# Patient Record
Sex: FEMALE | Race: BLACK OR AFRICAN AMERICAN | NOT HISPANIC OR LATINO | Employment: OTHER | URBAN - METROPOLITAN AREA
[De-identification: names, ages, dates, MRNs, and addresses within clinical notes are randomized per-mention and may not be internally consistent; named-entity substitution may affect disease eponyms.]

---

## 2017-01-11 ENCOUNTER — TELEPHONE (OUTPATIENT)
Dept: INTERNAL MEDICINE | Facility: CLINIC | Age: 72
End: 2017-01-11

## 2017-01-11 DIAGNOSIS — I10 ESSENTIAL HYPERTENSION: ICD-10-CM

## 2017-01-11 RX ORDER — ATENOLOL 50 MG/1
50 TABLET ORAL DAILY
Qty: 90 TABLET | Refills: 1 | Status: SHIPPED | OUTPATIENT
Start: 2017-01-11 | End: 2017-10-11 | Stop reason: SDUPTHER

## 2017-01-26 NOTE — TELEPHONE ENCOUNTER
PT SAYS THAT WHEN SHE TAKES 1-1/2 TABLETS DAILY SHE RUNS OUT BEFORE THE MONTH IS OVER. HER INSURANCE WILL NOT COVER IT BEING TAKEN LIKE THAT. IS THERE ANOTHER MEDICATION THAT CAN BE PRESCRIBED THAT PT CAN TAKE AS ALTERNATIVE

## 2017-03-03 ENCOUNTER — OFFICE VISIT (OUTPATIENT)
Dept: INTERNAL MEDICINE | Facility: CLINIC | Age: 72
End: 2017-03-03

## 2017-03-03 VITALS
RESPIRATION RATE: 16 BRPM | OXYGEN SATURATION: 94 % | DIASTOLIC BLOOD PRESSURE: 70 MMHG | BODY MASS INDEX: 44.55 KG/M2 | SYSTOLIC BLOOD PRESSURE: 112 MMHG | HEART RATE: 72 BPM | WEIGHT: 276 LBS | TEMPERATURE: 98.6 F

## 2017-03-03 DIAGNOSIS — J20.9 ACUTE BRONCHITIS, UNSPECIFIED ORGANISM: ICD-10-CM

## 2017-03-03 DIAGNOSIS — J10.1 INFLUENZA A: ICD-10-CM

## 2017-03-03 DIAGNOSIS — R68.89 FLU-LIKE SYMPTOMS: Primary | ICD-10-CM

## 2017-03-03 LAB
EXPIRATION DATE: ABNORMAL
FLUAV AG NPH QL: POSITIVE
FLUBV AG NPH QL: NEGATIVE
INTERNAL CONTROL: ABNORMAL
Lab: ABNORMAL

## 2017-03-03 PROCEDURE — 99213 OFFICE O/P EST LOW 20 MIN: CPT | Performed by: NURSE PRACTITIONER

## 2017-03-03 PROCEDURE — 87804 INFLUENZA ASSAY W/OPTIC: CPT | Performed by: NURSE PRACTITIONER

## 2017-03-03 RX ORDER — DEXAMETHASONE 2 MG/1
2 TABLET ORAL
Qty: 7 TABLET | Refills: 0 | Status: SHIPPED | OUTPATIENT
Start: 2017-03-03 | End: 2017-03-03 | Stop reason: ALTCHOICE

## 2017-03-03 RX ORDER — ALBUTEROL SULFATE 90 UG/1
2 AEROSOL, METERED RESPIRATORY (INHALATION) EVERY 4 HOURS PRN
Qty: 1 INHALER | Refills: 11 | Status: SHIPPED | OUTPATIENT
Start: 2017-03-03 | End: 2017-11-09

## 2017-03-03 RX ORDER — MIRABEGRON 50 MG/1
50 TABLET, FILM COATED, EXTENDED RELEASE ORAL EVERY MORNING
COMMUNITY
Start: 2017-03-01 | End: 2017-11-09

## 2017-03-03 NOTE — PROGRESS NOTES
Vitals:    03/03/17 1224   BP: 112/70   Pulse: 72   Resp: 16   Temp: 98.6 °F (37 °C)   SpO2: 94%     Last 2 weights    03/03/17  1224   Weight: 276 lb (125 kg)     Social History   Substance Use Topics   • Smoking status: Never Smoker   • Smokeless tobacco: Not on file   • Alcohol use No       Subjective     HPI  Pt presents to office today with body aches, headaches, fever (100), chills, sweating, cough, chest congestion, fatigue, malaise, diarrhea since this past Sunday. Diarrhea started Tuesday. Pt has tried coridcin and cough drops OTC. Pt has been drinking plenty of water. Pt thinks she caught something from her .     The following portions of the patient's history were reviewed and updated as appropriate: allergies, current medications, past medical history, past social history and problem list.    Review of Systems   Constitutional: Positive for chills (body aches), diaphoresis, fatigue and fever.   HENT: Positive for congestion (chest).    Respiratory: Positive for cough and wheezing.    Cardiovascular: Negative.    Gastrointestinal: Positive for diarrhea.   Neurological: Positive for headaches.       Objective     Physical Exam   Constitutional: She is oriented to person, place, and time. She appears well-developed and well-nourished.   HENT:   Head: Normocephalic and atraumatic.   Neck: Normal range of motion.   Cardiovascular: Normal rate, regular rhythm and normal heart sounds.    Pulmonary/Chest: Effort normal. She has wheezes in the right upper field and the left upper field.   Musculoskeletal: Normal range of motion.   Neurological: She is alert and oriented to person, place, and time.   Nursing note and vitals reviewed.      Assessment/Plan   Lana was seen today for generalized body aches, cough, fever, diarrhea, chest congestion, wheezing and headache.    Diagnoses and all orders for this visit:    Flu-like symptoms  -     POC Influenza A / B    Acute bronchitis, unspecified organism  -      albuterol (PROVENTIL HFA;VENTOLIN HFA) 108 (90 BASE) MCG/ACT inhaler; Inhale 2 puffs Every 4 (Four) Hours As Needed for wheezing.    Influenza A    Other orders  -     dexamethasone (DECADRON) 2 MG tablet; Take 1 tablet by mouth Daily With Breakfast.         -positive for Flu type A  -pt is outside of window for being tx with tamiflu. Symptomatic tx a this time with fluids, rest  -will give short course of steroids for wheezing  -cont home meds  -FU prn or if symptoms persist/worsen

## 2017-06-12 ENCOUNTER — HOSPITAL ENCOUNTER (INPATIENT)
Facility: HOSPITAL | Age: 72
LOS: 3 days | Discharge: HOME | End: 2017-06-15
Attending: EMERGENCY MEDICINE | Admitting: HOSPITALIST

## 2017-06-12 DIAGNOSIS — L03.113 CELLULITIS OF RIGHT ELBOW: Primary | ICD-10-CM

## 2017-06-12 PROBLEM — L03.90 CELLULITIS: Status: ACTIVE | Noted: 2017-06-12

## 2017-06-12 LAB
ALBUMIN SERPL-MCNC: 4.5 G/DL (ref 3.5–5.2)
ALBUMIN/GLOB SERPL: 1.2 G/DL
ALP SERPL-CCNC: 52 U/L (ref 39–117)
ALT SERPL W P-5'-P-CCNC: 8 U/L (ref 1–33)
ANION GAP SERPL CALCULATED.3IONS-SCNC: 12.6 MMOL/L
AST SERPL-CCNC: 16 U/L (ref 1–32)
BASOPHILS # BLD AUTO: 0.01 10*3/MM3 (ref 0–0.2)
BASOPHILS NFR BLD AUTO: 0.1 % (ref 0–1.5)
BILIRUB SERPL-MCNC: 0.3 MG/DL (ref 0.1–1.2)
BUN BLD-MCNC: 12 MG/DL (ref 8–23)
BUN/CREAT SERPL: 14 (ref 7–25)
CALCIUM SPEC-SCNC: 9.7 MG/DL (ref 8.6–10.5)
CHLORIDE SERPL-SCNC: 98 MMOL/L (ref 98–107)
CO2 SERPL-SCNC: 24.4 MMOL/L (ref 22–29)
CREAT BLD-MCNC: 0.86 MG/DL (ref 0.57–1)
DEPRECATED RDW RBC AUTO: 46.6 FL (ref 37–54)
EOSINOPHIL # BLD AUTO: 0.16 10*3/MM3 (ref 0–0.7)
EOSINOPHIL NFR BLD AUTO: 2.3 % (ref 0.3–6.2)
ERYTHROCYTE [DISTWIDTH] IN BLOOD BY AUTOMATED COUNT: 13.9 % (ref 11.7–13)
GFR SERPL CREATININE-BSD FRML MDRD: 79 ML/MIN/1.73
GLOBULIN UR ELPH-MCNC: 3.8 GM/DL
GLUCOSE BLD-MCNC: 149 MG/DL (ref 65–99)
HCT VFR BLD AUTO: 39.5 % (ref 35.6–45.5)
HGB BLD-MCNC: 13.2 G/DL (ref 11.9–15.5)
IMM GRANULOCYTES # BLD: 0.02 10*3/MM3 (ref 0–0.03)
IMM GRANULOCYTES NFR BLD: 0.3 % (ref 0–0.5)
LYMPHOCYTES # BLD AUTO: 2.55 10*3/MM3 (ref 0.9–4.8)
LYMPHOCYTES NFR BLD AUTO: 36.1 % (ref 19.6–45.3)
MCH RBC QN AUTO: 31 PG (ref 26.9–32)
MCHC RBC AUTO-ENTMCNC: 33.4 G/DL (ref 32.4–36.3)
MCV RBC AUTO: 92.7 FL (ref 80.5–98.2)
MONOCYTES # BLD AUTO: 0.74 10*3/MM3 (ref 0.2–1.2)
MONOCYTES NFR BLD AUTO: 10.5 % (ref 5–12)
NEUTROPHILS # BLD AUTO: 3.59 10*3/MM3 (ref 1.9–8.1)
NEUTROPHILS NFR BLD AUTO: 50.7 % (ref 42.7–76)
PLATELET # BLD AUTO: 178 10*3/MM3 (ref 140–500)
PMV BLD AUTO: 11 FL (ref 6–12)
POTASSIUM BLD-SCNC: 3.6 MMOL/L (ref 3.5–5.2)
PROT SERPL-MCNC: 8.3 G/DL (ref 6–8.5)
RBC # BLD AUTO: 4.26 10*6/MM3 (ref 3.9–5.2)
SODIUM BLD-SCNC: 135 MMOL/L (ref 136–145)
WBC NRBC COR # BLD: 7.07 10*3/MM3 (ref 4.5–10.7)

## 2017-06-12 PROCEDURE — 87040 BLOOD CULTURE FOR BACTERIA: CPT | Performed by: NURSE PRACTITIONER

## 2017-06-12 PROCEDURE — 87147 CULTURE TYPE IMMUNOLOGIC: CPT | Performed by: NURSE PRACTITIONER

## 2017-06-12 PROCEDURE — 80053 COMPREHEN METABOLIC PANEL: CPT | Performed by: NURSE PRACTITIONER

## 2017-06-12 PROCEDURE — 87186 SC STD MICRODIL/AGAR DIL: CPT | Performed by: NURSE PRACTITIONER

## 2017-06-12 PROCEDURE — 87070 CULTURE OTHR SPECIMN AEROBIC: CPT | Performed by: NURSE PRACTITIONER

## 2017-06-12 PROCEDURE — 87081 CULTURE SCREEN ONLY: CPT | Performed by: HOSPITALIST

## 2017-06-12 PROCEDURE — 25010000002 VANCOMYCIN: Performed by: HOSPITALIST

## 2017-06-12 PROCEDURE — 87205 SMEAR GRAM STAIN: CPT | Performed by: NURSE PRACTITIONER

## 2017-06-12 PROCEDURE — 99283 EMERGENCY DEPT VISIT LOW MDM: CPT

## 2017-06-12 PROCEDURE — 85025 COMPLETE CBC W/AUTO DIFF WBC: CPT | Performed by: NURSE PRACTITIONER

## 2017-06-12 RX ORDER — ACETAMINOPHEN 325 MG/1
650 TABLET ORAL EVERY 4 HOURS PRN
Status: DISCONTINUED | OUTPATIENT
Start: 2017-06-12 | End: 2017-06-15 | Stop reason: HOSPADM

## 2017-06-12 RX ORDER — SODIUM CHLORIDE 9 MG/ML
100 INJECTION, SOLUTION INTRAVENOUS CONTINUOUS
Status: DISCONTINUED | OUTPATIENT
Start: 2017-06-12 | End: 2017-06-13

## 2017-06-12 RX ORDER — SODIUM CHLORIDE 0.9 % (FLUSH) 0.9 %
10 SYRINGE (ML) INJECTION AS NEEDED
Status: DISCONTINUED | OUTPATIENT
Start: 2017-06-12 | End: 2017-06-14

## 2017-06-12 RX ORDER — HYDROCODONE BITARTRATE AND ACETAMINOPHEN 5; 325 MG/1; MG/1
1 TABLET ORAL EVERY 4 HOURS PRN
Status: DISCONTINUED | OUTPATIENT
Start: 2017-06-12 | End: 2017-06-15 | Stop reason: HOSPADM

## 2017-06-12 RX ORDER — SODIUM CHLORIDE 0.9 % (FLUSH) 0.9 %
1-10 SYRINGE (ML) INJECTION AS NEEDED
Status: DISCONTINUED | OUTPATIENT
Start: 2017-06-12 | End: 2017-06-15 | Stop reason: HOSPADM

## 2017-06-12 RX ORDER — HYDRALAZINE HYDROCHLORIDE 25 MG/1
25 TABLET, FILM COATED ORAL 4 TIMES DAILY PRN
Status: DISCONTINUED | OUTPATIENT
Start: 2017-06-12 | End: 2017-06-15 | Stop reason: HOSPADM

## 2017-06-12 RX ADMIN — VANCOMYCIN HYDROCHLORIDE 2500 MG: 1 INJECTION, POWDER, LYOPHILIZED, FOR SOLUTION INTRAVENOUS at 12:00

## 2017-06-12 RX ADMIN — SODIUM CHLORIDE 100 ML/HR: 9 INJECTION, SOLUTION INTRAVENOUS at 16:42

## 2017-06-12 RX ADMIN — VANCOMYCIN HYDROCHLORIDE 2000 MG: 1 INJECTION, POWDER, LYOPHILIZED, FOR SOLUTION INTRAVENOUS at 23:39

## 2017-06-12 RX ADMIN — ACETAMINOPHEN 650 MG: 325 TABLET ORAL at 19:03

## 2017-06-12 NOTE — ED TRIAGE NOTES
Patient was given one gram of rocephin IM on the 9th and given a prescription for keflex which patient is still taking.

## 2017-06-12 NOTE — ED PROVIDER NOTES
Attending Note    History:   Pt arrives to the ED c/o an area of redness and swelling to the right elbow for the past several days. She was seen at an urgent care center shortly after onset and she was prescribed  abx for cellulitis. Despite taking those meds, her sx have become worse and she has developed open sores on the elbow. She states she has a hx of recurrent cellulitis.    Exam:   Over her olecranon she has erythema and warmth, two areas of skin breakdown with purulent drainage. There are no signs of a bursitis. Her left elbow is not tender or painful with ROM and she is neurovascularly intact distally. There are no other abnormal findings noted.    Course:   Patient failed outpatient therapy, will admit for IV abx.      Attestation:  I supervised care provided by the midlevel provider.    We have discussed this patient's history, physical exam, and treatment plan.   I have reviewed the note and personally saw and examined the patient and agree with the plan of care.  I agree with the midlevel provider's findings and plan.  I reviewed the midlevel providers note.    Documentation assistance provided by gómez Bynum for Dr. Mao.  Information recorded by the scrant was done at my direction and has been verified and validated by me.         Keny Bynum  06/12/17 6713       Agustin Mao MD  06/12/17 [id number]

## 2017-06-12 NOTE — PLAN OF CARE
Problem: Patient Care Overview (Adult)  Goal: Plan of Care Review  Outcome: Ongoing (interventions implemented as appropriate)    06/12/17 9109   Coping/Psychosocial Response Interventions   Plan Of Care Reviewed With patient;daughter   Patient Care Overview   Progress no change   Outcome Evaluation   Outcome Summary/Follow up Plan pt arrived on floor from ER today with Cellulitis R elbow. there is an open wound present which has been cultured. Pt has no complaints of pain, awaiting med rec at this time. VSS, will continue to monitor       Goal: Adult Individualization and Mutuality  Outcome: Ongoing (interventions implemented as appropriate)    Problem: Cellulitis (Adult)  Goal: Signs and Symptoms of Listed Potential Problems Will be Absent or Manageable (Cellulitis)  Outcome: Ongoing (interventions implemented as appropriate)    Problem: Infection, Risk/Actual (Adult)  Goal: Identify Related Risk Factors and Signs and Symptoms  Outcome: Ongoing (interventions implemented as appropriate)  Goal: Infection Prevention/Resolution  Outcome: Ongoing (interventions implemented as appropriate)

## 2017-06-12 NOTE — H&P
Patient Care Team:  Raoul Ramirez MD as PCP - General (Internal Medicine)    Chief complaint right elbow redness    Subjective   72 yo who comes to the ER for erythema and swelling of the right elbow for the past several days. She was seen at an urgent care center on Friday and she was given a rocephin injection and given a prescription for bactrim for cellulitis.  She states that despite taking the bactrim, her sx have become worse and she has developed open sores on the elbow. She states she had cellulitis of the lower extremities in the past maybe a year or so ago.    She had previous knee replacements with Dr Lorenz.    Wound Infection   This is a new problem. The current episode started in the past 7 days. The problem occurs constantly. The problem has been gradually worsening. Associated symptoms include a rash. Pertinent negatives include no abdominal pain, anorexia, arthralgias, chest pain, chills, fever, headaches, nausea, numbness or sore throat.       Review of Systems   Constitutional: Negative for activity change, appetite change, chills and fever.   HENT: Negative for dental problem, postnasal drip, rhinorrhea and sore throat.    Respiratory: Negative for apnea and shortness of breath.    Cardiovascular: Negative for chest pain and palpitations.   Gastrointestinal: Negative for abdominal distention, abdominal pain, anorexia and nausea.   Endocrine: Negative for cold intolerance and polydipsia.   Musculoskeletal: Negative for arthralgias.   Skin: Positive for rash.   Neurological: Negative for dizziness, numbness and headaches.   Hematological: Negative for adenopathy.        Past Medical History:   Diagnosis Date   • Arthritis    • Edema    • Fatigue    • Headache    • Hx of bone density study     10/23/2014   • Hyperlipidemia    • Hypertension    • Vitamin D deficiency      Past Surgical History:   Procedure Laterality Date   • HYSTERECTOMY     • KNEE SURGERY     • MAMMO BILATERAL  2016     [facility]      Family History   Problem Relation Age of Onset   • Colon cancer Mother    • Glaucoma Mother    • Stroke Mother    • Glaucoma Sister    • Heart disease Brother      Social History   Substance Use Topics   • Smoking status: Never Smoker   • Smokeless tobacco: None   • Alcohol use No       (Not in a hospital admission)  Allergies:  Review of patient's allergies indicates no known allergies.    Objective      Vital Signs  Temp:  [98.2 °F (36.8 °C)] 98.2 °F (36.8 °C)  Heart Rate:  [93] 93  Resp:  [16] 16  BP: (155)/(80) 155/80    Physical Exam   Constitutional: She is oriented to person, place, and time. She appears well-developed and well-nourished.   HENT:   Head: Normocephalic and atraumatic.   Mouth/Throat: No oropharyngeal exudate.   Eyes: EOM are normal. Pupils are equal, round, and reactive to light.   Neck: Normal range of motion. Neck supple. No tracheal deviation present. No thyromegaly present.   Cardiovascular: Normal rate and regular rhythm.  Exam reveals no gallop and no friction rub.    No murmur heard.  Pulmonary/Chest: Effort normal and breath sounds normal. No respiratory distress. She has no wheezes.   Abdominal: Soft. She exhibits no distension. There is no tenderness.   Musculoskeletal: Normal range of motion. She exhibits no edema or deformity.   Elbow erythema, pain with movement of the elbow, two pustules that are draining fluid. There is a margin of erythema around the pustules   Neurological: She is alert and oriented to person, place, and time.   Skin: Skin is warm and dry. No erythema. No pallor.   Psychiatric: She has a normal mood and affect. Her behavior is normal.       Results Review:   I reviewed the patient's new clinical results.  Discussed with APRN in the ER      Assessment/Plan         Cellulitis but concern for elbow joint involvement      Essential hypertension      Rheumatoid arthritis involving both hands- not on any biologics for this      Fatigue       Sleep apnea      history of Bronchitis and uses albuterol at home, currently not having any respiratory symptoms    Hypertension - currently elevated 187/93, will continue with continue norvasc, atenolol and Hyzzar    Assessment & Plan  Wound cultures and blood cultures were obtained in the ER    vancomyin IV    Infectious disease consult, I discussed the case with Dr Butler and he is in agreement with IV Vanc as the suspicion is a staph infection and less likely strep infection.    Ortho consult, pain with movement of the elbow    Nasal swab for MRSA    Will be careful with the pain medication given her h/o of sleep apnea    Continuous pulse ox    I discussed the patients findings and my recommendations with patient    Cristian Grace MD  06/12/17  2:31 PM    Time:

## 2017-06-12 NOTE — ED PROVIDER NOTES
EMERGENCY DEPARTMENT ENCOUNTER    CHIEF COMPLAINT  Chief Complaint: upper extremity pain  History given by: patient  History limited by: N/A  Room Number: 05/05  PMD: Raoul Ramirez MD      HPI:  Pt is a 71 y.o. female who presents for upper extremity pain. She states that about 3 days ago, she developed pain, redness, swelling, and warmth to the right elbow. She reports that at the time, she was evaluated at [facility], was diagnosed with right elbow cellulitis, received IM rocephin, and was discharged with prescription for bactrim DS. However, despite taking the bactrim DS, sx have worsened and she has now developed 2 open sores to the right elbow. She has also had diffuse itching after taking bactrim DS. She denies skin rash, fevers, chills, chest pain, trouble breathing, sore throat, swelling, trouble swallowing, abd pain, N/V/D, sensory loss, motor loss, hx of diabetes, and missing any doses of bactrim DS. She states that she called her PMD who referred her to the ED for further evaluation. Past Medical History of HTN and hyperlipidemia.     Duration: started about 3 days ago  Timing: constant  Location: right elbow  Radiation: none  Quality: pain  Intensity/Severity: moderate  Progression: worsening  Associated Symptoms: right elbow- redness, swelling, warmth; 2 open sores to right elbow; diffuse itching since taking bactrim DS  Aggravating Factors: none  Alleviating Factors: none   Previous Episodes: pt states that she had similar sx with cellulitis in the past but to the BLE  Treatment before arrival: pt states that she has received IM rocephin and has taken bactrim DS without sx relief     PAST MEDICAL HISTORY  Active Ambulatory Problems     Diagnosis Date Noted   • Hyperlipidemia 03/21/2016   • Vitamin deficiency 03/21/2016   • Essential hypertension 03/21/2016   • Rheumatoid arthritis involving both hands 03/21/2016   • Fatigue 04/06/2016   • ANTOINE (dyspnea on exertion) 04/06/2016   • Dysuria 08/02/2016   •  Urge incontinence of urine 08/02/2016   • Hypovitaminosis D 08/02/2016   • Sleep apnea 08/02/2016   • Encounter for screening colonoscopy 08/02/2016   • Bronchitis 08/12/2016   • Cough 08/12/2016   • Generalized osteoarthritis of multiple sites 12/15/2016   • Morbid obesity 12/15/2016     Resolved Ambulatory Problems     Diagnosis Date Noted   • No Resolved Ambulatory Problems     Past Medical History:   Diagnosis Date   • Arthritis    • Edema    • Fatigue    • Headache    • Hx of bone density study    • Hyperlipidemia    • Hypertension    • Vitamin D deficiency        PAST SURGICAL HISTORY  Past Surgical History:   Procedure Laterality Date   • HYSTERECTOMY     • KNEE SURGERY     • MAMMO BILATERAL  2016    [facility]        FAMILY HISTORY  Family History   Problem Relation Age of Onset   • Colon cancer Mother    • Glaucoma Mother    • Stroke Mother    • Glaucoma Sister    • Heart disease Brother        SOCIAL HISTORY  Social History     Social History   • Marital status:      Spouse name: N/A   • Number of children: N/A   • Years of education: N/A     Occupational History   • Not on file.     Social History Main Topics   • Smoking status: Never Smoker   • Smokeless tobacco: Not on file   • Alcohol use No   • Drug use: Defer   • Sexual activity: Defer     Other Topics Concern   • Not on file     Social History Narrative   • No narrative on file         ALLERGIES  Review of patient's allergies indicates no known allergies.    REVIEW OF SYSTEMS  Review of Systems   Constitutional: Negative for chills and fever.   HENT: Negative for facial swelling, sore throat and trouble swallowing.    Respiratory: Negative for cough and shortness of breath.    Cardiovascular: Negative for chest pain.   Gastrointestinal: Negative for abdominal pain, diarrhea, nausea and vomiting.   Genitourinary: Negative for difficulty urinating and dysuria.   Musculoskeletal: Negative for back pain.        Right elbow- pain,  swelling, warmth   Skin: Positive for color change (right elbow redness) and wound (2 open sores to right elbow). Negative for rash.        Diffuse itching (since starting bactrim DS)   Neurological: Negative for dizziness, weakness and numbness.   Psychiatric/Behavioral: The patient is not nervous/anxious.        PHYSICAL EXAM  ED Triage Vitals   Temp Heart Rate Resp BP SpO2   06/12/17 1038 06/12/17 1043 06/12/17 1043 06/12/17 1043 06/12/17 1043   98.2 °F (36.8 °C) 93 16 155/80 99 % WNL       Physical Exam   Constitutional: She is oriented to person, place, and time and well-developed, well-nourished, and in no distress.   HENT:   Head: Normocephalic.   Mouth/Throat: Mucous membranes are normal.   Eyes: No scleral icterus.   Neck: Normal range of motion.   Cardiovascular: Normal rate, regular rhythm and normal heart sounds.    Pulses:       Radial pulses are 2+ on the right side, and 2+ on the left side.   Pulmonary/Chest: Effort normal and breath sounds normal.   Abdominal: Soft. There is no tenderness. There is no rebound and no guarding.   Musculoskeletal: Normal range of motion.   Swelling, warmth, erythema, and tenderness to right elbow; able to flex and extend right elbow; FROM of RUE; NV intact distally to RUE   Neurological: She is alert and oriented to person, place, and time. She has normal motor skills and normal sensation.   Skin: Skin is warm and dry. No rash noted.   2 small open and draining areas to the right elbow, no skin rash noted   Psychiatric: Mood and affect normal.   Nursing note and vitals reviewed.      LAB RESULTS  Recent Results (from the past 24 hour(s))   Comprehensive Metabolic Panel    Collection Time: 06/12/17 11:58 AM   Result Value Ref Range    Glucose 149 (H) 65 - 99 mg/dL    BUN 12 8 - 23 mg/dL    Creatinine 0.86 0.57 - 1.00 mg/dL    Sodium 135 (L) 136 - 145 mmol/L    Potassium 3.6 3.5 - 5.2 mmol/L    Chloride 98 98 - 107 mmol/L    CO2 24.4 22.0 - 29.0 mmol/L    Calcium 9.7 8.6  - 10.5 mg/dL    Total Protein 8.3 6.0 - 8.5 g/dL    Albumin 4.50 3.50 - 5.20 g/dL    ALT (SGPT) 8 1 - 33 U/L    AST (SGOT) 16 1 - 32 U/L    Alkaline Phosphatase 52 39 - 117 U/L    Total Bilirubin 0.3 0.1 - 1.2 mg/dL    eGFR  African Amer 79 >60 mL/min/1.73    Globulin 3.8 gm/dL    A/G Ratio 1.2 g/dL    BUN/Creatinine Ratio 14.0 7.0 - 25.0    Anion Gap 12.6 mmol/L   CBC Auto Differential    Collection Time: 06/12/17 11:58 AM   Result Value Ref Range    WBC 7.07 4.50 - 10.70 10*3/mm3    RBC 4.26 3.90 - 5.20 10*6/mm3    Hemoglobin 13.2 11.9 - 15.5 g/dL    Hematocrit 39.5 35.6 - 45.5 %    MCV 92.7 80.5 - 98.2 fL    MCH 31.0 26.9 - 32.0 pg    MCHC 33.4 32.4 - 36.3 g/dL    RDW 13.9 (H) 11.7 - 13.0 %    RDW-SD 46.6 37.0 - 54.0 fl    MPV 11.0 6.0 - 12.0 fL    Platelets 178 140 - 500 10*3/mm3    Neutrophil % 50.7 42.7 - 76.0 %    Lymphocyte % 36.1 19.6 - 45.3 %    Monocyte % 10.5 5.0 - 12.0 %    Eosinophil % 2.3 0.3 - 6.2 %    Basophil % 0.1 0.0 - 1.5 %    Immature Grans % 0.3 0.0 - 0.5 %    Neutrophils, Absolute 3.59 1.90 - 8.10 10*3/mm3    Lymphocytes, Absolute 2.55 0.90 - 4.80 10*3/mm3    Monocytes, Absolute 0.74 0.20 - 1.20 10*3/mm3    Eosinophils, Absolute 0.16 0.00 - 0.70 10*3/mm3    Basophils, Absolute 0.01 0.00 - 0.20 10*3/mm3    Immature Grans, Absolute 0.02 0.00 - 0.03 10*3/mm3   Wound Culture    Collection Time: 06/12/17 12:04 PM   Result Value Ref Range    Gram Stain Result Rare (1+) Gram positive cocci     Gram Stain Result No WBCs seen        I ordered the above labs and reviewed the results          PROGRESS AND CONSULTS  11:33 AM- Ordered vancomycin for right elbow cellulitis.   1:09 PM- Reviewed pt's history and workup with Dr. Mao.  At bedside evaluation, they agree with the plan of care.  2:19 PM- Sent call out to [facility] for admission.   2:25 PM- Rechecked pt. She is resting comfortably and is in no acute distress. Discussed with pt about all pertinent results including stable labs. Discussed pt admission  "for further care and observation. Pt verbalizes understanding and agrees with plan. Pt is ready for admission.  2:30 PM- Discussed case with Dr Grace ([facility] hospitalist)  Reviewed history, exam, results and treatments.  Discussed concerns and plan of care. Dr Grace accepts pt to be admitted to med/surg (observation).    ADMISSION    Discussed treatment plan and reason for admission with pt/family and admitting physician.  Pt/family voiced understanding of the plan for admission for further testing/treatment as needed.      DIAGNOSIS  Final diagnoses:   Cellulitis of right elbow       COURSE & MEDICAL DECISION MAKING  Pertinent Labs that were ordered and reviewed are noted above.  Results were reviewed/discussed with the patient and they were also made aware of online assess.   Pt also made aware that some labs, such as cultures, will not be resulted during ER visit and follow up with PMD is necessary.     MEDICATIONS GIVEN IN ER  Medications   sodium chloride 0.9 % flush 10 mL (not administered)   vancomycin 2500 mg/500 mL 0.9% NS IVPB ([facility]) (2,500 mg Intravenous New Bag 6/12/17 1200)       /80 (BP Location: Left arm, Patient Position: Sitting)  Pulse 93  Temp 98.2 °F (36.8 °C)  Resp 16  Ht 66\" (167.6 cm)  Wt 275 lb (125 kg)  SpO2 99%  Breastfeeding? No  BMI 44.39 kg/m2      I personally reviewed the past medical history, past surgical history, social history, family history, current medications and allergies as they appear in this chart.  The scribe's note accurately reflects the work and decisions made by me.     I personally scribed for MUKESH Craig on 6/12/2017 at 1:40 PM.  Electronically signed by Rupal George on 6/12/2017 at time 1:40 PM       Rupal George  06/12/17 5715       ANGELA Zurita  06/12/17 5007    "

## 2017-06-12 NOTE — PROGRESS NOTES
"Pharmacokinetic Consult - Vancomycin Dosing (Initial Note)    Lana Yañez has a consult for pharmacy to dose vancomycin for right elbow cellulitis.  Pharmacy dosing vancomycin per Dr. Grace's request.   Goal trough: 15-20 mg/L       Relevant clinical data and objective history reviewed:  71 y.o. female 66\" (167.6 cm) 275 lb (125 kg)    Past Medical History:   Diagnosis Date   • Arthritis    • Edema    • Fatigue    • Headache    • Hx of bone density study     10/23/2014   • Hyperlipidemia    • Hypertension    • Vitamin D deficiency      Creatinine   Date Value Ref Range Status   06/12/2017 0.86 0.57 - 1.00 mg/dL Final   08/02/2016 0.93 0.57 - 1.00 mg/dL Final   04/06/2016 0.77 0.57 - 1.00 mg/dL Final   02/26/2016 0.87 0.57 - 1.00 mg/dL Final     BUN   Date Value Ref Range Status   06/12/2017 12 8 - 23 mg/dL Final     Estimated Creatinine Clearance: 83.4 mL/min (by C-G formula based on Cr of 0.86).    Lab Results   Component Value Date    WBC 7.07 06/12/2017     Temp Readings from Last 3 Encounters:   06/12/17 98.2 °F (36.8 °C)   03/03/17 98.6 °F (37 °C) (Tympanic)   12/15/16 97.1 °F (36.2 °C) (Tympanic)          Assessment/Plan  Will start vancomycin 2000 mg IV q12h. Vanc 2500 mg load given in ER this afternoon. Vancomycin level to be drawn prior to AM dose on 6/14/17. Pharmacy will continue to follow daily while on vancomycin and adjust as needed.     Mishel Allison, [facility]    "

## 2017-06-13 ENCOUNTER — APPOINTMENT (OUTPATIENT)
Dept: GENERAL RADIOLOGY | Facility: HOSPITAL | Age: 72
End: 2017-06-13
Attending: INTERNAL MEDICINE

## 2017-06-13 LAB
CRP SERPL-MCNC: 0.5 MG/DL (ref 0–0.5)
ERYTHROCYTE [SEDIMENTATION RATE] IN BLOOD: 17 MM/HR (ref 0–30)

## 2017-06-13 PROCEDURE — 99221 1ST HOSP IP/OBS SF/LOW 40: CPT | Performed by: ORTHOPAEDIC SURGERY

## 2017-06-13 PROCEDURE — 85652 RBC SED RATE AUTOMATED: CPT | Performed by: INTERNAL MEDICINE

## 2017-06-13 PROCEDURE — 99223 1ST HOSP IP/OBS HIGH 75: CPT | Performed by: INTERNAL MEDICINE

## 2017-06-13 PROCEDURE — 86140 C-REACTIVE PROTEIN: CPT | Performed by: INTERNAL MEDICINE

## 2017-06-13 PROCEDURE — 73070 X-RAY EXAM OF ELBOW: CPT

## 2017-06-13 PROCEDURE — 25010000002 VANCOMYCIN: Performed by: HOSPITALIST

## 2017-06-13 RX ORDER — LOSARTAN POTASSIUM AND HYDROCHLOROTHIAZIDE 25; 100 MG/1; MG/1
1 TABLET ORAL DAILY
Status: DISCONTINUED | OUTPATIENT
Start: 2017-06-13 | End: 2017-06-15 | Stop reason: HOSPADM

## 2017-06-13 RX ORDER — DORZOLAMIDE HYDROCHLORIDE AND TIMOLOL MALEATE 20; 5 MG/ML; MG/ML
1 SOLUTION/ DROPS OPHTHALMIC EVERY 12 HOURS
Status: DISCONTINUED | OUTPATIENT
Start: 2017-06-14 | End: 2017-06-13 | Stop reason: CLARIF

## 2017-06-13 RX ORDER — BUPROPION HYDROCHLORIDE 75 MG/1
75 TABLET ORAL 2 TIMES DAILY
Status: DISCONTINUED | OUTPATIENT
Start: 2017-06-13 | End: 2017-06-14

## 2017-06-13 RX ORDER — AMLODIPINE BESYLATE 2.5 MG/1
2.5 TABLET ORAL DAILY
Status: DISCONTINUED | OUTPATIENT
Start: 2017-06-13 | End: 2017-06-15 | Stop reason: HOSPADM

## 2017-06-13 RX ORDER — FUROSEMIDE 20 MG/1
20 TABLET ORAL DAILY
Status: DISCONTINUED | OUTPATIENT
Start: 2017-06-13 | End: 2017-06-14

## 2017-06-13 RX ORDER — CETIRIZINE HYDROCHLORIDE 10 MG/1
10 TABLET ORAL DAILY
Status: DISCONTINUED | OUTPATIENT
Start: 2017-06-13 | End: 2017-06-15 | Stop reason: HOSPADM

## 2017-06-13 RX ORDER — LATANOPROST 50 UG/ML
1 SOLUTION/ DROPS OPHTHALMIC NIGHTLY
Status: DISCONTINUED | OUTPATIENT
Start: 2017-06-14 | End: 2017-06-15 | Stop reason: HOSPADM

## 2017-06-13 RX ORDER — DORZOLAMIDE HYDROCHLORIDE AND TIMOLOL MALEATE 20; 5 MG/ML; MG/ML
1 SOLUTION/ DROPS OPHTHALMIC EVERY 12 HOURS
Status: DISCONTINUED | OUTPATIENT
Start: 2017-06-13 | End: 2017-06-13

## 2017-06-13 RX ORDER — LATANOPROST 50 UG/ML
1 SOLUTION/ DROPS OPHTHALMIC NIGHTLY
Status: DISCONTINUED | OUTPATIENT
Start: 2017-06-13 | End: 2017-06-13

## 2017-06-13 RX ORDER — ATENOLOL 50 MG/1
50 TABLET ORAL DAILY
Status: DISCONTINUED | OUTPATIENT
Start: 2017-06-13 | End: 2017-06-15 | Stop reason: HOSPADM

## 2017-06-13 RX ORDER — ALBUTEROL SULFATE 2.5 MG/3ML
2.5 SOLUTION RESPIRATORY (INHALATION) EVERY 6 HOURS PRN
Status: DISCONTINUED | OUTPATIENT
Start: 2017-06-13 | End: 2017-06-15 | Stop reason: HOSPADM

## 2017-06-13 RX ORDER — TIMOLOL MALEATE 5 MG/ML
1 SOLUTION/ DROPS OPHTHALMIC EVERY 12 HOURS SCHEDULED
Status: DISCONTINUED | OUTPATIENT
Start: 2017-06-13 | End: 2017-06-15 | Stop reason: HOSPADM

## 2017-06-13 RX ORDER — DORZOLAMIDE HCL 20 MG/ML
1 SOLUTION/ DROPS OPHTHALMIC EVERY 12 HOURS SCHEDULED
Status: DISCONTINUED | OUTPATIENT
Start: 2017-06-13 | End: 2017-06-15 | Stop reason: HOSPADM

## 2017-06-13 RX ORDER — OXYBUTYNIN CHLORIDE 10 MG/1
10 TABLET, EXTENDED RELEASE ORAL DAILY
Status: DISCONTINUED | OUTPATIENT
Start: 2017-06-13 | End: 2017-06-14

## 2017-06-13 RX ADMIN — TIMOLOL MALEATE 1 DROP: 5 SOLUTION OPHTHALMIC at 23:17

## 2017-06-13 RX ADMIN — BUPROPION HYDROCHLORIDE 75 MG: 75 TABLET, FILM COATED ORAL at 09:00

## 2017-06-13 RX ADMIN — DORZOLAMIDE HYDROCHLORIDE 1 DROP: 20 SOLUTION/ DROPS OPHTHALMIC at 09:00

## 2017-06-13 RX ADMIN — OXYBUTYNIN CHLORIDE 10 MG: 10 TABLET, EXTENDED RELEASE ORAL at 09:00

## 2017-06-13 RX ADMIN — VANCOMYCIN HYDROCHLORIDE 2000 MG: 1 INJECTION, POWDER, LYOPHILIZED, FOR SOLUTION INTRAVENOUS at 23:27

## 2017-06-13 RX ADMIN — BUPROPION HYDROCHLORIDE 75 MG: 75 TABLET, FILM COATED ORAL at 17:35

## 2017-06-13 RX ADMIN — AMLODIPINE BESYLATE 2.5 MG: 2.5 TABLET ORAL at 09:00

## 2017-06-13 RX ADMIN — FUROSEMIDE 20 MG: 20 TABLET ORAL at 09:00

## 2017-06-13 RX ADMIN — ATENOLOL 50 MG: 50 TABLET ORAL at 09:00

## 2017-06-13 RX ADMIN — DORZOLAMIDE HYDROCHLORIDE 1 DROP: 20 SOLUTION/ DROPS OPHTHALMIC at 23:17

## 2017-06-13 RX ADMIN — LOSARTAN POTASSIUM AND HYDROCHLOROTHIAZIDE TABLETS 1 TABLET: 100; 25 TABLET, FILM COATED ORAL at 09:00

## 2017-06-13 RX ADMIN — TIMOLOL MALEATE 1 DROP: 5 SOLUTION OPHTHALMIC at 09:01

## 2017-06-13 RX ADMIN — CETIRIZINE HYDROCHLORIDE 10 MG: 10 TABLET, FILM COATED ORAL at 09:00

## 2017-06-13 RX ADMIN — VANCOMYCIN HYDROCHLORIDE 2000 MG: 1 INJECTION, POWDER, LYOPHILIZED, FOR SOLUTION INTRAVENOUS at 11:30

## 2017-06-13 RX ADMIN — ACETAMINOPHEN 650 MG: 325 TABLET ORAL at 17:35

## 2017-06-13 NOTE — PROGRESS NOTES
Name: Lana Yañez ADMIT: 2017   : 1945  PCP: Raoul Ramirez MD    MRN: [id number] LOS: 1 days   AGE/SEX: 71 y.o. female  ROOM: [facility]     Subjective   Subjective  Elbow feels slightly better.  Has been dealing with glaucoma chronically.  Eyes have been bothering her worse the last few weeks.  She takes drops for them.  Symptoms feel slightly better today compared to yesterday.  Vision is okay.  Has outpatient follow-up arranged.    Objective   Vital Signs  Temp:  [97.2 °F (36.2 °C)-98.3 °F (36.8 °C)] 97.2 °F (36.2 °C)  Heart Rate:  [83-93] 83  Resp:  [16] 16  BP: (147-187)/(80-93) 148/83  SpO2:  [95 %-99 %] 95 %  on   ;   O2 Device: room air  Body mass index is 44.39 kg/(m^2).    Physical Exam   Constitutional: She is oriented to person, place, and time. She appears well-developed. She is cooperative. No distress.   Neck: No JVD present. No tracheal deviation present. No thyromegaly present.   Cardiovascular: Normal rate and regular rhythm.    No murmur heard.  Pulmonary/Chest: Effort normal and breath sounds normal. No respiratory distress.   Abdominal: Soft. Normal appearance and bowel sounds are normal. She exhibits no distension. There is no tenderness.   Musculoskeletal:   Elbow erythema, pain with movement of the elbow, two pustules that are draining fluid. There is a margin of erythema around the pustules    Neurological: She is alert and oriented to person, place, and time.   Skin: Skin is warm and dry. No rash noted.   Psychiatric: She has a normal mood and affect. Her behavior is normal.   Nursing note and vitals reviewed.      Results Review:       I reviewed the patient's new clinical results.    Results from last 7 days  Lab Units 17  1158   WBC 10*3/mm3 7.07   HEMOGLOBIN g/dL 13.2   PLATELETS 10*3/mm3 178     Results from last 7 days  Lab Units 17  1158   SODIUM mmol/L 135*   POTASSIUM mmol/L 3.6   CHLORIDE mmol/L 98   TOTAL CO2 mmol/L 24.4   BUN mg/dL 12    CREATININE mg/dL 0.86   GLUCOSE mg/dL 149*   ALBUMIN g/dL 4.50   BILIRUBIN mg/dL 0.3   ALK PHOS U/L 52   AST (SGOT) U/L 16   ALT (SGPT) U/L 8   Estimated Creatinine Clearance: 83.4 mL/min (by C-G formula based on Cr of 0.86).  Results from last 7 days  Lab Units 06/12/17  1158   CALCIUM mg/dL 9.7   ALBUMIN g/dL 4.50         Results from last 7 days  Lab Units 06/12/17  1234 06/12/17  1204 06/12/17  1158   BLOODCX  No growth at less than 24 hours  --  No growth at less than 24 hours   GRAM STAIN RESULT   --  Rare (1+) Gram positive cocci  No WBCs seen  --      Microbiology Results (last 10 days)     Procedure Component Value - Date/Time    Blood Culture [[id number]]  (Normal) Collected:  06/12/17 1234    Lab Status:  Preliminary result Specimen:  Blood from Arm, Left Updated:  06/13/17 0101     Blood Culture No growth at less than 24 hours    Wound Culture [[id number]] Collected:  06/12/17 1204    Lab Status:  Preliminary result Specimen:  Wound from Elbow, Right Updated:  06/12/17 1328     Gram Stain Result Rare (1+) Gram positive cocci      No WBCs seen    Blood Culture [[id number]]  (Normal) Collected:  06/12/17 1158    Lab Status:  Preliminary result Specimen:  Blood from Arm, Left Updated:  06/13/17 0101     Blood Culture No growth at less than 24 hours              amLODIPine 2.5 mg Oral Daily   atenolol 50 mg Oral Daily   buPROPion 75 mg Oral BID   cetirizine 10 mg Oral Daily   dorzolamide 1 drop Both Eyes Q12H   And      timolol 1 drop Both Eyes Q12H   furosemide 20 mg Oral Daily   [START ON 6/14/2017] latanoprost 1 drop Both Eyes Nightly   losartan-hydrochlorothiazide 1 tablet Oral Daily   oxybutynin XL 10 mg Oral Daily   vancomycin 2,000 mg Intravenous Q12H       Pharmacy to dose vancomycin    Diet Regular      Assessment/Plan   Assessment:     Active Hospital Problems (** Indicates Principal Problem)    Diagnosis Date Noted   • **Cellulitis of right elbow [L03.113] 06/12/2017   • Morbid obesity with BMI of  40.0-44.9, adult [E66.01, Z68.41] 12/15/2016   • Bronchitis [J40] 08/12/2016   • Sleep apnea [G47.30] 08/02/2016   • Fatigue [R53.83] 04/06/2016   • Essential hypertension [I10] 03/21/2016   • Rheumatoid arthritis involving both hands [M06.9] 03/21/2016      Resolved Hospital Problems    Diagnosis Date Noted Date Resolved   No resolved problems to display.       Plan:   - Admitted to monitored unit  - Continue on  VANCOMYCIN IV as dosed by pharmacy for treatment of right elbow cellulitis.  - Orthopedic recommendations pending.  Elbow x-ray pending.  - Hemodynamically stable.  - Blood cultures x2 done in ED negative thus far.    - MRSA screen pending.        Khoa Rodriguez MD  [facility]  06/13/17  8:11 AM

## 2017-06-13 NOTE — PLAN OF CARE
Problem: Patient Care Overview (Adult)  Goal: Plan of Care Review  Outcome: Ongoing (interventions implemented as appropriate)    06/13/17 0506 06/13/17 0900 06/13/17 1721   Coping/Psychosocial Response Interventions   Plan Of Care Reviewed With --  patient --    Patient Care Overview   Progress no change --  --    Outcome Evaluation   Outcome Summary/Follow up Plan --  --  wound culture came back positive for MRSA, pt now in contact isolation. ID came by, no changes to ABX. Elbow xray done. if infection not in the bone patient could leave tmw or by the end of the week.          Problem: Cellulitis (Adult)  Goal: Signs and Symptoms of Listed Potential Problems Will be Absent or Manageable (Cellulitis)  Outcome: Ongoing (interventions implemented as appropriate)    Problem: Infection, Risk/Actual (Adult)  Goal: Identify Related Risk Factors and Signs and Symptoms  Outcome: Ongoing (interventions implemented as appropriate)  Goal: Infection Prevention/Resolution  Outcome: Ongoing (interventions implemented as appropriate)

## 2017-06-13 NOTE — PLAN OF CARE
Problem: Patient Care Overview (Adult)  Goal: Plan of Care Review  Outcome: Ongoing (interventions implemented as appropriate)    06/13/17 6750   Coping/Psychosocial Response Interventions   Plan Of Care Reviewed With patient   Patient Care Overview   Progress no change   Outcome Evaluation   Outcome Summary/Follow up Plan covered wound to right elbow per patient request related to scant drainage; picture taken of right elbow; Tylenol x1 for discomfort; tolerating IV vanc without difficulty         Problem: Cellulitis (Adult)  Goal: Signs and Symptoms of Listed Potential Problems Will be Absent or Manageable (Cellulitis)  Outcome: Ongoing (interventions implemented as appropriate)    Problem: Infection, Risk/Actual (Adult)  Goal: Infection Prevention/Resolution  Outcome: Ongoing (interventions implemented as appropriate)

## 2017-06-13 NOTE — CONSULTS
Referring Provider: Cristian Grace MD  [address]  [address]  [place] [address]    Reason for Consultation: elbow infection    History of present illness:  Ms. Yañez is a 72 YO who I am asked to evaluate and give opinion for elbow infection. History is obtained from the patient, Dr Grace, and review of the old medical records which I summarize/synthesize as follows: About 3 weeks prior to admission she thnks she might have hit her elbow on a door. She gradually developed pain, swelling, and erythema of t he R elbow worsening over the past few days. Due to the sharp constant pain, she went to urgent care and I reviewed that note which shows she was given IM ceftriaxone and an RX for TMP-SMX. The elbow actually became a bit worse with more drainage while on therapy so came to the ER yesterday. In the ER she was afebrile with normal HR. She did not have any imaging done. WBC was normal. She was started on vancomycin. Orthopedics was consulted and awaiting their evaluation. She reports a past history of LE cellulitis but denies known MRSA infections.     PMH:  OA  HTN  HLD  Low Vit D  Glaucoma    Past Surgical History:   Procedure Laterality Date   • HYSTERECTOMY     • KNEE SURGERY     • MAMMO BILATERAL      [facility]        Social History:  Lives w/ ;  48 years  Worked in customer service training  No illicits    Family History:  Mom:  at age 95 with RA and colon CA    Allergies:  NKDA    Medications:    Current Facility-Administered Medications:   •  acetaminophen (TYLENOL) tablet 650 mg, 650 mg, Oral, Q4H PRN, Cristian Grace MD, 650 mg at 17 1903  •  albuterol (PROVENTIL) nebulizer solution 0.083% 2.5 mg/3mL, 2.5 mg, Nebulization, Q6H PRN, Cristian Grace MD  •  amLODIPine (NORVASC) tablet 2.5 mg, 2.5 mg, Oral, Daily, Cristian Grace MD  •  atenolol (TENORMIN) tablet 50 mg, 50 mg, Oral, Daily, Cristian Grace MD  •  buPROPion (WELLBUTRIN) tablet 75 mg, 75  mg, Oral, BID, Cristian Grace MD  •  cetirizine (zyrTEC) tablet 10 mg, 10 mg, Oral, Daily, Cristian Grace MD  •  dorzolamide (TRUSOPT) 2 % ophthalmic solution 1 drop, 1 drop, Both Eyes, Q12H **AND** timolol (TIMOPTIC) 0.5 % ophthalmic solution 1 drop, 1 drop, Both Eyes, Q12H, Cristian Grace MD  •  furosemide (LASIX) tablet 20 mg, 20 mg, Oral, Daily, Cristian Grace MD  •  hydrALAZINE (APRESOLINE) tablet 25 mg, 25 mg, Oral, 4x Daily PRN, Vikash David MD  •  HYDROcodone-acetaminophen (NORCO) 5-325 MG per tablet 1 tablet, 1 tablet, Oral, Q4H PRN, Cristian Grace MD  •  [START ON 6/14/2017] latanoprost (XALATAN) 0.005 % ophthalmic solution 1 drop, 1 drop, Both Eyes, Nightly, Vikash David MD  •  losartan-hydrochlorothiazide (HYZAAR) 100-25 MG per tablet 1 tablet, 1 tablet, Oral, Daily, Cristian Grace MD  •  oxybutynin XL (DITROPAN-XL) 24 hr tablet 10 mg, 10 mg, Oral, Daily, Cristian Grace MD  •  Pharmacy to dose vancomycin, , Does not apply, Continuous PRN, Cristian Grace MD  •  sodium chloride 0.9 % flush 1-10 mL, 1-10 mL, Intravenous, PRN, Cristian Grace MD  •  Insert peripheral IV, , , Once **AND** sodium chloride 0.9 % flush 10 mL, 10 mL, Intravenous, PRN, Monie Desai, APRN  •  vancomycin 2000 mg/500 mL 0.9% NS IVPB ([facility]), 2,000 mg, Intravenous, Q12H, Cristian Grace MD, 2,000 mg at 06/12/17 3659      Review of Systems  All systems were reviewed and are negative unless otherwise stated above in the HPI    Objective   Vital Signs   Temp:  [97.2 °F (36.2 °C)-98.3 °F (36.8 °C)] 97.2 °F (36.2 °C)  Heart Rate:  [83-93] 83  Resp:  [16] 16  BP: (147-187)/(80-93) 148/83    Physical Exam:   General: awake, alert, NAD   Head: Normocephalic, atraumatic  Eyes: PERRL, no scleral icterus, no conjunctival pallor, +scleral injection  ENT: MMM, OP clear, no thrush. Good dentition.   Neck: Supple, no visible thyromegaly  Cardiovascular: NR, RR, no murmurs, rubs, or gallops;  trace LE edema  Respiratory: Lungs are clear to ascultation bilaterally, no rales or wheezing; normal work of breathing on ambient air  GI: Abdomen is obese, soft, non-tender, non-distended, normal bowel sounds in all four quadrants; no hepatosplenomegaly, no masses palpated  : no Catalan catheter present  Musculoskeletal: R elbow edematous with drainage from 3 areas; warm; painful w/ PROM; B TKA incisions well-healed  Skin: No rashes, lesions, or embolic phenomenon  Neurological: Alert and oriented x 3, cranial nerves 2-12 grossly intact, motor strength 5/5 in lower extremities  Psychiatric: Normal mood and affect   Lymph: no pre-auricular, post-auricular, submandibular, cervical, supraclavicular  LAD  Vasc: no cyanosis; PIV w/o erythema    Labs:     Lab Results   Component Value Date    WBC 7.07 06/12/2017    HGB 13.2 06/12/2017    HCT 39.5 06/12/2017    MCV 92.7 06/12/2017     06/12/2017       Lab Results   Component Value Date    GLUCOSE 149 (H) 06/12/2017    BUN 12 06/12/2017    CREATININE 0.86 06/12/2017    EGFRIFNONA 63 08/02/2016    EGFRIFAFRI 79 06/12/2017    BCR 14.0 06/12/2017    CO2 24.4 06/12/2017    CALCIUM 9.7 06/12/2017    PROTENTOTREF 7.5 08/02/2016    ALBUMIN 4.50 06/12/2017    LABIL2 1.2 06/12/2017    AST 16 06/12/2017    ALT 8 06/12/2017   No results found for: CRP  No results found for: SEDRATE  No results found for: MARLEY, JATINDER, HERBERTOM      Microbiology:  6/12 BCx: NGTD  6/12 WCx: GPCs on gram stain    Radiology (personally reviewed images/report):  None; XR ordered    Assessment/Plan   1. Right elbow cellulitis with concern for deeper infection  -continue vancomycin with goal trough 15-20  -follow-up wound culture  -obtain XR  -f/u ortho consult  -add ESR/CRP onto AM labs    Thank you for this consult. ID will follow. I discussed the case with Dr Grace.

## 2017-06-13 NOTE — CONSULTS
"Inpatient Consult to Orthopedic Surgery  Consult performed by: ARGELIA ROBERTSON  Consult ordered by: IVAN MUELLER  Reason for consult: History of cellulitis right elbow, \"rule out infection\"           Orthopedic Consult      Patient: Lana Yañez    Date of Admission: 6/12/2017 10:45 AM    YOB: 1945    Medical Record Number: [id number]    Attending Physician: Khoa Rodriguez MD    Consulting Physician: Argelia Robertson MD      Chief Complaints: Right elbow pain      History of Present Illness: 71 y.o. female admitted to [facility] [place] to services of Khoa Rodriguez MD with a 1 week, approximately, history of progressively worsening pain and swelling on the posterior aspect of her right elbow.  She says she thinks that it started when she banged her elbow on a door frame about a week ago.  When the pain seemed to get worse and the swelling seemed to get worse, she went to an immediate care center this past Friday.She was given a rocephin injection and given a prescription for bactrim for cellulitis. She states that despite taking the bactrim, her sx have become worse and she has developed open sores on the elbow. She states she had cellulitis of the lower extremities in the past maybe a year or so ago.    She says that this morning, at the time I examined her, that her pain has improved since she has been in the hospital.  It is now mild to moderate in intensity, located posteriorly, and typically aching though somewhat \"hot\" in character.  She has not had any pain in the anterior aspect of her elbow, and says that she has not noted tremendous pain or discomfort when moving her elbow at any time during this ordeal.  The pain is nonradiating  She denies acute numbness, tingling, or sensory derangements in her right hand.  She has not had fever, chills, sweats, or felt systemically ill.           Allergies: No Known Allergies    Home Medications:    Current Facility-Administered Medications:   •  " acetaminophen (TYLENOL) tablet 650 mg, 650 mg, Oral, Q4H PRN, Cristian Grace MD, 650 mg at 06/12/17 1903  •  albuterol (PROVENTIL) nebulizer solution 0.083% 2.5 mg/3mL, 2.5 mg, Nebulization, Q6H PRN, Cristian Grace MD  •  amLODIPine (NORVASC) tablet 2.5 mg, 2.5 mg, Oral, Daily, Cristian Grace MD, 2.5 mg at 06/13/17 0900  •  atenolol (TENORMIN) tablet 50 mg, 50 mg, Oral, Daily, Cristian Grace MD, 50 mg at 06/13/17 0900  •  buPROPion (WELLBUTRIN) tablet 75 mg, 75 mg, Oral, BID, Cristian Grace MD, 75 mg at 06/13/17 0900  •  cetirizine (zyrTEC) tablet 10 mg, 10 mg, Oral, Daily, Crsitian Grace MD, 10 mg at 06/13/17 0900  •  dorzolamide (TRUSOPT) 2 % ophthalmic solution 1 drop, 1 drop, Both Eyes, Q12H, 1 drop at 06/13/17 0900 **AND** timolol (TIMOPTIC) 0.5 % ophthalmic solution 1 drop, 1 drop, Both Eyes, Q12H, Cristian Grace MD, 1 drop at 06/13/17 0901  •  furosemide (LASIX) tablet 20 mg, 20 mg, Oral, Daily, Cristian Grace MD, 20 mg at 06/13/17 0900  •  hydrALAZINE (APRESOLINE) tablet 25 mg, 25 mg, Oral, 4x Daily PRN, Vikash David MD  •  HYDROcodone-acetaminophen (NORCO) 5-325 MG per tablet 1 tablet, 1 tablet, Oral, Q4H PRN, Cristian Grace MD  •  [START ON 6/14/2017] latanoprost (XALATAN) 0.005 % ophthalmic solution 1 drop, 1 drop, Both Eyes, Nightly, Vikash David MD  •  losartan-hydrochlorothiazide (HYZAAR) 100-25 MG per tablet 1 tablet, 1 tablet, Oral, Daily, Cristian Grace MD, 1 tablet at 06/13/17 0900  •  oxybutynin XL (DITROPAN-XL) 24 hr tablet 10 mg, 10 mg, Oral, Daily, Cristian Grace MD, 10 mg at 06/13/17 0900  •  Pharmacy to dose vancomycin, , Does not apply, Continuous PRN, Cristian Grace MD  •  sodium chloride 0.9 % flush 1-10 mL, 1-10 mL, Intravenous, PRN, Cristian Grace MD  •  Insert peripheral IV, , , Once **AND** sodium chloride 0.9 % flush 10 mL, 10 mL, Intravenous, PRN, Monie Desai, APRN  •  vancomycin 2000 mg/500 mL 0.9% NS IVPB ([facility]),  2,000 mg, Intravenous, Q12H, Cristian Grace MD, 2,000 mg at 06/13/17 1130    Current Medications:  Scheduled Meds:  amLODIPine 2.5 mg Oral Daily   atenolol 50 mg Oral Daily   buPROPion 75 mg Oral BID   cetirizine 10 mg Oral Daily   dorzolamide 1 drop Both Eyes Q12H   And      timolol 1 drop Both Eyes Q12H   furosemide 20 mg Oral Daily   [START ON 6/14/2017] latanoprost 1 drop Both Eyes Nightly   losartan-hydrochlorothiazide 1 tablet Oral Daily   oxybutynin XL 10 mg Oral Daily   vancomycin 2,000 mg Intravenous Q12H     Continuous Infusions:  Pharmacy to dose vancomycin      PRN Meds:.•  acetaminophen  •  albuterol  •  hydrALAZINE  •  HYDROcodone-acetaminophen  •  Pharmacy to dose vancomycin  •  sodium chloride  •  Insert peripheral IV **AND** sodium chloride    Past Medical History:   Diagnosis Date   • Arthritis    • Edema    • Fatigue    • Headache    • Hx of bone density study     10/23/2014   • Hyperlipidemia    • Hypertension    • Vitamin D deficiency        Past Surgical History:   Procedure Laterality Date   • HYSTERECTOMY     • KNEE SURGERY     • MAMMO BILATERAL  2016    [facility]        Social History     Occupational History   • Not on file.     Social History Main Topics   • Smoking status: Never Smoker   • Smokeless tobacco: Not on file   • Alcohol use No   • Drug use: Defer   • Sexual activity: Defer    Social History     Social History Narrative   • No narrative on file       Family History   Problem Relation Age of Onset   • Colon cancer Mother    • Glaucoma Mother    • Stroke Mother    • Glaucoma Sister    • Heart disease Brother          Review of Systems:     Musculoskeletal: The patient complains of posterior pain in her right elbow as noted in history of present illness.  She also has some chronic pain in her right shoulder.      All other systems were reviewed, and were found to be negative for other acute complaints at this time.          All other pertinent positives and negatives  as noted above in HPI.    Physical Exam: 71 y.o. female    General:  Awake, alert. No acute distress.      Vitals:    06/13/17 0614 06/13/17 0809 06/13/17 1235 06/13/17 1320   BP: 148/83 152/84 155/69 137/81   BP Location: Right arm Right arm Right arm Right arm   Patient Position: Lying Lying Sitting Sitting   Pulse: 83 82 81 75   Resp: 16 16 16 16   Temp: 97.2 °F (36.2 °C) 97.2 °F (36.2 °C) 97.3 °F (36.3 °C)    TempSrc: Oral Oral Oral    SpO2: 95% 95% 93% 95%   Weight:       Height:           Head/Neck:  Normocephalic, atraumatic.  Conjunctiva and sclera clear.  Hearing adequate for the exam.  Neck is supple with normal ROM.    Psych:  Affect and demeanor appropriate.    CV:  Regular rate and rhythm.  Hemodynamically stable.    Lungs:  Good chest expansion, breathing unlabored.    Abdomen:  Soft.  Non-tender, non-distended.    Extremities:  Right upper extremity:  The elbow is inspected.  There is some focal cellulitis posteriorly.  There are a couple of areas perhaps 5 x 6 or 7 mm in greatest extent consistent with small ruptured blisters.  There is no lymphangitis tracking up her arm.  There is warmth of the skin surrounding the elbow posteriorly.  The patient does not have any palpable antecubital or axillary lymphadenopathy.  She has a palpable, regular right radial pulse with a current heart rate of about 78 bpm.  Sensory and motor function are intact in the right hand in radial, median, and ulnar distributions.  Active range of motion does not cause the patient any significant discomfort.  Flexion, extension, and pro no supination of the right elbow do not seem to exacerbate her discomfort or cause any undue pain.  She has no tenderness in the antecubital fossa of the right upper extremity.  She has no tenderness in her medial upper arm, and has no palpable venous cord.  Capillary filling is brisk and sensory exam intact in all digits.  Her nail plates are unremarkable.  She has a good strong, normal  at  5 over 5 in the right hand.         All other extremities atraumatic without gross abnormality.       Diagnostic Tests:    Admission on 06/12/2017   Component Date Value Ref Range Status   • Glucose 06/12/2017 149* 65 - 99 mg/dL Final   • BUN 06/12/2017 12  8 - 23 mg/dL Final   • Creatinine 06/12/2017 0.86  0.57 - 1.00 mg/dL Final   • Sodium 06/12/2017 135* 136 - 145 mmol/L Final   • Potassium 06/12/2017 3.6  3.5 - 5.2 mmol/L Final   • Chloride 06/12/2017 98  98 - 107 mmol/L Final   • CO2 06/12/2017 24.4  22.0 - 29.0 mmol/L Final   • Calcium 06/12/2017 9.7  8.6 - 10.5 mg/dL Final   • Total Protein 06/12/2017 8.3  6.0 - 8.5 g/dL Final   • Albumin 06/12/2017 4.50  3.50 - 5.20 g/dL Final   • ALT (SGPT) 06/12/2017 8  1 - 33 U/L Final   • AST (SGOT) 06/12/2017 16  1 - 32 U/L Final   • Alkaline Phosphatase 06/12/2017 52  39 - 117 U/L Final   • Total Bilirubin 06/12/2017 0.3  0.1 - 1.2 mg/dL Final   • eGFR   Amer 06/12/2017 79  >60 mL/min/1.73 Final   • Globulin 06/12/2017 3.8  gm/dL Final   • A/G Ratio 06/12/2017 1.2  g/dL Final   • BUN/Creatinine Ratio 06/12/2017 14.0  7.0 - 25.0 Final   • Anion Gap 06/12/2017 12.6  mmol/L Final   • Wound Culture 06/12/2017 Light growth (2+) Staphylococcus aureus, MRSA*  Preliminary      Methicillin resistant Staphylococcus aureus, Patient may be an isolation risk.   • Gram Stain Result 06/12/2017 Rare (1+) Gram positive cocci   Preliminary   • Gram Stain Result 06/12/2017 No WBCs seen   Preliminary   • Blood Culture 06/12/2017 No growth at 24 hours   Preliminary   • Blood Culture 06/12/2017 No growth at 24 hours   Preliminary   • WBC 06/12/2017 7.07  4.50 - 10.70 10*3/mm3 Final   • RBC 06/12/2017 4.26  3.90 - 5.20 10*6/mm3 Final   • Hemoglobin 06/12/2017 13.2  11.9 - 15.5 g/dL Final   • Hematocrit 06/12/2017 39.5  35.6 - 45.5 % Final   • MCV 06/12/2017 92.7  80.5 - 98.2 fL Final   • MCH 06/12/2017 31.0  26.9 - 32.0 pg Final   • MCHC 06/12/2017 33.4  32.4 - 36.3 g/dL Final   • RDW  06/12/2017 13.9* 11.7 - 13.0 % Final   • RDW-SD 06/12/2017 46.6  37.0 - 54.0 fl Final   • MPV 06/12/2017 11.0  6.0 - 12.0 fL Final   • Platelets 06/12/2017 178  140 - 500 10*3/mm3 Final   • Neutrophil % 06/12/2017 50.7  42.7 - 76.0 % Final   • Lymphocyte % 06/12/2017 36.1  19.6 - 45.3 % Final   • Monocyte % 06/12/2017 10.5  5.0 - 12.0 % Final   • Eosinophil % 06/12/2017 2.3  0.3 - 6.2 % Final   • Basophil % 06/12/2017 0.1  0.0 - 1.5 % Final   • Immature Grans % 06/12/2017 0.3  0.0 - 0.5 % Final   • Neutrophils, Absolute 06/12/2017 3.59  1.90 - 8.10 10*3/mm3 Final   • Lymphocytes, Absolute 06/12/2017 2.55  0.90 - 4.80 10*3/mm3 Final   • Monocytes, Absolute 06/12/2017 0.74  0.20 - 1.20 10*3/mm3 Final   • Eosinophils, Absolute 06/12/2017 0.16  0.00 - 0.70 10*3/mm3 Final   • Basophils, Absolute 06/12/2017 0.01  0.00 - 0.20 10*3/mm3 Final   • Immature Grans, Absolute 06/12/2017 0.02  0.00 - 0.03 10*3/mm3 Final   • MRSA SCREEN CX 06/12/2017 No Methicillin Resistant Staphylococcus aureus Isolated at 24 hours   Preliminary   • Sed Rate 06/13/2017 17  0 - 30 mm/hr Final   • C-Reactive Protein 06/13/2017 0.50  0.00 - 0.50 mg/dL Final     Lab Results (last 24 hours)     Procedure Component Value Units Date/Time    Wound Culture [[id number]]  (Abnormal) Collected:  06/12/17 1204    Specimen:  Wound from Elbow, Right Updated:  06/13/17 0824     Wound Culture Light growth (2+) Staphylococcus aureus, MRSA (C)        Methicillin resistant Staphylococcus aureus, Patient may be an isolation risk.        Gram Stain Result Rare (1+) Gram positive cocci      No WBCs seen    C-reactive Protein [[id number]]  (Normal) Collected:  06/13/17 0859    Specimen:  Blood Updated:  06/13/17 0931     C-Reactive Protein 0.50 mg/dL     MRSA Screen Culture [[id number]]  (Normal) Collected:  06/12/17 1632    Specimen:  Swab from Nares Updated:  06/13/17 0958     MRSA SCREEN CX No Methicillin Resistant Staphylococcus aureus Isolated at 24 hours     Sedimentation Rate [[id number]]  (Normal) Collected:  06/13/17 0859    Specimen:  Blood Updated:  06/13/17 1008     Sed Rate 17 mm/hr     Blood Culture [[id number]]  (Normal) Collected:  06/12/17 1234    Specimen:  Blood from Arm, Left Updated:  06/13/17 1301     Blood Culture No growth at 24 hours    Blood Culture [[id number]]  (Normal) Collected:  06/12/17 1158    Specimen:  Blood from Arm, Left Updated:  06/13/17 1301     Blood Culture No growth at 24 hours          Imaging: X-rays of the right elbow were not available, and were apparently pending at the time of my evaluation.    Assessment: Cellulitis of the right elbow which seems to be improving with the patient's current antibiotic treatment.  Her physical exam is not at all consistent with septic arthritis of the right elbow.    Recommendations:  I discussed my examination and findings with the patient.  I explained that her history and her exam are not consistent with a septic arthritis of the elbow.  It appears, based on her history and current symptoms, that her cellulitis is responding to the present antibiotic treatment.    At this point, I do not believe the patient has an orthopedic surgical problem.  I was happy to provide this consultation, and we will be happy to return to see the patient if her condition changes, or if evidence of septic arthritis or osteomyelitis presents.    At this point, I would continue her current intravenous antibiotics, and careful neurovascular monitoring of the right upper extremity.  I have recommended to the patient that she be certain to maintain range of motion of her right shoulder, elbow, wrist, hand and forearm.    Date: 6/13/2017    Eusebio Bearden MD    CC: Khoa Rodriguez MD

## 2017-06-14 LAB
BACTERIA SPEC AEROBE CULT: ABNORMAL
CREAT BLD-MCNC: 0.81 MG/DL (ref 0.57–1)
DEPRECATED RDW RBC AUTO: 49.1 FL (ref 37–54)
ERYTHROCYTE [DISTWIDTH] IN BLOOD BY AUTOMATED COUNT: 14.3 % (ref 11.7–13)
GFR SERPL CREATININE-BSD FRML MDRD: 85 ML/MIN/1.73
GRAM STN SPEC: ABNORMAL
GRAM STN SPEC: ABNORMAL
HCT VFR BLD AUTO: 37.3 % (ref 35.6–45.5)
HGB BLD-MCNC: 12.1 G/DL (ref 11.9–15.5)
MCH RBC QN AUTO: 30.6 PG (ref 26.9–32)
MCHC RBC AUTO-ENTMCNC: 32.4 G/DL (ref 32.4–36.3)
MCV RBC AUTO: 94.4 FL (ref 80.5–98.2)
MRSA SPEC QL CULT: NORMAL
PLATELET # BLD AUTO: 176 10*3/MM3 (ref 140–500)
PMV BLD AUTO: 11.4 FL (ref 6–12)
RBC # BLD AUTO: 3.95 10*6/MM3 (ref 3.9–5.2)
VANCOMYCIN TROUGH SERPL-MCNC: 22.3 MCG/ML (ref 5–20)
WBC NRBC COR # BLD: 6.68 10*3/MM3 (ref 4.5–10.7)

## 2017-06-14 PROCEDURE — 99232 SBSQ HOSP IP/OBS MODERATE 35: CPT | Performed by: INTERNAL MEDICINE

## 2017-06-14 PROCEDURE — 80202 ASSAY OF VANCOMYCIN: CPT | Performed by: HOSPITALIST

## 2017-06-14 PROCEDURE — 25010000002 VANCOMYCIN: Performed by: HOSPITALIST

## 2017-06-14 PROCEDURE — 82565 ASSAY OF CREATININE: CPT | Performed by: INTERNAL MEDICINE

## 2017-06-14 PROCEDURE — 85027 COMPLETE CBC AUTOMATED: CPT | Performed by: INTERNAL MEDICINE

## 2017-06-14 RX ORDER — FUROSEMIDE 20 MG/1
20 TABLET ORAL DAILY PRN
Status: DISCONTINUED | OUTPATIENT
Start: 2017-06-15 | End: 2017-06-15 | Stop reason: HOSPADM

## 2017-06-14 RX ADMIN — TIMOLOL MALEATE 1 DROP: 5 SOLUTION OPHTHALMIC at 21:03

## 2017-06-14 RX ADMIN — VANCOMYCIN HYDROCHLORIDE 1750 MG: 1 INJECTION, POWDER, LYOPHILIZED, FOR SOLUTION INTRAVENOUS at 12:23

## 2017-06-14 RX ADMIN — CETIRIZINE HYDROCHLORIDE 10 MG: 10 TABLET, FILM COATED ORAL at 09:04

## 2017-06-14 RX ADMIN — DORZOLAMIDE HYDROCHLORIDE 1 DROP: 20 SOLUTION/ DROPS OPHTHALMIC at 09:04

## 2017-06-14 RX ADMIN — HYDROCODONE BITARTRATE AND ACETAMINOPHEN 1 TABLET: 5; 325 TABLET ORAL at 18:30

## 2017-06-14 RX ADMIN — DORZOLAMIDE HYDROCHLORIDE 1 DROP: 20 SOLUTION/ DROPS OPHTHALMIC at 21:03

## 2017-06-14 RX ADMIN — HYDROCODONE BITARTRATE AND ACETAMINOPHEN 1 TABLET: 5; 325 TABLET ORAL at 00:50

## 2017-06-14 RX ADMIN — LATANOPROST 1 DROP: 50 SOLUTION OPHTHALMIC at 21:10

## 2017-06-14 RX ADMIN — LOSARTAN POTASSIUM AND HYDROCHLOROTHIAZIDE TABLETS 1 TABLET: 100; 25 TABLET, FILM COATED ORAL at 09:04

## 2017-06-14 RX ADMIN — ATENOLOL 50 MG: 50 TABLET ORAL at 09:04

## 2017-06-14 RX ADMIN — TIMOLOL MALEATE 1 DROP: 5 SOLUTION OPHTHALMIC at 09:04

## 2017-06-14 RX ADMIN — AMLODIPINE BESYLATE 2.5 MG: 2.5 TABLET ORAL at 09:04

## 2017-06-14 NOTE — PLAN OF CARE
Problem: Patient Care Overview (Adult)  Goal: Plan of Care Review  Outcome: Ongoing (interventions implemented as appropriate)    06/14/17 0632   Coping/Psychosocial Response Interventions   Plan Of Care Reviewed With patient   Patient Care Overview   Progress no change   Outcome Evaluation   Outcome Summary/Follow up Plan Pt quiet night. C/O some pain in the left shoulder which was relieved by warm compress. and a norco.          Problem: Cellulitis (Adult)  Goal: Signs and Symptoms of Listed Potential Problems Will be Absent or Manageable (Cellulitis)  Outcome: Ongoing (interventions implemented as appropriate)    Problem: Infection, Risk/Actual (Adult)  Goal: Identify Related Risk Factors and Signs and Symptoms  Outcome: Ongoing (interventions implemented as appropriate)  Goal: Infection Prevention/Resolution  Outcome: Ongoing (interventions implemented as appropriate)

## 2017-06-14 NOTE — PLAN OF CARE
Problem: Patient Care Overview (Adult)  Goal: Plan of Care Review  Outcome: Ongoing (interventions implemented as appropriate)    06/14/17 0632 06/14/17 0917 06/14/17 1607   Coping/Psychosocial Response Interventions   Plan Of Care Reviewed With --  patient --    Patient Care Overview   Progress no change --  --    Outcome Evaluation   Outcome Summary/Follow up Plan --  --  no complaints of pain today. vanc decreased due to vanc trough. patient may go home tomorrow.         Problem: Cellulitis (Adult)  Goal: Signs and Symptoms of Listed Potential Problems Will be Absent or Manageable (Cellulitis)  Outcome: Ongoing (interventions implemented as appropriate)    Problem: Infection, Risk/Actual (Adult)  Goal: Identify Related Risk Factors and Signs and Symptoms  Outcome: Ongoing (interventions implemented as appropriate)  Goal: Infection Prevention/Resolution  Outcome: Ongoing (interventions implemented as appropriate)

## 2017-06-14 NOTE — PROGRESS NOTES
Name: Lana Yañez ADMIT: 2017   : 1945  PCP: Raoul Ramirez MD    MRN: [id number] LOS: 2 days   AGE/SEX: 71 y.o. female  ROOM: [facility]/     Subjective   Subjective  Elbow continues to do better.  No new c/o.  Eyes feel about same.    Objective   Vital Signs  Temp:  [97.1 °F (36.2 °C)-97.9 °F (36.6 °C)] 97.3 °F (36.3 °C)  Heart Rate:  [70-82] 70  Resp:  [16-18] 18  BP: (108-155)/(67-84) 108/67  SpO2:  [93 %-95 %] 94 %  on   ;   O2 Device: room air  Body mass index is 44.39 kg/(m^2).    Physical Exam   Constitutional: She is oriented to person, place, and time. She appears well-developed. She is cooperative. No distress.   Cardiovascular: Normal rate and regular rhythm.    No murmur heard.  Pulmonary/Chest: Effort normal and breath sounds normal. No respiratory distress.   Abdominal: Soft. Normal appearance and bowel sounds are normal. She exhibits no distension. There is no tenderness.   Musculoskeletal:   Elbow erythema, pain with movement of the elbow, two pustules that are draining fluid. There is a margin of erythema around the pustules    Neurological: She is alert and oriented to person, place, and time.   Skin: Skin is warm and dry.   Nursing note and vitals reviewed.      Results Review:       I reviewed the patient's new clinical results.    Results from last 7 days  Lab Units 17  0603 17  1158   WBC 10*3/mm3 6.68 7.07   HEMOGLOBIN g/dL 12.1 13.2   PLATELETS 10*3/mm3 176 178       Results from last 7 days  Lab Units 17  0603 17  1158   SODIUM mmol/L  --  135*   POTASSIUM mmol/L  --  3.6   CHLORIDE mmol/L  --  98   TOTAL CO2 mmol/L  --  24.4   BUN mg/dL  --  12   CREATININE mg/dL 0.81 0.86   GLUCOSE mg/dL  --  149*   ALBUMIN g/dL  --  4.50   BILIRUBIN mg/dL  --  0.3   ALK PHOS U/L  --  52   AST (SGOT) U/L  --  16   ALT (SGPT) U/L  --  8   Estimated Creatinine Clearance: 88.6 mL/min (by C-G formula based on Cr of 0.81).    Results from last 7 days  Lab Units  06/12/17  1158   CALCIUM mg/dL 9.7   ALBUMIN g/dL 4.50           Microbiology Results (last 10 days)     Procedure Component Value - Date/Time    MRSA Screen Culture [[id number]]  (Normal) Collected:  06/12/17 1632    Lab Status:  Preliminary result Specimen:  Swab from Nares Updated:  06/13/17 0958     MRSA SCREEN CX No Methicillin Resistant Staphylococcus aureus Isolated at 24 hours    Blood Culture [[id number]]  (Normal) Collected:  06/12/17 1234    Lab Status:  Preliminary result Specimen:  Blood from Arm, Left Updated:  06/13/17 1301     Blood Culture No growth at 24 hours    Wound Culture [[id number]]  (Abnormal) Collected:  06/12/17 1204    Lab Status:  Preliminary result Specimen:  Wound from Elbow, Right Updated:  06/13/17 0824     Wound Culture Light growth (2+) Staphylococcus aureus, MRSA (C)        Methicillin resistant Staphylococcus aureus, Patient may be an isolation risk.        Gram Stain Result Rare (1+) Gram positive cocci      No WBCs seen    Blood Culture [[id number]]  (Normal) Collected:  06/12/17 1158    Lab Status:  Preliminary result Specimen:  Blood from Arm, Left Updated:  06/13/17 1301     Blood Culture No growth at 24 hours              amLODIPine 2.5 mg Oral Daily   atenolol 50 mg Oral Daily   buPROPion 75 mg Oral BID   cetirizine 10 mg Oral Daily   dorzolamide 1 drop Both Eyes Q12H   And      timolol 1 drop Both Eyes Q12H   furosemide 20 mg Oral Daily   latanoprost 1 drop Both Eyes Nightly   losartan-hydrochlorothiazide 1 tablet Oral Daily   oxybutynin XL 10 mg Oral Daily   vancomycin 2,000 mg Intravenous Q12H       Pharmacy to dose vancomycin    Diet Regular      Assessment/Plan   Assessment:     Active Hospital Problems (** Indicates Principal Problem)    Diagnosis Date Noted   • **Cellulitis of right elbow [L03.113] 06/12/2017   • Morbid obesity with BMI of 40.0-44.9, adult [E66.01, Z68.41] 12/15/2016   • Bronchitis [J40] 08/12/2016   • Sleep apnea [G47.30] 08/02/2016   • Fatigue  [R53.83] 04/06/2016   • Essential hypertension [I10] 03/21/2016   • Rheumatoid arthritis involving both hands [M06.9] 03/21/2016      Resolved Hospital Problems    Diagnosis Date Noted Date Resolved   No resolved problems to display.       Plan:   - Continue on VANCOMYCIN IV as dosed by pharmacy for treatment of right elbow cellulitis.  MRSA identified but full sensitivities pending.  - Orthopedic recommendations appreciated.  No joint involvement.    - Blood cultures x2 done in ED negative thus far.    - Discussed w/ ID.  Plan continue IV abx today and decide about discharge and home antibiotic plan tomorrow based on sensitivities.    (errors in home med rec corrected)        Khoa Rodriguez MD  [facility]  06/14/17  7:53 AM

## 2017-06-14 NOTE — PROGRESS NOTES
LOS: 2 days     Chief Complaint:  Follow-up R elbow infection    Interval History:  No acute events. Sharp elbow pain is improved. The associated swelling is improved. She is tolerating vanco w/o rash or diarrhea. Ortho does not think deep infection.    ROS: no n/v/d    Vital Signs  Temp:  [97.1 °F (36.2 °C)-97.9 °F (36.6 °C)] 97.2 °F (36.2 °C)  Heart Rate:  [70-81] 73  Resp:  [16-18] 18  BP: (108-155)/(67-83) 136/80    Physical Exam:  General: awake, alert, NAD   Head: Normocephalic  Eyes: no scleral icterus, +scleral injection  ENT: MMM, OP clear, no thrush. Good dentition.   Neck: Supple  Cardiovascular: NR,  trace LE edema  Respiratory:normal work of breathing on ambient air  GI: Abdomen is obese, soft, non-tender, non-distended  : no Catalan catheter present  Musculoskeletal: R elbow less edematous with drainage from 3 areas; less warm and painful w/ PROM; B TKA incisions well-healed  Skin: No rashes, lesions, or embolic phenomenon  Neurological: Alert and oriented x 3, motor strength 5/5 in lower extremities  Psychiatric: Normal mood and affect   Vasc: no cyanosis; PIV w/o erythema    Antibiotics:  •  vancomycin 2000 mg/500 mL 0.9% NS IVPB ([facility]), 2,000 mg, Intravenous, Q12H, Cristian Grace MD, 2,000 mg at 06/13/17 2327    LABS:  CBC, BMP, CRP, ESR, micro reviewed today  Lab Results   Component Value Date    WBC 6.68 06/14/2017    HGB 12.1 06/14/2017    HCT 37.3 06/14/2017    MCV 94.4 06/14/2017     06/14/2017     Lab Results   Component Value Date    GLUCOSE 149 (H) 06/12/2017    BUN 12 06/12/2017    CREATININE 0.81 06/14/2017    EGFRIFNONA 63 08/02/2016    EGFRIFAFRI 85 06/14/2017    BCR 14.0 06/12/2017    CO2 24.4 06/12/2017    CALCIUM 9.7 06/12/2017    PROTENTOTREF 7.5 08/02/2016    ALBUMIN 4.50 06/12/2017    LABIL2 1.2 06/12/2017    AST 16 06/12/2017    ALT 8 06/12/2017    CRP 0.50 06/13/2017     Lab Results   Component Value Date    SEDRATE 17 06/13/2017     Microbiology:  6/12 BCx: NGTD  6/12  WCx: MRSA    Radiology (personally reviewed report):   XR with soft tissue swelling; no evidence of osteomyelitis    Assessment/Plan   1. Right elbow skin and soft tissue infection secondary to MRSA  -reviewed orthopedics note; they are not concerned for deep infection  -improving on vancomycin; level due today  -f/u MRSA sensitivities  -continue IV today but if still improving tomorrow can likely DC on PO antibiotics  -noted XR  -maybe DC tomorrow pending clinical results     Thank you for this consult. ID will follow. I discussed the case with Dr Rodriguez

## 2017-06-14 NOTE — PROGRESS NOTES
"Pharmacokinetic Consult - Vancomycin Dosing    Lana Yañez is on day 2 pharmacy to dose vancomycin for R elbow infection; pt with improved pain and swelling. No evidence of deep infection per orthopedics - Dr. Butler notes possible change to PO antibiotics depending on sensitivites. Wound cx +  MRSA.  Pharmacy dosing vancomycin per Dr. Grace's request.   Goal trough: 15-20 mg/L     Relevant clinical data and objective history reviewed:  71 y.o.  female  66\" (167.6 cm)  275 lb (125 kg)  Body mass index is 44.39 kg/(m^2).     She  has a past medical history of Arthritis; Edema; Fatigue; Headache; bone density study; Hyperlipidemia; Hypertension; and Vitamin D deficiency.    Creatinine   Date Value Ref Range Status   06/14/2017 0.81 0.57 - 1.00 mg/dL Final   06/12/2017 0.86 0.57 - 1.00 mg/dL Final     BUN   Date Value Ref Range Status   06/12/2017 12 8 - 23 mg/dL Final     Estimated Creatinine Clearance: 88.6 mL/min (by C-G formula based on Cr of 0.81).  Allergies as of 06/12/2017   • (No Known Allergies)       Lab Results   Component Value Date    WBC 6.68 06/14/2017     Temp Readings from Last 3 Encounters:   06/14/17 97.2 °F (36.2 °C) (Oral)   03/03/17 98.6 °F (37 °C) (Tympanic)   12/15/16 97.1 °F (36.2 °C) (Tympanic)     Baseline culture/source/susceptibility: Wound cx 6/12 + MRSA (sensitive to TCN, vanc, Bactrim, Clinda)    IV Anti-Infectives     Ordered     Dose/Rate Route Frequency Start Stop    06/12/17 1618  vancomycin 2000 mg/500 mL 0.9% NS IVPB ([facility])     Ordering Provider:  Cristian Grace MD    2,000 mg  over 200 Minutes Intravenous Every 12 Hours 06/12/17 2300      06/12/17 1548  Pharmacy to dose vancomycin     Ordering Provider:  Cristian Grace MD     Does not apply Continuous PRN 06/12/17 1548        Lab Results   Component Value Date    VANCOTROUGH 22.30 (H) 06/14/2017     Assessment/Plan  1) Patient with slightly supratherapeutic trough. Given potential severity of infection, " initial dosing appropriate, however will decrease dose to 1750 q12 to prevent any future complications. Vancomycin level on Friday 6/16 at 1230 after 4 total doses.  2) Will monitor serum creatinine at least every 48 hours per dosing recommendations.    3) Encourage hydration to prevent toxic accumulation; monitor for s/sxn of toxicity including increase in SCr and decrease in UOP.   Pharmacy will continue to follow daily while on vancomycin and adjust as needed.     Eliane Montalvo, Pharm.D., [facility]  Clinical Pharmacist  [phone]

## 2017-06-15 VITALS
DIASTOLIC BLOOD PRESSURE: 68 MMHG | OXYGEN SATURATION: 96 % | HEART RATE: 75 BPM | TEMPERATURE: 98.2 F | HEIGHT: 66 IN | SYSTOLIC BLOOD PRESSURE: 136 MMHG | WEIGHT: 275 LBS | BODY MASS INDEX: 44.2 KG/M2 | RESPIRATION RATE: 16 BRPM

## 2017-06-15 LAB
CREAT BLD-MCNC: 0.72 MG/DL (ref 0.57–1)
GFR SERPL CREATININE-BSD FRML MDRD: 97 ML/MIN/1.73

## 2017-06-15 PROCEDURE — 25010000002 VANCOMYCIN: Performed by: HOSPITALIST

## 2017-06-15 PROCEDURE — 99232 SBSQ HOSP IP/OBS MODERATE 35: CPT | Performed by: INTERNAL MEDICINE

## 2017-06-15 PROCEDURE — 82565 ASSAY OF CREATININE: CPT | Performed by: INTERNAL MEDICINE

## 2017-06-15 RX ORDER — HYDROCODONE BITARTRATE AND ACETAMINOPHEN 5; 325 MG/1; MG/1
1 TABLET ORAL EVERY 4 HOURS PRN
Qty: 20 TABLET | Refills: 0 | Status: SHIPPED | OUTPATIENT
Start: 2017-06-15 | End: 2017-06-22

## 2017-06-15 RX ADMIN — LOSARTAN POTASSIUM AND HYDROCHLOROTHIAZIDE TABLETS 1 TABLET: 100; 25 TABLET, FILM COATED ORAL at 09:32

## 2017-06-15 RX ADMIN — AMLODIPINE BESYLATE 2.5 MG: 2.5 TABLET ORAL at 09:32

## 2017-06-15 RX ADMIN — CETIRIZINE HYDROCHLORIDE 10 MG: 10 TABLET, FILM COATED ORAL at 09:32

## 2017-06-15 RX ADMIN — GLYCERIN 2 DROP: .002; .002; .01 SOLUTION/ DROPS OPHTHALMIC at 13:15

## 2017-06-15 RX ADMIN — VANCOMYCIN HYDROCHLORIDE 1750 MG: 1 INJECTION, POWDER, LYOPHILIZED, FOR SOLUTION INTRAVENOUS at 12:13

## 2017-06-15 RX ADMIN — ATENOLOL 50 MG: 50 TABLET ORAL at 09:32

## 2017-06-15 RX ADMIN — VANCOMYCIN HYDROCHLORIDE 1750 MG: 1 INJECTION, POWDER, LYOPHILIZED, FOR SOLUTION INTRAVENOUS at 00:37

## 2017-06-15 RX ADMIN — HYDROCODONE BITARTRATE AND ACETAMINOPHEN 1 TABLET: 5; 325 TABLET ORAL at 09:33

## 2017-06-15 NOTE — PLAN OF CARE
Problem: Patient Care Overview (Adult)  Goal: Plan of Care Review  Outcome: Ongoing (interventions implemented as appropriate)    06/15/17 0605   Coping/Psychosocial Response Interventions   Plan Of Care Reviewed With patient   Patient Care Overview   Progress improving   Outcome Evaluation   Outcome Summary/Follow up Plan c/o eye allergy. sclera is deeply red. pt c/o IV site pain. DC'd IV and called IV team for new placement. Still has an antibiotic due today.          Problem: Cellulitis (Adult)  Goal: Signs and Symptoms of Listed Potential Problems Will be Absent or Manageable (Cellulitis)  Outcome: Ongoing (interventions implemented as appropriate)    Problem: Infection, Risk/Actual (Adult)  Goal: Identify Related Risk Factors and Signs and Symptoms  Outcome: Ongoing (interventions implemented as appropriate)  Goal: Infection Prevention/Resolution  Outcome: Ongoing (interventions implemented as appropriate)

## 2017-06-15 NOTE — PROGRESS NOTES
LOS: 3 days     Chief Complaint:  Follow-up R elbow infection    Interval History:  No acute events. Sharp elbow pain is improved. The associated swelling is improved. There is still some scant drainage. She is tolerating vanco w/o rash or diarrhea. No fevers.     ROS: no n/v/d    Vital Signs  Temp:  [96.7 °F (35.9 °C)-98.2 °F (36.8 °C)] 98.2 °F (36.8 °C)  Heart Rate:  [72-81] 75  Resp:  [16-18] 16  BP: (123-146)/(68-89) 136/68    Physical Exam:  General: awake, alert, NAD   Head: Normocephalic  Eyes: no scleral icterus, +scleral injection  ENT: MMM, OP clear, no thrush. Good dentition.   Neck: Supple  Cardiovascular: NR,  trace LE edema  Respiratory:normal work of breathing on ambient air  GI: Abdomen is obese, soft, non-tender, non-distended  : no Catalan catheter present  Musculoskeletal: R elbow less edematous with drainage from 3 areas; less warm and painful w/ PROM; B TKA incisions well-healed  Skin: No rashes, lesions, or embolic phenomenon  Neurological: Alert and oriented x 3, motor strength 5/5 in lower extremities  Psychiatric: Normal mood and affect   Vasc: no cyanosis; PIV w/o erythema    Antibiotics:  •  vancomycin 1750 q12h    LABS:  Crt, Vtr, micro reviewed today  Lab Results   Component Value Date    WBC 6.68 06/14/2017    HGB 12.1 06/14/2017    HCT 37.3 06/14/2017    MCV 94.4 06/14/2017     06/14/2017     Lab Results   Component Value Date    GLUCOSE 149 (H) 06/12/2017    BUN 12 06/12/2017    CREATININE 0.72 06/15/2017    EGFRIFNONA 63 08/02/2016    EGFRIFAFRI 97 06/15/2017    BCR 14.0 06/12/2017    CO2 24.4 06/12/2017    CALCIUM 9.7 06/12/2017    PROTENTOTREF 7.5 08/02/2016    ALBUMIN 4.50 06/12/2017    LABIL2 1.2 06/12/2017    AST 16 06/12/2017    ALT 8 06/12/2017    CRP 0.50 06/13/2017     Lab Results   Component Value Date    SEDRATE 17 06/13/2017     Lab Results   Component Value Date    [facility] 06/14/2017       Microbiology:  6/12 BCx: NGTD  6/12 WCx: MRSA (sensitive to  vanco, doxy, TMP-SMX, clinda)    Radiology (prior):   XR with soft tissue swelling; no evidence of osteomyelitis    Assessment/Plan   1. Right elbow skin and soft tissue infection secondary to MRSA  -reviewed orthopedics note; they are not concerned for deep infection  -improving though still some drainage which is therapeutic  -OK to discharge from my standpoint after 1 pm dose of vancomycin today; dosed adjusted by PharmD  -based on MRSA sensitivities, she can resume the TMP-SMX DS BID she was on at home x 5 more days; I think she just needed more days of antibiotics before we saw her turn the corner  -if symptoms worsen while on antibiotics or after completion, she will call me and I would plan to order a CT or MRI of the elbow     I discussed the case with Dr Kapadia.      Thank you for allowing me to be involved in the care of this patient. Infectious diseases will sign off at this time with antibiotics plan in place but please call me at [phone] if any further questions.

## 2017-06-15 NOTE — DISCHARGE SUMMARY
NAME: Lana Yañez ADMIT: 2017   : 1945  PCP: Raoul Ramirez MD    MRN: [id number] LOS: 3 days   AGE/SEX: 71 y.o. female  ROOM: [facility]/     Date of Admission:  2017  Date of Discharge:  6/15/2017    PCP: Raoul Ramirez MD      CHIEF COMPLAINT  Wound Infection      DISCHARGE DIAGNOSIS  Active Hospital Problems (** Indicates Principal Problem)    Diagnosis Date Noted   • **Cellulitis of right elbow due to MRSA [L03.113] 2017   • Morbid obesity with BMI of 40.0-44.9, adult [E66.01, Z68.41] 12/15/2016   • Bronchitis [J40] 2016   • Sleep apnea [G47.30] 2016   • Fatigue [R53.83] 2016   • Essential hypertension [I10] 2016   • Rheumatoid arthritis involving both hands [M06.9] 2016      Resolved Hospital Problems    Diagnosis Date Noted Date Resolved   No resolved problems to display.       SECONDARY DIAGNOSES  Past Medical History:   Diagnosis Date   • Arthritis    • Edema    • Fatigue    • Headache    • Hx of bone density study     10/23/2014   • Hyperlipidemia    • Hypertension    • Vitamin D deficiency        CONSULTS   None  Consult Orders (all)     Start     Ordered    17 1547  Inpatient Consult to Infectious Diseases  Once     Specialty:  Infectious Diseases  Provider:  Al Butler MD    17 1548    17 1547  Inpatient Consult to Orthopedic Surgery  Once     Specialty:  Orthopedic Surgery  Provider:  Eusebio Bearden MD    17 1548    17 1420  [facility] (on-call MD unless specified)  Once     Specialty:  Internal Medicine  Provider:  Cristian Grace MD    17 1419        Treatment Team     Provider Relationship Specialty Contact    Eusebio Kapadia MD Attending --  [phone]    Zena Jackson Patient Care Technician --      Lillian Strong RN Registered Nurse --      Antoinette Proctor RN Registered Nurse --      Leslye Guaman RN Case Manager --      Elijah Lorenz MD Consulting  Physician Orthopedic Surgery  [phone]          PROCEDURES PERFORMED    RIGHT ELBOW 2 VIEWS         FINDINGS:  1. Mild nonspecific degenerative change, diffuse soft tissue swelling  posterior soft tissues distal humerus extending along the posterior  aspect of the proximal ulna.  2. No evidence of joint effusion nor significant osseous abnormality.    HOSPITAL COURSE    The patient is a very pleasant 71-year-old -American female who presented to the hospital with swelling and erythema of the right elbow.  She was seen at urgent care 2 days prior to arrival and was given Bactrim for cellulitis.  Her symptoms worsened despite taking the antibiotic so she presented to the emergency room.  She was started on IV vancomycin, infectious disease and orthopedic surgeons were both consulted.  Cultures were obtained from the wound as well.  The cultures grew MRSA which was sensitive to Bactrim, doxycycline, clindamycin.  Orthopedics evaluated her and did not feel she had any deeper infection and that this was all a cellulitis.  Her symptoms improved and she is remained afebrile since admission.  In addition, her white blood cell count is within normal limits.  After discussing with infectious diseases the patient can be discharged home today.  She has 5 additional days of Bactrim and this should be adequate for treatment.  Of also discussed with her to return to the emergency room if any of her symptoms recur.  She does have what appears to be allergic conjunctivitis and I've asked her to see her ophthalmologist by either calling when she gets home today or tomorrow.  In the meantime she will not take her glaucoma eyedrops because she says she is allergic to them.  I will also have her follow-up with her primary doctor within one week.  I discussed all of this with the patient and she is in agreement.  We will discharge her today after her vancomycin dose  PHYSICAL EXAM  Temp:  [96.7 °F (35.9 °C)-98.2 °F (36.8 °C)]  98.2 °F (36.8 °C)  Heart Rate:  [72-81] 75  Resp:  [16-18] 16  BP: (123-146)/(68-89) 136/68  Body mass index is 44.39 kg/(m^2).  Physical Exam  Constitutional: She is oriented to person, place, and time. She appears well-developed. She is cooperative. No distress.   Cardiovascular: Normal rate and regular rhythm.   No murmur heard.  Pulmonary/Chest: Effort normal and breath sounds normal. No respiratory distress.   Abdominal: Soft. Normal appearance and bowel sounds are normal. She exhibits no distension. There is no tenderness.   Musculoskeletal:   Elbow erythema, pain with movement of the elbow which is improved.   Neurological: She is alert and oriented to person, place, and time.   Skin: Skin is warm and dry.   Nursing note and vitals reviewed.    CONDITION ON DISCHARGE  Stable.      DISCHARGE DISPOSITION   Home or Self Care  Home      DISCHARGE MEDICATIONS   Lana Yañez   Home Medication Instructions DG:[id number]    Printed on:06/15/17 0896   Medication Information                      albuterol (PROVENTIL HFA;VENTOLIN HFA) 108 (90 BASE) MCG/ACT inhaler  Inhale 2 puffs Every 4 (Four) Hours As Needed for wheezing.             amLODIPine (NORVASC) 2.5 MG tablet  Take 1 tablet by mouth daily.             atenolol (TENORMIN) 50 MG tablet  Take 1 tablet by mouth Daily.             buPROPion (WELLBUTRIN) 75 MG tablet  Take 1 tablet by mouth 2 (Two) Times a Day.             cetirizine (ZyrTEC) 10 MG tablet  Take 10 mg by mouth daily.             dorzolamide-timolol (COSOPT) 22.3-6.8 MG/ML ophthalmic solution  Administer 1 drop to both eyes Every 12 (Twelve) Hours.             furosemide (LASIX) 20 MG tablet  Take 1 tablet by mouth daily.             Glycerin-Hypromellose- (ARTIFICIAL TEARS) 0.2-0.2-1 % solution ophthalmic solution  Administer 2 drops to both eyes Every 1 (One) Hour As Needed for Dry Eyes.             HYDROcodone-acetaminophen (NORCO) 5-325 MG per tablet  Take 1 tablet by mouth Every  4 (Four) Hours As Needed for Moderate Pain (4-6) for up to 7 days.             latanoprost (XALATAN) 0.005 % ophthalmic solution  Administer 1 drop to both eyes Every Night.             losartan-hydrochlorothiazide (HYZAAR) 100-25 MG per tablet  Take 1 tablet by mouth daily.             MYRBETRIQ 50 MG tablet sustained-release 24 hour                  No future appointments.  Follow-up Information     Follow up with Raoul Ramirez MD Follow up in 3 week(s).    Specialty:  Internal Medicine    Why:  Hospital follow up      Contact information:    [address]  [place] [address]  [phone]            TEST  RESULTS PENDING AT DISCHARGE   Order Current Status    Blood Culture Preliminary result    Blood Culture Preliminary result             Eusebio Kapadia MD  [facility]  06/15/17  11:28 AM      Time: greater than 30 minutes.      Dragon disclaimer:  Much of this encounter note is an electronic transcription/translation of spoken language to printed text. The electronic translation of spoken language may permit erroneous, or at times, nonsensical words or phrases to be inadvertently transcribed; Although I have reviewed the note for such errors, some may still exist.

## 2017-06-17 LAB
BACTERIA SPEC AEROBE CULT: NORMAL
BACTERIA SPEC AEROBE CULT: NORMAL

## 2017-06-27 ENCOUNTER — OFFICE VISIT (OUTPATIENT)
Dept: INTERNAL MEDICINE | Facility: CLINIC | Age: 72
End: 2017-06-27

## 2017-06-27 VITALS
RESPIRATION RATE: 16 BRPM | TEMPERATURE: 97.9 F | HEIGHT: 66 IN | OXYGEN SATURATION: 93 % | HEART RATE: 65 BPM | SYSTOLIC BLOOD PRESSURE: 131 MMHG | BODY MASS INDEX: 44.52 KG/M2 | DIASTOLIC BLOOD PRESSURE: 80 MMHG | WEIGHT: 277 LBS

## 2017-06-27 DIAGNOSIS — M15.9 GENERALIZED OSTEOARTHRITIS OF MULTIPLE SITES: ICD-10-CM

## 2017-06-27 DIAGNOSIS — I10 ESSENTIAL HYPERTENSION: ICD-10-CM

## 2017-06-27 DIAGNOSIS — Z09 HOSPITAL DISCHARGE FOLLOW-UP: ICD-10-CM

## 2017-06-27 DIAGNOSIS — E78.49 OTHER HYPERLIPIDEMIA: ICD-10-CM

## 2017-06-27 DIAGNOSIS — E55.9 HYPOVITAMINOSIS D: ICD-10-CM

## 2017-06-27 DIAGNOSIS — L03.113 CELLULITIS OF RIGHT ELBOW: ICD-10-CM

## 2017-06-27 DIAGNOSIS — Z00.00 MEDICARE ANNUAL WELLNESS VISIT, INITIAL: Primary | ICD-10-CM

## 2017-06-27 DIAGNOSIS — E56.9 VITAMIN DEFICIENCY: ICD-10-CM

## 2017-06-27 DIAGNOSIS — E66.01 MORBID OBESITY WITH BMI OF 40.0-44.9, ADULT (HCC): ICD-10-CM

## 2017-06-27 DIAGNOSIS — M05.742 RHEUMATOID ARTHRITIS INVOLVING BOTH HANDS WITH POSITIVE RHEUMATOID FACTOR (HCC): ICD-10-CM

## 2017-06-27 DIAGNOSIS — M05.741 RHEUMATOID ARTHRITIS INVOLVING BOTH HANDS WITH POSITIVE RHEUMATOID FACTOR (HCC): ICD-10-CM

## 2017-06-27 LAB
ALBUMIN SERPL-MCNC: 4.4 G/DL (ref 3.5–5.2)
ALBUMIN/GLOB SERPL: 1.3 G/DL
ALP SERPL-CCNC: 55 U/L (ref 39–117)
ALT SERPL-CCNC: 11 U/L (ref 1–33)
APPEARANCE UR: (no result)
AST SERPL-CCNC: 17 U/L (ref 1–32)
BACTERIA #/AREA URNS HPF: ABNORMAL /HPF
BASOPHILS # BLD AUTO: 0.01 10*3/MM3 (ref 0–0.2)
BASOPHILS NFR BLD AUTO: 0.2 % (ref 0–1.5)
BILIRUB SERPL-MCNC: 0.3 MG/DL (ref 0.1–1.2)
BILIRUB UR QL STRIP: NEGATIVE
BUN SERPL-MCNC: 27 MG/DL (ref 8–23)
BUN/CREAT SERPL: 26.2 (ref 7–25)
CALCIUM SERPL-MCNC: 10 MG/DL (ref 8.6–10.5)
CASTS URNS MICRO: ABNORMAL
CHLORIDE SERPL-SCNC: 102 MMOL/L (ref 98–107)
CHOLEST SERPL-MCNC: 209 MG/DL (ref 0–200)
CO2 SERPL-SCNC: 26.1 MMOL/L (ref 22–29)
COLOR UR: YELLOW
CREAT SERPL-MCNC: 1.03 MG/DL (ref 0.57–1)
EOSINOPHIL # BLD AUTO: 0.11 10*3/MM3 (ref 0–0.7)
EOSINOPHIL NFR BLD AUTO: 1.9 % (ref 0.3–6.2)
EPI CELLS #/AREA URNS HPF: ABNORMAL /HPF
ERYTHROCYTE [DISTWIDTH] IN BLOOD BY AUTOMATED COUNT: 14.2 % (ref 11.7–13)
GLOBULIN SER CALC-MCNC: 3.5 GM/DL
GLUCOSE SERPL-MCNC: 144 MG/DL (ref 65–99)
GLUCOSE UR QL: NEGATIVE
HCT VFR BLD AUTO: 39.8 % (ref 35.6–45.5)
HDLC SERPL-MCNC: 63 MG/DL (ref 40–60)
HGB BLD-MCNC: 13.1 G/DL (ref 11.9–15.5)
HGB UR QL STRIP: NEGATIVE
IMM GRANULOCYTES # BLD: 0 10*3/MM3 (ref 0–0.03)
IMM GRANULOCYTES NFR BLD: 0 % (ref 0–0.5)
KETONES UR QL STRIP: NEGATIVE
LDLC SERPL CALC-MCNC: 111 MG/DL (ref 0–100)
LDLC/HDLC SERPL: 1.76 {RATIO}
LEUKOCYTE ESTERASE UR QL STRIP: NEGATIVE
LYMPHOCYTES # BLD AUTO: 2.66 10*3/MM3 (ref 0.9–4.8)
LYMPHOCYTES NFR BLD AUTO: 46.6 % (ref 19.6–45.3)
MCH RBC QN AUTO: 31 PG (ref 26.9–32)
MCHC RBC AUTO-ENTMCNC: 32.9 G/DL (ref 32.4–36.3)
MCV RBC AUTO: 94.1 FL (ref 80.5–98.2)
MONOCYTES # BLD AUTO: 0.81 10*3/MM3 (ref 0.2–1.2)
MONOCYTES NFR BLD AUTO: 14.2 % (ref 5–12)
NEUTROPHILS # BLD AUTO: 2.12 10*3/MM3 (ref 1.9–8.1)
NEUTROPHILS NFR BLD AUTO: 37.1 % (ref 42.7–76)
NITRITE UR QL STRIP: POSITIVE
PH UR STRIP: 6 [PH] (ref 5–8)
PLATELET # BLD AUTO: 211 10*3/MM3 (ref 140–500)
POTASSIUM SERPL-SCNC: 5 MMOL/L (ref 3.5–5.2)
PROT SERPL-MCNC: 7.9 G/DL (ref 6–8.5)
PROT UR QL STRIP: NEGATIVE
RBC # BLD AUTO: 4.23 10*6/MM3 (ref 3.9–5.2)
RBC #/AREA URNS HPF: ABNORMAL /HPF
SODIUM SERPL-SCNC: 140 MMOL/L (ref 136–145)
SP GR UR: 1.02 (ref 1–1.03)
T4 FREE SERPL-MCNC: 1.11 NG/DL (ref 0.93–1.7)
TRIGL SERPL-MCNC: 176 MG/DL (ref 0–150)
TSH SERPL DL<=0.005 MIU/L-ACNC: 2.11 MIU/ML (ref 0.27–4.2)
UROBILINOGEN UR STRIP-MCNC: (no result) MG/DL
VLDLC SERPL CALC-MCNC: 35.2 MG/DL (ref 5–40)
WBC # BLD AUTO: 5.71 10*3/MM3 (ref 4.5–10.7)
WBC #/AREA URNS HPF: ABNORMAL /HPF

## 2017-06-27 PROCEDURE — 99214 OFFICE O/P EST MOD 30 MIN: CPT | Performed by: INTERNAL MEDICINE

## 2017-06-27 PROCEDURE — G0438 PPPS, INITIAL VISIT: HCPCS | Performed by: INTERNAL MEDICINE

## 2017-06-27 PROCEDURE — 96160 PT-FOCUSED HLTH RISK ASSMT: CPT | Performed by: INTERNAL MEDICINE

## 2017-06-27 RX ORDER — BROMPHENIRAMIN/PSEUDOEPHEDRINE 12MG-120MG
1 CAPSULE, EXTENDED RELEASE ORAL 2 TIMES DAILY PRN
COMMUNITY
Start: 2017-06-22 | End: 2018-05-10

## 2017-06-27 RX ORDER — MELOXICAM 7.5 MG/1
15 TABLET ORAL DAILY
COMMUNITY
End: 2017-10-11 | Stop reason: DRUGHIGH

## 2017-06-27 NOTE — PATIENT INSTRUCTIONS
Medicare Wellness  Personal Prevention Plan of Service     Date of Office Visit:  2017  Encounter Provider:  Raoul Ramirez MD  Place of Service:  [facility]  Patient Name: Lana Yañez  :  1945    As part of the Medicare Wellness portion of your visit today, we are providing you with this personalized preventive plan of services (PPPS). This plan is based upon recommendations of the [place] Preventive Services Task Force (USPSTF) and the Advisory Committee on Immunization Practices (ACIP).    This lists the preventive care services that should be considered, and provides dates of when you are due. Items listed as completed are up-to-date and do not require any further intervention.    Health Maintenance   Topic Date Due   • ZOSTER VACCINE  2016   • INFLUENZA VACCINE  2017   • LIPID PANEL  2017   • MAMMOGRAM  2018   • MEDICARE ANNUAL WELLNESS  2018   • TDAP/TD VACCINES (2 - Td) 2027   • COLONOSCOPY  2027   • HEPATITIS C SCREENING  Addressed   • PNEUMOCOCCAL VACCINES (65+ LOW/MEDIUM RISK)  Completed       Orders Placed This Encounter   Procedures   • Lipid Panel With LDL / HDL Ratio   • T4, Free   • TSH   • Comprehensive Metabolic Panel   • CBC & Differential     Order Specific Question:   Manual Differential     Answer:   No   • Urinalysis With Microscopic       No Follow-up on file.

## 2017-06-28 DIAGNOSIS — I10 ESSENTIAL HYPERTENSION: ICD-10-CM

## 2017-06-28 RX ORDER — LOSARTAN POTASSIUM AND HYDROCHLOROTHIAZIDE 25; 100 MG/1; MG/1
TABLET ORAL
Qty: 90 TABLET | Refills: 0 | Status: SHIPPED | OUTPATIENT
Start: 2017-06-28 | End: 2017-10-11 | Stop reason: SDUPTHER

## 2017-06-28 RX ORDER — AMLODIPINE BESYLATE 2.5 MG/1
TABLET ORAL
Qty: 90 TABLET | Refills: 0 | Status: SHIPPED | OUTPATIENT
Start: 2017-06-28 | End: 2017-10-06 | Stop reason: SDUPTHER

## 2017-06-29 NOTE — PROGRESS NOTES
Karl Yañez is a 71 y.o. female.   She is here today for Medicare annual wellness visit initial along with hyperlipidemia Hospital discharge follow-up for cellulitis hypertension osteoarthritis multiple sites cellulitis of right elbow due to MRSA rheumatoid arthritis involving both hands hypovitaminosis D morbid obesity and vitamin D deficiency  History of Present Illness   She is here today for Medicare annual wellness visit initial along with hyper lipidemia hospital discharge follow-up for saline as of elbow on the right side along with osteoporosis multiple sites rheumatoid arthritis of both hands vitamin D deficiency morbid obesity and overall she is getting better now  The following portions of the patient's history were reviewed and updated as appropriate: allergies, current medications, past family history, past medical history, past social history, past surgical history and problem list.    Review of Systems   Musculoskeletal: Positive for arthralgias.   Skin:        Right elbow wound is healing well   All other systems reviewed and are negative.      Objective   Physical Exam   Constitutional: She is oriented to person, place, and time. She appears well-developed and well-nourished. She is cooperative.   HENT:   Head: Normocephalic and atraumatic.   Right Ear: Hearing, tympanic membrane, external ear and ear canal normal.   Left Ear: Hearing, tympanic membrane, external ear and ear canal normal.   Nose: Nose normal.   Mouth/Throat: Uvula is midline, oropharynx is clear and moist and mucous membranes are normal.   Eyes: Conjunctivae, EOM and lids are normal. Pupils are equal, round, and reactive to light.   Neck: Phonation normal. Neck supple. Carotid bruit is not present.   Cardiovascular: Normal rate, regular rhythm and normal heart sounds.  Exam reveals no gallop and no friction rub.    No murmur heard.  Pulmonary/Chest: Effort normal and breath sounds normal. No respiratory distress.    Abdominal: Soft. Bowel sounds are normal. She exhibits no distension and no mass. There is no hepatosplenomegaly. There is no tenderness. There is no rebound and no guarding. No hernia.   Musculoskeletal: She exhibits no edema. Tenderness: multiple joints.   Neurological: She is alert and oriented to person, place, and time. Coordination and gait normal.   Skin: Skin is warm and dry.        Psychiatric: She has a normal mood and affect. Her speech is normal and behavior is normal. Judgment and thought content normal.   Nursing note and vitals reviewed.      Assessment/Plan   Diagnoses and all orders for this visit:    Medicare annual wellness visit, initial  -     Lipid Panel With LDL / HDL Ratio  -     T4, Free  -     TSH  -     CBC & Differential  -     Comprehensive Metabolic Panel  -     Urinalysis With Microscopic    Other hyperlipidemia  -     Lipid Panel With LDL / HDL Ratio  -     T4, Free  -     TSH  -     CBC & Differential  -     Comprehensive Metabolic Panel  -     Urinalysis With Microscopic    Essential hypertension  -     Lipid Panel With LDL / HDL Ratio  -     T4, Free  -     TSH  -     CBC & Differential  -     Comprehensive Metabolic Panel  -     Urinalysis With Microscopic    Hospital discharge follow-up  -     Lipid Panel With LDL / HDL Ratio  -     T4, Free  -     TSH  -     CBC & Differential  -     Comprehensive Metabolic Panel  -     Urinalysis With Microscopic    Generalized osteoarthritis of multiple sites  -     Lipid Panel With LDL / HDL Ratio  -     T4, Free  -     TSH  -     CBC & Differential  -     Comprehensive Metabolic Panel  -     Urinalysis With Microscopic    Cellulitis of right elbow due to MRSA  -     Lipid Panel With LDL / HDL Ratio  -     T4, Free  -     TSH  -     CBC & Differential  -     Comprehensive Metabolic Panel  -     Urinalysis With Microscopic    Rheumatoid arthritis involving both hands with positive rheumatoid factor  -     Lipid Panel With LDL / HDL Ratio  -      T4, Free  -     TSH  -     CBC & Differential  -     Comprehensive Metabolic Panel  -     Urinalysis With Microscopic    Hypovitaminosis D  -     Lipid Panel With LDL / HDL Ratio  -     T4, Free  -     TSH  -     CBC & Differential  -     Comprehensive Metabolic Panel  -     Urinalysis With Microscopic    Morbid obesity with BMI of 40.0-44.9, adult  -     Lipid Panel With LDL / HDL Ratio  -     T4, Free  -     TSH  -     CBC & Differential  -     Comprehensive Metabolic Panel  -     Urinalysis With Microscopic    Vitamin deficiency  -     Lipid Panel With LDL / HDL Ratio  -     T4, Free  -     TSH  -     CBC & Differential  -     Comprehensive Metabolic Panel  -     Urinalysis With Microscopic    Other orders  -     Microscopic Examination        Medicare annual wellness visit initial following recommendations  Hospital discharge follow-up for cellulitis of right elbow doing much better now and she is like he did not involve the elbow joint  Hypovitaminosis D supplementation as needed  Morbid obesity weight loss with proper diet exercise medication  Vitamin deficiency as noted  Rheumatoid arthritis of both hands follow closely  Osteoarthritis multiple sites noted  Hypertension well-controlled on current medication  Hyperlipidemia keep LDL less than 70 with proper diet exercise medication

## 2017-06-29 NOTE — PROGRESS NOTES
QUICK REFERENCE INFORMATION:  The ABCs of the Annual Wellness Visit    Initial Medicare Wellness Visit    HEALTH RISK ASSESSMENT    1945    Recent Hospitalizations:  Recently treated at the following:  [facility].        Current Medical Providers:  Patient Care Team:  Raoul Ramirez MD as PCP - General (Internal Medicine)        Smoking Status:  History   Smoking Status   • Never Smoker   Smokeless Tobacco   • Not on file       Alcohol Consumption:  History   Alcohol Use No       Depression Screen:   PHQ-9 Depression Screening 6/27/2017   Little interest or pleasure in doing things 0   Feeling down, depressed, or hopeless 0   Trouble falling or staying asleep, or sleeping too much 0   Feeling tired or having little energy 0   Poor appetite or overeating 0   Feeling bad about yourself - or that you are a failure or have let yourself or your family down 0   Trouble concentrating on things, such as reading the newspaper or watching television 0   Moving or speaking so slowly that other people could have noticed. Or the opposite - being so fidgety or restless that you have been moving around a lot more than usual 0   Thoughts that you would be better off dead, or of hurting yourself in some way 0   PHQ-9 Total Score 0   If you checked off any problems, how difficult have these problems made it for you to do your work, take care of things at home, or get along with other people? Not difficult at all       Health Habits and Functional and Cognitive Screening:  Functional & Cognitive Status 6/27/2017   Do you have difficulty preparing food and eating? No   Do you have difficulty bathing yourself? No   Do you have difficulty getting dressed? No   Do you have difficulty using the toilet? No   Do you have difficulty moving around from place to place? No   In the past year have you fallen or experienced a near fall? No   Do you need help using the phone?  No   Are you deaf or do you have serious  difficulty hearing?  No   Do you need help with transportation? No   Do you need help shopping? No   Do you need help preparing meals?  No   Do you need help with housework?  No   Do you need help with laundry? No   Do you need help taking your medications? No   Do you need help managing money? No   Do you have difficulty concentrating, remembering or making decisions? No       Health Habits  Current Diet: Well Balanced Diet  Dental Exam: Up to date  Eye Exam: Up to date  Exercise (times per week): 0 times per week  Current Exercise Activities Include: None          Does the patient have evidence of cognitive impairment? No    Asiprin use counseling: Taking ASA appropriately as indicated      Recent Lab Results:    Visual Acuity:  No exam data present    Age-appropriate Screening Schedule:  Refer to the list below for future screening recommendations based on patient's age, sex and/or medical conditions. Orders for these recommended tests are listed in the plan section. The patient has been provided with a written plan.    Health Maintenance   Topic Date Due   • ZOSTER VACCINE  02/24/2016   • INFLUENZA VACCINE  08/01/2017   • MAMMOGRAM  02/26/2018   • LIPID PANEL  06/27/2018   • TDAP/TD VACCINES (2 - Td) 06/09/2027   • COLONOSCOPY  06/27/2027   • PNEUMOCOCCAL VACCINES (65+ LOW/MEDIUM RISK)  Completed        Subjective   History of Present Illness    Lana Yañez is a 71 y.o. female who presents for an Annual Wellness Visit.    The following portions of the patient's history were reviewed and updated as appropriate: allergies, current medications, past family history, past medical history, past social history, past surgical history and problem list.    Outpatient Medications Prior to Visit   Medication Sig Dispense Refill   • albuterol (PROVENTIL HFA;VENTOLIN HFA) 108 (90 BASE) MCG/ACT inhaler Inhale 2 puffs Every 4 (Four) Hours As Needed for wheezing. 1 inhaler 11   • atenolol (TENORMIN) 50 MG tablet Take 1  tablet by mouth Daily. 90 tablet 1   • cetirizine (ZyrTEC) 10 MG tablet Take 10 mg by mouth daily.     • furosemide (LASIX) 20 MG tablet Take 1 tablet by mouth daily. 30 tablet 2   • latanoprost (XALATAN) 0.005 % ophthalmic solution Administer 1 drop to both eyes Every Night.     • MYRBETRIQ 50 MG tablet sustained-release 24 hour      • amLODIPine (NORVASC) 2.5 MG tablet Take 1 tablet by mouth daily. 90 tablet 1   • buPROPion (WELLBUTRIN) 75 MG tablet Take 1 tablet by mouth 2 (Two) Times a Day. 180 tablet 1   • dorzolamide-timolol (COSOPT) 22.3-6.8 MG/ML ophthalmic solution Administer 1 drop to both eyes Every 12 (Twelve) Hours.     • Glycerin-Hypromellose- (ARTIFICIAL TEARS) 0.2-0.2-1 % solution ophthalmic solution Administer 2 drops to both eyes Every 1 (One) Hour As Needed for Dry Eyes. 2 bottle 0   • losartan-hydrochlorothiazide (HYZAAR) 100-25 MG per tablet Take 1 tablet by mouth daily. 90 tablet 1     No facility-administered medications prior to visit.        Patient Active Problem List   Diagnosis   • Hyperlipidemia   • Vitamin deficiency   • Essential hypertension   • Rheumatoid arthritis involving both hands   • Fatigue   • ANTOINE (dyspnea on exertion)   • Dysuria   • Urge incontinence of urine   • Hypovitaminosis D   • Sleep apnea   • Encounter for screening colonoscopy   • Bronchitis   • Cough   • Generalized osteoarthritis of multiple sites   • Morbid obesity with BMI of 40.0-44.9, adult   • Cellulitis of right elbow due to MRSA   • Medicare annual wellness visit, initial   • Hospital discharge follow-up       Advance Care Planning:  has an advance directive - a copy has been provided and is in file    Identification of Risk Factors:  Risk factors include: weight .    Review of Systems    Compared to one year ago, the patient feels her physical health is the same.  Compared to one year ago, the patient feels her mental health is the same.    Objective     Physical Exam    Vitals:    06/27/17 0958  "  BP: 131/80   BP Location: Left arm   Patient Position: Sitting   Cuff Size: Large Adult   Pulse: 65   Resp: 16   Temp: 97.9 °F (36.6 °C)   TempSrc: Oral   SpO2: 93%   Weight: 277 lb (126 kg)   Height: 66\" (167.6 cm)   PainSc: 0-No pain       Body mass index is 44.71 kg/(m^2).  Discussed the patient's BMI with her. The BMI is above average; BMI management plan is completed.    Assessment/Plan   Patient Self-Management and Personalized Health Advice  The patient has been provided with information about: diet, exercise and weight management and preventive services including:   · Exercise counseling provided, Nutrition counseling provided.    Visit Diagnoses:    ICD-10-CM ICD-9-CM   1. Medicare annual wellness visit, initial Z00.00 V70.0   2. Other hyperlipidemia E78.4 272.4   3. Essential hypertension I10 401.9   4. Hospital discharge follow-up Z09 V67.59   5. Generalized osteoarthritis of multiple sites M15.9 715.09   6. Cellulitis of right elbow due to MRSA L03.113 682.3   7. Rheumatoid arthritis involving both hands with positive rheumatoid factor M05.741 714.0    M05.742    8. Hypovitaminosis D E55.9 268.9   9. Morbid obesity with BMI of 40.0-44.9, adult E66.01 278.01    Z68.41 V85.41   10. Vitamin deficiency E56.9 269.2       Orders Placed This Encounter   Procedures   • Lipid Panel With LDL / HDL Ratio     Order Specific Question:   [facility] Has the patient fasted?     Answer:   Yes   • T4, Free     Order Specific Question:   [facility] Has the patient fasted?     Answer:   Yes   • TSH     Order Specific Question:   [facility] Has the patient fasted?     Answer:   Yes   • Comprehensive Metabolic Panel     Order Specific Question:   [facility] Has the patient fasted?     Answer:   Yes   • Microscopic Examination     Order Specific Question:   [facility] Has the patient fasted?     Answer:   Yes   • CBC & Differential     Order Specific Question:   Manual Differential     Answer:   No     Order Specific Question:   [facility] " Has the patient fasted?     Answer:   Yes   • Urinalysis With Microscopic     Order Specific Question:   [facility] Has the patient fasted?     Answer:   Yes       Outpatient Encounter Prescriptions as of 6/27/2017   Medication Sig Dispense Refill   • albuterol (PROVENTIL HFA;VENTOLIN HFA) 108 (90 BASE) MCG/ACT inhaler Inhale 2 puffs Every 4 (Four) Hours As Needed for wheezing. 1 inhaler 11   • atenolol (TENORMIN) 50 MG tablet Take 1 tablet by mouth Daily. 90 tablet 1   • cetirizine (ZyrTEC) 10 MG tablet Take 10 mg by mouth daily.     • COSOPT PF 22.3-6.8 MG/ML solution      • furosemide (LASIX) 20 MG tablet Take 1 tablet by mouth daily. 30 tablet 2   • latanoprost (XALATAN) 0.005 % ophthalmic solution Administer 1 drop to both eyes Every Night.     • meloxicam (MOBIC) 7.5 MG tablet Take 7.5 mg by mouth Daily.     • MYRBETRIQ 50 MG tablet sustained-release 24 hour      • [DISCONTINUED] amLODIPine (NORVASC) 2.5 MG tablet Take 1 tablet by mouth daily. 90 tablet 1   • [DISCONTINUED] buPROPion (WELLBUTRIN) 75 MG tablet Take 1 tablet by mouth 2 (Two) Times a Day. 180 tablet 1   • [DISCONTINUED] dorzolamide-timolol (COSOPT) 22.3-6.8 MG/ML ophthalmic solution Administer 1 drop to both eyes Every 12 (Twelve) Hours.     • [DISCONTINUED] Glycerin-Hypromellose- (ARTIFICIAL TEARS) 0.2-0.2-1 % solution ophthalmic solution Administer 2 drops to both eyes Every 1 (One) Hour As Needed for Dry Eyes. 2 bottle 0   • [DISCONTINUED] losartan-hydrochlorothiazide (HYZAAR) 100-25 MG per tablet Take 1 tablet by mouth daily. 90 tablet 1     No facility-administered encounter medications on file as of 6/27/2017.        Reviewed use of high risk medication in the elderly: yes  Reviewed for potential of harmful drug interactions in the elderly: yes    Follow Up:  No Follow-up on file.     An After Visit Summary and PPPS with all of these plans were given to the patient.

## 2017-09-29 ENCOUNTER — APPOINTMENT (OUTPATIENT)
Dept: GENERAL RADIOLOGY | Facility: HOSPITAL | Age: 72
End: 2017-09-29

## 2017-09-29 ENCOUNTER — APPOINTMENT (OUTPATIENT)
Dept: CT IMAGING | Facility: HOSPITAL | Age: 72
End: 2017-09-29

## 2017-09-29 ENCOUNTER — HOSPITAL ENCOUNTER (EMERGENCY)
Facility: HOSPITAL | Age: 72
Discharge: HOME | End: 2017-09-30
Attending: EMERGENCY MEDICINE | Admitting: EMERGENCY MEDICINE

## 2017-09-29 VITALS
TEMPERATURE: 97.6 F | HEART RATE: 72 BPM | HEIGHT: 67 IN | OXYGEN SATURATION: 95 % | SYSTOLIC BLOOD PRESSURE: 142 MMHG | WEIGHT: 260 LBS | DIASTOLIC BLOOD PRESSURE: 67 MMHG | RESPIRATION RATE: 16 BRPM | BODY MASS INDEX: 40.81 KG/M2

## 2017-09-29 DIAGNOSIS — R82.71 BACTERIA IN URINE: ICD-10-CM

## 2017-09-29 DIAGNOSIS — M54.16 LEFT LUMBAR RADICULOPATHY: Primary | ICD-10-CM

## 2017-09-29 LAB
ALBUMIN SERPL-MCNC: 4.1 G/DL (ref 3.5–5.2)
ALBUMIN/GLOB SERPL: 1.1 G/DL
ALP SERPL-CCNC: 42 U/L (ref 39–117)
ALT SERPL W P-5'-P-CCNC: 13 U/L (ref 1–33)
ANION GAP SERPL CALCULATED.3IONS-SCNC: 12.4 MMOL/L
AST SERPL-CCNC: 23 U/L (ref 1–32)
BACTERIA UR QL AUTO: ABNORMAL /HPF
BASOPHILS # BLD AUTO: 0.02 10*3/MM3 (ref 0–0.2)
BASOPHILS NFR BLD AUTO: 0.3 % (ref 0–1.5)
BILIRUB SERPL-MCNC: 0.5 MG/DL (ref 0.1–1.2)
BILIRUB UR QL STRIP: NEGATIVE
BUN BLD-MCNC: 15 MG/DL (ref 8–23)
BUN/CREAT SERPL: 18.1 (ref 7–25)
CALCIUM SPEC-SCNC: 9.7 MG/DL (ref 8.6–10.5)
CHLORIDE SERPL-SCNC: 101 MMOL/L (ref 98–107)
CLARITY UR: CLEAR
CO2 SERPL-SCNC: 26.6 MMOL/L (ref 22–29)
COLOR UR: YELLOW
CREAT BLD-MCNC: 0.83 MG/DL (ref 0.57–1)
DEPRECATED RDW RBC AUTO: 56.1 FL (ref 37–54)
EOSINOPHIL # BLD AUTO: 0.12 10*3/MM3 (ref 0–0.7)
EOSINOPHIL NFR BLD AUTO: 1.8 % (ref 0.3–6.2)
ERYTHROCYTE [DISTWIDTH] IN BLOOD BY AUTOMATED COUNT: 15.9 % (ref 11.7–13)
GFR SERPL CREATININE-BSD FRML MDRD: 82 ML/MIN/1.73
GLOBULIN UR ELPH-MCNC: 3.6 GM/DL
GLUCOSE BLD-MCNC: 107 MG/DL (ref 65–99)
GLUCOSE UR STRIP-MCNC: NEGATIVE MG/DL
HCT VFR BLD AUTO: 37.3 % (ref 35.6–45.5)
HGB BLD-MCNC: 12.2 G/DL (ref 11.9–15.5)
HGB UR QL STRIP.AUTO: NEGATIVE
HYALINE CASTS UR QL AUTO: ABNORMAL /LPF
IMM GRANULOCYTES # BLD: 0.02 10*3/MM3 (ref 0–0.03)
IMM GRANULOCYTES NFR BLD: 0.3 % (ref 0–0.5)
KETONES UR QL STRIP: NEGATIVE
LEUKOCYTE ESTERASE UR QL STRIP.AUTO: ABNORMAL
LIPASE SERPL-CCNC: 16 U/L (ref 13–60)
LYMPHOCYTES # BLD AUTO: 3 10*3/MM3 (ref 0.9–4.8)
LYMPHOCYTES NFR BLD AUTO: 44.4 % (ref 19.6–45.3)
MCH RBC QN AUTO: 31.9 PG (ref 26.9–32)
MCHC RBC AUTO-ENTMCNC: 32.7 G/DL (ref 32.4–36.3)
MCV RBC AUTO: 97.6 FL (ref 80.5–98.2)
MONOCYTES # BLD AUTO: 0.54 10*3/MM3 (ref 0.2–1.2)
MONOCYTES NFR BLD AUTO: 8 % (ref 5–12)
NEUTROPHILS # BLD AUTO: 3.06 10*3/MM3 (ref 1.9–8.1)
NEUTROPHILS NFR BLD AUTO: 45.2 % (ref 42.7–76)
NITRITE UR QL STRIP: POSITIVE
NT-PROBNP SERPL-MCNC: 449.4 PG/ML (ref 0–900)
PH UR STRIP.AUTO: 6.5 [PH] (ref 5–8)
PLATELET # BLD AUTO: 186 10*3/MM3 (ref 140–500)
PMV BLD AUTO: 12.1 FL (ref 6–12)
POTASSIUM BLD-SCNC: 4.3 MMOL/L (ref 3.5–5.2)
PROT SERPL-MCNC: 7.7 G/DL (ref 6–8.5)
PROT UR QL STRIP: NEGATIVE
RBC # BLD AUTO: 3.82 10*6/MM3 (ref 3.9–5.2)
RBC # UR: ABNORMAL /HPF
REF LAB TEST METHOD: ABNORMAL
SODIUM BLD-SCNC: 140 MMOL/L (ref 136–145)
SP GR UR STRIP: 1.02 (ref 1–1.03)
SQUAMOUS #/AREA URNS HPF: ABNORMAL /HPF
TROPONIN T SERPL-MCNC: <0.01 NG/ML (ref 0–0.03)
UROBILINOGEN UR QL STRIP: ABNORMAL
WBC NRBC COR # BLD: 6.76 10*3/MM3 (ref 4.5–10.7)
WBC UR QL AUTO: ABNORMAL /HPF

## 2017-09-29 PROCEDURE — 80053 COMPREHEN METABOLIC PANEL: CPT | Performed by: EMERGENCY MEDICINE

## 2017-09-29 PROCEDURE — 83690 ASSAY OF LIPASE: CPT | Performed by: EMERGENCY MEDICINE

## 2017-09-29 PROCEDURE — 71020 HC CHEST PA AND LATERAL: CPT

## 2017-09-29 PROCEDURE — 74177 CT ABD & PELVIS W/CONTRAST: CPT

## 2017-09-29 PROCEDURE — 96374 THER/PROPH/DIAG INJ IV PUSH: CPT

## 2017-09-29 PROCEDURE — 25010000002 ONDANSETRON PER 1 MG: Performed by: EMERGENCY MEDICINE

## 2017-09-29 PROCEDURE — 96375 TX/PRO/DX INJ NEW DRUG ADDON: CPT

## 2017-09-29 PROCEDURE — 96361 HYDRATE IV INFUSION ADD-ON: CPT

## 2017-09-29 PROCEDURE — 87086 URINE CULTURE/COLONY COUNT: CPT | Performed by: EMERGENCY MEDICINE

## 2017-09-29 PROCEDURE — 81001 URINALYSIS AUTO W/SCOPE: CPT | Performed by: EMERGENCY MEDICINE

## 2017-09-29 PROCEDURE — 83880 ASSAY OF NATRIURETIC PEPTIDE: CPT | Performed by: EMERGENCY MEDICINE

## 2017-09-29 PROCEDURE — 85025 COMPLETE CBC W/AUTO DIFF WBC: CPT | Performed by: EMERGENCY MEDICINE

## 2017-09-29 PROCEDURE — 99284 EMERGENCY DEPT VISIT MOD MDM: CPT

## 2017-09-29 PROCEDURE — 25010000002 MORPHINE PER 10 MG: Performed by: EMERGENCY MEDICINE

## 2017-09-29 PROCEDURE — 84484 ASSAY OF TROPONIN QUANT: CPT | Performed by: EMERGENCY MEDICINE

## 2017-09-29 PROCEDURE — 0 IOPAMIDOL 61 % SOLUTION: Performed by: EMERGENCY MEDICINE

## 2017-09-29 RX ORDER — HYDROCODONE BITARTRATE AND ACETAMINOPHEN 5; 325 MG/1; MG/1
1 TABLET ORAL EVERY 6 HOURS PRN
Qty: 20 TABLET | Refills: 0 | Status: ON HOLD | OUTPATIENT
Start: 2017-09-29 | End: 2017-10-18

## 2017-09-29 RX ORDER — ONDANSETRON 2 MG/ML
4 INJECTION INTRAMUSCULAR; INTRAVENOUS ONCE
Status: COMPLETED | OUTPATIENT
Start: 2017-09-29 | End: 2017-09-29

## 2017-09-29 RX ORDER — MORPHINE SULFATE 2 MG/ML
2 INJECTION, SOLUTION INTRAMUSCULAR; INTRAVENOUS ONCE
Status: COMPLETED | OUTPATIENT
Start: 2017-09-29 | End: 2017-09-29

## 2017-09-29 RX ORDER — SODIUM CHLORIDE 0.9 % (FLUSH) 0.9 %
10 SYRINGE (ML) INJECTION AS NEEDED
Status: DISCONTINUED | OUTPATIENT
Start: 2017-09-29 | End: 2017-09-30 | Stop reason: HOSPADM

## 2017-09-29 RX ORDER — DOXYCYCLINE 100 MG/1
100 CAPSULE ORAL 2 TIMES DAILY
Qty: 28 CAPSULE | Refills: 0 | Status: SHIPPED | OUTPATIENT
Start: 2017-09-29 | End: 2017-10-14 | Stop reason: HOSPADM

## 2017-09-29 RX ADMIN — SODIUM CHLORIDE 1000 ML: 9 INJECTION, SOLUTION INTRAVENOUS at 20:16

## 2017-09-29 RX ADMIN — IOPAMIDOL 85 ML: 612 INJECTION, SOLUTION INTRAVENOUS at 21:15

## 2017-09-29 RX ADMIN — ONDANSETRON 4 MG: 2 INJECTION INTRAMUSCULAR; INTRAVENOUS at 20:16

## 2017-09-29 RX ADMIN — MORPHINE SULFATE 2 MG: 2 INJECTION, SOLUTION INTRAMUSCULAR; INTRAVENOUS at 20:16

## 2017-10-01 LAB
BACTERIA SPEC AEROBE CULT: NORMAL
BACTERIA SPEC AEROBE CULT: NORMAL

## 2017-10-02 ENCOUNTER — OFFICE VISIT (OUTPATIENT)
Dept: INTERNAL MEDICINE | Facility: CLINIC | Age: 72
End: 2017-10-02

## 2017-10-02 VITALS
HEART RATE: 92 BPM | OXYGEN SATURATION: 94 % | SYSTOLIC BLOOD PRESSURE: 142 MMHG | TEMPERATURE: 98 F | RESPIRATION RATE: 16 BRPM | WEIGHT: 260 LBS | HEIGHT: 67 IN | BODY MASS INDEX: 40.81 KG/M2 | DIASTOLIC BLOOD PRESSURE: 88 MMHG

## 2017-10-02 DIAGNOSIS — M25.552 LEFT HIP PAIN: ICD-10-CM

## 2017-10-02 DIAGNOSIS — R20.0 LEFT LEG NUMBNESS: ICD-10-CM

## 2017-10-02 DIAGNOSIS — M54.42 ACUTE LEFT-SIDED LOW BACK PAIN WITH LEFT-SIDED SCIATICA: Primary | ICD-10-CM

## 2017-10-02 DIAGNOSIS — R26.2 DIFFICULTY WALKING: ICD-10-CM

## 2017-10-02 PROCEDURE — 99213 OFFICE O/P EST LOW 20 MIN: CPT | Performed by: NURSE PRACTITIONER

## 2017-10-02 NOTE — PROGRESS NOTES
Vitals:    10/02/17 1339   BP: 142/88   Pulse: 92   Resp: 16   Temp: 98 °F (36.7 °C)   SpO2: 94%     Last 2 weights    10/02/17  1339   Weight: 260 lb (118 kg)     Social History   Substance Use Topics   • Smoking status: Never Smoker   • Smokeless tobacco: Not on file   • Alcohol use No       Subjective     HPI  Pt presents to office today with hospital follow up from ER visit on 9/29. She was having left flank pain that had been going on for about 6 months and increased dramatically which she ended going to the ER for it. They dx her with a UTI and sent pt home on doxy, and norco. norco helps the pain but pt states he back pain is radiating into her hip and down her left leg which is also creating some numbness in her upper leg. It is difficult to walk, especially as she lifts off the left foot. Pt is a morbidly obese pt. She denies fever, n/v, dyuria, blood in urine. Her pain is 10/10 and she appears to be in a lot of pain during office visit.     The following portions of the patient's history were reviewed and updated as appropriate: allergies, current medications, past medical history, past social history and problem list.    Review of Systems   Constitutional: Negative.         Hospital follow up   Respiratory: Negative.    Cardiovascular: Negative.    Musculoskeletal: Positive for back pain (left hip and leg pain).       Objective     Physical Exam   Constitutional: She is oriented to person, place, and time. Vital signs are normal. She appears well-developed and well-nourished.   HENT:   Head: Normocephalic and atraumatic.   Neck: Normal range of motion.   Cardiovascular: Normal rate, regular rhythm and normal heart sounds.    Pulmonary/Chest: Effort normal and breath sounds normal.   Musculoskeletal:        Left hip: She exhibits decreased strength and tenderness.        Lumbar back: She exhibits decreased range of motion, tenderness and pain.        Legs:  Neurological: She is oriented to person, place,  and time.   Nursing note and vitals reviewed.      Assessment/Plan   Lana was seen today for follow-up.    Diagnoses and all orders for this visit:    Acute left-sided low back pain with left-sided sciatica  -     MRI Lumbar Spine Without Contrast  -     MRI Hip Left Without Contrast    Left leg numbness  -     MRI Lumbar Spine Without Contrast  -     MRI Hip Left Without Contrast    Difficulty walking  -     MRI Hip Left Without Contrast    Left hip pain  -     MRI Hip Left Without Contrast           -lumbar and left hip mri today  -pt would like more pain medication. Will discuss with dr ball  -[facility] home meds  -FU prn or if symptoms persist/worsen

## 2017-10-03 ENCOUNTER — HOSPITAL ENCOUNTER (OUTPATIENT)
Dept: MRI IMAGING | Facility: HOSPITAL | Age: 72
Discharge: HOME | End: 2017-10-03

## 2017-10-03 ENCOUNTER — HOSPITAL ENCOUNTER (OUTPATIENT)
Dept: MRI IMAGING | Facility: HOSPITAL | Age: 72
Discharge: HOME | End: 2017-10-03
Admitting: NURSE PRACTITIONER

## 2017-10-03 DIAGNOSIS — M54.42 ACUTE LEFT-SIDED LOW BACK PAIN WITH LEFT-SIDED SCIATICA: ICD-10-CM

## 2017-10-03 DIAGNOSIS — R20.0 NUMBNESS IN LEFT LEG: ICD-10-CM

## 2017-10-03 DIAGNOSIS — M51.36 BULGING LUMBAR DISC: ICD-10-CM

## 2017-10-03 DIAGNOSIS — M43.16 SPONDYLOLISTHESIS OF LUMBAR REGION: ICD-10-CM

## 2017-10-03 DIAGNOSIS — M51.26 HERNIATED LUMBAR INTERVERTEBRAL DISC: Primary | ICD-10-CM

## 2017-10-03 PROCEDURE — 72148 MRI LUMBAR SPINE W/O DYE: CPT

## 2017-10-03 PROCEDURE — 73721 MRI JNT OF LWR EXTRE W/O DYE: CPT

## 2017-10-06 DIAGNOSIS — I10 ESSENTIAL HYPERTENSION: ICD-10-CM

## 2017-10-06 RX ORDER — AMLODIPINE BESYLATE 2.5 MG/1
TABLET ORAL
Qty: 90 TABLET | Refills: 0 | Status: SHIPPED | OUTPATIENT
Start: 2017-10-06 | End: 2017-10-11 | Stop reason: SDUPTHER

## 2017-10-11 ENCOUNTER — APPOINTMENT (OUTPATIENT)
Dept: CARDIOLOGY | Facility: HOSPITAL | Age: 72
End: 2017-10-11
Attending: INTERNAL MEDICINE

## 2017-10-11 ENCOUNTER — APPOINTMENT (OUTPATIENT)
Dept: GENERAL RADIOLOGY | Facility: HOSPITAL | Age: 72
End: 2017-10-11

## 2017-10-11 ENCOUNTER — OFFICE VISIT (OUTPATIENT)
Dept: NEUROSURGERY | Facility: CLINIC | Age: 72
End: 2017-10-11

## 2017-10-11 ENCOUNTER — APPOINTMENT (OUTPATIENT)
Dept: CT IMAGING | Facility: HOSPITAL | Age: 72
End: 2017-10-11

## 2017-10-11 ENCOUNTER — HOSPITAL ENCOUNTER (INPATIENT)
Facility: HOSPITAL | Age: 72
LOS: 3 days | Discharge: HOME | End: 2017-10-14
Attending: EMERGENCY MEDICINE | Admitting: INTERNAL MEDICINE

## 2017-10-11 VITALS
RESPIRATION RATE: 24 BRPM | WEIGHT: 263 LBS | HEIGHT: 66 IN | SYSTOLIC BLOOD PRESSURE: 130 MMHG | HEART RATE: 100 BPM | BODY MASS INDEX: 42.27 KG/M2 | DIASTOLIC BLOOD PRESSURE: 80 MMHG

## 2017-10-11 DIAGNOSIS — I48.91 NEW ONSET ATRIAL FIBRILLATION (HCC): ICD-10-CM

## 2017-10-11 DIAGNOSIS — E66.01 MORBID OBESITY WITH BMI OF 40.0-44.9, ADULT (HCC): Primary | ICD-10-CM

## 2017-10-11 DIAGNOSIS — M51.26 DISPLACEMENT OF LUMBAR INTERVERTEBRAL DISC WITHOUT MYELOPATHY: ICD-10-CM

## 2017-10-11 DIAGNOSIS — M51.26 LUMBAR DISC HERNIATION: Primary | ICD-10-CM

## 2017-10-11 PROBLEM — Z86.14 HISTORY OF MRSA INFECTION: Status: ACTIVE | Noted: 2017-10-11

## 2017-10-11 LAB
ALBUMIN SERPL-MCNC: 4.2 G/DL (ref 3.5–5.2)
ALBUMIN/GLOB SERPL: 1.1 G/DL
ALP SERPL-CCNC: 48 U/L (ref 39–117)
ALT SERPL W P-5'-P-CCNC: 16 U/L (ref 1–33)
ANION GAP SERPL CALCULATED.3IONS-SCNC: 13.7 MMOL/L
AST SERPL-CCNC: 17 U/L (ref 1–32)
BASOPHILS # BLD AUTO: 0.02 10*3/MM3 (ref 0–0.2)
BASOPHILS NFR BLD AUTO: 0.3 % (ref 0–1.5)
BILIRUB SERPL-MCNC: 0.4 MG/DL (ref 0.1–1.2)
BUN BLD-MCNC: 14 MG/DL (ref 8–23)
BUN/CREAT SERPL: 13.5 (ref 7–25)
CALCIUM SPEC-SCNC: 9.9 MG/DL (ref 8.6–10.5)
CHLORIDE SERPL-SCNC: 106 MMOL/L (ref 98–107)
CO2 SERPL-SCNC: 27.3 MMOL/L (ref 22–29)
CREAT BLD-MCNC: 1.04 MG/DL (ref 0.57–1)
D DIMER PPP FEU-MCNC: 0.51 MCGFEU/ML (ref 0–0.49)
DEPRECATED RDW RBC AUTO: 55.7 FL (ref 37–54)
EOSINOPHIL # BLD AUTO: 0.07 10*3/MM3 (ref 0–0.7)
EOSINOPHIL NFR BLD AUTO: 1 % (ref 0.3–6.2)
ERYTHROCYTE [DISTWIDTH] IN BLOOD BY AUTOMATED COUNT: 15.3 % (ref 11.7–13)
GFR SERPL CREATININE-BSD FRML MDRD: 63 ML/MIN/1.73
GLOBULIN UR ELPH-MCNC: 3.7 GM/DL
GLUCOSE BLD-MCNC: 145 MG/DL (ref 65–99)
HCT VFR BLD AUTO: 38.3 % (ref 35.6–45.5)
HGB BLD-MCNC: 12.3 G/DL (ref 11.9–15.5)
IMM GRANULOCYTES # BLD: 0.02 10*3/MM3 (ref 0–0.03)
IMM GRANULOCYTES NFR BLD: 0.3 % (ref 0–0.5)
LYMPHOCYTES # BLD AUTO: 2.71 10*3/MM3 (ref 0.9–4.8)
LYMPHOCYTES NFR BLD AUTO: 39.4 % (ref 19.6–45.3)
MCH RBC QN AUTO: 31.8 PG (ref 26.9–32)
MCHC RBC AUTO-ENTMCNC: 32.1 G/DL (ref 32.4–36.3)
MCV RBC AUTO: 99 FL (ref 80.5–98.2)
MONOCYTES # BLD AUTO: 0.95 10*3/MM3 (ref 0.2–1.2)
MONOCYTES NFR BLD AUTO: 13.8 % (ref 5–12)
NEUTROPHILS # BLD AUTO: 3.11 10*3/MM3 (ref 1.9–8.1)
NEUTROPHILS NFR BLD AUTO: 45.2 % (ref 42.7–76)
NT-PROBNP SERPL-MCNC: 1389 PG/ML (ref 0–900)
PLATELET # BLD AUTO: 169 10*3/MM3 (ref 140–500)
PMV BLD AUTO: 11.2 FL (ref 6–12)
POTASSIUM BLD-SCNC: 3.3 MMOL/L (ref 3.5–5.2)
PROT SERPL-MCNC: 7.9 G/DL (ref 6–8.5)
RBC # BLD AUTO: 3.87 10*6/MM3 (ref 3.9–5.2)
SODIUM BLD-SCNC: 147 MMOL/L (ref 136–145)
TROPONIN T SERPL-MCNC: <0.01 NG/ML (ref 0–0.03)
TROPONIN T SERPL-MCNC: <0.01 NG/ML (ref 0–0.03)
TSH SERPL DL<=0.05 MIU/L-ACNC: 1.07 MIU/ML (ref 0.27–4.2)
WBC NRBC COR # BLD: 6.88 10*3/MM3 (ref 4.5–10.7)

## 2017-10-11 PROCEDURE — 84484 ASSAY OF TROPONIN QUANT: CPT | Performed by: INTERNAL MEDICINE

## 2017-10-11 PROCEDURE — 93005 ELECTROCARDIOGRAM TRACING: CPT

## 2017-10-11 PROCEDURE — 84484 ASSAY OF TROPONIN QUANT: CPT | Performed by: EMERGENCY MEDICINE

## 2017-10-11 PROCEDURE — 85379 FIBRIN DEGRADATION QUANT: CPT | Performed by: EMERGENCY MEDICINE

## 2017-10-11 PROCEDURE — 93970 EXTREMITY STUDY: CPT

## 2017-10-11 PROCEDURE — 25010000002 ONDANSETRON PER 1 MG: Performed by: EMERGENCY MEDICINE

## 2017-10-11 PROCEDURE — 71020 HC CHEST PA AND LATERAL: CPT

## 2017-10-11 PROCEDURE — 99284 EMERGENCY DEPT VISIT MOD MDM: CPT

## 2017-10-11 PROCEDURE — 71275 CT ANGIOGRAPHY CHEST: CPT

## 2017-10-11 PROCEDURE — 25010000002 ENOXAPARIN PER 10 MG: Performed by: INTERNAL MEDICINE

## 2017-10-11 PROCEDURE — 80053 COMPREHEN METABOLIC PANEL: CPT | Performed by: EMERGENCY MEDICINE

## 2017-10-11 PROCEDURE — 25010000002 FUROSEMIDE PER 20 MG: Performed by: INTERNAL MEDICINE

## 2017-10-11 PROCEDURE — 84443 ASSAY THYROID STIM HORMONE: CPT | Performed by: INTERNAL MEDICINE

## 2017-10-11 PROCEDURE — 99222 1ST HOSP IP/OBS MODERATE 55: CPT | Performed by: INTERNAL MEDICINE

## 2017-10-11 PROCEDURE — 0 IOPAMIDOL PER 1 ML: Performed by: HOSPITALIST

## 2017-10-11 PROCEDURE — 93010 ELECTROCARDIOGRAM REPORT: CPT | Performed by: INTERNAL MEDICINE

## 2017-10-11 PROCEDURE — 85025 COMPLETE CBC W/AUTO DIFF WBC: CPT | Performed by: EMERGENCY MEDICINE

## 2017-10-11 PROCEDURE — 99204 OFFICE O/P NEW MOD 45 MIN: CPT | Performed by: NEUROLOGICAL SURGERY

## 2017-10-11 PROCEDURE — 83880 ASSAY OF NATRIURETIC PEPTIDE: CPT | Performed by: EMERGENCY MEDICINE

## 2017-10-11 RX ORDER — OXYBUTYNIN CHLORIDE 10 MG/1
10 TABLET, EXTENDED RELEASE ORAL DAILY
Status: DISCONTINUED | OUTPATIENT
Start: 2017-10-12 | End: 2017-10-14 | Stop reason: HOSPADM

## 2017-10-11 RX ORDER — METOPROLOL TARTRATE 50 MG/1
50 TABLET, FILM COATED ORAL EVERY 12 HOURS SCHEDULED
Status: DISCONTINUED | OUTPATIENT
Start: 2017-10-11 | End: 2017-10-11

## 2017-10-11 RX ORDER — ATENOLOL 50 MG/1
75 TABLET ORAL DAILY
COMMUNITY
End: 2017-10-14 | Stop reason: HOSPADM

## 2017-10-11 RX ORDER — ACETAMINOPHEN 325 MG/1
650 TABLET ORAL EVERY 4 HOURS PRN
Status: DISCONTINUED | OUTPATIENT
Start: 2017-10-11 | End: 2017-10-14 | Stop reason: HOSPADM

## 2017-10-11 RX ORDER — SODIUM CHLORIDE 0.9 % (FLUSH) 0.9 %
1-10 SYRINGE (ML) INJECTION AS NEEDED
Status: DISCONTINUED | OUTPATIENT
Start: 2017-10-11 | End: 2017-10-14 | Stop reason: HOSPADM

## 2017-10-11 RX ORDER — MELOXICAM 15 MG/1
15 TABLET ORAL DAILY
COMMUNITY
End: 2017-10-14 | Stop reason: HOSPADM

## 2017-10-11 RX ORDER — ONDANSETRON 2 MG/ML
4 INJECTION INTRAMUSCULAR; INTRAVENOUS ONCE
Status: COMPLETED | OUTPATIENT
Start: 2017-10-11 | End: 2017-10-11

## 2017-10-11 RX ORDER — HYDROCODONE BITARTRATE AND ACETAMINOPHEN 5; 325 MG/1; MG/1
1 TABLET ORAL EVERY 4 HOURS PRN
Status: DISCONTINUED | OUTPATIENT
Start: 2017-10-11 | End: 2017-10-14 | Stop reason: HOSPADM

## 2017-10-11 RX ORDER — DILTIAZEM HYDROCHLORIDE 5 MG/ML
5 INJECTION INTRAVENOUS ONCE
Status: COMPLETED | OUTPATIENT
Start: 2017-10-11 | End: 2017-10-11

## 2017-10-11 RX ORDER — AMLODIPINE BESYLATE 2.5 MG/1
2.5 TABLET ORAL DAILY
COMMUNITY
End: 2017-10-14 | Stop reason: HOSPADM

## 2017-10-11 RX ORDER — CETIRIZINE HYDROCHLORIDE 10 MG/1
10 TABLET ORAL DAILY
Status: DISCONTINUED | OUTPATIENT
Start: 2017-10-12 | End: 2017-10-14 | Stop reason: HOSPADM

## 2017-10-11 RX ORDER — FOLIC ACID 1 MG/1
1 TABLET ORAL DAILY
Status: DISCONTINUED | OUTPATIENT
Start: 2017-10-12 | End: 2017-10-14 | Stop reason: HOSPADM

## 2017-10-11 RX ORDER — DORZOLAMIDE HYDROCHLORIDE AND TIMOLOL MALEATE 20; 5 MG/ML; MG/ML
1 SOLUTION/ DROPS OPHTHALMIC 2 TIMES DAILY
Status: DISCONTINUED | OUTPATIENT
Start: 2017-10-11 | End: 2017-10-13

## 2017-10-11 RX ORDER — FOLIC ACID 1 MG/1
1 TABLET ORAL DAILY
COMMUNITY
End: 2017-11-09

## 2017-10-11 RX ORDER — TRAVOPROST OPHTHALMIC SOLUTION 0.04 MG/ML
1 SOLUTION OPHTHALMIC NIGHTLY PRN
COMMUNITY
End: 2018-05-10

## 2017-10-11 RX ORDER — FUROSEMIDE 10 MG/ML
40 INJECTION INTRAMUSCULAR; INTRAVENOUS ONCE
Status: COMPLETED | OUTPATIENT
Start: 2017-10-11 | End: 2017-10-11

## 2017-10-11 RX ORDER — LOSARTAN POTASSIUM AND HYDROCHLOROTHIAZIDE 25; 100 MG/1; MG/1
1 TABLET ORAL DAILY
COMMUNITY
End: 2017-10-14 | Stop reason: HOSPADM

## 2017-10-11 RX ORDER — POTASSIUM CHLORIDE 750 MG/1
20 CAPSULE, EXTENDED RELEASE ORAL ONCE
Status: COMPLETED | OUTPATIENT
Start: 2017-10-11 | End: 2017-10-11

## 2017-10-11 RX ORDER — POTASSIUM CHLORIDE 750 MG/1
40 CAPSULE, EXTENDED RELEASE ORAL ONCE
Status: COMPLETED | OUTPATIENT
Start: 2017-10-11 | End: 2017-10-11

## 2017-10-11 RX ORDER — AMLODIPINE BESYLATE 2.5 MG/1
2.5 TABLET ORAL DAILY
Status: DISCONTINUED | OUTPATIENT
Start: 2017-10-12 | End: 2017-10-12

## 2017-10-11 RX ORDER — MELOXICAM 7.5 MG/1
15 TABLET ORAL DAILY
Status: DISCONTINUED | OUTPATIENT
Start: 2017-10-12 | End: 2017-10-14 | Stop reason: HOSPADM

## 2017-10-11 RX ORDER — ALBUTEROL SULFATE 2.5 MG/3ML
2.5 SOLUTION RESPIRATORY (INHALATION) EVERY 6 HOURS PRN
Status: DISCONTINUED | OUTPATIENT
Start: 2017-10-11 | End: 2017-10-14 | Stop reason: HOSPADM

## 2017-10-11 RX ORDER — LOSARTAN POTASSIUM AND HYDROCHLOROTHIAZIDE 25; 100 MG/1; MG/1
1 TABLET ORAL DAILY
Status: DISCONTINUED | OUTPATIENT
Start: 2017-10-12 | End: 2017-10-12

## 2017-10-11 RX ORDER — LATANOPROST 50 UG/ML
1 SOLUTION/ DROPS OPHTHALMIC NIGHTLY
Status: DISCONTINUED | OUTPATIENT
Start: 2017-10-11 | End: 2017-10-13

## 2017-10-11 RX ADMIN — METOPROLOL TARTRATE 5 MG: 5 INJECTION INTRAVENOUS at 11:44

## 2017-10-11 RX ADMIN — IOPAMIDOL 95 ML: 755 INJECTION, SOLUTION INTRAVENOUS at 17:45

## 2017-10-11 RX ADMIN — FUROSEMIDE 40 MG: 10 INJECTION, SOLUTION INTRAMUSCULAR; INTRAVENOUS at 16:34

## 2017-10-11 RX ADMIN — ENOXAPARIN SODIUM 120 MG: 120 INJECTION SUBCUTANEOUS at 16:52

## 2017-10-11 RX ADMIN — POTASSIUM CHLORIDE 40 MEQ: 750 CAPSULE, EXTENDED RELEASE ORAL at 14:31

## 2017-10-11 RX ADMIN — DILTIAZEM HYDROCHLORIDE 5 MG: 5 INJECTION INTRAVENOUS at 16:34

## 2017-10-11 RX ADMIN — POTASSIUM CHLORIDE 20 MEQ: 750 CAPSULE, EXTENDED RELEASE ORAL at 16:36

## 2017-10-11 RX ADMIN — DILTIAZEM HYDROCHLORIDE 5 MG/HR: 100 INJECTION, POWDER, LYOPHILIZED, FOR SOLUTION INTRAVENOUS at 16:35

## 2017-10-11 RX ADMIN — ONDANSETRON 4 MG: 2 INJECTION INTRAMUSCULAR; INTRAVENOUS at 12:15

## 2017-10-11 NOTE — NURSING NOTE
Rec'd pt from ED.  Pt A & O x3 with  to bedside.  Elevated BP and pt currently in afib.  Dr. Dueñas at bedside.  New orders rec'd.  Pt rec'd IV Lasix, IV cardizem, PO potassium, Lovenox sub-q.  Pt left floor for doppler of BLE and CTA of chest.  Both tests negative.  NPO after midnight tonight.  Pt in contact isolation r/t hx of MRSA.  Will continue to monitor

## 2017-10-11 NOTE — PROGRESS NOTES
Vascular lab bilateral lower extremity venous doppler complete, prelim negative DVT - Nancy, RN aware

## 2017-10-11 NOTE — PROGRESS NOTES
Subjective   Patient ID: Lana Yañez is a 72 y.o. female is being seen for consultation today at the request of ANGELA Cordon for left low back pain. She is unaccompanied.    History of Present Illness 72 lady with LBP and LLE.  SHe was recently seen in the ER 1 week ago for same.  No fevers.  SHe feesl diaphoretic and SOB.  SHe had an MRI and was referred for follow up of imaging findings as well as her pain complaints.  NO loss of b/b.  SHe has urgency.  She feels her back and leg pain started last week.  Her pain is in the groin crease.  SHe reports left left thigh is numb alternating with burning dysesthesia.  Her pain is 8/10 in the left thigh and groin. No ANTELMO's.  NO PT. SHe was started on norco by the ER staff.  SHe is diaphoretic with SOB today.      The following portions of the patient's history were reviewed and updated as appropriate: allergies, current medications, past family history, past medical history, past social history, past surgical history and problem list.     Review of Systems   Constitutional: Positive for diaphoresis.   HENT: Negative for trouble swallowing.    Eyes: Positive for visual disturbance (glaucoma).   Respiratory: Positive for wheezing. Negative for cough.    Cardiovascular: Positive for palpitations and leg swelling. Negative for chest pain.   Gastrointestinal: Negative for abdominal pain.   Genitourinary: Positive for enuresis. Negative for difficulty urinating.   Musculoskeletal: Positive for arthralgias, back pain and gait problem.   Skin: Negative for rash.   Neurological: Positive for weakness (left leg) and numbness (left thigh).   Psychiatric/Behavioral: Positive for sleep disturbance.       Objective   Physical Exam   Cardiovascular: An irregularly irregular rhythm present.   Pulmonary/Chest: Effort normal and breath sounds normal.   Neurological:   Reflex Scores:       Patellar reflexes are 1+ on the right side and 1+ on the left side.       Achilles  reflexes are 1+ on the right side and 1+ on the left side.    Neurologic Exam     Motor Exam     Strength   Right deltoid: 5/5  Left deltoid: 5/5  Right biceps: 5/5  Left biceps: 5/5  Right triceps: 5/5  Left triceps: 5/5  Right wrist flexion: 5/5  Left wrist flexion: 5/5  Right wrist extension: 5/5  Left wrist extension: 5/5  Right interossei: 5/5  Left interossei: 5/5  Right iliopsoas: 5/5  Left iliopsoas: 3/5  Right quadriceps: 5/5  Left quadriceps: 5/5  Right hamstrin/5  Left hamstrin/5  Right anterior tibial: 5/5  Left anterior tibial: 5/5  Right gastroc: 5/5  Left gastroc: 5/5       AG in left IP     Sensory Exam   Sensory deficit distribution on left: L3    Gait, Coordination, and Reflexes     Gait  Gait: (antalgic and with a walker)    Reflexes   Right patellar: 1+  Left patellar: 1+  Right achilles: 1+  Left achilles: 1+  Right Thacker: absent  Left Thacker: absent  Right ankle clonus: absent  Left ankle clonus: absent     SHe has an irregularly irregular HR to the 100's (NEW)        Assessment/Plan   Independent Review of Radiographic Studies:    Left sided L23 disc herniation.  Medical Decision Making: New cardiac arrhythmia and new L23 disc hernaition.  This will prove surgical but her cardiac issues need to be vetted and optimized prior to considering intervention.  I have a call out to cardiology.    She will be going to the ER/or cards clinic today.     Lana was seen today for back pain.    Diagnoses and all orders for this visit:    Morbid obesity with BMI of 40.0-44.9, adult    Displacement of lumbar intervertebral disc without myelopathy    We will see her in close follow up.    No Follow-up on file.

## 2017-10-11 NOTE — ED PROVIDER NOTES
EMERGENCY DEPARTMENT ENCOUNTER       CHIEF COMPLAINT  Chief Complaint: Dyspnea  History given by: Patient, Spouse  History limited by: N/A  Room Number: 17/17  PMD: Raoul Ramirez MD      HPI:  HPI Comments: Pt presents to the ED c/o intermittent episodes of dyspnea onset about a year ago. Pt reports that her dyspnea has been gradually worsening over the past month. Pt states that her dyspnea worsens with exertion and improves with rest. Pt has also had intermittent episodes of chest pain and diaphoresis for several months, but denies abd pain, nausea, vomiting, and diarrhea. Pt reports that she has h/o lumbar radiculopathy due to which pt was seen at her neurosurgeon's office earlier today. While at the office, pt was noted to be in A-Fib with HR in the 100s-110s. Consequently, pt was referred to the ER for further evaluation. Pt reports that she is currently on Methotrexate due to h/o rheumatoid arthritis. Pt denies prior h/o A-Fib. There are no other complaints at this time.     Dr. Perez - neurosurgeon     Patient is a 72 y.o. female presenting with shortness of breath.   Shortness of Breath   Severity:  Moderate  Onset quality:  Gradual  Duration: onset about a year ago.  Timing:  Intermittent  Progression:  Worsening (over the past month)  Relieved by:  Rest  Worsened by:  Exertion  Associated symptoms: chest pain (intermittently for several months) and diaphoresis (intermittently for several months)    Associated symptoms: no abdominal pain, no cough, no headaches, no rash, no sore throat and no vomiting          PAST MEDICAL HISTORY  Active Ambulatory Problems     Diagnosis Date Noted   • Hyperlipidemia 03/21/2016   • Vitamin deficiency 03/21/2016   • Essential hypertension 03/21/2016   • Rheumatoid arthritis involving both hands 03/21/2016   • Fatigue 04/06/2016   • ANTOINE (dyspnea on exertion) 04/06/2016   • Dysuria 08/02/2016   • Urge incontinence of urine 08/02/2016   • Hypovitaminosis D 08/02/2016   •  Sleep apnea 08/02/2016   • Encounter for screening colonoscopy 08/02/2016   • Bronchitis 08/12/2016   • Cough 08/12/2016   • Generalized osteoarthritis of multiple sites 12/15/2016   • Morbid obesity with BMI of 40.0-44.9, adult 12/15/2016   • Cellulitis of right elbow due to MRSA 06/12/2017   • Medicare annual wellness visit, initial 06/27/2017   • Hospital discharge follow-up 06/27/2017   • Displacement of lumbar intervertebral disc without myelopathy 10/11/2017     Resolved Ambulatory Problems     Diagnosis Date Noted   • No Resolved Ambulatory Problems     Past Medical History:   Diagnosis Date   • Arthritis    • Asthma    • Atrial fibrillation    • Edema    • Fatigue    • Headache    • Hx of bone density study    • Hyperlipidemia    • Hypertension    • Low back pain    • Obesity    • Vitamin D deficiency          PAST SURGICAL HISTORY  Past Surgical History:   Procedure Laterality Date   • HYSTERECTOMY     • KNEE SURGERY     • MAMMO BILATERAL  2016    [facility]          FAMILY HISTORY  Family History   Problem Relation Age of Onset   • Colon cancer Mother    • Glaucoma Mother    • Stroke Mother    • Glaucoma Sister    • Diabetes Sister    • Heart disease Sister    • Heart disease Brother    • Diabetes Brother          SOCIAL HISTORY  Social History     Social History   • Marital status:      Spouse name: N/A   • Number of children: N/A   • Years of education: N/A     Occupational History   • retired      Social History Main Topics   • Smoking status: Never Smoker   • Smokeless tobacco: Never Used   • Alcohol use No   • Drug use: Defer   • Sexual activity: Defer     Other Topics Concern   • Not on file     Social History Narrative         ALLERGIES  Review of patient's allergies indicates no known allergies.        REVIEW OF SYSTEMS  Review of Systems   Constitutional: Positive for diaphoresis (intermittently for several months). Negative for chills.   HENT: Negative for congestion, rhinorrhea  and sore throat.    Eyes: Negative for pain.   Respiratory: Positive for shortness of breath. Negative for cough.    Cardiovascular: Positive for chest pain (intermittently for several months). Negative for leg swelling.   Gastrointestinal: Negative for abdominal pain, diarrhea, nausea and vomiting.   Genitourinary: Negative for difficulty urinating, dysuria, flank pain and frequency.   Musculoskeletal: Negative for neck stiffness.   Skin: Negative for rash.   Neurological: Negative for dizziness, speech difficulty, light-headedness and headaches.   Psychiatric/Behavioral: Negative.    All other systems reviewed and are negative.           PHYSICAL EXAM  ED Triage Vitals   Temp Heart Rate Resp BP SpO2   10/11/17 1037 10/11/17 1032 10/11/17 1032 10/11/17 1032 10/11/17 1032   97.9 °F (36.6 °C) 111 18 141/96 96 %      Temp src Heart Rate Source Patient Position BP Location FiO2 (%)   10/11/17 1037 -- -- -- --   Oral           Physical Exam   Constitutional: She is oriented to person, place, and time. No distress.   HENT:   Head: Normocephalic.   Mouth/Throat: Mucous membranes are normal.   Eyes: EOM are normal. Pupils are equal, round, and reactive to light.   Neck: Normal range of motion. Neck supple.   Cardiovascular: Normal heart sounds.  An irregular rhythm present. Tachycardia present.    Pulmonary/Chest: Effort normal and breath sounds normal. No respiratory distress. She has no decreased breath sounds. She has no wheezes. She has no rhonchi. She has no rales.   Abdominal: Soft. There is no tenderness. There is no rebound and no guarding.   Neurological: She is alert and oriented to person, place, and time. She displays weakness (to the left leg - pt reports that this is due to her lumbar radiculopathy and she is currently being followed by the neurosurgeon for this).   Skin: Skin is warm and dry.   Psychiatric: Mood and affect normal.   Nursing note and vitals reviewed.          LAB RESULTS  Recent Results (from  the past 24 hour(s))   Comprehensive Metabolic Panel    Collection Time: 10/11/17 11:47 AM   Result Value Ref Range    Glucose 145 (H) 65 - 99 mg/dL    BUN 14 8 - 23 mg/dL    Creatinine 1.04 (H) 0.57 - 1.00 mg/dL    Sodium 147 (H) 136 - 145 mmol/L    Potassium 3.3 (L) 3.5 - 5.2 mmol/L    Chloride 106 98 - 107 mmol/L    CO2 27.3 22.0 - 29.0 mmol/L    Calcium 9.9 8.6 - 10.5 mg/dL    Total Protein 7.9 6.0 - 8.5 g/dL    Albumin 4.20 3.50 - 5.20 g/dL    ALT (SGPT) 16 1 - 33 U/L    AST (SGOT) 17 1 - 32 U/L    Alkaline Phosphatase 48 39 - 117 U/L    Total Bilirubin 0.4 0.1 - 1.2 mg/dL    eGFR  African Amer 63 >60 mL/min/1.73    Globulin 3.7 gm/dL    A/G Ratio 1.1 g/dL    BUN/Creatinine Ratio 13.5 7.0 - 25.0    Anion Gap 13.7 mmol/L   BNP    Collection Time: 10/11/17 11:47 AM   Result Value Ref Range    proBNP 1389.0 (H) 0.0 - 900.0 pg/mL   Troponin    Collection Time: 10/11/17 11:47 AM   Result Value Ref Range    Troponin T <0.010 0.000 - 0.030 ng/mL   D-dimer, Quantitative    Collection Time: 10/11/17 11:47 AM   Result Value Ref Range    D-Dimer, Quantitative 0.51 (H) 0.00 - 0.49 MCGFEU/mL   CBC Auto Differential    Collection Time: 10/11/17 11:47 AM   Result Value Ref Range    WBC 6.88 4.50 - 10.70 10*3/mm3    RBC 3.87 (L) 3.90 - 5.20 10*6/mm3    Hemoglobin 12.3 11.9 - 15.5 g/dL    Hematocrit 38.3 35.6 - 45.5 %    MCV 99.0 (H) 80.5 - 98.2 fL    MCH 31.8 26.9 - 32.0 pg    MCHC 32.1 (L) 32.4 - 36.3 g/dL    RDW 15.3 (H) 11.7 - 13.0 %    RDW-SD 55.7 (H) 37.0 - 54.0 fl    MPV 11.2 6.0 - 12.0 fL    Platelets 169 140 - 500 10*3/mm3    Neutrophil % 45.2 42.7 - 76.0 %    Lymphocyte % 39.4 19.6 - 45.3 %    Monocyte % 13.8 (H) 5.0 - 12.0 %    Eosinophil % 1.0 0.3 - 6.2 %    Basophil % 0.3 0.0 - 1.5 %    Immature Grans % 0.3 0.0 - 0.5 %    Neutrophils, Absolute 3.11 1.90 - 8.10 10*3/mm3    Lymphocytes, Absolute 2.71 0.90 - 4.80 10*3/mm3    Monocytes, Absolute 0.95 0.20 - 1.20 10*3/mm3    Eosinophils, Absolute 0.07 0.00 - 0.70  10*3/mm3    Basophils, Absolute 0.02 0.00 - 0.20 10*3/mm3    Immature Grans, Absolute 0.02 0.00 - 0.03 10*3/mm3       Ordered the above labs and reviewed the results.        RADIOLOGY  XR Chest 2 View   Preliminary Result   Mild cardiomegaly and mild vascular congestion.       Interpreted by radiologist. Independently viewed by me.             Ordered the above noted radiological studies. Reviewed by me in PACS.       PROCEDURES  Procedures    EKG:           EKG time: 10:30 AM  Rhythm/Rate: A-Fib rate 107  P waves and FL: None present  QRS, axis: Normal QRS   ST and T waves: Normal ST    Interpreted Contemporaneously by me, independently viewed  No old EKG is available for comparison               PROGRESS AND CONSULTS  ED Course   Comment By Time   1:27 PM  Patient with new onset a fib as well as disc herniation.  Has weakness and needs to have surgical intervention.  Patient has new onset a fib.  Given metoprolol with improvement of heart rate.  D dimer is below age adjusted normal.  Discussed with Dr. Grace who will admit.  Discussed with Dr. Dueñas who will consult.  Dr. Perez will see in the hospital as well. Maciej Dunn MD 10/11 1329     11:01 AM:  D-Dimer, blood work, and CXR ordered for further evaluation. Pt is in A-Fib with HR in the 100s-110s -> lopressor ordered.     12:11 PM:  Per RN, after administration of the lopressor, pt remains in A-Fib; however, pt's HR is now in the 90s. Pt currently c/o nausea. Zofran ordered.    1:02 PM:  Rechecked pt. On re-evaluation, pt remains in A-Fib with HR in the 90s-100s. Informed pt that she is in new onset A-Fib. Pt's BNP is 1389 and CXR shows mild vascular congestion. Troponin is negative. Need for admission for further evaluation and treatment. Pt agrees with plan. Decision time to admit: Now.     1:05 PM:  Placed call to Dr. Perez, neurosurgeon, to discuss further course of care.    1:09 PM:  Discussed the case with Dr. Perez, neurosurgeon. He will consult.      1:10 PM:  Placed call to A for admission. Placed call to the cardiologist for consult.     1:17 PM:  Discussed the case with Dr. Grace, hospitalist. He will admit pt to a telemetry bed.     1:28 PM:  Case d/w Dr. Dueñas, cardiologist, who will consult.     1:33 PM:  Pt's K+ is 3.3. KCl PO ordered.         MEDICAL DECISION MAKING        MDM  Number of Diagnoses or Management Options     Amount and/or Complexity of Data Reviewed  Clinical lab tests: ordered and reviewed (Troponin is negative. BNP is 1389. K+ is 3.3.)  Tests in the radiology section of CPT®: ordered and reviewed (CXR: Mild cardiomegaly and mild vascular congestion.)  Tests in the medicine section of CPT®: reviewed and ordered (EKG interpreted.   )  Decide to obtain previous medical records or to obtain history from someone other than the patient: yes  Review and summarize past medical records: yes (Pt was seen in the ER on 09/29/17 secondary to lumbar radiculopathy due to which pt was referred to the neurosurgeon.   )  Discuss the patient with other providers: yes (Case d/w Dr. Grace, hospitalist, who will admit pt to a telemetry bed. Case d/w Dr. Perez, neurosurgeon, who will consult. Case d/w Dr. Dueñas, cardiologist, who will consult.        )    Patient Progress  Patient progress: stable             DIAGNOSIS  Final diagnoses:   Lumbar disc herniation   New onset atrial fibrillation         DISPOSITION  Pt admitted to telemetry.    ADMISSION    Discussed treatment plan and reason for admission with pt/family and admitting physician.  Pt/family voiced understanding of the plan for admission for further testing/treatment as needed.                 Latest Documented Vital Signs:  As of 1:38 PM  BP- 137/92 HR- 86 Temp- 97.9 °F (36.6 °C) (Oral) O2 sat- 93%        --  Documentation assistance provided by gómez George for Dr. Mona MD.  Information recorded by the gómez was done at my direction and has been verified and validated by me.               Solange George  10/11/17 1457       Maciej Dunn MD  10/11/17 9466

## 2017-10-11 NOTE — CONSULTS
[facility]  Consult Note                                                                              10/11/2017  Cristian Grace MD    Patient Identification:  Lana Yañez:   72 y.o.  female  1945     Date of Admission:10/11/2017    CC: Consulted for preoperative clearance for back surgery and new onset atrial fibrillation     History of Present Illness: Ms. Yañez is a 72 year old female with no prior documented history of CAD, CHF or arrhthymias. She does have a history of HTN, HLD, sleep apnea ( ), RA, morbid obesity, MRSA cellulitis in right elbow in 6/2017 and ANTOINE. She has 2 orders for an echocardiogram but I do not find that it was ever performed due to illness. She has had a nuclear stress test over ten years ago that she said was normal.     She was seen in the ED one week ago for left sided flank pain that has been progressively getting worse over the last 6 months. The pain goes down the left leg and it hurst if she sits for long periods.  CT of the abdomen/pelvis showed no definitive acute abnormality; questionable sludge/stone and a 2.8 x 6.3 x 0.2 cm lipoma of the chest wall previously measured 5.7 x 2.5 cm. She was discharged from the ED with follow up with NS.     She was seen in the Dr. Perez's office today for follow up of her MRI/MRA that showed an L 2-3 herniation requiring surgery.  It was noted that the patient heart beat was irregular and sent the patient to the ED. Upon presentation to the ED patient was found to be in atrial fibrillation. Her BP was 141/96,  (AF), afebrile and SPO2 96 % on RA.     On further questioning her further she has been having intermittent palpitations that occur 2-3 times a week over the past 3-4 months associated with shortness of air, chest pain, diaphoresis, bloating sensation and lower extremity edema. She is unable to walk to the mailbox without getting short of air. Her chest discomfort intermittently radiates to her back.  Chest  xray shows mild cardiomegaly and mild vascular congestion. ProBNP 1389, troponin is negative and D-Dimer 0.51. She has been more sedentary since her back issues.     Past Medical History:  Past Medical History:   Diagnosis Date   • Arthritis    • Asthma    • Atrial fibrillation    • Edema    • Fatigue    • Headache    • Hx of bone density study     10/23/2014   • Hyperlipidemia    • Hypertension    • Low back pain    • Obesity    • Vitamin D deficiency        Past Surgical History:  Past Surgical History:   Procedure Laterality Date   • HYSTERECTOMY     • KNEE SURGERY     • MAMMO BILATERAL  2016    [facility]        Allergies:  No Known Allergies    Home Meds:    (Not in a hospital admission)  :  Continuous Infusions:    diltiaZEM 5-15 mg/hr     PRN Meds:.    Social History:   Social History     Social History   • Marital status:      Spouse name: N/A   • Number of children: N/A   • Years of education: N/A     Occupational History   • retired      Social History Main Topics   • Smoking status: Never Smoker   • Smokeless tobacco: Never Used   • Alcohol use No   • Drug use: Defer   • Sexual activity: Defer     Other Topics Concern   • Not on file     Social History Narrative       Family History:  Family History   Problem Relation Age of Onset   • Colon cancer Mother    • Glaucoma Mother    • Stroke Mother    • Glaucoma Sister    • Diabetes Sister    • Heart disease Sister    • Heart disease Brother    • Diabetes Brother        REVIEW OF SYSTEMS:   CONSTITUTIONAL: No weight loss, fever, chills  HEENT: Eyes: No visual loss, blurred vision, double vision or yellow sclerae. Ears, Nose, Throat: No hearing loss, sneezing, congestion, runny nose or sore throat.   SKIN: No rash or itching.     RESPIRATORY: Nohemoptysis, cough or sputum.   GASTROINTESTINAL: No anorexia, nausea, vomiting or diarrhea. No abdominal pain, bright red blood per rectum or melena.  GENITOURINARY: No burning on urination, hematuria or  "increased frequency.  NEUROLOGICAL: No headache, dizziness, syncope, paralysis, ataxia, numbness or tingling in the extremities. No change in bowel or bladder control.   MUSCULOSKELETAL: No muscle,joint pain or stiffness.   HEMATOLOGIC: No anemia, bleeding or bruising.   LYMPHATICS: No enlarged nodes. No history of splenectomy.   PSYCHIATRIC: No history of depression, anxiety, hallucinations.   ENDOCRINOLOGIC: No reports of sweating, cold or heat intolerance. No polyuria or polydipsia.     Physical Exam    BP (!) 160/114 (BP Location: Left arm, Patient Position: Lying)  Pulse 107  Temp 97.9 °F (36.6 °C) (Oral)   Resp 20  Ht 66\" (167.6 cm)  Wt 260 lb (118 kg)  LMP  (LMP Unknown)  SpO2 95%  BMI 41.97 kg/m2    General Appearance Well developed, cooperative and well nourished and no acute distress   Head Normocephalic, without abnormality, atraumatic   Ears Ears appear intact with no abnormalities noted   Throat No oral lesions, no thrush, oral mucosa moist   Neck No adenopathy, supple, trachea midline, no thyromegaly, no carotid bruit, no JVD   Back No skin lesions, erythema or scars, no tenderness to percussion or palptaion,range of motion is normal   Lungs Clear to auscultation,respirations regular, even and unlabored   Heart Irregular rhythm and normal rate, normal S1 and S2, no murmur, no gallop, no rub, no click   Chest wall No abnormalities observed   Abdomen Normal bowel sounds, no masses, no hepatomegaly,    Extremities Moves all extremities well, 2+ edema with tenderness and cords on both calves and left thigh, no cyanosis, no redness   Pulses Pulses palpable and equal bilaterally. Normal radial, carotid, dorsalis pedis and posterior tibial pulses bilaterally. Normal abdominal aorta   Skin No bleeding, bruising or rash   Psyhiatric Alert and oriented x 3, normal mood and affect        Results from last 7 days  Lab Units 10/11/17  1147   SODIUM mmol/L 147*   POTASSIUM mmol/L 3.3*   CHLORIDE mmol/L 106 "   CO2 mmol/L 27.3   BUN mg/dL 14   CREATININE mg/dL 1.04*   CALCIUM mg/dL 9.9   BILIRUBIN mg/dL 0.4   ALK PHOS U/L 48   ALT (SGPT) U/L 16   AST (SGOT) U/L 17   GLUCOSE mg/dL 145*       Results from last 7 days  Lab Units 10/11/17  1147   TROPONIN T ng/mL <0.010     )    Results from last 7 days  Lab Units 10/11/17  1147   WBC 10*3/mm3 6.88   HEMOGLOBIN g/dL 12.3   HEMATOCRIT % 38.3   PLATELETS 10*3/mm3 169              Previous:       I personally viewed and interpreted the patient's EKG/Telemetry data    Assessment and Plan  1.  Atrial fibrillation, Rate still elevated.  We'll start her on Cardizem drip to stabilize overnight.  We'll obtain an echocardiogram.  We'll give Lovenox.  Transition to oral meds in a.m.  2.  Chest pain and dyspnea.  With markedly sedentary state and lower extremity chords with positive d-dimer, would check CT angiography of her chest as well as venous Doppler ultrasound of her legs.  Depending on these results we'll also need an echocardiogram and a Lexiscan cardiac stress test prior to clearance for back surgery and there is a component of heart failure.  We'll await echocardiogram results.  We'll give low-dose diuretics.  3.  Back pain with radiculopathy and nerve compression, neurosurgery wishes to consider intervention in the next several days.  4.  Hypertension  5.  Probable obstructive sleep apnea.  She's not had any formal testing but certainly has symptoms and    clinical scenario for this  6.  Rheumatoid arthritis, on methotrexate therapy  7.  Questionable gallbladder sludge  8.  Hypokalemia.  Replete and recheck in a.m.    Mini Spenser  10/11/2017  4:15 PM    60min spent in reviewing records, discussion and examination of the patient and discussion with other members of the patient's medical team.     Dictated utilizing Dragon dictation

## 2017-10-12 ENCOUNTER — APPOINTMENT (OUTPATIENT)
Dept: CARDIOLOGY | Facility: HOSPITAL | Age: 72
End: 2017-10-12
Attending: INTERNAL MEDICINE

## 2017-10-12 DIAGNOSIS — R07.9 CHEST PAIN, UNSPECIFIED TYPE: Primary | ICD-10-CM

## 2017-10-12 LAB
ANION GAP SERPL CALCULATED.3IONS-SCNC: 13.6 MMOL/L
APTT PPP: 37.2 SECONDS (ref 22.7–35.4)
ASCENDING AORTA: 3.6 CM
BASOPHILS # BLD AUTO: 0.03 10*3/MM3 (ref 0–0.2)
BASOPHILS NFR BLD AUTO: 0.4 % (ref 0–1.5)
BH CV ECHO MEAS - ACS: 1.8 CM
BH CV ECHO MEAS - AI DEC SLOPE: 106.5 CM/SEC^2
BH CV ECHO MEAS - AI MAX PG: 52.3 MMHG
BH CV ECHO MEAS - AI MAX VEL: 361.5 CM/SEC
BH CV ECHO MEAS - AI P1/2T: 994.2 MSEC
BH CV ECHO MEAS - AO MEAN PG (FULL): 3 MMHG
BH CV ECHO MEAS - AO MEAN PG: 4 MMHG
BH CV ECHO MEAS - AO ROOT AREA (BSA CORRECTED): 1.4
BH CV ECHO MEAS - AO ROOT AREA: 8 CM^2
BH CV ECHO MEAS - AO ROOT DIAM: 3.2 CM
BH CV ECHO MEAS - AO V2 MEAN: 89.9 CM/SEC
BH CV ECHO MEAS - AO V2 VTI: 26.3 CM
BH CV ECHO MEAS - ASC AORTA: 3.3 CM
BH CV ECHO MEAS - AVA(I,A): 1.3 CM^2
BH CV ECHO MEAS - AVA(I,D): 1.3 CM^2
BH CV ECHO MEAS - BSA(HAYCOCK): 2.4 M^2
BH CV ECHO MEAS - BSA: 2.2 M^2
BH CV ECHO MEAS - BZI_BMI: 42 KILOGRAMS/M^2
BH CV ECHO MEAS - BZI_METRIC_HEIGHT: 167.6 CM
BH CV ECHO MEAS - BZI_METRIC_WEIGHT: 117.9 KG
BH CV ECHO MEAS - CONTRAST EF (2CH): 56.5 ML/M^2
BH CV ECHO MEAS - CONTRAST EF 4CH: 57 ML/M^2
BH CV ECHO MEAS - EDV(CUBED): 103.8 ML
BH CV ECHO MEAS - EDV(MOD-SP2): 85 ML
BH CV ECHO MEAS - EDV(MOD-SP4): 79 ML
BH CV ECHO MEAS - EDV(TEICH): 102.4 ML
BH CV ECHO MEAS - EF(CUBED): 55.1 %
BH CV ECHO MEAS - EF(MOD-SP2): 56.5 %
BH CV ECHO MEAS - EF(MOD-SP4): 57 %
BH CV ECHO MEAS - EF(TEICH): 46.8 %
BH CV ECHO MEAS - ESV(CUBED): 46.7 ML
BH CV ECHO MEAS - ESV(MOD-SP2): 37 ML
BH CV ECHO MEAS - ESV(MOD-SP4): 34 ML
BH CV ECHO MEAS - ESV(TEICH): 54.4 ML
BH CV ECHO MEAS - FS: 23.4 %
BH CV ECHO MEAS - IVS/LVPW: 1.1
BH CV ECHO MEAS - IVSD: 1.1 CM
BH CV ECHO MEAS - LAT PEAK E' VEL: 11 CM/SEC
BH CV ECHO MEAS - LV DIASTOLIC VOL/BSA (35-75): 35.3 ML/M^2
BH CV ECHO MEAS - LV MASS(C)D: 175.8 GRAMS
BH CV ECHO MEAS - LV MASS(C)DI: 78.6 GRAMS/M^2
BH CV ECHO MEAS - LV MEAN PG: 1 MMHG
BH CV ECHO MEAS - LV SYSTOLIC VOL/BSA (12-30): 15.2 ML/M^2
BH CV ECHO MEAS - LV V1 MEAN: 55.9 CM/SEC
BH CV ECHO MEAS - LV V1 VTI: 17.3 CM
BH CV ECHO MEAS - LVIDD: 4.7 CM
BH CV ECHO MEAS - LVIDS: 3.6 CM
BH CV ECHO MEAS - LVLD AP2: 6.7 CM
BH CV ECHO MEAS - LVLD AP4: 6.4 CM
BH CV ECHO MEAS - LVLS AP2: 6.1 CM
BH CV ECHO MEAS - LVLS AP4: 5.7 CM
BH CV ECHO MEAS - LVOT AREA (M): 2 CM^2
BH CV ECHO MEAS - LVOT AREA: 2 CM^2
BH CV ECHO MEAS - LVOT DIAM: 1.6 CM
BH CV ECHO MEAS - LVPWD: 1 CM
BH CV ECHO MEAS - MED PEAK E' VEL: 8 CM/SEC
BH CV ECHO MEAS - MR MAX PG: 78.6 MMHG
BH CV ECHO MEAS - MR MAX VEL: 443 CM/SEC
BH CV ECHO MEAS - MV DEC SLOPE: 386 CM/SEC^2
BH CV ECHO MEAS - MV DEC TIME: 0.18 SEC
BH CV ECHO MEAS - MV E MAX VEL: 111 CM/SEC
BH CV ECHO MEAS - MV MEAN PG: 2 MMHG
BH CV ECHO MEAS - MV P1/2T MAX VEL: 115 CM/SEC
BH CV ECHO MEAS - MV P1/2T: 87.3 MSEC
BH CV ECHO MEAS - MV V2 MEAN: 68.2 CM/SEC
BH CV ECHO MEAS - MV V2 VTI: 28.8 CM
BH CV ECHO MEAS - MVA P1/2T LCG: 1.9 CM^2
BH CV ECHO MEAS - MVA(P1/2T): 2.5 CM^2
BH CV ECHO MEAS - MVA(VTI): 1.2 CM^2
BH CV ECHO MEAS - PA ACC SLOPE: 22.7 CM/SEC^2
BH CV ECHO MEAS - PA ACC TIME: 0.06 SEC
BH CV ECHO MEAS - PA MAX PG (FULL): 0.52 MMHG
BH CV ECHO MEAS - PA MAX PG: 2.9 MMHG
BH CV ECHO MEAS - PA PR(ACCEL): 52 MMHG
BH CV ECHO MEAS - PA V2 MAX: 85.5 CM/SEC
BH CV ECHO MEAS - PVA(V,A): 2.1 CM^2
BH CV ECHO MEAS - PVA(V,D): 2.1 CM^2
BH CV ECHO MEAS - QP/QS: 1
BH CV ECHO MEAS - RAP SYSTOLE: 15 MMHG
BH CV ECHO MEAS - RV MAX PG: 2.4 MMHG
BH CV ECHO MEAS - RV MEAN PG: 1 MMHG
BH CV ECHO MEAS - RV V1 MAX: 77.5 CM/SEC
BH CV ECHO MEAS - RV V1 MEAN: 47.4 CM/SEC
BH CV ECHO MEAS - RV V1 VTI: 16 CM
BH CV ECHO MEAS - RVOT AREA: 2.3 CM^2
BH CV ECHO MEAS - RVOT DIAM: 1.7 CM
BH CV ECHO MEAS - RVSP: 53.9 MMHG
BH CV ECHO MEAS - SI(AO): 94.6 ML/M^2
BH CV ECHO MEAS - SI(CUBED): 25.6 ML/M^2
BH CV ECHO MEAS - SI(LVOT): 15.6 ML/M^2
BH CV ECHO MEAS - SI(MOD-SP2): 21.5 ML/M^2
BH CV ECHO MEAS - SI(MOD-SP4): 20.1 ML/M^2
BH CV ECHO MEAS - SI(TEICH): 21.4 ML/M^2
BH CV ECHO MEAS - SUP REN AO DIAM: 2.36 CM
BH CV ECHO MEAS - SV(AO): 211.5 ML
BH CV ECHO MEAS - SV(CUBED): 57.2 ML
BH CV ECHO MEAS - SV(LVOT): 34.8 ML
BH CV ECHO MEAS - SV(MOD-SP2): 48 ML
BH CV ECHO MEAS - SV(MOD-SP4): 45 ML
BH CV ECHO MEAS - SV(RVOT): 36.3 ML
BH CV ECHO MEAS - SV(TEICH): 47.9 ML
BH CV ECHO MEAS - TAPSE (>1.6): 1.2 CM2
BH CV ECHO MEAS - TR MAX VEL: 312 CM/SEC
BH CV LOWER VASCULAR LEFT COMMON FEMORAL AUGMENT: NORMAL
BH CV LOWER VASCULAR LEFT COMMON FEMORAL COMPETENT: NORMAL
BH CV LOWER VASCULAR LEFT COMMON FEMORAL COMPRESS: NORMAL
BH CV LOWER VASCULAR LEFT COMMON FEMORAL PHASIC: NORMAL
BH CV LOWER VASCULAR LEFT COMMON FEMORAL SPONT: NORMAL
BH CV LOWER VASCULAR LEFT DISTAL FEMORAL COMPRESS: NORMAL
BH CV LOWER VASCULAR LEFT GASTRONEMIUS COMPRESS: NORMAL
BH CV LOWER VASCULAR LEFT GREATER SAPH AK COMPRESS: NORMAL
BH CV LOWER VASCULAR LEFT GREATER SAPH BK COMPRESS: NORMAL
BH CV LOWER VASCULAR LEFT LESSER SAPH COMPRESS: NORMAL
BH CV LOWER VASCULAR LEFT MID FEMORAL AUGMENT: NORMAL
BH CV LOWER VASCULAR LEFT MID FEMORAL COMPETENT: NORMAL
BH CV LOWER VASCULAR LEFT MID FEMORAL COMPRESS: NORMAL
BH CV LOWER VASCULAR LEFT MID FEMORAL PHASIC: NORMAL
BH CV LOWER VASCULAR LEFT MID FEMORAL SPONT: NORMAL
BH CV LOWER VASCULAR LEFT PERONEAL COMPRESS: NORMAL
BH CV LOWER VASCULAR LEFT POPLITEAL AUGMENT: NORMAL
BH CV LOWER VASCULAR LEFT POPLITEAL COMPETENT: NORMAL
BH CV LOWER VASCULAR LEFT POPLITEAL COMPRESS: NORMAL
BH CV LOWER VASCULAR LEFT POPLITEAL PHASIC: NORMAL
BH CV LOWER VASCULAR LEFT POPLITEAL SPONT: NORMAL
BH CV LOWER VASCULAR LEFT POSTERIOR TIBIAL COMPRESS: NORMAL
BH CV LOWER VASCULAR LEFT PROXIMAL FEMORAL COMPRESS: NORMAL
BH CV LOWER VASCULAR LEFT SAPHENOFEMORAL JUNCTION AUGMENT: NORMAL
BH CV LOWER VASCULAR LEFT SAPHENOFEMORAL JUNCTION COMPETENT: NORMAL
BH CV LOWER VASCULAR LEFT SAPHENOFEMORAL JUNCTION COMPRESS: NORMAL
BH CV LOWER VASCULAR LEFT SAPHENOFEMORAL JUNCTION PHASIC: NORMAL
BH CV LOWER VASCULAR LEFT SAPHENOFEMORAL JUNCTION SPONT: NORMAL
BH CV LOWER VASCULAR RIGHT COMMON FEMORAL AUGMENT: NORMAL
BH CV LOWER VASCULAR RIGHT COMMON FEMORAL COMPETENT: NORMAL
BH CV LOWER VASCULAR RIGHT COMMON FEMORAL COMPRESS: NORMAL
BH CV LOWER VASCULAR RIGHT COMMON FEMORAL PHASIC: NORMAL
BH CV LOWER VASCULAR RIGHT COMMON FEMORAL SPONT: NORMAL
BH CV LOWER VASCULAR RIGHT DISTAL FEMORAL COMPRESS: NORMAL
BH CV LOWER VASCULAR RIGHT GASTRONEMIUS COMPRESS: NORMAL
BH CV LOWER VASCULAR RIGHT GREATER SAPH AK COMPRESS: NORMAL
BH CV LOWER VASCULAR RIGHT GREATER SAPH BK COMPRESS: NORMAL
BH CV LOWER VASCULAR RIGHT LESSER SAPH COMPRESS: NORMAL
BH CV LOWER VASCULAR RIGHT MID FEMORAL AUGMENT: NORMAL
BH CV LOWER VASCULAR RIGHT MID FEMORAL COMPETENT: NORMAL
BH CV LOWER VASCULAR RIGHT MID FEMORAL COMPRESS: NORMAL
BH CV LOWER VASCULAR RIGHT MID FEMORAL PHASIC: NORMAL
BH CV LOWER VASCULAR RIGHT MID FEMORAL SPONT: NORMAL
BH CV LOWER VASCULAR RIGHT PERONEAL COMPRESS: NORMAL
BH CV LOWER VASCULAR RIGHT POPLITEAL AUGMENT: NORMAL
BH CV LOWER VASCULAR RIGHT POPLITEAL COMPETENT: NORMAL
BH CV LOWER VASCULAR RIGHT POPLITEAL COMPRESS: NORMAL
BH CV LOWER VASCULAR RIGHT POPLITEAL PHASIC: NORMAL
BH CV LOWER VASCULAR RIGHT POPLITEAL SPONT: NORMAL
BH CV LOWER VASCULAR RIGHT POSTERIOR TIBIAL COMPRESS: NORMAL
BH CV LOWER VASCULAR RIGHT PROXIMAL FEMORAL COMPRESS: NORMAL
BH CV LOWER VASCULAR RIGHT SAPHENOFEMORAL JUNCTION AUGMENT: NORMAL
BH CV LOWER VASCULAR RIGHT SAPHENOFEMORAL JUNCTION COMPETENT: NORMAL
BH CV LOWER VASCULAR RIGHT SAPHENOFEMORAL JUNCTION COMPRESS: NORMAL
BH CV LOWER VASCULAR RIGHT SAPHENOFEMORAL JUNCTION PHASIC: NORMAL
BH CV LOWER VASCULAR RIGHT SAPHENOFEMORAL JUNCTION SPONT: NORMAL
BH CV XLRA - RV BASE: 3.1 CM
BH CV XLRA - TDI S': 10 CM/SEC
BUN BLD-MCNC: 17 MG/DL (ref 8–23)
BUN/CREAT SERPL: 16.5 (ref 7–25)
CALCIUM SPEC-SCNC: 9.7 MG/DL (ref 8.6–10.5)
CHLORIDE SERPL-SCNC: 104 MMOL/L (ref 98–107)
CO2 SERPL-SCNC: 28.4 MMOL/L (ref 22–29)
CREAT BLD-MCNC: 1.03 MG/DL (ref 0.57–1)
DEPRECATED RDW RBC AUTO: 57.2 FL (ref 37–54)
E/E' RATIO: 12
EOSINOPHIL # BLD AUTO: 0.15 10*3/MM3 (ref 0–0.7)
EOSINOPHIL NFR BLD AUTO: 2 % (ref 0.3–6.2)
ERYTHROCYTE [DISTWIDTH] IN BLOOD BY AUTOMATED COUNT: 15.7 % (ref 11.7–13)
GFR SERPL CREATININE-BSD FRML MDRD: 64 ML/MIN/1.73
GLUCOSE BLD-MCNC: 118 MG/DL (ref 65–99)
HCT VFR BLD AUTO: 37 % (ref 35.6–45.5)
HGB BLD-MCNC: 11.9 G/DL (ref 11.9–15.5)
IMM GRANULOCYTES # BLD: 0.02 10*3/MM3 (ref 0–0.03)
IMM GRANULOCYTES NFR BLD: 0.3 % (ref 0–0.5)
INR PPP: 1.17 (ref 0.9–1.1)
LEFT ATRIUM VOLUME INDEX: 38 ML/M2
LV EF 2D ECHO EST: 57 %
LYMPHOCYTES # BLD AUTO: 3.52 10*3/MM3 (ref 0.9–4.8)
LYMPHOCYTES NFR BLD AUTO: 46.1 % (ref 19.6–45.3)
MCH RBC QN AUTO: 32.1 PG (ref 26.9–32)
MCHC RBC AUTO-ENTMCNC: 32.2 G/DL (ref 32.4–36.3)
MCV RBC AUTO: 99.7 FL (ref 80.5–98.2)
MONOCYTES # BLD AUTO: 1.23 10*3/MM3 (ref 0.2–1.2)
MONOCYTES NFR BLD AUTO: 16.1 % (ref 5–12)
NEUTROPHILS # BLD AUTO: 2.69 10*3/MM3 (ref 1.9–8.1)
NEUTROPHILS NFR BLD AUTO: 35.1 % (ref 42.7–76)
PLATELET # BLD AUTO: 178 10*3/MM3 (ref 140–500)
PMV BLD AUTO: 11.6 FL (ref 6–12)
POTASSIUM BLD-SCNC: 3.4 MMOL/L (ref 3.5–5.2)
PROTHROMBIN TIME: 14.5 SECONDS (ref 11.7–14.2)
RBC # BLD AUTO: 3.71 10*6/MM3 (ref 3.9–5.2)
SODIUM BLD-SCNC: 146 MMOL/L (ref 136–145)
TROPONIN T SERPL-MCNC: <0.01 NG/ML (ref 0–0.03)
WBC NRBC COR # BLD: 7.64 10*3/MM3 (ref 4.5–10.7)

## 2017-10-12 PROCEDURE — 80048 BASIC METABOLIC PNL TOTAL CA: CPT | Performed by: INTERNAL MEDICINE

## 2017-10-12 PROCEDURE — 93306 TTE W/DOPPLER COMPLETE: CPT

## 2017-10-12 PROCEDURE — 25010000002 ENOXAPARIN PER 10 MG: Performed by: INTERNAL MEDICINE

## 2017-10-12 PROCEDURE — 99232 SBSQ HOSP IP/OBS MODERATE 35: CPT | Performed by: INTERNAL MEDICINE

## 2017-10-12 PROCEDURE — 25010000002 HEPARIN (PORCINE) PER 1000 UNITS: Performed by: INTERNAL MEDICINE

## 2017-10-12 PROCEDURE — 85730 THROMBOPLASTIN TIME PARTIAL: CPT | Performed by: INTERNAL MEDICINE

## 2017-10-12 PROCEDURE — 85025 COMPLETE CBC W/AUTO DIFF WBC: CPT | Performed by: INTERNAL MEDICINE

## 2017-10-12 PROCEDURE — 93306 TTE W/DOPPLER COMPLETE: CPT | Performed by: INTERNAL MEDICINE

## 2017-10-12 PROCEDURE — 99232 SBSQ HOSP IP/OBS MODERATE 35: CPT | Performed by: NURSE PRACTITIONER

## 2017-10-12 PROCEDURE — 84484 ASSAY OF TROPONIN QUANT: CPT | Performed by: INTERNAL MEDICINE

## 2017-10-12 PROCEDURE — 85610 PROTHROMBIN TIME: CPT | Performed by: INTERNAL MEDICINE

## 2017-10-12 RX ORDER — POTASSIUM CHLORIDE 750 MG/1
20 CAPSULE, EXTENDED RELEASE ORAL ONCE
Status: COMPLETED | OUTPATIENT
Start: 2017-10-12 | End: 2017-10-12

## 2017-10-12 RX ORDER — LOSARTAN POTASSIUM 50 MG/1
50 TABLET ORAL
Status: DISCONTINUED | OUTPATIENT
Start: 2017-10-12 | End: 2017-10-14 | Stop reason: HOSPADM

## 2017-10-12 RX ORDER — POTASSIUM CHLORIDE 1.5 G/1.77G
20 POWDER, FOR SOLUTION ORAL ONCE
Status: COMPLETED | OUTPATIENT
Start: 2017-10-12 | End: 2017-10-12

## 2017-10-12 RX ORDER — POTASSIUM CHLORIDE 1.5 G/1.77G
40 POWDER, FOR SOLUTION ORAL ONCE
Status: DISCONTINUED | OUTPATIENT
Start: 2017-10-12 | End: 2017-10-12

## 2017-10-12 RX ORDER — ATENOLOL 50 MG/1
100 TABLET ORAL DAILY
Status: DISCONTINUED | OUTPATIENT
Start: 2017-10-12 | End: 2017-10-14 | Stop reason: HOSPADM

## 2017-10-12 RX ORDER — HEPARIN SODIUM 5000 [USP'U]/ML
40-80 INJECTION, SOLUTION INTRAVENOUS; SUBCUTANEOUS EVERY 6 HOURS PRN
Status: DISCONTINUED | OUTPATIENT
Start: 2017-10-12 | End: 2017-10-14

## 2017-10-12 RX ADMIN — HYDROCODONE BITARTRATE AND ACETAMINOPHEN 1 TABLET: 5; 325 TABLET ORAL at 20:24

## 2017-10-12 RX ADMIN — AMLODIPINE BESYLATE 2.5 MG: 2.5 TABLET ORAL at 08:48

## 2017-10-12 RX ADMIN — POTASSIUM CHLORIDE 20 MEQ: 750 CAPSULE, EXTENDED RELEASE ORAL at 08:47

## 2017-10-12 RX ADMIN — LOSARTAN POTASSIUM AND HYDROCHLOROTHIAZIDE TABLETS 1 TABLET: 100; 25 TABLET, FILM COATED ORAL at 08:47

## 2017-10-12 RX ADMIN — OXYBUTYNIN CHLORIDE 10 MG: 10 TABLET, FILM COATED, EXTENDED RELEASE ORAL at 09:22

## 2017-10-12 RX ADMIN — ENOXAPARIN SODIUM 120 MG: 120 INJECTION SUBCUTANEOUS at 05:56

## 2017-10-12 RX ADMIN — HEPARIN SODIUM 12.7 UNITS/KG/HR: 10000 INJECTION, SOLUTION INTRAVENOUS at 21:26

## 2017-10-12 RX ADMIN — ATENOLOL 100 MG: 50 TABLET ORAL at 09:19

## 2017-10-12 RX ADMIN — MELOXICAM 15 MG: 7.5 TABLET ORAL at 09:22

## 2017-10-12 RX ADMIN — CETIRIZINE HYDROCHLORIDE 10 MG: 10 TABLET, FILM COATED ORAL at 08:48

## 2017-10-12 RX ADMIN — POTASSIUM CHLORIDE 20 MEQ: 1.5 POWDER, FOR SOLUTION ORAL at 17:51

## 2017-10-12 RX ADMIN — FOLIC ACID 1 MG: 1 TABLET ORAL at 08:47

## 2017-10-12 RX ADMIN — HYDROCODONE BITARTRATE AND ACETAMINOPHEN 1 TABLET: 5; 325 TABLET ORAL at 08:47

## 2017-10-12 NOTE — PROGRESS NOTES
[facility]  Progress note: 10/12/2017    Patient Identification:  Name:Lana Yañez  Age:72 y.o.  Sex: female  :  1945  MRN: [id number]           CC:  Followup of atrial fib, prop  clearance    Interval history:  HR and BP improved. CTA with no PE, small ascending aortic aneurysm, venous doppler negative.  Echo ordered but not completed.  Still on diltiazem drip.  Did not receive atenolol as ordered yesterday.     Vital Signs:   Temp:  [97.2 °F (36.2 °C)-97.9 °F (36.6 °C)] 97.8 °F (36.6 °C)  Heart Rate:  [] 103  Resp:  [16-24] 16  BP: (120-162)/() 120/75  No intake or output data in the 24 hours ending 10/12/17 0811    Physical Examination:    General Appearance No acute distress   Neck No adenopathy, supple, trachea midline, no thyromegaly, no carotid bruit, no JVD   Lungs Clear to auscultation,respirations regular, even and unlabored   Heart Irregular rhythm and normal rate, normal S1 and S2, no murmur, no gallop, no rub, no click   Chest wall No abnormalities observed   Abdomen Normal bowel sounds, no masses, no hepatomegaly, soft   Extremities Moves all extremities well, no edema, no cyanosis, no redness   Neurological Alert and oriented x 3     Lab Review:  Personally reviewed the labs, radiology imaging and other cardiac procedures.   Results from last 7 days  Lab Units 10/12/17  0404 10/11/17  1147   SODIUM mmol/L 146* 147*   POTASSIUM mmol/L 3.4* 3.3*   CHLORIDE mmol/L 104 106   CO2 mmol/L 28.4 27.3   BUN mg/dL 17 14   CREATININE mg/dL 1.03* 1.04*   CALCIUM mg/dL 9.7 9.9   BILIRUBIN mg/dL  --  0.4   ALK PHOS U/L  --  48   ALT (SGPT) U/L  --  16   AST (SGOT) U/L  --  17   GLUCOSE mg/dL 118* 145*       Results from last 7 days  Lab Units 10/12/17  0404 10/11/17  1928 10/11/17  1147   TROPONIN T ng/mL <0.010 <0.010 <0.010     )  Results from last 7 days  Lab Units 10/11/17  1147   WBC 10*3/mm3 6.88   HEMOGLOBIN g/dL 12.3   HEMATOCRIT % 38.3   PLATELETS 10*3/mm3 169            Medication Review:   Meds reviewed  Scheduled Meds:  amLODIPine 2.5 mg Oral Daily   atenolol 75 mg Oral Daily   cetirizine 10 mg Oral Daily   dorzolamide-timolol 1 drop Both Eyes BID   enoxaparin 120 mg Subcutaneous Q12H   folic acid 1 mg Oral Daily   latanoprost 1 drop Both Eyes Nightly   losartan-hydrochlorothiazide 1 tablet Oral Daily   meloxicam 15 mg Oral Daily   oxybutynin XL 10 mg Oral Daily     Continuous Infusions:  diltiaZEM 5-15 mg/hr Last Rate: 5 mg/hr (10/11/17 1635)     I personally viewed and interpreted the patient's EKG/Telemetry data    Assessment and Plan    1.  Atrial fibrillation,  Increase atenolol furhter and d/c IV cardiazem.  Echocardiogram pending.  Continue with Lovenox if okay with neurosurgery hold additional doses until then.  Received one dose earlier this morning  2.  Chest pain and dyspnea.   as above awaiting echocardiogram.  When off IV diltiazem plan on a Lexiscan cardiac stress test hopefully today but more likely tomorrow  3.  Back pain with radiculopathy and nerve compression, neurosurgery consult with Dr. Chris peña.  Patient continues to complain of left leg pain  4.  Hypertension, Discontinue amlodipine and change Hyzaar to losartan 50 mg a day  5.  Probable obstructive sleep apnea.  She's not had any formal testing but certainly has symptoms and    clinical scenario for this  6.  Rheumatoid arthritis, on methotrexate therapy  7.  Questionable gallbladder sludge  8.  Hypokalemia.  Additional potassium supplementation ordered    Mini Dueñas  10/12/03907:11 AM  25min spent in reviewing records, discussion and examination of the patient and discussion with other members of the patient's medical team.     Dictated utilizing Dragon dictation

## 2017-10-12 NOTE — H&P
Internal medicine history and physical    INTERNAL MEDICINE   [facility]       Patient Identification:  Name: Lana Yañez  Age: 72 y.o.  Sex: female  :  1945  MRN: [id number]                   Primary Care Physician: Raoul Ramirez MD                                   Chief Complaint: Sent from spine surgery clinic for palpitation and shortness of breath, she was there for evaluation of back pain with radiculopathy involving the left lower extremity.    History of Present Illness:   Source of information patient herself patient's current and previous hospitalization records.  Patient is at times vague in her description of symptoms.  Patient is a 72-year-old female who has been having episodes of shortness of breath with dyspnea on exertion that used to come and go away.  She was seen by her regular care physician Dr. Ramirez few months ago and was referred to cardiology service for her symptoms and evaluation but she couldn't get there because of the hospitalization that she had for the right elbow cellulitis due to MRSA.  Since then she had a follow-up with Dr. Ramirez and then afterwards she came to the emergency room on 2017 with complaints of severe flank pain.  Subsequently she had a follow-up with her family care nurse practitioner and had MRI of the left hip and lumbar spine area on 10/2/2017.  She was then referred to Dr. Perez for further intervention.  While she was at Dr. Harris office she explained to them that for the last 2-3 weeks having intermittent episodes of shortness of breath along with dyspnea on exertion and at times swelling of her legs and orthopnea.  She was also having palpitations.  Because of these symptoms she was sent to the emergency room for further evaluation as she was noted to have new onset atrial fibrillation and tachycardia.  Patient describes the symptom off and on for the last few years and also was given a different diagnosis  until 3 months ago when she was referred to cardiology service which she could not go to because of her hospitalization for her elbow.  Patient is also having episodes of chest discomfort.  She describes her back pain is in the left side of her back going into her left groin and at times makes her weak.  Patient denies any fever or denies any chills this time.    Past Medical History:  Past Medical History:   Diagnosis Date   • Arthritis    • Asthma    • Atrial fibrillation    • Edema    • Fatigue    • Headache    • Hx of bone density study     10/23/2014   • Hyperlipidemia    • Hypertension    • Low back pain    • Obesity    • Vitamin D deficiency      Past Surgical History:  Past Surgical History:   Procedure Laterality Date   • HYSTERECTOMY     • KNEE SURGERY     • MAMMO BILATERAL  2016    [facility]       Home Meds:  Prescriptions Prior to Admission   Medication Sig Dispense Refill Last Dose   • albuterol (PROVENTIL HFA;VENTOLIN HFA) 108 (90 BASE) MCG/ACT inhaler Inhale 2 puffs Every 4 (Four) Hours As Needed for wheezing. 1 inhaler 11 10/10/2017 at Unknown time   • amLODIPine (NORVASC) 2.5 MG tablet Take 2.5 mg by mouth Daily.   10/10/2017 at Unknown time   • atenolol (TENORMIN) 50 MG tablet Take 75 mg by mouth Daily.   10/10/2017 at Unknown time   • cetirizine (ZyrTEC) 10 MG tablet Take 10 mg by mouth daily.   10/10/2017 at Unknown time   • COSOPT PF 22.3-6.8 MG/ML solution Administer 1 drop to both eyes 2 (Two) Times a Day.   10/10/2017 at Unknown time   • doxycycline (MONODOX) 100 MG capsule Take 1 capsule by mouth 2 (Two) Times a Day. 28 capsule 0 10/11/2017 at 1000   • folic acid (FOLVITE) 1 MG tablet Take 1 mg by mouth Daily. Patient stopped taking 2 weeks ago   Past Month at Unknown time   • HYDROcodone-acetaminophen (NORCO) 5-325 MG per tablet Take 1 tablet by mouth Every 6 (Six) Hours As Needed for Moderate Pain . (Patient taking differently: Take 1 tablet by mouth Every 4 (Four) Hours As  Needed for Moderate Pain . 4-6 hours prn) 20 tablet 0 10/11/2017 at 1000   • losartan-hydrochlorothiazide (HYZAAR) 100-25 MG per tablet Take 1 tablet by mouth Daily.   10/10/2017 at Unknown time   • meloxicam (MOBIC) 15 MG tablet Take 15 mg by mouth Daily.   10/10/2017 at Unknown time   • methotrexate 2.5 MG tablet Take  by mouth. Takes 8 tablets once a week. Patient stopped taking 2 weeks ago.   Past Month at Unknown time   • MYRBETRIQ 50 MG tablet sustained-release 24 hour Take 50 mg by mouth Every Morning.   10/10/2017 at Unknown time   • travoprost, ANNIE free, (TRAVATAN Z) 0.004 % solution ophthalmic solution Administer 1 drop to both eyes Every Evening. in affected eye(s)   10/10/2017 at Unknown time     Current Meds:     Current Facility-Administered Medications:   •  [COMPLETED] diltiaZEM (CARDIZEM) injection 5 mg, 5 mg, Intravenous, Once, 5 mg at 10/11/17 1634 **FOLLOWED BY** diltiaZEM (CARDIZEM) 100 mg/100 mL (1 mg/mL) 0.9% NS, 5-15 mg/hr, Intravenous, Titrated, Last Rate: 5 mL/hr at 10/11/17 1635, 5 mg/hr at 10/11/17 1635 **FOLLOWED BY** [START ON 10/12/2017] diltiazem (CARDIZEM) bolus from bag 1 mg/mL 5 mg, 5 mg, Intravenous, Q30 Min PRN, Pia Dueñas MD  •  Influenza Vac Subunit Quad (FLUCELVAX) injection 0.5 mL, 0.5 mL, Intramuscular, During Hospitalization, Cristian Grace MD  •  pneumococcal polysaccharide 23-valent (PNEUMOVAX-23) vaccine 0.5 mL, 0.5 mL, Intramuscular, During Hospitalization, Cristian Grace MD  •  sodium chloride 0.9 % flush 1-10 mL, 1-10 mL, Intravenous, PRN, Pia Dueñas MD  Allergies:  No Known Allergies  Social History:   Social History   Substance Use Topics   • Smoking status: Never Smoker   • Smokeless tobacco: Never Used   • Alcohol use No      Family History:  Family History   Problem Relation Age of Onset   • Colon cancer Mother    • Glaucoma Mother    • Stroke Mother    • Glaucoma Sister    • Diabetes Sister    • Heart disease Sister    • Heart disease Brother    •  "Diabetes Brother           Review of Systems  See history of present illness and past medical history..  Constitutional: Positive for diaphoresis (intermittently for several months). Negative for chills.   HENT: Negative for congestion, rhinorrhea and sore throat.    Eyes: Negative for pain.   Respiratory: Positive for shortness of breath. Negative for cough.    Cardiovascular: Positive for chest pain (intermittently for several months). Negative for leg swelling.   Gastrointestinal: Negative for abdominal pain, diarrhea, nausea and vomiting.   Genitourinary: Negative for difficulty urinating, dysuria, flank pain and frequency.   Musculoskeletal: Negative for neck stiffness.   Skin: Negative for rash.   Neurological: Negative for dizziness, speech difficulty, light-headedness and headaches.   Psychiatric/Behavioral: Negative.      Vitals:   /82 (BP Location: Right arm)  Pulse 116  Temp 97.2 °F (36.2 °C) (Oral)   Resp 16  Ht 66\" (167.6 cm)  Wt 260 lb (118 kg)  LMP  (LMP Unknown)  SpO2 95%  BMI 41.97 kg/m2  I/O: No intake or output data in the 24 hours ending 10/11/17 2128  Exam:  General Appearance:    Alert, cooperative, no distress, appears stated age, Tired appearing nontoxic female who is morbidly obese.     Head:    Normocephalic, without obvious abnormality, atraumatic   Eyes:    PERRL, conjunctiva/corneas clear, EOM's intact, both eyes   Ears:    Normal external ear canals, both ears   Nose:   Nares normal, septum midline, mucosa normal, no drainage    or sinus tenderness   Throat:   Lips, tongue, gums normal; oral mucosa pink and moist   Neck:   Supple, symmetrical, trachea midline, no adenopathy;     thyroid:  no enlargement/tenderness/nodules; no carotid    bruit or JVD   Back:     Symmetric, no curvature, ROM normal, no CVA tenderness   Lungs:     Clear to auscultation bilaterally, respirations unlabored   Chest Wall:    No tenderness or deformity    Heart:    Irregularly irregular rate and " rhythm, S1 and S2 normal, 2/6 murmur,    Abdomen:     Soft, non-tender, bowel sounds active all four quadrants,     no masses, no hepatomegaly, no splenomegaly   Extremities:   Extremities normal, atraumatic, no cyanosis, 1-2+ edema   Pulses:   Pulses palpable in all extremities; symmetric all extremities   Skin:   Skin color normal, Skin is warm and dry,  no rashes or palpable lesions   Neurologic:   CNII-XII intact, motor strength grossly intact, sensation grossly intact to light touch, no focal deficits noted       Data Review:      I reviewed the patient's new clinical results.    Results from last 7 days  Lab Units 10/11/17  1147   WBC 10*3/mm3 6.88   HEMOGLOBIN g/dL 12.3   PLATELETS 10*3/mm3 169       Results from last 7 days  Lab Units 10/11/17  1147   SODIUM mmol/L 147*   POTASSIUM mmol/L 3.3*   CHLORIDE mmol/L 106   CO2 mmol/L 27.3   BUN mg/dL 14   CREATININE mg/dL 1.04*   CALCIUM mg/dL 9.9   GLUCOSE mg/dL 145*   EKG  Rhythm/Rate: A-Fib rate 107  P waves and VA: None present  QRS, axis: Normal QRS   ST and T waves: Normal ST  D-dimer 0.5 and troponin less than 0.010 BNP 1389  Assessment:  Active Hospital Problems (** Indicates Principal Problem)    Diagnosis Date Noted   • **Atrial fibrillation with RVR [I48.91] 10/11/2017   • Lumbar disc herniation [M51.26] 10/11/2017   • History of MRSA infection [Z86.14] 10/11/2017   • Morbid obesity with BMI of 40.0-44.9, adult [E66.01, Z68.41] 12/15/2016   • Sleep apnea [G47.30] 08/02/2016   • Fatigue [R53.83] 04/06/2016   • ANTOINE (dyspnea on exertion) [R06.09] 04/06/2016   • Essential hypertension [I10] 03/21/2016   • Rheumatoid arthritis involving both hands [M06.9] 03/21/2016   • Hyperlipidemia [E78.5] 03/21/2016   Electrolyte imbalance    Resolved Hospital Problems    Diagnosis Date Noted Date Resolved   No resolved problems to display.       Plan:  Patient, cardiology consultation which has already been done in the ER by Dr. Dueñas, pain control.  Follow-up on  echocardiogram venous Doppler lower extremities and CT angiogram.  Decision regarding timing of stress test per cardiology service.  Replace electrolytes and further management as her condition evolves.    Radha Jarvis MD   10/11/2017  9:28 PM  Much of this encounter note is an electronic transcription/translation of spoken language to printed text. The electronic translation of spoken language may permit erroneous, or at times, nonsensical words or phrases to be inadvertently transcribed; Although I have reviewed the note for such errors, some may still exist

## 2017-10-12 NOTE — PROGRESS NOTES
Discharge Planning Assessment  [facility]     Patient Name: Lana Yañez  MRN: [id number]  Today's Date: 10/12/2017    Admit Date: 10/11/2017          Discharge Needs Assessment       10/12/17 1642    Living Environment    Lives With spouse    Living Arrangements house   bi-level    Home Accessibility bed and bath on same level;stairs within home    Living Environment    Provides Primary Care For no one, unable/limited ability to care for self    Quality Of Family Relationships supportive    Able to Return to Prior Living Arrangements yes    Discharge Needs Assessment    Anticipated Changes Related to Illness none    Equipment Currently Used at Home cane, straight;walker, rolling    Equipment Needed After Discharge commode            Discharge Plan       10/12/17 1643    Case Management/Social Work Plan    Plan Plans are home, would like a 3:1 commode for home use.         Discharge Placement     No information found                Demographic Summary     None            Functional Status     None            Psychosocial     None            Abuse/Neglect     None            Legal     None            Substance Abuse     None            Patient Forms     None          Shanthi King RN

## 2017-10-12 NOTE — NURSING NOTE
Pts cardizem drip stopped today.  Afib rate controlled between 60-85.  Had ultrasound of heart today.  Neurosurgery consulted.  VSS, no c/o dizziness, no CP, no falls, no SOA today.  Sitting up in chair with call light in reach.  Pt to be NPO after midnight tonight r/t A.M stress test.  Will continue to monitor

## 2017-10-12 NOTE — NURSING NOTE
OK for patient to start on heparin drip per ANGELA Llanes with neurosurgery.  Called Halina with [facility] to relay message.

## 2017-10-12 NOTE — PROGRESS NOTES
"    DAILY PROGRESS NOTE  [facility]    Patient Identification:  Name: Lana Yañez  Age: 72 y.o.  Sex: female  :  1945  MRN: [id number]         Primary Care Physician: Raoul Ramirez MD    Subjective:  Interval History: feeling and breathing better - no cp/n/v/loc/lof - not wanting AC and d/w risks vs benefits    Objective:no fm     Scheduled Meds:  atenolol 100 mg Oral Daily   cetirizine 10 mg Oral Daily   dorzolamide-timolol 1 drop Both Eyes BID   folic acid 1 mg Oral Daily   latanoprost 1 drop Both Eyes Nightly   losartan 50 mg Oral Q24H   meloxicam 15 mg Oral Daily   oxybutynin XL 10 mg Oral Daily     Continuous Infusions:  diltiaZEM 5-15 mg/hr Last Rate: 5 mg/hr (10/11/17 1635)       Vital signs in last 24 hours:  Temp:  [96.9 °F (36.1 °C)-97.8 °F (36.6 °C)] 96.9 °F (36.1 °C)  Heart Rate:  [] 72  Resp:  [16-20] 16  BP: (120-160)/() 123/85    Intake/Output:  No intake or output data in the 24 hours ending 10/12/17 1344    Exam:  /85 (BP Location: Right arm, Patient Position: Sitting)  Pulse 72  Temp 96.9 °F (36.1 °C) (Oral)   Resp 16  Ht 66\" (167.6 cm)  Wt 260 lb (118 kg)  LMP  (LMP Unknown)  SpO2 93%  BMI 41.97 kg/m2    General Appearance:    Alert, cooperative, no distress, AAOx3                         Throat:   Lips, tongue, gums normal; oral mucosa pink and moist                           Neck:   Supple, no JVD                         Lungs:    Clear to auscultation bilaterally, respirations unlabored                 Chest Wall:    No tenderness or deformity                          Heart:    Irregularly irregular rate and rhythm                  Abdomen:     Soft, non-tender, bowel sounds active, no masses                 Extremities:   Extremities normal, atraumatic, no cyanosis or edema                           Data Review:  Labs in chart were reviewed.    Assessment:  Active Hospital Problems (** Indicates Principal Problem)    Diagnosis Date " Noted   • **Atrial fibrillation with RVR [I48.91] 10/11/2017   • Lumbar disc herniation [M51.26] 10/11/2017   • History of MRSA infection [Z86.14] 10/11/2017   • Morbid obesity with BMI of 40.0-44.9, adult [E66.01, Z68.41] 12/15/2016   • Sleep apnea [G47.30] 08/02/2016   • Fatigue [R53.83] 04/06/2016   • ANTOINE (dyspnea on exertion) [R06.09] 04/06/2016   • Essential hypertension [I10] 03/21/2016   • Rheumatoid arthritis involving both hands [M06.9] 03/21/2016   • Hyperlipidemia [E78.5] 03/21/2016      Resolved Hospital Problems    Diagnosis Date Noted Date Resolved   No resolved problems to display.       Plan:  AF - RC. Echo report pending and Cards planning on stress   -patient is not interested in AC   -CP resolved and dyspnea improved    -agree w/ need for outpatient sleep study for FRANCY    Radiculopathy - COOPER following     HTN - changes noted    RA - MTX    Hypokalemia - replace     SCDs for DVT prophylaxis - formerly on therapeutic Lovenox     Georgi Garcia MD  10/12/2017  1:44 PM

## 2017-10-12 NOTE — PROGRESS NOTES
Discharge Planning Assessment  [facility]     Patient Name: Lana Yañez  MRN: [id number]  Today's Date: 10/12/2017    Admit Date: 10/11/2017          Discharge Needs Assessment       10/12/17 1642    Living Environment    Lives With spouse    Living Arrangements house   bi-level    Home Accessibility bed and bath on same level;stairs within home    Living Environment    Provides Primary Care For no one, unable/limited ability to care for self    Quality Of Family Relationships supportive    Able to Return to Prior Living Arrangements yes    Discharge Needs Assessment    Anticipated Changes Related to Illness none    Equipment Currently Used at Home cane, straight;walker, rolling    Equipment Needed After Discharge commode            Discharge Plan       10/12/17 1643    Case Management/Social Work Plan    Plan Plans are home, would like a 3:1 commode for home use.     Additional Comments Spoke with pt @ bedside. Confirmed facesheet and pharmacy info with pt.  Pt states she lives with spouse in bi-level home.  Pt had been IDAL's, but states she has been weaker this past 3 weeks. She has been using a walker @ home.  Pt states she had HH before but does not remember name of agency.  Pt also went to [facility] and [facility] in the past, pt states her preference would be [facility] if needed.  Pt is not sure when they are planning on surgery.  Instructed CCP would cont to follow and assist as needed.        Discharge Placement     No information found                Demographic Summary     None            Functional Status     None            Psychosocial     None            Abuse/Neglect     None            Legal     None            Substance Abuse     None            Patient Forms     None          Shanthi King RN

## 2017-10-12 NOTE — PLAN OF CARE
Problem: Fall Risk (Adult)  Goal: Identify Related Risk Factors and Signs and Symptoms  Outcome: Ongoing (interventions implemented as appropriate)    Problem: Pain, Acute (Adult)  Goal: Identify Related Risk Factors and Signs and Symptoms  Outcome: Ongoing (interventions implemented as appropriate)  Goal: Acceptable Pain Control/Comfort Level  Outcome: Ongoing (interventions implemented as appropriate)    Problem: Cardiac Output, Decreased (Adult)  Goal: Identify Related Risk Factors and Signs and Symptoms  Outcome: Ongoing (interventions implemented as appropriate)  Goal: Adequate Cardiac Output/Effective Tissue Perfusion  Outcome: Ongoing (interventions implemented as appropriate)

## 2017-10-12 NOTE — NURSING NOTE
Notified Gerri with [facility] of pts HR sustaining 55-70 and of pts request to eat.  New order to start reg/cardiac diet and be NPO after midnight tonight.

## 2017-10-12 NOTE — PLAN OF CARE
Problem: Fall Risk (Adult)  Goal: Identify Related Risk Factors and Signs and Symptoms  Outcome: Ongoing (interventions implemented as appropriate)  Goal: Absence of Falls  Outcome: Ongoing (interventions implemented as appropriate)    Problem: Pain, Acute (Adult)  Goal: Identify Related Risk Factors and Signs and Symptoms  Outcome: Ongoing (interventions implemented as appropriate)    Problem: Cardiac Output, Decreased (Adult)  Goal: Identify Related Risk Factors and Signs and Symptoms  Outcome: Ongoing (interventions implemented as appropriate)  Goal: Adequate Cardiac Output/Effective Tissue Perfusion  Outcome: Ongoing (interventions implemented as appropriate)

## 2017-10-12 NOTE — PROGRESS NOTES
[facility] PROGRESS NOTE    PATIENT IDENTIFICATION:   Name:  Lana Yañez      MRN:  [id number]     72 y.o.  female               CC: new onset afib w RVR/ Lumbar disc herniation/ L leg pain      Subjective     Interval History: none. She reports feeling much better today, denies SOB, feels more energized. NO back pain today, but she is having burning in the anterior L thigh- improved w pain meds    Objective     Vital signs in last 24 hours:  Temp:  [97.2 °F (36.2 °C)-97.8 °F (36.6 °C)] 97.8 °F (36.6 °C)  Heart Rate:  [] 67  Resp:  [16-20] 16  BP: (120-160)/() 123/89  ICP ranges-    Intake/Output last 3 shifts:       Intake/Output this shift:         Physical Exam:  General:   Awake, alert, and oriented x 3. NAD  Very pleasant, obese female  Sitting in chair at bedside  Neg SOB  afib with rate in 60-70's  Left iliopsoas weakness  1+ BLE reflexes  Coordination: Finger to nose test shows no dysmetria.  Rapid alternating movements are normal.  Heel to shin normal.  Extremities:  Patient is wearing TEDs      LABS:    Lab Results (last 24 hours)     Procedure Component Value Units Date/Time    TSH [[id number]]  (Normal) Collected:  10/11/17 1928    Specimen:  Blood Updated:  10/11/17 2038     TSH 1.070 mIU/mL     Troponin [[id number]]  (Normal) Collected:  10/11/17 1928    Specimen:  Blood Updated:  10/11/17 2038     Troponin T <0.010 ng/mL     Narrative:       Troponin T Reference Ranges:  Less than 0.03 ng/mL:    Negative for AMI  0.03 to 0.09 ng/mL:      Indeterminant for AMI  Greater than 0.09 ng/mL: Positive for AMI    Troponin [[id number]]  (Normal) Collected:  10/12/17 0404    Specimen:  Blood Updated:  10/12/17 0459     Troponin T <0.010 ng/mL     Narrative:       Troponin T Reference Ranges:  Less than 0.03 ng/mL:    Negative for AMI  0.03 to 0.09 ng/mL:      Indeterminant for AMI  Greater than 0.09 ng/mL: Positive for AMI    Basic Metabolic Panel [[id number]]  (Abnormal)  Collected:  10/12/17 0404    Specimen:  Blood Updated:  10/12/17 0500     Glucose 118 (H) mg/dL      BUN 17 mg/dL      Creatinine 1.03 (H) mg/dL      Sodium 146 (H) mmol/L      Potassium 3.4 (L) mmol/L      Chloride 104 mmol/L      CO2 28.4 mmol/L      Calcium 9.7 mg/dL      eGFR  African Amer 64 mL/min/1.73      BUN/Creatinine Ratio 16.5     Anion Gap 13.6 mmol/L     Narrative:       GFR Normal >60  Chronic Kidney Disease <60  Kidney Failure <15          IMAGING STUDIES:    No new imaging      Meds reviewed/changed: Yes    Current Facility-Administered Medications   Medication Dose Route Frequency Provider Last Rate Last Dose   • acetaminophen (TYLENOL) tablet 650 mg  650 mg Oral Q4H PRN Radha Jarvis MD       • albuterol (PROVENTIL) nebulizer solution 0.083% 2.5 mg/3mL  2.5 mg Nebulization Q6H PRN Radha Jarvis MD       • atenolol (TENORMIN) tablet 100 mg  100 mg Oral Daily Pia Dueñas MD   100 mg at 10/12/17 0919   • cetirizine (zyrTEC) tablet 10 mg  10 mg Oral Daily Radha Jarvis MD   10 mg at 10/12/17 0848   • diltiaZEM (CARDIZEM) 100 mg/100 mL (1 mg/mL) 0.9% NS  5-15 mg/hr Intravenous Titrated Pia Dueñas MD 5 mL/hr at 10/11/17 1635 5 mg/hr at 10/11/17 1635    Followed by   • diltiazem (CARDIZEM) bolus from bag 1 mg/mL 5 mg  5 mg Intravenous Q30 Min PRN Pia Dueñas MD       • dorzolamide-timolol (COSOPT) ophthalmic solution 1 drop  1 drop Both Eyes BID Radha Jarvis MD       • folic acid (FOLVITE) tablet 1 mg  1 mg Oral Daily Radha Jarvis MD   1 mg at 10/12/17 0847   • HYDROcodone-acetaminophen (NORCO) 5-325 MG per tablet 1 tablet  1 tablet Oral Q4H PRN Radha Jarvis MD   1 tablet at 10/12/17 0847   • Influenza Vac Subunit Quad (FLUCELVAX) injection 0.5 mL  0.5 mL Intramuscular During Hospitalization Cristian Grace MD       • latanoprost (XALATAN) 0.005 % ophthalmic solution 1 drop  1 drop Both Eyes Nightly Jawed MD Matheus       • losartan (COZAAR) tablet 50 mg  50 mg Oral Q24H Pia Dueñas MD       • meloxicam  (MOBIC) tablet 15 mg  15 mg Oral Daily Radha Jarvis MD   15 mg at 10/12/17 0922   • oxybutynin XL (DITROPAN-XL) 24 hr tablet 10 mg  10 mg Oral Daily Radha Jarvis MD   10 mg at 10/12/17 0922   • pneumococcal polysaccharide 23-valent (PNEUMOVAX-23) vaccine 0.5 mL  0.5 mL Intramuscular During Hospitalization Cristian Grace MD       • sodium chloride 0.9 % flush 1-10 mL  1-10 mL Intravenous PRN Pia Dueñas MD       • sodium chloride 0.9 % flush 1-10 mL  1-10 mL Intravenous PRN Radha Jarvis MD           Assessment/Plan     ASSESSMENT:    Principal Problem:    Atrial fibrillation with RVR  Active Problems:    Hyperlipidemia    Essential hypertension    Rheumatoid arthritis involving both hands    Fatigue    ANTOINE (dyspnea on exertion)    Sleep apnea    Morbid obesity with BMI of 40.0-44.9, adult    Lumbar disc herniation    History of MRSA infection      PLAN: She was taken to the emergency room from our office yesterday with new onset A. fib and shortness of breath.  She was seeing Dr Perez for LLE radiculopathy with an L2-3 disc herniation seen on MRI imaging.  She was admitted to the hospital and was started on Cardizem and beta blockers after being seen by cardiology.  Her rate is now controlled in the 60s and 70s and she reports feeling much better.  She underwent 2-D echo this morning and is pending a Lexiscan stress test.  Further recommendations to follow once completion of cardiac workup.  Leg and back pain are improved with Norco. Continue the same. COOPER will follow along closely.     I discussed the patients findings and my recommendations with patient, nursing staff and Dr Perez       LOS: 1 day       Rosalva Paulson, ANGELA  10/12/2017  12:13 PM

## 2017-10-13 ENCOUNTER — HOSPITAL ENCOUNTER (OUTPATIENT)
Dept: CARDIOLOGY | Facility: HOSPITAL | Age: 72
Discharge: HOME | End: 2017-10-13
Attending: INTERNAL MEDICINE | Admitting: INTERNAL MEDICINE

## 2017-10-13 DIAGNOSIS — R07.9 CHEST PAIN, UNSPECIFIED TYPE: ICD-10-CM

## 2017-10-13 LAB
ANION GAP SERPL CALCULATED.3IONS-SCNC: 12.1 MMOL/L
ANISOCYTOSIS BLD QL: NORMAL
APTT PPP: 102.8 SECONDS (ref 22.7–35.4)
APTT PPP: 170.2 SECONDS (ref 22.7–35.4)
APTT PPP: 49.6 SECONDS (ref 22.7–35.4)
BASOPHILS # BLD AUTO: 0.02 10*3/MM3 (ref 0–0.2)
BASOPHILS NFR BLD AUTO: 0.2 % (ref 0–1.5)
BH CV NUCLEAR PRIOR STUDY: 2
BH CV STRESS BP STAGE 1: NORMAL
BH CV STRESS COMMENTS STAGE 1: NORMAL
BH CV STRESS DOSE REGADENOSON STAGE 1: 0.4
BH CV STRESS DURATION MIN STAGE 1: 0
BH CV STRESS DURATION SEC STAGE 1: 15
BH CV STRESS HR STAGE 1: 103
BH CV STRESS PROTOCOL 1: NORMAL
BH CV STRESS RECOVERY BP: NORMAL MMHG
BH CV STRESS RECOVERY HR: 96 BPM
BH CV STRESS STAGE 1: 1
BUN BLD-MCNC: 20 MG/DL (ref 8–23)
BUN/CREAT SERPL: 19.6 (ref 7–25)
CALCIUM SPEC-SCNC: 9.5 MG/DL (ref 8.6–10.5)
CHLORIDE SERPL-SCNC: 102 MMOL/L (ref 98–107)
CO2 SERPL-SCNC: 27.9 MMOL/L (ref 22–29)
CREAT BLD-MCNC: 1.02 MG/DL (ref 0.57–1)
DEPRECATED RDW RBC AUTO: 56.7 FL (ref 37–54)
EOSINOPHIL # BLD AUTO: 0.26 10*3/MM3 (ref 0–0.7)
EOSINOPHIL NFR BLD AUTO: 3.1 % (ref 0.3–6.2)
ERYTHROCYTE [DISTWIDTH] IN BLOOD BY AUTOMATED COUNT: 15.5 % (ref 11.7–13)
GFR SERPL CREATININE-BSD FRML MDRD: 65 ML/MIN/1.73
GLUCOSE BLD-MCNC: 115 MG/DL (ref 65–99)
HCT VFR BLD AUTO: 36.7 % (ref 35.6–45.5)
HGB BLD-MCNC: 11.8 G/DL (ref 11.9–15.5)
IMM GRANULOCYTES # BLD: 0.02 10*3/MM3 (ref 0–0.03)
IMM GRANULOCYTES NFR BLD: 0.2 % (ref 0–0.5)
LV EF NUC BP: 53 %
LYMPHOCYTES # BLD AUTO: 5.07 10*3/MM3 (ref 0.9–4.8)
LYMPHOCYTES NFR BLD AUTO: 61.2 % (ref 19.6–45.3)
MAXIMAL PREDICTED HEART RATE: 150 BPM
MCH RBC QN AUTO: 31.8 PG (ref 26.9–32)
MCHC RBC AUTO-ENTMCNC: 32.2 G/DL (ref 32.4–36.3)
MCV RBC AUTO: 98.9 FL (ref 80.5–98.2)
MONOCYTES # BLD AUTO: 0.81 10*3/MM3 (ref 0.2–1.2)
MONOCYTES NFR BLD AUTO: 9.8 % (ref 5–12)
NEUTROPHILS # BLD AUTO: 2.1 10*3/MM3 (ref 1.9–8.1)
NEUTROPHILS NFR BLD AUTO: 25.5 % (ref 42.7–76)
NRBC BLD MANUAL-RTO: 0 /100 WBC (ref 0–0)
PERCENT MAX PREDICTED HR: 68.67 %
PLAT MORPH BLD: NORMAL
PLATELET # BLD AUTO: 170 10*3/MM3 (ref 140–500)
PMV BLD AUTO: 11.5 FL (ref 6–12)
POTASSIUM BLD-SCNC: 3.7 MMOL/L (ref 3.5–5.2)
RBC # BLD AUTO: 3.71 10*6/MM3 (ref 3.9–5.2)
SODIUM BLD-SCNC: 142 MMOL/L (ref 136–145)
STRESS BASELINE BP: NORMAL MMHG
STRESS BASELINE HR: 90 BPM
STRESS PERCENT HR: 81 %
STRESS POST EXERCISE DUR SEC: 15 SEC
STRESS POST PEAK BP: NORMAL MMHG
STRESS POST PEAK HR: 103 BPM
STRESS TARGET HR: 128 BPM
WBC MORPH BLD: NORMAL
WBC NRBC COR # BLD: 8.28 10*3/MM3 (ref 4.5–10.7)

## 2017-10-13 PROCEDURE — 85730 THROMBOPLASTIN TIME PARTIAL: CPT | Performed by: INTERNAL MEDICINE

## 2017-10-13 PROCEDURE — 78492 MYOCRD IMG PET MLT RST&STRS: CPT | Performed by: INTERNAL MEDICINE

## 2017-10-13 PROCEDURE — 99232 SBSQ HOSP IP/OBS MODERATE 35: CPT | Performed by: INTERNAL MEDICINE

## 2017-10-13 PROCEDURE — 25010000002 HEPARIN (PORCINE) PER 1000 UNITS: Performed by: INTERNAL MEDICINE

## 2017-10-13 PROCEDURE — 85025 COMPLETE CBC W/AUTO DIFF WBC: CPT | Performed by: INTERNAL MEDICINE

## 2017-10-13 PROCEDURE — 93016 CV STRESS TEST SUPVJ ONLY: CPT | Performed by: INTERNAL MEDICINE

## 2017-10-13 PROCEDURE — 85007 BL SMEAR W/DIFF WBC COUNT: CPT | Performed by: INTERNAL MEDICINE

## 2017-10-13 PROCEDURE — 93018 CV STRESS TEST I&R ONLY: CPT | Performed by: INTERNAL MEDICINE

## 2017-10-13 PROCEDURE — 80048 BASIC METABOLIC PNL TOTAL CA: CPT | Performed by: INTERNAL MEDICINE

## 2017-10-13 RX ORDER — TRAVOPROST OPHTHALMIC SOLUTION 0.04 MG/ML
1 SOLUTION OPHTHALMIC NIGHTLY
Status: DISCONTINUED | OUTPATIENT
Start: 2017-10-13 | End: 2017-10-14 | Stop reason: HOSPADM

## 2017-10-13 RX ORDER — POTASSIUM CHLORIDE 750 MG/1
20 CAPSULE, EXTENDED RELEASE ORAL DAILY
Status: DISCONTINUED | OUTPATIENT
Start: 2017-10-14 | End: 2017-10-14 | Stop reason: HOSPADM

## 2017-10-13 RX ORDER — POTASSIUM CHLORIDE 750 MG/1
10 CAPSULE, EXTENDED RELEASE ORAL DAILY
Status: DISCONTINUED | OUTPATIENT
Start: 2017-10-13 | End: 2017-10-14 | Stop reason: HOSPADM

## 2017-10-13 RX ORDER — FUROSEMIDE 20 MG/1
20 TABLET ORAL DAILY
Status: DISCONTINUED | OUTPATIENT
Start: 2017-10-13 | End: 2017-10-14 | Stop reason: HOSPADM

## 2017-10-13 RX ORDER — DORZOLAMIDE HYDROCHLORIDE AND TIMOLOL MALEATE 20; 5 MG/ML; MG/ML
1 SOLUTION/ DROPS OPHTHALMIC 2 TIMES DAILY
Status: DISCONTINUED | OUTPATIENT
Start: 2017-10-13 | End: 2017-10-14 | Stop reason: HOSPADM

## 2017-10-13 RX ORDER — LATANOPROST 50 UG/ML
1 SOLUTION/ DROPS OPHTHALMIC NIGHTLY
Status: DISCONTINUED | OUTPATIENT
Start: 2017-10-13 | End: 2017-10-13

## 2017-10-13 RX ADMIN — POTASSIUM CHLORIDE 10 MEQ: 750 CAPSULE, EXTENDED RELEASE ORAL at 09:22

## 2017-10-13 RX ADMIN — TRAVOPROST OPHTHALMIC SOLUTION 1 DROP: 0.04 SOLUTION OPHTHALMIC at 20:20

## 2017-10-13 RX ADMIN — FOLIC ACID 1 MG: 1 TABLET ORAL at 08:05

## 2017-10-13 RX ADMIN — HYDROCODONE BITARTRATE AND ACETAMINOPHEN 1 TABLET: 5; 325 TABLET ORAL at 01:27

## 2017-10-13 RX ADMIN — HEPARIN SODIUM 14.7 UNITS/KG/HR: 10000 INJECTION, SOLUTION INTRAVENOUS at 13:55

## 2017-10-13 RX ADMIN — RUBIDIUM CHLORIDE RB-82 1 DOSE: 150 INJECTION, SOLUTION INTRAVENOUS at 11:00

## 2017-10-13 RX ADMIN — DORZOLAMIDE HYDROCHLORIDE AND TIMOLOL MALEATE 1 DROP: 20; 5 SOLUTION/ DROPS OPHTHALMIC at 16:59

## 2017-10-13 RX ADMIN — ATENOLOL 100 MG: 50 TABLET ORAL at 08:05

## 2017-10-13 RX ADMIN — HEPARIN SODIUM 9400 UNITS: 5000 INJECTION, SOLUTION INTRAVENOUS; SUBCUTANEOUS at 13:48

## 2017-10-13 RX ADMIN — REGADENOSON 0.4 MG: 0.08 INJECTION, SOLUTION INTRAVENOUS at 11:00

## 2017-10-13 RX ADMIN — LOSARTAN POTASSIUM 50 MG: 50 TABLET, FILM COATED ORAL at 08:04

## 2017-10-13 RX ADMIN — HYDROCODONE BITARTRATE AND ACETAMINOPHEN 1 TABLET: 5; 325 TABLET ORAL at 22:33

## 2017-10-13 RX ADMIN — RUBIDIUM CHLORIDE RB-82 1 DOSE: 150 INJECTION, SOLUTION INTRAVENOUS at 10:45

## 2017-10-13 RX ADMIN — HYDROCODONE BITARTRATE AND ACETAMINOPHEN 1 TABLET: 5; 325 TABLET ORAL at 09:24

## 2017-10-13 RX ADMIN — HEPARIN SODIUM 14.7 UNITS/KG/HR: 10000 INJECTION, SOLUTION INTRAVENOUS at 16:59

## 2017-10-13 RX ADMIN — OXYBUTYNIN CHLORIDE 10 MG: 10 TABLET, FILM COATED, EXTENDED RELEASE ORAL at 08:05

## 2017-10-13 RX ADMIN — CETIRIZINE HYDROCHLORIDE 10 MG: 10 TABLET, FILM COATED ORAL at 08:05

## 2017-10-13 RX ADMIN — FUROSEMIDE 20 MG: 20 TABLET ORAL at 12:02

## 2017-10-13 RX ADMIN — MELOXICAM 15 MG: 7.5 TABLET ORAL at 08:05

## 2017-10-13 NOTE — PLAN OF CARE
Problem: Fall Risk (Adult)  Goal: Identify Related Risk Factors and Signs and Symptoms  Outcome: Ongoing (interventions implemented as appropriate)    10/13/17 0321   Fall Risk   Fall Risk: Related Risk Factors age-related changes;environment unfamiliar   Fall Risk: Signs and Symptoms presence of risk factors       Goal: Absence of Falls  Outcome: Ongoing (interventions implemented as appropriate)    10/13/17 0321   Fall Risk (Adult)   Absence of Falls making progress toward outcome         Problem: Pain, Acute (Adult)  Goal: Identify Related Risk Factors and Signs and Symptoms  Outcome: Ongoing (interventions implemented as appropriate)    10/13/17 0321   Pain, Acute   Related Risk Factors (Acute Pain) positioning;persistent pain;patient perception   Signs and Symptoms (Acute Pain) verbalization of pain descriptors;fatigue/weakness       Goal: Acceptable Pain Control/Comfort Level  Outcome: Ongoing (interventions implemented as appropriate)    10/13/17 0321   Pain, Acute (Adult)   Acceptable Pain Control/Comfort Level making progress toward outcome         Problem: Cardiac Output, Decreased (Adult)  Goal: Identify Related Risk Factors and Signs and Symptoms  Outcome: Ongoing (interventions implemented as appropriate)    10/13/17 0321   Cardiac Output, Decreased   Cardiac Output, Decreased: Related Risk Factors disease process;heart rhythm altered;heart rate altered;medication effects   Signs and Symptoms (Cardiac Output Decreased) heart rate/rhythm alteration;shortness of breath       Goal: Adequate Cardiac Output/Effective Tissue Perfusion  Outcome: Ongoing (interventions implemented as appropriate)    10/13/17 0321   Cardiac Output, Decreased (Adult)   Adequate Cardiac Output/Effective Tissue Perfusion making progress toward outcome         Problem: Patient Care Overview (Adult)  Goal: Plan of Care Review  Outcome: Ongoing (interventions implemented as appropriate)    10/13/17 0321   Coping/Psychosocial Response  Interventions   Plan Of Care Reviewed With patient   Patient Care Overview   Progress improving   Outcome Evaluation   Outcome Summary/Follow up Plan Given pain meds for back pain and thigh pain, Amiodarone shifted to oral in dayshift, heparin drip started, APTT monitored, up to bathroom with 1 x assist, NPO at MN for stress test, VSS, Afib on the monitor, well attended, will monitor       Goal: Adult Individualization and Mutuality  Outcome: Ongoing (interventions implemented as appropriate)    10/13/17 0321   Individualization   Patient Specific Goals pain control and manage heart rhythm   Patient Specific Interventions given pain meds as needed, heart rhythm monitored

## 2017-10-13 NOTE — PLAN OF CARE
Problem: Fall Risk (Adult)  Goal: Identify Related Risk Factors and Signs and Symptoms  Outcome: Ongoing (interventions implemented as appropriate)  Goal: Absence of Falls  Outcome: Ongoing (interventions implemented as appropriate)    Problem: Pain, Acute (Adult)  Goal: Identify Related Risk Factors and Signs and Symptoms  Outcome: Ongoing (interventions implemented as appropriate)  Goal: Acceptable Pain Control/Comfort Level  Outcome: Ongoing (interventions implemented as appropriate)    Problem: Cardiac Output, Decreased (Adult)  Goal: Identify Related Risk Factors and Signs and Symptoms  Outcome: Ongoing (interventions implemented as appropriate)  Goal: Adequate Cardiac Output/Effective Tissue Perfusion  Outcome: Ongoing (interventions implemented as appropriate)

## 2017-10-13 NOTE — PROGRESS NOTES
Neurosurgery will sign off pending ongoing cardiology evaluation and surgical clearance.  Please notify our office when patient stable for lumbar decompression with Dr Perez at [phone]    ANGELA Zaldivar as d/w Dr Perez

## 2017-10-13 NOTE — PROGRESS NOTES
[facility]  Progress note: 10/13/2017    Patient Identification:  Name:Lana Yañez  Age:72 y.o.  Sex: female  :  1945  MRN: [id number]           CC:  Atrial fib, chest pain, chf    Interval history:  No chest pain or palpitations.  Slight hypoxia overnight though oxygen levels normal today on room air.    Vital Signs:   Temp:  [96.9 °F (36.1 °C)-97.8 °F (36.6 °C)] 97.1 °F (36.2 °C)  Heart Rate:  [] 94  Resp:  [16] 16  BP: (111-123)/(70-89) 111/70    Intake/Output Summary (Last 24 hours) at 10/13/17 0707  Last data filed at 10/13/17 0530   Gross per 24 hour   Intake              170 ml   Output                0 ml   Net              170 ml       Physical Examination:    General Appearance No acute distress   Neck No adenopathy, supple, trachea midline, no thyromegaly, no carotid bruit, no JVD   Lungs Clear to auscultation,respirations regular, even and unlabored   Heart Irregular rhythm and normal rate, normal S1 and S2, no murmur, no gallop, no rub, no click   Chest wall No abnormalities observed   Abdomen Normal bowel sounds, no masses, no hepatomegaly, soft   Extremities Moves all extremities well, no edema, no cyanosis, no redness   Neurological Alert and oriented x 3     Lab Review:  Personally reviewed the labs, radiology imaging and other cardiac procedures.   Results from last 7 days  Lab Units 10/13/17  0534  10/11/17  1147   SODIUM mmol/L 142  < > 147*   POTASSIUM mmol/L 3.7  < > 3.3*   CHLORIDE mmol/L 102  < > 106   CO2 mmol/L 27.9  < > 27.3   BUN mg/dL 20  < > 14   CREATININE mg/dL 1.02*  < > 1.04*   CALCIUM mg/dL 9.5  < > 9.9   BILIRUBIN mg/dL  --   --  0.4   ALK PHOS U/L  --   --  48   ALT (SGPT) U/L  --   --  16   AST (SGOT) U/L  --   --  17   GLUCOSE mg/dL 115*  < > 145*   < > = values in this interval not displayed.    Results from last 7 days  Lab Units 10/12/17  0404 10/11/17  1928 10/11/17  1147   TROPONIN T ng/mL <0.010 <0.010 <0.010     )  Results from last 7  days  Lab Units 10/13/17  0533 10/12/17  1927 10/11/17  1147   WBC 10*3/mm3 8.28 7.64 6.88   HEMOGLOBIN g/dL 11.8* 11.9 12.3   HEMATOCRIT % 36.7 37.0 38.3   PLATELETS 10*3/mm3 170 178 169       Results from last 7 days  Lab Units 10/13/17  0534 10/12/17  1927   INR   --  1.17*   APTT seconds 102.8* 37.2*       Medication Review:   Meds reviewed  Scheduled Meds:  atenolol 100 mg Oral Daily   cetirizine 10 mg Oral Daily   dorzolamide-timolol 1 drop Both Eyes BID   folic acid 1 mg Oral Daily   latanoprost 1 drop Both Eyes Nightly   losartan 50 mg Oral Q24H   meloxicam 15 mg Oral Daily   oxybutynin XL 10 mg Oral Daily     Continuous Infusions:  heparin (porcine) 12.7 Units/kg/hr Last Rate: 10.7 Units/kg/hr (10/13/17 0624)     I personally viewed and interpreted the patient's EKG/Telemetry data    Assessment and Plan  1.  Atrial fibrillation, on IV heparin.  Long discussion with the patient regarding findings of atrial fibrillation at increased risk of stroke.  She is hesitant to consider anticoagulation long-term but willing to consider.  Would favor use of Xarelto prior to dismissal.  2.  Chest pain and dyspneaWith mild diastolic heart failure on admission.   Lucero PET today and add low-dose diuretic  3.  Back pain with radiculopathy and nerve compression, neurosurgery evaluation pending  4.  Hypertension,   5.  Probable obstructive sleep apnea.  She's not had any formal testing but certainly has symptoms and    clinical scenario for this  6.  Rheumatoid arthritis, on methotrexate therapy  7.  Questionable gallbladder sludge  8.  Hypokalemia.  Additional potassium supplementation ordered      Mini Dueñas  10/13/92343:07 AM  25min spent in reviewing records, discussion and examination of the patient and discussion with other members of the patient's medical team.     Dictated utilizing Dragon dictation

## 2017-10-13 NOTE — PROGRESS NOTES
"    DAILY PROGRESS NOTE  [facility]    Patient Identification:  Name: Lana Yañez  Age: 72 y.o.  Sex: female  :  1945  MRN: [id number]         Primary Care Physician: Raoul Ramirez MD    Subjective:  Interval History: no new c/o - no cp and sob better - no n/v    Objective:no fm     Scheduled Meds:  atenolol 100 mg Oral Daily   cetirizine 10 mg Oral Daily   dorzolamide-timolol 1 drop Both Eyes BID   folic acid 1 mg Oral Daily   furosemide 20 mg Oral Daily   losartan 50 mg Oral Q24H   meloxicam 15 mg Oral Daily   oxybutynin XL 10 mg Oral Daily   potassium chloride 10 mEq Oral Daily   travoprost (ANNIE free) 1 drop Both Eyes Nightly     Continuous Infusions:  heparin (porcine) 12.7 Units/kg/hr Last Rate: 14.7 Units/kg/hr (10/13/17 1355)       Vital signs in last 24 hours:  Temp:  [96.9 °F (36.1 °C)-97.7 °F (36.5 °C)] 96.9 °F (36.1 °C)  Heart Rate:  [89-94] 89  Resp:  [16-18] 18  BP: (106-126)/(70-88) 126/88    Intake/Output:    Intake/Output Summary (Last 24 hours) at 10/13/17 1551  Last data filed at 10/13/17 0530   Gross per 24 hour   Intake              170 ml   Output                0 ml   Net              170 ml       Exam:  /88 (BP Location: Right arm, Patient Position: Sitting)  Pulse 89  Temp 96.9 °F (36.1 °C) (Oral)   Resp 18  Ht 66\" (167.6 cm)  Wt 260 lb (118 kg)  LMP  (LMP Unknown)  SpO2 96%  BMI 41.97 kg/m2    General Appearance:    Alert, cooperative, no distress, AAOx3                         Throat:   Lips, tongue, gums normal; oral mucosa pink and moist                           Neck:   Supple, no JVD                         Lungs:    Clear to auscultation bilaterally, respirations unlabored                 Chest Wall:    No tenderness or deformity                          Heart:    Irregularly irregular rate and rhythm                  Abdomen:     Soft, non-tender, bowel sounds active, no masses                 Extremities:   Extremities normal, " atraumatic, no cyanosis or edema                           Data Review:  Labs in chart were reviewed.    Assessment:  Active Hospital Problems (** Indicates Principal Problem)    Diagnosis Date Noted   • **Atrial fibrillation with RVR [I48.91] 10/11/2017   • Lumbar disc herniation [M51.26] 10/11/2017   • History of MRSA infection [Z86.14] 10/11/2017   • Morbid obesity with BMI of 40.0-44.9, adult [E66.01, Z68.41] 12/15/2016   • Sleep apnea [G47.30] 08/02/2016   • Fatigue [R53.83] 04/06/2016   • ANTOINE (dyspnea on exertion) [R06.09] 04/06/2016   • Essential hypertension [I10] 03/21/2016   • Rheumatoid arthritis involving both hands [M06.9] 03/21/2016   • Hyperlipidemia [E78.5] 03/21/2016      Resolved Hospital Problems    Diagnosis Date Noted Date Resolved   No resolved problems to display.       Plan:  AF - RC. Echo ef 57% w/ hypertensive changes, mild to moderate valvular disease but no wall contractility abnormalities             -stress neg                        -now on hep gtt - still quite apprenhension w / AC                        -CP resolved and dyspnea improved - on low dose lasix                        -agree w/ need for outpatient sleep study for FRANCY     Radiculopathy - COOPER following - outpt intervention at later date      HTN - changes noted - controlled     RA - MTX     Hypokalemia - replace - start daily PO in am     SCDs for DVT prophylaxis - formerly on therapeutic Lovenox     Dispo - anticipate home in am pending Cards    Georgi Garcia MD  10/13/2017  3:51 PM

## 2017-10-13 NOTE — PLAN OF CARE
Problem: Patient Care Overview (Adult)  Goal: Plan of Care Review  Outcome: Ongoing (interventions implemented as appropriate)    10/13/17 1197   Coping/Psychosocial Response Interventions   Plan Of Care Reviewed With patient   Outcome Evaluation   Outcome Summary/Follow up Plan VSS, no c/o pain, no falls. Pt had stress today. heparin drip titrated per protocol. No bleeding noted. Pt tolerating well. Currently afib on monitor, pulse less than 90. Pt will start on PO K+ in AM.. Will continue to monitor

## 2017-10-13 NOTE — PROGRESS NOTES
Continued Stay Note  [facility]     Patient Name: Lana Yañez  MRN: [id number]  Today's Date: 10/13/2017    Admit Date: 10/11/2017          Discharge Plan       10/13/17 1401    Case Management/Social Work Plan    Plan Home    Patient/Family In Agreement With Plan yes    Additional Comments Ange here this am to deliver 3:1 commode. Followed up with patient and she is borrowing a rolling walker and feels she needs one for home. She would like me to call Brenda/Eric back to see if it will be covered by insurance. Called to Ange and left [facility] to check. Patient says no other needs and MD just here and plans DC tomorrow.               Discharge Codes     None            Maribel Smyth RN

## 2017-10-13 NOTE — PLAN OF CARE
Problem: Patient Care Overview (Adult)  Goal: Plan of Care Review    10/13/17 1911   Outcome Evaluation   Outcome Summary/Follow up Plan Next aPTT due tonight 8PM

## 2017-10-14 VITALS
TEMPERATURE: 96.7 F | HEIGHT: 66 IN | BODY MASS INDEX: 41.78 KG/M2 | OXYGEN SATURATION: 94 % | DIASTOLIC BLOOD PRESSURE: 62 MMHG | SYSTOLIC BLOOD PRESSURE: 107 MMHG | HEART RATE: 79 BPM | RESPIRATION RATE: 18 BRPM | WEIGHT: 260 LBS

## 2017-10-14 LAB
ANION GAP SERPL CALCULATED.3IONS-SCNC: 11.4 MMOL/L
APTT PPP: 127.5 SECONDS (ref 22.7–35.4)
BASOPHILS # BLD AUTO: 0.03 10*3/MM3 (ref 0–0.2)
BASOPHILS NFR BLD AUTO: 0.4 % (ref 0–1.5)
BUN BLD-MCNC: 21 MG/DL (ref 8–23)
BUN/CREAT SERPL: 23.9 (ref 7–25)
CALCIUM SPEC-SCNC: 9.5 MG/DL (ref 8.6–10.5)
CHLORIDE SERPL-SCNC: 102 MMOL/L (ref 98–107)
CO2 SERPL-SCNC: 27.6 MMOL/L (ref 22–29)
CREAT BLD-MCNC: 0.88 MG/DL (ref 0.57–1)
DEPRECATED RDW RBC AUTO: 54.5 FL (ref 37–54)
EOSINOPHIL # BLD AUTO: 0.26 10*3/MM3 (ref 0–0.7)
EOSINOPHIL NFR BLD AUTO: 3.3 % (ref 0.3–6.2)
ERYTHROCYTE [DISTWIDTH] IN BLOOD BY AUTOMATED COUNT: 15 % (ref 11.7–13)
GFR SERPL CREATININE-BSD FRML MDRD: 77 ML/MIN/1.73
GLUCOSE BLD-MCNC: 104 MG/DL (ref 65–99)
HCT VFR BLD AUTO: 37.8 % (ref 35.6–45.5)
HGB BLD-MCNC: 12.2 G/DL (ref 11.9–15.5)
IMM GRANULOCYTES # BLD: 0 10*3/MM3 (ref 0–0.03)
IMM GRANULOCYTES NFR BLD: 0 % (ref 0–0.5)
LYMPHOCYTES # BLD AUTO: 4.78 10*3/MM3 (ref 0.9–4.8)
LYMPHOCYTES NFR BLD AUTO: 61 % (ref 19.6–45.3)
MCH RBC QN AUTO: 31.9 PG (ref 26.9–32)
MCHC RBC AUTO-ENTMCNC: 32.3 G/DL (ref 32.4–36.3)
MCV RBC AUTO: 99 FL (ref 80.5–98.2)
MONOCYTES # BLD AUTO: 0.9 10*3/MM3 (ref 0.2–1.2)
MONOCYTES NFR BLD AUTO: 11.5 % (ref 5–12)
NEUTROPHILS # BLD AUTO: 1.87 10*3/MM3 (ref 1.9–8.1)
NEUTROPHILS NFR BLD AUTO: 23.8 % (ref 42.7–76)
NRBC BLD MANUAL-RTO: 0 /100 WBC (ref 0–0)
PLATELET # BLD AUTO: 181 10*3/MM3 (ref 140–500)
PMV BLD AUTO: 11 FL (ref 6–12)
POTASSIUM BLD-SCNC: 3.4 MMOL/L (ref 3.5–5.2)
RBC # BLD AUTO: 3.82 10*6/MM3 (ref 3.9–5.2)
SODIUM BLD-SCNC: 141 MMOL/L (ref 136–145)
WBC NRBC COR # BLD: 7.84 10*3/MM3 (ref 4.5–10.7)

## 2017-10-14 PROCEDURE — 85730 THROMBOPLASTIN TIME PARTIAL: CPT | Performed by: INTERNAL MEDICINE

## 2017-10-14 PROCEDURE — 80048 BASIC METABOLIC PNL TOTAL CA: CPT | Performed by: INTERNAL MEDICINE

## 2017-10-14 PROCEDURE — 85025 COMPLETE CBC W/AUTO DIFF WBC: CPT | Performed by: INTERNAL MEDICINE

## 2017-10-14 PROCEDURE — 99232 SBSQ HOSP IP/OBS MODERATE 35: CPT | Performed by: INTERNAL MEDICINE

## 2017-10-14 PROCEDURE — 25010000002 HEPARIN (PORCINE) PER 1000 UNITS: Performed by: INTERNAL MEDICINE

## 2017-10-14 RX ORDER — DABIGATRAN ETEXILATE 150 MG/1
150 CAPSULE ORAL EVERY 12 HOURS SCHEDULED
Status: DISCONTINUED | OUTPATIENT
Start: 2017-10-14 | End: 2017-10-14 | Stop reason: HOSPADM

## 2017-10-14 RX ORDER — FUROSEMIDE 20 MG/1
20 TABLET ORAL DAILY
Qty: 30 TABLET | Refills: 0 | Status: SHIPPED | OUTPATIENT
Start: 2017-10-15 | End: 2017-10-27 | Stop reason: HOSPADM

## 2017-10-14 RX ORDER — POTASSIUM CHLORIDE 750 MG/1
10 CAPSULE, EXTENDED RELEASE ORAL DAILY
Qty: 30 CAPSULE | Refills: 0 | Status: SHIPPED | OUTPATIENT
Start: 2017-10-15 | End: 2017-10-27 | Stop reason: HOSPADM

## 2017-10-14 RX ORDER — DABIGATRAN ETEXILATE 150 MG/1
150 CAPSULE ORAL EVERY 12 HOURS SCHEDULED
Qty: 60 CAPSULE | Refills: 0 | Status: SHIPPED | OUTPATIENT
Start: 2017-10-14 | End: 2017-10-27 | Stop reason: HOSPADM

## 2017-10-14 RX ORDER — ATENOLOL 100 MG/1
100 TABLET ORAL DAILY
Qty: 30 TABLET | Refills: 0 | Status: SHIPPED | OUTPATIENT
Start: 2017-10-15 | End: 2017-10-27 | Stop reason: HOSPADM

## 2017-10-14 RX ORDER — LOSARTAN POTASSIUM 50 MG/1
50 TABLET ORAL
Qty: 30 TABLET | Refills: 0 | Status: SHIPPED | OUTPATIENT
Start: 2017-10-15 | End: 2017-10-27 | Stop reason: HOSPADM

## 2017-10-14 RX ADMIN — CETIRIZINE HYDROCHLORIDE 10 MG: 10 TABLET, FILM COATED ORAL at 08:33

## 2017-10-14 RX ADMIN — OXYBUTYNIN CHLORIDE 10 MG: 10 TABLET, FILM COATED, EXTENDED RELEASE ORAL at 08:34

## 2017-10-14 RX ADMIN — POTASSIUM CHLORIDE 20 MEQ: 750 CAPSULE, EXTENDED RELEASE ORAL at 08:33

## 2017-10-14 RX ADMIN — POTASSIUM CHLORIDE 10 MEQ: 750 CAPSULE, EXTENDED RELEASE ORAL at 08:34

## 2017-10-14 RX ADMIN — ATENOLOL 100 MG: 50 TABLET ORAL at 08:33

## 2017-10-14 RX ADMIN — FOLIC ACID 1 MG: 1 TABLET ORAL at 08:33

## 2017-10-14 RX ADMIN — FUROSEMIDE 20 MG: 20 TABLET ORAL at 08:33

## 2017-10-14 RX ADMIN — DORZOLAMIDE HYDROCHLORIDE AND TIMOLOL MALEATE 1 DROP: 20; 5 SOLUTION/ DROPS OPHTHALMIC at 08:41

## 2017-10-14 RX ADMIN — HEPARIN SODIUM 9.12 UNITS/KG/HR: 10000 INJECTION, SOLUTION INTRAVENOUS at 12:38

## 2017-10-14 RX ADMIN — HYDROCODONE BITARTRATE AND ACETAMINOPHEN 1 TABLET: 5; 325 TABLET ORAL at 07:35

## 2017-10-14 RX ADMIN — MELOXICAM 15 MG: 7.5 TABLET ORAL at 08:35

## 2017-10-14 RX ADMIN — LOSARTAN POTASSIUM 50 MG: 50 TABLET, FILM COATED ORAL at 08:33

## 2017-10-14 NOTE — PROGRESS NOTES
LOS: 3 days   Patient Care Team:  Raoul Ramirez MD as PCP - General (Internal Medicine)  Kassandra Monsalve MD as Consulting Physician (Rheumatology)    Chief Complaint: AF       Interval History: She has a lot of questions about OAC and we discussed it for a long time.      Objective   Vital Signs  Temp:  [96.7 °F (35.9 °C)-97.5 °F (36.4 °C)] 96.7 °F (35.9 °C)  Heart Rate:  [] 79  Resp:  [16-18] 18  BP: (107-130)/(62-89) 107/62  No intake or output data in the 24 hours ending 10/14/17 1326        Physical Exam   Constitutional: She is oriented to person, place, and time.   obese   HENT:   Head: Normocephalic.   Nose: Nose normal.   Eyes: Conjunctivae and EOM are normal. Pupils are equal, round, and reactive to light.   Neck: Normal range of motion.   Cannot assess JVD due to body habitus   Abdominal:   Cannot feel organs or aorta   Neurological: She is alert and oriented to person, place, and time. No cranial nerve deficit.   Skin: Skin is warm and dry.   Psychiatric: She has a normal mood and affect. Her behavior is normal. Judgment and thought content normal.   Vitals reviewed.      Results Review:        Results from last 7 days  Lab Units 10/14/17  0643 10/13/17  0534 10/12/17  0404   SODIUM mmol/L 141 142 146*   POTASSIUM mmol/L 3.4* 3.7 3.4*   CHLORIDE mmol/L 102 102 104   CO2 mmol/L 27.6 27.9 28.4   BUN mg/dL 21 20 17   CREATININE mg/dL 0.88 1.02* 1.03*   GLUCOSE mg/dL 104* 115* 118*   CALCIUM mg/dL 9.5 9.5 9.7       Results from last 7 days  Lab Units 10/12/17  0404 10/11/17  1928 10/11/17  1147   TROPONIN T ng/mL <0.010 <0.010 <0.010       Results from last 7 days  Lab Units 10/14/17  0643 10/13/17  0533 10/12/17  1927   WBC 10*3/mm3 7.84 8.28 7.64   HEMOGLOBIN g/dL 12.2 11.8* 11.9   HEMATOCRIT % 37.8 36.7 37.0   PLATELETS 10*3/mm3 181 170 178       Results from last 7 days  Lab Units 10/14/17  0643 10/13/17  2006 10/13/17  1207  10/12/17  1927   INR   --   --   --   --  1.17*   APTT seconds  127.5* 170.2* 49.6*  < > 37.2*   < > = values in this interval not displayed.                I reviewed the patient's new clinical results.  I personally viewed and interpreted the patient's EKG/Telemetry data        Medication Review:     atenolol 100 mg Oral Daily   cetirizine 10 mg Oral Daily   dorzolamide-timolol 1 drop Both Eyes BID   folic acid 1 mg Oral Daily   furosemide 20 mg Oral Daily   losartan 50 mg Oral Q24H   meloxicam 15 mg Oral Daily   oxybutynin XL 10 mg Oral Daily   potassium chloride 10 mEq Oral Daily   potassium chloride 20 mEq Oral Daily   travoprost (ANNIE free) 1 drop Both Eyes Nightly         heparin (porcine) 12.7 Units/kg/hr Last Rate: 9.12 Units/kg/hr (10/14/17 1238)       Assessment/Plan     1.  Persistent atrial fibrillation -- CHADSVASc score of 3.  She and I also had a long talk about OAC today.  We had an extensive discussion re: NOAC vs warfarin, reversibility/non, dietary restrictions, INR checks.  We went over the risk of event per her CV score and the risk of bleeding.  She would like to start dabigatran as it has a reversal agent.  This is reasonable.  We will give her two weeks of samples today and then she will follow up in the office (we will call to arrange).    She can be d/c'd as soon as she has the samples.    I spent 25 minutes with her in face to face consultation and counseling.      Wagner Carter MD  10/14/17  1:26 PM

## 2017-10-14 NOTE — PROGRESS NOTES
Ready to DC pending Cards and AC requests - please coordinate w/ CCP depending on your choice of AC agent.

## 2017-10-14 NOTE — PLAN OF CARE
Problem: Fall Risk (Adult)  Goal: Identify Related Risk Factors and Signs and Symptoms  Outcome: Ongoing (interventions implemented as appropriate)  Goal: Absence of Falls  Outcome: Ongoing (interventions implemented as appropriate)    Problem: Pain, Acute (Adult)  Goal: Identify Related Risk Factors and Signs and Symptoms  Outcome: Ongoing (interventions implemented as appropriate)  Goal: Acceptable Pain Control/Comfort Level  Outcome: Ongoing (interventions implemented as appropriate)    Problem: Cardiac Output, Decreased (Adult)  Goal: Identify Related Risk Factors and Signs and Symptoms  Outcome: Ongoing (interventions implemented as appropriate)  Goal: Adequate Cardiac Output/Effective Tissue Perfusion  Outcome: Ongoing (interventions implemented as appropriate)    Problem: Patient Care Overview (Adult)  Goal: Plan of Care Review  Outcome: Ongoing (interventions implemented as appropriate)    10/14/17 0049   Coping/Psychosocial Response Interventions   Plan Of Care Reviewed With patient   Patient Care Overview   Progress progress toward functional goals as expected   Outcome Evaluation   Outcome Summary/Follow up Plan Pt on Heparin Drip, Evening Ptt elevated, Drip Held 1 hour and reduced in rate per protocol, Pt up ad sekou, Rhythm is A-fib with controlled rate <90.

## 2017-10-15 ENCOUNTER — APPOINTMENT (OUTPATIENT)
Dept: MRI IMAGING | Facility: HOSPITAL | Age: 72
End: 2017-10-15

## 2017-10-15 ENCOUNTER — APPOINTMENT (OUTPATIENT)
Dept: CT IMAGING | Facility: HOSPITAL | Age: 72
End: 2017-10-15

## 2017-10-15 ENCOUNTER — HOSPITAL ENCOUNTER (INPATIENT)
Facility: HOSPITAL | Age: 72
LOS: 3 days | Discharge: NURSING, REHAB OR LONG-TERM CARE | End: 2017-10-18
Attending: EMERGENCY MEDICINE | Admitting: INTERNAL MEDICINE

## 2017-10-15 DIAGNOSIS — I63.9 ACUTE CVA (CEREBROVASCULAR ACCIDENT) (HCC): ICD-10-CM

## 2017-10-15 DIAGNOSIS — G47.33 OSA (OBSTRUCTIVE SLEEP APNEA): ICD-10-CM

## 2017-10-15 DIAGNOSIS — I48.19 PERSISTENT ATRIAL FIBRILLATION (HCC): ICD-10-CM

## 2017-10-15 DIAGNOSIS — R53.1 LEFT-SIDED WEAKNESS: Primary | ICD-10-CM

## 2017-10-15 PROBLEM — I48.91 ATRIAL FIBRILLATION: Status: ACTIVE | Noted: 2017-10-15

## 2017-10-15 LAB
ALBUMIN SERPL-MCNC: 4.2 G/DL (ref 3.5–5.2)
ALBUMIN/GLOB SERPL: 1.2 G/DL
ALP SERPL-CCNC: 43 U/L (ref 39–117)
ALT SERPL W P-5'-P-CCNC: 15 U/L (ref 1–33)
ANION GAP SERPL CALCULATED.3IONS-SCNC: 11.6 MMOL/L
AST SERPL-CCNC: 19 U/L (ref 1–32)
BASOPHILS # BLD AUTO: 0.02 10*3/MM3 (ref 0–0.2)
BASOPHILS NFR BLD AUTO: 0.4 % (ref 0–1.5)
BILIRUB SERPL-MCNC: 0.5 MG/DL (ref 0.1–1.2)
BUN BLD-MCNC: 18 MG/DL (ref 8–23)
BUN/CREAT SERPL: 18.8 (ref 7–25)
CALCIUM SPEC-SCNC: 9.7 MG/DL (ref 8.6–10.5)
CHLORIDE SERPL-SCNC: 104 MMOL/L (ref 98–107)
CHOLEST SERPL-MCNC: 137 MG/DL (ref 0–200)
CO2 SERPL-SCNC: 26.4 MMOL/L (ref 22–29)
CREAT BLD-MCNC: 0.96 MG/DL (ref 0.57–1)
DEPRECATED RDW RBC AUTO: 54.3 FL (ref 37–54)
EOSINOPHIL # BLD AUTO: 0.17 10*3/MM3 (ref 0–0.7)
EOSINOPHIL NFR BLD AUTO: 3.4 % (ref 0.3–6.2)
ERYTHROCYTE [DISTWIDTH] IN BLOOD BY AUTOMATED COUNT: 15.3 % (ref 11.7–13)
GFR SERPL CREATININE-BSD FRML MDRD: 70 ML/MIN/1.73
GLOBULIN UR ELPH-MCNC: 3.6 GM/DL
GLUCOSE BLD-MCNC: 123 MG/DL (ref 65–99)
HBA1C MFR BLD: 6.19 % (ref 4.8–5.6)
HCT VFR BLD AUTO: 38.1 % (ref 35.6–45.5)
HDLC SERPL-MCNC: 60 MG/DL (ref 40–60)
HGB BLD-MCNC: 12.4 G/DL (ref 11.9–15.5)
IMM GRANULOCYTES # BLD: 0 10*3/MM3 (ref 0–0.03)
IMM GRANULOCYTES NFR BLD: 0 % (ref 0–0.5)
INR PPP: 1.37 (ref 0.9–1.1)
LDLC SERPL CALC-MCNC: 57 MG/DL (ref 0–100)
LDLC/HDLC SERPL: 0.95 {RATIO}
LYMPHOCYTES # BLD AUTO: 2 10*3/MM3 (ref 0.9–4.8)
LYMPHOCYTES NFR BLD AUTO: 40.1 % (ref 19.6–45.3)
MCH RBC QN AUTO: 31.9 PG (ref 26.9–32)
MCHC RBC AUTO-ENTMCNC: 32.5 G/DL (ref 32.4–36.3)
MCV RBC AUTO: 97.9 FL (ref 80.5–98.2)
MONOCYTES # BLD AUTO: 0.75 10*3/MM3 (ref 0.2–1.2)
MONOCYTES NFR BLD AUTO: 15 % (ref 5–12)
NEUTROPHILS # BLD AUTO: 2.05 10*3/MM3 (ref 1.9–8.1)
NEUTROPHILS NFR BLD AUTO: 41.1 % (ref 42.7–76)
NRBC BLD MANUAL-RTO: 0 /100 WBC (ref 0–0)
PLATELET # BLD AUTO: 188 10*3/MM3 (ref 140–500)
PMV BLD AUTO: 11.3 FL (ref 6–12)
POTASSIUM BLD-SCNC: 3.8 MMOL/L (ref 3.5–5.2)
PROT SERPL-MCNC: 7.8 G/DL (ref 6–8.5)
PROTHROMBIN TIME: 16.4 SECONDS (ref 11.7–14.2)
RBC # BLD AUTO: 3.89 10*6/MM3 (ref 3.9–5.2)
SODIUM BLD-SCNC: 142 MMOL/L (ref 136–145)
TRIGL SERPL-MCNC: 99 MG/DL (ref 0–150)
TROPONIN T SERPL-MCNC: <0.01 NG/ML (ref 0–0.03)
TSH SERPL DL<=0.05 MIU/L-ACNC: 0.76 MIU/ML (ref 0.27–4.2)
VIT B12 BLD-MCNC: 332 PG/ML (ref 211–946)
VLDLC SERPL-MCNC: 19.8 MG/DL (ref 5–40)
WBC NRBC COR # BLD: 4.99 10*3/MM3 (ref 4.5–10.7)

## 2017-10-15 PROCEDURE — 70496 CT ANGIOGRAPHY HEAD: CPT

## 2017-10-15 PROCEDURE — 82607 VITAMIN B-12: CPT | Performed by: PSYCHIATRY & NEUROLOGY

## 2017-10-15 PROCEDURE — 93010 ELECTROCARDIOGRAM REPORT: CPT | Performed by: INTERNAL MEDICINE

## 2017-10-15 PROCEDURE — 99222 1ST HOSP IP/OBS MODERATE 55: CPT | Performed by: PSYCHIATRY & NEUROLOGY

## 2017-10-15 PROCEDURE — 70450 CT HEAD/BRAIN W/O DYE: CPT

## 2017-10-15 PROCEDURE — 84443 ASSAY THYROID STIM HORMONE: CPT | Performed by: PSYCHIATRY & NEUROLOGY

## 2017-10-15 PROCEDURE — 70551 MRI BRAIN STEM W/O DYE: CPT

## 2017-10-15 PROCEDURE — 93005 ELECTROCARDIOGRAM TRACING: CPT | Performed by: EMERGENCY MEDICINE

## 2017-10-15 PROCEDURE — 70498 CT ANGIOGRAPHY NECK: CPT

## 2017-10-15 PROCEDURE — 0 IOPAMIDOL 61 % SOLUTION: Performed by: EMERGENCY MEDICINE

## 2017-10-15 PROCEDURE — 80061 LIPID PANEL: CPT | Performed by: PSYCHIATRY & NEUROLOGY

## 2017-10-15 PROCEDURE — 84484 ASSAY OF TROPONIN QUANT: CPT | Performed by: EMERGENCY MEDICINE

## 2017-10-15 PROCEDURE — 80053 COMPREHEN METABOLIC PANEL: CPT | Performed by: EMERGENCY MEDICINE

## 2017-10-15 PROCEDURE — 36415 COLL VENOUS BLD VENIPUNCTURE: CPT | Performed by: PSYCHIATRY & NEUROLOGY

## 2017-10-15 PROCEDURE — 85025 COMPLETE CBC W/AUTO DIFF WBC: CPT | Performed by: EMERGENCY MEDICINE

## 2017-10-15 PROCEDURE — 83036 HEMOGLOBIN GLYCOSYLATED A1C: CPT | Performed by: PSYCHIATRY & NEUROLOGY

## 2017-10-15 PROCEDURE — 85610 PROTHROMBIN TIME: CPT | Performed by: EMERGENCY MEDICINE

## 2017-10-15 PROCEDURE — 99284 EMERGENCY DEPT VISIT MOD MDM: CPT

## 2017-10-15 RX ORDER — MELOXICAM 15 MG/1
15 TABLET ORAL DAILY
COMMUNITY
End: 2017-10-27 | Stop reason: HOSPADM

## 2017-10-15 RX ORDER — CETIRIZINE HYDROCHLORIDE 10 MG/1
5 TABLET ORAL DAILY PRN
Status: DISCONTINUED | OUTPATIENT
Start: 2017-10-15 | End: 2017-10-18 | Stop reason: HOSPADM

## 2017-10-15 RX ORDER — FUROSEMIDE 20 MG/1
20 TABLET ORAL DAILY
Status: DISCONTINUED | OUTPATIENT
Start: 2017-10-16 | End: 2017-10-18 | Stop reason: HOSPADM

## 2017-10-15 RX ORDER — DORZOLAMIDE HYDROCHLORIDE AND TIMOLOL MALEATE 20; 5 MG/ML; MG/ML
1 SOLUTION/ DROPS OPHTHALMIC 2 TIMES DAILY
Status: DISCONTINUED | OUTPATIENT
Start: 2017-10-16 | End: 2017-10-18 | Stop reason: HOSPADM

## 2017-10-15 RX ORDER — MELOXICAM 7.5 MG/1
15 TABLET ORAL DAILY
Status: DISCONTINUED | OUTPATIENT
Start: 2017-10-16 | End: 2017-10-18 | Stop reason: HOSPADM

## 2017-10-15 RX ORDER — ASPIRIN 325 MG
325 TABLET ORAL ONCE
Status: DISCONTINUED | OUTPATIENT
Start: 2017-10-15 | End: 2017-10-15

## 2017-10-15 RX ORDER — ASPIRIN 81 MG/1
81 TABLET, CHEWABLE ORAL ONCE
Status: COMPLETED | OUTPATIENT
Start: 2017-10-15 | End: 2017-10-15

## 2017-10-15 RX ORDER — OXYBUTYNIN CHLORIDE 10 MG/1
10 TABLET, EXTENDED RELEASE ORAL DAILY
Status: DISCONTINUED | OUTPATIENT
Start: 2017-10-16 | End: 2017-10-18 | Stop reason: HOSPADM

## 2017-10-15 RX ORDER — LOSARTAN POTASSIUM 50 MG/1
50 TABLET ORAL
Status: DISCONTINUED | OUTPATIENT
Start: 2017-10-16 | End: 2017-10-18 | Stop reason: HOSPADM

## 2017-10-15 RX ORDER — ALBUTEROL SULFATE 2.5 MG/3ML
2.5 SOLUTION RESPIRATORY (INHALATION) EVERY 6 HOURS PRN
Status: DISCONTINUED | OUTPATIENT
Start: 2017-10-15 | End: 2017-10-18 | Stop reason: HOSPADM

## 2017-10-15 RX ORDER — ATENOLOL 50 MG/1
100 TABLET ORAL DAILY
Status: DISCONTINUED | OUTPATIENT
Start: 2017-10-16 | End: 2017-10-18 | Stop reason: HOSPADM

## 2017-10-15 RX ORDER — POTASSIUM CHLORIDE 750 MG/1
10 CAPSULE, EXTENDED RELEASE ORAL DAILY
Status: DISCONTINUED | OUTPATIENT
Start: 2017-10-16 | End: 2017-10-18 | Stop reason: HOSPADM

## 2017-10-15 RX ORDER — LORAZEPAM 0.5 MG/1
0.5 TABLET ORAL ONCE
Status: COMPLETED | OUTPATIENT
Start: 2017-10-15 | End: 2017-10-15

## 2017-10-15 RX ORDER — DABIGATRAN ETEXILATE 150 MG/1
150 CAPSULE ORAL EVERY 12 HOURS SCHEDULED
Status: DISCONTINUED | OUTPATIENT
Start: 2017-10-16 | End: 2017-10-16 | Stop reason: SDUPTHER

## 2017-10-15 RX ORDER — FOLIC ACID 1 MG/1
1 TABLET ORAL DAILY
Status: DISCONTINUED | OUTPATIENT
Start: 2017-10-16 | End: 2017-10-18 | Stop reason: HOSPADM

## 2017-10-15 RX ORDER — SODIUM CHLORIDE 0.9 % (FLUSH) 0.9 %
1-10 SYRINGE (ML) INJECTION AS NEEDED
Status: DISCONTINUED | OUTPATIENT
Start: 2017-10-15 | End: 2017-10-18 | Stop reason: HOSPADM

## 2017-10-15 RX ORDER — HYDROCODONE BITARTRATE AND ACETAMINOPHEN 5; 325 MG/1; MG/1
1 TABLET ORAL EVERY 6 HOURS PRN
Status: DISCONTINUED | OUTPATIENT
Start: 2017-10-15 | End: 2017-10-18 | Stop reason: HOSPADM

## 2017-10-15 RX ORDER — LATANOPROST 50 UG/ML
1 SOLUTION/ DROPS OPHTHALMIC NIGHTLY
Status: DISCONTINUED | OUTPATIENT
Start: 2017-10-16 | End: 2017-10-18 | Stop reason: HOSPADM

## 2017-10-15 RX ORDER — SODIUM CHLORIDE 0.9 % (FLUSH) 0.9 %
10 SYRINGE (ML) INJECTION AS NEEDED
Status: DISCONTINUED | OUTPATIENT
Start: 2017-10-15 | End: 2017-10-18 | Stop reason: HOSPADM

## 2017-10-15 RX ORDER — DABIGATRAN ETEXILATE 150 MG/1
150 CAPSULE ORAL EVERY 12 HOURS SCHEDULED
Status: DISCONTINUED | OUTPATIENT
Start: 2017-10-16 | End: 2017-10-18 | Stop reason: HOSPADM

## 2017-10-15 RX ORDER — ASPIRIN 81 MG/1
81 TABLET, CHEWABLE ORAL DAILY
Status: DISCONTINUED | OUTPATIENT
Start: 2017-10-16 | End: 2017-10-16

## 2017-10-15 RX ORDER — ATORVASTATIN CALCIUM 20 MG/1
40 TABLET, FILM COATED ORAL NIGHTLY
Status: DISCONTINUED | OUTPATIENT
Start: 2017-10-15 | End: 2017-10-18 | Stop reason: HOSPADM

## 2017-10-15 RX ADMIN — IOPAMIDOL 100 ML: 612 INJECTION, SOLUTION INTRAVENOUS at 15:37

## 2017-10-15 RX ADMIN — ATORVASTATIN CALCIUM 40 MG: 20 TABLET, FILM COATED ORAL at 23:37

## 2017-10-15 RX ADMIN — DABIGATRAN ETEXILATE MESYLATE 150 MG: 150 CAPSULE ORAL at 23:43

## 2017-10-15 RX ADMIN — LORAZEPAM 0.5 MG: 0.5 TABLET ORAL at 17:44

## 2017-10-15 RX ADMIN — ASPIRIN 81 MG: 81 TABLET, CHEWABLE ORAL at 20:06

## 2017-10-15 NOTE — ED TRIAGE NOTES
Pt reports waking up at 11am this morning with slurred speech, left sided facial droop & discomfort in left leg. Pt states she went to bed at midnight last night which was the last time she was normal.

## 2017-10-15 NOTE — ED PROVIDER NOTES
EMERGENCY DEPARTMENT ENCOUNTER    CHIEF COMPLAINT  Chief Complaint: Left-sided facial droop and slurred speech  History given by: Patient  History limited by: Nothing  Room Number: 13/13  PMD: Raoul Ramirez MD      HPI:  Pt is a 72 y.o. female who presents complaining of left-sided facial droop and slurred speech with the symptoms first being noticed at 1100 today. The pt was last known normal at 0000 this morning when she went to sleep. Pt denies trouble with ambulation. Pt reports left leg pain and left-sided weakness. Pt reports a hx of Bell's Palsy when she was 20. The pt reports left leg numbness with a hx of sciatica lumbar radiculopathy. The pt took her Pradaxa today.    Duration:  The symptoms were first noticed at 1100  Onset: Gradual  Timing: Constant  Location: Left-side of the face  Radiation: None  Quality: Facial droop  Intensity/Severity: Moderate  Progression: Unchanged  Associated Symptoms: Left leg pain, left-sided weakness, and left leg numbness with a hx of sciatica lumbar radiculopathy  Aggravating Factors: Nothing  Alleviating Factors: Nothing  Previous Episodes: Pt reports a hx of Bell's Palsy when she was 20.  Treatment before arrival: Nothing    PAST MEDICAL HISTORY  Active Ambulatory Problems     Diagnosis Date Noted   • Hyperlipidemia 03/21/2016   • Vitamin deficiency 03/21/2016   • Essential hypertension 03/21/2016   • Rheumatoid arthritis involving both hands 03/21/2016   • Fatigue 04/06/2016   • ANTOINE (dyspnea on exertion) 04/06/2016   • Dysuria 08/02/2016   • Urge incontinence of urine 08/02/2016   • Hypovitaminosis D 08/02/2016   • Sleep apnea 08/02/2016   • Encounter for screening colonoscopy 08/02/2016   • Bronchitis 08/12/2016   • Cough 08/12/2016   • Generalized osteoarthritis of multiple sites 12/15/2016   • Morbid obesity with BMI of 40.0-44.9, adult 12/15/2016   • Cellulitis of right elbow due to MRSA 06/12/2017   • Medicare annual wellness visit, initial 06/27/2017   •  Hospital discharge follow-up 06/27/2017   • Displacement of lumbar intervertebral disc without myelopathy 10/11/2017   • Lumbar disc herniation 10/11/2017   • Atrial fibrillation with RVR 10/11/2017   • History of MRSA infection 10/11/2017     Resolved Ambulatory Problems     Diagnosis Date Noted   • No Resolved Ambulatory Problems     Past Medical History:   Diagnosis Date   • Arthritis    • Asthma    • Atrial fibrillation    • Edema    • Fatigue    • Headache    • Hx of bone density study    • Hyperlipidemia    • Hypertension    • Low back pain    • Obesity    • Vitamin D deficiency        PAST SURGICAL HISTORY  Past Surgical History:   Procedure Laterality Date   • HYSTERECTOMY     • KNEE SURGERY     • MAMMO BILATERAL  2016    [facility]        FAMILY HISTORY  Family History   Problem Relation Age of Onset   • Colon cancer Mother    • Glaucoma Mother    • Stroke Mother    • Glaucoma Sister    • Diabetes Sister    • Heart disease Sister    • Heart disease Brother    • Diabetes Brother        SOCIAL HISTORY  Social History     Social History   • Marital status:      Spouse name: N/A   • Number of children: N/A   • Years of education: N/A     Occupational History   • retired      Social History Main Topics   • Smoking status: Never Smoker   • Smokeless tobacco: Never Used   • Alcohol use No   • Drug use: Defer   • Sexual activity: Defer     Other Topics Concern   • Not on file     Social History Narrative   • No narrative on file       ALLERGIES  Review of patient's allergies indicates no known allergies.    REVIEW OF SYSTEMS  Review of Systems   Constitutional: Negative for chills and fever.   HENT: Negative for sore throat and trouble swallowing.    Eyes: Negative for visual disturbance.   Respiratory: Negative for cough and shortness of breath.    Cardiovascular: Negative for chest pain, palpitations and leg swelling.   Gastrointestinal: Negative for abdominal pain, diarrhea and vomiting.    Endocrine: Negative.    Genitourinary: Negative for decreased urine volume, dysuria and frequency.   Musculoskeletal: Negative for neck pain.        Left leg pain   Skin: Negative for rash.   Allergic/Immunologic: Negative.    Neurological: Positive for weakness (Left-sided) and numbness (Left leg numbness with a hx of sciatica lumbar radiculopathy). Negative for syncope and headaches.   Hematological: Negative.    Psychiatric/Behavioral: Negative.    All other systems reviewed and are negative.      PHYSICAL EXAM  ED Triage Vitals   Temp Heart Rate Resp BP SpO2   10/15/17 1306 10/15/17 1306 10/15/17 1306 10/15/17 1326 10/15/17 1306   97.8 °F (36.6 °C) 88 16 116/72 97 %      Temp src Heart Rate Source Patient Position BP Location FiO2 (%)   10/15/17 1306 -- -- -- --   Tympanic           Physical Exam   Constitutional: She is oriented to person, place, and time and well-developed, well-nourished, and in no distress. No distress.   HENT:   Head: Normocephalic and atraumatic.   Eyes: EOM are normal. Pupils are equal, round, and reactive to light.   Neck: Normal range of motion. Neck supple.   Cardiovascular: Normal rate and normal heart sounds.  An irregularly irregular rhythm present.   Pulmonary/Chest: Effort normal and breath sounds normal. No respiratory distress.   Abdominal: Soft. Bowel sounds are normal. There is no tenderness. There is no rebound and no guarding.   Musculoskeletal: Normal range of motion. She exhibits no edema.   Neurological: She is alert and oriented to person, place, and time. She has normal sensation and normal strength. She displays facial asymmetry (Left but the forehead is spared).   The pt has left leg weakness. The pt has mild weakness to the left arm.   Skin: Skin is warm and dry. No rash noted.   Psychiatric: Mood and affect normal.   Nursing note and vitals reviewed.      LAB RESULTS  Lab Results (last 24 hours)     Procedure Component Value Units Date/Time    CBC & Differential  [[id number]] Collected:  10/15/17 1400    Specimen:  Blood Updated:  10/15/17 1423    Narrative:       The following orders were created for panel order CBC & Differential.  Procedure                               Abnormality         Status                     ---------                               -----------         ------                     CBC Auto Differential[[id number]]        Abnormal            Final result                 Please view results for these tests on the individual orders.    Comprehensive Metabolic Panel [[id number]]  (Abnormal) Collected:  10/15/17 1400    Specimen:  Blood Updated:  10/15/17 1436     Glucose 123 (H) mg/dL      BUN 18 mg/dL      Creatinine 0.96 mg/dL      Sodium 142 mmol/L      Potassium 3.8 mmol/L      Chloride 104 mmol/L      CO2 26.4 mmol/L      Calcium 9.7 mg/dL      Total Protein 7.8 g/dL      Albumin 4.20 g/dL      ALT (SGPT) 15 U/L      AST (SGOT) 19 U/L      Alkaline Phosphatase 43 U/L      Total Bilirubin 0.5 mg/dL      eGFR  African Amer 70 mL/min/1.73      Globulin 3.6 gm/dL      A/G Ratio 1.2 g/dL      BUN/Creatinine Ratio 18.8     Anion Gap 11.6 mmol/L     Protime-INR [[id number]]  (Abnormal) Collected:  10/15/17 1400    Specimen:  Blood Updated:  10/15/17 1434     Protime 16.4 (H) Seconds      INR 1.37 (H)    Troponin [[id number]]  (Normal) Collected:  10/15/17 1400    Specimen:  Blood Updated:  10/15/17 1436     Troponin T <0.010 ng/mL     Narrative:       Troponin T Reference Ranges:  Less than 0.03 ng/mL:    Negative for AMI  0.03 to 0.09 ng/mL:      Indeterminant for AMI  Greater than 0.09 ng/mL: Positive for AMI    CBC Auto Differential [[id number]]  (Abnormal) Collected:  10/15/17 1400    Specimen:  Blood Updated:  10/15/17 1423     WBC 4.99 10*3/mm3      RBC 3.89 (L) 10*6/mm3      Hemoglobin 12.4 g/dL      Hematocrit 38.1 %      MCV 97.9 fL      MCH 31.9 pg      MCHC 32.5 g/dL      RDW 15.3 (H) %      RDW-SD 54.3 (H) fl      MPV 11.3 fL      Platelets 188  10*3/mm3      Neutrophil % 41.1 (L) %      Lymphocyte % 40.1 %      Monocyte % 15.0 (H) %      Eosinophil % 3.4 %      Basophil % 0.4 %      Immature Grans % 0.0 %      Neutrophils, Absolute 2.05 10*3/mm3      Lymphocytes, Absolute 2.00 10*3/mm3      Monocytes, Absolute 0.75 10*3/mm3      Eosinophils, Absolute 0.17 10*3/mm3      Basophils, Absolute 0.02 10*3/mm3      Immature Grans, Absolute 0.00 10*3/mm3      nRBC 0.0 /100 WBC           I ordered the above labs and reviewed the results    RADIOLOGY  CT Head Without Contrast   Final Result           No acute intracranial hemorrhage or hydrocephalus. Mildly prominent   caliber of the bilateral superior ophthalmic veins, nonspecific,   correlate clinically. Chronic changes. If there is further clinical   concern, MRI could be considered for further evaluation.       This report was finalized on 10/15/2017 2:50 PM by Dr. Antelmo Corea MD.          CT Angiogram Head With & Without Contrast - no large vessel occlusion   CT Angiogram Neck With & Without Contrast - no large vessel occlusion   MRI Brain Without Contrast - Dr. Fernández will f/u with the MRI results        I ordered the above noted radiological studies. Interpreted by radiologist. Reviewed by me in PACS.       PROCEDURES  Critical Care  Performed by: CHENCHO WILLIAM  Authorized by: CHENCHO WILLIAM   Total critical care time: 40 minutes  Critical care time was exclusive of separately billable procedures and treating other patients.  Critical care was necessary to treat or prevent imminent or life-threatening deterioration of the following conditions: CNS failure or compromise.  Critical care was time spent personally by me on the following activities: blood draw for specimens, development of treatment plan with patient or surrogate, discussions with consultants, evaluation of patient's response to treatment, examination of patient, obtaining history from patient or surrogate, ordering and performing  treatments and interventions, ordering and review of laboratory studies, ordering and review of radiographic studies, pulse oximetry, re-evaluation of patient's condition and review of old charts.          EKG    EKG time: 1439  Rhythm/Rate: 85 A fib  No Acute Ischemia  Non-Specific ST-T changes    Unchanged compared to prior on 10-11-17    Interpreted Contemporaneously by me.  Independently viewed by me      PROGRESS AND CONSULTS  ED Course     1343  The pt is not a tPA candidate due to pt's last known normal being greater than 12 hours ago.    1344  CT Head, EKG, labs ordered for further evaluation.    1508  Consult placed to [facility].    1509  Received a call from Dr. Fernández and discussed pt's case. Dr. Fernández agreed with plan to have the pt admitted to the hospitalist. He would like the pt to receive a CTA Head and Neck to rule out the pt needing acute intervention.    1511  CTA Neck and CT Head ordered for further evaluation.    1520  BP- 116/72 HR- 98 Temp- 97.8 °F (36.6 °C) (Tympanic) O2 sat- 94%    Rechecked pt, she is resting comfortably with no change in her symptoms. Discussed the negative CT and lab results with the pt. Discussed the consult with Dr. Fernández and the plan to order a CTA for further evaluation. Discussed the plan to admit the pt for further evaluation into the cause of her symptoms. The pt understands and agrees with the plan.    1532  Received a call from Dr. Jarvis and discussed pt's case. Dr. Jarvis agreed with plan to admit the pt.    1538  Received a call from Dr. Fernández. He states the pt's CTA's show no large vessel occlusion. He would like a STAT MRI performed for further evaluation.    1541  I met with Dr. Fernández in the ED and discussed the pt's case. Dr. Fernández is at the pt's bedside and will f/u on MRI.      MEDICAL DECISION MAKING  Results were reviewed/discussed with the patient and they were also made aware of online access. Pt also made aware that some labs, such as cultures, will not be  resulted during ER visit and follow up with PMD is necessary.     MDM  Number of Diagnoses or Management Options  Left-sided weakness:      Amount and/or Complexity of Data Reviewed  Clinical lab tests: ordered and reviewed (Troponin - Negative)  Tests in the radiology section of CPT®: reviewed and ordered (CT Head -  No acute intracranial hemorrhage or hydrocephalus. Mildly prominent  caliber of the bilateral superior ophthalmic veins, nonspecific,  correlate clinically. Chronic changes. If there is further clinical  concern, MRI could be considered for further evaluation.  CTA Head and Neck - no large vessel occlusion)  Tests in the medicine section of CPT®: reviewed and ordered (Refer to the procedure section of the note for EKG results  )  Discussion of test results with the performing providers: yes (Dr. Jarvis)  Review and summarize past medical records: yes (The pt was admitted from 10-11-17 to 10-14-17 secondary to new onset a fib. The pt was initially treated with beta blockers and heparin, but was discharged on Pradaxa. The pt had a stress test on 10-12-17 that showed a normal EF of 57% with no ischemia. The pt also had an echocardiogram that appears to be unremarkable. The pt had a negative duplex venous on 10-11-17.)  Discuss the patient with other providers: yes (Dr. Fernández)    Critical Care  Total time providing critical care: 30-74 minutes    Patient Progress  Patient progress: stable         DIAGNOSIS  Final diagnoses:   Left-sided weakness       DISPOSITION  ADMISSION    Discussed treatment plan and reason for admission with pt/family and admitting physician.  Pt/family voiced understanding of the plan for admission for further testing/treatment as needed.         Latest Documented Vital Signs:  As of 3:50 PM  BP- 112/70 HR- 82 Temp- 97.8 °F (36.6 °C) (Tympanic) O2 sat- 95%    --  Documentation assistance provided by gómez Marie for Dr. Mao.  Information recorded by the gómez was done at  my direction and has been verified and validated by me.     Jefferson Marie  10/15/17 [id number]       Agustin Mao MD  10/15/17 [id number]

## 2017-10-15 NOTE — CONSULTS
NIHSS 2 for slurred speech and left facial droop; d/w DR.Alex Omer and gave details about this patient due to CTA findings of possible mca occlusion at m2-mca branch at 5.42 pm. But given the last known normal (yesterday) and  low NIHSS 2 we would not pursue endovascular therapy. Also she is not a t-PA candidate for the same reason of last known normal and she had pradaxa earlier today.    I discussed the MRI brain findings with the patient's daughter (who is a nurse) and explained the benefit and risk of giving pradaxa in the acute stroke settings. Given the lesion size is small the issue was to either give antiplateletes and take risk of further cardioembolism or take a chance of bleeding by continuing pradaxa. Family wants us to go ahead with pradaxa and they are willing to take risk as they dont want more thromboembolic phenomenon. They all are aware of the risk and we will go ahead with the plan to continue anticoagulation with pradaxa 150 mg bid  and other stroke work up.    Extremely guarded prognosis.     Will repeat CT head am.

## 2017-10-15 NOTE — PLAN OF CARE
Problem: Stroke (Ischemic) (Adult)  Goal: Signs and Symptoms of Listed Potential Problems Will be Absent or Manageable (Stroke)  Outcome: Ongoing (interventions implemented as appropriate)    Problem: Fall Risk (Adult)  Goal: Identify Related Risk Factors and Signs and Symptoms  Outcome: Ongoing (interventions implemented as appropriate)  Goal: Absence of Falls  Outcome: Ongoing (interventions implemented as appropriate)    Problem: Patient Care Overview (Adult)  Goal: Plan of Care Review  Outcome: Ongoing (interventions implemented as appropriate)    10/15/17 9355   Coping/Psychosocial Response Interventions   Plan Of Care Reviewed With patient;family   Patient Care Overview   Progress no change   Outcome Evaluation   Outcome Summary/Follow up Plan VSS, no falls, denies pain. New admit from ER. NIH 5. Ativan before MRI soon. NPO due to failed swallow. WCM patient.        Goal: Adult Individualization and Mutuality  Outcome: Ongoing (interventions implemented as appropriate)  Goal: Discharge Needs Assessment  Outcome: Ongoing (interventions implemented as appropriate)

## 2017-10-15 NOTE — CONSULTS
Encounter Date: 10/15/2017    CHIEF COMPLAINT:     Patient Active Problem List   Diagnosis   • Hyperlipidemia   • Vitamin deficiency   • Essential hypertension   • Rheumatoid arthritis involving both hands   • Fatigue   • ANTOINE (dyspnea on exertion)   • Dysuria   • Urge incontinence of urine   • Hypovitaminosis D   • Sleep apnea   • Encounter for screening colonoscopy   • Bronchitis   • Cough   • Generalized osteoarthritis of multiple sites   • Morbid obesity with BMI of 40.0-44.9, adult   • Cellulitis of right elbow due to MRSA   • Medicare annual wellness visit, initial   • Hospital discharge follow-up   • Displacement of lumbar intervertebral disc without myelopathy   • Lumbar disc herniation   • Atrial fibrillation with RVR   • History of MRSA infection   • Left-sided weakness       PRESENT ILLNESS:    Portions of this notes is copied from previous physician encounters, reviewed and edited appropriately.    Background:     Lana Yañez is a 72 y.o. female who got discharged yesterday from this hospital now readmitted with left side facial weakness and slurred speech .  She was supposed to take Pradaxa after discharged yesterday.  However she forgot to take it last night.  Today morning she woke up with some slurred speech.  Family noticed that she had some facial droop as well.  Last known normal was last night.  She is away from the TPA and thrombectomy window.  But patient mentions that she had Bell's palsy 10 years back and the symptoms are similar today.  Even though there was some initial concerns of left upper and lower extremity weakness as per patient she denies any new weakness patient has left shoulder pain and sciatica on the left which is the reason for her chronic left-sided weakness which was witnessed by family as well.     As per her discharge summary: She was admitted after being from spine surgery clinic for palpitations and shortness of breath.  Patient presented with new onset atrial  fibrillation.  Her rate is now controlled with increased beta blocker.  Patient was transitioned from heparin drip to Pradaxa per cardiology recommendations due to her need for anticoagulation.  Likely insulting agent that could've precipitated the A. fib is probably untreated sleep apnea and the patient has been counseled in regards to the needs to set up and out patient sleep study.  Other comorbidities have remained stable while here regards to her hypertension and rheumatoid arthritis.  There have been some changes in regards to her medications in which her atenolol has been increased 100 mg daily, hydrochlorothiazide has been discontinued, low-dose Lasix has been initiated in addition to changes to her losartan.  Current BP control has been excellent his most recent check is 107/62.  Daughter at bedside claims to be a dialysis nurse and I answered numerous questions to both patient and daughter this a.m.  Patient is medically stable for disposition not an anticoagulation has been finalized per cardiology.    Review of systems   No neck stiffness  No photophobia  All systems reviewed and are negative.         Past Medical History:   Diagnosis Date   • Arthritis    • Asthma    • Atrial fibrillation    • Edema    • Fatigue    • Headache    • Hx of bone density study     10/23/2014   • Hyperlipidemia    • Hypertension    • Low back pain    • Obesity    • Vitamin D deficiency        Past Surgical History:   Procedure Laterality Date   • HYSTERECTOMY     • KNEE SURGERY     • MAMMO BILATERAL  2016    [facility]        Current Facility-Administered Medications   Medication Dose Route Frequency Provider Last Rate Last Dose   • sodium chloride 0.9 % flush 10 mL  10 mL Intravenous PRN Agustin Mao MD         Current Outpatient Prescriptions   Medication Sig Dispense Refill   • albuterol (PROVENTIL HFA;VENTOLIN HFA) 108 (90 BASE) MCG/ACT inhaler Inhale 2 puffs Every 4 (Four) Hours As Needed for wheezing. 1  inhaler 11   • atenolol (TENORMIN) 100 MG tablet Take 1 tablet by mouth Daily. 30 tablet 0   • cetirizine (ZyrTEC) 10 MG tablet Take 10 mg by mouth daily.     • COSOPT PF 22.3-6.8 MG/ML solution Administer 1 drop to both eyes 2 (Two) Times a Day.     • dabigatran etexilate (PRADAXA) 150 MG capsu Take 1 capsule by mouth Every 12 (Twelve) Hours. 60 capsule 0   • folic acid (FOLVITE) 1 MG tablet Take 1 mg by mouth Daily. Patient stopped taking 2 weeks ago     • furosemide (LASIX) 20 MG tablet Take 1 tablet by mouth Daily. 30 tablet 0   • HYDROcodone-acetaminophen (NORCO) 5-325 MG per tablet Take 1 tablet by mouth Every 6 (Six) Hours As Needed for Moderate Pain . (Patient taking differently: Take 1 tablet by mouth Every 4 (Four) Hours As Needed for Moderate Pain . 4-6 hours prn) 20 tablet 0   • losartan (COZAAR) 50 MG tablet Take 1 tablet by mouth Daily. 30 tablet 0   • methotrexate 2.5 MG tablet Take  by mouth. Takes 8 tablets once a week. Patient stopped taking 2 weeks ago.     • MYRBETRIQ 50 MG tablet sustained-release 24 hour Take 50 mg by mouth Every Morning.     • potassium chloride (MICRO-K) 10 MEQ CR capsule Take 1 capsule by mouth Daily. 30 capsule 0   • travoprost, BAK free, (TRAVATAN Z) 0.004 % solution ophthalmic solution Administer 1 drop to both eyes Every Evening. in affected eye(s)         No Known Allergies    Social History     Social History   • Marital status:      Spouse name: N/A   • Number of children: N/A   • Years of education: N/A     Occupational History   • retired      Social History Main Topics   • Smoking status: Never Smoker   • Smokeless tobacco: Never Used   • Alcohol use No   • Drug use: Defer   • Sexual activity: Defer     Other Topics Concern   • Not on file     Social History Narrative   • No narrative on file       Family History   Problem Relation Age of Onset   • Colon cancer Mother    • Glaucoma Mother    • Stroke Mother    • Glaucoma Sister    • Diabetes Sister    •  Heart disease Sister    • Heart disease Brother    • Diabetes Brother        Family Status   Relation Status   • Mother    • Sister    • Brother        Current Facility-Administered Medications on File Prior to Encounter   Medication   • [DISCONTINUED] acetaminophen (TYLENOL) tablet 650 mg   • [DISCONTINUED] albuterol (PROVENTIL) nebulizer solution 0.083% 2.5 mg/3mL   • [DISCONTINUED] atenolol (TENORMIN) tablet 100 mg   • [DISCONTINUED] cetirizine (zyrTEC) tablet 10 mg   • [DISCONTINUED] dabigatran etexilate (PRADAXA) capsule 150 mg   • [DISCONTINUED] diltiazem (CARDIZEM) bolus from bag 1 mg/mL 5 mg   • [DISCONTINUED] dorzolamide-timolol (COSOPT) ophthalmic solution 1 drop   • [DISCONTINUED] folic acid (FOLVITE) tablet 1 mg   • [DISCONTINUED] furosemide (LASIX) tablet 20 mg   • [DISCONTINUED] HYDROcodone-acetaminophen (NORCO) 5-325 MG per tablet 1 tablet   • [DISCONTINUED] Influenza Vac Subunit Quad (FLUCELVAX) injection 0.5 mL   • [DISCONTINUED] losartan (COZAAR) tablet 50 mg   • [DISCONTINUED] meloxicam (MOBIC) tablet 15 mg   • [DISCONTINUED] oxybutynin XL (DITROPAN-XL) 24 hr tablet 10 mg   • [DISCONTINUED] pneumococcal polysaccharide 23-valent (PNEUMOVAX-23) vaccine 0.5 mL   • [DISCONTINUED] potassium chloride (MICRO-K) CR capsule 10 mEq   • [DISCONTINUED] potassium chloride (MICRO-K) CR capsule 20 mEq   • [DISCONTINUED] sodium chloride 0.9 % flush 1-10 mL   • [DISCONTINUED] sodium chloride 0.9 % flush 1-10 mL   • [DISCONTINUED] travoprost (BAK free) (TRAVATAN) 0.004 % ophthalmic solution 1 drop     Current Outpatient Prescriptions on File Prior to Encounter   Medication Sig   • albuterol (PROVENTIL HFA;VENTOLIN HFA) 108 (90 BASE) MCG/ACT inhaler Inhale 2 puffs Every 4 (Four) Hours As Needed for wheezing.   • atenolol (TENORMIN) 100 MG tablet Take 1 tablet by mouth Daily.   • cetirizine (ZyrTEC) 10 MG tablet Take 10 mg by mouth daily.   • COSOPT PF 22.3-6.8 MG/ML solution Administer 1 drop to both eyes 2 (Two)  "Times a Day.   • dabigatran etexilate (PRADAXA) 150 MG capsu Take 1 capsule by mouth Every 12 (Twelve) Hours.   • folic acid (FOLVITE) 1 MG tablet Take 1 mg by mouth Daily. Patient stopped taking 2 weeks ago   • furosemide (LASIX) 20 MG tablet Take 1 tablet by mouth Daily.   • HYDROcodone-acetaminophen (NORCO) 5-325 MG per tablet Take 1 tablet by mouth Every 6 (Six) Hours As Needed for Moderate Pain . (Patient taking differently: Take 1 tablet by mouth Every 4 (Four) Hours As Needed for Moderate Pain . 4-6 hours prn)   • losartan (COZAAR) 50 MG tablet Take 1 tablet by mouth Daily.   • methotrexate 2.5 MG tablet Take  by mouth. Takes 8 tablets once a week. Patient stopped taking 2 weeks ago.   • MYRBETRIQ 50 MG tablet sustained-release 24 hour Take 50 mg by mouth Every Morning.   • potassium chloride (MICRO-K) 10 MEQ CR capsule Take 1 capsule by mouth Daily.   • travoprost, BAK free, (TRAVATAN Z) 0.004 % solution ophthalmic solution Administer 1 drop to both eyes Every Evening. in affected eye(s)           PHYSICAL EXAMINATION:    Vitals: Patient Vitals for the past 4 hrs:   BP Temp Temp src Pulse Resp SpO2 Height Weight   10/15/17 1527 112/70 - - 82 16 95 % - -   10/15/17 1444 - - - 98 16 94 % - -   10/15/17 1326 116/72 - - - - - - -   10/15/17 1306 - 97.8 °F (36.6 °C) Tympanic 88 16 97 % 67\" (170.2 cm) 260 lb (118 kg)     Temp:  [97.8 °F (36.6 °C)] 97.8 °F (36.6 °C)  Heart Rate:  [82-98] 82  Resp:  [16] 16  BP: (112-116)/(70-72) 112/70    General:  pleasant in no acute distress.  HEENT: No pallor or icterus. Neck supple. No Carotid bruits.  Heart: S1 and S2 normal with regular rhythm.  No murmur.       GENERAL NEUROLOGIC EXAM     Mental Status: Awake alert and oriented to person place and time. Language is normal for comprehension, repetition, naming and fluency. Mild dysarthria +  Recent and remote memory were normal.  Attention span and concentration were normal. Fund of knowledge was normal.      Cranial nerves: "  Fundi were normal bilaterally.  Visual fields were full to confrontation.  Pupils are equal regular and reactive to light. Extraocular movements are intact. Facial sensation was normal and symmetrical bilaterally.  Left facial droop with some involvement of the frontalis as well.  Muscles of facial expression were strong and symmetric. Hearing to finger rub normal bilaterally. Palate elevates symmetrically. Sternocleidomastoid and trapezius normal. The tongue protrudes midline without atrophy or fasciculations.      Motor: Normal tone and bulk with no involuntary movements or pronator drift.    Strength normal 5/5 in right upper and lower extremity and 4/5 in left upper and lower extremity.     Sensation: Light touch, pin prick, vibration and joint position sense were normal in the upper and lower extremities.     Reflexes: 1+ bilaterally symmetrical with down going toes.    Coordination: finger nose normal did not test heel shin test     Gait:  patient can stand without any issue, did not make her walk because of fall risk.     Labs:     Lab Results   Component Value Date    HGB 12.4 10/15/2017    HCT 38.1 10/15/2017    WBC 4.99 10/15/2017     10/15/2017     Lab Results   Component Value Date    BUN 18 10/15/2017    CALCIUM 9.7 10/15/2017     10/15/2017    K 3.8 10/15/2017     10/15/2017    CO2 26.4 10/15/2017     (H) 06/27/2017     Lab Results   Component Value Date    ALT 15 10/15/2017    AST 19 10/15/2017    BILITOT 0.5 10/15/2017     Lab Results   Component Value Date    PROTIME 16.4 (H) 10/15/2017    INR 1.37 (H) 10/15/2017     No components found for: POCGLUC  No components found for: A1C  Lab Results   Component Value Date    HDL 63 (H) 06/27/2017     (H) 06/27/2017     No components found for: B12  Lab Results   Component Value Date    TSH 1.070 10/11/2017       Lab Results (last 24 hours)     Procedure Component Value Units Date/Time    CBC & Differential [[id number]]  Collected:  10/15/17 1400    Specimen:  Blood Updated:  10/15/17 1423    Narrative:       The following orders were created for panel order CBC & Differential.  Procedure                               Abnormality         Status                     ---------                               -----------         ------                     CBC Auto Differential[[id number]]        Abnormal            Final result                 Please view results for these tests on the individual orders.    CBC Auto Differential [[id number]]  (Abnormal) Collected:  10/15/17 1400    Specimen:  Blood Updated:  10/15/17 1423     WBC 4.99 10*3/mm3      RBC 3.89 (L) 10*6/mm3      Hemoglobin 12.4 g/dL      Hematocrit 38.1 %      MCV 97.9 fL      MCH 31.9 pg      MCHC 32.5 g/dL      RDW 15.3 (H) %      RDW-SD 54.3 (H) fl      MPV 11.3 fL      Platelets 188 10*3/mm3      Neutrophil % 41.1 (L) %      Lymphocyte % 40.1 %      Monocyte % 15.0 (H) %      Eosinophil % 3.4 %      Basophil % 0.4 %      Immature Grans % 0.0 %      Neutrophils, Absolute 2.05 10*3/mm3      Lymphocytes, Absolute 2.00 10*3/mm3      Monocytes, Absolute 0.75 10*3/mm3      Eosinophils, Absolute 0.17 10*3/mm3      Basophils, Absolute 0.02 10*3/mm3      Immature Grans, Absolute 0.00 10*3/mm3      nRBC 0.0 /100 WBC     Protime-INR [[id number]]  (Abnormal) Collected:  10/15/17 1400    Specimen:  Blood Updated:  10/15/17 1434     Protime 16.4 (H) Seconds      INR 1.37 (H)    Comprehensive Metabolic Panel [[id number]]  (Abnormal) Collected:  10/15/17 1400    Specimen:  Blood Updated:  10/15/17 1436     Glucose 123 (H) mg/dL      BUN 18 mg/dL      Creatinine 0.96 mg/dL      Sodium 142 mmol/L      Potassium 3.8 mmol/L      Chloride 104 mmol/L      CO2 26.4 mmol/L      Calcium 9.7 mg/dL      Total Protein 7.8 g/dL      Albumin 4.20 g/dL      ALT (SGPT) 15 U/L      AST (SGOT) 19 U/L      Alkaline Phosphatase 43 U/L      Total Bilirubin 0.5 mg/dL      eGFR  African Amer 70 mL/min/1.73       Globulin 3.6 gm/dL      A/G Ratio 1.2 g/dL      BUN/Creatinine Ratio 18.8     Anion Gap 11.6 mmol/L     Troponin [[id number]]  (Normal) Collected:  10/15/17 1400    Specimen:  Blood Updated:  10/15/17 1436     Troponin T <0.010 ng/mL     Narrative:       Troponin T Reference Ranges:  Less than 0.03 ng/mL:    Negative for AMI  0.03 to 0.09 ng/mL:      Indeterminant for AMI  Greater than 0.09 ng/mL: Positive for AMI          .  Imaging Results (last 24 hours)     Procedure Component Value Units Date/Time    CT Head Without Contrast [[id number]] Collected:  10/15/17 1442     Updated:  10/15/17 1453    Narrative:       CT HEAD WO CONTRAST-     INDICATIONS: Left-sided weakness     TECHNIQUE: Radiation dose reduction techniques were utilized, including  automated exposure control and exposure modulation based on body size.      Noncontrast head CT     COMPARISON: None available     FINDINGS:              No acute intracranial hemorrhage, midline shift or mass effect. No acute  territorial infarct is identified.     Mild periventricular hypodensities suggest chronic small vessel ischemic  change in a patient this age.           Ventricles, cisterns, cerebral sulci are unremarkable for patient age.     Caliber of the bilateral superior ophthalmic veins is mildly prominent,  nonspecific, could for example reflect increased intracranial pressure  or Graves' disease for example. The visualized paranasal sinuses,  orbits, mastoid air cells are otherwise unremarkable.     A 7 mm density at the right parotid gland on image 4 series 1 could for  example represent lymph node or parotid nodule. Likely lymph node at the  margin of the left parotid gland, 5 mm on image 1       Impression:              No acute intracranial hemorrhage or hydrocephalus. Mildly prominent  caliber of the bilateral superior ophthalmic veins, nonspecific,  correlate clinically. Chronic changes. If there is further clinical  concern, MRI could be considered  for further evaluation.     This report was finalized on 10/15/2017 2:50 PM by Dr. Antelmo Corea MD.       CT Angiogram Head With & Without Contrast [[id number]] Updated:  10/15/17 1543    CT Angiogram Neck With & Without Contrast [[id number]] Updated:  10/15/17 1543      Echocardiogram couple of days back:   · Left ventricular systolic function is normal. Calculated EF = 57%. Estimated EF was in agreement with the calculated EF. Estimated EF = 57%. Normal left ventricular cavity size noted. All left ventricular wall segments contract normally. Left ventricular wall thickness is consistent with mild concentric hypertrophy. Left ventricular diastolic was unable to be assessed.  · Left atrial volume is moderately increased.  · Right atrial cavity size is moderately dilated.      For this problem, the following was ordered:  Orders Placed This Encounter   Procedures   • Critical Care   • CT Head Without Contrast   • CT Angiogram Head With & Without Contrast   • CT Angiogram Neck With & Without Contrast   • MRI Brain Without Contrast   • Comprehensive Metabolic Panel   • Protime-INR   • Troponin   • CBC Auto Differential   • NPO Diet   • LHA (on-call MD unless specified)   • SLP Consult: Eval & Treat   • SLP Evaluation - Failed Bedside Dysphagia Screening   • ECG 12 Lead   • Insert peripheral IV   • Inpatient Admission       Problem List Items Addressed This Visit     Left-sided weakness - Primary          ASSESSMENT/PLAN: This is a 72 y.o. female who has   Past Medical History:   Diagnosis Date   • Arthritis    • Asthma    • Atrial fibrillation    • Edema    • Fatigue    • Headache    • Hx of bone density study     10/23/2014   • Hyperlipidemia    • Hypertension    • Low back pain    • Obesity    • Vitamin D deficiency     presents with Some slurred speech and facial droop.  TSH 1.0 A1c 7.  She has old Bell's palsy and the symptoms are exactly the same as before.  She has missed one dose of pradaxa last  night in the setting of recent onset atrial fibrillation.  Differentials are requested sense of old Bell's palsy versus new ischemic stroke of cardio embolic etiology.  Await brain MRI if negative patient can be continued with pradaxa and treated for Bell's palsy if it is positive then we will decide about the anticoagulation depending upon the extent of the stroke.  We would be hesitant to give anticoagulation in the setting of acute stroke and if the infarct size is large.    1.  Slurred speech and left facial droop: Right MCA ischemic stroke versus Bell's palsy:  - Stat MRI brain  - CT angiogram of the head and neck does not show intraoral extra cranial vascular stenosis or occlusion  - Stroke labs ordered  - No need for echocardiogram as it was done recently  - Please order PTOT speech rehabilitation assessment  - DVT prophylaxis  - Telemetry    2.  New onset atrial fibrillation.    Thank you for allowing us to participate in the care of this patient.    Edita Fernández MD  10/15/17  3:56 PM

## 2017-10-16 ENCOUNTER — APPOINTMENT (OUTPATIENT)
Dept: CT IMAGING | Facility: HOSPITAL | Age: 72
End: 2017-10-16
Attending: INTERNAL MEDICINE

## 2017-10-16 PROCEDURE — 94660 CPAP INITIATION&MGMT: CPT

## 2017-10-16 PROCEDURE — 70450 CT HEAD/BRAIN W/O DYE: CPT

## 2017-10-16 PROCEDURE — 92610 EVALUATE SWALLOWING FUNCTION: CPT

## 2017-10-16 PROCEDURE — 99231 SBSQ HOSP IP/OBS SF/LOW 25: CPT | Performed by: PSYCHIATRY & NEUROLOGY

## 2017-10-16 PROCEDURE — 94799 UNLISTED PULMONARY SVC/PX: CPT

## 2017-10-16 RX ORDER — CHOLECALCIFEROL (VITAMIN D3) 125 MCG
1000 CAPSULE ORAL DAILY
Status: DISCONTINUED | OUTPATIENT
Start: 2017-10-16 | End: 2017-10-18 | Stop reason: HOSPADM

## 2017-10-16 RX ORDER — SODIUM CHLORIDE 9 MG/ML
75 INJECTION, SOLUTION INTRAVENOUS CONTINUOUS
Status: DISCONTINUED | OUTPATIENT
Start: 2017-10-16 | End: 2017-10-17

## 2017-10-16 RX ORDER — ZOLPIDEM TARTRATE 5 MG/1
TABLET ORAL
Qty: 2 TABLET | Refills: 0 | Status: SHIPPED | OUTPATIENT
Start: 2017-10-16 | End: 2018-01-19

## 2017-10-16 RX ADMIN — ATORVASTATIN CALCIUM 40 MG: 20 TABLET, FILM COATED ORAL at 20:16

## 2017-10-16 RX ADMIN — LOSARTAN POTASSIUM 50 MG: 50 TABLET, FILM COATED ORAL at 09:54

## 2017-10-16 RX ADMIN — SODIUM CHLORIDE 75 ML/HR: 9 INJECTION, SOLUTION INTRAVENOUS at 15:13

## 2017-10-16 RX ADMIN — LATANOPROST 1 DROP: 50 SOLUTION OPHTHALMIC at 20:18

## 2017-10-16 RX ADMIN — DABIGATRAN ETEXILATE MESYLATE 150 MG: 150 CAPSULE ORAL at 09:54

## 2017-10-16 RX ADMIN — HYDROCODONE BITARTRATE AND ACETAMINOPHEN 1 TABLET: 5; 325 TABLET ORAL at 22:13

## 2017-10-16 RX ADMIN — OXYBUTYNIN CHLORIDE 10 MG: 10 TABLET, FILM COATED, EXTENDED RELEASE ORAL at 09:53

## 2017-10-16 RX ADMIN — DORZOLAMIDE HYDROCHLORIDE AND TIMOLOL MALEATE 1 DROP: 20; 5 SOLUTION/ DROPS OPHTHALMIC at 19:13

## 2017-10-16 RX ADMIN — CYANOCOBALAMIN TAB 500 MCG 1000 MCG: 500 TAB at 20:16

## 2017-10-16 RX ADMIN — FUROSEMIDE 20 MG: 20 TABLET ORAL at 09:54

## 2017-10-16 RX ADMIN — DABIGATRAN ETEXILATE MESYLATE 150 MG: 150 CAPSULE ORAL at 20:16

## 2017-10-16 RX ADMIN — ATENOLOL 100 MG: 50 TABLET ORAL at 09:53

## 2017-10-16 RX ADMIN — MELOXICAM 15 MG: 7.5 TABLET ORAL at 09:54

## 2017-10-16 RX ADMIN — POTASSIUM CHLORIDE 10 MEQ: 750 CAPSULE, EXTENDED RELEASE ORAL at 09:53

## 2017-10-16 RX ADMIN — FOLIC ACID 1 MG: 1 TABLET ORAL at 09:53

## 2017-10-16 NOTE — THERAPY EVALUATION
Acute Care - Speech Language Pathology   Swallow Initial Evaluation [facility]     Patient Name: Lana Yañez  : 1945  MRN: [id number]  Today's Date: 10/16/2017               Admit Date: 10/15/2017    Visit Dx:     ICD-10-CM ICD-9-CM   1. Left-sided weakness R53.1 728.87     Patient Active Problem List   Diagnosis   • Hyperlipidemia   • Vitamin deficiency   • Essential hypertension   • Rheumatoid arthritis involving both hands   • Fatigue   • ANTOINE (dyspnea on exertion)   • Dysuria   • Urge incontinence of urine   • Hypovitaminosis D   • Sleep apnea   • Encounter for screening colonoscopy   • Bronchitis   • Cough   • Generalized osteoarthritis of multiple sites   • Morbid obesity with BMI of 40.0-44.9, adult   • Cellulitis of right elbow due to MRSA   • Medicare annual wellness visit, initial   • Hospital discharge follow-up   • Displacement of lumbar intervertebral disc without myelopathy   • Lumbar disc herniation   • Atrial fibrillation with RVR   • History of MRSA infection   • Left-sided weakness   • Atrial fibrillation   • Acute CVA (cerebrovascular accident)     SPEECH-LANGUAGE PATHOLOGY EVALUATION - SWALLOW  Subjective: The patient was seen on this date for a Clinical Swallow evaluation.  Patient was alert and cooperative. Pt stated that she had Bell's Palsy at 20 w/ residual weakness on L but she stated that she eats a regular diet w/ no modifications.  She wears an upper plate.   The patient's history is significant for CVA, Bell's Palsy at 20 yrs of age w/ residual L side weakness of face and tongue including cheek and eye droop. MRI positive for infarct.  Objective: Textures given included thin liquid, puree consistency, mechanical soft consistency and regular consistency.  Assessment: Difficulties were noted with thin liquid, characterized by a cough via straw drink X1 of 4.  All other trials of thin liquid via cup and spoon were WNL w/ no s/s aspiration.  Pt took trials of puree, [facility]  soft and regular w/ good oral phrase, timely swallow and dry vocal quality.  SLP Findings:  Patient presents with functional swallow, without esophageal component.   Recommendations: Diet Textures: thin liquid, regular consistency food.  Medications should be taken whole with thin liquids, no straww.  Recommended Strategies: Upright for PO and no straw. Oral care before breakfast, after all meals and PRN.  Other Recommended Evaluations: none  Dysphagia therapy is not recommended. Rationale: functional swallow.  Past Medical History:   Diagnosis Date   • Arthritis    • Asthma    • Atrial fibrillation    • Edema    • Fatigue    • Headache    • Hx of bone density study     10/23/2014   • Hyperlipidemia    • Hypertension    • Low back pain    • Obesity    • Vitamin D deficiency      Past Surgical History:   Procedure Laterality Date   • HYSTERECTOMY     • KNEE SURGERY     • MAMMO BILATERAL  2016    [facility]           SWALLOW EVALUATION (last 72 hours)      Swallow Evaluation       10/16/17 0800                Rehab Evaluation    Document Type evaluation  -MG        Subjective Information no complaints;agree to therapy  -MG        Patient Effort, Rehab Treatment good  -MG        Symptoms Noted During/After Treatment none  -MG        General Information    Patient Profile Review yes  -MG        Current Diet Limitations NPO  -MG        Precautions/Limitations, Vision WFL with corrective lenses  -MG        Precautions/Limitations, Hearing WFL  -MG        Prior Level of Function- Communication functional in all spheres  -MG        Prior Level of Function- Swallowing no diet consistency restrictions  -MG        Plans/Goals Discussed With patient;agreed upon  -MG        Barriers to Rehab none identified  -MG        Clinical Impression    Patient's Goals For Discharge patient did not state  -MG        Rehab Potential/Prognosis, Swallowing good, to achieve stated therapy goals  -MG        Criteria for Skilled  Therapeutic Interventions Met skilled criteria for dysphagia intervention met  -MG        FCM, Swallowing 6-->Level 6  -MG        SLP Diet Recommendation regular textures;thin liquids  -MG        Recommended Feeding/Eating Techniques no straws  -MG        SLP Rec. for Method of Medication Administration meds whole with thin liquid  -MG        Monitor For Signs Of Aspiration cough;right lower lobe infiltrates  -MG        Anticipated Discharge Disposition other (see comments)  -MG        Demonstrates Need for Referral to Another Service --   unknown  -MG        Pain Assessment    Pain Assessment 0-10  -MG        Pain Score 5  -MG        Pain Type Chronic pain  -MG        Pain Location Leg  -MG        Cognitive Assessment/Intervention    Current Cognitive/Communication Assessment functional  -MG        Follows Commands/Answers Questions 100% of the time  -MG        Oral Motor Structure and Function    Oral Motor Anatomy and Physiology patient demonstrates anatomy that is WNL  -MG        Dentition Assessment upper dentures/partial in place  -MG        Secretion Management WNL/WFL  -MG        Volitional Swallow no difficulties initiating volitional swallow  -MG        Oral Musculature General Assessment buccal impairment;lingual impairment  -MG        Oral Motor Assessment Comment pt w/ Hammond palsy at 20 yrs of age, has residual L weakness  -MG        Lingual Strength and Mobility reduced ROM  -MG        Buccal Strength and Mobility reduced strength left  -MG        General Feeding/Swallowing Observations    Current Feeding Method NPO  -MG        Observations of Self Feeding Skills appropriate self feeding skills observed  -MG          User Key  (r) = Recorded By, (t) = Taken By, (c) = Cosigned By    Initials Name Effective Dates    MG Mayte Higginbotham MA,CCC-SLP 04/13/15 -         EDUCATION  The patient has been educated in the following areas:   Dysphagia (Swallowing Impairment) Oral Care/Hydration Modified Diet  Instruction.    SLP Recommendation and Plan     SLP Diet Recommendation: regular textures, thin liquids  Recommended Feeding/Eating Techniques: no straws  SLP Rec. for Method of Medication Administration: meds whole with thin liquid  Monitor For Signs Of Aspiration: cough, right lower lobe infiltrates     Criteria for Skilled Therapeutic Interventions Met: skilled criteria for dysphagia intervention met  Anticipated Discharge Disposition: other (see comments)  Rehab Potential/Prognosis, Swallowing: good, to achieve stated therapy goals           Demonstrates Need for Referral to Another Service:  (unknown)               IP SLP Goals       10/16/17 0850          Safely Consume Diet    Safely Consume Diet- SLP, Time to Achieve by discharge  -MG        User Key  (r) = Recorded By, (t) = Taken By, (c) = Cosigned By    Initials Name Provider Type    MG Mayte Higginbotham MA,CCC-SLP Speech and Language Pathologist             SLP Outcome Measures (last 72 hours)      SLP Outcome Measures       10/16/17 0800          SLP Outcome Measures    Outcome Measure Used? Adult NOMS  -MG      FCM Scores    FCM Chosen Swallowing  -MG      Swallowing FCM Score 6  -MG        User Key  (r) = Recorded By, (t) = Taken By, (c) = Cosigned By    Initials Name Effective Dates    MG Mayte Higginbotham MA,CCC-SLP 04/13/15 -            Time Calculation:         Time Calculation- SLP       10/16/17 0854          Time Calculation- SLP    SLP Received On 10/16/17  -MG        User Key  (r) = Recorded By, (t) = Taken By, (c) = Cosigned By    Initials Name Provider Type    MG Mayte Higginbotham MA,CCC-SLP Speech and Language Pathologist          Therapy Charges for Today     Code Description Service Date Service Provider Modifiers Qty    [id number]  ST EVAL ORAL PHARYNG SWALLOW 3 10/16/2017 Mayte Higginbotham MA,CCC-SLP GN 1               Mayte Higginbotham MA,LUKASZ-SLP  10/16/2017

## 2017-10-16 NOTE — H&P
Internal medicine history and physical    INTERNAL MEDICINE   [facility]       Patient Identification:  Name: Lana Yañez  Age: 72 y.o.  Sex: female  :  1945  MRN: [id number]                   Primary Care Physician: Raoul Ramirez MD                                   Chief Complaint:  Woke up this morning was slurred speech and left-sided facial weakness.    History of Present Illness:   Patient is a 72-year-old female who was hospitalized recently from 10/11/2017 through 10/14/2017 when she was admitted from spine surgery service's office for atrial fibrillation and rapid ventricular response and palpitation.  She was worked up and was discharged on her Pradaxa along with beta blocker.  According to patient she went to bed feeling fine for about a take her Pradaxa woke up this morning and couldn't speak very well.  She also has some weakness on the right side.  But that she came to the emergency room and is being evaluated and admitted for TIA/evolving CVA.  Patient underwent CT angiogram and it showed possible mca occlusion at right m2-mca branch.  Patient was seen by neurology service and was recommended that she can be started on Pradaxa.  MRI of her brain shows several tiny areas of acute infarction in the right cerebral hemisphere with the largest area measuring about 8 mm.  Patient denies any chest pain or shortness of breath.      Past Medical History:  Past Medical History:   Diagnosis Date   • Arthritis    • Asthma    • Atrial fibrillation    • Edema    • Fatigue    • Headache    • Hx of bone density study     10/23/2014   • Hyperlipidemia    • Hypertension    • Low back pain    • Obesity    • Vitamin D deficiency      Past Surgical History:  Past Surgical History:   Procedure Laterality Date   • HYSTERECTOMY     • KNEE SURGERY     • MAMMO BILATERAL      [facility]       Home Meds:  Prescriptions Prior to Admission   Medication Sig Dispense Refill Last Dose    • albuterol (PROVENTIL HFA;VENTOLIN HFA) 108 (90 BASE) MCG/ACT inhaler Inhale 2 puffs Every 4 (Four) Hours As Needed for wheezing. 1 inhaler 11 10/10/2017 at Unknown time   • atenolol (TENORMIN) 100 MG tablet Take 1 tablet by mouth Daily. 30 tablet 0    • cetirizine (ZyrTEC) 10 MG tablet Take 10 mg by mouth Daily As Needed.   10/10/2017 at Unknown time   • COSOPT PF 22.3-6.8 MG/ML solution Administer 1 drop to both eyes 2 (Two) Times a Day.   10/10/2017 at Unknown time   • dabigatran etexilate (PRADAXA) 150 MG capsu Take 1 capsule by mouth Every 12 (Twelve) Hours. 60 capsule 0    • folic acid (FOLVITE) 1 MG tablet Take 1 mg by mouth Daily. Patient stopped taking 2 weeks ago   Past Month at Unknown time   • furosemide (LASIX) 20 MG tablet Take 1 tablet by mouth Daily. (Patient taking differently: Take 20 mg by mouth Daily As Needed.) 30 tablet 0    • HYDROcodone-acetaminophen (NORCO) 5-325 MG per tablet Take 1 tablet by mouth Every 6 (Six) Hours As Needed for Moderate Pain . (Patient taking differently: Take 1 tablet by mouth Every 4 (Four) Hours As Needed for Moderate Pain . 4-6 hours prn) 20 tablet 0 10/11/2017 at 1000   • losartan (COZAAR) 50 MG tablet Take 1 tablet by mouth Daily. 30 tablet 0    • meloxicam (MOBIC) 15 MG tablet Take 15 mg by mouth Daily.   10/15/2017 at Unknown time   • methotrexate 2.5 MG tablet Take  by mouth. Takes 8 tablets once a week. Patient stopped taking 2 weeks ago.   Past Month at Unknown time   • MYRBETRIQ 50 MG tablet sustained-release 24 hour Take 50 mg by mouth Every Morning.   10/15/2017 at Unknown time   • potassium chloride (MICRO-K) 10 MEQ CR capsule Take 1 capsule by mouth Daily. 30 capsule 0    • travoprost, BAK free, (TRAVATAN Z) 0.004 % solution ophthalmic solution Administer 1 drop to both eyes Every Evening. in affected eye(s)   10/10/2017 at Unknown time     Current Meds:     Current Facility-Administered Medications:   •  dabigatran etexilate (PRADAXA) capsule 150 mg,  "150 mg, Oral, Q12H, Edita Fernández MD  •  Influenza Vac Subunit Quad (FLUCELVAX) injection 0.5 mL, 0.5 mL, Intramuscular, During Hospitalization, Radha Jarvis MD  •  pneumococcal polysaccharide 23-valent (PNEUMOVAX-23) vaccine 0.5 mL, 0.5 mL, Intramuscular, During Hospitalization, Radha Jarvis MD  •  Insert peripheral IV, , , Once **AND** sodium chloride 0.9 % flush 10 mL, 10 mL, Intravenous, PRN, Agustin Mao MD  Allergies:  No Known Allergies  Social History:   Social History   Substance Use Topics   • Smoking status: Never Smoker   • Smokeless tobacco: Never Used   • Alcohol use No      Family History:  Family History   Problem Relation Age of Onset   • Colon cancer Mother    • Glaucoma Mother    • Stroke Mother    • Glaucoma Sister    • Diabetes Sister    • Heart disease Sister    • Heart disease Brother    • Diabetes Brother           Review of Systems  See history of present illness and past medical history.   Constitutional: Negative for chills and fever.   HENT: Negative for sore throat and trouble swallowing.    Eyes: Negative for visual disturbance.   Respiratory: Negative for cough and shortness of breath.    Cardiovascular: Negative for chest pain, palpitations and leg swelling.   Gastrointestinal: Negative for abdominal pain, diarrhea and vomiting.   Endocrine: Negative.    Genitourinary: Negative for decreased urine volume, dysuria and frequency.   Musculoskeletal: Negative for neck pain.        Left leg pain   Skin: Negative for rash.   Allergic/Immunologic: Negative.    Neurological: Positive for weakness (Left-sided) and numbness (Left leg numbness with a hx of sciatica lumbar radiculopathy). Negative for syncope and headaches.   Hematological: Negative.    Psychiatric/Behavioral: Negative.      Vitals:   /85 (BP Location: Left arm, Patient Position: Lying)  Pulse 91  Temp 98 °F (36.7 °C) (Oral)   Resp 18  Ht 70\" (177.8 cm)  Wt 265 lb (120 kg)  LMP  (LMP Unknown)  SpO2 95%  BMI " 38.02 kg/m2  I/O: No intake or output data in the 24 hours ending 10/15/17 2009  Exam:  General Appearance:    Alert, cooperative, no distress, appears stated age   Head:    Normocephalic, without obvious abnormality, atraumatic   Eyes:    PERRL, conjunctiva/corneas clear, EOM's intact, both eyes   Ears:    Normal external ear canals, both ears   Nose:   Nares normal, septum midline, mucosa normal, no drainage    or sinus tenderness   Throat:   Lips, tongue, gums normal; oral mucosa pink and moist   Neck:   Supple, symmetrical, trachea midline, no adenopathy;     thyroid:  no enlargement/tenderness/nodules; no carotid    bruit or JVD   Back:     Symmetric, no curvature, ROM normal, no CVA tenderness   Lungs:     Clear to auscultation bilaterally, respirations unlabored   Chest Wall:    No tenderness or deformity    Heart:    Irregular rate and rhythm, S1 and S2 normal, no murmur, rub   or gallop   Abdomen:     Soft, non-tender, bowel sounds active all four quadrants,     no masses, no hepatomegaly, no splenomegaly   Extremities:   Extremities normal, atraumatic, no cyanosis or edema   Pulses:   Pulses palpable in all extremities; symmetric all extremities   Skin:   Skin color normal, Skin is warm and dry,  no rashes or palpable lesions   Neurologic:   Mild left-sided facial asymmetry with flattening of the nasolabial fold and weakness of the left arm and left leg.       Data Review:      I reviewed the patient's new clinical results.    Results from last 7 days  Lab Units 10/15/17  1400 10/14/17  0643 10/13/17  0533 10/12/17  1927 10/11/17  1147   WBC 10*3/mm3 4.99 7.84 8.28 7.64 6.88   HEMOGLOBIN g/dL 12.4 12.2 11.8* 11.9 12.3   PLATELETS 10*3/mm3 188 181 170 178 169       Results from last 7 days  Lab Units 10/15/17  1400 10/14/17  0643 10/13/17  0534 10/12/17  0404 10/11/17  1147   SODIUM mmol/L 142 141 142 146* 147*   POTASSIUM mmol/L 3.8 3.4* 3.7 3.4* 3.3*   CHLORIDE mmol/L 104 102 102 104 106   CO2 mmol/L 26.4  27.6 27.9 28.4 27.3   BUN mg/dL 18 21 20 17 14   CREATININE mg/dL 0.96 0.88 1.02* 1.03* 1.04*   CALCIUM mg/dL 9.7 9.5 9.5 9.7 9.9   GLUCOSE mg/dL 123* 104* 115* 118* 145*       Assessment:  Active Hospital Problems (** Indicates Principal Problem)    Diagnosis Date Noted   • **Left-sided weakness [R53.1] 10/15/2017   • Atrial fibrillation [I48.91] 10/15/2017   • History of MRSA infection [Z86.14] 10/11/2017   • Sleep apnea [G47.30] 08/02/2016   • Essential hypertension [I10] 03/21/2016   • Hyperlipidemia [E78.5] 03/21/2016      Resolved Hospital Problems    Diagnosis Date Noted Date Resolved   No resolved problems to display.       Plan:  Admit the patient continue anticoagulation repeat CAT scan in the morning follow neurology's lead for further intervention.  Further management as her condition evolves.    Radha Jarvis MD   10/15/2017  8:09 PM  Much of this encounter note is an electronic transcription/translation of spoken language to printed text. The electronic translation of spoken language may permit erroneous, or at times, nonsensical words or phrases to be inadvertently transcribed; Although I have reviewed the note for such errors, some may still exist

## 2017-10-16 NOTE — PROGRESS NOTES
Continued Stay Note  [facility]     Patient Name: Lana Yañez  MRN: [id number]  Today's Date: 10/16/2017    Admit Date: 10/15/2017          Discharge Plan       10/16/17 1419    Case Management/Social Work Plan    Plan Patient will need rehab at discharge. [facility] contacted at patient and family's request.     Additional Comments IMM given on 10-16. Face sheet, Pharmacy and PCP verified. Patient  had her daughter, Delaney ([place]) in the room. Patient  was agreeable to talking with her daughter,  Delaney ---[phone]-present. Patient lives with her  , Suresh in a house . It's a bi-level with steps inside and out. Delaney is an agency nurse who works  in the rehab wing at  [facility] and she wants patient to go there on discharge. Patient agrees.  Patient has been to [facility] and [facility]  in the past . She liked [facility]. She has used Home Health  in the past but can't remember which agency she used.  Have called Martha/ Reina  and asked her to follow for possible Rehab at [facility].  .Packet started and left in [facility] office...........  ERIN Jiménez              Discharge Codes     None            ERIN Jiménez

## 2017-10-16 NOTE — PROGRESS NOTES
Neurology Progress Note      Chief Complaint:  Left facial droop and slurred speech.    Subjective     Subjective:  Speech much better and left facial droop much better.    Medications:  Current Facility-Administered Medications   Medication Dose Route Frequency Provider Last Rate Last Dose   • albuterol (PROVENTIL) nebulizer solution 0.083% 2.5 mg/3mL  2.5 mg Nebulization Q6H PRN Radha Jarvis MD       • atenolol (TENORMIN) tablet 100 mg  100 mg Oral Daily Jawed MD Matheus   100 mg at 10/16/17 0953   • atorvastatin (LIPITOR) tablet 40 mg  40 mg Oral Nightly Edita Fernández MD   40 mg at 10/15/17 2337   • cetirizine (zyrTEC) tablet 5 mg  5 mg Oral Daily PRN Radha Jarvis MD       • dabigatran etexilate (PRADAXA) capsule 150 mg  150 mg Oral Q12H Jawed MD Matheus   150 mg at 10/16/17 0954   • dorzolamide-timolol (COSOPT) ophthalmic solution 1 drop  1 drop Both Eyes BID Radha Jarvis MD       • folic acid (FOLVITE) tablet 1 mg  1 mg Oral Daily Jawed MD Matheus   1 mg at 10/16/17 0953   • furosemide (LASIX) tablet 20 mg  20 mg Oral Daily Jawed MD Matheus   20 mg at 10/16/17 0954   • HYDROcodone-acetaminophen (NORCO) 5-325 MG per tablet 1 tablet  1 tablet Oral Q6H PRN Radha Jarvis MD       • Influenza Vac Subunit Quad (FLUCELVAX) injection 0.5 mL  0.5 mL Intramuscular During Hospitalization Jawed MD Matheus       • latanoprost (XALATAN) 0.005 % ophthalmic solution 1 drop  1 drop Both Eyes Nightly Jawed MD Matheus       • losartan (COZAAR) tablet 50 mg  50 mg Oral Q24H Jawed MD Matheus   50 mg at 10/16/17 0954   • meloxicam (MOBIC) tablet 15 mg  15 mg Oral Daily Jawed MD Matheus   15 mg at 10/16/17 0954   • oxybutynin XL (DITROPAN-XL) 24 hr tablet 10 mg  10 mg Oral Daily Jawed MD Mahteus   10 mg at 10/16/17 0953   • pneumococcal polysaccharide 23-valent (PNEUMOVAX-23) vaccine 0.5 mL  0.5 mL Intramuscular During Hospitalization Radha Jarvis MD       • potassium chloride (MICRO-K) CR capsule 10 mEq  10 mEq Oral Daily Radha Jarvis  MD   10 mEq at 10/16/17 0953   • sodium chloride 0.9 % flush 1-10 mL  1-10 mL Intravenous PRN Radha Jarvis MD       • sodium chloride 0.9 % flush 10 mL  10 mL Intravenous PRN Agustin Mao MD       • sodium chloride 0.9 % infusion  75 mL/hr Intravenous Continuous Peter Cuco De La Rosa MD 75 mL/hr at 10/16/17 1513 75 mL/hr at 10/16/17 1513       Review of Systems:   -A 14 point review of systems is completed and is negative except As above.      Objective      Vital Signs  Temp:  [98 °F (36.7 °C)-98.6 °F (37 °C)] 98.6 °F (37 °C)  Heart Rate:  [82-91] 82  Resp:  [16-18] 16  BP: (125-142)/() 125/87    Physical Exam:    HEENT:  Neck supple  CVS:  Regular rate and rhythm.  No murmurs  Carotid Examination:  No bruits  Lungs:  Clear to auscultation  Abdomen:  Non-tender, Non-distended  Extremities:  No signs of peripheral edema    Neurologic Exam:    -Awake, Alert, Oriented X 3  -No word finding difficulties  -No aphasia  -Very mild dysarthria-better than yesterday  -Follows simple and complex commands    Cranial nerves II through XII intact except left facial droop    Motor: (strength out of 5:  1= minimal movement, 2 = movement in plane of gravity, 3 = movement against gravity, 4 = movement against some resistance, 5 = full strength)    -Right Upper Ext: Proximal: 5 Distal: 5  -Left Upper Ext: Proximal: 5 Distal: 5    -Right Lower Ext: Proximal: 5 Distal: 5  -Left Lower Ext: Proximal: 4 Distal: 4    DTR:  1+ throughout in all four extremities    Sensory:  -Intact to light touch, pinprick, temperature, pain, and proprioception    Coordination/Gait:  -No ataxia     Results Review:    I reviewed the patient's new clinical results.  Repeat head CT no bleed      Assessment/Plan     Hospital Problem List    Principal Problem:    Acute CVA (cerebrovascular accident)  Active Problems:    Hyperlipidemia    Essential hypertension    Sleep apnea    History of MRSA infection    Left-sided weakness    Atrial  fibrillation    Impression:  1.  Right MCA ischemic stroke secondary to M2 MCA occlusion from atrial fibrillation:  - Continue pradaxa.  - Echocardiogram : Calculated EF = 57%  - PTOT speech  - TSH 1 B12 332 LDL 57 A1c 6.1  - At discharge Secondary stroke prophylaxis: Ideal targets for stroke prevention would be Blood pressure < 130/80; B12 > 500 TSH in normal range and LDL < 70; HbA1c < 6.5 and smoking cessation if applicable.  - She might benefit with rehabilitation placement.    2.  Atrial fibrillation: Continue Pradaxa.    3.  Mild B12 deficiency; replaced.        Edita Fernández MD  10/16/17  6:43 PM

## 2017-10-16 NOTE — PROGRESS NOTES
"Tera Yañez (72 y.o. Female)     Date of Birth Social Security Number Address Home Phone MRN    1945  [id number] [place] [id number] [phone] [id number]    Spiritism Marital Status          Zoroastrian        Admission Date Admission Type Admitting Provider Attending Provider Department, Room/Bed    10/15/17 Emergency Radha Jarvis MD Nguyen, Peter Norwood MD [address], [address]    Discharge Date Discharge Disposition Discharge Destination                      Attending Provider: Peter De La Rosa MD     Allergies:  No Known Allergies    Isolation:  Contact   Infection:  MRSA (06/13/17)   Code Status:  FULL    Ht:  70\" (177.8 cm)   Wt:  271 lb 1 oz (123 kg)    Admission Cmt:  None   Principal Problem:  Acute CVA (cerebrovascular accident) [I63.9]                 Active Insurance as of 10/15/2017     Primary Coverage     Payor Plan Insurance Group Employer/Plan Group    HUMANA MEDICARE REPLACEMENT HUMANA MEDICARE REPL [id number]     Payor Plan Address Payor Plan Phone Number Effective From Effective To    [address] [address] [phone] 1/1/2013     [place] [address]       Subscriber Name Subscriber Birth Date Member ID       TERA YÑAEZ 1945 [id number]                 Emergency Contacts      (Rel.) Home Phone Work Phone Mobile Phone    Suresh Yañez (Spouse) [phone]              "

## 2017-10-16 NOTE — PROGRESS NOTES
[facility]     LOS: 1 day     Name: Lana Yañez  Age: 72 y.o.  Sex: female  :  1945  MRN: [id number]         Primary Care Physician: Raoul Ramirez MD    Diet Regular; Thin; Cardiac    Subjective   No new complaints. Still with left facial weakness and LLE weakness.    Objective   Temp:  [97.6 °F (36.4 °C)-98.6 °F (37 °C)] 98.6 °F (37 °C)  Heart Rate:  [82-99] 82  Resp:  [16-18] 16  BP: (112-142)/() 142/118  SpO2:  [94 %-98 %] 94 %  on   ;   O2 Device: room air  Body mass index is 42.45 kg/(m^2).    Physical Exam   Constitutional: She is oriented to person, place, and time. No distress.   Cardiovascular: Normal rate and regular rhythm.    Pulmonary/Chest: Effort normal and breath sounds normal. No respiratory distress.   Abdominal: Soft. There is no tenderness.   Neurological: She is alert and oriented to person, place, and time.   LLE weakness, left facial weakness   Skin: Skin is warm and dry.     Reviewed medications and new clinical results    atenolol 100 mg Oral Daily   atorvastatin 40 mg Oral Nightly   dabigatran etexilate 150 mg Oral Q12H   dorzolamide-timolol 1 drop Both Eyes BID   folic acid 1 mg Oral Daily   furosemide 20 mg Oral Daily   latanoprost 1 drop Both Eyes Nightly   losartan 50 mg Oral Q24H   meloxicam 15 mg Oral Daily   oxybutynin XL 10 mg Oral Daily   potassium chloride 10 mEq Oral Daily        Results from last 7 days  Lab Units 10/15/17  1400 10/14/17  0643 10/13/17  0533 10/12/17  1927 10/11/17  1147   WBC 10*3/mm3 4.99 7.84 8.28 7.64 6.88   HEMOGLOBIN g/dL 12.4 12.2 11.8* 11.9 12.3   PLATELETS 10*3/mm3 188 181 170 178 169       Results from last 7 days  Lab Units 10/15/17  1400 10/14/17  0643 10/13/17  0534 10/12/17  0404 10/11/17  1147   SODIUM mmol/L 142 141 142 146* 147*   POTASSIUM mmol/L 3.8 3.4* 3.7 3.4* 3.3*   CHLORIDE mmol/L 104 102 102 104 106   CO2 mmol/L 26.4 27.6 27.9 28.4 27.3   BUN mg/dL 18 21 20  17 14   CREATININE mg/dL 0.96 0.88 1.02* 1.03* 1.04*   CALCIUM mg/dL 9.7 9.5 9.5 9.7 9.9   GLUCOSE mg/dL 123* 104* 115* 118* 145*     Hemoglobin A1C   Date Value Ref Range Status   10/15/2017 6.19 (H) 4.80 - 5.60 % Final     Estimated Creatinine Clearance: 77.7 mL/min (by C-G formula based on Cr of 0.96).    Assessment/Plan   Active Hospital Problems (** Indicates Principal Problem)    Diagnosis Date Noted   • **Acute CVA (cerebrovascular accident) [I63.9] 10/15/2017   • Left-sided weakness [R53.1] 10/15/2017   • Atrial fibrillation [I48.91] 10/15/2017   • History of MRSA infection [Z86.14] 10/11/2017   • Sleep apnea [G47.30] 08/02/2016   • Essential hypertension [I10] 03/21/2016   • Hyperlipidemia [E78.5] 03/21/2016      Resolved Hospital Problems    Diagnosis Date Noted Date Resolved   No resolved problems to display.     · pradaxa and further workup per neurology  · pulm eval per neurology FRANCY  · SNF eval  · Discussed with RN, daughter    Peter Cuco De La Rosa MD   10/16/17  1:45 PM

## 2017-10-16 NOTE — PLAN OF CARE
Problem: Inpatient SLP  Goal: Dysphagia- Patient will safely consume diet as per recommendation with no signs/symptoms of aspiration    10/16/17 0850   Safely Consume Diet   Safely Consume Diet- SLP, Time to Achieve by discharge

## 2017-10-16 NOTE — PLAN OF CARE
Problem: Patient Care Overview (Adult)  Goal: Plan of Care Review  Outcome: Ongoing (interventions implemented as appropriate)    10/16/17 0552   Coping/Psychosocial Response Interventions   Plan Of Care Reviewed With patient   Patient Care Overview   Progress improving   Outcome Evaluation   Outcome Summary/Follow up Plan VSS. Pt rested well throughout shift. no c/o pain. up to toilet multiple times throughout night. NIH 3. Pt NPO until SLP consult. meds given per order. will continue to closely monitor.        Goal: Adult Individualization and Mutuality  Outcome: Ongoing (interventions implemented as appropriate)  Goal: Discharge Needs Assessment  Outcome: Ongoing (interventions implemented as appropriate)    Problem: Pain, Acute (Adult)  Goal: Identify Related Risk Factors and Signs and Symptoms  Outcome: Ongoing (interventions implemented as appropriate)  Goal: Acceptable Pain Control/Comfort Level  Outcome: Ongoing (interventions implemented as appropriate)

## 2017-10-16 NOTE — SIGNIFICANT NOTE
10/16/17 1436   Rehab Treatment   Discipline occupational therapist   Rehab Evaluation   Evaluation Not Performed other (see comments)  (Follow up tomorrow for OT per PT note hold todayper neurologist)

## 2017-10-16 NOTE — SIGNIFICANT NOTE
10/16/17 1402   Rehab Treatment   Discipline physical therapist   Rehab Evaluation   Evaluation Not Performed other (see comments)  (PT consult received. Per RN, Aman, neurologist requested PT and OT hold today and begin tomorrow. Will follow up for evaluation tomorrow. )   Recommendation   PT - Next Appointment 10/17/17

## 2017-10-17 ENCOUNTER — TELEPHONE (OUTPATIENT)
Dept: NEUROSURGERY | Facility: CLINIC | Age: 72
End: 2017-10-17

## 2017-10-17 PROCEDURE — 90661 CCIIV3 VAC ABX FR 0.5 ML IM: CPT | Performed by: INTERNAL MEDICINE

## 2017-10-17 PROCEDURE — 99231 SBSQ HOSP IP/OBS SF/LOW 25: CPT | Performed by: PSYCHIATRY & NEUROLOGY

## 2017-10-17 PROCEDURE — G0009 ADMIN PNEUMOCOCCAL VACCINE: HCPCS | Performed by: INTERNAL MEDICINE

## 2017-10-17 PROCEDURE — 97110 THERAPEUTIC EXERCISES: CPT

## 2017-10-17 PROCEDURE — G0008 ADMIN INFLUENZA VIRUS VAC: HCPCS | Performed by: INTERNAL MEDICINE

## 2017-10-17 PROCEDURE — 97165 OT EVAL LOW COMPLEX 30 MIN: CPT

## 2017-10-17 PROCEDURE — 90732 PPSV23 VACC 2 YRS+ SUBQ/IM: CPT | Performed by: INTERNAL MEDICINE

## 2017-10-17 PROCEDURE — 97161 PT EVAL LOW COMPLEX 20 MIN: CPT

## 2017-10-17 PROCEDURE — 94799 UNLISTED PULMONARY SVC/PX: CPT

## 2017-10-17 PROCEDURE — 25010000002 PNEUMOCOCCAL VAC POLYVALENT PER 0.5 ML: Performed by: INTERNAL MEDICINE

## 2017-10-17 PROCEDURE — 25010000002 INFLUENZA VAC SUBUNIT QUAD 0.5 ML SUSPENSION PREFILLED SYRINGE: Performed by: INTERNAL MEDICINE

## 2017-10-17 RX ORDER — ACETAMINOPHEN 325 MG/1
650 TABLET ORAL EVERY 6 HOURS PRN
Status: DISCONTINUED | OUTPATIENT
Start: 2017-10-17 | End: 2017-10-18 | Stop reason: HOSPADM

## 2017-10-17 RX ADMIN — FUROSEMIDE 20 MG: 20 TABLET ORAL at 08:09

## 2017-10-17 RX ADMIN — DORZOLAMIDE HYDROCHLORIDE AND TIMOLOL MALEATE 1 DROP: 20; 5 SOLUTION/ DROPS OPHTHALMIC at 17:03

## 2017-10-17 RX ADMIN — OXYBUTYNIN CHLORIDE 10 MG: 10 TABLET, FILM COATED, EXTENDED RELEASE ORAL at 08:08

## 2017-10-17 RX ADMIN — A/SINGAPORE/GP1908/2015 IVR-180 (H1N1) (AN A/MICHIGAN/45/2015-LIKE VIRUS), A/SINGAPORE/GP2050/2015 (H3N2) (AN A/HONG KONG/4801/2014 - LIKE VIRUS), B/UTAH/9/2014 (A B/PHUKET/3073/2013-LIKE VIRUS), B/HONG KONG/259/2010 (A B/BRISBANE/60/08-LIKE VIRUS) 0.5 ML: 15; 15; 15; 15 INJECTION, SUSPENSION INTRAMUSCULAR at 18:13

## 2017-10-17 RX ADMIN — DABIGATRAN ETEXILATE MESYLATE 150 MG: 150 CAPSULE ORAL at 08:09

## 2017-10-17 RX ADMIN — LOSARTAN POTASSIUM 50 MG: 50 TABLET, FILM COATED ORAL at 08:08

## 2017-10-17 RX ADMIN — DORZOLAMIDE HYDROCHLORIDE AND TIMOLOL MALEATE 1 DROP: 20; 5 SOLUTION/ DROPS OPHTHALMIC at 08:11

## 2017-10-17 RX ADMIN — HYDROCODONE BITARTRATE AND ACETAMINOPHEN 1 TABLET: 5; 325 TABLET ORAL at 23:06

## 2017-10-17 RX ADMIN — LATANOPROST 1 DROP: 50 SOLUTION OPHTHALMIC at 20:22

## 2017-10-17 RX ADMIN — ATENOLOL 100 MG: 50 TABLET ORAL at 08:09

## 2017-10-17 RX ADMIN — ATORVASTATIN CALCIUM 40 MG: 20 TABLET, FILM COATED ORAL at 21:11

## 2017-10-17 RX ADMIN — PNEUMOCOCCAL VACCINE POLYVALENT 0.5 ML
25; 25; 25; 25; 25; 25; 25; 25; 25; 25; 25; 25; 25; 25; 25; 25; 25; 25; 25; 25; 25; 25; 25 INJECTION, SOLUTION INTRAMUSCULAR; SUBCUTANEOUS at 05:49

## 2017-10-17 RX ADMIN — POTASSIUM CHLORIDE 10 MEQ: 750 CAPSULE, EXTENDED RELEASE ORAL at 08:09

## 2017-10-17 RX ADMIN — DABIGATRAN ETEXILATE MESYLATE 150 MG: 150 CAPSULE ORAL at 20:22

## 2017-10-17 RX ADMIN — CYANOCOBALAMIN TAB 500 MCG 1000 MCG: 500 TAB at 08:09

## 2017-10-17 RX ADMIN — MELOXICAM 15 MG: 7.5 TABLET ORAL at 08:09

## 2017-10-17 RX ADMIN — FOLIC ACID 1 MG: 1 TABLET ORAL at 08:09

## 2017-10-17 NOTE — PROGRESS NOTES
Neurology Progress Note      Chief Complaint:  Left facial droop and slurred speech.    Subjective     Subjective:  Speech Is better however left leg still slightly weak .  Awaiting rehabilitation.    Medications:  Current Facility-Administered Medications   Medication Dose Route Frequency Provider Last Rate Last Dose   • acetaminophen (TYLENOL) tablet 650 mg  650 mg Oral Q6H PRN Peter De La Rosa MD       • albuterol (PROVENTIL) nebulizer solution 0.083% 2.5 mg/3mL  2.5 mg Nebulization Q6H PRN Radha Jarvis MD       • atenolol (TENORMIN) tablet 100 mg  100 mg Oral Daily Jawed MD Matheus   100 mg at 10/17/17 0809   • atorvastatin (LIPITOR) tablet 40 mg  40 mg Oral Nightly Edita Fernández MD   40 mg at 10/16/17 2016   • cetirizine (zyrTEC) tablet 5 mg  5 mg Oral Daily PRN Radha Jarvis MD       • dabigatran etexilate (PRADAXA) capsule 150 mg  150 mg Oral Q12H Jawed MD Matheus   150 mg at 10/17/17 0809   • dorzolamide-timolol (COSOPT) ophthalmic solution 1 drop  1 drop Both Eyes BID Edenilsoned MD Matheus   1 drop at 10/17/17 0811   • folic acid (FOLVITE) tablet 1 mg  1 mg Oral Daily Jawed MD Matheus   1 mg at 10/17/17 0809   • furosemide (LASIX) tablet 20 mg  20 mg Oral Daily Jawed MD Matheus   20 mg at 10/17/17 0809   • HYDROcodone-acetaminophen (NORCO) 5-325 MG per tablet 1 tablet  1 tablet Oral Q6H PRN Radha Jarvis MD   1 tablet at 10/16/17 2213   • Influenza Vac Subunit Quad (FLUCELVAX) injection 0.5 mL  0.5 mL Intramuscular During Hospitalization Jawed MD Matheus       • latanoprost (XALATAN) 0.005 % ophthalmic solution 1 drop  1 drop Both Eyes Nightly Radha Jarvis MD   1 drop at 10/16/17 2018   • losartan (COZAAR) tablet 50 mg  50 mg Oral Q24H Jawed MD Matheus   50 mg at 10/17/17 0808   • meloxicam (MOBIC) tablet 15 mg  15 mg Oral Daily Jawed MD Matheus   15 mg at 10/17/17 0809   • oxybutynin XL (DITROPAN-XL) 24 hr tablet 10 mg  10 mg Oral Daily Radha Jarvis MD   10 mg at 10/17/17 0808   • potassium chloride (MICRO-K) CR  capsule 10 mEq  10 mEq Oral Daily Radha Jarvis MD   10 mEq at 10/17/17 0809   • sodium chloride 0.9 % flush 1-10 mL  1-10 mL Intravenous PRN Radha Jarvis MD       • sodium chloride 0.9 % flush 10 mL  10 mL Intravenous PRN Agustin Mao MD       • vitamin B-12 (CYANOCOBALAMIN) tablet 1,000 mcg  1,000 mcg Oral Daily Edita Fernández MD   1,000 mcg at 10/17/17 0809       Review of Systems:   -A 14 point review of systems is completed and is negative except As above.      Objective      Vital Signs  Temp:  [97.5 °F (36.4 °C)-97.9 °F (36.6 °C)] 97.9 °F (36.6 °C)  Heart Rate:  [] 98  Resp:  [16] 16  BP: (110-135)/(83-93) 110/85    Physical Exam:    HEENT:  Neck supple  CVS:  Regular rate and rhythm.  No murmurs  Carotid Examination:  No bruits  Lungs:  Clear to auscultation  Abdomen:  Non-tender, Non-distended  Extremities:  No signs of peripheral edema    Neurologic Exam:    -Awake, Alert, Oriented X 3  -No word finding difficulties  -No aphasia  -Very mild dysarthria-better than yesterday  -Follows simple and complex commands    Cranial nerves II through XII intact except left facial droop    Motor: (strength out of 5:  1= minimal movement, 2 = movement in plane of gravity, 3 = movement against gravity, 4 = movement against some resistance, 5 = full strength)    -Right Upper Ext: Proximal: 5 Distal: 5  -Left Upper Ext: Proximal: 5 Distal: 5    -Right Lower Ext: Proximal: 5 Distal: 5  -Left Lower Ext: Proximal: 4- Distal: 4    DTR:  1+ throughout in all four extremities    Sensory:  -Intact to light touch, pinprick, temperature, pain, and proprioception    Coordination/Gait:  -No ataxia     Results Review:    I reviewed the patient's new clinical results.  Repeat head CT no bleed      Assessment/Plan     Hospital Problem List    Principal Problem:    Acute CVA (cerebrovascular accident)  Active Problems:    Hyperlipidemia    Essential hypertension    Sleep apnea    History of MRSA infection    Left-sided  weakness    Atrial fibrillation    Impression:  1.  Right MCA ischemic stroke secondary to M2 MCA occlusion from atrial fibrillation:  - Continue pradaxa.  - Echocardiogram : Calculated EF = 57%  - PTOT speech  - TSH 1 B12 332 LDL 57 A1c 6.1  - At discharge Secondary stroke prophylaxis: Ideal targets for stroke prevention would be Blood pressure < 130/80; B12 > 500 TSH in normal range and LDL < 70; HbA1c < 6.5 and smoking cessation if applicable.  - She might benefit with rehabilitation placement.    2.  Atrial fibrillation: Continue Pradaxa.    3.  Mild B12 deficiency; replaced.  4.  No driving for 6 weeks cleared by M.D.  5.  Severe sleep apnea will need sleep studies.        Edita Fernández MD  10/17/17  3:45 PM

## 2017-10-17 NOTE — PROGRESS NOTES
[facility]     LOS: 2 days     Name: Lana Yañez  Age: 72 y.o.  Sex: female  :  1945  MRN: [id number]         Primary Care Physician: Raoul Ramirez MD    Diet Regular; Thin; Cardiac    Subjective   No new complaints.    Objective   Temp:  [97.5 °F (36.4 °C)-97.9 °F (36.6 °C)] 97.9 °F (36.6 °C)  Heart Rate:  [] 98  Resp:  [16] 16  BP: (110-135)/(83-93) 110/85  SpO2:  [93 %-97 %] 95 %  on   ;   O2 Device: room air  Body mass index is 42.45 kg/(m^2).    Physical Exam   Constitutional: She is oriented to person, place, and time. No distress.   Cardiovascular: Normal rate and regular rhythm.    Pulmonary/Chest: Effort normal and breath sounds normal. No respiratory distress.   Abdominal: Soft. There is no tenderness.   Neurological: She is alert and oriented to person, place, and time.   LLE weakness about the same or maybe improved, left facial weakness improved   Skin: Skin is warm and dry.     Reviewed medications and new clinical results    atenolol 100 mg Oral Daily   atorvastatin 40 mg Oral Nightly   dabigatran etexilate 150 mg Oral Q12H   dorzolamide-timolol 1 drop Both Eyes BID   folic acid 1 mg Oral Daily   furosemide 20 mg Oral Daily   latanoprost 1 drop Both Eyes Nightly   losartan 50 mg Oral Q24H   meloxicam 15 mg Oral Daily   oxybutynin XL 10 mg Oral Daily   potassium chloride 10 mEq Oral Daily   vitamin B-12 1,000 mcg Oral Daily       sodium chloride 75 mL/hr Last Rate: 75 mL/hr (10/16/17 3968)       Results from last 7 days  Lab Units 10/15/17  1400 10/14/17  0643 10/13/17  0533 10/12/17  1927 10/11/17  1147   WBC 10*3/mm3 4.99 7.84 8.28 7.64 6.88   HEMOGLOBIN g/dL 12.4 12.2 11.8* 11.9 12.3   PLATELETS 10*3/mm3 188 181 170 178 169       Results from last 7 days  Lab Units 10/15/17  1400 10/14/17  0643 10/13/17  0534 10/12/17  0404 10/11/17  1147   SODIUM mmol/L 142 141 142 146* 147*   POTASSIUM mmol/L 3.8 3.4* 3.7 3.4* 3.3*    CHLORIDE mmol/L 104 102 102 104 106   CO2 mmol/L 26.4 27.6 27.9 28.4 27.3   BUN mg/dL 18 21 20 17 14   CREATININE mg/dL 0.96 0.88 1.02* 1.03* 1.04*   CALCIUM mg/dL 9.7 9.5 9.5 9.7 9.9   GLUCOSE mg/dL 123* 104* 115* 118* 145*     Hemoglobin A1C   Date Value Ref Range Status   10/15/2017 6.19 (H) 4.80 - 5.60 % Final     Estimated Creatinine Clearance: 77.7 mL/min (by C-G formula based on Cr of 0.96).    Assessment/Plan   Active Hospital Problems (** Indicates Principal Problem)    Diagnosis Date Noted   • **Acute CVA (cerebrovascular accident) [I63.9] 10/15/2017   • Left-sided weakness [R53.1] 10/15/2017   • Atrial fibrillation [I48.91] 10/15/2017   • History of MRSA infection [Z86.14] 10/11/2017   • Sleep apnea [G47.30] 08/02/2016   • Essential hypertension [I10] 03/21/2016   • Hyperlipidemia [E78.5] 03/21/2016      Resolved Hospital Problems    Diagnosis Date Noted Date Resolved   No resolved problems to display.     · Pradaxa, statin  · FRANCY per pulm  · SNF possible tomorrow    Peter De La Rosa MD   10/17/17  3:32 PM

## 2017-10-17 NOTE — THERAPY EVALUATION
Acute Care - Occupational Therapy Initial Evaluation  [facility]     Patient Name: Lana Yañez  : 1945  MRN: [id number]  Today's Date: 10/17/2017  Onset of Illness/Injury or Date of Surgery Date: 10/15/17          Admit Date: 10/15/2017       ICD-10-CM ICD-9-CM   1. Left-sided weakness R53.1 728.87   2. FRANCY (obstructive sleep apnea) G47.33 327.23   3. Persistent atrial fibrillation I48.1 427.31   4. Acute CVA (cerebrovascular accident) I63.9 434.91     Patient Active Problem List   Diagnosis   • Hyperlipidemia   • Vitamin deficiency   • Essential hypertension   • Rheumatoid arthritis involving both hands   • Fatigue   • ANTOINE (dyspnea on exertion)   • Dysuria   • Urge incontinence of urine   • Hypovitaminosis D   • Sleep apnea   • Encounter for screening colonoscopy   • Bronchitis   • Cough   • Generalized osteoarthritis of multiple sites   • Morbid obesity with BMI of 40.0-44.9, adult   • Cellulitis of right elbow due to MRSA   • Medicare annual wellness visit, initial   • Hospital discharge follow-up   • Displacement of lumbar intervertebral disc without myelopathy   • Lumbar disc herniation   • Atrial fibrillation with RVR   • History of MRSA infection   • Left-sided weakness   • Atrial fibrillation   • Acute CVA (cerebrovascular accident)     Past Medical History:   Diagnosis Date   • Arthritis    • Asthma    • Atrial fibrillation    • Edema    • Fatigue    • Headache    • Hx of bone density study     10/23/2014   • Hyperlipidemia    • Hypertension    • Low back pain    • Obesity    • Vitamin D deficiency      Past Surgical History:   Procedure Laterality Date   • HYSTERECTOMY     • KNEE SURGERY     • MAMMO BILATERAL  2016    [facility]           OT ASSESSMENT FLOWSHEET (last 72 hours)      OT Evaluation       10/17/17 1539 10/17/17 1418 10/16/17 1436 10/16/17 1418 10/16/17 1402    Rehab Evaluation    Document Type evaluation  -SG evaluation  -AR       Subjective Information agree to  therapy  -SG agree to therapy  -AR       Evaluation Not Performed   other (see comments)   Follow up tomorrow for OT per PT note hold todayper neurologist  -SG  other (see comments)   PT consult received. Per RN, Aman, neurologist requested PT and OT hold today and begin tomorrow. Will follow up for evaluation tomorrow.   -EE    Patient Effort, Rehab Treatment  good  -AR       Symptoms Noted During/After Treatment  fatigue  -AR       General Information    Patient Profile Review yes  -SG yes  -AR       Onset of Illness/Injury or Date of Surgery Date  10/15/17  -AR       General Observations sitting EOB  -SG        Precautions/Limitations fall precautions  -SG fall precautions  -AR       Equipment Currently Used at Home  walker, rolling  -AR       Plans/Goals Discussed With patient;agreed upon  -SG        Barriers to Rehab  none identified  -AR       Living Environment    Lives With  spouse  -AR  spouse  -SGA     Living Arrangements  house  -AR  house  -SGA     Home Accessibility  stairs to enter home;stairs within home  -AR  --   house is a bi-level   -SGA     Living Environment Comment  bilevel home  -AR       Clinical Impression    OT Diagnosis need for assist with personal care  -SG        Patient/Family Goals Statement to go to rehab  -SG        Criteria for Skilled Therapeutic Interventions Met yes;treatment indicated  -SG        Therapy Frequency 3-5 times/wk  -SG        Functional Level Prior    Ambulation    0-->independent  -SGA     Transferring    0-->independent  -SGA     Toileting    0-->independent  -SGA     Bathing    0-->independent  -SGA     Dressing    0-->independent  -SGA     Eating    0-->independent  -SGA     Pain Assessment    Pain Assessment 0-10  -SG No/denies pain  -AR       Pain Score 7  -SG        Pain Type Chronic pain  -SG        Pain Location Leg  -SG        Vision Assessment/Intervention    Vision Comment macular degeneration  -SG        Cognitive Assessment/Intervention    Current  Cognitive/Communication Assessment functional  -SG functional  -AR       Orientation Status oriented x 4  -SG oriented x 4  -AR       Follows Commands/Answers Questions 100% of the time;able to follow multi-step instructions  -% of the time  -AR       Personal Safety  WNL/WFL  -AR       ROM (Range of Motion)    General ROM  --   B LE WFL  -AR       General ROM Detail limited left shoulder /pt states has been present for several years  -SG        MMT (Manual Muscle Testing)    General MMT Assessment  --   R LE 4/5, LLE +3/5  -AR       Bed Mobility, Assessment/Treatment    Bed Mobility, Assistive Device  bed rails;head of bed elevated  -AR       Bed Mob, Supine to Sit, [place]  supervision required  -AR       Bed Mob, Sit to Supine, [place]  minimum assist (75% patient effort)  -AR       Bed Mobility, Comment sitting EOB  -SG        Transfer Assessment/Treatment    Transfers, Sit-Stand [place]  minimum assist (75% patient effort)  -AR       Transfers, Stand-Sit [place]  minimum assist (75% patient effort)  -AR       Transfers, Sit-Stand-Sit, Assist Device  rolling walker  -AR       Lower Body Dressing Assessment/Training    LB Dressing Assess/Train, Comment pt states difficulty with tie shoes   -SG        Grooming Assessment/Training    Grooming Assess/Train, Position sitting  -SG        Grooming Assess/Train, Indepen Level maximum assist (25% patient effort)  -SG        Grooming Assess/Train, Comment difficulty due to left shoulder ROM limited  -SG        Sensory Assessment/Intervention    Light Touch LUE;RUE  -SG        LUE Light Touch WNL  -SG        RUE Light Touch WNL  -SG        General Therapy Interventions    Planned Therapy Interventions ADL retraining;balance training;transfer training  -SG        Positioning and Restraints    Pre-Treatment Position in bed  -SG in bed  -AR       Post Treatment Position bed  -SG bed  -AR       In Bed call light within reach;encouraged to call for  assist;exit alarm on  -SG supine;call light within reach;encouraged to call for assist;exit alarm on  -AR         10/16/17 0800 10/15/17 1706 10/15/17 1703          Rehab Evaluation    Document Type evaluation  -MG        Subjective Information no complaints;agree to therapy  -MG        Patient Effort, Rehab Treatment good  -MG        Symptoms Noted During/After Treatment none  -MG        General Information    Equipment Currently Used at Home   none  -SGB      Living Environment    Lives With  spouse  -SGB       Living Arrangements  house  -SGB       Home Accessibility  stairs (2 railings present);stairs within home;stairs to enter home  -SGB       Stair Railings at Home  inside, present at both sides;outside, present at both sides  -SGB       Type of Financial/Environmental Concern  none  -SGB       Transportation Available  family or friend will provide  -SGB       Functional Level Prior    Ambulation   0-->independent  -SGB      Transferring   0-->independent  -SGB      Toileting   0-->independent  -SGB      Bathing   0-->independent  -SGB      Dressing   0-->independent  -SGB      Eating   0-->independent  -SGB      Communication   0-->understands/communicates without difficulty  -SGB      Swallowing   0-->swallows foods/liquids without difficulty  -SGB      Pain Assessment    Pain Assessment 0-10  -MG        Pain Score 5  -MG        Pain Type Chronic pain  -MG        Pain Location Leg  -MG        Cognitive Assessment/Intervention    Current Cognitive/Communication Assessment functional  -MG        Follows Commands/Answers Questions 100% of the time  -MG          User Key  (r) = Recorded By, (t) = Taken By, (c) = Cosigned By    Initials Name Effective Dates    SG Wen Moore, OTR 04/13/15 -     MG Mayte Higginbotham MA,CCC-SLP 04/13/15 -     EE Stefany Bravo, PT 12/01/15 -     AR Stephanie Dukes, PT 06/27/16 -     SGA Wen Mejia, CSW 02/18/16 -     SGB Narda Johnson RN 08/22/16 -            Occupational  Therapy Education     Title: PT OT SLP Therapies (Active)     Topic: Occupational Therapy (Active)     Point: ADL training (Done)    Description: Instruct learner(s) on proper safety adaptation and remediation techniques during self care or transfers.   Instruct in proper use of assistive devices.    Learning Progress Summary    Learner Readiness Method Response Comment Documented by Status   Patient Acceptance E,TB,D VU,NR safety for adl  10/17/17 1551 Done                      User Key     Initials Effective Dates Name Provider Type Discipline     04/13/15 -  ROSE Stratton Occupational Therapist OT                  OT Recommendation and Plan  Planned Therapy Interventions: ADL retraining, balance training, transfer training  Therapy Frequency: 3-5 times/wk  Plan of Care Review  Plan Of Care Reviewed With: patient  Outcome Summary/Follow up Plan: Pt will continue to benefit from OT for increasing independence for adls and functional transfers. Presents with left weakness that limits at this time          OT Goals       10/17/17 1553          Transfer Training OT LTG    Transfer Training OT LTG, Date Established 10/17/17  -SG      Transfer Training OT LTG, Time to Achieve 1 wk  -SG      Transfer Training OT LTG, Activity Type toilet  -SG      Transfer Training OT LTG, Andrew Level contact guard assist  -SG      ADL OT LTG    ADL OT LTG, Date Established 10/17/17  -SG      ADL OT LTG, Time to Achieve 1 wk  -SG      ADL OT LTG, Activity Type ADL skills  -SG      ADL OT LTG, Andrew Level min assist  -SG        User Key  (r) = Recorded By, (t) = Taken By, (c) = Cosigned By    Initials Name Provider Type     ROSE Stratton Occupational Therapist                Outcome Measures       10/17/17 1500          How much help from another is currently needed...    Putting on and taking off regular lower body clothing? 2  -SG      Bathing (including washing, rinsing, and drying) 2  -SG       Toileting (which includes using toilet bed pan or urinal) 3  -SG      Putting on and taking off regular upper body clothing 3  -SG      Taking care of personal grooming (such as brushing teeth) 2  -SG      Eating meals 3  -SG      Score 15  -SG      Functional Assessment    Outcome Measure Options AM-PAC 6 Clicks Daily Activity (OT)  -SG        User Key  (r) = Recorded By, (t) = Taken By, (c) = Cosigned By    Initials Name Provider Type    SG ROSE Stratton Occupational Therapist          Time Calculation:   OT Start Time: 1315  OT Stop Time: 1329  OT Time Calculation (min): 14 min    Therapy Charges for Today     Code Description Service Date Service Provider Modifiers Qty    [id number]  OT EVAL LOW COMPLEXITY 2 10/17/2017 ROSE Stratton GO 1               ROSE Stratton  10/17/2017

## 2017-10-17 NOTE — PROGRESS NOTES
Continued Stay Note  [facility]     Patient Name: Lana Yañez  MRN: [id number]  Today's Date: 10/17/2017    Admit Date: 10/15/2017          Discharge Plan       10/17/17 1206    Case Management/Social Work Plan    Plan [facility] if pre-cert obtained    Additional Comments Spoke with Deena from [facility]. They are willing to accept patient on discharge if pre-cert is obtained  Per Deena/ [facility] , they will initiate Precert once physical therapy evaluation is complete. CCP to follow..... .........  ERIN Jiménez              Discharge Codes     None            REIN Jiménez

## 2017-10-17 NOTE — THERAPY EVALUATION
Acute Care - Physical Therapy Initial Evaluation  [facility]     Patient Name: Lana Yañez  : 1945  MRN: [id number]  Today's Date: 10/17/2017   Onset of Illness/Injury or Date of Surgery Date: 10/15/17            Admit Date: 10/15/2017     Visit Dx:    ICD-10-CM ICD-9-CM   1. Left-sided weakness R53.1 728.87   2. FRANCY (obstructive sleep apnea) G47.33 327.23   3. Persistent atrial fibrillation I48.1 427.31   4. Acute CVA (cerebrovascular accident) I63.9 434.91     Patient Active Problem List   Diagnosis   • Hyperlipidemia   • Vitamin deficiency   • Essential hypertension   • Rheumatoid arthritis involving both hands   • Fatigue   • ANTOINE (dyspnea on exertion)   • Dysuria   • Urge incontinence of urine   • Hypovitaminosis D   • Sleep apnea   • Encounter for screening colonoscopy   • Bronchitis   • Cough   • Generalized osteoarthritis of multiple sites   • Morbid obesity with BMI of 40.0-44.9, adult   • Cellulitis of right elbow due to MRSA   • Medicare annual wellness visit, initial   • Hospital discharge follow-up   • Displacement of lumbar intervertebral disc without myelopathy   • Lumbar disc herniation   • Atrial fibrillation with RVR   • History of MRSA infection   • Left-sided weakness   • Atrial fibrillation   • Acute CVA (cerebrovascular accident)     Past Medical History:   Diagnosis Date   • Arthritis    • Asthma    • Atrial fibrillation    • Edema    • Fatigue    • Headache    • Hx of bone density study     10/23/2014   • Hyperlipidemia    • Hypertension    • Low back pain    • Obesity    • Vitamin D deficiency      Past Surgical History:   Procedure Laterality Date   • HYSTERECTOMY     • KNEE SURGERY     • MAMMO BILATERAL      [facility]           PT ASSESSMENT (last 72 hours)      PT Evaluation       10/17/17 1418 10/16/17 1436    Rehab Evaluation    Document Type evaluation  -AR     Subjective Information agree to therapy  -AR     Evaluation Not Performed  other (see comments)    Follow up tomorrow for OT per PT note hold todayper neurologist  -SG    Patient Effort, Rehab Treatment good  -AR     Symptoms Noted During/After Treatment fatigue  -AR     General Information    Patient Profile Review yes  -AR     Onset of Illness/Injury or Date of Surgery Date 10/15/17  -AR     Precautions/Limitations fall precautions  -AR     Equipment Currently Used at Home walker, rolling  -AR     Barriers to Rehab none identified  -AR     Living Environment    Lives With spouse  -AR     Living Arrangements house  -AR     Home Accessibility stairs to enter home;stairs within home  -AR     Living Environment Comment bilevel home  -AR     Clinical Impression    Patient/Family Goals Statement DC to [facility]  -AR     Criteria for Skilled Therapeutic Interventions Met yes  -AR     Pathology/Pathophysiology Noted (Describe Specifically for Each System) musculoskeletal  -AR     Impairments Found (describe specific impairments) aerobic capacity/endurance;gait, locomotion, and balance  -AR     Rehab Potential good, to achieve stated therapy goals  -AR     Pain Assessment    Pain Assessment No/denies pain  -AR     Cognitive Assessment/Intervention    Current Cognitive/Communication Assessment functional  -AR     Orientation Status oriented x 4  -AR     Follows Commands/Answers Questions 100% of the time  -AR     Personal Safety WNL/WFL  -AR     ROM (Range of Motion)    General ROM --   B LE WFL  -AR     MMT (Manual Muscle Testing)    General MMT Assessment --   R LE 4/5, LLE +3/5  -AR     Bed Mobility, Assessment/Treatment    Bed Mobility, Assistive Device bed rails;head of bed elevated  -AR     Bed Mob, Supine to Sit, [place] supervision required  -AR     Bed Mob, Sit to Supine, [place] minimum assist (75% patient effort)  -AR     Transfer Assessment/Treatment    Transfers, Sit-Stand [place] minimum assist (75% patient effort)  -AR     Transfers, Stand-Sit [place] minimum assist (75% patient effort)   -AR     Transfers, Sit-Stand-Sit, Assist Device rolling walker  -AR     Gait Assessment/Treatment    Gait, [place] Level contact guard assist  -AR     Gait, Assistive Device rolling walker  -AR     Gait, Distance (Feet) 30  -AR     Gait, Gait Pattern Analysis swing-through gait  -AR     Gait, Gait Deviations héctor decreased;decreased heel strike;forward flexed posture  -AR     Gait, Safety Issues step length decreased;weight-shifting ability decreased  -AR     Gait, Impairments impaired balance;strength decreased  -AR     Gait, Comment limited by fatigue  -AR     Positioning and Restraints    Pre-Treatment Position in bed  -AR     Post Treatment Position bed  -AR     In Bed supine;call light within reach;encouraged to call for assist;exit alarm on  -AR       10/16/17 1418 10/16/17 1402    Rehab Evaluation    Evaluation Not Performed  other (see comments)   PT consult received. Per RNAman, neurologist requested PT and OT hold today and begin tomorrow. Will follow up for evaluation tomorrow.   -EE    Living Environment    [place]     [place]     [place] Accessibility --   house is a bi-level   [place]       10/16/17 0800 10/15/17 1706    Rehab Evaluation    Document Type evaluation  -MG     Subjective Information no complaints;agree to therapy  -MG     Patient Effort, Rehab Treatment good  -MG     Symptoms Noted During/After Treatment none  -MG     Living Environment    Lives With  spouse  -SGB    Living Arrangements  house  -B    Home Accessibility  stairs (2 railings present);stairs within home;stairs to enter home  -SGB    Stair Railings at Home  inside, present at both sides;outside, present at both sides  -SGB    Type of Financial/Environmental Concern  none  -SGB    Transportation Available  family or friend will provide  -SGB    Pain Assessment    Pain Assessment 0-10  -MG     Pain Score 5  -MG     Pain Type Chronic pain  -MG     Pain Location Leg  -MG     Cognitive  Assessment/Intervention    Current Cognitive/Communication Assessment functional  -MG     Follows Commands/Answers Questions 100% of the time  -MG       10/15/17 1703       General Information    Equipment Currently Used at Home none  -SGB       User Key  (r) = Recorded By, (t) = Taken By, (c) = Cosigned By    Initials Name Provider Type    SG Wen Moore, OTR Occupational Therapist    MG Mayte Higginbotham MA,CCC-SLP Speech and Language Pathologist    EE Stefany Bravo, PT Physical Therapist    SHAQ Dukes, PT Physical Therapist    QUINN SharpeW     SGKIMBER Johnson, RN Registered Nurse          Physical Therapy Education     Title: PT OT SLP Therapies (Active)     Topic: Physical Therapy (Active)     Point: Mobility training (Active)    Learning Progress Summary    Learner Readiness Method Response Comment Documented by Status   Patient Acceptance E NR  AR 10/17/17 1421 Active               Point: Home exercise program (Active)    Learning Progress Summary    Learner Readiness Method Response Comment Documented by Status   Patient Acceptance E NR  AR 10/17/17 1421 Active               Point: Body mechanics (Active)    Learning Progress Summary    Learner Readiness Method Response Comment Documented by Status   Patient Acceptance E NR  AR 10/17/17 1421 Active               Point: Precautions (Active)    Learning Progress Summary    Learner Readiness Method Response Comment Documented by Status   Patient Acceptance E NR  AR 10/17/17 1421 Active                      User Key     Initials Effective Dates Name Provider Type Discipline    AR 06/27/16 -  Stephanie Dukes, PT Physical Therapist PT                PT Recommendation and Plan  Anticipated Discharge Disposition: skilled nursing facility  Planned Therapy Interventions: balance training, bed mobility training, gait training, home exercise program, patient/family education, ROM (Range of Motion), stair training, strengthening  PT  Frequency: daily  Plan of Care Review  Plan Of Care Reviewed With: patient  Outcome Summary/Follow up Plan: Pt admitted w/ acute CVA w/ L sided weakness and h/o afib.  Pt demonstrates increased weakness on L LE, impaired balance and gait pattern but able to ambulate 30' w/ RW and assist x1.  Limited by fatigue.  Discussed activity recommendations and PT POC.  Recommend DC to [facility].            IP PT Goals       10/17/17 1422          Bed Mobility PT LTG    Bed Mobility PT LTG, Date Established 10/17/17  -AR      Bed Mobility PT LTG, Time to Achieve 1 wk  -AR      Bed Mobility PT LTG, Activity Type supine to sit/sit to supine  -AR      Bed Mobility PT LTG, [place] Level supervision required  -AR      Transfer Training PT LTG    Transfer Training PT LTG, Date Established 10/17/17  -AR      Transfer Training PT LTG, Time to Achieve 1 wk  -AR      Transfer Training PT LTG, Activity Type sit to stand/stand to sit;bed to chair /chair to bed  -AR      Transfer Training PT LTG, [place] Level supervision required  -AR      Transfer Training PT LTG, Assist Device walker, rolling  -AR      Gait Training PT LTG    Gait Training Goal PT LTG, Date Established 10/17/17  -AR      Gait Training Goal PT LTG, Time to Achieve 1 wk  -AR      Gait Training Goal PT LTG, [place] Level contact guard assist  -AR      Gait Training Goal PT LTG, Assist Device walker, rolling  -AR      Gait Training Goal PT LTG, Distance to Achieve 100  -AR        User Key  (r) = Recorded By, (t) = Taken By, (c) = Cosigned By    Initials Name Provider Type    SHAQ Dukes, PT Physical Therapist               Time Calculation:         PT Charges       10/17/17 1423          Time Calculation    Start Time 1359  -AR      Stop Time 1423  -AR      Time Calculation (min) 24 min  -AR      PT Received On 10/17/17  -AR      PT - Next Appointment 10/18/17  -AR      PT Goal Re-Cert Due Date 10/24/17  -AR        User Key  (r) = Recorded By, (t) = Taken  By, (c) = Cosigned By    Initials Name Provider Type    AR Stephanie Dukes, PT Physical Therapist          Therapy Charges for Today     Code Description Service Date Service Provider Modifiers Qty    72680451858 HC PT EVAL LOW COMPLEXITY 2 10/17/2017 Stephanie Dukes, PT GP 1    25788585132 HC PT THER PROC EA 15 MIN 10/17/2017 Stephanie Dukes, PT GP 1    84272582550 HC PT THER SUPP EA 15 MIN 10/17/2017 Stephanie Dukes, PT GP 1                 Stephanie Dukes PT  10/17/2017

## 2017-10-17 NOTE — PLAN OF CARE
Problem: Patient Care Overview (Adult)  Goal: Plan of Care Review  Outcome: Ongoing (interventions implemented as appropriate)    10/17/17 1553   Coping/Psychosocial Response Interventions   Plan Of Care Reviewed With patient   Outcome Evaluation   Outcome Summary/Follow up Plan Pt will continue to benefit from OT for increasing independence for adls and functional transfers. Presents with left weakness that limits at this time         Problem: Inpatient Occupational Therapy  Goal: Transfer Training Goal 1 LTG- OT  Outcome: Ongoing (interventions implemented as appropriate)    10/17/17 1553   Transfer Training OT LTG   Transfer Training OT LTG, Date Established 10/17/17   Transfer Training OT LTG, Time to Achieve 1 wk   Transfer Training OT LTG, Activity Type toilet   Transfer Training OT LTG, [place] Level contact guard assist       Goal: ADL Goal LTG- OT  Outcome: Ongoing (interventions implemented as appropriate)    10/17/17 1553   ADL OT LTG   ADL OT LTG, Date Established 10/17/17   ADL OT LTG, Time to Achieve 1 wk   ADL OT LTG, Activity Type ADL skills   ADL OT LTG, [place] Level min assist

## 2017-10-17 NOTE — CONSULTS
[facility]  Phone: [phone]  Iain Hill MD      Subjective   LOS: 1 day     Thank you for this consultation.  72-year-old female who is admitted with left facial droop and slurred speech.  She was recently admitted with new onset atrial fibrillation.  She was started on Pradaxa.  Patient was advised to follow-up outpatient for sleep study.  However she returned shortly after discharge with new stroke symptoms.  Sided facial droop was noted.  Also left-sided weakness.  Patient has been seen by neurology.  Plan is to continue anticoagulation.  Neurology would like a sleep evaluation.    Patient states that she has had snoring and apneas at night for several years.  Snoring is quite loud and disturbs her .  She wakes up feeling tired and unrefreshed.  She has gained about 15 pounds in the last 5 years.  She takes Norco for back pain.  She does not use any alcohol.  Her medical history includes hypertension and hyperlipidemia.  She had MRSA cellulitis of the right elbow in June 2017.  She has L2-L3 herniation.  She had Bell's palsy is a young adult.  She also has glaucoma of the eyes.  She is a never smoker.     I have reviewed and edited the Past Medical History, Past Surgical History, Home Medications, Social History and Family History as of 9:00 PM on 10/16/17.    Prescriptions Prior to Admission   Medication Sig Dispense Refill Last Dose   • albuterol (PROVENTIL HFA;VENTOLIN HFA) 108 (90 BASE) MCG/ACT inhaler Inhale 2 puffs Every 4 (Four) Hours As Needed for wheezing. 1 inhaler 11 10/10/2017 at Unknown time   • atenolol (TENORMIN) 100 MG tablet Take 1 tablet by mouth Daily. 30 tablet 0    • cetirizine (ZyrTEC) 10 MG tablet Take 10 mg by mouth Daily As Needed.   10/10/2017 at Unknown time   • COSOPT PF 22.3-6.8 MG/ML solution Administer 1 drop to both eyes 2 (Two) Times a Day.   10/10/2017 at Unknown time   • dabigatran etexilate (PRADAXA) 150 MG capsu Take 1 capsule by mouth Every 12  (Twelve) Hours. 60 capsule 0    • folic acid (FOLVITE) 1 MG tablet Take 1 mg by mouth Daily. Patient stopped taking 2 weeks ago   Past Month at Unknown time   • furosemide (LASIX) 20 MG tablet Take 1 tablet by mouth Daily. (Patient taking differently: Take 20 mg by mouth Daily As Needed.) 30 tablet 0    • HYDROcodone-acetaminophen (NORCO) 5-325 MG per tablet Take 1 tablet by mouth Every 6 (Six) Hours As Needed for Moderate Pain . (Patient taking differently: Take 1 tablet by mouth Every 4 (Four) Hours As Needed for Moderate Pain . 4-6 hours prn) 20 tablet 0 10/11/2017 at 1000   • losartan (COZAAR) 50 MG tablet Take 1 tablet by mouth Daily. 30 tablet 0    • meloxicam (MOBIC) 15 MG tablet Take 15 mg by mouth Daily.   10/15/2017 at Unknown time   • methotrexate 2.5 MG tablet Take  by mouth. Takes 8 tablets once a week. Patient stopped taking 2 weeks ago.   Past Month at Unknown time   • MYRBETRIQ 50 MG tablet sustained-release 24 hour Take 50 mg by mouth Every Morning.   10/15/2017 at Unknown time   • potassium chloride (MICRO-K) 10 MEQ CR capsule Take 1 capsule by mouth Daily. 30 capsule 0    • travoprost, BAK free, (TRAVATAN Z) 0.004 % solution ophthalmic solution Administer 1 drop to both eyes Every Evening. in affected eye(s)   10/10/2017 at Unknown time     No Known Allergies    Review of Systems   Constitutional: Negative for appetite change, chills, fatigue and fever.   HENT: Negative for congestion, mouth sores, postnasal drip, rhinorrhea, trouble swallowing and voice change.    Cardiovascular: Negative for chest pain, palpitations and leg swelling.   Gastrointestinal: Negative for abdominal distention, abdominal pain, constipation, diarrhea, nausea and vomiting.   Endocrine: Negative for cold intolerance and heat intolerance.   Genitourinary: Negative for difficulty urinating, dysuria, frequency and urgency.   Musculoskeletal: Positive for back pain. Negative for arthralgias.   Skin: Negative for pallor and  rash.   Neurological: Positive for weakness (focal on left). Negative for dizziness and headaches.   Psychiatric/Behavioral: Negative for agitation and confusion. The patient is not nervous/anxious.        Vital Signs past 24hrs  BP range: BP: (123-142)/() 123/84  Pulse range: Heart Rate:  [82-87] 86  Resp rate range: Resp:  [16-18] 16  Temp range: Temp (24hrs), Av.1 °F (36.7 °C), Min:97.5 °F (36.4 °C), Max:98.6 °F (37 °C)    Oxygen range: SpO2:  [94 %-98 %] 97 %;  ;   O2 Device: room air  271 lb 1 oz (123 kg); Body mass index is 42.45 kg/(m^2).       Adult female lying in bed.  Clearly with left-sided weakness of the face.  Pupils are equal and reactive.  Oropharynx class IV Mallampati airway large tongue.  Cannot see posterior pharynx clearly.  Moist mucosa.  Nasopharynx without discharge septum midline.  JVP not distended.  Trachea midline thyroid not enlarged.  Lungs reveal bilateral air entry and clear to auscultation.  No wheezing rales or rhonchi.eart examination S1-S2 present.  Rhythm is regular.  No murmurs noted.  Mild edema on lower extremities.  Abdomen is obese, soft, nontender.  Bowel sounds present with no liver spleen enlargement.  No peripheral cyanosis clubbing.  Patient has trace movement in the left lower extremity.  Left upper extremity appears weak as well and is likely 3/5.  Left-sided facial asymmetry with left-sided facial weakness.  No cervical, axillary, inguinal adenopathy.    Results Review:    I have reviewed the laboratory and imaging data from current admission. My annotations are as noted in assessment and plan.    Medication Review:  I have reviewed the current MAR. My annotations are as noted in assessment and plan.    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  Discussed with patient.  She is  willing to trial a BiPAP machine here.  As she will be going to a nursing home we may be able to order the BiPAP for her there as well she tolerates tonight.  She will require an outpatient sleep study and I will place disorder. After sleep study should be able to get a CPAP machine or similar device at home.  I explained the cardiovascular health risks of untreated sleep apnea especially in the context of A. Fib and recent stroke hypertension.  She understands the necessity of using the CPAP if needed and of remaining compliant.  Weight loss was suggested to try and reduce severity of obstructive sleep apnea.  She is on Norco for low back pain and this can make her sleep apnea worse.      Iain Hill MD  10/16/17  9:00 PM    Part of this note may be an electronic transcription/translation of spoken language to printed text using the Dragon Dictation System.

## 2017-10-17 NOTE — PLAN OF CARE
Problem: Patient Care Overview (Adult)  Goal: Plan of Care Review  Outcome: Ongoing (interventions implemented as appropriate)    10/17/17 0350   Coping/Psychosocial Response Interventions   Plan Of Care Reviewed With patient   Patient Care Overview   Progress improving   Outcome Evaluation   Outcome Summary/Follow up Plan VSS. pt restless throughout shift. Pt unable to tolerate sleep study. pt c/o pain early in shift. tx x1. Meds given per order. no falls. Possible d/c to rehab today. will continue to monitor.        Goal: Adult Individualization and Mutuality  Outcome: Ongoing (interventions implemented as appropriate)  Goal: Discharge Needs Assessment  Outcome: Ongoing (interventions implemented as appropriate)    Problem: Pain, Acute (Adult)  Goal: Identify Related Risk Factors and Signs and Symptoms  Outcome: Ongoing (interventions implemented as appropriate)  Goal: Acceptable Pain Control/Comfort Level  Outcome: Ongoing (interventions implemented as appropriate)

## 2017-10-17 NOTE — PROGRESS NOTES
[facility]  Phone: [phone]  Iain Hill MD    Subjective:  LOS: 2    Did not tolerate BIPAP very well last night but willing to try in rehab.    Objective   Vital Signs past 24hrs  BP range: BP: (110-135)/(83-93) 110/85  Pulse range: Heart Rate:  [] 98  Resp rate range: Resp:  [16] 16  Temp range: Temp (24hrs), Av.7 °F (36.5 °C), Min:97.5 °F (36.4 °C), Max:97.9 °F (36.6 °C)    O2 Device: room air   Oxygen range:SpO2:  [93 %-97 %] 95 %   271 lb 1 oz (123 kg); Body mass index is 42.45 kg/(m^2).    Intake/Output Summary (Last 24 hours) at 10/17/17 1722  Last data filed at 10/17/17 1317   Gross per 24 hour   Intake             1165 ml   Output                0 ml   Net             1165 ml       Physical Exam   Oropx class IV  Lungs clear  Left facial droop      Results Review:    I have reviewed the laboratory and imaging data since the last note by LPC physician.  My annotations are noted in assessment and plan.    Medication Review:  I have reviewed the current MAR.  My annotations are noted in assessment and plan.       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 Will arrange outpatient study.    If discharged to rehab and willing to use, can trial autoBIPAP - EPAP min 5, IPAP max 15, PS 4.    Iain Hill MD  10/17/17  5:22 PM    Part of this note may be an electronic transcription/translation of spoken language to printed text using the Dragon Dictation System.

## 2017-10-17 NOTE — DISCHARGE PLACEMENT REQUEST
"Tera Yañez (72 y.o. Female)     Date of Birth Social Security Number Address Home Phone MRN    1945  [id number] [place] [id number] [phone] [id number]    Confucianism Marital Status          Mandaeism        Admission Date Admission Type Admitting Provider Attending Provider Department, Room/Bed    10/15/17 Emergency Radha Jarvis MD Nguyen, Peter Norwood MD [address], 513/1    Discharge Date Discharge Disposition Discharge Destination                      Attending Provider: Peter De La Rosa MD     Allergies:  No Known Allergies    Isolation:  Contact   Infection:  MRSA (06/13/17)   Code Status:  FULL    Ht:  70\" (177.8 cm)   Wt:  271 lb 1 oz (123 kg)    Admission Cmt:  None   Principal Problem:  Acute CVA (cerebrovascular accident) [I63.9]                 Active Insurance as of 10/15/2017     Primary Coverage     Payor Plan Insurance Group Employer/Plan Group    HUMANA MEDICARE REPLACEMENT HUMANA MEDICARE REPL [id number]     Payor Plan Address Payor Plan Phone Number Effective From Effective To    [address] [address] [phone] 1/1/2013     [place] [address]       Subscriber Name Subscriber Birth Date Member ID       TERA YAÑEZ 1945 [id number]                 Emergency Contacts      (Rel.) Home Phone Work Phone Mobile Phone    Suresh Yañez (Spouse) [phone] --              "

## 2017-10-17 NOTE — PLAN OF CARE
Problem: Patient Care Overview (Adult)  Goal: Plan of Care Review    10/17/17 1422   Coping/Psychosocial Response Interventions   Plan Of Care Reviewed With patient   Outcome Evaluation   Outcome Summary/Follow up Plan Pt admitted w/ acute CVA w/ L sided weakness and h/o afib. Pt demonstrates increased weakness on L LE, impaired balance and gait pattern but able to ambulate 30' w/ RW and assist x1. Limited by fatigue. Discussed activity recommendations and PT POC. Recommend DC to [facility].          Problem: Inpatient Physical Therapy  Goal: Bed Mobility Goal LTG- PT    10/17/17 1422   Bed Mobility PT LTG   Bed Mobility PT LTG, Date Established 10/17/17   Bed Mobility PT LTG, Time to Achieve 1 wk   Bed Mobility PT LTG, Activity Type supine to sit/sit to supine   Bed Mobility PT LTG, [place] Level supervision required       Goal: Transfer Training Goal 1 LTG- PT    10/17/17 1422   Transfer Training PT LTG   Transfer Training PT LTG, Date Established 10/17/17   Transfer Training PT LTG, Time to Achieve 1 wk   Transfer Training PT LTG, Activity Type sit to stand/stand to sit;bed to chair /chair to bed   Transfer Training PT LTG, [place] Level supervision required   Transfer Training PT LTG, Assist Device walker, rolling       Goal: Gait Training Goal LTG- PT    10/17/17 1422   Gait Training PT LTG   Gait Training Goal PT LTG, Date Established 10/17/17   Gait Training Goal PT LTG, Time to Achieve 1 wk   Gait Training Goal PT LTG, [place] Level contact guard assist   Gait Training Goal PT LTG, Assist Device walker, rolling   Gait Training Goal PT LTG, Distance to Achieve 100

## 2017-10-17 NOTE — TELEPHONE ENCOUNTER
----- Message from ANGELA Wong sent at 10/16/2017  4:26 PM EDT -----  Regarding: follow  Please follow her for hospital DC. LILIAN sent her to ER last week for Afib. She had a small stroke yesterday. She will need lumbar surgery if she can ever be cleared. Essentially, once she is discharged from hospital- LILIAN will need to see her to discuss surgery but she will have to cleared by cardiology first.

## 2017-10-18 VITALS
BODY MASS INDEX: 39.22 KG/M2 | SYSTOLIC BLOOD PRESSURE: 111 MMHG | HEIGHT: 70 IN | OXYGEN SATURATION: 93 % | RESPIRATION RATE: 18 BRPM | TEMPERATURE: 98.2 F | DIASTOLIC BLOOD PRESSURE: 68 MMHG | WEIGHT: 274 LBS | HEART RATE: 97 BPM

## 2017-10-18 LAB
MAGNESIUM SERPL-MCNC: 2 MG/DL (ref 1.6–2.4)
POTASSIUM BLD-SCNC: 3.7 MMOL/L (ref 3.5–5.2)

## 2017-10-18 PROCEDURE — 84132 ASSAY OF SERUM POTASSIUM: CPT | Performed by: HOSPITALIST

## 2017-10-18 PROCEDURE — 83735 ASSAY OF MAGNESIUM: CPT | Performed by: HOSPITALIST

## 2017-10-18 RX ORDER — HYDROCODONE BITARTRATE AND ACETAMINOPHEN 5; 325 MG/1; MG/1
1 TABLET ORAL EVERY 6 HOURS PRN
Qty: 20 TABLET | Refills: 0 | Status: SHIPPED | OUTPATIENT
Start: 2017-10-18 | End: 2017-12-05 | Stop reason: SDUPTHER

## 2017-10-18 RX ORDER — ATORVASTATIN CALCIUM 40 MG/1
40 TABLET, FILM COATED ORAL NIGHTLY
Start: 2017-10-18 | End: 2017-11-03 | Stop reason: SDUPTHER

## 2017-10-18 RX ADMIN — LOSARTAN POTASSIUM 50 MG: 50 TABLET, FILM COATED ORAL at 08:31

## 2017-10-18 RX ADMIN — HYDROCODONE BITARTRATE AND ACETAMINOPHEN 1 TABLET: 5; 325 TABLET ORAL at 08:41

## 2017-10-18 RX ADMIN — FUROSEMIDE 20 MG: 20 TABLET ORAL at 08:31

## 2017-10-18 RX ADMIN — POTASSIUM CHLORIDE 10 MEQ: 750 CAPSULE, EXTENDED RELEASE ORAL at 08:31

## 2017-10-18 RX ADMIN — FOLIC ACID 1 MG: 1 TABLET ORAL at 08:31

## 2017-10-18 RX ADMIN — ATENOLOL 100 MG: 50 TABLET ORAL at 08:31

## 2017-10-18 RX ADMIN — OXYBUTYNIN CHLORIDE 10 MG: 10 TABLET, FILM COATED, EXTENDED RELEASE ORAL at 08:31

## 2017-10-18 RX ADMIN — DABIGATRAN ETEXILATE MESYLATE 150 MG: 150 CAPSULE ORAL at 08:32

## 2017-10-18 RX ADMIN — MELOXICAM 15 MG: 7.5 TABLET ORAL at 08:31

## 2017-10-18 RX ADMIN — CYANOCOBALAMIN TAB 500 MCG 1000 MCG: 500 TAB at 08:31

## 2017-10-18 RX ADMIN — DORZOLAMIDE HYDROCHLORIDE AND TIMOLOL MALEATE 1 DROP: 20; 5 SOLUTION/ DROPS OPHTHALMIC at 08:42

## 2017-10-18 NOTE — PLAN OF CARE
Problem: Fall Risk (Adult)  Goal: Absence of Falls  Outcome: Ongoing (interventions implemented as appropriate)    10/18/17 0536   Fall Risk (Adult)   Absence of Falls making progress toward outcome

## 2017-10-18 NOTE — PROGRESS NOTES
Case Management Discharge Note         Discharge Placement     Facility/Agency Request Status Selected? Address Phone Number Fax Number    [facility] Accepted    Yes [address] [facility] [address] [phone]        ERIN Jiménez 10/16/2017 14:16    Patient's daughter, Jefferson hopes patient can go to  [facility] on discharge. Jefferson is an agency nurse in their skilled unit.                    Other: Other    Discharge Codes: 03  Discharged/transferred to skilled nursing facility (SNF) with Medicare certification in anticipation of skilled care

## 2017-10-18 NOTE — PROGRESS NOTES
Continued Stay Note  [facility]     Patient Name: Lana Yañez  MRN: [id number]  Today's Date: 10/18/2017    Admit Date: 10/15/2017          Discharge Plan       10/18/17 1414    Case Management/Social Work Plan    Additional Comments Plans are to skilled care @ [facility] today.  Spoke with pt at bedside to discuss transport.  Explained medical necessity to pt and that we could not guarantee payment.  Pt states she would like spouse to transport.  DC summary faxed to facility.  Orders for Bipap in packet.               Discharge Codes     None        Expected Discharge Date and Time     Expected Discharge Date Expected Discharge Time    Oct 18, 2017             Shanthi King RN

## 2017-10-18 NOTE — PROGRESS NOTES
Continued Stay Note  [facility]     Patient Name: Lana Yañez  MRN: [id number]  Today's Date: 10/18/2017    Admit Date: 10/15/2017          Discharge Plan       10/18/17 0838    Case Management/Social Work Plan    Plan [facility] has precert and can accept today.               Discharge Codes     None            Shanthi King RN

## 2017-10-18 NOTE — DISCHARGE PLACEMENT REQUEST
"Tera Yañez (72 y.o. Female)     Date of Birth Social Security Number Address Home Phone MRN    1945  [id number] [facility] [address] [phone] [id number]    Jew Marital Status          Jainism        Admission Date Admission Type Admitting Provider Attending Provider Department, Room/Bed    10/15/17 Emergency Radha Jarvis MD Nguyen, Peter Norwood MD [address], 513/1    Discharge Date Discharge Disposition Discharge Destination         Skilled Nursing Facility (DC - External)             Attending Provider: Peter De La Rosa MD     Allergies:  No Known Allergies    Isolation:  Contact   Infection:  MRSA (06/13/17)   Code Status:  FULL    Ht:  70\" (177.8 cm)   Wt:  274 lb (124 kg)    Admission Cmt:  None   Principal Problem:  Acute CVA (cerebrovascular accident) [I63.9]                 Active Insurance as of 10/15/2017     Primary Coverage     Payor Plan Insurance Group Employer/Plan Group    HUMANA MEDICARE REPLACEMENT HUMANA MEDICARE REPL [id number]     Payor Plan Address Payor Plan Phone Number Effective From Effective To    [address] [address] [phone] 1/1/2013     [place] [address]       Subscriber Name Subscriber Birth Date Member ID       TERA YAÑEZ 1945 [id number]                 Emergency Contacts      (Rel.) Home Phone Work Phone Mobile Phone    Suresh Yañez (Spouse) [phone] --                 Discharge Summary      Peter De La Rosa MD at 10/18/2017 11:53 AM                              [facility]    Date of Discharge:  10/18/2017    PCP: Raoul Ramirez MD    Discharge Diagnosis:   Active Hospital Problems (** Indicates Principal Problem)    Diagnosis Date Noted   • **Acute CVA (cerebrovascular accident) [I63.9] 10/15/2017   • Left-sided weakness [R53.1] 10/15/2017   • Atrial fibrillation [I48.91] 10/15/2017   • History of MRSA infection [Z86.14] " 10/11/2017   • Sleep apnea [G47.30] 08/02/2016   • Essential hypertension [I10] 03/21/2016   • Hyperlipidemia [E78.5] 03/21/2016      Resolved Hospital Problems    Diagnosis Date Noted Date Resolved   No resolved problems to display.      Consulting Physician(s)     Provider Relationship Specialty    Iain Hill MD Consulting Physician Pulmonary Disease           Edita Fernández MD    Hospital Course  Please see history and physical for details. Patient is a 72 y.o. female presented with left-sided facial weakness slurred speech. CT angiogram showed occlusion or severe narrowing of right MCA branch and mild narrowing at the origin the left vertebral artery. MRI showed several tiny areas of acute infarction in the right cerebral hemisphere large isthmus measuring approximately 8 mm. Neurology felt the right MCA ischemic stroke was secondary to M2 MCA occlusion from atrial fibrillation. Neurology recommended continuing Pradaxa. She had a recent echocardiogram with an EF of 57%. Target blood pressure less than 130/80, LDL less than 70. She does have some left lower extremity weakness and a mild left facial droop persistent. It is felt that she would benefit from rehabilitation placement.    Pulmonology saw the patient for hypersomnolence/snoring/witnessed apneas. They felt she had a high likelihood of obstructive sleep apnea and was tried on BiPAP machine. Pulmonology is recommending outpatient sleep study and pulmonology is arranging. If she is willing to continue to use pulmonology recommends a trial of auto BiPAP - EPAP min 5, IPAP max 15, PS 4.    B-12 332. TSH normal. LDL 57. A1c 6.19.    Condition on Discharge: Improved. Patient ready for rehabilitation. Denies chest pain, shortness of breath.    Temp:  [97.9 °F (36.6 °C)-98.5 °F (36.9 °C)] 98.2 °F (36.8 °C)  Heart Rate:  [97-98] 97  Resp:  [16-18] 18  BP: (110-125)/(68-95) 111/68  Body mass index is 42.91 kg/(m^2).    Physical Exam   Constitutional: She is  oriented to person, place, and time. No distress.   Cardiovascular: Normal rate and regular rhythm.    Pulmonary/Chest: Effort normal and breath sounds normal. No respiratory distress. She has no wheezes.   Abdominal: Soft. There is no tenderness.   Neurological: She is alert and oriented to person, place, and time.   Mild left facial weakness. Left lower extremity weakness   Psychiatric: She has a normal mood and affect.     Discharge Medications   Lana Yañez   Home Medication Instructions DG:[id number]    Printed on:10/18/17 1200   Medication Information                      albuterol (PROVENTIL HFA;VENTOLIN HFA) 108 (90 BASE) MCG/ACT inhaler  Inhale 2 puffs Every 4 (Four) Hours As Needed for wheezing.             atenolol (TENORMIN) 100 MG tablet  Take 1 tablet by mouth Daily.             atorvastatin (LIPITOR) 40 MG tablet  Take 1 tablet by mouth Every Night.             cetirizine (ZyrTEC) 10 MG tablet  Take 10 mg by mouth Daily As Needed.             COSOPT PF 22.3-6.8 MG/ML solution  Administer 1 drop to both eyes 2 (Two) Times a Day.             dabigatran etexilate (PRADAXA) 150 MG capsu  Take 1 capsule by mouth Every 12 (Twelve) Hours.             folic acid (FOLVITE) 1 MG tablet  Take 1 mg by mouth Daily. Patient stopped taking 2 weeks ago             furosemide (LASIX) 20 MG tablet  Take 1 tablet by mouth Daily.             HYDROcodone-acetaminophen (NORCO) 5-325 MG per tablet  Take 1 tablet by mouth Every 6 (Six) Hours As Needed for Moderate Pain .             losartan (COZAAR) 50 MG tablet  Take 1 tablet by mouth Daily.             meloxicam (MOBIC) 15 MG tablet  Take 15 mg by mouth Daily.             MYRBETRIQ 50 MG tablet sustained-release 24 hour  Take 50 mg by mouth Every Morning.             potassium chloride (MICRO-K) 10 MEQ CR capsule  Take 1 capsule by mouth Daily.             travoprost, BAK free, (TRAVATAN Z) 0.004 % solution ophthalmic solution  Administer 1 drop to both eyes  Every Evening. in affected eye(s)             vitamin B-12 (VITAMIN B-12) 1000 MCG tablet  Take 1 tablet by mouth Daily.             zolpidem (AMBIEN) 5 MG tablet  Bring to sleep lab DO NOT use at home                Diet Instructions     Diet: Regular, Cardiac       Discharge Diet:   Regular  Cardiac                   Activity Instructions     Activity as Tolerated       Per physical therapy                Additional Instructions for the Follow-ups that You Need to Schedule     Call MD for problems / concerns.    As directed              Follow-up Information     Follow up with [facility] .    Specialties:  Assisted Living Facility, Skilled Nursing Facility, Intermediate Care Facility    Contact information:    [address]  [facility] [address] [phone]        Follow up with Raoul Ramirez MD .    Specialty:  Internal Medicine    Why:  after rehab    Contact information:    [address] [facility] [address]  [place] [id number] [phone]          Follow up with Iain Hill MD .    Specialties:  Pulmonary Disease, Sleep Medicine    Contact information:    [address] [facility] [address]  [place] [id number] [phone]          Follow up with Edita Fernández MD .    Specialty:  Neurology    Contact information:    [address] [facility] [place] [address] [phone]          Test Results Pending at Discharge     Peter De La Rosa MD  10/18/17  12:00 PM    Discharge time spent greater than 30 minutes.       Electronically signed by Peter De La Rosa MD at 10/18/2017 12:01 PM        Discharge Order     Start     Ordered    10/18/17 1153  Discharge patient  Once     Expected Discharge Date:  10/18/17    Discharge Disposition:  Skilled Nursing Facility (DC - External)        10/18/17 1152

## 2017-10-18 NOTE — DISCHARGE SUMMARY
[facility]    Date of Discharge:  10/18/2017    PCP: Raoul Ramirez MD    Discharge Diagnosis:   Active Hospital Problems (** Indicates Principal Problem)    Diagnosis Date Noted   • **Acute CVA (cerebrovascular accident) [I63.9] 10/15/2017   • Left-sided weakness [R53.1] 10/15/2017   • Atrial fibrillation [I48.91] 10/15/2017   • History of MRSA infection [Z86.14] 10/11/2017   • Sleep apnea [G47.30] 08/02/2016   • Essential hypertension [I10] 03/21/2016   • Hyperlipidemia [E78.5] 03/21/2016      Resolved Hospital Problems    Diagnosis Date Noted Date Resolved   No resolved problems to display.      Consulting Physician(s)     Provider Relationship Specialty    Iain Hill MD Consulting Physician Pulmonary Disease           Edita Fernández MD    Hospital Course  Please see history and physical for details. Patient is a 72 y.o. female presented with left-sided facial weakness slurred speech. CT angiogram showed occlusion or severe narrowing of right MCA branch and mild narrowing at the origin the left vertebral artery. MRI showed several tiny areas of acute infarction in the right cerebral hemisphere large isthmus measuring approximately 8 mm. Neurology felt the right MCA ischemic stroke was secondary to M2 MCA occlusion from atrial fibrillation. Neurology recommended continuing Pradaxa. She had a recent echocardiogram with an EF of 57%. Target blood pressure less than 130/80, LDL less than 70. She does have some left lower extremity weakness and a mild left facial droop persistent. It is felt that she would benefit from rehabilitation placement.    Pulmonology saw the patient for hypersomnolence/snoring/witnessed apneas. They felt she had a high likelihood of obstructive sleep apnea and was tried on BiPAP machine. Pulmonology is recommending outpatient sleep study and pulmonology is arranging. If she is willing to continue to use pulmonology recommends  a trial of auto BiPAP - EPAP min 5, IPAP max 15, PS 4.    B-12 332. TSH normal. LDL 57. A1c 6.19.    Condition on Discharge: Improved. Patient ready for rehabilitation. Denies chest pain, shortness of breath.    Temp:  [97.9 °F (36.6 °C)-98.5 °F (36.9 °C)] 98.2 °F (36.8 °C)  Heart Rate:  [97-98] 97  Resp:  [16-18] 18  BP: (110-125)/(68-95) 111/68  Body mass index is 42.91 kg/(m^2).    Physical Exam   Constitutional: She is oriented to person, place, and time. No distress.   Cardiovascular: Normal rate and regular rhythm.    Pulmonary/Chest: Effort normal and breath sounds normal. No respiratory distress. She has no wheezes.   Abdominal: Soft. There is no tenderness.   Neurological: She is alert and oriented to person, place, and time.   Mild left facial weakness. Left lower extremity weakness   Psychiatric: She has a normal mood and affect.     Discharge Medications   Lana Yañez   Home Medication Instructions DG:[id number]    Printed on:10/18/17 1200   Medication Information                      albuterol (PROVENTIL HFA;VENTOLIN HFA) 108 (90 BASE) MCG/ACT inhaler  Inhale 2 puffs Every 4 (Four) Hours As Needed for wheezing.             atenolol (TENORMIN) 100 MG tablet  Take 1 tablet by mouth Daily.             atorvastatin (LIPITOR) 40 MG tablet  Take 1 tablet by mouth Every Night.             cetirizine (ZyrTEC) 10 MG tablet  Take 10 mg by mouth Daily As Needed.             COSOPT PF 22.3-6.8 MG/ML solution  Administer 1 drop to both eyes 2 (Two) Times a Day.             dabigatran etexilate (PRADAXA) 150 MG capsu  Take 1 capsule by mouth Every 12 (Twelve) Hours.             folic acid (FOLVITE) 1 MG tablet  Take 1 mg by mouth Daily. Patient stopped taking 2 weeks ago             furosemide (LASIX) 20 MG tablet  Take 1 tablet by mouth Daily.             HYDROcodone-acetaminophen (NORCO) 5-325 MG per tablet  Take 1 tablet by mouth Every 6 (Six) Hours As Needed for Moderate Pain .             losartan  (COZAAR) 50 MG tablet  Take 1 tablet by mouth Daily.             meloxicam (MOBIC) 15 MG tablet  Take 15 mg by mouth Daily.             MYRBETRIQ 50 MG tablet sustained-release 24 hour  Take 50 mg by mouth Every Morning.             potassium chloride (MICRO-K) 10 MEQ CR capsule  Take 1 capsule by mouth Daily.             travoprost, ANNIE free, (TRAVATAN Z) 0.004 % solution ophthalmic solution  Administer 1 drop to both eyes Every Evening. in affected eye(s)             vitamin B-12 (VITAMIN B-12) 1000 MCG tablet  Take 1 tablet by mouth Daily.             zolpidem (AMBIEN) 5 MG tablet  Bring to sleep lab DO NOT use at home                Diet Instructions     Diet: Regular, Cardiac       Discharge Diet:   Regular  Cardiac                   Activity Instructions     Activity as Tolerated       Per physical therapy                Additional Instructions for the Follow-ups that You Need to Schedule     Call MD for problems / concerns.    As directed              Follow-up Information     Follow up with [facility] .    Specialties:  Assisted Living Facility, Skilled Nursing Facility, Intermediate Care Facility    Contact information:    [address] [facility] [address] [phone]        Follow up with Raoul Ramirez MD .    Specialty:  Internal Medicine    Why:  after rehab    Contact information:    [address]  [facility] [address]  [place] [address]  [phone]          Follow up with Iain Hill MD .    Specialties:  Pulmonary Disease, Sleep Medicine    Contact information:    [address]  [facility] [address]  [facility] [id number] [phone]          Follow up with Edita Fernández MD .    Specialty:  Neurology    Contact information:    [facility] [facility] [place] [address] [phone]          Test Results Pending at Discharge     Peter De La Rosa MD  10/18/17  12:00 PM    Discharge time spent greater than 30 minutes.

## 2017-10-19 ENCOUNTER — HOSPITAL ENCOUNTER (INPATIENT)
Facility: HOSPITAL | Age: 72
LOS: 8 days | Discharge: HOME HEALTH | End: 2017-10-27
Attending: EMERGENCY MEDICINE | Admitting: INTERNAL MEDICINE

## 2017-10-19 ENCOUNTER — APPOINTMENT (OUTPATIENT)
Dept: GENERAL RADIOLOGY | Facility: HOSPITAL | Age: 72
End: 2017-10-19

## 2017-10-19 ENCOUNTER — TELEPHONE (OUTPATIENT)
Dept: CARDIOLOGY | Facility: CLINIC | Age: 72
End: 2017-10-19

## 2017-10-19 DIAGNOSIS — R53.1 GENERAL WEAKNESS: ICD-10-CM

## 2017-10-19 DIAGNOSIS — I21.4 NSTEMI (NON-ST ELEVATED MYOCARDIAL INFARCTION) (HCC): ICD-10-CM

## 2017-10-19 DIAGNOSIS — M15.9 GENERALIZED OSTEOARTHRITIS OF MULTIPLE SITES: ICD-10-CM

## 2017-10-19 DIAGNOSIS — R53.83 OTHER FATIGUE: ICD-10-CM

## 2017-10-19 DIAGNOSIS — I63.9 ACUTE CVA (CEREBROVASCULAR ACCIDENT) (HCC): Primary | ICD-10-CM

## 2017-10-19 DIAGNOSIS — I48.19 PERSISTENT ATRIAL FIBRILLATION (HCC): ICD-10-CM

## 2017-10-19 LAB
ALBUMIN SERPL-MCNC: 4.3 G/DL (ref 3.5–5.2)
ALBUMIN/GLOB SERPL: 1.3 G/DL
ALP SERPL-CCNC: 57 U/L (ref 39–117)
ALT SERPL W P-5'-P-CCNC: 38 U/L (ref 1–33)
ANION GAP SERPL CALCULATED.3IONS-SCNC: 13.4 MMOL/L
APTT PPP: 46.6 SECONDS (ref 22.7–35.4)
APTT PPP: 68.9 SECONDS (ref 22.7–35.4)
AST SERPL-CCNC: 47 U/L (ref 1–32)
BASOPHILS # BLD AUTO: 0.01 10*3/MM3 (ref 0–0.2)
BASOPHILS # BLD AUTO: 0.02 10*3/MM3 (ref 0–0.2)
BASOPHILS NFR BLD AUTO: 0.2 % (ref 0–1.5)
BASOPHILS NFR BLD AUTO: 0.4 % (ref 0–1.5)
BILIRUB SERPL-MCNC: 0.6 MG/DL (ref 0.1–1.2)
BUN BLD-MCNC: 12 MG/DL (ref 8–23)
BUN/CREAT SERPL: 13.8 (ref 7–25)
CALCIUM SPEC-SCNC: 9.6 MG/DL (ref 8.6–10.5)
CHLORIDE SERPL-SCNC: 103 MMOL/L (ref 98–107)
CO2 SERPL-SCNC: 26.6 MMOL/L (ref 22–29)
CREAT BLD-MCNC: 0.87 MG/DL (ref 0.57–1)
D DIMER PPP FEU-MCNC: <0.27 MCGFEU/ML (ref 0–0.49)
DEPRECATED RDW RBC AUTO: 53.8 FL (ref 37–54)
DEPRECATED RDW RBC AUTO: 54.4 FL (ref 37–54)
EOSINOPHIL # BLD AUTO: 0.1 10*3/MM3 (ref 0–0.7)
EOSINOPHIL # BLD AUTO: 0.12 10*3/MM3 (ref 0–0.7)
EOSINOPHIL NFR BLD AUTO: 1.8 % (ref 0.3–6.2)
EOSINOPHIL NFR BLD AUTO: 2 % (ref 0.3–6.2)
ERYTHROCYTE [DISTWIDTH] IN BLOOD BY AUTOMATED COUNT: 14.6 % (ref 11.7–13)
ERYTHROCYTE [DISTWIDTH] IN BLOOD BY AUTOMATED COUNT: 14.9 % (ref 11.7–13)
GFR SERPL CREATININE-BSD FRML MDRD: 78 ML/MIN/1.73
GLOBULIN UR ELPH-MCNC: 3.3 GM/DL
GLUCOSE BLD-MCNC: 129 MG/DL (ref 65–99)
HCT VFR BLD AUTO: 35.9 % (ref 35.6–45.5)
HCT VFR BLD AUTO: 37.4 % (ref 35.6–45.5)
HGB BLD-MCNC: 11.6 G/DL (ref 11.9–15.5)
HGB BLD-MCNC: 12 G/DL (ref 11.9–15.5)
HOLD SPECIMEN: NORMAL
HOLD SPECIMEN: NORMAL
IMM GRANULOCYTES # BLD: 0 10*3/MM3 (ref 0–0.03)
IMM GRANULOCYTES # BLD: 0 10*3/MM3 (ref 0–0.03)
IMM GRANULOCYTES NFR BLD: 0 % (ref 0–0.5)
IMM GRANULOCYTES NFR BLD: 0 % (ref 0–0.5)
INR PPP: 1.73 (ref 0.9–1.1)
INR PPP: 1.88 (ref 0.9–1.1)
LYMPHOCYTES # BLD AUTO: 2.21 10*3/MM3 (ref 0.9–4.8)
LYMPHOCYTES # BLD AUTO: 2.27 10*3/MM3 (ref 0.9–4.8)
LYMPHOCYTES NFR BLD AUTO: 36.2 % (ref 19.6–45.3)
LYMPHOCYTES NFR BLD AUTO: 39.8 % (ref 19.6–45.3)
MCH RBC QN AUTO: 32.1 PG (ref 26.9–32)
MCH RBC QN AUTO: 32.4 PG (ref 26.9–32)
MCHC RBC AUTO-ENTMCNC: 32.1 G/DL (ref 32.4–36.3)
MCHC RBC AUTO-ENTMCNC: 32.3 G/DL (ref 32.4–36.3)
MCV RBC AUTO: 101.1 FL (ref 80.5–98.2)
MCV RBC AUTO: 99.4 FL (ref 80.5–98.2)
MONOCYTES # BLD AUTO: 0.76 10*3/MM3 (ref 0.2–1.2)
MONOCYTES # BLD AUTO: 0.89 10*3/MM3 (ref 0.2–1.2)
MONOCYTES NFR BLD AUTO: 13.3 % (ref 5–12)
MONOCYTES NFR BLD AUTO: 14.6 % (ref 5–12)
NEUTROPHILS # BLD AUTO: 2.55 10*3/MM3 (ref 1.9–8.1)
NEUTROPHILS # BLD AUTO: 2.87 10*3/MM3 (ref 1.9–8.1)
NEUTROPHILS NFR BLD AUTO: 44.7 % (ref 42.7–76)
NEUTROPHILS NFR BLD AUTO: 47 % (ref 42.7–76)
NT-PROBNP SERPL-MCNC: 1264 PG/ML (ref 5–900)
PLATELET # BLD AUTO: 173 10*3/MM3 (ref 140–500)
PLATELET # BLD AUTO: 181 10*3/MM3 (ref 140–500)
PMV BLD AUTO: 11.4 FL (ref 6–12)
PMV BLD AUTO: 11.6 FL (ref 6–12)
POTASSIUM BLD-SCNC: 3.9 MMOL/L (ref 3.5–5.2)
PROT SERPL-MCNC: 7.6 G/DL (ref 6–8.5)
PROTHROMBIN TIME: 19.6 SECONDS (ref 11.7–14.2)
PROTHROMBIN TIME: 20.9 SECONDS (ref 11.7–14.2)
RBC # BLD AUTO: 3.61 10*6/MM3 (ref 3.9–5.2)
RBC # BLD AUTO: 3.7 10*6/MM3 (ref 3.9–5.2)
SODIUM BLD-SCNC: 143 MMOL/L (ref 136–145)
TROPONIN T SERPL-MCNC: 0.13 NG/ML (ref 0–0.03)
TROPONIN T SERPL-MCNC: 0.17 NG/ML (ref 0–0.03)
WBC NRBC COR # BLD: 5.7 10*3/MM3 (ref 4.5–10.7)
WBC NRBC COR # BLD: 6.1 10*3/MM3 (ref 4.5–10.7)
WHOLE BLOOD HOLD SPECIMEN: NORMAL
WHOLE BLOOD HOLD SPECIMEN: NORMAL

## 2017-10-19 PROCEDURE — 25010000002 HEPARIN (PORCINE) PER 1000 UNITS: Performed by: PHYSICIAN ASSISTANT

## 2017-10-19 PROCEDURE — 85379 FIBRIN DEGRADATION QUANT: CPT | Performed by: INTERNAL MEDICINE

## 2017-10-19 PROCEDURE — 84484 ASSAY OF TROPONIN QUANT: CPT | Performed by: INTERNAL MEDICINE

## 2017-10-19 PROCEDURE — 85730 THROMBOPLASTIN TIME PARTIAL: CPT | Performed by: PHYSICIAN ASSISTANT

## 2017-10-19 PROCEDURE — 83880 ASSAY OF NATRIURETIC PEPTIDE: CPT | Performed by: EMERGENCY MEDICINE

## 2017-10-19 PROCEDURE — 85025 COMPLETE CBC W/AUTO DIFF WBC: CPT | Performed by: PHYSICIAN ASSISTANT

## 2017-10-19 PROCEDURE — 84484 ASSAY OF TROPONIN QUANT: CPT | Performed by: EMERGENCY MEDICINE

## 2017-10-19 PROCEDURE — 85610 PROTHROMBIN TIME: CPT | Performed by: PHYSICIAN ASSISTANT

## 2017-10-19 PROCEDURE — 85025 COMPLETE CBC W/AUTO DIFF WBC: CPT | Performed by: EMERGENCY MEDICINE

## 2017-10-19 PROCEDURE — 93010 ELECTROCARDIOGRAM REPORT: CPT | Performed by: INTERNAL MEDICINE

## 2017-10-19 PROCEDURE — 99285 EMERGENCY DEPT VISIT HI MDM: CPT

## 2017-10-19 PROCEDURE — 71020 HC CHEST PA AND LATERAL: CPT

## 2017-10-19 PROCEDURE — 80053 COMPREHEN METABOLIC PANEL: CPT | Performed by: EMERGENCY MEDICINE

## 2017-10-19 PROCEDURE — 93005 ELECTROCARDIOGRAM TRACING: CPT | Performed by: EMERGENCY MEDICINE

## 2017-10-19 PROCEDURE — 99223 1ST HOSP IP/OBS HIGH 75: CPT | Performed by: INTERNAL MEDICINE

## 2017-10-19 RX ORDER — ZOLPIDEM TARTRATE 5 MG/1
5 TABLET ORAL NIGHTLY PRN
Status: DISCONTINUED | OUTPATIENT
Start: 2017-10-19 | End: 2017-10-27 | Stop reason: HOSPADM

## 2017-10-19 RX ORDER — FOLIC ACID 1 MG/1
1 TABLET ORAL DAILY
Status: DISCONTINUED | OUTPATIENT
Start: 2017-10-20 | End: 2017-10-27 | Stop reason: HOSPADM

## 2017-10-19 RX ORDER — POTASSIUM CHLORIDE 750 MG/1
10 CAPSULE, EXTENDED RELEASE ORAL DAILY
Status: DISCONTINUED | OUTPATIENT
Start: 2017-10-20 | End: 2017-10-25

## 2017-10-19 RX ORDER — HEPARIN SODIUM 5000 [USP'U]/ML
33.9 INJECTION, SOLUTION INTRAVENOUS; SUBCUTANEOUS ONCE
Status: COMPLETED | OUTPATIENT
Start: 2017-10-19 | End: 2017-10-19

## 2017-10-19 RX ORDER — SODIUM CHLORIDE 0.9 % (FLUSH) 0.9 %
1-10 SYRINGE (ML) INJECTION AS NEEDED
Status: DISCONTINUED | OUTPATIENT
Start: 2017-10-19 | End: 2017-10-27 | Stop reason: HOSPADM

## 2017-10-19 RX ORDER — LATANOPROST 50 UG/ML
1 SOLUTION/ DROPS OPHTHALMIC NIGHTLY
Status: DISCONTINUED | OUTPATIENT
Start: 2017-10-20 | End: 2017-10-27 | Stop reason: HOSPADM

## 2017-10-19 RX ORDER — SODIUM CHLORIDE 0.9 % (FLUSH) 0.9 %
10 SYRINGE (ML) INJECTION AS NEEDED
Status: DISCONTINUED | OUTPATIENT
Start: 2017-10-19 | End: 2017-10-27 | Stop reason: HOSPADM

## 2017-10-19 RX ORDER — POTASSIUM CHLORIDE 750 MG/1
20 CAPSULE, EXTENDED RELEASE ORAL ONCE
Status: COMPLETED | OUTPATIENT
Start: 2017-10-19 | End: 2017-10-20

## 2017-10-19 RX ORDER — ATENOLOL 50 MG/1
100 TABLET ORAL DAILY
Status: DISCONTINUED | OUTPATIENT
Start: 2017-10-20 | End: 2017-10-20

## 2017-10-19 RX ORDER — HEPARIN SODIUM 5000 [USP'U]/ML
29.7-33.9 INJECTION, SOLUTION INTRAVENOUS; SUBCUTANEOUS EVERY 6 HOURS PRN
Status: DISCONTINUED | OUTPATIENT
Start: 2017-10-19 | End: 2017-10-27 | Stop reason: HOSPADM

## 2017-10-19 RX ORDER — LOSARTAN POTASSIUM 50 MG/1
50 TABLET ORAL
Status: DISCONTINUED | OUTPATIENT
Start: 2017-10-20 | End: 2017-10-23

## 2017-10-19 RX ORDER — CHOLECALCIFEROL (VITAMIN D3) 125 MCG
1000 CAPSULE ORAL DAILY
Status: DISCONTINUED | OUTPATIENT
Start: 2017-10-20 | End: 2017-10-27 | Stop reason: HOSPADM

## 2017-10-19 RX ORDER — ASPIRIN 81 MG/1
81 TABLET ORAL DAILY
Status: DISCONTINUED | OUTPATIENT
Start: 2017-10-20 | End: 2017-10-27 | Stop reason: HOSPADM

## 2017-10-19 RX ORDER — ATORVASTATIN CALCIUM 20 MG/1
40 TABLET, FILM COATED ORAL NIGHTLY
Status: DISCONTINUED | OUTPATIENT
Start: 2017-10-20 | End: 2017-10-27 | Stop reason: HOSPADM

## 2017-10-19 RX ORDER — FUROSEMIDE 10 MG/ML
40 INJECTION INTRAMUSCULAR; INTRAVENOUS ONCE
Status: COMPLETED | OUTPATIENT
Start: 2017-10-19 | End: 2017-10-20

## 2017-10-19 RX ORDER — FUROSEMIDE 20 MG/1
20 TABLET ORAL DAILY
Status: DISCONTINUED | OUTPATIENT
Start: 2017-10-20 | End: 2017-10-25

## 2017-10-19 RX ORDER — OXYBUTYNIN CHLORIDE 10 MG/1
10 TABLET, EXTENDED RELEASE ORAL DAILY
Status: DISCONTINUED | OUTPATIENT
Start: 2017-10-20 | End: 2017-10-27 | Stop reason: HOSPADM

## 2017-10-19 RX ORDER — HYDROCODONE BITARTRATE AND ACETAMINOPHEN 5; 325 MG/1; MG/1
1 TABLET ORAL EVERY 6 HOURS PRN
Status: DISCONTINUED | OUTPATIENT
Start: 2017-10-19 | End: 2017-10-27 | Stop reason: HOSPADM

## 2017-10-19 RX ADMIN — HEPARIN SODIUM 8.47 UNITS/KG/HR: 10000 INJECTION, SOLUTION INTRAVENOUS at 19:06

## 2017-10-19 RX ADMIN — HEPARIN SODIUM 4000 UNITS: 5000 INJECTION, SOLUTION INTRAVENOUS; SUBCUTANEOUS at 19:00

## 2017-10-19 NOTE — TELEPHONE ENCOUNTER
Joslyn from [facility] call from [phone] re: Pt felt SOA at Therapy and increased since sitting.  She feels clammy.  No dizziness or CP.  BP was 120/88 and HR 86.  Pt is in afib and on Pradaxa 150mg BID.      Spoke with Dr Dueñas- Pt has been in afib and HR is 86.  Pt will need to be taken to the ER to find out what is going on.      Info to Joslyn.  (done)

## 2017-10-19 NOTE — TELEPHONE ENCOUNTER
Once she is cleared by cardiology to have surgery and be off anticoagulant for it. No specific time. I am not aware of any imaging unless he specified something in his last note.

## 2017-10-19 NOTE — TELEPHONE ENCOUNTER
Patient was discharged from [facility] and admitted to [facility] ([phone]) yesterday. How soon does she need to be seen? Imaging?

## 2017-10-20 ENCOUNTER — APPOINTMENT (OUTPATIENT)
Dept: GENERAL RADIOLOGY | Facility: HOSPITAL | Age: 72
End: 2017-10-20
Attending: INTERNAL MEDICINE

## 2017-10-20 ENCOUNTER — APPOINTMENT (OUTPATIENT)
Dept: CARDIOLOGY | Facility: HOSPITAL | Age: 72
End: 2017-10-20
Attending: INTERNAL MEDICINE

## 2017-10-20 PROBLEM — M25.512 LEFT SHOULDER PAIN: Status: ACTIVE | Noted: 2017-10-20

## 2017-10-20 LAB
ALBUMIN SERPL-MCNC: 4.1 G/DL (ref 3.5–5.2)
ALBUMIN/GLOB SERPL: 1.1 G/DL
ALP SERPL-CCNC: 56 U/L (ref 39–117)
ALT SERPL W P-5'-P-CCNC: 33 U/L (ref 1–33)
ANION GAP SERPL CALCULATED.3IONS-SCNC: 12.4 MMOL/L
APTT PPP: 66.1 SECONDS (ref 22.7–35.4)
APTT PPP: 90 SECONDS (ref 22.7–35.4)
AST SERPL-CCNC: 28 U/L (ref 1–32)
BASOPHILS # BLD AUTO: 0.01 10*3/MM3 (ref 0–0.2)
BASOPHILS NFR BLD AUTO: 0.1 % (ref 0–1.5)
BH CV ECHO MEAS - BSA(HAYCOCK): 2.4 M^2
BH CV ECHO MEAS - BSA: 2.3 M^2
BH CV ECHO MEAS - BZI_BMI: 43.4 KILOGRAMS/M^2
BH CV ECHO MEAS - BZI_METRIC_HEIGHT: 167.6 CM
BH CV ECHO MEAS - BZI_METRIC_WEIGHT: 122 KG
BH CV VAS BP RIGHT ARM: NORMAL MMHG
BILIRUB SERPL-MCNC: 0.5 MG/DL (ref 0.1–1.2)
BUN BLD-MCNC: 14 MG/DL (ref 8–23)
BUN/CREAT SERPL: 14.9 (ref 7–25)
CALCIUM SPEC-SCNC: 9.5 MG/DL (ref 8.6–10.5)
CHLORIDE SERPL-SCNC: 102 MMOL/L (ref 98–107)
CO2 SERPL-SCNC: 27.6 MMOL/L (ref 22–29)
CREAT BLD-MCNC: 0.94 MG/DL (ref 0.57–1)
DEPRECATED RDW RBC AUTO: 53.2 FL (ref 37–54)
EOSINOPHIL # BLD AUTO: 0.16 10*3/MM3 (ref 0–0.7)
EOSINOPHIL NFR BLD AUTO: 2.1 % (ref 0.3–6.2)
ERYTHROCYTE [DISTWIDTH] IN BLOOD BY AUTOMATED COUNT: 14.6 % (ref 11.7–13)
GFR SERPL CREATININE-BSD FRML MDRD: 71 ML/MIN/1.73
GLOBULIN UR ELPH-MCNC: 3.6 GM/DL
GLUCOSE BLD-MCNC: 134 MG/DL (ref 65–99)
HCT VFR BLD AUTO: 38.5 % (ref 35.6–45.5)
HGB BLD-MCNC: 12.2 G/DL (ref 11.9–15.5)
IMM GRANULOCYTES # BLD: 0.02 10*3/MM3 (ref 0–0.03)
IMM GRANULOCYTES NFR BLD: 0.3 % (ref 0–0.5)
LYMPHOCYTES # BLD AUTO: 3.58 10*3/MM3 (ref 0.9–4.8)
LYMPHOCYTES NFR BLD AUTO: 46.7 % (ref 19.6–45.3)
MCH RBC QN AUTO: 31.8 PG (ref 26.9–32)
MCHC RBC AUTO-ENTMCNC: 31.7 G/DL (ref 32.4–36.3)
MCV RBC AUTO: 100.3 FL (ref 80.5–98.2)
MONOCYTES # BLD AUTO: 0.95 10*3/MM3 (ref 0.2–1.2)
MONOCYTES NFR BLD AUTO: 12.4 % (ref 5–12)
NEUTROPHILS # BLD AUTO: 2.94 10*3/MM3 (ref 1.9–8.1)
NEUTROPHILS NFR BLD AUTO: 38.4 % (ref 42.7–76)
PLATELET # BLD AUTO: 177 10*3/MM3 (ref 140–500)
PMV BLD AUTO: 11.8 FL (ref 6–12)
POTASSIUM BLD-SCNC: 3.5 MMOL/L (ref 3.5–5.2)
PROT SERPL-MCNC: 7.7 G/DL (ref 6–8.5)
RBC # BLD AUTO: 3.84 10*6/MM3 (ref 3.9–5.2)
SODIUM BLD-SCNC: 142 MMOL/L (ref 136–145)
TROPONIN T SERPL-MCNC: 0.19 NG/ML (ref 0–0.03)
WBC NRBC COR # BLD: 7.66 10*3/MM3 (ref 4.5–10.7)

## 2017-10-20 PROCEDURE — 93312 ECHO TRANSESOPHAGEAL: CPT

## 2017-10-20 PROCEDURE — 93458 L HRT ARTERY/VENTRICLE ANGIO: CPT | Performed by: INTERNAL MEDICINE

## 2017-10-20 PROCEDURE — 85025 COMPLETE CBC W/AUTO DIFF WBC: CPT | Performed by: PHYSICIAN ASSISTANT

## 2017-10-20 PROCEDURE — C1894 INTRO/SHEATH, NON-LASER: HCPCS | Performed by: INTERNAL MEDICINE

## 2017-10-20 PROCEDURE — 94799 UNLISTED PULMONARY SVC/PX: CPT

## 2017-10-20 PROCEDURE — 99152 MOD SED SAME PHYS/QHP 5/>YRS: CPT

## 2017-10-20 PROCEDURE — 25010000002 HEPARIN (PORCINE) PER 1000 UNITS: Performed by: PHYSICIAN ASSISTANT

## 2017-10-20 PROCEDURE — 73030 X-RAY EXAM OF SHOULDER: CPT

## 2017-10-20 PROCEDURE — 25010000002 FENTANYL CITRATE (PF) 100 MCG/2ML SOLUTION: Performed by: INTERNAL MEDICINE

## 2017-10-20 PROCEDURE — B246ZZ4 ULTRASONOGRAPHY OF RIGHT AND LEFT HEART, TRANSESOPHAGEAL: ICD-10-PCS | Performed by: INTERNAL MEDICINE

## 2017-10-20 PROCEDURE — 25010000002 FUROSEMIDE PER 20 MG: Performed by: INTERNAL MEDICINE

## 2017-10-20 PROCEDURE — B2111ZZ FLUOROSCOPY OF MULTIPLE CORONARY ARTERIES USING LOW OSMOLAR CONTRAST: ICD-10-PCS | Performed by: INTERNAL MEDICINE

## 2017-10-20 PROCEDURE — 80053 COMPREHEN METABOLIC PANEL: CPT | Performed by: INTERNAL MEDICINE

## 2017-10-20 PROCEDURE — 25010000002 HEPARIN (PORCINE) PER 1000 UNITS: Performed by: INTERNAL MEDICINE

## 2017-10-20 PROCEDURE — 25010000002 MIDAZOLAM PER 1 MG: Performed by: INTERNAL MEDICINE

## 2017-10-20 PROCEDURE — 99232 SBSQ HOSP IP/OBS MODERATE 35: CPT | Performed by: INTERNAL MEDICINE

## 2017-10-20 PROCEDURE — 84484 ASSAY OF TROPONIN QUANT: CPT | Performed by: INTERNAL MEDICINE

## 2017-10-20 PROCEDURE — 85730 THROMBOPLASTIN TIME PARTIAL: CPT | Performed by: INTERNAL MEDICINE

## 2017-10-20 PROCEDURE — B2151ZZ FLUOROSCOPY OF LEFT HEART USING LOW OSMOLAR CONTRAST: ICD-10-PCS | Performed by: INTERNAL MEDICINE

## 2017-10-20 PROCEDURE — 0 IOPAMIDOL PER 1 ML: Performed by: INTERNAL MEDICINE

## 2017-10-20 PROCEDURE — C1769 GUIDE WIRE: HCPCS | Performed by: INTERNAL MEDICINE

## 2017-10-20 PROCEDURE — 85730 THROMBOPLASTIN TIME PARTIAL: CPT | Performed by: PHYSICIAN ASSISTANT

## 2017-10-20 PROCEDURE — 93325 DOPPLER ECHO COLOR FLOW MAPG: CPT | Performed by: INTERNAL MEDICINE

## 2017-10-20 PROCEDURE — 93320 DOPPLER ECHO COMPLETE: CPT

## 2017-10-20 PROCEDURE — 93325 DOPPLER ECHO COLOR FLOW MAPG: CPT

## 2017-10-20 PROCEDURE — 4A023N7 MEASUREMENT OF CARDIAC SAMPLING AND PRESSURE, LEFT HEART, PERCUTANEOUS APPROACH: ICD-10-PCS | Performed by: INTERNAL MEDICINE

## 2017-10-20 PROCEDURE — 93312 ECHO TRANSESOPHAGEAL: CPT | Performed by: INTERNAL MEDICINE

## 2017-10-20 PROCEDURE — 93320 DOPPLER ECHO COMPLETE: CPT | Performed by: INTERNAL MEDICINE

## 2017-10-20 RX ORDER — SODIUM CHLORIDE 9 MG/ML
INJECTION, SOLUTION INTRAVENOUS CONTINUOUS PRN
Status: DISCONTINUED | OUTPATIENT
Start: 2017-10-20 | End: 2017-10-20 | Stop reason: HOSPADM

## 2017-10-20 RX ORDER — FENTANYL CITRATE 50 UG/ML
INJECTION, SOLUTION INTRAMUSCULAR; INTRAVENOUS
Status: COMPLETED | OUTPATIENT
Start: 2017-10-20 | End: 2017-10-20

## 2017-10-20 RX ORDER — CARVEDILOL 12.5 MG/1
12.5 TABLET ORAL 2 TIMES DAILY WITH MEALS
Status: DISCONTINUED | OUTPATIENT
Start: 2017-10-20 | End: 2017-10-23

## 2017-10-20 RX ORDER — MIDAZOLAM HYDROCHLORIDE 1 MG/ML
INJECTION INTRAMUSCULAR; INTRAVENOUS AS NEEDED
Status: DISCONTINUED | OUTPATIENT
Start: 2017-10-20 | End: 2017-10-20 | Stop reason: HOSPADM

## 2017-10-20 RX ORDER — LIDOCAINE HYDROCHLORIDE 20 MG/ML
INJECTION, SOLUTION INFILTRATION; PERINEURAL AS NEEDED
Status: DISCONTINUED | OUTPATIENT
Start: 2017-10-20 | End: 2017-10-20 | Stop reason: HOSPADM

## 2017-10-20 RX ORDER — MIDAZOLAM HYDROCHLORIDE 1 MG/ML
INJECTION INTRAMUSCULAR; INTRAVENOUS
Status: COMPLETED | OUTPATIENT
Start: 2017-10-20 | End: 2017-10-20

## 2017-10-20 RX ORDER — FENTANYL CITRATE 50 UG/ML
INJECTION, SOLUTION INTRAMUSCULAR; INTRAVENOUS AS NEEDED
Status: DISCONTINUED | OUTPATIENT
Start: 2017-10-20 | End: 2017-10-20 | Stop reason: HOSPADM

## 2017-10-20 RX ORDER — SODIUM CHLORIDE 9 MG/ML
INJECTION, SOLUTION INTRAVENOUS
Status: COMPLETED | OUTPATIENT
Start: 2017-10-20 | End: 2017-10-20

## 2017-10-20 RX ORDER — SODIUM CHLORIDE 9 MG/ML
100 INJECTION, SOLUTION INTRAVENOUS CONTINUOUS
Status: ACTIVE | OUTPATIENT
Start: 2017-10-20 | End: 2017-10-21

## 2017-10-20 RX ADMIN — LIDOCAINE HYDROCHLORIDE 10 ML: 20 SOLUTION ORAL; TOPICAL at 09:51

## 2017-10-20 RX ADMIN — FUROSEMIDE 40 MG: 10 INJECTION, SOLUTION INTRAMUSCULAR; INTRAVENOUS at 00:22

## 2017-10-20 RX ADMIN — ATORVASTATIN CALCIUM 40 MG: 20 TABLET, FILM COATED ORAL at 21:12

## 2017-10-20 RX ADMIN — FUROSEMIDE 20 MG: 20 TABLET ORAL at 21:10

## 2017-10-20 RX ADMIN — ZOLPIDEM TARTRATE 5 MG: 5 TABLET ORAL at 00:22

## 2017-10-20 RX ADMIN — CYANOCOBALAMIN TAB 500 MCG 1000 MCG: 500 TAB at 21:07

## 2017-10-20 RX ADMIN — FENTANYL CITRATE 25 MCG: 50 INJECTION INTRAMUSCULAR; INTRAVENOUS at 10:06

## 2017-10-20 RX ADMIN — LATANOPROST 1 DROP: 50 SOLUTION OPHTHALMIC at 00:25

## 2017-10-20 RX ADMIN — HYDROCODONE BITARTRATE AND ACETAMINOPHEN 1 TABLET: 5; 325 TABLET ORAL at 00:22

## 2017-10-20 RX ADMIN — MIDAZOLAM 2 MG: 1 INJECTION INTRAMUSCULAR; INTRAVENOUS at 10:06

## 2017-10-20 RX ADMIN — FOLIC ACID 1 MG: 1 TABLET ORAL at 21:11

## 2017-10-20 RX ADMIN — HYDROCODONE BITARTRATE AND ACETAMINOPHEN 1 TABLET: 5; 325 TABLET ORAL at 21:23

## 2017-10-20 RX ADMIN — SODIUM CHLORIDE 100 ML/HR: 9 INJECTION, SOLUTION INTRAVENOUS at 21:12

## 2017-10-20 RX ADMIN — CARVEDILOL 12.5 MG: 12.5 TABLET, FILM COATED ORAL at 21:12

## 2017-10-20 RX ADMIN — HEPARIN SODIUM 8.47 UNITS/KG/HR: 10000 INJECTION, SOLUTION INTRAVENOUS at 21:08

## 2017-10-20 RX ADMIN — SODIUM CHLORIDE 50 ML/HR: 9 INJECTION, SOLUTION INTRAVENOUS at 09:49

## 2017-10-20 RX ADMIN — MIDAZOLAM 1 MG: 1 INJECTION INTRAMUSCULAR; INTRAVENOUS at 10:08

## 2017-10-20 RX ADMIN — ATORVASTATIN CALCIUM 40 MG: 20 TABLET, FILM COATED ORAL at 06:03

## 2017-10-20 RX ADMIN — HEPARIN SODIUM 6.47 UNITS/KG/HR: 10000 INJECTION, SOLUTION INTRAVENOUS at 05:47

## 2017-10-20 RX ADMIN — POTASSIUM CHLORIDE 10 MEQ: 750 CAPSULE, EXTENDED RELEASE ORAL at 21:10

## 2017-10-20 RX ADMIN — ASPIRIN 81 MG: 81 TABLET ORAL at 06:03

## 2017-10-20 RX ADMIN — ZOLPIDEM TARTRATE 5 MG: 5 TABLET ORAL at 21:23

## 2017-10-20 RX ADMIN — FENTANYL CITRATE 25 MCG: 50 INJECTION INTRAMUSCULAR; INTRAVENOUS at 10:08

## 2017-10-20 RX ADMIN — POTASSIUM CHLORIDE 20 MEQ: 750 CAPSULE, EXTENDED RELEASE ORAL at 00:22

## 2017-10-20 RX ADMIN — LOSARTAN POTASSIUM 50 MG: 50 TABLET, FILM COATED ORAL at 21:11

## 2017-10-20 RX ADMIN — OXYBUTYNIN CHLORIDE 10 MG: 10 TABLET, FILM COATED, EXTENDED RELEASE ORAL at 21:13

## 2017-10-20 NOTE — TELEPHONE ENCOUNTER
Barbara A Sturgeon, MA Angela Humphrey, MA                     I cancelled her FU appt darby/Chris on Monday, as she is still in the hospital for significant heart issues. Talked a bit to Deena about it today and really no need for us to see her until cardiology issues resolved and we have medical clearance.

## 2017-10-21 LAB
ANION GAP SERPL CALCULATED.3IONS-SCNC: 9.2 MMOL/L
APTT PPP: 71.5 SECONDS (ref 22.7–35.4)
BASOPHILS # BLD AUTO: 0.02 10*3/MM3 (ref 0–0.2)
BASOPHILS NFR BLD AUTO: 0.3 % (ref 0–1.5)
BUN BLD-MCNC: 14 MG/DL (ref 8–23)
BUN/CREAT SERPL: 15.6 (ref 7–25)
CALCIUM SPEC-SCNC: 9.4 MG/DL (ref 8.6–10.5)
CHLORIDE SERPL-SCNC: 106 MMOL/L (ref 98–107)
CO2 SERPL-SCNC: 28.8 MMOL/L (ref 22–29)
CREAT BLD-MCNC: 0.9 MG/DL (ref 0.57–1)
CRP SERPL-MCNC: 0.62 MG/DL (ref 0–0.5)
DEPRECATED RDW RBC AUTO: 53.6 FL (ref 37–54)
EOSINOPHIL # BLD AUTO: 0.16 10*3/MM3 (ref 0–0.7)
EOSINOPHIL NFR BLD AUTO: 2.6 % (ref 0.3–6.2)
ERYTHROCYTE [DISTWIDTH] IN BLOOD BY AUTOMATED COUNT: 14.5 % (ref 11.7–13)
ERYTHROCYTE [SEDIMENTATION RATE] IN BLOOD: 19 MM/HR (ref 0–30)
GFR SERPL CREATININE-BSD FRML MDRD: 75 ML/MIN/1.73
GLUCOSE BLD-MCNC: 146 MG/DL (ref 65–99)
HCT VFR BLD AUTO: 35.8 % (ref 35.6–45.5)
HGB BLD-MCNC: 11.3 G/DL (ref 11.9–15.5)
IMM GRANULOCYTES # BLD: 0 10*3/MM3 (ref 0–0.03)
IMM GRANULOCYTES NFR BLD: 0 % (ref 0–0.5)
LYMPHOCYTES # BLD AUTO: 2.72 10*3/MM3 (ref 0.9–4.8)
LYMPHOCYTES NFR BLD AUTO: 44.6 % (ref 19.6–45.3)
MCH RBC QN AUTO: 31.7 PG (ref 26.9–32)
MCHC RBC AUTO-ENTMCNC: 31.6 G/DL (ref 32.4–36.3)
MCV RBC AUTO: 100.6 FL (ref 80.5–98.2)
MONOCYTES # BLD AUTO: 0.82 10*3/MM3 (ref 0.2–1.2)
MONOCYTES NFR BLD AUTO: 13.4 % (ref 5–12)
NEUTROPHILS # BLD AUTO: 2.38 10*3/MM3 (ref 1.9–8.1)
NEUTROPHILS NFR BLD AUTO: 39.1 % (ref 42.7–76)
PLATELET # BLD AUTO: 164 10*3/MM3 (ref 140–500)
PMV BLD AUTO: 11.6 FL (ref 6–12)
POTASSIUM BLD-SCNC: 3.7 MMOL/L (ref 3.5–5.2)
RBC # BLD AUTO: 3.56 10*6/MM3 (ref 3.9–5.2)
SODIUM BLD-SCNC: 144 MMOL/L (ref 136–145)
WBC NRBC COR # BLD: 6.1 10*3/MM3 (ref 4.5–10.7)

## 2017-10-21 PROCEDURE — 99231 SBSQ HOSP IP/OBS SF/LOW 25: CPT | Performed by: INTERNAL MEDICINE

## 2017-10-21 PROCEDURE — 25010000002 HEPARIN (PORCINE) PER 1000 UNITS: Performed by: PHYSICIAN ASSISTANT

## 2017-10-21 PROCEDURE — 85652 RBC SED RATE AUTOMATED: CPT | Performed by: INTERNAL MEDICINE

## 2017-10-21 PROCEDURE — 85613 RUSSELL VIPER VENOM DILUTED: CPT | Performed by: INTERNAL MEDICINE

## 2017-10-21 PROCEDURE — 85705 THROMBOPLASTIN INHIBITION: CPT | Performed by: INTERNAL MEDICINE

## 2017-10-21 PROCEDURE — 85730 THROMBOPLASTIN TIME PARTIAL: CPT | Performed by: PHYSICIAN ASSISTANT

## 2017-10-21 PROCEDURE — 85670 THROMBIN TIME PLASMA: CPT | Performed by: INTERNAL MEDICINE

## 2017-10-21 PROCEDURE — 85732 THROMBOPLASTIN TIME PARTIAL: CPT | Performed by: INTERNAL MEDICINE

## 2017-10-21 PROCEDURE — 81240 F2 GENE: CPT | Performed by: INTERNAL MEDICINE

## 2017-10-21 PROCEDURE — 85240 CLOT FACTOR VIII AHG 1 STAGE: CPT | Performed by: INTERNAL MEDICINE

## 2017-10-21 PROCEDURE — 85303 CLOT INHIBIT PROT C ACTIVITY: CPT | Performed by: INTERNAL MEDICINE

## 2017-10-21 PROCEDURE — 85230 CLOT FACTOR VII PROCONVERTIN: CPT | Performed by: INTERNAL MEDICINE

## 2017-10-21 PROCEDURE — 80048 BASIC METABOLIC PNL TOTAL CA: CPT | Performed by: INTERNAL MEDICINE

## 2017-10-21 PROCEDURE — 99223 1ST HOSP IP/OBS HIGH 75: CPT | Performed by: INTERNAL MEDICINE

## 2017-10-21 PROCEDURE — 81241 F5 GENE: CPT | Performed by: INTERNAL MEDICINE

## 2017-10-21 PROCEDURE — 85306 CLOT INHIBIT PROT S FREE: CPT | Performed by: INTERNAL MEDICINE

## 2017-10-21 PROCEDURE — 93010 ELECTROCARDIOGRAM REPORT: CPT | Performed by: INTERNAL MEDICINE

## 2017-10-21 PROCEDURE — 86140 C-REACTIVE PROTEIN: CPT | Performed by: INTERNAL MEDICINE

## 2017-10-21 PROCEDURE — 85025 COMPLETE CBC W/AUTO DIFF WBC: CPT | Performed by: PHYSICIAN ASSISTANT

## 2017-10-21 PROCEDURE — 93005 ELECTROCARDIOGRAM TRACING: CPT | Performed by: INTERNAL MEDICINE

## 2017-10-21 PROCEDURE — 85300 ANTITHROMBIN III ACTIVITY: CPT | Performed by: INTERNAL MEDICINE

## 2017-10-21 PROCEDURE — 97161 PT EVAL LOW COMPLEX 20 MIN: CPT

## 2017-10-21 PROCEDURE — 94799 UNLISTED PULMONARY SVC/PX: CPT

## 2017-10-21 RX ADMIN — ATORVASTATIN CALCIUM 40 MG: 20 TABLET, FILM COATED ORAL at 20:43

## 2017-10-21 RX ADMIN — HEPARIN SODIUM 8.47 UNITS/KG/HR: 10000 INJECTION, SOLUTION INTRAVENOUS at 07:30

## 2017-10-21 RX ADMIN — LOSARTAN POTASSIUM 50 MG: 50 TABLET, FILM COATED ORAL at 08:23

## 2017-10-21 RX ADMIN — OXYBUTYNIN CHLORIDE 10 MG: 10 TABLET, FILM COATED, EXTENDED RELEASE ORAL at 08:22

## 2017-10-21 RX ADMIN — ZOLPIDEM TARTRATE 5 MG: 5 TABLET ORAL at 22:35

## 2017-10-21 RX ADMIN — CARVEDILOL 12.5 MG: 12.5 TABLET, FILM COATED ORAL at 08:26

## 2017-10-21 RX ADMIN — ASPIRIN 81 MG: 81 TABLET ORAL at 08:22

## 2017-10-21 RX ADMIN — FUROSEMIDE 20 MG: 20 TABLET ORAL at 08:23

## 2017-10-21 RX ADMIN — FOLIC ACID 1 MG: 1 TABLET ORAL at 08:26

## 2017-10-21 RX ADMIN — CYANOCOBALAMIN TAB 500 MCG 1000 MCG: 500 TAB at 08:22

## 2017-10-21 RX ADMIN — POTASSIUM CHLORIDE 10 MEQ: 750 CAPSULE, EXTENDED RELEASE ORAL at 08:22

## 2017-10-21 RX ADMIN — HYDROCODONE BITARTRATE AND ACETAMINOPHEN 1 TABLET: 5; 325 TABLET ORAL at 22:35

## 2017-10-21 RX ADMIN — CARVEDILOL 12.5 MG: 12.5 TABLET, FILM COATED ORAL at 17:32

## 2017-10-22 ENCOUNTER — APPOINTMENT (OUTPATIENT)
Dept: CARDIOLOGY | Facility: HOSPITAL | Age: 72
End: 2017-10-22
Attending: INTERNAL MEDICINE

## 2017-10-22 LAB
APTT PPP: 64.5 SECONDS (ref 22.7–35.4)
BASOPHILS # BLD AUTO: 0.02 10*3/MM3 (ref 0–0.2)
BASOPHILS NFR BLD AUTO: 0.3 % (ref 0–1.5)
BH CV LOWER VASCULAR LEFT COMMON FEMORAL AUGMENT: NORMAL
BH CV LOWER VASCULAR LEFT COMMON FEMORAL COMPETENT: NORMAL
BH CV LOWER VASCULAR LEFT COMMON FEMORAL COMPRESS: NORMAL
BH CV LOWER VASCULAR LEFT COMMON FEMORAL PHASIC: NORMAL
BH CV LOWER VASCULAR LEFT COMMON FEMORAL SPONT: NORMAL
BH CV LOWER VASCULAR LEFT DISTAL FEMORAL COMPRESS: NORMAL
BH CV LOWER VASCULAR LEFT GASTRONEMIUS COMPRESS: NORMAL
BH CV LOWER VASCULAR LEFT GREATER SAPH AK COMPRESS: NORMAL
BH CV LOWER VASCULAR LEFT GREATER SAPH BK COMPRESS: NORMAL
BH CV LOWER VASCULAR LEFT MID FEMORAL AUGMENT: NORMAL
BH CV LOWER VASCULAR LEFT MID FEMORAL COMPETENT: NORMAL
BH CV LOWER VASCULAR LEFT MID FEMORAL COMPRESS: NORMAL
BH CV LOWER VASCULAR LEFT MID FEMORAL PHASIC: NORMAL
BH CV LOWER VASCULAR LEFT MID FEMORAL SPONT: NORMAL
BH CV LOWER VASCULAR LEFT PERONEAL COMPRESS: NORMAL
BH CV LOWER VASCULAR LEFT POPLITEAL AUGMENT: NORMAL
BH CV LOWER VASCULAR LEFT POPLITEAL COMPETENT: NORMAL
BH CV LOWER VASCULAR LEFT POPLITEAL COMPRESS: NORMAL
BH CV LOWER VASCULAR LEFT POPLITEAL PHASIC: NORMAL
BH CV LOWER VASCULAR LEFT POPLITEAL SPONT: NORMAL
BH CV LOWER VASCULAR LEFT POSTERIOR TIBIAL COMPRESS: NORMAL
BH CV LOWER VASCULAR LEFT PROXIMAL FEMORAL COMPRESS: NORMAL
BH CV LOWER VASCULAR LEFT SAPHENOFEMORAL JUNCTION AUGMENT: NORMAL
BH CV LOWER VASCULAR LEFT SAPHENOFEMORAL JUNCTION COMPETENT: NORMAL
BH CV LOWER VASCULAR LEFT SAPHENOFEMORAL JUNCTION COMPRESS: NORMAL
BH CV LOWER VASCULAR LEFT SAPHENOFEMORAL JUNCTION PHASIC: NORMAL
BH CV LOWER VASCULAR LEFT SAPHENOFEMORAL JUNCTION SPONT: NORMAL
BH CV LOWER VASCULAR RIGHT COMMON FEMORAL AUGMENT: NORMAL
BH CV LOWER VASCULAR RIGHT COMMON FEMORAL COMPETENT: NORMAL
BH CV LOWER VASCULAR RIGHT COMMON FEMORAL COMPRESS: NORMAL
BH CV LOWER VASCULAR RIGHT COMMON FEMORAL PHASIC: NORMAL
BH CV LOWER VASCULAR RIGHT COMMON FEMORAL SPONT: NORMAL
BH CV LOWER VASCULAR RIGHT DISTAL FEMORAL COMPRESS: NORMAL
BH CV LOWER VASCULAR RIGHT GASTRONEMIUS COMPRESS: NORMAL
BH CV LOWER VASCULAR RIGHT GREATER SAPH AK COMPRESS: NORMAL
BH CV LOWER VASCULAR RIGHT GREATER SAPH BK COMPRESS: NORMAL
BH CV LOWER VASCULAR RIGHT MID FEMORAL AUGMENT: NORMAL
BH CV LOWER VASCULAR RIGHT MID FEMORAL COMPETENT: NORMAL
BH CV LOWER VASCULAR RIGHT MID FEMORAL COMPRESS: NORMAL
BH CV LOWER VASCULAR RIGHT MID FEMORAL PHASIC: NORMAL
BH CV LOWER VASCULAR RIGHT MID FEMORAL SPONT: NORMAL
BH CV LOWER VASCULAR RIGHT PERONEAL COMPRESS: NORMAL
BH CV LOWER VASCULAR RIGHT POPLITEAL AUGMENT: NORMAL
BH CV LOWER VASCULAR RIGHT POPLITEAL COMPETENT: NORMAL
BH CV LOWER VASCULAR RIGHT POPLITEAL COMPRESS: NORMAL
BH CV LOWER VASCULAR RIGHT POPLITEAL PHASIC: NORMAL
BH CV LOWER VASCULAR RIGHT POPLITEAL SPONT: NORMAL
BH CV LOWER VASCULAR RIGHT POSTERIOR TIBIAL COMPRESS: NORMAL
BH CV LOWER VASCULAR RIGHT PROXIMAL FEMORAL COMPRESS: NORMAL
BH CV LOWER VASCULAR RIGHT SAPHENOFEMORAL JUNCTION AUGMENT: NORMAL
BH CV LOWER VASCULAR RIGHT SAPHENOFEMORAL JUNCTION COMPETENT: NORMAL
BH CV LOWER VASCULAR RIGHT SAPHENOFEMORAL JUNCTION COMPRESS: NORMAL
BH CV LOWER VASCULAR RIGHT SAPHENOFEMORAL JUNCTION PHASIC: NORMAL
BH CV LOWER VASCULAR RIGHT SAPHENOFEMORAL JUNCTION SPONT: NORMAL
DEPRECATED RDW RBC AUTO: 51.4 FL (ref 37–54)
EOSINOPHIL # BLD AUTO: 0.2 10*3/MM3 (ref 0–0.7)
EOSINOPHIL NFR BLD AUTO: 2.8 % (ref 0.3–6.2)
ERYTHROCYTE [DISTWIDTH] IN BLOOD BY AUTOMATED COUNT: 14.3 % (ref 11.7–13)
HCT VFR BLD AUTO: 35 % (ref 35.6–45.5)
HGB BLD-MCNC: 11.2 G/DL (ref 11.9–15.5)
IMM GRANULOCYTES # BLD: 0 10*3/MM3 (ref 0–0.03)
IMM GRANULOCYTES NFR BLD: 0 % (ref 0–0.5)
LYMPHOCYTES # BLD AUTO: 4.01 10*3/MM3 (ref 0.9–4.8)
LYMPHOCYTES NFR BLD AUTO: 55.7 % (ref 19.6–45.3)
MCH RBC QN AUTO: 31.5 PG (ref 26.9–32)
MCHC RBC AUTO-ENTMCNC: 32 G/DL (ref 32.4–36.3)
MCV RBC AUTO: 98.6 FL (ref 80.5–98.2)
MONOCYTES # BLD AUTO: 0.83 10*3/MM3 (ref 0.2–1.2)
MONOCYTES NFR BLD AUTO: 11.5 % (ref 5–12)
NEUTROPHILS # BLD AUTO: 2.14 10*3/MM3 (ref 1.9–8.1)
NEUTROPHILS NFR BLD AUTO: 29.7 % (ref 42.7–76)
PLAT MORPH BLD: NORMAL
PLATELET # BLD AUTO: 179 10*3/MM3 (ref 140–500)
PMV BLD AUTO: 11.2 FL (ref 6–12)
RBC # BLD AUTO: 3.55 10*6/MM3 (ref 3.9–5.2)
TARGETS BLD QL SMEAR: NORMAL
WBC MORPH BLD: NORMAL
WBC NRBC COR # BLD: 7.2 10*3/MM3 (ref 4.5–10.7)

## 2017-10-22 PROCEDURE — 93970 EXTREMITY STUDY: CPT

## 2017-10-22 PROCEDURE — 25010000002 HEPARIN (PORCINE) PER 1000 UNITS: Performed by: PHYSICIAN ASSISTANT

## 2017-10-22 PROCEDURE — 99231 SBSQ HOSP IP/OBS SF/LOW 25: CPT | Performed by: INTERNAL MEDICINE

## 2017-10-22 PROCEDURE — 94799 UNLISTED PULMONARY SVC/PX: CPT

## 2017-10-22 PROCEDURE — 85007 BL SMEAR W/DIFF WBC COUNT: CPT | Performed by: PHYSICIAN ASSISTANT

## 2017-10-22 PROCEDURE — 97530 THERAPEUTIC ACTIVITIES: CPT

## 2017-10-22 PROCEDURE — 36415 COLL VENOUS BLD VENIPUNCTURE: CPT | Performed by: PHYSICIAN ASSISTANT

## 2017-10-22 PROCEDURE — 85730 THROMBOPLASTIN TIME PARTIAL: CPT | Performed by: PHYSICIAN ASSISTANT

## 2017-10-22 PROCEDURE — 85025 COMPLETE CBC W/AUTO DIFF WBC: CPT | Performed by: PHYSICIAN ASSISTANT

## 2017-10-22 RX ORDER — CETIRIZINE HYDROCHLORIDE 10 MG/1
10 TABLET ORAL DAILY
Status: DISCONTINUED | OUTPATIENT
Start: 2017-10-22 | End: 2017-10-27 | Stop reason: HOSPADM

## 2017-10-22 RX ADMIN — HEPARIN SODIUM 8.47 UNITS/KG/HR: 10000 INJECTION, SOLUTION INTRAVENOUS at 10:40

## 2017-10-22 RX ADMIN — CYANOCOBALAMIN TAB 500 MCG 1000 MCG: 500 TAB at 08:40

## 2017-10-22 RX ADMIN — LOSARTAN POTASSIUM 50 MG: 50 TABLET, FILM COATED ORAL at 08:41

## 2017-10-22 RX ADMIN — FUROSEMIDE 20 MG: 20 TABLET ORAL at 08:42

## 2017-10-22 RX ADMIN — OXYBUTYNIN CHLORIDE 10 MG: 10 TABLET, FILM COATED, EXTENDED RELEASE ORAL at 08:42

## 2017-10-22 RX ADMIN — ASPIRIN 81 MG: 81 TABLET ORAL at 08:40

## 2017-10-22 RX ADMIN — CARVEDILOL 12.5 MG: 12.5 TABLET, FILM COATED ORAL at 17:26

## 2017-10-22 RX ADMIN — CARVEDILOL 12.5 MG: 12.5 TABLET, FILM COATED ORAL at 08:42

## 2017-10-22 RX ADMIN — CETIRIZINE HYDROCHLORIDE 10 MG: 10 TABLET, FILM COATED ORAL at 14:13

## 2017-10-22 RX ADMIN — ATORVASTATIN CALCIUM 40 MG: 20 TABLET, FILM COATED ORAL at 20:34

## 2017-10-22 RX ADMIN — POTASSIUM CHLORIDE 10 MEQ: 750 CAPSULE, EXTENDED RELEASE ORAL at 08:42

## 2017-10-22 RX ADMIN — LATANOPROST 1 DROP: 50 SOLUTION OPHTHALMIC at 20:34

## 2017-10-22 RX ADMIN — FOLIC ACID 1 MG: 1 TABLET ORAL at 08:48

## 2017-10-23 LAB
APTT PPP: 60.5 SECONDS (ref 22.7–35.4)
APTT PPP: 70.6 SECONDS (ref 22.7–35.4)
APTT PPP: 99.7 SECONDS (ref 22.7–35.4)
BASOPHILS # BLD AUTO: 0.02 10*3/MM3 (ref 0–0.2)
BASOPHILS NFR BLD AUTO: 0.3 % (ref 0–1.5)
CARDIOLIPIN IGG SER IA-ACNC: <9 GPL U/ML (ref 0–14)
CARDIOLIPIN IGM SER IA-ACNC: 16 MPL U/ML (ref 0–12)
DEPRECATED RDW RBC AUTO: 53.1 FL (ref 37–54)
EOSINOPHIL # BLD AUTO: 0.24 10*3/MM3 (ref 0–0.7)
EOSINOPHIL NFR BLD AUTO: 3.2 % (ref 0.3–6.2)
ERYTHROCYTE [DISTWIDTH] IN BLOOD BY AUTOMATED COUNT: 14.6 % (ref 11.7–13)
HCT VFR BLD AUTO: 37.5 % (ref 35.6–45.5)
HGB BLD-MCNC: 11.9 G/DL (ref 11.9–15.5)
IMM GRANULOCYTES # BLD: 0.02 10*3/MM3 (ref 0–0.03)
IMM GRANULOCYTES NFR BLD: 0.3 % (ref 0–0.5)
INR PPP: 1.14 (ref 0.9–1.1)
LYMPHOCYTES # BLD AUTO: 4.07 10*3/MM3 (ref 0.9–4.8)
LYMPHOCYTES NFR BLD AUTO: 54.9 % (ref 19.6–45.3)
MCH RBC QN AUTO: 31.6 PG (ref 26.9–32)
MCHC RBC AUTO-ENTMCNC: 31.7 G/DL (ref 32.4–36.3)
MCV RBC AUTO: 99.7 FL (ref 80.5–98.2)
MONOCYTES # BLD AUTO: 0.93 10*3/MM3 (ref 0.2–1.2)
MONOCYTES NFR BLD AUTO: 12.5 % (ref 5–12)
NEUTROPHILS # BLD AUTO: 2.14 10*3/MM3 (ref 1.9–8.1)
NEUTROPHILS NFR BLD AUTO: 28.8 % (ref 42.7–76)
PLATELET # BLD AUTO: 180 10*3/MM3 (ref 140–500)
PMV BLD AUTO: 11.5 FL (ref 6–12)
PROTHROMBIN TIME: 14.2 SECONDS (ref 11.7–14.2)
RBC # BLD AUTO: 3.76 10*6/MM3 (ref 3.9–5.2)
WBC NRBC COR # BLD: 7.42 10*3/MM3 (ref 4.5–10.7)

## 2017-10-23 PROCEDURE — 97110 THERAPEUTIC EXERCISES: CPT | Performed by: PHYSICAL THERAPIST

## 2017-10-23 PROCEDURE — 85610 PROTHROMBIN TIME: CPT | Performed by: INTERNAL MEDICINE

## 2017-10-23 PROCEDURE — 25010000002 HEPARIN (PORCINE) PER 1000 UNITS: Performed by: PHYSICIAN ASSISTANT

## 2017-10-23 PROCEDURE — 85025 COMPLETE CBC W/AUTO DIFF WBC: CPT | Performed by: PHYSICIAN ASSISTANT

## 2017-10-23 PROCEDURE — 85730 THROMBOPLASTIN TIME PARTIAL: CPT | Performed by: PHYSICIAN ASSISTANT

## 2017-10-23 PROCEDURE — 85730 THROMBOPLASTIN TIME PARTIAL: CPT | Performed by: INTERNAL MEDICINE

## 2017-10-23 PROCEDURE — 99232 SBSQ HOSP IP/OBS MODERATE 35: CPT | Performed by: INTERNAL MEDICINE

## 2017-10-23 RX ORDER — POLYETHYLENE GLYCOL 3350 17 G/17G
17 POWDER, FOR SOLUTION ORAL DAILY
Status: DISCONTINUED | OUTPATIENT
Start: 2017-10-23 | End: 2017-10-27 | Stop reason: HOSPADM

## 2017-10-23 RX ORDER — LOSARTAN POTASSIUM 25 MG/1
25 TABLET ORAL
Status: DISCONTINUED | OUTPATIENT
Start: 2017-10-23 | End: 2017-10-27 | Stop reason: HOSPADM

## 2017-10-23 RX ORDER — WARFARIN SODIUM 7.5 MG/1
7.5 TABLET ORAL
Status: DISCONTINUED | OUTPATIENT
Start: 2017-10-23 | End: 2017-10-25

## 2017-10-23 RX ADMIN — OXYBUTYNIN CHLORIDE 10 MG: 10 TABLET, FILM COATED, EXTENDED RELEASE ORAL at 09:18

## 2017-10-23 RX ADMIN — HEPARIN SODIUM 9.47 UNITS/KG/HR: 10000 INJECTION, SOLUTION INTRAVENOUS at 14:27

## 2017-10-23 RX ADMIN — CARVEDILOL 18.75 MG: 12.5 TABLET, FILM COATED ORAL at 17:43

## 2017-10-23 RX ADMIN — CARVEDILOL 12.5 MG: 12.5 TABLET, FILM COATED ORAL at 09:18

## 2017-10-23 RX ADMIN — HEPARIN SODIUM 3540 UNITS: 5000 INJECTION, SOLUTION INTRAVENOUS; SUBCUTANEOUS at 11:10

## 2017-10-23 RX ADMIN — ASPIRIN 81 MG: 81 TABLET ORAL at 09:18

## 2017-10-23 RX ADMIN — FUROSEMIDE 20 MG: 20 TABLET ORAL at 09:24

## 2017-10-23 RX ADMIN — CYANOCOBALAMIN TAB 500 MCG 1000 MCG: 500 TAB at 09:17

## 2017-10-23 RX ADMIN — CETIRIZINE HYDROCHLORIDE 10 MG: 10 TABLET, FILM COATED ORAL at 09:17

## 2017-10-23 RX ADMIN — ZOLPIDEM TARTRATE 5 MG: 5 TABLET ORAL at 00:15

## 2017-10-23 RX ADMIN — LOSARTAN POTASSIUM 25 MG: 50 TABLET, FILM COATED ORAL at 09:17

## 2017-10-23 RX ADMIN — ATORVASTATIN CALCIUM 40 MG: 20 TABLET, FILM COATED ORAL at 20:29

## 2017-10-23 RX ADMIN — HYDROCODONE BITARTRATE AND ACETAMINOPHEN 1 TABLET: 5; 325 TABLET ORAL at 21:30

## 2017-10-23 RX ADMIN — HEPARIN SODIUM 7.47 UNITS/KG/HR: 10000 INJECTION, SOLUTION INTRAVENOUS at 19:13

## 2017-10-23 RX ADMIN — POLYETHYLENE GLYCOL 3350 17 G: 17 POWDER, FOR SOLUTION ORAL at 11:30

## 2017-10-23 RX ADMIN — WARFARIN SODIUM 7.5 MG: 7.5 TABLET ORAL at 17:43

## 2017-10-23 RX ADMIN — FOLIC ACID 1 MG: 1 TABLET ORAL at 09:18

## 2017-10-23 RX ADMIN — HYDROCODONE BITARTRATE AND ACETAMINOPHEN 1 TABLET: 5; 325 TABLET ORAL at 00:14

## 2017-10-23 RX ADMIN — POTASSIUM CHLORIDE 10 MEQ: 750 CAPSULE, EXTENDED RELEASE ORAL at 09:18

## 2017-10-23 RX ADMIN — ZOLPIDEM TARTRATE 5 MG: 5 TABLET ORAL at 21:30

## 2017-10-23 RX ADMIN — HEPARIN SODIUM 9.47 UNITS/KG/HR: 10000 INJECTION, SOLUTION INTRAVENOUS at 11:11

## 2017-10-24 LAB
APTT PPP: 55.7 SECONDS (ref 22.7–35.4)
APTT PPP: 57.5 SECONDS (ref 22.7–35.4)
APTT PPP: 67.2 SECONDS (ref 22.7–35.4)
APTT PPP: 98.5 SECONDS (ref 22.7–35.4)
B2 GLYCOPROT1 IGA SER-ACNC: <9 GPI IGA UNITS (ref 0–25)
B2 GLYCOPROT1 IGG SER-ACNC: <9 GPI IGG UNITS (ref 0–20)
B2 GLYCOPROT1 IGM SER-ACNC: <9 GPI IGM UNITS (ref 0–32)
BASOPHILS # BLD AUTO: 0.02 10*3/MM3 (ref 0–0.2)
BASOPHILS NFR BLD AUTO: 0.3 % (ref 0–1.5)
DEPRECATED RDW RBC AUTO: 52.7 FL (ref 37–54)
EOSINOPHIL # BLD AUTO: 0.23 10*3/MM3 (ref 0–0.7)
EOSINOPHIL NFR BLD AUTO: 3.2 % (ref 0.3–6.2)
ERYTHROCYTE [DISTWIDTH] IN BLOOD BY AUTOMATED COUNT: 14.6 % (ref 11.7–13)
FACT VII ACT/NOR PPP: 61 % (ref 51–186)
HCT VFR BLD AUTO: 36.8 % (ref 35.6–45.5)
HGB BLD-MCNC: 12 G/DL (ref 11.9–15.5)
IMM GRANULOCYTES # BLD: 0 10*3/MM3 (ref 0–0.03)
IMM GRANULOCYTES NFR BLD: 0 % (ref 0–0.5)
INR PPP: 1.15 (ref 0.9–1.1)
LYMPHOCYTES # BLD AUTO: 4.05 10*3/MM3 (ref 0.9–4.8)
LYMPHOCYTES NFR BLD AUTO: 56.3 % (ref 19.6–45.3)
MCH RBC QN AUTO: 32.5 PG (ref 26.9–32)
MCHC RBC AUTO-ENTMCNC: 32.6 G/DL (ref 32.4–36.3)
MCV RBC AUTO: 99.7 FL (ref 80.5–98.2)
MONOCYTES # BLD AUTO: 0.7 10*3/MM3 (ref 0.2–1.2)
MONOCYTES NFR BLD AUTO: 9.7 % (ref 5–12)
NEUTROPHILS # BLD AUTO: 2.19 10*3/MM3 (ref 1.9–8.1)
NEUTROPHILS NFR BLD AUTO: 30.5 % (ref 42.7–76)
PLATELET # BLD AUTO: 183 10*3/MM3 (ref 140–500)
PMV BLD AUTO: 11.8 FL (ref 6–12)
PROTHROMBIN TIME: 14.3 SECONDS (ref 11.7–14.2)
RBC # BLD AUTO: 3.69 10*6/MM3 (ref 3.9–5.2)
WBC NRBC COR # BLD: 7.19 10*3/MM3 (ref 4.5–10.7)

## 2017-10-24 PROCEDURE — 25010000002 HEPARIN (PORCINE) PER 1000 UNITS: Performed by: PHYSICIAN ASSISTANT

## 2017-10-24 PROCEDURE — 85730 THROMBOPLASTIN TIME PARTIAL: CPT | Performed by: INTERNAL MEDICINE

## 2017-10-24 PROCEDURE — 85730 THROMBOPLASTIN TIME PARTIAL: CPT | Performed by: PHYSICIAN ASSISTANT

## 2017-10-24 PROCEDURE — 25010000002 DIGOXIN PER 500 MCG: Performed by: INTERNAL MEDICINE

## 2017-10-24 PROCEDURE — 85025 COMPLETE CBC W/AUTO DIFF WBC: CPT | Performed by: PHYSICIAN ASSISTANT

## 2017-10-24 PROCEDURE — 99232 SBSQ HOSP IP/OBS MODERATE 35: CPT | Performed by: INTERNAL MEDICINE

## 2017-10-24 PROCEDURE — 85610 PROTHROMBIN TIME: CPT | Performed by: INTERNAL MEDICINE

## 2017-10-24 RX ORDER — DIGOXIN 0.25 MG/ML
250 INJECTION INTRAMUSCULAR; INTRAVENOUS ONCE
Status: COMPLETED | OUTPATIENT
Start: 2017-10-24 | End: 2017-10-24

## 2017-10-24 RX ORDER — DIGOXIN 0.25 MG/ML
250 INJECTION INTRAMUSCULAR; INTRAVENOUS 2 TIMES DAILY
Status: DISCONTINUED | OUTPATIENT
Start: 2017-10-24 | End: 2017-10-24

## 2017-10-24 RX ADMIN — CYANOCOBALAMIN TAB 500 MCG 1000 MCG: 500 TAB at 09:56

## 2017-10-24 RX ADMIN — DIGOXIN 250 MCG: 0.25 INJECTION INTRAMUSCULAR; INTRAVENOUS at 09:53

## 2017-10-24 RX ADMIN — ATORVASTATIN CALCIUM 40 MG: 20 TABLET, FILM COATED ORAL at 21:40

## 2017-10-24 RX ADMIN — OXYBUTYNIN CHLORIDE 10 MG: 10 TABLET, FILM COATED, EXTENDED RELEASE ORAL at 09:53

## 2017-10-24 RX ADMIN — HYDROCODONE BITARTRATE AND ACETAMINOPHEN 1 TABLET: 5; 325 TABLET ORAL at 21:44

## 2017-10-24 RX ADMIN — CARVEDILOL 18.75 MG: 12.5 TABLET, FILM COATED ORAL at 17:56

## 2017-10-24 RX ADMIN — HEPARIN SODIUM 3600 UNITS: 5000 INJECTION, SOLUTION INTRAVENOUS; SUBCUTANEOUS at 07:41

## 2017-10-24 RX ADMIN — HEPARIN SODIUM 7.47 UNITS/KG/HR: 10000 INJECTION, SOLUTION INTRAVENOUS at 21:57

## 2017-10-24 RX ADMIN — HEPARIN SODIUM 3600 UNITS: 5000 INJECTION, SOLUTION INTRAVENOUS; SUBCUTANEOUS at 21:57

## 2017-10-24 RX ADMIN — DIGOXIN 250 MCG: 0.25 INJECTION INTRAMUSCULAR; INTRAVENOUS at 21:40

## 2017-10-24 RX ADMIN — CARVEDILOL 18.75 MG: 12.5 TABLET, FILM COATED ORAL at 09:53

## 2017-10-24 RX ADMIN — POTASSIUM CHLORIDE 10 MEQ: 750 CAPSULE, EXTENDED RELEASE ORAL at 09:56

## 2017-10-24 RX ADMIN — ZOLPIDEM TARTRATE 5 MG: 5 TABLET ORAL at 21:44

## 2017-10-24 RX ADMIN — ASPIRIN 81 MG: 81 TABLET ORAL at 09:56

## 2017-10-24 RX ADMIN — LOSARTAN POTASSIUM 25 MG: 50 TABLET, FILM COATED ORAL at 09:52

## 2017-10-24 RX ADMIN — HEPARIN SODIUM 6.47 UNITS/KG/HR: 10000 INJECTION, SOLUTION INTRAVENOUS at 19:33

## 2017-10-24 RX ADMIN — HEPARIN SODIUM 8.47 UNITS/KG/HR: 10000 INJECTION, SOLUTION INTRAVENOUS at 07:43

## 2017-10-24 RX ADMIN — CETIRIZINE HYDROCHLORIDE 10 MG: 10 TABLET, FILM COATED ORAL at 09:53

## 2017-10-24 RX ADMIN — HEPARIN SODIUM 6.47 UNITS/KG/HR: 10000 INJECTION, SOLUTION INTRAVENOUS at 15:04

## 2017-10-24 RX ADMIN — WARFARIN SODIUM 7.5 MG: 7.5 TABLET ORAL at 17:56

## 2017-10-24 RX ADMIN — FUROSEMIDE 20 MG: 20 TABLET ORAL at 09:52

## 2017-10-24 RX ADMIN — FOLIC ACID 1 MG: 1 TABLET ORAL at 09:53

## 2017-10-25 LAB
ANION GAP SERPL CALCULATED.3IONS-SCNC: 11.5 MMOL/L
ANISOCYTOSIS BLD QL: ABNORMAL
APTT PPP: 82.7 SECONDS (ref 22.7–35.4)
BUN BLD-MCNC: 11 MG/DL (ref 8–23)
BUN/CREAT SERPL: 12.4 (ref 7–25)
CALCIUM SPEC-SCNC: 9.6 MG/DL (ref 8.6–10.5)
CHLORIDE SERPL-SCNC: 102 MMOL/L (ref 98–107)
CO2 SERPL-SCNC: 26.5 MMOL/L (ref 22–29)
CREAT BLD-MCNC: 0.89 MG/DL (ref 0.57–1)
DEPRECATED RDW RBC AUTO: 52.6 FL (ref 37–54)
EOSINOPHIL # BLD MANUAL: 0.16 10*3/MM3 (ref 0–0.7)
EOSINOPHIL NFR BLD MANUAL: 2 % (ref 0.3–6.2)
ERYTHROCYTE [DISTWIDTH] IN BLOOD BY AUTOMATED COUNT: 14.5 % (ref 11.7–13)
GFR SERPL CREATININE-BSD FRML MDRD: 76 ML/MIN/1.73
GLUCOSE BLD-MCNC: 109 MG/DL (ref 65–99)
HCT VFR BLD AUTO: 38 % (ref 35.6–45.5)
HGB BLD-MCNC: 12.3 G/DL (ref 11.9–15.5)
INR PPP: 1.3 (ref 0.9–1.1)
LYMPHOCYTES # BLD MANUAL: 4.76 10*3/MM3 (ref 0.9–4.8)
LYMPHOCYTES NFR BLD MANUAL: 3 % (ref 5–12)
LYMPHOCYTES NFR BLD MANUAL: 58 % (ref 19.6–45.3)
MCH RBC QN AUTO: 32.2 PG (ref 26.9–32)
MCHC RBC AUTO-ENTMCNC: 32.4 G/DL (ref 32.4–36.3)
MCV RBC AUTO: 99.5 FL (ref 80.5–98.2)
MONOCYTES # BLD AUTO: 0.25 10*3/MM3 (ref 0.2–1.2)
NEUTROPHILS # BLD AUTO: 2.62 10*3/MM3 (ref 1.9–8.1)
NEUTROPHILS NFR BLD MANUAL: 32 % (ref 42.7–76)
PLAT MORPH BLD: NORMAL
PLATELET # BLD AUTO: 181 10*3/MM3 (ref 140–500)
PMV BLD AUTO: 11.5 FL (ref 6–12)
POTASSIUM BLD-SCNC: 3.7 MMOL/L (ref 3.5–5.2)
PROTHROMBIN TIME: 15.7 SECONDS (ref 11.7–14.2)
RBC # BLD AUTO: 3.82 10*6/MM3 (ref 3.9–5.2)
SCAN SLIDE: NORMAL
SODIUM BLD-SCNC: 140 MMOL/L (ref 136–145)
SPHEROCYTES BLD QL SMEAR: ABNORMAL
VARIANT LYMPHS NFR BLD MANUAL: 5 % (ref 0–5)
WBC MORPH BLD: NORMAL
WBC NRBC COR # BLD: 8.2 10*3/MM3 (ref 4.5–10.7)

## 2017-10-25 PROCEDURE — 99232 SBSQ HOSP IP/OBS MODERATE 35: CPT | Performed by: INTERNAL MEDICINE

## 2017-10-25 PROCEDURE — 85730 THROMBOPLASTIN TIME PARTIAL: CPT | Performed by: INTERNAL MEDICINE

## 2017-10-25 PROCEDURE — 80048 BASIC METABOLIC PNL TOTAL CA: CPT | Performed by: INTERNAL MEDICINE

## 2017-10-25 PROCEDURE — 85025 COMPLETE CBC W/AUTO DIFF WBC: CPT | Performed by: PHYSICIAN ASSISTANT

## 2017-10-25 PROCEDURE — 85610 PROTHROMBIN TIME: CPT | Performed by: INTERNAL MEDICINE

## 2017-10-25 PROCEDURE — 97110 THERAPEUTIC EXERCISES: CPT | Performed by: PHYSICAL THERAPIST

## 2017-10-25 PROCEDURE — 85007 BL SMEAR W/DIFF WBC COUNT: CPT | Performed by: PHYSICIAN ASSISTANT

## 2017-10-25 RX ORDER — POTASSIUM CHLORIDE 750 MG/1
20 CAPSULE, EXTENDED RELEASE ORAL DAILY
Status: DISCONTINUED | OUTPATIENT
Start: 2017-10-26 | End: 2017-10-27 | Stop reason: HOSPADM

## 2017-10-25 RX ORDER — FUROSEMIDE 40 MG/1
40 TABLET ORAL DAILY
Status: DISCONTINUED | OUTPATIENT
Start: 2017-10-26 | End: 2017-10-27 | Stop reason: HOSPADM

## 2017-10-25 RX ORDER — DIGOXIN 125 MCG
125 TABLET ORAL
Status: DISCONTINUED | OUTPATIENT
Start: 2017-10-25 | End: 2017-10-26

## 2017-10-25 RX ORDER — WARFARIN SODIUM 10 MG/1
10 TABLET ORAL
Status: DISCONTINUED | OUTPATIENT
Start: 2017-10-25 | End: 2017-10-27 | Stop reason: HOSPADM

## 2017-10-25 RX ADMIN — CYANOCOBALAMIN TAB 500 MCG 1000 MCG: 500 TAB at 08:33

## 2017-10-25 RX ADMIN — DIGOXIN 125 MCG: 0.12 TABLET ORAL at 12:40

## 2017-10-25 RX ADMIN — ATORVASTATIN CALCIUM 40 MG: 20 TABLET, FILM COATED ORAL at 20:16

## 2017-10-25 RX ADMIN — GLYCERIN 2 DROP: .002; .002; .01 SOLUTION/ DROPS OPHTHALMIC at 18:01

## 2017-10-25 RX ADMIN — OXYBUTYNIN CHLORIDE 10 MG: 10 TABLET, FILM COATED, EXTENDED RELEASE ORAL at 08:34

## 2017-10-25 RX ADMIN — FOLIC ACID 1 MG: 1 TABLET ORAL at 08:34

## 2017-10-25 RX ADMIN — HYDROCODONE BITARTRATE AND ACETAMINOPHEN 1 TABLET: 5; 325 TABLET ORAL at 06:41

## 2017-10-25 RX ADMIN — POLYETHYLENE GLYCOL 3350 17 G: 17 POWDER, FOR SOLUTION ORAL at 08:40

## 2017-10-25 RX ADMIN — FUROSEMIDE 20 MG: 20 TABLET ORAL at 08:34

## 2017-10-25 RX ADMIN — ZOLPIDEM TARTRATE 5 MG: 5 TABLET ORAL at 22:25

## 2017-10-25 RX ADMIN — HYDROCODONE BITARTRATE AND ACETAMINOPHEN 1 TABLET: 5; 325 TABLET ORAL at 22:25

## 2017-10-25 RX ADMIN — CARVEDILOL 18.75 MG: 12.5 TABLET, FILM COATED ORAL at 08:34

## 2017-10-25 RX ADMIN — LOSARTAN POTASSIUM 25 MG: 50 TABLET, FILM COATED ORAL at 08:33

## 2017-10-25 RX ADMIN — ASPIRIN 81 MG: 81 TABLET ORAL at 08:34

## 2017-10-25 RX ADMIN — CETIRIZINE HYDROCHLORIDE 10 MG: 10 TABLET, FILM COATED ORAL at 08:33

## 2017-10-25 RX ADMIN — CARVEDILOL 18.75 MG: 12.5 TABLET, FILM COATED ORAL at 18:00

## 2017-10-25 RX ADMIN — WARFARIN SODIUM 10 MG: 10 TABLET ORAL at 18:00

## 2017-10-25 RX ADMIN — POTASSIUM CHLORIDE 10 MEQ: 750 CAPSULE, EXTENDED RELEASE ORAL at 08:33

## 2017-10-26 LAB
ANION GAP SERPL CALCULATED.3IONS-SCNC: 15.7 MMOL/L
APTT PPP: 167.4 SECONDS (ref 22.7–35.4)
APTT PPP: 60.9 SECONDS (ref 22.7–35.4)
APTT PPP: 61 SECONDS (ref 22.7–35.4)
AT III PPP CHRO-ACNC: 79 % (ref 90–134)
BASOPHILS # BLD AUTO: 0.03 10*3/MM3 (ref 0–0.2)
BASOPHILS NFR BLD AUTO: 0.4 % (ref 0–1.5)
BUN BLD-MCNC: 14 MG/DL (ref 8–23)
BUN/CREAT SERPL: 14.9 (ref 7–25)
CALCIUM SPEC-SCNC: 9.7 MG/DL (ref 8.6–10.5)
CHLORIDE SERPL-SCNC: 102 MMOL/L (ref 98–107)
CO2 SERPL-SCNC: 23.3 MMOL/L (ref 22–29)
CREAT BLD-MCNC: 0.94 MG/DL (ref 0.57–1)
DEPRECATED RDW RBC AUTO: 52.6 FL (ref 37–54)
DRVVT IMM 1:2 NP PPP: 47.2 SEC (ref 0–47)
EOSINOPHIL # BLD AUTO: 0.25 10*3/MM3 (ref 0–0.7)
EOSINOPHIL NFR BLD AUTO: 3.3 % (ref 0.3–6.2)
ERYTHROCYTE [DISTWIDTH] IN BLOOD BY AUTOMATED COUNT: 14.2 % (ref 11.7–13)
F2 GENE MUT ANL BLD/T: NORMAL
F5 GENE MUT ANL BLD/T: NORMAL
FACT VIII ACT/NOR PPP: 331 % ACTIVITY (ref 60–150)
FACTOR V COMMENT: NORMAL
GFR SERPL CREATININE-BSD FRML MDRD: 71 ML/MIN/1.73
GLUCOSE BLD-MCNC: 146 MG/DL (ref 65–99)
HCT VFR BLD AUTO: 40 % (ref 35.6–45.5)
HGB BLD-MCNC: 12.8 G/DL (ref 11.9–15.5)
IMM GRANULOCYTES # BLD: 0.02 10*3/MM3 (ref 0–0.03)
IMM GRANULOCYTES NFR BLD: 0.3 % (ref 0–0.5)
INR PPP: 1.5 (ref 0.9–1.1)
LA NT DPL PPP: 57 SEC (ref 0–55)
LA NT DPL/LA NT HPL PPP-RTO: 0.75 RATIO (ref 0–1.4)
LA NT PLATELET PPP: 44.2 SEC (ref 0–51.9)
LUPUS ANTICOAGULANT REFLEX: ABNORMAL
LYMPHOCYTES # BLD AUTO: 4.33 10*3/MM3 (ref 0.9–4.8)
LYMPHOCYTES NFR BLD AUTO: 57.5 % (ref 19.6–45.3)
Lab: ABNORMAL
Lab: NORMAL
MCH RBC QN AUTO: 32.2 PG (ref 26.9–32)
MCHC RBC AUTO-ENTMCNC: 32 G/DL (ref 32.4–36.3)
MCV RBC AUTO: 100.5 FL (ref 80.5–98.2)
MONOCYTES # BLD AUTO: 0.82 10*3/MM3 (ref 0.2–1.2)
MONOCYTES NFR BLD AUTO: 10.9 % (ref 5–12)
NEUTROPHILS # BLD AUTO: 2.08 10*3/MM3 (ref 1.9–8.1)
NEUTROPHILS NFR BLD AUTO: 27.6 % (ref 42.7–76)
PLATELET # BLD AUTO: 169 10*3/MM3 (ref 140–500)
PMV BLD AUTO: 11.5 FL (ref 6–12)
POTASSIUM BLD-SCNC: 4.5 MMOL/L (ref 3.5–5.2)
PROT C PPP-ACNC: 86 % (ref 86–163)
PROT S ACT/NOR PPP: 117 % (ref 70–127)
PROTHROMBIN TIME: 17.6 SECONDS (ref 11.7–14.2)
RBC # BLD AUTO: 3.98 10*6/MM3 (ref 3.9–5.2)
SCREEN DRVVT: 0.9 RATIO (ref 0.8–1.2)
SCREEN DRVVT: 47.4 SEC (ref 0–47)
SODIUM BLD-SCNC: 141 MMOL/L (ref 136–145)
THROMBIN NEUTRALIZATION: 71.3 SEC (ref 0–23)
THROMBIN TIME MIX: 70.4 SEC (ref 0–23)
THROMBIN TIME: 87.9 SEC (ref 0–23)
WBC NRBC COR # BLD: 7.53 10*3/MM3 (ref 4.5–10.7)

## 2017-10-26 PROCEDURE — 85730 THROMBOPLASTIN TIME PARTIAL: CPT | Performed by: PHYSICIAN ASSISTANT

## 2017-10-26 PROCEDURE — 85730 THROMBOPLASTIN TIME PARTIAL: CPT | Performed by: INTERNAL MEDICINE

## 2017-10-26 PROCEDURE — 25010000002 HEPARIN (PORCINE) PER 1000 UNITS: Performed by: PHYSICIAN ASSISTANT

## 2017-10-26 PROCEDURE — 85730 THROMBOPLASTIN TIME PARTIAL: CPT | Performed by: HOSPITALIST

## 2017-10-26 PROCEDURE — 97110 THERAPEUTIC EXERCISES: CPT | Performed by: PHYSICAL THERAPIST

## 2017-10-26 PROCEDURE — 99232 SBSQ HOSP IP/OBS MODERATE 35: CPT | Performed by: INTERNAL MEDICINE

## 2017-10-26 PROCEDURE — 85610 PROTHROMBIN TIME: CPT | Performed by: INTERNAL MEDICINE

## 2017-10-26 PROCEDURE — 85025 COMPLETE CBC W/AUTO DIFF WBC: CPT | Performed by: PHYSICIAN ASSISTANT

## 2017-10-26 PROCEDURE — 80048 BASIC METABOLIC PNL TOTAL CA: CPT | Performed by: HOSPITALIST

## 2017-10-26 RX ORDER — DIGOXIN 250 MCG
250 TABLET ORAL DAILY
Status: DISCONTINUED | OUTPATIENT
Start: 2017-10-27 | End: 2017-10-27 | Stop reason: HOSPADM

## 2017-10-26 RX ORDER — DIGOXIN 125 MCG
125 TABLET ORAL ONCE
Status: COMPLETED | OUTPATIENT
Start: 2017-10-26 | End: 2017-10-26

## 2017-10-26 RX ADMIN — ATORVASTATIN CALCIUM 40 MG: 20 TABLET, FILM COATED ORAL at 21:45

## 2017-10-26 RX ADMIN — HEPARIN SODIUM 3600 UNITS: 5000 INJECTION, SOLUTION INTRAVENOUS; SUBCUTANEOUS at 05:47

## 2017-10-26 RX ADMIN — POTASSIUM CHLORIDE 20 MEQ: 750 CAPSULE, EXTENDED RELEASE ORAL at 08:23

## 2017-10-26 RX ADMIN — HYDROCODONE BITARTRATE AND ACETAMINOPHEN 1 TABLET: 5; 325 TABLET ORAL at 21:48

## 2017-10-26 RX ADMIN — HEPARIN SODIUM 7.47 UNITS/KG/HR: 10000 INJECTION, SOLUTION INTRAVENOUS at 01:18

## 2017-10-26 RX ADMIN — ZOLPIDEM TARTRATE 5 MG: 5 TABLET ORAL at 21:47

## 2017-10-26 RX ADMIN — CYANOCOBALAMIN TAB 500 MCG 1000 MCG: 500 TAB at 08:23

## 2017-10-26 RX ADMIN — FOLIC ACID 1 MG: 1 TABLET ORAL at 08:23

## 2017-10-26 RX ADMIN — HEPARIN SODIUM 3600 UNITS: 5000 INJECTION, SOLUTION INTRAVENOUS; SUBCUTANEOUS at 20:51

## 2017-10-26 RX ADMIN — ASPIRIN 81 MG: 81 TABLET ORAL at 08:23

## 2017-10-26 RX ADMIN — WARFARIN SODIUM 10 MG: 10 TABLET ORAL at 18:18

## 2017-10-26 RX ADMIN — CETIRIZINE HYDROCHLORIDE 10 MG: 10 TABLET, FILM COATED ORAL at 08:22

## 2017-10-26 RX ADMIN — CARVEDILOL 18.75 MG: 12.5 TABLET, FILM COATED ORAL at 18:18

## 2017-10-26 RX ADMIN — DIGOXIN 125 MCG: 0.12 TABLET ORAL at 15:54

## 2017-10-26 RX ADMIN — LOSARTAN POTASSIUM 25 MG: 50 TABLET, FILM COATED ORAL at 08:22

## 2017-10-26 RX ADMIN — HEPARIN SODIUM 8.47 UNITS/KG/HR: 10000 INJECTION, SOLUTION INTRAVENOUS at 05:47

## 2017-10-26 RX ADMIN — HEPARIN SODIUM 6.47 UNITS/KG/HR: 10000 INJECTION, SOLUTION INTRAVENOUS at 20:52

## 2017-10-26 RX ADMIN — FUROSEMIDE 40 MG: 40 TABLET ORAL at 08:22

## 2017-10-26 RX ADMIN — DIGOXIN 125 MCG: 0.12 TABLET ORAL at 12:22

## 2017-10-26 RX ADMIN — CARVEDILOL 18.75 MG: 12.5 TABLET, FILM COATED ORAL at 08:23

## 2017-10-26 RX ADMIN — OXYBUTYNIN CHLORIDE 10 MG: 10 TABLET, FILM COATED, EXTENDED RELEASE ORAL at 08:23

## 2017-10-27 ENCOUNTER — TELEPHONE (OUTPATIENT)
Dept: INTERNAL MEDICINE | Facility: CLINIC | Age: 72
End: 2017-10-27

## 2017-10-27 VITALS
BODY MASS INDEX: 42.81 KG/M2 | DIASTOLIC BLOOD PRESSURE: 64 MMHG | OXYGEN SATURATION: 96 % | HEART RATE: 97 BPM | RESPIRATION RATE: 18 BRPM | SYSTOLIC BLOOD PRESSURE: 114 MMHG | HEIGHT: 66 IN | TEMPERATURE: 98 F | WEIGHT: 266.4 LBS

## 2017-10-27 LAB
ANION GAP SERPL CALCULATED.3IONS-SCNC: 11.7 MMOL/L
APTT PPP: 49.5 SECONDS (ref 22.7–35.4)
APTT PPP: 86.3 SECONDS (ref 22.7–35.4)
BASOPHILS # BLD AUTO: 0.03 10*3/MM3 (ref 0–0.2)
BASOPHILS NFR BLD AUTO: 0.4 % (ref 0–1.5)
BUN BLD-MCNC: 18 MG/DL (ref 8–23)
BUN/CREAT SERPL: 18.6 (ref 7–25)
CALCIUM SPEC-SCNC: 10 MG/DL (ref 8.6–10.5)
CHLORIDE SERPL-SCNC: 100 MMOL/L (ref 98–107)
CO2 SERPL-SCNC: 27.3 MMOL/L (ref 22–29)
CREAT BLD-MCNC: 0.97 MG/DL (ref 0.57–1)
DEPRECATED RDW RBC AUTO: 53.7 FL (ref 37–54)
EOSINOPHIL # BLD AUTO: 0.19 10*3/MM3 (ref 0–0.7)
EOSINOPHIL NFR BLD AUTO: 2.6 % (ref 0.3–6.2)
ERYTHROCYTE [DISTWIDTH] IN BLOOD BY AUTOMATED COUNT: 14.5 % (ref 11.7–13)
GFR SERPL CREATININE-BSD FRML MDRD: 69 ML/MIN/1.73
GLUCOSE BLD-MCNC: 116 MG/DL (ref 65–99)
HCT VFR BLD AUTO: 40.7 % (ref 35.6–45.5)
HGB BLD-MCNC: 12.8 G/DL (ref 11.9–15.5)
IMM GRANULOCYTES # BLD: 0 10*3/MM3 (ref 0–0.03)
IMM GRANULOCYTES NFR BLD: 0 % (ref 0–0.5)
INR PPP: 2.02 (ref 0.9–1.1)
LYMPHOCYTES # BLD AUTO: 4.54 10*3/MM3 (ref 0.9–4.8)
LYMPHOCYTES NFR BLD AUTO: 61.8 % (ref 19.6–45.3)
MAGNESIUM SERPL-MCNC: 2 MG/DL (ref 1.6–2.4)
MCH RBC QN AUTO: 31.7 PG (ref 26.9–32)
MCHC RBC AUTO-ENTMCNC: 31.4 G/DL (ref 32.4–36.3)
MCV RBC AUTO: 100.7 FL (ref 80.5–98.2)
MONOCYTES # BLD AUTO: 0.71 10*3/MM3 (ref 0.2–1.2)
MONOCYTES NFR BLD AUTO: 9.7 % (ref 5–12)
NEUTROPHILS # BLD AUTO: 1.88 10*3/MM3 (ref 1.9–8.1)
NEUTROPHILS NFR BLD AUTO: 25.5 % (ref 42.7–76)
NRBC BLD MANUAL-RTO: 0 /100 WBC (ref 0–0)
PLATELET # BLD AUTO: 197 10*3/MM3 (ref 140–500)
PMV BLD AUTO: 11.3 FL (ref 6–12)
POTASSIUM BLD-SCNC: 4.1 MMOL/L (ref 3.5–5.2)
PROTHROMBIN TIME: 22.2 SECONDS (ref 11.7–14.2)
RBC # BLD AUTO: 4.04 10*6/MM3 (ref 3.9–5.2)
SODIUM BLD-SCNC: 139 MMOL/L (ref 136–145)
WBC NRBC COR # BLD: 7.35 10*3/MM3 (ref 4.5–10.7)

## 2017-10-27 PROCEDURE — 80048 BASIC METABOLIC PNL TOTAL CA: CPT | Performed by: HOSPITALIST

## 2017-10-27 PROCEDURE — 83735 ASSAY OF MAGNESIUM: CPT | Performed by: HOSPITALIST

## 2017-10-27 PROCEDURE — 85730 THROMBOPLASTIN TIME PARTIAL: CPT | Performed by: INTERNAL MEDICINE

## 2017-10-27 PROCEDURE — 25010000002 HEPARIN (PORCINE) PER 1000 UNITS: Performed by: PHYSICIAN ASSISTANT

## 2017-10-27 PROCEDURE — 85025 COMPLETE CBC W/AUTO DIFF WBC: CPT | Performed by: PHYSICIAN ASSISTANT

## 2017-10-27 PROCEDURE — 85610 PROTHROMBIN TIME: CPT | Performed by: INTERNAL MEDICINE

## 2017-10-27 PROCEDURE — 99232 SBSQ HOSP IP/OBS MODERATE 35: CPT | Performed by: INTERNAL MEDICINE

## 2017-10-27 RX ORDER — WARFARIN SODIUM 5 MG/1
TABLET ORAL
Qty: 30 TABLET | Refills: 0 | Status: SHIPPED | OUTPATIENT
Start: 2017-10-27 | End: 2017-11-27 | Stop reason: SDUPTHER

## 2017-10-27 RX ORDER — LOSARTAN POTASSIUM 25 MG/1
25 TABLET ORAL
Qty: 30 TABLET | Refills: 0 | Status: SHIPPED | OUTPATIENT
Start: 2017-10-28 | End: 2017-11-20 | Stop reason: SDUPTHER

## 2017-10-27 RX ORDER — POTASSIUM CHLORIDE 750 MG/1
20 CAPSULE, EXTENDED RELEASE ORAL DAILY
Qty: 30 CAPSULE | Refills: 0 | Status: SHIPPED | OUTPATIENT
Start: 2017-10-28 | End: 2017-11-20 | Stop reason: SDUPTHER

## 2017-10-27 RX ORDER — CARVEDILOL 6.25 MG/1
18.75 TABLET ORAL 2 TIMES DAILY WITH MEALS
Qty: 180 TABLET | Refills: 0 | Status: SHIPPED | OUTPATIENT
Start: 2017-10-27 | End: 2017-11-09

## 2017-10-27 RX ORDER — DIGOXIN 250 MCG
250 TABLET ORAL DAILY
Qty: 30 TABLET | Refills: 0 | Status: SHIPPED | OUTPATIENT
Start: 2017-10-28 | End: 2017-11-20 | Stop reason: SDUPTHER

## 2017-10-27 RX ORDER — ASPIRIN 81 MG/1
81 TABLET ORAL DAILY
Start: 2017-10-28

## 2017-10-27 RX ORDER — FUROSEMIDE 40 MG/1
40 TABLET ORAL DAILY
Qty: 30 TABLET | Refills: 0 | Status: SHIPPED | OUTPATIENT
Start: 2017-10-28 | End: 2017-11-20 | Stop reason: SDUPTHER

## 2017-10-27 RX ADMIN — CARVEDILOL 18.75 MG: 12.5 TABLET, FILM COATED ORAL at 08:16

## 2017-10-27 RX ADMIN — HEPARIN SODIUM 4.47 UNITS/KG/HR: 10000 INJECTION, SOLUTION INTRAVENOUS at 03:53

## 2017-10-27 RX ADMIN — ASPIRIN 81 MG: 81 TABLET ORAL at 08:16

## 2017-10-27 RX ADMIN — POTASSIUM CHLORIDE 20 MEQ: 750 CAPSULE, EXTENDED RELEASE ORAL at 08:16

## 2017-10-27 RX ADMIN — POLYETHYLENE GLYCOL 3350 17 G: 17 POWDER, FOR SOLUTION ORAL at 08:15

## 2017-10-27 RX ADMIN — CETIRIZINE HYDROCHLORIDE 10 MG: 10 TABLET, FILM COATED ORAL at 08:16

## 2017-10-27 RX ADMIN — FOLIC ACID 1 MG: 1 TABLET ORAL at 08:16

## 2017-10-27 RX ADMIN — FUROSEMIDE 40 MG: 40 TABLET ORAL at 08:16

## 2017-10-27 RX ADMIN — CYANOCOBALAMIN TAB 500 MCG 1000 MCG: 500 TAB at 08:16

## 2017-10-27 RX ADMIN — OXYBUTYNIN CHLORIDE 10 MG: 10 TABLET, FILM COATED, EXTENDED RELEASE ORAL at 08:16

## 2017-10-27 RX ADMIN — LOSARTAN POTASSIUM 25 MG: 50 TABLET, FILM COATED ORAL at 08:17

## 2017-10-27 RX ADMIN — DIGOXIN 250 MCG: 250 TABLET ORAL at 12:05

## 2017-10-27 NOTE — TELEPHONE ENCOUNTER
JOSE LUIS FROM [facility] CALLED AND PATIENT IS GETTING OUT OF THE HOSPITAL AND NEEDS HOME HEALTH CARE AND I GAVE VERBAL ORDERS

## 2017-10-28 ENCOUNTER — TELEPHONE (OUTPATIENT)
Dept: CARDIOLOGY | Facility: CLINIC | Age: 72
End: 2017-10-28

## 2017-10-28 NOTE — TELEPHONE ENCOUNTER
Received call from home health nurse Colleen-INR 2.8 today. He says they are suppose to do them daily for 3 days as she was discharged from hospital yesterday. He did not know what her previous doses had been or what her INR's had been. She had 5 mg tablets. I informed that I would review in Epic when I was able to get to my computer and call patient with dose.  INR was 2.0 yesterday and per Dr. Dueñas's note she recommend 10mg of warfarin but it does not look like she received any prior to hospital discharge.Her INR was 1.5 the day prior. She has received 7.5, 7.5, 10 & 10 mg from 10/23-10/26. I called several times and finally got a hold of her. She said that she took 5 mg yesterday evening when she got home. I instructed her to take none today and home health is checking her again tomorrow.

## 2017-10-29 ENCOUNTER — TELEPHONE (OUTPATIENT)
Dept: CARDIOLOGY | Facility: CLINIC | Age: 72
End: 2017-10-29

## 2017-10-29 NOTE — TELEPHONE ENCOUNTER
Received call from [facility], today's INR 2.4, instructed to take 5 mg of warfarin today and they are rechecking again tomorrow and will call results to Dr. Dueñas

## 2017-10-30 ENCOUNTER — OFFICE VISIT (OUTPATIENT)
Dept: INTERNAL MEDICINE | Facility: CLINIC | Age: 72
End: 2017-10-30

## 2017-10-30 VITALS
HEART RATE: 92 BPM | TEMPERATURE: 98.1 F | RESPIRATION RATE: 16 BRPM | WEIGHT: 244 LBS | OXYGEN SATURATION: 95 % | SYSTOLIC BLOOD PRESSURE: 122 MMHG | BODY MASS INDEX: 39.21 KG/M2 | HEIGHT: 66 IN | DIASTOLIC BLOOD PRESSURE: 86 MMHG

## 2017-10-30 DIAGNOSIS — I63.9 ACUTE CVA (CEREBROVASCULAR ACCIDENT) (HCC): ICD-10-CM

## 2017-10-30 DIAGNOSIS — G47.30 SLEEP APNEA, UNSPECIFIED TYPE: ICD-10-CM

## 2017-10-30 DIAGNOSIS — Z09 FOLLOW UP: ICD-10-CM

## 2017-10-30 DIAGNOSIS — I48.19 PERSISTENT ATRIAL FIBRILLATION (HCC): ICD-10-CM

## 2017-10-30 DIAGNOSIS — G47.01 INSOMNIA DUE TO MEDICAL CONDITION: ICD-10-CM

## 2017-10-30 DIAGNOSIS — I63.511 CEREBROVASCULAR ACCIDENT (CVA) DUE TO OCCLUSION OF RIGHT MIDDLE CEREBRAL ARTERY (HCC): Primary | ICD-10-CM

## 2017-10-30 DIAGNOSIS — Z09 HOSPITAL DISCHARGE FOLLOW-UP: ICD-10-CM

## 2017-10-30 PROCEDURE — 99214 OFFICE O/P EST MOD 30 MIN: CPT | Performed by: NURSE PRACTITIONER

## 2017-10-30 RX ORDER — TRAZODONE HYDROCHLORIDE 50 MG/1
50 TABLET ORAL NIGHTLY
Qty: 30 TABLET | Refills: 0 | Status: SHIPPED | OUTPATIENT
Start: 2017-10-30 | End: 2017-11-20 | Stop reason: SDUPTHER

## 2017-10-30 NOTE — PROGRESS NOTES
Vitals:    10/30/17 1125   BP: 122/86   Pulse: 92   Resp: 16   Temp: 98.1 °F (36.7 °C)   SpO2: 95%     Last 2 weights    10/30/17  1125   Weight: 244 lb (111 kg)     Social History   Substance Use Topics   • Smoking status: Former Smoker     Packs/day: 3.00     Types: Cigarettes     Start date: 5/19/1967     Quit date: 10/19/1968   • Smokeless tobacco: Never Used   • Alcohol use No       Subjective     HPI  Pt presents to office today with hospital follow up. She has had several hospitalization, 10/11, 10/15, and 10/19. First hospitalization was a referral from neurosx due to her being found to be in new onset afib with RVR, her second visit she was found to have a right MCA stroke, and third visit was found to have a NSTEMI. She is currently on coumadin and beta blocker and still in afib (to follow up with cardiology on 11/9). She is suppose to have a sleep study done for sleep apnea done on 1/16/18. She is to have a follow up with hematology regarding afib for blood disorders on 11/13. Pt to follow up with neurology end of November. Pt is still have back back pain related to her bulging/protruding disc and dr. Perez wants to wait until pt has had follow ups and stable for further intervention regarding pts back pain. Today she is feeling much better aside from being tired and back pain. PT to see pt this week.    The following portions of the patient's history were reviewed and updated as appropriate: allergies, current medications, past medical history, past social history and problem list.    Review of Systems   Constitutional: Negative.         Follow up from hosptial   Respiratory: Negative.    Cardiovascular: Negative.        Objective     Physical Exam   Constitutional: She is oriented to person, place, and time. Vital signs are normal. She appears well-developed and well-nourished.   HENT:   Head: Normocephalic and atraumatic.   Neck: Normal range of motion.   Cardiovascular: Normal rate and normal heart  sounds.  An irregular rhythm present.   Pulmonary/Chest: Effort normal and breath sounds normal.   Musculoskeletal: Normal range of motion.        Lumbar back: She exhibits tenderness and pain.   Neurological: She is oriented to person, place, and time.   Nursing note and vitals reviewed.      Assessment/Plan   Lana was seen today for follow-up.    Diagnoses and all orders for this visit:    Cerebrovascular accident (CVA) due to occlusion of right middle cerebral artery  -     Ambulatory Referral to Neurology    Acute CVA (cerebrovascular accident)  -     Ambulatory Referral to Neurology    Persistent atrial fibrillation    Follow up  -     Ambulatory Referral to Neurology    Hospital discharge follow-up    Sleep apnea, unspecified type    Insomnia due to medical condition    Other orders  -     traZODone (DESYREL) 50 MG tablet; Take 1 tablet by mouth Every Night.           -try desyrel for sleep first rather than ambien  -will give norco 5/325 m po q6hr prn pain of back #60 signed by alissa as she has no more left from last script filled on 10/18/17 by Dr De La Rosa (quant 20)  -keep up with follow up appts  -would like to pt to follow up with dr ball so he can see pt progress/discuss pain management  -cont home med  -spent more than 30 min in room with pt discussing hpi/pe/plan of care

## 2017-11-03 RX ORDER — ATORVASTATIN CALCIUM 40 MG/1
40 TABLET, FILM COATED ORAL NIGHTLY
Qty: 30 TABLET | Refills: 5 | Status: SHIPPED | OUTPATIENT
Start: 2017-11-03 | End: 2017-12-26 | Stop reason: SDUPTHER

## 2017-11-09 ENCOUNTER — OFFICE VISIT (OUTPATIENT)
Dept: CARDIOLOGY | Facility: CLINIC | Age: 72
End: 2017-11-09

## 2017-11-09 VITALS
BODY MASS INDEX: 40.34 KG/M2 | HEART RATE: 93 BPM | SYSTOLIC BLOOD PRESSURE: 140 MMHG | WEIGHT: 251 LBS | DIASTOLIC BLOOD PRESSURE: 82 MMHG | HEIGHT: 66 IN

## 2017-11-09 DIAGNOSIS — I48.19 PERSISTENT ATRIAL FIBRILLATION (HCC): ICD-10-CM

## 2017-11-09 DIAGNOSIS — G47.30 SLEEP APNEA, UNSPECIFIED TYPE: ICD-10-CM

## 2017-11-09 DIAGNOSIS — E66.01 MORBID OBESITY WITH BMI OF 40.0-44.9, ADULT (HCC): ICD-10-CM

## 2017-11-09 DIAGNOSIS — I21.4 NSTEMI (NON-ST ELEVATED MYOCARDIAL INFARCTION) (HCC): ICD-10-CM

## 2017-11-09 DIAGNOSIS — I63.511 CEREBROVASCULAR ACCIDENT (CVA) DUE TO OCCLUSION OF RIGHT MIDDLE CEREBRAL ARTERY (HCC): Primary | ICD-10-CM

## 2017-11-09 DIAGNOSIS — I10 ESSENTIAL HYPERTENSION: ICD-10-CM

## 2017-11-09 PROCEDURE — 99214 OFFICE O/P EST MOD 30 MIN: CPT | Performed by: NURSE PRACTITIONER

## 2017-11-09 PROCEDURE — 93000 ELECTROCARDIOGRAM COMPLETE: CPT | Performed by: NURSE PRACTITIONER

## 2017-11-09 RX ORDER — CARVEDILOL 25 MG/1
25 TABLET ORAL 2 TIMES DAILY
Qty: 60 TABLET | Refills: 3 | Status: SHIPPED | OUTPATIENT
Start: 2017-11-09 | End: 2017-12-26 | Stop reason: SDUPTHER

## 2017-11-09 NOTE — PROGRESS NOTES
Date of Office Visit: 2017  Encounter Provider: ANGELA Vega  Place of Service: [facility]  Patient Name: Lana Yañez  :1945    Chief Complaint   Patient presents with   • Atrial Fibrillation   • Coronary Artery Disease   :     HPI: Lana Yañez is a 72 y.o. female comes in today for HOSPITAL FOLLOW-UP  She is a patient of Dr. Dueñas. I am seeing her for the first time today and have reviewed her records. She has been in the hospital multiple times over the past month.     She has a history of CVA, A-Fib, heart failure, COPD, coronary disease, and sleep apnea.    In the beginning of 10/2017, the patient was in the hospital after being found to be in an irregular heartbeat in Dr. Madrigal's office. The patient had been having left-sided flank pain that had been progressively getting worse over the past 6 months. She had an MRI/MRA showing an L2-L3 herniation requiring surgery. When she was found in irregular beat in the office she was sent to the emergency room and found to be in atrial fibrillation. She was started on Cardizem and Atenolol. She had an echocardiogram showing an EF to be 57%, moderately increased left atrial volume, moderately dilated right atrium, mild aortic valve regurgitation, mild mitral valve regurgitation and moderate tricuspid valve regurgitation with an RVSP of 53 mmHg. She was ultimately discharged from the hospital and was started on Pradaxa at that time.     She was discharged on 10/14/2017. On 10/15/2017, she presented to the hospital with slurred speech and left-sided facial weakness. A CT angiogram showed occlusion and severe narrowing of the right MCA branch and mild narrowing of the origin of the left vertebral artery. An MRI showed several tiny areas of acute infarct in the right cerebral hemisphere, large isthmus measuring approximately 8 mm. Neurology felt the right MCA ischemic stroke was secondary to M2 and was  likely an occlusion from A-Fib. Neurology recommended continuing Pradaxa.     It was also felt like she had untreated sleep apnea. Pulmonology was going to arrange an outpatient sleep study. She continued to have mild left facial droop. She was discharged from that hospital stay on 10/18/2017.    She then presented to the hospital on 10/19/2017 with shortness of breath and chest tightness. Her troponin was elevated at 0.134 and was diagnosed with a Non-STEMI. She went on to have a CLARIBEL first showing normal EF, moderate mitral valve prolapse, right spontaneous echo contrast in the atrial body and atrial appendage. She was known to have a cardiac catheterization on 10/20/2017 showing an EF of 60% with 2+ mitral regurgitation. She had a right dominant system with 1-vessel coronary disease. She had a filling defect in the distal PDA consistent with a coronary embolus. This lesion was 90%, but it was too small to consider for coronary intervention. She was ultimately seen by Hematology and started on a heparin drip and converted over to Coumadin. They will work her up for a hypercoagulable workup.          She comes in today feeling well. She is now at home. She reports yesterday her home health nurse recorded her blood pressure as 140/100 using a manual cuff. She has some mild fullness in her chest, but not likely she came in to the hospital. It is worse when she gets up and walks because she feels her heart racing slightly. Her shortness of breath has improved, except with long distances. She has had no diaphoresis or weakness like she did before. She still has some left shoulder pain. Denies edema or dizziness. Denies orthopnea or PND. She is starting to get her strength back.            Past Medical History:   Diagnosis Date   • Acute CVA (cerebrovascular accident)    • Arthritis    • Asthma    • Atrial fibrillation    • Atrial fibrillation with RVR    • Bronchitis    • Cellulitis of right elbow     due to MRSA   •  CHF (congestive heart failure)    • COPD (chronic obstructive pulmonary disease)    • Coronary artery disease    • Cough    • Disease of thyroid gland    • Displacement of lumbar intervertebral disc without myelopathy    • ANTOINE (dyspnea on exertion)    • Dysuria    • Edema    • Essential hypertension    • Fatigue    • Generalized osteoarthritis of multiple sites    • Headache    • History of transfusion    • Hx of bone density study     10/23/2014   • Hyperlipidemia    • Hypertension    • Hypovitaminosis D    • Left shoulder pain    • Left-sided weakness    • Low back pain    • Morbid obesity with BMI of 40.0-44.9, adult    • MRSA infection    • NSTEMI (non-ST elevated myocardial infarction)    • Obesity    • RA (rheumatoid arthritis)     involving both hands   • Sleep apnea    • Stroke     left side weakness   • Urge incontinence of urine    • Vitamin D deficiency        Past Surgical History:   Procedure Laterality Date   • BREAST SURGERY      right side lumpectomy with biopsy   • CARDIAC CATHETERIZATION Left 10/20/2017    Procedure: Cardiac Catheterization/Vascular Study;  Surgeon: Alphonso Olmedo MD;  Location: [facility] INVASIVE LOCATION;  Service:    • CARDIAC CATHETERIZATION N/A 10/20/2017    Procedure: Left ventriculography;  Surgeon: Alphonso Olmedo MD;  Location: [facility] CATH INVASIVE LOCATION;  Service:    • EYE SURGERY      laser surgery for glaucoma and left eye cataracts removed   • HYSTERECTOMY      10+ years ago   • JOINT REPLACEMENT  2005; 2006    bilateral knees and left rotater   • KNEE SURGERY     • MAMMO BILATERAL  2016    [facility]            Review of Systems   Constitution: Negative for fever and malaise/fatigue.   HENT: Negative for ear pain, hearing loss, nosebleeds and sore throat.    Eyes: Negative for double vision, pain, vision loss in left eye, vision loss in right eye and visual disturbance.   Cardiovascular: Negative for claudication and leg swelling.   Respiratory: Negative for  "cough, snoring and wheezing.    Endocrine: Negative for cold intolerance, heat intolerance and polyuria.   Skin: Negative for color change, itching and rash.   Musculoskeletal: Positive for joint pain. Negative for joint swelling and muscle cramps.   Gastrointestinal: Negative for abdominal pain, diarrhea, melena, nausea and vomiting.   Genitourinary: Negative for bladder incontinence and hematuria.   Neurological: Negative for excessive daytime sleepiness, dizziness, light-headedness, paresthesias and seizures.   Psychiatric/Behavioral: Negative for depression. The patient is not nervous/anxious.    All other systems reviewed and are negative.    All other systems reviewed and are negative    No Known Allergies    All aspects of family and social history reviewed.          Objective:     Vitals:    11/09/17 1418 11/09/17 1445   BP: 90/68 140/82   BP Location: Right arm Left arm   Cuff Size: Large Adult Large Adult   Pulse: 93    Weight: 251 lb (114 kg)    Height: 66\" (167.6 cm)      Body mass index is 40.51 kg/(m^2).    PHYSICAL EXAM:  Physical Exam   Constitutional: She is oriented to person, place, and time. She appears well-developed and well-nourished.   Obese   HENT:   Head: Normocephalic and atraumatic.   Neck: Neck supple. No JVD present.   Cardiovascular: Normal rate, normal heart sounds and intact distal pulses.  An irregularly irregular rhythm present.   Pulses:       Carotid pulses are 2+ on the right side, and 2+ on the left side.       Radial pulses are 2+ on the right side, and 2+ on the left side.        Dorsalis pedis pulses are 2+ on the right side, and 2+ on the left side.   Pulmonary/Chest: Effort normal and breath sounds normal. No accessory muscle usage. No respiratory distress. She has no rales.   Abdominal: Soft. Normal appearance and bowel sounds are normal. There is no tenderness.   Musculoskeletal: Normal range of motion. She exhibits no edema.   Neurological: She is alert and oriented to " person, place, and time.   Skin: Skin is warm, dry and intact. She is not diaphoretic.   Psychiatric: She has a normal mood and affect. Her speech is normal and behavior is normal. Judgment and thought content normal. Cognition and memory are normal.         ECG 12 Lead  Date/Time: 11/9/2017 2:50 PM  Performed by: DAVEY KRUSE  Authorized by: DAVEY KRUSE   Comparison: compared with previous ECG from 10/21/2017  Similar to previous ECG  Rhythm: atrial fibrillation  Ectopy: PVCs  Rate: normal  BPM: 93  QRS axis: normal  Clinical impression: abnormal ECG  Comments: Indication: Atrial fibrillation                Assessment:       Diagnosis Plan   1. Cerebrovascular accident (CVA) due to occlusion of right middle cerebral artery     2. NSTEMI (non-ST elevated myocardial infarction)     3. Persistent atrial fibrillation     4. Essential hypertension     5. Morbid obesity with BMI of 40.0-44.9, adult     6. Sleep apnea, unspecified type          Orders Placed This Encounter   Procedures   • ECG 12 Lead     This order was created via procedure documentation       Current Outpatient Prescriptions   Medication Sig Dispense Refill   • aspirin 81 MG EC tablet Take 1 tablet by mouth Daily.     • atorvastatin (LIPITOR) 40 MG tablet Take 1 tablet by mouth Every Night. 30 tablet 5   • cetirizine (ZyrTEC) 10 MG tablet Take 10 mg by mouth Daily As Needed.     • COSOPT PF 22.3-6.8 MG/ML solution Administer 1 drop to both eyes 2 (Two) Times a Day.     • digoxin (LANOXIN) 250 MCG tablet Take 1 tablet by mouth Daily for 30 days. 30 tablet 0   • furosemide (LASIX) 40 MG tablet Take 1 tablet by mouth Daily for 30 days. 30 tablet 0   • HYDROcodone-acetaminophen (NORCO) 5-325 MG per tablet Take 1 tablet by mouth Every 6 (Six) Hours As Needed for Moderate Pain . 20 tablet 0   • losartan (COZAAR) 25 MG tablet Take 1 tablet by mouth Daily. 30 tablet 0   • potassium chloride (MICRO-K) 10 MEQ CR capsule Take 2 capsules by mouth Daily  for 30 days. 30 capsule 0   • travoprost, ANNIE free, (TRAVATAN Z) 0.004 % solution ophthalmic solution Administer 1 drop to both eyes Every Evening. in affected eye(s)     • traZODone (DESYREL) 50 MG tablet Take 1 tablet by mouth Every Night. 30 tablet 0   • vitamin B-12 (VITAMIN B-12) 1000 MCG tablet Take 1 tablet by mouth Daily.     • warfarin (COUMADIN) 5 MG tablet 5 mg daily at 6 pm or as directed by Dr Dueñas 30 tablet 0   • zolpidem (AMBIEN) 5 MG tablet Bring to sleep lab DO NOT use at home 2 tablet 0   • carvedilol (COREG) 25 MG tablet Take 1 tablet by mouth 2 (Two) Times a Day. 60 tablet 3     No current facility-administered medications for this visit.             Plan:       1. Atrial fibrillation-persistent atrial fibrillation now. Noticed on EKG at a office visit. She was on Pradaxa but failed this with a Non-STEMI after initiation along with a stroke after initiation, now on warfarin. She feels her heart racing when she gets up and walks. Her blood pressure is slightly elevated. We will increase Carvedilol to 25 mg b.i.d. She has been referred for sleep apnea, but her sleep apnea test is not until 01/2018.   2. Non-STEMI-her 3rd hospital stay was after a Non-STEMI. She had some chest pain and shortness of breath. She was found to have an embolus in the distal PDA that was too small for coronary intervention. Medical management was recommended. Not having angina at this time. Continue to monitor.   3. CVA-cardioembolic stroke. Failed Pradaxa. Now is on warfarin. Also on aspirin. She is being worked up by Hematology for a hypercoagulable state.   4. Sleep apnea-has a sleep study scheduled in 01/2018. She has a follow-up with Pulmonary.  5. Hypertension-increase Carvedilol to 25 mg b.i.d.   6. Shoulder pain-complains of shoulder pain, likely arthritis. Continue to monitor. Heat does help the site.    Follow-up with Dr. Dueñas in 3 weeks.              As always, it has been a pleasure to participate in this  patient's care.      Sincerely,      ANGELA Vega

## 2017-11-13 ENCOUNTER — LAB (OUTPATIENT)
Dept: OTHER | Facility: HOSPITAL | Age: 72
End: 2017-11-13

## 2017-11-13 ENCOUNTER — OFFICE VISIT (OUTPATIENT)
Dept: ONCOLOGY | Facility: CLINIC | Age: 72
End: 2017-11-13

## 2017-11-13 VITALS
RESPIRATION RATE: 16 BRPM | SYSTOLIC BLOOD PRESSURE: 119 MMHG | HEIGHT: 65 IN | TEMPERATURE: 98 F | HEART RATE: 87 BPM | BODY MASS INDEX: 42.24 KG/M2 | WEIGHT: 253.5 LBS | DIASTOLIC BLOOD PRESSURE: 77 MMHG | OXYGEN SATURATION: 95 %

## 2017-11-13 DIAGNOSIS — E56.9 VITAMIN DEFICIENCY: ICD-10-CM

## 2017-11-13 DIAGNOSIS — E78.49 OTHER HYPERLIPIDEMIA: Primary | ICD-10-CM

## 2017-11-13 DIAGNOSIS — I48.91 ATRIAL FIBRILLATION, UNSPECIFIED TYPE (HCC): ICD-10-CM

## 2017-11-13 DIAGNOSIS — E55.9 HYPOVITAMINOSIS D: ICD-10-CM

## 2017-11-13 DIAGNOSIS — I63.511 CEREBROVASCULAR ACCIDENT (CVA) DUE TO OCCLUSION OF RIGHT MIDDLE CEREBRAL ARTERY (HCC): Primary | ICD-10-CM

## 2017-11-13 DIAGNOSIS — D72.820 RELATIVE LYMPHOCYTOSIS: ICD-10-CM

## 2017-11-13 LAB
BASOPHILS # BLD AUTO: 0.03 10*3/MM3 (ref 0–0.2)
BASOPHILS NFR BLD AUTO: 0.5 % (ref 0–1.5)
DEPRECATED RDW RBC AUTO: 45.5 FL (ref 37–54)
EOSINOPHIL # BLD AUTO: 0.15 10*3/MM3 (ref 0–0.7)
EOSINOPHIL NFR BLD AUTO: 2.4 % (ref 0.3–6.2)
ERYTHROCYTE [DISTWIDTH] IN BLOOD BY AUTOMATED COUNT: 13.4 % (ref 11.7–13)
HCT VFR BLD AUTO: 42.1 % (ref 35.6–45.5)
HGB BLD-MCNC: 14.1 G/DL (ref 11.9–15.5)
IMM GRANULOCYTES # BLD: 0.03 10*3/MM3 (ref 0–0.03)
IMM GRANULOCYTES NFR BLD: 0.5 % (ref 0–0.5)
LYMPHOCYTES # BLD AUTO: 3.28 10*3/MM3 (ref 0.9–4.8)
LYMPHOCYTES NFR BLD AUTO: 51.5 % (ref 19.6–45.3)
MCH RBC QN AUTO: 30.9 PG (ref 26.9–32)
MCHC RBC AUTO-ENTMCNC: 33.5 G/DL (ref 32.4–36.3)
MCV RBC AUTO: 92.3 FL (ref 80.5–98.2)
MONOCYTES # BLD AUTO: 0.77 10*3/MM3 (ref 0.2–1.2)
MONOCYTES NFR BLD AUTO: 12.1 % (ref 5–12)
NEUTROPHILS # BLD AUTO: 2.11 10*3/MM3 (ref 1.9–8.1)
NEUTROPHILS NFR BLD AUTO: 33 % (ref 42.7–76)
NRBC BLD MANUAL-RTO: 0 /100 WBC (ref 0–0)
PLATELET # BLD AUTO: 173 10*3/MM3 (ref 140–500)
PMV BLD AUTO: 11.2 FL (ref 6–12)
RBC # BLD AUTO: 4.56 10*6/MM3 (ref 3.9–5.2)
WBC NRBC COR # BLD: 6.37 10*3/MM3 (ref 4.5–10.7)

## 2017-11-13 PROCEDURE — 85025 COMPLETE CBC W/AUTO DIFF WBC: CPT | Performed by: INTERNAL MEDICINE

## 2017-11-13 PROCEDURE — 36415 COLL VENOUS BLD VENIPUNCTURE: CPT

## 2017-11-13 PROCEDURE — 99214 OFFICE O/P EST MOD 30 MIN: CPT | Performed by: INTERNAL MEDICINE

## 2017-11-14 NOTE — PROGRESS NOTES
Subjective .     REASONS FOR FOLLOWUP:   1. Atrial fibrillation, rate controlled  2. 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  Transition to heparin drip and subsequently Coumadin with INR monitored by cardiology. Hypercoagulable evaluation 10/21/17 with anticardiolipin IgM antibody 16 (indeterminate range), significantly elevated factor VIII activity at 331%.  4. Borderline anemia with macrocytic indices  5. Rheumatoid arthritis previously treated with methotrexate  6. Borderline B12 deficiency (level 332 on 10/15/17) continuing on oral B12 1000 µg daily.  7. Persistent relative lymphocytic predominance    HISTORY OF PRESENT ILLNESS:  The patient is a 72 y.o. year old female who is here for follow-up with the above-mentioned history.    History of Present Illness   patient returns in follow-up today after having been seen initially in consultation during recent hospitalization in October 2017 regarding the above issues.  She is back today to review results from her hypercoagulable evaluation.  Since her hospital discharge, she has been continuing on Coumadin with INR monitored by cardiology.  She has not experienced any bleeding difficulties.  She does note continued improvement from a neurologic standpoint with some residual slight weakness in her left upper and lower extremities.  She is ambulating with the use of a rolling walker.  She remains off of methotrexate for her rheumatoid arthritis in her arthritic complaints remain stable.    Past Medical History:   Diagnosis Date   • Acute CVA (cerebrovascular accident)    • Arthritis    • Asthma    • Atrial fibrillation    • Atrial fibrillation with RVR    • Bronchitis    • Cellulitis of right elbow     due to MRSA   • CHF (congestive heart failure)    • COPD (chronic obstructive pulmonary disease)    • Coronary artery disease    • Cough    • Disease of thyroid gland    •  Displacement of lumbar intervertebral disc without myelopathy    • ANTOINE (dyspnea on exertion)    • Dysuria    • Edema    • Essential hypertension    • Fatigue    • Generalized osteoarthritis of multiple sites    • Headache    • History of transfusion    • Hx of bone density study     10/23/2014   • Hyperlipidemia    • Hypertension    • Hypovitaminosis D    • Left shoulder pain    • Left-sided weakness    • Low back pain    • Morbid obesity with BMI of 40.0-44.9, adult    • MRSA infection    • NSTEMI (non-ST elevated myocardial infarction)    • Obesity    • RA (rheumatoid arthritis)     involving both hands   • Sleep apnea    • Stroke     left side weakness   • Urge incontinence of urine    • Vitamin D deficiency      Past Surgical History:   Procedure Laterality Date   • BREAST SURGERY      right side lumpectomy with biopsy   • CARDIAC CATHETERIZATION Left 10/20/2017    Procedure: Cardiac Catheterization/Vascular Study;  Surgeon: Alphonso Olmedo MD;  Location:  [facility];  Service:    • CARDIAC CATHETERIZATION N/A 10/20/2017    Procedure: Left ventriculography;  Surgeon: Alphonso Olmedo MD;  Location:  [facility];  Service:    • EYE SURGERY      laser surgery for glaucoma and left eye cataracts removed   • HYSTERECTOMY      10+ years ago   • JOINT REPLACEMENT  2005; 2006    bilateral knees and left rotater   • KNEE SURGERY     • MAMMO BILATERAL  2016    [facility]        Current Outpatient Prescriptions on File Prior to Visit   Medication Sig Dispense Refill   • aspirin 81 MG EC tablet Take 1 tablet by mouth Daily.     • atorvastatin (LIPITOR) 40 MG tablet Take 1 tablet by mouth Every Night. 30 tablet 5   • carvedilol (COREG) 25 MG tablet Take 1 tablet by mouth 2 (Two) Times a Day. 60 tablet 3   • cetirizine (ZyrTEC) 10 MG tablet Take 10 mg by mouth Daily As Needed.     • COSOPT PF 22.3-6.8 MG/ML solution Administer 1 drop to both eyes 2 (Two) Times a Day.     • digoxin (LANOXIN)  250 MCG tablet Take 1 tablet by mouth Daily for 30 days. 30 tablet 0   • furosemide (LASIX) 40 MG tablet Take 1 tablet by mouth Daily for 30 days. 30 tablet 0   • HYDROcodone-acetaminophen (NORCO) 5-325 MG per tablet Take 1 tablet by mouth Every 6 (Six) Hours As Needed for Moderate Pain . 20 tablet 0   • losartan (COZAAR) 25 MG tablet Take 1 tablet by mouth Daily. 30 tablet 0   • potassium chloride (MICRO-K) 10 MEQ CR capsule Take 2 capsules by mouth Daily for 30 days. 30 capsule 0   • travoprost, BAK free, (TRAVATAN Z) 0.004 % solution ophthalmic solution Administer 1 drop to both eyes Every Evening. in affected eye(s)     • traZODone (DESYREL) 50 MG tablet Take 1 tablet by mouth Every Night. 30 tablet 0   • vitamin B-12 (VITAMIN B-12) 1000 MCG tablet Take 1 tablet by mouth Daily.     • warfarin (COUMADIN) 5 MG tablet 5 mg daily at 6 pm or as directed by Dr Dueñas 30 tablet 0   • zolpidem (AMBIEN) 5 MG tablet Bring to sleep lab DO NOT use at home 2 tablet 0     No current facility-administered medications on file prior to visit.        ALLERGIES:   No Known Allergies    Social History     Social History   • Marital status:      Spouse name: Suresh   • Number of children: 5   • Years of education: N/A     Occupational History   •  Retired     Social History Main Topics   • Smoking status: Former Smoker     Packs/day: 3.00     Types: Cigarettes     Start date: 5/19/1967     Quit date: 10/19/1968   • Smokeless tobacco: Never Used   • Alcohol use No   • Drug use: No   • Sexual activity: Defer     Other Topics Concern   • Not on file     Social History Narrative     Family History   Problem Relation Age of Onset   • Colon cancer Mother    • Glaucoma Mother    • Stroke Mother    • Arthritis Mother    • Hypertension Mother    • Glaucoma Sister    • Diabetes Sister    • Heart disease Sister    • Arthritis Sister    • Asthma Sister    • Hypertension Sister    • Miscarriages / Stillbirths Sister    • Lung  disease Sister    • Heart disease Brother    • Diabetes Brother    • Arthritis Brother    • Drug abuse Brother    • Hypertension Brother    • Arthritis Father    • COPD Father    • Lung disease Father    • Arthritis Daughter    • Depression Daughter    • Alcohol abuse Maternal Uncle          Review of Systems   Constitutional: Negative for activity change, appetite change, fatigue, fever and unexpected weight change.   HENT: Negative for congestion, mouth sores, nosebleeds, sore throat and voice change.    Respiratory: Negative for cough, shortness of breath and wheezing.    Cardiovascular: Negative for chest pain, palpitations and leg swelling.   Gastrointestinal: Negative for abdominal distention, abdominal pain, blood in stool, constipation, diarrhea, nausea and vomiting.   Endocrine: Negative for cold intolerance and heat intolerance.   Genitourinary: Negative for difficulty urinating, dysuria, frequency and hematuria.   Musculoskeletal: Positive for arthralgias. Negative for back pain, joint swelling and myalgias.   Skin: Negative for rash.   Neurological: Positive for weakness. Negative for dizziness, syncope, light-headedness, numbness and headaches.   Hematological: Negative for adenopathy. Does not bruise/bleed easily.   Psychiatric/Behavioral: Negative for confusion and sleep disturbance. The patient is not nervous/anxious.          Objective      Vitals:    11/13/17 1406   BP: 119/77   Pulse: 87   Resp: 16   Temp: 98 °F (36.7 °C)   SpO2: 95%      Current Status 11/13/2017   ECOG score 1       Physical Exam   Constitutional: She is oriented to person, place, and time. She appears well-developed and well-nourished.   Elderly female no apparent distress ambulating with the use of a rolling walker.   HENT:   Mouth/Throat: Oropharynx is clear and moist.   Eyes: Conjunctivae are normal.   Neck: No thyromegaly present.   Cardiovascular: Normal rate.  Exam reveals no gallop and no friction rub.    No murmur  heard.  Irregularly irregular   Pulmonary/Chest: Breath sounds normal. No respiratory distress.   Abdominal: Soft. Bowel sounds are normal. She exhibits no distension. There is no tenderness.   Musculoskeletal: She exhibits no edema.   Lymphadenopathy:        Head (right side): No submandibular adenopathy present.     She has no cervical adenopathy.     She has no axillary adenopathy.        Right: No inguinal and no supraclavicular adenopathy present.        Left: No inguinal and no supraclavicular adenopathy present.   Neurological: She is alert and oriented to person, place, and time. No cranial nerve deficit.   4+/5 strength in the left upper and lower extremities   Skin: Skin is warm and dry. No rash noted.   Psychiatric: She has a normal mood and affect. Her behavior is normal.       RECENT LABS:  Hematology WBC   Date Value Ref Range Status   11/13/2017 6.37 4.50 - 10.70 10*3/mm3 Final     RBC   Date Value Ref Range Status   11/13/2017 4.56 3.90 - 5.20 10*6/mm3 Final     Hemoglobin   Date Value Ref Range Status   11/13/2017 14.1 11.9 - 15.5 g/dL Final     Hematocrit   Date Value Ref Range Status   11/13/2017 42.1 35.6 - 45.5 % Final     Platelets   Date Value Ref Range Status   11/13/2017 173 140 - 500 10*3/mm3 Final        Lab Results   Component Value Date    GLUCOSE 116 (H) 10/27/2017    BUN 18 10/27/2017    CREATININE 0.97 10/27/2017    EGFRIFNONA 53 (L) 06/27/2017    EGFRIFAFRI 69 10/27/2017    BCR 18.6 10/27/2017    K 4.1 10/27/2017    CO2 27.3 10/27/2017    CALCIUM 10.0 10/27/2017    PROTENTOTREF 7.9 06/27/2017    ALBUMIN 4.10 10/20/2017    LABIL2 1.1 10/20/2017    AST 28 10/20/2017    ALT 33 10/20/2017     Laboratory studies 10/21/17: CRP 0.62, ESR 19, anti-thrombin 379% (90-1 34), anticardiolipin IgM antibody 16 (indeterminate range 13-20), beta-2 GP 1 antibody panel negative, prothrombin gene mutation negative, factor V Leiden mutation negative, factor VII activity 61%, factor VIII activity 331%,  lupus anticoagulant negative, protein C activity 86%, protein S activity 117%    Bilateral lower extremity Doppler negative for DVT 10/22/17.        Assessment/Plan      1. Persistent atrial fibrillation: The patient is currently rate controlled.  She was on Pradaxa however missed a dose and was admitted with CVA 1015/17.  She has subsequently developed a coronary artery embolism despite being maintained on Pradaxa and therefore appears to have failed that drug.  She is currently receiving coumadin.  It appears that she will require ongoing anticoagulation for multiple indications including atrial fibrillation.  2. Coronary artery embolism with non-ST elevation MI 10/19/17: The patient had been receiving Pradaxa on schedule at the nursing facility however developed acute onset of chest pain shortness of breath and was admitted with non-ST elevation MI 10/19/17.  She was placed on a heparin drip, Pradaxa discontinued.  CLARIBEL showed normal ejection fraction, severe biatrial dilation, negative saline test, no atrial appendage thrombus.  Cardiac catheterization showed only 10% stenosis however there was a filling defect in the distal PDA consistent with a coronary embolus that was 90% occluded.  We were consultated regarding the possibility of an underlying hypercoagulable state.  Certainly, a coronary artery embolism can occur from atrial fibrillation.  There are reports of associated hypercoagulable states however.  Patient has no personal or family history of prior thrombosis.  This is an arterial thrombosis not venous and most of the inherited thrombophilias carry a higher risk of venous thrombosis, not necessarily arterial thrombosis. There are reports of factor VII increased activity associated with coronary artery embolism.  Hypercoagulable evaluation was obtained 10/21/17.  We did review the results today.  Anti-thrombin 3 was slightly low at 79% (normal range 90-1 34) however this is likely related to the patient  having been on a heparin drip at the time the test was drawn.  We will repeat this value in 3 months.  Prothrombin gene mutation was negative as was factor V Leiden gene mutation.  Factor VII activity was normal at 61% area protein C activity was normal at 86% as was protein S activity at 117%.  Lupus anticoagulant and antiplatelet beta-2 GP 1 antibody panel was negative however anticardiolipin IgM antibody was in the indeterminate range at 16 (range 13-20).  This does not meet criteria for diagnosis of antiphospholipid syndrome but does raise some suspicion.  We will anticipate repeating this value as well as the remainder of the antiphospholipid panel at a 3 month interval.  The patient does have underlying rheumatoid arthritis and factor VIII activity was significantly elevated at 331%, likely related to her underlying inflammatory disorder.  We will repeat this again in 3 months to see if it is persistently significantly elevated.  None of these factors however change the recommendation for the patient to remain on anticoagulation.  She has multiple reasons to remain on long-term anticoagulation including atrial fibrillation with CVA as well as her coronary artery embolism.  She will continue to have her INR monitored while on Coumadin through the cardiology office.  3. Rheumatoid arthritis: The patient has had this for over 5 years.  She has required treatment over the past 6 months under the direction of Dr. Monsalve with oral methotrexate.  With her recent medical issues in October 2017, methotrexate was held and she has not resumed the drug to date.  Her arthritic symptoms are stable, recent labs from 10/21/17 with CRP 0.62, ESR 19.  4. Borderline anemia with macrocytosis: Macrocytosis was related to recent methotrexate use and now both the anemia and macrocytosis have resolved off methotrexate.  5. Right MCA CVA: The patient was admitted on 10/15/17 with multifocal embolic right MCA CVA with associated speech  difficulty, left facial droop, left upper and lower extremity weakness.  Her neurologic residual deficit in the left upper and lower extremities remains minimal.  She continues on anticoagulation as above with Coumadin.  Felt to be related to atrial fibrillation.  6. Relative lymphocytosis: The patient has a persistent relative lymphocytosis.  CBC is normal at 6.37 with a differential of 33 segs, 52 lymphs.  We will check a peripheral blood flow cytometry today.  7. Borderline B12 deficiency: Borderline level of 332 on 10/15/17.  The patient is continuing on oral B12 1000 µg daily.      Plan:    1. Additional labs today with peripheral blood flow cytometry  2. Continue oral B12 1000 µg daily  3. Continue on Coumadin with INR monitored by cardiology  4. M.D. visit in 3 months.  One week prior we will draw labs with CBC, CMP, anti-thrombin 3, anticardiolipin antibody panel, anti-beta-2 GP 1 antibody panel, lupus anticoagulant, factor VIII activity.

## 2017-11-15 LAB — REF LAB TEST METHOD: NORMAL

## 2017-11-20 RX ORDER — FUROSEMIDE 40 MG/1
40 TABLET ORAL DAILY
Qty: 30 TABLET | Refills: 0 | Status: SHIPPED | OUTPATIENT
Start: 2017-11-20 | End: 2017-12-18 | Stop reason: SDUPTHER

## 2017-11-20 RX ORDER — DIGOXIN 250 MCG
250 TABLET ORAL DAILY
Qty: 30 TABLET | Refills: 0 | Status: SHIPPED | OUTPATIENT
Start: 2017-11-20 | End: 2017-12-05 | Stop reason: ALTCHOICE

## 2017-11-20 RX ORDER — POTASSIUM CHLORIDE 750 MG/1
20 CAPSULE, EXTENDED RELEASE ORAL DAILY
Qty: 30 CAPSULE | Refills: 0 | Status: SHIPPED | OUTPATIENT
Start: 2017-11-20 | End: 2017-11-20 | Stop reason: SDUPTHER

## 2017-11-20 RX ORDER — TRAZODONE HYDROCHLORIDE 50 MG/1
TABLET ORAL
Qty: 30 TABLET | Refills: 0 | Status: SHIPPED | OUTPATIENT
Start: 2017-11-20 | End: 2017-12-14 | Stop reason: SDUPTHER

## 2017-11-20 RX ORDER — LOSARTAN POTASSIUM 25 MG/1
25 TABLET ORAL
Qty: 30 TABLET | Refills: 0 | Status: SHIPPED | OUTPATIENT
Start: 2017-11-20 | End: 2017-12-04 | Stop reason: SDUPTHER

## 2017-11-20 RX ORDER — POTASSIUM CHLORIDE 750 MG/1
20 CAPSULE, EXTENDED RELEASE ORAL DAILY
Qty: 60 CAPSULE | Refills: 0 | Status: SHIPPED | OUTPATIENT
Start: 2017-11-20 | End: 2017-12-26 | Stop reason: SDUPTHER

## 2017-11-27 RX ORDER — WARFARIN SODIUM 5 MG/1
TABLET ORAL
Qty: 30 TABLET | Refills: 0 | Status: SHIPPED | OUTPATIENT
Start: 2017-11-27 | End: 2017-12-26 | Stop reason: SDUPTHER

## 2017-11-28 ENCOUNTER — TELEPHONE (OUTPATIENT)
Dept: CARDIOLOGY | Facility: CLINIC | Age: 72
End: 2017-11-28

## 2017-11-28 ENCOUNTER — OFFICE VISIT (OUTPATIENT)
Dept: CARDIOLOGY | Facility: CLINIC | Age: 72
End: 2017-11-28

## 2017-11-28 ENCOUNTER — LAB (OUTPATIENT)
Dept: LAB | Facility: HOSPITAL | Age: 72
End: 2017-11-28

## 2017-11-28 VITALS
HEART RATE: 69 BPM | HEIGHT: 65 IN | WEIGHT: 254 LBS | DIASTOLIC BLOOD PRESSURE: 72 MMHG | BODY MASS INDEX: 42.32 KG/M2 | SYSTOLIC BLOOD PRESSURE: 136 MMHG

## 2017-11-28 DIAGNOSIS — R53.83 OTHER FATIGUE: Primary | ICD-10-CM

## 2017-11-28 DIAGNOSIS — I21.4 NSTEMI (NON-ST ELEVATED MYOCARDIAL INFARCTION) (HCC): ICD-10-CM

## 2017-11-28 DIAGNOSIS — Z79.01 CHRONIC ANTICOAGULATION: Primary | ICD-10-CM

## 2017-11-28 DIAGNOSIS — I48.0 PAF (PAROXYSMAL ATRIAL FIBRILLATION) (HCC): ICD-10-CM

## 2017-11-28 DIAGNOSIS — R53.1 WEAKNESS: ICD-10-CM

## 2017-11-28 DIAGNOSIS — R11.0 NAUSEA: ICD-10-CM

## 2017-11-28 DIAGNOSIS — Z79.01 CHRONIC ANTICOAGULATION: ICD-10-CM

## 2017-11-28 DIAGNOSIS — I10 ESSENTIAL HYPERTENSION: ICD-10-CM

## 2017-11-28 LAB
BASOPHILS # BLD AUTO: 0.01 10*3/MM3 (ref 0–0.2)
BASOPHILS NFR BLD AUTO: 0.2 % (ref 0–1.5)
DEPRECATED RDW RBC AUTO: 49.8 FL (ref 37–54)
DIGOXIN SERPL-MCNC: 2 NG/ML (ref 0.6–1.2)
EOSINOPHIL # BLD AUTO: 0.1 10*3/MM3 (ref 0–0.7)
EOSINOPHIL NFR BLD AUTO: 1.6 % (ref 0.3–6.2)
ERYTHROCYTE [DISTWIDTH] IN BLOOD BY AUTOMATED COUNT: 14.2 % (ref 11.7–13)
HCT VFR BLD AUTO: 41.7 % (ref 35.6–45.5)
HGB BLD-MCNC: 13.7 G/DL (ref 11.9–15.5)
IMM GRANULOCYTES # BLD: 0 10*3/MM3 (ref 0–0.03)
IMM GRANULOCYTES NFR BLD: 0 % (ref 0–0.5)
INR PPP: 2.51 (ref 0.9–1.1)
LYMPHOCYTES # BLD AUTO: 3.24 10*3/MM3 (ref 0.9–4.8)
LYMPHOCYTES NFR BLD AUTO: 50.5 % (ref 19.6–45.3)
MCH RBC QN AUTO: 31.4 PG (ref 26.9–32)
MCHC RBC AUTO-ENTMCNC: 32.9 G/DL (ref 32.4–36.3)
MCV RBC AUTO: 95.6 FL (ref 80.5–98.2)
MONOCYTES # BLD AUTO: 0.8 10*3/MM3 (ref 0.2–1.2)
MONOCYTES NFR BLD AUTO: 12.5 % (ref 5–12)
NEUTROPHILS # BLD AUTO: 2.26 10*3/MM3 (ref 1.9–8.1)
NEUTROPHILS NFR BLD AUTO: 35.2 % (ref 42.7–76)
PLATELET # BLD AUTO: 178 10*3/MM3 (ref 140–500)
PMV BLD AUTO: 11.1 FL (ref 6–12)
PROTHROMBIN TIME: 26.3 SECONDS (ref 11.7–14.2)
RBC # BLD AUTO: 4.36 10*6/MM3 (ref 3.9–5.2)
WBC NRBC COR # BLD: 6.41 10*3/MM3 (ref 4.5–10.7)

## 2017-11-28 PROCEDURE — 80162 ASSAY OF DIGOXIN TOTAL: CPT | Performed by: INTERNAL MEDICINE

## 2017-11-28 PROCEDURE — 36415 COLL VENOUS BLD VENIPUNCTURE: CPT | Performed by: INTERNAL MEDICINE

## 2017-11-28 PROCEDURE — 85025 COMPLETE CBC W/AUTO DIFF WBC: CPT | Performed by: INTERNAL MEDICINE

## 2017-11-28 PROCEDURE — 99214 OFFICE O/P EST MOD 30 MIN: CPT | Performed by: INTERNAL MEDICINE

## 2017-11-28 PROCEDURE — 93000 ELECTROCARDIOGRAM COMPLETE: CPT | Performed by: INTERNAL MEDICINE

## 2017-11-28 PROCEDURE — 80053 COMPREHEN METABOLIC PANEL: CPT | Performed by: INTERNAL MEDICINE

## 2017-11-28 PROCEDURE — 85610 PROTHROMBIN TIME: CPT

## 2017-11-28 NOTE — PROGRESS NOTES
Date of Office Visit: 2017  Encounter Provider: Pia Dueñas MD  Place of Service: [facility]  Patient Name: Lana Yañez  :1945    Chief complaint  Followup of atrial fib, embolic stoke and NSTEMI, hypertension    History of Present Illness  The patient is a 71 yo female with with history of retention, hyperlipidemia, rheumatoid arthritis, obstructive sleep apnea who in early October was found to be in atrial fibrillation with rapid ventricular rates and mild diastolic heart failure.  She was placed on IV heparin and Pradaxa.  Echocardiogram revealed normal systolic function mild left ventricular hypertrophy, moderate atrial enlargement with aortic valve sclerosis and moderate pulmonary hypertension and moderate tricuspid regurgitation.  The RV systolic pressure is 54 mmHg.  She had a stress perfusion study that was negative for ischemia and a CT and her grandmother revealed a mildly dilated aorta without pulmonary emboli.  She then presented several days later with an acute stroke.  CLARIBEL was not pursued as it was felt to be embolic in nature.  However on  and she was diaphoretic and was found to have a non-ST elevation myocardial infarction.  This was on full dose Pradaxa which was not held.  CLARIBEL was pursued that revealed normal systolic function with moderate mitral valve prolapse without significant regurgitation.  There was right-sided spontaneous echo contrast without thrombus formation.  Cardiac catheterization revealed normal systolic function with 2+ mitral regurgitation 90% stenosis was noted distally which is felt to be too small to be amenable PCI.  She was treated medically and switched to Coumadin.    Since last visit she was seen by my nurse practitioner Antoinette and beta blocker therapy was increased for hypertension.  She states since then she has had significant fatigue but denies any chest pain or palpitations.  She has dyspnea  which is slightly worse she denies any major edema though is mild left ankle dependent edema.  Blood pressures been much better.  She continues to have mild nausea.    Past Medical History:   Diagnosis Date   • Acute CVA (cerebrovascular accident)    • Arthritis    • Asthma    • Atrial fibrillation    • Atrial fibrillation with RVR    • Bronchitis    • Cellulitis of right elbow     due to MRSA   • CHF (congestive heart failure)    • COPD (chronic obstructive pulmonary disease)    • Coronary artery disease    • Cough    • Disease of thyroid gland    • Displacement of lumbar intervertebral disc without myelopathy    • ANTOINE (dyspnea on exertion)    • Dysuria    • Edema    • Essential hypertension    • Fatigue    • Generalized osteoarthritis of multiple sites    • Headache    • History of transfusion    • Hx of bone density study     10/23/2014   • Hyperlipidemia    • Hypertension    • Hypovitaminosis D    • Left shoulder pain    • Left-sided weakness    • Low back pain    • Morbid obesity with BMI of 40.0-44.9, adult    • MRSA infection    • NSTEMI (non-ST elevated myocardial infarction)    • Obesity    • RA (rheumatoid arthritis)     involving both hands   • Sleep apnea    • Stroke     left side weakness   • Urge incontinence of urine    • Vitamin D deficiency      Past Surgical History:   Procedure Laterality Date   • BREAST SURGERY      right side lumpectomy with biopsy   • CARDIAC CATHETERIZATION Left 10/20/2017    Procedure: Cardiac Catheterization/Vascular Study;  Surgeon: Alphonso Olmedo MD;  Location: [facility] CATH INVASIVE LOCATION;  Service:    • CARDIAC CATHETERIZATION N/A 10/20/2017    Procedure: Left ventriculography;  Surgeon: Alphonso Olmedo MD;  Location: [facility] INVASIVE LOCATION;  Service:    • EYE SURGERY      laser surgery for glaucoma and left eye cataracts removed   • HYSTERECTOMY      10+ years ago   • JOINT REPLACEMENT  2005; 2006    bilateral knees and left rotater   • KNEE SURGERY     • MAMMO BILATERAL   2016    [facility] CENTER      Outpatient Medications Prior to Visit   Medication Sig Dispense Refill   • aspirin 81 MG EC tablet Take 1 tablet by mouth Daily.     • atorvastatin (LIPITOR) 40 MG tablet Take 1 tablet by mouth Every Night. 30 tablet 5   • carvedilol (COREG) 25 MG tablet Take 1 tablet by mouth 2 (Two) Times a Day. 60 tablet 3   • cetirizine (ZyrTEC) 10 MG tablet Take 10 mg by mouth Daily As Needed.     • COSOPT PF 22.3-6.8 MG/ML solution Administer 1 drop to both eyes 2 (Two) Times a Day.     • digoxin (LANOXIN) 250 MCG tablet Take 1 tablet by mouth Daily for 30 days. 30 tablet 0   • furosemide (LASIX) 40 MG tablet Take 1 tablet by mouth Daily for 30 days. 30 tablet 0   • HYDROcodone-acetaminophen (NORCO) 5-325 MG per tablet Take 1 tablet by mouth Every 6 (Six) Hours As Needed for Moderate Pain . 20 tablet 0   • losartan (COZAAR) 25 MG tablet Take 1 tablet by mouth Daily. 30 tablet 0   • potassium chloride (MICRO-K) 10 MEQ CR capsule Take 2 capsules by mouth Daily for 30 days. 60 capsule 0   • travoprost, BAK free, (TRAVATAN Z) 0.004 % solution ophthalmic solution Administer 1 drop to both eyes Every Evening. in affected eye(s)     • traZODone (DESYREL) 50 MG tablet TAKE ONE TABLET BY MOUTH IN THE EVENING 30 tablet 0   • vitamin B-12 (VITAMIN B-12) 1000 MCG tablet Take 1 tablet by mouth Daily.     • warfarin (COUMADIN) 5 MG tablet 5 mg daily at 6 pm or as directed by Dr Dueñas 30 tablet 0   • zolpidem (AMBIEN) 5 MG tablet Bring to sleep lab DO NOT use at home 2 tablet 0     No facility-administered medications prior to visit.      Allergies as of 11/28/2017   • (No Known Allergies)     Social History     Social History   • Marital status:      Spouse name: Suresh   • Number of children: 5   • Years of education: N/A     Occupational History   •  Retired     Social History Main Topics   • Smoking status: Former Smoker     Packs/day: 3.00     Types: Cigarettes     Start date:  5/19/1967     Quit date: 10/19/1968   • Smokeless tobacco: Never Used   • Alcohol use No   • Drug use: No   • Sexual activity: Defer     Other Topics Concern   • Not on file     Social History Narrative     Family History   Problem Relation Age of Onset   • Colon cancer Mother    • Glaucoma Mother    • Stroke Mother    • Arthritis Mother    • Hypertension Mother    • Glaucoma Sister    • Diabetes Sister    • Heart disease Sister    • Arthritis Sister    • Asthma Sister    • Hypertension Sister    • Miscarriages / Stillbirths Sister    • Lung disease Sister    • Heart disease Brother    • Diabetes Brother    • Arthritis Brother    • Drug abuse Brother    • Hypertension Brother    • Arthritis Father    • COPD Father    • Lung disease Father    • Arthritis Daughter    • Depression Daughter    • Alcohol abuse Maternal Uncle      Review of Systems   Constitution: Positive for malaise/fatigue. Negative for fever, weight gain and weight loss.   HENT: Negative for ear pain, hearing loss, nosebleeds and sore throat.    Eyes: Negative for double vision, pain, vision loss in left eye and vision loss in right eye.   Cardiovascular:        See history of present illness.   Respiratory: Positive for shortness of breath. Negative for cough, sleep disturbances due to breathing, snoring and wheezing.    Endocrine: Negative for cold intolerance, heat intolerance and polyuria.   Skin: Negative for itching, poor wound healing and rash.   Musculoskeletal: Negative for joint pain, joint swelling and myalgias.   Gastrointestinal: Negative for abdominal pain, diarrhea, hematochezia, nausea and vomiting.   Genitourinary: Negative for hematuria and hesitancy.   Neurological: Positive for excessive daytime sleepiness. Negative for numbness, paresthesias and seizures.   Psychiatric/Behavioral: Negative for depression. The patient is not nervous/anxious.      Objective:     Vitals:    11/28/17 1313   BP: 136/72   Pulse: 69   Weight: 254 lb (115  "kg)   Height: 65\" (165.1 cm)     Body mass index is 42.27 kg/(m^2).    Physical Exam   Constitutional: She is oriented to person, place, and time. She appears well-developed and well-nourished.   Obese   HENT:   Head: Normocephalic.   Nose: Nose normal.   Mouth/Throat: Oropharynx is clear and moist.   Eyes: Conjunctivae and EOM are normal. Pupils are equal, round, and reactive to light. Right eye exhibits no discharge. No scleral icterus.   Neck: Normal range of motion. Neck supple. No JVD present. No thyromegaly present.   Cardiovascular: Normal rate, regular rhythm, normal heart sounds and intact distal pulses.  Exam reveals no gallop and no friction rub.    No murmur heard.  Pulses:       Carotid pulses are 2+ on the right side, and 2+ on the left side.       Radial pulses are 2+ on the right side, and 2+ on the left side.        Femoral pulses are 2+ on the right side, and 2+ on the left side.       Popliteal pulses are 2+ on the right side, and 2+ on the left side.        Dorsalis pedis pulses are 2+ on the right side, and 2+ on the left side.        Posterior tibial pulses are 2+ on the right side, and 2+ on the left side.   Pulmonary/Chest: Effort normal and breath sounds normal. No respiratory distress. She has no wheezes. She has no rales.   Abdominal: Soft. Bowel sounds are normal. She exhibits no distension. There is no hepatosplenomegaly. There is no tenderness. There is no rebound.   Musculoskeletal: Normal range of motion. She exhibits no edema or tenderness.   Neurological: She is alert and oriented to person, place, and time.   Skin: Skin is warm and dry. No rash noted. No erythema.   Psychiatric: She has a normal mood and affect. Her behavior is normal. Judgment and thought content normal.   Vitals reviewed.    Lab Review:     ECG 12 Lead  Date/Time: 11/28/2017 7:02 PM  Performed by: MARIANA SALGUERO  Authorized by: MARIANA SALGUERO   Comparison: compared with previous ECG   Similar to previous ECG  Rhythm: " atrial fibrillation  Conduction: LAFB  Other findings: PRWP  Clinical impression: abnormal ECG          Assessment:       Diagnosis Plan   1. Other fatigue     2. PAF (paroxysmal atrial fibrillation)  Digoxin Level   3. Nausea     4. Weakness  CBC & Differential    CBC Auto Differential   5. NSTEMI (non-ST elevated myocardial infarction)     6. Essential hypertension       Plan:       1. Persistent atrial fibrillation. Rates appear controlled and on warfarin and asprin  2. Fatigue, This may be due to a recent increase of coreg, however BP is better controlled and readings at home have been the same. I think some of her weakness is also recovery from the stroke and she has many questions re recovery and restrictions. I suggested additional rehab/PT for stroke and followup with neurologyOrdered in addition we'll check a dig level.  3. S/p embolic NSTEMI. Once cleared by neuro to do cardiac rehab, will enroll her in this and she is anxious to do this.  4. S/p smbolic stroke, as above  5.  Hypertension, as above  6.  FRANCY  7.  Hyperlipidemia  8.  Warfarin anticoagulation    Atrial Fibrillation and Atrial Flutter  Assessment  • The patient has persistent atrial fibrillation  • This is non-valvular in etiology  • The patient's CHADS2-VASc score is 7  • A [id number] score of 2 or more is considered a high risk for a thromboembolic event  • Warfarin prescribed  • Dabigatran not prescribed  • Apixaban not prescribed  • Aspirin prescribed    Plan  • Continue in atrial fibrillation with rate control  • Continue aspirin and warfarin for antithrombotic therapy, bleeding issues discussed  • Continue beta blocker for rate control    Coronary Artery Disease  Assessment  • The patient has no angina    Subjective - Objective  • There is a history of past MI  • Current antiplatelet therapy includes aspirin 81 mg         Lana Yañez   Home Medication Instructions DG:    Printed on:11/28/17 6370   Medication Information                       aspirin 81 MG EC tablet  Take 1 tablet by mouth Daily.             atorvastatin (LIPITOR) 40 MG tablet  Take 1 tablet by mouth Every Night.             carvedilol (COREG) 25 MG tablet  Take 1 tablet by mouth 2 (Two) Times a Day.             cetirizine (ZyrTEC) 10 MG tablet  Take 10 mg by mouth Daily As Needed.             COSOPT PF 22.3-6.8 MG/ML solution  Administer 1 drop to both eyes 2 (Two) Times a Day.             digoxin (LANOXIN) 250 MCG tablet  Take 1 tablet by mouth Daily for 30 days.             furosemide (LASIX) 40 MG tablet  Take 1 tablet by mouth Daily for 30 days.             HYDROcodone-acetaminophen (NORCO) 5-325 MG per tablet  Take 1 tablet by mouth Every 6 (Six) Hours As Needed for Moderate Pain .             losartan (COZAAR) 25 MG tablet  Take 1 tablet by mouth Daily.             potassium chloride (MICRO-K) 10 MEQ CR capsule  Take 2 capsules by mouth Daily for 30 days.             travoprost, BAK free, (TRAVATAN Z) 0.004 % solution ophthalmic solution  Administer 1 drop to both eyes Every Evening. in affected eye(s)             traZODone (DESYREL) 50 MG tablet  TAKE ONE TABLET BY MOUTH IN THE EVENING             vitamin B-12 (VITAMIN B-12) 1000 MCG tablet  Take 1 tablet by mouth Daily.             warfarin (COUMADIN) 5 MG tablet  5 mg daily at 6 pm or as directed by Dr Spenser romero (AMBIEN) 5 MG tablet  Bring to sleep lab DO NOT use at home                 Dictated utilizing Dragon dictation

## 2017-11-28 NOTE — TELEPHONE ENCOUNTER
Please arrange a followup appointment with Dr Fernández, neurology. Post stroke visit. He saw her in hospital. maura

## 2017-11-29 ENCOUNTER — TELEPHONE (OUTPATIENT)
Dept: NEUROLOGY | Facility: CLINIC | Age: 72
End: 2017-11-29

## 2017-11-29 LAB
ALBUMIN SERPL-MCNC: 4.3 G/DL (ref 3.5–5.2)
ALBUMIN/GLOB SERPL: 1.2 G/DL
ALP SERPL-CCNC: 52 U/L (ref 39–117)
ALT SERPL W P-5'-P-CCNC: 14 U/L (ref 1–33)
ANION GAP SERPL CALCULATED.3IONS-SCNC: 15.1 MMOL/L
AST SERPL-CCNC: 20 U/L (ref 1–32)
BILIRUB SERPL-MCNC: 0.6 MG/DL (ref 0.1–1.2)
BUN BLD-MCNC: 12 MG/DL (ref 8–23)
BUN/CREAT SERPL: 11.4 (ref 7–25)
CALCIUM SPEC-SCNC: 10.1 MG/DL (ref 8.6–10.5)
CHLORIDE SERPL-SCNC: 103 MMOL/L (ref 98–107)
CO2 SERPL-SCNC: 26.9 MMOL/L (ref 22–29)
CREAT BLD-MCNC: 1.05 MG/DL (ref 0.57–1)
GFR SERPL CREATININE-BSD FRML MDRD: 63 ML/MIN/1.73
GLOBULIN UR ELPH-MCNC: 3.5 GM/DL
GLUCOSE BLD-MCNC: 143 MG/DL (ref 65–99)
POTASSIUM BLD-SCNC: 4.2 MMOL/L (ref 3.5–5.2)
PROT SERPL-MCNC: 7.8 G/DL (ref 6–8.5)
SODIUM BLD-SCNC: 145 MMOL/L (ref 136–145)

## 2017-11-29 NOTE — TELEPHONE ENCOUNTER
I called to schedule the f/u with Dr. Fernández and they stated that he is only a hospitalist and does not see pts in office. They are sending a message to dr. sridevi chacon's medical assistant (he is the stroke dr at that office) and they will call the pt to schedule a f/u. I called pt to give her the update and to expect a call from that office and she is ok with that.  Thanks, Sarah Beth

## 2017-11-29 NOTE — TELEPHONE ENCOUNTER
"Mrs. Yañez returned my call.  She said she is feeling pretty good.  She did have some cardiac difficulties shortly after discharge from the hospital.  She had a follow up appointment with Dr. Dueñas and her \"water pill \" was discontinued.  Dr. Dueñas did continue Cozaar 25 mg daily, Coreg 25 mg 2 times a day, ASA 81 mg daily and she is presently on Coumadin at 6 PM daily.  [facility].  We reviewed signs and symptoms of stroke and to call 911 immediately if any occur.  I told her I would send out a follow up packet to her verified listed address.  She also told me she did go to [facility] but didn't like it an came home.  She can call me with any further questions.  NEMESIO Higgins RN  "

## 2017-11-30 ENCOUNTER — TELEPHONE (OUTPATIENT)
Dept: NEUROLOGY | Facility: CLINIC | Age: 72
End: 2017-11-30

## 2017-11-30 NOTE — TELEPHONE ENCOUNTER
I s/w patient and set up her 3 month hospital f/u with Dr. Montoya (patient requested to see him) on 1/31/18 at 10am. She has not had any seizures but does notice some vision changes. She will contact her eye doctor.

## 2017-11-30 NOTE — TELEPHONE ENCOUNTER
----- Message from ANGELA Amaya sent at 11/29/2017  3:57 PM EST -----  Contact: Patient [phone]  She can see me or LQ at 3 months. Regarding her driving: has she had a seizure or is she blind or missing any vision with her stroke? If any concerns about vision she should see her eye doctor and they can reassure her that she can drive. Other wise, by state law there is no reason she cannot drive. this is up to therapy, if she has required rehab PT or OT and they have signed off then I don't see why she cannot drive   ----- Message -----     From: Deena Stokes MA     Sent: 11/29/2017   8:58 AM       To: ANGELA Amaya        ----- Message -----     From: Justo Nelson     Sent: 11/29/2017   8:49 AM       To: Deena Stokes MA    Patient was seen in the hospital by Dr Fernández and needs a follow up appointment as soon as possible. Was told she needed to be released to drive again. Thanks

## 2017-12-04 RX ORDER — LOSARTAN POTASSIUM 25 MG/1
25 TABLET ORAL
Qty: 30 TABLET | Refills: 0 | Status: SHIPPED | OUTPATIENT
Start: 2017-12-04 | End: 2017-12-05

## 2017-12-05 ENCOUNTER — OFFICE VISIT (OUTPATIENT)
Dept: INTERNAL MEDICINE | Facility: CLINIC | Age: 72
End: 2017-12-05

## 2017-12-05 VITALS
WEIGHT: 255 LBS | TEMPERATURE: 97.9 F | DIASTOLIC BLOOD PRESSURE: 73 MMHG | BODY MASS INDEX: 42.49 KG/M2 | HEART RATE: 81 BPM | OXYGEN SATURATION: 93 % | HEIGHT: 65 IN | RESPIRATION RATE: 16 BRPM | SYSTOLIC BLOOD PRESSURE: 111 MMHG

## 2017-12-05 DIAGNOSIS — M15.9 GENERALIZED OSTEOARTHRITIS OF MULTIPLE SITES: ICD-10-CM

## 2017-12-05 DIAGNOSIS — Z09 HOSPITAL DISCHARGE FOLLOW-UP: Primary | ICD-10-CM

## 2017-12-05 DIAGNOSIS — I48.0 PAROXYSMAL ATRIAL FIBRILLATION (HCC): ICD-10-CM

## 2017-12-05 DIAGNOSIS — I10 ESSENTIAL HYPERTENSION: ICD-10-CM

## 2017-12-05 DIAGNOSIS — Z79.899 CONTROLLED SUBSTANCE AGREEMENT SIGNED: ICD-10-CM

## 2017-12-05 DIAGNOSIS — M51.26 LUMBAR DISC HERNIATION: ICD-10-CM

## 2017-12-05 DIAGNOSIS — I21.4 NSTEMI (NON-ST ELEVATED MYOCARDIAL INFARCTION) (HCC): ICD-10-CM

## 2017-12-05 DIAGNOSIS — E66.01 MORBID OBESITY WITH BMI OF 40.0-44.9, ADULT (HCC): ICD-10-CM

## 2017-12-05 PROCEDURE — 99214 OFFICE O/P EST MOD 30 MIN: CPT | Performed by: INTERNAL MEDICINE

## 2017-12-05 RX ORDER — HYDROCODONE BITARTRATE AND ACETAMINOPHEN 5; 325 MG/1; MG/1
1 TABLET ORAL EVERY 6 HOURS PRN
Qty: 40 TABLET | Refills: 0 | Status: SHIPPED | OUTPATIENT
Start: 2017-12-05 | End: 2018-05-10

## 2017-12-05 RX ORDER — LISINOPRIL 10 MG/1
10 TABLET ORAL DAILY
Qty: 90 TABLET | Refills: 3 | Status: SHIPPED | OUTPATIENT
Start: 2017-12-05 | End: 2017-12-21

## 2017-12-06 ENCOUNTER — TELEPHONE (OUTPATIENT)
Dept: CARDIOLOGY | Facility: CLINIC | Age: 72
End: 2017-12-06

## 2017-12-06 DIAGNOSIS — R60.0 LOCALIZED EDEMA: Primary | ICD-10-CM

## 2017-12-06 NOTE — TELEPHONE ENCOUNTER
LMM re: call to verify [facility]leen got these instructions re: increase in Lasix & K+ x 2days and have BMP at [facility] on Mon or Tues.  Also gave info on Coumadin dosing.  (2.5mg Friday and Tuesday 5mg AOD recheck in 1 week in [facility]).    FYI [facility] re: checking INR with you next week.

## 2017-12-06 NOTE — TELEPHONE ENCOUNTER
12/06/17  10:56 AM  Lana Yañez  1945    [facility] [phone]    Colleen calls with Ms. Yañez's INR today: PT 21.2 INR 1.8. She has been taking 5mg warfarin alternating 2.5mg. She took 5mg warfarin yesterday.    Also, he notes that she has some BLE edema. She reports feeling SOA with activity but not at rest. Her weight is up 1lb over the last week. She has no coughing or wheezing; her lungs are clear. /64, HR 84, O2 sat 97% room air. She takes 40mg lasix daily. Colleen wants to know if she should take an extra dose of lasix.     MACIE - Ms. Yañez is being discharged from home health today.    Monie SHAFFER RN

## 2017-12-06 NOTE — TELEPHONE ENCOUNTER
12/06/17  9:34 AM  Lana Yañez  1945    Home Phone [phone]     Ms. Yañez states the home health nurse has not been to see her in two weeks. They were supposed to see her yesterday and check her INR; she states she had an appt with Dr. Ramirez yesterday. She has not heard from home health.     She states she is not supposed to drive and likely will not have anyone that can drive her until Thursday.    She states she takes a whole tablet and then 1/2 tablet on alternating days.    Monie SHAFFER RN

## 2017-12-06 NOTE — TELEPHONE ENCOUNTER
12/06/17  2:57 PM  I called Ms. Yañez and instructed her to take an extra dose of lasix and potassium today and tomorrow/ RBV. She understands that she is supposed to have a BMP at the beginning of next week. I let her know that she will receive an additional call today regarding her warfarin dosing.    Monie SHAFFER RN

## 2017-12-12 ENCOUNTER — HOSPITAL ENCOUNTER (OUTPATIENT)
Dept: CARDIOLOGY | Facility: HOSPITAL | Age: 72
Setting detail: RECURRING SERIES
Discharge: HOME | End: 2017-12-12

## 2017-12-12 ENCOUNTER — TELEPHONE (OUTPATIENT)
Dept: CARDIOLOGY | Facility: CLINIC | Age: 72
End: 2017-12-12

## 2017-12-12 ENCOUNTER — LAB (OUTPATIENT)
Dept: LAB | Facility: HOSPITAL | Age: 72
End: 2017-12-12

## 2017-12-12 ENCOUNTER — TELEPHONE (OUTPATIENT)
Dept: INTERNAL MEDICINE | Facility: CLINIC | Age: 72
End: 2017-12-12

## 2017-12-12 DIAGNOSIS — R60.0 LOCALIZED EDEMA: ICD-10-CM

## 2017-12-12 LAB
ANION GAP SERPL CALCULATED.3IONS-SCNC: 12.3 MMOL/L
BUN BLD-MCNC: 17 MG/DL (ref 8–23)
BUN/CREAT SERPL: 18.1 (ref 7–25)
CALCIUM SPEC-SCNC: 9.1 MG/DL (ref 8.6–10.5)
CHLORIDE SERPL-SCNC: 102 MMOL/L (ref 98–107)
CO2 SERPL-SCNC: 27.7 MMOL/L (ref 22–29)
CREAT BLD-MCNC: 0.94 MG/DL (ref 0.57–1)
GFR SERPL CREATININE-BSD FRML MDRD: 71 ML/MIN/1.73
GLUCOSE BLD-MCNC: 144 MG/DL (ref 65–99)
POTASSIUM BLD-SCNC: 4 MMOL/L (ref 3.5–5.2)
SODIUM BLD-SCNC: 142 MMOL/L (ref 136–145)

## 2017-12-12 PROCEDURE — 36416 COLLJ CAPILLARY BLOOD SPEC: CPT

## 2017-12-12 PROCEDURE — 36415 COLL VENOUS BLD VENIPUNCTURE: CPT

## 2017-12-12 PROCEDURE — 85610 PROTHROMBIN TIME: CPT

## 2017-12-12 PROCEDURE — 80048 BASIC METABOLIC PNL TOTAL CA: CPT

## 2017-12-12 NOTE — TELEPHONE ENCOUNTER
pt was here 12/5 and was started lisinopril 10mg and she takes lasix 40 daily and she did doubled up on them,. she is having swelling in her feet, pt had heart attack last month, she was on losartan and you changed it to lisinopril due to cost because she lost her script and had to pay out of pocket. , she is willing to go back on losartan

## 2017-12-12 NOTE — TELEPHONE ENCOUNTER
FYI: Pt called to report that all these sx started when Dr Ramirez had changed her Losartan to Lisinopril a few weeks ago.  Her swelling did not go down with the increase in Lasix.  Asked pt to call Dr Ramirez TODAY and report these SE.

## 2017-12-13 NOTE — TELEPHONE ENCOUNTER
Dr Dueñas is out until tomorrow and I wanted to see if BNP needs to be ordered or should she await Dr Ramirez to call back.      See note date 12/617     BLE edema. She reports feeling SOA with activity but not at rest. Her weight is up 1lb over the last week. She has no coughing or wheezing; her lungs are clear.    Pt said this started after Dr Ramirez changed pt to Lisinopril from Losartan.  Pt said when the Lasix was increased no change in swelling.  So she stated that she thinks this is related to the change he made.  I did advise her to call Dr Ramirez.  No change in sx today.  She is waiting on his returned call.  Do we need to order anything else?     BP was 110/79.  Dr Ramirez changed to Lisinopril because cost was $32 vs Losartan was $5.  Pt said in 2018 the Lisinopril cost will go to $4 and she is ok with changing back.

## 2017-12-13 NOTE — TELEPHONE ENCOUNTER
She needs to be seen with either here or there and needs to have lab work completed. I am most interested in her kidney function more than I am her bnp.  I am not sure that she should have swelling with a change from ARB to ACE unless her bp is not controlled. It is hard for me to  her swelling if she is only 1 lb up. Venita has openings tomorrow or I would be happy to see her tomorrow if any spots open up.

## 2017-12-14 ENCOUNTER — OFFICE VISIT (OUTPATIENT)
Dept: CARDIOLOGY | Facility: CLINIC | Age: 72
End: 2017-12-14

## 2017-12-14 ENCOUNTER — HOSPITAL ENCOUNTER (OUTPATIENT)
Dept: CARDIOLOGY | Facility: HOSPITAL | Age: 72
Discharge: HOME | End: 2017-12-14
Admitting: NURSE PRACTITIONER

## 2017-12-14 VITALS
HEIGHT: 65 IN | SYSTOLIC BLOOD PRESSURE: 120 MMHG | BODY MASS INDEX: 44.52 KG/M2 | WEIGHT: 267.2 LBS | DIASTOLIC BLOOD PRESSURE: 80 MMHG | HEART RATE: 103 BPM

## 2017-12-14 DIAGNOSIS — I10 ESSENTIAL HYPERTENSION: ICD-10-CM

## 2017-12-14 DIAGNOSIS — I48.91 ATRIAL FIBRILLATION WITH RVR (HCC): ICD-10-CM

## 2017-12-14 DIAGNOSIS — I63.511 CEREBROVASCULAR ACCIDENT (CVA) DUE TO OCCLUSION OF RIGHT MIDDLE CEREBRAL ARTERY (HCC): ICD-10-CM

## 2017-12-14 DIAGNOSIS — I25.119 CORONARY ARTERY DISEASE INVOLVING NATIVE CORONARY ARTERY OF NATIVE HEART WITH ANGINA PECTORIS (HCC): ICD-10-CM

## 2017-12-14 DIAGNOSIS — I50.33 ACUTE ON CHRONIC DIASTOLIC HEART FAILURE (HCC): ICD-10-CM

## 2017-12-14 DIAGNOSIS — R06.02 SHORTNESS OF BREATH: Primary | ICD-10-CM

## 2017-12-14 DIAGNOSIS — R06.09 DOE (DYSPNEA ON EXERTION): Primary | ICD-10-CM

## 2017-12-14 DIAGNOSIS — R60.0 LOWER EXTREMITY EDEMA: ICD-10-CM

## 2017-12-14 PROBLEM — I48.0 PAF (PAROXYSMAL ATRIAL FIBRILLATION) (HCC): Status: RESOLVED | Noted: 2017-11-28 | Resolved: 2017-12-14

## 2017-12-14 PROBLEM — I63.9 ACUTE CVA (CEREBROVASCULAR ACCIDENT): Status: RESOLVED | Noted: 2017-10-15 | Resolved: 2017-12-14

## 2017-12-14 LAB — NT-PROBNP SERPL-MCNC: 1325 PG/ML (ref 0–900)

## 2017-12-14 PROCEDURE — 93000 ELECTROCARDIOGRAM COMPLETE: CPT | Performed by: NURSE PRACTITIONER

## 2017-12-14 PROCEDURE — 99214 OFFICE O/P EST MOD 30 MIN: CPT | Performed by: NURSE PRACTITIONER

## 2017-12-14 PROCEDURE — 36415 COLL VENOUS BLD VENIPUNCTURE: CPT

## 2017-12-14 PROCEDURE — 83880 ASSAY OF NATRIURETIC PEPTIDE: CPT | Performed by: NURSE PRACTITIONER

## 2017-12-14 PROCEDURE — 96376 TX/PRO/DX INJ SAME DRUG ADON: CPT

## 2017-12-14 PROCEDURE — 96374 THER/PROPH/DIAG INJ IV PUSH: CPT

## 2017-12-14 RX ORDER — BUMETANIDE 0.25 MG/ML
2 INJECTION INTRAMUSCULAR; INTRAVENOUS ONCE
Status: COMPLETED | OUTPATIENT
Start: 2017-12-14 | End: 2017-12-14

## 2017-12-14 RX ORDER — TRAZODONE HYDROCHLORIDE 50 MG/1
TABLET ORAL
Qty: 30 TABLET | Refills: 5 | Status: SHIPPED | OUTPATIENT
Start: 2017-12-14 | End: 2018-10-04

## 2017-12-14 RX ADMIN — BUMETANIDE 2 MG: 0.25 INJECTION INTRAMUSCULAR; INTRAVENOUS at 11:19

## 2017-12-14 NOTE — PATIENT INSTRUCTIONS
"  Wear holter monitor   Increase furosemide to 40 mg twice daily (or 80 mg in morning)  Low salt diet  Check morning weight daily and record - bring with you next visit  Follow up in one week with Vneita SPARKS       Low-Sodium Eating Plan  Sodium raises blood pressure and causes water to be held in the body. Getting less sodium from food will help lower your blood pressure, reduce any swelling, and protect your heart, liver, and kidneys. We get sodium by adding salt (sodium chloride) to food. Most of our sodium comes from canned, boxed, and frozen foods. Restaurant foods, fast foods, and pizza are also very high in sodium. Even if you take medicine to lower your blood pressure or to reduce fluid in your body, getting less sodium from your food is important.  WHAT IS MY PLAN?  Most people should limit their sodium intake to 2,300 mg a day. Your health care provider recommends that you limit your sodium intake to __________ a day.   WHAT DO I NEED TO KNOW ABOUT THIS EATING PLAN?  For the low-sodium eating plan, you will follow these general guidelines:  · Choose foods with a % Daily Value for sodium of less than 5% (as listed on the food label).    · Use salt-free seasonings or herbs instead of table salt or sea salt.    · Check with your health care provider or pharmacist before using salt substitutes.    · Eat fresh foods.  · Eat more vegetables and fruits.  · Limit canned vegetables. If you do use them, rinse them well to decrease the sodium.    · Limit cheese to 1 oz (28 g) per day.     · Eat lower-sodium products, often labeled as \"lower sodium\" or \"no salt added.\"  · Avoid foods that contain monosodium glutamate (MSG). MSG is sometimes added to Chinese food and some canned foods.    · Check food labels (Nutrition Facts labels) on foods to learn how much sodium is in one serving.  · Eat more home-cooked food and less restaurant, buffet, and fast food.   · When eating at a restaurant, ask that your food be prepared with " less salt, or no salt if possible.    HOW DO I READ FOOD LABELS FOR SODIUM INFORMATION?  The Nutrition Facts label lists the amount of sodium in one serving of the food. If you eat more than one serving, you must multiply the listed amount of sodium by the number of servings.  Food labels may also identify foods as:  · Sodium free--Less than 5 mg in a serving.  · Very low sodium--35 mg or less in a serving.  · Low sodium--140 mg or less in a serving.  · Light in sodium--50% less sodium in a serving. For example, if a food that usually has 300 mg of sodium is changed to become light in sodium, it will have 150 mg of sodium.  · Reduced sodium--25% less sodium in a serving. For example, if a food that usually has 400 mg of sodium is changed to reduced sodium, it will have 300 mg of sodium.  WHAT FOODS CAN I EAT?  Grains   Low-sodium cereals, including oats, puffed wheat and rice, and shredded wheat cereals. Low-sodium crackers. Unsalted rice and pasta. Lower-sodium bread.   Vegetables   Frozen or fresh vegetables. Low-sodium or reduced-sodium canned vegetables. Low-sodium or reduced-sodium tomato sauce and paste. Low-sodium or reduced-sodium tomato and vegetable juices.   Fruits   Fresh, frozen, and canned fruit. Fruit juice.   Meat and Other Protein Products   Low-sodium canned tuna and salmon. Fresh or frozen meat, poultry, seafood, and fish. Lamb. Unsalted nuts. Dried beans, peas, and lentils without added salt. Unsalted canned beans. Homemade soups without salt. Eggs.   Dairy   Milk. Soy milk. Ricotta cheese. Low-sodium or reduced-sodium cheeses. Yogurt.   Condiments   Fresh and dried herbs and spices. Salt-free seasonings. Onion and garlic powders. Low-sodium varieties of mustard and ketchup. Fresh or refrigerated horseradish. Lemon juice.   Fats and Oils    Reduced-sodium salad dressings. Unsalted butter.    Other   Unsalted popcorn and pretzels.   The items listed above may not be a complete list of recommended  foods or beverages. Contact your dietitian for more options.  WHAT FOODS ARE NOT RECOMMENDED?  Grains   Instant hot cereals. Bread stuffing, pancake, and biscuit mixes. Croutons. Seasoned rice or pasta mixes. Noodle soup cups. Boxed or frozen macaroni and cheese. Self-rising flour. Regular salted crackers.  Vegetables   Regular canned vegetables. Regular canned tomato sauce and paste. Regular tomato and vegetable juices. Frozen vegetables in sauces. Salted French fries. Olives. Pickles. Relishes. Sauerkraut. Salsa.  Meat and Other Protein Products   Salted, canned, smoked, spiced, or pickled meats, seafood, or fish. Camejo, ham, sausage, hot dogs, corned beef, chipped beef, and packaged luncheon meats. Salt pork. Jerky. Pickled herring. Anchovies, regular canned tuna, and sardines. Salted nuts.  Dairy   Processed cheese and cheese spreads. Cheese curds. Blue cheese and cottage cheese. Buttermilk.   Condiments   Onion and garlic salt, seasoned salt, table salt, and sea salt. Canned and packaged gravies. Worcestershire sauce. Tartar sauce. Barbecue sauce. Teriyaki sauce. Soy sauce, including reduced sodium. Steak sauce. Fish sauce. Oyster sauce. Cocktail sauce. Horseradish that you find on the shelf. Regular ketchup and mustard. Meat flavorings and tenderizers. Bouillon cubes. Hot sauce. Tabasco sauce. Marinades. Taco seasonings. Relishes.  Fats and Oils    Regular salad dressings. Salted butter. Margarine. Ghee. Camejo fat.   Other   Potato and tortilla chips. Corn chips and puffs. Salted popcorn and pretzels. Canned or dried soups. Pizza. Frozen entrees and pot pies.    The items listed above may not be a complete list of foods and beverages to avoid. Contact your dietitian for more information.     This information is not intended to replace advice given to you by your health care provider. Make sure you discuss any questions you have with your health care provider.     Document Released: 06/09/2003 Document Revised:  01/08/2016 Document Reviewed: 10/22/2014  Elsevier Interactive Patient Education ©2017 [employer]

## 2017-12-14 NOTE — PROGRESS NOTES
Date of Office Visit: 2017  Encounter Provider: ANGELA Foley  Place of Service: [facility] CARDIOLOGY  Patient Name: Lana Yañez  :1945    Chief Complaint   Patient presents with   • Shortness of Breath   • Edema   :     HPI: Lana Yañez is a 72 y.o. female who presents today for evaluation of chest pain, shortness of air, edema, fatigue, and dizziness.  She is a new patient to me and her previous records have been reviewed.  She has a history of hypertension, hyperlipidemia, atrial fibrillation, heart failure, stroke, CAD, previous myocardial infarction, sleep apnea, and COPD. She is an established patient of Dr. Pia Dueñas and was evaluated last in the office on 17.    In early October she was found to be in atrial fibrillation with rapid ventricular response and mild diastolic heart failure.  Echocardiogram revealed normal ejection fraction, mild left ventricular hypertrophy, moderate atrial enlargement, aortic valve sclerosis, moderate pulmonary hypertension with RVSP of 54 mmHg, and moderate tricuspid regurgitation.  She had a stress perfusion study that was negative for ischemia and CTA revealed a mildly dilated aorta without evidence of pulmonary emboli.  She presented a few several days later with an acute stroke despite being on Pradaxa and CLARIBEL was not pursued as it was felt to be embolic in nature.  On 10/19 she became diaphoretic and had a non-ST elevation myocardial infarction.  She then had a CLARIBEL which revealed normal systolic function and moderate mitral valve prolapse without significant regurgitation, and right sided spontaneous echo contrast without thrombus formation.  She then had a cardiac catheterization which revealed +2 mitral regurgitation, left main 10% stenosis, mid to distal LAD 10% diffuse stenosis, circumflex 10% diffuse stenosis, RCA 10% proximal stenosis, and distal PDA consistent with coronary embolus with a lesion  of 90% too small to consider coronary intervention.  Medical management was recommended.  She has continued to have persistent atrial fibrillation and is on warfarin and aspirin.  She was recommended to enroll in cardiac rehabilitation once cleared by neurology.    Mrs. Yañez contacted our office yesterday with a chief concern of worsening shortness of breath and lower extremity edema.  She's noticed the shortness of breath with both rest and exertion over the past week with accompanying with paroxysmal nocturnal dyspnea, orthopnea, and wheezing.  She has also had worsening lower extremity edema especially of the top of her feet.  According to her weight today she is up approximately 13 pounds since her visit with Dr. Dueñas on 11/28/17.  I asked her about palpitations and she said her chest just feels funny and she's been experiencing some sharp epigastric discomfort while sitting.  She continues to experience left-sided weakness from her stroke and fatigue.  She has been compliant with all of her medications.    The following portion of the patient's history were reviewed and updated as appropriate: past medical history, past surgical history, past social history, past family history, allergies, current medications, and problem list.    Past Medical History:   Diagnosis Date   • Arthritis    • Asthma    • Atrial fibrillation     Persistent; on warfarin   • Bronchitis    • Cellulitis of right elbow     due to MRSA   • CHF (congestive heart failure)    • COPD (chronic obstructive pulmonary disease)    • Coronary artery disease     Cardiac catheterization completed; 90% PDA stenosis with medical management recommended   • Cough    • Disease of thyroid gland    • Displacement of lumbar intervertebral disc without myelopathy    • ANTOINE (dyspnea on exertion)    • Dysuria    • Edema    • Essential hypertension    • Fatigue    • Generalized osteoarthritis of multiple sites    • Headache    • History of transfusion    • Hx  of bone density study     10/23/2014   • Hyperlipidemia    • Hypertension    • Hypovitaminosis D    • Low back pain    • Mitral valve disease     Moderate mitral valve prolapse and moderate mitral regurgitation   • Morbid obesity with BMI of 40.0-44.9, adult    • MRSA infection    • NSTEMI (non-ST elevated myocardial infarction)    • RA (rheumatoid arthritis)     involving both hands   • Sleep apnea    • Stroke     left side weakness   • Urge incontinence of urine    • Vitamin D deficiency        Past Surgical History:   Procedure Laterality Date   • BREAST SURGERY      right side lumpectomy with biopsy   • CARDIAC CATHETERIZATION Left 10/20/2017    Procedure: Cardiac Catheterization/Vascular Study;  Surgeon: Alphonso Olmedo MD;  Location:  [facility];  Service:    • CARDIAC CATHETERIZATION N/A 10/20/2017    Procedure: Left ventriculography;  Surgeon: Alphonso Olmedo MD;  Location:  [facility];  Service:    • EYE SURGERY      laser surgery for glaucoma and left eye cataracts removed   • HYSTERECTOMY      10+ years ago   • JOINT REPLACEMENT  2005; 2006    bilateral knees and left rotater   • KNEE SURGERY     • MAMMO BILATERAL  2016    [facility]      Social History   • Marital status:      Spouse name: Suresh   • Number of children: 5   • Years of education: N/A     Occupational History   •  Retired     Social History Main Topics   • Smoking status: Former Smoker     Packs/day: 3.00     Types: Cigarettes     Start date: 5/19/1967     Quit date: 10/19/1968   • Smokeless tobacco: Never Used   • Alcohol use No      Comment: No caffeine use   • Drug use: No   • Sexual activity: Defer       Family History   Problem Relation Age of Onset   • Colon cancer Mother    • Glaucoma Mother    • Stroke Mother    • Arthritis Mother    • Hypertension Mother    • Glaucoma Sister    • Diabetes Sister    • Heart disease Sister    • Arthritis Sister    • Asthma Sister    •  Hypertension Sister    • Miscarriages / Stillbirths Sister    • Lung disease Sister    • Heart disease Brother    • Diabetes Brother    • Arthritis Brother    • Drug abuse Brother    • Hypertension Brother    • Arthritis Father    • COPD Father    • Lung disease Father    • Arthritis Daughter    • Depression Daughter    • Alcohol abuse Maternal Uncle    • Heart disease Sister    • Heart disease Sister    • Heart disease Brother        Review of Systems   Constitution: Negative for chills, diaphoresis, fever, malaise/fatigue, night sweats, weight gain and weight loss.   HENT: Negative for hearing loss, nosebleeds, sore throat and tinnitus.    Eyes: Negative for blurred vision, double vision, pain and visual disturbance.   Cardiovascular: Positive for dyspnea on exertion and leg swelling. Negative for chest pain, claudication, cyanosis, irregular heartbeat, near-syncope, orthopnea, palpitations, paroxysmal nocturnal dyspnea and syncope.   Respiratory: Negative for cough, hemoptysis, snoring and wheezing.    Endocrine: Negative for cold intolerance, heat intolerance and polyuria.   Hematologic/Lymphatic: Negative for bleeding problem. Does not bruise/bleed easily.   Skin: Negative for color change, dry skin, flushing and itching.   Musculoskeletal: Negative for falls, joint pain, joint swelling, muscle cramps, muscle weakness and myalgias.   Gastrointestinal: Negative for abdominal pain, constipation, heartburn, melena, nausea and vomiting.   Genitourinary: Negative for dysuria and hematuria.   Neurological: Negative for excessive daytime sleepiness, dizziness, light-headedness, loss of balance, numbness, paresthesias, seizures and vertigo.   Psychiatric/Behavioral: Negative for altered mental status, depression, memory loss and substance abuse. The patient does not have insomnia and is not nervous/anxious.    Allergic/Immunologic: Negative for environmental allergies.       No Known Allergies      Current Outpatient  "Prescriptions:   •  aspirin 81 MG EC tablet, Take 1 tablet by mouth Daily., Disp: , Rfl:   •  atorvastatin (LIPITOR) 40 MG tablet, Take 1 tablet by mouth Every Night., Disp: 30 tablet, Rfl: 5  •  carvedilol (COREG) 25 MG tablet, Take 1 tablet by mouth 2 (Two) Times a Day., Disp: 60 tablet, Rfl: 3  •  cetirizine (ZyrTEC) 10 MG tablet, Take 10 mg by mouth Daily As Needed., Disp: , Rfl:   •  COSOPT PF 22.3-6.8 MG/ML solution, Administer 1 drop to both eyes 2 (Two) Times a Day., Disp: , Rfl:   •  furosemide (LASIX) 40 MG tablet, Take 1 tablet by mouth Daily for 30 days., Disp: 30 tablet, Rfl: 0  •  HYDROcodone-acetaminophen (NORCO) 5-325 MG per tablet, Take 1 tablet by mouth Every 6 (Six) Hours As Needed for Moderate Pain, Disp: 40 tablet, Rfl: 0  •  lisinopril (PRINIVIL,ZESTRIL) 10 MG tablet, Take 1 tablet (10 mg total) by mouth Daily, Disp: 90 tablet, Rfl: 3  •  potassium chloride (MICRO-K) 10 MEQ CR capsule, Take 2 capsules by mouth Daily for 30 days., Disp: 60 capsule, Rfl: 0  •  travoprost, BAK free, (TRAVATAN Z) 0.004 % solution ophthalmic solution, Administer 1 drop to both eyes Every Evening. in affected eye(s), Disp: , Rfl:   •  traZODone (DESYREL) 50 MG tablet, TAKE ONE TABLET BY MOUTH IN THE EVENING, Disp: 30 tablet, Rfl: 0  •  vitamin B-12 (VITAMIN B-12) 1000 MCG tablet, Take 1 tablet by mouth Daily., Disp: , Rfl:   •  warfarin (COUMADIN) 5 MG tablet, 5 mg daily at 6 pm or as directed by Dr Dueñas, Disp: 30 tablet, Rfl: 0  •  zolpidem (AMBIEN) 5 MG tablet, Bring to sleep lab DO NOT use at home, Disp: 2 tablet, Rfl: 0  No current facility-administered medications for this visit.     Facility-Administered Medications Ordered in Other Visits:   •  bumetanide (BUMEX) injection 2 mg, 2 mg, Intravenous, Once, Venita S Betsy, APRN     Objective:     Vitals:    12/14/17 1004   BP: 120/80   Pulse: 103   Weight: 121 kg (267 lb 3.2 oz)   Height: 165.1 cm (65\")     Body mass index is 44.46 kg/(m^2).    PHYSICAL " EXAM:    Vitals Reviewed.   General Appearance: No acute distress, well developed and well nourished. Obese.   Eyes: Conjunctiva and lids: No erythema, swelling, or discharge. Sclera non-icteric.   HENT: Atraumatic, normocephalic. External eyes, ears, and nose normal. No hearing loss noted. Mucous membranes normal. Lips not cyanotic. Neck supple with no tenderness.  Respiratory: No signs of respiratory distress. Respiration rhythm and depth normal.   Clear to auscultation. No rales, crackles, rhonchi, or wheezing auscultated.   Cardiovascular:  Jugular Venous Pressure: Elevated.   Heart Rate and Rhythm: Irregularly, irregular with RVR.  Heart Sounds: Normal S1 and S2. No S3 or S4 noted.  Murmurs: No murmurs noted. No rubs, thrills, or gallops.   Arterial Pulses: Carotid pulses normal. No carotid bruit noted. Posterior tibialis and dorsalis pedis pulses normal.   Lower Extremities: +2 bilateral lower extremity edema noted.  Gastrointestinal:  Abdomen soft, non-distended, non-tender. Normal bowel sounds. No hepatomegaly.   Musculoskeletal: Normal movement of extremities  Skin and Nails: General appearance normal. No pallor, cyanosis, diaphoresis. Skin temperature normal. No clubbing of fingernails.   Psychiatric: Patient alert and oriented to person, place, and time. Speech and behavior appropriate. Normal mood and affect.       ECG 12 Lead  Date/Time: 12/14/2017 10:00 AM  Performed by: FLORECITA WARREN  Authorized by: FLORECITA WARREN   Comparison: compared with previous ECG from 11/28/2017  Rhythm: atrial fibrillation  Rate: tachycardic  BPM: 103  Conduction: LAFB  ST Segments: ST segments normal  QRS axis: left  Q waves: II, III, aVF, V1, V2, V3, V4, V5 and V6  Clinical impression: abnormal ECG              Assessment:       Diagnosis Plan   1. Shortness of breath     2. Lower extremity edema     3. Acute on chronic diastolic heart failure     4. Atrial fibrillation with RVR  Holter Monitor - 24 Hour   5. Coronary  artery disease involving native coronary artery of native heart with angina pectoris     6. Cerebrovascular accident (CVA) due to occlusion of right middle cerebral artery     7. Essential hypertension            Plan:       1. Heart Failure  Assessment  • NYHA class IV - The patient is unable to carry on any physical activity without discomfort. There are symptoms of heart failure at rest.  • The patient was newly diagnosed with heart failure within the past 12 months  • ACE inhibitor prescribed  • Beta blocker prescribed  • Diuretics prescribed  • Left ventricular function is normal by qualitative assessment    Plan  • The patient has received heart failure education on the following topics: dietary sodium restriction, medication instructions, symptom management and weight monitoring  • The heart failure care plan was discussed with the patient today including: up-titrating HF medications and lifestyle modifications  • Bumex 2mg IVP x 1 and [facility]  Increase furosemide to 40 mg twice daily   Repeat BMP and follow up appointment next week   Reinstructed low salt diet    Subjective/Objective  • Physical exam findings positive for peripheral edema and elevated JVP.   • Physical exam findings negative for rales, S3 gallop, S4 gallop and hepatomegaly.  • - +2 Bilateral lower extremity edema      2. Atrial Fibrillation and Atrial Flutter  Assessment  • The patient has persistent atrial fibrillation  • The patient's CHADS2-VASc score is 7  • A [id number] score of 2 or more is considered a high risk for a thromboembolic event  • Warfarin prescribed    Plan  • Continue in atrial fibrillation with rate control  • Continue warfarin for antithrombotic therapy, bleeding issues discussed  • Continue beta blocker for rate control  • - Assess heart rate with 24 Holter Monitor    Subjective - Objective  • Heart rate 103 bpm      3. Coronary Artery Disease  Plan  • Lifestyle modifications discussed  include adhering to a heart healthy diet, maintenance of a healthy weight, medication compliance, regular exercise and regular monitoring of cholesterol and blood pressure    Subjective - Objective  • Current antiplatelet therapy includes aspirin 81 mg  • Reports vague chest pain     4. CVA: Still recovering and has left sided weakness.     5. Essential Hypertension: Blood pressure well controlled today.     As always, it has been a pleasure to participate in your patient's care.      Sincerely,         ANGELA Veloz

## 2017-12-17 NOTE — PROGRESS NOTES
Karl Yañez is a 72 y.o. female.   She is here today for hospital discharge follow-up for non-ST elevated myocardial infarction along with hypertension PAF lumbar disc herniation morbid obesity osteoarthritis multiple sites and control substance agreement signed today for hydrocodone for her low back pain and osteoarthritis  History of Present Illness   She is here today for hospital discharge follow-up for non-ST elevated MI for which she is gradually getting better but takes time  Hypertension well-controlled on current medications  PAF rate controlled and steady on current medications lumbar disc herniation requires medication for her back pain  Morbid obesity this will be her go down the road lose weight  Osteoarthritis multiple sites supportive meds for this as well including hydrocodone as NSAIDs have black box warning for CAD  Control substance agreement signed today for right:  Low back pain for which we will provide hydrocodone and no other problems  The following portions of the patient's history were reviewed and updated as appropriate: allergies, current medications, past family history, past medical history, past social history, past surgical history and problem list.    Review of Systems   Constitutional: Positive for fatigue.   Musculoskeletal: Positive for arthralgias and back pain.   All other systems reviewed and are negative.      Objective   Physical Exam   Constitutional: She is oriented to person, place, and time. She appears well-developed and well-nourished. She is cooperative.   HENT:   Head: Normocephalic and atraumatic.   Right Ear: Hearing, tympanic membrane, external ear and ear canal normal.   Left Ear: Hearing, tympanic membrane, external ear and ear canal normal.   Nose: Nose normal.   Mouth/Throat: Uvula is midline, oropharynx is clear and moist and mucous membranes are normal.   Eyes: Conjunctivae, EOM and lids are normal. Pupils are equal, round, and reactive to  light.   Neck: Phonation normal. Neck supple. Carotid bruit is not present.   Cardiovascular: Normal rate, regular rhythm and normal heart sounds.  Exam reveals no gallop and no friction rub.    No murmur heard.  Pulmonary/Chest: Effort normal and breath sounds normal. No respiratory distress.   Abdominal: Soft. Bowel sounds are normal. She exhibits no distension and no mass. There is no hepatosplenomegaly. There is no tenderness. There is no rebound and no guarding. No hernia.   Musculoskeletal: She exhibits tenderness (multiple sites). She exhibits no edema.        Lumbar back: She exhibits pain.   Neurological: She is alert and oriented to person, place, and time. Coordination and gait normal.   Skin: Skin is warm and dry.   Psychiatric: She has a normal mood and affect. Her speech is normal and behavior is normal. Judgment and thought content normal.   Nursing note and vitals reviewed.      Assessment/Plan   Diagnoses and all orders for this visit:    Hospital discharge follow-up    NSTEMI (non-ST elevated myocardial infarction)    Essential hypertension    Paroxysmal atrial fibrillation    Lumbar disc herniation    Morbid obesity with BMI of 40.0-44.9, adult    Generalized osteoarthritis of multiple sites    Controlled substance agreement signed    Other orders  -     HYDROcodone-acetaminophen (NORCO) 5-325 MG per tablet; Take 1 tablet by mouth Every 6 (Six) Hours As Needed for Moderate Pain  -     lisinopril (PRINIVIL,ZESTRIL) 10 MG tablet; Take 1 tablet (10 mg total) by mouth Daily      Hospital discharge follow-up for non-ST elevated MI vaginally getting better she will follow-up with cardiology aggressively  Non-ST elevated MI follow closely with cardiology no evidence of any problems at this time  Hypertension well-controlled on current medication at this time but we will add lisinopril for cardiac remodeling after her MI  PAF rate controlled and steady on chronic anticoagulation and rate control  drugs  Lumbar disc herniation supportive meds including hydrocodone  Morbid obesity weight loss with proper diet exercise medication as much as she can tolerate  Osteoarthritis monocytes supportive meds within reason bili but avoid NSAIDs  Control substance agreement signed today for hydrocodone

## 2017-12-18 ENCOUNTER — TELEPHONE (OUTPATIENT)
Dept: CARDIOLOGY | Facility: CLINIC | Age: 72
End: 2017-12-18

## 2017-12-18 RX ORDER — FUROSEMIDE 40 MG/1
TABLET ORAL
Qty: 30 TABLET | Refills: 0 | Status: SHIPPED | OUTPATIENT
Start: 2017-12-18 | End: 2017-12-26 | Stop reason: SDUPTHER

## 2017-12-18 NOTE — TELEPHONE ENCOUNTER
----- Message from ANGELA Tavares sent at 12/14/2017  9:30 PM EST -----  Follow up on holter placed 12/14

## 2017-12-18 NOTE — TELEPHONE ENCOUNTER
Holter Results:    Study Description  Monitor hooked-up on 12/14/2017 at 11:57 EST. The monitor was scanned on 12/15/2017. The patient was monitored for 1 days 23 hours and 59 minutes. Indications for this exam include palpitations and atrial fibrillation with RVR . Total beats: 046264. Average HR: 94. Min HR: 65. Max HR: 145.     Study Findings  Patient diary submitted.Leg pain, shortness of breath was reported during the monitoring period. Leg pain, shortness of breath Leg pain, shortness of breath had no correlations. No complications noted. The predominant rhythm noted during the testing period was atrial fibrillation. Premature ventricular contractions occured frequently. There were no episodes of ventricular tachycardia. No atrioventricular block noted.     Study Impressions  An abnormal monitor study. 1. Persistent atrial fibrillation, average heart rate 94 bpm (min 65 bpm to max of 145 bpm)  2. Frequent PVCs without complex ventricular ectopy  3. With complaints of dyspnea and leg cramps, no other arrhythmia was present

## 2017-12-21 ENCOUNTER — OFFICE VISIT (OUTPATIENT)
Dept: CARDIOLOGY | Facility: CLINIC | Age: 72
End: 2017-12-21

## 2017-12-21 ENCOUNTER — APPOINTMENT (OUTPATIENT)
Dept: LAB | Facility: HOSPITAL | Age: 72
End: 2017-12-21

## 2017-12-21 ENCOUNTER — TELEPHONE (OUTPATIENT)
Dept: CARDIOLOGY | Facility: HOSPITAL | Age: 72
End: 2017-12-21

## 2017-12-21 ENCOUNTER — HOSPITAL ENCOUNTER (OUTPATIENT)
Dept: CARDIOLOGY | Facility: HOSPITAL | Age: 72
Setting detail: RECURRING SERIES
Discharge: HOME | End: 2017-12-21

## 2017-12-21 VITALS
HEIGHT: 65 IN | BODY MASS INDEX: 43.39 KG/M2 | DIASTOLIC BLOOD PRESSURE: 82 MMHG | SYSTOLIC BLOOD PRESSURE: 130 MMHG | WEIGHT: 260.4 LBS | HEART RATE: 88 BPM

## 2017-12-21 DIAGNOSIS — I25.119 CORONARY ARTERY DISEASE INVOLVING NATIVE CORONARY ARTERY OF NATIVE HEART WITH ANGINA PECTORIS (HCC): ICD-10-CM

## 2017-12-21 DIAGNOSIS — I50.33 ACUTE ON CHRONIC DIASTOLIC HEART FAILURE (HCC): ICD-10-CM

## 2017-12-21 DIAGNOSIS — I48.0 PAROXYSMAL ATRIAL FIBRILLATION (HCC): ICD-10-CM

## 2017-12-21 DIAGNOSIS — I34.0 MITRAL VALVE INSUFFICIENCY, UNSPECIFIED ETIOLOGY: ICD-10-CM

## 2017-12-21 DIAGNOSIS — I10 ESSENTIAL HYPERTENSION: ICD-10-CM

## 2017-12-21 DIAGNOSIS — R60.0 LOWER EXTREMITY EDEMA: Primary | ICD-10-CM

## 2017-12-21 DIAGNOSIS — R06.02 SHORTNESS OF BREATH: ICD-10-CM

## 2017-12-21 LAB
ANION GAP SERPL CALCULATED.3IONS-SCNC: 10.3 MMOL/L
BUN BLD-MCNC: 19 MG/DL (ref 8–23)
BUN/CREAT SERPL: 20.4 (ref 7–25)
CALCIUM SPEC-SCNC: 9.4 MG/DL (ref 8.6–10.5)
CHLORIDE SERPL-SCNC: 103 MMOL/L (ref 98–107)
CO2 SERPL-SCNC: 29.7 MMOL/L (ref 22–29)
CREAT BLD-MCNC: 0.93 MG/DL (ref 0.57–1)
GFR SERPL CREATININE-BSD FRML MDRD: 72 ML/MIN/1.73
GLUCOSE BLD-MCNC: 165 MG/DL (ref 65–99)
POTASSIUM BLD-SCNC: 3.7 MMOL/L (ref 3.5–5.2)
SODIUM BLD-SCNC: 143 MMOL/L (ref 136–145)

## 2017-12-21 PROCEDURE — 36416 COLLJ CAPILLARY BLOOD SPEC: CPT

## 2017-12-21 PROCEDURE — 85610 PROTHROMBIN TIME: CPT

## 2017-12-21 PROCEDURE — 80048 BASIC METABOLIC PNL TOTAL CA: CPT | Performed by: NURSE PRACTITIONER

## 2017-12-21 PROCEDURE — 93000 ELECTROCARDIOGRAM COMPLETE: CPT | Performed by: NURSE PRACTITIONER

## 2017-12-21 PROCEDURE — 99214 OFFICE O/P EST MOD 30 MIN: CPT | Performed by: NURSE PRACTITIONER

## 2017-12-21 RX ORDER — POTASSIUM CHLORIDE 750 MG/1
10 TABLET, FILM COATED, EXTENDED RELEASE ORAL
COMMUNITY
End: 2017-12-27 | Stop reason: SDUPTHER

## 2017-12-21 RX ORDER — LOSARTAN POTASSIUM 25 MG/1
25 TABLET ORAL DAILY
COMMUNITY
Start: 2017-12-20 | End: 2018-01-31 | Stop reason: SDUPTHER

## 2017-12-21 NOTE — PROGRESS NOTES
Date of Office Visit: 2017  Encounter Provider: ANGELA Foley  Place of Service: [facility] CARDIOLOGY  Patient Name: Lana Yañez  :1945    Chief Complaint   Patient presents with   • Leg Swelling   • Atrial Fibrillation   :     HPI: Lana Yañez is a 72 y.o. female who presents today for who presents today for follow up shortness of air and lower extremity edema. She has a history of hypertension, hyperlipidemia, atrial fibrillation, heart failure, stroke, CAD, previous myocardial infarction, sleep apnea, and COPD. She is an established patient of Dr. Pia Dueñas.     In early October she was found to be in atrial fibrillation with rapid ventricular response and mild diastolic heart failure.  Echocardiogram revealed normal ejection fraction, mild left ventricular hypertrophy, moderate atrial enlargement, aortic valve sclerosis, moderate pulmonary hypertension with RVSP of 54 mmHg, and moderate tricuspid regurgitation.  She had a stress perfusion study that was negative for ischemia and CTA revealed a mildly dilated aorta without evidence of pulmonary emboli.  She presented a few several days later with an acute stroke despite being on Pradaxa and CLARIBEL was not pursued as it was felt to be embolic in nature.  On 10/19 she became diaphoretic and had a non-ST elevation myocardial infarction.  She then had a CLARIBEL which revealed normal systolic function and moderate mitral valve prolapse without significant regurgitation, and right sided spontaneous echo contrast without thrombus formation.  She then had a cardiac catheterization which revealed +2 mitral regurgitation, left main 10% stenosis, mid to distal LAD 10% diffuse stenosis, circumflex 10% diffuse stenosis, RCA 10% proximal stenosis, and distal PDA consistent with coronary embolus with a lesion of 90% too small to consider coronary intervention.  Medical management was recommended.  She has continued to  have persistent atrial fibrillation and is on warfarin and aspirin.  She was recommended to enroll in cardiac rehabilitation once cleared by neurology.     I saw her in the office on 12/14/17 with a chief concern of worsening shortness of breath and lower extremity edema.  She's noticed the shortness of breath with both rest and exertion over the past week with accompanying with paroxysmal nocturnal dyspnea, orthopnea, and wheezing.  She has also had worsening lower extremity edema especially of the top of her feet. Her weight was up approximately 13 pounds since her visit with Dr. Dueñas on 11/28/17. She was having palpitations, sharp epigastric discomfort while sitting, and continued left-sided weakness from her stroke and fatigue.  She received Bumex 2 mg IV push times one dose in our cardiac care Center and her pro-BNP level was elevated.  I increased her furosemide to 40 mg twice a day and instructed her on a low-sodium diet.  She also wore a 24-hour Holter monitor to assess for rate control with her atrial fibrillation.    With increase of the diuretic she is feeling better.  She says her shortness of breath and lower extremity edema have both improved.  According to our scales she is down 7 pounds from her last visit.  She denies any further chest pain, palpitations, paroxysmal nocturnal dyspnea, orthopnea, or syncope.  She does experience dizziness on occasion.  She has been experiencing some left arm pain and does not have full range of motion.    The following portion of the patient's history were reviewed and updated as appropriate: past medical history, past surgical history, past social history, past family history, allergies, current medications, and problem list.    Past Medical History:   Diagnosis Date   • Arthritis    • Asthma    • Atrial fibrillation     Persistent; on warfarin   • Bronchitis    • Cellulitis of right elbow     due to MRSA   • CHF (congestive heart failure)    • COPD (chronic  obstructive pulmonary disease)    • Coronary artery disease     Cardiac catheterization completed; 90% PDA stenosis with medical management recommended   • Disease of thyroid gland    • Displacement of lumbar intervertebral disc without myelopathy    • Essential hypertension    • Generalized osteoarthritis of multiple sites    • History of transfusion    • Hx of bone density study     10/23/2014   • Hyperlipidemia    • Hypovitaminosis D    • Low back pain    • Mitral valve disease     Moderate mitral valve prolapse and moderate mitral regurgitation   • Morbid obesity with BMI of 40.0-44.9, adult    • MRSA infection    • NSTEMI (non-ST elevated myocardial infarction)    • RA (rheumatoid arthritis)     involving both hands   • Sleep apnea    • Stroke     left side weakness   • Urge incontinence of urine    • Vitamin D deficiency        Past Surgical History:   Procedure Laterality Date   • BREAST SURGERY      right side lumpectomy with biopsy   • CARDIAC CATHETERIZATION Left 10/20/2017    Procedure: Cardiac Catheterization/Vascular Study;  Surgeon: Alphonso Olmedo MD;  Location: [facility] INVASIVE LOCATION;  Service:    • CARDIAC CATHETERIZATION N/A 10/20/2017    +2 mitral regurgitation, left main 10% stenosis, mid to distal LAD 10% diffuse stenosis, circumflex 10% diffuse stenosis, RCA 10% proximal stenosis, and distal PDA consistent with coronary embolus with a lesion of 90% too small to consider coronary intervention; medical management recommended   • EYE SURGERY      laser surgery for glaucoma and left eye cataracts removed   • HYSTERECTOMY      10+ years ago   • JOINT REPLACEMENT  2005; 2006    bilateral knees and left rotater   • KNEE SURGERY     • MAMMO BILATERAL  2016    [facility]      Social History   • Marital status:      Spouse name: Suresh   • Number of children: 5   • Years of education: N/A     Occupational History   •  Retired     Social History Main Topics   • Smoking  status: Former Smoker     Packs/day: 3.00     Types: Cigarettes     Start date: 5/19/1967     Quit date: 10/19/1968   • Smokeless tobacco: Never Used   • Alcohol use No      Comment: No caffeine use   • Drug use: No   • Sexual activity: Defer     • Not on file       Family History   Problem Relation Age of Onset   • Colon cancer Mother    • Glaucoma Mother    • Stroke Mother    • Arthritis Mother    • Hypertension Mother    • Glaucoma Sister    • Diabetes Sister    • Heart disease Sister    • Arthritis Sister    • Asthma Sister    • Hypertension Sister    • Miscarriages / Stillbirths Sister    • Lung disease Sister    • Heart disease Brother    • Diabetes Brother    • Arthritis Brother    • Drug abuse Brother    • Hypertension Brother    • Arthritis Father    • COPD Father    • Lung disease Father    • Arthritis Daughter    • Depression Daughter    • Alcohol abuse Maternal Uncle    • Heart disease Sister    • Heart disease Sister    • Heart disease Brother        Review of Systems   Constitution: Positive for malaise/fatigue. Negative for chills, diaphoresis, fever, night sweats, weight gain and weight loss.   HENT: Negative for hearing loss, nosebleeds, sore throat and tinnitus.    Eyes: Negative for blurred vision, double vision, pain and visual disturbance.   Cardiovascular: Positive for leg swelling. Negative for chest pain, claudication, cyanosis, dyspnea on exertion, irregular heartbeat, near-syncope, orthopnea, palpitations, paroxysmal nocturnal dyspnea and syncope.   Respiratory: Negative for cough, hemoptysis, shortness of breath, snoring and wheezing.    Endocrine: Negative for cold intolerance, heat intolerance and polyuria.   Hematologic/Lymphatic: Negative for bleeding problem. Does not bruise/bleed easily.   Skin: Negative for color change, dry skin, flushing and itching.   Musculoskeletal: Negative for falls, joint pain, joint swelling, muscle cramps, muscle weakness and myalgias.        Left arm pain    Gastrointestinal: Negative for abdominal pain, constipation, heartburn, melena, nausea and vomiting.   Genitourinary: Negative for dysuria and hematuria.   Neurological: Positive for dizziness. Negative for excessive daytime sleepiness, light-headedness, loss of balance, numbness, paresthesias, seizures and vertigo.   Psychiatric/Behavioral: Negative for altered mental status, depression, memory loss and substance abuse. The patient does not have insomnia and is not nervous/anxious.    Allergic/Immunologic: Negative for environmental allergies.       No Known Allergies      Current Outpatient Prescriptions:   •  aspirin 81 MG EC tablet, Take 1 tablet by mouth Daily., Disp: , Rfl:   •  atorvastatin (LIPITOR) 40 MG tablet, Take 1 tablet by mouth Every Night., Disp: 30 tablet, Rfl: 5  •  carvedilol (COREG) 25 MG tablet, Take 1 tablet by mouth 2 (Two) Times a Day., Disp: 60 tablet, Rfl: 3  •  cetirizine (ZyrTEC) 10 MG tablet, Take 10 mg by mouth Daily As Needed., Disp: , Rfl:   •  COSOPT PF 22.3-6.8 MG/ML solution, Administer 1 drop to both eyes 2 (Two) Times a Day., Disp: , Rfl:   •  furosemide (LASIX) 40 MG tablet, TAKE ONE TABLET BY MOUTH ONCE DAILY FOR 30 DAYS (Patient taking differently: TAKE TWO TABLETS BY MOUTH ONCE DAILY FOR 30 DAYS), Disp: 30 tablet, Rfl: 0  •  HYDROcodone-acetaminophen (NORCO) 5-325 MG per tablet, Take 1 tablet by mouth Every 6 (Six) Hours As Needed for Moderate Pain, Disp: 40 tablet, Rfl: 0  •  losartan (COZAAR) 25 MG tablet, Take 25 mg by mouth Daily., Disp: , Rfl:   •  potassium chloride (K-DUR) 10 MEQ CR tablet, Take 10 mEq by mouth. tAKE 4 PO DAILY, Disp: , Rfl:   •  travoprost, BAK free, (TRAVATAN Z) 0.004 % solution ophthalmic solution, Administer 1 drop to both eyes Every Evening. in affected eye(s), Disp: , Rfl:   •  traZODone (DESYREL) 50 MG tablet, TAKE ONE TABLET BY MOUTH IN THE EVENING, Disp: 30 tablet, Rfl: 5  •  vitamin B-12 (VITAMIN B-12) 1000 MCG tablet, Take 1 tablet by  "mouth Daily., Disp: , Rfl:   •  warfarin (COUMADIN) 5 MG tablet, 5 mg daily at 6 pm or as directed by Dr Dueñas, Disp: 30 tablet, Rfl: 0  •  zolpidem (AMBIEN) 5 MG tablet, Bring to sleep lab DO NOT use at home, Disp: 2 tablet, Rfl: 0     Objective:     Vitals:    12/21/17 1127   BP: 130/82   Pulse: 88   Weight: 118 kg (260 lb 6.4 oz)   Height: 165.1 cm (65\")     Body mass index is 43.33 kg/(m^2).    PHYSICAL EXAM:    Vitals Reviewed.   General Appearance: No acute distress, well developed and well nourished. Obese.  Eyes: Conjunctiva and lids: No erythema, swelling, or discharge. Sclera non-icteric.   HENT: Atraumatic, normocephalic. External eyes, ears, and nose normal. No hearing loss noted. Mucous membranes normal. Lips not cyanotic. Neck supple with no tenderness.  Respiratory: No signs of respiratory distress. Respiration rhythm and depth normal.   Clear to auscultation. No rales, crackles, rhonchi, or wheezing auscultated.   Cardiovascular:  Jugular Venous Pressure: Normal  Heart Rate and Rhythm: Irregularly, irregular.  Heart Sounds: Normal S1 and S2. No S3 or S4 noted.  Murmurs: No murmurs noted. No rubs, thrills, or gallops.   Arterial Pulses: Carotid pulses normal. No carotid bruit noted. Posterior tibialis and dorsalis pedis pulses normal.   Lower Extremities: +1 bilateral lower extremity edema noted.  Gastrointestinal:  Abdomen soft, non-distended, non-tender. Normal bowel sounds. No hepatomegaly.   Musculoskeletal: Normal movement of extremities  Skin and Nails: General appearance normal. No pallor, cyanosis, diaphoresis. Skin temperature normal. No clubbing of fingernails.   Psychiatric: Patient alert and oriented to person, place, and time. Speech and behavior appropriate. Normal mood and affect.       ECG 12 Lead  Date/Time: 12/21/2017 11:22 AM  Performed by: FLORECITA WARREN  Authorized by: FLORECITA WARREN   Comparison: compared with previous ECG from 12/14/2017  Similar to previous ECG  Rhythm: " atrial fibrillation  Ectopy: PVCs  Rate: normal  BPM: 88  Conduction: conduction normal  QRS axis: normal  Q waves: II, III, aVF, V1, V2 and V3  Clinical impression: abnormal ECG              Assessment:       Diagnosis Plan   1. Lower extremity edema  Basic Metabolic Panel   2. Shortness of breath     3. Acute on chronic diastolic heart failure     4. Paroxysmal atrial fibrillation     5. Coronary artery disease involving native coronary artery of native heart with angina pectoris     6. Mitral valve insufficiency, unspecified etiology     7. Essential hypertension            Plan:       1.  Lower Extremity edema and Shortness of Breath: She received Bumex IV push times one dose last week in the office and has increased her furosemide to 40 mg twice a day.  She feels that her symptoms of shortness of breath and edema have improved.  We discussed the possibility of further increasing her furosemide to 80 mg in the morning and 40 mg in the evening.  She wants to continue to monitor symptoms and perform daily weights.  At this time she wants to continue with furosemide 40 mg twice a day.  I've asked her to follow a low-sodium diet and given her instructions on how to do so.    2. Heart Failure  Assessment  • NYHA class IIIb   • The patient was newly diagnosed with heart failure within the past 12 months  • ARB inhibitor prescribed  • Beta blocker prescribed  • Diuretics prescribed  • Left ventricular function is normal by qualitative assessment   Plan  • The patient has received heart failure education on the following topics: dietary sodium restriction, medication instructions, symptom management and weight monitoring  • The heart failure care plan was discussed with the patient today including: up-titrating HF medications and lifestyle modifications  • Continue furosemide to 40 mg twice daily   Repeat BMP    Reinstructed low salt diet  I've asked her to monitor daily weights in the morning.  If she gains 23 pounds  overnight or 4-5 pounds a week to contact our office.     Subjective/Objective  • Physical exam findings positive for peripheral edema +1  • Physical exam findings negative for rales, S3 gallop, S4 gallop and hepatomegaly.  Weight down 7 pounds from last visit        2. Atrial Fibrillation and Atrial Flutter  Assessment  • The patient has persistent atrial fibrillation  • The patient's CHADS2-VASc score is 7  • A [id number] score of 2 or more is considered a high risk for a thromboembolic event  • Warfarin prescribed     Plan  • Continue in atrial fibrillation with rate control  • Continue warfarin for antithrombotic therapy, bleeding issues discussed  • Continue beta blocker for rate control  •  I discussed with her the Holter monitor results.  This showed persistent atrial fibrillation with a heart rate of 94 bpm, minimum 65 and maximum 145.  Frequent PVCs.  I discussed with Dr. Dueñas.  She is recommended the patient decrease her caffeine in which she does.  She is recommended a low-sodium diet and treatment of her sleep apnea which is scheduled in January.  We will continue with her current dose of beta blocker at this time.     Subjective - Objective  • Heart rate 88 bpm     3. Coronary Artery Disease  Plan  • Lifestyle modifications discussed include adhering to a heart healthy diet, maintenance of a healthy weight, medication compliance, regular exercise and regular monitoring of cholesterol and blood pressure   Subjective - Objective  • Current antiplatelet therapy includes aspirin 81 mg  • Reports vague chest pain      4.  Mitral Regurgitation: Continue to monitor.    5. CVA: Still recovering and has left sided weakness.      6. Essential Hypertension: Blood pressure well controlled today.    7.  Follow up: She'll see Dr. Deuñas in 4 weeks.    As always, it has been a pleasure to participate in your patient's care.      Sincerely,         ANGELA Veloz

## 2017-12-26 RX ORDER — FUROSEMIDE 40 MG/1
80 TABLET ORAL DAILY
Qty: 180 TABLET | Refills: 1 | Status: SHIPPED | OUTPATIENT
Start: 2017-12-26 | End: 2018-02-02 | Stop reason: DRUGHIGH

## 2017-12-26 RX ORDER — WARFARIN SODIUM 5 MG/1
TABLET ORAL
Qty: 90 TABLET | Refills: 0 | Status: SHIPPED | OUTPATIENT
Start: 2017-12-26 | End: 2018-03-25 | Stop reason: SDUPTHER

## 2017-12-26 RX ORDER — CARVEDILOL 25 MG/1
25 TABLET ORAL 2 TIMES DAILY WITH MEALS
Qty: 180 TABLET | Refills: 1 | Status: SHIPPED | OUTPATIENT
Start: 2017-12-26 | End: 2018-02-02 | Stop reason: DRUGHIGH

## 2017-12-26 RX ORDER — POTASSIUM CHLORIDE 750 MG/1
CAPSULE, EXTENDED RELEASE ORAL
Qty: 60 CAPSULE | Refills: 0 | Status: SHIPPED | OUTPATIENT
Start: 2017-12-26 | End: 2019-05-15

## 2017-12-26 RX ORDER — ATORVASTATIN CALCIUM 40 MG/1
40 TABLET, FILM COATED ORAL NIGHTLY
Qty: 90 TABLET | Refills: 1 | Status: SHIPPED | OUTPATIENT
Start: 2017-12-26 | End: 2018-01-31 | Stop reason: DRUGHIGH

## 2017-12-27 RX ORDER — POTASSIUM CHLORIDE 750 MG/1
TABLET, FILM COATED, EXTENDED RELEASE ORAL
Qty: 360 TABLET | Refills: 1 | Status: SHIPPED | OUTPATIENT
Start: 2017-12-27 | End: 2017-12-28 | Stop reason: SDUPTHER

## 2017-12-28 ENCOUNTER — OFFICE VISIT (OUTPATIENT)
Dept: INTERNAL MEDICINE | Facility: CLINIC | Age: 72
End: 2017-12-28

## 2017-12-28 VITALS
TEMPERATURE: 98.2 F | SYSTOLIC BLOOD PRESSURE: 121 MMHG | HEART RATE: 93 BPM | BODY MASS INDEX: 42.32 KG/M2 | DIASTOLIC BLOOD PRESSURE: 81 MMHG | OXYGEN SATURATION: 92 % | WEIGHT: 254 LBS | RESPIRATION RATE: 16 BRPM | HEIGHT: 65 IN

## 2017-12-28 DIAGNOSIS — M75.02 ADHESIVE CAPSULITIS OF LEFT SHOULDER: Primary | ICD-10-CM

## 2017-12-28 DIAGNOSIS — R30.0 DYSURIA: ICD-10-CM

## 2017-12-28 DIAGNOSIS — M54.9 CVA TENDERNESS: ICD-10-CM

## 2017-12-28 PROCEDURE — 99213 OFFICE O/P EST LOW 20 MIN: CPT | Performed by: INTERNAL MEDICINE

## 2017-12-28 RX ORDER — NITROFURANTOIN 25; 75 MG/1; MG/1
100 CAPSULE ORAL EVERY 12 HOURS SCHEDULED
Qty: 20 CAPSULE | Refills: 0 | OUTPATIENT
Start: 2017-12-28 | End: 2018-01-05

## 2018-01-03 ENCOUNTER — TELEPHONE (OUTPATIENT)
Dept: CARDIOLOGY | Facility: CLINIC | Age: 73
End: 2018-01-03

## 2018-01-03 NOTE — TELEPHONE ENCOUNTER
01/03/18  1:52 PM  Lana Yañez  1945    Home Phone [phone]     Ms. Yañez calls to see if she can be seen tomorrow since she is coming in for INR check. She states she has right sided chest pain when she takes a deep breath in. She states she started with a cough yesterday. She also has chest pain with coughing. Her cough is non-productive, but she feels like she has a film in her throat. She reports not having an appetite.     She denies fever or edema. She reports that her weight is stable. She takes 80mg lasix daily. She does not measure her HR or BP.     She is scheduled to see Dr. Dueñas on 1/19/18. Does she need to be seen tomorrow? Maliha Romero has an opening tomorrow.     I also advised that she should call her PCP. She states she was just seen by Dr. Ramirez on 12/28/17.    Monie SHAFFER RN

## 2018-01-04 ENCOUNTER — OFFICE VISIT (OUTPATIENT)
Dept: INTERNAL MEDICINE | Facility: CLINIC | Age: 73
End: 2018-01-04

## 2018-01-04 ENCOUNTER — HOSPITAL ENCOUNTER (OUTPATIENT)
Dept: CARDIOLOGY | Facility: HOSPITAL | Age: 73
Setting detail: RECURRING SERIES
Discharge: HOME | End: 2018-01-04

## 2018-01-04 ENCOUNTER — HOSPITAL ENCOUNTER (EMERGENCY)
Facility: HOSPITAL | Age: 73
Discharge: HOME | End: 2018-01-04
Attending: EMERGENCY MEDICINE | Admitting: EMERGENCY MEDICINE

## 2018-01-04 ENCOUNTER — APPOINTMENT (OUTPATIENT)
Dept: GENERAL RADIOLOGY | Facility: HOSPITAL | Age: 73
End: 2018-01-04

## 2018-01-04 ENCOUNTER — APPOINTMENT (OUTPATIENT)
Dept: CT IMAGING | Facility: HOSPITAL | Age: 73
End: 2018-01-04

## 2018-01-04 VITALS
WEIGHT: 250 LBS | HEIGHT: 65 IN | SYSTOLIC BLOOD PRESSURE: 119 MMHG | RESPIRATION RATE: 18 BRPM | OXYGEN SATURATION: 93 % | DIASTOLIC BLOOD PRESSURE: 86 MMHG | BODY MASS INDEX: 41.65 KG/M2 | HEART RATE: 107 BPM | TEMPERATURE: 99 F

## 2018-01-04 VITALS
HEART RATE: 99 BPM | OXYGEN SATURATION: 94 % | DIASTOLIC BLOOD PRESSURE: 80 MMHG | TEMPERATURE: 99 F | SYSTOLIC BLOOD PRESSURE: 120 MMHG | RESPIRATION RATE: 16 BRPM

## 2018-01-04 DIAGNOSIS — R91.1 NODULE OF LEFT LUNG: ICD-10-CM

## 2018-01-04 DIAGNOSIS — R05.9 COUGH: ICD-10-CM

## 2018-01-04 DIAGNOSIS — M94.0 COSTOCHONDRITIS, ACUTE: Primary | ICD-10-CM

## 2018-01-04 DIAGNOSIS — J18.9 PNEUMONIA OF RIGHT LOWER LOBE DUE TO INFECTIOUS ORGANISM: Primary | ICD-10-CM

## 2018-01-04 LAB
ALBUMIN SERPL-MCNC: 3.8 G/DL (ref 3.5–5.2)
ALBUMIN/GLOB SERPL: 0.8 G/DL
ALP SERPL-CCNC: 78 U/L (ref 39–117)
ALT SERPL W P-5'-P-CCNC: 18 U/L (ref 1–33)
ANION GAP SERPL CALCULATED.3IONS-SCNC: 10.8 MMOL/L
AST SERPL-CCNC: 16 U/L (ref 1–32)
BACTERIA UR QL AUTO: ABNORMAL /HPF
BASOPHILS # BLD AUTO: 0.02 10*3/MM3 (ref 0–0.2)
BASOPHILS NFR BLD AUTO: 0.1 % (ref 0–1.5)
BILIRUB SERPL-MCNC: 0.6 MG/DL (ref 0.1–1.2)
BILIRUB UR QL STRIP: NEGATIVE
BUN BLD-MCNC: 16 MG/DL (ref 8–23)
BUN/CREAT SERPL: 16.2 (ref 7–25)
CALCIUM SPEC-SCNC: 9.6 MG/DL (ref 8.6–10.5)
CHLORIDE SERPL-SCNC: 97 MMOL/L (ref 98–107)
CLARITY UR: ABNORMAL
CO2 SERPL-SCNC: 30.2 MMOL/L (ref 22–29)
COLOR UR: YELLOW
CREAT BLD-MCNC: 0.99 MG/DL (ref 0.57–1)
D DIMER PPP FEU-MCNC: 5.87 MCGFEU/ML (ref 0–0.49)
DEPRECATED RDW RBC AUTO: 48.1 FL (ref 37–54)
EOSINOPHIL # BLD AUTO: 0.17 10*3/MM3 (ref 0–0.7)
EOSINOPHIL NFR BLD AUTO: 1.2 % (ref 0.3–6.2)
ERYTHROCYTE [DISTWIDTH] IN BLOOD BY AUTOMATED COUNT: 14 % (ref 11.7–13)
GFR SERPL CREATININE-BSD FRML MDRD: 67 ML/MIN/1.73
GLOBULIN UR ELPH-MCNC: 4.5 GM/DL
GLUCOSE BLD-MCNC: 173 MG/DL (ref 65–99)
GLUCOSE UR STRIP-MCNC: NEGATIVE MG/DL
HCT VFR BLD AUTO: 38.3 % (ref 35.6–45.5)
HGB BLD-MCNC: 12.2 G/DL (ref 11.9–15.5)
HGB UR QL STRIP.AUTO: NEGATIVE
HOLD SPECIMEN: NORMAL
HOLD SPECIMEN: NORMAL
HYALINE CASTS UR QL AUTO: ABNORMAL /LPF
IMM GRANULOCYTES # BLD: 0.05 10*3/MM3 (ref 0–0.03)
IMM GRANULOCYTES NFR BLD: 0.3 % (ref 0–0.5)
INR PPP: 3.4 (ref 0.9–1.1)
KETONES UR QL STRIP: NEGATIVE
LEUKOCYTE ESTERASE UR QL STRIP.AUTO: ABNORMAL
LIPASE SERPL-CCNC: 20 U/L (ref 13–60)
LYMPHOCYTES # BLD AUTO: 2.87 10*3/MM3 (ref 0.9–4.8)
LYMPHOCYTES NFR BLD AUTO: 19.8 % (ref 19.6–45.3)
MCH RBC QN AUTO: 30 PG (ref 26.9–32)
MCHC RBC AUTO-ENTMCNC: 31.9 G/DL (ref 32.4–36.3)
MCV RBC AUTO: 94.1 FL (ref 80.5–98.2)
MONOCYTES # BLD AUTO: 1.52 10*3/MM3 (ref 0.2–1.2)
MONOCYTES NFR BLD AUTO: 10.5 % (ref 5–12)
NEUTROPHILS # BLD AUTO: 9.84 10*3/MM3 (ref 1.9–8.1)
NEUTROPHILS NFR BLD AUTO: 68.1 % (ref 42.7–76)
NITRITE UR QL STRIP: NEGATIVE
NT-PROBNP SERPL-MCNC: 1459 PG/ML (ref 0–900)
PH UR STRIP.AUTO: 6 [PH] (ref 5–8)
PLATELET # BLD AUTO: 259 10*3/MM3 (ref 140–500)
PMV BLD AUTO: 10.8 FL (ref 6–12)
POTASSIUM BLD-SCNC: 4.2 MMOL/L (ref 3.5–5.2)
PROT SERPL-MCNC: 8.3 G/DL (ref 6–8.5)
PROT UR QL STRIP: NEGATIVE
PROTHROMBIN TIME: 33.3 SECONDS (ref 11.7–14.2)
RBC # BLD AUTO: 4.07 10*6/MM3 (ref 3.9–5.2)
RBC # UR: ABNORMAL /HPF
REF LAB TEST METHOD: ABNORMAL
SODIUM BLD-SCNC: 138 MMOL/L (ref 136–145)
SP GR UR STRIP: 1.02 (ref 1–1.03)
SQUAMOUS #/AREA URNS HPF: ABNORMAL /HPF
TROPONIN T SERPL-MCNC: <0.01 NG/ML (ref 0–0.03)
UROBILINOGEN UR QL STRIP: ABNORMAL
WBC NRBC COR # BLD: 14.47 10*3/MM3 (ref 4.5–10.7)
WBC UR QL AUTO: ABNORMAL /HPF
WHOLE BLOOD HOLD SPECIMEN: NORMAL
WHOLE BLOOD HOLD SPECIMEN: NORMAL

## 2018-01-04 PROCEDURE — 25010000002 KETOROLAC TROMETHAMINE PER 15 MG: Performed by: EMERGENCY MEDICINE

## 2018-01-04 PROCEDURE — 85610 PROTHROMBIN TIME: CPT

## 2018-01-04 PROCEDURE — 96374 THER/PROPH/DIAG INJ IV PUSH: CPT

## 2018-01-04 PROCEDURE — 36416 COLLJ CAPILLARY BLOOD SPEC: CPT

## 2018-01-04 PROCEDURE — 93010 ELECTROCARDIOGRAM REPORT: CPT | Performed by: INTERNAL MEDICINE

## 2018-01-04 PROCEDURE — 71046 X-RAY EXAM CHEST 2 VIEWS: CPT

## 2018-01-04 PROCEDURE — 96375 TX/PRO/DX INJ NEW DRUG ADDON: CPT

## 2018-01-04 PROCEDURE — 36415 COLL VENOUS BLD VENIPUNCTURE: CPT

## 2018-01-04 PROCEDURE — 83880 ASSAY OF NATRIURETIC PEPTIDE: CPT | Performed by: EMERGENCY MEDICINE

## 2018-01-04 PROCEDURE — 83690 ASSAY OF LIPASE: CPT | Performed by: EMERGENCY MEDICINE

## 2018-01-04 PROCEDURE — 25010000002 DIPHENHYDRAMINE PER 50 MG: Performed by: EMERGENCY MEDICINE

## 2018-01-04 PROCEDURE — 99284 EMERGENCY DEPT VISIT MOD MDM: CPT

## 2018-01-04 PROCEDURE — 71275 CT ANGIOGRAPHY CHEST: CPT

## 2018-01-04 PROCEDURE — 99213 OFFICE O/P EST LOW 20 MIN: CPT | Performed by: NURSE PRACTITIONER

## 2018-01-04 PROCEDURE — 96361 HYDRATE IV INFUSION ADD-ON: CPT

## 2018-01-04 PROCEDURE — 85025 COMPLETE CBC W/AUTO DIFF WBC: CPT | Performed by: EMERGENCY MEDICINE

## 2018-01-04 PROCEDURE — 93005 ELECTROCARDIOGRAM TRACING: CPT | Performed by: EMERGENCY MEDICINE

## 2018-01-04 PROCEDURE — 0 IOPAMIDOL PER 1 ML: Performed by: EMERGENCY MEDICINE

## 2018-01-04 PROCEDURE — 80053 COMPREHEN METABOLIC PANEL: CPT | Performed by: EMERGENCY MEDICINE

## 2018-01-04 PROCEDURE — 84484 ASSAY OF TROPONIN QUANT: CPT | Performed by: EMERGENCY MEDICINE

## 2018-01-04 PROCEDURE — 85610 PROTHROMBIN TIME: CPT | Performed by: EMERGENCY MEDICINE

## 2018-01-04 PROCEDURE — 25010000002 METHYLPREDNISOLONE PER 125 MG: Performed by: EMERGENCY MEDICINE

## 2018-01-04 PROCEDURE — 85379 FIBRIN DEGRADATION QUANT: CPT | Performed by: EMERGENCY MEDICINE

## 2018-01-04 PROCEDURE — 25010000002 CEFTRIAXONE PER 250 MG: Performed by: EMERGENCY MEDICINE

## 2018-01-04 PROCEDURE — 81001 URINALYSIS AUTO W/SCOPE: CPT | Performed by: EMERGENCY MEDICINE

## 2018-01-04 RX ORDER — ASPIRIN 325 MG
325 TABLET ORAL ONCE
Status: DISCONTINUED | OUTPATIENT
Start: 2018-01-04 | End: 2018-01-04

## 2018-01-04 RX ORDER — SODIUM CHLORIDE 0.9 % (FLUSH) 0.9 %
10 SYRINGE (ML) INJECTION AS NEEDED
Status: DISCONTINUED | OUTPATIENT
Start: 2018-01-04 | End: 2018-01-04 | Stop reason: HOSPADM

## 2018-01-04 RX ORDER — DIPHENHYDRAMINE HYDROCHLORIDE 50 MG/ML
25 INJECTION INTRAMUSCULAR; INTRAVENOUS ONCE
Status: COMPLETED | OUTPATIENT
Start: 2018-01-04 | End: 2018-01-04

## 2018-01-04 RX ORDER — METHYLPREDNISOLONE SODIUM SUCCINATE 125 MG/2ML
125 INJECTION, POWDER, LYOPHILIZED, FOR SOLUTION INTRAMUSCULAR; INTRAVENOUS ONCE
Status: COMPLETED | OUTPATIENT
Start: 2018-01-04 | End: 2018-01-04

## 2018-01-04 RX ORDER — AZITHROMYCIN 250 MG/1
TABLET, FILM COATED ORAL
Qty: 6 TABLET | Refills: 0 | Status: SHIPPED | OUTPATIENT
Start: 2018-01-04 | End: 2018-01-19

## 2018-01-04 RX ORDER — KETOROLAC TROMETHAMINE 30 MG/ML
15 INJECTION, SOLUTION INTRAMUSCULAR; INTRAVENOUS ONCE
Status: COMPLETED | OUTPATIENT
Start: 2018-01-04 | End: 2018-01-04

## 2018-01-04 RX ADMIN — METHYLPREDNISOLONE SODIUM SUCCINATE 125 MG: 125 INJECTION, POWDER, FOR SOLUTION INTRAMUSCULAR; INTRAVENOUS at 19:46

## 2018-01-04 RX ADMIN — KETOROLAC TROMETHAMINE 15 MG: 30 INJECTION, SOLUTION INTRAMUSCULAR at 17:32

## 2018-01-04 RX ADMIN — CEFTRIAXONE 1 G: 1 INJECTION, POWDER, FOR SOLUTION INTRAMUSCULAR; INTRAVENOUS at 20:27

## 2018-01-04 RX ADMIN — SODIUM CHLORIDE 500 ML: 9 INJECTION, SOLUTION INTRAVENOUS at 17:32

## 2018-01-04 RX ADMIN — IOPAMIDOL 95 ML: 755 INJECTION, SOLUTION INTRAVENOUS at 19:27

## 2018-01-04 RX ADMIN — DIPHENHYDRAMINE HYDROCHLORIDE 25 MG: 50 INJECTION, SOLUTION INTRAMUSCULAR; INTRAVENOUS at 19:46

## 2018-01-04 NOTE — ED PROVIDER NOTES
" EMERGENCY DEPARTMENT ENCOUNTER    CHIEF COMPLAINT  Chief Complaint: CP  History given by: Pt  History limited by: Nothing  Room Number: 19/19  PMD: Raoul Ramirez MD      HPI:  Pt is a 72 y.o. female with a hx of CHF and COPD who presents complaining of R sided CP described as sharp radiating to her back worsened with deep breathing onset one day ago. She also complains of decreased appetite, SOA, and slight lower extremity swelling but denies nausea, vomiting, or any other symptoms at this time. Pt is on 80mg Lasix secondary to lower extremity swelling. Pt's family member reports the pt's INR was checked earlier today which was 4 at that time.    Duration:  One day  Onset: gradual  Timing: constant  Location: R side  Radiation: back  Quality: \"sharp\"  Intensity/Severity: moderate  Progression: unchanged  Associated Symptoms: decreased appetite, SOA, and slight lower extremity swelling  Aggravating Factors: none  Alleviating Factors: none  Previous Episodes: Pt has a hx of COPD and CHF.  Treatment before arrival: Pt received no treatment PTA.    PAST MEDICAL HISTORY  Active Ambulatory Problems     Diagnosis Date Noted   • Hyperlipidemia 03/21/2016   • Vitamin deficiency 03/21/2016   • Essential hypertension 03/21/2016   • Rheumatoid arthritis involving both hands 03/21/2016   • Fatigue 04/06/2016   • ANTOINE (dyspnea on exertion) 04/06/2016   • Dysuria 08/02/2016   • Urge incontinence of urine 08/02/2016   • Hypovitaminosis D 08/02/2016   • Sleep apnea 08/02/2016   • Encounter for screening colonoscopy 08/02/2016   • Bronchitis 08/12/2016   • Cough 08/12/2016   • Generalized osteoarthritis of multiple sites 12/15/2016   • Morbid obesity with BMI of 40.0-44.9, adult 12/15/2016   • Cellulitis of right elbow due to MRSA 06/12/2017   • Medicare annual wellness visit, initial 06/27/2017   • Hospital discharge follow-up 06/27/2017   • Displacement of lumbar intervertebral disc without myelopathy 10/11/2017   • Lumbar " disc herniation 10/11/2017   • Atrial fibrillation with RVR 10/11/2017   • History of MRSA infection 10/11/2017   • Left-sided weakness 10/15/2017   • Atrial fibrillation 10/15/2017   • NSTEMI (non-ST elevated myocardial infarction) 10/19/2017   • Left shoulder pain 10/20/2017   • Cerebrovascular accident (CVA) due to occlusion of right middle cerebral artery 10/30/2017   • Relative lymphocytosis 11/13/2017   • Nausea 11/28/2017   • Weakness 11/28/2017   • Controlled substance agreement signed 12/05/2017   • Coronary artery disease involving native coronary artery of native heart with angina pectoris 12/14/2017   • Shortness of breath 12/14/2017   • Lower extremity edema 12/14/2017   • Acute on chronic diastolic heart failure 12/14/2017   • Adhesive capsulitis of left shoulder 12/28/2017   • CVA tenderness 12/28/2017   • Costochondritis, acute 01/04/2018     Resolved Ambulatory Problems     Diagnosis Date Noted   • Acute CVA (cerebrovascular accident) 10/15/2017   • PAF (paroxysmal atrial fibrillation) 11/28/2017     Past Medical History:   Diagnosis Date   • Arthritis    • Asthma    • Atrial fibrillation    • Bronchitis    • Cellulitis of right elbow    • CHF (congestive heart failure)    • COPD (chronic obstructive pulmonary disease)    • Coronary artery disease    • Disease of thyroid gland    • Displacement of lumbar intervertebral disc without myelopathy    • Essential hypertension    • Generalized osteoarthritis of multiple sites    • History of transfusion    • Hx of bone density study    • Hyperlipidemia    • Hypovitaminosis D    • Low back pain    • Mitral valve disease    • Morbid obesity with BMI of 40.0-44.9, adult    • MRSA infection    • NSTEMI (non-ST elevated myocardial infarction)    • RA (rheumatoid arthritis)    • Sleep apnea    • Stroke    • Urge incontinence of urine    • Vitamin D deficiency        PAST SURGICAL HISTORY  Past Surgical History:   Procedure Laterality Date   • BREAST SURGERY       right side lumpectomy with biopsy   • CARDIAC CATHETERIZATION Left 10/20/2017    Procedure: Cardiac Catheterization/Vascular Study;  Surgeon: Alphonso Olmedo MD;  Location: [facility] INVASIVE LOCATION;  Service:    • CARDIAC CATHETERIZATION N/A 10/20/2017    +2 mitral regurgitation, left main 10% stenosis, mid to distal LAD 10% diffuse stenosis, circumflex 10% diffuse stenosis, RCA 10% proximal stenosis, and distal PDA consistent with coronary embolus with a lesion of 90% too small to consider coronary intervention; medical management recommended   • EYE SURGERY      laser surgery for glaucoma and left eye cataracts removed   • HYSTERECTOMY      10+ years ago   • JOINT REPLACEMENT  2005; 2006    bilateral knees and left rotater   • KNEE SURGERY     • MAMMO BILATERAL  2016    [facility]        FAMILY HISTORY  Family History   Problem Relation Age of Onset   • Colon cancer Mother    • Glaucoma Mother    • Stroke Mother    • Arthritis Mother    • Hypertension Mother    • Glaucoma Sister    • Diabetes Sister    • Heart disease Sister    • Arthritis Sister    • Asthma Sister    • Hypertension Sister    • Miscarriages / Stillbirths Sister    • Lung disease Sister    • Heart disease Brother    • Diabetes Brother    • Arthritis Brother    • Drug abuse Brother    • Hypertension Brother    • Arthritis Father    • COPD Father    • Lung disease Father    • Arthritis Daughter    • Depression Daughter    • Alcohol abuse Maternal Uncle    • Heart disease Sister    • Heart disease Sister    • Heart disease Brother        SOCIAL HISTORY  Social History     Social History   • Marital status:      Spouse name: Suresh   • Number of children: 5   • Years of education: N/A     Occupational History   •  Retired     Social History Main Topics   • Smoking status: Former Smoker     Packs/day: 3.00     Types: Cigarettes     Start date: 5/19/1967     Quit date: 10/19/1968   • Smokeless tobacco: Never Used   •  Alcohol use No      Comment: No caffeine use   • Drug use: No   • Sexual activity: Defer     Other Topics Concern   • Not on file     Social History Narrative   • No narrative on file       ALLERGIES  Contrast dye    REVIEW OF SYSTEMS  Review of Systems   Constitutional: Positive for appetite change (decreased). Negative for fever.   HENT: Negative for sore throat.    Eyes: Negative.    Respiratory: Positive for shortness of breath. Negative for cough.    Cardiovascular: Positive for chest pain (R sided radiating to back) and leg swelling (slight).   Gastrointestinal: Negative for abdominal pain, diarrhea and vomiting.   Genitourinary: Negative for dysuria.   Musculoskeletal: Negative for neck pain.   Skin: Negative for rash.   Allergic/Immunologic: Negative.    Neurological: Negative for weakness, numbness and headaches.   Hematological: Negative.    Psychiatric/Behavioral: Negative.    All other systems reviewed and are negative.      PHYSICAL EXAM  ED Triage Vitals   Temp Heart Rate Resp BP SpO2   01/04/18 1632 01/04/18 1632 01/04/18 1632 01/04/18 1632 01/04/18 1632   99 °F (37.2 °C) 107 18 129/66 95 %      Temp src Heart Rate Source Patient Position BP Location FiO2 (%)   -- -- -- -- --              Physical Exam   Constitutional: She is oriented to person, place, and time and well-developed, well-nourished, and in no distress. No distress.   HENT:   Head: Normocephalic and atraumatic.   Eyes: EOM are normal. Pupils are equal, round, and reactive to light.   Neck: Normal range of motion. Neck supple.   Cardiovascular: Normal rate and normal heart sounds.  A regularly irregular rhythm present.   Pulmonary/Chest: Effort normal and breath sounds normal. No respiratory distress.   Pt has pain to the anterior part of the lower R cheest wall with reproducible pain.   Abdominal: Soft. Bowel sounds are normal. There is tenderness in the right upper quadrant. There is no rebound, no guarding, no CVA tenderness and  negative Arizmendi's sign.   Musculoskeletal: Normal range of motion. She exhibits no edema.   Neurological: She is alert and oriented to person, place, and time. She has normal sensation and normal strength.   Skin: Skin is warm and dry. No rash noted.   Psychiatric: Mood and affect normal.   Nursing note and vitals reviewed.      LAB RESULTS  Lab Results (last 24 hours)     Procedure Component Value Units Date/Time    CBC & Differential [[id number]] Collected:  01/04/18 1648    Specimen:  Blood Updated:  01/04/18 1751    Narrative:       The following orders were created for panel order CBC & Differential.  Procedure                               Abnormality         Status                     ---------                               -----------         ------                     CBC Auto Differential[[id number]]        Abnormal            Final result                 Please view results for these tests on the individual orders.    Comprehensive Metabolic Panel [[id number]]  (Abnormal) Collected:  01/04/18 1648    Specimen:  Blood Updated:  01/04/18 1805     Glucose 173 (H) mg/dL      BUN 16 mg/dL      Creatinine 0.99 mg/dL      Sodium 138 mmol/L      Potassium 4.2 mmol/L      Chloride 97 (L) mmol/L      CO2 30.2 (H) mmol/L      Calcium 9.6 mg/dL      Total Protein 8.3 g/dL      Albumin 3.80 g/dL      ALT (SGPT) 18 U/L      AST (SGOT) 16 U/L      Alkaline Phosphatase 78 U/L      Total Bilirubin 0.6 mg/dL      eGFR  African Amer 67 mL/min/1.73      Globulin 4.5 gm/dL      A/G Ratio 0.8 g/dL      BUN/Creatinine Ratio 16.2     Anion Gap 10.8 mmol/L     Troponin [[id number]]  (Normal) Collected:  01/04/18 1648    Specimen:  Blood Updated:  01/04/18 1805     Troponin T <0.010 ng/mL     Narrative:       Troponin T Reference Ranges:  Less than 0.03 ng/mL:    Negative for AMI  0.03 to 0.09 ng/mL:      Indeterminant for AMI  Greater than 0.09 ng/mL: Positive for AMI    Protime-INR [[id number]]  (Abnormal) Collected:  01/04/18 1648     Specimen:  Blood Updated:  01/04/18 1822     Protime 33.3 (H) Seconds      INR 3.40 (H)    CBC Auto Differential [[id number]]  (Abnormal) Collected:  01/04/18 1648    Specimen:  Blood Updated:  01/04/18 1751     WBC 14.47 (H) 10*3/mm3      RBC 4.07 10*6/mm3      Hemoglobin 12.2 g/dL      Hematocrit 38.3 %      MCV 94.1 fL      MCH 30.0 pg      MCHC 31.9 (L) g/dL      RDW 14.0 (H) %      RDW-SD 48.1 fl      MPV 10.8 fL      Platelets 259 10*3/mm3      Neutrophil % 68.1 %      Lymphocyte % 19.8 %      Monocyte % 10.5 %      Eosinophil % 1.2 %      Basophil % 0.1 %      Immature Grans % 0.3 %      Neutrophils, Absolute 9.84 (H) 10*3/mm3      Lymphocytes, Absolute 2.87 10*3/mm3      Monocytes, Absolute 1.52 (H) 10*3/mm3      Eosinophils, Absolute 0.17 10*3/mm3      Basophils, Absolute 0.02 10*3/mm3      Immature Grans, Absolute 0.05 (H) 10*3/mm3     D-dimer, Quantitative [[id number]]  (Abnormal) Collected:  01/04/18 1648    Specimen:  Blood Updated:  01/04/18 1823     D-Dimer, Quantitative 5.87 (H) MCGFEU/mL     Narrative:       The Stago D-Dimer test used in conjunction with a clinical pretest probability (PTP) assessment model, has been approved by the FDA to rule out the presence of venous thromboembolism (VTE) in outpatients suspected of deep venous thrombosis (DVT) or pulmonary embolism (PE).     Lipase [[id number]]  (Normal) Collected:  01/04/18 1648    Specimen:  Blood Updated:  01/04/18 1805     Lipase 20 U/L     BNP [[id number]]  (Abnormal) Collected:  01/04/18 1648    Specimen:  Blood Updated:  01/04/18 1803     proBNP 1459.0 (H) pg/mL     Narrative:       Among patients with dyspnea, NT-proBNP is highly sensitive for the detection of acute congestive heart failure. In addition NT-proBNP of <300 pg/ml effectively rules out acute congestive heart failure with 99% negative predictive value.    Urinalysis With / Culture If Indicated - Urine, Catheter [[id number]]  (Abnormal) Collected:  01/04/18 4393    Specimen:   Urine from Urine, Catheter Updated:  01/04/18 1752     Color, UA Yellow     Appearance, UA Cloudy (A)     pH, UA 6.0     Specific Gravity, UA 1.018     Glucose, UA Negative     Ketones, UA Negative     Bilirubin, UA Negative     Blood, UA Negative     Protein, UA Negative     Leuk Esterase, UA Small (1+) (A)     Nitrite, UA Negative     Urobilinogen, UA 1.0 E.U./dL    Urinalysis, Microscopic Only - Urine, Clean Catch [[id number]]  (Abnormal) Collected:  01/04/18 1725    Specimen:  Urine from Urine, Catheter Updated:  01/04/18 1810     RBC, UA None Seen /HPF      WBC, UA 3-5 (A) /HPF      Bacteria, UA None Seen /HPF      Squamous Epithelial Cells, UA 0-2 /HPF      Hyaline Casts, UA None Seen /LPF      Methodology Manual Light Microscopy          I ordered the above labs and reviewed the results    RADIOLOGY  XR Chest 2 View   Final Result   PA AND LATERAL CHEST     CLINICAL HISTORY: Asthma. COPD. History of CHF. Hypertension.     Compared to the previous chest x-ray dated 10/19/2017.     The heart is moderately enlarged and has increased in size slightly  since the preceding chest x-ray. There is mild pulmonary vascular  congestion and also patchy mild interstitial thickening in both lungs  consistent with pulmonary interstitial edema. Tiny bilateral pleural  effusions are also present, and are new. No focal areas of consolidation  are identified.     IMPRESSIONS: Mild CHF.     This report was finalized on 1/4/2018 5:14 PM by Dr. Eusebio Cheek MD.     CT ANGIOGRAM CHEST W CONTRAST-     INDICATIONS: Pleuritic chest pain, elevated d-dimer, possible pulmonary  embolism  Radiation dose reduction techniques were utilized, including  automated exposure control and exposure modulation based on body size.     TECHNIQUE: CT ANGIOGRAPHY OF THE CHEST WITH THREE-DIMENSIONAL  RECONSTRUCTIONS. By report, the patient experienced pruritis following  contrast material administration, suggesting allergic reaction.     COMPARISON:  10/11/2017     FINDINGS:           No pulmonary embolism. Ascending aorta is dilated, 4.2 cm, not  significantly changed. Aorta is suboptimally evaluated owing to phase of  contrast enhancement.     The heart size is enlarged without pericardial effusion. A 1.2 cm short  axis subcarinal lymph node is mildly enlarged, nonspecific, could be  reactive in nature or potentially evidence of neoplasm, does not appear  significantly changed, clinical correlation and continued follow-up  suggested. A few small subcentimeter short axis mediastinal lymph nodes  are seen that are not significant by size criteria. Lipoma is apparent  at the anterior aspect of the lower left anterior chest wall.     The airways appear clear.     Mild right more than left pleural effusions.     The lungs show atelectasis in the lower lungs. Assessment of atelectatic  lung is limited, and follow-up is suggested.     On image 65 of series 3, soft tissue density appears to wrap partially  around left lower lobe branch pulmonary artery, measures 2.4 cm overall,  and appears increased, may represent enlarged lymph node or possibly a  neoplastic lesion, suggest further evaluation with positron emission  tomography, in addition to continued follow-up.           Upper abdominal structures appear unremarkable.     Degenerative changes are seen in the spine. No acute fracture is  identified.          Impression            1. No pulmonary embolism. Interval development of right more than left  pleural effusions and basilar atelectasis, follow-up recommended.  2. Question enlarged lymph node versus lesion at the left infrahilar  region, further evaluation/follow-up suggested, as described.     Discussed by telephone with Dr. Trammell at time of interpretation, 2005,  01/04/2018     This report was finalized on 1/4/2018 8:07 PM by Dr. Antelmo Corea MD.             I ordered the above noted radiological studies. Interpreted by radiologist. Reviewed by me in  PACS.       PROCEDURES  Procedures      PROGRESS AND CONSULTS  ED Course     1630 Ordered CBC, CMP, CXR, Troponin, and Protime-INR for further evaluation    1653 Ordered UA, Lipase, and D-Dimer for further evaluation. Ordered 15mg toradol for treatment of symptoms.    1727 Ordered BNP for further evaluation    1835 Ordered CTA Chest for further evaluation    1838 BP- 119/86 HR- 107 Temp- 99 °F (37.2 °C) O2 sat- 93%. Rechecked the patient who is in NAD and is resting comfortably. Informed pt and family of plan to order CT for further evaluation    1942 Ordered 25g bendaryl and 125mg prednisone for treatment of symptoms    1943 BP- 127/73 HR- 101 Temp- 99 °F (37.2 °C) O2 sat- 93%. Rechecked the patient who is in NAD and is resting comfortably. There are hives on R forehead and L upper chest. HENT is unremarkable. Informed pt of pending CT. Pt understands and agrees with the plan. All questions answered.     2017 Ordered rocephin for treatment of symptoms    2019 Rechecked pt who is resting and in no acute distress. Suggested pt not take her coumadin tonight and restart tomorrow. Informed pt of negative CT results which show NAD and lung nodule which needs follow up. Will discharge with zithromax. Will provide pulmonology referral. Pt understands and agrees with the plan. All questions answered.    MEDICAL DECISION MAKING  Results were reviewed/discussed with the patient and they were also made aware of online access. Pt also made aware that some labs, such as cultures, will not be resulted during ER visit and follow up with PMD is necessary.     MDM  Number of Diagnoses or Management Options  Nodule of left lung:   Pneumonia of right lower lobe due to infectious organism:      Amount and/or Complexity of Data Reviewed  Clinical lab tests: ordered and reviewed (WBC - 14.47, INR - 3.40, Troponin - <.010, D-Dimer - 5.87, BNP - 1459)  Tests in the radiology section of CPT®: reviewed and ordered (CXR shows mild CHF. CTA  Chest shows enlarged lymph node versus lesion at the left infrahilar region.)  Decide to obtain previous medical records or to obtain history from someone other than the patient: yes  Review and summarize past medical records: yes (Pt's last cath was 10/20/2017 and she had a 90% distal PDA at that time.)  Independent visualization of images, tracings, or specimens: yes           DIAGNOSIS  Final diagnoses:   Pneumonia of right lower lobe due to infectious organism   Nodule of left lung       DISPOSITION  DISCHARGE    Patient discharged in stable condition.    Reviewed implications of results, diagnosis, meds, responsibility to follow up, warning signs and symptoms of possible worsening, potential complications and reasons to return to ER, including new or worsening symptoms.    Patient/Family voiced understanding of above instructions.    Discussed plan for discharge, as there is no emergent indication for admission.  Pt/family is agreeable and understands need for follow up and repeat testing.  Pt is aware that discharge does not mean that nothing is wrong but it indicates no emergency is present that requires admission and they must continue care with follow-up as given below or physician of their choice.     FOLLOW-UP  Raoul Ramirez MD  [address] [facility] [address]  [place] [id number]  [phone]          Rangel Simmons MD  [address] [facility] [address]  [place] [address]  [phone]    Schedule an appointment as soon as possible for a visit           Medication List      New Prescriptions          azithromycin 250 MG tablet   Commonly known as:  ZITHROMAX   Take 2 tablets the first day, then 1 tablet daily for 4 days.         Stop          nitrofurantoin (macrocrystal-monohydrate) 100 MG capsule   Commonly known as:  MACROBID               Latest Documented Vital Signs:  As of 11:47 PM  BP- 119/86 HR- 107 Temp- 99 °F (37.2 °C) O2 sat- 93%    --  Documentation assistance provided by gómez Dunn  for Dr. Trammell.  Information recorded by the scribe was done at my direction and has been verified and validated by me.       Puja Dunn  01/04/18 4012       Parker Trammell MD  01/04/18 2781

## 2018-01-04 NOTE — PROGRESS NOTES
Subjective   Patient ID: Lana Yañez is a 72 y.o. female presents with   Chief Complaint   Patient presents with   • Chest Pain     Right side when taking a breath in   • Shortness of Breath   • Cough       HPI Comments: 71 yo BF  Cc: r sided pectoral pain x 24h. She has assoc night sweat and malaise. Denies nausea, vomitiing, fever but has mild NP cough x 2d. She has known coronary disease on warfarin. She has not taken any analgesic or NTG for relief.    Chest Pain    Associated symptoms include a cough and shortness of breath. Pertinent negatives include no abdominal pain, dizziness, fever, nausea, palpitations, vomiting or weakness.   Shortness of Breath   Associated symptoms include chest pain. Pertinent negatives include no abdominal pain, fever, leg swelling, rash, vomiting or wheezing.   Cough   Associated symptoms include chest pain and shortness of breath. Pertinent negatives include no chills, fever, myalgias, rash or wheezing.       No Known Allergies    The following portions of the patient's history were reviewed and updated as appropriate: allergies, current medications, past family history, past medical history, past social history, past surgical history and problem list.      Review of Systems   Constitutional: Negative for activity change, chills, fatigue, fever and unexpected weight change.   HENT: Negative for nosebleeds and trouble swallowing.    Eyes: Negative for pain and visual disturbance.   Respiratory: Positive for cough and shortness of breath. Negative for chest tightness and wheezing.    Cardiovascular: Positive for chest pain. Negative for palpitations and leg swelling.   Gastrointestinal: Negative for abdominal pain, blood in stool, diarrhea, nausea and vomiting.   Endocrine: Negative.    Genitourinary: Negative for difficulty urinating and hematuria.   Musculoskeletal: Negative for joint swelling, myalgias and neck stiffness.   Skin: Negative for color change and rash.    Allergic/Immunologic: Negative.    Neurological: Negative for dizziness, syncope, speech difficulty, weakness and light-headedness.   Hematological: Negative for adenopathy.   Psychiatric/Behavioral: Negative.    All other systems reviewed and are negative.      Objective     Vitals:    01/04/18 1553   BP: 120/80   Pulse: 99   Resp: 16   Temp: 99 °F (37.2 °C)   SpO2: 94%         Physical Exam   Constitutional: She is oriented to person, place, and time. She appears well-developed and well-nourished. She appears distressed (appears uncomfortable).   HENT:   Head: Normocephalic and atraumatic.   Eyes: Conjunctivae and EOM are normal. Pupils are equal, round, and reactive to light.   Neck: Normal range of motion. Neck supple.   Cardiovascular: Normal rate, regular rhythm, normal heart sounds and intact distal pulses.    No murmur heard.  Pulmonary/Chest: Breath sounds normal. She has no wheezes. She exhibits tenderness.   Abdominal: Soft. Bowel sounds are normal.   Musculoskeletal: Normal range of motion.   Chest wall tenderness R rosario pepctoral region with palpation and with deep inspiration   Neurological: She is alert and oriented to person, place, and time.   Skin: Skin is warm and dry. No bruising and no ecchymosis noted. No erythema.   Psychiatric: She has a normal mood and affect. Her behavior is normal. Judgment and thought content normal.   Nursing note and vitals reviewed.        Lana was seen today for chest pain, shortness of breath and cough.    Diagnoses and all orders for this visit:    Costochondritis, acute  -     Cancel: XR Chest PA & Lateral    Cough  -     Cancel: XR Chest PA & Lateral    Patient will be transported directly to [facility] via our staff by W/C. She is accompanied by her daughter today.    Follow-up with Primary Care Physician in 24-48 hours if these symptoms worsen or fail to improve as anticipated.

## 2018-01-05 ENCOUNTER — TELEPHONE (OUTPATIENT)
Dept: SOCIAL WORK | Facility: HOSPITAL | Age: 73
End: 2018-01-05

## 2018-01-05 NOTE — ED NOTES
Pt developed hives and itching after contrast dye for CTA of chest. MD notified and solumedrol and Iv benadryl given. Allergy added to history, VSS, pt feeling well.     Eduin Dixon RN  01/04/18 1955

## 2018-01-07 ENCOUNTER — TELEPHONE (OUTPATIENT)
Dept: CARDIOLOGY | Facility: CLINIC | Age: 73
End: 2018-01-07

## 2018-01-07 NOTE — TELEPHONE ENCOUNTER
Pt called concerned that she is now on antibiotics with her warfarin.  She was told to call our office and let us know that antibiotics were started. Her next scheduled INR check is 01/11/2018     Discussed with Dr Collins.  Pt will need to come in tomorrow or Tuesday to have an INR checked and dose will be adjusted pending that result.  Pt agreeable.

## 2018-01-08 ENCOUNTER — HOSPITAL ENCOUNTER (OUTPATIENT)
Dept: CARDIOLOGY | Facility: HOSPITAL | Age: 73
Setting detail: RECURRING SERIES
Discharge: HOME | End: 2018-01-08

## 2018-01-08 PROCEDURE — 85610 PROTHROMBIN TIME: CPT

## 2018-01-08 PROCEDURE — 36416 COLLJ CAPILLARY BLOOD SPEC: CPT

## 2018-01-14 NOTE — PROGRESS NOTES
Karl Yañez is a 72 y.o. female.   She is here today for adhesive capsulitis of left shoulder along with CVA tenderness and dysuria  History of Present Illness   She is here today for adhesive capsulitis of left shoulder with left shoulder pain along with CVA tenderness and dysuria as well so she possibly could have a urinary tract infection as well including pyelonephritis in the left shoulder pain is getting worse and limited range of motion as well and the left shoulder  The following portions of the patient's history were reviewed and updated as appropriate: allergies, current medications, past family history, past medical history, past social history, past surgical history and problem list.    Review of Systems   Genitourinary: Positive for dysuria and flank pain.   Musculoskeletal: Positive for arthralgias (left shoulder).   All other systems reviewed and are negative.      Objective   Physical Exam   Constitutional: She is oriented to person, place, and time. She appears well-developed and well-nourished. She is cooperative.   HENT:   Head: Normocephalic and atraumatic.   Right Ear: Hearing, tympanic membrane, external ear and ear canal normal.   Left Ear: Hearing, tympanic membrane, external ear and ear canal normal.   Nose: Nose normal.   Mouth/Throat: Uvula is midline, oropharynx is clear and moist and mucous membranes are normal.   Eyes: Conjunctivae, EOM and lids are normal. Pupils are equal, round, and reactive to light.   Neck: Phonation normal. Neck supple. Carotid bruit is not present.   Cardiovascular: Normal rate, regular rhythm and normal heart sounds.  Exam reveals no gallop and no friction rub.    No murmur heard.  Pulmonary/Chest: Effort normal and breath sounds normal. No respiratory distress.   Abdominal: Soft. Bowel sounds are normal. She exhibits no distension and no mass. There is no hepatosplenomegaly. There is tenderness (eft CVA). There is no rebound and no guarding.  No hernia.   Musculoskeletal: She exhibits no edema.        Left shoulder: She exhibits decreased range of motion, tenderness and pain.   Neurological: She is alert and oriented to person, place, and time. Coordination and gait normal.   Skin: Skin is warm and dry.   Psychiatric: She has a normal mood and affect. Her speech is normal and behavior is normal. Judgment and thought content normal.   Nursing note and vitals reviewed.      Assessment/Plan   Diagnoses and all orders for this visit:    Adhesive capsulitis of left shoulder  -     Ambulatory Referral to Orthopedic Surgery    CVA tenderness    Dysuria    Other orders  -     Discontinue: nitrofurantoin, macrocrystal-monohydrate, (MACROBID) 100 MG capsule; Take 1 capsule by mouth Every 12 (Twelve) Hours.        Adhesive capsulitis left/shoulder referral orthopedics surgery and go from there  CVA tenderness and dysuria these 2 problems we will treat with nitrofurantoin rather antibacterial medications as needed and check labs and go from there

## 2018-01-16 RX ORDER — LOSARTAN POTASSIUM 25 MG/1
TABLET ORAL
Qty: 30 TABLET | Refills: 0 | Status: SHIPPED | OUTPATIENT
Start: 2018-01-16 | End: 2018-02-02

## 2018-01-19 ENCOUNTER — HOSPITAL ENCOUNTER (OUTPATIENT)
Dept: CARDIOLOGY | Facility: HOSPITAL | Age: 73
Setting detail: RECURRING SERIES
Discharge: HOME | End: 2018-01-19

## 2018-01-19 ENCOUNTER — TELEPHONE (OUTPATIENT)
Dept: CARDIOLOGY | Facility: CLINIC | Age: 73
End: 2018-01-19

## 2018-01-19 ENCOUNTER — OFFICE VISIT (OUTPATIENT)
Dept: CARDIOLOGY | Facility: CLINIC | Age: 73
End: 2018-01-19

## 2018-01-19 VITALS
DIASTOLIC BLOOD PRESSURE: 76 MMHG | BODY MASS INDEX: 40.15 KG/M2 | WEIGHT: 241 LBS | HEART RATE: 105 BPM | SYSTOLIC BLOOD PRESSURE: 112 MMHG | HEIGHT: 65 IN

## 2018-01-19 DIAGNOSIS — I48.91 ATRIAL FIBRILLATION WITH RVR (HCC): Primary | ICD-10-CM

## 2018-01-19 DIAGNOSIS — I10 ESSENTIAL HYPERTENSION: ICD-10-CM

## 2018-01-19 DIAGNOSIS — I25.119 CORONARY ARTERY DISEASE INVOLVING NATIVE CORONARY ARTERY OF NATIVE HEART WITH ANGINA PECTORIS (HCC): ICD-10-CM

## 2018-01-19 PROCEDURE — 36416 COLLJ CAPILLARY BLOOD SPEC: CPT

## 2018-01-19 PROCEDURE — 85610 PROTHROMBIN TIME: CPT

## 2018-01-19 PROCEDURE — 99213 OFFICE O/P EST LOW 20 MIN: CPT | Performed by: INTERNAL MEDICINE

## 2018-01-19 PROCEDURE — 93000 ELECTROCARDIOGRAM COMPLETE: CPT | Performed by: INTERNAL MEDICINE

## 2018-01-19 NOTE — TELEPHONE ENCOUNTER
Pt had a sleep study last Monday but was rescheuled due to bad weather but nor setup til mid-March. Can you see if she can get in earlier(E lab)?. maura

## 2018-01-19 NOTE — TELEPHONE ENCOUNTER
I called and spoke with Jacque. Jacque stated that she offered the pt the soonest available which was March and the pt was the one that decided to push the appt out into April. Jacque stated that she would put the pt on the cancellation list.  ThanksSarah Beth

## 2018-01-20 PROBLEM — I63.511 CEREBROVASCULAR ACCIDENT (CVA) DUE TO OCCLUSION OF RIGHT MIDDLE CEREBRAL ARTERY: Status: RESOLVED | Noted: 2017-10-30 | Resolved: 2018-01-20

## 2018-01-20 PROBLEM — I21.4 NSTEMI (NON-ST ELEVATED MYOCARDIAL INFARCTION) (HCC): Status: RESOLVED | Noted: 2017-10-19 | Resolved: 2018-01-20

## 2018-01-20 NOTE — PROGRESS NOTES
Date of Office Visit: 2018  Encounter Provider: Pia Dueñas MD  Place of Service: [facility] CARDIOLOGY  Patient Name: Lana Yañez  :1945    Chief complaint  Follow up of embolic stroke, atrial fibrillation, and coronary artery disease.    History of Present Illness  The patient is a 73 yo female with with history of retention, hyperlipidemia, rheumatoid arthritis, obstructive sleep apnea who in early October was found to be in atrial fibrillation with rapid ventricular rates and mild diastolic heart failure.  She was placed on IV heparin and Pradaxa.  Echocardiogram revealed normal systolic function mild left ventricular hypertrophy, moderate atrial enlargement with aortic valve sclerosis and moderate pulmonary hypertension and moderate tricuspid regurgitation.  The RV systolic pressure is 54 mmHg.  She had a stress perfusion study that was negative for ischemia and a CT and her grandmother revealed a mildly dilated aorta without pulmonary emboli.  She then presented several days later with an acute stroke.  CLARIBEL was not pursued as it was felt to be embolic in nature.  However on  and she was diaphoretic and was found to have a non-ST elevation myocardial infarction.  This was on full dose Pradaxa which was not held.  CLARIBEL was pursued that revealed normal systolic function with moderate mitral valve prolapse without significant regurgitation.  There was right-sided spontaneous echo contrast without thrombus formation.  Cardiac catheterization revealed normal systolic function with 2+ mitral regurgitation 90% stenosis was noted distally which is felt to be too small to be amenable PCI.  She was treated medically and switched to Coumadin.She was seen in 2017 by my nurse practitioner for edema. This occurred in the setting of dietary indiscretions with salt. She was also dyspneic at the time. Bumex and IV was given in the office and Lasix was increased.  Low-salt diet was again recommended. She also had a 24-hour Holter that revealed persistent atrial fibrillation with reasonable rate control and frequent PVCs. No other arrhythmia was present. She then was treated more recently for atypical chest pain and cough with pneumonia. She also noted to have a nodule in the left lung. She now presents for followup.    In the interim she feels much improved. She denies any chest pain, palpitations, syncope or near syncope or edema. On a rare occasion when she stands suddenly she has mild lightheadedness. She is to have sleep apnea testing on Monday. She is to see Dr. Simmons for pulmonary nodule.    Past Medical History:   Diagnosis Date   • Acute on chronic diastolic heart failure    • Arthritis    • Asthma    • Atrial fibrillation     Persistent; on warfarin   • Bronchitis    • Cellulitis of right elbow     due to MRSA   • CHF (congestive heart failure)    • COPD (chronic obstructive pulmonary disease)    • Coronary artery disease     Cardiac catheterization completed; 90% PDA stenosis with medical management recommended   • Coronary artery disease involving native coronary artery of native heart with angina pectoris with documented spasm    • Disease of thyroid gland    • Displacement of lumbar intervertebral disc without myelopathy    • Dizziness    • Essential hypertension    • Generalized osteoarthritis of multiple sites    • History of rheumatic fever    • History of transfusion    • Hx of bone density study     10/23/2014   • Hyperlipidemia    • Hypovitaminosis D    • Left arm pain    • Leg swelling    • Low back pain    • Lower extremity edema    • Malaise and fatigue    • Mitral valve disease     Moderate mitral valve prolapse and moderate mitral regurgitation   • Mitral valve insufficiency    • Morbid obesity with BMI of 40.0-44.9, adult    • MRSA infection    • NSTEMI (non-ST elevated myocardial infarction)    • PAF (paroxysmal atrial fibrillation)    • RA  (rheumatoid arthritis)     involving both hands   • Sleep apnea    • SOB (shortness of breath)    • Stroke     left side weakness   • Urge incontinence of urine    • Vitamin D deficiency      Past Surgical History:   Procedure Laterality Date   • BREAST SURGERY      right side lumpectomy with biopsy   • CARDIAC CATHETERIZATION Left 10/20/2017    Procedure: Cardiac Catheterization/Vascular Study;  Surgeon: Alphonso Olmedo MD;  Location: [facility] CATH INVASIVE LOC[facility];  Service:    • CARDIAC CATHETERIZATION N/A 10/20/2017    +2 mitral regurgitation, left main 10% stenosis, mid to distal LAD 10% diffuse stenosis, circumflex 10% diffuse stenosis, RCA 10% proximal stenosis, and distal PDA consistent with coronary embolus with a lesion of 90% too small to consider coronary intervention; medical management recommended   • EYE SURGERY      laser surgery for glaucoma and left eye cataracts removed   • HYSTERECTOMY      10+ years ago   • JOINT REPLACEMENT  2005; 2006    bilateral knees and left rotater   • KNEE SURGERY     • MAMMO BILATERAL  2016    [facility]      Outpatient Medications Prior to Visit   Medication Sig Dispense Refill   • aspirin 81 MG EC tablet Take 1 tablet by mouth Daily.     • atorvastatin (LIPITOR) 40 MG tablet Take 1 tablet by mouth Every Night. 90 tablet 1   • carvedilol (COREG) 25 MG tablet Take 1 tablet by mouth 2 (Two) Times a Day With Meals. 180 tablet 1   • cetirizine (ZyrTEC) 10 MG tablet Take 10 mg by mouth Daily As Needed.     • COSOPT PF 22.3-6.8 MG/ML solution Administer 1 drop to both eyes 2 (Two) Times a Day As Needed.     • furosemide (LASIX) 40 MG tablet Take 2 tablets by mouth Daily. 180 tablet 1   • HYDROcodone-acetaminophen (NORCO) 5-325 MG per tablet Take 1 tablet by mouth Every 6 (Six) Hours As Needed for Moderate Pain 40 tablet 0   • losartan (COZAAR) 25 MG tablet Take 25 mg by mouth Daily.     • losartan (COZAAR) 25 MG tablet TAKE ONE TABLET BY MOUTH ONCE DAILY 30 tablet 0   •  potassium chloride (MICRO-K) 10 MEQ CR capsule TAKE TWO CAPSULES BY MOUTH ONCE DAILY FOR 30 DAYS 60 capsule 0   • travoprost, BAK free, (TRAVATAN Z) 0.004 % solution ophthalmic solution Administer 1 drop to both eyes At Night As Needed. in affected eye(s)      • traZODone (DESYREL) 50 MG tablet TAKE ONE TABLET BY MOUTH IN THE EVENING (Patient taking differently: TAKE ONE TABLET BY MOUTH IN THE EVENING PRN) 30 tablet 5   • vitamin B-12 (VITAMIN B-12) 1000 MCG tablet Take 1 tablet by mouth Daily.     • warfarin (COUMADIN) 5 MG tablet 5 mg daily at 6 pm or as directed by Dr Dueñas 90 tablet 0   • azithromycin (ZITHROMAX) 250 MG tablet Take 2 tablets the first day, then 1 tablet daily for 4 days. 6 tablet 0   • zolpidem (AMBIEN) 5 MG tablet Bring to sleep lab DO NOT use at home 2 tablet 0     No facility-administered medications prior to visit.      Allergies as of 01/19/2018 - Rangel as Reviewed 01/19/2018   Allergen Reaction Noted   • Contrast dye Hives and Itching 01/04/2018     Social History     Social History   • Marital status:      Spouse name: Suresh   • Number of children: 3   • Years of education: N/A     Occupational History   •  Retired     Social History Main Topics   • Smoking status: Former Smoker     Packs/day: 3.00     Types: Cigarettes     Start date: 5/19/1967     Quit date: 10/19/1968   • Smokeless tobacco: Never Used   • Alcohol use No      Comment: No caffeine use   • Drug use: No   • Sexual activity: Defer     Other Topics Concern   • Not on file     Social History Narrative     Family History   Problem Relation Age of Onset   • Colon cancer Mother    • Glaucoma Mother    • Stroke Mother    • Arthritis Mother    • Hypertension Mother    • Glaucoma Sister    • Diabetes Sister    • Heart disease Sister    • Arthritis Sister    • Asthma Sister    • Hypertension Sister    • Miscarriages / Stillbirths Sister    • Lung disease Sister    • Heart disease Brother    • Diabetes Brother   "  • Arthritis Brother    • Drug abuse Brother    • Hypertension Brother    • Arthritis Father    • COPD Father    • Lung disease Father    • Arthritis Daughter    • Depression Daughter    • Alcohol abuse Maternal Uncle    • Heart disease Sister    • Heart disease Sister    • Heart disease Brother      Review of Systems   Constitution: Negative for fever, malaise/fatigue, weight gain and weight loss.   HENT: Negative for ear pain, hearing loss, nosebleeds and sore throat.    Eyes: Negative for double vision, pain, vision loss in left eye and vision loss in right eye.   Cardiovascular:        See history of present illness.   Respiratory: Negative for cough, shortness of breath, sleep disturbances due to breathing, snoring and wheezing.    Endocrine: Negative for cold intolerance, heat intolerance and polyuria.   Skin: Negative for itching, poor wound healing and rash.   Musculoskeletal: Positive for joint pain. Negative for joint swelling and myalgias.   Gastrointestinal: Negative for abdominal pain, diarrhea, hematochezia, nausea and vomiting.   Genitourinary: Negative for hematuria and hesitancy.   Neurological: Negative for numbness, paresthesias and seizures.   Psychiatric/Behavioral: Negative for depression. The patient is not nervous/anxious.      Objective:     Vitals:    01/19/18 1250   BP: 112/76   BP Location: Left arm   Pulse: 105   Weight: 109 kg (241 lb)   Height: 165.1 cm (65\")     Body mass index is 40.1 kg/(m^2).    Physical Exam   Constitutional: She is oriented to person, place, and time. She appears well-developed and well-nourished.   Obese   HENT:   Head: Normocephalic.   Nose: Nose normal.   Mouth/Throat: Oropharynx is clear and moist.   Eyes: Conjunctivae and EOM are normal. Pupils are equal, round, and reactive to light. Right eye exhibits no discharge. No scleral icterus.   Neck: Normal range of motion. Neck supple. No JVD present. No thyromegaly present.   Cardiovascular: Normal rate, normal " heart sounds and intact distal pulses.  An irregularly irregular rhythm present. Exam reveals no gallop and no friction rub.    No murmur heard.  Pulses:       Carotid pulses are 2+ on the right side, and 2+ on the left side.       Radial pulses are 2+ on the right side, and 2+ on the left side.        Femoral pulses are 2+ on the right side, and 2+ on the left side.       Popliteal pulses are 2+ on the right side, and 2+ on the left side.        Dorsalis pedis pulses are 2+ on the right side, and 2+ on the left side.        Posterior tibial pulses are 2+ on the right side, and 2+ on the left side.   Pulmonary/Chest: Effort normal and breath sounds normal. No respiratory distress. She has no wheezes. She has no rales.   Abdominal: Soft. Bowel sounds are normal. She exhibits no distension. There is no hepatosplenomegaly. There is no tenderness. There is no rebound.   Musculoskeletal: Normal range of motion. She exhibits no edema or tenderness.   Neurological: She is alert and oriented to person, place, and time.   Skin: Skin is warm and dry. No rash noted. No erythema.   Psychiatric: She has a normal mood and affect. Her behavior is normal. Judgment and thought content normal.   Vitals reviewed.    Lab Review:     ECG 12 Lead  Date/Time: 1/19/2018 10:32 PM  Performed by: MARIANA SALGUERO  Authorized by: MARIANA SALGUERO   Comparison: compared with previous ECG   Rhythm: atrial fibrillation  Ectopy: PVCs  Conduction comments: Nonspecific ST T wave changes  Other findings: PRWP  Clinical impression: abnormal ECG          Assessment:       Diagnosis Plan   1. Atrial fibrillation with RVR     2. Essential hypertension     3. Coronary artery disease involving native coronary artery of native heart with angina pectoris       Plan:       1. Persistent atrial fibrillation. Rates elevated in office but lower at home. Tolerating  warfarin and asprin  2. Fatigue, likely multifactorial. To get sleep study soon.   3. S/p embolic NSTEMI.  Will proceed with cardiac rehab  4. S/p smbolic stroke, as above  5.  Hypertension, controlled  6.  FRANCY, untreated  7.  Hyperlipidemia  8.  Warfarin anticoagulation  9.  Pulmonary nodule, followed by Dr Simmons and to have f/p CT in 6 months.     Atrial Fibrillation and Atrial Flutter  Assessment  • The patient has persistent atrial fibrillation  • This is non-valvular in etiology  • The patient's CHADS2-VASc score is 7  • A [id number] score of 2 or more is considered a high risk for a thromboembolic event  • Warfarin prescribed  • Dabigatran not prescribed  • Apixaban not prescribed  • Aspirin prescribed    Plan  • Continue in atrial fibrillation with rate control  • Continue aspirin and warfarin for antithrombotic therapy, bleeding issues discussed  • Continue beta blocker for rate control    Coronary Artery Disease  Assessment  • The patient has no angina    Subjective - Objective  • There is a history of past MI  • Current antiplatelet therapy includes aspirin 81 mg             [place], Lana   Home Medication Instructions DG:    Printed on:01/20/18 0596   Medication Information                      aspirin 81 MG EC tablet  Take 1 tablet by mouth Daily.             atorvastatin (LIPITOR) 40 MG tablet  Take 1 tablet by mouth Every Night.             carvedilol (COREG) 25 MG tablet  Take 1 tablet by mouth 2 (Two) Times a Day With Meals.             cetirizine (ZyrTEC) 10 MG tablet  Take 10 mg by mouth Daily As Needed.             COSOPT PF 22.3-6.8 MG/ML solution  Administer 1 drop to both eyes 2 (Two) Times a Day As Needed.             furosemide (LASIX) 40 MG tablet  Take 2 tablets by mouth Daily.             HYDROcodone-acetaminophen (NORCO) 5-325 MG per tablet  Take 1 tablet by mouth Every 6 (Six) Hours As Needed for Moderate Pain             losartan (COZAAR) 25 MG tablet  Take 25 mg by mouth Daily.             losartan (COZAAR) 25 MG tablet  TAKE ONE TABLET BY MOUTH ONCE DAILY             potassium  chloride (MICRO-K) 10 MEQ CR capsule  TAKE TWO CAPSULES BY MOUTH ONCE DAILY FOR 30 DAYS             travoprost, BAK free, (TRAVATAN Z) 0.004 % solution ophthalmic solution  Administer 1 drop to both eyes At Night As Needed. in affected eye(s)              traZODone (DESYREL) 50 MG tablet  TAKE ONE TABLET BY MOUTH IN THE EVENING             vitamin B-12 (VITAMIN B-12) 1000 MCG tablet  Take 1 tablet by mouth Daily.             warfarin (COUMADIN) 5 MG tablet  5 mg daily at 6 pm or as directed by Dr Dueñas               Dictated utilizing Dragon dictation

## 2018-01-25 ENCOUNTER — HOSPITAL ENCOUNTER (OUTPATIENT)
Dept: CARDIOLOGY | Facility: HOSPITAL | Age: 73
Setting detail: RECURRING SERIES
Discharge: HOME | End: 2018-01-25

## 2018-01-25 PROCEDURE — 85610 PROTHROMBIN TIME: CPT

## 2018-01-25 PROCEDURE — 36416 COLLJ CAPILLARY BLOOD SPEC: CPT

## 2018-01-26 ENCOUNTER — TELEPHONE (OUTPATIENT)
Dept: CARDIOLOGY | Facility: CLINIC | Age: 73
End: 2018-01-26

## 2018-01-26 NOTE — TELEPHONE ENCOUNTER
Patient called stating she was to be scheduled for cardiac rehab after her OV about a week or so ago.  She states that she has not heard anything from anyone. I do not see an order in her chart under referrals.  Can you advise?    YRIS Hart

## 2018-01-28 DIAGNOSIS — I21.4 NSTEMI (NON-ST ELEVATED MYOCARDIAL INFARCTION) (HCC): Primary | ICD-10-CM

## 2018-01-29 ENCOUNTER — APPOINTMENT (OUTPATIENT)
Dept: OTHER | Facility: HOSPITAL | Age: 73
End: 2018-01-29

## 2018-01-29 NOTE — TELEPHONE ENCOUNTER
Patient notified and voiced understanding.      [facility] ROMAINE Hart

## 2018-01-31 ENCOUNTER — HOSPITAL ENCOUNTER (OUTPATIENT)
Dept: CARDIOLOGY | Facility: HOSPITAL | Age: 73
Setting detail: RECURRING SERIES
Discharge: HOME | End: 2018-01-31

## 2018-01-31 ENCOUNTER — DOCUMENTATION (OUTPATIENT)
Dept: CARDIOLOGY | Facility: CLINIC | Age: 73
End: 2018-01-31

## 2018-01-31 ENCOUNTER — HOSPITAL ENCOUNTER (OUTPATIENT)
Dept: CARDIOLOGY | Facility: HOSPITAL | Age: 73
Discharge: HOME | End: 2018-01-31
Attending: INTERNAL MEDICINE | Admitting: INTERNAL MEDICINE

## 2018-01-31 ENCOUNTER — HOSPITAL ENCOUNTER (OUTPATIENT)
Dept: CARDIOLOGY | Facility: HOSPITAL | Age: 73
Discharge: HOME | End: 2018-01-31

## 2018-01-31 ENCOUNTER — LAB (OUTPATIENT)
Dept: LAB | Facility: HOSPITAL | Age: 73
End: 2018-01-31

## 2018-01-31 ENCOUNTER — TELEPHONE (OUTPATIENT)
Dept: NEUROLOGY | Facility: CLINIC | Age: 73
End: 2018-01-31

## 2018-01-31 ENCOUNTER — OFFICE VISIT (OUTPATIENT)
Dept: NEUROLOGY | Facility: CLINIC | Age: 73
End: 2018-01-31

## 2018-01-31 VITALS — HEART RATE: 110 BPM | SYSTOLIC BLOOD PRESSURE: 96 MMHG | OXYGEN SATURATION: 96 % | DIASTOLIC BLOOD PRESSURE: 58 MMHG

## 2018-01-31 VITALS
WEIGHT: 241 LBS | OXYGEN SATURATION: 98 % | SYSTOLIC BLOOD PRESSURE: 120 MMHG | HEIGHT: 65 IN | DIASTOLIC BLOOD PRESSURE: 64 MMHG | HEART RATE: 103 BPM | BODY MASS INDEX: 40.15 KG/M2

## 2018-01-31 DIAGNOSIS — E78.5 HYPERLIPIDEMIA, UNSPECIFIED HYPERLIPIDEMIA TYPE: ICD-10-CM

## 2018-01-31 DIAGNOSIS — R41.3 MEMORY LOSS: ICD-10-CM

## 2018-01-31 DIAGNOSIS — I63.9 CEREBROVASCULAR ACCIDENT (CVA), UNSPECIFIED MECHANISM (HCC): Primary | ICD-10-CM

## 2018-01-31 DIAGNOSIS — I21.4 NSTEMI (NON-ST ELEVATED MYOCARDIAL INFARCTION) (HCC): ICD-10-CM

## 2018-01-31 LAB
CHOLEST SERPL-MCNC: 100 MG/DL (ref 0–200)
HDLC SERPL-MCNC: 71 MG/DL (ref 40–60)
LDLC SERPL CALC-MCNC: 16 MG/DL (ref 0–100)
LDLC/HDLC SERPL: 0.22 {RATIO}
TRIGL SERPL-MCNC: 67 MG/DL (ref 0–150)
VLDLC SERPL-MCNC: 13.4 MG/DL (ref 5–40)

## 2018-01-31 PROCEDURE — 99214 OFFICE O/P EST MOD 30 MIN: CPT | Performed by: RADIOLOGY

## 2018-01-31 PROCEDURE — 36415 COLL VENOUS BLD VENIPUNCTURE: CPT

## 2018-01-31 PROCEDURE — 80061 LIPID PANEL: CPT

## 2018-01-31 PROCEDURE — 85610 PROTHROMBIN TIME: CPT

## 2018-01-31 PROCEDURE — 36416 COLLJ CAPILLARY BLOOD SPEC: CPT

## 2018-01-31 PROCEDURE — 96374 THER/PROPH/DIAG INJ IV PUSH: CPT

## 2018-01-31 RX ORDER — ATORVASTATIN CALCIUM 10 MG/1
10 TABLET, FILM COATED ORAL DAILY
Qty: 30 TABLET | Refills: 11 | Status: SHIPPED | OUTPATIENT
Start: 2018-01-31 | End: 2018-11-12 | Stop reason: SDUPTHER

## 2018-01-31 RX ADMIN — METOPROLOL TARTRATE 5 MG: 5 INJECTION, SOLUTION INTRAVENOUS at 14:45

## 2018-01-31 NOTE — TELEPHONE ENCOUNTER
I called and left a message for Ms. Yañez.  Her spouse called me back instead since Lana had another MD appointment.  I let her spouse, Suresh know that her LDL (bad cholesterol) is 16.  At the time of her stroke it was 57.  She presently is taking Lipitor (Atorvastin) 40 mg.  She needs to reduce the dosage to 10 mg nightly.  A new prescription has been called into her pharmacy of record.  She should follow up with Dr. Ramirez in 3 months for repeats labs again I stressed the goal with the LDL is between 40 and 70.  NEMESIO Higgins RN

## 2018-01-31 NOTE — PROGRESS NOTES
I got a call from Candie that patient is therefore rehabilitation treadmill and has atrial fibrillation with rates in the 120s to 130s.  She is relatively asymptomatic.  This is persistent.  Her systolic blood pressures 126 mmHg.  We'll move her to CPU and Lopressor 5 mg IV, if her heart rate improves will increase Coreg 37.5 mg twice a day and discontinue losartan.  If however heart rate does not improve she will need admission.  I have asked my nurse practitioner Antoinette Romero to see as well. maura

## 2018-01-31 NOTE — TELEPHONE ENCOUNTER
----- Message from ANGELA Amaya sent at 1/31/2018 12:23 PM EST -----  Will you let her know that her LDL is 16, it was 57 at the time of the stroke. I want her to decrease her Lipitor dose from 40mg to 10mg. I will send in a prescription. She should follow up with Dr. Ramirez in 3 months for repeat lab work. Goal LDL is 40-70

## 2018-02-02 ENCOUNTER — CLINICAL SUPPORT (OUTPATIENT)
Dept: CARDIOLOGY | Facility: CLINIC | Age: 73
End: 2018-02-02

## 2018-02-02 DIAGNOSIS — I48.19 PERSISTENT ATRIAL FIBRILLATION (HCC): Primary | ICD-10-CM

## 2018-02-02 PROCEDURE — 93000 ELECTROCARDIOGRAM COMPLETE: CPT | Performed by: INTERNAL MEDICINE

## 2018-02-02 RX ORDER — FUROSEMIDE 40 MG/1
40 TABLET ORAL DAILY
COMMUNITY
End: 2020-02-28

## 2018-02-02 RX ORDER — CARVEDILOL 25 MG/1
37.5 TABLET ORAL 2 TIMES DAILY WITH MEALS
COMMUNITY
End: 2018-02-09 | Stop reason: SDUPTHER

## 2018-02-02 NOTE — PROGRESS NOTES
Procedure     ECG 12 Lead  Date/Time: 2/2/2018 11:04 AM  Performed by: MARIANA SALGUERO  Authorized by: MARIANA SALGUERO   Comparison: compared with previous ECG   Similar to previous ECG  Rhythm: atrial fibrillation  Ectopy: PVCs  Conduction: LAFB  Conduction comments: Nonspecific ST T wave changes  Other findings: PRWP  Clinical impression: abnormal ECG           Pt came in for an EKG, after having being in rapid afib on Weds & in the [facility] for IV Metoprolol. She is still complaining of SOA & lightheadedness. BP was 124/82. She is taking Carvedilol 37.5 mg bid & d/c'd Losartan as instructed, she is also taking Furosemide 40 mg daily. Per Dr. Salguero, she is going to check her bp am & pm, lower her Furosemide to 20 mg daily & call us Monday with her readings. EKG in your in box for interpretation. Meds updated in chart, to reflect new dosing. dmk

## 2018-02-05 ENCOUNTER — TELEPHONE (OUTPATIENT)
Dept: CARDIOLOGY | Facility: CLINIC | Age: 73
End: 2018-02-05

## 2018-02-05 NOTE — TELEPHONE ENCOUNTER
Pt denies any complaints at this time. She took her BP as directed over the weekend. Below are her readings. Please advise if any changes, look at BP documentation from last week, I could not add on to that documentation    Friday   /62  /59    Saturday  /73  /69    Sunday  /82    Monday  /87

## 2018-02-07 ENCOUNTER — TELEPHONE (OUTPATIENT)
Dept: CARDIOLOGY | Facility: HOSPITAL | Age: 73
End: 2018-02-07

## 2018-02-08 ENCOUNTER — TRANSCRIBE ORDERS (OUTPATIENT)
Dept: ADMINISTRATIVE | Facility: HOSPITAL | Age: 73
End: 2018-02-08

## 2018-02-08 DIAGNOSIS — R59.0 HILAR ADENOPATHY: Primary | ICD-10-CM

## 2018-02-09 ENCOUNTER — HOSPITAL ENCOUNTER (OUTPATIENT)
Dept: CARDIOLOGY | Facility: HOSPITAL | Age: 73
Setting detail: RECURRING SERIES
Discharge: HOME | End: 2018-02-09

## 2018-02-09 ENCOUNTER — OFFICE VISIT (OUTPATIENT)
Dept: INTERNAL MEDICINE | Facility: CLINIC | Age: 73
End: 2018-02-09

## 2018-02-09 VITALS
DIASTOLIC BLOOD PRESSURE: 70 MMHG | RESPIRATION RATE: 16 BRPM | SYSTOLIC BLOOD PRESSURE: 110 MMHG | OXYGEN SATURATION: 97 % | HEART RATE: 60 BPM | WEIGHT: 249.8 LBS | BODY MASS INDEX: 41.62 KG/M2 | HEIGHT: 65 IN | TEMPERATURE: 97.3 F

## 2018-02-09 DIAGNOSIS — T78.40XA ALLERGIC REACTION, INITIAL ENCOUNTER: Primary | ICD-10-CM

## 2018-02-09 PROCEDURE — 96372 THER/PROPH/DIAG INJ SC/IM: CPT | Performed by: NURSE PRACTITIONER

## 2018-02-09 PROCEDURE — 99214 OFFICE O/P EST MOD 30 MIN: CPT | Performed by: NURSE PRACTITIONER

## 2018-02-09 PROCEDURE — 36416 COLLJ CAPILLARY BLOOD SPEC: CPT

## 2018-02-09 PROCEDURE — 85610 PROTHROMBIN TIME: CPT

## 2018-02-09 RX ORDER — CARVEDILOL 25 MG/1
37.5 TABLET ORAL 2 TIMES DAILY WITH MEALS
Qty: 135 TABLET | Refills: 1 | Status: SHIPPED | OUTPATIENT
Start: 2018-02-09 | End: 2018-04-29 | Stop reason: SDUPTHER

## 2018-02-09 RX ORDER — METHYLPREDNISOLONE ACETATE 80 MG/ML
80 INJECTION, SUSPENSION INTRA-ARTICULAR; INTRALESIONAL; INTRAMUSCULAR; SOFT TISSUE ONCE
Status: COMPLETED | OUTPATIENT
Start: 2018-02-09 | End: 2018-02-09

## 2018-02-09 RX ADMIN — METHYLPREDNISOLONE ACETATE 80 MG: 80 INJECTION, SUSPENSION INTRA-ARTICULAR; INTRALESIONAL; INTRAMUSCULAR; SOFT TISSUE at 16:10

## 2018-02-09 NOTE — PROGRESS NOTES
Subjective   Patient ID: Lana Yañez is a 72 y.o. female presents with   Chief Complaint   Patient presents with   • Urticaria     Pt broke out in hives on face and chest while in waiting room       HPI Comments: 71 yo BF   Cc: sitting in our lobby she began to develop hives and itching of her arms and chest for past 30min.  Prior to arriving, she ate shrimp dish from [employer]. She is also here for hosp d/c after tx for pneumonia. She has completed zpak after d/c 3 weeks ago. She is feeling better and cough has resolved. She reports no new medications. She is not experiencing any facial swelling, dysphagia, or wheezing/sob    Urticaria         Allergies   Allergen Reactions   • Contrast Dye Hives and Itching       The following portions of the patient's history were reviewed and updated as appropriate: allergies, current medications, past family history, past medical history, past social history, past surgical history and problem list.      Review of Systems    Objective     Vitals:    02/09/18 1546   BP: 110/70   Pulse: 60   Resp: 16   Temp: 97.3 °F (36.3 °C)   SpO2: 97%         Physical Exam   Constitutional: She is oriented to person, place, and time. She appears well-developed and well-nourished. No distress.   HENT:   Head: Normocephalic and atraumatic.   Mouth/Throat: Oropharynx is clear and moist. No oropharyngeal exudate.   No angioedema   Eyes: Conjunctivae and EOM are normal. Pupils are equal, round, and reactive to light.   Neck: Normal range of motion. Neck supple.   Cardiovascular: Normal rate, normal heart sounds and intact distal pulses.    irreg rhythm   Pulmonary/Chest: Effort normal and breath sounds normal.   Abdominal: Soft. Bowel sounds are normal.   Musculoskeletal: Normal range of motion.   Neurological: She is alert and oriented to person, place, and time.   Skin: Skin is warm and dry. Rash noted. Rash is urticarial (urticarial rash to face and chest).   No angioedema noted    Psychiatric: She has a normal mood and affect. Her behavior is normal. Judgment and thought content normal.   Nursing note and vitals reviewed.        Lana was seen today for urticaria.    Diagnoses and all orders for this visit:    Allergic reaction, initial encounter  -     methylPREDNISolone acetate (DEPO-medrol) injection 80 mg; Inject 1 mL into the shoulder, thigh, or buttocks 1 (One) Time.    Avoid shrimp in the future. Will need allergy testing in the future. Benedryl 50mg po no in office and then 25mg q8h prn itching. She was advised to go to ER if dysphagia, sob or facial swelling occurs.  Pneumonia/ new left infrahilar lung nodule: will see Dr. Hill and repeat CT 5/2018 this is already scheduled.   Follow-up with Primary Care Physician in 24-48 hours if these symptoms worsen or fail to improve as anticipated.

## 2018-02-14 ENCOUNTER — TELEPHONE (OUTPATIENT)
Dept: CARDIAC REHAB | Facility: HOSPITAL | Age: 73
End: 2018-02-14

## 2018-02-14 ENCOUNTER — HOSPITAL ENCOUNTER (OUTPATIENT)
Dept: SPEECH THERAPY | Facility: HOSPITAL | Age: 73
Setting detail: THERAPIES SERIES
Discharge: HOME | End: 2018-02-14

## 2018-02-14 ENCOUNTER — TELEPHONE (OUTPATIENT)
Dept: CARDIOLOGY | Facility: CLINIC | Age: 73
End: 2018-02-14

## 2018-02-14 DIAGNOSIS — IMO0002 CEREBROVASCULAR ACCIDENT WITH COGNITIVE COMMUNICATION DEFICIT: Primary | ICD-10-CM

## 2018-02-14 PROCEDURE — G9169 MEMORY GOAL STATUS: HCPCS

## 2018-02-14 PROCEDURE — G9168 MEMORY CURRENT STATUS: HCPCS

## 2018-02-14 PROCEDURE — 96125 COGNITIVE TEST BY HC PRO: CPT

## 2018-02-14 NOTE — TELEPHONE ENCOUNTER
Pt called back to report that she has notice a little more swelling and want to know if she can increase the Lasix for a few days (from 20mg to 40mg PRN)  Pt said she has not been watching her salt as well.  No SOA and BP has been 120s/80s with HR 80 to 90.

## 2018-02-14 NOTE — THERAPY EVALUATION
Outpatient Speech Language Pathology   Adult Speech Language Cognitive Initial Evaluation  [facility]     Patient Name: Lana Yañez  : 1945  MRN: [id number]  Today's Date: 2018      Visit Date: 2018   Patient Active Problem List   Diagnosis   • Hyperlipidemia   • Vitamin deficiency   • Essential hypertension   • Rheumatoid arthritis involving both hands   • Fatigue   • ANTOINE (dyspnea on exertion)   • Dysuria   • Urge incontinence of urine   • Hypovitaminosis D   • Sleep apnea   • Encounter for screening colonoscopy   • Bronchitis   • Cough   • Generalized osteoarthritis of multiple sites   • Morbid obesity with BMI of 40.0-44.9, adult   • Cellulitis of right elbow due to MRSA   • Medicare annual wellness visit, initial   • Hospital discharge follow-up   • Displacement of lumbar intervertebral disc without myelopathy   • Lumbar disc herniation   • Atrial fibrillation with RVR   • History of MRSA infection   • Left-sided weakness   • Atrial fibrillation   • Left shoulder pain   • Relative lymphocytosis   • Nausea   • Weakness   • Controlled substance agreement signed   • Coronary artery disease involving native coronary artery of native heart with angina pectoris   • Shortness of breath   • Lower extremity edema   • Acute on chronic diastolic heart failure   • Adhesive capsulitis of left shoulder   • CVA tenderness   • Costochondritis, acute   • Allergic reaction     Past Medical History:   Diagnosis Date   • Acute on chronic diastolic heart failure    • Arthritis    • Asthma    • Atrial fibrillation     Persistent; on warfarin   • Bronchitis    • Cellulitis of right elbow     due to MRSA   • CHF (congestive heart failure)    • COPD (chronic obstructive pulmonary disease)    • Coronary artery disease     Cardiac catheterization completed; 90% PDA stenosis with medical management recommended   • Coronary artery disease involving native coronary artery of native heart with angina pectoris with  documented spasm    • Disease of thyroid gland    • Displacement of lumbar intervertebral disc without myelopathy    • Dizziness    • Essential hypertension    • Generalized osteoarthritis of multiple sites    • History of rheumatic fever    • History of transfusion    • Hx of bone density study     10/23/2014   • Hyperlipidemia    • Hypovitaminosis D    • Left arm pain    • Leg swelling    • Low back pain    • Lower extremity edema    • Malaise and fatigue    • Mitral valve disease     Moderate mitral valve prolapse and moderate mitral regurgitation   • Mitral valve insufficiency    • Morbid obesity with BMI of 40.0-44.9, adult    • MRSA infection    • NSTEMI (non-ST elevated myocardial infarction)    • PAF (paroxysmal atrial fibrillation)    • RA (rheumatoid arthritis)     involving both hands   • Sleep apnea    • SOB (shortness of breath)    • Stroke     left side weakness   • Urge incontinence of urine    • Vitamin D deficiency       Past Surgical History:   Procedure Laterality Date   • BREAST SURGERY      right side lumpectomy with biopsy   • CARDIAC CATHETERIZATION Left 10/20/2017    Procedure: Cardiac Catheterization/Vascular Study;  Surgeon: Alphonso Olmedo MD;  Location: [facility] INVASIVE LOCATION;  Service:    • CARDIAC CATHETERIZATION N/A 10/20/2017    +2 mitral regurgitation, left main 10% stenosis, mid to distal LAD 10% diffuse stenosis, circumflex 10% diffuse stenosis, RCA 10% proximal stenosis, and distal PDA consistent with coronary embolus with a lesion of 90% too small to consider coronary intervention; medical management recommended   • EYE SURGERY      laser surgery for glaucoma and left eye cataracts removed   • HYSTERECTOMY      10+ years ago   • JOINT REPLACEMENT  2005; 2006    bilateral knees and left rotater   • KNEE SURGERY     • MAMMO BILATERAL  2016    [facility]      Visit Dx:    ICD-10-CM ICD-9-CM   1. Cerebrovascular accident with cognitive communication deficit I63.9 437.9     "R41.841 799.52           Patient History       02/14/18 1400          History    Chief Complaint Other 1 (comment)   Difficulty with memory  -HS      Brief Description of Current Complaint Patient complains of memory trouble following stroke. She specifically feels her auditory memory has been affected.  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  -HS      Patient seeing anyone else for problem(s)? No  -HS      What clinical tests have you had for this problem? MRI  -HS      Results of Clinical Tests MRI on 10/15/17 revealed \"several tiny areas of infarction in the Right cerebral hemisphere\".   -HS      Related/Recent Hospitalizations Yes  -HS      Date of Hospitalization 10/15/17  -HS      Are you or can you be pregnant No  -HS      Pain     Pain Location Back  -HS      Pain at Present 6  -HS      Pain Frequency Other (Comment)   Chronic  -HS      Pain Comments Patient states that she uses a walker when her back is in pain.   -HS      Services    Prior Rehab/Home Health Experiences No  -HS      Are you currently receiving Home Health services No  -HS      Do you plan to receive Home Health services in the near future No  -HS      Daily Activities    Primary Language English  -HS      Are you able to read Yes  -HS      Are you able to write Yes  -HS      How does patient learn best? Listening  -HS      Teaching needs identified Management of Condition  -HS      Does patient have problems with the following? None  -HS      Barriers to learning None  -HS      Pt Participated in POC and Goals Yes  -HS      Safety    Are you being hurt, " "hit, or frightened by anyone at home or in your life? No  -HS      Are you being neglected by a caregiver No  -HS        User Key  (r) = Recorded By, (t) = Taken By, (c) = Cosigned By    Initials Name Provider Type    HS Lillian MS Stephanie CCC-SLP Speech and Language Pathologist              Adult Speech Language - 02/14/18 1400     Background and History    Reason for Referral Memory impairment following stroke.   -HS    Stated Goals \"Better memory\". Patient would also like to return to her role/responsibilities in her volunteer position at her [employer], organizing conferences.   -HS    Description of Complaint Reduced ability to recall information, especially information presented orally. Patient states that she required multiple repetition of information in order to recall.  -HS    Previous Functional Status Functional in all spheres  -HS    Current Baseline Abilities Patient is currently driving and completing all ADL and home management activities. No reported difficulties.  -HS    Pertinent Medications Please see list in chart.  -HS    Primary Language in the Home English  -HS    Informant for the Evaluation Self  -HS    Standardized Tests    Cognitive/Memory Tests CLQT: Cognitive Linguistic Quick Test  -HS    CLQT (The Cognitive Linguistic Quick Test)    Attention Domain Score 194  -HS    Attention Severity Rating 4: WNL  -HS    Memory Domain Score 147  -HS    Memory Severity Rating 4: WNL  -HS    Executive Function Domain Score 28  -HS    Executive Function Severity Rating 4: WNL  -HS    Language Domain Score 29  -HS    Language Severity Rating 4: WNL  -HS    Visuospatial Domain Score 91  -HS    Visuospatial Severity Rating 4: WNL  -HS    Clock Drawing Total Score 11  -HS    Clock Drawing Severity Rating WNL  -HS    Composite Severity Rating 4.0  -HS    Composite Severity Rating Range 4.0 - 3.5: WNL  -    CLQT Comments Patient scored WFL on all domains. Of note, the patient did have slight difficulty with " working memory and auditory memory during the story retelling subtest. Overall, feel the patient has mild memory deficit following her stroke. Feel she would benefit from short term speech therapy to train memory strategies so that the patient can successfully and efficiently complete her roles/responsibilities in her volunteer position in her community.   -HS      User Key  (r) = Recorded By, (t) = Taken By, (c) = Cosigned By    Initials Name Provider Type    HS Lillian Brown MS CCC-SLP Speech and Language Pathologist              OP SLP Education       02/14/18 1400    Education    Barriers to Learning No barriers identified  -HS    Education Provided Described results of evaluation;Patient expressed understanding of evaluation;Patient participated in establishing goals and treatment plan  -HS    Assessed Learning needs;Learning motivation;Learning preferences;Learning readiness  -HS    Learning Motivation Strong  -HS    Learning Method Explanation  -HS    Teaching Response Verbalized understanding  -HS      User Key  (r) = Recorded By, (t) = Taken By, (c) = Cosigned By    Initials Name Effective Dates    HS Lillian Brown MS CCC-SLP 04/13/15 -               SLP OP Goals       02/14/18 1400       Goal Type Needed    Goal Type Needed Memory  -HS     Subjective Comments    Subjective Comments Mrs. Yañez participated well during the initial evaluation session.   -HS     Subjective Pain    Able to rate subjective pain? yes  -HS     Pre-Treatment Pain Level 6  -HS     Post-Treatment Pain Level 6  -HS     Subjective Pain Comment Chronic back pain  -HS     Memory Goals    Memory LTG's Patient will be able to remember information needed to participate in avocational activities  -HS     Status: Patient will be able to remember information needed to participate in avocational activities New  -HS     Memory STG's Patient’s memory skills will be enhanced as reported by patient by utilizing internal memory strategies  to recall up to 3 pieces of information after a 5- minute delay;Patient’s memory skills will be enhanced as reported by patient by using external memory aides  -HS     Patient’s memory skills will be enhanced as reported by patient by utilizing internal memory strategies to recall up to 3 pieces of information after a 5- minute delay 90%:;without cues  -HS     Status: Patient’s memory skills will be enhanced as reported by patient by utilizing internal memory strategies to recall up to 3 pieces of information after a 5- minute delay New  -HS     Patient’s memory skills will be enhanced as reported by patient by using external memory aides 90%:;without cues  -HS     Status: Patient’s memory skills will be enhanced as reported by patient by using external memory aides New  -HS     SLP Time Calculation    SLP Goal Re-Cert Due Date 05/14/18  -       User Key  (r) = Recorded By, (t) = Taken By, (c) = Cosigned By    Initials Name Provider Type     Lillian Brown MS CCC-SLP Speech and Language Pathologist              OP SLP Assessment/Plan - 02/14/18 1400     SLP Assessment    Functional Problems Speech Language- Adult/Cognition  -HS    Impact on Function: Adult Speech Language/Cognition Difficulty participating in avocational activities  -HS    Clinical Impression: Speech Language-Adult/Congnition Mild:;Cognitive Communication Impairment  -HS    Prognosis Good (comment)   Patient highly motivated to participate. Appears willing to try a variety of strategies and techniques.   -HS    Patient/caregiver participated in establishment of treatment plan and goals Yes  -HS    Patient would benefit from skilled therapy intervention Yes  -HS    SLP Plan    Frequency 1x/week  -HS    Duration 4-6 weeks  -HS    Planned CPT's? SLP DEVEL COG SKILLS (PER 15 MINUTES): 53342  -    Expected Duration Therapy Session (min) 45-60 minutes  -      User Key  (r) = Recorded By, (t) = Taken By, (c) = Cosigned By    Initials Name  Provider Type    HS Lillian Brown MS CCC-SLP Speech and Language Pathologist           SLP Outcome Measures (last 72 hours)      SLP Outcome Measures       02/14/18 1400          SLP Outcome Measures    Outcome Measure Used? Adult NOMS  -HS      FCM Scores    FCM Chosen Memory  -HS      Memory FCM Score 5  -HS        User Key  (r) = Recorded By, (t) = Taken By, (c) = Cosigned By    Initials Name Effective Dates    HS Lillian Brown MS CCC-SLP 04/13/15 -          Time Calculation:   SLP Start Time: 1305  SLP Stop Time: 1405  SLP Time Calculation (min): 60 min    Therapy Charges for Today     Code Description Service Date Service Provider Modifiers Qty    54453872952 HC ST MEMORY CURRENT 2/14/2018 Lillian Brown MS CCC-SLP GN, CJ 1    03823206426 HC ST MEMORY PROJECTED 2/14/2018 Lillian Brown MS CCC-SLP GN, CI 1    25679749947 HC ST STD COG PERF TEST PER HOUR 2/14/2018 Lillian Naagraham MS CCC-SLP GN 1        SLP G-Codes  SLP NOMS Used?: Yes  Functional Limitations: Memory  Memory Current Status (): At least 20 percent but less than 40 percent impaired, limited or restricted  Memory Goal Status (): At least 1 percent but less than 20 percent impaired, limited or restricted        Lillian Naagraham MS LUKASZ-SLP  2/14/2018

## 2018-02-14 NOTE — TELEPHONE ENCOUNTER
"Pt called to cancel her March 5 cardiac rehab appt.  She has speech therapy and needs a sleep study and feels she just has, \"too much on my plate now\".  Provided pt with our phone number and encouraged pt to call us when ready to reschedule.  "

## 2018-02-14 NOTE — TELEPHONE ENCOUNTER
Clarify she is taking the lasix currently at 20 mg a day and if so increase to 40 mg a day for 3 days and eat a bannana a day. maura

## 2018-02-15 ENCOUNTER — TELEPHONE (OUTPATIENT)
Dept: INTERNAL MEDICINE | Facility: CLINIC | Age: 73
End: 2018-02-15

## 2018-02-15 RX ORDER — AMOXICILLIN AND CLAVULANATE POTASSIUM 875; 125 MG/1; MG/1
1 TABLET, FILM COATED ORAL EVERY 12 HOURS SCHEDULED
Qty: 20 TABLET | Refills: 0 | Status: SHIPPED | OUTPATIENT
Start: 2018-02-15 | End: 2018-03-02

## 2018-02-15 RX ORDER — METHYLPREDNISOLONE 4 MG/1
TABLET ORAL
Qty: 21 TABLET | Refills: 0 | Status: SHIPPED | OUTPATIENT
Start: 2018-02-15 | End: 2018-03-02

## 2018-02-21 ENCOUNTER — APPOINTMENT (OUTPATIENT)
Dept: SPEECH THERAPY | Facility: HOSPITAL | Age: 73
End: 2018-02-21

## 2018-02-23 ENCOUNTER — LAB (OUTPATIENT)
Dept: LAB | Facility: HOSPITAL | Age: 73
End: 2018-02-23

## 2018-02-23 ENCOUNTER — HOSPITAL ENCOUNTER (OUTPATIENT)
Dept: CARDIOLOGY | Facility: HOSPITAL | Age: 73
Setting detail: RECURRING SERIES
Discharge: HOME | End: 2018-02-23

## 2018-02-23 DIAGNOSIS — M54.9 CVA TENDERNESS: ICD-10-CM

## 2018-02-23 DIAGNOSIS — E66.01 MORBID OBESITY WITH BMI OF 40.0-44.9, ADULT (HCC): ICD-10-CM

## 2018-02-23 DIAGNOSIS — I48.91 ATRIAL FIBRILLATION WITH RVR (HCC): ICD-10-CM

## 2018-02-23 DIAGNOSIS — I48.91 ATRIAL FIBRILLATION, UNSPECIFIED TYPE (HCC): ICD-10-CM

## 2018-02-23 DIAGNOSIS — E55.9 HYPOVITAMINOSIS D: Primary | ICD-10-CM

## 2018-02-23 DIAGNOSIS — M15.9 GENERALIZED OSTEOARTHRITIS OF MULTIPLE SITES: ICD-10-CM

## 2018-02-23 LAB
ALBUMIN SERPL-MCNC: 3.6 G/DL (ref 3.5–5.2)
ALBUMIN/GLOB SERPL: 1.1 G/DL (ref 1.1–2.4)
ALP SERPL-CCNC: 59 U/L (ref 38–116)
ALT SERPL W P-5'-P-CCNC: 39 U/L (ref 0–33)
ANION GAP SERPL CALCULATED.3IONS-SCNC: 10.2 MMOL/L
AST SERPL-CCNC: 21 U/L (ref 0–32)
BASOPHILS # BLD AUTO: 0.03 10*3/MM3 (ref 0–0.1)
BASOPHILS NFR BLD AUTO: 0.3 % (ref 0–1.1)
BILIRUB SERPL-MCNC: 0.8 MG/DL (ref 0.1–1.2)
BUN BLD-MCNC: 18 MG/DL (ref 6–20)
BUN/CREAT SERPL: 25.4 (ref 7.3–30)
CALCIUM SPEC-SCNC: 9.5 MG/DL (ref 8.5–10.2)
CHLORIDE SERPL-SCNC: 101 MMOL/L (ref 98–107)
CO2 SERPL-SCNC: 28.8 MMOL/L (ref 22–29)
CREAT BLD-MCNC: 0.71 MG/DL (ref 0.6–1.1)
DEPRECATED RDW RBC AUTO: 55.2 FL (ref 37–49)
EOSINOPHIL # BLD AUTO: 0.06 10*3/MM3 (ref 0–0.36)
EOSINOPHIL NFR BLD AUTO: 0.6 % (ref 1–5)
ERYTHROCYTE [DISTWIDTH] IN BLOOD BY AUTOMATED COUNT: 16.7 % (ref 11.7–14.5)
GFR SERPL CREATININE-BSD FRML MDRD: 99 ML/MIN/1.73
GLOBULIN UR ELPH-MCNC: 3.4 GM/DL (ref 1.8–3.5)
GLUCOSE BLD-MCNC: 126 MG/DL (ref 74–124)
HCT VFR BLD AUTO: 42.4 % (ref 34–45)
HGB BLD-MCNC: 13.8 G/DL (ref 11.5–14.9)
IMM GRANULOCYTES # BLD: 0.05 10*3/MM3 (ref 0–0.03)
IMM GRANULOCYTES NFR BLD: 0.5 % (ref 0–0.5)
LYMPHOCYTES # BLD AUTO: 4.44 10*3/MM3 (ref 1–3.5)
LYMPHOCYTES NFR BLD AUTO: 44.4 % (ref 20–49)
MCH RBC QN AUTO: 29.2 PG (ref 27–33)
MCHC RBC AUTO-ENTMCNC: 32.5 G/DL (ref 32–35)
MCV RBC AUTO: 89.8 FL (ref 83–97)
MONOCYTES # BLD AUTO: 0.79 10*3/MM3 (ref 0.25–0.8)
MONOCYTES NFR BLD AUTO: 7.9 % (ref 4–12)
NEUTROPHILS # BLD AUTO: 4.62 10*3/MM3 (ref 1.5–7)
NEUTROPHILS NFR BLD AUTO: 46.3 % (ref 39–75)
NRBC BLD MANUAL-RTO: 0 /100 WBC (ref 0–0)
PLATELET # BLD AUTO: 208 10*3/MM3 (ref 150–375)
PMV BLD AUTO: 9.6 FL (ref 8.9–12.1)
POTASSIUM BLD-SCNC: 4 MMOL/L (ref 3.5–4.7)
PROT SERPL-MCNC: 7 G/DL (ref 6.3–8)
RBC # BLD AUTO: 4.72 10*6/MM3 (ref 3.9–5)
SODIUM BLD-SCNC: 140 MMOL/L (ref 134–145)
WBC NRBC COR # BLD: 9.99 10*3/MM3 (ref 4–10)

## 2018-02-23 PROCEDURE — 36416 COLLJ CAPILLARY BLOOD SPEC: CPT

## 2018-02-23 PROCEDURE — 85300 ANTITHROMBIN III ACTIVITY: CPT | Performed by: INTERNAL MEDICINE

## 2018-02-23 PROCEDURE — 85240 CLOT FACTOR VIII AHG 1 STAGE: CPT | Performed by: INTERNAL MEDICINE

## 2018-02-23 PROCEDURE — 85025 COMPLETE CBC W/AUTO DIFF WBC: CPT | Performed by: INTERNAL MEDICINE

## 2018-02-23 PROCEDURE — 85610 PROTHROMBIN TIME: CPT

## 2018-02-23 PROCEDURE — 36415 COLL VENOUS BLD VENIPUNCTURE: CPT | Performed by: INTERNAL MEDICINE

## 2018-02-23 PROCEDURE — 80053 COMPREHEN METABOLIC PANEL: CPT | Performed by: INTERNAL MEDICINE

## 2018-02-25 LAB
B2 GLYCOPROT1 IGA SER-ACNC: <9 GPI IGA UNITS (ref 0–25)
B2 GLYCOPROT1 IGG SER-ACNC: <9 GPI IGG UNITS (ref 0–20)
B2 GLYCOPROT1 IGM SER-ACNC: <9 GPI IGM UNITS (ref 0–32)

## 2018-02-26 ENCOUNTER — HOSPITAL ENCOUNTER (OUTPATIENT)
Dept: CARDIOLOGY | Facility: HOSPITAL | Age: 73
Setting detail: RECURRING SERIES
Discharge: HOME | End: 2018-02-26

## 2018-02-26 LAB
APTT HEX PL PPP: NORMAL S
APTT IMM NP PPP: NORMAL S
APTT PPP 1:1 SALINE: NORMAL S
APTT PPP: NORMAL S
B2 GLYCOPROT1 IGA SER-ACNC: NORMAL
B2 GLYCOPROT1 IGG SER-ACNC: NORMAL
B2 GLYCOPROT1 IGM SER-ACNC: NORMAL
CARDIOLIPIN IGG SER IA-ACNC: <9 GPL U/ML (ref 0–14)
CARDIOLIPIN IGG SER IA-ACNC: NORMAL
CARDIOLIPIN IGM SER IA-ACNC: 13 MPL U/ML (ref 0–12)
CARDIOLIPIN IGM SER IA-ACNC: NORMAL
CONFIRM DRVVT: NORMAL S
PLATELET NEUTRALIZATION: NORMAL
PROTHROMBIN TIME: NORMAL SEC
SCREEN DRVVT: NORMAL S
THROMBIN TIME: NORMAL S

## 2018-02-26 PROCEDURE — 36416 COLLJ CAPILLARY BLOOD SPEC: CPT

## 2018-02-26 PROCEDURE — 85610 PROTHROMBIN TIME: CPT

## 2018-02-26 RX ORDER — AMOXICILLIN AND CLAVULANATE POTASSIUM 875; 125 MG/1; MG/1
TABLET, FILM COATED ORAL
Qty: 20 TABLET | Refills: 0 | OUTPATIENT
Start: 2018-02-26

## 2018-02-28 ENCOUNTER — HOSPITAL ENCOUNTER (OUTPATIENT)
Dept: SPEECH THERAPY | Facility: HOSPITAL | Age: 73
Setting detail: THERAPIES SERIES
Discharge: HOME | End: 2018-02-28

## 2018-02-28 DIAGNOSIS — IMO0002 CEREBROVASCULAR ACCIDENT WITH COGNITIVE COMMUNICATION DEFICIT: Primary | ICD-10-CM

## 2018-02-28 PROCEDURE — G0515 COGNITIVE SKILLS DEVELOPMENT: HCPCS

## 2018-03-01 ENCOUNTER — TELEPHONE (OUTPATIENT)
Dept: CARDIOLOGY | Facility: CLINIC | Age: 73
End: 2018-03-01

## 2018-03-01 LAB
AT III PPP CHRO-ACNC: 99 % (ref 90–134)
FACT VIII ACT/NOR PPP: 358 % ACTIVITY (ref 60–150)
Lab: ABNORMAL

## 2018-03-01 NOTE — TELEPHONE ENCOUNTER
Pt called re: swelling in her legs again and wants to go back on Lasix.    LMM with pt to see what was going on.

## 2018-03-02 ENCOUNTER — APPOINTMENT (OUTPATIENT)
Dept: LAB | Facility: HOSPITAL | Age: 73
End: 2018-03-02
Attending: INTERNAL MEDICINE

## 2018-03-02 ENCOUNTER — OFFICE VISIT (OUTPATIENT)
Dept: ONCOLOGY | Facility: CLINIC | Age: 73
End: 2018-03-02

## 2018-03-02 VITALS
OXYGEN SATURATION: 96 % | WEIGHT: 240.4 LBS | BODY MASS INDEX: 40.05 KG/M2 | TEMPERATURE: 97.9 F | DIASTOLIC BLOOD PRESSURE: 88 MMHG | RESPIRATION RATE: 16 BRPM | HEIGHT: 65 IN | HEART RATE: 97 BPM | SYSTOLIC BLOOD PRESSURE: 142 MMHG

## 2018-03-02 DIAGNOSIS — I24.0 CORONARY ARTERY EMBOLISM (HCC): Primary | ICD-10-CM

## 2018-03-02 PROCEDURE — 99214 OFFICE O/P EST MOD 30 MIN: CPT | Performed by: INTERNAL MEDICINE

## 2018-03-02 PROCEDURE — 36415 COLL VENOUS BLD VENIPUNCTURE: CPT | Performed by: INTERNAL MEDICINE

## 2018-03-02 RX ORDER — MONTELUKAST SODIUM 10 MG/1
10 TABLET ORAL
COMMUNITY
Start: 2018-02-20 | End: 2018-03-22

## 2018-03-02 RX ORDER — ALBUTEROL SULFATE 90 UG/1
2 AEROSOL, METERED RESPIRATORY (INHALATION) EVERY 6 HOURS PRN
COMMUNITY
Start: 2018-02-20 | End: 2020-11-19

## 2018-03-02 RX ORDER — IPRATROPIUM BROMIDE AND ALBUTEROL SULFATE 2.5; .5 MG/3ML; MG/3ML
SOLUTION RESPIRATORY (INHALATION) AS NEEDED
COMMUNITY
Start: 2018-02-28 | End: 2020-11-19

## 2018-03-02 NOTE — TELEPHONE ENCOUNTER
Increase Lasix to 40 mg for the next 3 days and take an extra banana a day.  Call next week with an update.

## 2018-03-02 NOTE — TELEPHONE ENCOUNTER
Weight has increased by 2 pounds.  Pt has bronchitis/congestion so Dr Hill has started her on a neb.  She feels her SOA is better.  Pt has mainly swelling around ankles.  She is watching her salt.  She currently is taking Lasix 40mg simg qd.

## 2018-03-03 NOTE — PROGRESS NOTES
Subjective .     REASONS FOR FOLLOWUP:   1. Atrial fibrillation, rate controlled  2. 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  Transition to heparin drip and subsequently Coumadin with INR monitored by cardiology. Hypercoagulable evaluation 10/21/17 with anticardiolipin IgM antibody 16 (indeterminate range), significantly elevated factor VIII activity at 331%.  Patient will require long-term anticoagulation.  4. Borderline anemia with macrocytic indices  5. Rheumatoid arthritis previously treated with methotrexate  6. Borderline B12 deficiency (level 332 on 10/15/17) continuing on oral B12 1000 µg daily.  7. Persistent relative lymphocytic predominance, negative peripheral blood flow cytometry (increased CD4/8 ratio of unclear significance)    HISTORY OF PRESENT ILLNESS:  The patient is a 72 y.o. year old female who is here for follow-up with the above-mentioned history.    History of Present Illness   returns today in follow-up with laboratory studies to review, continuing on long-term anticoagulation with Coumadin, INR monitored by cardiology.  The patient also continues on oral B12 due to borderline B12 level in the past.  In the interval since her last visit, she reports a fluctuating INR, checked on a 1-2 week basis.  She has not experienced any bleeding issues.  She did experience an episode of pneumonia in January treated with antibiotics.  Symptoms improved.  She does have some mild chronic dyspnea on exertion and occasional cough with some persistent congestion.  She denies any fevers.  She denies any chest pain.  She has remained off treatment for rheumatoid arthritis at this point and has not experienced any flare of her arthritic symptoms.    Past Medical History:   Diagnosis Date   • Acute on chronic diastolic heart failure    • Arthritis    • Asthma    • Atrial fibrillation     Persistent; on warfarin   •  Bronchitis    • Cellulitis of right elbow     due to MRSA   • CHF (congestive heart failure)    • COPD (chronic obstructive pulmonary disease)    • Coronary artery disease     Cardiac catheterization completed; 90% PDA stenosis with medical management recommended   • Coronary artery disease involving native coronary artery of native heart with angina pectoris with documented spasm    • Disease of thyroid gland    • Displacement of lumbar intervertebral disc without myelopathy    • Dizziness    • Essential hypertension    • Generalized osteoarthritis of multiple sites    • History of rheumatic fever    • History of transfusion    • Hx of bone density study     10/23/2014   • Hyperlipidemia    • Hypovitaminosis D    • Left arm pain    • Leg swelling    • Low back pain    • Lower extremity edema    • Malaise and fatigue    • Mitral valve disease     Moderate mitral valve prolapse and moderate mitral regurgitation   • Mitral valve insufficiency    • Morbid obesity with BMI of 40.0-44.9, adult    • MRSA infection    • NSTEMI (non-ST elevated myocardial infarction)    • PAF (paroxysmal atrial fibrillation)    • RA (rheumatoid arthritis)     involving both hands   • Sleep apnea    • SOB (shortness of breath)    • Stroke     left side weakness   • Urge incontinence of urine    • Vitamin D deficiency      Past Surgical History:   Procedure Laterality Date   • BREAST SURGERY      right side lumpectomy with biopsy   • CARDIAC CATHETERIZATION Left 10/20/2017    Procedure: Cardiac Catheterization/Vascular Study;  Surgeon: Alphonso Olmedo MD;  Location: [facility] INVASIVE LOCATION;  Service:    • CARDIAC CATHETERIZATION N/A 10/20/2017    +2 mitral regurgitation, left main 10% stenosis, mid to distal LAD 10% diffuse stenosis, circumflex 10% diffuse stenosis, RCA 10% proximal stenosis, and distal PDA consistent with coronary embolus with a lesion of 90% too small to consider coronary intervention; medical management recommended   •  EYE SURGERY      laser surgery for glaucoma and left eye cataracts removed   • HYSTERECTOMY      10+ years ago   • JOINT REPLACEMENT  2005; 2006    bilateral knees and left rotater   • KNEE SURGERY     • MAMMO BILATERAL  2016    [facility]        Current Outpatient Prescriptions on File Prior to Visit   Medication Sig Dispense Refill   • aspirin 81 MG EC tablet Take 1 tablet by mouth Daily.     • atorvastatin (LIPITOR) 10 MG tablet Take 1 tablet by mouth Daily. 30 tablet 11   • carvedilol (COREG) 25 MG tablet Take 1.5 tablets by mouth 2 (Two) Times a Day With Meals. 135 tablet 1   • cetirizine (ZyrTEC) 10 MG tablet Take 10 mg by mouth Daily As Needed.     • COSOPT PF 22.3-6.8 MG/ML solution Administer 1 drop to both eyes 2 (Two) Times a Day As Needed.     • furosemide (LASIX) 40 MG tablet Take 20 mg by mouth Daily.     • HYDROcodone-acetaminophen (NORCO) 5-325 MG per tablet Take 1 tablet by mouth Every 6 (Six) Hours As Needed for Moderate Pain 40 tablet 0   • potassium chloride (MICRO-K) 10 MEQ CR capsule TAKE TWO CAPSULES BY MOUTH ONCE DAILY FOR 30 DAYS 60 capsule 0   • travoprost, BAK free, (TRAVATAN Z) 0.004 % solution ophthalmic solution Administer 1 drop to both eyes At Night As Needed. in affected eye(s)      • traZODone (DESYREL) 50 MG tablet TAKE ONE TABLET BY MOUTH IN THE EVENING (Patient taking differently: TAKE ONE TABLET BY MOUTH IN THE EVENING PRN) 30 tablet 5   • vitamin B-12 (VITAMIN B-12) 1000 MCG tablet Take 1 tablet by mouth Daily.     • warfarin (COUMADIN) 5 MG tablet 5 mg daily at 6 pm or as directed by Dr Dueñas 90 tablet 0   • [DISCONTINUED] amoxicillin-clavulanate (AUGMENTIN) 875-125 MG per tablet Take 1 tablet by mouth Every 12 (Twelve) Hours. 20 tablet 0   • [DISCONTINUED] MethylPREDNISolone (MEDROL, JACINTO,) 4 MG tablet Take as directed on package instructions. 21 tablet 0     No current facility-administered medications on file prior to visit.        ALLERGIES:     Allergies   Allergen  Reactions   • Contrast Dye Hives and Itching       Social History     Social History   • Marital status:      Spouse name: Suresh   • Number of children: 3   • Years of education: N/A     Occupational History   •  Retired     Social History Main Topics   • Smoking status: Former Smoker     Packs/day: 3.00     Types: Cigarettes     Start date: 5/19/1967     Quit date: 10/19/1968   • Smokeless tobacco: Never Used   • Alcohol use No      Comment: No caffeine use   • Drug use: No   • Sexual activity: Defer     Other Topics Concern   • Not on file     Social History Narrative     Family History   Problem Relation Age of Onset   • Colon cancer Mother    • Glaucoma Mother    • Stroke Mother    • Arthritis Mother    • Hypertension Mother    • Glaucoma Sister    • Diabetes Sister    • Heart disease Sister    • Arthritis Sister    • Asthma Sister    • Hypertension Sister    • Miscarriages / Stillbirths Sister    • Lung disease Sister    • Heart disease Brother    • Diabetes Brother    • Arthritis Brother    • Drug abuse Brother    • Hypertension Brother    • Arthritis Father    • COPD Father    • Lung disease Father    • Arthritis Daughter    • Depression Daughter    • Alcohol abuse Maternal Uncle    • Heart disease Sister    • Heart disease Sister    • Heart disease Brother          Review of Systems   Constitutional: Positive for fatigue. Negative for activity change, appetite change, fever and unexpected weight change.   HENT: Positive for congestion. Negative for mouth sores, nosebleeds, sore throat and voice change.    Respiratory: Positive for cough and shortness of breath. Negative for wheezing.    Cardiovascular: Negative for chest pain, palpitations and leg swelling.   Gastrointestinal: Negative for abdominal distention, abdominal pain, blood in stool, constipation, diarrhea, nausea and vomiting.   Endocrine: Negative for cold intolerance and heat intolerance.   Genitourinary: Negative for  difficulty urinating, dysuria, frequency and hematuria.   Musculoskeletal: Positive for arthralgias. Negative for back pain, joint swelling and myalgias.   Skin: Negative for rash.   Neurological: Positive for weakness. Negative for dizziness, syncope, light-headedness, numbness and headaches.   Hematological: Negative for adenopathy. Does not bruise/bleed easily.   Psychiatric/Behavioral: Negative for confusion and sleep disturbance. The patient is not nervous/anxious.          Objective      Vitals:    03/02/18 1547   BP: 142/88   Pulse: 97   Resp: 16   Temp: 97.9 °F (36.6 °C)   SpO2: 96%      Current Status 3/2/2018   ECOG score 1       Physical Exam   Constitutional: She is oriented to person, place, and time. She appears well-developed and well-nourished.   Elderly female no apparent distress ambulating with the use of a rolling walker.   HENT:   Mouth/Throat: Oropharynx is clear and moist.   Eyes: Conjunctivae are normal.   Neck: No thyromegaly present.   Cardiovascular: Normal rate.  Exam reveals no gallop and no friction rub.    No murmur heard.  Irregularly irregular   Pulmonary/Chest: Effort normal. No respiratory distress.   Scattered faint bilateral wheezes   Abdominal: Soft. Bowel sounds are normal. She exhibits no distension. There is no tenderness.   Musculoskeletal: She exhibits no edema.   Lymphadenopathy:        Head (right side): No submandibular adenopathy present.     She has no cervical adenopathy.     She has no axillary adenopathy.        Right: No inguinal and no supraclavicular adenopathy present.        Left: No inguinal and no supraclavicular adenopathy present.   Neurological: She is alert and oriented to person, place, and time. No cranial nerve deficit.   4+/5 strength in the left upper and lower extremities   Skin: Skin is warm and dry. No rash noted.   Psychiatric: She has a normal mood and affect. Her behavior is normal.       RECENT LABS:  Hematology WBC   Date Value Ref Range  Status   02/23/2018 9.99 4.00 - 10.00 10*3/mm3 Final     RBC   Date Value Ref Range Status   02/23/2018 4.72 3.90 - 5.00 10*6/mm3 Final     Hemoglobin   Date Value Ref Range Status   02/23/2018 13.8 11.5 - 14.9 g/dL Final     Hematocrit   Date Value Ref Range Status   02/23/2018 42.4 34.0 - 45.0 % Final     Platelets   Date Value Ref Range Status   02/23/2018 208 150 - 375 10*3/mm3 Final        Lab Results   Component Value Date    GLUCOSE 126 (H) 02/23/2018    BUN 18 02/23/2018    CREATININE 0.71 02/23/2018    EGFRIFNONA 53 (L) 06/27/2017    EGFRIFAFRI 99 02/23/2018    BCR 25.4 02/23/2018    K 4.0 02/23/2018    CO2 28.8 02/23/2018    CALCIUM 9.5 02/23/2018    PROTENTOTREF 7.9 06/27/2017    ALBUMIN 3.60 02/23/2018    LABIL2 1.1 02/23/2018    AST 21 02/23/2018    ALT 39 (H) 02/23/2018     Laboratory studies 10/21/17: CRP 0.62, ESR 19, anti-thrombin 379% (90-1 34), anticardiolipin IgM antibody 16 (indeterminate range 13-20), beta-2 GP 1 antibody panel negative, prothrombin gene mutation negative, factor V Leiden mutation negative, factor VII activity 61%, factor VIII activity 331%, lupus anticoagulant negative, protein C activity 86%, protein S activity 117%    Bilateral lower extremity Doppler negative for DVT 10/22/17.    Laboratory studies 2/23/18: Anticardiolipin antibody IgM 13 (indeterminate), anti-beta 2G P1 antibodies negative, lupus anticoagulant not performed (insufficient specimen), anti-thrombin 399%, factor VIII activity 358%    Assessment/Plan      1. Persistent atrial fibrillation: The patient is currently rate controlled.  She was on Pradaxa however missed a dose and was admitted with CVA 1015/17.  She has subsequently developed a coronary artery embolism despite being maintained on Pradaxa and therefore appears to have failed that drug.  She is currently receiving coumadin with INR monitored by cardiology.  It appears that she will require ongoing anticoagulation for multiple indications including  atrial fibrillation.  2. Coronary artery embolism with non-ST elevation MI 10/19/17: The patient had been receiving Pradaxa on schedule at the nursing facility however developed acute onset of chest pain shortness of breath and was admitted with non-ST elevation MI 10/19/17.  She was placed on a heparin drip, Pradaxa discontinued, subsequently transitioned to coumadin.  CLARIBEL showed normal ejection fraction, severe biatrial dilation, negative saline test, no atrial appendage thrombus.  Cardiac catheterization showed only 10% stenosis however there was a filling defect in the distal PDA consistent with a coronary embolus that was 90% occluded.  We were consultated regarding the possibility of an underlying hypercoagulable state.  Certainly, a coronary artery embolism can occur from atrial fibrillation.  There are reports of associated hypercoagulable states however.  Patient has no personal or family history of prior thrombosis.  This was an arterial thrombosis not venous and most of the inherited thrombophilias carry a higher risk of venous thrombosis, not necessarily arterial thrombosis. There are reports of factor VII increased activity associated with coronary artery embolism.  Hypercoagulable evaluation was obtained 10/21/17.   Anti-thrombin 3 was slightly low at 79% (normal range 90-1 34) however this was likely related to the patient having been on a heparin drip at the time the test was drawn.  We will repeat this value in 3 months.  Prothrombin gene mutation was negative as was factor V Leiden gene mutation.  Factor VII activity was normal at 61%. Protein C activity was normal at 86% as was protein S activity at 117%.  Lupus anticoagulant and antiplatelet beta-2 GP 1 antibody panel were negative however anticardiolipin IgM antibody was in the indeterminate range at 16 (range 13-20).  This did not meet criteria for diagnosis of antiphospholipid syndrome but did raise some suspicion in the setting of underlying  rheumatoid arthritis.  None of these factors however change the recommendation for the patient to remain on anticoagulation.  She has multiple reasons to remain on long-term anticoagulation including atrial fibrillation with CVA as well as her coronary artery embolism.      Returns today in follow-up with repeat labs from 2/23/18.  Antiphospholipid studies show an anticardiolipin IgM antibody that remains indeterminate at 13, likely of little significance.  Beta-2 GPI antibodies are negative.  Lupus anticoagulant was ordered but not performed due to an insufficient specimen.  Antithrombin III was normal at 99%.  Factor VIII activity remains significantly elevated at 358%, likely a contributing factor to the patient's risk of thrombosis.  We will check a lupus anticoagulant today.  As noted above, none of these findings change the recommendation to continue with long-term anticoagulation with Coumadin.  Her INR is monitored by cardiology.  We do not have much more to add in this situation.  We will not plan any further routine follow-up in our office but are available for any further questions in the future.  3. Rheumatoid arthritis: The patient has had this for over 5 years.  She has required treatment over the past 6 months under the direction of Dr. Monsalve with oral methotrexate.  With her recent medical issues in October 2017, methotrexate was held and she has not resumed the drug to date.  Her arthritic symptoms are stable, labs from 10/21/17 with CRP 0.62, ESR 19.  She remains off treatment at this time.  4. Borderline anemia with macrocytosis: Macrocytosis was related to methotrexate use. Both the anemia and macrocytosis have resolved off methotrexate.  5. Right MCA CVA: The patient was admitted on 10/15/17 with multifocal embolic right MCA CVA with associated speech difficulty, left facial droop, left upper and lower extremity weakness.  Her neurologic residual deficit in the left upper and lower extremities  remains minimal.  She continues on anticoagulation as above with Coumadin.  Felt to be related to atrial fibrillation.  6. Relative lymphocytosis: The patient had a persistent relative lymphocytosis.  Peripheral blood flow cytometry sent on 11/13/18 showed no abnormal cells, did show an increase CD4/8 ratio of unclear significance.  Lymphocytosis has resolved.  7. Borderline B12 deficiency: Borderline level of 332 on 10/15/17.  The patient is continuing on oral B12 1000 µg daily.      Plan:    1. Additional labs today with lupus anticoagulant  2. Continue oral B12 1000 µg daily  3. Continue on Coumadin with INR monitored by cardiology  4. Not plan any further routine follow-up in our office, anticipating that the patient will remain on long-term anticoagulation as outlined above.  We are available with any further questions in the future if needed.

## 2018-03-05 ENCOUNTER — TELEPHONE (OUTPATIENT)
Dept: ONCOLOGY | Facility: HOSPITAL | Age: 73
End: 2018-03-05

## 2018-03-05 LAB
DRVVT IMM 1:2 NP PPP: 37 SEC (ref 0–47)
LA NT PLATELET PPP: 45.9 SEC (ref 0–51.9)
LUPUS ANTICOAGULANT REFLEX: ABNORMAL
SCREEN DRVVT: 56.4 SEC (ref 0–47)

## 2018-03-05 NOTE — TELEPHONE ENCOUNTER
Pt called re: she never did increase Lasix.  She got my VM and swelling went down on her own.  She will call if this happens again.

## 2018-03-05 NOTE — TELEPHONE ENCOUNTER
----- Message from Andrés Reyes Jr., MD sent at 3/5/2018 11:12 AM EST -----  Please let patient know that lupus anticoagulant drawn in office Friday was negative (and that is good)  ----- Message -----     From: Lab, Background User     Sent: 3/5/2018  11:10 AM       To: Andrés Reyes Jr., MD        Patient notified. V/U.

## 2018-03-06 ENCOUNTER — HOSPITAL ENCOUNTER (OUTPATIENT)
Dept: CARDIOLOGY | Facility: HOSPITAL | Age: 73
Setting detail: RECURRING SERIES
Discharge: HOME | End: 2018-03-06

## 2018-03-06 PROCEDURE — 36416 COLLJ CAPILLARY BLOOD SPEC: CPT

## 2018-03-06 PROCEDURE — 85610 PROTHROMBIN TIME: CPT

## 2018-03-07 ENCOUNTER — APPOINTMENT (OUTPATIENT)
Dept: SPEECH THERAPY | Facility: HOSPITAL | Age: 73
End: 2018-03-07

## 2018-03-14 ENCOUNTER — HOSPITAL ENCOUNTER (OUTPATIENT)
Dept: SPEECH THERAPY | Facility: HOSPITAL | Age: 73
Setting detail: THERAPIES SERIES
Discharge: HOME | End: 2018-03-14

## 2018-03-14 DIAGNOSIS — IMO0002 CEREBROVASCULAR ACCIDENT WITH COGNITIVE COMMUNICATION DEFICIT: Primary | ICD-10-CM

## 2018-03-14 PROCEDURE — G0515 COGNITIVE SKILLS DEVELOPMENT: HCPCS

## 2018-03-14 NOTE — THERAPY TREATMENT NOTE
Outpatient Speech Language Pathology   Adult Speech Language Cognitive Treatment Note  [facility]     Patient Name: Lana Yañez  : 1945  MRN: [id number]  Today's Date: 3/14/2018       Visit Date: 2018   Patient Active Problem List   Diagnosis   • Hyperlipidemia   • Vitamin deficiency   • Essential hypertension   • Rheumatoid arthritis involving both hands   • Fatigue   • ANTOINE (dyspnea on exertion)   • Dysuria   • Urge incontinence of urine   • Hypovitaminosis D   • Sleep apnea   • Encounter for screening colonoscopy   • Bronchitis   • Cough   • Generalized osteoarthritis of multiple sites   • Morbid obesity with BMI of 40.0-44.9, adult   • Cellulitis of right elbow due to MRSA   • Medicare annual wellness visit, initial   • Hospital discharge follow-up   • Displacement of lumbar intervertebral disc without myelopathy   • Lumbar disc herniation   • Atrial fibrillation with RVR   • History of MRSA infection   • Left-sided weakness   • Atrial fibrillation   • Left shoulder pain   • Relative lymphocytosis   • Nausea   • Weakness   • Controlled substance agreement signed   • Coronary artery disease involving native coronary artery of native heart with angina pectoris   • Shortness of breath   • Lower extremity edema   • Acute on chronic diastolic heart failure   • Adhesive capsulitis of left shoulder   • CVA tenderness   • Costochondritis, acute   • Allergic reaction   • Coronary artery embolism     Visit Dx:    ICD-10-CM ICD-9-CM   1. Cerebrovascular accident with cognitive communication deficit I63.9 437.9    R41.841 799.52           SLP OP Goals     Row Name 18 1330          Subjective Comments    Subjective Comments Mrs. Yañez was pleasant and cooperative. She is making steady progress towards her goals.  She states that she is feeling much better following her nebulization treatments.   -        Memory Goals    Patient’s memory skills will be enhanced as reported by patient by  utilizing internal memory strategies to recall up to 3 pieces of information after a 5- minute delay 90%:;without cues  -HS     Status: Patient’s memory skills will be enhanced as reported by patient by utilizing internal memory strategies to recall up to 3 pieces of information after a 5- minute delay Progressing as expected  -HS     Comments: Patient’s memory skills will be enhanced as reported by patient by utilizing internal memory strategies to recall up to 3 pieces of information after a 5- minute delay 90% without cues. Goal criteria met x1 session. Will continue goal for carryover/generalization.  -HS     Patient’s memory skills will be enhanced as reported by patient by using external memory aides 90%:;without cues  -HS     Status: Patient’s memory skills will be enhanced as reported by patient by using external memory aides Progressing as expected  -HS     Comments: Patient’s memory skills will be enhanced as reported by patient by using external memory aides Independent with calendar and note taking for recall of information (90% or greater). Patient did report difficulty recalling whether or not she had taken her medication last night. SLP recommended the patient use a pill box to organize her medications that way she will be able to recall taking them. Patient agreeable.   -HS       User Key  (r) = Recorded By, (t) = Taken By, (c) = Cosigned By    Initials Name Provider Type    AMY Brown MS CCC-SLP Speech and Language Pathologist              OP SLP Education     Row Name 03/14/18 7558       Education    Education Provided --   Home exercises for independent practice of learned strategies/techniques.   -HS    Assessed Learning needs;Learning motivation  -HS    Learning Motivation Strong  -HS    Learning Method Explanation  -HS    Teaching Response Verbalized understanding  -HS      User Key  (r) = Recorded By, (t) = Taken By, (c) = Cosigned By    Initials Name Effective Dates    AMY Snyder  MS LUKASZ Brown-SLP 04/13/15 -               OP SLP Assessment/Plan - 03/14/18 1335        SLP Plan    Plan Comments Patient making steady progress towards goals. Anticipate 1-2 more therapy sessions to establish a home maintenance program. Patient agreeable and feels she is doing better.  -HS      User Key  (r) = Recorded By, (t) = Taken By, (c) = Cosigned By    Initials Name Provider Type    HS Lillian Brown MS CCC-SLP Speech and Language Pathologist        Time Calculation:   SLP Start Time: 1245  SLP Stop Time: 1330  SLP Time Calculation (min): 45 min    Therapy Charges for Today     Code Description Service Date Service Provider Modifiers Qty    [id number] HC ST DEV OF COGN SKILLS EACH 15 MIN 3/14/2018 Lillian Brown MS CCC-SLP  3            Lillian Brown MS CCC-SLP  3/14/2018

## 2018-03-21 ENCOUNTER — APPOINTMENT (OUTPATIENT)
Dept: SPEECH THERAPY | Facility: HOSPITAL | Age: 73
End: 2018-03-21

## 2018-03-27 RX ORDER — WARFARIN SODIUM 5 MG/1
TABLET ORAL
Qty: 90 TABLET | Refills: 0 | Status: SHIPPED | OUTPATIENT
Start: 2018-03-27 | End: 2018-06-22 | Stop reason: SDUPTHER

## 2018-03-28 ENCOUNTER — HOSPITAL ENCOUNTER (OUTPATIENT)
Dept: SPEECH THERAPY | Facility: HOSPITAL | Age: 73
Setting detail: THERAPIES SERIES
Discharge: HOME | End: 2018-03-28

## 2018-03-28 ENCOUNTER — HOSPITAL ENCOUNTER (OUTPATIENT)
Dept: CARDIOLOGY | Facility: HOSPITAL | Age: 73
Setting detail: RECURRING SERIES
Discharge: HOME | End: 2018-03-28

## 2018-03-28 DIAGNOSIS — IMO0002 CEREBROVASCULAR ACCIDENT WITH COGNITIVE COMMUNICATION DEFICIT: Primary | ICD-10-CM

## 2018-03-28 PROCEDURE — 36416 COLLJ CAPILLARY BLOOD SPEC: CPT

## 2018-03-28 PROCEDURE — G9169 MEMORY GOAL STATUS: HCPCS

## 2018-03-28 PROCEDURE — G9170 MEMORY D/C STATUS: HCPCS

## 2018-03-28 PROCEDURE — 85610 PROTHROMBIN TIME: CPT

## 2018-03-28 PROCEDURE — G9168 MEMORY CURRENT STATUS: HCPCS

## 2018-03-28 NOTE — THERAPY DISCHARGE NOTE
Outpatient Speech Language Pathology   Adult Speech Language Cognitive Treatment Note/Discharge Summary  [facility]     Patient Name: Lana Yañez  : 1945  MRN: [id number]  Today's Date: 3/28/2018       Visit Date: 2018   Patient Active Problem List   Diagnosis   • Hyperlipidemia   • Vitamin deficiency   • Essential hypertension   • Rheumatoid arthritis involving both hands   • Fatigue   • ANTOINE (dyspnea on exertion)   • Dysuria   • Urge incontinence of urine   • Hypovitaminosis D   • Sleep apnea   • Encounter for screening colonoscopy   • Bronchitis   • Cough   • Generalized osteoarthritis of multiple sites   • Morbid obesity with BMI of 40.0-44.9, adult   • Cellulitis of right elbow due to MRSA   • Medicare annual wellness visit, initial   • Hospital discharge follow-up   • Displacement of lumbar intervertebral disc without myelopathy   • Lumbar disc herniation   • Atrial fibrillation with RVR   • History of MRSA infection   • Left-sided weakness   • Atrial fibrillation   • Left shoulder pain   • Relative lymphocytosis   • Nausea   • Weakness   • Controlled substance agreement signed   • Coronary artery disease involving native coronary artery of native heart with angina pectoris   • Shortness of breath   • Lower extremity edema   • Acute on chronic diastolic heart failure   • Adhesive capsulitis of left shoulder   • CVA tenderness   • Costochondritis, acute   • Allergic reaction   • Coronary artery embolism     Visit Dx:    ICD-10-CM ICD-9-CM   1. Cerebrovascular accident with cognitive communication deficit I63.9 437.9    R41.841 799.52           SLP OP Goals     Row Name 18 1320          Subjective Comments Mrs. Yañez is happy with her progress in therapy. She reports an upcoming sleep study and cardiac rehab, both of which she is hopeful will help improve her fatigue.  -HS          Status: Patient will be able to remember information needed to participate in avocational activities  Achieved  -HS    Comments: Patient will be able to remember information needed to participate in avocational activities Patient is independent with both internal and external memory strategies. She reports improvement in her memory skills during avocational activities.   -HS    Patient’s memory skills will be enhanced as reported by patient by utilizing internal memory strategies to recall up to 3 pieces of information after a 5- minute delay 90%:;without cues  -HS    Status: Patient’s memory skills will be enhanced as reported by patient by utilizing internal memory strategies to recall up to 3 pieces of information after a 5- minute delay Achieved  -HS    Comments: Patient’s memory skills will be enhanced as reported by patient by utilizing internal memory strategies to recall up to 3 pieces of information after a 5- minute delay 95% without cues.   -HS    Patient’s memory skills will be enhanced as reported by patient by using external memory aides 90%:;without cues  -HS    Status: Patient’s memory skills will be enhanced as reported by patient by using external memory aides Achieved  -HS    Comments: Patient’s memory skills will be enhanced as reported by patient by using external memory aides Patient states she is picking up a pill box to help organize her medications. She has been successful in keeping up with multiple medical appointments and information through the use of memory aides.   -HS      User Key  (r) = Recorded By, (t) = Taken By, (c) = Cosigned By    Initials Name Provider Type    AMY Brown MS CCC-SLP Speech and Language Pathologist              OP SLP Education     Row Name 03/28/18 2626       Education    Barriers to Learning No barriers identified  -HS    Education Provided --   Home maintenance program.  -HS    Assessed Learning readiness;Learning preferences;Learning motivation;Learning needs  -HS    Learning Motivation Strong  -    Learning Method  Explanation;Demonstration;Written materials  -    Teaching Response Verbalized understanding;Demonstrated understanding  -HS      User Key  (r) = Recorded By, (t) = Taken By, (c) = Cosigned By    Initials Name Effective Dates    AMY Brown MS CCC-SLP 04/13/15 -               OP SLP Assessment/Plan - 03/28/18 1320        SLP Plan    Plan Comments Discharge to home maintenance program.  -HS      User Key  (r) = Recorded By, (t) = Taken By, (c) = Cosigned By    Initials Name Provider Type    AMY Brown MS CCC-SLP Speech and Language Pathologist           SLP Outcome Measures (last 72 hours)      SLP Outcome Measures     Row Name 03/28/18 1320             SLP Outcome Measures    Outcome Measure Used? Adult NOMS  -HS         FCM Scores    FCM Chosen Memory  -HS      Memory FCM Score 6  -HS        User Key  (r) = Recorded By, (t) = Taken By, (c) = Cosigned By    Initials Name Effective Dates    AMY Brown MS CCC-SLP 04/13/15 -          Time Calculation:   SLP Start Time: 1245  SLP Stop Time: 1320  SLP Time Calculation (min): 35 min    Therapy Charges for Today     Code Description Service Date Service Provider Modifiers Qty    85136468969 HC ST MEMORY CURRENT 3/28/2018 Lillian Brown MS CCC-SLP GN, CI 1    81214362392 HC ST MEMORY PROJECTED 3/28/2018 Lillian Brown MS CCC-SLP GN, CI 1    80234109361 HC ST MEMORY DISCHARGE 3/28/2018 Lillian Brown MS CCC-SLP GN, CI 1        SLP G-Codes  SLP NOMS Used?: Yes  Functional Limitations: Memory  Memory Current Status (): At least 1 percent but less than 20 percent impaired, limited or restricted  Memory Goal Status (): At least 1 percent but less than 20 percent impaired, limited or restricted  Memory Discharge Status (): At least 1 percent but less than 20 percent impaired, limited or restricted      OP SLP Discharge Summary  Date of Discharge: 03/28/18  Reason for Discharge: all goals and outcomes met, no further needs  identified  Progress Toward Achieving Short/long Term Goals: all goals met within established timelines  Discharge Destination: home      Lillian Brown MS CCC-SLP  3/28/2018

## 2018-04-02 ENCOUNTER — OFFICE VISIT (OUTPATIENT)
Dept: INTERNAL MEDICINE | Facility: CLINIC | Age: 73
End: 2018-04-02

## 2018-04-02 VITALS
DIASTOLIC BLOOD PRESSURE: 83 MMHG | BODY MASS INDEX: 42.22 KG/M2 | HEART RATE: 89 BPM | TEMPERATURE: 97.8 F | HEIGHT: 65 IN | SYSTOLIC BLOOD PRESSURE: 130 MMHG | OXYGEN SATURATION: 96 % | WEIGHT: 253.4 LBS

## 2018-04-02 DIAGNOSIS — I48.0 PAROXYSMAL ATRIAL FIBRILLATION (HCC): ICD-10-CM

## 2018-04-02 DIAGNOSIS — I10 ESSENTIAL HYPERTENSION: ICD-10-CM

## 2018-04-02 DIAGNOSIS — Z12.31 VISIT FOR SCREENING MAMMOGRAM: Primary | ICD-10-CM

## 2018-04-02 DIAGNOSIS — E78.2 MIXED HYPERLIPIDEMIA: ICD-10-CM

## 2018-04-02 DIAGNOSIS — R60.0 LOWER EXTREMITY EDEMA: ICD-10-CM

## 2018-04-02 DIAGNOSIS — M54.50 CHRONIC MIDLINE LOW BACK PAIN WITHOUT SCIATICA: ICD-10-CM

## 2018-04-02 DIAGNOSIS — E66.01 MORBID OBESITY WITH BMI OF 40.0-44.9, ADULT (HCC): ICD-10-CM

## 2018-04-02 DIAGNOSIS — M05.742 RHEUMATOID ARTHRITIS INVOLVING BOTH HANDS WITH POSITIVE RHEUMATOID FACTOR (HCC): ICD-10-CM

## 2018-04-02 DIAGNOSIS — E55.9 HYPOVITAMINOSIS D: ICD-10-CM

## 2018-04-02 DIAGNOSIS — M05.741 RHEUMATOID ARTHRITIS INVOLVING BOTH HANDS WITH POSITIVE RHEUMATOID FACTOR (HCC): ICD-10-CM

## 2018-04-02 DIAGNOSIS — G89.29 CHRONIC MIDLINE LOW BACK PAIN WITHOUT SCIATICA: ICD-10-CM

## 2018-04-02 PROCEDURE — 99214 OFFICE O/P EST MOD 30 MIN: CPT | Performed by: INTERNAL MEDICINE

## 2018-04-02 RX ORDER — FOLIC ACID 1 MG/1
TABLET ORAL
COMMUNITY
Start: 2018-03-25 | End: 2018-10-04

## 2018-04-03 ENCOUNTER — HOSPITAL ENCOUNTER (OUTPATIENT)
Dept: SLEEP MEDICINE | Facility: HOSPITAL | Age: 73
Discharge: HOME | End: 2018-04-03
Attending: INTERNAL MEDICINE | Admitting: INTERNAL MEDICINE

## 2018-04-03 DIAGNOSIS — I63.9 ACUTE CVA (CEREBROVASCULAR ACCIDENT) (HCC): ICD-10-CM

## 2018-04-03 DIAGNOSIS — I48.19 PERSISTENT ATRIAL FIBRILLATION (HCC): ICD-10-CM

## 2018-04-03 DIAGNOSIS — G47.33 OSA (OBSTRUCTIVE SLEEP APNEA): ICD-10-CM

## 2018-04-03 LAB
25(OH)D3+25(OH)D2 SERPL-MCNC: 21.2 NG/ML (ref 30–100)
ALBUMIN SERPL-MCNC: 4.2 G/DL (ref 3.5–5.2)
ALBUMIN/GLOB SERPL: 1.6 G/DL
ALP SERPL-CCNC: 60 U/L (ref 39–117)
ALT SERPL-CCNC: 29 U/L (ref 1–33)
APPEARANCE UR: (no result)
AST SERPL-CCNC: 24 U/L (ref 1–32)
BACTERIA #/AREA URNS HPF: ABNORMAL /HPF
BASOPHILS # BLD AUTO: 0.01 10*3/MM3 (ref 0–0.2)
BASOPHILS NFR BLD AUTO: 0.1 % (ref 0–1.5)
BILIRUB SERPL-MCNC: 0.4 MG/DL (ref 0.1–1.2)
BILIRUB UR QL STRIP: NEGATIVE
BUN SERPL-MCNC: 14 MG/DL (ref 8–23)
BUN/CREAT SERPL: 17.1 (ref 7–25)
CALCIUM SERPL-MCNC: 9.8 MG/DL (ref 8.6–10.5)
CASTS URNS MICRO: ABNORMAL
CHLORIDE SERPL-SCNC: 104 MMOL/L (ref 98–107)
CHOLEST SERPL-MCNC: 107 MG/DL (ref 0–200)
CO2 SERPL-SCNC: 29.3 MMOL/L (ref 22–29)
COLOR UR: YELLOW
CREAT SERPL-MCNC: 0.82 MG/DL (ref 0.57–1)
EOSINOPHIL # BLD AUTO: 0.06 10*3/MM3 (ref 0–0.7)
EOSINOPHIL NFR BLD AUTO: 0.8 % (ref 0.3–6.2)
EPI CELLS #/AREA URNS HPF: ABNORMAL /HPF
ERYTHROCYTE [DISTWIDTH] IN BLOOD BY AUTOMATED COUNT: 17.7 % (ref 11.7–13)
GFR SERPLBLD CREATININE-BSD FMLA CKD-EPI: 69 ML/MIN/1.73
GFR SERPLBLD CREATININE-BSD FMLA CKD-EPI: 84 ML/MIN/1.73
GLOBULIN SER CALC-MCNC: 2.7 GM/DL
GLUCOSE SERPL-MCNC: 119 MG/DL (ref 65–99)
GLUCOSE UR QL: NEGATIVE
HCT VFR BLD AUTO: 43.6 % (ref 35.6–45.5)
HDLC SERPL-MCNC: 63 MG/DL (ref 40–60)
HGB BLD-MCNC: 13.2 G/DL (ref 11.9–15.5)
HGB UR QL STRIP: NEGATIVE
IMM GRANULOCYTES # BLD: 0 10*3/MM3 (ref 0–0.03)
IMM GRANULOCYTES NFR BLD: 0 % (ref 0–0.5)
KETONES UR QL STRIP: NEGATIVE
LDLC SERPL CALC-MCNC: 32 MG/DL (ref 0–100)
LDLC/HDLC SERPL: 0.51 {RATIO}
LEUKOCYTE ESTERASE UR QL STRIP: NEGATIVE
LYMPHOCYTES # BLD AUTO: 3.19 10*3/MM3 (ref 0.9–4.8)
LYMPHOCYTES NFR BLD AUTO: 41.8 % (ref 19.6–45.3)
MCH RBC QN AUTO: 29.6 PG (ref 26.9–32)
MCHC RBC AUTO-ENTMCNC: 30.3 G/DL (ref 32.4–36.3)
MCV RBC AUTO: 97.8 FL (ref 80.5–98.2)
MONOCYTES # BLD AUTO: 0.82 10*3/MM3 (ref 0.2–1.2)
MONOCYTES NFR BLD AUTO: 10.7 % (ref 5–12)
NEUTROPHILS # BLD AUTO: 3.55 10*3/MM3 (ref 1.9–8.1)
NEUTROPHILS NFR BLD AUTO: 46.6 % (ref 42.7–76)
NITRITE UR QL STRIP: POSITIVE
PH UR STRIP: 6 [PH] (ref 5–8)
PLATELET # BLD AUTO: 204 10*3/MM3 (ref 140–500)
POTASSIUM SERPL-SCNC: 4.6 MMOL/L (ref 3.5–5.2)
PROT SERPL-MCNC: 6.9 G/DL (ref 6–8.5)
PROT UR QL STRIP: (no result)
RBC # BLD AUTO: 4.46 10*6/MM3 (ref 3.9–5.2)
RBC #/AREA URNS HPF: ABNORMAL /HPF
SODIUM SERPL-SCNC: 144 MMOL/L (ref 136–145)
SP GR UR: 1.03 (ref 1–1.03)
T4 FREE SERPL-MCNC: 1.45 NG/DL (ref 0.93–1.7)
TRIGL SERPL-MCNC: 60 MG/DL (ref 0–150)
TSH SERPL DL<=0.005 MIU/L-ACNC: 0.86 MIU/ML (ref 0.27–4.2)
UROBILINOGEN UR STRIP-MCNC: (no result) MG/DL
VLDLC SERPL CALC-MCNC: 12 MG/DL (ref 5–40)
WBC # BLD AUTO: 7.63 10*3/MM3 (ref 4.5–10.7)
WBC #/AREA URNS HPF: ABNORMAL /HPF

## 2018-04-03 PROCEDURE — 95811 POLYSOM 6/>YRS CPAP 4/> PARM: CPT

## 2018-04-04 ENCOUNTER — APPOINTMENT (OUTPATIENT)
Dept: SPEECH THERAPY | Facility: HOSPITAL | Age: 73
End: 2018-04-04

## 2018-04-09 ENCOUNTER — TELEPHONE (OUTPATIENT)
Dept: SLEEP MEDICINE | Facility: HOSPITAL | Age: 73
End: 2018-04-09

## 2018-04-09 NOTE — TELEPHONE ENCOUNTER
Spoke with pt about sleep study results. Wants orders sent to [facility]. Will follow up at [facility]

## 2018-04-10 RX ORDER — ERGOCALCIFEROL 1.25 MG/1
50000 CAPSULE ORAL WEEKLY
Qty: 12 CAPSULE | Refills: 0 | Status: SHIPPED | OUTPATIENT
Start: 2018-04-10 | End: 2018-06-28 | Stop reason: SDUPTHER

## 2018-04-11 ENCOUNTER — APPOINTMENT (OUTPATIENT)
Dept: SPEECH THERAPY | Facility: HOSPITAL | Age: 73
End: 2018-04-11

## 2018-04-15 NOTE — PROGRESS NOTES
Karl Yañez is a 72 y.o. female.   She is here to follow-up for visit for screening mammogram chronic low back pain hypertension mixed hyperlipidemia PAF morbid obesity hypovitaminosis D rheumatoid arthritis of both hands lower extremity edema and everything is stable with no changes needed except for back pain  History of Present Illness   She is here to follow-up for visit for screening mammogram chronic low back pain which is getting worse hypertension which is well-controlled on current medication mixed hyper lipidemia which is stable on Lipitor and PAF which is rate controlled and steady on current medication morbid obesity which is getting worse not better hypovitaminosis D which has been stable and rheumatoid arthritis of both hands which has been stable and lower extremity edema which is ongoing chronic and tolerable and this may be lymphedema as well and her main complaint is back pain which is not getting better  The following portions of the patient's history were reviewed and updated as appropriate: allergies, current medications, past family history, past medical history, past social history, past surgical history and problem list.    Review of Systems   Cardiovascular: Positive for leg swelling.   Musculoskeletal: Positive for back pain.   All other systems reviewed and are negative.      Objective   Physical Exam   Constitutional: She is oriented to person, place, and time. She appears well-developed and well-nourished. She is cooperative.   HENT:   Head: Normocephalic and atraumatic.   Right Ear: Hearing, tympanic membrane, external ear and ear canal normal.   Left Ear: Hearing, tympanic membrane, external ear and ear canal normal.   Nose: Nose normal.   Mouth/Throat: Uvula is midline, oropharynx is clear and moist and mucous membranes are normal.   Eyes: Conjunctivae, EOM and lids are normal. Pupils are equal, round, and reactive to light.   Neck: Phonation normal. Neck supple.  Carotid bruit is not present.   Cardiovascular: Normal rate, regular rhythm and normal heart sounds.  Exam reveals no gallop and no friction rub.    No murmur heard.  Pulmonary/Chest: Effort normal and breath sounds normal. No respiratory distress.   Abdominal: Soft. Bowel sounds are normal. She exhibits no distension and no mass. There is no hepatosplenomegaly. There is no tenderness. There is no rebound and no guarding. No hernia.   Musculoskeletal: She exhibits edema (both legs chronically).        Lumbar back: She exhibits decreased range of motion and pain.   Neurological: She is alert and oriented to person, place, and time. Coordination and gait normal.   Skin: Skin is warm and dry.   Psychiatric: She has a normal mood and affect. Her speech is normal and behavior is normal. Judgment and thought content normal.   Nursing note and vitals reviewed.      Assessment/Plan   Diagnoses and all orders for this visit:    Visit for screening mammogram  -     Mammo Screening Bilateral With CAD; Future    Chronic midline low back pain without sciatica  -     Ambulatory Referral to Physical Therapy Evaluate and treat    Essential hypertension  -     Lipid Panel With LDL / HDL Ratio  -     CBC & Differential  -     Comprehensive Metabolic Panel  -     T4, Free  -     TSH  -     Urinalysis With Microscopic - Urine, Clean Catch    Mixed hyperlipidemia  -     Lipid Panel With LDL / HDL Ratio  -     CBC & Differential  -     Comprehensive Metabolic Panel  -     T4, Free  -     TSH  -     Urinalysis With Microscopic - Urine, Clean Catch    Paroxysmal atrial fibrillation    Morbid obesity with BMI of 40.0-44.9, adult    Hypovitaminosis D  -     Vitamin D 25 Hydroxy    Rheumatoid arthritis involving both hands with positive rheumatoid factor    Lower extremity edema    Other orders  -     Microscopic Examination  -     Vitamin D 25 Hydroxy      Visit for screening mammogram we will schedule  Chronic low back pain with decreased  range of motion we will refer her to physical therapy for evaluate and treat  Mixed hyper lipidemia stable on Lipitor at this time and we will check lipid panel today  PAF rate controlled and steady on current medication no changes needed  Morbid obesity getting worse not better and she will work on diet and exercise once we get her back in order  Hypovitaminosis D we will check vitamin D level but has been stable  Rheumatoid arthritis stable on current medications  Lower extremity edema stable and chronic in nature as well  Hypertension well-controlled on current medication no changes needed

## 2018-04-18 ENCOUNTER — APPOINTMENT (OUTPATIENT)
Dept: SPEECH THERAPY | Facility: HOSPITAL | Age: 73
End: 2018-04-18

## 2018-04-24 ENCOUNTER — TELEPHONE (OUTPATIENT)
Dept: NEUROSURGERY | Facility: CLINIC | Age: 73
End: 2018-04-24

## 2018-04-24 NOTE — TELEPHONE ENCOUNTER
Pt was seen by Dr. Perez in October 2017 and he had sent her to the ER for heart issues. She would like another appointment for her low back pain. Does she need new imaging before she comes back in? Pt number is [phone].

## 2018-04-26 ENCOUNTER — HOSPITAL ENCOUNTER (OUTPATIENT)
Dept: PHYSICAL THERAPY | Facility: HOSPITAL | Age: 73
Setting detail: THERAPIES SERIES
Discharge: HOME | End: 2018-04-26

## 2018-04-26 ENCOUNTER — HOSPITAL ENCOUNTER (OUTPATIENT)
Dept: CARDIOLOGY | Facility: HOSPITAL | Age: 73
Setting detail: RECURRING SERIES
Discharge: HOME | End: 2018-04-26

## 2018-04-26 DIAGNOSIS — M54.50 CHRONIC BILATERAL LOW BACK PAIN WITHOUT SCIATICA: ICD-10-CM

## 2018-04-26 DIAGNOSIS — R26.2 DIFFICULTY WALKING: ICD-10-CM

## 2018-04-26 DIAGNOSIS — G89.29 CHRONIC BILATERAL LOW BACK PAIN WITHOUT SCIATICA: ICD-10-CM

## 2018-04-26 DIAGNOSIS — G89.29 CHRONIC MIDLINE LOW BACK PAIN WITHOUT SCIATICA: Primary | ICD-10-CM

## 2018-04-26 DIAGNOSIS — M54.50 CHRONIC MIDLINE LOW BACK PAIN WITHOUT SCIATICA: Primary | ICD-10-CM

## 2018-04-26 PROCEDURE — G8979 MOBILITY GOAL STATUS: HCPCS | Performed by: PHYSICAL THERAPIST

## 2018-04-26 PROCEDURE — 36416 COLLJ CAPILLARY BLOOD SPEC: CPT

## 2018-04-26 PROCEDURE — G8978 MOBILITY CURRENT STATUS: HCPCS | Performed by: PHYSICAL THERAPIST

## 2018-04-26 PROCEDURE — 97161 PT EVAL LOW COMPLEX 20 MIN: CPT | Performed by: PHYSICAL THERAPIST

## 2018-04-26 PROCEDURE — 97110 THERAPEUTIC EXERCISES: CPT | Performed by: PHYSICAL THERAPIST

## 2018-04-26 PROCEDURE — 85610 PROTHROMBIN TIME: CPT

## 2018-04-26 NOTE — THERAPY EVALUATION
Outpatient Physical Therapy Ortho Initial Evaluation  [facility]     Patient Name: Lana Yañez  : 1945  MRN: [id number]  Today's Date: 2018      Visit Date: 2018    Patient Active Problem List   Diagnosis   • Hyperlipidemia   • Vitamin deficiency   • Essential hypertension   • Rheumatoid arthritis involving both hands   • Fatigue   • ANTOINE (dyspnea on exertion)   • Dysuria   • Urge incontinence of urine   • Hypovitaminosis D   • Sleep apnea   • Encounter for screening colonoscopy   • Bronchitis   • Cough   • Generalized osteoarthritis of multiple sites   • Morbid obesity with BMI of 40.0-44.9, adult   • Cellulitis of right elbow due to MRSA   • Medicare annual wellness visit, initial   • Hospital discharge follow-up   • Displacement of lumbar intervertebral disc without myelopathy   • Lumbar disc herniation   • Atrial fibrillation with RVR   • History of MRSA infection   • Left-sided weakness   • Atrial fibrillation   • Left shoulder pain   • Relative lymphocytosis   • Nausea   • Weakness   • Controlled substance agreement signed   • Coronary artery disease involving native coronary artery of native heart with angina pectoris   • Shortness of breath   • Lower extremity edema   • Acute on chronic diastolic heart failure   • Adhesive capsulitis of left shoulder   • CVA tenderness   • Costochondritis, acute   • Allergic reaction   • Coronary artery embolism   • Visit for screening mammogram   • Chronic midline low back pain without sciatica        Past Medical History:   Diagnosis Date   • Acute on chronic diastolic heart failure    • Arthritis    • Asthma    • Atrial fibrillation     Persistent; on warfarin   • Bronchitis    • Cellulitis of right elbow     due to MRSA   • CHF (congestive heart failure)    • COPD (chronic obstructive pulmonary disease)    • Coronary artery disease     Cardiac catheterization completed; 90% PDA stenosis with medical management recommended   • Coronary  artery disease involving native coronary artery of native heart with angina pectoris with documented spasm    • Disease of thyroid gland    • Displacement of lumbar intervertebral disc without myelopathy    • Dizziness    • Essential hypertension    • Generalized osteoarthritis of multiple sites    • History of rheumatic fever    • History of transfusion    • Hx of bone density study     10/23/2014   • Hyperlipidemia    • Hypovitaminosis D    • Left arm pain    • Leg swelling    • Low back pain    • Lower extremity edema    • Malaise and fatigue    • Mitral valve disease     Moderate mitral valve prolapse and moderate mitral regurgitation   • Mitral valve insufficiency    • Morbid obesity with BMI of 40.0-44.9, adult    • MRSA infection    • NSTEMI (non-ST elevated myocardial infarction)    • PAF (paroxysmal atrial fibrillation)    • RA (rheumatoid arthritis)     involving both hands   • Sleep apnea    • SOB (shortness of breath)    • Stroke     left side weakness   • Urge incontinence of urine    • Vitamin D deficiency         Past Surgical History:   Procedure Laterality Date   • BREAST SURGERY      right side lumpectomy with biopsy   • CARDIAC CATHETERIZATION Left 10/20/2017    Procedure: Cardiac Catheterization/Vascular Study;  Surgeon: Alphonso Olmedo MD;  Location: [facility] INVASIVE LOCATION;  Service:    • CARDIAC CATHETERIZATION N/A 10/20/2017    +2 mitral regurgitation, left main 10% stenosis, mid to distal LAD 10% diffuse stenosis, circumflex 10% diffuse stenosis, RCA 10% proximal stenosis, and distal PDA consistent with coronary embolus with a lesion of 90% too small to consider coronary intervention; medical management recommended   • EYE SURGERY      laser surgery for glaucoma and left eye cataracts removed   • HYSTERECTOMY      10+ years ago   • JOINT REPLACEMENT  2005; 2006    bilateral knees and left rotater   • KNEE SURGERY     • MAMMO BILATERAL  2016    [facility]        Visit Dx:      "ICD-10-CM ICD-9-CM   1. Chronic midline low back pain without sciatica M54.5 724.2    G89.29 338.29   2. Chronic bilateral low back pain without sciatica M54.5 724.2    G89.29 338.29   3. Difficulty walking R26.2 719.7                 PT Ortho     Row Name 04/26/18 1100       Subjective Comments    Subjective Comments States she has had back pain for quite a while.  States she was diagnosed with a bulging disc in October but had a heart attack in Dr. Perez's office.  She recovered in the hospital without surgery. She says the pain is consistently bad across her low back.  She c/o recent inccrease in \"popping\" sensation.Her pain is worse with standing or walking.  She limits ambulation to 50 ft or less. She hsa been using a rollator since Nov-Dec of last year. She is driving.  Her sleep is disturbed, she often moves from sidelying to supine. Heat provides some relief.  She is planning to start cardiac rehab after she finishes outpatient rehab.  -GR       Subjective Pain    Able to rate subjective pain? yes  -GR    Pre-Treatment Pain Level 9  -GR    Post-Treatment Pain Level 9  -GR    Subjective Pain Comment at best 7/10 lumbar  -GR       Posture/Observations    Posture/Observations Comments Forward head, hips to L, decreased lordosis  -GR       Quarter Clearing    Quarter Clearing Lower Quarter Clearing  -GR       DTR- Lower Quarter Clearing    Patellar tendon (L2-4) 1- Minimal response;Bilateral:  -GR    Achilles tendon (S1-2) 1- Minimal response;Bilateral:  -GR       Neural Tension Signs- Lower Quarter Clearing    SLR Negative;Bilateral:   unable L actively  -GR       Myotomal Screen- Lower Quarter Clearing    Hip flexion (L2) Bilateral:;4- (Good -)  -GR    Knee extension (L3) Right:;4 (Good);Left:;3+ (Fair +)  -GR    Ankle DF (L4) Bilateral:;4 (Good)  -GR    Great toe extension (L5) Bilateral:;4 (Good)  -GR    Knee flexion (S2) Bilateral:;3+ (Fair +)  -GR       Lumbar ROM Screen- Lower Quarter Clearing    Lumbar " Flexion Impaired   to knees  -GR    Lumbar Extension Impaired   10%  -GR    Lumbar Lateral Flexion Impaired   to mid thigh B and painful  -GR    Lumbar Rotation Impaired   50% and painful B  -GR       Flexibility    Flexibility Tested? Lower Extremity  -GR       Lower Extremity Flexibility    Hamstrings Bilateral:;Moderately limited  -GR       Gait/Stairs Assessment/Training    Comment (Gait/Stairs) Amb with RW, slow héctor, flexed trunk, decreased push off L>R  -GR      User Key  (r) = Recorded By, (t) = Taken By, (c) = Cosigned By    Initials Name Provider Type    GR Agustin Siegel, PT Physical Therapist                      Therapy Education  Education Details: 90/90 lumbar decompression, POC, initial HEP, spine pathologies  Given: HEP, Symptoms/condition management, Posture/body mechanics  Program: New  How Provided: Verbal, Written  Provided to: Patient  Level of Understanding: Teach back education performed, Verbalized           PT OP Goals     Row Name 04/26/18 1200          PT Short Term Goals    STG Date to Achieve 05/11/18  -GR     STG 1 Patient will be independent with initial HEP.  -GR     STG 1 Progress New  -GR     STG 2 Patient will demonstrate lumbar ROM to 50% of WFL or greater to ease transfers.  -GR     STG 2 Progress New  -GR     STG 3 Pain at worst 8/10 with ADLs.  -GR     STG 3 Progress New  -GR        Long Term Goals    LTG Date to Achieve 06/10/18  -GR     LTG 1 Patient will be independent with progressive HEP for long term management of current condition.  -GR     LTG 1 Progress New  -GR     LTG 2 Patient will demonstrate lumbar ROM to 75% of WFL for improved gait pattern and community navigation.  -GR     LTG 2 Progress New  -GR     LTG 3 Patient will score </=50% disability on the modified EDWARD to indicate improved perceived ADL performance.  -GR     LTG 3 Progress New  -GR     LTG 4 Patient will report pain at worst 5/10 or less with ADLs.  -     LTG 4 Progress New  -        Time  Calculation    PT Goal Re-Cert Due Date 07/25/18  -GR       User Key  (r) = Recorded By, (t) = Taken By, (c) = Cosigned By    Initials Name Provider Type    GR Agustin Siegel, PT Physical Therapist                PT Assessment/Plan     Row Name 04/26/18 1309          PT Assessment    Functional Limitations Decreased safety during functional activities;Impaired gait;Limitation in home management;Limitations in community activities;Limitations in functional capacity and performance;Performance in leisure activities;Performance in self-care ADL  -GR     Impairments Balance;Endurance;Gait;Muscle strength;Pain;Poor body mechanics;Posture;Joint mobility;Range of motion  -GR     Assessment Comments 71 y/o F referred to outpatient PT for c/o low back, insidious onset worsening fall 2017; rehab delayed per myocardial infarction October 2017.  Patient presents with impaired trunk ROM all directions, decreased LE strength and inability to actively perform L SLR.  Patient perceived disability as per the modified Oswestry is 80%, where 100% = complete disability. Her condition is stable. Recommend skilled PT to address current deficits and promote improved functional mobility.  -GR     Please refer to paper survey for additional self-reported information Yes  -GR     Rehab Potential Good  -GR     Patient/caregiver participated in establishment of treatment plan and goals Yes  -GR     Patient would benefit from skilled therapy intervention Yes  -GR        PT Plan    PT Frequency 2x/week  -GR     Predicted Duration of Therapy Intervention (OT Eval) 4-6 weeks  -GR     Planned CPT's? PT EVAL LOW COMPLEXITY: 34217;PT RE-EVAL: 51212;PT THER PROC EA 15 MIN: 26485;PT THER ACT EA 15 MIN: 80716;PT MANUAL THERAPY EA 15 MIN: 61601;PT NEUROMUSC RE-EDUCATION EA 15 MIN: 51388;PT GAIT TRAINING EA 15 MIN: 43749;PT AQUATIC THERAPY EA 15 MIN: 88273;PT SELF CARE/HOME MGMT/TRAIN EA 15: 91919;PT HOT OR COLD PACK TREAT MCARE;PT ELECTRICAL STIM  "UNATTEND: ;PT ULTRASOUND EA 15 MIN: 57693;PT TRACTION LUMBAR: 59846  -GR     Physical Therapy Interventions (Optional Details) balance training;aquatics exercise;gait training;home exercise program;joint mobilization;lumbar stabilization;manual therapy techniques;modalities;neuromuscular re-education;patient/family education;postural re-education;ROM (Range of Motion);strengthening;stretching;transfer training  -GR     PT Plan Comments Begin with nustep, review HEP and perform. Add gentle core strengthening/TA work.  Modalities as indicated.  -GR       User Key  (r) = Recorded By, (t) = Taken By, (c) = Cosigned By    Initials Name Provider Type    ARACELY Siegel, PT Physical Therapist                Modalities     Row Name 04/26/18 1100             Moist Heat    MH Applied Yes  -GR      Location lumbar in 90/90 decompression  -GR      Rx Minutes 10 mins  -GR        User Key  (r) = Recorded By, (t) = Taken By, (c) = Cosigned By    Initials Name Provider Type    ARACELY Siegel, PT Physical Therapist              Exercises     Row Name 04/26/18 1100             Subjective Comments    Subjective Comments States she has had back pain for quite a while.  States she was diagnosed with a bulging disc in October but had a heart attack in Dr. Perez's office.  She recovered in the hospital without surgery. She says the pain is consistently bad across her low back.  She c/o recent inccrease in \"popping\" sensation.Her pain is worse with standing or walking.  She limits ambulation to 50 ft or less. She hsa been using a rollator since Nov-Dec of last year. She is driving.  Her sleep is disturbed, she often moves from sidelying to supine. Heat provides some relief.  She is planning to start cardiac rehab after she finishes outpatient rehab.  -GR         Subjective Pain    Able to rate subjective pain? yes  -GR      Pre-Treatment Pain Level 9  -GR      Post-Treatment Pain Level 9  -GR      Subjective Pain Comment at " best 7/10 lumbar  -GR         Exercise 1    Exercise Name 1 PPT  -GR      Cueing 1 Demo  -GR      Sets 1 1  -GR      Reps 1 10  -GR         Exercise 2    Exercise Name 2 LTR   -GR      Cueing 2 Demo  -GR      Sets 2 1  -GR      Reps 2 10  -GR      Additional Comments comfortable range  -GR         Exercise 3    Exercise Name 3 SKTC  -GR      Cueing 3 Demo  -GR      Sets 3 1  -GR      Reps 3 2  -GR      Additional Comments alternating  -GR        User Key  (r) = Recorded By, (t) = Taken By, (c) = Cosigned By    Initials Name Provider Type    GR Agustin Siegel, PT Physical Therapist                        Outcome Measure Options: Modifed Owestry  Modified Oswestry  Modified Oswestry Score/Comments: 80%      Time Calculation:   Start Time: 1138  Stop Time: 1220  Time Calculation (min): 42 min  Total Timed Code Minutes- PT: 15 minute(s)     Therapy Charges for Today     Code Description Service Date Service Provider Modifiers Qty    91177642189 HC PT MOBILITY CURRENT 4/26/2018 Agustin Siegel, PT GP, CM 1    85740642885 HC PT MOBILITY PROJECTED 4/26/2018 Agustin Siegel, PT GP, CK 1    49024704524 HC PT THER PROC EA 15 MIN 4/26/2018 Agustin Siegel, PT GP 1    99744490418 HC PT HOT OR COLD PACK TREAT MCARE 4/26/2018 Agustin Siegel, PT GP 1    54973293936 HC PT EVAL LOW COMPLEXITY 2 4/26/2018 Agustin Siegel, PT GP 1          PT G-Codes  PT Professional Judgement Used?: Yes  Outcome Measure Options: Modifed Owestry  Score: 80%  Functional Limitation: Mobility: Walking and moving around  Mobility: Walking and Moving Around Current Status (): At least 80 percent but less than 100 percent impaired, limited or restricted  Mobility: Walking and Moving Around Goal Status (): At least 40 percent but less than 60 percent impaired, limited or restricted         Agustin Siegel, PT  4/26/2018

## 2018-04-30 RX ORDER — CARVEDILOL 25 MG/1
TABLET ORAL
Qty: 135 TABLET | Refills: 1 | Status: SHIPPED | OUTPATIENT
Start: 2018-04-30 | End: 2018-07-19 | Stop reason: SDUPTHER

## 2018-05-03 ENCOUNTER — HOSPITAL ENCOUNTER (OUTPATIENT)
Dept: CT IMAGING | Facility: HOSPITAL | Age: 73
Discharge: HOME | End: 2018-05-03
Attending: INTERNAL MEDICINE | Admitting: INTERNAL MEDICINE

## 2018-05-03 DIAGNOSIS — R59.0 HILAR ADENOPATHY: ICD-10-CM

## 2018-05-03 LAB — CREAT BLDA-MCNC: 0.8 MG/DL (ref 0.6–1.3)

## 2018-05-03 PROCEDURE — 82565 ASSAY OF CREATININE: CPT

## 2018-05-03 PROCEDURE — 71260 CT THORAX DX C+: CPT

## 2018-05-03 PROCEDURE — 25010000002 IOPAMIDOL 61 % SOLUTION: Performed by: INTERNAL MEDICINE

## 2018-05-03 RX ADMIN — IOPAMIDOL 75 ML: 612 INJECTION, SOLUTION INTRAVENOUS at 14:00

## 2018-05-04 ENCOUNTER — APPOINTMENT (OUTPATIENT)
Dept: PHYSICAL THERAPY | Facility: HOSPITAL | Age: 73
End: 2018-05-04

## 2018-05-08 ENCOUNTER — HOSPITAL ENCOUNTER (OUTPATIENT)
Dept: PHYSICAL THERAPY | Facility: HOSPITAL | Age: 73
Setting detail: THERAPIES SERIES
Discharge: HOME | End: 2018-05-08

## 2018-05-08 DIAGNOSIS — G89.29 CHRONIC BILATERAL LOW BACK PAIN WITHOUT SCIATICA: ICD-10-CM

## 2018-05-08 DIAGNOSIS — M54.50 CHRONIC BILATERAL LOW BACK PAIN WITHOUT SCIATICA: ICD-10-CM

## 2018-05-08 DIAGNOSIS — R26.2 DIFFICULTY WALKING: ICD-10-CM

## 2018-05-08 DIAGNOSIS — M54.50 CHRONIC MIDLINE LOW BACK PAIN WITHOUT SCIATICA: Primary | ICD-10-CM

## 2018-05-08 DIAGNOSIS — G89.29 CHRONIC MIDLINE LOW BACK PAIN WITHOUT SCIATICA: Primary | ICD-10-CM

## 2018-05-08 PROCEDURE — G0283 ELEC STIM OTHER THAN WOUND: HCPCS | Performed by: PHYSICAL THERAPIST

## 2018-05-08 PROCEDURE — 97110 THERAPEUTIC EXERCISES: CPT | Performed by: PHYSICAL THERAPIST

## 2018-05-10 ENCOUNTER — OFFICE VISIT (OUTPATIENT)
Dept: NEUROSURGERY | Facility: CLINIC | Age: 73
End: 2018-05-10

## 2018-05-10 ENCOUNTER — HOSPITAL ENCOUNTER (OUTPATIENT)
Dept: CARDIOLOGY | Facility: HOSPITAL | Age: 73
Setting detail: RECURRING SERIES
Discharge: HOME | End: 2018-05-10

## 2018-05-10 VITALS
WEIGHT: 246 LBS | RESPIRATION RATE: 16 BRPM | SYSTOLIC BLOOD PRESSURE: 146 MMHG | BODY MASS INDEX: 40.99 KG/M2 | DIASTOLIC BLOOD PRESSURE: 90 MMHG | HEART RATE: 88 BPM

## 2018-05-10 DIAGNOSIS — M54.42 CHRONIC MIDLINE LOW BACK PAIN WITH LEFT-SIDED SCIATICA: ICD-10-CM

## 2018-05-10 DIAGNOSIS — G89.29 CHRONIC MIDLINE LOW BACK PAIN WITH LEFT-SIDED SCIATICA: ICD-10-CM

## 2018-05-10 DIAGNOSIS — R53.1 LEFT-SIDED WEAKNESS: Primary | ICD-10-CM

## 2018-05-10 PROCEDURE — 36416 COLLJ CAPILLARY BLOOD SPEC: CPT

## 2018-05-10 PROCEDURE — 99214 OFFICE O/P EST MOD 30 MIN: CPT | Performed by: NEUROLOGICAL SURGERY

## 2018-05-10 PROCEDURE — 85610 PROTHROMBIN TIME: CPT

## 2018-05-10 RX ORDER — TRAMADOL HYDROCHLORIDE 50 MG/1
50 TABLET ORAL EVERY 6 HOURS PRN
Qty: 45 TABLET | Refills: 0 | Status: SHIPPED | OUTPATIENT
Start: 2018-05-10 | End: 2018-07-11 | Stop reason: SDUPTHER

## 2018-05-10 NOTE — PROGRESS NOTES
Subjective   Patient ID: Lana Yañez is a 72 y.o. female is here today for follow-up of back pain. Treatment was delayed secondary to significant cardiac and pulmonary issues. She is unaccompanied..    History of Present Illness L23 disc herniation but at her last visit had trouble with cardiac issues.  She suffered a pulmonary embolism  and myocardial infarction and is on coumadin.  She is still very troubled with her left leg intermittently.  She did also suffer a stroke.  She has had AF as well and lifelong AC has been recommended.      The following portions of the patient's history were reviewed and updated as appropriate: allergies, current medications, past family history, past medical history, past social history, past surgical history and problem list.    Review of Systems   Cardiovascular: Positive for leg swelling.   Musculoskeletal: Positive for back pain. Negative for gait problem.   Neurological: Positive for weakness (left leg) and numbness (left posterior calf intermittently).       Objective   Physical Exam  Neurologic Exam   Right iliopsoas: 5/5  Left iliopsoas: 3/5  Right quadriceps: 5/5  Left quadriceps: 5/5  Right hamstrin/5  Left hamstrin/5  Right anterior tibial: 5/5  Left anterior tibial: 5/5  Right gastroc: 5/5  Left gastroc: 5/5       AG in left IP      Sensory Exam   Sensory deficit distribution on left: L3 - much improved    Tender in midline in the L spine.   NL droop on the left    Assessment/Plan   Independent Review of Radiographic Studies:  I reviewed her old MRI - L23 disc herniation.    Medical Decision Making:  Clinically had a very adventurous year.  Normal renal function.  Her left leg is quite bothersome.  Low back pain is her biggest issue today.  Several tough questions: can AC be interrupted? Does she need a bridge?  When is this OK?.  She has started PT just recently.  Her chronic weakness is difficult to predict in terms of surgical outcome for her disc.  At  this juncture I agree with PT. Topical diclofenac may assist her pain.  I suspect given her intracardiac clot and stroke that interruption of AC would not be permitted for 3-6 months.  She may be the rare patient that narcotics make the most sense.   Diclofenac gel might assist but her MI may preclude any NSAID use.  I will reach out to Dr. Dueñas and see if this is permissible and to investigate some of these other questions.  In the interim I will give her a few tramadol. I will discuss with Dr. Ramirez as well.  We will check her in 6 weeks.     Lana was seen today for back pain.    Diagnoses and all orders for this visit:    Left-sided weakness    Chronic midline low back pain with left-sided sciatica    Other orders  -     traMADol (ULTRAM) 50 MG tablet; Take 1 tablet by mouth Every 6 (Six) Hours As Needed for Moderate Pain .      No Follow-up on file.

## 2018-05-11 ENCOUNTER — HOSPITAL ENCOUNTER (OUTPATIENT)
Dept: PHYSICAL THERAPY | Facility: HOSPITAL | Age: 73
Setting detail: THERAPIES SERIES
Discharge: HOME | End: 2018-05-11

## 2018-05-11 DIAGNOSIS — G89.29 CHRONIC MIDLINE LOW BACK PAIN WITHOUT SCIATICA: Primary | ICD-10-CM

## 2018-05-11 DIAGNOSIS — M54.50 CHRONIC BILATERAL LOW BACK PAIN WITHOUT SCIATICA: ICD-10-CM

## 2018-05-11 DIAGNOSIS — G89.29 CHRONIC BILATERAL LOW BACK PAIN WITHOUT SCIATICA: ICD-10-CM

## 2018-05-11 DIAGNOSIS — M54.50 CHRONIC MIDLINE LOW BACK PAIN WITHOUT SCIATICA: Primary | ICD-10-CM

## 2018-05-11 DIAGNOSIS — R26.2 DIFFICULTY WALKING: ICD-10-CM

## 2018-05-11 PROCEDURE — 97110 THERAPEUTIC EXERCISES: CPT | Performed by: PHYSICAL THERAPIST

## 2018-05-11 PROCEDURE — G0283 ELEC STIM OTHER THAN WOUND: HCPCS | Performed by: PHYSICAL THERAPIST

## 2018-05-11 NOTE — THERAPY TREATMENT NOTE
Outpatient Physical Therapy Ortho Treatment Note   [facility]     Patient Name: Lana Yañez  : 1945  MRN: [id number]  Today's Date: 2018      Visit Date: 2018    Visit Dx:    ICD-10-CM ICD-9-CM   1. Chronic midline low back pain without sciatica M54.5 724.2    G89.29 338.29   2. Chronic bilateral low back pain without sciatica M54.5 724.2    G89.29 338.29   3. Difficulty walking R26.2 719.7       Patient Active Problem List   Diagnosis   • Hyperlipidemia   • Vitamin deficiency   • Essential hypertension   • Rheumatoid arthritis involving both hands   • Fatigue   • ANTOINE (dyspnea on exertion)   • Dysuria   • Urge incontinence of urine   • Hypovitaminosis D   • Sleep apnea   • Encounter for screening colonoscopy   • Bronchitis   • Cough   • Generalized osteoarthritis of multiple sites   • Morbid obesity with BMI of 40.0-44.9, adult   • Cellulitis of right elbow due to MRSA   • Medicare annual wellness visit, initial   • Hospital discharge follow-up   • Displacement of lumbar intervertebral disc without myelopathy   • Lumbar disc herniation   • Atrial fibrillation with RVR   • History of MRSA infection   • Left-sided weakness   • Atrial fibrillation   • Left shoulder pain   • Relative lymphocytosis   • Nausea   • Weakness   • Controlled substance agreement signed   • Coronary artery disease involving native coronary artery of native heart with angina pectoris   • Shortness of breath   • Lower extremity edema   • Acute on chronic diastolic heart failure   • Adhesive capsulitis of left shoulder   • CVA tenderness   • Costochondritis, acute   • Allergic reaction   • Coronary artery embolism   • Visit for screening mammogram   • Low back pain        Past Medical History:   Diagnosis Date   • Acute on chronic diastolic heart failure    • Arthritis    • Asthma    • Atrial fibrillation     Persistent; on warfarin   • Bronchitis    • Cellulitis of right elbow     due to MRSA   • CHF (congestive  heart failure)    • COPD (chronic obstructive pulmonary disease)    • Coronary artery disease     Cardiac catheterization completed; 90% PDA stenosis with medical management recommended   • Coronary artery disease involving native coronary artery of native heart with angina pectoris with documented spasm    • Disease of thyroid gland    • Displacement of lumbar intervertebral disc without myelopathy    • Dizziness    • Essential hypertension    • Generalized osteoarthritis of multiple sites    • History of rheumatic fever    • History of transfusion    • Hx of bone density study     10/23/2014   • Hyperlipidemia    • Hypovitaminosis D    • Left arm pain    • Leg swelling    • Low back pain    • Lower extremity edema    • Malaise and fatigue    • Mitral valve disease     Moderate mitral valve prolapse and moderate mitral regurgitation   • Mitral valve insufficiency    • Morbid obesity with BMI of 40.0-44.9, adult    • MRSA infection    • NSTEMI (non-ST elevated myocardial infarction)    • PAF (paroxysmal atrial fibrillation)    • RA (rheumatoid arthritis)     involving both hands   • Sleep apnea    • SOB (shortness of breath)    • Stroke     left side weakness   • Urge incontinence of urine    • Vitamin D deficiency         Past Surgical History:   Procedure Laterality Date   • BREAST SURGERY      right side lumpectomy with biopsy   • CARDIAC CATHETERIZATION Left 10/20/2017    Procedure: Cardiac Catheterization/Vascular Study;  Surgeon: Alphonso Olmedo MD;  Location:  INVASIVE LOCATION;  Service:    • CARDIAC CATHETERIZATION N/A 10/20/2017    +2 mitral regurgitation, left main 10% stenosis, mid to distal LAD 10% diffuse stenosis, circumflex 10% diffuse stenosis, RCA 10% proximal stenosis, and distal PDA consistent with coronary embolus with a lesion of 90% too small to consider coronary intervention; medical management recommended   • EYE SURGERY      laser surgery for glaucoma and left eye cataracts removed    • HYSTERECTOMY      10+ years ago   • JOINT REPLACEMENT  2005; 2006    bilateral knees and left rotater   • KNEE SURGERY     • MAMMO BILATERAL  2016    [facility]                              PT Assessment/Plan     Row Name 05/11/18 1156          PT Assessment    Assessment Comments Patient ambulated increased distance without rest break today suggesting improved endurance. Pain continues to be lower than at time of IE.  -GR       User Key  (r) = Recorded By, (t) = Taken By, (c) = Cosigned By    Initials Name Provider Type    GR Agustin Siegel PT Physical Therapist                Modalities     Row Name 05/11/18 1100             Moist Heat    MH Applied Yes  -GR      Location lumbar in 90/90 decompression during IFC  -GR      Rx Minutes 10 mins  -GR         ELECTRICAL STIMULATION    Attended/Unattended Unattended  -GR      Stimulation Type IFC  -GR      Max mAmp 12  -GR      Location/Electrode Placement/Other 4 electrodes/2 channels/lumbar spine  -GR      Rx Minutes 10 mins  -GR        User Key  (r) = Recorded By, (t) = Taken By, (c) = Cosigned By    Initials Name Provider Type    GR Agustin Siegel PT Physical Therapist                Exercises     Row Name 05/11/18 1100             Subjective Comments    Subjective Comments Patient arrives 6 min late and then to bathroom upon arrival; states she had to walk from the parking lot today.  Felt OK after exercise and liked the [facility] last visit.  States before PT would not have been able to make the entire walk from the parking lot, so this is encouraging.  -GR         Subjective Pain    Able to rate subjective pain? yes  -GR      Pre-Treatment Pain Level 5  -GR      Post-Treatment Pain Level 5  -GR         Exercise 1    Exercise Name 1 Nustep L3  -GR      Time 1 5 min  -GR      Additional Comments UE/LE  -GR         Exercise 3    Exercise Name 3 LTR  -GR      Cueing 3 Verbal  -GR      Sets 3 2  -GR      Reps 3 10  -GR         Exercise 4    Exercise Name 4  SKTC  -GR      Cueing 4 Verbal  -GR      Sets 4 1  -GR      Reps 4 10  -GR      Time 4 5 seconds each  -GR         Exercise 5    Exercise Name 5 H/L add squeeze + PPT  -GR      Cueing 5 Demo  -GR      Sets 5 1  -GR      Reps 5 15  -GR         Exercise 6    Exercise Name 6 H/L clam + PPT  -GR      Cueing 6 Demo  -GR      Sets 6 1  -GR      Reps 6 15  -GR      Additional Comments YTB  -GR         Exercise 7    Exercise Name 7 H/S stretch with sheet   -GR      Cueing 7 Demo  -GR      Sets 7 1  -GR      Reps 7 3  -GR      Time 7 20 seconds  -GR      Additional Comments each  -GR        User Key  (r) = Recorded By, (t) = Taken By, (c) = Cosigned By    Initials Name Provider Type    ARACELY Siegel PT Physical Therapist                               PT OP Goals     Row Name 05/11/18 1100          PT Short Term Goals    STG Date to Achieve 05/11/18  -GR     STG 1 Patient will be independent with initial HEP.  -GR     STG 1 Progress Met  -GR     STG 2 Patient will demonstrate lumbar ROM to 50% of WFL or greater to ease transfers.  -GR     STG 2 Progress Ongoing  -GR     STG 3 Pain at worst 8/10 with ADLs.  -GR     STG 3 Progress Met  -GR        Long Term Goals    LTG Date to Achieve 06/10/18  -GR     LTG 1 Patient will be independent with progressive HEP for long term management of current condition.  -GR     LTG 1 Progress Ongoing  -GR     LTG 2 Patient will demonstrate lumbar ROM to 75% of WFL for improved gait pattern and community navigation.  -GR     LTG 2 Progress Ongoing  -GR     LTG 3 Patient will score </=50% disability on the modified EDWARD to indicate improved perceived ADL performance.  -GR     LTG 3 Progress Ongoing  -GR     LTG 4 Patient will report pain at worst 5/10 or less with ADLs.  -GR     LTG 4 Progress Ongoing  -GR       User Key  (r) = Recorded By, (t) = Taken By, (c) = Cosigned By    Initials Name Provider Type    ARACELY Siegel PT Physical Therapist          Therapy Education  Education  Details: progress with endurance  Given: Symptoms/condition management  Program: Progressed  How Provided: Verbal  Provided to: Patient  Level of Understanding: Teach back education performed              Time Calculation:   Start Time: 1140  Stop Time: 1220  Time Calculation (min): 40 min  Total Timed Code Minutes- PT: 30 minute(s)    Therapy Charges for Today     Code Description Service Date Service Provider Modifiers Qty    76594043141 HC PT THER PROC EA 15 MIN 5/11/2018 Agustin Siegel, PT GP 1    89389477420 HC PT HOT OR COLD PACK TREAT MCARE 5/11/2018 Agustin Siegel, PT GP 1    06059289900 HC PT ELECTRICAL STIM UNATTENDED 5/11/2018 Agustin Siegel, PT  1                    Agustin Siegel, PT  5/11/2018

## 2018-05-15 ENCOUNTER — HOSPITAL ENCOUNTER (OUTPATIENT)
Dept: PHYSICAL THERAPY | Facility: HOSPITAL | Age: 73
Setting detail: THERAPIES SERIES
Discharge: HOME | End: 2018-05-15

## 2018-05-15 ENCOUNTER — HOSPITAL ENCOUNTER (OUTPATIENT)
Dept: MAMMOGRAPHY | Facility: HOSPITAL | Age: 73
Discharge: HOME | End: 2018-05-15
Admitting: INTERNAL MEDICINE

## 2018-05-15 DIAGNOSIS — R26.2 DIFFICULTY WALKING: ICD-10-CM

## 2018-05-15 DIAGNOSIS — M54.50 CHRONIC MIDLINE LOW BACK PAIN WITHOUT SCIATICA: Primary | ICD-10-CM

## 2018-05-15 DIAGNOSIS — G89.29 CHRONIC BILATERAL LOW BACK PAIN WITHOUT SCIATICA: ICD-10-CM

## 2018-05-15 DIAGNOSIS — G89.29 CHRONIC MIDLINE LOW BACK PAIN WITHOUT SCIATICA: Primary | ICD-10-CM

## 2018-05-15 DIAGNOSIS — Z12.31 VISIT FOR SCREENING MAMMOGRAM: ICD-10-CM

## 2018-05-15 DIAGNOSIS — M54.50 CHRONIC BILATERAL LOW BACK PAIN WITHOUT SCIATICA: ICD-10-CM

## 2018-05-15 PROCEDURE — 77067 SCR MAMMO BI INCL CAD: CPT

## 2018-05-15 PROCEDURE — G0283 ELEC STIM OTHER THAN WOUND: HCPCS | Performed by: PHYSICAL THERAPIST

## 2018-05-15 PROCEDURE — 97110 THERAPEUTIC EXERCISES: CPT | Performed by: PHYSICAL THERAPIST

## 2018-05-15 NOTE — THERAPY TREATMENT NOTE
Outpatient Physical Therapy Ortho Treatment Note   [facility]     Patient Name: Lana Yañez  : 1945  MRN: [id number]  Today's Date: 5/15/2018      Visit Date: 05/15/2018    Visit Dx:    ICD-10-CM ICD-9-CM   1. Chronic midline low back pain without sciatica M54.5 724.2    G89.29 338.29   2. Chronic bilateral low back pain without sciatica M54.5 724.2    G89.29 338.29   3. Difficulty walking R26.2 719.7       Patient Active Problem List   Diagnosis   • Hyperlipidemia   • Vitamin deficiency   • Essential hypertension   • Rheumatoid arthritis involving both hands   • Fatigue   • ANTOINE (dyspnea on exertion)   • Dysuria   • Urge incontinence of urine   • Hypovitaminosis D   • Sleep apnea   • Encounter for screening colonoscopy   • Bronchitis   • Cough   • Generalized osteoarthritis of multiple sites   • Morbid obesity with BMI of 40.0-44.9, adult   • Cellulitis of right elbow due to MRSA   • Medicare annual wellness visit, initial   • Hospital discharge follow-up   • Displacement of lumbar intervertebral disc without myelopathy   • Lumbar disc herniation   • Atrial fibrillation with RVR   • History of MRSA infection   • Left-sided weakness   • Atrial fibrillation   • Left shoulder pain   • Relative lymphocytosis   • Nausea   • Weakness   • Controlled substance agreement signed   • Coronary artery disease involving native coronary artery of native heart with angina pectoris   • Shortness of breath   • Lower extremity edema   • Acute on chronic diastolic heart failure   • Adhesive capsulitis of left shoulder   • CVA tenderness   • Costochondritis, acute   • Allergic reaction   • Coronary artery embolism   • Visit for screening mammogram   • Low back pain        Past Medical History:   Diagnosis Date   • Acute on chronic diastolic heart failure    • Arthritis    • Asthma    • Atrial fibrillation     Persistent; on warfarin   • Bronchitis    • Cellulitis of right elbow     due to MRSA   • CHF (congestive  heart failure)    • COPD (chronic obstructive pulmonary disease)    • Coronary artery disease     Cardiac catheterization completed; 90% PDA stenosis with medical management recommended   • Coronary artery disease involving native coronary artery of native heart with angina pectoris with documented spasm    • Disease of thyroid gland    • Displacement of lumbar intervertebral disc without myelopathy    • Dizziness    • Essential hypertension    • Generalized osteoarthritis of multiple sites    • History of rheumatic fever    • History of transfusion    • Hx of bone density study     10/23/2014   • Hyperlipidemia    • Hypovitaminosis D    • Left arm pain    • Leg swelling    • Low back pain    • Lower extremity edema    • Malaise and fatigue    • Mitral valve disease     Moderate mitral valve prolapse and moderate mitral regurgitation   • Mitral valve insufficiency    • Morbid obesity with BMI of 40.0-44.9, adult    • MRSA infection    • NSTEMI (non-ST elevated myocardial infarction)    • PAF (paroxysmal atrial fibrillation)    • RA (rheumatoid arthritis)     involving both hands   • Sleep apnea    • SOB (shortness of breath)    • Stroke     left side weakness   • Urge incontinence of urine    • Vitamin D deficiency         Past Surgical History:   Procedure Laterality Date   • BREAST BIOPSY     • BREAST SURGERY      right side lumpectomy with biopsy   • CARDIAC CATHETERIZATION Left 10/20/2017    Procedure: Cardiac Catheterization/Vascular Study;  Surgeon: Alphonso Olmedo MD;  Location: [facility] INVASIVE LOCATION;  Service:    • CARDIAC CATHETERIZATION N/A 10/20/2017    +2 mitral regurgitation, left main 10% stenosis, mid to distal LAD 10% diffuse stenosis, circumflex 10% diffuse stenosis, RCA 10% proximal stenosis, and distal PDA consistent with coronary embolus with a lesion of 90% too small to consider coronary intervention; medical management recommended   • EYE SURGERY      laser surgery for glaucoma and left eye  cataracts removed   • HYSTERECTOMY      10+ years ago   • JOINT REPLACEMENT  2005; 2006    bilateral knees and left rotater   • KNEE SURGERY     • MAMMO BILATERAL  2016    [facility]                              PT Assessment/Plan     Row Name 05/15/18 1720          PT Assessment    Assessment Comments Patient pain gradually decreasing.  Consistently lower following passive modalities, encouraged home purchase for independent management. Will continue to progress with core strengthening and lower extremity flexibility in clinic as tolerated.  -GR        PT Plan    PT Plan Comments Continue POC.  -GR       User Key  (r) = Recorded By, (t) = Taken By, (c) = Cosigned By    Initials Name Provider Type    GR Agustin Siegel, PT Physical Therapist                Modalities     Row Name 05/15/18 1300             Moist Heat    MH Applied Yes  -GR      Location lumbar in 90/90 decompression during IFC  -GR      Rx Minutes 12 mins  -GR         ELECTRICAL STIMULATION    Attended/Unattended Unattended  -GR      Stimulation Type IFC  -GR      Max mAmp 16  -GR      Location/Electrode Placement/Other 4 electrodes/2 channels/lumbar spine  -GR      Rx Minutes Other:   12 mins  -GR        User Key  (r) = Recorded By, (t) = Taken By, (c) = Cosigned By    Initials Name Provider Type    GR Agustin Siegel, PT Physical Therapist                Exercises     Row Name 05/15/18 1400             Subjective Comments    Subjective Comments Pain is less frequent.  Dr. Perez prescribed Tramadol which she uses PRN and thinks this help. Is not a surgical candidate at this time per cardiac risk.  -GR         Subjective Pain    Able to rate subjective pain? yes  -GR      Pre-Treatment Pain Level 4  -GR      Post-Treatment Pain Level 4  -GR         Exercise 1    Exercise Name 1 Nustep L3  -GR      Time 1 5 min  -GR      Additional Comments UE/LE  -GR         Exercise 3    Exercise Name 3 LTR  -GR      Cueing 3 Verbal  -GR      Sets 3 2  -GR       Reps 3 10  -GR      Additional Comments with swiss ball  -GR         Exercise 4    Exercise Name 4 DKTC  -GR      Cueing 4 Demo  -GR      Sets 4 2  -GR      Reps 4 10  -GR      Additional Comments with swiss ball  -GR         Exercise 5    Exercise Name 5 H/L add squeeze  -GR      Cueing 5 Demo  -GR      Sets 5 2  -GR      Reps 5 10  -GR         Exercise 6    Exercise Name 6 H/L clam + PPT  -GR      Cueing 6 Demo  -GR      Sets 6 1  -GR      Reps 6 10  -GR      Additional Comments RTB  -GR         Exercise 7    Exercise Name 7 H/S stretch with sheet   -GR      Cueing 7 Demo  -GR      Sets 7 1  -GR      Reps 7 3  -GR      Time 7 20 seconds  -GR        User Key  (r) = Recorded By, (t) = Taken By, (c) = Cosigned By    Initials Name Provider Type    GR Agustin Siegel, PT Physical Therapist                               PT OP Goals     Row Name 05/15/18 1400          PT Short Term Goals    STG Date to Achieve 05/11/18  -GR     STG 1 Patient will be independent with initial HEP.  -GR     STG 1 Progress Met  -GR     STG 2 Patient will demonstrate lumbar ROM to 50% of WFL or greater to ease transfers.  -GR     STG 2 Progress Ongoing  -GR     STG 3 Pain at worst 8/10 with ADLs.  -GR     STG 3 Progress Met  -GR        Long Term Goals    LTG Date to Achieve 06/10/18  -GR     LTG 1 Patient will be independent with progressive HEP for long term management of current condition.  -GR     LTG 1 Progress Ongoing  -GR     LTG 2 Patient will demonstrate lumbar ROM to 75% of WFL for improved gait pattern and community navigation.  -GR     LTG 2 Progress Ongoing  -GR     LTG 3 Patient will score </=50% disability on the modified EDWARD to indicate improved perceived ADL performance.  -GR     LTG 3 Progress Ongoing  -GR     LTG 4 Patient will report pain at worst 5/10 or less with ADLs.  -GR     LTG 4 Progress Ongoing  -GR       User Key  (r) = Recorded By, (t) = Taken By, (c) = Cosigned By    Initials Name Provider Type    GR  Agustin Siegel, PT Physical Therapist          Therapy Education  Education Details: progression of exercise with ball and core work required to control  Given: HEP, Symptoms/condition management  Program: Progressed  How Provided: Verbal  Provided to: Patient  Level of Understanding: Teach back education performed              Time Calculation:   Start Time: 1415  Stop Time: 1500  Time Calculation (min): 45 min  Total Timed Code Minutes- PT: 35 minute(s)    Therapy Charges for Today     Code Description Service Date Service Provider Modifiers Qty    11877223741 HC PT ELECTRICAL STIM UNATTENDED 5/15/2018 Agustin Siegel, PT  1    12588152695 HC PT HOT OR COLD PACK TREAT MCARE 5/15/2018 Agustin Siegel, PT GP 1    02199563736 HC PT THER PROC EA 15 MIN 5/15/2018 Agustin Siegel, PT GP 2                    Agustin Siegel, PT  5/15/2018

## 2018-05-18 ENCOUNTER — APPOINTMENT (OUTPATIENT)
Dept: PHYSICAL THERAPY | Facility: HOSPITAL | Age: 73
End: 2018-05-18

## 2018-05-21 ENCOUNTER — TELEPHONE (OUTPATIENT)
Dept: CARDIAC REHAB | Facility: HOSPITAL | Age: 73
End: 2018-05-21

## 2018-05-21 NOTE — TELEPHONE ENCOUNTER
This patient had to cancel an Initial Assessment for Cardiac Rehab on 3/5/18.  When I talked with her on 4/23/18 she stated that she is starting physical therapy and will call us when she can do Cardiac Rehab.

## 2018-05-22 ENCOUNTER — HOSPITAL ENCOUNTER (OUTPATIENT)
Dept: PHYSICAL THERAPY | Facility: HOSPITAL | Age: 73
Setting detail: THERAPIES SERIES
Discharge: HOME | End: 2018-05-22

## 2018-05-22 DIAGNOSIS — M54.50 CHRONIC MIDLINE LOW BACK PAIN WITHOUT SCIATICA: Primary | ICD-10-CM

## 2018-05-22 DIAGNOSIS — G89.29 CHRONIC BILATERAL LOW BACK PAIN WITHOUT SCIATICA: ICD-10-CM

## 2018-05-22 DIAGNOSIS — G89.29 CHRONIC MIDLINE LOW BACK PAIN WITHOUT SCIATICA: Primary | ICD-10-CM

## 2018-05-22 DIAGNOSIS — R26.2 DIFFICULTY WALKING: ICD-10-CM

## 2018-05-22 DIAGNOSIS — M54.50 CHRONIC BILATERAL LOW BACK PAIN WITHOUT SCIATICA: ICD-10-CM

## 2018-05-22 PROCEDURE — G0283 ELEC STIM OTHER THAN WOUND: HCPCS | Performed by: PHYSICAL THERAPIST

## 2018-05-22 PROCEDURE — 97110 THERAPEUTIC EXERCISES: CPT | Performed by: PHYSICAL THERAPIST

## 2018-05-22 NOTE — THERAPY TREATMENT NOTE
Outpatient Physical Therapy Ortho Treatment Note   [facility]     Patient Name: Lana Yañez  : 1945  MRN: [id number]  Today's Date: 2018      Visit Date: 2018    Visit Dx:    ICD-10-CM ICD-9-CM   1. Chronic midline low back pain without sciatica M54.5 724.2    G89.29 338.29   2. Chronic bilateral low back pain without sciatica M54.5 724.2    G89.29 338.29   3. Difficulty walking R26.2 719.7       Patient Active Problem List   Diagnosis   • Hyperlipidemia   • Vitamin deficiency   • Essential hypertension   • Rheumatoid arthritis involving both hands   • Fatigue   • ANTOINE (dyspnea on exertion)   • Dysuria   • Urge incontinence of urine   • Hypovitaminosis D   • Sleep apnea   • Encounter for screening colonoscopy   • Bronchitis   • Cough   • Generalized osteoarthritis of multiple sites   • Morbid obesity with BMI of 40.0-44.9, adult   • Cellulitis of right elbow due to MRSA   • Medicare annual wellness visit, initial   • Hospital discharge follow-up   • Displacement of lumbar intervertebral disc without myelopathy   • Lumbar disc herniation   • Atrial fibrillation with RVR   • History of MRSA infection   • Left-sided weakness   • Atrial fibrillation   • Left shoulder pain   • Relative lymphocytosis   • Nausea   • Weakness   • Controlled substance agreement signed   • Coronary artery disease involving native coronary artery of native heart with angina pectoris   • Shortness of breath   • Lower extremity edema   • Acute on chronic diastolic heart failure   • Adhesive capsulitis of left shoulder   • CVA tenderness   • Costochondritis, acute   • Allergic reaction   • Coronary artery embolism   • Visit for screening mammogram   • Low back pain        Past Medical History:   Diagnosis Date   • Acute on chronic diastolic heart failure    • Arthritis    • Asthma    • Atrial fibrillation     Persistent; on warfarin   • Bronchitis    • Cellulitis of right elbow     due to MRSA   • CHF (congestive  heart failure)    • COPD (chronic obstructive pulmonary disease)    • Coronary artery disease     Cardiac catheterization completed; 90% PDA stenosis with medical management recommended   • Coronary artery disease involving native coronary artery of native heart with angina pectoris with documented spasm    • Disease of thyroid gland    • Displacement of lumbar intervertebral disc without myelopathy    • Dizziness    • Essential hypertension    • Generalized osteoarthritis of multiple sites    • History of rheumatic fever    • History of transfusion    • Hx of bone density study     10/23/2014   • Hyperlipidemia    • Hypovitaminosis D    • Left arm pain    • Leg swelling    • Low back pain    • Lower extremity edema    • Malaise and fatigue    • Mitral valve disease     Moderate mitral valve prolapse and moderate mitral regurgitation   • Mitral valve insufficiency    • Morbid obesity with BMI of 40.0-44.9, adult    • MRSA infection    • NSTEMI (non-ST elevated myocardial infarction)    • PAF (paroxysmal atrial fibrillation)    • RA (rheumatoid arthritis)     involving both hands   • Sleep apnea    • SOB (shortness of breath)    • Stroke     left side weakness   • Urge incontinence of urine    • Vitamin D deficiency         Past Surgical History:   Procedure Laterality Date   • BREAST BIOPSY     • BREAST SURGERY      right side lumpectomy with biopsy   • CARDIAC CATHETERIZATION Left 10/20/2017    Procedure: Cardiac Catheterization/Vascular Study;  Surgeon: Alphonso Olmedo MD;  Location: [facility] INVASIVE LOCATION;  Service:    • CARDIAC CATHETERIZATION N/A 10/20/2017    +2 mitral regurgitation, left main 10% stenosis, mid to distal LAD 10% diffuse stenosis, circumflex 10% diffuse stenosis, RCA 10% proximal stenosis, and distal PDA consistent with coronary embolus with a lesion of 90% too small to consider coronary intervention; medical management recommended   • EYE SURGERY      laser surgery for glaucoma and left eye  cataracts removed   • HYSTERECTOMY      10+ years ago   • JOINT REPLACEMENT  2005; 2006    bilateral knees and left rotater   • KNEE SURGERY     • MAMMO BILATERAL  2016    [facility]                              PT Assessment/Plan     Row Name 05/22/18 1350          PT Assessment    Assessment Comments Advanced resistance bad today without adverse response in clinic. Remains with forward flexed trunk on rollator, improved without AD but requiring to decrease fall risk per occasional R foot drag.  -GR        PT Plan    PT Plan Comments Reassess next visit. Possible extension 2x/week x 2-3 weeks then patient transition to cardiac rehab.  -GR       User Key  (r) = Recorded By, (t) = Taken By, (c) = Cosigned By    Initials Name Provider Type    GR Agustin Siegel, PT Physical Therapist                Modalities     Row Name 05/22/18 1100             Moist Heat    MH Applied Yes  -GR      Location lumbar in 90/90 decompression during IFC  -GR      Rx Minutes 15 mins  -GR         ELECTRICAL STIMULATION    Attended/Unattended Unattended  -GR      Stimulation Type IFC  -GR      Max mAmp 16  -GR      Location/Electrode Placement/Other 4 electrodes/2 channels/lumbar spine  -GR      Rx Minutes 15 mins  -GR        User Key  (r) = Recorded By, (t) = Taken By, (c) = Cosigned By    Initials Name Provider Type    GR Agustin Siegel, PT Physical Therapist                Exercises     Row Name 05/22/18 1100             Subjective Comments    Subjective Comments Patient states her pain is less today and she is able to walk more.  -GR         Subjective Pain    Able to rate subjective pain? yes  -GR      Pre-Treatment Pain Level 5  -GR      Post-Treatment Pain Level 5  -GR         Exercise 1    Exercise Name 1 Nustep L4  -GR      Time 1 5 min  -GR      Additional Comments ANNE/KIMBER'LE  -GR         Exercise 3    Exercise Name 3 LTR  -GR      Cueing 3 Verbal  -GR      Sets 3 2  -GR      Reps 3 10  -GR      Additional Comments with  swiss ball  -GR         Exercise 4    Exercise Name 4 DKTC  -GR      Cueing 4 Demo  -GR      Sets 4 2  -GR      Reps 4 10  -GR      Additional Comments with swiss ball  -GR         Exercise 5    Exercise Name 5 H/L add squeeze  -GR      Cueing 5 Verbal  -GR      Sets 5 2  -GR      Reps 5 10  -GR         Exercise 6    Exercise Name 6 H/L clam + PPT  -GR      Cueing 6 Demo  -GR      Sets 6 1  -GR      Reps 6 10  -GR         Exercise 7    Exercise Name 7 H/S stretch with sheet   -GR      Cueing 7 Demo  -GR      Sets 7 1  -GR      Reps 7 3  -GR      Time 7 20 seconds  -GR         Exercise 8    Exercise Name 8 Bridge  -GR      Cueing 8 Demo  -GR      Sets 8 1  -GR      Reps 8 5  -GR        User Key  (r) = Recorded By, (t) = Taken By, (c) = Cosigned By    Initials Name Provider Type    GR Agustin Siegel, PT Physical Therapist                               PT OP Goals     Row Name 05/22/18 1300          PT Short Term Goals    STG Date to Achieve 05/11/18  -GR     STG 1 Patient will be independent with initial HEP.  -GR     STG 1 Progress Met  -GR     STG 2 Patient will demonstrate lumbar ROM to 50% of WFL or greater to ease transfers.  -GR     STG 2 Progress Ongoing  -GR     STG 3 Pain at worst 8/10 with ADLs.  -GR     STG 3 Progress Met  -GR        Long Term Goals    LTG Date to Achieve 06/10/18  -GR     LTG 1 Patient will be independent with progressive HEP for long term management of current condition.  -GR     LTG 1 Progress Ongoing  -GR     LTG 2 Patient will demonstrate lumbar ROM to 75% of WFL for improved gait pattern and community navigation.  -GR     LTG 2 Progress Ongoing  -GR     LTG 3 Patient will score </=50% disability on the modified EDWARD to indicate improved perceived ADL performance.  -GR     LTG 3 Progress Ongoing  -GR     LTG 4 Patient will report pain at worst 5/10 or less with ADLs.  -GR     LTG 4 Progress Ongoing  -GR       User Key  (r) = Recorded By, (t) = Taken By, (c) = Cosigned By    Initials  Name Provider Type    GR Agustin Siegel PT Physical Therapist          Therapy Education  Education Details: reeducated on home TENS  Given: Symptoms/condition management  Program: Reinforced  How Provided: Verbal  Provided to: Patient  Level of Understanding: Teach back education performed, Verbalized              Time Calculation:   Start Time: 1145  Stop Time: 1230  Time Calculation (min): 45 min  Total Timed Code Minutes- PT: 30 minute(s)    Therapy Charges for Today     Code Description Service Date Service Provider Modifiers Qty    94251620801 HC PT THER PROC EA 15 MIN 5/22/2018 Agustin Siegel, PT GP 2    [id number]  PT ELECTRICAL STIM UNATTENDED 5/22/2018 Agustin Siegel, PT  1    [id number]  PT HOT OR COLD PACK TREAT MCARE 5/22/2018 Agustin Siegel PT GP 1                    Agustin Siegel, PT  5/22/2018

## 2018-05-25 ENCOUNTER — HOSPITAL ENCOUNTER (OUTPATIENT)
Dept: PHYSICAL THERAPY | Facility: HOSPITAL | Age: 73
Setting detail: THERAPIES SERIES
Discharge: HOME | End: 2018-05-25

## 2018-05-25 ENCOUNTER — HOSPITAL ENCOUNTER (OUTPATIENT)
Dept: CARDIOLOGY | Facility: HOSPITAL | Age: 73
Setting detail: RECURRING SERIES
Discharge: HOME | End: 2018-05-25

## 2018-05-25 DIAGNOSIS — G89.29 CHRONIC BILATERAL LOW BACK PAIN WITHOUT SCIATICA: ICD-10-CM

## 2018-05-25 DIAGNOSIS — R26.2 DIFFICULTY WALKING: ICD-10-CM

## 2018-05-25 DIAGNOSIS — M54.50 CHRONIC MIDLINE LOW BACK PAIN WITHOUT SCIATICA: Primary | ICD-10-CM

## 2018-05-25 DIAGNOSIS — G89.29 CHRONIC MIDLINE LOW BACK PAIN WITHOUT SCIATICA: Primary | ICD-10-CM

## 2018-05-25 DIAGNOSIS — M54.50 CHRONIC BILATERAL LOW BACK PAIN WITHOUT SCIATICA: ICD-10-CM

## 2018-05-25 PROCEDURE — G0283 ELEC STIM OTHER THAN WOUND: HCPCS | Performed by: PHYSICAL THERAPIST

## 2018-05-25 PROCEDURE — 85610 PROTHROMBIN TIME: CPT

## 2018-05-25 PROCEDURE — 36416 COLLJ CAPILLARY BLOOD SPEC: CPT

## 2018-05-25 PROCEDURE — 97110 THERAPEUTIC EXERCISES: CPT | Performed by: PHYSICAL THERAPIST

## 2018-05-25 PROCEDURE — G8979 MOBILITY GOAL STATUS: HCPCS | Performed by: PHYSICAL THERAPIST

## 2018-05-25 PROCEDURE — G8978 MOBILITY CURRENT STATUS: HCPCS | Performed by: PHYSICAL THERAPIST

## 2018-05-25 NOTE — THERAPY RE-EVALUATION
Outpatient Physical Therapy Ortho Re-Assessment   [facility]     Patient Name: Lana Yañez  : 1945  MRN: [id number]  Today's Date: 2018      Visit Date: 2018    Patient Active Problem List   Diagnosis   • Hyperlipidemia   • Vitamin deficiency   • Essential hypertension   • Rheumatoid arthritis involving both hands   • Fatigue   • ANTOINE (dyspnea on exertion)   • Dysuria   • Urge incontinence of urine   • Hypovitaminosis D   • Sleep apnea   • Encounter for screening colonoscopy   • Bronchitis   • Cough   • Generalized osteoarthritis of multiple sites   • Morbid obesity with BMI of 40.0-44.9, adult   • Cellulitis of right elbow due to MRSA   • Medicare annual wellness visit, initial   • Hospital discharge follow-up   • Displacement of lumbar intervertebral disc without myelopathy   • Lumbar disc herniation   • Atrial fibrillation with RVR   • History of MRSA infection   • Left-sided weakness   • Atrial fibrillation   • Left shoulder pain   • Relative lymphocytosis   • Nausea   • Weakness   • Controlled substance agreement signed   • Coronary artery disease involving native coronary artery of native heart with angina pectoris   • Shortness of breath   • Lower extremity edema   • Acute on chronic diastolic heart failure   • Adhesive capsulitis of left shoulder   • CVA tenderness   • Costochondritis, acute   • Allergic reaction   • Coronary artery embolism   • Visit for screening mammogram   • Low back pain        Past Medical History:   Diagnosis Date   • Acute on chronic diastolic heart failure    • Arthritis    • Asthma    • Atrial fibrillation     Persistent; on warfarin   • Bronchitis    • Cellulitis of right elbow     due to MRSA   • CHF (congestive heart failure)    • COPD (chronic obstructive pulmonary disease)    • Coronary artery disease     Cardiac catheterization completed; 90% PDA stenosis with medical management recommended   • Coronary artery disease involving native coronary  artery of native heart with angina pectoris with documented spasm    • Disease of thyroid gland    • Displacement of lumbar intervertebral disc without myelopathy    • Dizziness    • Essential hypertension    • Generalized osteoarthritis of multiple sites    • History of rheumatic fever    • History of transfusion    • Hx of bone density study     10/23/2014   • Hyperlipidemia    • Hypovitaminosis D    • Left arm pain    • Leg swelling    • Low back pain    • Lower extremity edema    • Malaise and fatigue    • Mitral valve disease     Moderate mitral valve prolapse and moderate mitral regurgitation   • Mitral valve insufficiency    • Morbid obesity with BMI of 40.0-44.9, adult    • MRSA infection    • NSTEMI (non-ST elevated myocardial infarction)    • PAF (paroxysmal atrial fibrillation)    • RA (rheumatoid arthritis)     involving both hands   • Sleep apnea    • SOB (shortness of breath)    • Stroke     left side weakness   • Urge incontinence of urine    • Vitamin D deficiency         Past Surgical History:   Procedure Laterality Date   • BREAST BIOPSY     • BREAST SURGERY      right side lumpectomy with biopsy   • CARDIAC CATHETERIZATION Left 10/20/2017    Procedure: Cardiac Catheterization/Vascular Study;  Surgeon: Alphonso Olmedo MD;  Location: [facility] CATH INVASIVE LOCATION;  Service:    • CARDIAC CATHETERIZATION N/A 10/20/2017    +2 mitral regurgitation, left main 10% stenosis, mid to distal LAD 10% diffuse stenosis, circumflex 10% diffuse stenosis, RCA 10% proximal stenosis, and distal PDA consistent with coronary embolus with a lesion of 90% too small to consider coronary intervention; medical management recommended   • EYE SURGERY      laser surgery for glaucoma and left eye cataracts removed   • HYSTERECTOMY      10+ years ago   • JOINT REPLACEMENT  2005; 2006    bilateral knees and left rotater   • KNEE SURGERY     • MAMMO BILATERAL  2016    [facility]        Visit Dx:     ICD-10-CM ICD-9-CM   1.  Chronic midline low back pain without sciatica M54.5 724.2    G89.29 338.29   2. Chronic bilateral low back pain without sciatica M54.5 724.2    G89.29 338.29   3. Difficulty walking R26.2 719.7                 PT Ortho     Row Name 05/25/18 1100       Myotomal Screen- Lower Quarter Clearing    Hip flexion (L2) Bilateral:;5 (Normal)  -GR    Knee extension (L3) Left:;4 (Good);Right:;4+ (Good +)  -GR    Ankle DF (L4) Bilateral:;4+ (Good +)  -GR    Great toe extension (L5) Bilateral:;4+ (Good +)  -GR    Knee flexion (S2) Bilateral:;4 (Good)  -GR       Lumbar ROM Screen- Lower Quarter Clearing    Lumbar Flexion Impaired   to distal patellar tendon  -GR    Lumbar Extension Impaired   neutral  -GR    Lumbar Lateral Flexion Impaired   R upper 1/3 tibia; L distal 1/3 of femur  -GR    Lumbar Rotation Impaired   50% decreased pain  -GR      User Key  (r) = Recorded By, (t) = Taken By, (c) = Cosigned By    Initials Name Provider Type    ARACELY Siegel, PT Physical Therapist                      Therapy Education  Education Details: update to POC and extension of visits  Given: Symptoms/condition management  Program: Reinforced  How Provided: Verbal  Level of Understanding: Teach back education performed, Verbalized           PT OP Goals     Row Name 05/25/18 1100          PT Short Term Goals    STG Date to Achieve 05/11/18  -GR     STG 1 Patient will be independent with initial HEP.  -GR     STG 1 Progress Met  -GR     STG 2 Patient will demonstrate lumbar ROM to 50% of WFL or greater to ease transfers.  -GR     STG 2 Progress Ongoing;Progressing  -GR     STG 3 Pain at worst 8/10 with ADLs.  -GR     STG 3 Progress Met  -GR        Long Term Goals    LTG Date to Achieve 06/10/18  -GR     LTG 1 Patient will be independent with progressive HEP for long term management of current condition.  -GR     LTG 1 Progress Ongoing  -GR     LTG 2 Patient will demonstrate lumbar ROM to 75% of WFL for improved gait pattern and community  navigation.  -GR     LTG 2 Progress Ongoing;Progressing  -GR     LTG 3 Patient will score </=50% disability on the modified EDWARD to indicate improved perceived ADL performance.  -GR     LTG 3 Progress Ongoing  -GR     LTG 3 Progress Comments 54%  -GR     LTG 4 Patient will report pain at worst 5/10 or less with ADLs.  -GR     LTG 4 Progress Ongoing;Progressing  -GR     LTG 4 Progress Comments at worst 6/10  -GR       User Key  (r) = Recorded By, (t) = Taken By, (c) = Cosigned By    Initials Name Provider Type    ARACELY Siegel, PT Physical Therapist                PT Assessment/Plan     Row Name 05/25/18 1312          PT Assessment    Assessment Comments Ms. Yañez has attended 5 sessions of skilled PT for treatment of chronic LBP.  Perceived disability as per the modified EDWARD has improved from 80% to 54% disability where 100% = complete disability.  ROM is slowly progressing and strength of LE myotomes is much improved L still weaker than R, with h/o CVA.  Per patient's gains and continued potential benefit recommend continue skilled PT services 2x/week x 3 weeks. Thank you for this referral.  -GR        PT Plan    PT Plan Comments Continue 2x/week x 3 weeks with core strengthening modalities for pain control. Transition to cardiac rehab when appropriate.  -GR       User Key  (r) = Recorded By, (t) = Taken By, (c) = Cosigned By    Initials Name Provider Type    ARACELY Siegel, PT Physical Therapist                Modalities     Row Name 05/25/18 1300             Moist Heat    MH Applied Yes  -GR      Location lumbar in 90/90 decompression during IFC  -GR      Rx Minutes 15 mins  -GR         ELECTRICAL STIMULATION    Attended/Unattended Unattended  -GR      Stimulation Type IFC  -GR      Max mAmp 18  -GR      Location/Electrode Placement/Other 4 electrodes/2 channels/lumbar spine  -GR      Rx Minutes 15 mins  -GR        User Key  (r) = Recorded By, (t) = Taken By, (c) = Cosigned By    Initials Name  Provider Type    GR Agustin Siegel PT Physical Therapist              Exercises     Row Name 05/25/18 1300 05/25/18 1100          Subjective Comments    Subjective Comments --  -GR Pain very mild today, skipping conference next week per concern of lack of endurance.  -GR        Subjective Pain    Able to rate subjective pain? --  -GR yes  -GR     Pre-Treatment Pain Level --  -GR 4  -GR     Post-Treatment Pain Level --  -GR 3  -GR        Exercise 1    Exercise Name 1  -- Nustep L4  -GR     Time 1  -- 8 min  -GR     Additional Comments  -- RUE/BLE  -GR        Exercise 3    Exercise Name 3  -- LTR  -GR     Cueing 3  -- Verbal  -GR     Sets 3  -- 2  -GR     Reps 3  -- 10  -GR     Additional Comments  -- with swiss ball  -GR        Exercise 4    Exercise Name 4  -- DKTC  -GR     Cueing 4  -- Demo  -GR     Sets 4  -- 2  -GR     Reps 4  -- 10  -GR     Additional Comments  -- with swiss ball  -GR        Exercise 5    Exercise Name 5  -- H/L add squeeze  -GR     Cueing 5  -- Verbal  -GR     Sets 5  -- 2  -GR     Reps 5  -- 12  -GR        Exercise 6    Exercise Name 6  -- H/L clam + PPT  -GR     Cueing 6  -- Demo  -GR     Sets 6  -- 1  -GR     Reps 6  -- 15  -GR     Additional Comments  -- RTB  -GR        Exercise 7    Exercise Name 7  -- H/S stretch with sheet   -GR     Cueing 7  -- Demo  -GR     Sets 7  -- 1  -GR     Reps 7  -- 3  -GR     Time 7  -- 20 seconds  -GR       User Key  (r) = Recorded By, (t) = Taken By, (c) = Cosigned By    Initials Name Provider Type    GR Agustin Siegel PT Physical Therapist                        Outcome Measure Options: Modifed Owestry  Modified Oswestry  Modified Oswestry Score/Comments: 54%      Time Calculation:   Start Time: 1130  Stop Time: 1225  Time Calculation (min): 55 min  Total Timed Code Minutes- PT: 45 minute(s)     Therapy Charges for Today     Code Description Service Date Service Provider Modifiers Qty    26179185988  PT MOBILITY CURRENT 5/25/2018 Agustin Siegel, PT  GP, CL 1    34110789533 HC PT MOBILITY PROJECTED 5/25/2018 Agustin Siegel, PT GP, CK 1    44012585877 HC PT THER PROC EA 15 MIN 5/25/2018 Agustin Siegel, PT GP 3    27183403719 HC PT HOT OR COLD PACK TREAT MCARE 5/25/2018 Agustin Siegel, PT GP 1    53781538679 HC PT ELECTRICAL STIM UNATTENDED 5/25/2018 Agustin Siegel, PT  1          PT G-Codes  PT Professional Judgement Used?: Yes  Outcome Measure Options: Giovany Edwards  Functional Limitation: Mobility: Walking and moving around  Mobility: Walking and Moving Around Current Status (): At least 60 percent but less than 80 percent impaired, limited or restricted  Mobility: Walking and Moving Around Goal Status (): At least 40 percent but less than 60 percent impaired, limited or restricted         Agustin Siegel, PT  5/25/2018

## 2018-06-05 ENCOUNTER — HOSPITAL ENCOUNTER (OUTPATIENT)
Dept: PHYSICAL THERAPY | Facility: HOSPITAL | Age: 73
Setting detail: THERAPIES SERIES
Discharge: HOME | End: 2018-06-05

## 2018-06-05 DIAGNOSIS — G89.29 CHRONIC MIDLINE LOW BACK PAIN WITHOUT SCIATICA: Primary | ICD-10-CM

## 2018-06-05 DIAGNOSIS — M54.50 CHRONIC BILATERAL LOW BACK PAIN WITHOUT SCIATICA: ICD-10-CM

## 2018-06-05 DIAGNOSIS — R26.2 DIFFICULTY WALKING: ICD-10-CM

## 2018-06-05 DIAGNOSIS — M54.50 CHRONIC MIDLINE LOW BACK PAIN WITHOUT SCIATICA: Primary | ICD-10-CM

## 2018-06-05 DIAGNOSIS — G89.29 CHRONIC BILATERAL LOW BACK PAIN WITHOUT SCIATICA: ICD-10-CM

## 2018-06-05 PROCEDURE — G0283 ELEC STIM OTHER THAN WOUND: HCPCS | Performed by: PHYSICAL THERAPIST

## 2018-06-05 PROCEDURE — 97110 THERAPEUTIC EXERCISES: CPT | Performed by: PHYSICAL THERAPIST

## 2018-06-05 NOTE — THERAPY TREATMENT NOTE
Outpatient Physical Therapy Ortho Treatment Note  [facility]     Patient Name: Lana Yañez  : 1945  MRN: [id number]  Today's Date: 2018      Visit Date: 2018    Visit Dx:    ICD-10-CM ICD-9-CM   1. Chronic midline low back pain without sciatica M54.5 724.2    G89.29 338.29   2. Chronic bilateral low back pain without sciatica M54.5 724.2    G89.29 338.29   3. Difficulty walking R26.2 719.7       Patient Active Problem List   Diagnosis   • Hyperlipidemia   • Vitamin deficiency   • Essential hypertension   • Rheumatoid arthritis involving both hands   • Fatigue   • ANTOINE (dyspnea on exertion)   • Dysuria   • Urge incontinence of urine   • Hypovitaminosis D   • Sleep apnea   • Encounter for screening colonoscopy   • Bronchitis   • Cough   • Generalized osteoarthritis of multiple sites   • Morbid obesity with BMI of 40.0-44.9, adult   • Cellulitis of right elbow due to MRSA   • Medicare annual wellness visit, initial   • Hospital discharge follow-up   • Displacement of lumbar intervertebral disc without myelopathy   • Lumbar disc herniation   • Atrial fibrillation with RVR   • History of MRSA infection   • Left-sided weakness   • Atrial fibrillation   • Left shoulder pain   • Relative lymphocytosis   • Nausea   • Weakness   • Controlled substance agreement signed   • Coronary artery disease involving native coronary artery of native heart with angina pectoris   • Shortness of breath   • Lower extremity edema   • Acute on chronic diastolic heart failure   • Adhesive capsulitis of left shoulder   • CVA tenderness   • Costochondritis, acute   • Allergic reaction   • Coronary artery embolism   • Visit for screening mammogram   • Low back pain        Past Medical History:   Diagnosis Date   • Acute on chronic diastolic heart failure    • Arthritis    • Asthma    • Atrial fibrillation     Persistent; on warfarin   • Bronchitis    • Cellulitis of right elbow     due to MRSA   • CHF (congestive  heart failure)    • COPD (chronic obstructive pulmonary disease)    • Coronary artery disease     Cardiac catheterization completed; 90% PDA stenosis with medical management recommended   • Coronary artery disease involving native coronary artery of native heart with angina pectoris with documented spasm    • Disease of thyroid gland    • Displacement of lumbar intervertebral disc without myelopathy    • Dizziness    • Essential hypertension    • Generalized osteoarthritis of multiple sites    • History of rheumatic fever    • History of transfusion    • Hx of bone density study     10/23/2014   • Hyperlipidemia    • Hypovitaminosis D    • Left arm pain    • Leg swelling    • Low back pain    • Lower extremity edema    • Malaise and fatigue    • Mitral valve disease     Moderate mitral valve prolapse and moderate mitral regurgitation   • Mitral valve insufficiency    • Morbid obesity with BMI of 40.0-44.9, adult    • MRSA infection    • NSTEMI (non-ST elevated myocardial infarction)    • PAF (paroxysmal atrial fibrillation)    • RA (rheumatoid arthritis)     involving both hands   • Sleep apnea    • SOB (shortness of breath)    • Stroke     left side weakness   • Urge incontinence of urine    • Vitamin D deficiency         Past Surgical History:   Procedure Laterality Date   • BREAST BIOPSY     • BREAST SURGERY      right side lumpectomy with biopsy   • CARDIAC CATHETERIZATION Left 10/20/2017    Procedure: Cardiac Catheterization/Vascular Study;  Surgeon: Alphonso Olmedo MD;  Location: [facility] INVASIVE LOCATION;  Service:    • CARDIAC CATHETERIZATION N/A 10/20/2017    +2 mitral regurgitation, left main 10% stenosis, mid to distal LAD 10% diffuse stenosis, circumflex 10% diffuse stenosis, RCA 10% proximal stenosis, and distal PDA consistent with coronary embolus with a lesion of 90% too small to consider coronary intervention; medical management recommended   • EYE SURGERY      laser surgery for glaucoma and left eye  cataracts removed   • HYSTERECTOMY      10+ years ago   • JOINT REPLACEMENT  2005; 2006    bilateral knees and left rotater   • KNEE SURGERY     • MAMMO BILATERAL  2016    [facility] [[email or web address]                              PT Assessment/Plan     Row Name 06/05/18 1344          PT Assessment    Assessment Comments Patient indicates she can tell a difference since she began therapy and is pleased with her progress.  She did get some L LE HS cramping with HS stretching and was only able to complete 2 stretches on the L.    -RA        PT Plan    PT Plan Comments Continue skilled therapy working on core strengthening.  Modaliites as indicated for pain management.    -RA       User Key  (r) = Recorded By, (t) = Taken By, (c) = Cosigned By    Initials Name Provider Type    RA Annabella Lynch, PT Physical Therapist                    Exercises     Row Name 06/05/18 1200             Subjective Comments    Subjective Comments Doing okay, pain not too bad today.  My body just keeps falling apart - supposed to start cardiac rehab when I finish with PT.   -RA         Subjective Pain    Able to rate subjective pain? yes  -RA      Pre-Treatment Pain Level 4  -RA         Exercise 1    Exercise Name 1 Nustep L4  -RA      Time 1 8 min  -RA      Additional Comments R UE/B LE  -RA         Exercise 3    Exercise Name 3 LTR  -RA      Cueing 3 Verbal  -RA      Sets 3 2  -RA      Reps 3 10  -RA      Additional Comments with swiss ball  -RA         Exercise 4    Exercise Name 4 DKTC  -RA      Cueing 4 Demo  -RA      Sets 4 2  -RA      Reps 4 10  -RA      Additional Comments with swiss ball   -RA         Exercise 5    Exercise Name 5 H/L add squeeze  -RA      Cueing 5 Verbal  -RA      Sets 5 2  -RA      Reps 5 12  -RA         Exercise 6    Exercise Name 6 H/L clam + PPT  -RA      Cueing 6 Demo  -RA      Sets 6 1  -RA      Reps 6 15  -RA         Exercise 7    Exercise Name 7 H/S stretch with sheet   -RA      Cueing 7 Demo  -RA      Sets 7 1   -RA      Reps 7 3  -RA      Time 7 20 seconds  -RA        User Key  (r) = Recorded By, (t) = Taken By, (c) = Cosigned By    Initials Name Provider Type    RA Annabella Lynch PT Physical Therapist                             Therapy Education  Given: Symptoms/condition management  Program: Reinforced  How Provided: Verbal, Demonstration  Provided to: Patient  Level of Understanding: Teach back education performed, Verbalized              Time Calculation:   Start Time: 1230  Stop Time: 1322  Time Calculation (min): 52 min  Total Timed Code Minutes- PT: 35 minute(s)    Therapy Charges for Today     Code Description Service Date Service Provider Modifiers Qty    37204792026 HC PT HOT OR COLD PACK TREAT MCARE 6/5/2018 Annabella Lynch, PT GP 1    19653231740 HC PT ELECTRICAL STIM UNATTENDED 6/5/2018 Annabella Lynch PT  1    19013497568 HC PT THER PROC EA 15 MIN 6/5/2018 Annabella Lynch, PT GP 2                    Annabella Lynch, PT  6/5/2018

## 2018-06-07 ENCOUNTER — APPOINTMENT (OUTPATIENT)
Dept: PHYSICAL THERAPY | Facility: HOSPITAL | Age: 73
End: 2018-06-07

## 2018-06-08 ENCOUNTER — HOSPITAL ENCOUNTER (OUTPATIENT)
Dept: PHYSICAL THERAPY | Facility: HOSPITAL | Age: 73
Setting detail: THERAPIES SERIES
Discharge: HOME | End: 2018-06-08

## 2018-06-08 DIAGNOSIS — G89.29 CHRONIC BILATERAL LOW BACK PAIN WITHOUT SCIATICA: ICD-10-CM

## 2018-06-08 DIAGNOSIS — R26.2 DIFFICULTY WALKING: ICD-10-CM

## 2018-06-08 DIAGNOSIS — M54.50 CHRONIC MIDLINE LOW BACK PAIN WITHOUT SCIATICA: Primary | ICD-10-CM

## 2018-06-08 DIAGNOSIS — M54.50 CHRONIC BILATERAL LOW BACK PAIN WITHOUT SCIATICA: ICD-10-CM

## 2018-06-08 DIAGNOSIS — G89.29 CHRONIC MIDLINE LOW BACK PAIN WITHOUT SCIATICA: Primary | ICD-10-CM

## 2018-06-08 PROCEDURE — G0283 ELEC STIM OTHER THAN WOUND: HCPCS | Performed by: PHYSICAL THERAPIST

## 2018-06-08 PROCEDURE — 97110 THERAPEUTIC EXERCISES: CPT | Performed by: PHYSICAL THERAPIST
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
## 2018-06-12 ENCOUNTER — APPOINTMENT (OUTPATIENT)
Dept: PHYSICAL THERAPY | Facility: HOSPITAL | Age: 73
End: 2018-06-12

## 2018-06-14 ENCOUNTER — HOSPITAL ENCOUNTER (OUTPATIENT)
Dept: PHYSICAL THERAPY | Facility: HOSPITAL | Age: 73
Setting detail: THERAPIES SERIES
Discharge: HOME | End: 2018-06-14

## 2018-06-14 DIAGNOSIS — M54.50 CHRONIC MIDLINE LOW BACK PAIN WITHOUT SCIATICA: Primary | ICD-10-CM

## 2018-06-14 DIAGNOSIS — G89.29 CHRONIC MIDLINE LOW BACK PAIN WITHOUT SCIATICA: Primary | ICD-10-CM

## 2018-06-14 DIAGNOSIS — R26.2 DIFFICULTY WALKING: ICD-10-CM

## 2018-06-14 DIAGNOSIS — M54.50 CHRONIC BILATERAL LOW BACK PAIN WITHOUT SCIATICA: ICD-10-CM

## 2018-06-14 DIAGNOSIS — G89.29 CHRONIC BILATERAL LOW BACK PAIN WITHOUT SCIATICA: ICD-10-CM

## 2018-06-14 PROCEDURE — 97032 APPL MODALITY 1+ESTIM EA 15: CPT | Performed by: PHYSICAL THERAPIST

## 2018-06-14 PROCEDURE — 97110 THERAPEUTIC EXERCISES: CPT | Performed by: PHYSICAL THERAPIST

## 2018-06-14 NOTE — THERAPY TREATMENT NOTE
Outpatient Physical Therapy Ortho Treatment Note  [facility]     Patient Name: Lana Yañez  : 1945  MRN: [id number]  Today's Date: 2018      Visit Date: 2018    Visit Dx:    ICD-10-CM ICD-9-CM   1. Chronic midline low back pain without sciatica M54.5 724.2    G89.29 338.29   2. Chronic bilateral low back pain without sciatica M54.5 724.2    G89.29 338.29   3. Difficulty walking R26.2 719.7       Patient Active Problem List   Diagnosis   • Hyperlipidemia   • Vitamin deficiency   • Essential hypertension   • Rheumatoid arthritis involving both hands   • Fatigue   • ANTOINE (dyspnea on exertion)   • Dysuria   • Urge incontinence of urine   • Hypovitaminosis D   • Sleep apnea   • Encounter for screening colonoscopy   • Bronchitis   • Cough   • Generalized osteoarthritis of multiple sites   • Morbid obesity with BMI of 40.0-44.9, adult   • Cellulitis of right elbow due to MRSA   • Medicare annual wellness visit, initial   • Hospital discharge follow-up   • Displacement of lumbar intervertebral disc without myelopathy   • Lumbar disc herniation   • Atrial fibrillation with RVR   • History of MRSA infection   • Left-sided weakness   • Atrial fibrillation   • Left shoulder pain   • Relative lymphocytosis   • Nausea   • Weakness   • Controlled substance agreement signed   • Coronary artery disease involving native coronary artery of native heart with angina pectoris   • Shortness of breath   • Lower extremity edema   • Acute on chronic diastolic heart failure   • Adhesive capsulitis of left shoulder   • CVA tenderness   • Costochondritis, acute   • Allergic reaction   • Coronary artery embolism   • Visit for screening mammogram   • Low back pain        Past Medical History:   Diagnosis Date   • Acute on chronic diastolic heart failure    • Arthritis    • Asthma    • Atrial fibrillation     Persistent; on warfarin   • Bronchitis    • Cellulitis of right elbow     due to MRSA   • CHF (congestive  heart failure)    • COPD (chronic obstructive pulmonary disease)    • Coronary artery disease     Cardiac catheterization completed; 90% PDA stenosis with medical management recommended   • Coronary artery disease involving native coronary artery of native heart with angina pectoris with documented spasm    • Disease of thyroid gland    • Displacement of lumbar intervertebral disc without myelopathy    • Dizziness    • Essential hypertension    • Generalized osteoarthritis of multiple sites    • History of rheumatic fever    • History of transfusion    • Hx of bone density study     10/23/2014   • Hyperlipidemia    • Hypovitaminosis D    • Left arm pain    • Leg swelling    • Low back pain    • Lower extremity edema    • Malaise and fatigue    • Mitral valve disease     Moderate mitral valve prolapse and moderate mitral regurgitation   • Mitral valve insufficiency    • Morbid obesity with BMI of 40.0-44.9, adult    • MRSA infection    • NSTEMI (non-ST elevated myocardial infarction)    • PAF (paroxysmal atrial fibrillation)    • RA (rheumatoid arthritis)     involving both hands   • Sleep apnea    • SOB (shortness of breath)    • Stroke     left side weakness   • Urge incontinence of urine    • Vitamin D deficiency         Past Surgical History:   Procedure Laterality Date   • BREAST BIOPSY     • BREAST SURGERY      right side lumpectomy with biopsy   • CARDIAC CATHETERIZATION Left 10/20/2017    Procedure: Cardiac Catheterization/Vascular Study;  Surgeon: Alphonso Olmedo MD;  Location: [facility] INVASIVE LOCATION;  Service:    • CARDIAC CATHETERIZATION N/A 10/20/2017    +2 mitral regurgitation, left main 10% stenosis, mid to distal LAD 10% diffuse stenosis, circumflex 10% diffuse stenosis, RCA 10% proximal stenosis, and distal PDA consistent with coronary embolus with a lesion of 90% too small to consider coronary intervention; medical management recommended   • EYE SURGERY      laser surgery for glaucoma and left eye  cataracts removed   • HYSTERECTOMY      10+ years ago   • JOINT REPLACEMENT  2005; 2006    bilateral knees and left rotater   • KNEE SURGERY     • MAMMO BILATERAL  2016    [facility]                              PT Assessment/Plan     Row Name 06/14/18 1412          PT Assessment    Assessment Comments Patient tolerated advancement of reps and resistance in clinic today.  Required extensive review of home TENS unit for independent pain management. May benefit from 1-2 more sessions to advance her home program and assist in use of modalities.  -GR        PT Plan    PT Plan Comments Continue x 2 visits as per assessment.  -GR       User Key  (r) = Recorded By, (t) = Taken By, (c) = Cosigned By    Initials Name Provider Type    GR Agustin Siegel, PT Physical Therapist                Modalities     Row Name 06/14/18 1200             ELECTRICAL STIMULATION    Attended/Unattended Attended  -GR      Stimulation Type IFC   patient brings home unit; set-up, demo'd, educated use c pt.  -GR      Max mAmp 10  -GR      Location/Electrode Placement/Other 4 electrodes/2 channels/lumbar spine  -GR      45805 - PT Electrical Stimulation (Manual) Minutes 10  -GR        User Key  (r) = Recorded By, (t) = Taken By, (c) = Cosigned By    Initials Name Provider Type    GR Agustin Siegel, PT Physical Therapist                Exercises     Row Name 06/14/18 1200             Subjective Comments    Subjective Comments Had a few bad days but overall improved. Brings home TENS unit to clinic to learn how to use.  -GR         Subjective Pain    Able to rate subjective pain? yes  -GR      Pre-Treatment Pain Level 4  -GR         Exercise 1    Exercise Name 1 Nustep L5  -GR      Time 1 8 min  -GR      Additional Comments RUE/BLE  -GR         Exercise 3    Exercise Name 3 LTR  -GR      Cueing 3 Verbal  -GR      Sets 3 2  -GR      Reps 3 12  -GR      Additional Comments with swiss ball  -GR         Exercise 4    Exercise Name 4 DKTC  -GR       Cueing 4 Demo  -GR      Sets 4 2  -GR      Reps 4 12  -GR      Additional Comments with swiss ball  -GR         Exercise 5    Exercise Name 5 H/L add squeeze  -GR      Cueing 5 Verbal  -GR      Sets 5 2  -GR      Reps 5 15  -GR         Exercise 6    Exercise Name 6 H/L clam + PPT  -GR      Cueing 6 Demo  -GR      Sets 6 2  -GR      Reps 6 10  -GR      Additional Comments GTB  -GR         Exercise 7    Exercise Name 7 H/S stretch with sheet   -GR      Cueing 7 Demo  -GR      Sets 7 1  -GR      Reps 7 3  -GR      Time 7 20 seconds  -GR        User Key  (r) = Recorded By, (t) = Taken By, (c) = Cosigned By    Initials Name Provider Type    ARACELY Siegel PT Physical Therapist                               PT OP Goals     Row Name 06/14/18 1400          PT Short Term Goals    STG Date to Achieve 05/11/18  -GR     STG 1 Patient will be independent with initial HEP.  -GR     STG 1 Progress Met  -GR     STG 2 Patient will demonstrate lumbar ROM to 50% of WFL or greater to ease transfers.  -GR     STG 2 Progress Ongoing;Progressing  -GR     STG 3 Pain at worst 8/10 with ADLs.  -GR     STG 3 Progress Met  -GR        Long Term Goals    LTG Date to Achieve 06/10/18  -GR     LTG 1 Patient will be independent with progressive HEP for long term management of current condition.  -GR     LTG 1 Progress Ongoing  -GR     LTG 2 Patient will demonstrate lumbar ROM to 75% of WFL for improved gait pattern and community navigation.  -GR     LTG 2 Progress Ongoing;Progressing  -GR     LTG 3 Patient will score </=50% disability on the modified EDWARD to indicate improved perceived ADL performance.  -GR     LTG 3 Progress Ongoing  -GR     LTG 4 Patient will report pain at worst 5/10 or less with ADLs.  -GR     LTG 4 Progress Ongoing;Progressing  -GR       User Key  (r) = Recorded By, (t) = Taken By, (c) = Cosigned By    Initials Name Provider Type    ARACELY Siegel PT Physical Therapist          Therapy Education  Education  Details: home TENS unit setup/operation  Given: Symptoms/condition management  Program: New  How Provided: Verbal  Provided to: Patient  Level of Understanding: Teach back education performed, Demonstrated              Time Calculation:   Start Time: 1230  Stop Time: 1320  Time Calculation (min): 50 min  Total Timed Code Minutes- PT: 50 minute(s)  Therapy Suggested Charges     Code   Minutes Charges    22456 (CPT®) Hc Pt Neuromusc Re Education Ea 15 Min      29654 (CPT®) Hc Pt Ther Proc Ea 15 Min      32800 (CPT®) Hc Gait Training Ea 15 Min      95773 (CPT®) Hc Pt Therapeutic Act Ea 15 Min      06481 (CPT®) Hc Pt Manual Therapy Ea 15 Min      82494 (CPT®) Hc Pt Ther Massage- Per 15 Min      81222 (CPT®) Hc Pt Iontophoresis Ea 15 Min      19703 (CPT®) Hc Pt Elec Stim Ea-Per 15 Min 10 1    54653 (CPT®) Hc Pt Ultrasound Ea 15 Min      72548 (CPT®) Hc Pt Self Care/Mgmt/Train Ea 15 Min      Total  10 1        Therapy Charges for Today     Code Description Service Date Service Provider Modifiers Qty    64307906186 HC PT THER PROC EA 15 MIN 6/14/2018 Agustin Siegel, PT  2    31263885951 HC PT ELEC STIM EA-PER 15 MIN 6/14/2018 Agustin Siegel, PT  1                    Agustin Siegel, PT  6/14/2018

## 2018-06-19 ENCOUNTER — TRANSCRIBE ORDERS (OUTPATIENT)
Dept: CARDIAC REHAB | Facility: HOSPITAL | Age: 73
End: 2018-06-19

## 2018-06-19 ENCOUNTER — HOSPITAL ENCOUNTER (OUTPATIENT)
Dept: PHYSICAL THERAPY | Facility: HOSPITAL | Age: 73
Setting detail: THERAPIES SERIES
Discharge: HOME | End: 2018-06-19

## 2018-06-19 DIAGNOSIS — G89.29 CHRONIC MIDLINE LOW BACK PAIN WITHOUT SCIATICA: Primary | ICD-10-CM

## 2018-06-19 DIAGNOSIS — G89.29 CHRONIC BILATERAL LOW BACK PAIN WITHOUT SCIATICA: ICD-10-CM

## 2018-06-19 DIAGNOSIS — M54.50 CHRONIC BILATERAL LOW BACK PAIN WITHOUT SCIATICA: ICD-10-CM

## 2018-06-19 DIAGNOSIS — M54.50 CHRONIC MIDLINE LOW BACK PAIN WITHOUT SCIATICA: Primary | ICD-10-CM

## 2018-06-19 DIAGNOSIS — I21.4 NSTEMI (NON-ST ELEVATED MYOCARDIAL INFARCTION) (HCC): Primary | ICD-10-CM

## 2018-06-19 DIAGNOSIS — R26.2 DIFFICULTY WALKING: ICD-10-CM

## 2018-06-19 PROCEDURE — 97032 APPL MODALITY 1+ESTIM EA 15: CPT | Performed by: PHYSICAL THERAPIST

## 2018-06-19 PROCEDURE — 97110 THERAPEUTIC EXERCISES: CPT | Performed by: PHYSICAL THERAPIST

## 2018-06-19 RX ORDER — WARFARIN SODIUM 5 MG/1
TABLET ORAL
Qty: 90 TABLET | Refills: 0 | OUTPATIENT
Start: 2018-06-19

## 2018-06-19 RX ORDER — WARFARIN SODIUM 5 MG/1
TABLET ORAL
Qty: 34 TABLET | Refills: 0 | Status: SHIPPED | OUTPATIENT
Start: 2018-06-19 | End: 2018-07-19 | Stop reason: SDUPTHER

## 2018-06-19 NOTE — THERAPY TREATMENT NOTE
Outpatient Physical Therapy Ortho Treatment Note  [facility]     Patient Name: Lana Yañez  : 1945  MRN: [id number]  Today's Date: 2018      Visit Date: 2018    Visit Dx:    ICD-10-CM ICD-9-CM   1. Chronic midline low back pain without sciatica M54.5 724.2    G89.29 338.29   2. Chronic bilateral low back pain without sciatica M54.5 724.2    G89.29 338.29   3. Difficulty walking R26.2 719.7       Patient Active Problem List   Diagnosis   • Hyperlipidemia   • Vitamin deficiency   • Essential hypertension   • Rheumatoid arthritis involving both hands   • Fatigue   • ANTOINE (dyspnea on exertion)   • Dysuria   • Urge incontinence of urine   • Hypovitaminosis D   • Sleep apnea   • Encounter for screening colonoscopy   • Bronchitis   • Cough   • Generalized osteoarthritis of multiple sites   • Morbid obesity with BMI of 40.0-44.9, adult   • Cellulitis of right elbow due to MRSA   • Medicare annual wellness visit, initial   • Hospital discharge follow-up   • Displacement of lumbar intervertebral disc without myelopathy   • Lumbar disc herniation   • Atrial fibrillation with RVR   • History of MRSA infection   • Left-sided weakness   • Atrial fibrillation   • Left shoulder pain   • Relative lymphocytosis   • Nausea   • Weakness   • Controlled substance agreement signed   • Coronary artery disease involving native coronary artery of native heart with angina pectoris   • Shortness of breath   • Lower extremity edema   • Acute on chronic diastolic heart failure   • Adhesive capsulitis of left shoulder   • CVA tenderness   • Costochondritis, acute   • Allergic reaction   • Coronary artery embolism   • Visit for screening mammogram   • Low back pain        Past Medical History:   Diagnosis Date   • Acute on chronic diastolic heart failure    • Arthritis    • Asthma    • Atrial fibrillation     Persistent; on warfarin   • Bronchitis    • Cellulitis of right elbow     due to MRSA   • CHF (congestive  heart failure)    • COPD (chronic obstructive pulmonary disease)    • Coronary artery disease     Cardiac catheterization completed; 90% PDA stenosis with medical management recommended   • Coronary artery disease involving native coronary artery of native heart with angina pectoris with documented spasm    • Disease of thyroid gland    • Displacement of lumbar intervertebral disc without myelopathy    • Dizziness    • Essential hypertension    • Generalized osteoarthritis of multiple sites    • History of rheumatic fever    • History of transfusion    • Hx of bone density study     10/23/2014   • Hyperlipidemia    • Hypovitaminosis D    • Left arm pain    • Leg swelling    • Low back pain    • Lower extremity edema    • Malaise and fatigue    • Mitral valve disease     Moderate mitral valve prolapse and moderate mitral regurgitation   • Mitral valve insufficiency    • Morbid obesity with BMI of 40.0-44.9, adult    • MRSA infection    • NSTEMI (non-ST elevated myocardial infarction)    • PAF (paroxysmal atrial fibrillation)    • RA (rheumatoid arthritis)     involving both hands   • Sleep apnea    • SOB (shortness of breath)    • Stroke     left side weakness   • Urge incontinence of urine    • Vitamin D deficiency         Past Surgical History:   Procedure Laterality Date   • BREAST BIOPSY     • BREAST SURGERY      right side lumpectomy with biopsy   • CARDIAC CATHETERIZATION Left 10/20/2017    Procedure: Cardiac Catheterization/Vascular Study;  Surgeon: Alphonso Olmedo MD;  Location: [facility] INVASIVE LOCATION;  Service:    • CARDIAC CATHETERIZATION N/A 10/20/2017    +2 mitral regurgitation, left main 10% stenosis, mid to distal LAD 10% diffuse stenosis, circumflex 10% diffuse stenosis, RCA 10% proximal stenosis, and distal PDA consistent with coronary embolus with a lesion of 90% too small to consider coronary intervention; medical management recommended   • EYE SURGERY      laser surgery for glaucoma and left eye  cataracts removed   • HYSTERECTOMY      10+ years ago   • JOINT REPLACEMENT  2005; 2006    bilateral knees and left rotater   • KNEE SURGERY     • MAMMO BILATERAL  2016    [facility]                              PT Assessment/Plan     Row Name 06/19/18 1314          PT Assessment    Assessment Comments Patient with increased independence trialing home TENS today.  One visit remains to reassess and update/progress HEP.  Patient to begin cardiac rehab next week.  -GR        PT Plan    PT Plan Comments D/C to I HEP at next visit.  -GR       User Key  (r) = Recorded By, (t) = Taken By, (c) = Cosigned By    Initials Name Provider Type    ARACELY Siegel, PT Physical Therapist                Modalities     Row Name 06/19/18 1300 06/19/18 1200          Moist Heat    MH Applied  -- Yes  -GR     Location  -- lumbar in sitting  -GR     Rx Minutes  -- 15 mins  -GR        ELECTRICAL STIMULATION    Attended/Unattended Attended  -GR  --     Stimulation Type IFC   re-trained on use set up hands-on with patient  -GR  --     Max mAmp 15  -GR  --     Location/Electrode Placement/Other 4 electrodes/2 channels/lumbar spine  -GR  --       User Key  (r) = Recorded By, (t) = Taken By, (c) = Cosigned By    Initials Name Provider Type    ARACELY Siegel, PT Physical Therapist                Exercises     Row Name 06/19/18 1315 06/19/18 1200          Subjective Comments    Subjective Comments  -- Pain a little less this week so far. Has not tried the home TENS since last visit, still with a few questions.  -GR        Subjective Pain    Able to rate subjective pain?  -- yes  -GR     Pre-Treatment Pain Level  -- 3  -GR        Total Minutes    04385 - PT Therapeutic Exercise Minutes 15  -GR  --        Exercise 1    Exercise Name 1  -- Nustep L5  -GR     Time 1  -- 8 min  -GR     Additional Comments  -- RUE/BLE  -GR        Exercise 2    Exercise Name 2  -- Reviewed HEP- declined performing per leaving for dentist appt  -GR        User Key  (r) = Recorded By, (t) = Taken By, (c) = Cosigned By    Initials Name Provider Type    ARACELY Siegel PT Physical Therapist                               PT OP Goals     Row Name 06/19/18 1300          PT Short Term Goals    STG Date to Achieve 05/11/18  -GR     STG 1 Patient will be independent with initial HEP.  -GR     STG 1 Progress Met  -GR     STG 2 Patient will demonstrate lumbar ROM to 50% of WFL or greater to ease transfers.  -GR     STG 2 Progress Ongoing;Progressing  -GR     STG 3 Pain at worst 8/10 with ADLs.  -GR     STG 3 Progress Met  -GR        Long Term Goals    LTG Date to Achieve 06/10/18  -GR     LTG 1 Patient will be independent with progressive HEP for long term management of current condition.  -GR     LTG 1 Progress Ongoing  -GR     LTG 2 Patient will demonstrate lumbar ROM to 75% of WFL for improved gait pattern and community navigation.  -GR     LTG 2 Progress Ongoing;Progressing  -GR     LTG 3 Patient will score </=50% disability on the modified EDWARD to indicate improved perceived ADL performance.  -GR     LTG 3 Progress Ongoing  -GR     LTG 4 Patient will report pain at worst 5/10 or less with ADLs.  -GR     LTG 4 Progress Ongoing;Progressing  -GR       User Key  (r) = Recorded By, (t) = Taken By, (c) = Cosigned By    Initials Name Provider Type    ARACELY Siegel, RENE Physical Therapist                         Time Calculation:   Start Time: 1230  Stop Time: 1305  Time Calculation (min): 35 min  Total Timed Code Minutes- PT: 25 minute(s)  Therapy Suggested Charges     Code   Minutes Charges    80453 (CPT®) Hc Pt Neuromusc Re Education Ea 15 Min      45944 (CPT®) Hc Pt Ther Proc Ea 15 Min 15 1    43988 (CPT®) Hc Gait Training Ea 15 Min      07874 (CPT®) Hc Pt Therapeutic Act Ea 15 Min      11644 (CPT®) Hc Pt Manual Therapy Ea 15 Min      74909 (CPT®) Hc Pt Ther Massage- Per 15 Min      68995 (CPT®) Hc Pt Iontophoresis Ea 15 Min      02936 (CPT®) Hc Pt Elec Stim Ea-Per  15 Min      94046 (CPT®) Hc Pt Ultrasound Ea 15 Min      82636 (CPT®) Hc Pt Self Care/Mgmt/Train Ea 15 Min      Total  15 1        Therapy Charges for Today     Code Description Service Date Service Provider Modifiers Qty    70750450206 HC PT THER PROC EA 15 MIN 6/19/2018 Agustin Siegel, PT GP 1    48059323307 HC PT ELEC STIM EA-PER 15 MIN 6/19/2018 Agustin Siegel, PT GP 1                    Agustin Siegel, PT  6/19/2018

## 2018-06-21 ENCOUNTER — HOSPITAL ENCOUNTER (OUTPATIENT)
Dept: PHYSICAL THERAPY | Facility: HOSPITAL | Age: 73
Setting detail: THERAPIES SERIES
Discharge: HOME | End: 2018-06-21

## 2018-06-21 ENCOUNTER — HOSPITAL ENCOUNTER (OUTPATIENT)
Dept: CARDIOLOGY | Facility: HOSPITAL | Age: 73
Setting detail: RECURRING SERIES
Discharge: HOME | End: 2018-06-21

## 2018-06-21 DIAGNOSIS — M54.50 CHRONIC BILATERAL LOW BACK PAIN WITHOUT SCIATICA: ICD-10-CM

## 2018-06-21 DIAGNOSIS — G89.29 CHRONIC BILATERAL LOW BACK PAIN WITHOUT SCIATICA: ICD-10-CM

## 2018-06-21 DIAGNOSIS — G89.29 CHRONIC MIDLINE LOW BACK PAIN WITHOUT SCIATICA: Primary | ICD-10-CM

## 2018-06-21 DIAGNOSIS — M54.50 CHRONIC MIDLINE LOW BACK PAIN WITHOUT SCIATICA: Primary | ICD-10-CM

## 2018-06-21 DIAGNOSIS — R26.2 DIFFICULTY WALKING: ICD-10-CM

## 2018-06-21 PROCEDURE — 97032 APPL MODALITY 1+ESTIM EA 15: CPT | Performed by: PHYSICAL THERAPIST

## 2018-06-21 PROCEDURE — 85610 PROTHROMBIN TIME: CPT

## 2018-06-21 PROCEDURE — 97110 THERAPEUTIC EXERCISES: CPT | Performed by: PHYSICAL THERAPIST

## 2018-06-21 PROCEDURE — 36416 COLLJ CAPILLARY BLOOD SPEC: CPT

## 2018-06-21 NOTE — THERAPY DISCHARGE NOTE
Outpatient Physical Therapy Ortho Treatment Note/Discharge Summary  [facility]     Patient Name: Lana Yañez  : 1945  MRN: [id number]  Today's Date: 2018      Visit Date: 2018    Visit Dx:    ICD-10-CM ICD-9-CM   1. Chronic midline low back pain without sciatica M54.5 724.2    G89.29 338.29   2. Chronic bilateral low back pain without sciatica M54.5 724.2    G89.29 338.29   3. Difficulty walking R26.2 719.7       Patient Active Problem List   Diagnosis   • Hyperlipidemia   • Vitamin deficiency   • Essential hypertension   • Rheumatoid arthritis involving both hands   • Fatigue   • ANTOINE (dyspnea on exertion)   • Dysuria   • Urge incontinence of urine   • Hypovitaminosis D   • Sleep apnea   • Encounter for screening colonoscopy   • Bronchitis   • Cough   • Generalized osteoarthritis of multiple sites   • Morbid obesity with BMI of 40.0-44.9, adult   • Cellulitis of right elbow due to MRSA   • Medicare annual wellness visit, initial   • Hospital discharge follow-up   • Displacement of lumbar intervertebral disc without myelopathy   • Lumbar disc herniation   • Atrial fibrillation with RVR   • History of MRSA infection   • Left-sided weakness   • Atrial fibrillation   • Left shoulder pain   • Relative lymphocytosis   • Nausea   • Weakness   • Controlled substance agreement signed   • Coronary artery disease involving native coronary artery of native heart with angina pectoris   • Shortness of breath   • Lower extremity edema   • Acute on chronic diastolic heart failure   • Adhesive capsulitis of left shoulder   • CVA tenderness   • Costochondritis, acute   • Allergic reaction   • Coronary artery embolism   • Visit for screening mammogram   • Low back pain        Past Medical History:   Diagnosis Date   • Acute on chronic diastolic heart failure    • Arthritis    • Asthma    • Atrial fibrillation     Persistent; on warfarin   • Bronchitis    • Cellulitis of right elbow     due to MRSA   •  CHF (congestive heart failure)    • COPD (chronic obstructive pulmonary disease)    • Coronary artery disease     Cardiac catheterization completed; 90% PDA stenosis with medical management recommended   • Coronary artery disease involving native coronary artery of native heart with angina pectoris with documented spasm    • Disease of thyroid gland    • Displacement of lumbar intervertebral disc without myelopathy    • Dizziness    • Essential hypertension    • Generalized osteoarthritis of multiple sites    • History of rheumatic fever    • History of transfusion    • Hx of bone density study     10/23/2014   • Hyperlipidemia    • Hypovitaminosis D    • Left arm pain    • Leg swelling    • Low back pain    • Lower extremity edema    • Malaise and fatigue    • Mitral valve disease     Moderate mitral valve prolapse and moderate mitral regurgitation   • Mitral valve insufficiency    • Morbid obesity with BMI of 40.0-44.9, adult    • MRSA infection    • NSTEMI (non-ST elevated myocardial infarction)    • PAF (paroxysmal atrial fibrillation)    • RA (rheumatoid arthritis)     involving both hands   • Sleep apnea    • SOB (shortness of breath)    • Stroke     left side weakness   • Urge incontinence of urine    • Vitamin D deficiency         Past Surgical History:   Procedure Laterality Date   • BREAST BIOPSY     • BREAST SURGERY      right side lumpectomy with biopsy   • CARDIAC CATHETERIZATION Left 10/20/2017    Procedure: Cardiac Catheterization/Vascular Study;  Surgeon: Alphonso Olmedo MD;  Location: [facility];  Service:    • CARDIAC CATHETERIZATION N/A 10/20/2017    +2 mitral regurgitation, left main 10% stenosis, mid to distal LAD 10% diffuse stenosis, circumflex 10% diffuse stenosis, RCA 10% proximal stenosis, and distal PDA consistent with coronary embolus with a lesion of 90% too small to consider coronary intervention; medical management recommended   • EYE SURGERY      laser surgery for  glaucoma and left eye cataracts removed   • HYSTERECTOMY      10+ years ago   • JOINT REPLACEMENT  2005; 2006    bilateral knees and left rotater   • KNEE SURGERY     • MAMMO BILATERAL  2016    [facility]                              PT Assessment/Plan     Row Name 06/21/18 1328          PT Assessment    Assessment Comments Ms. Yañez attended 10 visits of skilled PT for treatment of chronic LBP.  Perceived disability as per the modified EDWARD improved from 80% to 48% where 100% = complete disability.  ROM now functional to allow ease with transfers including out of chair and bed.  She is independent with a home program, use of TENS device and is scheduled to begin cardiac rehab next week. For these reasons she is appropriate to d/c from PT and verbalizes agreement.  Thank you for this referral.  -GR        PT Plan    PT Plan Comments D/C to I HEP, home TENS and cardiac rehab.  -GR       User Key  (r) = Recorded By, (t) = Taken By, (c) = Cosigned By    Initials Name Provider Type    GR Agustin Siegel, PT Physical Therapist                Modalities     Row Name 06/21/18 1200             Moist Heat    MH Applied Yes  -GR      Location lumbar in 90/90  -GR      Rx Minutes 15 mins  -GR         ELECTRICAL STIMULATION    Attended/Unattended Unattended  -GR      Stimulation Type IFC  -GR      Max mAmp 14  -GR      Location/Electrode Placement/Other 4 electrodes/2 channels/lumbar spine  -GR      66228 - PT Electrical Stimulation (Manual) Minutes 15  -GR        User Key  (r) = Recorded By, (t) = Taken By, (c) = Cosigned By    Initials Name Provider Type    GR Agustin Siegel, PT Physical Therapist                Exercises     Row Name 06/21/18 1332 06/21/18 1200          Subjective Comments    Subjective Comments  -- Reports 80% improvement since starting PT. Pain is worse today, not sure why. Starting cardiac rehab but a little anxious.  -GR        Total Minutes    29815 - PT Therapeutic Exercise Minutes --   -GR 35  -GR        Exercise 1    Exercise Name 1  -- Nustep L5  -GR     Time 1  -- 8 min  -GR     Additional Comments  -- RUE/BLE  -GR        Exercise 3    Exercise Name 3  -- LTR  -GR     Cueing 3  -- Verbal  -GR     Sets 3  -- 2  -GR     Reps 3  -- 12  -GR        Exercise 4    Exercise Name 4  -- DKTC  -GR     Cueing 4  -- Demo  -GR     Sets 4  -- 2  -GR     Reps 4  -- 12  -GR        Exercise 5    Exercise Name 5  -- H/L add squeeze  -GR     Cueing 5  -- Verbal  -GR     Sets 5  -- 2  -GR     Reps 5  -- 15  -GR        Exercise 6    Exercise Name 6  -- H/L clam + PPT  -GR     Cueing 6  -- Demo  -GR     Sets 6  -- 2  -GR     Reps 6  -- 10  -GR     Additional Comments  -- GTB  -GR        Exercise 7    Exercise Name 7  -- H/S stretch with sheet   -GR     Cueing 7  -- Demo  -GR     Sets 7  -- 1  -GR     Reps 7  -- 3  -GR     Time 7  -- 20 seconds  -GR       User Key  (r) = Recorded By, (t) = Taken By, (c) = Cosigned By    Initials Name Provider Type    GR Agustin Siegel, PT Physical Therapist                               PT OP Goals     Row Name 06/21/18 1200          PT Short Term Goals    STG Date to Achieve 05/11/18  -GR     STG 1 Patient will be independent with initial HEP.  -GR     STG 1 Progress Met  -GR     STG 2 Patient will demonstrate lumbar ROM to 50% of WFL or greater to ease transfers.  -GR     STG 2 Progress Met  -GR     STG 3 Pain at worst 8/10 with ADLs.  -GR     STG 3 Progress Met  -GR        Long Term Goals    LTG Date to Achieve 06/10/18  -GR     LTG 1 Patient will be independent with progressive HEP for long term management of current condition.  -GR     LTG 1 Progress Met  -GR     LTG 2 Patient will demonstrate lumbar ROM to 75% of WFL for improved gait pattern and community navigation.  -GR     LTG 2 Progress Partially Met  -GR     LTG 3 Patient will score </=50% disability on the modified EDWARD to indicate improved perceived ADL performance.  -GR     LTG 3 Progress Met  -GR     LTG 3 Progress  Comments 48%  -GR     LTG 4 Patient will report pain at worst 5/10 or less with ADLs.  -GR     LTG 4 Progress Met  -GR       User Key  (r) = Recorded By, (t) = Taken By, (c) = Cosigned By    Initials Name Provider Type    GR Agustin Siegel PT Physical Therapist          Therapy Education  Education Details: finalized HEP and TB use  Given: HEP, Symptoms/condition management  Program: Reinforced  How Provided: Verbal  Provided to: Patient  Level of Understanding: Teach back education performed, Verbalized    Outcome Measure Options: Modifed Owestry  Modified Oswestry  Modified Oswestry Score/Comments: 48%      Time Calculation:   Start Time: 1230  Stop Time: 1320  Time Calculation (min): 50 min  Total Timed Code Minutes- PT: 40 minute(s)  Therapy Suggested Charges     Code   Minutes Charges    02729 (CPT®) Hc Pt Neuromusc Re Education Ea 15 Min      34175 (CPT®) Hc Pt Ther Proc Ea 15 Min 35 2    83095 (CPT®) Hc Gait Training Ea 15 Min      38732 (CPT®) Hc Pt Therapeutic Act Ea 15 Min      52526 (CPT®) Hc Pt Manual Therapy Ea 15 Min      85822 (CPT®) Hc Pt Ther Massage- Per 15 Min      71770 (CPT®) Hc Pt Iontophoresis Ea 15 Min      28802 (CPT®) Hc Pt Elec Stim Ea-Per 15 Min 15 1    46555 (CPT®) Hc Pt Ultrasound Ea 15 Min      89653 (CPT®) Hc Pt Self Care/Mgmt/Train Ea 15 Min      Total  50 3        Therapy Charges for Today     Code Description Service Date Service Provider Modifiers Qty    92093490105 HC PT THER PROC EA 15 MIN 6/21/2018 Agustin Siegel, PT GP 2    18161865524 HC PT ELEC STIM EA-PER 15 MIN 6/21/2018 Agustin Siegel, PT GP 1    5851947 HC PT HOT OR COLD PACK TREAT MCARE 6/21/2018 Agustin Siegel, PT GP 1          PT G-Codes  Outcome Measure Options: Modifed Owestry     OP PT Discharge Summary  Date of Discharge: 06/21/18  Reason for Discharge: Maximum functional potential achieved  Outcomes Achieved: Patient able to partially acheive established goals  Discharge Destination: Home with home  program  Discharge Instructions/Additional Comments: See assessment.      Agustin Siegel, PT  6/21/2018

## 2018-06-22 RX ORDER — WARFARIN SODIUM 5 MG/1
TABLET ORAL
Qty: 90 TABLET | Refills: 0 | Status: SHIPPED | OUTPATIENT
Start: 2018-06-22 | End: 2018-08-28 | Stop reason: SDUPTHER

## 2018-06-27 ENCOUNTER — OFFICE VISIT (OUTPATIENT)
Dept: CARDIAC REHAB | Facility: HOSPITAL | Age: 73
End: 2018-06-27

## 2018-06-27 VITALS
HEART RATE: 81 BPM | HEIGHT: 66 IN | WEIGHT: 250 LBS | BODY MASS INDEX: 40.18 KG/M2 | SYSTOLIC BLOOD PRESSURE: 140 MMHG | DIASTOLIC BLOOD PRESSURE: 90 MMHG | OXYGEN SATURATION: 96 %

## 2018-06-27 DIAGNOSIS — I21.4 NON-ST ELEVATION (NSTEMI) MYOCARDIAL INFARCTION (HCC): Primary | ICD-10-CM

## 2018-06-27 PROCEDURE — 93797 PHYS/QHP OP CAR RHAB WO ECG: CPT

## 2018-06-27 NOTE — PROGRESS NOTES
Cardiac Rehab Initial Assessment      Name: Lana Yañez  :1945 Allergies:Contrast dye   MRN: [id number] 72 y.o. Physician: Raoul Ramirez MD   Primary Diagnosis:    Diagnosis Plan   1. Non-ST elevation (NSTEMI) myocardial infarction      Event Date: 10/19/2017 Specialist: Spenser   Secondary Diagnosis: CVA 10/15/2017 Risk Stratification:Moderate Risk Note Author: Fe Durant RN     Cardiovascular History: Chronic Atrial Fibrillation, gets edema in both feet and legs if she eats too much salt, medical record states CHF, pt unaware of diagnosis.     EXERCISE AT HOME  no  na  N/A    EF: Normal LV function Source: CLARIBEL 10/20/2017          Ambulatory Status:uses a rolator due to fatiguing easily if she walks too far.  Ambulatory Fall Risk Assessed on Initial Visit: yes 6 Minute Walk Pre- Cardiac Rehab:  Distance:681ft      RPE:4  Max. HR: 133       SPO2:95-98    MET: 2  MPH: 1.3             RPD: 1  Resting BP: 140/90 LA, 132/88 RA    Peak BP: 144/84  Recovery BP: 130/80  Comments: Pt had 2 rest stops due to fatigue.  One lasted 30 seconds and the other was 45 seconds.  Only complaint was fatigue.  She walked 4 minutes and 45 seconds.       NUTRITION  Lipids:yes If yes, labs as follows;  Total: No components found for: CHOLESTEROL  HDL:   HDL Cholesterol   Date Value Ref Range Status   2018 63 (H) 40 - 60 mg/dL Final   2018 71 (H) 40 - 60 mg/dL Final    Lipids continued:  LDL:  LDL Cholesterol    Date Value Ref Range Status   2018 32 0 - 100 mg/dL Final   2018 16 0 - 100 mg/dL Final     Triglyceride: No components found for: TRIGLYCERIDE   Weight Management:                 Weight: 250 lbs  Height: 65.5 in                                   BMI: 40.96  Waist Circumference: 50  inches   Alcohol Use: none Diabetes:No    Last HGBA1C with date if applicable:No components found for: A1C         SOCIAL HISTORY  Social History     Social History   • Marital status:      Spouse name:  Suresh   • Number of children: 5   • Years of education: 13     Occupational History   •  for [employer] Retired     Social History Main Topics   • Smoking status: Former Smoker     Packs/day: 3.00     Types: Cigarettes     Start date: 5/19/1967     Quit date: 10/19/1968   • Smokeless tobacco: Never Used   • Alcohol use No      Comment: No caffeine use   • Drug use: No   • Sexual activity: Defer     Other Topics Concern   • Not on file       Educational Level (choose one that applies) some college Learning Barriers:[employer] Learn, Vision, has glaucoma both eyes.Cognitive occasional memory problem since stroke, but she writes things down which helps her memory.    Family Support:yes    Living Arrangement: lives with their spouse    Risk Factors: Stress  Yes, Heredity  Yes If Yes Mom, Dad, brother, sisters, Hyperlipidemia  Yes and Obesity  Yes Morbid     Tobacco Adjunct: No        Comorbidities: Cerebrovascular disease, Connective tissue disease: Pt has rheumatoid arthritis and pt had CVA 10-.  Has asthma.  Has FRANCY, but is intolerant of mask.     PSYCHOSOCIAL  Clinical Depression: no    Stress: yes from family and her extensive health problems     Assess presence or absence of depression using a valid screening tool: yes      PHYSICAL ASSESSMENT  Influenza vaccine: yes  Pneumococcal vaccine: yes          Angina: no    Describe angina scale of 0 - 4: 0 = none    Today are you having incisional pain? N/A. If, Yes, Scale: na        Today are you having any other pain? Yes. If, Yes, Scale: 4  Pt has chronic left shoulder pain and chronic back pain.  Uses heating pad or uses Tramadol to help.   Diagnosed with Hypertension:yes    Heart Sounds: S1 S2 irregular     Lung Sounds: normal air entry, lungs clear to auscultation  Breath sounds posterior lung field more diminished when compared to right lung field.         Assessment: Very nice lady.  Appropriate to conversation.  Reasonable well at  remembering health history. Orthopedic Problems: Very limited use of left arm due to needing shoulder replacement.  Chronic back pain.  Bilateral TKR about 10 years ago.  Rheumatoid and osteo-arthritis wide spread. Fractured left ankle 2014.    Are you being hurt, hit, or frightened by anyone at home or in your life? no    Are you being neglected by a caregiver? No Shoulder flexibility/Range of motion: Poor with left shoulder function.  Right arm average.    Recommended arm activity: Arm Ergometer, pt thinks she can do that motion.    Chair sit and reach within: 8 inches   Leg flexibility: Average    Leg Strength/Balance/Five times sit to stand: Could only do 3 sit to stands and took greater than 20 seconds     Chose one: Fall Risk    Recommended stretching: Chair   Reviewed with pt signs and symptoms of CHF.  Recommended daily weight tracking.  Adherence to medication.  Avoiding salt in foods.  Pt says se is trying to not have more than 2,000 mg per day.  She does read food labels. Assessment: Bilateral ankle edema 1-2+. Both feet warm to touch.  Pt says she skipped her lasix because of this appointment today. Advised not to do that again.     Family attends IA: no Time of arrival: 1355 (Pt arrived late)  Time of departure: 1510     Patient Goals: MET 3-4  Increase mobility, improve balance and strength.  Be able to walk longer without fatigue.  Be able to clean house.  Lose 5-10 lbs in program with long term goal weight of 200 lbs.  Learn how to eat heart healthy.         6/27/2018  3:30 PM  Fe Durant RN

## 2018-06-28 RX ORDER — ERGOCALCIFEROL 1.25 MG/1
CAPSULE ORAL
Qty: 12 CAPSULE | Refills: 0 | Status: SHIPPED | OUTPATIENT
Start: 2018-06-28 | End: 2018-12-07 | Stop reason: SDUPTHER

## 2018-07-02 RX ORDER — ERGOCALCIFEROL 1.25 MG/1
CAPSULE ORAL
Qty: 12 CAPSULE | Refills: 0 | Status: SHIPPED | OUTPATIENT
Start: 2018-07-02 | End: 2018-07-11 | Stop reason: SDUPTHER

## 2018-07-09 ENCOUNTER — TREATMENT (OUTPATIENT)
Dept: CARDIAC REHAB | Facility: HOSPITAL | Age: 73
End: 2018-07-09

## 2018-07-09 DIAGNOSIS — I21.4 NON-ST ELEVATION (NSTEMI) MYOCARDIAL INFARCTION (HCC): Primary | ICD-10-CM

## 2018-07-09 PROCEDURE — 93798 PHYS/QHP OP CAR RHAB W/ECG: CPT

## 2018-07-11 ENCOUNTER — HOSPITAL ENCOUNTER (OUTPATIENT)
Dept: CARDIOLOGY | Facility: HOSPITAL | Age: 73
Setting detail: RECURRING SERIES
Discharge: HOME | End: 2018-07-11

## 2018-07-11 ENCOUNTER — OFFICE VISIT (OUTPATIENT)
Dept: NEUROSURGERY | Facility: CLINIC | Age: 73
End: 2018-07-11

## 2018-07-11 ENCOUNTER — TELEPHONE (OUTPATIENT)
Dept: CARDIOLOGY | Facility: CLINIC | Age: 73
End: 2018-07-11

## 2018-07-11 VITALS — HEIGHT: 66 IN | DIASTOLIC BLOOD PRESSURE: 60 MMHG | SYSTOLIC BLOOD PRESSURE: 116 MMHG

## 2018-07-11 DIAGNOSIS — M54.42 CHRONIC LEFT-SIDED LOW BACK PAIN WITH LEFT-SIDED SCIATICA: Primary | ICD-10-CM

## 2018-07-11 DIAGNOSIS — G89.29 CHRONIC LEFT-SIDED LOW BACK PAIN WITH LEFT-SIDED SCIATICA: Primary | ICD-10-CM

## 2018-07-11 PROCEDURE — 36416 COLLJ CAPILLARY BLOOD SPEC: CPT

## 2018-07-11 PROCEDURE — 85610 PROTHROMBIN TIME: CPT

## 2018-07-11 PROCEDURE — 99213 OFFICE O/P EST LOW 20 MIN: CPT | Performed by: NEUROLOGICAL SURGERY

## 2018-07-11 RX ORDER — TRAMADOL HYDROCHLORIDE 50 MG/1
50 TABLET ORAL EVERY 6 HOURS PRN
Qty: 45 TABLET | Refills: 0 | Status: SHIPPED | OUTPATIENT
Start: 2018-07-11 | End: 2018-08-28

## 2018-07-11 NOTE — PROGRESS NOTES
Subjective   Patient ID: Lana Yañez is a 72 y.o. female who is here today for follow-up for low back pain. She presents unaccompanied.    History of Present Illness  71 yo lady who had an MI the day of our 1st appointment.  No new SOB or CP.  She remains on coumadin.  I discussed her case with neurology and they felt 3-6 months of ac was mandatory.      The following portions of the patient's history were reviewed and updated as appropriate: allergies, current medications, past family history, past medical history, past social history, past surgical history and problem list.    Review of Systems   Musculoskeletal: Positive for arthralgias (LLE) and back pain.   Neurological: Positive for weakness ( LLE) and numbness (LLE).       Objective   Physical Exam  Neurologic Exam  Right iliopsoas: 5/5  Left iliopsoas: 3/5  Right quadriceps: 5/5  Left quadriceps: 5/5  Right hamstrin/5  Left hamstrin/5  Right anterior tibial: 5/5  Left anterior tibial: 5/5  Right gastroc: 5/5  Left gastroc: 5/5       AG in left IP     Sensory Exam   Sensory deficit distribution on left: L3 - much improved     Pitting edema noted to LE Left worse than right    Assessment/Plan   Independent Review of Radiographic Studies:  Patient has a L23 disc herniation    Medical Decision Making:  I discussed her case with Dr. Dueñas - she is a poor operative candidate with some difficulty and increased risk. She has lots of back pain which fusions are usually marginally efficacious for and require lengthy anesthetics and higher blood loss - not a great option.  I also discussed her with Dr. Catherine Reyes who felt 3-6 months of continued AC was mandated by her cardiac thrombus.  She would require a AC bridge according to Dr. Dueñas.  We will see her in 3 months.  Her left leg is not very painful.  Essentially the easiest symptom to remit would be leg pain (absent) and her objective issues (weakness and numbness) may simply be too far gone to assist  with her fragile cardiac status. I ceetainly feel she is not a good candidate for a fusion. ANTELMO might assist her pain but again would necessitate AC interruption.  Antiinflammatories might help her back pain but AC.  Her weight is up and she needs to speak to cardiology to see if additional diuretic might help. I refilled her tramadol and referred her to pain management.      Lana was seen today for back pain.    Diagnoses and all orders for this visit:    Chronic left-sided low back pain with left-sided sciatica  -     Ambulatory Referral to Pain Management    Other orders  -     traMADol (ULTRAM) 50 MG tablet; Take 1 tablet by mouth Every 6 (Six) Hours As Needed for Moderate Pain .      No Follow-up on file.

## 2018-07-16 ENCOUNTER — LAB (OUTPATIENT)
Dept: LAB | Facility: HOSPITAL | Age: 73
End: 2018-07-16

## 2018-07-16 ENCOUNTER — TREATMENT (OUTPATIENT)
Dept: CARDIAC REHAB | Facility: HOSPITAL | Age: 73
End: 2018-07-16

## 2018-07-16 DIAGNOSIS — I10 ESSENTIAL HYPERTENSION: Primary | ICD-10-CM

## 2018-07-16 DIAGNOSIS — I10 ESSENTIAL HYPERTENSION: ICD-10-CM

## 2018-07-16 DIAGNOSIS — I21.4 NON-ST ELEVATION (NSTEMI) MYOCARDIAL INFARCTION (HCC): Primary | ICD-10-CM

## 2018-07-16 LAB
ANION GAP SERPL CALCULATED.3IONS-SCNC: 12.5 MMOL/L
BUN BLD-MCNC: 17 MG/DL (ref 8–23)
BUN/CREAT SERPL: 17.2 (ref 7–25)
CALCIUM SPEC-SCNC: 10.3 MG/DL (ref 8.6–10.5)
CHLORIDE SERPL-SCNC: 100 MMOL/L (ref 98–107)
CO2 SERPL-SCNC: 28.5 MMOL/L (ref 22–29)
CREAT BLD-MCNC: 0.99 MG/DL (ref 0.57–1)
GFR SERPL CREATININE-BSD FRML MDRD: 67 ML/MIN/1.73
GLUCOSE BLD-MCNC: 144 MG/DL (ref 65–99)
POTASSIUM BLD-SCNC: 4.4 MMOL/L (ref 3.5–5.2)
SODIUM BLD-SCNC: 141 MMOL/L (ref 136–145)

## 2018-07-16 PROCEDURE — 80048 BASIC METABOLIC PNL TOTAL CA: CPT

## 2018-07-16 PROCEDURE — 36415 COLL VENOUS BLD VENIPUNCTURE: CPT

## 2018-07-16 PROCEDURE — 93798 PHYS/QHP OP CAR RHAB W/ECG: CPT

## 2018-07-18 ENCOUNTER — TREATMENT (OUTPATIENT)
Dept: CARDIAC REHAB | Facility: HOSPITAL | Age: 73
End: 2018-07-18

## 2018-07-18 DIAGNOSIS — I21.4 NON-ST ELEVATION (NSTEMI) MYOCARDIAL INFARCTION (HCC): Primary | ICD-10-CM

## 2018-07-18 PROCEDURE — 93798 PHYS/QHP OP CAR RHAB W/ECG: CPT

## 2018-07-18 NOTE — PROGRESS NOTES
Lana attended heart education and diet class. We reviewed AHA guidelines, a brief history and overview of diet recommendations and the merits of a plant based diet. We discussed low sodium diet guidelines and meal strategies.  2:14 PM  7/18/2018  Kristine Zuñiga RD

## 2018-07-19 ENCOUNTER — OFFICE VISIT (OUTPATIENT)
Dept: CARDIOLOGY | Facility: CLINIC | Age: 73
End: 2018-07-19

## 2018-07-19 ENCOUNTER — TELEPHONE (OUTPATIENT)
Dept: CARDIOLOGY | Facility: CLINIC | Age: 73
End: 2018-07-19

## 2018-07-19 VITALS
SYSTOLIC BLOOD PRESSURE: 152 MMHG | DIASTOLIC BLOOD PRESSURE: 80 MMHG | HEART RATE: 99 BPM | HEIGHT: 65 IN | BODY MASS INDEX: 39.99 KG/M2 | WEIGHT: 240 LBS

## 2018-07-19 DIAGNOSIS — G47.30 SLEEP APNEA, UNSPECIFIED TYPE: ICD-10-CM

## 2018-07-19 DIAGNOSIS — I50.33 ACUTE ON CHRONIC DIASTOLIC HEART FAILURE (HCC): ICD-10-CM

## 2018-07-19 DIAGNOSIS — I10 ESSENTIAL HYPERTENSION: ICD-10-CM

## 2018-07-19 DIAGNOSIS — I25.119 CORONARY ARTERY DISEASE INVOLVING NATIVE CORONARY ARTERY OF NATIVE HEART WITH ANGINA PECTORIS (HCC): ICD-10-CM

## 2018-07-19 DIAGNOSIS — I48.19 PERSISTENT ATRIAL FIBRILLATION (HCC): Primary | ICD-10-CM

## 2018-07-19 PROCEDURE — 99214 OFFICE O/P EST MOD 30 MIN: CPT | Performed by: INTERNAL MEDICINE

## 2018-07-19 PROCEDURE — 93000 ELECTROCARDIOGRAM COMPLETE: CPT | Performed by: INTERNAL MEDICINE

## 2018-07-19 RX ORDER — CARVEDILOL 25 MG/1
37.5 TABLET ORAL 2 TIMES DAILY WITH MEALS
Qty: 135 TABLET | Refills: 1 | Status: SHIPPED | OUTPATIENT
Start: 2018-07-19 | End: 2018-11-15 | Stop reason: SDUPTHER

## 2018-07-19 RX ORDER — DIGOXIN 125 MCG
125 TABLET ORAL EVERY OTHER DAY
Qty: 30 TABLET | Refills: 6 | Status: SHIPPED | OUTPATIENT
Start: 2018-07-19 | End: 2019-06-03 | Stop reason: SDUPTHER

## 2018-07-19 RX ORDER — WARFARIN SODIUM 5 MG/1
TABLET ORAL
Qty: 45 TABLET | Refills: 1 | Status: SHIPPED | OUTPATIENT
Start: 2018-07-19 | End: 2018-10-05 | Stop reason: SDUPTHER

## 2018-07-19 NOTE — PROGRESS NOTES
Date of Office Visit: 2018  Encounter Provider: Pia Dueñas MD  Place of Service: [facility]  Patient Name: Lana Yañez  :1945    Chief complaint  Follow up of embolic stroke, atrial fibrillation, and coronary artery disease.    History of Present Illness  The patient is a 73 yo female with with history of retention, hyperlipidemia, rheumatoid arthritis, obstructive sleep apnea who in early October was found to be in atrial fibrillation with rapid ventricular rates and mild diastolic heart failure.  She was placed on IV heparin and Pradaxa.  Echocardiogram revealed normal systolic function mild left ventricular hypertrophy, moderate atrial enlargement with aortic valve sclerosis and moderate pulmonary hypertension and moderate tricuspid regurgitation.  The RV systolic pressure is 54 mmHg.  She had a stress perfusion study that was negative for ischemia and a CT and her grandmother revealed a mildly dilated aorta without pulmonary emboli.  She then presented several days later with an acute stroke.  CLARIBEL was not pursued as it was felt to be embolic in nature.  However on  and she was diaphoretic and was found to have a non-ST elevation myocardial infarction.  This was on full dose Pradaxa which was not held.  CLARIBEL was pursued that revealed normal systolic function with moderate mitral valve prolapse without significant regurgitation.  There was right-sided spontaneous echo contrast without thrombus formation.  Cardiac catheterization revealed normal systolic function with 2+ mitral regurgitation 90% stenosis was noted distally which is felt to be too small to be amenable PCI.  She was treated medically and switched to Coumadin.She was seen in 2017 by my nurse practitioner for edema. This occurred in the setting of dietary indiscretions with salt. She was also dyspneic at the time. Bumex and IV was given in the office and Lasix was increased.  Low-salt diet was again recommended. She also had a 24-hour Holter that revealed persistent atrial fibrillation with reasonable rate control and frequent PVCs. No other arrhythmia was present. She then was treated more recently for atypical chest pain and cough with pneumonia. She also noted to have a nodule in the left lung. She now presents for followup.    Approximately 2 weeks ago, she was noted to have worsening edema and Lasix was increased.  She has had occasional palpitations with increased activity.  She has dyspnea on exertion that she believes is stable and unchanged.  She is trying to participate in cardiac rehab.  She denies any back pain or syncope.  Her blood pressure checked at rehab has been as it is today.  Her heart rate however has been at times elevated.    Past Medical History:   Diagnosis Date   • Acute on chronic diastolic heart failure (CMS/HCC)    • Arthritis    • Asthma    • Atrial fibrillation (CMS/HCC)     Persistent; on warfarin   • Bronchitis    • Cellulitis of right elbow     due to MRSA   • CHF (congestive heart failure) (CMS/HCC)    • COPD (chronic obstructive pulmonary disease) (CMS/HCC)    • Coronary artery disease     Cardiac catheterization completed; 90% PDA stenosis with medical management recommended   • Coronary artery disease involving native coronary artery of native heart with angina pectoris with documented spasm (CMS/HCC)    • Disease of thyroid gland    • Displacement of lumbar intervertebral disc without myelopathy    • Dizziness    • Essential hypertension    • Generalized osteoarthritis of multiple sites    • History of rheumatic fever    • History of transfusion    • Hx of bone density study     10/23/2014   • Hyperlipidemia    • Hypovitaminosis D    • Left arm pain    • Leg swelling    • Low back pain    • Lower extremity edema    • Malaise and fatigue    • Mitral valve disease     Moderate mitral valve prolapse and moderate mitral regurgitation   • Mitral valve  insufficiency    • Morbid obesity with BMI of 40.0-44.9, adult (CMS/[facility])    • MRSA infection    • NSTEMI (non-ST elevated myocardial infarction) (CMS/HCC)    • PAF (paroxysmal atrial fibrillation) (CMS/HCC)    • RA (rheumatoid arthritis) (CMS/HCC)     involving both hands   • Sleep apnea    • SOB (shortness of breath)    • Stroke (CMS/HCC)     left side weakness   • Urge incontinence of urine    • Vitamin D deficiency      Past Surgical History:   Procedure Laterality Date   • BREAST BIOPSY     • BREAST SURGERY      right side lumpectomy with biopsy   • CARDIAC CATHETERIZATION Left 10/20/2017    Procedure: Cardiac Catheterization/Vascular Study;  Surgeon: Alphonso Olmedo MD;  Location: [facility] CATH INVASIVE LOCATION;  Service:    • CARDIAC CATHETERIZATION N/A 10/20/2017    +2 mitral regurgitation, left main 10% stenosis, mid to distal LAD 10% diffuse stenosis, circumflex 10% diffuse stenosis, RCA 10% proximal stenosis, and distal PDA consistent with coronary embolus with a lesion of 90% too small to consider coronary intervention; medical management recommended   • EYE SURGERY      laser surgery for glaucoma and left eye cataracts removed   • HYSTERECTOMY      10+ years ago   • JOINT REPLACEMENT  2005; 2006    bilateral knees and left rotater   • KNEE SURGERY     • MAMMO BILATERAL  2016    [facility]      Outpatient Medications Prior to Visit   Medication Sig Dispense Refill   • aspirin 81 MG EC tablet Take 1 tablet by mouth Daily.     • atorvastatin (LIPITOR) 10 MG tablet Take 1 tablet by mouth Daily. 30 tablet 11   • cetirizine (ZyrTEC) 10 MG tablet Take 10 mg by mouth Daily As Needed.     • furosemide (LASIX) 40 MG tablet Take 40 mg by mouth 2 (Two) Times a Day.     • potassium chloride (MICRO-K) 10 MEQ CR capsule TAKE TWO CAPSULES BY MOUTH ONCE DAILY FOR 30 DAYS (Patient taking differently: 2pills in the AM, 1 pill in the PM) 60 capsule 0   • traMADol (ULTRAM) 50 MG tablet Take 1 tablet by mouth Every 6 (Six)  Hours As Needed for Moderate Pain . 45 tablet 0   • vitamin B-12 (VITAMIN B-12) 1000 MCG tablet Take 1 tablet by mouth Daily.     • vitamin D (ERGOCALCIFEROL) 42636 units capsule capsule TAKE 1 CAPSULE BY MOUTH ONCE A WEEK 12 capsule 0   • Zolpidem Tartrate (AMBIEN PO) Take  by mouth.     • carvedilol (COREG) 25 MG tablet TAKE 1 & 1/2 (ONE & ONE-HALF) TABLETS BY MOUTH TWICE DAILY WITH MEALS 135 tablet 1   • warfarin (COUMADIN) 5 MG tablet TAKE ONE TABLET BY MOUTH ONCE DAILY AT  6PM  OR  AS  DIRECTED  BY  DR SALGUERO 34 tablet 0   • albuterol (PROVENTIL HFA;VENTOLIN HFA) 108 (90 Base) MCG/ACT inhaler Inhale 2 puffs.     • Amoxicillin-Pot Clavulanate (AUGMENTIN PO) Take  by mouth.     • folic acid (FOLVITE) 1 MG tablet      • ipratropium-albuterol (DUO-NEB) 0.5-2.5 mg/mL nebulizer As Needed.     • traZODone (DESYREL) 50 MG tablet TAKE ONE TABLET BY MOUTH IN THE EVENING (Patient taking differently: TAKE ONE TABLET BY MOUTH IN THE EVENING PRN) 30 tablet 5   • warfarin (COUMADIN) 5 MG tablet TAKE 1 TABLET BY MOUTH ONCE DAILY AT  6PM  OR  AS  DIRECTED  BY  DR SALGUERO 90 tablet 0     No facility-administered medications prior to visit.        Allergies as of 07/19/2018 - Reviewed 07/19/2018   Allergen Reaction Noted   • Contrast dye Hives and Itching 01/04/2018     Social History     Social History   • Marital status:      Spouse name: Suresh   • Number of children: 5   • Years of education: 13     Occupational History   •  for [employer] Retired     Social History Main Topics   • Smoking status: Former Smoker     Packs/day: 3.00     Types: Cigarettes     Start date: 5/19/1967     Quit date: 10/19/1968   • Smokeless tobacco: Never Used   • Alcohol use No      Comment: No caffeine use   • Drug use: No   • Sexual activity: Defer     Other Topics Concern   • Not on file     Social History Narrative   • No narrative on file     Family History   Problem Relation Age of Onset   • Colon cancer Mother    •  "Glaucoma Mother    • Stroke Mother    • Arthritis Mother    • Hypertension Mother    • Glaucoma Sister    • Diabetes Sister    • Heart disease Sister    • Arthritis Sister    • Asthma Sister    • Hypertension Sister    • Miscarriages / Stillbirths Sister    • Lung disease Sister    • Heart disease Brother    • Diabetes Brother    • Arthritis Brother    • Drug abuse Brother    • Hypertension Brother    • Arthritis Father    • COPD Father    • Lung disease Father    • Arthritis Daughter    • Depression Daughter    • Alcohol abuse Maternal Uncle    • Heart disease Sister    • Heart disease Sister    • Heart disease Brother      Review of Systems   Constitution: Negative for fever, malaise/fatigue, weight gain and weight loss.   HENT: Negative for ear pain, hearing loss, nosebleeds and sore throat.    Eyes: Negative for double vision, pain, vision loss in left eye and vision loss in right eye.   Cardiovascular: Positive for leg swelling. Negative for orthopnea.        See history of present illness.   Respiratory: Positive for shortness of breath. Negative for cough, sleep disturbances due to breathing, snoring and wheezing.    Endocrine: Negative for cold intolerance, heat intolerance and polyuria.   Skin: Negative for itching, poor wound healing and rash.   Musculoskeletal: Positive for joint pain. Negative for joint swelling and myalgias.   Gastrointestinal: Negative for abdominal pain, diarrhea, hematochezia, nausea and vomiting.   Genitourinary: Negative for hematuria and hesitancy.   Neurological: Negative for numbness, paresthesias and seizures.   Psychiatric/Behavioral: Negative for depression. The patient is not nervous/anxious.         Objective:     Vitals:    07/19/18 1318   BP: 152/80   Pulse: 99   Weight: 109 kg (240 lb)   Height: 165.1 cm (65\")     Body mass index is 39.94 kg/m².    Physical Exam   Constitutional: She is oriented to person, place, and time. She appears well-developed and well-nourished. "   Morbid obesty   HENT:   Head: Normocephalic.   Nose: Nose normal.   Mouth/Throat: Oropharynx is clear and moist.   Eyes: Pupils are equal, round, and reactive to light. Conjunctivae and EOM are normal. Right eye exhibits no discharge. No scleral icterus.   Neck: Normal range of motion. Neck supple. No JVD present. No thyromegaly present.   Cardiovascular: Normal rate, regular rhythm, normal heart sounds and intact distal pulses.  Exam reveals no gallop and no friction rub.    No murmur heard.  Pulses:       Carotid pulses are 2+ on the right side, and 2+ on the left side.       Radial pulses are 2+ on the right side, and 2+ on the left side.        Femoral pulses are 0 on the right side, and 0 on the left side.       Popliteal pulses are 2+ on the right side, and 2+ on the left side.        Dorsalis pedis pulses are 2+ on the right side, and 2+ on the left side.        Posterior tibial pulses are 2+ on the right side, and 2+ on the left side.   Pulmonary/Chest: Effort normal and breath sounds normal. No respiratory distress. She has no wheezes. She has no rales.   Abdominal: Soft. Bowel sounds are normal. She exhibits no distension. There is no hepatosplenomegaly. There is no tenderness. There is no rebound.   Musculoskeletal: Normal range of motion. She exhibits no edema or tenderness.   Neurological: She is alert and oriented to person, place, and time.   Skin: Skin is warm and dry. No rash noted. No erythema.   Psychiatric: She has a normal mood and affect. Her behavior is normal. Judgment and thought content normal.   Vitals reviewed.    Lab Review:     ECG 12 Lead  Date/Time: 7/19/2018 1:29 PM  Performed by: MARIANA SALGUERO  Authorized by: MARIANA SALGUERO   Comparison: compared with previous ECG   Rhythm: atrial fibrillation  Conduction: LAFB  Conduction comments: Nonspecific ST T wave changes  Clinical impression: abnormal ECG          Assessment:       Diagnosis Plan   1. Persistent atrial fibrillation (CMS/HCC)   ECG 12 Lead    Holter Monitor - 24 Hour   2. Acute on chronic diastolic heart failure (CMS/HCC)     3. Coronary artery disease involving native coronary artery of native heart with angina pectoris (CMS/HCC)     4. Essential hypertension     5. Sleep apnea, unspecified type       Plan:       1. Edema. This has improved on the current dose of diuretics. Suspect that rapid rates are contributing to worsening diastolic heart failure.    2. Atrial fibrillation with still persistent elevated heart rate. Will start back Digoxin 0.125 mg but use it every other day. Previously she had not tolerated it well but will cautiously reintroduce this. Will also check a 24-hour Holter next week.    3. Hypertension. Blood pressure is elevated today but has been lower at rehab. Will observe for now and continue with cardiac rehab.    4. History of embolic non ST-elevation myocardial infarction.  5. History of embolic stroke.   6. Obstructive sleep apnea.  7. Warfarin anticoagulation.   8. Lung nodule followed by Dr. Simmons.         Atrial Fibrillation and Atrial Flutter  Assessment  • The patient has persistent atrial fibrillation  • This is non-valvular in etiology  • The patient's CHADS2-VASc score is 7  • A [id number] score of 2 or more is considered a high risk for a thromboembolic event  • Warfarin prescribed  • Dabigatran not prescribed  • Apixaban not prescribed  • Aspirin prescribed    Plan  • Continue in atrial fibrillation with rate control  • Continue aspirin and warfarin for antithrombotic therapy, bleeding issues discussed  • Continue beta blocker for rate control    Heart Failure  Assessment  • NYHA class II - There is slight limitation of physical activity. The patient is comfortable at rest, but physical activity results in fatigue, palpitations or shortness of breath.  • ACE inhibitor not prescribed for patient reasons  • Beta blocker prescribed  • Diuretics prescribed  • Angiotensin receptor blocker (ARB) not  prescribed  • Calcium channel blockers not prescribed  • Left ventricular function is normal by qualitative assessment    Subjective/Objective  • The patient reports dyspnea    • Physical exam findings negative for rales, peripheral edema and elevated JVP.           Your medication list          Accurate as of 7/19/18 11:59 PM. If you have any questions, ask your nurse or doctor.               START taking these medications      Instructions Last Dose Given Next Dose Due   digoxin 125 MCG tablet  Commonly known as:  LANOXIN  Started by:  Pia Salguero MD      Take 1 tablet by mouth Every Other Day.          CHANGE how you take these medications      Instructions Last Dose Given Next Dose Due   carvedilol 25 MG tablet  Commonly known as:  COREG  What changed:  See the new instructions.  Changed by:  Pia Salguero MD      Take 1.5 tablets by mouth 2 (Two) Times a Day With Meals.       potassium chloride 10 MEQ CR capsule  Commonly known as:  MICRO-K  What changed:  See the new instructions.      TAKE TWO CAPSULES BY MOUTH ONCE DAILY FOR 30 DAYS       traZODone 50 MG tablet  Commonly known as:  DESYREL  What changed:  See the new instructions.      TAKE ONE TABLET BY MOUTH IN THE EVENING       warfarin 5 MG tablet  Commonly known as:  COUMADIN  What changed:  Another medication with the same name was changed. Make sure you understand how and when to take each.  Changed by:  Pia Salguero MD      TAKE 1 TABLET BY MOUTH ONCE DAILY AT  6PM  OR  AS  DIRECTED  BY  DR SALGUERO       warfarin 5 MG tablet  Commonly known as:  COUMADIN  What changed:  See the new instructions.  Changed by:  Pia Salguero MD      Take one tablet by mouth at 6 pm or as directed by Dr Salguero          CONTINUE taking these medications      Instructions Last Dose Given Next Dose Due   albuterol 108 (90 Base) MCG/ACT inhaler  Commonly known as:  PROVENTIL HFA;VENTOLIN HFA      Inhale 2 puffs.       AMBIEN PO      Take  by mouth.       aspirin 81 MG EC tablet       Take 1 tablet by mouth Daily.       atorvastatin 10 MG tablet  Commonly known as:  LIPITOR      Take 1 tablet by mouth Daily.       AUGMENTIN PO      Take  by mouth.       cetirizine 10 MG tablet  Commonly known as:  zyrTEC      Take 10 mg by mouth Daily As Needed.       cyanocobalamin 1000 MCG tablet  Commonly known as:  VITAMIN B-12      Take 1 tablet by mouth Daily.       folic acid 1 MG tablet  Commonly known as:  FOLVITE           furosemide 40 MG tablet  Commonly known as:  LASIX      Take 40 mg by mouth 2 (Two) Times a Day.       ipratropium-albuterol 0.5-2.5 mg/3 ml nebulizer  Commonly known as:  DUO-NEB      As Needed.       traMADol 50 MG tablet  Commonly known as:  ULTRAM      Take 1 tablet by mouth Every 6 (Six) Hours As Needed for Moderate Pain .       vitamin D 71251 units capsule capsule  Commonly known as:  ERGOCALCIFEROL      TAKE 1 CAPSULE BY MOUTH ONCE A WEEK             Where to Get Your Medications      These medications were sent to [facility] [address] [place] [id number] [facility] - [phone]  - [phone] [address] [address]    Phone:  [phone]   · carvedilol 25 MG tablet  · digoxin 125 MCG tablet  · warfarin 5 MG tablet             Dictated utilizing Dragon dictation

## 2018-07-20 ENCOUNTER — TREATMENT (OUTPATIENT)
Dept: CARDIAC REHAB | Facility: HOSPITAL | Age: 73
End: 2018-07-20

## 2018-07-20 ENCOUNTER — TELEPHONE (OUTPATIENT)
Dept: CARDIOLOGY | Facility: CLINIC | Age: 73
End: 2018-07-20

## 2018-07-20 DIAGNOSIS — I21.4 NON-ST ELEVATION (NSTEMI) MYOCARDIAL INFARCTION (HCC): Primary | ICD-10-CM

## 2018-07-20 PROCEDURE — 93798 PHYS/QHP OP CAR RHAB W/ECG: CPT

## 2018-07-24 NOTE — TELEPHONE ENCOUNTER
Patient called and said her SOA and edema are better.  Patient said her b/p today is 106/63 p86.  Patient did not get her holter today and wandered if she still needs it.  Please advise.  Trinidad    Current meds:  Lasix 80mg twice daily  K 20meq po in the am and 10meq in the pm  Coreg 25mg take 1.5 po twice daily  Digoxin 125mcg po every other day  Warfarin 5mg as directed  Atorvastatin 10mg daily

## 2018-07-24 NOTE — TELEPHONE ENCOUNTER
Decrease Lasix to 40 mg twice a day and potassium chloride to 10 mEq twice a day.  Recheck BMP in one week.  I see that there is artery in order and place.  Hold on Holter for now.  maura

## 2018-07-24 NOTE — TELEPHONE ENCOUNTER
I informed patient.  She said that was an error earlier.  She is actually taking Lasix 40mg twice daily not 80mg twice daily.  Patient did say the K 20meq in the am and 10meq in the pm is correct. Please advise.  Trinidad

## 2018-07-25 ENCOUNTER — TREATMENT (OUTPATIENT)
Dept: CARDIAC REHAB | Facility: HOSPITAL | Age: 73
End: 2018-07-25

## 2018-07-25 DIAGNOSIS — I21.4 NON-ST ELEVATION (NSTEMI) MYOCARDIAL INFARCTION (HCC): Primary | ICD-10-CM

## 2018-07-25 PROCEDURE — 93798 PHYS/QHP OP CAR RHAB W/ECG: CPT

## 2018-07-27 ENCOUNTER — HOSPITAL ENCOUNTER (OUTPATIENT)
Dept: CARDIOLOGY | Facility: HOSPITAL | Age: 73
Setting detail: RECURRING SERIES
Discharge: HOME | End: 2018-07-27

## 2018-07-27 ENCOUNTER — TREATMENT (OUTPATIENT)
Dept: CARDIAC REHAB | Facility: HOSPITAL | Age: 73
End: 2018-07-27

## 2018-07-27 DIAGNOSIS — I21.4 NON-ST ELEVATION (NSTEMI) MYOCARDIAL INFARCTION (HCC): Primary | ICD-10-CM

## 2018-07-27 PROCEDURE — 93798 PHYS/QHP OP CAR RHAB W/ECG: CPT

## 2018-07-27 PROCEDURE — 85610 PROTHROMBIN TIME: CPT

## 2018-07-27 PROCEDURE — 36416 COLLJ CAPILLARY BLOOD SPEC: CPT

## 2018-07-30 ENCOUNTER — TREATMENT (OUTPATIENT)
Dept: CARDIAC REHAB | Facility: HOSPITAL | Age: 73
End: 2018-07-30

## 2018-07-30 DIAGNOSIS — I21.4 NON-ST ELEVATION (NSTEMI) MYOCARDIAL INFARCTION (HCC): Primary | ICD-10-CM

## 2018-07-30 PROCEDURE — 93798 PHYS/QHP OP CAR RHAB W/ECG: CPT

## 2018-08-01 ENCOUNTER — TREATMENT (OUTPATIENT)
Dept: CARDIAC REHAB | Facility: HOSPITAL | Age: 73
End: 2018-08-01

## 2018-08-01 DIAGNOSIS — I21.4 NON-ST ELEVATION (NSTEMI) MYOCARDIAL INFARCTION (HCC): Primary | ICD-10-CM

## 2018-08-01 PROCEDURE — 93798 PHYS/QHP OP CAR RHAB W/ECG: CPT

## 2018-08-03 ENCOUNTER — TREATMENT (OUTPATIENT)
Dept: CARDIAC REHAB | Facility: HOSPITAL | Age: 73
End: 2018-08-03

## 2018-08-03 DIAGNOSIS — I21.4 NON-ST ELEVATION (NSTEMI) MYOCARDIAL INFARCTION (HCC): Primary | ICD-10-CM

## 2018-08-03 PROCEDURE — 93798 PHYS/QHP OP CAR RHAB W/ECG: CPT

## 2018-08-06 ENCOUNTER — TELEPHONE (OUTPATIENT)
Dept: CARDIAC REHAB | Facility: HOSPITAL | Age: 73
End: 2018-08-06

## 2018-08-06 ENCOUNTER — TREATMENT (OUTPATIENT)
Dept: CARDIAC REHAB | Facility: HOSPITAL | Age: 73
End: 2018-08-06

## 2018-08-06 DIAGNOSIS — I21.4 NON-ST ELEVATION (NSTEMI) MYOCARDIAL INFARCTION (HCC): Primary | ICD-10-CM

## 2018-08-06 PROCEDURE — 93798 PHYS/QHP OP CAR RHAB W/ECG: CPT

## 2018-08-06 NOTE — TELEPHONE ENCOUNTER
Dr. Dueñas,  Ms. Yañez had several couplets during her exercise session today in Cardiac Rehab.  Other then feeling tired, she denied any symptoms. Will fax her session to you at [phone].  Thanks, Darcie Aleman RN

## 2018-08-08 ENCOUNTER — TELEPHONE (OUTPATIENT)
Dept: CARDIOLOGY | Facility: CLINIC | Age: 73
End: 2018-08-08

## 2018-08-08 NOTE — TELEPHONE ENCOUNTER
Pt called stating that she was returning Trinidad's phone call.  I do not see any messages in chart.  I did call pt back to see if she knew what the call could have been in regards too. She did not know.    I told her that you will be back in the office tomorrow and I would let you know she called you back

## 2018-08-09 NOTE — TELEPHONE ENCOUNTER
I spoke with pt and she did not know what her b/p and HR were.  I asked patient if she was feeling ok.  She said actually for the last week she has felt like indigestion in her chest for 1 week.  It is the same and not getting worse.  Patient did say she does on occ get episodes where she feels bad, has trouble moving around and is sluggish.  Patient said this episode started Sunday night and got worse on Monday.  Patient said she did not go to cardiac rehab yesterday because she was feeling bad.  But, she said she is feeling better today. I called cardiac rehab and they have been:  August 6: 118//88  p:     July 30: 122//78    p: 104-154    July 20:  118//70   p:      Patient is currently taking:   Carvedilol 25mg 1.5 po twice daily  Digoxin 125mcg every other day  Warfarin 5mg daily as directed  Atorvastatin 10mg daily  KCL 10meq 2 po daily  Lasix 40mg daily  ASA 81mg daily    Please advise if any changes.  Trinidad

## 2018-08-09 NOTE — TELEPHONE ENCOUNTER
I spoke with Dr. Dueñas regarding patient's problems.  Dr. Dueñas said for patient to call her pcp regarding the indigestion and episodes.  I informed patient and she will do this.  Please advise if you would like to do anything else.  Trinidad

## 2018-08-10 ENCOUNTER — TREATMENT (OUTPATIENT)
Dept: CARDIAC REHAB | Facility: HOSPITAL | Age: 73
End: 2018-08-10

## 2018-08-10 DIAGNOSIS — I21.4 NON-ST ELEVATION (NSTEMI) MYOCARDIAL INFARCTION (HCC): Primary | ICD-10-CM

## 2018-08-10 PROCEDURE — 93798 PHYS/QHP OP CAR RHAB W/ECG: CPT

## 2018-08-13 ENCOUNTER — TREATMENT (OUTPATIENT)
Dept: CARDIAC REHAB | Facility: HOSPITAL | Age: 73
End: 2018-08-13

## 2018-08-13 DIAGNOSIS — I21.4 NON-ST ELEVATION (NSTEMI) MYOCARDIAL INFARCTION (HCC): Primary | ICD-10-CM

## 2018-08-13 PROCEDURE — 93798 PHYS/QHP OP CAR RHAB W/ECG: CPT

## 2018-08-15 ENCOUNTER — TREATMENT (OUTPATIENT)
Dept: CARDIAC REHAB | Facility: HOSPITAL | Age: 73
End: 2018-08-15

## 2018-08-15 DIAGNOSIS — I21.4 NON-ST ELEVATION (NSTEMI) MYOCARDIAL INFARCTION (HCC): Primary | ICD-10-CM

## 2018-08-15 PROCEDURE — 93798 PHYS/QHP OP CAR RHAB W/ECG: CPT

## 2018-08-17 ENCOUNTER — TREATMENT (OUTPATIENT)
Dept: CARDIAC REHAB | Facility: HOSPITAL | Age: 73
End: 2018-08-17

## 2018-08-17 DIAGNOSIS — I21.4 NON-ST ELEVATION (NSTEMI) MYOCARDIAL INFARCTION (HCC): Primary | ICD-10-CM

## 2018-08-17 PROCEDURE — 93798 PHYS/QHP OP CAR RHAB W/ECG: CPT

## 2018-08-20 ENCOUNTER — TREATMENT (OUTPATIENT)
Dept: CARDIAC REHAB | Facility: HOSPITAL | Age: 73
End: 2018-08-20

## 2018-08-20 DIAGNOSIS — I21.4 NON-ST ELEVATION (NSTEMI) MYOCARDIAL INFARCTION (HCC): Primary | ICD-10-CM

## 2018-08-20 PROCEDURE — 93798 PHYS/QHP OP CAR RHAB W/ECG: CPT

## 2018-08-22 ENCOUNTER — TREATMENT (OUTPATIENT)
Dept: CARDIAC REHAB | Facility: HOSPITAL | Age: 73
End: 2018-08-22

## 2018-08-22 DIAGNOSIS — I21.4 NON-ST ELEVATION (NSTEMI) MYOCARDIAL INFARCTION (HCC): Primary | ICD-10-CM

## 2018-08-22 PROCEDURE — 93798 PHYS/QHP OP CAR RHAB W/ECG: CPT

## 2018-08-28 ENCOUNTER — OFFICE VISIT (OUTPATIENT)
Dept: PAIN MEDICINE | Facility: CLINIC | Age: 73
End: 2018-08-28

## 2018-08-28 ENCOUNTER — TELEPHONE (OUTPATIENT)
Dept: CARDIOLOGY | Facility: CLINIC | Age: 73
End: 2018-08-28

## 2018-08-28 ENCOUNTER — RESULTS ENCOUNTER (OUTPATIENT)
Dept: PAIN MEDICINE | Facility: CLINIC | Age: 73
End: 2018-08-28

## 2018-08-28 VITALS
WEIGHT: 240 LBS | RESPIRATION RATE: 16 BRPM | DIASTOLIC BLOOD PRESSURE: 93 MMHG | HEART RATE: 95 BPM | TEMPERATURE: 98.4 F | OXYGEN SATURATION: 97 % | SYSTOLIC BLOOD PRESSURE: 145 MMHG | HEIGHT: 65 IN | BODY MASS INDEX: 39.99 KG/M2

## 2018-08-28 DIAGNOSIS — G89.29 CHRONIC BILATERAL LOW BACK PAIN WITH LEFT-SIDED SCIATICA: Primary | ICD-10-CM

## 2018-08-28 DIAGNOSIS — M54.42 CHRONIC BILATERAL LOW BACK PAIN WITH LEFT-SIDED SCIATICA: Primary | ICD-10-CM

## 2018-08-28 DIAGNOSIS — G89.29 CHRONIC BILATERAL LOW BACK PAIN WITH LEFT-SIDED SCIATICA: ICD-10-CM

## 2018-08-28 DIAGNOSIS — M54.42 CHRONIC BILATERAL LOW BACK PAIN WITH LEFT-SIDED SCIATICA: ICD-10-CM

## 2018-08-28 LAB
POC AMPHETAMINES: NEGATIVE
POC BARBITURATES: NEGATIVE
POC BENZODIAZEPHINES: NEGATIVE
POC COCAINE: NEGATIVE
POC METHADONE: NEGATIVE
POC METHAMPHETAMINE SCREEN URINE: NEGATIVE
POC OPIATES: NEGATIVE
POC OXYCODONE: NEGATIVE
POC PHENCYCLIDINE: NEGATIVE
POC PROPOXYPHENE: NEGATIVE
POC THC: NEGATIVE
POC TRICYCLIC ANTIDEPRESSANTS: NEGATIVE

## 2018-08-28 PROCEDURE — 99204 OFFICE O/P NEW MOD 45 MIN: CPT | Performed by: PAIN MEDICINE

## 2018-08-28 PROCEDURE — 80305 DRUG TEST PRSMV DIR OPT OBS: CPT | Performed by: PAIN MEDICINE

## 2018-08-28 RX ORDER — HYDROCODONE BITARTRATE AND ACETAMINOPHEN 5; 325 MG/1; MG/1
TABLET ORAL
Qty: 75 TABLET | Refills: 0 | Status: SHIPPED | OUTPATIENT
Start: 2018-08-28 | End: 2018-11-26

## 2018-08-28 RX ORDER — GABAPENTIN 100 MG/1
CAPSULE ORAL
Qty: 60 CAPSULE | Refills: 0 | Status: SHIPPED | OUTPATIENT
Start: 2018-08-28 | End: 2018-10-04

## 2018-08-28 NOTE — PROGRESS NOTES
CHIEF COMPLAINT: Back Pain    Lana Yañez is a 73 y.o. female.   He was referred here by Dr Perez. He presents to the office for evaluation and treatment of Back Pain    HPI  Back Pain  Started pt has had LBP off and on since about 10 years ago (no known accident), gradually worsening until pt started using a walker in 6/17 (previously acquired from mother) and then seen by Dr Perez.   Pt apparently could not have back surgery due to M.I. in 10/17 and newly diagnosed A Fib, and not being able to stop AC. No stents placed but high risk for surgery. Started her on tramadol for pain.   No previous pain clinics, no back injections, no hx of opiates prior to current Tramadol by [facility] but states it is not helping.    The patient states their pain is a 8 on a scale of 1-10.  The patient describes this pain as constant dull and ache.  The pain is located in bilateral low back and does radiate anterior and posterior left thigh. This painful problem is aggravated by physical activity, standing and walking and is alleviated by relaxation and sitting. Trouble walking due to constant LLE numbness.     Currently followed by Dr. Dueñas for cardiac issues. Last visit was last month.     Past pain medications:   norco 5/325 mg - helpful  Gabapentin 800 - dizzy, hurt stomach    Current pain medications: Tramadol 50 mg up to 4/day - not helping    Past therapies:  Physical Therapy: yes- minimal  Chiropractor: no  Massage Therapy: no  TENS: yes  Neck or back surgery: no  Past pain management: no    Previous Injections: none    PEG Assessment   What number best describes your pain on average in the past week? 8  What number best describes how, during the past week, pain has interfered with your enjoyment of life? 8  What number best describes how, during the past week, pain has interfered with your general activity? 8      Current Outpatient Prescriptions:   •  albuterol (PROVENTIL HFA;VENTOLIN HFA) 108 (90 Base) MCG/ACT inhaler, Inhale  2 puffs., Disp: , Rfl:   •  aspirin 81 MG EC tablet, Take 1 tablet by mouth Daily., Disp: , Rfl:   •  atorvastatin (LIPITOR) 10 MG tablet, Take 1 tablet by mouth Daily., Disp: 30 tablet, Rfl: 11  •  carvedilol (COREG) 25 MG tablet, Take 1.5 tablets by mouth 2 (Two) Times a Day With Meals., Disp: 135 tablet, Rfl: 1  •  cetirizine (ZyrTEC) 10 MG tablet, Take 10 mg by mouth Daily As Needed., Disp: , Rfl:   •  digoxin (LANOXIN) 125 MCG tablet, Take 1 tablet by mouth Every Other Day., Disp: 30 tablet, Rfl: 6  •  folic acid (FOLVITE) 1 MG tablet, , Disp: , Rfl:   •  furosemide (LASIX) 40 MG tablet, Take 40 mg by mouth Daily., Disp: , Rfl:   •  ipratropium-albuterol (DUO-NEB) 0.5-2.5 mg/mL nebulizer, As Needed., Disp: , Rfl:   •  potassium chloride (MICRO-K) 10 MEQ CR capsule, TAKE TWO CAPSULES BY MOUTH ONCE DAILY FOR 30 DAYS (Patient taking differently: take 1 po twice daily), Disp: 60 capsule, Rfl: 0  •  traZODone (DESYREL) 50 MG tablet, TAKE ONE TABLET BY MOUTH IN THE EVENING (Patient taking differently: TAKE ONE TABLET BY MOUTH IN THE EVENING PRN), Disp: 30 tablet, Rfl: 5  •  vitamin B-12 (VITAMIN B-12) 1000 MCG tablet, Take 1 tablet by mouth Daily., Disp: , Rfl:   •  vitamin D (ERGOCALCIFEROL) 54009 units capsule capsule, TAKE 1 CAPSULE BY MOUTH ONCE A WEEK, Disp: 12 capsule, Rfl: 0  •  warfarin (COUMADIN) 5 MG tablet, Take one tablet by mouth at 6 pm or as directed by Dr Dueñas, Disp: 45 tablet, Rfl: 1  •  Zolpidem Tartrate (AMBIEN PO), Take  by mouth., Disp: , Rfl:   •  gabapentin (NEURONTIN) 100 MG capsule, Take 1 cap PO QHS x 7 days; then increase to 1 cap BID, Disp: 60 capsule, Rfl: 0  •  HYDROcodone-acetaminophen (NORCO) 5-325 MG per tablet, Take 1 tablet po 2-3 times a day prn pain, Disp: 75 tablet, Rfl: 0    REVIEW OF PERTINENT MEDICAL DATA  Chart reviewed and summarization of all medical records up to new patient visit performed.  New onset of a fib noticed at first [facility] - diagnosed with NSTEMI and A Fib, now  on Warfarin. norco given by pcp 8 months ago - [facility] recently gave her tramadol.     IMAGING  MRI OF THE LUMBAR SPINE WITHOUT CONTRAST - 10/2017   FINDINGS:  At T12-L1, there is no significant canal or foraminal stenosis.   At L1-2, there is mild facet arthropathy. Minor bulging disc material is  seen. Minor canal narrowing is noted.   At the L2-3 level, there is a left medial foraminal and left paracentral  disc herniation. The extruded disc material ascends cephalad from the  level of the L2-3 disc space where it abuts the descending and exiting  left L2 nerve root. It also results in a mild-to-moderate degree of  canal narrowing along the left side of the spinal canal L2 level. At the  level of the L2-3 disc space, bulging disc material, ligamentum flavum  thickening, and facet arthropathy result in mild-to-moderate central  canal stenosis.   At L3-4, there is moderate facet arthropathy. Degenerative disc changes  are noted along with adjacent degenerative endplate marrow changes.  There is a mild degree of right and a moderate degree of left foraminal  narrowing secondary to disc bulging and facet arthropathy. There is  anterior spondylolisthesis of L3 on L4 by approximately 3 mm.  There is  a mild-to-moderate degree of central canal stenosis at L3-4 secondary to  disc bulging, ligamentum flavum thickening, and facet arthropathy.   At L4-5, moderate to severe facet arthritic changes are noted. There is  a moderate degree of bilateral foraminal narrowing at L4-5 secondary to  disc bulging and facet arthropathy. There is anterior spondylolisthesis  of L4 on L5 by approximately 3 mm. Mild central canal stenosis is seen  secondary to disc bulging.   At L5-S1, there is a disc osteophyte complex with mild-to-moderate  degree of bilateral foraminal narrowing. This abuts the descending  portions of both S1 nerve root sleeves.   IMPRESSION:  1.  At the L2-3 level, there is bulging disc material, ligamentum  flavum  thickening, and facet arthropathy that results in a mild-to-moderate  degree of central canal stenosis. Additionally, there is some herniated  extruded disc material which ascends cephalad from the level of the L2-3  disc space along the posterior body of L2. This is seen within the  medial aspect of the left L2-3 neural foramen and within the left  paracentral region at the L2 level. This abuts the descending and  exiting left L2 nerve root.  2.  There is grade 1 degenerative anterior spondylolisthesis of L4 on  L5. Moderate bilateral foraminal narrowing is seen at the L4-5 level  secondary to disc bulging with ligamentum flavum thickening and facet  arthropathy.  3.  There is grade 1 degenerative anterior spondylolisthesis of L3 on  L4. Moderate left L3-4 foraminal narrowing is seen secondary to disc  bulging and facet arthropathy. Mild-to-moderate central canal stenosis  is seen at the L3-4 level secondary to disc bulging, ligamentum flavum  thickening, and facet arthritic change.  4.  At L5-S1, a disc osteophyte complex is identified that results in a  mild to moderate degree of bilateral foraminal narrowing. This abuts the  descending S1 nerve roots sleeves bilaterally.    I personally reviewed the images of lumbar MRI while patient was in the office.  Findings discussed with patient.      PFSH:  The following portions of the patient's history were reviewed and updated as appropriate: problem list, past medical history, past surgery history, social history, family history, medications, and allergies    Review of Systems   Constitutional: Positive for activity change (decreaserd). Negative for appetite change.   HENT: Negative for hearing loss and trouble swallowing.    Eyes: Positive for visual disturbance (glaucoma).   Respiratory: Positive for apnea. Negative for chest tightness and shortness of breath (A Fib).    Cardiovascular: Negative for chest pain.   Gastrointestinal: Negative for constipation,  "diarrhea and nausea.   Genitourinary: Negative for difficulty urinating and dysuria.   Musculoskeletal: Positive for back pain and gait problem.   Neurological: Positive for weakness (L leg) and numbness (L leg). Negative for dizziness and light-headedness.   Psychiatric/Behavioral: Positive for sleep disturbance. Negative for suicidal ideas.       Vitals:    08/28/18 1403   BP: 145/93   Pulse: 95   Resp: 16   Temp: 98.4 °F (36.9 °C)   SpO2: 97%   Weight: 109 kg (240 lb)   Height: 165.1 cm (65\")   PainSc: 8  Comment: LBP ranges from 6-8/10   PainLoc: Back       Physical Exam   Constitutional: She appears well-developed and well-nourished. No distress.   HENT:   Head: Normocephalic and atraumatic.   Nose: Nose normal.   Mouth/Throat: Oropharynx is clear and moist.   Eyes: Conjunctivae and EOM are normal.   Neck: Normal range of motion. Neck supple.   Cardiovascular: Normal rate, regular rhythm and normal heart sounds.    Pulmonary/Chest: Effort normal and breath sounds normal. No stridor. No respiratory distress.   Abdominal: Soft. Bowel sounds are normal. She exhibits no distension. There is no tenderness.   Musculoskeletal:        Lumbar back: She exhibits decreased range of motion, tenderness (even to light touch) and pain.   Neurological: She is alert. She has normal strength. No cranial nerve deficit or sensory deficit. Gait (shuffling) abnormal.   Skin: Skin is warm and dry. No rash noted. She is not diaphoretic.   Psychiatric: She has a normal mood and affect. Her speech is normal and behavior is normal.   Nursing note and vitals reviewed.    Ortho Exam  Neurologic Exam     Mental Status   Speech: speech is normal     Cranial Nerves     CN III, IV, VI   Extraocular motions are normal.     Motor Exam     Strength   Strength 5/5 throughout.       Lab Results   Component Value Date    POCMETH Negative 08/28/2018    POCAMPHET Negative 08/28/2018    POCBARBITUR Negative 08/28/2018    POCBENZO Negative 08/28/2018    " POCCOCAINE Negative 08/28/2018    POCMETHADO Negative 08/28/2018    POCOPIATES Negative 08/28/2018    POCOXYCODO Negative 08/28/2018    POCPHENCYC Negative 08/28/2018    POCPROPOXY Negative 08/28/2018    POCTHC Negative 08/28/2018    POCTRICYC Negative 08/28/2018       Comments: Reviewed POC today      Date of last MARCO reviewed : 08/28/18   Comments: Consistent         Lana was seen today for back pain.    Diagnoses and all orders for this visit:    Chronic bilateral low back pain with left-sided sciatica  -     gabapentin (NEURONTIN) 100 MG capsule; Take 1 cap PO QHS x 7 days; then increase to 1 cap BID  -     HYDROcodone-acetaminophen (NORCO) 5-325 MG per tablet; Take 1 tablet po 2-3 times a day prn pain  -     POC Urine Drug Screen, Triage  -     Urine Drug Screen Confirmation - Urine, Clean Catch; Future      Requested Prescriptions     Signed Prescriptions Disp Refills   • gabapentin (NEURONTIN) 100 MG capsule 60 capsule 0     Sig: Take 1 cap PO QHS x 7 days; then increase to 1 cap BID   • HYDROcodone-acetaminophen (NORCO) 5-325 MG per tablet 75 tablet 0     Sig: Take 1 tablet po 2-3 times a day prn pain     - Imaging reviewed with patient.  Not a surgical candidate.   - may benefit from trial of LESI but would have to hold AC.   - will send message two Dr. Dueñas about holding AC. Will need to ask about time frame and how long she will need to be on AC before holding.   - until then, we will just have to manage with medication only.   - Random urine drug screen per office policy today, to be checked at next visit.   - will need narcotic agreement at next visit.   - no benefit with tramadol. Start norco 5/325 mg 2-3 times a day prn pain. Patient instructed regarding side effects and the need to avoid driving until they know how the medication changes affect them.     - Discussed with the patient regarding long-term side effects of opioids including but not limited to dependence, addiction, sedation,  respiratory depression, opioid induced hormonal suppression, hyperalgesia, and elevated risk of myocardial infarction.   - start gabapentin at low dose. Can not tolerate 800 but will start low and titrate up as tolerated. Patient instructed regarding side effects and the need to avoid driving until they know how the medication changes affect them.       Wt Readings from Last 3 Encounters:   08/28/18 109 kg (240 lb)   07/19/18 109 kg (240 lb)   06/27/18 113 kg (250 lb)     Body mass index is 39.94 kg/m². Patient counseled on the importance of weight loss to help with overall health and pain control. Patient instructed to attempt weight loss.   Plan: Calorie counting reduce portion size, cut out extra servings and reduce fast food intake    Follow-up in 1 month.       Carmen Kirk MD  Pain Management

## 2018-08-29 ENCOUNTER — TELEPHONE (OUTPATIENT)
Dept: CARDIOLOGY | Facility: CLINIC | Age: 73
End: 2018-08-29

## 2018-08-29 NOTE — TELEPHONE ENCOUNTER
She really needs to talk this over with her pain doctors.  Both drugs have potential side effects and she has to weigh the risks versus benefits with Dr. Kirk.  Gabapentin can sometimes cause swelling and less often shortness of breath but there may not be great alternatives and she will have to discuss this further with her pain doctors. maura

## 2018-08-29 NOTE — TELEPHONE ENCOUNTER
I spoke with the patient and she states that she is back in pain management for her back over at [place] with Dr Kirk. They put her on Gabapentin 100 mg and Hydrocodone-acetaminophen 5-325. The patient is wanting to know if she is ok to take this medication along with what she currently takes.        Thanks  Bernard

## 2018-08-30 DIAGNOSIS — M54.42 CHRONIC BILATERAL LOW BACK PAIN WITH LEFT-SIDED SCIATICA: Primary | ICD-10-CM

## 2018-08-30 DIAGNOSIS — G89.29 CHRONIC BILATERAL LOW BACK PAIN WITH LEFT-SIDED SCIATICA: Primary | ICD-10-CM

## 2018-08-30 NOTE — TELEPHONE ENCOUNTER
I called her and explained to her what you said and she will follow up with Dr. Kirk about the benefits and risk.

## 2018-08-31 ENCOUNTER — HOSPITAL ENCOUNTER (OUTPATIENT)
Dept: CARDIOLOGY | Facility: HOSPITAL | Age: 73
Setting detail: RECURRING SERIES
Discharge: HOME | End: 2018-08-31

## 2018-08-31 PROCEDURE — 36416 COLLJ CAPILLARY BLOOD SPEC: CPT

## 2018-08-31 PROCEDURE — 85610 PROTHROMBIN TIME: CPT

## 2018-09-18 ENCOUNTER — TELEPHONE (OUTPATIENT)
Dept: CARDIOLOGY | Facility: CLINIC | Age: 73
End: 2018-09-18

## 2018-09-18 NOTE — TELEPHONE ENCOUNTER
Pt called for the results of her holter that was done on 9/13/18. Results are scanned into epic    Pt can be reached at [phone]

## 2018-09-19 DIAGNOSIS — I49.3 PVC (PREMATURE VENTRICULAR CONTRACTION): Primary | ICD-10-CM

## 2018-09-19 NOTE — TELEPHONE ENCOUNTER
I called patient regarding monitor results.  I asked her to avoid caffeine and alcohol and over the call counter cold sinus stimulants.  In addition I asked her to come in for a BMP, magnesium and to call back with a blood pressure readings as I might need to increase carvedilol..   Minda, please followup on this. maura

## 2018-09-24 ENCOUNTER — LAB (OUTPATIENT)
Dept: LAB | Facility: HOSPITAL | Age: 73
End: 2018-09-24

## 2018-09-24 ENCOUNTER — TREATMENT (OUTPATIENT)
Dept: CARDIAC REHAB | Facility: HOSPITAL | Age: 73
End: 2018-09-24

## 2018-09-24 DIAGNOSIS — I49.3 PVC (PREMATURE VENTRICULAR CONTRACTION): ICD-10-CM

## 2018-09-24 DIAGNOSIS — I21.4 NON-ST ELEVATION (NSTEMI) MYOCARDIAL INFARCTION (HCC): Primary | ICD-10-CM

## 2018-09-24 LAB
ANION GAP SERPL CALCULATED.3IONS-SCNC: 13 MMOL/L
BUN BLD-MCNC: 18 MG/DL (ref 8–23)
BUN/CREAT SERPL: 20.2 (ref 7–25)
CALCIUM SPEC-SCNC: 10 MG/DL (ref 8.6–10.5)
CHLORIDE SERPL-SCNC: 101 MMOL/L (ref 98–107)
CO2 SERPL-SCNC: 27 MMOL/L (ref 22–29)
CREAT BLD-MCNC: 0.89 MG/DL (ref 0.57–1)
GFR SERPL CREATININE-BSD FRML MDRD: 76 ML/MIN/1.73
GLUCOSE BLD-MCNC: 145 MG/DL (ref 65–99)
MAGNESIUM SERPL-MCNC: 1.9 MG/DL (ref 1.6–2.4)
POTASSIUM BLD-SCNC: 4.2 MMOL/L (ref 3.5–5.2)
SODIUM BLD-SCNC: 141 MMOL/L (ref 136–145)

## 2018-09-24 PROCEDURE — 36415 COLL VENOUS BLD VENIPUNCTURE: CPT

## 2018-09-24 PROCEDURE — 93798 PHYS/QHP OP CAR RHAB W/ECG: CPT

## 2018-09-24 PROCEDURE — 83735 ASSAY OF MAGNESIUM: CPT

## 2018-09-24 PROCEDURE — 80048 BASIC METABOLIC PNL TOTAL CA: CPT

## 2018-09-24 NOTE — TELEPHONE ENCOUNTER
Spoke with pt.  She did NOT get this information.  Pt will have labs today and call me Wednesday will some BP Readings.  Reminder set.

## 2018-09-26 ENCOUNTER — TREATMENT (OUTPATIENT)
Dept: CARDIAC REHAB | Facility: HOSPITAL | Age: 73
End: 2018-09-26

## 2018-09-26 DIAGNOSIS — I21.4 NON-ST ELEVATION (NSTEMI) MYOCARDIAL INFARCTION (HCC): Primary | ICD-10-CM

## 2018-09-26 PROCEDURE — 93798 PHYS/QHP OP CAR RHAB W/ECG: CPT

## 2018-09-27 NOTE — TELEPHONE ENCOUNTER
Spoke with pt.  She was fasting that Am of the lab.  Advised her to check in with her PCP to keep an eye on it.  BP was 150/88 on Monday.  She explained that she had some fried salty food the night before.  She will watch this more.  [facility] and BP yesterday was 124/64.  Advised pt to watch her salt and fried foods.  She will keep tracking her BP.  Anything else to add?

## 2018-09-28 ENCOUNTER — TREATMENT (OUTPATIENT)
Dept: CARDIAC REHAB | Facility: HOSPITAL | Age: 73
End: 2018-09-28

## 2018-09-28 DIAGNOSIS — I21.4 NON-ST ELEVATION (NSTEMI) MYOCARDIAL INFARCTION (HCC): Primary | ICD-10-CM

## 2018-09-28 PROCEDURE — 93798 PHYS/QHP OP CAR RHAB W/ECG: CPT

## 2018-10-01 ENCOUNTER — TREATMENT (OUTPATIENT)
Dept: CARDIAC REHAB | Facility: HOSPITAL | Age: 73
End: 2018-10-01

## 2018-10-01 DIAGNOSIS — I21.4 NON-ST ELEVATION (NSTEMI) MYOCARDIAL INFARCTION (HCC): Primary | ICD-10-CM

## 2018-10-01 PROCEDURE — 93798 PHYS/QHP OP CAR RHAB W/ECG: CPT

## 2018-10-02 RX ORDER — POTASSIUM CHLORIDE 750 MG/1
TABLET, FILM COATED, EXTENDED RELEASE ORAL
Qty: 360 TABLET | Refills: 1 | Status: SHIPPED | OUTPATIENT
Start: 2018-10-02 | End: 2018-10-29 | Stop reason: DRUGHIGH

## 2018-10-03 ENCOUNTER — TREATMENT (OUTPATIENT)
Dept: CARDIAC REHAB | Facility: HOSPITAL | Age: 73
End: 2018-10-03

## 2018-10-03 DIAGNOSIS — I21.4 NON-ST ELEVATION (NSTEMI) MYOCARDIAL INFARCTION (HCC): Primary | ICD-10-CM

## 2018-10-03 PROCEDURE — 93798 PHYS/QHP OP CAR RHAB W/ECG: CPT

## 2018-10-04 ENCOUNTER — ANTICOAGULATION VISIT (OUTPATIENT)
Dept: PHARMACY | Facility: HOSPITAL | Age: 73
End: 2018-10-04

## 2018-10-04 ENCOUNTER — OFFICE VISIT (OUTPATIENT)
Dept: INTERNAL MEDICINE | Facility: CLINIC | Age: 73
End: 2018-10-04

## 2018-10-04 VITALS
WEIGHT: 241 LBS | HEIGHT: 65 IN | DIASTOLIC BLOOD PRESSURE: 82 MMHG | TEMPERATURE: 97 F | HEART RATE: 82 BPM | BODY MASS INDEX: 40.15 KG/M2 | SYSTOLIC BLOOD PRESSURE: 129 MMHG | OXYGEN SATURATION: 97 %

## 2018-10-04 DIAGNOSIS — E55.9 HYPOVITAMINOSIS D: ICD-10-CM

## 2018-10-04 DIAGNOSIS — E78.2 MIXED HYPERLIPIDEMIA: ICD-10-CM

## 2018-10-04 DIAGNOSIS — I48.0 PAROXYSMAL ATRIAL FIBRILLATION (HCC): ICD-10-CM

## 2018-10-04 DIAGNOSIS — E56.9 VITAMIN DEFICIENCY: ICD-10-CM

## 2018-10-04 DIAGNOSIS — I10 ESSENTIAL HYPERTENSION: ICD-10-CM

## 2018-10-04 DIAGNOSIS — M05.741 RHEUMATOID ARTHRITIS INVOLVING BOTH HANDS WITH POSITIVE RHEUMATOID FACTOR (HCC): ICD-10-CM

## 2018-10-04 DIAGNOSIS — E66.01 MORBID OBESITY WITH BMI OF 40.0-44.9, ADULT (HCC): ICD-10-CM

## 2018-10-04 DIAGNOSIS — R39.15 URGENCY OF URINATION: ICD-10-CM

## 2018-10-04 DIAGNOSIS — R73.9 HYPERGLYCEMIA: ICD-10-CM

## 2018-10-04 DIAGNOSIS — M75.02 ADHESIVE CAPSULITIS OF LEFT SHOULDER: Primary | ICD-10-CM

## 2018-10-04 DIAGNOSIS — M05.742 RHEUMATOID ARTHRITIS INVOLVING BOTH HANDS WITH POSITIVE RHEUMATOID FACTOR (HCC): ICD-10-CM

## 2018-10-04 LAB
INR PPP: 1.8 (ref 0.91–1.09)
PROTHROMBIN TIME: 22.2 SECONDS (ref 10–13.8)

## 2018-10-04 PROCEDURE — G0008 ADMIN INFLUENZA VIRUS VAC: HCPCS | Performed by: INTERNAL MEDICINE

## 2018-10-04 PROCEDURE — 85610 PROTHROMBIN TIME: CPT

## 2018-10-04 PROCEDURE — 36416 COLLJ CAPILLARY BLOOD SPEC: CPT

## 2018-10-04 PROCEDURE — 90662 IIV NO PRSV INCREASED AG IM: CPT | Performed by: INTERNAL MEDICINE

## 2018-10-04 PROCEDURE — G0463 HOSPITAL OUTPT CLINIC VISIT: HCPCS

## 2018-10-04 PROCEDURE — 99214 OFFICE O/P EST MOD 30 MIN: CPT | Performed by: INTERNAL MEDICINE

## 2018-10-04 NOTE — PROGRESS NOTES
Anticoagulation Clinic Progress Note  Anticoagulation Summary  As of 10/4/2018    INR goal:   2.0-3.0   TTR:   --   Today's INR:   1.8!   Warfarin maintenance plan:   7.5 mg on Wed, Sat; 5 mg all other days   Weekly warfarin total:   40 mg   Plan last modified:   Kerry Teague RPH (10/4/2018)   Next INR check:   10/18/2018   Target end date:   Indefinite    Indications    Atrial fibrillation (CMS/HCC) [I48.91]             Anticoagulation Episode Summary     INR check location:       Preferred lab:       Send INR reminders to:    [facility] [place]    Comments:         Anticoagulation Care Providers     Provider Role Specialty Phone number    Pia Dueñas MD Referring Cardiology [phone]    Kerry Teague RPH Responsible Pharmacy             Drug interactions: no new meds  Diet: more greens last week    Clinic Interview:  Patient Findings     Positives:   Change in diet/appetite    Negatives:   Signs/symptoms of thrombosis, Signs/symptoms of bleeding,   Laboratory test error suspected, Change in health, Change in alcohol use,   Change in activity, Upcoming invasive procedure, Emergency department   visit, Upcoming dental procedure, Missed doses, Extra doses, Change in   medications, Hospital admission, Bruising, Other complaints    Comments:   Pt has been eating more greens last week      Clinical Outcomes     Negatives:   Major bleeding event, Thromboembolic event,   Anticoagulation-related hospital admission, Anticoagulation-related ED   visit, Anticoagulation-related fatality    Comments:   Pt has been eating more greens last week        Education:  Lana Yañez is a new start in the [facility] Management Clinic. We discussed the followin) Warfarin's indication, mechanism, and dosing  2) Enforced the importance of taking warfarin as instructed and at the same time every day, preferably in the evening so that we can make dose adjustments more easily following subsequent clinic visits  3) What  she should do about a missed dose; pts can take missed doses within about 12 hours of their usual scheduled dose, but she was instructed on the importance of not doubling up on doses unless told to do so by the [facility]  4) Explained possible side effects of warfarin therapy, including increased risk of bleeding, s/sx of bleeding and s/sx of any additional clots/PE/CVA.   5) Discussed monitoring of warfarin, the INR, goal INR range, and the frequency of monitoring  6) Reviewed drug/food/tobacco/EtOH interactions and provided written information covering these topics in more detail, explaining that green, leafy vegetables interact most heavily with warfarin  7) Instructed the pt not to take or discontinue any medications without informing her physician/pharmacist and reminded her to inform us of any dietary changes, as well  8) Explained that she would be coming into the clinic more frequently in these first few weeks of therapy as we try to adjust her dose and achieve a therapeutic INR x 2 consecutive readings. Once that is achieved, patient will follow up in clinic every 4 weeks, on average.    She stated no problems with transportation or scheduling clinic appts in this manner. she expressed understanding of the information provided and has no additional questions at this time.    Lana Yañez was presented with a copy of the Patients Rights and Responsibilities. she expressed verbal consent and agreement to receive care in the [facility] Management Clinic under the current collaborative care agreement with [facility].       INR History:  Previous INR data from [facility] is recorded in [facility] and scanned into the patient's chart in [facility]. These results have been analyzed and reviewed.      Plan:  1. INR is subtherapeutic today- see above in Anticoagulation Summary.   Will instruct Lana Yañez to Continue their warfarin regimen- see above in  Anticoagulation Summary.  2. Follow up in 2 weeks  3. Patient declines warfarin refills.  4. Verbal and written information provided. Patient expresses understanding and has no further questions at this time.    Kerry Teague [facility]

## 2018-10-05 ENCOUNTER — TREATMENT (OUTPATIENT)
Dept: CARDIAC REHAB | Facility: HOSPITAL | Age: 73
End: 2018-10-05

## 2018-10-05 DIAGNOSIS — I21.4 NON-ST ELEVATION (NSTEMI) MYOCARDIAL INFARCTION (HCC): Primary | ICD-10-CM

## 2018-10-05 PROCEDURE — 93798 PHYS/QHP OP CAR RHAB W/ECG: CPT

## 2018-10-05 RX ORDER — WARFARIN SODIUM 5 MG/1
TABLET ORAL
Qty: 45 TABLET | Refills: 1 | Status: SHIPPED | OUTPATIENT
Start: 2018-10-05 | End: 2019-03-05 | Stop reason: SDUPTHER

## 2018-10-08 ENCOUNTER — TREATMENT (OUTPATIENT)
Dept: CARDIAC REHAB | Facility: HOSPITAL | Age: 73
End: 2018-10-08

## 2018-10-08 DIAGNOSIS — I25.119 CORONARY ARTERY DISEASE INVOLVING NATIVE CORONARY ARTERY OF NATIVE HEART WITH ANGINA PECTORIS (HCC): Primary | ICD-10-CM

## 2018-10-08 LAB
25(OH)D3+25(OH)D2 SERPL-MCNC: 30.3 NG/ML (ref 30–100)
ALBUMIN SERPL-MCNC: 4.9 G/DL (ref 3.5–5.2)
ALBUMIN/GLOB SERPL: 1.3 G/DL
ALP SERPL-CCNC: 74 U/L (ref 39–117)
ALT SERPL-CCNC: 12 U/L (ref 1–33)
APPEARANCE UR: CLEAR
AST SERPL-CCNC: 16 U/L (ref 1–32)
BACTERIA #/AREA URNS HPF: ABNORMAL /HPF
BACTERIA UR CULT: ABNORMAL
BACTERIA UR CULT: ABNORMAL
BASOPHILS # BLD AUTO: 0.02 10*3/MM3 (ref 0–0.2)
BASOPHILS NFR BLD AUTO: 0.3 % (ref 0–1.5)
BILIRUB SERPL-MCNC: 0.7 MG/DL (ref 0.1–1.2)
BILIRUB UR QL STRIP: NEGATIVE
BUN SERPL-MCNC: 18 MG/DL (ref 8–23)
BUN/CREAT SERPL: 18 (ref 7–25)
CALCIUM SERPL-MCNC: 10.4 MG/DL (ref 8.6–10.5)
CASTS URNS MICRO: ABNORMAL
CHLORIDE SERPL-SCNC: 103 MMOL/L (ref 98–107)
CHOLEST SERPL-MCNC: 133 MG/DL (ref 0–200)
CO2 SERPL-SCNC: 27.3 MMOL/L (ref 22–29)
COLOR UR: YELLOW
CREAT SERPL-MCNC: 1 MG/DL (ref 0.57–1)
EOSINOPHIL # BLD AUTO: 0.11 10*3/MM3 (ref 0–0.7)
EOSINOPHIL NFR BLD AUTO: 1.6 % (ref 0.3–6.2)
EPI CELLS #/AREA URNS HPF: ABNORMAL /HPF
ERYTHROCYTE [DISTWIDTH] IN BLOOD BY AUTOMATED COUNT: 15.1 % (ref 11.7–13)
GLOBULIN SER CALC-MCNC: 3.7 GM/DL
GLUCOSE SERPL-MCNC: 132 MG/DL (ref 65–99)
GLUCOSE UR QL: NEGATIVE
HBA1C MFR BLD: 6.91 % (ref 4.8–5.6)
HCT VFR BLD AUTO: 44.4 % (ref 35.6–45.5)
HDLC SERPL-MCNC: 65 MG/DL (ref 40–60)
HGB BLD-MCNC: 14.8 G/DL (ref 11.9–15.5)
HGB UR QL STRIP: NEGATIVE
IMM GRANULOCYTES # BLD: 0 10*3/MM3 (ref 0–0.03)
IMM GRANULOCYTES NFR BLD: 0 % (ref 0–0.5)
KETONES UR QL STRIP: NEGATIVE
LDLC SERPL CALC-MCNC: 51 MG/DL (ref 0–100)
LDLC/HDLC SERPL: 0.78 {RATIO}
LEUKOCYTE ESTERASE UR QL STRIP: (no result)
LYMPHOCYTES # BLD AUTO: 3.1 10*3/MM3 (ref 0.9–4.8)
LYMPHOCYTES NFR BLD AUTO: 45.1 % (ref 19.6–45.3)
MCH RBC QN AUTO: 30.7 PG (ref 26.9–32)
MCHC RBC AUTO-ENTMCNC: 33.3 G/DL (ref 32.4–36.3)
MCV RBC AUTO: 92.1 FL (ref 80.5–98.2)
MONOCYTES # BLD AUTO: 0.91 10*3/MM3 (ref 0.2–1.2)
MONOCYTES NFR BLD AUTO: 13.2 % (ref 5–12)
NEUTROPHILS # BLD AUTO: 2.74 10*3/MM3 (ref 1.9–8.1)
NEUTROPHILS NFR BLD AUTO: 39.8 % (ref 42.7–76)
NITRITE UR QL STRIP: NEGATIVE
OTHER ANTIBIOTIC SUSC ISLT: ABNORMAL
PH UR STRIP: 5.5 [PH] (ref 5–8)
PLATELET # BLD AUTO: 191 10*3/MM3 (ref 140–500)
POTASSIUM SERPL-SCNC: 4.4 MMOL/L (ref 3.5–5.2)
PROT SERPL-MCNC: 8.6 G/DL (ref 6–8.5)
PROT UR QL STRIP: NEGATIVE
RBC # BLD AUTO: 4.82 10*6/MM3 (ref 3.9–5.2)
RBC #/AREA URNS HPF: ABNORMAL /HPF
SODIUM SERPL-SCNC: 144 MMOL/L (ref 136–145)
SP GR UR: 1.02 (ref 1–1.03)
T4 FREE SERPL-MCNC: 1.42 NG/DL (ref 0.93–1.7)
TRIGL SERPL-MCNC: 85 MG/DL (ref 0–150)
TSH SERPL DL<=0.005 MIU/L-ACNC: 0.81 MIU/ML (ref 0.27–4.2)
UROBILINOGEN UR STRIP-MCNC: (no result) MG/DL
VIT B12 SERPL-MCNC: 667 PG/ML (ref 211–946)
VLDLC SERPL CALC-MCNC: 17 MG/DL (ref 5–40)
WBC # BLD AUTO: 6.88 10*3/MM3 (ref 4.5–10.7)
WBC #/AREA URNS HPF: ABNORMAL /HPF

## 2018-10-08 PROCEDURE — 93798 PHYS/QHP OP CAR RHAB W/ECG: CPT

## 2018-10-10 ENCOUNTER — TREATMENT (OUTPATIENT)
Dept: CARDIAC REHAB | Facility: HOSPITAL | Age: 73
End: 2018-10-10

## 2018-10-10 ENCOUNTER — TELEPHONE (OUTPATIENT)
Dept: INTERNAL MEDICINE | Facility: CLINIC | Age: 73
End: 2018-10-10

## 2018-10-10 DIAGNOSIS — I21.4 NON-ST ELEVATION (NSTEMI) MYOCARDIAL INFARCTION (HCC): ICD-10-CM

## 2018-10-10 DIAGNOSIS — I25.119 CORONARY ARTERY DISEASE INVOLVING NATIVE CORONARY ARTERY OF NATIVE HEART WITH ANGINA PECTORIS (HCC): Primary | ICD-10-CM

## 2018-10-10 PROCEDURE — 93798 PHYS/QHP OP CAR RHAB W/ECG: CPT

## 2018-10-10 NOTE — PROGRESS NOTES
f2    73 y.o.         Height: 65  in    Weight: 241  lb     BMI: 40.1 IBW:  130-150 lb.      %IBW:       Adj IBW             Time seen:      Diet Survey Score: 49  Cardiac Risk Factors: Family history, HTN,sedentary lifestyle Weight Assessment: Overweight  Desired Weightless than 200 lb      Food records reviewed? Yes     Occupation:  Retired, minimal physical activity  Job Activity Level:mild    Routine Exercise: mild     Who does the patient live with: wife  Who does the cooking at home: self         Pertinent Lab Values:   Total: No components found for: CHOLESTEROL  HDL:   HDL Cholesterol   Date Value Ref Range Status   10/04/2018 67 (H) 40 - 60 mg/dL Final   01/31/2018 73 (H) 40 - 60 mg/dL Final     LDL:  LDL Cholesterol    Date Value Ref Range Status   10/04/2018 51 0 - 100 mg/dL Final   01/31/2018 16 0 - 100 mg/dL Final     Triglyceride: No components found for: TRIGLYCERIDE  Last HGBA1C with date if applicable:No components found for: A1C  Glucose:   Glucose   Date Value Ref Range Status   09/24/2018 145 (H) 65 - 99 mg/dL Final    Nutritional Supplements: Vitamin B-12, 1000 mcg daily  Vitamin D,50,000 weekly    Pertinent Nutrition-Related Medications: Coumadin         Stated Problem Areas / Concerns: Increased A1C from 6.19 (8/17) to current 6.91. Significant issues include Steroid treatments that may have influenced A1C and family history of type II Diabetes     Assessment / Recommendations: We reviewed weight loss strategies and carbohydrate consciousness. We discussed heart healthy options. Supportive written information was provided  Motivation level toward diet compliance: moderate       Goals:  Follow a 1200 calorie ADA diet with attention to small nutrient dense meals, high fiber choices and at least 50 gms Protein.                 3:39 PM  10/10/2018  Kristine Zuñiga RD

## 2018-10-12 RX ORDER — NITROFURANTOIN 25; 75 MG/1; MG/1
100 CAPSULE ORAL EVERY 12 HOURS SCHEDULED
Qty: 20 CAPSULE | Refills: 0 | Status: SHIPPED | OUTPATIENT
Start: 2018-10-12 | End: 2018-10-22

## 2018-10-15 ENCOUNTER — TREATMENT (OUTPATIENT)
Dept: CARDIAC REHAB | Facility: HOSPITAL | Age: 73
End: 2018-10-15

## 2018-10-15 DIAGNOSIS — I21.4 NON-ST ELEVATION (NSTEMI) MYOCARDIAL INFARCTION (HCC): ICD-10-CM

## 2018-10-15 DIAGNOSIS — I25.119 CORONARY ARTERY DISEASE INVOLVING NATIVE CORONARY ARTERY OF NATIVE HEART WITH ANGINA PECTORIS (HCC): Primary | ICD-10-CM

## 2018-10-15 PROCEDURE — 93798 PHYS/QHP OP CAR RHAB W/ECG: CPT

## 2018-10-17 ENCOUNTER — TREATMENT (OUTPATIENT)
Dept: CARDIAC REHAB | Facility: HOSPITAL | Age: 73
End: 2018-10-17

## 2018-10-17 DIAGNOSIS — I25.119 CORONARY ARTERY DISEASE INVOLVING NATIVE CORONARY ARTERY OF NATIVE HEART WITH ANGINA PECTORIS (HCC): Primary | ICD-10-CM

## 2018-10-17 LAB — GLUCOSE BLDC GLUCOMTR-MCNC: 136 MG/DL (ref 70–130)

## 2018-10-17 PROCEDURE — 93798 PHYS/QHP OP CAR RHAB W/ECG: CPT

## 2018-10-17 PROCEDURE — 82962 GLUCOSE BLOOD TEST: CPT

## 2018-10-19 ENCOUNTER — ANTICOAGULATION VISIT (OUTPATIENT)
Dept: PHARMACY | Facility: HOSPITAL | Age: 73
End: 2018-10-19

## 2018-10-19 ENCOUNTER — APPOINTMENT (OUTPATIENT)
Dept: PHARMACY | Facility: HOSPITAL | Age: 73
End: 2018-10-19

## 2018-10-19 LAB
INR PPP: 2.8 (ref 0.91–1.09)
PROTHROMBIN TIME: 33.7 SECONDS (ref 10–13.8)

## 2018-10-19 PROCEDURE — 36416 COLLJ CAPILLARY BLOOD SPEC: CPT

## 2018-10-19 PROCEDURE — 85610 PROTHROMBIN TIME: CPT

## 2018-10-19 NOTE — PROGRESS NOTES
Anticoagulation Clinic Progress Note    Anticoagulation Summary  As of 10/19/2018    INR goal:   2.0-3.0   TTR:   100.0 % (5 d)   Today's INR:   2.8   Warfarin maintenance plan:   7.5 mg on Wed, Sat; 5 mg all other days   Weekly warfarin total:   40 mg   No change documented:   Maria Alejandra Guerrero   Plan last modified:   Kerry Teague RPH (10/4/2018)   Next INR check:   11/2/2018   Priority:   High   Target end date:   Indefinite    Indications    Atrial fibrillation (CMS/HCC) [I48.91]             Anticoagulation Episode Summary     INR check location:       Preferred lab:       Send INR reminders to:    [facility]    Comments:         Anticoagulation Care Providers     Provider Role Specialty Phone number    Pia Dueñas MD Referring Cardiology [phone]    Kerry Teague RPH Responsible Pharmacy           Drug interactions: has remained unchanged.  Diet: has remained unchanged.    Clinic Interview:  Patient Findings     Negatives:   Signs/symptoms of thrombosis, Signs/symptoms of bleeding,   Laboratory test error suspected, Change in health, Change in alcohol use,   Change in activity, Upcoming invasive procedure, Emergency department   visit, Upcoming dental procedure, Missed doses, Extra doses, Change in   medications, Change in diet/appetite, Hospital admission, Bruising, Other   complaints      Clinical Outcomes     Negatives:   Major bleeding event, Thromboembolic event,   Anticoagulation-related hospital admission, Anticoagulation-related ED   visit, Anticoagulation-related fatality        INR History:  Anticoagulation Monitoring 10/4/2018 10/5/2018 10/19/2018   INR 1.8 - 2.8   INR Date 10/4/2018 - 10/19/2018   INR Goal 2.0-3.0 2.0-3.0 2.0-3.0   Trend - - Same   Last Week Total 0 mg - 40 mg   Next Week Total 40 mg - 40 mg   Sun 5 mg - 5 mg   Mon 5 mg - 5 mg   Tue 5 mg - 5 mg   Wed 7.5 mg - 7.5 mg   Thu 5 mg - 5 mg   Fri 5 mg - 5 mg   Sat 7.5 mg - 7.5 mg   Visit Report - - -       Plan:  1.  INR is therapeutic today- see above in Anticoagulation Summary.   Will instruct Lanaantonio Aguerocomb to continue their warfarin regimen- see above in Anticoagulation Summary.  2. Follow up in 2 weeks.  3. Patient declines warfarin refills.  4. Verbal and written information provided. Patient expresses understanding and has no further questions at this time.    Maria Alejandra Guerrero

## 2018-10-20 ENCOUNTER — HOSPITAL ENCOUNTER (EMERGENCY)
Facility: HOSPITAL | Age: 73
Discharge: HOME | End: 2018-10-20
Attending: EMERGENCY MEDICINE | Admitting: EMERGENCY MEDICINE

## 2018-10-20 ENCOUNTER — APPOINTMENT (OUTPATIENT)
Dept: GENERAL RADIOLOGY | Facility: HOSPITAL | Age: 73
End: 2018-10-20

## 2018-10-20 ENCOUNTER — APPOINTMENT (OUTPATIENT)
Dept: NUCLEAR MEDICINE | Facility: HOSPITAL | Age: 73
End: 2018-10-20

## 2018-10-20 VITALS
OXYGEN SATURATION: 94 % | RESPIRATION RATE: 18 BRPM | HEIGHT: 66 IN | HEART RATE: 74 BPM | SYSTOLIC BLOOD PRESSURE: 105 MMHG | DIASTOLIC BLOOD PRESSURE: 74 MMHG | WEIGHT: 235.1 LBS | BODY MASS INDEX: 37.78 KG/M2 | TEMPERATURE: 97.7 F

## 2018-10-20 DIAGNOSIS — I50.9 CHRONIC CONGESTIVE HEART FAILURE, UNSPECIFIED HEART FAILURE TYPE (HCC): ICD-10-CM

## 2018-10-20 DIAGNOSIS — R07.89 ATYPICAL CHEST PAIN: Primary | ICD-10-CM

## 2018-10-20 LAB
ALBUMIN SERPL-MCNC: 4.1 G/DL (ref 3.5–5.2)
ALBUMIN/GLOB SERPL: 1 G/DL
ALP SERPL-CCNC: 81 U/L (ref 39–117)
ALT SERPL W P-5'-P-CCNC: 25 U/L (ref 1–33)
ANION GAP SERPL CALCULATED.3IONS-SCNC: 10.4 MMOL/L
AST SERPL-CCNC: 25 U/L (ref 1–32)
BASOPHILS # BLD AUTO: 0.03 10*3/MM3 (ref 0–0.2)
BASOPHILS NFR BLD AUTO: 0.3 % (ref 0–1.5)
BILIRUB SERPL-MCNC: 1 MG/DL (ref 0.1–1.2)
BUN BLD-MCNC: 13 MG/DL (ref 8–23)
BUN/CREAT SERPL: 13.4 (ref 7–25)
CALCIUM SPEC-SCNC: 9.8 MG/DL (ref 8.6–10.5)
CHLORIDE SERPL-SCNC: 101 MMOL/L (ref 98–107)
CO2 SERPL-SCNC: 25.6 MMOL/L (ref 22–29)
CREAT BLD-MCNC: 0.97 MG/DL (ref 0.57–1)
D DIMER PPP FEU-MCNC: 3.15 MCGFEU/ML (ref 0–0.49)
DEPRECATED RDW RBC AUTO: 48.7 FL (ref 37–54)
DIGOXIN SERPL-MCNC: <0.3 NG/ML (ref 0.6–1.2)
EOSINOPHIL # BLD AUTO: 0.23 10*3/MM3 (ref 0–0.7)
EOSINOPHIL NFR BLD AUTO: 2.1 % (ref 0.3–6.2)
ERYTHROCYTE [DISTWIDTH] IN BLOOD BY AUTOMATED COUNT: 14.4 % (ref 11.7–13)
GFR SERPL CREATININE-BSD FRML MDRD: 69 ML/MIN/1.73
GLOBULIN UR ELPH-MCNC: 4.1 GM/DL
GLUCOSE BLD-MCNC: 148 MG/DL (ref 65–99)
HCT VFR BLD AUTO: 39.5 % (ref 35.6–45.5)
HGB BLD-MCNC: 12.6 G/DL (ref 11.9–15.5)
HOLD SPECIMEN: NORMAL
HOLD SPECIMEN: NORMAL
IMM GRANULOCYTES # BLD: 0.03 10*3/MM3 (ref 0–0.03)
IMM GRANULOCYTES NFR BLD: 0.3 % (ref 0–0.5)
INR PPP: 2.68 (ref 0.9–1.1)
LYMPHOCYTES # BLD AUTO: 2.78 10*3/MM3 (ref 0.9–4.8)
LYMPHOCYTES NFR BLD AUTO: 25.2 % (ref 19.6–45.3)
MCH RBC QN AUTO: 29.4 PG (ref 26.9–32)
MCHC RBC AUTO-ENTMCNC: 31.9 G/DL (ref 32.4–36.3)
MCV RBC AUTO: 92.3 FL (ref 80.5–98.2)
MONOCYTES # BLD AUTO: 1.23 10*3/MM3 (ref 0.2–1.2)
MONOCYTES NFR BLD AUTO: 11.1 % (ref 5–12)
NEUTROPHILS # BLD AUTO: 6.75 10*3/MM3 (ref 1.9–8.1)
NEUTROPHILS NFR BLD AUTO: 61 % (ref 42.7–76)
NT-PROBNP SERPL-MCNC: 1513 PG/ML (ref 5–900)
PLATELET # BLD AUTO: 188 10*3/MM3 (ref 140–500)
PMV BLD AUTO: 10.8 FL (ref 6–12)
POTASSIUM BLD-SCNC: 3.6 MMOL/L (ref 3.5–5.2)
PROT SERPL-MCNC: 8.2 G/DL (ref 6–8.5)
PROTHROMBIN TIME: 28.1 SECONDS (ref 11.7–14.2)
RBC # BLD AUTO: 4.28 10*6/MM3 (ref 3.9–5.2)
SODIUM BLD-SCNC: 137 MMOL/L (ref 136–145)
TROPONIN T SERPL-MCNC: <0.01 NG/ML (ref 0–0.03)
TROPONIN T SERPL-MCNC: <0.01 NG/ML (ref 0–0.03)
WBC NRBC COR # BLD: 11.05 10*3/MM3 (ref 4.5–10.7)
WHOLE BLOOD HOLD SPECIMEN: NORMAL
WHOLE BLOOD HOLD SPECIMEN: NORMAL

## 2018-10-20 PROCEDURE — 93010 ELECTROCARDIOGRAM REPORT: CPT | Performed by: INTERNAL MEDICINE

## 2018-10-20 PROCEDURE — 93005 ELECTROCARDIOGRAM TRACING: CPT | Performed by: EMERGENCY MEDICINE

## 2018-10-20 PROCEDURE — 71046 X-RAY EXAM CHEST 2 VIEWS: CPT

## 2018-10-20 PROCEDURE — 84484 ASSAY OF TROPONIN QUANT: CPT | Performed by: EMERGENCY MEDICINE

## 2018-10-20 PROCEDURE — A9540 TC99M MAA: HCPCS | Performed by: EMERGENCY MEDICINE

## 2018-10-20 PROCEDURE — 85610 PROTHROMBIN TIME: CPT | Performed by: EMERGENCY MEDICINE

## 2018-10-20 PROCEDURE — 85379 FIBRIN DEGRADATION QUANT: CPT | Performed by: EMERGENCY MEDICINE

## 2018-10-20 PROCEDURE — 0 XENON XE 133: Performed by: EMERGENCY MEDICINE

## 2018-10-20 PROCEDURE — 80162 ASSAY OF DIGOXIN TOTAL: CPT | Performed by: EMERGENCY MEDICINE

## 2018-10-20 PROCEDURE — A9558 XE133 XENON 10MCI: HCPCS | Performed by: EMERGENCY MEDICINE

## 2018-10-20 PROCEDURE — 80053 COMPREHEN METABOLIC PANEL: CPT | Performed by: EMERGENCY MEDICINE

## 2018-10-20 PROCEDURE — 78582 LUNG VENTILAT&PERFUS IMAGING: CPT

## 2018-10-20 PROCEDURE — 85025 COMPLETE CBC W/AUTO DIFF WBC: CPT | Performed by: EMERGENCY MEDICINE

## 2018-10-20 PROCEDURE — 83880 ASSAY OF NATRIURETIC PEPTIDE: CPT | Performed by: EMERGENCY MEDICINE

## 2018-10-20 PROCEDURE — 0 TECHNETIUM ALBUMIN AGGREGATED: Performed by: EMERGENCY MEDICINE

## 2018-10-20 PROCEDURE — 99284 EMERGENCY DEPT VISIT MOD MDM: CPT

## 2018-10-20 RX ORDER — NITROGLYCERIN 0.4 MG/1
0.4 TABLET SUBLINGUAL
Status: DISCONTINUED | OUTPATIENT
Start: 2018-10-20 | End: 2018-10-20 | Stop reason: HOSPADM

## 2018-10-20 RX ORDER — SODIUM CHLORIDE 0.9 % (FLUSH) 0.9 %
10 SYRINGE (ML) INJECTION AS NEEDED
Status: DISCONTINUED | OUTPATIENT
Start: 2018-10-20 | End: 2018-10-20 | Stop reason: HOSPADM

## 2018-10-20 RX ORDER — ASPIRIN 325 MG
325 TABLET ORAL ONCE
Status: COMPLETED | OUTPATIENT
Start: 2018-10-20 | End: 2018-10-20

## 2018-10-20 RX ADMIN — NITROGLYCERIN 0.4 MG: 0.4 TABLET, ORALLY DISINTEGRATING SUBLINGUAL at 16:53

## 2018-10-20 RX ADMIN — Medication 1 DOSE: at 19:48

## 2018-10-20 RX ADMIN — ASPIRIN 325 MG: 325 TABLET ORAL at 16:53

## 2018-10-20 RX ADMIN — NITROGLYCERIN 1 INCH: 20 OINTMENT TOPICAL at 17:24

## 2018-10-20 RX ADMIN — XENON XE-133 10.2 MILLICURIE: 10 GAS RESPIRATORY (INHALATION) at 19:48

## 2018-10-20 NOTE — ED NOTES
Pt states that pain is completely gone after one sublingual nitro given     Cristian Moreno, OLEGARIO  10/20/18 8617

## 2018-10-20 NOTE — ED PROVIDER NOTES
" EMERGENCY DEPARTMENT ENCOUNTER    CHIEF COMPLAINT  Chief Complaint: CP  History given by: Patient   History limited by: none  Room Number: 15/15  PMD: Raoul Ramirez MD      HPI:  Pt is a 73 y.o. female who presents complaining of mid-sternum \"squeezing\" constant CP that began yesterday. Pt states pain is currently a 5. Pt states pain is worse with deep inspiration and mild exertion and radiates up to base of neck. Pt reports dry cough since yesterday, SOA, and diaphoresis, but denies N/V, fever, or changed leg swelling. Pt denies any similar previous pains. Pt reports Hx of PE and MI but no stent placement. Pt takes Coumadin for A-fib and takes baby ASA daily.     Duration:  Since yesterday  Onset: gradual  Timing: constant  Location: mid-sternum  Radiation: base of neck  Quality: \"Squeezing\"  Intensity/Severity: currently 5  Progression: unchanged  Associated Symptoms: dry cough, SOA, diaphoresis  Aggravating Factors: deep inspiration and mild exertion  Alleviating Factors: none  Previous Episodes: PT reports Hx of MI and PE  Treatment before arrival: none    PAST MEDICAL HISTORY  Active Ambulatory Problems     Diagnosis Date Noted   • Hyperlipidemia 03/21/2016   • Vitamin deficiency 03/21/2016   • Essential hypertension 03/21/2016   • Rheumatoid arthritis involving both hands (CMS/[facility]) 03/21/2016   • Fatigue 04/06/2016   • ANTOINE (dyspnea on exertion) 04/06/2016   • Dysuria 08/02/2016   • Urge incontinence of urine 08/02/2016   • Hypovitaminosis D 08/02/2016   • Sleep apnea 08/02/2016   • Encounter for screening colonoscopy 08/02/2016   • Bronchitis 08/12/2016   • Cough 08/12/2016   • Generalized osteoarthritis of multiple sites 12/15/2016   • Morbid obesity with BMI of 40.0-44.9, adult (CMS/[facility]) 12/15/2016   • Cellulitis of right elbow due to MRSA 06/12/2017   • Medicare annual wellness visit, initial 06/27/2017   • Hospital discharge follow-up 06/27/2017   • Displacement of lumbar intervertebral disc without " myelopathy 10/11/2017   • Lumbar disc herniation 10/11/2017   • Atrial fibrillation with RVR (CMS/[facility]) 10/11/2017   • History of MRSA infection 10/11/2017   • Left-sided weakness 10/15/2017   • Atrial fibrillation (CMS/[facility]) 10/15/2017   • Left shoulder pain 10/20/2017   • Relative lymphocytosis 11/13/2017   • Nausea 11/28/2017   • Weakness 11/28/2017   • Controlled substance agreement signed 12/05/2017   • Coronary artery disease involving native coronary artery of native heart with angina pectoris (CMS/[facility]) 12/14/2017   • Shortness of breath 12/14/2017   • Lower extremity edema 12/14/2017   • Acute on chronic diastolic heart failure (CMS/[facility]) 12/14/2017   • Adhesive capsulitis of left shoulder 12/28/2017   • CVA tenderness 12/28/2017   • Costochondritis, acute 01/04/2018   • Allergic reaction 02/09/2018   • 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/2018   • Visit for screening mammogram 04/02/2018   • Low back pain 04/02/2018   • Urgency of urination 10/04/2018   • Hyperglycemia 10/04/2018     Resolved Ambulatory Problems     Diagnosis Date Noted   • Acute CVA (cerebrovascular accident) (CMS/[facility]) 10/15/2017   • NSTEMI (non-ST elevated myocardial infarction) (CMS/[facility]) 10/19/2017   • Cerebrovascular accident (CVA) due to occlusion of right middle cerebral artery (CMS/[facility]) 10/30/2017     Past Medical History:   Diagnosis Date   • Acute on chronic diastolic heart failure (CMS/[facility])    • Arthritis    • Asthma    • Atrial fibrillation (CMS/[facility])    • Bronchitis    • Cellulitis of right elbow    • CHF (congestive heart failure) (CMS/[facility])    • COPD (chronic obstructive pulmonary disease) (CMS/[facility])    • Coronary artery disease    • Coronary artery disease involving native coronary artery of native heart with angina pectoris with documented spasm (CMS/[facility])    • Disease of thyroid gland    • Displacement of lumbar intervertebral disc without myelopathy    • Dizziness    • Essential hypertension    • Generalized osteoarthritis of  multiple sites    • History of rheumatic fever    • History of transfusion    • Hx of bone density study    • Hyperlipidemia    • Hypovitaminosis D    • Left arm pain    • Leg swelling    • Low back pain    • Lower extremity edema    • Malaise and fatigue    • Mitral valve disease    • Mitral valve insufficiency    • Morbid obesity with BMI of 40.0-44.9, adult (CMS/[facility])    • MRSA infection    • NSTEMI (non-ST elevated myocardial infarction) (CMS/[facility])    • PAF (paroxysmal atrial fibrillation) (CMS/[facility])    • RA (rheumatoid arthritis) (CMS/[facility])    • Sleep apnea    • SOB (shortness of breath)    • Stroke (CMS/[facility])    • Urge incontinence of urine    • Vitamin D deficiency        PAST SURGICAL HISTORY  Past Surgical History:   Procedure Laterality Date   • BREAST BIOPSY     • BREAST SURGERY      right side lumpectomy with biopsy   • CARDIAC CATHETERIZATION Left 10/20/2017    Procedure: Cardiac Catheterization/Vascular Study;  Surgeon: Alphonso Olmedo MD;  Location:  [facility];  Service:    • CARDIAC CATHETERIZATION N/A 10/20/2017    +2 mitral regurgitation, left main 10% stenosis, mid to distal LAD 10% diffuse stenosis, circumflex 10% diffuse stenosis, RCA 10% proximal stenosis, and distal PDA consistent with coronary embolus with a lesion of 90% too small to consider coronary intervention; medical management recommended   • EYE SURGERY      laser surgery for glaucoma and left eye cataracts removed   • HYSTERECTOMY      10+ years ago   • JOINT REPLACEMENT  2005; 2006    bilateral knees and left rotater   • KNEE SURGERY     • MAMMO BILATERAL  2016    [facility]        FAMILY HISTORY  Family History   Problem Relation Age of Onset   • Colon cancer Mother    • Glaucoma Mother    • Stroke Mother    • Arthritis Mother    • Hypertension Mother    • Glaucoma Sister    • Diabetes Sister    • Heart disease Sister    • Arthritis Sister    • Asthma Sister    • Hypertension Sister    • Miscarriages /  Stillbirths Sister    • Lung disease Sister    • Heart disease Brother    • Diabetes Brother    • Arthritis Brother    • Drug abuse Brother    • Hypertension Brother    • Arthritis Father    • COPD Father    • Lung disease Father    • Arthritis Daughter    • Depression Daughter    • Alcohol abuse Maternal Uncle    • Heart disease Sister    • Heart disease Sister    • Heart disease Brother        SOCIAL HISTORY  Social History     Social History   • Marital status:      Spouse name: Suresh   • Number of children: 5   • Years of education: 13     Occupational History   •  for [employer] Retired     Social History Main Topics   • Smoking status: Former Smoker     Packs/day: 3.00     Types: Cigarettes     Start date: 5/19/1967     Quit date: 10/19/1968   • Smokeless tobacco: Never Used   • Alcohol use No      Comment: No caffeine use   • Drug use: No   • Sexual activity: Defer     Other Topics Concern   • Not on file     Social History Narrative   • No narrative on file       ALLERGIES  Contrast dye    REVIEW OF SYSTEMS  Review of Systems   Constitutional: Positive for diaphoresis. Negative for fever.   HENT: Negative for sore throat.    Eyes: Negative.    Respiratory: Positive for cough (dry) and shortness of breath.    Cardiovascular: Positive for chest pain (med-sternum, radiates to base of neck). Negative for leg swelling (changed).   Gastrointestinal: Negative for abdominal pain, diarrhea, nausea and vomiting.   Genitourinary: Negative for dysuria.   Musculoskeletal: Negative for neck pain.   Skin: Negative for rash.   Allergic/Immunologic: Negative.    Neurological: Negative for weakness, numbness and headaches.   Hematological: Negative.    Psychiatric/Behavioral: Negative.    All other systems reviewed and are negative.      PHYSICAL EXAM  ED Triage Vitals   Temp Pulse Resp BP SpO2   -- -- -- -- --      Temp src Heart Rate Source Patient Position BP Location FiO2 (%)   -- -- -- -- --        Physical Exam   Constitutional: She is oriented to person, place, and time. No distress.   HENT:   Head: Normocephalic and atraumatic.   Eyes: Pupils are equal, round, and reactive to light. EOM are normal.   Neck: Normal range of motion. Neck supple.   Cardiovascular: Normal rate and normal heart sounds.  An irregularly irregular rhythm present.   Pulses:       Radial pulses are 2+ on the right side, and 2+ on the left side.        Dorsalis pedis pulses are 2+ on the right side, and 2+ on the left side.   Pulmonary/Chest: Effort normal and breath sounds normal. No respiratory distress. She exhibits tenderness (mild sternum).   Abdominal: Soft. Bowel sounds are normal. There is no tenderness. There is no rebound and no guarding.   Musculoskeletal: Normal range of motion. She exhibits no edema (pedal).   Neurological: She is alert and oriented to person, place, and time. She has normal sensation and normal strength.   Skin: Skin is warm and dry. No rash noted.   Psychiatric: Mood and affect normal.   Nursing note and vitals reviewed.      LAB RESULTS  Lab Results (last 24 hours)     Procedure Component Value Units Date/Time    Protime-INR [[id number]]  (Abnormal) Collected:  10/20/18 1638    Specimen:  Blood Updated:  10/20/18 1720     Protime 28.1 (H) Seconds      INR 2.68 (H)    Digoxin Level [[id number]]  (Abnormal) Collected:  10/20/18 1638    Specimen:  Blood Updated:  10/20/18 1711     Digoxin <0.30 (L) ng/mL     BNP [[id number]]  (Abnormal) Collected:  10/20/18 1638    Specimen:  Blood Updated:  10/20/18 1728     proBNP 1,513.0 (H) pg/mL     Narrative:       Among patients with dyspnea, NT-proBNP is highly sensitive for the detection of acute congestive heart failure. In addition NT-proBNP of <300 pg/ml effectively rules out acute congestive heart failure with 99% negative predictive value.    D-dimer, Quantitative [[id number]]  (Abnormal) Collected:  10/20/18 1638    Specimen:  Blood Updated:  10/20/18 1815      D-Dimer, Quantitative 3.15 (H) MCGFEU/mL     Narrative:       The Stago D-Dimer test used in conjunction with a clinical pretest probability (PTP) assessment model, has been approved by the FDA to rule out the presence of venous thromboembolism (VTE) in outpatients suspected of deep venous thrombosis (DVT) or pulmonary embolism (PE).     Troponin [[id number]]  (Normal) Collected:  10/20/18 1638    Specimen:  Blood Updated:  10/20/18 1728     Troponin T <0.010 ng/mL     Narrative:       Troponin T Reference Ranges:  Less than 0.03 ng/mL:    Negative for AMI  0.03 to 0.09 ng/mL:      Indeterminant for AMI  Greater than 0.09 ng/mL: Positive for AMI    Comprehensive Metabolic Panel [[id number]]  (Abnormal) Collected:  10/20/18 1638    Specimen:  Blood Updated:  10/20/18 1729     Glucose 148 (H) mg/dL      BUN 13 mg/dL      Creatinine 0.97 mg/dL      Sodium 137 mmol/L      Potassium 3.6 mmol/L      Chloride 101 mmol/L      CO2 25.6 mmol/L      Calcium 9.8 mg/dL      Total Protein 8.2 g/dL      Albumin 4.10 g/dL      ALT (SGPT) 25 U/L      AST (SGOT) 25 U/L      Alkaline Phosphatase 81 U/L      Total Bilirubin 1.0 mg/dL      eGFR  African Amer 69 mL/min/1.73      Globulin 4.1 gm/dL      A/G Ratio 1.0 g/dL      BUN/Creatinine Ratio 13.4     Anion Gap 10.4 mmol/L     Narrative:       The MDRD GFR formula is only valid for adults with stable renal function between ages 18 and 70.    CBC Auto Differential [[id number]]  (Abnormal) Collected:  10/20/18 1638    Specimen:  Blood Updated:  10/20/18 1735     WBC 11.05 (H) 10*3/mm3      RBC 4.28 10*6/mm3      Hemoglobin 12.6 g/dL      Hematocrit 39.5 %      MCV 92.3 fL      MCH 29.4 pg      MCHC 31.9 (L) g/dL      RDW 14.4 (H) %      RDW-SD 48.7 fl      MPV 10.8 fL      Platelets 188 10*3/mm3      Neutrophil % 61.0 %      Lymphocyte % 25.2 %      Monocyte % 11.1 %      Eosinophil % 2.1 %      Basophil % 0.3 %      Immature Grans % 0.3 %      Neutrophils, Absolute 6.75 10*3/mm3       Lymphocytes, Absolute 2.78 10*3/mm3      Monocytes, Absolute 1.23 (H) 10*3/mm3      Eosinophils, Absolute 0.23 10*3/mm3      Basophils, Absolute 0.03 10*3/mm3      Immature Grans, Absolute 0.03 10*3/mm3     Troponin [[id number]]  (Normal) Collected:  10/20/18 2003    Specimen:  Blood Updated:  10/20/18 2101     Troponin T <0.010 ng/mL     Narrative:       Troponin T Reference Ranges:  Less than 0.03 ng/mL:    Negative for AMI  0.03 to 0.09 ng/mL:      Indeterminant for AMI  Greater than 0.09 ng/mL: Positive for AMI          I ordered the above labs and reviewed the results    RADIOLOGY  NM Lung Ventilation Perfusion   Final Result   Low probability VQ scan.       This report was finalized on 10/20/2018 8:02 PM by Dr. Amy Stokes M.D.          XR Chest 2 View    (Results Pending)        I ordered the above noted radiological studies. Interpreted by radiologist. DReviewed by me in PACS.       PROCEDURES  Procedures    EKG          EKG time: 1622  Rhythm/Rate: A-fib, 88  P waves and VT: Irregular P waves, Varying DIANE  QRS, axis: Q waves anterior and inferior, LAD  ST and T waves: nonspecific t wave changes    Interpreted Contemporaneously by me, independently viewed  changed compared to prior 1/4/18 Rate 116 at that time. No new ischemic changes.       PROGRESS AND CONSULTS  ED Course as of Oct 20 2240   Sat Oct 20, 2018   1809 stable proBNP: (!) 1,513.0 [WH]   1819 D-dimer is elevated.  Patient had IV contrast approximately one year ago.  She states she did develop urticaria and itching after this.  Because of this, a VQ scan will be ordered instead of a CTA of the chest  [WH]   2116 Heart Rate: 74 [WH]      ED Course User Index  [] Cordell Moore MD     1631  Initial encounter. Notified pt of unchanged EKG. Discussed plan to further evaluate with lab work and CXR. Pt is agreeable.     1641  Ordered lab work, CXR, Nitostat, and ASA 325mg.    1721  Per RN, pt's CP relieved after 1 Nitro.  Ordered nitro  paste.    1821   Ordered VQ scan.     2045  Pt recheck. Pt is resting feeling better. Notified pt of lab work, including negative first troponin and VQ scan results. Discussed with pt the plan to discharge pt after pending second troponin. Pt is agreeable.    2118  Discussed the pt's case with Dr. Carter, [facility] who recommends increasing lasix dose for several days and calling office on Monday.     2140  Pt recheck. Notified pt of negative second troponin and discussion with Dr. Carter. Discussed plan to discharge pt home with prescription for Lasix. Pt understands and agrees with treatment plan, all concerns addressed.      MEDICAL DECISION MAKING  Results were reviewed/discussed with the patient and they were also made aware of online access. Pt also made aware that some labs, such as cultures, will not be resulted during ER visit and follow up with PMD is necessary.     MDM  Number of Diagnoses or Management Options  Atypical chest pain:   Chronic congestive heart failure, unspecified heart failure type (CMS/HCC):   Diagnosis management comments: Troponin was negative ×2.  EKG did not show any new ischemic changes.  Chest x-ray did show some increased vascular congestion.  Patient's proBNP was mildly elevated.  Patient's d-dimer was elevated.  Her Coumadin was therapeutic.  VQ scan was low probability.  Patient was not tachycardic, tachypneic, or hypoxic so I do not suspect the patient has a pulmonary embolus.  Patient's pain was relieved with nitroglycerin.  Patient stated her pain had been constant for over 24 hours.  Case was discussed with Dr. Carter of cardiology and she recommended increasing the patient's Lasix for the next several days and having her follow-up in the office this upcoming week.        Amount and/or Complexity of Data Reviewed  Clinical lab tests: ordered and reviewed (BNP-1513(stable); D-Dimer-3.15; Negative first and second troponin)  Tests in the radiology section of CPT®: ordered and reviewed  (CXR shows interstitial edema and mild cardiomegaly. VQ scan shows low probability.)  Tests in the medicine section of CPT®: ordered and reviewed (See EKG results in procedure. )  Decide to obtain previous medical records or to obtain history from someone other than the patient: yes  Review and summarize past medical records: yes (Pt had Cardiac Cath on Oct last year which showed normal LV systolic function and embolism in distal PDA too small for intervention but otherwise minimal CAD.)  Discuss the patient with other providers: yes (Dr. Carter, [facility])  Independent visualization of images, tracings, or specimens: yes           DIAGNOSIS  Final diagnoses:   Atypical chest pain   Chronic congestive heart failure, unspecified heart failure type (CMS/HCC)       DISPOSITION  DISCHARGE    Patient discharged in stable condition.    Reviewed implications of results, diagnosis, meds, responsibility to follow up, warning signs and symptoms of possible worsening, potential complications and reasons to return to ER, including persistent or worsening chest pain, difficulty breathing, or other concern.    Patient/Family voiced understanding of above instructions.    Discussed plan for discharge, as there is no emergent indication for admission. Patient referred to primary care provider for BP management due to today's BP. Pt/family is agreeable and understands need for follow up and repeat testing.  Pt is aware that discharge does not mean that nothing is wrong but it indicates no emergency is present that requires admission and they must continue care with follow-up as given below or physician of their choice.     FOLLOW-UP  [facility]  [facility] [id number] [phone]  Call in 2 days           Medication List      Changed    * potassium chloride 10 MEQ CR capsule  Commonly known as:  MICRO-K  TAKE TWO CAPSULES BY MOUTH ONCE DAILY FOR 30 DAYS  What changed:  See the new  instructions.     * potassium chloride 10 MEQ CR tablet  Commonly known as:  K-DUR  TAKE FOUR TABLETS BY MOUTH ONCE DAILY  What changed:  Another medication with the same name was changed. Make   sure you understand how and when to take each.        * This list has 2 medication(s) that are the same as other medications   prescribed for you. Read the directions carefully, and ask your doctor or   other care provider to review them with you.                  Latest Documented Vital Signs:  As of 10:40 PM  BP- 105/74 HR- 74 Temp- 97.7 °F (36.5 °C) (Oral) O2 sat- 94%    --  Documentation assistance provided by gómez So for Dr. Moore.  Information recorded by the fabricioibchristina was done at my direction and has been verified and validated by me.            Jamie So  10/20/18 2201       Cordell Moore MD  10/20/18 7553

## 2018-10-21 NOTE — PROGRESS NOTES
Karl Yañez is a 73 y.o. female.   She is here today for adhesive capsulitis of left shoulder which is severely sore when she moves it along with urgency of urination hypertension mixed hyper lipidemia PAF morbid obesity hypovitaminosis D rheumatoid arthritis of both hands hyper glycemia and vitamin deficiency  History of Present Illness   She is here today for adhesive capsulitis of left shoulder which is getting worse along with urgency of urination which is not stable and hypertension which is well-controlled current medication mixed hyper lipidemia which has been stable on current medication PAF which is rate controlled and steady on current medication morbid obesity which is about the same as before and hypovitaminosis D which has been stable supplementation and vitamin deficiency for which we will check B12 and arthritis of both hands in the form of rheumatoid which is about the same as before and hyperglycemia which has been stable up to this point as well  The following portions of the patient's history were reviewed and updated as appropriate: allergies, current medications, past family history, past medical history, past social history, past surgical history and problem list.    Review of Systems   Constitutional: Positive for fatigue.   Musculoskeletal: Positive for arthralgias. Negative for myalgias.   Neurological: Positive for weakness.   All other systems reviewed and are negative.      Objective   Physical Exam   Constitutional: She is oriented to person, place, and time. She appears well-developed and well-nourished. She is cooperative.   HENT:   Head: Normocephalic and atraumatic.   Right Ear: Hearing, tympanic membrane, external ear and ear canal normal.   Left Ear: Hearing, tympanic membrane, external ear and ear canal normal.   Nose: Nose normal.   Mouth/Throat: Uvula is midline, oropharynx is clear and moist and mucous membranes are normal.   Eyes: Pupils are equal, round,  and reactive to light. Conjunctivae, EOM and lids are normal.   Neck: Phonation normal. Neck supple. Carotid bruit is not present.   Cardiovascular: Normal rate, regular rhythm and normal heart sounds.  Exam reveals no gallop and no friction rub.    No murmur heard.  Pulmonary/Chest: Effort normal and breath sounds normal. No respiratory distress.   Abdominal: Soft. Bowel sounds are normal. She exhibits no distension and no mass. There is no hepatosplenomegaly. There is no tenderness. There is no rebound and no guarding. No hernia.   Musculoskeletal: She exhibits no edema.        Left shoulder: She exhibits decreased range of motion, tenderness and pain.        Hands:  Neurological: She is alert and oriented to person, place, and time. Coordination and gait normal.   Skin: Skin is warm and dry.   Psychiatric: She has a normal mood and affect. Her speech is normal and behavior is normal. Judgment and thought content normal.   Nursing note and vitals reviewed.      Assessment/Plan   Diagnoses and all orders for this visit:    Adhesive capsulitis of left shoulder  -     Ambulatory Referral to Orthopedic Surgery    Urgency of urination  -     Urinalysis With Microscopic - Urine, Clean Catch  -     Urine Culture - Urine, Urine, Clean Catch    Essential hypertension    Mixed hyperlipidemia  -     Lipid Panel With LDL / HDL Ratio  -     CBC & Differential  -     Comprehensive Metabolic Panel  -     T4, Free  -     TSH    Paroxysmal atrial fibrillation (CMS/HCC)  -     T4, Free  -     TSH    Morbid obesity with BMI of 40.0-44.9, adult (CMS/HCC)    Hypovitaminosis D  -     Vitamin D 25 Hydroxy    Rheumatoid arthritis involving both hands with positive rheumatoid factor (CMS/HCC)    Hyperglycemia  -     Hemoglobin A1c    Vitamin deficiency  -     Vitamin B12    Other orders  -     Fluzone High Dose =>65Years  -     Microscopic Examination  -     Hemoglobin A1c  -     Vitamin D 25 Hydroxy  -     Vitamin B12      Adhesive  capsulitis of left shoulder noted and we will have her see orthopedics as this is getting worse  Hypertension well-controlled current medication no changes  Mixed hyper lipidemia has been stable on current medication with no evidence of myalgias and we will continue Lipitor  PAF rate controlled and steady on current medication with no evidence of any strokes  Morbid obesity about the same as before and she will work on diet and exercise  Hypovitaminosis D has been stable supplementation and we will check levels today  Rheumatoid arthritis of both hands about same as before and she has a rheumatologist  Hyperglycemia has been noted and has been stable up to this point we will check hemoglobin A1c  Weight loss would be beneficial for her hyperglycemia as well  Vitamin  deficiency noted and we will check vitamin B12 level

## 2018-10-21 NOTE — DISCHARGE INSTRUCTIONS
Take an extra 40 mg of Lasix daily for the next 3 days.  Call your cardiologist in 2 days.  Return to the emergency department for worsening pain, worsening shortness of breath, or other concern.

## 2018-10-22 ENCOUNTER — TELEPHONE (OUTPATIENT)
Dept: CARDIOLOGY | Facility: CLINIC | Age: 73
End: 2018-10-22

## 2018-10-22 NOTE — TELEPHONE ENCOUNTER
"10/22/18  10:14 AM  Lana Yañez  1945    Home Phone [phone]   Mobile [phone]       Lana Yañez is a patient of Dr Dueñas.  She is calling in today for an ER follow up.  She tells me she is still having chest \"pleuritic\" pain, and SOA.  Dr Dueñas reviewed ER notes and felt if pt is still having symptoms she can come into the [facility] today for further evaluation.  Or she can call her PCP, as she was seen in the ER for the same reason we saw her in the office and from a cardiac stand point everything looks ok. Patient ok to see next week for er follow per Dr Dueñas.    Discussed options with patient.  patient stated she will call her pulmonologist to be seen. She was scheduled with Celina for next week.  She will let us know if she starts to feel worse.    Maribell Hathaway RN  Triage nurse    "

## 2018-10-29 ENCOUNTER — OFFICE VISIT (OUTPATIENT)
Dept: INTERNAL MEDICINE | Facility: CLINIC | Age: 73
End: 2018-10-29

## 2018-10-29 ENCOUNTER — OFFICE VISIT (OUTPATIENT)
Dept: CARDIOLOGY | Facility: CLINIC | Age: 73
End: 2018-10-29

## 2018-10-29 ENCOUNTER — ANTICOAGULATION VISIT (OUTPATIENT)
Dept: PHARMACY | Facility: HOSPITAL | Age: 73
End: 2018-10-29

## 2018-10-29 ENCOUNTER — APPOINTMENT (OUTPATIENT)
Dept: LAB | Facility: HOSPITAL | Age: 73
End: 2018-10-29

## 2018-10-29 VITALS
BODY MASS INDEX: 39.82 KG/M2 | SYSTOLIC BLOOD PRESSURE: 126 MMHG | HEART RATE: 99 BPM | WEIGHT: 239 LBS | DIASTOLIC BLOOD PRESSURE: 72 MMHG | HEIGHT: 65 IN

## 2018-10-29 VITALS
HEIGHT: 65 IN | HEART RATE: 85 BPM | WEIGHT: 239 LBS | SYSTOLIC BLOOD PRESSURE: 119 MMHG | DIASTOLIC BLOOD PRESSURE: 82 MMHG | TEMPERATURE: 98.1 F | BODY MASS INDEX: 39.82 KG/M2 | OXYGEN SATURATION: 95 %

## 2018-10-29 DIAGNOSIS — I10 ESSENTIAL HYPERTENSION: ICD-10-CM

## 2018-10-29 DIAGNOSIS — E78.2 MIXED HYPERLIPIDEMIA: ICD-10-CM

## 2018-10-29 DIAGNOSIS — G47.33 OBSTRUCTIVE SLEEP APNEA SYNDROME: ICD-10-CM

## 2018-10-29 DIAGNOSIS — I25.119 CORONARY ARTERY DISEASE INVOLVING NATIVE CORONARY ARTERY OF NATIVE HEART WITH ANGINA PECTORIS (HCC): ICD-10-CM

## 2018-10-29 DIAGNOSIS — R06.02 SHORTNESS OF BREATH: ICD-10-CM

## 2018-10-29 DIAGNOSIS — I50.33 ACUTE ON CHRONIC DIASTOLIC HEART FAILURE (HCC): Primary | ICD-10-CM

## 2018-10-29 DIAGNOSIS — R05.9 COUGH: ICD-10-CM

## 2018-10-29 DIAGNOSIS — I48.19 PERSISTENT ATRIAL FIBRILLATION (HCC): ICD-10-CM

## 2018-10-29 DIAGNOSIS — J40 BRONCHITIS: Primary | ICD-10-CM

## 2018-10-29 DIAGNOSIS — I48.0 PAROXYSMAL ATRIAL FIBRILLATION (HCC): Primary | ICD-10-CM

## 2018-10-29 DIAGNOSIS — I34.0 MITRAL VALVE INSUFFICIENCY, UNSPECIFIED ETIOLOGY: ICD-10-CM

## 2018-10-29 DIAGNOSIS — R06.02 SHORTNESS OF BREATH: Primary | ICD-10-CM

## 2018-10-29 LAB
INR PPP: 3.46 (ref 0.9–1.1)
INR PPP: 5 (ref 0.91–1.09)
INR PPP: 5.1 (ref 0.91–1.09)
PROTHROMBIN TIME: 34.3 SECONDS (ref 11.7–14.2)
PROTHROMBIN TIME: 59.5 SECONDS (ref 10–13.8)
PROTHROMBIN TIME: 61.7 SECONDS (ref 10–13.8)

## 2018-10-29 PROCEDURE — 99212 OFFICE O/P EST SF 10 MIN: CPT | Performed by: INTERNAL MEDICINE

## 2018-10-29 PROCEDURE — 85610 PROTHROMBIN TIME: CPT

## 2018-10-29 PROCEDURE — 93000 ELECTROCARDIOGRAM COMPLETE: CPT | Performed by: NURSE PRACTITIONER

## 2018-10-29 PROCEDURE — 36415 COLL VENOUS BLD VENIPUNCTURE: CPT

## 2018-10-29 PROCEDURE — G0463 HOSPITAL OUTPT CLINIC VISIT: HCPCS

## 2018-10-29 PROCEDURE — 96372 THER/PROPH/DIAG INJ SC/IM: CPT | Performed by: INTERNAL MEDICINE

## 2018-10-29 PROCEDURE — 36416 COLLJ CAPILLARY BLOOD SPEC: CPT

## 2018-10-29 PROCEDURE — 99214 OFFICE O/P EST MOD 30 MIN: CPT | Performed by: NURSE PRACTITIONER

## 2018-10-29 RX ORDER — DOXYCYCLINE HYCLATE 100 MG
100 TABLET ORAL 2 TIMES DAILY
Qty: 20 TABLET | Refills: 0 | Status: SHIPPED | OUTPATIENT
Start: 2018-10-29 | End: 2018-11-20

## 2018-10-29 RX ORDER — METHYLPREDNISOLONE ACETATE 80 MG/ML
80 INJECTION, SUSPENSION INTRA-ARTICULAR; INTRALESIONAL; INTRAMUSCULAR; SOFT TISSUE ONCE
Status: COMPLETED | OUTPATIENT
Start: 2018-10-29 | End: 2018-10-29

## 2018-10-29 RX ORDER — TRIAMCINOLONE ACETONIDE 40 MG/ML
40 INJECTION, SUSPENSION INTRA-ARTICULAR; INTRAMUSCULAR ONCE
Status: COMPLETED | OUTPATIENT
Start: 2018-10-29 | End: 2018-10-29

## 2018-10-29 RX ADMIN — METHYLPREDNISOLONE ACETATE 80 MG: 80 INJECTION, SUSPENSION INTRA-ARTICULAR; INTRALESIONAL; INTRAMUSCULAR; SOFT TISSUE at 17:35

## 2018-10-29 RX ADMIN — TRIAMCINOLONE ACETONIDE 40 MG: 40 INJECTION, SUSPENSION INTRA-ARTICULAR; INTRAMUSCULAR at 17:35

## 2018-10-29 NOTE — PATIENT INSTRUCTIONS
Labe verification of INR= 3.46.  Called pt to decrease dose to 7.5mg on Sat only, 5mg all other days and recheck in next 1-2 weeks.

## 2018-10-29 NOTE — PROGRESS NOTES
Date of Office Visit: 10/29/2018  Encounter Provider: Christine Coe, FABIAN, APRN  Place of Service: [facility] CARDIOLOGY  Patient Name: Lana Yañez  :1945      Subjective:     Chief Complaint:  ER follow-up, acute on chronic CHF, shortness of breath, chest pain.    History of Present Illness:  Lana Yañez is a 73 y.o. female patient of Dr. Dueñas.  This is my first time seeing this patient in the office and I reviewed her records.     Patient has a history of coronary artery disease, atrial fibrillation, embolic stroke, hyperlipidemia, rheumatoid arthritis, obstructive sleep apnea, diastolic heart failure.    Per previous office note, in early October patient was found to be in atrial fibrillation with rapid ventricular rates and mild diastolic heart failure.  She was placed on IV heparin and Pradaxa.  Echocardiogram revealed normal systolic function mild left ventricular hypertrophy, moderate atrial enlargement with aortic valve sclerosis and moderate pulmonary hypertension and moderate tricuspid regurgitation.  The RV systolic pressure was 54 mmHg.  She had a stress perfusion study that was negative for ischemia and a CT angiogram revealed a mildly dilated aorta without pulmonary emboli.  She then presented several days later with an acute stroke.  CLARIBEL was not pursued as it was felt to be embolic in nature.  However on  she was diaphoretic and was found to have a non-ST elevation myocardial infarction.  This was on full dose Pradaxa which had not been held.  CLARIBEL was pursued that revealed normal systolic function with moderate mitral valve prolapse without significant regurgitation.  There was right-sided spontaneous echo contrast without thrombus formation.  Cardiac catheterization revealed normal systolic function with 2+ mitral regurgitation, and 90% stenosis was noted distally which is felt to be too small to be amenable PCI.  She was treated medically  and switched to Coumadin.      Patient was seen in 12/2017 by ANGELA Lovelace. This occurred in the setting of dietary indiscretions with salt. She was also dyspneic at the time. Bumex and IV was given in the office and Lasix was increased. Low-salt diet was again recommended. She also had a 24-hour Holter that revealed persistent atrial fibrillation with reasonable rate control and frequent PVCs. No other arrhythmia was present.     She then was treated more recently for atypical chest pain and cough with pneumonia. She also noted to have a nodule in the left lung.     Patient was last seen in office by Dr. Dueñas 7/19/18.  At this point it was noted that approximately 2 weeks prior patient was having worsening edema and Lasix dose was increased.  She reported occasional palpitations with increased activity.  She had dyspnea on exertion that she felt was stable and unchanged.  She was trying to participate in cardiac rehabilitation.  She denied any back pain or syncope.  Blood pressure checked at rehabilitation was about similar as it was in office that day, ~150/80.  Heart rate at times had been elevated.  Rapid heart rates were suspected to be contributing to worsening diastolic heart failure.  Patient was started back on low-dose of digoxin.  24-hour Holter monitor was ordered.  Patient was encouraged to continue cardiac rehabilitation.     Patient had Holter monitor study done 9/13/18 showing the predominant rhythm during the testing period to be atrial fibrillation.  Premature ventricular contractions occurred frequently.  There were no episodes of ventricular tachycardia.  No AV block noted.  Average heart rate was 86 bpm.  Patient was instructed to avoid caffeine and alcohol and all over-the-counter cold and sinus medication/stimulants.  She was asked to come in to check a BMP, magnesium, and to notify office of blood pressure readings so it could be determined whether carvedilol should be  increased.    Patient presented to [facility] emergency department 10/20/18 for the evaluation of atypical chest pain.  Patient presented complaining with a mid sternal squeezing sensation that had begun the day before.  Patient reported pain was worse with deep breath and mild exertion and radiates up to the base of the neck.  Patient reported a dry cough since the day before, shortness of breath, and diaphoresis, but denied nausea, vomiting, fever, or worsening lower extremity edema.  Patient reported history of PE and MI.  She was reported to be on Coumadin for atrial fibrillation and to take aspirin 81 mg daily.  ECG in ER showed atrial fibrillation with controlled ventricular rate of 88 bpm.  Patient had an elevated d-dimer but reported a history of itching with contrast.  A VQ scan was ordered instead of a CTA chest.  It was a low probability VQ scan.  Troponin was negative.  Patient's chest pain was relieved after one nitroglycerin.  Dr. Carter, with cardiology, was consulted and it was felt that if patient second troponin was negative that she could be discharged home with a prescription for Lasix and follow-up with office outpatient.  Second troponin was negative and patient was sent home with instructions to take an extra 40 mg of Lasix for the next 3 days.  Patient was instructed to return to ER if any new, recurrent, or worsening symptoms were to occur before cardiology office follow-up appointment.    Patient presents to office today for ER follow-up appointment.  Patient is still having some shortness of breath, coughing, and left chest pressure.  Patient is not the best health historian.  She reports that her lower extremity edema has improved since ER discharge.  She continues to have fatigue.  She continues to have intermittent coughing, though she reports it is improving.  She has an appointment with Dr. Ramirez this afternoon for further evaluation of coughing.  We will go ahead and repeat a  chest x-ray to compare to previous.  She reports that shortness of breath has improved somewhat since discharge.  She has a left chest pressure that occurs at rest and with activities and is worse with coughing.  She denies any syncope, near syncope, falls, abnormal bleeding.  She reports that she still has some shortness of breath when she lies flat at night.  She uses her CPAP machine every night, but not all night every night.  Patient encouraged to try to use her CPAP machine all night every night.  At this point we will also have her take her Lasix 40 mg twice a day for another 3 days, and then go back to taking it once a day as she normally does.  Patient to take potassium with each Lasix dose, as instructed.  We will recheck a BMP and BNP today.  Patient reports blood pressure outside the office has been staying 120s over 70s, and heart rate recently has been staying in the 90s.  Patient reports that she saw Dr. Hill with pulmonology last week after the ER visit, and no additional imaging was ordered.    Additional testing today.  Schedule follow-up appointment in 3 months with Dr. Dueñas & 1 month with ANGELA but come back sooner if needed for any new or worsening symptoms or other problems/concerns.        Past Medical History:   Diagnosis Date   • Acute on chronic diastolic heart failure (CMS/HCC)    • Arthritis    • Asthma    • Atrial fibrillation (CMS/HCC)     Persistent; on warfarin   • Atypical chest pain    • Bronchitis    • Cellulitis of right elbow     due to MRSA   • CHF (congestive heart failure) (CMS/HCC)    • COPD (chronic obstructive pulmonary disease) (CMS/HCC)    • Coronary artery disease     Cardiac catheterization completed; 90% PDA stenosis with medical management recommended   • Coronary artery disease involving native coronary artery of native heart with angina pectoris with documented spasm (CMS/HCC)    • Disease of thyroid gland    • Displacement of lumbar intervertebral disc without  myelopathy    • Dizziness    • ANTOINE (dyspnea on exertion)    • Essential hypertension    • Fatigue    • Generalized osteoarthritis of multiple sites    • History of rheumatic fever    • History of transfusion    • Hx of bone density study     10/23/2014   • Hyperglycemia    • Hyperlipidemia    • Hypovitaminosis D    • Left arm pain    • Left-sided weakness    • Leg swelling    • Low back pain    • Lower extremity edema    • Malaise and fatigue    • Mitral valve disease     Moderate mitral valve prolapse and moderate mitral regurgitation   • Mitral valve insufficiency    • Morbid obesity with BMI of 40.0-44.9, adult (CMS/[facility])    • MRSA infection    • NSTEMI (non-ST elevated myocardial infarction) (CMS/[facility])    • PAF (paroxysmal atrial fibrillation) (CMS/[facility])    • RA (rheumatoid arthritis) (CMS/[facility])     involving both hands   • Sleep apnea    • SOB (shortness of breath)    • Stroke (CMS/[facility])     left side weakness   • Urge incontinence of urine    • Vitamin D deficiency      Past Surgical History:   Procedure Laterality Date   • BREAST BIOPSY     • BREAST SURGERY      right side lumpectomy with biopsy   • CARDIAC CATHETERIZATION Left 10/20/2017    Procedure: Cardiac Catheterization/Vascular Study;  Surgeon: Alphonso Olmedo MD;  Location: [facility] INVASIVE LOCATION;  Service:    • CARDIAC CATHETERIZATION N/A 10/20/2017    +2 mitral regurgitation, left main 10% stenosis, mid to distal LAD 10% diffuse stenosis, circumflex 10% diffuse stenosis, RCA 10% proximal stenosis, and distal PDA consistent with coronary embolus with a lesion of 90% too small to consider coronary intervention; medical management recommended   • EYE SURGERY      laser surgery for glaucoma and left eye cataracts removed   • HYSTERECTOMY      10+ years ago   • JOINT REPLACEMENT  2005; 2006    bilateral knees and left rotater   • KNEE SURGERY     • MAMMO BILATERAL  2016    [facility]      Outpatient Medications Prior to Visit   Medication Sig  Dispense Refill   • albuterol (PROVENTIL HFA;VENTOLIN HFA) 108 (90 Base) MCG/ACT inhaler Inhale 2 puffs.     • aspirin 81 MG EC tablet Take 1 tablet by mouth Daily.     • atorvastatin (LIPITOR) 10 MG tablet Take 1 tablet by mouth Daily. 30 tablet 11   • carvedilol (COREG) 25 MG tablet Take 1.5 tablets by mouth 2 (Two) Times a Day With Meals. 135 tablet 1   • cetirizine (ZyrTEC) 10 MG tablet Take 10 mg by mouth Daily As Needed.     • digoxin (LANOXIN) 125 MCG tablet Take 1 tablet by mouth Every Other Day. 30 tablet 6   • furosemide (LASIX) 40 MG tablet Take 40 mg by mouth Daily.     • HYDROcodone-acetaminophen (NORCO) 5-325 MG per tablet Take 1 tablet po 2-3 times a day prn pain 75 tablet 0   • ipratropium-albuterol (DUO-NEB) 0.5-2.5 mg/mL nebulizer As Needed.     • potassium chloride (MICRO-K) 10 MEQ CR capsule TAKE TWO CAPSULES BY MOUTH ONCE DAILY FOR 30 DAYS (Patient taking differently: take 1 po twice daily) 60 capsule 0   • vitamin B-12 (VITAMIN B-12) 1000 MCG tablet Take 1 tablet by mouth Daily.     • vitamin D (ERGOCALCIFEROL) 82564 units capsule capsule TAKE 1 CAPSULE BY MOUTH ONCE A WEEK 12 capsule 0   • warfarin (COUMADIN) 5 MG tablet Take one tablet by mouth at 6 pm or as directed by Dr Dueñas 45 tablet 1   • Zolpidem Tartrate (AMBIEN PO) Take  by mouth As Needed.     • potassium chloride (K-DUR) 10 MEQ CR tablet TAKE FOUR TABLETS BY MOUTH ONCE DAILY 360 tablet 1     No facility-administered medications prior to visit.        Allergies as of 10/29/2018 - Reviewed 10/29/2018   Allergen Reaction Noted   • Contrast dye Hives and Itching 01/04/2018     Social History     Social History   • Marital status:      Spouse name: Suresh   • Number of children: 5   • Years of education: 13     Occupational History   •  for [employer] Retired     Social History Main Topics   • Smoking status: Former Smoker     Packs/day: 3.00     Types: Cigarettes     Start date: 5/19/1967     Quit date:  "10/19/1968   • Smokeless tobacco: Never Used   • Alcohol use No      Comment: No caffeine use   • Drug use: No   • Sexual activity: Defer     Other Topics Concern   • Not on file     Social History Narrative   • No narrative on file     Family History   Problem Relation Age of Onset   • Colon cancer Mother    • Glaucoma Mother    • Stroke Mother    • Arthritis Mother    • Hypertension Mother    • Glaucoma Sister    • Diabetes Sister    • Heart disease Sister    • Arthritis Sister    • Asthma Sister    • Hypertension Sister    • Miscarriages / Stillbirths Sister    • Lung disease Sister    • Heart disease Brother    • Diabetes Brother    • Arthritis Brother    • Drug abuse Brother    • Hypertension Brother    • Arthritis Father    • COPD Father    • Lung disease Father    • Arthritis Daughter    • Depression Daughter    • Alcohol abuse Maternal Uncle    • Heart disease Sister    • Heart disease Sister    • Heart disease Brother        Review of Systems   Constitution: Positive for malaise/fatigue.   Eyes: Negative for redness.   Cardiovascular: Positive for dyspnea on exertion, leg swelling (Improved) and palpitations. Negative for syncope.   Respiratory: Positive for cough and wheezing.         No acute distress.   No wheezing in office today.    Hematologic/Lymphatic: Negative for bleeding problem.   Skin: Negative for rash.   Musculoskeletal: Negative for falls.   Gastrointestinal: Negative for jaundice.   Neurological: Negative for seizures.   Psychiatric/Behavioral: Negative for altered mental status.          Objective:     Vitals:    10/29/18 1445   BP: 126/72   BP Location: Left arm   Cuff Size: Large Adult   Pulse: 99   Weight: 108 kg (239 lb)   Height: 165.1 cm (65\")     Body mass index is 39.77 kg/m².      PHYSICAL EXAM:  Physical Exam   Constitutional: She is oriented to person, place, and time. She appears well-developed and well-nourished. No distress.   Obese.    HENT:   Head: Normocephalic and " atraumatic.   Eyes: Pupils are equal, round, and reactive to light. No scleral icterus.   Neck: Neck supple. No JVD present. Carotid bruit is not present. No tracheal deviation present.   Cardiovascular: Normal rate, normal heart sounds and intact distal pulses.  An irregularly irregular rhythm present. Exam reveals no gallop and no friction rub.    No murmur heard.  Pulses:       Radial pulses are 2+ on the right side, and 2+ on the left side.        Posterior tibial pulses are 2+ on the right side, and 2+ on the left side.   Pulmonary/Chest: Effort normal and breath sounds normal. No respiratory distress. She has no wheezes. She has no rales.   Abdominal: Soft. Bowel sounds are normal. She exhibits no distension. There is no tenderness. There is no rebound and no guarding.   Musculoskeletal: She exhibits edema (Trace, bilateral ankles). She exhibits no tenderness or deformity.   Neurological: She is alert and oriented to person, place, and time.   Skin: Skin is warm and dry. No rash noted. She is not diaphoretic. No erythema.   Psychiatric: She has a normal mood and affect. Her behavior is normal. Judgment normal.           ECG 12 Lead  Date/Time: 10/29/2018 3:51 PM  Performed by: VIVIAN GARCIA  Authorized by: VIVIAN GARCIA   Comparison: compared with previous ECG from 7/19/2018  Similar to previous ECG  Rhythm: atrial fibrillation  Rate: normal  BPM: 99  Conduction: LAFB  Other findings: PRWP  Other findings comments: nonspecific st-t wave changes  Clinical impression: abnormal ECG            Assessment:       Diagnosis Plan   1. Acute on chronic diastolic heart failure (CMS/HCC)     2. Persistent atrial fibrillation (CMS/HCC)     3. Coronary artery disease involving native coronary artery of native heart with angina pectoris (CMS/HCC)     4. Essential hypertension     5. Mixed hyperlipidemia     6. Obstructive sleep apnea syndrome     7. Mitral valve insufficiency, unspecified etiology  Adult  Transthoracic Echo Complete W/ Cont if Necessary Per Protocol   8. Shortness of breath  Adult Transthoracic Echo Complete W/ Cont if Necessary Per Protocol         Plan:     1. Chronic diastolic heart failure: Patient took 40 mg of Lasix twice a day instead of once daily for the 3 days following ER discharge, per ER discharge instructions.  She reports that lower extremity edema has improved.  Shortness of breath has improved somewhat but is still present, along with cough.  At this point we will recheck a BMP, BNP, and chest x-ray.  Patient reports she saw Dr. Hill last week after the ER visit and no additional chest imaging was ordered.  She has an appointment with Dr. Ramirez this afternoon for further evaluation of coughing.   Patient does NOT appear fluid overloaded in office today.  No acute distress.  2. Persistent atrial fibrillation: Patient reports heart rate recently has been running around the 90s.  She is currently taking digoxin every other day.  Digoxin level was within normal limits 10/20/18.  3. Coronary artery disease: Patient had heart catheter done 10/2017 showing a blockage of a very small area of artery that was not thought to be easily stented.  Medical management was recommended.  4. Hypertension: Blood pressure in office today stable at 126/72 mmHg.  Patient reports blood pressure outside the office has been staying well controlled at 120s over 70s.  5. Hyperlipidemia: Managed by outside provider.  Patient on atorvastatin 10 mg nightly.  6. Sleep apnea: Patient on CPAP machine.  She reports she uses CPAP machine every night, but not all night every night.  Patient instructed to try to use CPAP machine all night every night.  She will follow-up with sleep medicine as instructed.      Plan of care reviewed with Dr. Spenser MD.  At this point we will also schedule repeat echo for follow-up on hx mitral regurgitation.    Repeat BMP, BNP, and chest x-ray today at outpatient lab and  radiology.    Follow-up with Dr. Dueñas in 3 months or follow-up sooner if needed for any new or worsening symptoms or other problems/concerns.             Your medication list          Accurate as of 10/29/18  4:08 PM. If you have any questions, ask your nurse or doctor.               CHANGE how you take these medications      Instructions Last Dose Given Next Dose Due   potassium chloride 10 MEQ CR capsule  Commonly known as:  MICRO-K  What changed:  · See the new instructions.  · Another medication with the same name was removed. Continue taking this medication, and follow the directions you see here.      TAKE TWO CAPSULES BY MOUTH ONCE DAILY FOR 30 DAYS          CONTINUE taking these medications      Instructions Last Dose Given Next Dose Due   albuterol 108 (90 Base) MCG/ACT inhaler  Commonly known as:  PROVENTIL HFA;VENTOLIN HFA      Inhale 2 puffs.       AMBIEN PO      Take  by mouth As Needed.       aspirin 81 MG EC tablet      Take 1 tablet by mouth Daily.       atorvastatin 10 MG tablet  Commonly known as:  LIPITOR      Take 1 tablet by mouth Daily.       carvedilol 25 MG tablet  Commonly known as:  COREG      Take 1.5 tablets by mouth 2 (Two) Times a Day With Meals.       cetirizine 10 MG tablet  Commonly known as:  zyrTEC      Take 10 mg by mouth Daily As Needed.       cyanocobalamin 1000 MCG tablet  Commonly known as:  VITAMIN B-12      Take 1 tablet by mouth Daily.       digoxin 125 MCG tablet  Commonly known as:  LANOXIN      Take 1 tablet by mouth Every Other Day.       furosemide 40 MG tablet  Commonly known as:  LASIX      Take 40 mg by mouth Daily.       HYDROcodone-acetaminophen 5-325 MG per tablet  Commonly known as:  NORCO      Take 1 tablet po 2-3 times a day prn pain       ipratropium-albuterol 0.5-2.5 mg/3 ml nebulizer  Commonly known as:  DUO-NEB      As Needed.       vitamin D 05340 units capsule capsule  Commonly known as:  ERGOCALCIFEROL      TAKE 1 CAPSULE BY MOUTH ONCE A WEEK        warfarin 5 MG tablet  Commonly known as:  COUMADIN      Take one tablet by mouth at 6 pm or as directed by Dr Dueñas          I did not stop or change the above medications.  Patient's medication list was updated to reflect medications she is currently taking including medication changes made by other providers.             Christine Coe, FABIAN, APRN  10/29/2018       Dictated utilizing Dragon dictation

## 2018-10-29 NOTE — PROGRESS NOTES
Anticoagulation Clinic Progress Note    Anticoagulation Summary  As of 10/29/2018    INR goal:   2.0-3.0   TTR:   50.6 % (2.1 wk)   Today's INR:   3.46!   Warfarin maintenance plan:   7.5 mg on Sat; 5 mg all other days   Weekly warfarin total:   37.5 mg   Plan last modified:   Nano Cool RPH (10/29/2018)   Next INR check:   11/5/2018   Priority:   High   Target end date:   Indefinite    Indications    Atrial fibrillation (CMS/HCC) [I48.91]             Anticoagulation Episode Summary     INR check location:       Preferred lab:       Send INR reminders to:    [facility]    Comments:         Anticoagulation Care Providers     Provider Role Specialty Phone number    Pia Dueñas MD Referring Cardiology [phone]    Kerry Teague [facility] Responsible [facility]           Drug interactions: has remained unchanged.  Diet: has remained unchanged.    Clinic Interview:  Patient Findings     Positives:   Change in medications    Negatives:   Signs/symptoms of thrombosis, Signs/symptoms of bleeding,   Laboratory test error suspected, Change in health, Change in alcohol use,   Change in activity, Upcoming invasive procedure, Emergency department   visit, Upcoming dental procedure, Missed doses, Extra doses, Change in   diet/appetite, Hospital admission, Bruising, Other complaints    Comments:   Completed 10 days of Nitrofurantoin about a week ago for UTI.        Clinical Outcomes     Negatives:   Major bleeding event, Thromboembolic event,   Anticoagulation-related hospital admission, Anticoagulation-related ED   visit, Anticoagulation-related fatality    Comments:   Completed 10 days of Nitrofurantoin about a week ago for UTI.          INR History:  Anticoagulation Monitoring 10/4/2018 10/5/2018 10/19/2018 10/29/2018   INR 1.8 - 2.8 3.46   INR Date 10/4/2018 - 10/19/2018 10/29/2018   INR Goal 2.0-3.0 2.0-3.0 2.0-3.0 2.0-3.0   Trend - - Same Down   Last Week Total 0 mg - 40 mg 40 mg   Next Week Total 40 mg - 40  mg 37.5 mg   Sun 5 mg - 5 mg 5 mg   Mon 5 mg - 5 mg 5 mg   Tue 5 mg - 5 mg 5 mg   Wed 7.5 mg - 7.5 mg 5 mg   Thu 5 mg - 5 mg 5 mg   Fri 5 mg - 5 mg 5 mg   Sat 7.5 mg - 7.5 mg 7.5 mg   Visit Report - - - -   Some recent data might be hidden       Previous INR data from [facility] is recorded in [facility] and scanned into the patient's chart in [facility]. These results have been analyzed and reviewed.      Plan:  1. INR is Supratherapeutic today- see above in Anticoagulation Summary.  Will instruct Lana Coles to Change their warfarin regimen- see above in Anticoagulation Summary.  2. Follow up in 1 week  3. Patient declines warfarin refills.  4. Verbal and written information provided. Patient expresses understanding and has no further questions at this time.    Nano Cool [facility]

## 2018-10-30 ENCOUNTER — TELEPHONE (OUTPATIENT)
Dept: CARDIOLOGY | Facility: CLINIC | Age: 73
End: 2018-10-30

## 2018-10-30 NOTE — TELEPHONE ENCOUNTER
Thank you    ----- Message from Christine Coe, FABIAN, APRN sent at 10/29/2018  4:12 PM EDT -----  Patient needs a  3-4 week follow-up appointment with me, and a 3-4 month follow-up appointment with Dr. Dueñas.

## 2018-11-08 RX ORDER — DOXYCYCLINE HYCLATE 100 MG
TABLET ORAL
Qty: 20 TABLET | Refills: 0 | OUTPATIENT
Start: 2018-11-08

## 2018-11-09 ENCOUNTER — ANTICOAGULATION VISIT (OUTPATIENT)
Dept: PHARMACY | Facility: HOSPITAL | Age: 73
End: 2018-11-09

## 2018-11-09 LAB
INR PPP: 2 (ref 0.91–1.09)
PROTHROMBIN TIME: 24.1 SECONDS (ref 10–13.8)

## 2018-11-09 PROCEDURE — 36416 COLLJ CAPILLARY BLOOD SPEC: CPT

## 2018-11-09 PROCEDURE — 85610 PROTHROMBIN TIME: CPT

## 2018-11-09 NOTE — PROGRESS NOTES
Anticoagulation Clinic Progress Note    Anticoagulation Summary  As of 11/9/2018    INR goal:   2.0-3.0   TTR:   43.0 % (3.7 wk)   Today's INR:   2.0   Warfarin maintenance plan:   7.5 mg on Sat; 5 mg all other days   Weekly warfarin total:   37.5 mg   Plan last modified:   [facility] (10/29/2018)   Next INR check:   11/28/2018   Priority:   High   Target end date:   Indefinite    Indications    Atrial fibrillation (CMS/HCC) [I48.91]             Anticoagulation Episode Summary     INR check location:       Preferred lab:       Send INR reminders to:    [facility]    Comments:         Anticoagulation Care Providers     Provider Role Specialty Phone number    Pia Dueñas MD Referring Cardiology [phone]    Kerry Teague [facility] [phone]          Drug interactions: has been on doxycyline, last dose today.   Diet: has remained unchanged.    Clinic Interview:  Patient Findings     Positives:   Change in medications    Negatives:   Signs/symptoms of thrombosis, Signs/symptoms of bleeding,   Laboratory test error suspected, Change in health, Change in alcohol use,   Change in activity, Upcoming invasive procedure, Emergency department   visit, Upcoming dental procedure, Missed doses, Extra doses, Change in   diet/appetite, Hospital admission, Bruising, Other complaints    Comments:   Has been on Doxycycline since 10/29. Received steroid shot on   10/29      Clinical Outcomes     Negatives:   Major bleeding event, Thromboembolic event,   Anticoagulation-related hospital admission, Anticoagulation-related ED   visit, Anticoagulation-related fatality    Comments:   Has been on Doxycycline since 10/29. Received steroid shot on 10/29        INR History:  Anticoagulation Monitoring 10/19/2018 10/29/2018 11/9/2018   INR 2.8 3.46 2.0   INR Date 10/19/2018 10/29/2018 11/9/2018   INR Goal 2.0-3.0 2.0-3.0 2.0-3.0   Trend Same Down Same   Last Week Total 40 mg 40 mg 37.5 mg   Next Week  Total 40 mg 37.5 mg 37.5 mg   Sun 5 mg 5 mg 5 mg   Mon 5 mg 5 mg 5 mg   Tue 5 mg 5 mg 5 mg   Wed 7.5 mg 5 mg 5 mg   Thu 5 mg 5 mg 5 mg   Fri 5 mg 5 mg 5 mg   Sat 7.5 mg 7.5 mg 7.5 mg   Visit Report - - -   Some recent data might be hidden       Previous INR data from [facility] is recorded in [facility] and scanned into the patient's chart in [facility]. These results have been analyzed and reviewed.      Plan:  1. INR is Therapeutic today- see above in Anticoagulation Summary.  Will instruct Lana Aguerocomb to Continue their warfarin regimen- see above in Anticoagulation Summary.   2. Follow up on 11/28  3. Patient declines warfarin refills.  4. Verbal and written information provided. Patient expresses understanding and has no further questions at this time.    Kassi Le [facility]

## 2018-11-12 RX ORDER — ATORVASTATIN CALCIUM 10 MG/1
10 TABLET, FILM COATED ORAL DAILY
Qty: 30 TABLET | Refills: 2 | Status: SHIPPED | OUTPATIENT
Start: 2018-11-12 | End: 2019-03-23 | Stop reason: SDUPTHER

## 2018-11-14 NOTE — PROGRESS NOTES
Karl Yañez is a 73 y.o. female.   She is here today for bronchitis which is not stable  History of Present Illness   She is here today for bronchitis which is not getting any better  The following portions of the patient's history were reviewed and updated as appropriate: allergies, current medications, past family history, past medical history, past social history, past surgical history and problem list.    Review of Systems   Respiratory: Positive for cough and wheezing.    All other systems reviewed and are negative.      Objective   Physical Exam   Constitutional: She is oriented to person, place, and time. She appears well-developed and well-nourished. She is cooperative.   HENT:   Head: Normocephalic and atraumatic.   Right Ear: Hearing, tympanic membrane, external ear and ear canal normal.   Left Ear: Hearing, tympanic membrane, external ear and ear canal normal.   Nose: Nose normal.   Mouth/Throat: Uvula is midline, oropharynx is clear and moist and mucous membranes are normal.   Eyes: Conjunctivae, EOM and lids are normal. Pupils are equal, round, and reactive to light.   Neck: Phonation normal. Neck supple. Carotid bruit is not present.   Cardiovascular: Normal rate, regular rhythm and normal heart sounds. Exam reveals no gallop and no friction rub.   No murmur heard.  Pulmonary/Chest: Effort normal and breath sounds normal. No respiratory distress. Wheezes: bilateral rhonchi.   Abdominal: Soft. Bowel sounds are normal. She exhibits no distension and no mass. There is no hepatosplenomegaly. There is no tenderness. There is no rebound and no guarding. No hernia.   Musculoskeletal: She exhibits no edema.   Neurological: She is alert and oriented to person, place, and time. Coordination and gait normal.   Skin: Skin is warm and dry.   Psychiatric: She has a normal mood and affect. Her speech is normal and behavior is normal. Judgment and thought content normal.   Nursing note and vitals  reviewed.      Assessment/Plan   Diagnoses and all orders for this visit:    Bronchitis  -     methylPREDNISolone acetate (DEPO-medrol) injection 80 mg; Inject 1 mL into the appropriate muscle as directed by prescriber 1 (One) Time.  -     triamcinolone acetonide (KENALOG-40) injection 40 mg; Inject 1 mL into the appropriate muscle as directed by prescriber 1 (One) Time.    Other orders  -     doxycycline (VIBRAMYICN) 100 MG tablet; Take 1 tablet by mouth 2 (Two) Times a Day.      Bronchitis we will provide steroid injections and doxycycline

## 2018-11-16 RX ORDER — CARVEDILOL 25 MG/1
TABLET ORAL
Qty: 270 TABLET | Refills: 1 | Status: SHIPPED | OUTPATIENT
Start: 2018-11-16 | End: 2019-05-14 | Stop reason: SDUPTHER

## 2018-11-20 ENCOUNTER — LAB (OUTPATIENT)
Dept: LAB | Facility: HOSPITAL | Age: 73
End: 2018-11-20

## 2018-11-20 ENCOUNTER — HOSPITAL ENCOUNTER (OUTPATIENT)
Dept: CARDIOLOGY | Facility: HOSPITAL | Age: 73
Discharge: HOME | End: 2018-11-20
Admitting: NURSE PRACTITIONER

## 2018-11-20 ENCOUNTER — OFFICE VISIT (OUTPATIENT)
Dept: CARDIOLOGY | Facility: CLINIC | Age: 73
End: 2018-11-20

## 2018-11-20 VITALS
HEART RATE: 80 BPM | SYSTOLIC BLOOD PRESSURE: 110 MMHG | WEIGHT: 242 LBS | DIASTOLIC BLOOD PRESSURE: 70 MMHG | BODY MASS INDEX: 40.32 KG/M2 | HEIGHT: 65 IN

## 2018-11-20 VITALS
HEART RATE: 87 BPM | OXYGEN SATURATION: 98 % | HEIGHT: 65 IN | SYSTOLIC BLOOD PRESSURE: 140 MMHG | DIASTOLIC BLOOD PRESSURE: 80 MMHG | WEIGHT: 239 LBS | BODY MASS INDEX: 39.82 KG/M2

## 2018-11-20 DIAGNOSIS — I34.0 MITRAL VALVE INSUFFICIENCY, UNSPECIFIED ETIOLOGY: ICD-10-CM

## 2018-11-20 DIAGNOSIS — R06.09 DOE (DYSPNEA ON EXERTION): ICD-10-CM

## 2018-11-20 DIAGNOSIS — R06.02 SHORTNESS OF BREATH: ICD-10-CM

## 2018-11-20 DIAGNOSIS — I48.19 PERSISTENT ATRIAL FIBRILLATION (HCC): ICD-10-CM

## 2018-11-20 DIAGNOSIS — I10 ESSENTIAL HYPERTENSION: ICD-10-CM

## 2018-11-20 DIAGNOSIS — E78.2 MIXED HYPERLIPIDEMIA: ICD-10-CM

## 2018-11-20 DIAGNOSIS — G47.33 OBSTRUCTIVE SLEEP APNEA SYNDROME: ICD-10-CM

## 2018-11-20 DIAGNOSIS — I25.119 CORONARY ARTERY DISEASE INVOLVING NATIVE CORONARY ARTERY OF NATIVE HEART WITH ANGINA PECTORIS (HCC): Primary | ICD-10-CM

## 2018-11-20 LAB
ANION GAP SERPL CALCULATED.3IONS-SCNC: 9.4 MMOL/L
BUN BLD-MCNC: 14 MG/DL (ref 8–23)
BUN/CREAT SERPL: 15.7 (ref 7–25)
CALCIUM SPEC-SCNC: 9.8 MG/DL (ref 8.6–10.5)
CHLORIDE SERPL-SCNC: 102 MMOL/L (ref 98–107)
CO2 SERPL-SCNC: 28.6 MMOL/L (ref 22–29)
CREAT BLD-MCNC: 0.89 MG/DL (ref 0.57–1)
GFR SERPL CREATININE-BSD FRML MDRD: 76 ML/MIN/1.73
GLUCOSE BLD-MCNC: 156 MG/DL (ref 65–99)
NT-PROBNP SERPL-MCNC: 1009 PG/ML (ref 0–900)
POTASSIUM BLD-SCNC: 4.7 MMOL/L (ref 3.5–5.2)
SODIUM BLD-SCNC: 140 MMOL/L (ref 136–145)

## 2018-11-20 PROCEDURE — 93306 TTE W/DOPPLER COMPLETE: CPT | Performed by: INTERNAL MEDICINE

## 2018-11-20 PROCEDURE — 99214 OFFICE O/P EST MOD 30 MIN: CPT | Performed by: NURSE PRACTITIONER

## 2018-11-20 PROCEDURE — 25010000002 PERFLUTREN (DEFINITY) 8.476 MG IN SODIUM CHLORIDE 0.9 % 10 ML INJECTION: Performed by: NURSE PRACTITIONER

## 2018-11-20 PROCEDURE — 83880 ASSAY OF NATRIURETIC PEPTIDE: CPT

## 2018-11-20 PROCEDURE — 93000 ELECTROCARDIOGRAM COMPLETE: CPT | Performed by: NURSE PRACTITIONER

## 2018-11-20 PROCEDURE — 36415 COLL VENOUS BLD VENIPUNCTURE: CPT

## 2018-11-20 PROCEDURE — 93306 TTE W/DOPPLER COMPLETE: CPT

## 2018-11-20 PROCEDURE — 80048 BASIC METABOLIC PNL TOTAL CA: CPT

## 2018-11-20 RX ADMIN — PERFLUTREN 2 ML: 6.52 INJECTION, SUSPENSION INTRAVENOUS at 12:40

## 2018-11-20 NOTE — PROGRESS NOTES
Date of Office Visit: 2018  Encounter Provider: Christine Coe, FABIAN, APRN  Place of Service: [facility] CARDIOLOGY  Patient Name: Lana Yañez  :1945        Subjective:     Chief Complaint:  Follow-up coronary artery disease, atrial fibrillation, sleep apnea, diastolic heart failure.      History of Present Illness:  Lana Yañez is a 73 y.o. female patient of Dr. Dueñas.    Patient has a history of coronary artery disease, atrial fibrillation, embolic stroke, hyperlipidemia, rheumatoid arthritis, obstructive sleep apnea, diastolic heart failure.     Per previous office note, in early October patient was found to be in atrial fibrillation with rapid ventricular rates and mild diastolic heart failure.  She was placed on IV heparin and Pradaxa.  Echocardiogram revealed normal systolic function mild left ventricular hypertrophy, moderate atrial enlargement with aortic valve sclerosis and moderate pulmonary hypertension and moderate tricuspid regurgitation.  The RV systolic pressure was 54 mmHg.  She had a stress perfusion study that was negative for ischemia and a CT angiogram revealed a mildly dilated aorta without pulmonary emboli.  She then presented several days later with an acute stroke.  CLARIBEL was not pursued as it was felt to be embolic in nature.  However on  she was diaphoretic and was found to have a non-ST elevation myocardial infarction.  This was on full dose Pradaxa which had not been held.  CLARIBEL was pursued that revealed normal systolic function with moderate mitral valve prolapse without significant regurgitation.  There was right-sided spontaneous echo contrast without thrombus formation.  Cardiac catheterization revealed normal systolic function with 2+ mitral regurgitation, and 90% stenosis was noted distally which is felt to be too small to be amenable PCI.  She was treated medically and switched to Coumadin.       Patient was seen in  12/2017 by ANGELA Lovelace edema. This occurred in the setting of dietary indiscretions with salt. She was also dyspneic at the time. Bumex and IV was given in the office and Lasix was increased. Low-salt diet was again recommended. She also had a 24-hour Holter that revealed persistent atrial fibrillation with reasonable rate control and frequent PVCs. No other arrhythmia was present.      She then was treated more recently for atypical chest pain and cough with pneumonia. She also noted to have a nodule in the left lung.      Patient was last seen in office by Dr. Dueñas 7/19/18.  At this point it was noted that approximately 2 weeks prior patient was having worsening edema and Lasix dose was increased.  She reported occasional palpitations with increased activity.  She had dyspnea on exertion that she felt was stable and unchanged.  She was trying to participate in cardiac rehabilitation.  She denied any back pain or syncope.  Blood pressure checked at rehabilitation was about similar as it was in office that day, ~150/80.  Heart rate at times had been elevated.  Rapid heart rates were suspected to be contributing to worsening diastolic heart failure.  Patient was started back on low-dose of digoxin.  24-hour Holter monitor was ordered.  Patient was encouraged to continue cardiac rehabilitation.      Patient had Holter monitor study done 9/13/18 showing the predominant rhythm during the testing period to be atrial fibrillation.  Premature ventricular contractions occurred frequently.  There were no episodes of ventricular tachycardia.  No AV block noted.  Average heart rate was 86 bpm.  Patient was instructed to avoid caffeine and alcohol and all over-the-counter cold and sinus medication/stimulants.  She was asked to come in to check a BMP, magnesium, and to notify office of blood pressure readings so it could be determined whether carvedilol should be increased.     Patient presented to [facility] emergency  department 10/20/18 for the evaluation of atypical chest pain.  Patient presented complaining with a mid sternal squeezing sensation that had begun the day before.  Patient reported pain was worse with deep breath and mild exertion and radiates up to the base of the neck.  Patient reported a dry cough since the day before, shortness of breath, and diaphoresis, but denied nausea, vomiting, fever, or worsening lower extremity edema.  Patient reported history of PE and MI.  She was reported to be on Coumadin for atrial fibrillation and to take aspirin 81 mg daily.  ECG in ER showed atrial fibrillation with controlled ventricular rate of 88 bpm.  Patient had an elevated d-dimer but reported a history of itching with contrast.  A VQ scan was ordered instead of a CTA chest.  It was a low probability VQ scan.  Troponin was negative.  Patient's chest pain was relieved after one nitroglycerin.  Dr. Carter, with cardiology, was consulted and it was felt that if patient second troponin was negative that she could be discharged home with a prescription for Lasix and follow-up with office outpatient.  Second troponin was negative and patient was sent home with instructions to take an extra 40 mg of Lasix for the next 3 days.  Patient was instructed to return to ER if any new, recurrent, or worsening symptoms were to occur before cardiology office follow-up appointment.     Patient presented to office 10/29/18 for ER follow-up appointment.   At this point patient was still having some shortness of breath, coughing, and left chest pressure. She reported that her lower extremity edema had improved since ER discharge.  She continued to have intermittent coughing, though she reported it was improving.  She reported that her shortness of breath had improved somewhat since discharge.  She had a left chest pressure that occured at rest and with activities and was worse with coughing.  She denied any syncope, near syncope, falls, abnormal  bleeding.  She reported that she still has some shortness of breath when she lies flat at night.  At this point patient was instructed to take her Lasix 40 mg twice a day for another 3 days, and then go back to taking it once a day. Patient instructed to take potassium with each Lasix dose, as instructed. Patient reported blood pressure outside the office had been staying 120s over 70s, and heart rate had been staying in the 90s.  Patient reported that she saw Dr. Hill with pulmonology the previous week after the ER visit, and no additional imaging was ordered.    Patient presents to office today for 3 week follow-up appointment.  Patient had BMP done earlier today that showed kidney function was within normal limits, however blood sugar is elevated.  She will follow-up with primary care regarding elevated blood sugar.  Patient had proBNP done today that was still mildly elevated, however improved from previous.  Patient reports she is feeling much better since last office visit.  She reports that her shortness of breath is improved.  She looks much better in the office today.  She does have some shortness of breath with heavier exertion, however this is not new or worsening.  She feels like her symptoms are a baseline.  She reports that she did see her primary care provider since last visit and was told that she had bronchitis and was given a steroid shot and antibiotics, which she has completed, and helped greatly.  She had one episode of palpitations after the steroid shot, however none since then.  She has some mild intermittent lightheadedness with rapid position changes, such as bending over and standing up quickly.  She reports blood pressure outside the office stays around 110-120/70-80.  She will notify the office if symptoms worsen.  She denies any chest pain, shortness of breath at rest, palpitations, racing heartbeat sensation, lower extremity edema, syncope, near syncope, falls, or abnormal bleeding.   She does have some chronic fatigue.  She has a history of sleep apnea and reports that she does not use her CPAP machine all the time due to being claustrophobic.  She reports that she has the nose pill oh.  She reports she has better compliance with her CPAP machine during her daytime naps.  It was recommended that she try to increase her use of CPAP machine to every night.  We did discuss risks of uncontrolled sleep apnea, and patient verbalized understanding.  Patient to follow-up with Dr. Ramirez on elevated blood sugar.    Patient will schedule a 4 month follow-up appointment with Dr. Mota or will follow-up sooner if needed for any new, recurrent, or worsening symptoms or other problems/concerns.          Past Medical History:   Diagnosis Date   • Acute on chronic diastolic heart failure (CMS/HCC)    • Arthritis    • Asthma    • Atrial fibrillation (CMS/HCC)     Persistent; on warfarin   • Atypical chest pain    • Bronchitis    • Cellulitis of right elbow     due to MRSA   • CHF (congestive heart failure) (CMS/HCC)    • COPD (chronic obstructive pulmonary disease) (CMS/HCC)    • Coronary artery disease     Cardiac catheterization completed; 90% PDA stenosis with medical management recommended   • Coronary artery disease involving native coronary artery of native heart with angina pectoris with documented spasm (CMS/HCC)    • Disease of thyroid gland    • Displacement of lumbar intervertebral disc without myelopathy    • Dizziness    • ANTOINE (dyspnea on exertion)    • Essential hypertension    • Fatigue    • Generalized osteoarthritis of multiple sites    • History of rheumatic fever    • History of transfusion    • Hx of bone density study     10/23/2014   • Hyperglycemia    • Hyperlipidemia    • Hypovitaminosis D    • Left arm pain    • Left-sided weakness    • Leg swelling    • Low back pain    • Lower extremity edema    • Malaise and fatigue    • Mitral valve disease     Moderate mitral valve prolapse and  moderate mitral regurgitation   • Mitral valve insufficiency    • Morbid obesity with BMI of 40.0-44.9, adult (CMS/[facility], located [facility])    • MRSA infection    • NSTEMI (non-ST elevated myocardial infarction) (CMS/[facility], located within [facility])    • PAF (paroxysmal atrial fibrillation) (CMS/[facility])    • RA (rheumatoid arthritis) (CMS/[facility])     involving both hands   • Sleep apnea    • SOB (shortness of breath)    • Stroke (CMS/[facility], located [facility])     left side weakness   • Urge incontinence of urine    • Vitamin D deficiency      Past Surgical History:   Procedure Laterality Date   • BREAST BIOPSY     • BREAST SURGERY      right side lumpectomy with biopsy   • EYE SURGERY      laser surgery for glaucoma and left eye cataracts removed   • HYSTERECTOMY      10+ years ago   • JOINT REPLACEMENT  2005; 2006    bilateral knees and left rotater   • KNEE SURGERY     • MAMMO BILATERAL  2016    [facility]      Outpatient Medications Prior to Visit   Medication Sig Dispense Refill   • albuterol (PROVENTIL HFA;VENTOLIN HFA) 108 (90 Base) MCG/ACT inhaler Inhale 2 puffs Every 6 (Six) Hours As Needed.     • aspirin 81 MG EC tablet Take 1 tablet by mouth Daily.     • atorvastatin (LIPITOR) 10 MG tablet Take 1 tablet by mouth Daily. 30 tablet 2   • carvedilol (COREG) 25 MG tablet TAKE 1 & 1/2 (ONE & ONE-HALF) TABLETS BY MOUTH TWICE DAILY WITH MEALS 270 tablet 1   • cetirizine (ZyrTEC) 10 MG tablet Take 10 mg by mouth Daily As Needed.     • digoxin (LANOXIN) 125 MCG tablet Take 1 tablet by mouth Every Other Day. 30 tablet 6   • furosemide (LASIX) 40 MG tablet Take 40 mg by mouth Daily.     • HYDROcodone-acetaminophen (NORCO) 5-325 MG per tablet Take 1 tablet po 2-3 times a day prn pain 75 tablet 0   • ipratropium-albuterol (DUO-NEB) 0.5-2.5 mg/mL nebulizer As Needed.     • potassium chloride (MICRO-K) 10 MEQ CR capsule TAKE TWO CAPSULES BY MOUTH ONCE DAILY FOR 30 DAYS (Patient taking differently: take 1 po twice daily) 60 capsule 0   • vitamin B-12 (VITAMIN B-12) 1000 MCG tablet Take 1  tablet by mouth Daily.     • vitamin D (ERGOCALCIFEROL) 27979 units capsule capsule TAKE 1 CAPSULE BY MOUTH ONCE A WEEK 12 capsule 0   • warfarin (COUMADIN) 5 MG tablet Take one tablet by mouth at 6 pm or as directed by Dr Dueñas 45 tablet 1   • Zolpidem Tartrate (AMBIEN PO) Take  by mouth As Needed.     • doxycycline (VIBRAMYICN) 100 MG tablet Take 1 tablet by mouth 2 (Two) Times a Day. 20 tablet 0     No facility-administered medications prior to visit.        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     Employer: RETIRED   Tobacco Use   • Smoking status: Former Smoker     Packs/day: 3.00     Types: Cigarettes     Start date: 1967     Last attempt to quit: 10/19/1968     Years since quittin.1   • Smokeless tobacco: Never Used   Substance and Sexual Activity   • Alcohol use: No     Comment: No caffeine use   • Drug use: No   • Sexual activity: Defer   Other Topics Concern   • Not on file   Social History Narrative   • Not on file     Family History   Problem Relation Age of Onset   • Colon cancer Mother    • Glaucoma Mother    • Stroke Mother    • Arthritis Mother    • Hypertension Mother    • Glaucoma Sister    • Diabetes Sister    • Heart disease Sister    • Arthritis Sister    • Asthma Sister    • Hypertension Sister    • Miscarriages / Stillbirths Sister    • Lung disease Sister    • Heart disease Brother    • Diabetes  "Brother    • Arthritis Brother    • Drug abuse Brother    • Hypertension Brother    • Arthritis Father    • COPD Father    • Lung disease Father    • Arthritis Daughter    • Depression Daughter    • Alcohol abuse Maternal Uncle    • Heart disease Sister    • Heart disease Sister    • Heart disease Brother        Review of Systems   Constitution: Negative for chills, fever, malaise/fatigue, night sweats, weight gain and weight loss.   HENT: Negative for ear pain, hearing loss, nosebleeds and sore throat.    Eyes: Negative for blurred vision, double vision, redness, vision loss in left eye, vision loss in right eye and visual disturbance.   Cardiovascular: Positive for dyspnea on exertion.        SEE HPI.    Respiratory: Negative for cough, hemoptysis, shortness of breath, snoring and wheezing.    Endocrine: Negative for cold intolerance and heat intolerance.   Skin: Negative for itching, rash and suspicious lesions.   Musculoskeletal: Positive for joint pain. Negative for joint swelling and myalgias.   Gastrointestinal: Negative for abdominal pain, diarrhea, hematemesis, melena, nausea and vomiting.   Genitourinary: Negative for dysuria, frequency and hematuria.   Neurological: Negative for dizziness, headaches, numbness, paresthesias and seizures.   Psychiatric/Behavioral: Negative for altered mental status and depression. The patient is not nervous/anxious.           Objective:     Vitals:    11/20/18 1326   BP: 110/70   Pulse: 80   Weight: 110 kg (242 lb)   Height: 165.1 cm (65\")     Body mass index is 40.27 kg/m².      PHYSICAL EXAM:  Physical Exam   Constitutional: She is oriented to person, place, and time. She appears well-developed and well-nourished. No distress.   Obese.    HENT:   Head: Normocephalic and atraumatic.   Eyes: Pupils are equal, round, and reactive to light.   Neck: Neck supple. No JVD present. Carotid bruit is not present. No tracheal deviation present. No thyromegaly present. "   Cardiovascular: Normal rate, regular rhythm, normal heart sounds and intact distal pulses. Exam reveals no gallop and no friction rub.   No murmur heard.  Pulses:       Radial pulses are 2+ on the right side, and 2+ on the left side.        Posterior tibial pulses are 2+ on the right side, and 2+ on the left side.   Pulmonary/Chest: Effort normal and breath sounds normal. No respiratory distress. She has no wheezes. She has no rales.   Abdominal: Soft. Bowel sounds are normal. She exhibits no distension. There is no tenderness.   Musculoskeletal: She exhibits no edema, tenderness or deformity.   Neurological: She is alert and oriented to person, place, and time.   Skin: Skin is warm and dry. No rash noted. She is not diaphoretic. No erythema.   Psychiatric: She has a normal mood and affect. Her behavior is normal. Judgment normal.           ECG 12 Lead  Date/Time: 11/20/2018 1:34 PM  Performed by: Christine Coe DNP, APRN  Authorized by: Christine Coe DNP, ANGELA   Comparison: compared with previous ECG from 10/29/2018  Similar to previous ECG  Rhythm: atrial fibrillation  Ectopy: PVCs  Rate: normal  BPM: 85  Conduction: LAFB  Other findings comments: nonspecific st-t wave changes  Clinical impression: abnormal ECG              Assessment:       Diagnosis Plan   1. Coronary artery disease involving native coronary artery of native heart with angina pectoris (CMS/HCC)     2. Persistent atrial fibrillation (CMS/HCC)     3. Essential hypertension     4. Mixed hyperlipidemia     5. ANTOINE (dyspnea on exertion)     6. Obstructive sleep apnea syndrome             Plan:     1. Chronic diastolic heart failure: Patient's BNP was improved today.  She reports improvement of her shortness of breath.  No edema at this time.  2. Persistent atrial fibrillation: Patient's EKG in office today shows that she remains in atrial fibrillation with controlled ventricular rate of 85 bpm.  3. Coronary artery disease: Patient had heart  catheterization done 10/2017 showing a blockage of a very small area of artery that was not thought to be easily stented.  Medical management was recommended.  Patient denies any chest pain at this time.  4. Hypertension: Blood pressure well controlled in the office today at 110/70 mmHg.  5. Hyperlipidemia: Managed by outside provider.  Patient on atorvastatin 10 mg nightly.  6. Sleep apnea: Patient on CPAP machine.      Patient to schedule 3-4 month follow-up appointment with Dr. Dueñas or follow-up sooner if needed for any new, recurrent, or worsening symptoms or other problems/concerns.               Your medication list           Accurate as of 11/20/18  2:04 PM. If you have any questions, ask your nurse or doctor.               CHANGE how you take these medications      Instructions Last Dose Given Next Dose Due   potassium chloride 10 MEQ CR capsule  Commonly known as:  MICRO-K  What changed:  See the new instructions.      TAKE TWO CAPSULES BY MOUTH ONCE DAILY FOR 30 DAYS          CONTINUE taking these medications      Instructions Last Dose Given Next Dose Due   albuterol 108 (90 Base) MCG/ACT inhaler  Commonly known as:  PROVENTIL HFA;VENTOLIN HFA      Inhale 2 puffs Every 6 (Six) Hours As Needed.       AMBIEN PO      Take  by mouth As Needed.       aspirin 81 MG EC tablet      Take 1 tablet by mouth Daily.       atorvastatin 10 MG tablet  Commonly known as:  LIPITOR      Take 1 tablet by mouth Daily.       carvedilol 25 MG tablet  Commonly known as:  COREG      TAKE 1 & 1/2 (ONE & ONE-HALF) TABLETS BY MOUTH TWICE DAILY WITH MEALS       cetirizine 10 MG tablet  Commonly known as:  zyrTEC      Take 10 mg by mouth Daily As Needed.       cyanocobalamin 1000 MCG tablet  Commonly known as:  VITAMIN B-12      Take 1 tablet by mouth Daily.       digoxin 125 MCG tablet  Commonly known as:  LANOXIN      Take 1 tablet by mouth Every Other Day.       furosemide 40 MG tablet  Commonly known as:  LASIX      Take 40 mg by  mouth Daily.       HYDROcodone-acetaminophen 5-325 MG per tablet  Commonly known as:  NORCO      Take 1 tablet po 2-3 times a day prn pain       ipratropium-albuterol 0.5-2.5 mg/3 ml nebulizer  Commonly known as:  DUO-NEB      As Needed.       vitamin D 00076 units capsule capsule  Commonly known as:  ERGOCALCIFEROL      TAKE 1 CAPSULE BY MOUTH ONCE A WEEK       warfarin 5 MG tablet  Commonly known as:  COUMADIN      Take one tablet by mouth at 6 pm or as directed by Dr Dueñas          STOP taking these medications    doxycycline 100 MG tablet  Commonly known as:  VIBRAMYICN  Stopped by:  Christine Coe DNP, APRN           I did not stop or change the above medications.  Patient's medication list was updated to reflect medications she is currently taking clear medication changes made by other providers.             Christine Coe DNP, APRN  11/20/2018       Dictated utilizing Dragon dictation

## 2018-11-21 LAB
ASCENDING AORTA: 3.5 CM
BH CV ECHO MEAS - ACS: 2.2 CM
BH CV ECHO MEAS - AI DEC SLOPE: 191.3 CM/SEC^2
BH CV ECHO MEAS - AI MAX PG: 84.3 MMHG
BH CV ECHO MEAS - AI MAX VEL: 459 CM/SEC
BH CV ECHO MEAS - AI P1/2T: 702.8 MSEC
BH CV ECHO MEAS - AO MAX PG (FULL): 6.3 MMHG
BH CV ECHO MEAS - AO MAX PG: 9.7 MMHG
BH CV ECHO MEAS - AO MEAN PG (FULL): 3.5 MMHG
BH CV ECHO MEAS - AO MEAN PG: 5.4 MMHG
BH CV ECHO MEAS - AO ROOT AREA: 7.3 CM^2
BH CV ECHO MEAS - AO ROOT DIAM: 3 CM
BH CV ECHO MEAS - AO V2 MAX: 155.5 CM/SEC
BH CV ECHO MEAS - AO V2 MEAN: 110.6 CM/SEC
BH CV ECHO MEAS - AO V2 VTI: 24.1 CM
BH CV ECHO MEAS - AVA(I,A): 1.8 CM^2
BH CV ECHO MEAS - AVA(I,D): 1.8 CM^2
BH CV ECHO MEAS - AVA(V,A): 1.5 CM^2
BH CV ECHO MEAS - AVA(V,D): 1.5 CM^2
BH CV ECHO MEAS - CONTRAST EF (2CH): 68 CM2
BH CV ECHO MEAS - CONTRAST EF 4CH: 51 CM2
BH CV ECHO MEAS - EDV(CUBED): 145.2 ML
BH CV ECHO MEAS - EDV(MOD-SP2): 68 ML
BH CV ECHO MEAS - EDV(MOD-SP4): 70 ML
BH CV ECHO MEAS - EDV(TEICH): 132.7 ML
BH CV ECHO MEAS - EF(CUBED): 79.8 %
BH CV ECHO MEAS - EF(MOD-BP): 51 %
BH CV ECHO MEAS - EF(TEICH): 71.8 %
BH CV ECHO MEAS - ESV(CUBED): 29.3 ML
BH CV ECHO MEAS - ESV(MOD-SP2): 46 ML
BH CV ECHO MEAS - ESV(MOD-SP4): 34 ML
BH CV ECHO MEAS - ESV(TEICH): 37.4 ML
BH CV ECHO MEAS - IVS/LVPW: 1
BH CV ECHO MEAS - IVSD: 1 CM
BH CV ECHO MEAS - LAT PEAK E' VEL: 12 CM/SEC
BH CV ECHO MEAS - LV MASS(C)D: 208.9 GRAMS
BH CV ECHO MEAS - LV MAX PG: 3.4 MMHG
BH CV ECHO MEAS - LV MEAN PG: 1.9 MMHG
BH CV ECHO MEAS - LV V1 MAX: 92.2 CM/SEC
BH CV ECHO MEAS - LV V1 MEAN: 65 CM/SEC
BH CV ECHO MEAS - LV V1 VTI: 17.2 CM
BH CV ECHO MEAS - LVIDD: 5.3 CM
BH CV ECHO MEAS - LVIDS: 3.1 CM
BH CV ECHO MEAS - LVLD AP2: 6.4 CM
BH CV ECHO MEAS - LVLD AP4: 6.1 CM
BH CV ECHO MEAS - LVLS AP2: 5.9 CM
BH CV ECHO MEAS - LVLS AP4: 5.7 CM
BH CV ECHO MEAS - LVOT AREA (M): 2.5 CM^2
BH CV ECHO MEAS - LVOT AREA: 2.6 CM^2
BH CV ECHO MEAS - LVOT DIAM: 1.8 CM
BH CV ECHO MEAS - LVPWD: 1 CM
BH CV ECHO MEAS - MED PEAK E' VEL: 6 CM/SEC
BH CV ECHO MEAS - MR MAX PG: 54.6 MMHG
BH CV ECHO MEAS - MR MAX VEL: 369.6 CM/SEC
BH CV ECHO MEAS - MV DEC SLOPE: 592.7 CM/SEC^2
BH CV ECHO MEAS - MV DEC TIME: 0.19 SEC
BH CV ECHO MEAS - MV E MAX VEL: 104 CM/SEC
BH CV ECHO MEAS - MV MAX PG: 6.9 MMHG
BH CV ECHO MEAS - MV MEAN PG: 2.3 MMHG
BH CV ECHO MEAS - MV P1/2T MAX VEL: 107.4 CM/SEC
BH CV ECHO MEAS - MV P1/2T: 53.1 MSEC
BH CV ECHO MEAS - MV V2 MAX: 131.5 CM/SEC
BH CV ECHO MEAS - MV V2 MEAN: 69.2 CM/SEC
BH CV ECHO MEAS - MV V2 VTI: 37.8 CM
BH CV ECHO MEAS - MVA P1/2T LCG: 2 CM^2
BH CV ECHO MEAS - MVA(P1/2T): 4.1 CM^2
BH CV ECHO MEAS - MVA(VTI): 1.2 CM^2
BH CV ECHO MEAS - PA MAX PG (FULL): 2.6 MMHG
BH CV ECHO MEAS - PA MAX PG: 3.6 MMHG
BH CV ECHO MEAS - PA V2 MAX: 95 CM/SEC
BH CV ECHO MEAS - PVA(V,A): 2.9 CM^2
BH CV ECHO MEAS - PVA(V,D): 2.9 CM^2
BH CV ECHO MEAS - QP/QS: 0.93
BH CV ECHO MEAS - RAP SYSTOLE: 15 MMHG
BH CV ECHO MEAS - RV MAX PG: 1.1 MMHG
BH CV ECHO MEAS - RV MEAN PG: 0.45 MMHG
BH CV ECHO MEAS - RV V1 MAX: 51.4 CM/SEC
BH CV ECHO MEAS - RV V1 MEAN: 30 CM/SEC
BH CV ECHO MEAS - RV V1 VTI: 7.7 CM
BH CV ECHO MEAS - RVOT AREA: 5.3 CM^2
BH CV ECHO MEAS - RVOT DIAM: 2.6 CM
BH CV ECHO MEAS - RVSP: 49 MMHG
BH CV ECHO MEAS - SUP REN AO DIAM: 1.5 CM
BH CV ECHO MEAS - SV(AO): 175.3 ML
BH CV ECHO MEAS - SV(CUBED): 115.8 ML
BH CV ECHO MEAS - SV(LVOT): 44.2 ML
BH CV ECHO MEAS - SV(MOD-SP2): 22 ML
BH CV ECHO MEAS - SV(MOD-SP4): 36 ML
BH CV ECHO MEAS - SV(RVOT): 41.3 ML
BH CV ECHO MEAS - SV(TEICH): 95.3 ML
BH CV ECHO MEAS - TAPSE (>1.6): 1.7 CM2
BH CV ECHO MEAS - TR MAX VEL: 290.5 CM/SEC
BH CV ECHO MEASUREMENTS AVERAGE E/E' RATIO: 11.56
BH CV XLRA - RV BASE: 2.5 CM
BH CV XLRA - TDI S': 10 CM/SEC
LEFT ATRIUM VOLUME INDEX: 50 ML/M2
LV EF 2D ECHO EST: 51 %
MAXIMAL PREDICTED HEART RATE: 149 BPM
SINUS: 2.6 CM
STJ: 3 CM
STRESS TARGET HR: 127 BPM

## 2018-11-26 ENCOUNTER — TELEPHONE (OUTPATIENT)
Dept: CARDIOLOGY | Facility: CLINIC | Age: 73
End: 2018-11-26

## 2018-11-26 ENCOUNTER — OFFICE VISIT (OUTPATIENT)
Dept: NEUROSURGERY | Facility: CLINIC | Age: 73
End: 2018-11-26

## 2018-11-26 VITALS
WEIGHT: 238 LBS | HEIGHT: 65 IN | RESPIRATION RATE: 18 BRPM | BODY MASS INDEX: 39.65 KG/M2 | HEART RATE: 85 BPM | SYSTOLIC BLOOD PRESSURE: 104 MMHG | DIASTOLIC BLOOD PRESSURE: 80 MMHG

## 2018-11-26 DIAGNOSIS — M51.26 DISPLACEMENT OF LUMBAR INTERVERTEBRAL DISC WITHOUT MYELOPATHY: Primary | ICD-10-CM

## 2018-11-26 PROCEDURE — 99213 OFFICE O/P EST LOW 20 MIN: CPT | Performed by: NEUROLOGICAL SURGERY

## 2018-11-26 NOTE — TELEPHONE ENCOUNTER
11/26/18  Pt left [facility] - asking for echo results from last week.   [phone]/rachana

## 2018-11-26 NOTE — PROGRESS NOTES
Subjective   Patient ID: Lana Yañez is a 73 y.o. female is here today for follow-up back pain. Patient has not had new imaging and presents unaccompanied.     History of Present Illness  Patient has some residual tingling but denies pain or weaknes sat this point.  No loss of b/b.  She is ambulatory without a walker or cane.  She remains on AC.      The following portions of the patient's history were reviewed and updated as appropriate: allergies, current medications, past family history, past medical history, past social history, past surgical history and problem list.    Review of Systems   Musculoskeletal: Positive for back pain.        Denies leg pain, admits occ'l left thigh cramping   Neurological: Positive for weakness (LLE) and numbness (LLE).   Psychiatric/Behavioral: Negative for sleep disturbance.       Objective   Physical Exam  Neurologic Exam    Right iliopsoas: 5/5  Left iliopsoas: 4+/5 (improved)  Right quadriceps: 5/5  Left quadriceps: 5/5  Right hamstrin/5  Left hamstrin/5  Right anterior tibial: 5/5  Left anterior tibial: 5/5  Right gastroc: 5/5  Left gastroc: 5/5       AG in left IP      Sensory Exam   Sensory deficit distribution on left: L3 - much improved      Assessment/Plan   Independent Review of Radiographic Studies:  n/a    Medical Decision Making:   She is much improved.  We will see her as needed.  She was pleased with her visit.      Lana was seen today for back pain.    Diagnoses and all orders for this visit:    Displacement of lumbar intervertebral disc without myelopathy      No Follow-up on file.

## 2018-11-28 ENCOUNTER — ANTICOAGULATION VISIT (OUTPATIENT)
Dept: PHARMACY | Facility: HOSPITAL | Age: 73
End: 2018-11-28

## 2018-11-28 ENCOUNTER — OFFICE VISIT (OUTPATIENT)
Dept: INTERNAL MEDICINE | Facility: CLINIC | Age: 73
End: 2018-11-28

## 2018-11-28 VITALS
HEART RATE: 84 BPM | WEIGHT: 238 LBS | HEIGHT: 65 IN | OXYGEN SATURATION: 95 % | RESPIRATION RATE: 17 BRPM | SYSTOLIC BLOOD PRESSURE: 119 MMHG | TEMPERATURE: 97.4 F | DIASTOLIC BLOOD PRESSURE: 81 MMHG | BODY MASS INDEX: 39.65 KG/M2

## 2018-11-28 DIAGNOSIS — M05.742 RHEUMATOID ARTHRITIS INVOLVING BOTH HANDS WITH POSITIVE RHEUMATOID FACTOR (HCC): ICD-10-CM

## 2018-11-28 DIAGNOSIS — I48.19 PERSISTENT ATRIAL FIBRILLATION (HCC): ICD-10-CM

## 2018-11-28 DIAGNOSIS — M05.741 RHEUMATOID ARTHRITIS INVOLVING BOTH HANDS WITH POSITIVE RHEUMATOID FACTOR (HCC): ICD-10-CM

## 2018-11-28 DIAGNOSIS — M54.40 LOW BACK PAIN WITH SCIATICA, SCIATICA LATERALITY UNSPECIFIED, UNSPECIFIED BACK PAIN LATERALITY, UNSPECIFIED CHRONICITY: ICD-10-CM

## 2018-11-28 DIAGNOSIS — G56.02 CARPAL TUNNEL SYNDROME OF LEFT WRIST: Primary | ICD-10-CM

## 2018-11-28 LAB
INR PPP: 3.3 (ref 0.91–1.09)
PROTHROMBIN TIME: 39.2 SECONDS (ref 10–13.8)

## 2018-11-28 PROCEDURE — G0463 HOSPITAL OUTPT CLINIC VISIT: HCPCS

## 2018-11-28 PROCEDURE — 99213 OFFICE O/P EST LOW 20 MIN: CPT | Performed by: INTERNAL MEDICINE

## 2018-11-28 PROCEDURE — 85610 PROTHROMBIN TIME: CPT

## 2018-11-28 PROCEDURE — 36416 COLLJ CAPILLARY BLOOD SPEC: CPT

## 2018-11-28 RX ORDER — MELOXICAM 7.5 MG/1
7.5 TABLET ORAL DAILY
Qty: 30 TABLET | Refills: 1 | Status: SHIPPED | OUTPATIENT
Start: 2018-11-28 | End: 2019-03-05

## 2018-11-28 NOTE — TELEPHONE ENCOUNTER
Please see result note for echo interpretation.    I called and discussed echo results with patient.    She reports that she is still feeling well and has not had any worsening of her shortness of breath since last visit.    As stated last visit, she feels like her shortness of breath only occurs with heavier exertion, and that it has not gotten any worse and remains at baseline.    She will contact office right away if she develops any worsening of her shortness of breath symptoms or any other problems/concerns.    We discussed the need to continue using CPAP machine all night every night.    Patient to keep her March follow-up appointment with Dr. Mota as scheduled or follow-up sooner if needed for any new or worsening symptoms or other problems/concerns.

## 2018-11-28 NOTE — PROGRESS NOTES
Anticoagulation Clinic Progress Note    Anticoagulation Summary  As of 11/28/2018    INR goal:   2.0-3.0   TTR:   57.1 % (1.5 mo)   INR used for dosing:   3.3! (11/28/2018)   Warfarin maintenance plan:   7.5 mg on Sat; 5 mg all other days   Weekly warfarin total:   37.5 mg   Plan last modified:   Nano Cool RPH (11/28/2018)   Next INR check:   12/19/2018   Priority:   High   Target end date:   Indefinite    Indications    Atrial fibrillation (CMS/HCC) [I48.91]             Anticoagulation Episode Summary     INR check location:       Preferred lab:       Send INR reminders to:    [facility]    Comments:         Anticoagulation Care Providers     Provider Role Specialty Phone number    Pia Dueñas MD Referring Cardiology [phone]    Kerry Teague [facility] Responsible Pharmacy [phone]          Drug interactions: has changed in the following manner adding meloxicam.  Diet: has remained unchanged.    Clinic Interview:  Patient Findings     Positives:   Change in medications    Comments:   Starting meloxicam for carpal tunnel--instructed to continue   to watch for any GI bleed sxs      Clinical Outcomes     Comments:   Starting meloxicam for carpal tunnel--instructed to continue   to watch for any GI bleed sxs        INR History:  Anticoagulation Monitoring 10/29/2018 11/9/2018 11/28/2018   INR 3.46 2.0 3.3   INR Date 10/29/2018 11/9/2018 11/28/2018   INR Goal 2.0-3.0 2.0-3.0 2.0-3.0   Trend Down Same Same   Last Week Total 40 mg 37.5 mg 37.5 mg   Next Week Total 37.5 mg 37.5 mg 35 mg   Sun 5 mg 5 mg 5 mg   Mon 5 mg 5 mg 5 mg   Tue 5 mg 5 mg 5 mg   Wed 5 mg 5 mg 2.5 mg (11/28); Otherwise 5 mg   Thu 5 mg 5 mg 5 mg   Fri 5 mg 5 mg 5 mg   Sat 7.5 mg 7.5 mg 7.5 mg   Visit Report - - -   Some recent data might be hidden       Previous INR data from [facility] is recorded in [facility] and scanned into the patient's chart in [facility]. These results have been analyzed and  reviewed.      Plan:  1. INR is Supratherapeutic today- see above in Anticoagulation Summary.  Will instruct Lana Yañez to Continue their warfarin regimen- see above in Anticoagulation Summary.  Today only take 2.5mg (one-half dose).  Also educated on increased risk of bleeding with start of NSAID medication class.  2. Follow up in 3 weeks  3. Patient declines warfarin refills.  4. Verbal and written information provided. Patient expresses understanding and has no further questions at this time.    Nano Cool RP

## 2018-11-28 NOTE — PROGRESS NOTES
Left ventricular systolic function remains within normal limits.  Left atrial cavity remains dilated.  Aortic valve regurgitation remains mild.  Mitral valve regurgitation remains mild.  Tricuspid valve regurgitation appears increased from previous echocardiograms (was previously moderate, now severe).  Patient's right ventricular systolic pressure is slightly improved from previous echocardiogram (was previously 53.9 mmHg in 10/2017).   Right atrial cavity remains moderately dilated.  Trace pulmonic valve regurgitation was seen.  Patient reported at last office visit that she has been treated for bronchitis and was feeling much better after that and her shortness of breath was significantly improved.    She still has some shortness of breath with heavier exertion, however she reported that this was not new or worsening.  Dr. Dueñas,  any further interventions needed at this time?

## 2018-11-30 ENCOUNTER — TELEPHONE (OUTPATIENT)
Dept: CARDIOLOGY | Facility: CLINIC | Age: 73
End: 2018-11-30

## 2018-11-30 NOTE — PROGRESS NOTES
Medical, I previously called and spoke with patient regarding echo results.  Please have her call the office if systolic blood pressure staying above 130 or diastolic greater than 80.  When I spoke to her on the phone we artery discussed importance of CPAP compliance and following up with sleep medicine at least yearly.

## 2018-11-30 NOTE — TELEPHONE ENCOUNTER
11/30/18  I spoke with patient - reviewed when to take bp and to call if they stay above 130/80.  She voiced her understanding/rachana

## 2018-11-30 NOTE — TELEPHONE ENCOUNTER
----- Message from Christine Coe, FABIAN, APRN sent at 11/30/2018  8:50 AM EST -----  Medical, I previously called and spoke with patient regarding echo results.  Please have her call the office if systolic blood pressure staying above 130 or diastolic greater than 80.  When I spoke to her on the phone we artery discussed importance o  f CPAP compliance and following up with sleep medicine at least yearly.

## 2018-12-07 RX ORDER — ERGOCALCIFEROL 1.25 MG/1
CAPSULE ORAL
Qty: 12 CAPSULE | Refills: 2 | Status: SHIPPED | OUTPATIENT
Start: 2018-12-07 | End: 2019-09-26 | Stop reason: SDUPTHER

## 2018-12-11 NOTE — PROGRESS NOTES
Karl Yañez is a 73 y.o. female.   She is here today for carpal syndrome of left wrist which wakes her up along with low back pain with sciatica which is getting better and RA which is still bothering her  History of Present Illness   She is here today for carpal syndrome left wrist which wakes her up along with low back pain with sciatica which is getting better and rheumatoid arthritis which is still bothering her  The following portions of the patient's history were reviewed and updated as appropriate: allergies, current medications, past family history, past medical history, past social history, past surgical history and problem list.    Review of Systems   Constitutional: Negative for fatigue.   Musculoskeletal: Positive for arthralgias.   Neurological: Negative for weakness.   Psychiatric/Behavioral: Negative for decreased concentration and dysphoric mood.   All other systems reviewed and are negative.      Objective   Physical Exam   Constitutional: She is oriented to person, place, and time. She appears well-developed and well-nourished. She is cooperative.   HENT:   Head: Normocephalic and atraumatic.   Right Ear: Hearing, tympanic membrane, external ear and ear canal normal.   Left Ear: Hearing, tympanic membrane, external ear and ear canal normal.   Nose: Nose normal.   Mouth/Throat: Uvula is midline, oropharynx is clear and moist and mucous membranes are normal.   Eyes: Conjunctivae, EOM and lids are normal. Pupils are equal, round, and reactive to light.   Neck: Phonation normal. Neck supple. Carotid bruit is not present.   Cardiovascular: Normal rate, regular rhythm and normal heart sounds. Exam reveals no gallop and no friction rub.   No murmur heard.  Pulmonary/Chest: Effort normal and breath sounds normal. No respiratory distress.   Abdominal: Soft. Bowel sounds are normal. She exhibits no distension and no mass. There is no hepatosplenomegaly. There is no tenderness. There is no  rebound and no guarding. No hernia.   Musculoskeletal: She exhibits no edema.        Left wrist: She exhibits decreased range of motion and tenderness.        Lumbar back: She exhibits pain.        Hands:  Neurological: She is alert and oriented to person, place, and time. Coordination and gait normal.   Skin: Skin is warm and dry.   Psychiatric: She has a normal mood and affect. Her speech is normal and behavior is normal. Judgment and thought content normal.   Nursing note and vitals reviewed.      Assessment/Plan   Diagnoses and all orders for this visit:    Carpal tunnel syndrome of left wrist/ wakes her up  -     Ambulatory Referral to Hand Surgery    Low back pain with sciatica, sciatica laterality unspecified, unspecified back pain laterality, unspecified chronicity    Rheumatoid arthritis involving both hands with positive rheumatoid factor (CMS/HCC)    Other orders  -     meloxicam (MOBIC) 7.5 MG tablet; Take 1 tablet by mouth Daily.      CTS of left wrist wakes her up we will have her see hand surgery not stable  Low back pain with sciatica getting better  RA both hands with positive rheumatoid factor we will have her see rheumatology and provide meloxicam

## 2018-12-17 ENCOUNTER — OFFICE VISIT (OUTPATIENT)
Dept: ORTHOPEDIC SURGERY | Facility: CLINIC | Age: 73
End: 2018-12-17

## 2018-12-17 ENCOUNTER — ANTICOAGULATION VISIT (OUTPATIENT)
Dept: PHARMACY | Facility: HOSPITAL | Age: 73
End: 2018-12-17

## 2018-12-17 VITALS — BODY MASS INDEX: 40.48 KG/M2 | WEIGHT: 243 LBS | TEMPERATURE: 98 F | HEIGHT: 65 IN

## 2018-12-17 DIAGNOSIS — M19.012 PRIMARY LOCALIZED OSTEOARTHROSIS OF LEFT SHOULDER REGION: ICD-10-CM

## 2018-12-17 DIAGNOSIS — G89.29 CHRONIC LEFT SHOULDER PAIN: Primary | ICD-10-CM

## 2018-12-17 DIAGNOSIS — M25.512 CHRONIC LEFT SHOULDER PAIN: Primary | ICD-10-CM

## 2018-12-17 LAB
INR PPP: 1.8 (ref 0.91–1.09)
PROTHROMBIN TIME: 21.3 SECONDS (ref 10–13.8)

## 2018-12-17 PROCEDURE — 85610 PROTHROMBIN TIME: CPT

## 2018-12-17 PROCEDURE — 99214 OFFICE O/P EST MOD 30 MIN: CPT | Performed by: ORTHOPAEDIC SURGERY

## 2018-12-17 PROCEDURE — G0463 HOSPITAL OUTPT CLINIC VISIT: HCPCS

## 2018-12-17 PROCEDURE — 73030 X-RAY EXAM OF SHOULDER: CPT | Performed by: ORTHOPAEDIC SURGERY

## 2018-12-17 PROCEDURE — 36416 COLLJ CAPILLARY BLOOD SPEC: CPT

## 2018-12-17 PROCEDURE — 20610 DRAIN/INJ JOINT/BURSA W/O US: CPT | Performed by: ORTHOPAEDIC SURGERY

## 2018-12-17 RX ADMIN — LIDOCAINE HYDROCHLORIDE 4 ML: 10 INJECTION, SOLUTION EPIDURAL; INFILTRATION; INTRACAUDAL; PERINEURAL at 13:52

## 2018-12-17 RX ADMIN — METHYLPREDNISOLONE ACETATE 80 MG: 80 INJECTION, SUSPENSION INTRA-ARTICULAR; INTRALESIONAL; INTRAMUSCULAR; SOFT TISSUE at 13:52

## 2018-12-17 NOTE — PROGRESS NOTES
Anticoagulation Clinic Progress Note    Anticoagulation Summary  As of 2018    INR goal:   2.0-3.0   TTR:   59.9 % (2.1 mo)   INR used for dosin.8! (2018)   Warfarin maintenance plan:   7.5 mg on Sat; 5 mg all other days   Weekly warfarin total:   37.5 mg   Plan last modified:   Nano Cool RPH (2018)   Next INR check:   2018   Priority:   High   Target end date:   Indefinite    Indications    Atrial fibrillation (CMS/HCC) [I48.91]             Anticoagulation Episode Summary     INR check location:       Preferred lab:       Send INR reminders to:    [facility]    Comments:         Anticoagulation Care Providers     Provider Role Specialty Phone number    Pia Dueñas MD Referring Cardiology [phone]    Kerry Teague [facility] Responsible Pharmacy [phone]          Clinic Interview:  Patient Findings     Negatives:   Signs/symptoms of thrombosis, Signs/symptoms of bleeding,   Laboratory test error suspected, Change in health, Change in alcohol use,   Change in activity, Upcoming invasive procedure, Emergency department   visit, Upcoming dental procedure, Missed doses, Extra doses, Change in   diet/appetite, Hospital admission, Bruising, Other complaints    Comments:   Internal Medicine MD ordered Mobic for patient. Discussed the   increased risk of GI bleed while taking Mobic and Warfarin        Clinical Outcomes     Negatives:   Major bleeding event, Thromboembolic event,   Anticoagulation-related hospital admission, Anticoagulation-related ED   visit, Anticoagulation-related fatality    Comments:   Internal Medicine MD ordered Mobic for patient. Discussed the   increased risk of GI bleed while taking Mobic and Warfarin          INR History:  Anticoagulation Monitoring 2018   INR 2.0 3.3 1.8   INR Date 2018   INR Goal 2.0-3.0 2.0-3.0 2.0-3.0   Trend Same Same Same   Last Week Total 37.5 mg 37.5 mg 37.5 mg   Next  Week Total 37.5 mg 35 mg 40 mg   Sun 5 mg 5 mg 5 mg   Mon 5 mg 5 mg 7.5 mg (12/17); Otherwise 5 mg   Tue 5 mg 5 mg 5 mg   Wed 5 mg 2.5 mg (11/28); Otherwise 5 mg 5 mg   Thu 5 mg 5 mg 5 mg   Fri 5 mg 5 mg 5 mg   Sat 7.5 mg 7.5 mg 7.5 mg   Visit Report - - -   Some recent data might be hidden       Plan:  1. INR is Subtherapeutic today- see above in Anticoagulation Summary.  Will instruct Lanaantonio Aguerocomb to Change their warfarin regimen- see above in Anticoagulation Summary.  12/17 INR= 1.8 Steroid shot today. Take 7.5mg booster today. Continue same warfarin regimen of 7.5mg on Sat, 5mg all other days. Follow up on 12/27  Internal Medicine MD ordered Mobic for patient. Discussed the increased risk of GI bleed while taking Mobic and Warfarin    2. Follow up in 2 weeks  3. Patient declines warfarin refills.  4. Verbal and written information provided. Patient expresses understanding and has no further questions at this time.    Jayleen Mariscal, [facility]

## 2018-12-17 NOTE — PROGRESS NOTES
New left Shoulder      Patient: Lana Yañez        YOB: 1945    Medical Record Number: [id number]        Chief Complaints: left shoulder  Chief Complaint   Patient presents with   • Left Shoulder - Pain, Establish Care           History of Present Illness: This is a  73-year-old female is right-hand-dominant presents with left shoulder pain is been ongoing for years much worse lately no history of injury change in activity she does have night pain she's tried heating pad ice I'll see hot and rest symptoms are severe constant stabbing aching worse with driving and activity somewhat better with ice rest she is retired past medical history marked for sleep apnea blood clots stroke cardiac disease and rheumatoid arthritis      Allergies:   Allergies   Allergen Reactions   • Contrast Dye Hives and Itching       Medications:   Home Medications:  Current Outpatient Medications on File Prior to Visit   Medication Sig   • albuterol (PROVENTIL HFA;VENTOLIN HFA) 108 (90 Base) MCG/ACT inhaler Inhale 2 puffs Every 6 (Six) Hours As Needed.   • aspirin 81 MG EC tablet Take 1 tablet by mouth Daily.   • atorvastatin (LIPITOR) 10 MG tablet Take 1 tablet by mouth Daily.   • carvedilol (COREG) 25 MG tablet TAKE 1 & 1/2 (ONE & ONE-HALF) TABLETS BY MOUTH TWICE DAILY WITH MEALS   • cetirizine (ZyrTEC) 10 MG tablet Take 10 mg by mouth Daily As Needed.   • digoxin (LANOXIN) 125 MCG tablet Take 1 tablet by mouth Every Other Day.   • furosemide (LASIX) 40 MG tablet Take 40 mg by mouth Daily.   • ipratropium-albuterol (DUO-NEB) 0.5-2.5 mg/mL nebulizer As Needed.   • meloxicam (MOBIC) 7.5 MG tablet Take 1 tablet by mouth Daily.   • potassium chloride (MICRO-K) 10 MEQ CR capsule TAKE TWO CAPSULES BY MOUTH ONCE DAILY FOR 30 DAYS (Patient taking differently: take 1 po twice daily)   • vitamin B-12 (VITAMIN B-12) 1000 MCG tablet Take 1 tablet by mouth Daily.   • vitamin D (ERGOCALCIFEROL) 48239 units capsule capsule TAKE 1  CAPSULE BY MOUTH ONCE A WEEK   • warfarin (COUMADIN) 5 MG tablet Take one tablet by mouth at 6 pm or as directed by Dr Dueñas   • Zolpidem Tartrate (AMBIEN PO) Take  by mouth As Needed.     No current facility-administered medications on file prior to visit.      Current Medications:  Scheduled Meds:  Continuous Infusions:  No current facility-administered medications for this visit.   PRN Meds:.    Past Medical History:   Diagnosis Date   • Acute on chronic diastolic heart failure (CMS/[facility])    • Arthritis    • Asthma    • Atrial fibrillation (CMS/[facility])     Persistent; on warfarin   • Atypical chest pain    • Bronchitis    • Cellulitis of right elbow     due to MRSA   • CHF (congestive heart failure) (CMS/[facility])    • COPD (chronic obstructive pulmonary disease) (CMS/[facility])    • Coronary artery disease     Cardiac catheterization completed; 90% PDA stenosis with medical management recommended   • Coronary artery disease involving native coronary artery of native heart with angina pectoris with documented spasm (CMS/[facility])    • Disease of thyroid gland    • Displacement of lumbar intervertebral disc without myelopathy    • Dizziness    • ANTOINE (dyspnea on exertion)    • Essential hypertension    • Fatigue    • Generalized osteoarthritis of multiple sites    • History of rheumatic fever    • History of transfusion    • Hx of bone density study     10/23/2014   • Hyperglycemia    • Hyperlipidemia    • Hypovitaminosis D    • Left arm pain    • Left-sided weakness    • Leg swelling    • Low back pain    • Lower extremity edema    • Malaise and fatigue    • Mitral valve disease     Moderate mitral valve prolapse and moderate mitral regurgitation   • Mitral valve insufficiency    • Morbid obesity with BMI of 40.0-44.9, adult (CMS/[facility])    • MRSA infection    • NSTEMI (non-ST elevated myocardial infarction) (CMS/[facility])    • PAF (paroxysmal atrial fibrillation) (CMS/[facility])    • RA (rheumatoid arthritis) (CMS/[facility])     involving both hands   •  Sleep apnea    • SOB (shortness of breath)    • Stroke (CMS/HCC)     left side weakness   • Urge incontinence of urine    • Vitamin D deficiency         Past Surgical History:   Procedure Laterality Date   • BREAST BIOPSY     • BREAST SURGERY      right side lumpectomy with biopsy   • EYE SURGERY      laser surgery for glaucoma and left eye cataracts removed   • HYSTERECTOMY      10+ years ago   • JOINT REPLACEMENT  ;     bilateral knees and left rotater   • KNEE SURGERY     • MAMMO BILATERAL  2016    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     Employer: RETIRED   Tobacco Use   • Smoking status: Former Smoker     Packs/day: 3.00     Types: Cigarettes     Start date: 1967     Last attempt to quit: 10/19/1968     Years since quittin.1   • Smokeless tobacco: Never Used   Substance and Sexual Activity   • Alcohol use: No     Comment: No caffeine use   • Drug use: No   • Sexual activity: Defer    Social History     Social History Narrative   • Not on file        Family History   Problem Relation Age of Onset   • Colon cancer Mother    • Glaucoma Mother    • Stroke Mother    • Arthritis Mother    • Hypertension Mother    • Glaucoma Sister    • Diabetes Sister    • Heart disease Sister    • Arthritis Sister    • Asthma Sister    • Hypertension Sister    • Miscarriages / Stillbirths Sister    • Lung disease Sister    • Heart disease Brother    • Diabetes Brother    • Arthritis Brother    • Drug abuse Brother    • Hypertension Brother    • Arthritis Father    • COPD Father    • Lung disease Father    • Arthritis Daughter    • Depression Daughter    • Alcohol abuse Maternal Uncle    • Heart disease Sister    • Heart disease Sister    • Heart disease Brother              Review of Systems: 14 point review of systems are remarkable for pertinent positives listed in the chart by the patient the remainder are negative    Review of  "Systems      Physical Exam: 73 y.o. female  General Appearance:    Alert, cooperative, in no acute distress                 Vitals:    12/17/18 1336   Temp: 98 °F (36.7 °C)   Weight: 110 kg (243 lb)   Height: 165.1 cm (65\")      Patient is alert and read ×3 no acute distress appears her above-listed at height weight and age.  Affect is normal respiratory rate is normal unlabored. Heart rate regular rate rhythm, sclera, dentition and hearing are normal for the purpose of this exam.    Ortho Exam Physical exam the left shoulder reveals no overlying skin changes no lymphedema lymphadenopathy the patient can actively flex to about 150 passively I get them to 160 abduction is similar external rotation is 40 internal rotation to there buttock.  Rotator cuff strength is 4+ over 5 with isometric strength testing no overlying skin changes.  Patient has reasonable cervical range of motion for their age no radicular symptoms and a normal elbow exam.  There are good distal pulses.    Large Joint Arthrocentesis: L glenohumeral  Date/Time: 12/17/2018 1:52 PM  Consent given by: patient  Site marked: site marked  Timeout: Immediately prior to procedure a time out was called to verify the correct patient, procedure, equipment, support staff and site/side marked as required   Supporting Documentation  Indications: pain   Procedure Details  Location: shoulder - L glenohumeral  Preparation: Patient was prepped and draped in the usual sterile fashion  Needle size: 22 G  Approach: posterior  Medications administered: 80 mg methylPREDNISolone acetate 80 MG/ML; 4 mL lidocaine PF 1% 1 %  Patient tolerance: patient tolerated the procedure well with no immediate complications                Radiology:   AP, Scapular Y and Axillary Lateral of the left shoulder were ordered/reviewed to evauate shoulder pain.  I've no comparative films these show severe glenohumeral arthritis with multiple cartilaginous loose bodies  Imaging Results (most " recent)     Procedure Component Value Units Date/Time    XR Shoulder 2+ View Left [[id number]] Resulted:  12/17/18 1329     Updated:  12/17/18 1329    Impression:       Ordering physician's impression is located in the Encounter Note dated 12/17/18. X-ray performed in the DR room.          Assessment/Plan: Left shoulder OA we want to try an injection see if we can give her relief.  If it does we could do that again every 3-4 months.  It fails to improve her symptoms we'll she could consider arthroplasty.  She's had a recent total knee done so I think she knows somewhat what to expect  Cortisone Injection. See procedure note.  Cortisone Injection for DIAGNOSTIC and THERAPUTIC purposes.

## 2018-12-17 NOTE — PATIENT INSTRUCTIONS
Take 7.5mg booster today. Continue same warfarin regimen of 7.5mg on Sat, 5mg all other days. Follow up on 12/27

## 2018-12-18 ENCOUNTER — TELEPHONE (OUTPATIENT)
Dept: CARDIOLOGY | Facility: CLINIC | Age: 73
End: 2018-12-18

## 2018-12-18 RX ORDER — METHYLPREDNISOLONE ACETATE 80 MG/ML
80 INJECTION, SUSPENSION INTRA-ARTICULAR; INTRALESIONAL; INTRAMUSCULAR; SOFT TISSUE
Status: COMPLETED | OUTPATIENT
Start: 2018-12-17 | End: 2018-12-17

## 2018-12-18 RX ORDER — LIDOCAINE HYDROCHLORIDE 10 MG/ML
4 INJECTION, SOLUTION EPIDURAL; INFILTRATION; INTRACAUDAL; PERINEURAL
Status: COMPLETED | OUTPATIENT
Start: 2018-12-17 | End: 2018-12-17

## 2018-12-18 NOTE — TELEPHONE ENCOUNTER
Pt called re: she was started on Mobic 2 weeks ago.  She wanted to make sure you were ok with this.  She is not having any stomach issues BUT pharm D in Medical Management wanted her to call you.

## 2018-12-19 NOTE — TELEPHONE ENCOUNTER
6 is not okay.  She should not be taking this when taking Coumadin.  It is a nonsteroidal.  Please have her stop this.. maura

## 2018-12-19 NOTE — TELEPHONE ENCOUNTER
Spoke with pt.  Verbalized understanding.  She will stop and contact her PCP on alternative.  (done)

## 2018-12-27 ENCOUNTER — ANTICOAGULATION VISIT (OUTPATIENT)
Dept: PHARMACY | Facility: HOSPITAL | Age: 73
End: 2018-12-27

## 2018-12-27 LAB
INR PPP: 2.3 (ref 0.91–1.09)
PROTHROMBIN TIME: 28 SECONDS (ref 10–13.8)

## 2018-12-27 PROCEDURE — 36416 COLLJ CAPILLARY BLOOD SPEC: CPT

## 2018-12-27 PROCEDURE — 85610 PROTHROMBIN TIME: CPT

## 2018-12-27 NOTE — PROGRESS NOTES
Anticoagulation Clinic Progress Note    Anticoagulation Summary  As of 2018    INR goal:   2.0-3.0   TTR:   60.0 % (2.5 mo)   INR used for dosin.3 (2018)   Warfarin maintenance plan:   7.5 mg on Sat; 5 mg all other days   Weekly warfarin total:   37.5 mg   Plan last modified:   Nano Cool RP (2018)   Next INR check:   2019   Priority:   High   Target end date:   Indefinite    Indications    Atrial fibrillation (CMS/HCC) [I48.91]             Anticoagulation Episode Summary     INR check location:       Preferred lab:       Send INR reminders to:   [facility] PO[facility]    Comments:         Anticoagulation Care Providers     Provider Role Specialty Phone number    Pia Dueñas MD Referring Cardiology [phone]    Kerry Teague [facility] Responsible Pharmacy [phone]          Clinic Interview:  Patient Findings     Positives:   Change in medications    Negatives:   Signs/symptoms of thrombosis, Signs/symptoms of bleeding,   Laboratory test error suspected, Change in health, Change in alcohol use,   Change in activity, Upcoming invasive procedure, Emergency department   visit, Upcoming dental procedure, Missed doses, Extra doses, Change in   diet/appetite, Hospital admission, Bruising, Other complaints    Comments:   Off Mobic      Clinical Outcomes     Negatives:   Major bleeding event, Thromboembolic event,   Anticoagulation-related hospital admission, Anticoagulation-related ED   visit, Anticoagulation-related fatality    Comments:   Off Mobic        INR History:  Anticoagulation Monitoring 2018   INR 3.3 1.8 2.3   INR Date 2018   INR Goal 2.0-3.0 2.0-3.0 2.0-3.0   Trend Same Same Same   Last Week Total 37.5 mg 37.5 mg 37.5 mg   Next Week Total 35 mg 40 mg 37.5 mg   Sun 5 mg 5 mg 5 mg   Mon 5 mg 7.5 mg (); Otherwise 5 mg 5 mg   Tue 5 mg 5 mg 5 mg   Wed 2.5 mg (); Otherwise 5 mg 5 mg 5 mg   Thu 5 mg 5 mg 5 mg    Fri 5 mg 5 mg 5 mg   Sat 7.5 mg 7.5 mg 7.5 mg   Visit Report - - -   Some recent data might be hidden       Plan:  1. INR is Therapeutic today- see above in Anticoagulation Summary.  Will instruct Lana Yañez to Continue their warfarin regimen- see above in Anticoagulation Summary.  2. Follow up in 1 month  3. Patient declines warfarin refills.  4. Verbal and written information provided. Patient expresses understanding and has no further questions at this time.    Ksasi Le [facility]

## 2019-01-18 RX ORDER — FUROSEMIDE 40 MG/1
TABLET ORAL
Qty: 180 TABLET | Refills: 0 | Status: SHIPPED | OUTPATIENT
Start: 2019-01-18 | End: 2019-03-05

## 2019-01-24 ENCOUNTER — ANTICOAGULATION VISIT (OUTPATIENT)
Dept: PHARMACY | Facility: HOSPITAL | Age: 74
End: 2019-01-24

## 2019-01-24 LAB
INR PPP: 2.4 (ref 0.91–1.09)
PROTHROMBIN TIME: 28.7 SECONDS (ref 10–13.8)

## 2019-01-24 PROCEDURE — 85610 PROTHROMBIN TIME: CPT

## 2019-01-24 PROCEDURE — 36416 COLLJ CAPILLARY BLOOD SPEC: CPT

## 2019-01-24 RX ORDER — WARFARIN SODIUM 5 MG/1
TABLET ORAL
Qty: 98 TABLET | Refills: 1 | Status: SHIPPED | OUTPATIENT
Start: 2019-01-24 | End: 2019-03-05

## 2019-01-24 NOTE — PROGRESS NOTES
Anticoagulation Clinic Progress Note    Anticoagulation Summary  As of 2019    INR goal:   2.0-3.0   TTR:   70.9 % (3.4 mo)   INR used for dosin.4 (2019)   Warfarin maintenance plan:   7.5 mg on Sat; 5 mg all other days   Weekly warfarin total:   37.5 mg   No change documented:   Divine Lea   Plan last modified:   [facility] (2018)   Next INR check:   2019   Priority:   High   Target end date:   Indefinite    Indications    Atrial fibrillation (CMS/HCC) [I48.91]             Anticoagulation Episode Summary     INR check location:       Preferred lab:       Send INR reminders to:    [facility]    Comments:         Anticoagulation Care Providers     Provider Role Specialty Phone number    Pia Dueñas MD Referring Cardiology [phone]    [facility] [phone]          Clinic Interview:  Patient Findings     Negatives:   Signs/symptoms of thrombosis, Signs/symptoms of bleeding,   Laboratory test error suspected, Change in health, Change in alcohol use,   Change in activity, Upcoming invasive procedure, Emergency department   visit, Upcoming dental procedure, Missed doses, Extra doses, Change in   medications, Change in diet/appetite, Hospital admission, Bruising, Other   complaints      Clinical Outcomes     Negatives:   Major bleeding event, Thromboembolic event,   Anticoagulation-related hospital admission, Anticoagulation-related ED   visit, Anticoagulation-related fatality        INR History:  Anticoagulation Monitoring 2018   INR 1.8 2.3 2.4   INR Date 2018   INR Goal 2.0-3.0 2.0-3.0 2.0-3.0   Trend Same Same Same   Last Week Total 37.5 mg 37.5 mg 37.5 mg   Next Week Total 40 mg 37.5 mg 37.5 mg   Sun 5 mg 5 mg 5 mg   Mon 7.5 mg (); Otherwise 5 mg 5 mg 5 mg   Tue 5 mg 5 mg 5 mg   Wed 5 mg 5 mg 5 mg   Thu 5 mg 5 mg 5 mg   Fri 5 mg 5 mg 5 mg   Sat 7.5 mg 7.5 mg 7.5 mg    Visit Report - - -   Some recent data might be hidden       Plan:  1. INR is therapeutic today- see above in Anticoagulation Summary.   Will instruct Lana Nielsonb to continue their warfarin regimen- see above in Anticoagulation Summary.  2. Follow up in 4 weeks.  3. Patient declines warfarin refills.  4. Verbal and written information provided. Patient expresses understanding and has no further questions at this time.    Divine Lea

## 2019-01-29 ENCOUNTER — TELEPHONE (OUTPATIENT)
Dept: NEUROLOGY | Facility: CLINIC | Age: 74
End: 2019-01-29

## 2019-02-21 ENCOUNTER — APPOINTMENT (OUTPATIENT)
Dept: PHARMACY | Facility: HOSPITAL | Age: 74
End: 2019-02-21

## 2019-02-22 ENCOUNTER — ANTICOAGULATION VISIT (OUTPATIENT)
Dept: PHARMACY | Facility: HOSPITAL | Age: 74
End: 2019-02-22

## 2019-02-22 LAB
INR PPP: 2.2 (ref 0.91–1.09)
PROTHROMBIN TIME: 26.9 SECONDS (ref 10–13.8)

## 2019-02-22 PROCEDURE — 85610 PROTHROMBIN TIME: CPT

## 2019-02-22 PROCEDURE — 36416 COLLJ CAPILLARY BLOOD SPEC: CPT

## 2019-02-22 NOTE — PROGRESS NOTES
Anticoagulation Clinic Progress Note    Anticoagulation Summary  As of 2019    INR goal:   2.0-3.0   TTR:   77.3 % (4.4 mo)   INR used for dosin.2 (2019)   Warfarin maintenance plan:   7.5 mg on Sat; 5 mg all other days   Weekly warfarin total:   37.5 mg   No change documented:   Maria Alejandra Guerrero   Plan last modified:   [facility] (2018)   Next INR check:   3/22/2019   Priority:   Maintenance   Target end date:   Indefinite    Indications    Atrial fibrillation (CMS/[facility]) [I48.91]             Anticoagulation Episode Summary     INR check location:       Preferred lab:       Send INR reminders to:    [facility]    Comments:         Anticoagulation Care Providers     Provider Role Specialty Phone number    Pia Dueñas MD Referring Cardiology [phone]    [facility] [phone]          Clinic Interview:  Patient Findings     Negatives:   Signs/symptoms of thrombosis, Signs/symptoms of bleeding,   Laboratory test error suspected, Change in health, Change in alcohol use,   Change in activity, Upcoming invasive procedure, Emergency department   visit, Upcoming dental procedure, Missed doses, Extra doses, Change in   medications, Change in diet/appetite, Hospital admission, Bruising, Other   complaints      Clinical Outcomes     Negatives:   Major bleeding event, Thromboembolic event,   Anticoagulation-related hospital admission, Anticoagulation-related ED   visit, Anticoagulation-related fatality        INR History:  Anticoagulation Monitoring 2018   INR 2.3 2.4 2.2   INR Date 2018   INR Goal 2.0-3.0 2.0-3.0 2.0-3.0   Trend Same Same Same   Last Week Total 37.5 mg 37.5 mg 37.5 mg   Next Week Total 37.5 mg 37.5 mg 37.5 mg   Sun 5 mg 5 mg 5 mg   Mon 5 mg 5 mg 5 mg   Tue 5 mg 5 mg 5 mg   Wed 5 mg 5 mg 5 mg   Thu 5 mg 5 mg 5 mg   Fri 5 mg 5 mg 5 mg   Sat 7.5 mg 7.5 mg 7.5 mg   Visit Report - - -    Some recent data might be hidden       Plan:  1. INR is therapeutic today- see above in Anticoagulation Summary.   Will instruct Lana Yañez to continue their warfarin regimen- see above in Anticoagulation Summary.  2. Follow up in 4 weeks.  3. Patient declines warfarin refills.  4. Verbal and written information provided. Patient expresses understanding and has no further questions at this time.    Maria Alejandra Guerrero

## 2019-03-05 ENCOUNTER — OFFICE VISIT (OUTPATIENT)
Dept: CARDIOLOGY | Facility: CLINIC | Age: 74
End: 2019-03-05

## 2019-03-05 ENCOUNTER — TELEPHONE (OUTPATIENT)
Dept: CARDIOLOGY | Facility: CLINIC | Age: 74
End: 2019-03-05

## 2019-03-05 VITALS
HEIGHT: 65 IN | WEIGHT: 251 LBS | DIASTOLIC BLOOD PRESSURE: 82 MMHG | BODY MASS INDEX: 41.82 KG/M2 | SYSTOLIC BLOOD PRESSURE: 132 MMHG | HEART RATE: 86 BPM

## 2019-03-05 DIAGNOSIS — I48.19 PERSISTENT ATRIAL FIBRILLATION (HCC): ICD-10-CM

## 2019-03-05 DIAGNOSIS — I25.119 CORONARY ARTERY DISEASE INVOLVING NATIVE CORONARY ARTERY OF NATIVE HEART WITH ANGINA PECTORIS (HCC): ICD-10-CM

## 2019-03-05 DIAGNOSIS — I10 ESSENTIAL HYPERTENSION: ICD-10-CM

## 2019-03-05 DIAGNOSIS — R06.02 SOB (SHORTNESS OF BREATH): ICD-10-CM

## 2019-03-05 DIAGNOSIS — R60.0 LOCALIZED EDEMA: Primary | ICD-10-CM

## 2019-03-05 PROCEDURE — 99213 OFFICE O/P EST LOW 20 MIN: CPT | Performed by: INTERNAL MEDICINE

## 2019-03-05 PROCEDURE — 93000 ELECTROCARDIOGRAM COMPLETE: CPT | Performed by: INTERNAL MEDICINE

## 2019-03-05 RX ORDER — WARFARIN SODIUM 5 MG/1
TABLET ORAL
Qty: 45 TABLET | Refills: 0 | Status: SHIPPED | OUTPATIENT
Start: 2019-03-05 | End: 2019-08-15 | Stop reason: SDUPTHER

## 2019-03-05 NOTE — TELEPHONE ENCOUNTER
She had to take a break form cardiac rehab due to bronchitis and is now feeling better. Please call her to complete [email or web address]

## 2019-03-05 NOTE — PROGRESS NOTES
Date of Office Visit: 2019  Encounter Provider: Pia Dueñas MD  Place of Service: [facility] CARDIOLOGY  Patient Name: Lana Yañez  :1945    Chief complaint  Follow up of embolic stroke, atrial fibrillation, and coronary artery disease.    History of Present Illness  The patient is a 74 yo female with with history of retention, hyperlipidemia, rheumatoid arthritis, obstructive sleep apnea who in early October was found to be in atrial fibrillation with rapid ventricular rates and mild diastolic heart failure.  She was placed on IV heparin and Pradaxa.  Echocardiogram revealed normal systolic function mild left ventricular hypertrophy, moderate atrial enlargement with aortic valve sclerosis and moderate pulmonary hypertension and moderate tricuspid regurgitation.  The RV systolic pressure is 54 mmHg.  She had a stress perfusion study that was negative for ischemia and a CT and her grandmother revealed a mildly dilated aorta without pulmonary emboli.  She then presented several days later with an acute stroke.  CLARIBEL was not pursued as it was felt to be embolic in nature.  However on  and she was diaphoretic and was found to have a non-ST elevation myocardial infarction.  This was on full dose Pradaxa which was not held.  CLARIBEL was pursued that revealed normal systolic function with moderate mitral valve prolapse without significant regurgitation.  There was right-sided spontaneous echo contrast without thrombus formation.  Cardiac catheterization revealed normal systolic function with 2+ mitral regurgitation 90% stenosis was noted distally which is felt to be too small to be amenable PCI.  She was treated medically and switched to Coumadin.She was seen in 2017 by my nurse practitioner for edema. This occurred in the setting of dietary indiscretions with salt. She was also dyspneic at the time. Bumex and IV was given in the office and Lasix was increased.  Low-salt diet was again recommended. She also had a 24-hour Holter that revealed persistent atrial fibrillation with reasonable rate control and frequent PVCs. No other arrhythmia was present. She then was treated more recently for atypical chest pain and cough with pneumonia. She also noted to have a nodule in the left lung.  2018 with complaints of dyspnea an echocardiogram was performed that showed normal systolic function with an ejection fraction of 51%, moderate to severe left atrial enlargement aortic valve calcification with mild aortic regurgitation, mild mitral regurgitation with severe tricuspid regurgitation and RV systolic pressure 49 mmHg.    While she feels relatively well.  Working with a new mask and using her CPAP consistently.  Blood pressure has been as it is today.  She occasionally has dizziness with standing suddenly.  She is on a low-salt diet and aiming to lose weight.  She has been seen by Dr. Hill for pulmonary nodule which is felt to be stable.  No further follow-up was felt to be needed.    Past Medical History:   Diagnosis Date   • Acute on chronic diastolic heart failure (CMS/HCC)    • Arthritis    • Asthma    • Atrial fibrillation (CMS/HCC)     Persistent; on warfarin   • Atypical chest pain    • Bronchitis    • Cellulitis of right elbow     due to MRSA   • CHF (congestive heart failure) (CMS/HCC)    • COPD (chronic obstructive pulmonary disease) (CMS/HCC)    • Coronary artery disease     Cardiac catheterization completed; 90% PDA stenosis with medical management recommended   • Coronary artery disease involving native coronary artery of native heart with angina pectoris with documented spasm (CMS/HCC)    • Disease of thyroid gland    • Displacement of lumbar intervertebral disc without myelopathy    • Dizziness    • ANTOINE (dyspnea on exertion)    • Essential hypertension    • Fatigue    • Generalized osteoarthritis of multiple sites    • History of rheumatic fever    • History of  transfusion    • Hx of bone density study     10/23/2014   • Hyperglycemia    • Hyperlipidemia    • Hypovitaminosis D    • Left arm pain    • Left-sided weakness    • Leg swelling    • Low back pain    • Lower extremity edema    • Malaise and fatigue    • Mitral valve disease     Moderate mitral valve prolapse and moderate mitral regurgitation   • Mitral valve insufficiency    • Morbid obesity with BMI of 40.0-44.9, adult (CMS/Formerly [facility])    • MRSA infection    • NSTEMI (non-ST elevated myocardial infarction) (CMS/[facility])    • PAF (paroxysmal atrial fibrillation) (CMS/[facility])    • RA (rheumatoid arthritis) (CMS/[facility])     involving both hands   • Sleep apnea    • SOB (shortness of breath)    • Stroke (CMS/[facility])     left side weakness   • Urge incontinence of urine    • Vitamin D deficiency      Past Surgical History:   Procedure Laterality Date   • BREAST BIOPSY     • BREAST SURGERY      right side lumpectomy with biopsy   • CARDIAC CATHETERIZATION Left 10/20/2017    Procedure: Cardiac Catheterization/Vascular Study;  Surgeon: Alphonso Olmedo MD;  Location: [facility] INVASIVE LOCATION;  Service:    • CARDIAC CATHETERIZATION N/A 10/20/2017    +2 mitral regurgitation, left main 10% stenosis, mid to distal LAD 10% diffuse stenosis, circumflex 10% diffuse stenosis, RCA 10% proximal stenosis, and distal PDA consistent with coronary embolus with a lesion of 90% too small to consider coronary intervention; medical management recommended   • EYE SURGERY      laser surgery for glaucoma and left eye cataracts removed   • HYSTERECTOMY      10+ years ago   • JOINT REPLACEMENT  2005; 2006    bilateral knees and left rotater   • KNEE SURGERY     • MAMMO BILATERAL  2016    [facility]      Outpatient Medications Prior to Visit   Medication Sig Dispense Refill   • albuterol (PROVENTIL HFA;VENTOLIN HFA) 108 (90 Base) MCG/ACT inhaler Inhale 2 puffs Every 6 (Six) Hours As Needed.     • aspirin 81 MG EC tablet Take 1 tablet by mouth Daily.     •  atorvastatin (LIPITOR) 10 MG tablet Take 1 tablet by mouth Daily. 30 tablet 2   • carvedilol (COREG) 25 MG tablet TAKE 1 & 1/2 (ONE & ONE-HALF) TABLETS BY MOUTH TWICE DAILY WITH MEALS 270 tablet 1   • digoxin (LANOXIN) 125 MCG tablet Take 1 tablet by mouth Every Other Day. 30 tablet 6   • furosemide (LASIX) 40 MG tablet Take 40 mg by mouth Daily.     • ipratropium-albuterol (DUO-NEB) 0.5-2.5 mg/mL nebulizer As Needed.     • potassium chloride (MICRO-K) 10 MEQ CR capsule TAKE TWO CAPSULES BY MOUTH ONCE DAILY FOR 30 DAYS (Patient taking differently: take 1 po twice daily) 60 capsule 0   • vitamin D (ERGOCALCIFEROL) 54440 units capsule capsule TAKE 1 CAPSULE BY MOUTH ONCE A WEEK 12 capsule 2   • warfarin (COUMADIN) 5 MG tablet Take one tablet by mouth at 6 pm or as directed by Dr Dueñas 45 tablet 1   • cetirizine (ZyrTEC) 10 MG tablet Take 10 mg by mouth Daily As Needed.     • furosemide (LASIX) 40 MG tablet TAKE TWO TABLETS BY MOUTH ONCE DAILY 180 tablet 0   • meloxicam (MOBIC) 7.5 MG tablet Take 1 tablet by mouth Daily. 30 tablet 1   • vitamin B-12 (VITAMIN B-12) 1000 MCG tablet Take 1 tablet by mouth Daily.     • warfarin (COUMADIN) 5 MG tablet Take one tablet daily every day except take 1.5 tablets on Saturdays or as directed by Medication Management clinic 98 tablet 1   • Zolpidem Tartrate (AMBIEN PO) Take  by mouth As Needed.       No facility-administered medications prior to visit.        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     Employer: RETIRED   Tobacco Use   • Smoking status: Former Smoker     Packs/day: 3.00     Types: Cigarettes     Start date: 1967     Last attempt to quit: 10/19/1968     Years since quittin.4   • Smokeless tobacco: Never Used   Substance and Sexual Activity   • Alcohol use: No     Comment: No caffeine use   • Drug use: No   • Sexual activity: Defer   Other Topics Concern   • Not on file   Social History Narrative   • Not on file     Family History   Problem Relation Age of Onset   • Colon cancer Mother    • Glaucoma Mother    • Stroke Mother    • Arthritis Mother    • Hypertension Mother    • Glaucoma Sister    • Diabetes Sister    • Heart disease Sister    • Arthritis Sister    • Asthma Sister    • Hypertension Sister    • Miscarriages / Stillbirths Sister    • Lung disease Sister    • Heart disease Brother    • Diabetes Brother    • Arthritis Brother    • Drug abuse Brother    • Hypertension Brother    • Arthritis Father    • COPD Father    • Lung disease Father    • Arthritis Daughter    • Depression Daughter    • Alcohol abuse Maternal Uncle    • Heart disease Sister    • Heart disease Sister    • Heart disease Brother      Review of Systems   Constitution: Negative for fever, malaise/fatigue, weight gain and weight loss.   HENT: Negative for ear pain, hearing loss, nosebleeds and sore throat.    Eyes: Negative for double vision, pain, vision loss in left eye and vision loss in right eye.   Cardiovascular:        See history of present illness.   Respiratory: Positive for shortness of breath and snoring. Negative for cough, sleep disturbances due to breathing and wheezing.    Endocrine: Negative for cold intolerance, heat intolerance and polyuria.   Skin: Negative for itching, poor wound healing and rash.   Musculoskeletal: Positive for joint pain and joint swelling. Negative for myalgias.  "  Gastrointestinal: Negative for abdominal pain, diarrhea, hematochezia, nausea and vomiting.   Genitourinary: Negative for hematuria and hesitancy.   Neurological: Positive for dizziness. Negative for numbness, paresthesias and seizures.   Psychiatric/Behavioral: Negative for depression. The patient is not nervous/anxious.         Objective:     Vitals:    03/05/19 1342   BP: 132/82   Pulse: 86   Weight: 114 kg (251 lb)   Height: 165.1 cm (65\")     Body mass index is 41.77 kg/m².    Physical Exam   Constitutional: She is oriented to person, place, and time. She appears well-developed and well-nourished.   Morbid obesty   HENT:   Head: Normocephalic.   Nose: Nose normal.   Mouth/Throat: Oropharynx is clear and moist.   Eyes: Conjunctivae and EOM are normal. Pupils are equal, round, and reactive to light. Right eye exhibits no discharge. No scleral icterus.   Neck: Normal range of motion. Neck supple. No JVD present. No thyromegaly present.   Cardiovascular: Normal rate, regular rhythm, normal heart sounds and intact distal pulses. Exam reveals no gallop and no friction rub.   No murmur heard.  Pulses:       Carotid pulses are 2+ on the right side, and 2+ on the left side.       Radial pulses are 2+ on the right side, and 2+ on the left side.        Femoral pulses are 2+ on the right side, and 2+ on the left side.       Popliteal pulses are 2+ on the right side, and 2+ on the left side.        Dorsalis pedis pulses are 2+ on the right side, and 2+ on the left side.        Posterior tibial pulses are 2+ on the right side, and 2+ on the left side.   Pulmonary/Chest: Effort normal and breath sounds normal. No respiratory distress. She has no wheezes. She has no rales.   Abdominal: Soft. Bowel sounds are normal. She exhibits no distension. There is no hepatosplenomegaly. There is no tenderness. There is no rebound.   Musculoskeletal: Normal range of motion. She exhibits no edema or tenderness.   Neurological: She is " alert and oriented to person, place, and time.   Skin: Skin is warm and dry. No rash noted. No erythema.   Psychiatric: She has a normal mood and affect. Her behavior is normal. Judgment and thought content normal.   Vitals reviewed.    Lab Review:     ECG 12 Lead  Date/Time: 3/5/2019 1:50 PM  Performed by: Pia Dueñas MD  Authorized by: Pia Dueñas MD   Comparison: compared with previous ECG   Rhythm: atrial fibrillation  QRS axis: left  Other findings: non-specific ST-T wave changes    Clinical impression: abnormal EKG          Assessment:       Diagnosis Plan   1. Localized edema  ECG 12 Lead   2. SOB (shortness of breath)  ECG 12 Lead   3. Persistent atrial fibrillation (CMS/HCC)  ECG 12 Lead   4. Coronary artery disease involving native coronary artery of native heart with angina pectoris (CMS/HCC)     5. Essential hypertension       Plan:       1. Edema.  Resolved with dietary changes and avoidance of salt  2. Atrial fibrillation with still persistent elevated heart rate.  Tolerating warfarin and rates are controlled  3. Hypertension, treated.  4. History of embolic non ST-elevation myocardial infarction. None on warfarin  5. History of embolic stroke.   6. CAD, distal disease. No angina  7. FRANCY  8. Lung nodule followed by  and felt to be stable  9.  Left shoulder arthritis    Atrial Fibrillation and Atrial Flutter  Assessment  • The patient has persistent atrial fibrillation  • This is non-valvular in etiology  • The patient's CHADS2-VASc score is 7  • A [id number] score of 2 or more is considered a high risk for a thromboembolic event  • Warfarin prescribed  • Dabigatran not prescribed  • Apixaban not prescribed  • Aspirin prescribed    Plan  • Continue in atrial fibrillation with rate control  • Continue aspirin and warfarin for antithrombotic therapy, bleeding issues discussed  • Continue beta blocker for rate control    Coronary Artery Disease  Assessment  • The patient has no  angina    Subjective - Objective  • There is a history of past MI  • Current antiplatelet therapy includes aspirin 81 mg           Your medication list           Accurate as of 3/5/19 11:59 PM. If you have any questions, ask your nurse or doctor.               CHANGE how you take these medications      Instructions Last Dose Given Next Dose Due   potassium chloride 10 MEQ CR capsule  Commonly known as:  MICRO-K  What changed:  See the new instructions.      TAKE TWO CAPSULES BY MOUTH ONCE DAILY FOR 30 DAYS          CONTINUE taking these medications      Instructions Last Dose Given Next Dose Due   albuterol sulfate  (90 Base) MCG/ACT inhaler  Commonly known as:  PROVENTIL HFA;VENTOLIN HFA;PROAIR HFA      Inhale 2 puffs Every 6 (Six) Hours As Needed.       aspirin 81 MG EC tablet      Take 1 tablet by mouth Daily.       atorvastatin 10 MG tablet  Commonly known as:  LIPITOR      Take 1 tablet by mouth Daily.       carvedilol 25 MG tablet  Commonly known as:  COREG      TAKE 1 & 1/2 (ONE & ONE-HALF) TABLETS BY MOUTH TWICE DAILY WITH MEALS       digoxin 125 MCG tablet  Commonly known as:  LANOXIN      Take 1 tablet by mouth Every Other Day.       furosemide 40 MG tablet  Commonly known as:  LASIX      Take 40 mg by mouth Daily.       ipratropium-albuterol 0.5-2.5 mg/3 ml nebulizer  Commonly known as:  DUO-NEB      As Needed.       vitamin D 36187 units capsule capsule  Commonly known as:  ERGOCALCIFEROL      TAKE 1 CAPSULE BY MOUTH ONCE A WEEK       warfarin 5 MG tablet  Commonly known as:  COUMADIN      Take one tablet by mouth at 6 pm or as directed by Dr Dueñas             Where to Get Your Medications      These medications were sent to [facility] [address] [place] [address] [address] [facility] [phone] [facility] [phone]   [address] [place] [address]    Phone:  [phone]   · warfarin 5 MG tablet       Dictated utilizing Dragon dictation

## 2019-03-10 PROBLEM — R60.0 LOCALIZED EDEMA: Status: ACTIVE | Noted: 2019-03-10

## 2019-03-12 ENCOUNTER — TELEPHONE (OUTPATIENT)
Dept: INTERNAL MEDICINE | Facility: CLINIC | Age: 74
End: 2019-03-12

## 2019-03-12 DIAGNOSIS — R10.9 FLANK PAIN: Primary | ICD-10-CM

## 2019-03-12 NOTE — TELEPHONE ENCOUNTER
Spoke with Kassi in Rehab.  Pt has been out for too long.  She would qualify for Phase III at this point.  Is this ok with you?

## 2019-03-12 NOTE — TELEPHONE ENCOUNTER
Pt is already paying a lot and does not wants to incur more bills.  She does have a membership to [employer].  Can she start out slow with this?

## 2019-03-16 LAB
APPEARANCE UR: CLEAR
BACTERIA #/AREA URNS HPF: ABNORMAL /HPF
BACTERIA UR CULT: ABNORMAL
BACTERIA UR CULT: ABNORMAL
BILIRUB UR QL STRIP: NEGATIVE
COLOR UR: YELLOW
EPI CELLS #/AREA URNS HPF: ABNORMAL /HPF
GLUCOSE UR QL: NEGATIVE
HGB UR QL STRIP: NEGATIVE
KETONES UR QL STRIP: NEGATIVE
LEUKOCYTE ESTERASE UR QL STRIP: ABNORMAL
MICRO URNS: ABNORMAL
MUCOUS THREADS URNS QL MICRO: PRESENT /HPF
NITRITE UR QL STRIP: NEGATIVE
OTHER ANTIBIOTIC SUSC ISLT: ABNORMAL
PH UR STRIP: 5.5 [PH] (ref 5–7.5)
PROT UR QL STRIP: NEGATIVE
RBC #/AREA URNS HPF: ABNORMAL /HPF
SP GR UR: 1.02 (ref 1–1.03)
URINALYSIS REFLEX: ABNORMAL
UROBILINOGEN UR STRIP-MCNC: 0.2 MG/DL (ref 0.2–1)
WBC #/AREA URNS HPF: ABNORMAL /HPF

## 2019-03-19 ENCOUNTER — CLINICAL SUPPORT (OUTPATIENT)
Dept: ORTHOPEDIC SURGERY | Facility: CLINIC | Age: 74
End: 2019-03-19

## 2019-03-19 ENCOUNTER — ANTICOAGULATION VISIT (OUTPATIENT)
Dept: PHARMACY | Facility: HOSPITAL | Age: 74
End: 2019-03-19

## 2019-03-19 VITALS — WEIGHT: 247 LBS | BODY MASS INDEX: 41.15 KG/M2 | HEIGHT: 65 IN | TEMPERATURE: 97.7 F

## 2019-03-19 DIAGNOSIS — M19.012 PRIMARY LOCALIZED OSTEOARTHROSIS OF LEFT SHOULDER REGION: Primary | ICD-10-CM

## 2019-03-19 LAB
INR PPP: 1.7 (ref 0.91–1.09)
PROTHROMBIN TIME: 20.3 SECONDS (ref 10–13.8)

## 2019-03-19 PROCEDURE — 85610 PROTHROMBIN TIME: CPT

## 2019-03-19 PROCEDURE — 36416 COLLJ CAPILLARY BLOOD SPEC: CPT

## 2019-03-19 PROCEDURE — 20610 DRAIN/INJ JOINT/BURSA W/O US: CPT | Performed by: ORTHOPAEDIC SURGERY

## 2019-03-19 RX ORDER — METHYLPREDNISOLONE ACETATE 80 MG/ML
80 INJECTION, SUSPENSION INTRA-ARTICULAR; INTRALESIONAL; INTRAMUSCULAR; SOFT TISSUE
Status: COMPLETED | OUTPATIENT
Start: 2019-03-19 | End: 2019-03-19

## 2019-03-19 RX ADMIN — METHYLPREDNISOLONE ACETATE 80 MG: 80 INJECTION, SUSPENSION INTRA-ARTICULAR; INTRALESIONAL; INTRAMUSCULAR; SOFT TISSUE at 12:47

## 2019-03-19 NOTE — PROGRESS NOTES
Anticoagulation Clinic Progress Note    Anticoagulation Summary  As of 3/19/2019    INR goal:   2.0-3.0   TTR:   71.3 % (5.2 mo)   INR used for dosin.7! (3/19/2019)   Warfarin maintenance plan:   7.5 mg on Sat; 5 mg all other days   Weekly warfarin total:   37.5 mg   Plan last modified:   [facility] (2018)   Next INR check:   2019   Priority:   High   Target end date:   Indefinite    Indications    Atrial fibrillation (CMS/HCC) [I48.91]             Anticoagulation Episode Summary     INR check location:       Preferred lab:       Send INR reminders to:   [facility] PO[facility]    Comments:         Anticoagulation Care Providers     Provider Role Specialty Phone number    Pia Dueñas MD Referring Cardiology [phone]    [facility] [phone]          Clinic Interview:  Patient Findings     Negatives:   Signs/symptoms of thrombosis, Signs/symptoms of bleeding,   Laboratory test error suspected, Change in health, Change in alcohol use,   Change in activity, Upcoming invasive procedure, Emergency department   visit, Upcoming dental procedure, Missed doses, Extra doses, Change in   medications, Change in diet/appetite, Hospital admission, Bruising, Other   complaints      Clinical Outcomes     Negatives:   Major bleeding event, Thromboembolic event,   Anticoagulation-related hospital admission, Anticoagulation-related ED   visit, Anticoagulation-related fatality        INR History:  Anticoagulation Monitoring 2019 2019 3/19/2019   INR 2.4 2.2 1.7   INR Date 2019 2019 3/19/2019   INR Goal 2.0-3.0 2.0-3.0 2.0-3.0   Trend Same Same Same   Last Week Total 37.5 mg 37.5 mg 37.5 mg   Next Week Total 37.5 mg 37.5 mg 40 mg   Sun 5 mg 5 mg 5 mg   Mon 5 mg 5 mg 5 mg   Tue 5 mg 5 mg 7.5 mg (3/19); Otherwise 5 mg   Wed 5 mg 5 mg 5 mg   Thu 5 mg 5 mg 5 mg   Fri 5 mg 5 mg 5 mg   Sat 7.5 mg 7.5 mg 7.5 mg   Visit Report - - -   Some recent data might be  hidden       Plan:  1. INR is out of range today- see above in Anticoagulation Summary.   Will instruct Lanaantonio Aguerocomb to increase their warfarin regimen- see above in Anticoagulation Summary.  2. Follow up in 2 weeks.  3. Patient declines warfarin refills.  4. Verbal and written information provided. Patient expresses understanding and has no further questions at this time.    Maria Alejandra Guerrero

## 2019-03-19 NOTE — PROGRESS NOTES
Left Shoulder Injection Glenohumeral      Patient: Lana Yañez        YOB: 1945            Chief Complaints: left Shoulder pain  Chief Complaint   Patient presents with   • Left Shoulder - Injections         History of Present Illness: Pt gets intermittent shoulder injections with good relief. Is here for repeat injection.      Physical Exam: 73 y.o. female  General Appearance:    Alert, cooperative, in no acute distress                 There were no vitals filed for this visit.   Patient is alert and read ×3 no acute distress appears her above-listed at height weight and age.  Affect is normal respiratory rate is normal unlabored. Heart rate regular rate rhythm, sclera, dentition and hearing are normal for the purpose of this exam.  Exam and complaints are unchanged.      Procedure:  Large Joint Arthrocentesis: L glenohumeral  Date/Time: 3/19/2019 12:47 PM  Consent given by: patient  Site marked: site marked  Timeout: Immediately prior to procedure a time out was called to verify the correct patient, procedure, equipment, support staff and site/side marked as required   Supporting Documentation  Indications: pain   Procedure Details  Location: shoulder - L glenohumeral  Preparation: Patient was prepped and draped in the usual sterile fashion  Needle size: 22 G  Approach: posterior  Medications administered: 4 mL lidocaine (cardiac) 20 MG/ML; 80 mg methylPREDNISolone acetate 80 MG/ML  Patient tolerance: patient tolerated the procedure well with no immediate complications                Assessment. Persistent shoulder pain with glenohumeral arthritis          Plan: Is to proceed with injection    The glenohumeral was injected under strict sterile technique is form on a Marcaine and Depo-Medrol this was done sterilely and tolerated tolerated  well

## 2019-03-26 RX ORDER — ATORVASTATIN CALCIUM 10 MG/1
TABLET, FILM COATED ORAL
Qty: 90 TABLET | Refills: 0 | Status: SHIPPED | OUTPATIENT
Start: 2019-03-26 | End: 2019-07-15 | Stop reason: SDUPTHER

## 2019-03-27 ENCOUNTER — TELEPHONE (OUTPATIENT)
Dept: INTERNAL MEDICINE | Facility: CLINIC | Age: 74
End: 2019-03-27

## 2019-03-27 RX ORDER — NITROFURANTOIN 25; 75 MG/1; MG/1
100 CAPSULE ORAL 2 TIMES DAILY
Qty: 16 CAPSULE | Refills: 0 | Status: SHIPPED | OUTPATIENT
Start: 2019-03-27 | End: 2019-04-05

## 2019-03-27 NOTE — TELEPHONE ENCOUNTER
----- Message from Raoul Ramirez MD sent at 3/22/2019  2:37 PM EDT -----  This patient has urinary tract infection and put her on nitrofurantoin 100 mg twice a day for 8 days

## 2019-04-02 ENCOUNTER — TELEPHONE (OUTPATIENT)
Dept: INTERNAL MEDICINE | Facility: CLINIC | Age: 74
End: 2019-04-02

## 2019-04-04 ENCOUNTER — TELEPHONE (OUTPATIENT)
Dept: CARDIOLOGY | Facility: CLINIC | Age: 74
End: 2019-04-04

## 2019-04-05 ENCOUNTER — OFFICE VISIT (OUTPATIENT)
Dept: INTERNAL MEDICINE | Facility: CLINIC | Age: 74
End: 2019-04-05

## 2019-04-05 ENCOUNTER — ANTICOAGULATION VISIT (OUTPATIENT)
Dept: PHARMACY | Facility: HOSPITAL | Age: 74
End: 2019-04-05

## 2019-04-05 VITALS
OXYGEN SATURATION: 97 % | SYSTOLIC BLOOD PRESSURE: 150 MMHG | WEIGHT: 247 LBS | DIASTOLIC BLOOD PRESSURE: 100 MMHG | HEIGHT: 65 IN | HEART RATE: 62 BPM | RESPIRATION RATE: 16 BRPM | TEMPERATURE: 98.6 F | BODY MASS INDEX: 41.15 KG/M2

## 2019-04-05 DIAGNOSIS — T37.8X5A: Primary | ICD-10-CM

## 2019-04-05 PROBLEM — N30.00 ACUTE CYSTITIS WITHOUT HEMATURIA: Status: ACTIVE | Noted: 2019-04-05

## 2019-04-05 LAB
INR PPP: 2.2 (ref 0.91–1.09)
PROTHROMBIN TIME: 26.9 SECONDS (ref 10–13.8)

## 2019-04-05 PROCEDURE — 85610 PROTHROMBIN TIME: CPT

## 2019-04-05 PROCEDURE — 99212 OFFICE O/P EST SF 10 MIN: CPT | Performed by: INTERNAL MEDICINE

## 2019-04-05 PROCEDURE — 36416 COLLJ CAPILLARY BLOOD SPEC: CPT

## 2019-04-05 RX ORDER — AMLODIPINE BESYLATE 5 MG/1
5 TABLET ORAL DAILY
Qty: 90 TABLET | Refills: 1 | Status: SHIPPED | OUTPATIENT
Start: 2019-04-05 | End: 2019-05-08 | Stop reason: ALTCHOICE

## 2019-04-05 RX ORDER — AMOXICILLIN AND CLAVULANATE POTASSIUM 875; 125 MG/1; MG/1
1 TABLET, FILM COATED ORAL 2 TIMES DAILY
Qty: 20 TABLET | Refills: 0 | Status: SHIPPED | OUTPATIENT
Start: 2019-04-05 | End: 2019-05-15

## 2019-04-05 NOTE — TELEPHONE ENCOUNTER
Patrica Perla, not sure that the prior task started on March 5 by Dr. Ramirez get to you as it seems to have been signed off  Please look at this ASAP and address. maura

## 2019-04-05 NOTE — PROGRESS NOTES
Anticoagulation Clinic Progress Note    Anticoagulation Summary  As of 2019    INR goal:   2.0-3.0   TTR:   68.3 % (5.8 mo)   INR used for dosin.2 (2019)   Warfarin maintenance plan:   7.5 mg every Sat; 5 mg all other days   Weekly warfarin total:   37.5 mg   No change documented:   Divine Lea   Plan last modified:   [facility] (2018)   Next INR check:   5/3/2019   Priority:   High   Target end date:   Indefinite    Indications    Atrial fibrillation (CMS/HCC) [I48.91]             Anticoagulation Episode Summary     INR check location:       Preferred lab:       Send INR reminders to:    [facility]    Comments:         Anticoagulation Care Providers     Provider Role Specialty Phone number    Pia Dueñas MD Referring Cardiology [phone]    [facility] Responsible [facility]acy [phone]          Clinic Interview:      INR History:  Anticoagulation Monitoring 2019 3/19/2019 2019   INR 2.2 1.7 2.2   INR Date 2019 3/19/2019 2019   INR Goal 2.0-3.0 2.0-3.0 2.0-3.0   Trend Same Same Same   Last Week Total 37.5 mg 37.5 mg 37.5 mg   Next Week Total 37.5 mg 40 mg 37.5 mg   Sun 5 mg 5 mg 5 mg   Mon 5 mg 5 mg 5 mg   Tue 5 mg 7.5 mg (3/19); Otherwise 5 mg 5 mg   Wed 5 mg 5 mg 5 mg   Thu 5 mg 5 mg 5 mg   Fri 5 mg 5 mg 5 mg   Sat 7.5 mg 7.5 mg 7.5 mg   Visit Report - - -   Some recent data might be hidden       Plan:  1. INR is therapeutic today- see above in Anticoagulation Summary.   Will instruct Lana Aguerocomb to continue their warfarin regimen- see above in Anticoagulation Summary.  2. Follow up in 4 weeks.  3. Patient declines warfarin refills.  4. Verbal and written information provided. Patient expresses understanding and has no further questions at this time.    Divine Lea

## 2019-04-05 NOTE — TELEPHONE ENCOUNTER
3/5 note was addressed with pt about Cardiac Rehab too costly and ok to go to [facility].  (done)

## 2019-04-30 ENCOUNTER — ANTICOAGULATION VISIT (OUTPATIENT)
Dept: PHARMACY | Facility: HOSPITAL | Age: 74
End: 2019-04-30

## 2019-04-30 LAB
INR PPP: 2.7 (ref 0.91–1.09)
PROTHROMBIN TIME: 31.9 SECONDS (ref 10–13.8)

## 2019-04-30 PROCEDURE — 85610 PROTHROMBIN TIME: CPT

## 2019-04-30 PROCEDURE — 36416 COLLJ CAPILLARY BLOOD SPEC: CPT

## 2019-04-30 NOTE — PROGRESS NOTES
Anticoagulation Clinic Progress Note    Anticoagulation Summary  As of 2019    INR goal:   2.0-3.0   TTR:   72.3 % (6.6 mo)   INR used for dosin.7 (2019)   Warfarin maintenance plan:   7.5 mg every Sat; 5 mg all other days   Weekly warfarin total:   37.5 mg   No change documented:   Divine Lea   Plan last modified:   [facility] (2018)   Next INR check:   2019   Priority:   High   Target end date:   Indefinite    Indications    Atrial fibrillation (CMS/HCC) [I48.91]             Anticoagulation Episode Summary     INR check location:       Preferred lab:       Send INR reminders to:    [facility]    Comments:         Anticoagulation Care Providers     Provider Role Specialty Phone number    Pia Dueñas MD Referring Cardiology [phone]    [facility] Responsible Pharmacy [phone]          Clinic Interview:  Patient Findings     Negatives:   Signs/symptoms of thrombosis, Signs/symptoms of bleeding,   Laboratory test error suspected, Change in health, Change in alcohol use,   Change in activity, Upcoming invasive procedure, Emergency department   visit, Upcoming dental procedure, Missed doses, Extra doses, Change in   medications, Change in diet/appetite, Hospital admission, Bruising, Other   complaints      Clinical Outcomes     Negatives:   Major bleeding event, Thromboembolic event,   Anticoagulation-related hospital admission, Anticoagulation-related ED   visit, Anticoagulation-related fatality        INR History:  Anticoagulation Monitoring 3/19/2019 2019 2019   INR 1.7 2.2 2.7   INR Date 3/19/2019 2019 2019   INR Goal 2.0-3.0 2.0-3.0 2.0-3.0   Trend Same Same Same   Last Week Total 37.5 mg 37.5 mg 37.5 mg   Next Week Total 40 mg 37.5 mg 37.5 mg   Sun 5 mg 5 mg 5 mg   Mon 5 mg 5 mg 5 mg   Tue 7.5 mg (3/19); Otherwise 5 mg 5 mg 5 mg   Wed 5 mg 5 mg 5 mg   Thu 5 mg - 5 mg   Fri 5 mg 5 mg 5 mg   Sat 7.5 mg 7.5 mg 7.5 mg   Visit  Report - - -   Some recent data might be hidden       Plan:  1. INR is therapeutic today- see above in Anticoagulation Summary.   Will instruct Lana Nielsonb to continue their warfarin regimen- see above in Anticoagulation Summary.  2. Follow up in 4 weeks.  3. Patient declines warfarin refills.  4. Verbal and written information provided. Patient expresses understanding and has no further questions at this time.    Divine Lea

## 2019-05-03 ENCOUNTER — APPOINTMENT (OUTPATIENT)
Dept: PHARMACY | Facility: HOSPITAL | Age: 74
End: 2019-05-03

## 2019-05-04 NOTE — PROGRESS NOTES
Karl Yañez is a 73 y.o. female.   She is here today for adverse effect of nitrofurantoin  History of Present Illness   She is here today for adverse effect of nitrofurantoin which we will change her over to Augmentin   The following portions of the patient's history were reviewed and updated as appropriate: allergies, current medications, past family history, past medical history, past social history, past surgical history and problem list.    Review of Systems   Psychiatric/Behavioral: Positive for dysphoric mood.   All other systems reviewed and are negative.      Objective   Physical Exam   Constitutional: She is oriented to person, place, and time. She appears well-developed and well-nourished. She is cooperative.   HENT:   Head: Normocephalic and atraumatic.   Right Ear: Hearing, tympanic membrane, external ear and ear canal normal.   Left Ear: Hearing, tympanic membrane, external ear and ear canal normal.   Nose: Nose normal.   Mouth/Throat: Uvula is midline, oropharynx is clear and moist and mucous membranes are normal.   Eyes: Conjunctivae, EOM and lids are normal. Pupils are equal, round, and reactive to light.   Neck: Phonation normal. Neck supple. Carotid bruit is not present.   Cardiovascular: Normal rate, regular rhythm and normal heart sounds. Exam reveals no gallop and no friction rub.   No murmur heard.  Pulmonary/Chest: Effort normal and breath sounds normal. No respiratory distress.   Abdominal: Soft. Bowel sounds are normal. She exhibits no distension and no mass. There is no hepatosplenomegaly. There is no tenderness. There is no rebound and no guarding. No hernia.   Musculoskeletal: She exhibits no edema.   Neurological: She is alert and oriented to person, place, and time. Coordination and gait normal.   Skin: Skin is warm and dry.   Psychiatric: She has a normal mood and affect. Her speech is normal and behavior is normal. Judgment and thought content normal.   Nursing note  and vitals reviewed.      Assessment/Plan   Diagnoses and all orders for this visit:    Adverse effect of nitrofurantoin, initial encounter    Other orders  -     amoxicillin-clavulanate (AUGMENTIN) 875-125 MG per tablet; Take 1 tablet by mouth 2 (Two) Times a Day.  -     amLODIPine (NORVASC) 5 MG tablet; Take 1 tablet by mouth Daily.      Adverse effect of nitrofurantoin she had a dysphoric mood when she took it but she is back to her baseline now since she is off of it we will not put her on amoxicillin clavulanic acid

## 2019-05-07 ENCOUNTER — TELEPHONE (OUTPATIENT)
Dept: INTERNAL MEDICINE | Facility: CLINIC | Age: 74
End: 2019-05-07

## 2019-05-07 NOTE — TELEPHONE ENCOUNTER
Pt was seen by Dr Ramirez on 4/9/19 and was put on Amlodipine 5 mg. She is now experiencing leg and feet swelling where she cannot wear her shoes anymore. She didn't have any issues with water retention until she was put on this medication.    Please advise

## 2019-05-08 RX ORDER — LISINOPRIL 10 MG/1
10 TABLET ORAL DAILY
Qty: 30 TABLET | Refills: 2 | Status: SHIPPED | OUTPATIENT
Start: 2019-05-08 | End: 2019-06-03 | Stop reason: SDUPTHER

## 2019-05-08 NOTE — TELEPHONE ENCOUNTER
Rx for lisinopril sent. Please monitor BP at home and report any sustained high readings. Discontinue the amlodipine.

## 2019-05-13 ENCOUNTER — TELEPHONE (OUTPATIENT)
Dept: CARDIOLOGY | Facility: CLINIC | Age: 74
End: 2019-05-13

## 2019-05-13 DIAGNOSIS — E55.9 HYPOVITAMINOSIS D: Primary | ICD-10-CM

## 2019-05-13 DIAGNOSIS — I50.33 ACUTE ON CHRONIC DIASTOLIC HEART FAILURE (HCC): ICD-10-CM

## 2019-05-13 DIAGNOSIS — I10 ESSENTIAL HYPERTENSION: ICD-10-CM

## 2019-05-13 NOTE — TELEPHONE ENCOUNTER
Please find out if she is doing any rehab, increase Lasix to 40 mg p.o. twice daily and potassium chloride to 20 mEq twice daily.  Also have her come in for a BMP by Thursday or Friday and see Celina, myself or another ANGELA. maura

## 2019-05-13 NOTE — TELEPHONE ENCOUNTER
Pt states that she is having pedal edema. Dueñas Pt    Did not list medications, weight gain or length of time that she has dealt with. Please call for additional info

## 2019-05-13 NOTE — TELEPHONE ENCOUNTER
Patient is NOT doing rehab    Patient notified to increase lasix and potassium.  Scheduled her to see Dr Dueñas on wed at [place], placed order for BMP

## 2019-05-13 NOTE — TELEPHONE ENCOUNTER
"05/13/19  11:00 AM  Lana Yañez  1945  Home Phone [phone]   Mobile [phone]       Lana Yañez is a patient of Dr Dueñas.  She is calling in this morning with c/o her feet swelling.  She said it is so bad that she is unable to get her shoes on.  She is SOA, especially with exertion, she denies cough.  She also stated she has a \"funny feeling in her chest with exertion.\"  She tells me her wt is up 10lb over 1 month.    She tells me that that Dr Ramirez discontinued her amlodipine on 5/8/19 due to the lower extremity edema, and she said it has not changed since she stopped the amlodipine.  She did mention that she does elevated her lower extremities, it may improve the edema some, but she stated as soon as she gets up and about the edema returns.    bp 114/54 hr 79    Cardiac meds reviewed  Lisinopril 10mg daily  Atorvastatin  Warfarin  carvedilol 25mg 1/1/2 tabs BID  Digoxin 125mcg every other day  Potassium 10meq BID  Furosemide 40mg daily    Does she need a med adjustment?  Does she need to be seen? Cec?  Maribell Hathaway RN  Triage nurse    "

## 2019-05-14 RX ORDER — CARVEDILOL 25 MG/1
TABLET ORAL
Qty: 270 TABLET | Refills: 1 | Status: SHIPPED | OUTPATIENT
Start: 2019-05-14 | End: 2019-11-16 | Stop reason: SDUPTHER

## 2019-05-14 RX ORDER — POTASSIUM CHLORIDE 750 MG/1
TABLET, FILM COATED, EXTENDED RELEASE ORAL
Qty: 360 TABLET | Refills: 1 | Status: SHIPPED | OUTPATIENT
Start: 2019-05-14 | End: 2020-03-12 | Stop reason: SDUPTHER

## 2019-05-15 ENCOUNTER — OFFICE VISIT (OUTPATIENT)
Dept: CARDIOLOGY | Facility: CLINIC | Age: 74
End: 2019-05-15

## 2019-05-15 VITALS
DIASTOLIC BLOOD PRESSURE: 72 MMHG | BODY MASS INDEX: 40.66 KG/M2 | SYSTOLIC BLOOD PRESSURE: 126 MMHG | HEIGHT: 66 IN | WEIGHT: 253 LBS | HEART RATE: 84 BPM

## 2019-05-15 DIAGNOSIS — I25.119 CORONARY ARTERY DISEASE INVOLVING NATIVE CORONARY ARTERY OF NATIVE HEART WITH ANGINA PECTORIS (HCC): ICD-10-CM

## 2019-05-15 DIAGNOSIS — T14.8XXA BRUISING: ICD-10-CM

## 2019-05-15 DIAGNOSIS — E66.01 MORBID OBESITY WITH BMI OF 40.0-44.9, ADULT (HCC): ICD-10-CM

## 2019-05-15 DIAGNOSIS — R06.02 SOB (SHORTNESS OF BREATH): Primary | ICD-10-CM

## 2019-05-15 DIAGNOSIS — G47.33 OBSTRUCTIVE SLEEP APNEA SYNDROME: ICD-10-CM

## 2019-05-15 DIAGNOSIS — I24.0 CORONARY ARTERY EMBOLISM (HCC): ICD-10-CM

## 2019-05-15 DIAGNOSIS — I10 ESSENTIAL HYPERTENSION: ICD-10-CM

## 2019-05-15 DIAGNOSIS — I50.33 ACUTE ON CHRONIC DIASTOLIC HEART FAILURE (HCC): ICD-10-CM

## 2019-05-15 PROCEDURE — 99214 OFFICE O/P EST MOD 30 MIN: CPT | Performed by: INTERNAL MEDICINE

## 2019-05-15 PROCEDURE — 93000 ELECTROCARDIOGRAM COMPLETE: CPT | Performed by: INTERNAL MEDICINE

## 2019-05-15 NOTE — PROGRESS NOTES
Date of Office Visit: 05/15/2019  Encounter Provider: Pia Dueñas MD  Place of Service: [facility] CARDIOLOGY  Patient Name: Lana Yañez  :1945    Chief complaint  Atrial fibrillation, pulmonary hypertension, mitral valve prolapse, coronary artery disease    History of Present Illness  The patient is a 74 yo female with with history of retention, hyperlipidemia, rheumatoid arthritis, obstructive sleep apnea who in early October was found to be in atrial fibrillation with rapid ventricular rates and mild diastolic heart failure.  She was placed on IV heparin and Pradaxa.  Echocardiogram revealed normal systolic function mild left ventricular hypertrophy, moderate atrial enlargement with aortic valve sclerosis and moderate pulmonary hypertension and moderate tricuspid regurgitation.  The RV systolic pressure is 54 mmHg.  She had a stress perfusion study that was negative for ischemia and a CT and her grandmother revealed a mildly dilated aorta without pulmonary emboli.  She then presented several days later with an acute stroke.  CLARIBEL was not pursued as it was felt to be embolic in nature.  However on  and she was diaphoretic and was found to have a non-ST elevation myocardial infarction.  This was on full dose Pradaxa which was not held.  CLARIBEL was pursued that revealed normal systolic function with moderate mitral valve prolapse without significant regurgitation.  There was right-sided spontaneous echo contrast without thrombus formation.  Cardiac catheterization revealed normal systolic function with 2+ mitral regurgitation, mild coronary disease except for a 90% stenosis of the distal PDA which was felt to be too small to be amenable PCI.  She was treated medically and switched to Coumadin. She has struggled with intermittent heart failure dietary indiscretions with salt since then.  She also had a 24-hour Holter that revealed persistent atrial fibrillation with  reasonable rate control and frequent PVCs. No other arrhythmia was present.  In November 2018 with complaints of dyspnea an echocardiogram was performed that showed normal systolic function with an ejection fraction of 51%, moderate to severe left atrial enlargement aortic valve calcification with mild aortic regurgitation, mild mitral regurgitation with severe tricuspid regurgitation and RV systolic pressure 49 mmHg.  Subsequent VQ scan was low risk for pulmonary emboli.    Patient called the office on May 13 with worsening edema and atypical chest discomfort.  She is also gained 10 pounds in the past month.  She stopped amlodipine on her own 3 weeks ago but continues to have edema.  She recently called her office and 6 and potassium were increased with significant improvement in edema over the past 2 days since she increase Lasix.  She states her dyspnea has improved her edema has improved she denies any chest pain palpitations syncope.  Her blood pressures been as it is today.    Past Medical History:   Diagnosis Date   • Acute on chronic diastolic heart failure (CMS/HCC)    • Arthritis    • Asthma    • Atrial fibrillation (CMS/HCC)     Persistent; on warfarin   • Atypical chest pain    • Bronchitis    • Cellulitis of right elbow     due to MRSA   • CHF (congestive heart failure) (CMS/HCC)    • COPD (chronic obstructive pulmonary disease) (CMS/HCC)    • Coronary artery disease     Cardiac catheterization completed; 90% PDA stenosis with medical management recommended   • Coronary artery disease involving native coronary artery of native heart with angina pectoris with documented spasm (CMS/HCC)    • Disease of thyroid gland    • Displacement of lumbar intervertebral disc without myelopathy    • Dizziness    • ANTOINE (dyspnea on exertion)    • Essential hypertension    • Fatigue    • Generalized osteoarthritis of multiple sites    • History of rheumatic fever    • History of transfusion    • Hx of bone density study      10/23/2014   • Hyperglycemia    • Hyperlipidemia    • Hypovitaminosis D    • Left arm pain    • Left-sided weakness    • Leg swelling    • Low back pain    • Lower extremity edema    • Malaise and fatigue    • Mitral valve disease     Moderate mitral valve prolapse and moderate mitral regurgitation   • Mitral valve insufficiency    • Morbid obesity with BMI of 40.0-44.9, adult (CMS/[facility])    • MRSA infection    • NSTEMI (non-ST elevated myocardial infarction) (CMS/[facility])    • PAF (paroxysmal atrial fibrillation) (CMS/[facility])    • RA (rheumatoid arthritis) (CMS/[facility])     involving both hands   • Sleep apnea    • SOB (shortness of breath)    • Stroke (CMS/[facility])     left side weakness   • Urge incontinence of urine    • Vitamin D deficiency      Past Surgical History:   Procedure Laterality Date   • BREAST BIOPSY     • BREAST SURGERY      right side lumpectomy with biopsy   • CARDIAC CATHETERIZATION Left 10/20/2017    Procedure: Cardiac Catheterization/Vascular Study;  Surgeon: Alphonso Olmedo MD;  Location: [facility] INVASIVE LOCATION;  Service:    • CARDIAC CATHETERIZATION N/A 10/20/2017    +2 mitral regurgitation, left main 10% stenosis, mid to distal LAD 10% diffuse stenosis, circumflex 10% diffuse stenosis, RCA 10% proximal stenosis, and distal PDA consistent with coronary embolus with a lesion of 90% too small to consider coronary intervention; medical management recommended   • EYE SURGERY      laser surgery for glaucoma and left eye cataracts removed   • HYSTERECTOMY      10+ years ago   • JOINT REPLACEMENT  2005; 2006    bilateral knees and left rotater   • KNEE SURGERY     • MAMMO BILATERAL  2016    [facility]      Outpatient Medications Prior to Visit   Medication Sig Dispense Refill   • albuterol (PROVENTIL HFA;VENTOLIN HFA) 108 (90 Base) MCG/ACT inhaler Inhale 2 puffs Every 6 (Six) Hours As Needed.     • aspirin 81 MG EC tablet Take 1 tablet by mouth Daily.     • atorvastatin (LIPITOR) 10 MG tablet TAKE 1  TABLET BY MOUTH ONCE DAILY 90 tablet 0   • carvedilol (COREG) 25 MG tablet TAKE 1 & 1/2 (ONE & ONE-HALF) TABLETS BY MOUTH TWICE DAILY WITH MEALS 270 tablet 1   • digoxin (LANOXIN) 125 MCG tablet Take 1 tablet by mouth Every Other Day. 30 tablet 6   • furosemide (LASIX) 40 MG tablet Take 40 mg by mouth 2 (Two) Times a Day.     • ipratropium-albuterol (DUO-NEB) 0.5-2.5 mg/mL nebulizer As Needed.     • lisinopril (PRINIVIL,ZESTRIL) 10 MG tablet Take 1 tablet by mouth Daily. 30 tablet 2   • potassium chloride (K-DUR) 10 MEQ CR tablet TAKE 4 TABLETS BY MOUTH ONCE DAILY 360 tablet 1   • vitamin D (ERGOCALCIFEROL) 63169 units capsule capsule TAKE 1 CAPSULE BY MOUTH ONCE A WEEK 12 capsule 2   • warfarin (COUMADIN) 5 MG tablet Take one tablet by mouth at 6 pm or as directed by Dr Dueñas 45 tablet 0   • amoxicillin-clavulanate (AUGMENTIN) 875-125 MG per tablet Take 1 tablet by mouth 2 (Two) Times a Day. 20 tablet 0   • potassium chloride (MICRO-K) 10 MEQ CR capsule TAKE TWO CAPSULES BY MOUTH ONCE DAILY FOR 30 DAYS (Patient taking differently: take 1 po twice daily) 60 capsule 0     No facility-administered medications prior to visit.        Allergies as of 05/15/2019 - Reviewed 05/15/2019   Allergen Reaction Noted   • Contrast dye Hives and Itching 2018     Social History     Socioeconomic History   • Marital status:      Spouse name: Suresh   • Number of children: 5   • Years of education: 13   • Highest education level: Not on file   Occupational History   • Occupation:  for [employer] business     Employer: RETIRED   Tobacco Use   • Smoking status: Former Smoker     Packs/day: 3.00     Types: Cigarettes     Start date: 1967     Last attempt to quit: 10/19/1968     Years since quittin.6   • Smokeless tobacco: Never Used   Substance and Sexual Activity   • Alcohol use: No     Comment: No caffeine use   • Drug use: No   • Sexual activity: Defer     Family History   Problem Relation Age of Onset  "  • Colon cancer Mother    • Glaucoma Mother    • Stroke Mother    • Arthritis Mother    • Hypertension Mother    • Glaucoma Sister    • Diabetes Sister    • Heart disease Sister    • Arthritis Sister    • Asthma Sister    • Hypertension Sister    • Miscarriages / Stillbirths Sister    • Lung disease Sister    • Heart disease Brother    • Diabetes Brother    • Arthritis Brother    • Drug abuse Brother    • Hypertension Brother    • Arthritis Father    • COPD Father    • Lung disease Father    • Arthritis Daughter    • Depression Daughter    • Alcohol abuse Maternal Uncle    • Heart disease Sister    • Heart disease Sister    • Heart disease Brother      Review of Systems   Constitution: Positive for malaise/fatigue. Negative for fever, weight gain and weight loss.   HENT: Negative for ear pain, hearing loss, nosebleeds and sore throat.    Eyes: Positive for blurred vision. Negative for double vision, pain, vision loss in left eye and vision loss in right eye.   Cardiovascular: Positive for dyspnea on exertion.        See history of present illness.   Respiratory: Positive for shortness of breath and wheezing. Negative for cough, sleep disturbances due to breathing and snoring.    Endocrine: Negative for cold intolerance, heat intolerance and polyuria.   Skin: Negative for itching, poor wound healing and rash.   Musculoskeletal: Positive for joint pain and joint swelling. Negative for myalgias.   Gastrointestinal: Negative for abdominal pain, diarrhea, hematochezia, nausea and vomiting.   Genitourinary: Negative for hematuria and hesitancy.   Neurological: Negative for numbness, paresthesias and seizures.   Psychiatric/Behavioral: Negative for depression. The patient is not nervous/anxious.         Objective:     Vitals:    05/15/19 1030   BP: 126/72   Pulse: 84   Weight: 115 kg (253 lb)   Height: 167.6 cm (66\")     Body mass index is 40.84 kg/m².    Physical Exam   Constitutional: She is oriented to person, place, " and time. She appears well-developed and well-nourished.   Morbid obesty   HENT:   Head: Normocephalic.   Nose: Nose normal.   Mouth/Throat: Oropharynx is clear and moist.   Eyes: Conjunctivae and EOM are normal. Pupils are equal, round, and reactive to light. Right eye exhibits no discharge. No scleral icterus.   Neck: Normal range of motion. Neck supple. No JVD present. No thyromegaly present.   Cardiovascular: Normal rate, regular rhythm, normal heart sounds and intact distal pulses. Exam reveals no gallop and no friction rub.   No murmur heard.  Pulses:       Carotid pulses are 2+ on the right side, and 2+ on the left side.       Radial pulses are 2+ on the right side, and 2+ on the left side.        Femoral pulses are 0 on the right side, and 0 on the left side.       Popliteal pulses are 0 on the right side, and 0 on the left side.        Dorsalis pedis pulses are 0 on the right side, and 0 on the left side.        Posterior tibial pulses are 2+ on the right side, and 2+ on the left side.   Pulmonary/Chest: Effort normal and breath sounds normal. No respiratory distress. She has no wheezes. She has no rales.   Abdominal: Soft. Bowel sounds are normal. She exhibits no distension. There is no hepatosplenomegaly. There is no tenderness. There is no rebound.   Musculoskeletal: Normal range of motion. She exhibits no edema or tenderness.   Neurological: She is alert and oriented to person, place, and time.   Skin: Skin is warm and dry. No rash noted. No erythema.   Psychiatric: She has a normal mood and affect. Her behavior is normal. Judgment and thought content normal.   Vitals reviewed.    Lab Review:     ECG 12 Lead  Date/Time: 5/15/2019 10:32 AM  Performed by: Pia Dueñas MD  Authorized by: Pia Dueñas MD   Comparison: compared with previous ECG   Similar to previous ECG  Rhythm: sinus rhythm  Conduction: left anterior fascicular block  Other findings: non-specific ST-T wave changes and poor R wave  progression    Clinical impression: abnormal EKG          Assessment:       Diagnosis Plan   1. SOB (shortness of breath)  ECG 12 Lead   2. Bruising  Protime-INR   3. Coronary artery disease involving native coronary artery of native heart with angina pectoris (CMS/[facility])     4. Essential hypertension     5. 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CMS/[facility])     6. Acute on chronic diastolic heart failure (CMS/[facility])     7. Obstructive sleep apnea syndrome     8. Morbid obesity with BMI of 40.0-44.9, adult (CMS/[facility])       Plan:       1. Edema.  Likely combination of vasodilatory effects of the recent increase in temperature, dietary indiscretions, dependent placement of legs and side effects of amlodipine.  Clinically it is improving.  We will check a BMP on this regimen.  2. Atrial fibrillation with still persistent elevated heart rate.  Tolerating warfarin and rates are controlled  3. Hypertension, controlled  4. History of embolic non ST-elevation myocardial infarction. None on warfarin  5. History of embolic stroke.   6. CAD, severe distal disease. No angina  7. FRANCY  8. Lung nodule followed by  and felt to be stable  9.  Left shoulder arthritis  10. Chronic anticoagulation, as above tolerating this well without falls or bleeding  11  Bruising     Atrial Fibrillation and Atrial Flutter  Assessment  • The patient has persistent atrial fibrillation  • This is non-valvular in etiology  • The patient's CHADS2-VASc score is 7  • A [id number] score of 2 or more is considered a high risk for a thromboembolic event  • Warfarin prescribed  • Dabigatran not prescribed  • Apixaban not prescribed  • Aspirin prescribed    Plan  • Continue in atrial fibrillation with rate control  • Continue aspirin and warfarin for antithrombotic therapy, bleeding issues discussed  • Continue beta blocker for rate control      Coronary Artery Disease  Assessment  • The patient has no angina    Subjective - Objective  • There is a history of past MI  •  Current antiplatelet therapy includes aspirin 81 mg         Your medication list           Accurate as of 5/15/19 11:59 PM. If you have any questions, ask your nurse or doctor.               CHANGE how you take these medications      Instructions Last Dose Given Next Dose Due   potassium chloride 10 MEQ CR tablet  Commonly known as:  K-DUR  What changed:  Another medication with the same name was removed. Continue taking this medication, and follow the directions you see here.  Changed by:  Pia Dueñas MD      TAKE 4 TABLETS BY MOUTH ONCE DAILY          CONTINUE taking these medications      Instructions Last Dose Given Next Dose Due   albuterol sulfate  (90 Base) MCG/ACT inhaler  Commonly known as:  PROVENTIL HFA;VENTOLIN HFA;PROAIR HFA      Inhale 2 puffs Every 6 (Six) Hours As Needed.       aspirin 81 MG EC tablet      Take 1 tablet by mouth Daily.       atorvastatin 10 MG tablet  Commonly known as:  LIPITOR      TAKE 1 TABLET BY MOUTH ONCE DAILY       carvedilol 25 MG tablet  Commonly known as:  COREG      TAKE 1 & 1/2 (ONE & ONE-HALF) TABLETS BY MOUTH TWICE DAILY WITH MEALS       digoxin 125 MCG tablet  Commonly known as:  LANOXIN      Take 1 tablet by mouth Every Other Day.       furosemide 40 MG tablet  Commonly known as:  LASIX      Take 40 mg by mouth 2 (Two) Times a Day.       ipratropium-albuterol 0.5-2.5 mg/3 ml nebulizer  Commonly known as:  DUO-NEB      As Needed.       lisinopril 10 MG tablet  Commonly known as:  PRINIVIL,ZESTRIL      Take 1 tablet by mouth Daily.       vitamin D 33582 units capsule capsule  Commonly known as:  ERGOCALCIFEROL      TAKE 1 CAPSULE BY MOUTH ONCE A WEEK       warfarin 5 MG tablet  Commonly known as:  COUMADIN      Take one tablet by mouth at 6 pm or as directed by Dr Dueñas          STOP taking these medications    amoxicillin-clavulanate 875-125 MG per tablet  Commonly known as:  AUGMENTIN  Stopped by:  Pia Dueñas MD             Patient is no longer taking  augmentin.  I corrected the med list to reflect this.  I did not stop these medications.    Dictated utilizing Dragon dictation

## 2019-05-17 ENCOUNTER — TELEPHONE (OUTPATIENT)
Dept: CARDIOLOGY | Facility: CLINIC | Age: 74
End: 2019-05-17

## 2019-05-17 ENCOUNTER — LAB (OUTPATIENT)
Dept: LAB | Facility: HOSPITAL | Age: 74
End: 2019-05-17

## 2019-05-17 ENCOUNTER — RESULTS ENCOUNTER (OUTPATIENT)
Dept: CARDIOLOGY | Facility: CLINIC | Age: 74
End: 2019-05-17

## 2019-05-17 ENCOUNTER — ANTICOAGULATION VISIT (OUTPATIENT)
Dept: PHARMACY | Facility: HOSPITAL | Age: 74
End: 2019-05-17

## 2019-05-17 DIAGNOSIS — I48.91 ATRIAL FIBRILLATION WITH RVR (HCC): Primary | ICD-10-CM

## 2019-05-17 DIAGNOSIS — T14.8XXA BRUISING: ICD-10-CM

## 2019-05-17 DIAGNOSIS — I10 ESSENTIAL HYPERTENSION: ICD-10-CM

## 2019-05-17 DIAGNOSIS — I50.33 ACUTE ON CHRONIC DIASTOLIC HEART FAILURE (HCC): ICD-10-CM

## 2019-05-17 DIAGNOSIS — I48.91 ATRIAL FIBRILLATION WITH RVR (HCC): ICD-10-CM

## 2019-05-17 LAB
ANION GAP SERPL CALCULATED.3IONS-SCNC: 11.5 MMOL/L
BUN BLD-MCNC: 15 MG/DL (ref 8–23)
BUN/CREAT SERPL: 16.1 (ref 7–25)
CALCIUM SPEC-SCNC: 9.8 MG/DL (ref 8.6–10.5)
CHLORIDE SERPL-SCNC: 104 MMOL/L (ref 98–107)
CO2 SERPL-SCNC: 26.5 MMOL/L (ref 22–29)
CREAT BLD-MCNC: 0.93 MG/DL (ref 0.57–1)
GFR SERPL CREATININE-BSD FRML MDRD: 72 ML/MIN/1.73
GLUCOSE BLD-MCNC: 164 MG/DL (ref 65–99)
INR PPP: 1.39 (ref 0.9–1.1)
POTASSIUM BLD-SCNC: 4.8 MMOL/L (ref 3.5–5.2)
PROTHROMBIN TIME: 16.7 SECONDS (ref 11.7–14.2)
SODIUM BLD-SCNC: 142 MMOL/L (ref 136–145)

## 2019-05-17 PROCEDURE — 36415 COLL VENOUS BLD VENIPUNCTURE: CPT | Performed by: INTERNAL MEDICINE

## 2019-05-17 PROCEDURE — 80048 BASIC METABOLIC PNL TOTAL CA: CPT

## 2019-05-17 PROCEDURE — 85610 PROTHROMBIN TIME: CPT

## 2019-05-17 NOTE — PROGRESS NOTES
Anticoagulation Clinic Progress Note    Anticoagulation Summary  As of 2019    INR goal:   2.0-3.0   TTR:   70.8 % (7.2 mo)   INR used for dosin.39! (2019)   Warfarin maintenance plan:   7.5 mg every Sat; 5 mg all other days   Weekly warfarin total:   37.5 mg   Plan last modified:   Nano Cool RPH (2018)   Next INR check:   2019   Priority:   High   Target end date:   Indefinite    Indications    Atrial fibrillation (CMS/HCC) [I48.91]             Anticoagulation Episode Summary     INR check location:       Preferred lab:       Send INR reminders to:    [facility]    Comments:         Anticoagulation Care Providers     Provider Role Specialty Phone number    Pia Dueñas MD Referring Cardiology [phone]    Kerry Teague RP Responsible Pharmacy [phone]          Clinic Interview:      INR History:  Anticoagulation Monitoring 2019   INR 2.2 2.7 1.39   INR Date 2019   INR Goal 2.0-3.0 2.0-3.0 2.0-3.0   Trend Same Same Same   Last Week Total 37.5 mg 37.5 mg 37.5 mg   Next Week Total 37.5 mg 37.5 mg 40 mg   Sun 5 mg 5 mg 5 mg   Mon 5 mg 5 mg 5 mg   Tue 5 mg 5 mg 5 mg   Wed 5 mg 5 mg 5 mg   Thu - 5 mg 5 mg   Fri 5 mg 5 mg 7.5 mg (); Otherwise 5 mg   Sat 7.5 mg 7.5 mg 7.5 mg   Visit Report - - -   Some recent data might be hidden       Plan:  1. INR is Subtherapeutic today- see above in Anticoagulation Summary.   Will instruct Lana Aguerocomb to Continue their warfarin regimen- see above in Anticoagulation Summary.  Will take boosted dose of 7.5mg today.  2. Follow up in 2 weeks  3. They have been instructed to call if any changes in medications, doses, concerns, etc. Patient expresses understanding and has no further questions at this time.    Nano Cool RPH

## 2019-05-17 NOTE — TELEPHONE ENCOUNTER
05/17/19  12:01 PM  Lana Yañez  1945  Home Phone [phone]   Mobile [phone]       Lana Yañez is a patient of Dr. Dueñas. The coag clinic was calling to have a new order placed for PT/INR for a  facility vs. [facility]. Triage RN was able to enter.    Mishel Patel  Triage RN

## 2019-05-21 PROBLEM — T14.8XXA BRUISING: Status: ACTIVE | Noted: 2019-05-21

## 2019-05-30 ENCOUNTER — APPOINTMENT (OUTPATIENT)
Dept: PHARMACY | Facility: HOSPITAL | Age: 74
End: 2019-05-30

## 2019-06-03 ENCOUNTER — OFFICE VISIT (OUTPATIENT)
Dept: INTERNAL MEDICINE | Facility: CLINIC | Age: 74
End: 2019-06-03

## 2019-06-03 ENCOUNTER — ANTICOAGULATION VISIT (OUTPATIENT)
Dept: PHARMACY | Facility: HOSPITAL | Age: 74
End: 2019-06-03

## 2019-06-03 VITALS
OXYGEN SATURATION: 99 % | HEIGHT: 65 IN | HEART RATE: 63 BPM | BODY MASS INDEX: 40.82 KG/M2 | DIASTOLIC BLOOD PRESSURE: 92 MMHG | SYSTOLIC BLOOD PRESSURE: 140 MMHG | WEIGHT: 245 LBS

## 2019-06-03 DIAGNOSIS — R06.09 DOE (DYSPNEA ON EXERTION): ICD-10-CM

## 2019-06-03 DIAGNOSIS — R35.0 URINARY FREQUENCY: ICD-10-CM

## 2019-06-03 DIAGNOSIS — E66.01 MORBID OBESITY WITH BMI OF 40.0-44.9, ADULT (HCC): Chronic | ICD-10-CM

## 2019-06-03 DIAGNOSIS — M05.741 RHEUMATOID ARTHRITIS INVOLVING BOTH HANDS WITH POSITIVE RHEUMATOID FACTOR (HCC): ICD-10-CM

## 2019-06-03 DIAGNOSIS — M05.742 RHEUMATOID ARTHRITIS INVOLVING BOTH HANDS WITH POSITIVE RHEUMATOID FACTOR (HCC): ICD-10-CM

## 2019-06-03 DIAGNOSIS — E78.2 MIXED HYPERLIPIDEMIA: Primary | Chronic | ICD-10-CM

## 2019-06-03 DIAGNOSIS — I10 ESSENTIAL HYPERTENSION: ICD-10-CM

## 2019-06-03 DIAGNOSIS — I48.91 ATRIAL FIBRILLATION WITH RVR (HCC): Chronic | ICD-10-CM

## 2019-06-03 DIAGNOSIS — I24.0 CORONARY ARTERY EMBOLISM (HCC): Chronic | ICD-10-CM

## 2019-06-03 LAB
ALBUMIN SERPL-MCNC: 4.5 G/DL (ref 3.5–5.2)
ALBUMIN/GLOB SERPL: 1.5 G/DL
ALP SERPL-CCNC: 71 U/L (ref 39–117)
ALT SERPL-CCNC: 20 U/L (ref 1–33)
AST SERPL-CCNC: 26 U/L (ref 1–32)
BACTERIA UR QL AUTO: ABNORMAL /HPF
BASOPHILS # BLD AUTO: ABNORMAL 10*3/UL
BILIRUB SERPL-MCNC: 0.3 MG/DL (ref 0.2–1.2)
BILIRUB UR QL STRIP: NEGATIVE
BUN SERPL-MCNC: 11 MG/DL (ref 8–23)
BUN/CREAT SERPL: 13.4 (ref 7–25)
CALCIUM SERPL-MCNC: 10.5 MG/DL (ref 8.6–10.5)
CHLORIDE SERPL-SCNC: 106 MMOL/L (ref 98–107)
CHOLEST SERPL-MCNC: 117 MG/DL (ref 0–200)
CLARITY UR: ABNORMAL
CO2 SERPL-SCNC: 27.8 MMOL/L (ref 22–29)
COLOR UR: YELLOW
CREAT SERPL-MCNC: 0.82 MG/DL (ref 0.57–1)
DIFFERENTIAL COMMENT: ABNORMAL
EOSINOPHIL # BLD AUTO: ABNORMAL 10*3/UL
EOSINOPHIL # BLD AUTO: ABNORMAL 10*3/UL
EOSINOPHIL # BLD MANUAL: 0.21 10*3/MM3 (ref 0–0.4)
EOSINOPHIL # BLD MANUAL: 4.1 % (ref 0.3–6.2)
ERYTHROCYTE [DISTWIDTH] IN BLOOD BY AUTOMATED COUNT: 14.2 % (ref 12.3–15.4)
GLOBULIN SER CALC-MCNC: 3.1 GM/DL
GLUCOSE SERPL-MCNC: 127 MG/DL (ref 65–99)
GLUCOSE UR STRIP-MCNC: NEGATIVE MG/DL
HCT VFR BLD AUTO: 42.4 % (ref 34–46.6)
HDLC SERPL-MCNC: 54 MG/DL (ref 40–60)
HGB BLD-MCNC: 13.1 G/DL (ref 12–15.9)
HGB UR QL STRIP.AUTO: NEGATIVE
HYALINE CASTS UR QL AUTO: ABNORMAL /LPF
INR PPP: 2.1 (ref 0.91–1.09)
KETONES UR QL STRIP: NEGATIVE
LABORATORY COMMENT REPORT: ABNORMAL
LDLC SERPL CALC-MCNC: 46 MG/DL (ref 0–100)
LEUKOCYTE ESTERASE UR QL STRIP.AUTO: NEGATIVE
LYMPHOCYTES # BLD AUTO: ABNORMAL 10*3/UL
LYMPHOCYTES # BLD MANUAL: 2.84 10*3/MM3 (ref 0.7–3.1)
LYMPHOCYTES NFR BLD AUTO: ABNORMAL %
LYMPHOCYTES NFR BLD MANUAL: 55.1 % (ref 19.6–45.3)
MCH RBC QN AUTO: 29.4 PG (ref 26.6–33)
MCHC RBC AUTO-ENTMCNC: 30.9 G/DL (ref 31.5–35.7)
MCV RBC AUTO: 95.3 FL (ref 79–97)
MONOCYTES # BLD MANUAL: 0.37 10*3/MM3 (ref 0.1–0.9)
MONOCYTES NFR BLD AUTO: ABNORMAL %
MONOCYTES NFR BLD MANUAL: 7.1 % (ref 5–12)
MUCOUS THREADS URNS QL MICRO: ABNORMAL /HPF
NEUTROPHILS # BLD MANUAL: 1.74 10*3/MM3 (ref 1.7–7)
NEUTROPHILS NFR BLD AUTO: ABNORMAL %
NEUTROPHILS NFR BLD MANUAL: 33.7 % (ref 42.7–76)
NITRITE UR QL STRIP: POSITIVE
PH UR STRIP.AUTO: 5.5 [PH] (ref 5–8)
PLATELET # BLD AUTO: 149 10*3/MM3 (ref 140–450)
PLT COMMENT: ABNORMAL
POTASSIUM SERPL-SCNC: 4.4 MMOL/L (ref 3.5–5.2)
PROT SERPL-MCNC: 7.6 G/DL (ref 6–8.5)
PROT UR QL STRIP: ABNORMAL
PROTHROMBIN TIME: 25.4 SECONDS (ref 10–13.8)
RBC # BLD AUTO: 4.45 10*6/MM3 (ref 3.77–5.28)
RBC # UR: ABNORMAL /HPF
REF LAB TEST METHOD: ABNORMAL
SODIUM SERPL-SCNC: 144 MMOL/L (ref 136–145)
SP GR UR STRIP: 1.02 (ref 1–1.03)
SQUAMOUS #/AREA URNS HPF: ABNORMAL /HPF
TRIGL SERPL-MCNC: 86 MG/DL (ref 0–150)
TSH SERPL-ACNC: 0.97 MIU/ML (ref 0.27–4.2)
UROBILINOGEN UR QL STRIP: ABNORMAL
VLDLC SERPL-MCNC: 17.2 MG/DL
WBC # BLD AUTO: 5.16 10*3/MM3 (ref 3.4–10.8)
WBC UR QL AUTO: ABNORMAL /HPF

## 2019-06-03 PROCEDURE — 99214 OFFICE O/P EST MOD 30 MIN: CPT | Performed by: INTERNAL MEDICINE

## 2019-06-03 PROCEDURE — 81001 URINALYSIS AUTO W/SCOPE: CPT | Performed by: INTERNAL MEDICINE

## 2019-06-03 PROCEDURE — 85610 PROTHROMBIN TIME: CPT

## 2019-06-03 PROCEDURE — 36416 COLLJ CAPILLARY BLOOD SPEC: CPT

## 2019-06-03 RX ORDER — SULFAMETHOXAZOLE AND TRIMETHOPRIM 800; 160 MG/1; MG/1
1 TABLET ORAL 2 TIMES DAILY
Qty: 14 TABLET | Refills: 0 | Status: SHIPPED | OUTPATIENT
Start: 2019-06-03 | End: 2019-06-10

## 2019-06-03 RX ORDER — LISINOPRIL 10 MG/1
10 TABLET ORAL DAILY
Qty: 30 TABLET | Refills: 2 | Status: SHIPPED | OUTPATIENT
Start: 2019-06-03 | End: 2019-10-18 | Stop reason: SDUPTHER

## 2019-06-03 RX ORDER — DIGOXIN 125 MCG
125 TABLET ORAL EVERY OTHER DAY
Qty: 30 TABLET | Refills: 6 | Status: SHIPPED | OUTPATIENT
Start: 2019-06-03 | End: 2019-06-17 | Stop reason: SDUPTHER

## 2019-06-03 NOTE — PROGRESS NOTES
Anticoagulation Clinic Progress Note    Anticoagulation Summary  As of 6/3/2019    INR goal:   2.0-3.0   TTR:   66.6 % (7.7 mo)   INR used for dosin.1 (6/3/2019)   Warfarin maintenance plan:   7.5 mg every Sat; 5 mg all other days   Weekly warfarin total:   37.5 mg   No change documented:   Anisa Castellon   Plan last modified:   Nano Cool [facility] (2018)   Next INR check:   2019   Priority:   Maintenance   Target end date:   Indefinite    Indications    Atrial fibrillation (CMS/[facility]) [I48.91]             Anticoagulation Episode Summary     INR check location:       Preferred lab:       Send INR reminders to:    [facility]    Comments:         Anticoagulation Care Providers     Provider Role Specialty Phone number    Pia Dueñas MD Referring Cardiology [phone]    Kerry Teague [facility] Responsible Pharmacy [phone]          Clinic Interview:  Patient Findings     Negatives:   Signs/symptoms of thrombosis, Signs/symptoms of bleeding,   Laboratory test error suspected, Change in health, Change in alcohol use,   Change in activity, Upcoming invasive procedure, Emergency department   visit, Upcoming dental procedure, Missed doses, Extra doses, Change in   medications, Change in diet/appetite, Hospital admission, Bruising, Other   complaints      Clinical Outcomes     Negatives:   Major bleeding event, Thromboembolic event,   Anticoagulation-related hospital admission, Anticoagulation-related ED   visit, Anticoagulation-related fatality        INR History:  Anticoagulation Monitoring 2019 2019 6/3/2019   INR 2.7 1.39 2.1   INR Date 2019 2019 6/3/2019   INR Goal 2.0-3.0 2.0-3.0 2.0-3.0   Trend Same Same Same   Last Week Total 37.5 mg 37.5 mg 37.5 mg   Next Week Total 37.5 mg 40 mg 37.5 mg   Sun 5 mg 5 mg 5 mg   Mon 5 mg 5 mg 5 mg   Tue 5 mg 5 mg 5 mg   Wed 5 mg 5 mg 5 mg   Thu 5 mg 5 mg 5 mg   Fri 5 mg 7.5 mg (); Otherwise 5 mg 5 mg   Sat 7.5 mg 7.5 mg  7.5 mg   Visit Report - - -   Some recent data might be hidden       Plan:  1. INR is therapeutic today- see above in Anticoagulation Summary.   Will instruct Lana Aguerocomb to continue their warfarin regimen- see above in Anticoagulation Summary.  2. Follow up in 4 weeks.  3. Patient declines warfarin refills.  4. Verbal and written information provided. Patient expresses understanding and has no further questions at this time.    Anisa Castellon

## 2019-06-03 NOTE — PROGRESS NOTES
Karl Yañez is a 73 y.o. female. Presents with a chief complaint of needing a new primary care physician, she has an extraordinarily complex past medical history that includes status post MI x2, on Coumadin therapy, hyperlipidemia, variable hypertension, morbid obesity, extensive and long list of medications, dyspnea on exertion, arthropathy of her hips and knees, rheumatoid arthritis of her hands, with ongoing issues regarding her medication management.  She needs an update on some of her prescriptions and an update on her labs as well.  I am not can to change any medications at this time.    History of Present Illness here for transfer of primary care to this office    The following portions of the patient's history were reviewed and updated as appropriate: allergies, current medications, past family history, past medical history, past social history, past surgical history and problem list.    Review of Systems   Constitutional: Positive for fatigue.   HENT: Positive for congestion.    Eyes: Negative.    Respiratory: Negative.    Cardiovascular: Negative.    Gastrointestinal: Negative.    Endocrine: Negative.    Genitourinary: Negative.    Musculoskeletal: Positive for arthralgias.   Skin: Negative.    Allergic/Immunologic: Negative.    Neurological: Negative.    Hematological: Negative.    Psychiatric/Behavioral: Negative.        Objective   Physical Exam   Constitutional: She is oriented to person, place, and time. She appears well-developed and well-nourished.   HENT:   Head: Normocephalic and atraumatic.   Eyes: EOM are normal. Pupils are equal, round, and reactive to light.   Neck: Normal range of motion. Neck supple.   Cardiovascular: Normal rate, regular rhythm and normal heart sounds.   Pulmonary/Chest: Effort normal and breath sounds normal.   Abdominal: Soft. Bowel sounds are normal.   Musculoskeletal:   Diffuse arthropathy   Neurological: She is alert and oriented to person, place,  and time.   Skin: Skin is warm and dry.   Psychiatric: She has a normal mood and affect. Her behavior is normal. Judgment and thought content normal.   Nursing note and vitals reviewed.        Assessment/Plan   Lana was seen today for hypertension and hyperlipidemia.    Diagnoses and all orders for this visit:    Mixed hyperlipidemia  Comments:  no change in therapy  Orders:  -     Lipid Panel; Future  -     Comprehensive Metabolic Panel; Future    Essential hypertension  Comments:  well controlled  Orders:  -     CBC & Differential; Future  -     TSH; Future    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CMS/HCC)  Comments:  doing well now    Atrial fibrillation with RVR (CMS/HCC)  Comments:  stable for now    ANTOINE (dyspnea on exertion)  Comments:  doing well currently    Morbid obesity with BMI of 40.0-44.9, adult (CMS/HCC)  Comments:  low carbohydrate diet    Rheumatoid arthritis involving both hands with positive rheumatoid factor (CMS/HCC)  Comments:  stable for now no change in therapy    Other orders  -     lisinopril (PRINIVIL,ZESTRIL) 10 MG tablet; Take 1 tablet by mouth Daily.  -     digoxin (LANOXIN) 125 MCG tablet; Take 1 tablet by mouth Every Other Day.

## 2019-06-06 ENCOUNTER — TELEPHONE (OUTPATIENT)
Dept: CARDIOLOGY | Facility: CLINIC | Age: 74
End: 2019-06-06

## 2019-06-06 LAB
BACTERIA UR CULT: ABNORMAL
Lab: ABNORMAL
SUSCEPTIBILITY TESTING: ABNORMAL

## 2019-06-10 ENCOUNTER — TELEPHONE (OUTPATIENT)
Dept: INTERNAL MEDICINE | Facility: CLINIC | Age: 74
End: 2019-06-10

## 2019-06-10 NOTE — TELEPHONE ENCOUNTER
Pt stated the med is Bactrim, and does say on the print out that. I called and spoke to the pharmacist just to make sure if there is any serious interaction taking bactrim with lipitor, pharmacist said there shouldn't any reaction with that except with warfarin that could increase her INR level which  aware of that , other than that she should be OK taking bactrim with her other meds. Called pt and informed her of that, she understood and asked about her labs which they were normal but her glucose  level high, advised to discuss that at her next OV, may want a1c to make sure. Pt understood that as well.

## 2019-06-10 NOTE — TELEPHONE ENCOUNTER
----- Message from Nathalia Paredes sent at 6/10/2019 10:25 AM EDT -----  Contact: pt - Dr GALEANO's pt - RE: medications  Pt calling and would like a return call regarding medications. She informs was given new Rx's at appt and Rx bottle informs that can not take if on water pill & Lipitor. She would like to know if ok to take medications together. Could you please call pt to discuss? Please advise. Thanks      Pt # [phone]

## 2019-06-17 RX ORDER — DIGOXIN 125 MCG
125 TABLET ORAL EVERY OTHER DAY
Qty: 30 TABLET | Refills: 5 | Status: SHIPPED | OUTPATIENT
Start: 2019-06-17 | End: 2020-08-26

## 2019-06-25 ENCOUNTER — OFFICE VISIT (OUTPATIENT)
Dept: INTERNAL MEDICINE | Facility: CLINIC | Age: 74
End: 2019-06-25

## 2019-06-25 VITALS — WEIGHT: 245 LBS | HEIGHT: 65 IN | BODY MASS INDEX: 40.82 KG/M2

## 2019-06-25 DIAGNOSIS — R35.0 FREQUENCY OF URINATION: Primary | ICD-10-CM

## 2019-06-25 DIAGNOSIS — R73.9 ELEVATED BLOOD SUGAR: ICD-10-CM

## 2019-06-25 LAB
BILIRUB UR QL STRIP: NEGATIVE
CLARITY UR: ABNORMAL
COLOR UR: YELLOW
GLUCOSE UR STRIP-MCNC: NEGATIVE MG/DL
HGB UR QL STRIP.AUTO: NEGATIVE
KETONES UR QL STRIP: NEGATIVE
LEUKOCYTE ESTERASE UR QL STRIP.AUTO: NEGATIVE
NITRITE UR QL STRIP: NEGATIVE
PH UR STRIP.AUTO: 5.5 [PH] (ref 5–8)
PROT UR QL STRIP: NEGATIVE
SP GR UR STRIP: 1.02 (ref 1–1.03)
UROBILINOGEN UR QL STRIP: ABNORMAL

## 2019-06-25 PROCEDURE — 99213 OFFICE O/P EST LOW 20 MIN: CPT | Performed by: NURSE PRACTITIONER

## 2019-06-25 PROCEDURE — 36415 COLL VENOUS BLD VENIPUNCTURE: CPT | Performed by: NURSE PRACTITIONER

## 2019-06-25 PROCEDURE — 81003 URINALYSIS AUTO W/O SCOPE: CPT | Performed by: NURSE PRACTITIONER

## 2019-06-25 NOTE — PROGRESS NOTES
Subjective   Lana Yañez is a 73 y.o. female.     Her  is diabetic and she has been checking her blood sugars in the last week. Her sugars have been running 140s-170s. She has increased thirst and urination, denies excessive appetite. She has had elevated blood sugars on metabolic panels dating as far back as I can see which is 2017. She has been to a diabetes educator but never diagnosed with DM or given medication for DM.         The following portions of the patient's history were reviewed and updated as appropriate: allergies, current medications, past family history, past medical history, past social history, past surgical history and problem list.    Review of Systems   Constitutional: Positive for diaphoresis (rarely) and fatigue. Negative for chills and fever.   Respiratory: Positive for shortness of breath (chronic, no increase lately).    Cardiovascular: Positive for palpitations (chronic a fib).   Gastrointestinal: Negative for abdominal distention.   Endocrine: Positive for polydipsia and polyuria. Negative for polyphagia.   Genitourinary: Positive for frequency (also takes water pill). Negative for pelvic pain, urgency and vaginal pain.   Musculoskeletal: Positive for back pain (chronic, but increased pain lately).       Objective   Physical Exam   Constitutional: She appears well-developed and well-nourished.   HENT:   Head: Normocephalic and atraumatic.   Cardiovascular: Normal rate, regular rhythm and normal heart sounds.   No murmur heard.  Pulmonary/Chest: Effort normal and breath sounds normal. No respiratory distress.   Musculoskeletal: Normal range of motion.   Neurological: She is alert.   Skin: Skin is warm and dry.   Psychiatric: She has a normal mood and affect. Her behavior is normal. Judgment and thought content normal.   Vitals reviewed.      Assessment/Plan   Lana was seen today for diabetes.    Diagnoses and all orders for this visit:    Frequency of urination  -      Urinalysis With Culture If Indicated - Urine, Clean Catch; Future  -     Urinalysis With Culture If Indicated - Urine, Clean Catch    Elevated blood sugar  -     Basic Metabolic Panel  -     Hemoglobin A1c    Discussed possible need for DM medication. She is hesitant to start; wants to control with diet.    If back pain persists she may need further evaluation. She has previously been seen by specialist for back pain. She has had PT in the past but her HR would increase with her afib and she could no longer tolerate PT. She still has stretch handout that she will start doing on her own.

## 2019-06-26 ENCOUNTER — TELEPHONE (OUTPATIENT)
Dept: CARDIOLOGY | Facility: CLINIC | Age: 74
End: 2019-06-26

## 2019-06-26 ENCOUNTER — TELEPHONE (OUTPATIENT)
Dept: INTERNAL MEDICINE | Facility: CLINIC | Age: 74
End: 2019-06-26

## 2019-06-26 LAB
BUN SERPL-MCNC: 19 MG/DL (ref 8–23)
BUN/CREAT SERPL: 19.4 (ref 7–25)
CALCIUM SERPL-MCNC: 10.1 MG/DL (ref 8.6–10.5)
CHLORIDE SERPL-SCNC: 102 MMOL/L (ref 98–107)
CO2 SERPL-SCNC: 25 MMOL/L (ref 22–29)
CREAT SERPL-MCNC: 0.98 MG/DL (ref 0.57–1)
GLUCOSE SERPL-MCNC: 121 MG/DL (ref 65–99)
HBA1C MFR BLD: 7.6 % (ref 4.8–5.6)
POTASSIUM SERPL-SCNC: 5.1 MMOL/L (ref 3.5–5.2)
SODIUM SERPL-SCNC: 140 MMOL/L (ref 136–145)

## 2019-06-26 RX ORDER — METFORMIN HYDROCHLORIDE 500 MG/1
500 TABLET, EXTENDED RELEASE ORAL
Qty: 90 TABLET | Refills: 1 | Status: SHIPPED | OUTPATIENT
Start: 2019-06-26 | End: 2020-03-26

## 2019-06-26 RX ORDER — LANCETS
EACH MISCELLANEOUS
Qty: 204 EACH | Refills: 12 | Status: SHIPPED | OUTPATIENT
Start: 2019-06-26 | End: 2022-11-16 | Stop reason: SDUPTHER

## 2019-06-26 NOTE — TELEPHONE ENCOUNTER
Pt informed. Per Mery moore to test bid.  Pt advised to test before breakfast and in the evening.

## 2019-06-26 NOTE — TELEPHONE ENCOUNTER
I scheduled pt for follow up in September.  She had just called right before saying she thinks she should go ahead with the Metformin.  She would also like a glucometer, lancets, strips.  Please advise.

## 2019-06-26 NOTE — TELEPHONE ENCOUNTER
----- Message from Paola Hale sent at 6/26/2019  9:54 AM EDT -----  Contact: Pt   Pt returning phone call from Mery, has decided to go ahead and start metformin.  Pt also needs a glucose testing kit.    [facility] [address] [facility] [address] [facility] [phone] [facility] [phone] FX    Pt#[phone]

## 2019-06-27 NOTE — TELEPHONE ENCOUNTER
Looks like the diabetes is getting worse and agree with Dr. Perez should be treated further.  Metformin is okay.maura

## 2019-07-01 ENCOUNTER — ANTICOAGULATION VISIT (OUTPATIENT)
Dept: PHARMACY | Facility: HOSPITAL | Age: 74
End: 2019-07-01

## 2019-07-01 ENCOUNTER — CLINICAL SUPPORT (OUTPATIENT)
Dept: ORTHOPEDIC SURGERY | Facility: CLINIC | Age: 74
End: 2019-07-01

## 2019-07-01 VITALS — WEIGHT: 244.4 LBS | BODY MASS INDEX: 39.28 KG/M2 | TEMPERATURE: 97.3 F | HEIGHT: 66 IN

## 2019-07-01 DIAGNOSIS — M25.512 LEFT SHOULDER PAIN, UNSPECIFIED CHRONICITY: Primary | ICD-10-CM

## 2019-07-01 LAB
INR PPP: 2.9 (ref 0.91–1.09)
PROTHROMBIN TIME: 34.9 SECONDS (ref 10–13.8)

## 2019-07-01 PROCEDURE — 20610 DRAIN/INJ JOINT/BURSA W/O US: CPT | Performed by: ORTHOPAEDIC SURGERY

## 2019-07-01 PROCEDURE — 85610 PROTHROMBIN TIME: CPT

## 2019-07-01 PROCEDURE — 36416 COLLJ CAPILLARY BLOOD SPEC: CPT

## 2019-07-01 RX ADMIN — METHYLPREDNISOLONE ACETATE 80 MG: 80 INJECTION, SUSPENSION INTRA-ARTICULAR; INTRALESIONAL; INTRAMUSCULAR; SOFT TISSUE at 12:14

## 2019-07-01 NOTE — PROGRESS NOTES
Anticoagulation Clinic Progress Note    Anticoagulation Summary  As of 2019    INR goal:   2.0-3.0   TTR:   70.2 % (8.7 mo)   INR used for dosin.9 (2019)   Warfarin maintenance plan:   7.5 mg every Sat; 5 mg all other days   Weekly warfarin total:   37.5 mg   No change documented:   Divine Lea   Plan last modified:   Nano Cool [facility] (2018)   Next INR check:   2019   Priority:   Maintenance   Target end date:   Indefinite    Indications    Atrial fibrillation (CMS/HCC) [I48.91]             Anticoagulation Episode Summary     INR check location:       Preferred lab:       Send INR reminders to:    [facility]    Comments:         Anticoagulation Care Providers     Provider Role Specialty Phone number    Pia Dueñas MD Referring Cardiology [phone]    Kerry Teague [facility] Responsible Pharmacy [phone]          Clinic Interview:  Patient Findings     Negatives:   Signs/symptoms of thrombosis, Signs/symptoms of bleeding,   Laboratory test error suspected, Change in health, Change in alcohol use,   Change in activity, Upcoming invasive procedure, Emergency department   visit, Upcoming dental procedure, Missed doses, Extra doses, Change in   medications, Change in diet/appetite, Hospital admission, Bruising, Other   complaints      Clinical Outcomes     Negatives:   Major bleeding event, Thromboembolic event,   Anticoagulation-related hospital admission, Anticoagulation-related ED   visit, Anticoagulation-related fatality        INR History:  Anticoagulation Monitoring 2019 6/3/2019 2019   INR 1.39 2.1 2.9   INR Date 2019 6/3/2019 2019   INR Goal 2.0-3.0 2.0-3.0 2.0-3.0   Trend Same Same Same   Last Week Total 37.5 mg 37.5 mg 37.5 mg   Next Week Total 40 mg 37.5 mg 37.5 mg   Sun 5 mg 5 mg 5 mg   Mon 5 mg 5 mg 5 mg   Tue 5 mg 5 mg 5 mg   Wed 5 mg 5 mg 5 mg   Thu 5 mg 5 mg 5 mg   Fri 7.5 mg (); Otherwise 5 mg 5 mg 5 mg   Sat 7.5 mg 7.5 mg 7.5 mg    Visit Report - - -   Some recent data might be hidden       Plan:  1. INR is therapeutic today- see above in Anticoagulation Summary.   Will instruct Lana Nielsonb to continue their warfarin regimen- see above in Anticoagulation Summary.  2. Follow up in 4 weeks.  3. Patient declines warfarin refills.  4. Verbal and written information provided. Patient expresses understanding and has no further questions at this time.    Divine Lea

## 2019-07-01 NOTE — PROGRESS NOTES
Patient: Lana Yañez  YOB: 1945  Date of Service: 7/1/2019    Chief Complaints:   Chief Complaint   Patient presents with   • Left Shoulder - Follow-up, Pain   Left shoulder pain    Subjective:    History of Present Illness: Pt is seen in the office today with complaints of Chief Complaint   Patient presents with   • Left Shoulder - Follow-up, Pain   .    Patient is here for left shoulder injection      Allergies:   Allergies   Allergen Reactions   • Contrast Dye Hives and Itching       Medications:   Home Medications:  Current Outpatient Medications on File Prior to Visit   Medication Sig   • albuterol (PROVENTIL HFA;VENTOLIN HFA) 108 (90 Base) MCG/ACT inhaler Inhale 2 puffs Every 6 (Six) Hours As Needed.   • aspirin 81 MG EC tablet Take 1 tablet by mouth Daily.   • atorvastatin (LIPITOR) 10 MG tablet TAKE 1 TABLET BY MOUTH ONCE DAILY   • Blood Glucose Monitoring Suppl kit 1 kit 3 (Three) Times a Day.   • carvedilol (COREG) 25 MG tablet TAKE 1 & 1/2 (ONE & ONE-HALF) TABLETS BY MOUTH TWICE DAILY WITH MEALS   • digoxin (LANOXIN) 125 MCG tablet Take 1 tablet by mouth Every Other Day.   • furosemide (LASIX) 40 MG tablet Take 40 mg by mouth 2 (Two) Times a Day.   • glucose blood test strip Use as instructed   • ipratropium-albuterol (DUO-NEB) 0.5-2.5 mg/mL nebulizer As Needed.   • Lancets (ACCU-CHEK MULTICLIX) lancets Use 1 lancet per finger stick   • lisinopril (PRINIVIL,ZESTRIL) 10 MG tablet Take 1 tablet by mouth Daily.   • metFORMIN ER (GLUCOPHAGE-XR) 500 MG 24 hr tablet Take 1 tablet by mouth Daily With Breakfast.   • potassium chloride (K-DUR) 10 MEQ CR tablet TAKE 4 TABLETS BY MOUTH ONCE DAILY   • vitamin D (ERGOCALCIFEROL) 29535 units capsule capsule TAKE 1 CAPSULE BY MOUTH ONCE A WEEK   • warfarin (COUMADIN) 5 MG tablet Take one tablet by mouth at 6 pm or as directed by Dr Dueñas (Patient taking differently: Take 5 mg by mouth Every Night. Take one tablet by mouth daily , take 7.5 on Sat)      No current facility-administered medications on file prior to visit.      Current Medications:  Scheduled Meds:  Continuous Infusions:  No current facility-administered medications for this visit.   PRN Meds:.    I have reviewed the patient's medical history in detail and updated the computerized patient record.  Review and summarization of old records include:    Past Medical History:   Diagnosis Date   • Acute on chronic diastolic heart failure (CMS/[facility])    • Arthritis    • Asthma    • Atrial fibrillation (CMS/[facility])     Persistent; on warfarin   • Atypical chest pain    • Bronchitis    • Cellulitis of right elbow     due to MRSA   • CHF (congestive heart failure) (CMS/[facility])    • COPD (chronic obstructive pulmonary disease) (CMS/[facility])    • Coronary artery disease     Cardiac catheterization completed; 90% PDA stenosis with medical management recommended   • Coronary artery disease involving native coronary artery of native heart with angina pectoris with documented spasm (CMS/[facility])    • Disease of thyroid gland    • Displacement of lumbar intervertebral disc without myelopathy    • Dizziness    • ANTOINE (dyspnea on exertion)    • Essential hypertension    • Fatigue    • Generalized osteoarthritis of multiple sites    • History of rheumatic fever    • History of transfusion    • Hx of bone density study     10/23/2014   • Hyperglycemia    • Hyperlipidemia    • Hypovitaminosis D    • Left arm pain    • Left-sided weakness    • Leg swelling    • Low back pain    • Lower extremity edema    • Malaise and fatigue    • Mitral valve disease     Moderate mitral valve prolapse and moderate mitral regurgitation   • Mitral valve insufficiency    • Morbid obesity with BMI of 40.0-44.9, adult (CMS/[facility])    • MRSA infection    • NSTEMI (non-ST elevated myocardial infarction) (CMS/[facility])    • PAF (paroxysmal atrial fibrillation) (CMS/[facility])    • RA (rheumatoid arthritis) (CMS/[facility])     involving both hands   • Sleep apnea    • SOB  (shortness of breath)    • Stroke (CMS/HCC)     left side weakness   • Urge incontinence of urine    • Vitamin D deficiency         Past Surgical History:   Procedure Laterality Date   • BREAST BIOPSY     • BREAST SURGERY      right side lumpectomy with biopsy   • CARDIAC CATHETERIZATION Left 10/20/2017    Procedure: Cardiac Catheterization/Vascular Study;  Surgeon: Alphonso Olmedo MD;  Location: [facility] CATH INVASIVE LOCATION;  Service:    • CARDIAC CATHETERIZATION N/A 10/20/2017    +2 mitral regurgitation, left main 10% stenosis, mid to distal LAD 10% diffuse stenosis, circumflex 10% diffuse stenosis, RCA 10% proximal stenosis, and distal PDA consistent with coronary embolus with a lesion of 90% too small to consider coronary intervention; medical management recommended   • EYE SURGERY      laser surgery for glaucoma and left eye cataracts removed   • HYSTERECTOMY      10+ years ago   • JOINT REPLACEMENT  ;     bilateral knees and left rotater   • KNEE SURGERY     • MAMMO BILATERAL      [facility]         Social History     Occupational History   • 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     Employer: RETIRED   Tobacco Use   • Smoking status: Former Smoker     Packs/day: 3.00     Types: Cigarettes     Start date: 1967     Last attempt to quit: 10/19/1968     Years since quittin.7   • Smokeless tobacco: Never Used   Substance and Sexual Activity   • Alcohol use: No     Comment: No caffeine use   • Drug use: No   • Sexual activity: Defer    Social History     Social History Narrative   • Not on file        Family History   Problem Relation Age of Onset   • Colon cancer Mother    • Glaucoma Mother    • Stroke Mother    • Arthritis Mother    • Hypertension Mother    • Glaucoma Sister    • Diabetes Sister    • Heart disease Sister    • Arthritis Sister    • Asthma Sister    • Hypertension Sister    • Miscarriages / Stillbirths Sister    • Lung disease Sister    • Heart disease Brother     • Diabetes Brother    • Arthritis Brother    • Drug abuse Brother    • Hypertension Brother    • Arthritis Father    • COPD Father    • Lung disease Father    • Arthritis Daughter    • Depression Daughter    • Alcohol abuse Maternal Uncle    • Heart disease Sister    • Heart disease Sister    • Heart disease Brother        ROS: 14 point review of systems was performed and was negative except for documented findings in HPI and today's encounter.     Allergies:   Allergies   Allergen Reactions   • Contrast Dye Hives and Itching     Constitutional:  Denies fever, shaking or chills   Eyes:  Denies change in visual acuity   HENT:  Denies nasal congestion or sore throat   Respiratory:  Denies cough or shortness of breath   Cardiovascular:  Denies chest pain or severe LE edema   GI:  Denies abdominal pain, nausea, vomiting, bloody stools or diarrhea   Musculoskeletal:  Numbness, tingling, or loss of motor function only as noted above in history of present illness.  : Denies painful urination or hematuria  Integument:  Denies rash, lesion or ulceration   Neurologic:  Denies headache or focal weakness  Endocrine:  Denies lymphadenopathy  Psych:  Denies confusion or change in mental status   Hem:  Denies active bleeding      Physical Exam: 73 y.o. female  Wt Readings from Last 3 Encounters:   06/25/19 111 kg (245 lb)   06/03/19 111 kg (245 lb)   05/15/19 115 kg (253 lb)       There is no height or weight on file to calculate BMI.  No height and weight on file for this encounter.  There were no vitals filed for this visit.  Vital signs reviewed.   General Appearance:    Alert, cooperative, in no acute distress                  Eyes: conjunctiva clear  ENT: external ears and nose atraumatic  CV: no peripheral edema  Resp: normal respiratory effort  Skin: no rashes or wounds; normal turgor  Psych: mood and affect appropriate  Lymph: no nodes appreciated  Neuro: gross sensation intact  Vascular:  Palpable peripheral pulse in  noted extremity    Ortho exam      Physical exam the left shoulder reveals no overlying skin changes no lymphedema lymphadenopathy the patient can actively flex to about 150 passively I get them to 160 abduction is similar external rotation is 40 internal rotation to there buttock.  Rotator cuff strength is 4+ over 5 with isometric strength testing no overlying skin changes.  Patient has reasonable cervical range of motion for their age no radicular symptoms and a normal elbow exam.  There are good distal pulses.         Assessment: Left shoulder arthritis    Plan: Injection  Follow up as indicated.  Ice, elevate, and rest as needed.  Asya Rodriges M.D.      Large Joint Arthrocentesis: L glenohumeral  Date/Time: 7/1/2019 12:14 PM  Consent given by: patient  Site marked: site marked  Timeout: Immediately prior to procedure a time out was called to verify the correct patient, procedure, equipment, support staff and site/side marked as required   Supporting Documentation  Indications: pain   Procedure Details  Location: shoulder - L glenohumeral  Preparation: Patient was prepped and draped in the usual sterile fashion  Needle size: 22 G  Approach: posterior  Medications administered: 80 mg methylPREDNISolone acetate 80 MG/ML; 4 mL lidocaine (cardiac)  Patient tolerance: patient tolerated the procedure well with no immediate complications

## 2019-07-02 RX ORDER — ATORVASTATIN CALCIUM 10 MG/1
TABLET, FILM COATED ORAL
Qty: 90 TABLET | Refills: 0 | OUTPATIENT
Start: 2019-07-02

## 2019-07-02 RX ORDER — METHYLPREDNISOLONE ACETATE 80 MG/ML
80 INJECTION, SUSPENSION INTRA-ARTICULAR; INTRALESIONAL; INTRAMUSCULAR; SOFT TISSUE
Status: COMPLETED | OUTPATIENT
Start: 2019-07-01 | End: 2019-07-01

## 2019-07-10 DIAGNOSIS — E78.2 MIXED HYPERLIPIDEMIA: Primary | ICD-10-CM

## 2019-07-10 RX ORDER — ATORVASTATIN CALCIUM 10 MG/1
TABLET, FILM COATED ORAL
Qty: 90 TABLET | Refills: 0 | OUTPATIENT
Start: 2019-07-10

## 2019-07-10 NOTE — TELEPHONE ENCOUNTER
Patient was last seen on, 01-    Patient has an appointment to be seen on, Does not have a follow up appointment scheduled.    Please advise.  Thanks!

## 2019-07-11 NOTE — PROGRESS NOTES
See session report   Steroid Injection Discharge Instructions    General Information:   A steroid injection was performed today, placing a combination of a steroid and an anesthetic (numbing medicine) into the space around the nerves of your spine. This is done to treat back pain. It may take 7-10 days for the injection to reach its full potential.  This procedure can be done at any level of the spinal column, depending on where your pain is. Your doctor will have ordered the appropriate level to be treated prior to your coming in for the procedure. Home Care Instructions: You can resume your regular diet. Do not drink alcohol. You may notice that you have to use your pain medications less after your injection. Some people do not notice much of a change in their pain after the                     first injection. If that is the case, it is worth your time to have a second one done. This is why these injections are sometimes ordered in a series of                    three. Keep the puncture site clean and dry for 24 hours, and then you may remove the dressing. Showering is acceptable after the bandage is removed. Avoid tub baths, swimming, and hot tubs until puncture site is healed. Call If:   You should call your Physician and/or the Radiology Nurse if you have any bleeding other than a small spot on your bandage. Call if you have any signs of infection, fever, increased pain at the puncture site, confusion, or a headache that worsens when you stand and eases when lying flat. Follow-Up Instructions:  Please see your ordering doctor as he/she has requested. Let your doctor know if you have relief from your pain so they may schedule another injection for you if it is indicated. To Reach Us:    Should you experience any significant changes, please call [phone] between the hours of 730 am and 10 pm or [phone] after hours.   After hours, ask the  to page the [address] Technologist, and describe the problem to the technologist.

## 2019-07-13 RX ORDER — ATORVASTATIN CALCIUM 10 MG/1
TABLET, FILM COATED ORAL
Qty: 90 TABLET | Refills: 0 | Status: CANCELLED | OUTPATIENT
Start: 2019-07-13

## 2019-07-15 ENCOUNTER — TELEPHONE (OUTPATIENT)
Dept: INTERNAL MEDICINE | Facility: CLINIC | Age: 74
End: 2019-07-15

## 2019-07-15 RX ORDER — ATORVASTATIN CALCIUM 10 MG/1
10 TABLET, FILM COATED ORAL DAILY
Qty: 90 TABLET | Refills: 3 | Status: SHIPPED | OUTPATIENT
Start: 2019-07-15 | End: 2020-04-29 | Stop reason: SDUPTHER

## 2019-07-15 NOTE — TELEPHONE ENCOUNTER
----- Message from Paola Hale sent at 7/15/2019 11:51 AM EDT -----  Contact: pt  Pt is calling for a refill     atorvastatin (LIPITOR) 10 MG tablet    Patient requests RX SENT TO   [address] [facility] [phone] [facility] [phone] FX    Caller# [phone]     Please and thank you.

## 2019-08-08 ENCOUNTER — ANTICOAGULATION VISIT (OUTPATIENT)
Dept: PHARMACY | Facility: HOSPITAL | Age: 74
End: 2019-08-08

## 2019-08-08 LAB
INR PPP: 2.7 (ref 0.91–1.09)
PROTHROMBIN TIME: 32.4 SECONDS (ref 10–13.8)

## 2019-08-08 PROCEDURE — 36416 COLLJ CAPILLARY BLOOD SPEC: CPT

## 2019-08-08 PROCEDURE — 85610 PROTHROMBIN TIME: CPT

## 2019-08-08 NOTE — PROGRESS NOTES
Anticoagulation Clinic Progress Note    Anticoagulation Summary  As of 2019    INR goal:   2.0-3.0   TTR:   74.0 % (9.9 mo)   INR used for dosin.7 (2019)   Warfarin maintenance plan:   7.5 mg every Sat; 5 mg all other days   Weekly warfarin total:   37.5 mg   No change documented:   Shaw Hand [facility]   Plan last modified:   Nano Cool [facility] (2018)   Next INR check:   2019   Priority:   Maintenance   Target end date:   Indefinite    Indications    Atrial fibrillation (CMS/[facility]) [I48.91]             Anticoagulation Episode Summary     INR check location:       Preferred lab:       Send INR reminders to:    [facility] CLINICAL POOL    Comments:         Anticoagulation Care Providers     Provider Role Specialty Phone number    Pia Dueñas MD Referring Cardiology [phone]    Kerry Teague [facility] Responsible Pharmacy [phone]          Clinic Interview:  Patient Findings     Negatives:   Signs/symptoms of thrombosis, Signs/symptoms of bleeding,   Laboratory test error suspected, Change in health, Change in alcohol use,   Change in activity, Upcoming invasive procedure, Emergency department   visit, Upcoming dental procedure, Missed doses, Extra doses, Change in   medications, Change in diet/appetite, Hospital admission, Bruising, Other   complaints      Clinical Outcomes     Negatives:   Major bleeding event, Thromboembolic event,   Anticoagulation-related hospital admission, Anticoagulation-related ED   visit, Anticoagulation-related fatality        INR History:  Anticoagulation Monitoring 6/3/2019 2019 2019   INR 2.1 2.9 2.7   INR Date 6/3/2019 2019 2019   INR Goal 2.0-3.0 2.0-3.0 2.0-3.0   Trend Same Same Same   Last Week Total 37.5 mg 37.5 mg 37.5 mg   Next Week Total 37.5 mg 37.5 mg 37.5 mg   Sun 5 mg 5 mg 5 mg   Mon 5 mg 5 mg 5 mg   Tue 5 mg 5 mg 5 mg   Wed 5 mg 5 mg 5 mg   Thu 5 mg 5 mg 5 mg   Fri 5 mg 5 mg 5 mg   Sat 7.5 mg 7.5 mg 7.5 mg   Visit Report - - -   Some  recent data might be hidden       Plan:  1. INR is Therapeutic today- see above in Anticoagulation Summary.  Will instruct Lana Yañez to Continue their warfarin regimen- see above in Anticoagulation Summary.  2. Follow up in 4 weeks  3. Patient declines warfarin refills.  4. Verbal and written information provided. Patient expresses understanding and has no further questions at this time.    Shaw Hand RP

## 2019-08-15 RX ORDER — WARFARIN SODIUM 5 MG/1
TABLET ORAL
Qty: 98 TABLET | Refills: 0 | Status: SHIPPED | OUTPATIENT
Start: 2019-08-15 | End: 2019-12-10 | Stop reason: SDUPTHER

## 2019-09-03 RX ORDER — ERGOCALCIFEROL 1.25 MG/1
CAPSULE ORAL
Qty: 12 CAPSULE | Refills: 2 | OUTPATIENT
Start: 2019-09-03

## 2019-09-06 ENCOUNTER — ANTICOAGULATION VISIT (OUTPATIENT)
Dept: PHARMACY | Facility: HOSPITAL | Age: 74
End: 2019-09-06

## 2019-09-06 LAB
INR PPP: 3.9 (ref 0.91–1.09)
PROTHROMBIN TIME: 46.4 SECONDS (ref 10–13.8)

## 2019-09-06 PROCEDURE — 85610 PROTHROMBIN TIME: CPT

## 2019-09-06 PROCEDURE — G0463 HOSPITAL OUTPT CLINIC VISIT: HCPCS

## 2019-09-06 PROCEDURE — 36416 COLLJ CAPILLARY BLOOD SPEC: CPT

## 2019-09-06 NOTE — PROGRESS NOTES
Anticoagulation Clinic Progress Note    Anticoagulation Summary  As of 9/6/2019    INR goal:   2.0-3.0   TTR:   69.6 % (10.9 mo)   INR used for dosing:   3.9! (9/6/2019)   Warfarin maintenance plan:   7.5 mg every Sat; 5 mg all other days   Weekly warfarin total:   37.5 mg   Plan last modified:   Nano Cool RPH (11/28/2018)   Next INR check:   9/18/2019   Priority:   Maintenance   Target end date:   Indefinite    Indications    Atrial fibrillation (CMS/HCC) [I48.91]             Anticoagulation Episode Summary     INR check location:       Preferred lab:       Send INR reminders to:   [facility]IN[facility] POOL    Comments:         Anticoagulation Care Providers     Provider Role Specialty Phone number    Pia Dueñas MD Referring Cardiology [phone]          Clinic Interview:  Patient Findings     Positives:   Change in health, Change in diet/appetite    Negatives:   Signs/symptoms of thrombosis, Signs/symptoms of bleeding,   Laboratory test error suspected, Change in alcohol use, Change in   activity, Upcoming invasive procedure, Emergency department visit,   Upcoming dental procedure, Missed doses, Extra doses, Change in   medications, Hospital admission, Bruising, Other complaints    Comments:   Feeling poorly last couple weeks. Decreased appetite last   week; some diarrhea x 1 day last wk.      Clinical Outcomes     Negatives:   Major bleeding event, Thromboembolic event,   Anticoagulation-related hospital admission, Anticoagulation-related ED   visit, Anticoagulation-related fatality    Comments:   Feeling poorly last couple weeks. Decreased appetite last   week; some diarrhea x 1 day last wk.        INR History:  Anticoagulation Monitoring 7/1/2019 8/8/2019 9/6/2019   INR 2.9 2.7 3.9   INR Date 7/1/2019 8/8/2019 9/6/2019   INR Goal 2.0-3.0 2.0-3.0 2.0-3.0   Trend Same Same Same   Last Week Total 37.5 mg 37.5 mg 37.5 mg   Next Week Total 37.5 mg 37.5 mg 32.5 mg   Sun 5 mg 5 mg 5 mg   Mon 5 mg 5 mg 5  mg   Tue 5 mg 5 mg 5 mg   Wed 5 mg 5 mg 5 mg   Thu 5 mg 5 mg 5 mg   Fri 5 mg 5 mg Hold (9/6); Otherwise 5 mg   Sat 7.5 mg 7.5 mg 7.5 mg   Visit Report - - -   Some recent data might be hidden       Plan:  1. INR is Supratherapeutic today- see above in Anticoagulation Summary.  Will instruct Lana [place] to Change their warfarin regimen- see above in Anticoagulation Summary.  2. Follow up in 2 weeks  3. Patient declines warfarin refills.  4. Verbal and written information provided. Patient expresses understanding and has no further questions at this time.    Shaw Hand [facility]

## 2019-09-18 ENCOUNTER — ANTICOAGULATION VISIT (OUTPATIENT)
Dept: PHARMACY | Facility: HOSPITAL | Age: 74
End: 2019-09-18

## 2019-09-18 LAB
INR PPP: 3.9 (ref 0.91–1.09)
PROTHROMBIN TIME: 46.2 SECONDS (ref 10–13.8)

## 2019-09-18 PROCEDURE — G0463 HOSPITAL OUTPT CLINIC VISIT: HCPCS

## 2019-09-18 PROCEDURE — 36416 COLLJ CAPILLARY BLOOD SPEC: CPT

## 2019-09-18 PROCEDURE — 85610 PROTHROMBIN TIME: CPT

## 2019-09-18 NOTE — PROGRESS NOTES
I have supervised and reviewed the notes, assessments, and/or procedures performed by Cassidy Segundo, PharmD Candidate. The documented assessment and plan were developed cooperatively, and the plan was implemented in my presence. I concur with her documentation of this patient.    Nano Cool [facility]

## 2019-09-18 NOTE — PROGRESS NOTES
Anticoagulation Clinic Progress Note    Anticoagulation Summary  As of 9/18/2019    INR goal:   2.0-3.0   TTR:   67.2 % (11.3 mo)   INR used for dosing:   3.9! (9/18/2019)   Warfarin maintenance plan:   5 mg every day   Weekly warfarin total:   35 mg   Plan last modified:   Nano Cool RPH (9/18/2019)   Next INR check:   9/26/2019   Priority:   Maintenance   Target end date:   Indefinite    Indications    Atrial fibrillation (CMS/HCC) [I48.91]             Anticoagulation Episode Summary     INR check location:       Preferred lab:       Send INR reminders to:   [facility] CLINICAL POOL    Comments:         Anticoagulation Care Providers     Provider Role Specialty Phone number    Pia Dueñas MD Referring Cardiology [phone]          Clinic Interview:      INR History:  Anticoagulation Monitoring 8/8/2019 9/6/2019 9/18/2019   INR 2.7 3.9 3.9   INR Date 8/8/2019 9/6/2019 9/18/2019   INR Goal 2.0-3.0 2.0-3.0 2.0-3.0   Trend Same Same Down   Last Week Total 37.5 mg 37.5 mg 37.5 mg   Next Week Total 37.5 mg 32.5 mg 30 mg   Sun 5 mg 5 mg 5 mg   Mon 5 mg 5 mg 5 mg   Tue 5 mg 5 mg 5 mg   Wed 5 mg 5 mg Hold (9/18); Otherwise 5 mg   Thu 5 mg 5 mg 5 mg   Fri 5 mg Hold (9/6); Otherwise 5 mg 5 mg   Sat 7.5 mg 7.5 mg 5 mg   Visit Report - - -   Some recent data might be hidden       Plan:  1. INR is Supratherapeutic today- see above in Anticoagulation Summary.  Will instruct Lana [place] to Change their warfarin regimen- see above in Anticoagulation Summary.  2. Follow up in 8 days  3. Patient declines warfarin refills.  4. Verbal and written information provided. Patient expresses understanding and has no further questions at this time.    Cassidy Segundo, Pharmacy Intern

## 2019-09-18 NOTE — PROGRESS NOTES
Anticoagulation Clinic Progress Note    Anticoagulation Summary  As of 9/18/2019    INR goal:   2.0-3.0   TTR:   67.2 % (11.3 mo)   INR used for dosing:   3.9! (9/18/2019)   Warfarin maintenance plan:   5 mg every day   Weekly warfarin total:   35 mg   Plan last modified:   Nano Cool RPH (9/18/2019)   Next INR check:   9/26/2019   Priority:   Maintenance   Target end date:   Indefinite    Indications    Atrial fibrillation (CMS/HCC) [I48.91]             Anticoagulation Episode Summary     INR check location:       Preferred lab:       [facility]    Comments:         Anticoagulation Care Providers     Provider Role Specialty Phone number    Pia Dueñas MD Referring Cardiology [phone]          Clinic Interview:  Patient Findings     Negatives:   Signs/symptoms of thrombosis, Signs/symptoms of bleeding,   Laboratory test error suspected, Change in health, Change in alcohol use,   Change in activity, Upcoming invasive procedure, Emergency department   visit, Upcoming dental procedure, Missed doses, Extra doses, Change in   medications, Change in diet/appetite, Hospital admission, Bruising, Other   complaints      Clinical Outcomes     Negatives:   Major bleeding event, Thromboembolic event,   Anticoagulation-related hospital admission, Anticoagulation-related ED   visit, Anticoagulation-related fatality        INR History:  Anticoagulation Monitoring 8/8/2019 9/6/2019 9/18/2019   INR 2.7 3.9 3.9   INR Date 8/8/2019 9/6/2019 9/18/2019   INR Goal 2.0-3.0 2.0-3.0 2.0-3.0   Trend Same Same Down   Last Week Total 37.5 mg 37.5 mg 37.5 mg   Next Week Total 37.5 mg 32.5 mg 30 mg   Sun 5 mg 5 mg 5 mg   Mon 5 mg 5 mg 5 mg   Tue 5 mg 5 mg 5 mg   Wed 5 mg 5 mg Hold (9/18); Otherwise 5 mg   Thu 5 mg 5 mg 5 mg   Fri 5 mg Hold (9/6); Otherwise 5 mg 5 mg   Sat 7.5 mg 7.5 mg 5 mg   Visit Report - - -   Some recent data might be hidden       Plan:  1. INR is Supratherapeutic today-  see above in Anticoagulation Summary.  Will instruct Lana [place] to Change their warfarin regimen- see above in Anticoagulation Summary.  2. Follow up in 8 days  3. Patient declines warfarin refills.  4. Verbal and written information provided. Patient expresses understanding and has no further questions at this time.    Cassidy Segundo, Pharmacy Intern

## 2019-09-26 ENCOUNTER — ANTICOAGULATION VISIT (OUTPATIENT)
Dept: PHARMACY | Facility: HOSPITAL | Age: 74
End: 2019-09-26

## 2019-09-26 ENCOUNTER — OFFICE VISIT (OUTPATIENT)
Dept: CARDIOLOGY | Facility: CLINIC | Age: 74
End: 2019-09-26

## 2019-09-26 ENCOUNTER — OFFICE VISIT (OUTPATIENT)
Dept: INTERNAL MEDICINE | Facility: CLINIC | Age: 74
End: 2019-09-26

## 2019-09-26 VITALS
SYSTOLIC BLOOD PRESSURE: 112 MMHG | HEART RATE: 89 BPM | BODY MASS INDEX: 39.82 KG/M2 | DIASTOLIC BLOOD PRESSURE: 74 MMHG | WEIGHT: 243 LBS | OXYGEN SATURATION: 96 %

## 2019-09-26 VITALS
BODY MASS INDEX: 40.65 KG/M2 | WEIGHT: 244 LBS | HEART RATE: 92 BPM | HEIGHT: 65 IN | DIASTOLIC BLOOD PRESSURE: 80 MMHG | SYSTOLIC BLOOD PRESSURE: 110 MMHG

## 2019-09-26 DIAGNOSIS — I50.32 CHRONIC DIASTOLIC HEART FAILURE (HCC): Primary | ICD-10-CM

## 2019-09-26 DIAGNOSIS — I48.19 PERSISTENT ATRIAL FIBRILLATION (HCC): ICD-10-CM

## 2019-09-26 DIAGNOSIS — Z86.73 HISTORY OF STROKE: ICD-10-CM

## 2019-09-26 DIAGNOSIS — G89.29 CHRONIC BILATERAL LOW BACK PAIN WITH LEFT-SIDED SCIATICA: ICD-10-CM

## 2019-09-26 DIAGNOSIS — I24.0 CORONARY ARTERY EMBOLISM (HCC): ICD-10-CM

## 2019-09-26 DIAGNOSIS — I10 ESSENTIAL HYPERTENSION: ICD-10-CM

## 2019-09-26 DIAGNOSIS — E11.9 TYPE 2 DIABETES MELLITUS WITHOUT COMPLICATION, WITHOUT LONG-TERM CURRENT USE OF INSULIN (HCC): Primary | ICD-10-CM

## 2019-09-26 DIAGNOSIS — M54.42 CHRONIC BILATERAL LOW BACK PAIN WITH LEFT-SIDED SCIATICA: ICD-10-CM

## 2019-09-26 DIAGNOSIS — G47.33 OBSTRUCTIVE SLEEP APNEA SYNDROME: ICD-10-CM

## 2019-09-26 LAB
ALBUMIN SERPL-MCNC: 4.3 G/DL (ref 3.5–5.2)
ALBUMIN/GLOB SERPL: 1.2 G/DL
ALP SERPL-CCNC: 58 U/L (ref 39–117)
ALT SERPL-CCNC: 12 U/L (ref 1–33)
AST SERPL-CCNC: 15 U/L (ref 1–32)
BILIRUB SERPL-MCNC: 0.6 MG/DL (ref 0.2–1.2)
BUN SERPL-MCNC: 14 MG/DL (ref 8–23)
BUN/CREAT SERPL: 16.9 (ref 7–25)
CALCIUM SERPL-MCNC: 9.6 MG/DL (ref 8.6–10.5)
CHLORIDE SERPL-SCNC: 103 MMOL/L (ref 98–107)
CO2 SERPL-SCNC: 26 MMOL/L (ref 22–29)
CREAT SERPL-MCNC: 0.83 MG/DL (ref 0.57–1)
GLOBULIN SER CALC-MCNC: 3.5 GM/DL
GLUCOSE SERPL-MCNC: 192 MG/DL (ref 65–99)
HBA1C MFR BLD: 7.3 % (ref 4.8–5.6)
INR PPP: 2.7 (ref 0.91–1.09)
POTASSIUM SERPL-SCNC: 4 MMOL/L (ref 3.5–5.2)
PROT SERPL-MCNC: 7.8 G/DL (ref 6–8.5)
PROTHROMBIN TIME: 32.2 SECONDS (ref 10–13.8)
SODIUM SERPL-SCNC: 142 MMOL/L (ref 136–145)

## 2019-09-26 PROCEDURE — 99213 OFFICE O/P EST LOW 20 MIN: CPT | Performed by: NURSE PRACTITIONER

## 2019-09-26 PROCEDURE — 85610 PROTHROMBIN TIME: CPT

## 2019-09-26 PROCEDURE — 36416 COLLJ CAPILLARY BLOOD SPEC: CPT

## 2019-09-26 PROCEDURE — 93000 ELECTROCARDIOGRAM COMPLETE: CPT | Performed by: NURSE PRACTITIONER

## 2019-09-26 RX ORDER — ERGOCALCIFEROL 1.25 MG/1
50000 CAPSULE ORAL WEEKLY
Qty: 12 CAPSULE | Refills: 2 | Status: SHIPPED | OUTPATIENT
Start: 2019-09-26 | End: 2020-05-22

## 2019-09-26 NOTE — PROGRESS NOTES
Karl Yañez is a 74 y.o. female.     She presents for diabetes f/u and mentions her chronic back pain has recently increased in intensity in the last few months. She has been monitoring her glucose at home and they range from 120s-140s consistently. Back pain is a new problem to me.      Diabetes   She presents for her follow-up diabetic visit. She has type 2 diabetes mellitus. Her disease course has been stable. Hypoglycemia symptoms include dizziness (mild, intermittent, x 1 month). Pertinent negatives for hypoglycemia include no headaches, hunger, mood changes, nervousness/anxiousness, sleepiness, sweats or tremors. There are no diabetic associated symptoms. Pertinent negatives for diabetes include no chest pain, no fatigue, no polydipsia, no polyphagia, no polyuria and no weakness. There are no hypoglycemic complications. Symptoms are improving. There are no diabetic complications. Risk factors for coronary artery disease include post-menopausal and obesity. Current diabetic treatment includes oral agent (monotherapy). She is compliant with treatment all of the time. Her weight is decreasing steadily. She is following a diabetic diet. She rarely participates in exercise. An ACE inhibitor/angiotensin II receptor blocker is being taken.   Back Pain   This is a chronic problem. The problem occurs constantly. The problem has been gradually worsening since onset. The pain is present in the lumbar spine. The quality of the pain is described as aching. The pain radiates to the left thigh. The symptoms are aggravated by bending. Pertinent negatives include no abdominal pain, bladder incontinence, bowel incontinence, chest pain, fever, headaches, numbness, paresis, paresthesias, perianal numbness, tingling or weakness. Treatments tried: PT and pain management in the past, tylenol. The treatment provided mild relief.        The following portions of the patient's history were reviewed and updated as  appropriate: allergies, current medications, past family history, past medical history, past social history, past surgical history and problem list.    Review of Systems   Constitutional: Negative for fatigue and fever.   Respiratory: Negative for chest tightness and shortness of breath.    Cardiovascular: Negative for chest pain and palpitations.   Gastrointestinal: Negative for abdominal pain and bowel incontinence.   Endocrine: Negative for cold intolerance, heat intolerance, polydipsia, polyphagia and polyuria.   Genitourinary: Positive for frequency (only when taking lasix). Negative for bladder incontinence and difficulty urinating.   Musculoskeletal: Positive for back pain. Negative for gait problem.   Neurological: Positive for dizziness (mild, intermittent, x 1 month). Negative for tingling, tremors, weakness, numbness, headaches and paresthesias.   Psychiatric/Behavioral: The patient is not nervous/anxious.        Objective   Physical Exam   Constitutional: She appears well-developed and well-nourished.   HENT:   Head: Normocephalic and atraumatic.   Cardiovascular: Normal rate and normal heart sounds. An irregular rhythm present.   No murmur heard.  H/o afib.  Mild swelling to b/l feet (pt reports increased salt intake)   Pulmonary/Chest: Effort normal and breath sounds normal. No respiratory distress.   Musculoskeletal: Normal range of motion.   Neurological: She is alert.   Skin: Skin is warm and dry.   Psychiatric: She has a normal mood and affect. Her behavior is normal. Judgment and thought content normal.   Vitals reviewed.      Assessment/Plan   Lana was seen today for diabetes and back pain.    Diagnoses and all orders for this visit:    Type 2 diabetes mellitus without complication, without long-term current use of insulin (CMS/[facility])  -     Ambulatory Referral to Diabetic Education  -     Comprehensive Metabolic Panel  -     Hemoglobin A1c    Chronic bilateral low back pain with left-sided  sciatica  -     Ambulatory Referral to Physical Therapy Evaluate and treat      She will limit her salt intake to combat mild pedal edema. Pt reported that they only started swelling after eating specialty popcorn. If swelling and dizziness continue she will schedule appt. Dizziness could be r/t drainage/allergies since her glucose is normal. She does have a f/u appt with cardiology today.    She had 2 pieces of toast and applesauce today but we will go ahead and get labs today.      She will also participate in water aerobics for her back pain. She can use Tylenol but avoid NSAIDs.    She has f/u scheduled in less than 3 months with Dr. Tavarez.

## 2019-09-26 NOTE — PROGRESS NOTES
I have supervised and reviewed the notes, assessments, and/or procedures performed by Cassidy Segundo, PharmD Candidate. The documented assessment and plan were developed cooperatively. I concur with her documentation of this patient.    Shaw Hand [facility]

## 2019-09-26 NOTE — PROGRESS NOTES
Date of Office Visit: 2019  Encounter Provider: Christine Coe, FABIAN, APRN  Place of Service: [facility] CARDIOLOGY  Patient Name: Lana Yañez  :1945        Subjective:     Chief Complaint:  Follow-up, chronic diastolic heart failure, coronary disease, atrial fibrillation.      History of Present Illness:  Lana Yañez is a 74 y.o. female patient of Dr. Dueñas.  I am seeing this patient in the office today and I reviewed her records.    Patient has a history of coronary artery disease, atrial fibrillation, embolic stroke, hyperlipidemia, hypertension, rheumatoid arthritis, obstructive sleep apnea, diastolic heart failure.     Per previous office note, in early October patient was found to be in atrial fibrillation with rapid ventricular rates and mild diastolic heart failure.  She was placed on IV heparin and Pradaxa.  Echocardiogram revealed normal systolic function mild left ventricular hypertrophy, moderate atrial enlargement with aortic valve sclerosis and moderate pulmonary hypertension and moderate tricuspid regurgitation.  The RV systolic pressure was 54 mmHg.  She had a stress perfusion study that was negative for ischemia and a CT angiogram revealed a mildly dilated aorta without pulmonary emboli.  She then presented several days later with an acute stroke.  CLARIBEL was not pursued as it was felt to be embolic in nature.  However on  she was diaphoretic and was found to have a non-ST elevation myocardial infarction.  This was on full dose Pradaxa which had not been held.  CLARIBEL was pursued that revealed normal systolic function with moderate mitral valve prolapse without significant regurgitation.  There was right-sided spontaneous echo contrast without thrombus formation.  Cardiac catheterization revealed normal systolic function with 2+ mitral regurgitation, and 90% stenosis was noted distally which is felt to be too small to be amenable PCI.  She  was treated medically and switched to Coumadin.  She has had intermittent heart failure after dietary indiscretions with salt since that time.  Patient had Holter monitor study done 9/13/18 showing the predominant rhythm during the testing period to be atrial fibrillation.  Premature ventricular contractions occurred frequently.  There were no episodes of ventricular tachycardia.  No AV block noted.  Average heart rate was 86 bpm.  Echocardiogram showed normal systolic function with EF of 51%, moderate to severe left atrial enlargement, aortic valve calcification with mild aortic regurgitation, mild mitral regurgitation with severe tricuspid regurgitation, RV systolic pressure 49 mmHg.  Subsequent VQ scan was low risk for PE.      Patient presents to office today for follow-up appointment.  Patient reports she has been doing well overall since last visit.  She actually thinks she is feeling improved since last visit.  She does report that she recently had a viral illness with some drainage and now she has been having some pressure and pain in her temples and some lightheadedness with leaning forward.  This sounds sinus related and like she might be trying to develop a sinus infection.  I advised that she call her primary care provider today for further advice.  Patient verbalized understanding.  She has some chronic shortness of breath with exertion which is about the same as last visit.  She denies any chest pain, palpitations, racing heartbeat sensation, lower extremity edema, syncope, near syncope, falls, or abnormal bleeding.  Blood pressure and heart rate fairly well controlled in the office today.  She thinks that her heart rate is closer to 70s to 80s at home and systolic 120s at home.  She is not orthostatic in the office today, blood pressures actually increased slightly when going from lying to sitting to standing.  At this point she will call her primary care for further advice on the sinus symptoms.  She  was taking Mucinex DM but has not been taking recently.  She does report that her primary care provider has referred her to some physical therapy for her back.  They also discussed starting some [facility]ob[facility] at [facility].  I did recommend doing the physical therapy for her back and trying to get into a light exercise routine and very gradually increasing as tolerated.  She will notify the office if she has any issues or concerns with this.  INR today therapeutic.  She had CMP and CBC done by primary care provider today however results are still pending.          Past Medical History:   Diagnosis Date   • Acute on chronic diastolic heart failure (CMS/[facility])    • Arthritis    • Asthma    • Atrial fibrillation (CMS/[facility])     Persistent; on warfarin   • Atypical chest pain    • Bronchitis    • Cellulitis of right elbow     due to MRSA   • CHF (congestive heart failure) (CMS/[facility])    • COPD (chronic obstructive pulmonary disease) (CMS/[facility])    • Coronary artery disease     Cardiac catheterization completed; 90% PDA stenosis with medical management recommended   • Coronary artery disease involving native coronary artery of native heart with angina pectoris with documented spasm (CMS/[facility])    • Disease of thyroid gland    • Displacement of lumbar intervertebral disc without myelopathy    • Dizziness    • ANTOINE (dyspnea on exertion)    • Essential hypertension    • Fatigue    • Generalized osteoarthritis of multiple sites    • History of rheumatic fever    • History of transfusion    • Hx of bone density study     10/23/2014   • Hyperglycemia    • Hyperlipidemia    • Hypovitaminosis D    • Left arm pain    • Left-sided weakness    • Leg swelling    • Low back pain    • Lower extremity edema    • Malaise and fatigue    • Mitral valve disease     Moderate mitral valve prolapse and moderate mitral regurgitation   • Mitral valve insufficiency    • Morbid obesity with BMI of 40.0-44.9, adult (CMS/[facility])    • MRSA infection    • NSTEMI  (non-ST elevated myocardial infarction) (CMS/HCC)    • PAF (paroxysmal atrial fibrillation) (CMS/HCC)    • RA (rheumatoid arthritis) (CMS/HCC)     involving both hands   • Sleep apnea    • SOB (shortness of breath)    • Stroke (CMS/HCC)     left side weakness   • Urge incontinence of urine    • Vitamin D deficiency      Past Surgical History:   Procedure Laterality Date   • BREAST BIOPSY     • BREAST SURGERY      right side lumpectomy with biopsy   • CARDIAC CATHETERIZATION Left 10/20/2017    Procedure: Cardiac Catheterization/Vascular Study;  Surgeon: Alphonso Olmedo MD;  Location: [facility] CATH INVASIVE LOCATION;  Service:    • CARDIAC CATHETERIZATION N/A 10/20/2017    +2 mitral regurgitation, left main 10% stenosis, mid to distal LAD 10% diffuse stenosis, circumflex 10% diffuse stenosis, RCA 10% proximal stenosis, and distal PDA consistent with coronary embolus with a lesion of 90% too small to consider coronary intervention; medical management recommended   • EYE SURGERY      laser surgery for glaucoma and left eye cataracts removed   • HYSTERECTOMY      10+ years ago   • JOINT REPLACEMENT  2005; 2006    bilateral knees and left rotater   • KNEE SURGERY     • MAMMO BILATERAL  2016    [facility]      Outpatient Medications Prior to Visit   Medication Sig Dispense Refill   • albuterol (PROVENTIL HFA;VENTOLIN HFA) 108 (90 Base) MCG/ACT inhaler Inhale 2 puffs Every 6 (Six) Hours As Needed.     • aspirin 81 MG EC tablet Take 1 tablet by mouth Daily.     • atorvastatin (LIPITOR) 10 MG tablet Take 1 tablet by mouth Daily. 90 tablet 3   • Blood Glucose Monitoring Suppl kit 1 kit 3 (Three) Times a Day. 1 each 0   • carvedilol (COREG) 25 MG tablet TAKE 1 & 1/2 (ONE & ONE-HALF) TABLETS BY MOUTH TWICE DAILY WITH MEALS 270 tablet 1   • digoxin (LANOXIN) 125 MCG tablet Take 1 tablet by mouth Every Other Day. 30 tablet 5   • furosemide (LASIX) 40 MG tablet Take 40 mg by mouth Daily.     • glucose blood test strip Use as  instructed 300 each 12   • ipratropium-albuterol (DUO-NEB) 0.5-2.5 mg/mL nebulizer As Needed.     • Lancets (ACCU-CHEK MULTICLIX) lancets Use 1 lancet per finger stick 204 each 12   • lisinopril (PRINIVIL,ZESTRIL) 10 MG tablet Take 1 tablet by mouth Daily. 30 tablet 2   • metFORMIN ER (GLUCOPHAGE-XR) 500 MG 24 hr tablet Take 1 tablet by mouth Daily With Breakfast. 90 tablet 1   • potassium chloride (K-DUR) 10 MEQ CR tablet TAKE 4 TABLETS BY MOUTH ONCE DAILY (Patient taking differently: TAKE 4 TABLETS BY MOUTH ONCE DAILY - currently taking 2 in the am and two in the pm) 360 tablet 1   • vitamin D (ERGOCALCIFEROL) 27276 units capsule capsule Take 1 capsule by mouth 1 (One) Time Per Week. 12 capsule 2   • warfarin (COUMADIN) 5 MG tablet TAKE ONE TABLET BY MOUTH DAILY EXCEPT TAKE 1.5 TABLETS ON  OR AS DIRECTED BY MEDICATION MANAGEMENT CLINIC 98 tablet 0     No facility-administered medications prior to visit.        Allergies as of 2019 - Reviewed 2019   Allergen Reaction Noted   • Contrast dye Hives and Itching 2018     Social History     Socioeconomic History   • Marital status:      Spouse name: Suresh   • Number of children: 5   • Years of education: 13   • Highest education level: Not on file   Occupational History   • Occupation:  for [employer]     Employer: RETIRED   Tobacco Use   • Smoking status: Former Smoker     Packs/day: 3.00     Types: Cigarettes     Start date: 1967     Last attempt to quit: 10/19/1968     Years since quittin.9   • Smokeless tobacco: Never Used   Substance and Sexual Activity   • Alcohol use: No     Comment: No caffeine use   • Drug use: No   • Sexual activity: Defer     Family History   Problem Relation Age of Onset   • Colon cancer Mother    • Glaucoma Mother    • Stroke Mother    • Arthritis Mother    • Hypertension Mother    • Glaucoma Sister    • Diabetes Sister    • Heart disease Sister    • Arthritis Sister    •  "Asthma Sister    • Hypertension Sister    • Miscarriages / Stillbirths Sister    • Lung disease Sister    • Heart disease Brother    • Diabetes Brother    • Arthritis Brother    • Drug abuse Brother    • Hypertension Brother    • Arthritis Father    • COPD Father    • Lung disease Father    • Arthritis Daughter    • Depression Daughter    • Alcohol abuse Maternal Uncle    • Heart disease Sister    • Heart disease Sister    • Heart disease Brother        Review of Systems   Constitution: Positive for malaise/fatigue. Negative for chills, fever, weight gain and weight loss.   HENT: Negative for ear pain, hearing loss, nosebleeds and sore throat.    Eyes: Negative for blurred vision, double vision, redness, vision loss in left eye and vision loss in right eye.   Cardiovascular:        SEE HPI.    Respiratory: Positive for shortness of breath and wheezing. Negative for cough and snoring.    Endocrine: Negative for cold intolerance and heat intolerance.   Skin: Negative for itching, rash and suspicious lesions.   Musculoskeletal: Positive for joint pain and joint swelling. Negative for myalgias.   Gastrointestinal: Negative for abdominal pain, diarrhea, hematemesis, melena, nausea and vomiting.   Genitourinary: Negative for dysuria, frequency and hematuria.   Neurological: Positive for dizziness. Negative for headaches, numbness, paresthesias and seizures.   Psychiatric/Behavioral: Negative for altered mental status and depression. The patient is not nervous/anxious.           Objective:     Vitals:    09/26/19 1340   BP: 110/80   BP Location: Left arm   Pulse: 92   Weight: 111 kg (244 lb)   Height: 165.1 cm (65\")     Body mass index is 40.6 kg/m².      PHYSICAL EXAM:  Physical Exam   Constitutional: She is oriented to person, place, and time. She appears well-developed and well-nourished. No distress.   HENT:   Head: Normocephalic and atraumatic.   Eyes: Pupils are equal, round, and reactive to light.   Neck: Neck " supple. No JVD present. No tracheal deviation present.   Cardiovascular: Normal rate, regular rhythm, normal heart sounds and intact distal pulses. Exam reveals no gallop and no friction rub.   No murmur heard.  Pulses:       Radial pulses are 2+ on the right side, and 2+ on the left side.        Posterior tibial pulses are 2+ on the right side, and 2+ on the left side.   Pulmonary/Chest: Effort normal and breath sounds normal. No respiratory distress. She has no wheezes. She has no rales.   Abdominal: Soft. Bowel sounds are normal. She exhibits no distension. There is no tenderness.   Musculoskeletal: She exhibits no edema, tenderness or deformity.   Neurological: She is alert and oriented to person, place, and time.   Skin: Skin is warm and dry. No rash noted. She is not diaphoretic. No erythema.   Psychiatric: She has a normal mood and affect. Her behavior is normal. Judgment normal.           ECG 12 Lead  Date/Time: 9/26/2019 1:56 PM  Performed by: Christine Coe DNP, ANGELA  Authorized by: Christine Coe DNP, ANGELA   Comparison: compared with previous ECG from 11/20/2018  Rhythm: atrial fibrillation  Ectopy comments: PVC  Rate: normal  BPM: 92  Comments: No significant changes from previous ECG.              Assessment:       Diagnosis Plan   1. Chronic diastolic heart failure (CMS/HCC)     2. Persistent atrial fibrillation (CMS/HCC)     3. Essential hypertension     4. Obstructive sleep apnea syndrome     5. 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CMS/HCC)     6. History of stroke           Plan:     1. Chronic diastolic heart failure: Appears fairly euvolemic in the office today.  Trace edema however lungs are clear.  Continue diuretics.  Continue to avoid salt and elevate lower extremities.  2. Persist atrial fibrillation: Remains in rate controlled atrial fibrillation in the office today.  Remains on anticoagulation therapy with warfarin.  Denies falls or abnormal bleeding.    Atrial Fibrillation and Atrial  Flutter  Assessment  • The patient has persistent atrial fibrillation  • This is non-valvular in etiology  • The patient's CHADS2-VASc score is 7  • A [id number] score of 2 or more is considered a high risk for a thromboembolic event  • Warfarin prescribed  • Dabigatran not prescribed  • Apixaban not prescribed  • Aspirin prescribed    Plan  • Continue in atrial fibrillation with rate control  • Continue aspirin and warfarin for antithrombotic therapy, bleeding issues discussed  • Continue beta blocker for rate control    3. Hypertension: Blood pressure well controlled in the office today.  Patient to continue to monitor.  4. History of embolic non-ST elevation MI: Now on warfarin.  5. History of embolic stroke  6. Coronary artery disease with severe distal disease: Denies angina.  7. Lung nodule: Followed by Dr. Hill.  8. Obstructive sleep apnea  9. Left shoulder arthritis  10. Chronic anticoagulation therapy with warfarin    Patient to follow-up with Dr. Dueñas in 6 months or sooner if needed for any new, recurrent, or worsening symptoms or other problems/concerns.           Your medication list           Accurate as of 9/26/19  1:56 PM. If you have any questions, ask your nurse or doctor.               CHANGE how you take these medications      Instructions Last Dose Given Next Dose Due   potassium chloride 10 MEQ CR tablet  Commonly known as:  K-DUR  What changed:    · how much to take  · how to take this  · when to take this      TAKE 4 TABLETS BY MOUTH ONCE DAILY          CONTINUE taking these medications      Instructions Last Dose Given Next Dose Due   accu-chek multiclix lancets      Use 1 lancet per finger stick       albuterol sulfate  (90 Base) MCG/ACT inhaler  Commonly known as:  PROVENTIL HFA;VENTOLIN HFA;PROAIR HFA      Inhale 2 puffs Every 6 (Six) Hours As Needed.       aspirin 81 MG EC tablet      Take 1 tablet by mouth Daily.       atorvastatin 10 MG tablet  Commonly known as:  LIPITOR       Take 1 tablet by mouth Daily.       Blood Glucose Monitoring Suppl kit      1 kit 3 (Three) Times a Day.       carvedilol 25 MG tablet  Commonly known as:  COREG      TAKE 1 & 1/2 (ONE & ONE-HALF) TABLETS BY MOUTH TWICE DAILY WITH MEALS       digoxin 125 MCG tablet  Commonly known as:  LANOXIN      Take 1 tablet by mouth Every Other Day.       furosemide 40 MG tablet  Commonly known as:  LASIX      Take 40 mg by mouth Daily.       glucose blood test strip      Use as instructed       ipratropium-albuterol 0.5-2.5 mg/3 ml nebulizer  Commonly known as:  DUO-NEB      As Needed.       lisinopril 10 MG tablet  Commonly known as:  PRINIVIL,ZESTRIL      Take 1 tablet by mouth Daily.       metFORMIN  MG 24 hr tablet  Commonly known as:  GLUCOPHAGE-XR      Take 1 tablet by mouth Daily With Breakfast.       vitamin D 59330 units capsule capsule  Commonly known as:  ERGOCALCIFEROL      Take 1 capsule by mouth 1 (One) Time Per Week.       warfarin 5 MG tablet  Commonly known as:  COUMADIN      TAKE ONE TABLET BY MOUTH DAILY EXCEPT TAKE 1.5 TABLETS ON SATURDAYS OR AS DIRECTED BY MEDICATION MANAGEMENT CLINIC             Where to Get Your Medications      These medications were sent to [facility] - [place], [place] - [address] - [phone]  - [phone] [address] [address]    Phone:  [phone]   · vitamin D 75412 units capsule capsule         I did not stop or change the above medications.  Patient's medication list was updated to reflect medications they are currently taking including medication changes made by other providers.        Thanks,    Christine Coe, FABIAN, APRN  09/26/2019         Dictated utilizing Dragon dictation

## 2019-09-26 NOTE — PROGRESS NOTES
Anticoagulation Clinic Progress Note    Anticoagulation Summary  As of 2019    INR goal:   2.0-3.0   TTR:   66.2 % (11.6 mo)   INR used for dosin.7 (2019)   Warfarin maintenance plan:   5 mg every day   Weekly warfarin total:   35 mg   No change documented:   Cassidy Segundo, Pharmacy Intern   Plan last modified:   Nano Cool RPH (2019)   Next INR check:   10/10/2019   Priority:   Maintenance   Target end date:   Indefinite    Indications    Atrial fibrillation (CMS/HCC) [I48.91]             Anticoagulation Episode Summary     INR check location:       Preferred lab:       Send INR reminders to:   [facility]    Comments:         Anticoagulation Care Providers     Provider Role Specialty Phone number    Pia Dueñas MD Referring Cardiology [phone]          Clinic Interview:  Patient Findings     Negatives:   Signs/symptoms of thrombosis, Signs/symptoms of bleeding,   Laboratory test error suspected, Change in health, Change in alcohol use,   Change in activity, Upcoming invasive procedure, Emergency department   visit, Upcoming dental procedure, Missed doses, Extra doses, Change in   medications, Change in diet/appetite, Hospital admission, Bruising, Other   complaints      Clinical Outcomes     Negatives:   Major bleeding event, Thromboembolic event,   Anticoagulation-related hospital admission, Anticoagulation-related ED   visit, Anticoagulation-related fatality        INR History:  Anticoagulation Monitoring 2019   INR 3.9 3.9 2.7   INR Date 2019   INR Goal 2.0-3.0 2.0-3.0 2.0-3.0   Trend Same Down Same   Last Week Total 37.5 mg 37.5 mg 35 mg   Next Week Total 32.5 mg 30 mg 35 mg   Sun 5 mg 5 mg 5 mg   Mon 5 mg 5 mg 5 mg   Tue 5 mg 5 mg 5 mg   Wed 5 mg Hold (); Otherwise 5 mg 5 mg   Thu 5 mg 5 mg 5 mg   Fri Hold (); Otherwise 5 mg 5 mg 5 mg   Sat 7.5 mg 5 mg 5 mg   Visit Report - - -   Some recent data might be  hidden       Plan:  1. INR is Therapeutic today- see above in Anticoagulation Summary.  Will instruct Lana Yañez to Continue their warfarin regimen- see above in Anticoagulation Summary.  2. Follow up in 2 weeks  3. Patient declines warfarin refills.  4. Verbal and written information provided. Patient expresses understanding and has no further questions at this time.    Cassidy Segundo, Pharmacy Intern

## 2019-10-01 ENCOUNTER — TELEPHONE (OUTPATIENT)
Dept: INTERNAL MEDICINE | Facility: CLINIC | Age: 74
End: 2019-10-01

## 2019-10-01 ENCOUNTER — CLINICAL SUPPORT (OUTPATIENT)
Dept: ORTHOPEDIC SURGERY | Facility: CLINIC | Age: 74
End: 2019-10-01

## 2019-10-01 VITALS — TEMPERATURE: 98.2 F | BODY MASS INDEX: 41.19 KG/M2 | WEIGHT: 247.2 LBS | HEIGHT: 65 IN

## 2019-10-01 DIAGNOSIS — M19.90 ARTHRITIS: Primary | ICD-10-CM

## 2019-10-01 PROCEDURE — 20610 DRAIN/INJ JOINT/BURSA W/O US: CPT | Performed by: ORTHOPAEDIC SURGERY

## 2019-10-01 RX ADMIN — METHYLPREDNISOLONE ACETATE 80 MG: 80 INJECTION, SUSPENSION INTRA-ARTICULAR; INTRALESIONAL; INTRAMUSCULAR; SOFT TISSUE at 12:19

## 2019-10-01 NOTE — PROGRESS NOTES
Patient: Lana Yañez  YOB: 1945  Date of Service: 10/1/2019    Chief Complaints:   Chief Complaint   Patient presents with   • Left Shoulder - Follow-up, Pain     Left shoulder subjective:    History of Present Illness: Pt is seen in the office today with complaints of Chief Complaint   Patient presents with   • Left Shoulder - Follow-up, Pain   .      Patient has a history of left shoulder pain with degenerative changes she is responded well to injections in the past    Allergies:   Allergies   Allergen Reactions   • Contrast Dye Hives and Itching       Medications:   Home Medications:  Current Outpatient Medications on File Prior to Visit   Medication Sig   • albuterol (PROVENTIL HFA;VENTOLIN HFA) 108 (90 Base) MCG/ACT inhaler Inhale 2 puffs Every 6 (Six) Hours As Needed.   • aspirin 81 MG EC tablet Take 1 tablet by mouth Daily.   • atorvastatin (LIPITOR) 10 MG tablet Take 1 tablet by mouth Daily.   • Blood Glucose Monitoring Suppl kit 1 kit 3 (Three) Times a Day.   • carvedilol (COREG) 25 MG tablet TAKE 1 & 1/2 (ONE & ONE-HALF) TABLETS BY MOUTH TWICE DAILY WITH MEALS   • digoxin (LANOXIN) 125 MCG tablet Take 1 tablet by mouth Every Other Day.   • furosemide (LASIX) 40 MG tablet Take 40 mg by mouth Daily.   • glucose blood test strip Use as instructed   • ipratropium-albuterol (DUO-NEB) 0.5-2.5 mg/mL nebulizer As Needed.   • Lancets (ACCU-CHEK MULTICLIX) lancets Use 1 lancet per finger stick   • lisinopril (PRINIVIL,ZESTRIL) 10 MG tablet Take 1 tablet by mouth Daily.   • metFORMIN ER (GLUCOPHAGE-XR) 500 MG 24 hr tablet Take 1 tablet by mouth Daily With Breakfast.   • potassium chloride (K-DUR) 10 MEQ CR tablet TAKE 4 TABLETS BY MOUTH ONCE DAILY (Patient taking differently: TAKE 4 TABLETS BY MOUTH ONCE DAILY - currently taking 2 in the am and two in the pm)   • vitamin D (ERGOCALCIFEROL) 83687 units capsule capsule Take 1 capsule by mouth 1 (One) Time Per Week.   • warfarin (COUMADIN) 5 MG  tablet TAKE ONE TABLET BY MOUTH DAILY EXCEPT TAKE 1.5 TABLETS ON SATURDAYS OR AS DIRECTED BY MEDICATION MANAGEMENT CLINIC     No current facility-administered medications on file prior to visit.      Current Medications:  Scheduled Meds:  Continuous Infusions:  No current facility-administered medications for this visit.   PRN Meds:.    I have reviewed the patient's medical history in detail and updated the computerized patient record.  Review and summarization of old records include:    Past Medical History:   Diagnosis Date   • Acute on chronic diastolic heart failure (CMS/[facility])    • Arthritis    • Asthma    • Atrial fibrillation (CMS/[facility])     Persistent; on warfarin   • Atypical chest pain    • Bronchitis    • Cellulitis of right elbow     due to MRSA   • CHF (congestive heart failure) (CMS/[facility])    • COPD (chronic obstructive pulmonary disease) (CMS/[facility])    • Coronary artery disease     Cardiac catheterization completed; 90% PDA stenosis with medical management recommended   • Coronary artery disease involving native coronary artery of native heart with angina pectoris with documented spasm (CMS/[facility])    • Disease of thyroid gland    • Displacement of lumbar intervertebral disc without myelopathy    • Dizziness    • ANTOINE (dyspnea on exertion)    • Essential hypertension    • Fatigue    • Generalized osteoarthritis of multiple sites    • History of rheumatic fever    • History of transfusion    • Hx of bone density study     10/23/2014   • Hyperglycemia    • Hyperlipidemia    • Hypovitaminosis D    • Left arm pain    • Left-sided weakness    • Leg swelling    • Low back pain    • Lower extremity edema    • Malaise and fatigue    • Mitral valve disease     Moderate mitral valve prolapse and moderate mitral regurgitation   • Mitral valve insufficiency    • Morbid obesity with BMI of 40.0-44.9, adult (CMS/[facility])    • MRSA infection    • NSTEMI (non-ST elevated myocardial infarction) (CMS/[facility])    • PAF (paroxysmal atrial  fibrillation) (CMS/HCC)    • RA (rheumatoid arthritis) (CMS/HCC)     involving both hands   • Sleep apnea    • SOB (shortness of breath)    • Stroke (CMS/HCC)     left side weakness   • Urge incontinence of urine    • Vitamin D deficiency         Past Surgical History:   Procedure Laterality Date   • BREAST BIOPSY     • BREAST SURGERY      right side lumpectomy with biopsy   • CARDIAC CATHETERIZATION Left 10/20/2017    Procedure: Cardiac Catheterization/Vascular Study;  Surgeon: Alphonos Olmedo MD;  Location: [facility] CATH INVASIVE LOCATION;  Service:    • CARDIAC CATHETERIZATION N/A 10/20/2017    +2 mitral regurgitation, left main 10% stenosis, mid to distal LAD 10% diffuse stenosis, circumflex 10% diffuse stenosis, RCA 10% proximal stenosis, and distal PDA consistent with coronary embolus with a lesion of 90% too small to consider coronary intervention; medical management recommended   • EYE SURGERY      laser surgery for glaucoma and left eye cataracts removed   • HYSTERECTOMY      10+ years ago   • JOINT REPLACEMENT  ;     bilateral knees and left rotater   • KNEE SURGERY     • MAMMO BILATERAL      [facility]         Social History     Occupational History   • 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     Employer: RETIRED   Tobacco Use   • Smoking status: Former Smoker     Packs/day: 3.00     Types: Cigarettes     Start date: 1967     Last attempt to quit: 10/19/1968     Years since quittin.9   • Smokeless tobacco: Never Used   Substance and Sexual Activity   • Alcohol use: No     Comment: No caffeine use   • Drug use: No   • Sexual activity: Defer    Social History     Social History Narrative   • Not on file        Family History   Problem Relation Age of Onset   • Colon cancer Mother    • Glaucoma Mother    • Stroke Mother    • Arthritis Mother    • Hypertension Mother    • Glaucoma Sister    • Diabetes Sister    • Heart disease Sister    • Arthritis Sister    • Asthma Sister     • Hypertension Sister    • Miscarriages / Stillbirths Sister    • Lung disease Sister    • Heart disease Brother    • Diabetes Brother    • Arthritis Brother    • Drug abuse Brother    • Hypertension Brother    • Arthritis Father    • COPD Father    • Lung disease Father    • Arthritis Daughter    • Depression Daughter    • Alcohol abuse Maternal Uncle    • Heart disease Sister    • Heart disease Sister    • Heart disease Brother        ROS: 14 point review of systems was performed and was negative except for documented findings in HPI and today's encounter.     Allergies:   Allergies   Allergen Reactions   • Contrast Dye Hives and Itching     Constitutional:  Denies fever, shaking or chills   Eyes:  Denies change in visual acuity   HENT:  Denies nasal congestion or sore throat   Respiratory:  Denies cough or shortness of breath   Cardiovascular:  Denies chest pain or severe LE edema   GI:  Denies abdominal pain, nausea, vomiting, bloody stools or diarrhea   Musculoskeletal:  Numbness, tingling, or loss of motor function only as noted above in history of present illness.  : Denies painful urination or hematuria  Integument:  Denies rash, lesion or ulceration   Neurologic:  Denies headache or focal weakness  Endocrine:  Denies lymphadenopathy  Psych:  Denies confusion or change in mental status   Hem:  Denies active bleeding      Physical Exam: 74 y.o. female  Wt Readings from Last 3 Encounters:   09/26/19 111 kg (244 lb)   09/26/19 110 kg (243 lb)   07/01/19 111 kg (244 lb 6.4 oz)       There is no height or weight on file to calculate BMI.  No height and weight on file for this encounter.  There were no vitals filed for this visit.  Vital signs reviewed.   General Appearance:    Alert, cooperative, in no acute distress                  Eyes: conjunctiva clear  ENT: external ears and nose atraumatic  CV: no peripheral edema  Resp: normal respiratory effort  Skin: no rashes or wounds; normal turgor  Psych: mood  and affect appropriate  Lymph: no nodes appreciated  Neuro: gross sensation intact  Vascular:  Palpable peripheral pulse in noted extremity    Ortho exam        Physical exam the left shoulder reveals no overlying skin changes no lymphedema lymphadenopathy the patient can actively flex to about 150 passively I get them to 160 abduction is similar external rotation is 40 internal rotation to there buttock.  Rotator cuff strength is 4+ over 5 with isometric strength testing no overlying skin changes.  Patient has reasonable cervical range of motion for their age no radicular symptoms and a normal elbow exam.  There are good distal pulses.         Assessment: Left shoulder arthritis    Plan: Injection  Follow up as indicated.  Soha Rodriges M.D.        Large Joint Arthrocentesis: L glenohumeral  Date/Time: 10/1/2019 12:19 PM  Consent given by: patient  Site marked: site marked  Timeout: Immediately prior to procedure a time out was called to verify the correct patient, procedure, equipment, support staff and site/side marked as required   Supporting Documentation  Indications: pain   Procedure Details  Location: shoulder - L glenohumeral  Preparation: Patient was prepped and draped in the usual sterile fashion  Needle size: 22 G  Approach: posterior  Medications administered: 4 mL lidocaine (cardiac); 80 mg methylPREDNISolone acetate 80 MG/ML  Patient tolerance: patient tolerated the procedure well with no immediate complications

## 2019-10-01 NOTE — TELEPHONE ENCOUNTER
LVM for pt. If she call back you can tell her that her: Comprehensive metabolic panel shows:  Glucose (blood sugar) elevated (normal in diabetics).  Normal electrolytes.  Kidney tests normal.  Liver tests normal. A1c improved and is headed in right direction.

## 2019-10-01 NOTE — TELEPHONE ENCOUNTER
Contact: Pt  Pt is calling to receive a phone call regarding lab work. We mailed her labs but still would like phone call.  Pt#[phone]

## 2019-10-02 RX ORDER — METHYLPREDNISOLONE ACETATE 80 MG/ML
80 INJECTION, SUSPENSION INTRA-ARTICULAR; INTRALESIONAL; INTRAMUSCULAR; SOFT TISSUE
Status: COMPLETED | OUTPATIENT
Start: 2019-10-01 | End: 2019-10-01

## 2019-10-10 ENCOUNTER — APPOINTMENT (OUTPATIENT)
Dept: PHARMACY | Facility: HOSPITAL | Age: 74
End: 2019-10-10

## 2019-10-11 ENCOUNTER — APPOINTMENT (OUTPATIENT)
Dept: PHARMACY | Facility: HOSPITAL | Age: 74
End: 2019-10-11

## 2019-10-11 ENCOUNTER — ANTICOAGULATION VISIT (OUTPATIENT)
Dept: PHARMACY | Facility: HOSPITAL | Age: 74
End: 2019-10-11

## 2019-10-11 LAB
INR PPP: 2.8 (ref 0.91–1.09)
PROTHROMBIN TIME: 33.7 SECONDS (ref 10–13.8)

## 2019-10-11 PROCEDURE — 36416 COLLJ CAPILLARY BLOOD SPEC: CPT

## 2019-10-11 PROCEDURE — 85610 PROTHROMBIN TIME: CPT

## 2019-10-11 NOTE — PROGRESS NOTES
Anticoagulation Clinic Progress Note    Anticoagulation Summary  As of 10/11/2019    INR goal:   2.0-3.0   TTR:   67.6 % (1 y)   INR used for dosin.8 (10/11/2019)   Warfarin maintenance plan:   5 mg every day   Weekly warfarin total:   35 mg   No change documented:   Cassidy Segundo, Pharmacy Intern   Plan last modified:   Nano Cool RPH (2019)   Next INR check:   10/25/2019   Priority:   Maintenance   Target end date:   Indefinite    Indications    Atrial fibrillation (CMS/HCC) [I48.91]             Anticoagulation Episode Summary     INR check location:       Preferred lab:       Send INR reminders to:   [facility]    Comments:         Anticoagulation Care Providers     Provider Role Specialty Phone number    Pia Dueñas MD Referring Cardiology [phone]          Clinic Interview:  Patient Findings     Positives:   Missed doses    Negatives:   Signs/symptoms of thrombosis, Signs/symptoms of bleeding,   Laboratory test error suspected, Change in health, Change in alcohol use,   Change in activity, Upcoming invasive procedure, Emergency department   visit, Upcoming dental procedure, Extra doses, Change in medications,   Change in diet/appetite, Hospital admission, Bruising, Other complaints    Comments:   Patient reports brother passed away last week, was under a   lot of stress, and missed one dose of her warfarin (cannot recall the day,   states possibly Tuesday 10/8)      Clinical Outcomes     Negatives:   Major bleeding event, Thromboembolic event,   Anticoagulation-related hospital admission, Anticoagulation-related ED   visit, Anticoagulation-related fatality    Comments:   Patient reports brother passed away last week, was under a   lot of stress, and missed one dose of her warfarin (cannot recall the day,   states possibly Tuesday 10/8)        INR History:  Anticoagulation Monitoring 2019 2019 10/11/2019   INR 3.9 2.7 2.8   INR Date 2019  10/11/2019   INR Goal 2.0-3.0 2.0-3.0 2.0-3.0   Trend Down Same Same   Last Week Total 37.5 mg 35 mg 35 mg   Next Week Total 30 mg 35 mg 35 mg   Sun 5 mg 5 mg 5 mg   Mon 5 mg 5 mg 5 mg   Tue 5 mg 5 mg 5 mg   Wed Hold (9/18); Otherwise 5 mg 5 mg 5 mg   Thu 5 mg 5 mg 5 mg   Fri 5 mg 5 mg 5 mg   Sat 5 mg 5 mg 5 mg   Visit Report - - -   Some recent data might be hidden       Plan:  1. INR is Therapeutic today- see above in Anticoagulation Summary.  Will instruct Lana Otilio to Continue their warfarin regimen- see above in Anticoagulation Summary.  2. Follow up in 2 weeks  3. Patient declines warfarin refills.  4. Verbal and written information provided. Patient expresses understanding and has no further questions at this time.    Cassidy Segundo, Pharmacy Intern

## 2019-10-11 NOTE — PROGRESS NOTES
I have supervised and reviewed the notes, assessments, and/or procedures performed by Cassidy Segundo, PharmD Candidate. The documented assessment and plan were developed cooperatively. I concur with her documentation of this patient.    Shaw Hand [facility]

## 2019-10-18 RX ORDER — LISINOPRIL 10 MG/1
TABLET ORAL
Qty: 90 TABLET | Refills: 1 | Status: SHIPPED | OUTPATIENT
Start: 2019-10-18 | End: 2020-04-21

## 2019-10-22 ENCOUNTER — TELEPHONE (OUTPATIENT)
Dept: INTERNAL MEDICINE | Facility: CLINIC | Age: 74
End: 2019-10-22

## 2019-10-22 NOTE — TELEPHONE ENCOUNTER
I suggest taking magnesium 250 to 500 mg every night, if that does not work please have her make an appointment with 1 of our nurse practitioners

## 2019-10-22 NOTE — TELEPHONE ENCOUNTER
Pt called stating that she is having extreme cramping in both of her legs and hands that wake her up in the middle of the night along with indegestion Pt would like a call at [phone] to discuss if an appt is needed or what she can do.    Thank you.

## 2019-10-25 ENCOUNTER — APPOINTMENT (OUTPATIENT)
Dept: PHARMACY | Facility: HOSPITAL | Age: 74
End: 2019-10-25

## 2019-10-28 ENCOUNTER — ANTICOAGULATION VISIT (OUTPATIENT)
Dept: PHARMACY | Facility: HOSPITAL | Age: 74
End: 2019-10-28

## 2019-10-28 LAB
INR PPP: 2 (ref 0.91–1.09)
PROTHROMBIN TIME: 24.1 SECONDS (ref 10–13.8)

## 2019-10-28 PROCEDURE — 36416 COLLJ CAPILLARY BLOOD SPEC: CPT

## 2019-10-28 PROCEDURE — 85610 PROTHROMBIN TIME: CPT

## 2019-10-28 NOTE — PROGRESS NOTES
Anticoagulation Clinic Progress Note    Anticoagulation Summary  As of 10/28/2019    INR goal:   2.0-3.0   TTR:   69.1 % (1 y)   INR used for dosin.0 (10/28/2019)   Warfarin maintenance plan:   5 mg every day   Weekly warfarin total:   35 mg   No change documented:   Divine Lea   Plan last modified:   Nano Cool RPH (2019)   Next INR check:   2019   Priority:   Maintenance   Target end date:   Indefinite    Indications    Atrial fibrillation (CMS/HCC) [I48.91]             Anticoagulation Episode Summary     INR check location:       Preferred lab:       Send INR reminders to:   [facility] PO[facility]    Comments:         Anticoagulation Care Providers     Provider Role Specialty Phone number    Pia Dueñas MD Referring Cardiology [phone]          Clinic Interview:  Patient Findings     Negatives:   Signs/symptoms of thrombosis, Signs/symptoms of bleeding,   Laboratory test error suspected, Change in health, Change in alcohol use,   Change in activity, Upcoming invasive procedure, Emergency department   visit, Upcoming dental procedure, Missed doses, Extra doses, Change in   medications, Change in diet/appetite, Hospital admission, Bruising, Other   complaints      Clinical Outcomes     Negatives:   Major bleeding event, Thromboembolic event,   Anticoagulation-related hospital admission, Anticoagulation-related ED   visit, Anticoagulation-related fatality        INR History:  Anticoagulation Monitoring 2019 10/11/2019 10/28/2019   INR 2.7 2.8 2.0   INR Date 2019 10/11/2019 10/28/2019   INR Goal 2.0-3.0 2.0-3.0 2.0-3.0   Trend Same Same Same   Last Week Total 35 mg 35 mg 35 mg   Next Week Total 35 mg 35 mg 35 mg   Sun 5 mg 5 mg 5 mg   Mon 5 mg 5 mg 5 mg   Tue 5 mg 5 mg 5 mg   Wed 5 mg 5 mg 5 mg   Thu 5 mg 5 mg 5 mg   Fri 5 mg 5 mg 5 mg   Sat 5 mg 5 mg 5 mg   Visit Report - - -   Some recent data might be hidden       Plan:  1. INR is therapeutic today- see above in  Anticoagulation Summary.   Will instruct Lana Yañez to continue their warfarin regimen- see above in Anticoagulation Summary.  2. Follow up in 4 weeks.  3. Patient declines warfarin refills.  4. Verbal and written information provided. Patient expresses understanding and has no further questions at this time.    Divine Lea

## 2019-11-05 ENCOUNTER — APPOINTMENT (OUTPATIENT)
Dept: DIABETES SERVICES | Facility: HOSPITAL | Age: 74
End: 2019-11-05

## 2019-11-12 ENCOUNTER — APPOINTMENT (OUTPATIENT)
Dept: DIABETES SERVICES | Facility: HOSPITAL | Age: 74
End: 2019-11-12

## 2019-11-18 RX ORDER — CARVEDILOL 25 MG/1
TABLET ORAL
Qty: 270 TABLET | Refills: 1 | Status: SHIPPED | OUTPATIENT
Start: 2019-11-18 | End: 2020-05-22

## 2019-11-19 ENCOUNTER — APPOINTMENT (OUTPATIENT)
Dept: DIABETES SERVICES | Facility: HOSPITAL | Age: 74
End: 2019-11-19

## 2019-12-03 ENCOUNTER — OFFICE VISIT (OUTPATIENT)
Dept: INTERNAL MEDICINE | Facility: CLINIC | Age: 74
End: 2019-12-03

## 2019-12-03 VITALS
WEIGHT: 250 LBS | OXYGEN SATURATION: 98 % | HEIGHT: 64 IN | SYSTOLIC BLOOD PRESSURE: 110 MMHG | DIASTOLIC BLOOD PRESSURE: 72 MMHG | BODY MASS INDEX: 42.68 KG/M2 | HEART RATE: 91 BPM

## 2019-12-03 DIAGNOSIS — M54.40 LOW BACK PAIN WITH SCIATICA, SCIATICA LATERALITY UNSPECIFIED, UNSPECIFIED BACK PAIN LATERALITY, UNSPECIFIED CHRONICITY: ICD-10-CM

## 2019-12-03 DIAGNOSIS — I50.32 CHRONIC DIASTOLIC HEART FAILURE (HCC): ICD-10-CM

## 2019-12-03 DIAGNOSIS — E78.2 MIXED HYPERLIPIDEMIA: Primary | ICD-10-CM

## 2019-12-03 DIAGNOSIS — I10 ESSENTIAL HYPERTENSION: Chronic | ICD-10-CM

## 2019-12-03 DIAGNOSIS — I48.0 PAROXYSMAL ATRIAL FIBRILLATION (HCC): ICD-10-CM

## 2019-12-03 DIAGNOSIS — E13.9 DIABETES 1.5, MANAGED AS TYPE 2 (HCC): ICD-10-CM

## 2019-12-03 DIAGNOSIS — Z23 NEED FOR VACCINATION: ICD-10-CM

## 2019-12-03 PROCEDURE — G0008 ADMIN INFLUENZA VIRUS VAC: HCPCS | Performed by: INTERNAL MEDICINE

## 2019-12-03 PROCEDURE — 36415 COLL VENOUS BLD VENIPUNCTURE: CPT | Performed by: INTERNAL MEDICINE

## 2019-12-03 PROCEDURE — 90653 IIV ADJUVANT VACCINE IM: CPT | Performed by: INTERNAL MEDICINE

## 2019-12-03 PROCEDURE — 99214 OFFICE O/P EST MOD 30 MIN: CPT | Performed by: INTERNAL MEDICINE

## 2019-12-03 NOTE — PROGRESS NOTES
"Karl Yañez is a 74 y.o. female.  The patient presents with a chief complaint of non-insulin-dependent diabetes mellitus type 2, mixed hyperlipidemia, essential hypertension, chronic diastolic heart failure, status post MI, paroxysmal atrial fibrillation, and chronic low back pain here for evaluation and treatment.  She was recently started on metformin to address her blood sugar issues and I am going to check an A1c today along with a CMP.  She no longer has the pain medicine physician that she was working with regarding her back and I have scheduled a consultation for her with a new program.      /72   Pulse 91   Ht 163.8 cm (64.49\")   Wt 113 kg (250 lb)   LMP  (LMP Unknown)   SpO2 98%   BMI 42.27 kg/m²     Body mass index is 42.27 kg/m².    History of Present Illness ongoing issues with blood sugar and low back pain    The following portions of the patient's history were reviewed and updated as appropriate: allergies, current medications, past family history, past medical history, past social history, past surgical history and problem list.    Review of Systems   Constitutional: Negative.    HENT: Negative.    Eyes: Negative.    Respiratory: Negative.    Cardiovascular: Positive for palpitations.   Gastrointestinal: Negative.    Endocrine: Negative.    Genitourinary: Negative.    Musculoskeletal: Positive for back pain.   Allergic/Immunologic: Negative.    Neurological: Negative.    Hematological: Negative.    Psychiatric/Behavioral: Negative.        Objective   Physical Exam   Constitutional: She appears well-developed and well-nourished.   HENT:   Head: Normocephalic and atraumatic.   Eyes: EOM are normal. Pupils are equal, round, and reactive to light.   Neck: Normal range of motion.   Cardiovascular: Normal rate and regular rhythm.   Murmur heard.  Pulmonary/Chest: Effort normal and breath sounds normal.   Abdominal: Soft. Bowel sounds are normal.   Musculoskeletal:   Chronic " low back pain uses a cane while walking   Neurological: She is alert.   Skin: Skin is warm.   Psychiatric: She has a normal mood and affect.   Nursing note and vitals reviewed.        Assessment/Plan   Lana was seen today for diabetes, hypertension, hypothyroidism and back pain.    Diagnoses and all orders for this visit:    Mixed hyperlipidemia  Comments:  no change in therapy    Essential hypertension  Comments:  doing well overall    Chronic diastolic heart failure (CMS/HCC)  Comments:  stable for now     Paroxysmal atrial fibrillation (CMS/[facility])  Comments:  stable for now    Low back pain with sciatica, sciatica laterality unspecified, unspecified back pain laterality, unspecified chronicity  Comments:  pain management  Orders:  -     Ambulatory Referral to Pain Management    Diabetes 1.5, 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HCC)  Comments:  check an A1c and a CMP  Orders:  -     Hemoglobin A1c; Future  -     Comprehensive metabolic panel; Future

## 2019-12-04 LAB
ALBUMIN SERPL-MCNC: 4.3 G/DL (ref 3.5–5.2)
ALBUMIN/GLOB SERPL: 1.4 G/DL
ALP SERPL-CCNC: 54 U/L (ref 39–117)
ALT SERPL-CCNC: 7 U/L (ref 1–33)
AST SERPL-CCNC: 10 U/L (ref 1–32)
BILIRUB SERPL-MCNC: 0.5 MG/DL (ref 0.2–1.2)
BUN SERPL-MCNC: 16 MG/DL (ref 8–23)
BUN/CREAT SERPL: 15.7 (ref 7–25)
CALCIUM SERPL-MCNC: 9.2 MG/DL (ref 8.6–10.5)
CHLORIDE SERPL-SCNC: 103 MMOL/L (ref 98–107)
CO2 SERPL-SCNC: 25.5 MMOL/L (ref 22–29)
CREAT SERPL-MCNC: 1.02 MG/DL (ref 0.57–1)
GLOBULIN SER CALC-MCNC: 3.1 GM/DL
GLUCOSE SERPL-MCNC: 172 MG/DL (ref 65–99)
POTASSIUM SERPL-SCNC: 4.3 MMOL/L (ref 3.5–5.2)
PROT SERPL-MCNC: 7.4 G/DL (ref 6–8.5)
SODIUM SERPL-SCNC: 141 MMOL/L (ref 136–145)

## 2019-12-10 ENCOUNTER — ANTICOAGULATION VISIT (OUTPATIENT)
Dept: PHARMACY | Facility: HOSPITAL | Age: 74
End: 2019-12-10

## 2019-12-10 LAB
INR PPP: 2.8 (ref 0.91–1.09)
PROTHROMBIN TIME: 34 SECONDS (ref 10–13.8)

## 2019-12-10 PROCEDURE — 85610 PROTHROMBIN TIME: CPT

## 2019-12-10 PROCEDURE — 36416 COLLJ CAPILLARY BLOOD SPEC: CPT

## 2019-12-10 RX ORDER — WARFARIN SODIUM 5 MG/1
TABLET ORAL
Qty: 90 TABLET | Refills: 0 | Status: SHIPPED | OUTPATIENT
Start: 2019-12-10 | End: 2020-02-27 | Stop reason: SDUPTHER

## 2019-12-10 RX ORDER — WARFARIN SODIUM 5 MG/1
TABLET ORAL
Qty: 98 TABLET | Refills: 0 | Status: SHIPPED | OUTPATIENT
Start: 2019-12-10 | End: 2019-12-10 | Stop reason: SDUPTHER

## 2019-12-10 NOTE — PROGRESS NOTES
Anticoagulation Clinic Progress Note    Anticoagulation Summary  As of 12/10/2019    INR goal:   2.0-3.0   TTR:   72.2 % (1.2 y)   INR used for dosin.8 (12/10/2019)   Warfarin maintenance plan:   5 mg every day   Weekly warfarin total:   35 mg   No change documented:   Shaw Hand RPH   Plan last modified:   Nano Cool RPH (2019)   Next INR check:   2020   Priority:   Maintenance   Target end date:   Indefinite    Indications    Atrial fibrillation (CMS/HCC) [I48.91]             Anticoagulation Episode Summary     INR check location:       Preferred lab:       Send INR reminders to:   [facility]IN[facility] PO[facility]    Comments:         Anticoagulation Care Providers     Provider Role Specialty Phone number    Pia Dueñas MD Referring Cardiology [phone]          Clinic Interview:  Patient Findings     Negatives:   Signs/symptoms of thrombosis, Signs/symptoms of bleeding,   Laboratory test error suspected, Change in health, Change in alcohol use,   Change in activity, Upcoming invasive procedure, Emergency department   visit, Upcoming dental procedure, Missed doses, Extra doses, Change in   medications, Change in diet/appetite, Hospital admission, Bruising, Other   complaints      Clinical Outcomes     Negatives:   Major bleeding event, Thromboembolic event,   Anticoagulation-related hospital admission, Anticoagulation-related ED   visit, Anticoagulation-related fatality        INR History:  Anticoagulation Monitoring 10/11/2019 10/28/2019 12/10/2019   INR 2.8 2.0 2.8   INR Date 10/11/2019 10/28/2019 12/10/2019   INR Goal 2.0-3.0 2.0-3.0 2.0-3.0   Trend Same Same Same   Last Week Total 35 mg 35 mg 35 mg   Next Week Total 35 mg 35 mg 35 mg   Sun 5 mg 5 mg 5 mg   Mon 5 mg 5 mg 5 mg   Tue 5 mg 5 mg 5 mg   Wed 5 mg 5 mg 5 mg   Thu 5 mg 5 mg 5 mg   Fri 5 mg 5 mg 5 mg   Sat 5 mg 5 mg 5 mg   Visit Report - - -   Some recent data might be hidden       Plan:  1. INR is Therapeutic today- see above in  Anticoagulation Summary.  Will instruct Lana Yañez to Continue their warfarin regimen- see above in Anticoagulation Summary.  2. Follow up in 4 weeks  3. Patient desires warfarin refills.  4. Verbal and written information provided. Patient expresses understanding and has no further questions at this time.    Shaw Hand [facility]

## 2019-12-12 ENCOUNTER — APPOINTMENT (OUTPATIENT)
Dept: PHARMACY | Facility: HOSPITAL | Age: 74
End: 2019-12-12

## 2019-12-19 ENCOUNTER — RESULTS ENCOUNTER (OUTPATIENT)
Dept: PAIN MEDICINE | Facility: CLINIC | Age: 74
End: 2019-12-19

## 2019-12-19 ENCOUNTER — OFFICE VISIT (OUTPATIENT)
Dept: PAIN MEDICINE | Facility: CLINIC | Age: 74
End: 2019-12-19

## 2019-12-19 VITALS
DIASTOLIC BLOOD PRESSURE: 81 MMHG | BODY MASS INDEX: 41.88 KG/M2 | HEART RATE: 88 BPM | TEMPERATURE: 97.7 F | WEIGHT: 251.4 LBS | RESPIRATION RATE: 16 BRPM | HEIGHT: 65 IN | OXYGEN SATURATION: 94 % | SYSTOLIC BLOOD PRESSURE: 123 MMHG

## 2019-12-19 DIAGNOSIS — M15.9 GENERALIZED OSTEOARTHRITIS OF MULTIPLE SITES: ICD-10-CM

## 2019-12-19 DIAGNOSIS — M51.26 DISPLACEMENT OF LUMBAR INTERVERTEBRAL DISC WITHOUT MYELOPATHY: Primary | ICD-10-CM

## 2019-12-19 DIAGNOSIS — G89.29 OTHER CHRONIC PAIN: ICD-10-CM

## 2019-12-19 DIAGNOSIS — M51.26 DISPLACEMENT OF LUMBAR INTERVERTEBRAL DISC WITHOUT MYELOPATHY: ICD-10-CM

## 2019-12-19 PROCEDURE — 80305 DRUG TEST PRSMV DIR OPT OBS: CPT | Performed by: NURSE PRACTITIONER

## 2019-12-19 PROCEDURE — 99214 OFFICE O/P EST MOD 30 MIN: CPT | Performed by: NURSE PRACTITIONER

## 2019-12-19 RX ORDER — HYDROCODONE BITARTRATE AND ACETAMINOPHEN 5; 325 MG/1; MG/1
1 TABLET ORAL 2 TIMES DAILY PRN
Qty: 40 TABLET | Refills: 0 | Status: SHIPPED | OUTPATIENT
Start: 2019-12-19 | End: 2019-12-30 | Stop reason: SDUPTHER

## 2019-12-19 RX ORDER — TRAVOPROST 0.004 %
1 DROPS OPHTHALMIC (EYE) EVERY MORNING
COMMUNITY
Start: 2019-12-05

## 2019-12-19 RX ORDER — ACETAMINOPHEN 500 MG
500 TABLET ORAL EVERY 6 HOURS PRN
COMMUNITY
End: 2021-08-05

## 2019-12-19 RX ORDER — MAGNESIUM OXIDE 400 MG/1
400 TABLET ORAL AS NEEDED
COMMUNITY
End: 2022-02-10

## 2019-12-19 NOTE — PROGRESS NOTES
"CHIEF COMPLAINT  F/U back pain- patient states that her pain has worsened since her last visit. She is unable to perform household duties because of the pain. Dr. Ramirez had prescribed her  Tramadol and Norco which both improved her pain but since he has left she is now seeing Dr. Tavarez and he referred her to us.     Karl Yañez is a 74 y.o. female  who presents to the office for follow-up.She has a history of chronic back pain. Reports her pain is worse since last office visit.  She was initially evaluated in the office by Dr. Annie Kirk on 8/28/2018.  She was referred here by Dr. Perez for evaluation of back pain.  Has had back pain for 10 years on and off.  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 fib not being able to stop anticoagulant.  No stents were placed but is high risk for surgery.  Started on tramadol for pain.  No previous pain clinics, back injections, no history of opioids prior to current tramadol by neurosurgery.  Currently followed by Dr. Dueñas for cardiacs issues.  Reviewed imaging with patient.  May benefit from LESI but would have to hold anticoagulant.  We will reach out to Dr. Mota in regards to holding anticoagulant if possible.  Random urine drug screen per office policy.  No benefit with tramadol.  Trial of hydrocodone 5-3 25 2-3 times a day as needed as needed.  Plan to start low-dose gabapentin.  Prescription for gabapentin 100 mg to increase up to twice daily as tolerated.    Complains of pain in low back, joints. Today her pain 7/10VAS. Describes the pain as continuous aching. Pain increases with walking, standing,; pain decreases with sitting, laying down, rest.  Occasionally the pain radiates into her left leg. Has numbness also left thigh. Takes OTC Tylenol with mild relief. \"I felt better with the pain medicine.\" Found an older prescription for Tramadol in her drawer recently. Was taking this occasionally to " help with her pain. However, it was  so her daughter wanted her to stop this due to expiration. She also had an old prescription for Hydrocodone 5/325 but has not had this in approximately 1 year. Notices improvement in pain with Hydrocodone versus Tramadol. Without the pain medication, she has difficulty with chores, walking, activities. Denies any side effects with either pain medication. ADL's by self. Denies any bowel or bladder changes.     She needs a left shoulder replacement, but this is not planned, as she would need bridge for Coumadin.  She states she did ask Dr Tavarez about pain medication, but he wanted her to see pain management.   Back Pain   This is a chronic problem. The current episode started more than 1 year ago. The problem occurs constantly. The problem has been gradually worsening since onset. The pain is present in the lumbar spine and sacro-iliac. The quality of the pain is described as aching. Radiates to: LLE. The pain is at a severity of 7/10. The pain is moderate. The pain is worse during the day. The symptoms are aggravated by bending, position, standing and twisting. Associated symptoms include headaches, numbness (L leg) and weakness (L leg). Pertinent negatives include no abdominal pain, chest pain or dysuria. Risk factors include lack of exercise, menopause, obesity and sedentary lifestyle. She has tried analgesics, bed rest, chiropractic manipulation, heat, home exercises, ice, muscle relaxant, walking and NSAIDs for the symptoms. The treatment provided mild relief.      Past pain medications:   norco 5/325 mg - helpful  Gabapentin 800 - dizzy, hurt stomach  Tramadol 50 mg up to 4/day - not helping     Current pain medications:   Tylenol OTC     Past therapies:  Physical Therapy: yes- minimal  Chiropractor: no  Massage Therapy: no  TENS: yes  Neck or back surgery: no  Past pain management: no     Previous Injections: none    PEG Assessment   What number best describes  "your pain on average in the past week?7  What number best describes how, during the past week, pain has interfered with your enjoyment of life?9  What number best describes how, during the past week, pain has interfered with your general activity?  9    The following portions of the patient's history were reviewed and updated as appropriate: allergies, current medications, past family history, past medical history, past social history, past surgical history and problem list.    Review of Systems   Constitutional: Positive for activity change (decreaserd) and fatigue. Negative for appetite change.   HENT: Negative for hearing loss and trouble swallowing.    Eyes: Positive for visual disturbance (glaucoma).   Respiratory: Positive for apnea and shortness of breath (A Fib). Negative for chest tightness.    Cardiovascular: Negative for chest pain.   Gastrointestinal: Positive for constipation. Negative for abdominal pain, diarrhea and nausea.   Genitourinary: Negative for difficulty urinating and dysuria.   Musculoskeletal: Positive for back pain and gait problem.   Neurological: Positive for dizziness, weakness (L leg), numbness (L leg) and headaches. Negative for light-headedness.   Psychiatric/Behavioral: Positive for sleep disturbance. Negative for agitation and suicidal ideas. The patient is not nervous/anxious.        Vitals:    12/19/19 1107   BP: 123/81   Pulse: 88   Resp: 16   Temp: 97.7 °F (36.5 °C)   SpO2: 94%   Weight: 114 kg (251 lb 6.4 oz)   Height: 165.1 cm (65\")   PainSc:   7   PainLoc: Back     Objective   Physical Exam   Constitutional: She is oriented to person, place, and time. Vital signs are normal. She appears well-developed and well-nourished. She is cooperative.   HENT:   Head: Normocephalic and atraumatic.   Nose: Nose normal.   Eyes: Conjunctivae and lids are normal.   Neck: Trachea normal. Neck supple.   Cardiovascular: Normal rate.   Pulmonary/Chest: Effort normal.   Abdominal: Normal " appearance.   Musculoskeletal:        Lumbar back: She exhibits decreased range of motion and tenderness.   Widespread OA changes with no acute synovitis   Neurological: She is alert and oriented to person, place, and time. Gait (cane) abnormal.   Skin: Skin is warm, dry and intact.   Psychiatric: She has a normal mood and affect. Her speech is normal and behavior is normal. Judgment and thought content normal. Cognition and memory are normal.   Nursing note and vitals reviewed.          Assessment/Plan   Lana was seen today for back pain.    Diagnoses and all orders for this visit:    Displacement of lumbar intervertebral disc without myelopathy    Generalized osteoarthritis of multiple sites    Other orders  -     HYDROcodone-acetaminophen (NORCO) 5-325 MG per tablet; Take 1 tablet by mouth 2 (Two) Times a Day As Needed for Severe Pain .      --- Routine UDS in office today as part of monitoring requirements for controlled substances.  The specimen was viewed and the immunoassay result reviewed and is NEGATIVE(LAST PRESCRIPTION GREATER THAN ONE YEAR- APPROPRIATE).  This specimen will be sent to [facility] laboratory for confirmation.     --- Refill Hydrocodone BID PRN #[id number]. Patient appears stable with current regimen. No adverse effects. Regarding continuation of opioids, there is no evidence of aberrant behavior or any red flags.  The patient continues with appropriate response to opioid therapy. ADL's remain intact by self.   --- Follow-up 6 weeks or sooner if needed.       MARCO REPORT    As part of the patient's treatment plan, I am prescribing controlled substances. The patient has been made aware of appropriate use of such medications, including potential risk of somnolence, limited ability to drive and/or work safely, and the potential for dependence or overdose. It has also bee made clear that these medications are for use by this patient only, without concomitant use of alcohol or other substances  unless prescribed.     Patient has completed prescribing agreement detailing terms of continued prescribing of controlled substances, including monitoring MARCO reports, urine drug screening, and pill counts if necessary. The patient is aware that inappropriate use will results in cessation of prescribing such medications.    MARCO report has been reviewed and scanned into the patient's chart.    As the clinician, I personally reviewed the MARCO from 12-18-19 while the patient was in the office today.    History and physical exam exhibit continued safe and appropriate use of controlled substances.      EMR Dragon/Transcription disclaimer:   Much of this encounter note is an electronic transcription/translation of spoken language to printed text. The electronic translation of spoken language may permit erroneous, or at times, nonsensical words or phrases to be inadvertently transcribed; Although I have reviewed the note for such errors, some may still exist.

## 2019-12-23 ENCOUNTER — TELEPHONE (OUTPATIENT)
Dept: PAIN MEDICINE | Facility: CLINIC | Age: 74
End: 2019-12-23

## 2019-12-30 RX ORDER — HYDROCODONE BITARTRATE AND ACETAMINOPHEN 5; 325 MG/1; MG/1
1 TABLET ORAL 2 TIMES DAILY PRN
Qty: 40 TABLET | Refills: 0 | Status: SHIPPED | OUTPATIENT
Start: 2019-12-30 | End: 2020-02-04 | Stop reason: SDUPTHER

## 2019-12-30 NOTE — TELEPHONE ENCOUNTER
I spoke with MsAnn Otilio and she states that she needs the remaining part of her hydro/apap Rx script that was filled on 12/19/19. She was only given a 7 day supply of the previous Rx.

## 2020-01-09 ENCOUNTER — APPOINTMENT (OUTPATIENT)
Dept: PHARMACY | Facility: HOSPITAL | Age: 75
End: 2020-01-09

## 2020-01-14 ENCOUNTER — CLINICAL SUPPORT (OUTPATIENT)
Dept: ORTHOPEDIC SURGERY | Facility: CLINIC | Age: 75
End: 2020-01-14

## 2020-01-14 ENCOUNTER — ANTICOAGULATION VISIT (OUTPATIENT)
Dept: PHARMACY | Facility: HOSPITAL | Age: 75
End: 2020-01-14

## 2020-01-14 VITALS — BODY MASS INDEX: 41.82 KG/M2 | WEIGHT: 251 LBS | TEMPERATURE: 98.3 F | HEIGHT: 65 IN

## 2020-01-14 DIAGNOSIS — M25.512 CHRONIC LEFT SHOULDER PAIN: Primary | ICD-10-CM

## 2020-01-14 DIAGNOSIS — G89.29 CHRONIC LEFT SHOULDER PAIN: Primary | ICD-10-CM

## 2020-01-14 DIAGNOSIS — M19.90 ARTHRITIS: ICD-10-CM

## 2020-01-14 LAB
INR PPP: 1.9 (ref 0.91–1.09)
PROTHROMBIN TIME: 22.2 SECONDS (ref 10–13.8)

## 2020-01-14 PROCEDURE — 20610 DRAIN/INJ JOINT/BURSA W/O US: CPT | Performed by: ORTHOPAEDIC SURGERY

## 2020-01-14 PROCEDURE — 85610 PROTHROMBIN TIME: CPT

## 2020-01-14 PROCEDURE — 73030 X-RAY EXAM OF SHOULDER: CPT | Performed by: ORTHOPAEDIC SURGERY

## 2020-01-14 PROCEDURE — 36416 COLLJ CAPILLARY BLOOD SPEC: CPT

## 2020-01-14 RX ORDER — METHYLPREDNISOLONE ACETATE 80 MG/ML
80 INJECTION, SUSPENSION INTRA-ARTICULAR; INTRALESIONAL; INTRAMUSCULAR; SOFT TISSUE
Status: COMPLETED | OUTPATIENT
Start: 2020-01-14 | End: 2020-01-14

## 2020-01-14 RX ADMIN — METHYLPREDNISOLONE ACETATE 80 MG: 80 INJECTION, SUSPENSION INTRA-ARTICULAR; INTRALESIONAL; INTRAMUSCULAR; SOFT TISSUE at 08:36

## 2020-01-14 NOTE — PROGRESS NOTES
Patient: Lana Yañez  YOB: 1945  Date of Service: 1/14/2020    Chief Complaints: Left shoulder  Subjective:    History of Present Illness: Pt is seen in the office today with complaints of left shoulder pain she gets intermittent injections and does well with those understands her options.          Allergies:   Allergies   Allergen Reactions   • Contrast Dye Hives and Itching       Medications:   Home Medications:  Current Outpatient Medications on File Prior to Visit   Medication Sig   • acetaminophen (TYLENOL) 500 MG tablet Take 500 mg by mouth Every 6 (Six) Hours As Needed for Mild Pain .   • albuterol (PROVENTIL HFA;VENTOLIN HFA) 108 (90 Base) MCG/ACT inhaler Inhale 2 puffs Every 6 (Six) Hours As Needed.   • aspirin 81 MG EC tablet Take 1 tablet by mouth Daily.   • atorvastatin (LIPITOR) 10 MG tablet Take 1 tablet by mouth Daily.   • Blood Glucose Monitoring Suppl kit 1 kit 3 (Three) Times a Day.   • carvedilol (COREG) 25 MG tablet TAKE 1 & 1/2 (ONE & ONE-HALF) TABLETS BY MOUTH TWICE DAILY WITH  MEALS   • digoxin (LANOXIN) 125 MCG tablet Take 1 tablet by mouth Every Other Day.   • furosemide (LASIX) 40 MG tablet Take 40 mg by mouth Daily.   • glucose blood test strip Use as instructed   • HYDROcodone-acetaminophen (NORCO) 5-325 MG per tablet Take 1 tablet by mouth 2 (Two) Times a Day As Needed for Severe Pain .   • ipratropium-albuterol (DUO-NEB) 0.5-2.5 mg/mL nebulizer As Needed.   • Lancets (ACCU-CHEK MULTICLIX) lancets Use 1 lancet per finger stick   • lisinopril (PRINIVIL,ZESTRIL) 10 MG tablet TAKE 1 TABLET BY MOUTH ONCE DAILY   • magnesium oxide (MAG-OX) 400 MG tablet Take 400 mg by mouth Daily.   • metFORMIN ER (GLUCOPHAGE-XR) 500 MG 24 hr tablet Take 1 tablet by mouth Daily With Breakfast.   • potassium chloride (K-DUR) 10 MEQ CR tablet TAKE 4 TABLETS BY MOUTH ONCE DAILY (Patient taking differently: TAKE 4 TABLETS BY MOUTH ONCE DAILY - currently taking 2 in the am and two in the pm)    • TRAVATAN Z 0.004 % solution ophthalmic solution    • vitamin D (ERGOCALCIFEROL) 49543 units capsule capsule Take 1 capsule by mouth 1 (One) Time Per Week.   • warfarin (COUMADIN) 5 MG tablet Take one tablet by mouth daily or as directed by [facility].     No current facility-administered medications on file prior to visit.      Current Medications:  Scheduled Meds:  Continuous Infusions:  No current facility-administered medications for this visit.   PRN Meds:.    I have reviewed the patient's medical history in detail and updated the computerized patient record.  Review and summarization of old records include:    Past Medical History:   Diagnosis Date   • Acute on chronic diastolic heart failure (CMS/HCC)    • Arthritis    • Asthma    • Atrial fibrillation (CMS/[facility])     Persistent; on warfarin   • Atypical chest pain    • Bronchitis    • Cellulitis of right elbow     due to MRSA   • CHF (congestive heart failure) (CMS/HCC)    • COPD (chronic obstructive pulmonary disease) (CMS/[facility])    • Coronary artery disease     Cardiac catheterization completed; 90% PDA stenosis with medical management recommended   • Coronary artery disease involving native coronary artery of native heart with angina pectoris with documented spasm (CMS/[facility])    • Disease of thyroid gland    • Displacement of lumbar intervertebral disc without myelopathy    • Dizziness    • ANTOINE (dyspnea on exertion)    • Essential hypertension    • Fatigue    • Generalized osteoarthritis of multiple sites    • History of rheumatic fever    • History of transfusion    • Hx of bone density study     10/23/2014   • Hyperglycemia    • Hyperlipidemia    • Hypovitaminosis D    • Left arm pain    • Left-sided weakness    • Leg swelling    • Low back pain    • Lower extremity edema    • Malaise and fatigue    • Mitral valve disease     Moderate mitral valve prolapse and moderate mitral regurgitation   • Mitral valve insufficiency    • Morbid  obesity with BMI of 40.0-44.9, adult (CMS/[facility])    • MRSA infection    • NSTEMI (non-ST elevated myocardial infarction) (CMS/HCC)    • PAF (paroxysmal atrial fibrillation) (CMS/HCC)    • RA (rheumatoid arthritis) (CMS/[facility])     involving both hands   • Sleep apnea    • SOB (shortness of breath)    • Stroke (CMS/HCC)     left side weakness   • Urge incontinence of urine    • Vitamin D deficiency         Past Surgical History:   Procedure Laterality Date   • BREAST BIOPSY     • BREAST SURGERY      right side lumpectomy with biopsy   • CARDIAC CATHETERIZATION Left 10/20/2017    Procedure: Cardiac Catheterization/Vascular Study;  Surgeon: Alphonso Olmedo MD;  Location: [facility] INVASIVE LOCATION;  Service:    • CARDIAC CATHETERIZATION N/A 10/20/2017    +2 mitral regurgitation, left main 10% stenosis, mid to distal LAD 10% diffuse stenosis, circumflex 10% diffuse stenosis, RCA 10% proximal stenosis, and distal PDA consistent with coronary embolus with a lesion of 90% too small to consider coronary intervention; medical management recommended   • EYE SURGERY      laser surgery for glaucoma and left eye cataracts removed   • HYSTERECTOMY      10+ years ago   • JOINT REPLACEMENT  ;     bilateral knees and left rotater   • KNEE SURGERY     • MAMMO BILATERAL  2016    [facility]         Social History     Occupational History   • Occupation:  for [employer]     Employer: RETIRED   Tobacco Use   • Smoking status: Former Smoker     Packs/day: 3.00     Types: Cigarettes     Start date: 1967     Last attempt to quit: 10/19/1968     Years since quittin.2   • Smokeless tobacco: Never Used   Substance and Sexual Activity   • Alcohol use: No     Comment: No caffeine use   • Drug use: No   • Sexual activity: Defer    Social History     Social History Narrative   • Not on file        Family History   Problem Relation Age of Onset   • Colon cancer Mother    • Glaucoma Mother    • Stroke Mother     • Arthritis Mother    • Hypertension Mother    • Glaucoma Sister    • Diabetes Sister    • Heart disease Sister    • Arthritis Sister    • Asthma Sister    • Hypertension Sister    • Miscarriages / Stillbirths Sister    • Lung disease Sister    • Heart disease Brother    • Diabetes Brother    • Arthritis Brother    • Drug abuse Brother    • Hypertension Brother    • Arthritis Father    • COPD Father    • Lung disease Father    • Arthritis Daughter    • Depression Daughter    • Alcohol abuse Maternal Uncle    • Heart disease Sister    • Heart disease Sister    • Heart disease Brother        ROS: 14 point review of systems was performed and was negative except for documented findings in HPI and today's encounter.     Allergies:   Allergies   Allergen Reactions   • Contrast Dye Hives and Itching     Constitutional:  Denies fever, shaking or chills   Eyes:  Denies change in visual acuity   HENT:  Denies nasal congestion or sore throat   Respiratory:  Denies cough or shortness of breath   Cardiovascular:  Denies chest pain or severe LE edema   GI:  Denies abdominal pain, nausea, vomiting, bloody stools or diarrhea   Musculoskeletal:  Numbness, tingling, or loss of motor function only as noted above in history of present illness.  : Denies painful urination or hematuria  Integument:  Denies rash, lesion or ulceration   Neurologic:  Denies headache or focal weakness  Endocrine:  Denies lymphadenopathy  Psych:  Denies confusion or change in mental status   Hem:  Denies active bleeding      Physical Exam: 74 y.o. female  Wt Readings from Last 3 Encounters:   12/19/19 114 kg (251 lb 6.4 oz)   12/03/19 113 kg (250 lb)   10/01/19 112 kg (247 lb 3.2 oz)       There is no height or weight on file to calculate BMI.  No height and weight on file for this encounter.  There were no vitals filed for this visit.  Vital signs reviewed.   General Appearance:    Alert, cooperative, in no acute distress                  Eyes:  conjunctiva clear  ENT: external ears and nose atraumatic  CV: no peripheral edema  Resp: normal respiratory effort  Skin: no rashes or wounds; normal turgor  Psych: mood and affect appropriate  Lymph: no nodes appreciated  Neuro: gross sensation intact  Vascular:  Palpable peripheral pulse in noted extremity    Ortho exam      Physical exam the left shoulder reveals no overlying skin changes no lymphedema lymphadenopathy the patient can actively flex to about 150 passively I get them to 160 abduction is similar external rotation is 40 internal rotation to there buttock.  Rotator cuff strength is 4+ over 5 with isometric strength testing no overlying skin changes.  Patient has reasonable cervical range of motion for their age no radicular symptoms and a normal elbow exam.  There are good distal pulses.       X-rays AP scapular on x-ray lateral-left shoulder were taken to evaluate her symptoms and compared to previous films she does have severe humeral arthritis with several loose bodies no significant change    Assessment: Left shoulder pain which is arthritic in origin    Plan: Injections we talked about arthroplasty she wants to hold off on that for now  Follow up as indicated.    Asya Rodriges M.D.        Large Joint Arthrocentesis: L glenohumeral  Date/Time: 1/14/2020 8:36 AM  Consent given by: patient  Site marked: site marked  Timeout: Immediately prior to procedure a time out was called to verify the correct patient, procedure, equipment, support staff and site/side marked as required   Supporting Documentation  Indications: pain   Procedure Details  Location: shoulder - L glenohumeral  Preparation: Patient was prepped and draped in the usual sterile fashion  Needle size: 22 G  Approach: posterior  Medications administered: 4 mL lidocaine (cardiac); 80 mg methylPREDNISolone acetate 80 MG/ML  Patient tolerance: patient tolerated the procedure well with no immediate complications

## 2020-01-14 NOTE — PROGRESS NOTES
Anticoagulation Clinic Progress Note    Anticoagulation Summary  As of 2020    INR goal:   2.0-3.0   TTR:   73.5 % (1.3 y)   INR used for dosin.9! (2020)   Warfarin maintenance plan:   5 mg every day   Weekly warfarin total:   35 mg   No change documented:   Anisa Castellon   Plan last modified:   Nano Cool RPH (2019)   Next INR check:   2020   Priority:   Maintenance   Target end date:   Indefinite    Indications    Atrial fibrillation (CMS/HCC) [I48.91]             Anticoagulation Episode Summary     INR check location:       Preferred lab:       Send INR reminders to:   [facility]    Comments:         Anticoagulation Care Providers     Provider Role Specialty Phone number    Pia Dueñas MD Referring Cardiology [phone]          Clinic Interview:  Patient Findings     Negatives:   Signs/symptoms of thrombosis, Signs/symptoms of bleeding,   Laboratory test error suspected, Change in health, Change in alcohol use,   Change in activity, Upcoming invasive procedure, Emergency department   visit, Upcoming dental procedure, Missed doses, Extra doses, Change in   medications, Change in diet/appetite, Hospital admission, Bruising, Other   complaints      Clinical Outcomes     Negatives:   Major bleeding event, Thromboembolic event,   Anticoagulation-related hospital admission, Anticoagulation-related ED   visit, Anticoagulation-related fatality        INR History:  Anticoagulation Monitoring 10/28/2019 12/10/2019 2020   INR 2.0 2.8 1.9   INR Date 10/28/2019 12/10/2019 2020   INR Goal 2.0-3.0 2.0-3.0 2.0-3.0   Trend Same Same Same   Last Week Total 35 mg 35 mg 35 mg   Next Week Total 35 mg 35 mg 35 mg   Sun 5 mg 5 mg 5 mg   Mon 5 mg 5 mg 5 mg   Tue 5 mg 5 mg 5 mg   Wed 5 mg 5 mg 5 mg   Thu 5 mg 5 mg 5 mg   Fri 5 mg 5 mg 5 mg   Sat 5 mg 5 mg 5 mg   Visit Report - - -   Some recent data might be hidden       Plan:  1. INR is out of range- see above in  Anticoagulation Summary.   Will instruct Lana Yañez to Continue  their warfarin regimen- see above in Anticoagulation Summary.  2. Follow up in 1 weeks.   3. Patient declines warfarin refills.  4. Verbal and written information provided. Patient expresses understanding and has no further questions at this time.    Anisa Castellon

## 2020-01-21 ENCOUNTER — APPOINTMENT (OUTPATIENT)
Dept: PHARMACY | Facility: HOSPITAL | Age: 75
End: 2020-01-21

## 2020-01-30 ENCOUNTER — APPOINTMENT (OUTPATIENT)
Dept: PHARMACY | Facility: HOSPITAL | Age: 75
End: 2020-01-30

## 2020-01-30 DIAGNOSIS — I48.91 ATRIAL FIBRILLATION WITH RVR (HCC): Primary | ICD-10-CM

## 2020-01-31 ENCOUNTER — ANTICOAGULATION VISIT (OUTPATIENT)
Dept: PHARMACY | Facility: HOSPITAL | Age: 75
End: 2020-01-31

## 2020-01-31 LAB
INR PPP: 2 (ref 0.91–1.09)
PROTHROMBIN TIME: 24.5 SECONDS (ref 10–13.8)

## 2020-01-31 PROCEDURE — 36416 COLLJ CAPILLARY BLOOD SPEC: CPT

## 2020-01-31 PROCEDURE — 85610 PROTHROMBIN TIME: CPT

## 2020-01-31 NOTE — PROGRESS NOTES
Anticoagulation Clinic Progress Note    Anticoagulation Summary  As of 2020    INR goal:   2.0-3.0   TTR:   70.8 % (1.3 y)   INR used for dosin.0 (2020)   Warfarin maintenance plan:   5 mg every day   Weekly warfarin total:   35 mg   No change documented:   Divine Lea   Plan last modified:   Nano Cool RPH (2019)   Next INR check:   2020   Priority:   Maintenance   Target end date:   Indefinite    Indications    Atrial fibrillation (CMS/HCC) [I48.91]             Anticoagulation Episode Summary     INR check location:       Preferred lab:       Send INR reminders to:   [facility]    Comments:         Anticoagulation Care Providers     Provider Role Specialty Phone number    Pia Dueñas MD Referring Cardiology [phone]          Clinic Interview:  Patient Findings     Negatives:   Signs/symptoms of thrombosis, Signs/symptoms of bleeding,   Laboratory test error suspected, Change in health, Change in alcohol use,   Change in activity, Upcoming invasive procedure, Emergency department   visit, Upcoming dental procedure, Missed doses, Extra doses, Change in   medications, Change in diet/appetite, Hospital admission, Bruising, Other   complaints      Clinical Outcomes     Negatives:   Major bleeding event, Thromboembolic event,   Anticoagulation-related hospital admission, Anticoagulation-related ED   visit, Anticoagulation-related fatality        INR History:  Anticoagulation Monitoring 12/10/2019 2020 2020   INR 2.8 1.9 2.0   INR Date 12/10/2019 2020 2020   INR Goal 2.0-3.0 2.0-3.0 2.0-3.0   Trend Same Same Same   Last Week Total 35 mg 35 mg 35 mg   Next Week Total 35 mg 35 mg 35 mg   Sun 5 mg 5 mg 5 mg   Mon 5 mg 5 mg 5 mg   Tue 5 mg 5 mg 5 mg   Wed 5 mg 5 mg 5 mg   Thu 5 mg 5 mg 5 mg   Fri 5 mg 5 mg 5 mg   Sat 5 mg 5 mg 5 mg   Visit Report - - -   Some recent data might be hidden       Plan:  1. INR is therapeutic today- see above in  Anticoagulation Summary.   Will instruct Lana Yañez to continue their warfarin regimen- see above in Anticoagulation Summary.  2. Follow up in 4 weeks.  3. Patient declines warfarin refills.  4. Verbal and written information provided. Patient expresses understanding and has no further questions at this time.    Divine Lea

## 2020-02-04 ENCOUNTER — OFFICE VISIT (OUTPATIENT)
Dept: PAIN MEDICINE | Facility: CLINIC | Age: 75
End: 2020-02-04

## 2020-02-04 VITALS
HEIGHT: 65 IN | SYSTOLIC BLOOD PRESSURE: 109 MMHG | DIASTOLIC BLOOD PRESSURE: 68 MMHG | BODY MASS INDEX: 40.92 KG/M2 | WEIGHT: 245.6 LBS | TEMPERATURE: 97.7 F | HEART RATE: 99 BPM | RESPIRATION RATE: 20 BRPM | OXYGEN SATURATION: 97 %

## 2020-02-04 DIAGNOSIS — M05.742 RHEUMATOID ARTHRITIS INVOLVING BOTH HANDS WITH POSITIVE RHEUMATOID FACTOR (HCC): ICD-10-CM

## 2020-02-04 DIAGNOSIS — M51.26 DISPLACEMENT OF LUMBAR INTERVERTEBRAL DISC WITHOUT MYELOPATHY: ICD-10-CM

## 2020-02-04 DIAGNOSIS — G89.29 OTHER CHRONIC PAIN: Primary | ICD-10-CM

## 2020-02-04 DIAGNOSIS — Z79.899 CONTROLLED SUBSTANCE AGREEMENT SIGNED: ICD-10-CM

## 2020-02-04 DIAGNOSIS — M05.741 RHEUMATOID ARTHRITIS INVOLVING BOTH HANDS WITH POSITIVE RHEUMATOID FACTOR (HCC): ICD-10-CM

## 2020-02-04 PROCEDURE — 99214 OFFICE O/P EST MOD 30 MIN: CPT | Performed by: NURSE PRACTITIONER

## 2020-02-04 RX ORDER — HYDROCODONE BITARTRATE AND ACETAMINOPHEN 5; 325 MG/1; MG/1
1 TABLET ORAL 2 TIMES DAILY PRN
Qty: 40 TABLET | Refills: 0 | Status: SHIPPED | OUTPATIENT
Start: 2020-02-04 | End: 2020-04-01 | Stop reason: SDUPTHER

## 2020-02-04 NOTE — PROGRESS NOTES
CHIEF COMPLAINT  F/u back pain. Pt sts pain is the same.     Karl Yañez is a 74 y.o. female  who presents to the office for follow-up.She has a history of chronic back and joint pain. Reports her pain is unchanged since last office visit.    Complains of pain in her low back and joints. Today her pain is 6/10VAS. Describes her pain as nearly continuous aching and throbbing. Pain increases with activity, walking, standing, household chores; pain decreases with medication and rest. Continues with Hydrocodone 5/325 1/day PRN. Denies any side effects from the regimen. The regimen helps decrease her pain by 50%. ADL's by self.     Counter prescription this morning, has 6 tablets at home.     She was initially evaluated in the office by Dr. Annie Kirk on 8/28/2018.  She was referred here by Dr. Perez for evaluation of back pain.  Has had back pain for 10 years on and off.  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 fib not being able to stop anticoagulant.  No stents were placed but is high risk for surgery.  Started on tramadol for pain.  No previous pain clinics, back injections, no history of opioids prior to current tramadol by neurosurgery.  Currently followed by Dr. Dueñas for cardiacs issues.  Reviewed imaging with patient.  May benefit from LESI but would have to hold anticoagulant.  We will reach out to Dr. Moat in regards to holding anticoagulant if possible.  Random urine drug screen per office policy.  No benefit with tramadol.  Trial of hydrocodone 5-3 25 2-3 times a day as needed as needed.  Plan to start low-dose gabapentin.  Prescription for gabapentin 100 mg to increase up to twice daily as tolerated.    She needs a left shoulder replacement, but this is not planned, as she would need bridge for Coumadin. Left shoulder injection with Dr Asya Rodriges 1-14-20. Is noticing some improvement with this.  She states she did ask Dr Tavarez about  pain medication, but he wanted her to see pain management.   Back Pain   This is a chronic problem. The current episode started more than 1 year ago. The problem occurs constantly. The problem has been gradually worsening since onset. The pain is present in the lumbar spine and sacro-iliac. The quality of the pain is described as aching. Radiates to: LLE. The pain is at a severity of 6/10. The pain is moderate. The pain is worse during the day. The symptoms are aggravated by bending, position, standing and twisting. Associated symptoms include headaches, numbness (L leg) and weakness (L leg). Pertinent negatives include no abdominal pain, chest pain or dysuria. Risk factors include lack of exercise, menopause, obesity and sedentary lifestyle. She has tried analgesics, bed rest, chiropractic manipulation, heat, home exercises, ice, muscle relaxant, walking and NSAIDs for the symptoms. The treatment provided mild relief.      Past pain medications:   norco 5/325 mg - helpful  Gabapentin 800 - dizzy, hurt stomach  Tramadol 50 mg up to 4/day - not helping     Current pain medications:   Tylenol OTC      Past therapies:  Physical Therapy: yes- minimal  Chiropractor: no  Massage Therapy: no  TENS: yes  Neck or back surgery: no  Past pain management: no     Previous Injections: none      PEG Assessment   What number best describes your pain on average in the past week?6  What number best describes how, during the past week, pain has interfered with your enjoyment of life?7  What number best describes how, during the past week, pain has interfered with your general activity?  7    The following portions of the patient's history were reviewed and updated as appropriate: allergies, current medications, past family history, past medical history, past social history, past surgical history and problem list.    Review of Systems   Constitutional: Positive for activity change (decreaserd) and fatigue. Negative for appetite change.  "  HENT: Negative for hearing loss and trouble swallowing.    Eyes: Positive for visual disturbance (glaucoma).   Respiratory: Positive for apnea and shortness of breath (A Fib). Negative for chest tightness.    Cardiovascular: Negative for chest pain.   Gastrointestinal: Positive for constipation. Negative for abdominal pain, diarrhea and nausea.   Genitourinary: Negative for difficulty urinating and dysuria.   Musculoskeletal: Positive for back pain and gait problem.   Neurological: Positive for dizziness, weakness (L leg), numbness (L leg) and headaches. Negative for light-headedness.   Psychiatric/Behavioral: Positive for sleep disturbance. Negative for agitation and suicidal ideas. The patient is not nervous/anxious.        Vitals:    02/04/20 1328   BP: 109/68   Pulse: 99   Resp: 20   Temp: 97.7 °F (36.5 °C)   SpO2: 97%   Weight: 111 kg (245 lb 9.6 oz)   Height: 165.1 cm (65\")   PainSc:   6   PainLoc: Back     Objective   Physical Exam   Constitutional: She is oriented to person, place, and time. Vital signs are normal. She appears well-developed and well-nourished. She is cooperative.   HENT:   Head: Normocephalic and atraumatic.   Nose: Nose normal.   Eyes: Conjunctivae and lids are normal.   Neck: Trachea normal. Neck supple.   Cardiovascular: Normal rate.   Pulmonary/Chest: Effort normal.   Abdominal: Normal appearance.   Musculoskeletal:        Lumbar back: She exhibits decreased range of motion and tenderness.   Widespread OA changes with no acute synovitis   Neurological: She is alert and oriented to person, place, and time. Gait (cane) abnormal.   Skin: Skin is warm, dry and intact.   Psychiatric: She has a normal mood and affect. Her speech is normal and behavior is normal. Judgment and thought content normal. Cognition and memory are normal.   Nursing note and vitals reviewed.    Assessment/Plan   Lana was seen today for back pain.    Diagnoses and all orders for this visit:    Other chronic " pain    Displacement of lumbar intervertebral disc without myelopathy    Rheumatoid arthritis involving both hands with positive rheumatoid factor (CMS/[facility])    Controlled substance agreement signed      --- The urine drug screen confirmation from 12-19-19 has been reviewed and the result is APPROPRIATE(no recent prescription for pain medication) based on patient history and MARCO report  --- Refill Hydrocodone. Patient appears stable with current regimen. No adverse effects. Regarding continuation of opioids, there is no evidence of aberrant behavior or any red flags.  The patient continues with appropriate response to opioid therapy. ADL's remain intact by self.   --- Referral to physical therapy/aquatherapy  --- Sign medication management agreement. A medication management agreement is signed by the patient and the provider today.  Reviewed with the patient that this contract is specific to controlled substances, specifically pain medication.  Reviewed core of pain medicine is to decrease pain and increase functional level.  Reviewed the medication must be picked up as a written prescription from this office and will not be called into any pharmacy.  Reviewed the use of Marco within the office setting.  Reviewed causes for potential of discontinuation of opiates,  including but not limited to consideration for diversion, obtaining other controlled substances from other license practitioners, or  inappropriate office behavior.  Patient understands to use a controlled substance as prescribed by physician and to avoid improper use of controlled substances.  Patient will also verbalized understanding that prescriptions to be filled at the same pharmacy  unless office staff is made aware of this.  Patient understands they can be submitted to random urine drug screens and/or random pill counts on any request.  Patient understands they are not to receive early refills.  The patient must produce an official police  report for any effort to replace controlled substances that are lost or stolen.  No use of illegal street drugs while receiving controlled substances from this prescribing provider.  The patient is to take medication as prescribed  and not deviate from the normal schedule without consultation from the provider.  Patient is not to share the medication with others or to take medication with alcohol or other sedatives.  Use caution when driving or operating machinery.  Alert office if the patient becomes pregnant or begins nursing a child.  Reviewed the use of controlled substances recreates a risk for respiratory depression, which may result in serious harm or even death.  Must always keep the prescription in the original container.  Patient is to store controlled substances in a locked cabinet or other secure storage unit that is cool, dry and out of sunlight.  Patient must immediately notify office if any controlled substances is stolen or improperly taken by another individual.  Reviewed risk for physical dependence, tolerance and addiction.  --- Follow-up 2 months or sooner if needed.     MARCO REPORT    As part of the patient's treatment plan, I am prescribing controlled substances. The patient has been made aware of appropriate use of such medications, including potential risk of somnolence, limited ability to drive and/or work safely, and the potential for dependence or overdose. It has also bee made clear that these medications are for use by this patient only, without concomitant use of alcohol or other substances unless prescribed.     Patient has completed prescribing agreement detailing terms of continued prescribing of controlled substances, including monitoring MARCO reports, urine drug screening, and pill counts if necessary. The patient is aware that inappropriate use will results in cessation of prescribing such medications.    MARCO report has been reviewed and scanned into the patient's  chart.    As the clinician, I personally reviewed the MARCO from 2-3-20 while the patient was in the office today.    History and physical exam exhibit continued safe and appropriate use of controlled substances.      EMR Dragon/Transcription disclaimer:   Much of this encounter note is an electronic transcription/translation of spoken language to printed text. The electronic translation of spoken language may permit erroneous, or at times, nonsensical words or phrases to be inadvertently transcribed; Although I have reviewed the note for such errors, some may still exist.

## 2020-02-10 ENCOUNTER — OFFICE VISIT (OUTPATIENT)
Dept: INTERNAL MEDICINE | Facility: CLINIC | Age: 75
End: 2020-02-10

## 2020-02-10 ENCOUNTER — APPOINTMENT (OUTPATIENT)
Dept: WOMENS IMAGING | Facility: HOSPITAL | Age: 75
End: 2020-02-10

## 2020-02-10 VITALS
TEMPERATURE: 98.4 F | WEIGHT: 245 LBS | HEART RATE: 78 BPM | DIASTOLIC BLOOD PRESSURE: 78 MMHG | SYSTOLIC BLOOD PRESSURE: 130 MMHG | OXYGEN SATURATION: 98 % | BODY MASS INDEX: 40.77 KG/M2

## 2020-02-10 DIAGNOSIS — R60.9 PERIPHERAL EDEMA: ICD-10-CM

## 2020-02-10 DIAGNOSIS — L81.9 DISCOLORATION OF SKIN OF LOWER LEG: Primary | ICD-10-CM

## 2020-02-10 DIAGNOSIS — R06.02 SHORTNESS OF BREATH: ICD-10-CM

## 2020-02-10 PROCEDURE — 71046 X-RAY EXAM CHEST 2 VIEWS: CPT | Performed by: RADIOLOGY

## 2020-02-10 PROCEDURE — 99213 OFFICE O/P EST LOW 20 MIN: CPT | Performed by: NURSE PRACTITIONER

## 2020-02-10 PROCEDURE — 71046 X-RAY EXAM CHEST 2 VIEWS: CPT | Performed by: NURSE PRACTITIONER

## 2020-02-10 NOTE — PROGRESS NOTES
Subjective     Lana Yañez is a 74 y.o. female.         She presents for discoloration and itching of BLE that she noticed months ago that had gradually been worsening. The main problem now is the itching that she reports wakes her from sleep. She has extensive cardiac medical history that includes, HLD, HTN, a fib, CHF, CAD.       The following portions of the patient's history were reviewed and updated as appropriate: allergies, current medications, past social history and problem list.    Review of Systems   Respiratory: Positive for shortness of breath. Negative for cough and wheezing.    Cardiovascular: Positive for leg swelling. Negative for chest pain and palpitations.   Skin: Positive for color change.       Objective     /78 (BP Location: Left arm, Patient Position: Sitting, Cuff Size: Large Adult)   Pulse 78   Temp 98.4 °F (36.9 °C)   Wt 111 kg (245 lb)   LMP  (LMP Unknown)   SpO2 98%   BMI 40.77 kg/m²     Physical Exam   Constitutional: She appears well-developed and well-nourished.   HENT:   Head: Normocephalic and atraumatic.   Cardiovascular: Normal rate and normal heart sounds. An irregular rhythm present.   Pulses:       Dorsalis pedis pulses are 1+ on the right side, and 1+ on the left side.   Mild edema with spotty brown discoloration to BLE No rash or erythema noted   Pulmonary/Chest: Effort normal and breath sounds normal. No respiratory distress.   Musculoskeletal: Normal range of motion.   Neurological: She is alert.   Skin: Skin is warm and dry.   Psychiatric: She has a normal mood and affect. Her behavior is normal. Judgment and thought content normal.   Vitals reviewed.      Assessment/Plan     Lana was seen today for skin problem.    Diagnoses and all orders for this visit:    Discoloration of skin of lower leg  -     Ambulatory Referral to Vascular Surgery    Peripheral edema  -     Ambulatory Referral to Vascular Surgery    Shortness of breath  -     XR Chest 2  View    With PMH of CHF and current SOB, CXR ordered. I reviewed CXR and will send to radiology for official review.    Appt with cardiology is scheduled for late March. She may need to be seen sooner.    She will f/u within the next month to see Dr. Tavarez to make her f/u a 3 month increment rather than 6 due to her current status.

## 2020-02-17 ENCOUNTER — TREATMENT (OUTPATIENT)
Dept: PHYSICAL THERAPY | Facility: CLINIC | Age: 75
End: 2020-02-17

## 2020-02-17 DIAGNOSIS — M54.42 CHRONIC BILATERAL LOW BACK PAIN WITH LEFT-SIDED SCIATICA: Primary | ICD-10-CM

## 2020-02-17 DIAGNOSIS — G89.29 CHRONIC BILATERAL LOW BACK PAIN WITH LEFT-SIDED SCIATICA: Primary | ICD-10-CM

## 2020-02-17 DIAGNOSIS — R26.2 DIFFICULTY WALKING: ICD-10-CM

## 2020-02-17 PROCEDURE — 97162 PT EVAL MOD COMPLEX 30 MIN: CPT | Performed by: PHYSICAL THERAPIST

## 2020-02-17 PROCEDURE — 97110 THERAPEUTIC EXERCISES: CPT | Performed by: PHYSICAL THERAPIST

## 2020-02-17 NOTE — PROGRESS NOTES
Physical Therapy Initial Evaluation and Plan of Care      Patient: Lana Yañez   : 1945  Diagnosis/ICD-10 Code:  Chronic bilateral low back pain with left-sided sciatica [M54.42, G89.29]  Referring practitioner: ANGELA Neal  Date of Initial Visit: 2020  Today's Date: 2020  Patient seen for 1 sessions           Subjective Evaluation    History of Present Illness  Date of onset: 2019  Mechanism of injury: Pt has a history of chronic back pain. Dr. Perez was treating her and meds were helping. She is unable to do injections or surgery due to some heart issues (A-fib, heart attack). Her back started to flare up again about 6 months ago. She has difficulty walking, standing, do housework. Numbness in the L LE, gets leg cramps a lot L>R. She reports 1 fall in the past year. Walking tolerance - 5 min.   She needs a shoulder replacement L, but cannot have sx due to heart issues.       Patient Occupation: retire Quality of life: poor    Pain  Current pain ratin  At best pain ratin  At worst pain ratin  Location: LB  Quality: burning, sharp, dull ache and cramping  Aggravating factors: ambulation, standing, sleeping, prolonged positioning and stairs (bed mobility, sit to stand transfers)    Social Support  Lives in: multiple-level home (bedroom upstairs, laundry downstairs)  Lives with: spouse    Treatments  Previous treatment: medication  Current treatment: medication  Patient Goals  Patient goals for therapy: increased strength and decreased pain  Patient goal: walking, doing housework           Objective       Static Posture     Comments  Pt stands with forward flexed posture, slightly shifted to the L, decreased lumbar lordosis    Palpation   Left   Hypertonic in the erector spinae, lumbar paraspinals and quadratus lumborum.   Tenderness of the erector spinae, gluteus medius, lumbar paraspinals, piriformis and quadratus lumborum.     Right   Hypertonic in the erector  spinae, lumbar paraspinals and quadratus lumborum. Tenderness of the erector spinae, gluteus medius, piriformis and quadratus lumborum.     Tenderness     Left Hip   Tenderness in the greater trochanter.     Right Hip   Tenderness in the greater trochanter.     Neurological Testing     Sensation     Lumbar   Left   Intact: light touch    Right   Intact: light touch  Diminished: light touch    Comments   Right light touch: diminished L4 dermatome    Active Range of Motion     Additional Active Range of Motion Details  AROM limited by 75% in all motions and all are painful    Passive Range of Motion     Additional Passive Range of Motion Details  Pain with PA in lower lumbar, hypomobile throughtout    Strength/Myotome Testing     Left Hip   Planes of Motion   Flexion: 3+  Abduction: 3  Adduction: 4-  External rotation: 3+    Right Hip   Planes of Motion   Flexion: 4-  Abduction: 3  Adduction: 4-  External rotation: 3+    Left Knee   Flexion: 4-  Extension: 4-    Right Knee   Flexion: 4-  Extension: 4-    Left Ankle/Foot   Dorsiflexion: 3+  Plantar flexion: 3+    Right Ankle/Foot   Dorsiflexion: 3+  Plantar flexion: 3+    Muscle Activation     Additional Muscle Activation Details  Able to have a weak contraction of TA    Tests     Lumbar     Left   Positive passive SLR and quadrant.     Right   Positive quadrant.     Left Pelvic Girdle/Sacrum   Positive: sacrum compression and gapping.     Right Pelvic Girdle/Sacrum   Positive: sacrum compression and gapping.     Left Hip   Positive SO.     Right Hip   Positive SO.     Ambulation     Comments   Slow paced gait with SC, decreased stance time and weight shift over the L LE       See Exercise, Manual, and Modality Logs for complete treatment.     Functional outcome score:  -5x Sit to Stand=38.15 sec with use of hands on armrests  -Oswestry Back Index=76%    Education:  -reviewed body mechanics with household chores  -taught pt ab bracing          Assessment & Plan      Assessment  Impairments: abnormal gait, abnormal or restricted ROM, activity intolerance, impaired balance, impaired physical strength, lacks appropriate home exercise program and pain with function  Assessment details: Lana Yañez is a 74 y.o. year-old female referred to physical therapy for LBP. She presents with a evolving clinical presentation.  She has comorbidities of intermittent LE symptoms and no known personal factors that may affect her progress in the plan of care. Pt has decreased lumbar AROM and pain with all motions. She has decreased core and LE strength, decreased flexibility of the hips and hamstrings, and pain/tenderness over the lumbar PVMs and gluteals. Signs and symptoms are consistent with physical therapy diagnosis of lumbar radiculopathy. Pt would benefit from therapy to help improve her ability to walk, transfer sit to stand, improve bed mobility, and to be able to perform household chores with decreased pain.    Prognosis: good  Functional Limitations: carrying objects, lifting, sleeping, walking, pushing, moving in bed and standing  Goals  Plan Goals: ST wks  1. Patient will be independent with education for symptom management, joint protection and strategies to minimize stress on affected tissues  2. Pt is able to tolerate 30 min of aquatic therapy with reports of reduced pain levels after treatment for 6 hours  3. Pt able to walk 10 min in the water without increased pain and with good posture  4. Pt to improve pain complaints to no more than 6/10    LT wks  1. Pt to report no more than 4/10 pain in the LB with ADLs  2. Pt to improve trunk and LE strength by 1/2 to 1 MMT grade  3. Pt able to exit the pool on the stairs using a reciprocal pattern  4. Pt to improve Oswestry Back index score from 76% to  50% for overall functional improvement  5. Pt to improve 5x STS test from 38.15 sec to 30 sec for improved strength and balance  6. Pt to be independent with progressive  HEP for core and LE strength      Plan  Therapy options: will be seen for skilled physical therapy services  Planned modality interventions: thermotherapy (hydrocollator packs), TENS, ultrasound and cryotherapy  Other planned modality interventions: aquatic therapy  Planned therapy interventions: abdominal trunk stabilization, ADL retraining, balance/weight-bearing training, body mechanics training, flexibility, functional ROM exercises, gait training, home exercise program, IADL retraining, joint mobilization, manual therapy, postural training, soft tissue mobilization, spinal/joint mobilization, strengthening, stretching, therapeutic activities and transfer training  Frequency: 2x week  Duration in weeks: 20  Treatment plan discussed with: patient        Timed:  Manual Therapy:         mins  59325;  Therapeutic Exercise:    8     mins  73968;     Neuromuscular Mark:        mins  78282;    Therapeutic Activity:          mins  43722;     Gait Training:           mins  96482;     Ultrasound:          mins  79073;    Electrical Stimulation:         mins  11277 ( );  Iontophoresis         mins 15057  Dry Needling        mins      Untimed:  Electrical Stimulation:         mins  16063 ( );  Mechanical Traction:         mins  04980;     Timed Treatment:   8   mins   Total Treatment:     40   mins    PT SIGNATURE: Maribell Yoder, PT   DATE TREATMENT INITIATED: 2/17/2020    Initial Certification  Certification Period: 5/17/2020  I certify that the therapy services are furnished while this patient is under my care.  The services outlined above are required by this patient, and will be reviewed every 90 days.     PHYSICIAN: Kalpana Kaiser, APRN      DATE:     Please sign and return via fax to  .. Thank you, [facility].

## 2020-02-19 ENCOUNTER — TREATMENT (OUTPATIENT)
Dept: PHYSICAL THERAPY | Facility: CLINIC | Age: 75
End: 2020-02-19

## 2020-02-19 DIAGNOSIS — M54.42 CHRONIC BILATERAL LOW BACK PAIN WITH LEFT-SIDED SCIATICA: Primary | ICD-10-CM

## 2020-02-19 DIAGNOSIS — R26.2 DIFFICULTY WALKING: ICD-10-CM

## 2020-02-19 DIAGNOSIS — G89.29 CHRONIC BILATERAL LOW BACK PAIN WITH LEFT-SIDED SCIATICA: Primary | ICD-10-CM

## 2020-02-19 PROCEDURE — 97113 AQUATIC THERAPY/EXERCISES: CPT | Performed by: PHYSICAL THERAPIST

## 2020-02-19 NOTE — PROGRESS NOTES
Physical Therapy Daily Progress Note    Patient: Lana Yañez   : 1945  Diagnosis/ICD-10 Code:  Chronic bilateral low back pain with left-sided sciatica [M54.42, G89.29]  Referring practitioner: ANGELA Neal  Date of Initial Visit:   Type: THERAPY  Noted: 2020  Today's Date: 2020  Patient seen for 2 sessions             Subjective   LBP 5/10, no leg pain but does have L LE. numbness. Used walking stick to     Objective     AQUATICS LE    Water Walk  50 ft each w/ foam roll UE support and SBA   Stretch   HS 15 sec X 3  Stretch 2  Piriformis 20” X 2 w/ manual assist  Stretch 3  DKTC 15” X 3  Stretch Other 1 --  Stretch Other 2 --  Vertical Traction 4 min   Abdominals   SN X 15  Clams   --  Hip Abd/Add  10X  Hip Flex/Ext  --  March in Place 10X  Mini Squat  Sit/Stands X 10  Toe/Heel Raises 10/10 B  Step Ups  --  Bicycle   5 min seated  Flutter/Scissor  20/20 seated      Assessment/Plan  First session of aquatic PT. Patient had difficulty with long walk from facility entrance back to pool with just her walking stick, therefore recommended she use rolling walker next visit. Lana entered/exited pool via 6 stairs with rail and CGA.  Instructed in low level aquatic exercises. Lana reported feeling better after treatment.     Progress per Plan of Care and Progress strengthening /stabilization /functional activity           Timed:  Aquatic Therapy    39     mins [id number];    Atif Pickens, PT  Physical Therapist

## 2020-02-25 ENCOUNTER — TREATMENT (OUTPATIENT)
Dept: PHYSICAL THERAPY | Facility: CLINIC | Age: 75
End: 2020-02-25

## 2020-02-25 DIAGNOSIS — G89.29 CHRONIC BILATERAL LOW BACK PAIN WITH LEFT-SIDED SCIATICA: Primary | ICD-10-CM

## 2020-02-25 DIAGNOSIS — M54.42 CHRONIC BILATERAL LOW BACK PAIN WITH LEFT-SIDED SCIATICA: Primary | ICD-10-CM

## 2020-02-25 DIAGNOSIS — R26.2 DIFFICULTY WALKING: ICD-10-CM

## 2020-02-25 PROCEDURE — 97113 AQUATIC THERAPY/EXERCISES: CPT | Performed by: PHYSICAL THERAPIST

## 2020-02-25 NOTE — PROGRESS NOTES
Physical Therapy Daily Progress Note    Patient: Lana Yañez   : 1945  Diagnosis/ICD-10 Code:  Chronic bilateral low back pain with left-sided sciatica [M54.42, G89.29]  Referring practitioner: ANGELA Neal  Date of Initial Visit:   Type: THERAPY  Noted: 2020  Today's Date: 2020  Patient seen for 3 sessions             Subjective   Was a little stiff after first aqua treatment. Pain is the same today as on last visit.     Objective     AQUATICS LE    Water Walk  100 ft each Forward/Side/Backward  Stretch   DKTC 15” X 2  Abdominals   LN X 15  Hip Abd/Add  10X   Hip Flex/Ext  SLRs X 10  March in Place 10X  Mini Squat  Sit/Stands X 10  Toe/Heel Raises 10/10  Uni-Clock  --  Step Ups  --  Bicycle   2.5 min. seated   Flutter/Scissor  20/20 seated      Assessment/Plan  Lana used her cane today to attend therapy which helped her navigate the long walk into facility and back to pool. Doubled walking distance today and progressed from small to large noodle for abdominal press.     Progress per Plan of Care and Progress strengthening /stabilization /functional activity           Timed:  Aquatic Therapy    30     mins [id number];    Atif Pickens, PT  Physical Therapist

## 2020-02-27 ENCOUNTER — ANTICOAGULATION VISIT (OUTPATIENT)
Dept: PHARMACY | Facility: HOSPITAL | Age: 75
End: 2020-02-27

## 2020-02-27 ENCOUNTER — TREATMENT (OUTPATIENT)
Dept: PHYSICAL THERAPY | Facility: CLINIC | Age: 75
End: 2020-02-27

## 2020-02-27 DIAGNOSIS — M54.42 CHRONIC BILATERAL LOW BACK PAIN WITH LEFT-SIDED SCIATICA: Primary | ICD-10-CM

## 2020-02-27 DIAGNOSIS — G89.29 CHRONIC BILATERAL LOW BACK PAIN WITH LEFT-SIDED SCIATICA: Primary | ICD-10-CM

## 2020-02-27 DIAGNOSIS — M54.50 CHRONIC MIDLINE LOW BACK PAIN WITHOUT SCIATICA: ICD-10-CM

## 2020-02-27 DIAGNOSIS — G89.29 CHRONIC MIDLINE LOW BACK PAIN WITHOUT SCIATICA: ICD-10-CM

## 2020-02-27 DIAGNOSIS — R26.2 DIFFICULTY WALKING: ICD-10-CM

## 2020-02-27 LAB
INR PPP: 2.4 (ref 0.91–1.09)
PROTHROMBIN TIME: 29 SECONDS (ref 10–13.8)

## 2020-02-27 PROCEDURE — 85610 PROTHROMBIN TIME: CPT

## 2020-02-27 PROCEDURE — 97113 AQUATIC THERAPY/EXERCISES: CPT | Performed by: PHYSICAL THERAPIST

## 2020-02-27 PROCEDURE — 36416 COLLJ CAPILLARY BLOOD SPEC: CPT

## 2020-02-27 RX ORDER — WARFARIN SODIUM 5 MG/1
TABLET ORAL
Qty: 90 TABLET | Refills: 1 | Status: SHIPPED | OUTPATIENT
Start: 2020-02-27 | End: 2020-08-26

## 2020-02-27 NOTE — PROGRESS NOTES
"Physical Therapy Daily Progress Note    Patient: Lana Yañez   : 1945  Diagnosis/ICD-10 Code:  Chronic bilateral low back pain with left-sided sciatica [M54.42, G89.29]  Referring practitioner: ANGELA Neal  Date of Initial Visit:   Type: THERAPY  Noted: 2020  Today's Date: 2020  Patient seen for 4 sessions             Subjective   Pain rated 5/10. No problems after last aquatic session.  Needs to stop and sit 3X during walking in the facility to get back to pool area due to LBP.     Objective     AQUATICS LE    Water Walk  Sideways and backwards X 50 ft each  Stretch   DKTC 15\" X 2  Vertical Traction --  Abdominals   LN X 15  Clams   --  Hip Abd/Add  12X  Hip Flex/Ext   in Place 12  Sit to Stands               10X SBA  Toe/Heel Raises   Uni-Squat  --  Step Ups  --  Bicycle  2.5 min   Flutter/Scissor 20/20  Exercise 1  Rows W/ open paddles X 12  Exercise 2  Paddle Stirs L1,       Assessment/Plan  Slow progress, however patient does states she feels “better” just with the few sessions she has had. We discussed her home activity and she feels she can begin to walk a few minutes each day inside her home for exercise. Today progressed to free standing core stabilization using UE paddle resistance.   Plan: Close paddles next visit to increase resistance.     Progress per Plan of Care and Progress strengthening /stabilization /functional activity           Timed:  Aquatic Therapy    30     mins [id number];    Atif Pickens, PT  Physical Therapist        "

## 2020-02-27 NOTE — PROGRESS NOTES
Anticoagulation Clinic Progress Note    Anticoagulation Summary  As of 2020    INR goal:   2.0-3.0   TTR:   72.4 % (1.4 y)   INR used for dosin.4 (2020)   Warfarin maintenance plan:   5 mg every day   Weekly warfarin total:   35 mg   No change documented:   Anisa Castellon   Plan last modified:   Nano Cool RPH (2019)   Next INR check:   3/26/2020   Priority:   Maintenance   Target end date:   Indefinite    Indications    Atrial fibrillation (CMS/HCC) [I48.91]             Anticoagulation Episode Summary     INR check location:       Preferred lab:       Send INR reminders to:   [facility]    Comments:         Anticoagulation Care Providers     Provider Role Specialty Phone number    Pia Dueñas MD Referring Cardiology [phone]          Clinic Interview:  Patient Findings     Negatives:   Signs/symptoms of thrombosis, Signs/symptoms of bleeding,   Laboratory test error suspected, Change in health, Change in alcohol use,   Change in activity, Upcoming invasive procedure, Emergency department   visit, Upcoming dental procedure, Missed doses, Extra doses, Change in   medications, Change in diet/appetite, Hospital admission, Bruising, Other   complaints      Clinical Outcomes     Negatives:   Major bleeding event, Thromboembolic event,   Anticoagulation-related hospital admission, Anticoagulation-related ED   visit, Anticoagulation-related fatality        INR History:  Anticoagulation Monitoring 2020   INR 1.9 2.0 2.4   INR Date 2020   INR Goal 2.0-3.0 2.0-3.0 2.0-3.0   Trend Same Same Same   Last Week Total 35 mg 35 mg 35 mg   Next Week Total 35 mg 35 mg 35 mg   Sun 5 mg 5 mg 5 mg   Mon 5 mg 5 mg 5 mg   Tue 5 mg 5 mg 5 mg   Wed 5 mg 5 mg 5 mg   Thu 5 mg 5 mg 5 mg   Fri 5 mg 5 mg 5 mg   Sat 5 mg 5 mg 5 mg   Visit Report - - -   Some recent data might be hidden       Plan:  1. INR is therapeutic today- see above in  Anticoagulation Summary.   Will instruct Lana Yañez to continue their warfarin regimen- see above in Anticoagulation Summary.  2. Follow up in 4 weeks.  3. Patient desires warfarin refills.  4. Verbal and written information provided. Patient expresses understanding and has no further questions at this time.    Anisa Castellon

## 2020-02-28 RX ORDER — FUROSEMIDE 40 MG/1
TABLET ORAL
Qty: 180 TABLET | Refills: 1 | Status: SHIPPED | OUTPATIENT
Start: 2020-02-28 | End: 2020-08-18 | Stop reason: SDUPTHER

## 2020-03-09 ENCOUNTER — TREATMENT (OUTPATIENT)
Dept: PHYSICAL THERAPY | Facility: CLINIC | Age: 75
End: 2020-03-09

## 2020-03-09 DIAGNOSIS — M54.42 CHRONIC BILATERAL LOW BACK PAIN WITH LEFT-SIDED SCIATICA: Primary | ICD-10-CM

## 2020-03-09 DIAGNOSIS — G89.29 CHRONIC BILATERAL LOW BACK PAIN WITH LEFT-SIDED SCIATICA: Primary | ICD-10-CM

## 2020-03-09 DIAGNOSIS — R26.2 DIFFICULTY WALKING: ICD-10-CM

## 2020-03-09 PROCEDURE — 97113 AQUATIC THERAPY/EXERCISES: CPT | Performed by: PHYSICAL THERAPIST

## 2020-03-09 NOTE — PROGRESS NOTES
Physical Therapy Daily Progress Note    Patient: Lana Yañez   : 1945  Diagnosis/ICD-10 Code:  Chronic bilateral low back pain with left-sided sciatica [M54.42, G89.29]  Referring practitioner: ANGELA Neal  Date of Initial Visit:   Type: THERAPY  Noted: 2020  Today's Date: 3/9/2020  Patient seen for 5 sessions             Subjective   Just a little back pain from walking in- had to sit down twice.     Objective     AQUATICS LE    Water Walk  Holding foam noodle for support, SBA x 5 min. X 2  Stretch 2  HS 20” X 2  Stretch 3  DKTC 20” X 2  Vertical Traction 1 min.   Abdominals   LN X 20  Hip Abd/Add  15X   Hip Flex/Ext  SLRs X 10  March in Place March Walk 15 ft X 2  Mini Squat  Sit/Stands X 12  Toe/Heel Raises --  Uni-Clock  --  Step Ups  --  Bicycle   2. 5 min   Flutter/Scissor  15/15  Exercise 1  Rows L5 paddles X 20  Exercise 2  Paddle Stirs L5 15/15      Assessment/Plan  Lana has been walking more at home and has also started using a grocery cart for shopping instead of the motorized scooter. Today she was able to increase her water walking to 5 min X 2.   PLAN: Add 4 inch step ups in shallow.    Progress per Plan of Care and Progress strengthening /stabilization /functional activity           Timed:  Aquatic Therapy    40     mins [id number];    Atif Pickens, PT  Physical Therapist

## 2020-03-11 ENCOUNTER — TREATMENT (OUTPATIENT)
Dept: PHYSICAL THERAPY | Facility: CLINIC | Age: 75
End: 2020-03-11

## 2020-03-11 DIAGNOSIS — G89.29 CHRONIC BILATERAL LOW BACK PAIN WITH LEFT-SIDED SCIATICA: Primary | ICD-10-CM

## 2020-03-11 DIAGNOSIS — R26.2 DIFFICULTY WALKING: ICD-10-CM

## 2020-03-11 DIAGNOSIS — M54.42 CHRONIC BILATERAL LOW BACK PAIN WITH LEFT-SIDED SCIATICA: Primary | ICD-10-CM

## 2020-03-11 PROCEDURE — 97113 AQUATIC THERAPY/EXERCISES: CPT | Performed by: PHYSICAL THERAPIST

## 2020-03-12 ENCOUNTER — OFFICE VISIT (OUTPATIENT)
Dept: INTERNAL MEDICINE | Facility: CLINIC | Age: 75
End: 2020-03-12

## 2020-03-12 VITALS
BODY MASS INDEX: 41.48 KG/M2 | DIASTOLIC BLOOD PRESSURE: 62 MMHG | WEIGHT: 249 LBS | SYSTOLIC BLOOD PRESSURE: 108 MMHG | HEIGHT: 65 IN

## 2020-03-12 DIAGNOSIS — I50.32 CHRONIC DIASTOLIC HEART FAILURE (HCC): ICD-10-CM

## 2020-03-12 DIAGNOSIS — E78.2 MIXED HYPERLIPIDEMIA: ICD-10-CM

## 2020-03-12 DIAGNOSIS — R05.9 COUGH: ICD-10-CM

## 2020-03-12 DIAGNOSIS — R06.02 SOB (SHORTNESS OF BREATH): ICD-10-CM

## 2020-03-12 DIAGNOSIS — I10 ESSENTIAL HYPERTENSION: ICD-10-CM

## 2020-03-12 DIAGNOSIS — J40 BRONCHITIS: Primary | ICD-10-CM

## 2020-03-12 PROCEDURE — 99214 OFFICE O/P EST MOD 30 MIN: CPT | Performed by: INTERNAL MEDICINE

## 2020-03-12 RX ORDER — AMOXICILLIN AND CLAVULANATE POTASSIUM 875; 125 MG/1; MG/1
1 TABLET, FILM COATED ORAL 2 TIMES DAILY
Qty: 14 TABLET | Refills: 0 | Status: SHIPPED | OUTPATIENT
Start: 2020-03-12 | End: 2020-03-19

## 2020-03-12 RX ORDER — DEXTROMETHORPHAN HYDROBROMIDE AND PROMETHAZINE HYDROCHLORIDE 15; 6.25 MG/5ML; MG/5ML
5 SYRUP ORAL 4 TIMES DAILY PRN
Qty: 240 ML | Refills: 0 | Status: SHIPPED | OUTPATIENT
Start: 2020-03-12 | End: 2020-03-26

## 2020-03-12 RX ORDER — POTASSIUM CHLORIDE 750 MG/1
40 TABLET, FILM COATED, EXTENDED RELEASE ORAL DAILY
Qty: 360 TABLET | Refills: 1 | Status: SHIPPED | OUTPATIENT
Start: 2020-03-12 | End: 2020-06-04 | Stop reason: SDUPTHER

## 2020-03-12 NOTE — PROGRESS NOTES
"Subjective   Lana Yañez is a 74 y.o. female.  Patient presents with chief complaint of a productive cough with low-grade fever for last 3 days, essential hypertension, hyperlipidemia, chronic congestive heart failure, cough, and dyspnea on exertion here for evaluation follow-up treatment and lab check.      /62   Ht 165.1 cm (65\")   Wt 113 kg (249 lb)   LMP  (LMP Unknown)   BMI 41.44 kg/m²     Body mass index is 41.44 kg/m².    History of Present Illness productive cough for the last 3 days    The following portions of the patient's history were reviewed and updated as appropriate: allergies, current medications, past family history, past medical history, past social history, past surgical history and problem list.    Review of Systems   Constitutional: Positive for fatigue.   HENT: Positive for congestion and sore throat.    Respiratory: Positive for cough.    Gastrointestinal: Negative.    Musculoskeletal: Positive for arthralgias and gait problem.   Psychiatric/Behavioral: Negative.        Objective   Physical Exam   Constitutional: She is oriented to person, place, and time. She appears well-developed and well-nourished.   HENT:   Head: Normocephalic and atraumatic.   Congested and tender maxillary sinuses   Cardiovascular: Normal rate, regular rhythm and normal heart sounds.   Pulmonary/Chest: Effort normal and breath sounds normal.   Scattered rhonchi with a fairly frequent cough   Abdominal: Soft. Bowel sounds are normal.   Musculoskeletal: She exhibits edema.   Chronic lower extremity edema with a slightly unstable gait for which the patient uses a cane at all times   Neurological: She is alert and oriented to person, place, and time.   Psychiatric: She has a normal mood and affect. Her behavior is normal.   Nursing note and vitals reviewed.        Assessment/Plan   Lana was seen today for skin discoloration, hyperlipidemia and shortness of breath.    Diagnoses and all orders for this " visit:    Bronchitis  Comments:  Augmentin 875 mg twice a day    Essential hypertension  Comments:  Well-controlled    Mixed hyperlipidemia  Comments:  No change in therapy necessary at this time    Chronic diastolic heart failure (CMS/HCC)  Comments:  Stable for now    Cough  Comments:  Promethazine cough syrup every 4 hours as needed    SOB (shortness of breath)  Comments:  Continue current medications including her inhaler    Other orders  -     amoxicillin-clavulanate (AUGMENTIN) 875-125 MG per tablet; Take 1 tablet by mouth 2 (Two) Times a Day for 7 days.  -     promethazine-dextromethorphan (PROMETHAZINE-DM) 6.25-15 MG/5ML syrup; Take 5 mL by mouth 4 (Four) Times a Day As Needed for Cough.  -     potassium chloride (K-DUR) 10 MEQ CR tablet; Take 4 tablets by mouth Daily.

## 2020-03-24 ENCOUNTER — ANTICOAGULATION VISIT (OUTPATIENT)
Dept: PHARMACY | Facility: HOSPITAL | Age: 75
End: 2020-03-24

## 2020-03-24 LAB
INR PPP: 2.2 (ref 0.91–1.09)
PROTHROMBIN TIME: 25.9 SECONDS (ref 10–13.8)

## 2020-03-24 PROCEDURE — 85610 PROTHROMBIN TIME: CPT

## 2020-03-24 PROCEDURE — 36416 COLLJ CAPILLARY BLOOD SPEC: CPT

## 2020-03-24 NOTE — PROGRESS NOTES
Anticoagulation Clinic Progress Note    Anticoagulation Summary  As of 3/24/2020    INR goal:   2.0-3.0   TTR:   73.8 % (1.4 y)   INR used for dosin.2 (3/24/2020)   Warfarin maintenance plan:   5 mg every day   Weekly warfarin total:   35 mg   No change documented:   Anisa Castellon   Plan last modified:   Nano Cool RP (2019)   Next INR check:   2020   Priority:   Maintenance   Target end date:   Indefinite    Indications    Atrial fibrillation (CMS/HCC) [I48.91]             Anticoagulation Episode Summary     INR check location:       Preferred lab:       Send INR reminders to:    [facility]    Comments:         Anticoagulation Care Providers     Provider Role Specialty Phone number    Pia Dueñas MD Referring Cardiology [phone]          Clinic Interview:  Patient Findings     Positives:   Change in medications        INR History:  Anticoagulation Monitoring 2020 2020 3/24/2020   INR 2.0 2.4 2.2   INR Date 2020 2020 3/24/2020   INR Goal 2.0-3.0 2.0-3.0 2.0-3.0   Trend Same Same Same   Last Week Total 35 mg 35 mg 35 mg   Next Week Total 35 mg 35 mg 35 mg   Sun 5 mg 5 mg 5 mg   Mon 5 mg 5 mg 5 mg   Tue 5 mg 5 mg 5 mg   Wed 5 mg 5 mg 5 mg   Thu 5 mg 5 mg 5 mg   Fri 5 mg 5 mg 5 mg   Sat 5 mg 5 mg 5 mg   Visit Report - - -   Some recent data might be hidden       Plan:  1. INR is therapeutic today- see above in Anticoagulation Summary.   Will instruct Lana Aguer[place]omb to continue their warfarin regimen- see above in Anticoagulation Summary.  2. Follow up in 6 weeks.  3. Patient declines warfarin refills.  4. Verbal and written information provided. Patient expresses understanding and has no further questions at this time.    Anisa Castellon

## 2020-03-26 ENCOUNTER — TELEMEDICINE (OUTPATIENT)
Dept: CARDIOLOGY | Facility: CLINIC | Age: 75
End: 2020-03-26

## 2020-03-26 VITALS
HEIGHT: 65 IN | DIASTOLIC BLOOD PRESSURE: 89 MMHG | WEIGHT: 240 LBS | RESPIRATION RATE: 16 BRPM | SYSTOLIC BLOOD PRESSURE: 132 MMHG | HEART RATE: 89 BPM | BODY MASS INDEX: 39.99 KG/M2

## 2020-03-26 DIAGNOSIS — I48.21 PERMANENT ATRIAL FIBRILLATION (HCC): Primary | ICD-10-CM

## 2020-03-26 DIAGNOSIS — I10 ESSENTIAL HYPERTENSION: ICD-10-CM

## 2020-03-26 DIAGNOSIS — E13.9 DIABETES 1.5, MANAGED AS TYPE 2 (HCC): ICD-10-CM

## 2020-03-26 DIAGNOSIS — E66.01 MORBID OBESITY WITH BMI OF 40.0-44.9, ADULT (HCC): ICD-10-CM

## 2020-03-26 DIAGNOSIS — I50.32 CHRONIC DIASTOLIC HEART FAILURE (HCC): ICD-10-CM

## 2020-03-26 DIAGNOSIS — I25.119 CORONARY ARTERY DISEASE INVOLVING NATIVE CORONARY ARTERY OF NATIVE HEART WITH ANGINA PECTORIS (HCC): ICD-10-CM

## 2020-03-26 DIAGNOSIS — G47.33 OBSTRUCTIVE SLEEP APNEA SYNDROME: ICD-10-CM

## 2020-03-26 PROCEDURE — 99214 OFFICE O/P EST MOD 30 MIN: CPT | Performed by: INTERNAL MEDICINE

## 2020-03-26 NOTE — PROGRESS NOTES
Date of Office Visit: 2020  Encounter Provider: Pia Dueñas MD  Place of Service: Phone visit  Patient Name: Lana Yañez  :1945    This patient has consented to a telehealth visit via phone. I spent  25 minutes in total including but not limited to the direct conversation with this patient. All vitals recorded within this visit are reported by the patient.    Chief complaint  Atrial fibrillation, coronary artery disease, pulmonary hypertension    History of Present Illness  The patient is a 73 yo female with with history of retention, hyperlipidemia, rheumatoid arthritis, obstructive sleep apnea who in early October was found to be in atrial fibrillation with rapid ventricular rates and mild diastolic heart failure.  She was placed on IV heparin and Pradaxa.  Echocardiogram revealed normal systolic function mild left ventricular hypertrophy, moderate atrial enlargement with aortic valve sclerosis and moderate pulmonary hypertension and moderate tricuspid regurgitation.  The RV systolic pressure is 54 mmHg.  She had a stress perfusion study that was negative for ischemia and a CT and her grandmother revealed a mildly dilated aorta without pulmonary emboli.  She then presented several days later with an acute stroke.  CLARIBEL was not pursued as it was felt to be embolic in nature.  However on  and she was diaphoretic and was found to have a non-ST elevation myocardial infarction.  This was on full dose Pradaxa which was not held.  CLARIBEL was pursued that revealed normal systolic function with moderate mitral valve prolapse without significant regurgitation.  There was right-sided spontaneous echo contrast without thrombus formation.  Cardiac catheterization revealed normal systolic function with 2+ mitral regurgitation, mild coronary disease except for a 90% stenosis of the distal PDA which was felt to be too small to be amenable PCI.  She was treated medically and switched to Coumadin. She has  struggled with intermittent heart failure dietary indiscretions with salt since then.  She also had a 24-hour Holter that revealed persistent atrial fibrillation with reasonable rate control and frequent PVCs. No other arrhythmia was present.  In November 2018 with complaints of dyspnea an echocardiogram was performed that showed normal systolic function with an ejection fraction of 51%, moderate to severe left atrial enlargement aortic valve calcification with mild aortic regurgitation, mild mitral regurgitation with severe tricuspid regurgitation and RV systolic pressure 49 mmHg.  Subsequent VQ scan was low risk for pulmonary emboli.    Since last visit she has been relatively well complains of generalized fatigue.  Approximately 3 to 4 months ago Lasix was decreased with complaints of polyuria.  2 weeks ago she decreased this on her own to once a day.  She did have improvement and not having to urinate frequently however developed worsening edema in her hands and legs and since Monday has gone back to taking Lasix 40 mg a day.  With this she is taking 40 mEq of potassium daily.  Her edema is improving and almost back to baseline though her weight is still elevated at 240 with a baseline of 237.  She has chronic dyspnea on exertion that is unchanged denies any chest pain she has had occasional racing type sensation lasting for 2 to 3 minutes 1-2 times a week.  Overall she otherwise feels well.  She has not had any falls or bleeding.  She does not have any upcoming procedures.    Past Medical History:   Diagnosis Date   • Acute on chronic diastolic heart failure (CMS/HCC)    • Arthritis    • Asthma    • Atrial fibrillation (CMS/HCC)     Persistent; on warfarin   • Atypical chest pain    • Bronchitis    • Cellulitis of right elbow     due to MRSA   • CHF (congestive heart failure) (CMS/HCC)    • COPD (chronic obstructive pulmonary disease) (CMS/HCC)    • Coronary artery disease     Cardiac catheterization completed;  90% PDA stenosis with medical management recommended   • Coronary artery disease involving native coronary artery of native heart with angina pectoris with documented spasm (CMS/[facility])    • Disease of thyroid gland    • Displacement of lumbar intervertebral disc without myelopathy    • Dizziness    • ANTOINE (dyspnea on exertion)    • Essential hypertension    • Fatigue    • Generalized osteoarthritis of multiple sites    • History of rheumatic fever    • History of transfusion    • Hx of bone density study     10/23/2014   • Hyperglycemia    • Hyperlipidemia    • Hypovitaminosis D    • Left arm pain    • Left-sided weakness    • Leg swelling    • Low back pain    • Lower extremity edema    • Malaise and fatigue    • Mitral valve disease     Moderate mitral valve prolapse and moderate mitral regurgitation   • Mitral valve insufficiency    • Morbid obesity with BMI of 40.0-44.9, adult (CMS/[facility])    • MRSA infection    • NSTEMI (non-ST elevated myocardial infarction) (CMS/HCC)    • PAF (paroxysmal atrial fibrillation) (CMS/[facility])    • RA (rheumatoid arthritis) (CMS/[facility])     involving both hands   • Sleep apnea    • SOB (shortness of breath)    • Stroke (CMS/[facility])     left side weakness   • Urge incontinence of urine    • Vitamin D deficiency      Past Surgical History:   Procedure Laterality Date   • BREAST BIOPSY     • BREAST SURGERY      right side lumpectomy with biopsy   • CARDIAC CATHETERIZATION Left 10/20/2017    Procedure: Cardiac Catheterization/Vascular Study;  Surgeon: Alphonso Olmedo MD;  Location: [facility] INVASIVE LOCATION;  Service:    • CARDIAC CATHETERIZATION N/A 10/20/2017    +2 mitral regurgitation, left main 10% stenosis, mid to distal LAD 10% diffuse stenosis, circumflex 10% diffuse stenosis, RCA 10% proximal stenosis, and distal PDA consistent with coronary embolus with a lesion of 90% too small to consider coronary intervention; medical management recommended   • EYE SURGERY      laser surgery for glaucoma  and left eye cataracts removed   • HYSTERECTOMY      10+ years ago   • JOINT REPLACEMENT  2005; 2006    bilateral knees and left rotater   • KNEE SURGERY     • MAMMO BILATERAL  2016    [facility]      Outpatient Medications Prior to Visit   Medication Sig Dispense Refill   • acetaminophen (TYLENOL) 500 MG tablet Take 500 mg by mouth Every 6 (Six) Hours As Needed for Mild Pain .     • albuterol (PROVENTIL HFA;VENTOLIN HFA) 108 (90 Base) MCG/ACT inhaler Inhale 2 puffs Every 6 (Six) Hours As Needed.     • aspirin 81 MG EC tablet Take 1 tablet by mouth Daily.     • atorvastatin (LIPITOR) 10 MG tablet Take 1 tablet by mouth Daily. 90 tablet 3   • Blood Glucose Monitoring Suppl kit 1 kit 3 (Three) Times a Day. 1 each 0   • carvedilol (COREG) 25 MG tablet TAKE 1 & 1/2 (ONE & ONE-HALF) TABLETS BY MOUTH TWICE DAILY WITH  MEALS 270 tablet 1   • digoxin (LANOXIN) 125 MCG tablet Take 1 tablet by mouth Every Other Day. 30 tablet 5   • furosemide (LASIX) 40 MG tablet Take 2 tablets by mouth once daily (Patient taking differently: 40 mg Daily.) 180 tablet 1   • glucose blood test strip Use as instructed 300 each 12   • HYDROcodone-acetaminophen (NORCO) 5-325 MG per tablet Take 1 tablet by mouth 2 (Two) Times a Day As Needed for Severe Pain . (Patient taking differently: Take 1 tablet by mouth 2 (Two) Times a Day As Needed for Severe Pain . By pain man) 40 tablet 0   • ipratropium-albuterol (DUO-NEB) 0.5-2.5 mg/mL nebulizer As Needed.     • Lancets (ACCU-CHEK MULTICLIX) lancets Use 1 lancet per finger stick 204 each 12   • lisinopril (PRINIVIL,ZESTRIL) 10 MG tablet TAKE 1 TABLET BY MOUTH ONCE DAILY 90 tablet 1   • magnesium oxide (MAG-OX) 400 MG tablet Take 400 mg by mouth Daily.     • potassium chloride (K-DUR) 10 MEQ CR tablet Take 4 tablets by mouth Daily. 360 tablet 1   • TRAVATAN Z 0.004 % solution ophthalmic solution      • vitamin D (ERGOCALCIFEROL) 28881 units capsule capsule Take 1 capsule by mouth 1 (One) Time  Per Week. 12 capsule 2   • warfarin (COUMADIN) 5 MG tablet Take one tablet by mouth daily or as directed by [facility]. 90 tablet 1   • metFORMIN ER (GLUCOPHAGE-XR) 500 MG 24 hr tablet Take 1 tablet by mouth Daily With Breakfast. 90 tablet 1   • promethazine-dextromethorphan (PROMETHAZINE-DM) 6.25-15 MG/5ML syrup Take 5 mL by mouth 4 (Four) Times a Day As Needed for Cough. 240 mL 0     No facility-administered medications prior to visit.        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     Employer: RETIRED   Tobacco Use   • Smoking status: Former Smoker     Packs/day: 3.00     Types: Cigarettes     Start date: 1967     Last attempt to quit: 10/19/1968     Years since quittin.4   • Smokeless tobacco: Never Used   Substance and Sexual Activity   • Alcohol use: No     Comment: No caffeine use   • Drug use: No   • Sexual activity: Defer     Family History   Problem Relation Age of Onset   • Colon cancer Mother    • Glaucoma Mother    • Stroke Mother    • Arthritis Mother    • Hypertension Mother    • Glaucoma Sister    • Diabetes Sister    • Heart disease Sister    • Arthritis Sister    • Asthma Sister    • Hypertension Sister    • Miscarriages / Stillbirths Sister    • Lung disease Sister    • Heart disease Brother    • Diabetes Brother    • Arthritis Brother    • Drug abuse Brother    • Hypertension Brother    • Arthritis Father    • COPD Father    • Lung disease Father    • Arthritis Daughter    • Depression Daughter    • Alcohol abuse Maternal Uncle    • Heart disease Sister    • Heart disease Sister    • Heart disease Brother      Review of  "Systems   Constitution: Positive for malaise/fatigue.   Cardiovascular: Positive for leg swelling and palpitations. Negative for chest pain and syncope.   Respiratory: Positive for shortness of breath.         Objective:     Vitals:    03/26/20 1206   BP: 132/89   Pulse: 89   Resp: 16   Weight: 109 kg (240 lb)   Height: 165.1 cm (65\")     Body mass index is 39.94 kg/m².  Blood pressure this morning was 132/89 with a pulse of 89 bpm as reported per patient    Physical Exam: Not performed as this is a phone visit    Assessment:       Diagnosis Plan   1. Permanent atrial fibrillation     2. Chronic diastolic heart failure (CMS/[facility])     3. Coronary artery disease involving native coronary artery of native heart with angina pectoris (CMS/[facility])     4. Essential hypertension     5. Obstructive sleep apnea syndrome     6. Morbid obesity with BMI of 40.0-44.9, adult (CMS/[facility])     7. Diabetes 1.5, 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[facility])       Plan:     1.  Pulmonary hypertension.  We will plan on repeat echocardiographic imaging in 6 months.  However I did recommend she reconsider CPAP therapy.  Once the COVID restrictions are lifted we will have her visit with Dr. Hill regarding this  2.  Mitral regurgitation, mild on echocardiogram in November 2018   3.  Severe small vessel coronary artery disease.  No anginal symptoms  4.  Persistent atrial fibrillation.  Overall rates appear controlled and she is tolerating anticoagulation without bleeding or falls.  5.  Edema.  She will continue to take Lasix and potassium as she is doing until Sunday.  At that time she will go to 40 mg of Lasix alternating every other day and also decrease potassium chloride to 40 mEq every other day.  She will continue to monitor weights.  6. Hypertension, lower that today usually  7. History of embolic non ST-elevation myocardial infarction. Now on warfarin  8. History of embolic stroke.   9. FRANCY, untreated despite multiple masks but is willing to reconsider " another visit with Dr. Hill.  To address further.    10. Lung nodule followed by  and felt to be benign per patient.  No further follow-up felt to be needed per patient  11. Chronic anticoagulation, as above tolerating this well without falls or bleeding           Your medication list           Accurate as of March 26, 2020 12:36 PM. If you have any questions, ask your nurse or doctor.               CHANGE how you take these medications      Instructions Last Dose Given Next Dose Due   furosemide 40 MG tablet  Commonly known as:  LASIX  What changed:    · how much to take  · how to take this      Take 2 tablets by mouth once daily       HYDROcodone-acetaminophen 5-325 MG per tablet  Commonly known as:  NORCO  What changed:  additional instructions      Take 1 tablet by mouth 2 (Two) Times a Day As Needed for Severe Pain .          CONTINUE taking these medications      Instructions Last Dose Given Next Dose Due   accu-chek multiclix lancets      Use 1 lancet per finger stick       acetaminophen 500 MG tablet  Commonly known as:  TYLENOL      Take 500 mg by mouth Every 6 (Six) Hours As Needed for Mild Pain .       albuterol sulfate  (90 Base) MCG/ACT inhaler  Commonly known as:  PROVENTIL HFA;VENTOLIN HFA;PROAIR HFA      Inhale 2 puffs Every 6 (Six) Hours As Needed.       aspirin 81 MG EC tablet      Take 1 tablet by mouth Daily.       atorvastatin 10 MG tablet  Commonly known as:  LIPITOR      Take 1 tablet by mouth Daily.       Blood Glucose Monitoring Suppl kit      1 kit 3 (Three) Times a Day.       carvedilol 25 MG tablet  Commonly known as:  COREG      TAKE 1 & 1/2 (ONE & ONE-HALF) TABLETS BY MOUTH TWICE DAILY WITH  MEALS       digoxin 125 MCG tablet  Commonly known as:  LANOXIN      Take 1 tablet by mouth Every Other Day.       glucose blood test strip      Use as instructed       ipratropium-albuterol 0.5-2.5 mg/3 ml nebulizer  Commonly known as:  DUO-NEB      As Needed.       lisinopril 10 MG  tablet  Commonly known as:  PRINIVIL,ZESTRIL      TAKE 1 TABLET BY MOUTH ONCE DAILY       magnesium oxide 400 MG tablet  Commonly known as:  MAG-OX      Take 400 mg by mouth Daily.       potassium chloride 10 MEQ CR tablet  Commonly known as:  K-DUR      Take 4 tablets by mouth Daily.       Travatan Z 0.004 % solution ophthalmic solution  Generic drug:  travoprost (ANNIE free)           vitamin D 1.25 MG (80249 UT) capsule capsule  Commonly known as:  ERGOCALCIFEROL      Take 1 capsule by mouth 1 (One) Time Per Week.       warfarin 5 MG tablet  Commonly known as:  COUMADIN      Take one tablet by mouth daily or as directed by [facility].          STOP taking these medications    metFORMIN  MG 24 hr tablet  Commonly known as:  GLUCOPHAGE-XR  Stopped by:  Pia Dueñas MD        promethazine-dextromethorphan 6.25-15 MG/5ML syrup  Commonly known as:  PROMETHAZINE-DM  Stopped by:  Pia Dueñas MD               Dictated utilizing Dragon dictation

## 2020-04-01 RX ORDER — HYDROCODONE BITARTRATE AND ACETAMINOPHEN 5; 325 MG/1; MG/1
1 TABLET ORAL 2 TIMES DAILY PRN
Qty: 40 TABLET | Refills: 0 | Status: SHIPPED | OUTPATIENT
Start: 2020-04-01 | End: 2020-05-08 | Stop reason: SDUPTHER

## 2020-04-01 NOTE — TELEPHONE ENCOUNTER
Medication Refill Request    Date of phone call: 20    Medication being requested: norco 5/325 mg si tab po bid prn  Qty: 40    Date of last visit: 20    Date of last refill: 20    MARCO up to date?: yes    Next Follow up?: 20    Any new pertinent information? (i.e, new medication allergies, new use of medications, change in patient's health or condition, non-compliance or inconsistency with prescribing agreement?):

## 2020-04-21 RX ORDER — LISINOPRIL 10 MG/1
TABLET ORAL
Qty: 90 TABLET | Refills: 0 | Status: SHIPPED | OUTPATIENT
Start: 2020-04-21 | End: 2020-04-28

## 2020-04-28 RX ORDER — LISINOPRIL 10 MG/1
TABLET ORAL
Qty: 90 TABLET | Refills: 0 | Status: SHIPPED | OUTPATIENT
Start: 2020-04-28 | End: 2020-05-22

## 2020-04-29 RX ORDER — ATORVASTATIN CALCIUM 10 MG/1
10 TABLET, FILM COATED ORAL DAILY
Qty: 90 TABLET | Refills: 3 | Status: SHIPPED | OUTPATIENT
Start: 2020-04-29 | End: 2021-08-06 | Stop reason: SDUPTHER

## 2020-04-30 RX ORDER — SULFAMETHOXAZOLE AND TRIMETHOPRIM 800; 160 MG/1; MG/1
1 TABLET ORAL 2 TIMES DAILY
Qty: 14 TABLET | Refills: 1 | Status: SHIPPED | OUTPATIENT
Start: 2020-04-30 | End: 2020-08-18

## 2020-05-05 ENCOUNTER — ANTICOAGULATION VISIT (OUTPATIENT)
Dept: PHARMACY | Facility: HOSPITAL | Age: 75
End: 2020-05-05

## 2020-05-05 DIAGNOSIS — I48.21 PERMANENT ATRIAL FIBRILLATION (HCC): ICD-10-CM

## 2020-05-05 LAB
INR PPP: 2.8 (ref 0.91–1.09)
PROTHROMBIN TIME: 34.1 SECONDS (ref 10–13.8)

## 2020-05-05 PROCEDURE — 36416 COLLJ CAPILLARY BLOOD SPEC: CPT

## 2020-05-05 PROCEDURE — 85610 PROTHROMBIN TIME: CPT

## 2020-05-05 NOTE — PROGRESS NOTES
Anticoagulation Clinic Progress Note    Anticoagulation Summary  As of 2020    INR goal:   2.0-3.0   TTR:   75.7 % (1.6 y)   INR used for dosin.8 (2020)   Warfarin maintenance plan:   5 mg every day   Weekly warfarin total:   35 mg   Plan last modified:   Nano Cool RPH (2019)   Next INR check:   2020   Priority:   Maintenance   Target end date:   Indefinite    Indications    Atrial fibrillation (CMS/HCC) [I48.91]             Anticoagulation Episode Summary     INR check location:       Preferred lab:       Send INR reminders to:   [facility] [employer] [place]    Comments:         Anticoagulation Care Providers     Provider Role Specialty Phone number    Pia Dueñas MD Referring Cardiology [phone]          Clinic Interview:      INR History:  Anticoagulation Monitoring 2020 3/24/2020 2020   INR 2.4 2.2 2.8   INR Date 2020 3/24/2020 2020   INR Goal 2.0-3.0 2.0-3.0 2.0-3.0   Trend Same Same Same   Last Week Total 35 mg 35 mg 35 mg   Next Week Total 35 mg 35 mg 32.5 mg   Sun 5 mg 5 mg 5 mg   Mon 5 mg 5 mg 5 mg   Tue 5 mg 5 mg 2.5 mg (5/5); Otherwise 5 mg   Wed 5 mg 5 mg 5 mg   Thu 5 mg 5 mg 5 mg   Fri 5 mg 5 mg 5 mg   Sat 5 mg 5 mg 5 mg   Visit Report - - -   Some recent data might be hidden       Plan:  1. INR is therapeutic today- see above in Anticoagulation Summary.   Will instruct Lana Yañez to take 2.5 mg today then continue their normal warfarin regimen- see above in Anticoagulation Summary.  2. Follow up in 4 weeks.  3. Patient declines warfarin refills.  4. Verbal and written information provided. Patient expresses understanding and has no further questions at this time.    Maria Alejandra Guerrero

## 2020-05-08 ENCOUNTER — OFFICE VISIT (OUTPATIENT)
Dept: PAIN MEDICINE | Facility: CLINIC | Age: 75
End: 2020-05-08

## 2020-05-08 DIAGNOSIS — M05.741 RHEUMATOID ARTHRITIS INVOLVING BOTH HANDS WITH POSITIVE RHEUMATOID FACTOR (HCC): ICD-10-CM

## 2020-05-08 DIAGNOSIS — M05.742 RHEUMATOID ARTHRITIS INVOLVING BOTH HANDS WITH POSITIVE RHEUMATOID FACTOR (HCC): ICD-10-CM

## 2020-05-08 DIAGNOSIS — G89.29 OTHER CHRONIC PAIN: Primary | ICD-10-CM

## 2020-05-08 DIAGNOSIS — M51.26 DISPLACEMENT OF LUMBAR INTERVERTEBRAL DISC WITHOUT MYELOPATHY: ICD-10-CM

## 2020-05-08 PROCEDURE — 99441 PR PHYS/QHP TELEPHONE EVALUATION 5-10 MIN: CPT | Performed by: NURSE PRACTITIONER

## 2020-05-08 RX ORDER — HYDROCODONE BITARTRATE AND ACETAMINOPHEN 5; 325 MG/1; MG/1
1 TABLET ORAL 2 TIMES DAILY PRN
Qty: 40 TABLET | Refills: 0 | Status: SHIPPED | OUTPATIENT
Start: 2020-05-08 | End: 2020-07-22 | Stop reason: SDUPTHER

## 2020-05-08 NOTE — PROGRESS NOTES
You have chosen to receive care through a telephone visit. Do you consent to use a telephone visit for your medical care today? Yes    TELEPHONE VISIT    CHIEF COMPLAINT  Joint pain and back pain    Subjective   Lana Yañez is a 74 y.o. female  who presents for a telephonic follow-up.She has a history of chronic joint and back pain. Reports her pain is worse since last office visit.    Complains of pain in her low back and joints. Today her pain is 6/10VAS. Describes her pain as nearly continuous aching and throbbing when up on feet. Continues with Hydrocodone 5/325 1-2/day PRN. Denies any side effects from the regimen. The regimen helps decrease her pain by 50%. ADL's by self.     Reports she believes she recently had  UTI. Was sent in bactrim by Dr Tavarez. Is feeling better from this.     She was initially evaluated in the office by Dr. Carmen Kirk on 8/28/2018.  She was referred here by Dr. Perez for evaluation of back pain.  Has had back pain for 10 years on and off.  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 fib not being able to stop anticoagulant.  No stents were placed but is high risk for surgery.  Started on tramadol for pain.  No previous pain clinics, back injections, no history of opioids prior to current tramadol by neurosurgery.  Currently followed by Dr. Dueñas for cardiacs issues.  Reviewed imaging with patient.  May benefit from LESI but would have to hold anticoagulant.  We will reach out to Dr. Mota in regards to holding anticoagulant if possible.  Random urine drug screen per office policy.  No benefit with tramadol.  Trial of hydrocodone 5-3 25 2-3 times a day as needed as needed.  Plan to start low-dose gabapentin.  Prescription for gabapentin 100 mg to increase up to twice daily as tolerated.   Does not want procedures due to risk with anticoagulants.   She needs a left shoulder replacement, but this is not planned, as she would need bridge  for Coumadin. Left shoulder injection with Dr Asya Rodriges 1-14-20. Is noticing some improvement with this.  She states she did ask Dr Tavarez about pain medication, but he wanted her to see pain management.     Back Pain   This is a chronic problem. The current episode started more than 1 year ago. The problem occurs constantly. The problem has been gradually worsening (worse since last office visit) since onset. The pain is present in the lumbar spine and sacro-iliac. The quality of the pain is described as aching. Radiates to: LLE. The pain is at a severity of 6/10. The pain is moderate. The pain is worse during the day. The symptoms are aggravated by bending, position, standing and twisting. Associated symptoms include headaches, numbness (L leg) and weakness (L leg). Pertinent negatives include no abdominal pain, chest pain or dysuria. Risk factors include lack of exercise, menopause, obesity and sedentary lifestyle. She has tried analgesics, bed rest, chiropractic manipulation, heat, home exercises, ice, muscle relaxant, walking and NSAIDs for the symptoms. The treatment provided mild relief.      Past pain medications:   norco 5/325 mg - helpful  Gabapentin 800 - dizzy, hurt stomach  Tramadol 50 mg up to 4/day - not helping     Current pain medications:   Tylenol OTC  Hydrocodone 5/325 1-2/day PRN      Past therapies:  Physical Therapy: yes- minimal  Chiropractor: no  Massage Therapy: no  TENS: yes  Neck or back surgery: no  Past pain management: no     Previous Injections: none       The following portions of the patient's history were reviewed and updated as appropriate: allergies, current medications, past family history, past medical history, past social history, past surgical history and problem list.    Review of Systems   Respiratory: Negative for chest tightness and shortness of breath.    Cardiovascular: Negative for chest pain.   Gastrointestinal: Negative for abdominal pain.   Genitourinary:  Negative for dysuria.   Musculoskeletal: Positive for arthralgias and back pain.   Neurological: Positive for weakness (L leg), numbness (L leg) and headaches.   Psychiatric/Behavioral: Negative for sleep disturbance. The patient is not nervous/anxious.      There were no vitals filed for this visit.      Objective   Physical Exam  As this is a telephone check-in, the ability to perform a routine physical exam is extremely limited. The patient seems alert and is oriented appropriately.   On this phone call there is not any evidence of respiratory distress.   The patient seems of normal mood.   The remainder of a routine physical exam is deferred.      Assessment/Plan   Diagnoses and all orders for this visit:    Other chronic pain    Rheumatoid arthritis involving both hands with positive rheumatoid factor (CMS/HCC)    Displacement of lumbar intervertebral disc without myelopathy    Other orders  -     HYDROcodone-acetaminophen (NORCO) 5-325 MG per tablet; Take 1 tablet by mouth 2 (Two) Times a Day As Needed for Severe Pain .      ----------------    Our practice is offering alternative &/or electronic methods to continue to follow our patients while at the same time further the efforts toward social distancing, in accordance with our organizational policies, professional societies' guidance, and gubernatorial mandates.  I support the Healthy at Home campaign and in this visit I have counseled the patient on our needs to limit in-person office visits and physical encounters with medical facilities whenever possible.  I have also educated the patient on the medical necessities of maintaining social distancing while we continue to function during this crisis period.      The patient was counseled on the need to consider telehealth options. The patient had obstacles which preclude consideration for a Video Visit. As a Video Visit was not practical, the patient was offered the option for a Telephone Check-In. The  patient agreed to a Telephone Check-In. The patient was counseled on the need for a check-in visit. The patient was educated about our efforts to comply with monitoring standards when prescribing potent medications.    TIME:  Time spent in education / counselin minutes. Topics discussed are outlined in the Assessment/Plan section of the note.      ----------------    --- The urine drug screen confirmation from 19 has been reviewed and the result is APPROPRIATE(No recent prescription for pain medication) based on patient history and MARCO report  --- refill Hydrocodone. Patient appears stable with current regimen. No adverse effects. Regarding continuation of opioids, there is no evidence of aberrant behavior or any red flags.  The patient continues with appropriate response to opioid therapy. ADL's remain intact by self.   --- Declines interventions at this time.  --- Follow-up 2 months or sooner if needed.       MARCO REPORT    As part of the patient's treatment plan, I am prescribing controlled substances. The patient has been made aware of appropriate use of such medications, including potential risk of somnolence, limited ability to drive and/or work safely, and the potential for dependence or overdose. It has also been made clear that these medications are for use by this patient only, without concomitant use of alcohol or other substances unless prescribed.     Patient has completed prescribing agreement detailing terms of continued prescribing of controlled substances, including monitoring MARCO reports, urine drug screening, and pill counts if necessary. The patient is aware that inappropriate use will results in cessation of prescribing such medications.    MARCO report has been reviewed and scanned into the patient's chart.    As the clinician, I personally reviewed the MARCO from 20 while the patient was on the telephonic visit today.    History and physical exam exhibit continued safe  and appropriate use of controlled substances.    -------    EMR Dragon/Transcription disclaimer:   Much of this encounter note is an electronic transcription/translation of spoken language to printed text. The electronic translation of spoken language may permit erroneous, or at times, nonsensical words or phrases to be inadvertently transcribed; Although I have reviewed the note for such errors, some may still exist.

## 2020-05-20 ENCOUNTER — TELEPHONE (OUTPATIENT)
Dept: CARDIOLOGY | Facility: CLINIC | Age: 75
End: 2020-05-20

## 2020-05-22 RX ORDER — CARVEDILOL 25 MG/1
TABLET ORAL
Qty: 270 TABLET | Refills: 1 | Status: SHIPPED | OUTPATIENT
Start: 2020-05-22 | End: 2020-11-16

## 2020-05-22 RX ORDER — LISINOPRIL 10 MG/1
TABLET ORAL
Qty: 30 TABLET | Refills: 0 | Status: SHIPPED | OUTPATIENT
Start: 2020-05-22 | End: 2020-08-24

## 2020-05-22 RX ORDER — ERGOCALCIFEROL 1.25 MG/1
CAPSULE ORAL
Qty: 12 CAPSULE | Refills: 0 | Status: SHIPPED | OUTPATIENT
Start: 2020-05-22 | End: 2020-09-10

## 2020-06-02 ENCOUNTER — ANTICOAGULATION VISIT (OUTPATIENT)
Dept: PHARMACY | Facility: HOSPITAL | Age: 75
End: 2020-06-02

## 2020-06-02 ENCOUNTER — CLINICAL SUPPORT (OUTPATIENT)
Dept: ORTHOPEDIC SURGERY | Facility: CLINIC | Age: 75
End: 2020-06-02

## 2020-06-02 ENCOUNTER — LAB (OUTPATIENT)
Dept: LAB | Facility: HOSPITAL | Age: 75
End: 2020-06-02

## 2020-06-02 VITALS — TEMPERATURE: 98.3 F | BODY MASS INDEX: 39.99 KG/M2 | HEIGHT: 65 IN | WEIGHT: 240 LBS

## 2020-06-02 DIAGNOSIS — M19.90 ARTHRITIS: Primary | ICD-10-CM

## 2020-06-02 DIAGNOSIS — I48.91 ATRIAL FIBRILLATION WITH RVR (HCC): ICD-10-CM

## 2020-06-02 LAB
INR PPP: 1.86 (ref 0.9–1.1)
INR PPP: 2.1 (ref 0.91–1.09)
PROTHROMBIN TIME: 21.1 SECONDS (ref 11.7–14.2)
PROTHROMBIN TIME: 25.7 SECONDS (ref 10–13.8)

## 2020-06-02 PROCEDURE — 20610 DRAIN/INJ JOINT/BURSA W/O US: CPT | Performed by: ORTHOPAEDIC SURGERY

## 2020-06-02 PROCEDURE — 85610 PROTHROMBIN TIME: CPT

## 2020-06-02 PROCEDURE — 36416 COLLJ CAPILLARY BLOOD SPEC: CPT

## 2020-06-02 PROCEDURE — 36415 COLL VENOUS BLD VENIPUNCTURE: CPT

## 2020-06-02 RX ADMIN — METHYLPREDNISOLONE ACETATE 80 MG: 80 INJECTION, SUSPENSION INTRA-ARTICULAR; INTRALESIONAL; INTRAMUSCULAR; SOFT TISSUE at 08:26

## 2020-06-02 NOTE — PROGRESS NOTES
Patient: Lana Yañez  YOB: 1945  Date of Service: 6/2/2020    Chief Complaints: Left shoulder pain    Subjective:    History of Present Illness: Pt is seen in the office today with complaints of left shoulder pain I last saw her in January we injected her she did quite well from that she would like another injection no no history injury.          Allergies:   Allergies   Allergen Reactions   • Contrast Dye Hives and Itching       Medications:   Home Medications:  Current Outpatient Medications on File Prior to Visit   Medication Sig   • acetaminophen (TYLENOL) 500 MG tablet Take 500 mg by mouth Every 6 (Six) Hours As Needed for Mild Pain .   • albuterol (PROVENTIL HFA;VENTOLIN HFA) 108 (90 Base) MCG/ACT inhaler Inhale 2 puffs Every 6 (Six) Hours As Needed.   • aspirin 81 MG EC tablet Take 1 tablet by mouth Daily.   • atorvastatin (LIPITOR) 10 MG tablet Take 1 tablet by mouth Daily.   • Blood Glucose Monitoring Suppl kit 1 kit 3 (Three) Times a Day.   • carvedilol (COREG) 25 MG tablet TAKE 1 & 1/2 (ONE & ONE-HALF) TABLETS BY MOUTH TWICE DAILY WITH  MEALS   • digoxin (LANOXIN) 125 MCG tablet Take 1 tablet by mouth Every Other Day.   • furosemide (LASIX) 40 MG tablet Take 2 tablets by mouth once daily (Patient taking differently: 40 mg Daily.)   • glucose blood test strip Use as instructed   • HYDROcodone-acetaminophen (NORCO) 5-325 MG per tablet Take 1 tablet by mouth 2 (Two) Times a Day As Needed for Severe Pain .   • ipratropium-albuterol (DUO-NEB) 0.5-2.5 mg/mL nebulizer As Needed.   • Lancets (ACCU-CHEK MULTICLIX) lancets Use 1 lancet per finger stick   • lisinopril (PRINIVIL,ZESTRIL) 10 MG tablet Take 1 tablet by mouth once daily   • magnesium oxide (MAG-OX) 400 MG tablet Take 400 mg by mouth Daily.   • potassium chloride (K-DUR) 10 MEQ CR tablet Take 4 tablets by mouth Daily.   • sulfamethoxazole-trimethoprim (BACTRIM DS,SEPTRA DS) 800-160 MG per tablet Take 1 tablet by mouth 2 (Two) Times  a Day.   • TRAVATAN Z 0.004 % solution ophthalmic solution    • vitamin D (ERGOCALCIFEROL) 1.25 MG (41580 UT) capsule capsule Take 1 capsule by mouth once a week   • warfarin (COUMADIN) 5 MG tablet Take one tablet by mouth daily or as directed by [facility].     No current facility-administered medications on file prior to visit.      Current Medications:  Scheduled Meds:  Continuous Infusions:  No current facility-administered medications for this visit.   PRN Meds:.    I have reviewed the patient's medical history in detail and updated the computerized patient record.  Review and summarization of old records include:    Past Medical History:   Diagnosis Date   • Acute on chronic diastolic heart failure (CMS/HCC)    • Arthritis    • Asthma    • Atrial fibrillation (CMS/[facility])     Persistent; on warfarin   • Atypical chest pain    • Bronchitis    • Cellulitis of right elbow     due to MRSA   • CHF (congestive heart failure) (CMS/[facility])    • COPD (chronic obstructive pulmonary disease) (CMS/[facility])    • Coronary artery disease     Cardiac catheterization completed; 90% PDA stenosis with medical management recommended   • Coronary artery disease involving native coronary artery of native heart with angina pectoris with documented spasm (CMS/[facility])    • Disease of thyroid gland    • Displacement of lumbar intervertebral disc without myelopathy    • Dizziness    • ANTOINE (dyspnea on exertion)    • Essential hypertension    • Fatigue    • Generalized osteoarthritis of multiple sites    • History of rheumatic fever    • History of transfusion    • Hx of bone density study     10/23/2014   • Hyperglycemia    • Hyperlipidemia    • Hypovitaminosis D    • Left arm pain    • Left-sided weakness    • Leg swelling    • Low back pain    • Lower extremity edema    • Malaise and fatigue    • Mitral valve disease     Moderate mitral valve prolapse and moderate mitral regurgitation   • Mitral valve insufficiency    • Morbid  obesity with BMI of 40.0-44.9, adult (CMS/[facility])    • MRSA infection    • NSTEMI (non-ST elevated myocardial infarction) (CMS/[facility])    • PAF (paroxysmal atrial fibrillation) (CMS/[facility])    • RA (rheumatoid arthritis) (CMS/[facility])     involving both hands   • Sleep apnea    • SOB (shortness of breath)    • Stroke (CMS/[facility])     left side weakness   • Urge incontinence of urine    • UTI (urinary tract infection) 2020   • Vitamin D deficiency         Past Surgical History:   Procedure Laterality Date   • BREAST BIOPSY     • BREAST SURGERY      right side lumpectomy with biopsy   • CARDIAC CATHETERIZATION Left 10/20/2017    Procedure: Cardiac Catheterization/Vascular Study;  Surgeon: Alphonso Olmedo MD;  Location: [facility] CATH INVASIVE LOCATION;  Service:    • CARDIAC CATHETERIZATION N/A 10/20/2017    +2 mitral regurgitation, left main 10% stenosis, mid to distal LAD 10% diffuse stenosis, circumflex 10% diffuse stenosis, RCA 10% proximal stenosis, and distal PDA consistent with coronary embolus with a lesion of 90% too small to consider coronary intervention; medical management recommended   • EYE SURGERY      laser surgery for glaucoma and left eye cataracts removed   • HYSTERECTOMY      10+ years ago   • JOINT REPLACEMENT  ;     bilateral knees and left rotater   • KNEE SURGERY     • MAMMO BILATERAL  2016    [facility]         Social History     Occupational History   • Occupation:  for [employer]     Employer: RETIRED   Tobacco Use   • Smoking status: Former Smoker     Packs/day: 3.00     Types: Cigarettes     Start date: 1967     Last attempt to quit: 10/19/1968     Years since quittin.6   • Smokeless tobacco: Never Used   Substance and Sexual Activity   • Alcohol use: No     Comment: No caffeine use   • Drug use: No   • Sexual activity: Defer    Social History     Social History Narrative   • Not on file        Family History   Problem Relation Age of Onset   • Colon cancer  Mother    • Glaucoma Mother    • Stroke Mother    • Arthritis Mother    • Hypertension Mother    • Glaucoma Sister    • Diabetes Sister    • Heart disease Sister    • Arthritis Sister    • Asthma Sister    • Hypertension Sister    • Miscarriages / Stillbirths Sister    • Lung disease Sister    • Heart disease Brother    • Diabetes Brother    • Arthritis Brother    • Drug abuse Brother    • Hypertension Brother    • Arthritis Father    • COPD Father    • Lung disease Father    • Arthritis Daughter    • Depression Daughter    • Alcohol abuse Maternal Uncle    • Heart disease Sister    • Heart disease Sister    • Heart disease Brother        ROS: 14 point review of systems was performed and was negative except for documented findings in HPI and today's encounter.     Allergies:   Allergies   Allergen Reactions   • Contrast Dye Hives and Itching     Constitutional:  Denies fever, shaking or chills   Eyes:  Denies change in visual acuity   HENT:  Denies nasal congestion or sore throat   Respiratory:  Denies cough or shortness of breath   Cardiovascular:  Denies chest pain or severe LE edema   GI:  Denies abdominal pain, nausea, vomiting, bloody stools or diarrhea   Musculoskeletal:  Numbness, tingling, or loss of motor function only as noted above in history of present illness.  : Denies painful urination or hematuria  Integument:  Denies rash, lesion or ulceration   Neurologic:  Denies headache or focal weakness  Endocrine:  Denies lymphadenopathy  Psych:  Denies confusion or change in mental status   Hem:  Denies active bleeding      Physical Exam: 74 y.o. female  Wt Readings from Last 3 Encounters:   03/26/20 109 kg (240 lb)   03/12/20 113 kg (249 lb)   02/10/20 111 kg (245 lb)     Exam is unchanged  There is no height or weight on file to calculate BMI.  No height and weight on file for this encounter.  There were no vitals filed for this visit.  Vital signs reviewed.   General Appearance:    Alert, cooperative, in  no acute distress                  Eyes: conjunctiva clear  ENT: external ears and nose atraumatic  CV: no peripheral edema  Resp: normal respiratory effort  Skin: no rashes or wounds; normal turgor  Psych: mood and affect appropriate  Lymph: no nodes appreciated  Neuro: gross sensation intact  Vascular:  Palpable peripheral pulse in noted extremity    Ortho exam    Exam is unchanged             Assessment: Left shoulder pain I think this is degenerative changes in origin she did well with an injection previously we will do that again today she understands her options but is not interested in anything surgical    Plan:   Follow up as indicated.  Ice, elevate, and rest as needed.  Discussed conservative measures of pain control including ice, bracing.  Also talked about the importance of strengthening   Asya Rodriges M.D.        Large Joint Arthrocentesis: L glenohumeral  Date/Time: 6/2/2020 8:26 AM  Consent given by: patient  Site marked: site marked  Timeout: Immediately prior to procedure a time out was called to verify the correct patient, procedure, equipment, support staff and site/side marked as required   Supporting Documentation  Indications: pain   Procedure Details  Location: shoulder - L glenohumeral  Preparation: Patient was prepped and draped in the usual sterile fashion  Needle size: 22 G  Approach: posterior  Medications administered: 4 mL lidocaine (cardiac); 80 mg methylPREDNISolone acetate 80 MG/ML  Patient tolerance: patient tolerated the procedure well with no immediate complications

## 2020-06-02 NOTE — PROGRESS NOTES
Anticoagulation Clinic Progress Note    Anticoagulation Summary  As of 2020    INR goal:   2.0-3.0   TTR:   76.8 % (1.6 y)   INR used for dosin.86! (2020)   Warfarin maintenance plan:   5 mg every day   Weekly warfarin total:   35 mg   No change documented:   Anisa Castellon   Plan last modified:   Nano Cool RPH (2019)   Next INR check:   2020   Priority:   Maintenance   Target end date:   Indefinite    Indications    Atrial fibrillation (CMS/HCC) [I48.91]             Anticoagulation Episode Summary     INR check location:       Preferred lab:       Send INR reminders to:   [facility]    Comments:         Anticoagulation Care Providers     Provider Role Specialty Phone number    Pia Dueñas MD Referring Cardiology [phone]          Clinic Interview:  Patient Findings     Negatives:   Signs/symptoms of thrombosis, Signs/symptoms of bleeding,   Laboratory test error suspected, Change in health, Change in alcohol use,   Change in activity, Upcoming invasive procedure, Emergency department   visit, Upcoming dental procedure, Missed doses, Extra doses, Change in   medications, Change in diet/appetite, Hospital admission, Bruising, Other   complaints      Clinical Outcomes     Negatives:   Major bleeding event, Thromboembolic event,   Anticoagulation-related hospital admission, Anticoagulation-related ED   visit, Anticoagulation-related fatality        INR History:  Anticoagulation Monitoring 3/24/2020 2020 2020   INR 2.2 2.8 1.86   INR Date 3/24/2020 2020 2020   INR Goal 2.0-3.0 2.0-3.0 2.0-3.0   Trend Same Same Same   Last Week Total 35 mg 35 mg 35 mg   Next Week Total 35 mg 32.5 mg 35 mg   Sun 5 mg 5 mg 5 mg   Mon 5 mg 5 mg 5 mg   Tue 5 mg 2.5 mg (5/5); Otherwise 5 mg 5 mg   Wed 5 mg 5 mg 5 mg   Thu 5 mg 5 mg 5 mg   Fri 5 mg 5 mg 5 mg   Sat 5 mg 5 mg 5 mg   Visit Report - - -   Some recent data might be hidden       Plan:  1. INR is  Subtherapeutic today (POC 2.1 /  1.86) - see above in Anticoagulation Summary.   Will instruct Lanaatnonio Aguerocomb to Continue their warfarin regimen- see above in Anticoagulation Summary.  2. Follow up in 4 weeks  3. They have been instructed to call if any changes in medications, doses, concerns, etc. Patient expresses understanding and has no further questions at this time.    [facility]

## 2020-06-02 NOTE — PROGRESS NOTES
Anticoagulation Clinic Progress Note    Anticoagulation Summary  As of 2020    INR goal:   2.0-3.0   TTR:   76.8 % (1.6 y)   INR used for dosin.1 (2020)   Warfarin maintenance plan:   5 mg every day   Weekly warfarin total:   35 mg   No change documented:   Anisa Castellon   Plan last modified:   Nano Cool RPH (2019)   Next INR check:   2020   Priority:   Maintenance   Target end date:   Indefinite    Indications    Atrial fibrillation (CMS/HCC) [I48.91]             Anticoagulation Episode Summary     INR check location:       Preferred lab:       Send INR reminders to:   [facility]    Comments:         Anticoagulation Care Providers     Provider Role Specialty Phone number    Pia Dueñas MD Referring Cardiology [phone]          Clinic Interview:  Patient Findings     Negatives:   Signs/symptoms of thrombosis, Signs/symptoms of bleeding,   Laboratory test error suspected, Change in health, Change in alcohol use,   Change in activity, Upcoming invasive procedure, Emergency department   visit, Upcoming dental procedure, Missed doses, Extra doses, Change in   medications, Change in diet/appetite, Hospital admission, Bruising, Other   complaints      Clinical Outcomes     Negatives:   Major bleeding event, Thromboembolic event,   Anticoagulation-related hospital admission, Anticoagulation-related ED   visit, Anticoagulation-related fatality        INR History:  Anticoagulation Monitoring 3/24/2020 2020 2020   INR 2.2 2.8 2.1   INR Date 3/24/2020 2020 2020   INR Goal 2.0-3.0 2.0-3.0 2.0-3.0   Trend Same Same Same   Last Week Total 35 mg 35 mg 35 mg   Next Week Total 35 mg 32.5 mg 35 mg   Sun 5 mg 5 mg 5 mg   Mon 5 mg 5 mg 5 mg   Tue 5 mg 2.5 mg (5/5); Otherwise 5 mg 5 mg   Wed 5 mg 5 mg 5 mg   Thu 5 mg 5 mg 5 mg   Fri 5 mg 5 mg 5 mg   Sat 5 mg 5 mg 5 mg   Visit Report - - -   Some recent data might be hidden       Plan:  1. INR is therapeutic  today- see above in Anticoagulation Summary.   Will instruct Lanaantonio Aguerocomb to continue their warfarin regimen- see above in Anticoagulation Summary.  2. Follow up in 6 weeks.  3. Patient declines warfarin refills.  4. Verbal and written information provided. Patient expresses understanding and has no further questions at this time.    Anisa Castellon

## 2020-06-03 RX ORDER — METHYLPREDNISOLONE ACETATE 80 MG/ML
80 INJECTION, SUSPENSION INTRA-ARTICULAR; INTRALESIONAL; INTRAMUSCULAR; SOFT TISSUE
Status: COMPLETED | OUTPATIENT
Start: 2020-06-02 | End: 2020-06-02

## 2020-06-03 NOTE — TELEPHONE ENCOUNTER
6/3/20  I left vmsg - reminding her she was to get digoxin lab drawn when she went to get inr drawn - did not do the dig - reminded her to get the dig level, hold the medi prior so that it has been done 10-12 after the dighad been taken and can take immediately after the dig level lab has been drawn./rachana

## 2020-06-04 ENCOUNTER — OFFICE VISIT (OUTPATIENT)
Dept: INTERNAL MEDICINE | Facility: CLINIC | Age: 75
End: 2020-06-04

## 2020-06-04 ENCOUNTER — LAB (OUTPATIENT)
Dept: LAB | Facility: HOSPITAL | Age: 75
End: 2020-06-04

## 2020-06-04 VITALS
HEART RATE: 73 BPM | OXYGEN SATURATION: 95 % | DIASTOLIC BLOOD PRESSURE: 93 MMHG | RESPIRATION RATE: 16 BRPM | SYSTOLIC BLOOD PRESSURE: 144 MMHG | WEIGHT: 247 LBS | HEIGHT: 65 IN | BODY MASS INDEX: 41.15 KG/M2 | TEMPERATURE: 98.1 F

## 2020-06-04 DIAGNOSIS — J40 BRONCHITIS: ICD-10-CM

## 2020-06-04 DIAGNOSIS — I10 ESSENTIAL HYPERTENSION: ICD-10-CM

## 2020-06-04 DIAGNOSIS — E78.2 MIXED HYPERLIPIDEMIA: ICD-10-CM

## 2020-06-04 DIAGNOSIS — I48.19 PERSISTENT ATRIAL FIBRILLATION (HCC): ICD-10-CM

## 2020-06-04 DIAGNOSIS — E13.9 DIABETES 1.5, MANAGED AS TYPE 2 (HCC): Primary | ICD-10-CM

## 2020-06-04 DIAGNOSIS — I48.91 ATRIAL FIBRILLATION WITH RVR (HCC): ICD-10-CM

## 2020-06-04 LAB — DIGOXIN SERPL-MCNC: <0.3 NG/ML (ref 0.6–1.2)

## 2020-06-04 PROCEDURE — 99214 OFFICE O/P EST MOD 30 MIN: CPT | Performed by: INTERNAL MEDICINE

## 2020-06-04 PROCEDURE — 80162 ASSAY OF DIGOXIN TOTAL: CPT

## 2020-06-04 PROCEDURE — 36415 COLL VENOUS BLD VENIPUNCTURE: CPT

## 2020-06-04 RX ORDER — METFORMIN HYDROCHLORIDE EXTENDED-RELEASE TABLETS 1000 MG/1
1000 TABLET, FILM COATED, EXTENDED RELEASE ORAL
Qty: 90 TABLET | Refills: 3 | Status: SHIPPED | OUTPATIENT
Start: 2020-06-04 | End: 2020-06-05

## 2020-06-04 RX ORDER — POTASSIUM CHLORIDE 750 MG/1
40 TABLET, FILM COATED, EXTENDED RELEASE ORAL DAILY
Qty: 360 TABLET | Refills: 1 | Status: SHIPPED | OUTPATIENT
Start: 2020-06-04 | End: 2021-01-11

## 2020-06-04 NOTE — PROGRESS NOTES
"Karl Yañez is a 74 y.o. female.  Patient presents with chief complaint of type 2 diabetes mellitus, hyperlipidemia, hypertension, atrial fibrillation, here for follow-up evaluation and treatment.  She had had an episode of bronchitis which fortunately resolved with antibiotics and she is doing well now.  She has a follow-up with her cardiologist next month and had appropriate lab work for that.      /93 (BP Location: Left arm, Patient Position: Sitting, Cuff Size: Adult)   Pulse 73   Temp 98.1 °F (36.7 °C) (Oral)   Resp 16   Ht 165.1 cm (65\")   Wt 112 kg (247 lb)   LMP  (LMP Unknown)   SpO2 95%   BMI 41.10 kg/m²     Body mass index is 41.1 kg/m².    History of Present Illness somewhat unsteady in her gait uses a walker at all times    The following portions of the patient's history were reviewed and updated as appropriate: allergies, current medications, past family history, past medical history, past social history, past surgical history and problem list.    Review of Systems   Constitutional: Negative.    HENT: Negative.    Respiratory: Negative.    Cardiovascular: Negative.    Gastrointestinal: Negative.    Musculoskeletal: Positive for arthralgias, back pain and gait problem.   Hematological: Negative.    Psychiatric/Behavioral: Negative.        Objective   Physical Exam   Constitutional: She appears well-developed and well-nourished.   HENT:   Head: Normocephalic and atraumatic.   Cardiovascular: Normal rate, regular rhythm and normal heart sounds.   Pulmonary/Chest: Effort normal and breath sounds normal.   Abdominal: Soft. Bowel sounds are normal.   Musculoskeletal:   Chronic low back pain with an unsteady gait   Neurological: She is alert.   Psychiatric: She has a normal mood and affect. Her behavior is normal.   Nursing note and vitals reviewed.        Assessment/Plan   Diagnoses and all orders for this visit:    Diabetes 1.5, 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[facility])  Comments:  No " episodes of hypoglycemia will check an A1c today  Orders:  -     Comprehensive metabolic panel; Future  -     Hemoglobin A1c; Future  -     Hemoglobin A1c  -     Comprehensive metabolic panel    Mixed hyperlipidemia  Comments:  Check a lipid and CMP panel    Essential hypertension  Comments:  Well-controlled no changes at this time    Atrial fibrillation with RVR (CMS/HCC)  Comments:  In normal sinus rhythm today    Bronchitis  Comments:  Solved at this point    Other orders  -     potassium chloride (K-DUR) 10 MEQ CR tablet; Take 4 tablets by mouth Daily.  -     metFORMIN (FORTAMET) 1000 MG (OSM) 24 hr tablet; Take 1 tablet by mouth Daily With Breakfast.

## 2020-06-05 ENCOUNTER — TELEPHONE (OUTPATIENT)
Dept: INTERNAL MEDICINE | Facility: CLINIC | Age: 75
End: 2020-06-05

## 2020-06-05 ENCOUNTER — TELEPHONE (OUTPATIENT)
Dept: CARDIOLOGY | Facility: CLINIC | Age: 75
End: 2020-06-05

## 2020-06-05 LAB
ALBUMIN SERPL-MCNC: 4.6 G/DL (ref 3.5–5.2)
ALBUMIN/GLOB SERPL: 1.4 G/DL
ALP SERPL-CCNC: 49 U/L (ref 39–117)
ALT SERPL-CCNC: 12 U/L (ref 1–33)
AST SERPL-CCNC: 13 U/L (ref 1–32)
BILIRUB SERPL-MCNC: 0.5 MG/DL (ref 0.2–1.2)
BUN SERPL-MCNC: 24 MG/DL (ref 8–23)
BUN/CREAT SERPL: 21.6 (ref 7–25)
CALCIUM SERPL-MCNC: 10.1 MG/DL (ref 8.6–10.5)
CHLORIDE SERPL-SCNC: 102 MMOL/L (ref 98–107)
CO2 SERPL-SCNC: 26.2 MMOL/L (ref 22–29)
CREAT SERPL-MCNC: 1.11 MG/DL (ref 0.57–1)
GLOBULIN SER CALC-MCNC: 3.4 GM/DL
GLUCOSE SERPL-MCNC: 142 MG/DL (ref 65–99)
HBA1C MFR BLD: 7.8 % (ref 4.8–5.6)
POTASSIUM SERPL-SCNC: 4.5 MMOL/L (ref 3.5–5.2)
PROT SERPL-MCNC: 8 G/DL (ref 6–8.5)
SODIUM SERPL-SCNC: 138 MMOL/L (ref 136–145)

## 2020-06-05 NOTE — TELEPHONE ENCOUNTER
Please let patient know that her digoxin level is within normal limits.  Continue current medication dose.

## 2020-06-05 NOTE — TELEPHONE ENCOUNTER
The pt had sent a [employer] message about her Metformin 500XR being changed and the cost of the Metformin 1000osm release.  I changed it to the plain metformin 500 bid and she doesn't want to take more thanm 500mg's daily and she also would like to know why her dose was increased?  Please review labs and advise

## 2020-06-09 NOTE — TELEPHONE ENCOUNTER
Patient called back and I spoke with her about why the Metformin was increased and also to let her know the plain Metformin was sent in Friday afternoon and that should be more affordable, but to call me back if still too expensive.

## 2020-06-15 ENCOUNTER — TELEPHONE (OUTPATIENT)
Dept: ENDOCRINOLOGY | Age: 75
End: 2020-06-15

## 2020-06-15 NOTE — TELEPHONE ENCOUNTER
Pt called stated she needs a endocrinology pt sees [facility] her records are in there so could we just look those over an make a new pt cliff please get back with me and let me know.

## 2020-07-02 ENCOUNTER — ANTICOAGULATION VISIT (OUTPATIENT)
Dept: PHARMACY | Facility: HOSPITAL | Age: 75
End: 2020-07-02

## 2020-07-02 LAB
INR PPP: 3.1 (ref 0.91–1.09)
PROTHROMBIN TIME: 37.4 SECONDS (ref 10–13.8)

## 2020-07-02 PROCEDURE — 36416 COLLJ CAPILLARY BLOOD SPEC: CPT

## 2020-07-02 PROCEDURE — 85610 PROTHROMBIN TIME: CPT

## 2020-07-02 NOTE — PROGRESS NOTES
Anticoagulation Clinic Progress Note    Anticoagulation Summary  As of 7/2/2020    INR goal:   2.0-3.0   TTR:   77.5 % (1.7 y)   INR used for dosing:   3.1! (7/2/2020)   Warfarin maintenance plan:   5 mg every day   Weekly warfarin total:   35 mg   No change documented:   Maria Alejandra Guerrero   Plan last modified:   [facility] (9/18/2019)   Next INR check:   7/30/2020   Priority:   Maintenance   Target end date:   Indefinite    Indications    Atrial fibrillation (CMS/HCC) [I48.91]             Anticoagulation Episode Summary     INR check location:       Preferred lab:       Send INR reminders to:    [facility]    Comments:         Anticoagulation Care Providers     Provider Role Specialty Phone number    Pia Dueñas MD Referring Cardiology [phone]          Clinic Interview:      INR History:  Anticoagulation Monitoring 5/5/2020 6/2/2020 7/2/2020   INR 2.8 1.86 3.1   INR Date 5/5/2020 6/2/2020 7/2/2020   INR Goal 2.0-3.0 2.0-3.0 2.0-3.0   Trend Same Same Same   Last Week Total 35 mg 35 mg 35 mg   Next Week Total 32.5 mg 35 mg 35 mg   Sun 5 mg 5 mg 5 mg   Mon 5 mg 5 mg 5 mg   Tue 2.5 mg (5/5); Otherwise 5 mg 5 mg 5 mg   Wed 5 mg 5 mg 5 mg   Thu 5 mg 5 mg 5 mg   Fri 5 mg 5 mg 5 mg   Sat 5 mg 5 mg 5 mg   Visit Report - - -   Some recent data might be hidden       Plan:  1. INR is supratherapeutic today- see above in Anticoagulation Summary.   Will instruct Lana Aguerocomb to continue their warfarin regimen- see above in Anticoagulation Summary.  2. Follow up in 4 weeks.  3. Patient declines warfarin refills.  4. Verbal and written information provided. Patient expresses understanding and has no further questions at this time.    Maria Alejandra Guerrero

## 2020-07-22 ENCOUNTER — RESULTS ENCOUNTER (OUTPATIENT)
Dept: PAIN MEDICINE | Facility: CLINIC | Age: 75
End: 2020-07-22

## 2020-07-22 ENCOUNTER — OFFICE VISIT (OUTPATIENT)
Dept: PAIN MEDICINE | Facility: CLINIC | Age: 75
End: 2020-07-22

## 2020-07-22 VITALS
SYSTOLIC BLOOD PRESSURE: 128 MMHG | RESPIRATION RATE: 20 BRPM | WEIGHT: 242.2 LBS | HEART RATE: 91 BPM | BODY MASS INDEX: 40.35 KG/M2 | OXYGEN SATURATION: 96 % | DIASTOLIC BLOOD PRESSURE: 73 MMHG | TEMPERATURE: 98 F | HEIGHT: 65 IN

## 2020-07-22 DIAGNOSIS — G89.29 OTHER CHRONIC PAIN: Primary | ICD-10-CM

## 2020-07-22 DIAGNOSIS — Z79.899 CONTROLLED SUBSTANCE AGREEMENT SIGNED: ICD-10-CM

## 2020-07-22 DIAGNOSIS — G89.29 OTHER CHRONIC PAIN: ICD-10-CM

## 2020-07-22 DIAGNOSIS — M15.9 GENERALIZED OSTEOARTHRITIS OF MULTIPLE SITES: ICD-10-CM

## 2020-07-22 DIAGNOSIS — M51.26 DISPLACEMENT OF LUMBAR INTERVERTEBRAL DISC WITHOUT MYELOPATHY: ICD-10-CM

## 2020-07-22 LAB
POC AMPHETAMINES: NEGATIVE
POC BARBITURATES: NEGATIVE
POC BENZODIAZEPHINES: NEGATIVE
POC COCAINE: NEGATIVE
POC METHADONE: NEGATIVE
POC METHAMPHETAMINE SCREEN URINE: NEGATIVE
POC OPIATES: POSITIVE
POC OXYCODONE: NEGATIVE
POC PHENCYCLIDINE: NEGATIVE
POC PROPOXYPHENE: NEGATIVE
POC THC: NEGATIVE
POC TRICYCLIC ANTIDEPRESSANTS: NEGATIVE

## 2020-07-22 PROCEDURE — 99214 OFFICE O/P EST MOD 30 MIN: CPT | Performed by: NURSE PRACTITIONER

## 2020-07-22 PROCEDURE — 80305 DRUG TEST PRSMV DIR OPT OBS: CPT | Performed by: NURSE PRACTITIONER

## 2020-07-22 RX ORDER — HYDROCODONE BITARTRATE AND ACETAMINOPHEN 5; 325 MG/1; MG/1
1 TABLET ORAL 2 TIMES DAILY PRN
Qty: 40 TABLET | Refills: 0 | Status: SHIPPED | OUTPATIENT
Start: 2020-07-22 | End: 2020-09-01 | Stop reason: SDUPTHER

## 2020-07-22 RX ORDER — BLOOD-GLUCOSE METER
EACH MISCELLANEOUS SEE ADMIN INSTRUCTIONS
COMMUNITY
Start: 2020-06-04

## 2020-07-22 NOTE — PROGRESS NOTES
"CHIEF COMPLAINT  F/u back and joint pain. Pt sts pain has worsened since last ov.     Karl Yañez is a 74 y.o. female  who presents for follow-up.  She has a history of chronic back and joint pain. Reports her pain is worse since last evaluation.    Complains of pain in her low back and joints. Today her pain is 7/10VAS. Describes her pain as nearly continuous aching and throbbing.  Pain increases with walking, standing, household chores and prolonged position; pain decreases with medication, rest, heating pad and position changes. Continues with Hydrocodone 5/325 1-2/day PRN. Denies any side effects from the regimen. The regimen helps decrease her pain by 50%. \"I can definitely tell a difference when I take it.\" Notes increase in activity and function with regimen. ADL's by self.  Denies any bowel or bladder changes.     Prior to COVID pandemic, she was going to pool and doing aquatherapy. Was doing better while doing this. Concerned about retuning right now.     She needs a left shoulder replacement, but this is not planned, as she would need bridge for Coumadin.  She states she did ask Dr Tavarez about pain medication, but he wanted her to see pain management.    She was initially evaluated in the office by Dr. Carmen Kirk on 8/28/2018.  She was referred here by Dr. Perez for evaluation of back pain.  Has had back pain for 10 years on and off.  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 fib not being able to stop anticoagulant.  No stents were placed but is high risk for surgery.  Started on tramadol for pain.  No previous pain clinics, back injections, no history of opioids prior to current tramadol by neurosurgery.  Currently followed by Dr. Dueñas for cardiacs issues.  Reviewed imaging with patient.  May benefit from LESI but would have to hold anticoagulant.  We will reach out to Dr. Mota in regards to holding anticoagulant if possible.  Random " urine drug screen per office policy.  No benefit with tramadol.  Trial of hydrocodone 5-325 2-3 times a day as needed as needed.  Plan to start low-dose gabapentin.  Prescription for gabapentin 100 mg to increase up to twice daily as tolerated.    Patient remained masked during entire encounter. No cough present. I donned a mask and gloves throughout entire visit. Prior to donning mask and gloves, hand hygiene was performed, as well as when it was doffed.  I was closer than 6 feet, but not for an extended period of time. No obvious exposure to any bodily fluids.    Back Pain   This is a chronic problem. The current episode started more than 1 year ago. The problem occurs constantly. The problem has been gradually worsening (worse since last evaluation) since onset. The pain is present in the lumbar spine and sacro-iliac. The quality of the pain is described as aching. Radiates to: LLE. The pain is at a severity of 7/10. The pain is moderate. The pain is worse during the day. The symptoms are aggravated by bending, position, standing and twisting. Associated symptoms include headaches (occ), numbness (L leg) and weakness (L leg). Pertinent negatives include no abdominal pain, chest pain, dysuria or fever. Risk factors include lack of exercise, menopause, obesity and sedentary lifestyle. She has tried analgesics, bed rest, chiropractic manipulation, heat, home exercises, ice, muscle relaxant, walking and NSAIDs for the symptoms. The treatment provided mild relief.      Past pain medications:   norco 5/325 mg - helpful  Gabapentin 800 - dizzy, hurt stomach  Tramadol 50 mg up to 4/day - not helping     Current pain medications:   Tylenol OTC  Hydrocodone 5/325 1-2/day PRN      Past therapies:  Physical Therapy: yes- minimal  Chiropractor: no  Massage Therapy: no  TENS: yes  Neck or back surgery: no  Past pain management: no     Previous Injections: none-- Does not want procedures due to risk with anticoagulants.   "    PEG Assessment   What number best describes your pain on average in the past week?7  What number best describes how, during the past week, pain has interfered with your enjoyment of life?7  What number best describes how, during the past week, pain has interfered with your general activity?  7    The following portions of the patient's history were reviewed and updated as appropriate: allergies, current medications, past family history, past medical history, past social history, past surgical history and problem list.    Review of Systems   Constitutional: Positive for activity change (dec) and fatigue (occ). Negative for fever and unexpected weight change.   HENT: Negative for congestion and dental problem.    Eyes: Negative for visual disturbance.   Respiratory: Positive for shortness of breath (occ). Negative for cough and chest tightness.    Cardiovascular: Negative for chest pain.   Gastrointestinal: Negative for abdominal pain, constipation and diarrhea.   Endocrine: Negative for polyuria.   Genitourinary: Negative for difficulty urinating and dysuria.   Musculoskeletal: Positive for arthralgias, back pain, gait problem (walker) and joint swelling (bilat ankles and feet and hands).   Allergic/Immunologic: Negative for immunocompromised state.   Neurological: Positive for dizziness (occ), weakness (L leg), light-headedness (occ), numbness (L leg) and headaches (occ).   Psychiatric/Behavioral: Positive for agitation and sleep disturbance. Negative for suicidal ideas. The patient is nervous/anxious.        Vitals:    07/22/20 1313   BP: 128/73   Pulse: 91   Resp: 20   Temp: 98 °F (36.7 °C)   SpO2: 96%   Weight: 110 kg (242 lb 3.2 oz)   Height: 165.1 cm (65\")   PainSc:   7   PainLoc: Back     Objective   Physical Exam   Constitutional: She is oriented to person, place, and time. Vital signs are normal. She appears well-developed and well-nourished. She is cooperative.   HENT:   Head: Normocephalic and " atraumatic.   Nose: Nose normal.   Eyes: Conjunctivae and lids are normal.   Neck: Trachea normal. Neck supple.   Cardiovascular: Normal rate.   Pulmonary/Chest: Effort normal.   Abdominal: Normal appearance.   Musculoskeletal:        Lumbar back: She exhibits decreased range of motion and tenderness.   Widespread OA changes with no acute synovitis   Neurological: She is alert and oriented to person, place, and time. Gait (rolling walker) abnormal.   Skin: Skin is warm, dry and intact.   Psychiatric: She has a normal mood and affect. Her speech is normal and behavior is normal. Judgment and thought content normal. Cognition and memory are normal.   Nursing note and vitals reviewed.      Assessment/Plan   Lana was seen today for back pain and joint pain.    Diagnoses and all orders for this visit:    Other chronic pain    Generalized osteoarthritis of multiple sites    Displacement of lumbar intervertebral disc without myelopathy    Controlled substance agreement signed    Other orders  -     HYDROcodone-acetaminophen (NORCO) 5-325 MG per tablet; Take 1 tablet by mouth 2 (Two) Times a Day As Needed for Severe Pain .      --- Routine UDS in office today as part of monitoring requirements for controlled substances.  The specimen was viewed and the immunoassay result reviewed and is +OPI.  This specimen will be sent to [facility] for confirmation.   Last dose of Hydrocodone was this morning.  --- Refill Hydrocodone. Patient appears stable with current regimen. No adverse effects. Regarding continuation of opioids, there is no evidence of aberrant behavior or any red flags.  The patient continues with appropriate response to opioid therapy. ADL's remain intact by self.   --- Declines interventions at this time.  --- Follow-up 2 months or sooner if needed.       MARCO REPORT    As part of the patient's treatment plan, I am prescribing controlled substances. The patient has been made aware of appropriate  use of such medications, including potential risk of somnolence, limited ability to drive and/or work safely, and the potential for dependence or overdose. It has also bee made clear that these medications are for use by this patient only, without concomitant use of alcohol or other substances unless prescribed.     Patient has completed prescribing agreement detailing terms of continued prescribing of controlled substances, including monitoring MARCO reports, urine drug screening, and pill counts if necessary. The patient is aware that inappropriate use will results in cessation of prescribing such medications.    MARCO report has been reviewed and scanned into the patient's chart.    As the clinician, I personally reviewed the MARCO from 7-20-20 .    History and physical exam exhibit continued safe and appropriate use of controlled substances.      EMR Dragon/Transcription disclaimer:   Much of this encounter note is an electronic transcription/translation of spoken language to printed text. The electronic translation of spoken language may permit erroneous, or at times, nonsensical words or phrases to be inadvertently transcribed; Although I have reviewed the note for such errors, some may still exist.

## 2020-08-04 ENCOUNTER — OFFICE VISIT (OUTPATIENT)
Dept: ENDOCRINOLOGY | Age: 75
End: 2020-08-04

## 2020-08-04 ENCOUNTER — ANTICOAGULATION VISIT (OUTPATIENT)
Dept: PHARMACY | Facility: HOSPITAL | Age: 75
End: 2020-08-04

## 2020-08-04 VITALS
HEIGHT: 66 IN | OXYGEN SATURATION: 97 % | WEIGHT: 244.2 LBS | SYSTOLIC BLOOD PRESSURE: 118 MMHG | HEART RATE: 107 BPM | BODY MASS INDEX: 39.25 KG/M2 | DIASTOLIC BLOOD PRESSURE: 70 MMHG

## 2020-08-04 DIAGNOSIS — IMO0002 DM (DIABETES MELLITUS), TYPE 2, UNCONTROLLED W/NEUROLOGIC COMPLICATION: Primary | ICD-10-CM

## 2020-08-04 DIAGNOSIS — G47.34 IDIOPATHIC SLEEP RELATED NONOBSTRUCTIVE ALVEOLAR HYPOVENTILATION: ICD-10-CM

## 2020-08-04 DIAGNOSIS — E78.2 HYPERLIPEMIA, MIXED: ICD-10-CM

## 2020-08-04 LAB
INR PPP: 3.2 (ref 0.91–1.09)
PROTHROMBIN TIME: 38.9 SECONDS (ref 10–13.8)

## 2020-08-04 PROCEDURE — G0463 HOSPITAL OUTPT CLINIC VISIT: HCPCS

## 2020-08-04 PROCEDURE — 85610 PROTHROMBIN TIME: CPT

## 2020-08-04 PROCEDURE — 99204 OFFICE O/P NEW MOD 45 MIN: CPT | Performed by: INTERNAL MEDICINE

## 2020-08-04 PROCEDURE — 36416 COLLJ CAPILLARY BLOOD SPEC: CPT

## 2020-08-04 NOTE — PROGRESS NOTES
Anticoagulation Clinic Progress Note    Anticoagulation Summary  As of 8/4/2020    INR goal:   2.0-3.0   TTR:   73.6 % (1.8 y)   INR used for dosing:   3.2! (8/4/2020)   Warfarin maintenance plan:   5 mg every day   Weekly warfarin total:   35 mg   Plan last modified:   Nano Cool RPH (9/18/2019)   Next INR check:   8/18/2020   Priority:   Maintenance   Target end date:   Indefinite    Indications    Atrial fibrillation (CMS/HCC) [I48.91]             Anticoagulation Episode Summary     INR check location:       Preferred lab:       Send INR reminders to:    [facility]    Comments:         Anticoagulation Care Providers     Provider Role Specialty Phone number    Pia Dueñas MD Referring Cardiology [phone]          Clinic Interview:  Patient Findings     Positives:   Change in health, Other complaints    Negatives:   Signs/symptoms of thrombosis, Signs/symptoms of bleeding,   Laboratory test error suspected, Change in alcohol use, Change in   activity, Upcoming invasive procedure, Emergency department visit,   Upcoming dental procedure, Missed doses, Extra doses, Change in   medications, Change in diet/appetite, Hospital admission, Bruising    Comments:   More BLE edema than usual; pt reports adjusting timing of   diuretic based on plans for that day, but typically takes upon returning   home; advised improved adherence with taking diuretic each day.       Clinical Outcomes     Negatives:   Major bleeding event, Thromboembolic event,   Anticoagulation-related hospital admission, Anticoagulation-related ED   visit, Anticoagulation-related fatality    Comments:   More BLE edema than usual; pt reports adjusting timing of   diuretic based on plans for that day, but typically takes upon returning   home; advised improved adherence with taking diuretic each day.         INR History:  Anticoagulation Monitoring 6/2/2020 7/2/2020 8/4/2020   INR 1.86 3.1 3.2   INR Date 6/2/2020 7/2/2020 8/4/2020    INR Goal 2.0-3.0 2.0-3.0 2.0-3.0   Trend Same Same Same   Last Week Total 35 mg 35 mg 35 mg   Next Week Total 35 mg 35 mg 32.5 mg   Sun 5 mg 5 mg 5 mg   Mon 5 mg 5 mg 5 mg   Tue 5 mg 5 mg 2.5 mg (8/4); Otherwise 5 mg   Wed 5 mg 5 mg 5 mg   Thu 5 mg 5 mg 5 mg   Fri 5 mg 5 mg 5 mg   Sat 5 mg 5 mg 5 mg   Visit Report - - -   Some recent data might be hidden       Plan:  1. INR is Supratherapeutic today- see above in Anticoagulation Summary.  Will instruct Lana Waban to Change their warfarin regimen- see above in Anticoagulation Summary.  2. Follow up in 2 weeks  3. Patient declines warfarin refills.  4. Verbal and written information provided. Patient expresses understanding and has no further questions at this time.    Shaw Hand RP

## 2020-08-18 ENCOUNTER — OFFICE VISIT (OUTPATIENT)
Dept: CARDIOLOGY | Facility: CLINIC | Age: 75
End: 2020-08-18

## 2020-08-18 ENCOUNTER — ANTICOAGULATION VISIT (OUTPATIENT)
Dept: PHARMACY | Facility: HOSPITAL | Age: 75
End: 2020-08-18

## 2020-08-18 VITALS
BODY MASS INDEX: 38.89 KG/M2 | WEIGHT: 242 LBS | SYSTOLIC BLOOD PRESSURE: 120 MMHG | HEART RATE: 82 BPM | DIASTOLIC BLOOD PRESSURE: 60 MMHG | HEIGHT: 66 IN

## 2020-08-18 DIAGNOSIS — E78.2 MIXED HYPERLIPIDEMIA: ICD-10-CM

## 2020-08-18 DIAGNOSIS — I50.32 CHRONIC DIASTOLIC HEART FAILURE (HCC): ICD-10-CM

## 2020-08-18 DIAGNOSIS — I48.21 PERMANENT ATRIAL FIBRILLATION (HCC): ICD-10-CM

## 2020-08-18 DIAGNOSIS — I10 ESSENTIAL HYPERTENSION: ICD-10-CM

## 2020-08-18 DIAGNOSIS — Z86.73 HISTORY OF STROKE: ICD-10-CM

## 2020-08-18 DIAGNOSIS — I27.20 PULMONARY HYPERTENSION (HCC): Primary | ICD-10-CM

## 2020-08-18 DIAGNOSIS — I25.119 CORONARY ARTERY DISEASE INVOLVING NATIVE CORONARY ARTERY OF NATIVE HEART WITH ANGINA PECTORIS (HCC): ICD-10-CM

## 2020-08-18 LAB
INR PPP: 4.2 (ref 0.91–1.09)
PROTHROMBIN TIME: 50.1 SECONDS (ref 10–13.8)

## 2020-08-18 PROCEDURE — 85610 PROTHROMBIN TIME: CPT

## 2020-08-18 PROCEDURE — 99214 OFFICE O/P EST MOD 30 MIN: CPT | Performed by: NURSE PRACTITIONER

## 2020-08-18 PROCEDURE — G0463 HOSPITAL OUTPT CLINIC VISIT: HCPCS

## 2020-08-18 PROCEDURE — 36416 COLLJ CAPILLARY BLOOD SPEC: CPT

## 2020-08-18 PROCEDURE — 93000 ELECTROCARDIOGRAM COMPLETE: CPT | Performed by: NURSE PRACTITIONER

## 2020-08-18 RX ORDER — FUROSEMIDE 40 MG/1
40 TABLET ORAL DAILY
Qty: 30 TABLET | Refills: 6
Start: 2020-08-18 | End: 2021-07-19 | Stop reason: SDUPTHER

## 2020-08-18 NOTE — PROGRESS NOTES
I have supervised and reviewed the notes, assessments, and/or procedures performed by our PharmD Candidate. The documented assessment and plan were developed cooperatively, and the plan was implemented in my presence. I concur with the documentation of this patient encounter.    Nano Cool [facility]

## 2020-08-18 NOTE — PROGRESS NOTES
Date of Office Visit: 20  Encounter Provider: ANGELA Brennan  Place of Service: [facility] CARDIOLOGY  Patient Name: Lana Yañez  :1945    Chief Complaint   Patient presents with   • Congestive Heart Failure   • Atrial Fibrillation   • Coronary Artery Disease   • Follow-up   :     HPI: Lana Yañez is a 75 y.o. female  with history of paroxysmal atrial fibrillation, hypertension, hyperlipidemia, coronary artery disease, rheumatoid arthritis, obstructive sleep apnea, diabetes, NSTEMI pulmonary hypertension, mitral regurgitation, and COPD    She is followed by Dr. Dueñas. I will visit with her for the first time today and have reviewed her medical record.     She was found to have atrial fibrillation with rapid ventricular response and mild diastolic heart failure.  She was placed on IV heparin and transitioned to Pradaxa.  Echocardiogram revealed normal left ventricular systolic function, mild left ventricular hypertrophy, moderate atrial enlargement, aortic valve sclerosis, moderate pulmonary hypertension and moderate tricuspid regurgitation.  The RVSP was 54 mmHg.  Perfusion stress test was negative for ischemia.  Patient then presented with an acute stroke.  She later had a non-ST elevation myocardial infarction while on full dose Pradaxa which was not held.  CLARIBEL was pursued and revealed normal left ventricular systolic function, moderate mitral valve prolapse without significant regurgitation.  There was right-sided spontaneous echo contrast without thrombus formation.  Cardiac catheterization revealed normal systolic function, 2+ mitral regurgitation, mild coronary artery disease despite a 90% stenosis in the distal PDA which is felt to be too small for intervention.  She was treated medically and switched to Coumadin.  She had issues with intermittent heart failure due to dietary indiscretion.  She later wore a 24-hour Holter which revealed persistent  atrial fibrillation with reasonable rate control and frequent premature ventricular contraction.  In November 2018, she complained of dyspnea.  Echocardiogram showed normal left ventricular systolic function with an ejection fraction of 51%, moderate to severe left atrial enlargement, aortic valve calcification with mild aortic regurgitation, mild mitral regurgitation, severe tricuspid regurgitation with RVSP 49 mmHg.  Subsequent VQ scan had low probability for pulmonary embolism.  At the beginning of 2020 she decreased her Lasix due to urinary frequency.  She developed worsening edema and went back to taking her usual dose of Lasix of 40 mg daily with 40 M EQ potassium daily.  Her edema improved.  Her weight was at 240 and her baseline was 237.    We visit today in follow-up.  She reports more frequent palpitations but has not been checking her blood pressure heart rate at home.  She continues to take warfarin uninterrupted and follows in the INR clinic.  She is maintained on Lasix 40 mg daily has not had issues outside of her baseline lower extremity swelling with the left being greater than the right.  She has shortness of breath with exertion but that is unchanged.  She denies chest pain tightness pressure.  She has fatigue.  She has not followed up with sleep medicine but was evaluated by Endocrinology and referred to a new sleep physician.  She does not perform any structured exercise.  She ambulates with a cane.  She is not had any falls.    Allergies   Allergen Reactions   • Contrast Dye Hives and Itching       Past Medical History:   Diagnosis Date   • Acute on chronic diastolic heart failure (CMS/HCC)    • Arthritis    • Asthma    • Atrial fibrillation (CMS/HCC)     Persistent; on warfarin   • Atypical chest pain    • Bronchitis    • Cellulitis of right elbow     due to MRSA   • CHF (congestive heart failure) (CMS/HCC)    • COPD (chronic obstructive pulmonary disease) (CMS/HCC)    • Coronary artery disease      Cardiac catheterization completed; 90% PDA stenosis with medical management recommended   • Coronary artery disease involving native coronary artery of native heart with angina pectoris with documented spasm (CMS/[facility])    • Diabetes 1.5, 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[facility])    • Disease of thyroid gland    • Displacement of lumbar intervertebral disc without myelopathy    • Dizziness    • ANTOINE (dyspnea on exertion)    • Essential hypertension    • Fatigue    • Generalized osteoarthritis of multiple sites    • History of rheumatic fever    • History of transfusion    • Hx of bone density study     10/23/2014   • Hyperglycemia    • Hyperlipidemia    • Hypovitaminosis D    • Left arm pain    • Left-sided weakness    • Leg swelling    • Low back pain    • Lower extremity edema    • Malaise and fatigue    • Mitral valve disease     Moderate mitral valve prolapse and moderate mitral regurgitation   • Mitral valve insufficiency    • Morbid obesity with BMI of 40.0-44.9, adult (CMS/[facility])    • MRSA infection    • NSTEMI (non-ST elevated myocardial infarction) (CMS/[facility])    • PAF (paroxysmal atrial fibrillation) (CMS/[facility])    • RA (rheumatoid arthritis) (CMS/[facility])     involving both hands   • Sleep apnea    • SOB (shortness of breath)    • Stroke (CMS/[facility])     left side weakness   • Urge incontinence of urine    • UTI (urinary tract infection) 05/2020   • Vitamin D deficiency        Past Surgical History:   Procedure Laterality Date   • BREAST BIOPSY     • BREAST SURGERY      right side lumpectomy with biopsy   • CARDIAC CATHETERIZATION Left 10/20/2017    Procedure: Cardiac Catheterization/Vascular Study;  Surgeon: Alphonso Olmedo MD;  Location: [facility] INVASIVE LOCATION;  Service:    • CARDIAC CATHETERIZATION N/A 10/20/2017    +2 mitral regurgitation, left main 10% stenosis, mid to distal LAD 10% diffuse stenosis, circumflex 10% diffuse stenosis, RCA 10% proximal stenosis, and distal PDA consistent with coronary embolus with a lesion of  "90% too small to consider coronary intervention; medical management recommended   • EYE SURGERY      laser surgery for glaucoma and left eye cataracts removed   • HYSTERECTOMY      10+ years ago   • JOINT REPLACEMENT  2005; 2006    bilateral knees and left rotater   • KNEE SURGERY     • MAMMO BILATERAL  2016    [facility]          Family and social history reviewed.     ROS  All other systems were reviewed and are negative          Objective:     Vitals:    08/18/20 1158   BP: 120/60   BP Location: Right arm   Patient Position: Sitting   Pulse: 82   Weight: 110 kg (242 lb)   Height: 167.6 cm (66\")     Body mass index is 39.06 kg/m².    PHYSICAL EXAM:  Physical Exam   Constitutional: She is oriented to person, place, and time. She appears well-developed and well-nourished. No distress.   HENT:   Head: Normocephalic.   Eyes: Conjunctivae are normal.   Neck: Normal range of motion. No JVD present.   Cardiovascular: Normal rate, normal heart sounds and intact distal pulses. An irregularly irregular rhythm present.   No murmur heard.  Pulses:       Carotid pulses are 2+ on the right side, and 2+ on the left side.       Radial pulses are 2+ on the right side, and 2+ on the left side.        Posterior tibial pulses are 2+ on the right side, and 2+ on the left side.   Pulmonary/Chest: Effort normal and breath sounds normal. No respiratory distress. She has no wheezes. She has no rhonchi. She has no rales. She exhibits no tenderness.   Abdominal: Soft. Bowel sounds are normal. She exhibits no distension.   Musculoskeletal: Normal range of motion. She exhibits edema (L >R 1+ ankle pretibial with venous stasis color changes).   Neurological: She is alert and oriented to person, place, and time.   Skin: Skin is warm, dry and intact. No rash noted. She is not diaphoretic. No cyanosis.   Psychiatric: She has a normal mood and affect. Her behavior is normal. Judgment and thought content normal.         ECG 12 " Lead  Date/Time: 8/18/2020 12:56 PM  Performed by: Shazia Nails APRN  Authorized by: Shazia Nails APRN   Comparison: compared with previous ECG   Similar to previous ECG  Rhythm: atrial fibrillation  Rate: normal    Clinical impression: abnormal EKG            Current Outpatient Medications   Medication Sig Dispense Refill   • acetaminophen (TYLENOL) 500 MG tablet Take 500 mg by mouth Every 6 (Six) Hours As Needed for Mild Pain .     • albuterol (PROVENTIL HFA;VENTOLIN HFA) 108 (90 Base) MCG/ACT inhaler Inhale 2 puffs Every 6 (Six) Hours As Needed.     • aspirin 81 MG EC tablet Take 1 tablet by mouth Daily.     • atorvastatin (LIPITOR) 10 MG tablet Take 1 tablet by mouth Daily. 90 tablet 3   • Blood Glucose Monitoring Suppl (ACCU-CHEK GUIDE) w/Device kit See Admin Instructions.     • carvedilol (COREG) 25 MG tablet TAKE 1 & 1/2 (ONE & ONE-HALF) TABLETS BY MOUTH TWICE DAILY WITH  MEALS 270 tablet 1   • digoxin (LANOXIN) 125 MCG tablet Take 1 tablet by mouth Every Other Day. 30 tablet 5   • furosemide (LASIX) 40 MG tablet Take 1 tablet by mouth Daily. 30 tablet 6   • glucose blood test strip Use as instructed 300 each 12   • HYDROcodone-acetaminophen (NORCO) 5-325 MG per tablet Take 1 tablet by mouth 2 (Two) Times a Day As Needed for Severe Pain . 40 tablet 0   • ipratropium-albuterol (DUO-NEB) 0.5-2.5 mg/mL nebulizer As Needed.     • Lancets (ACCU-CHEK MULTICLIX) lancets Use 1 lancet per finger stick 204 each 12   • lisinopril (PRINIVIL,ZESTRIL) 10 MG tablet Take 1 tablet by mouth once daily 30 tablet 0   • magnesium oxide (MAG-OX) 400 MG tablet Take 400 mg by mouth Daily.     • metFORMIN (Glucophage) 500 MG tablet Take 1 tablet by mouth 2 (Two) Times a Day With Meals. (Patient taking differently: Take 1,000 mg by mouth 2 (Two) Times a Day With Meals.) 60 tablet 2   • potassium chloride (K-DUR) 10 MEQ CR tablet Take 4 tablets by mouth Daily. 360 tablet 1   • TRAVATAN Z 0.004 % solution ophthalmic solution      •  vitamin D (ERGOCALCIFEROL) 1.25 MG (37533 UT) capsule capsule Take 1 capsule by mouth once a week 12 capsule 0   • warfarin (COUMADIN) 5 MG tablet Take one tablet by mouth daily or as directed by [facility]. 90 tablet 1     No current facility-administered medications for this visit.      Assessment:       Diagnosis Plan   1. Pulmonary hypertension (CMS/HCC)  Adult Transthoracic Echo Complete W/ Cont if Necessary Per Protocol        Orders Placed This Encounter   Procedures   • ECG 12 Lead     This order was created via procedure documentation   • Adult Transthoracic Echo Complete W/ Cont if Necessary Per Protocol     Standing Status:   Future     Standing Expiration Date:   8/18/2021     Order Specific Question:   Reason for exam?     Answer:   Heart Failure, Cardiomyopathy, or Sytemic or Pulmonary Hypertension         Plan:     1. 75 year old female with chronic atrial fibrillation, history of embolic stroke while on Pradaxa now maintained on warfarin following in anticoagulation clinic  -Reports increased palpitations and racing heartbeat.  She is to follow her heart rate daily at home we will discuss that when we discussed her echo results  2. Hypertension blood pressure today appears at goal  3.hyperlipidemia on atorvastatin 10 mg target LDL 70 or less  4. coronary artery disease, history of NSTEMI with significant disease and distal PDA which was too small for intervention other vessels with mild disease in October 2019-no angina  5. rheumatoid arthritis  6.obstructive sleep apnea this has not been treated.  She was previously seen by Dr. Hill but has been referred to a new sleep medicine physician in his practice.  She is to call them and arrange follow-up  7.pulmonary hypertension RVSP 49-54 mmHg-she is to return for repeat echocardiogram to reassess this  8.  History of mitral valve prolapse with mild to moderate mitral regurgitation-as above reassess on echo  9. COPD  10.  History of  embolic CVA while on full-strength Pradaxa and was switched to warfarin  11.history of lung nodule followed by   12.  History of chronic diastolic congestive heart failure she appears euvolemic today she has baseline lower extremity edema, lungs are clear and her weight is stable continue the same    If her heart rate at home is at goal on average less than 110 bpm and echocardiogram without significant change she is to follow-up in 6 months and again be evaluated by sleep medicine as above.            It has been a pleasure to participate in this patient's care.      Thank you,  ANGELA Brennan      **I used Dragon to dictate this note:**

## 2020-08-18 NOTE — PROGRESS NOTES
Anticoagulation Clinic Progress Note    Anticoagulation Summary  As of 2020    INR goal:   2.0-3.0   TTR:   72.1 % (1.8 y)   INR used for dosin.2! (2020)   Warfarin maintenance plan:   2.5 mg every Fri; 5 mg all other days   Weekly warfarin total:   32.5 mg   Plan last modified:   Nano Cool RPH (2020)   Next INR check:   2020   Priority:   Maintenance   Target end date:   Indefinite    Indications    Atrial fibrillation (CMS/HCC) [I48.91]             Anticoagulation Episode Summary     INR check location:       Preferred lab:       Send INR reminders to:   [facility]    Comments:         Anticoagulation Care Providers     Provider Role Specialty Phone number    Pia Dueñas MD Referring Cardiology [phone]          Clinic Interview:  Patient Findings     Positives:   Other complaints    Negatives:   Signs/symptoms of thrombosis, Signs/symptoms of bleeding,   Laboratory test error suspected, Change in health, Change in alcohol use,   Change in activity, Upcoming invasive procedure, Emergency department   visit, Upcoming dental procedure, Missed doses, Extra doses, Change in   medications, Change in diet/appetite, Hospital admission, Bruising    Comments:   Tiredness, dizziness, heart fluttering. Increased stress      Clinical Outcomes     Negatives:   Major bleeding event, Thromboembolic event,   Anticoagulation-related hospital admission, Anticoagulation-related ED   visit, Anticoagulation-related fatality    Comments:   Tiredness, dizziness, heart fluttering. Increased stress        INR History:  Anticoagulation Monitoring 2020   INR 3.1 3.2 4.2   INR Date 2020   INR Goal 2.0-3.0 2.0-3.0 2.0-3.0   Trend Same Same Down   Last Week Total 35 mg 35 mg 35 mg   Next Week Total 35 mg 32.5 mg 27.5 mg   Sun 5 mg 5 mg 5 mg   Mon 5 mg 5 mg 5 mg   Tue 5 mg 2.5 mg (); Otherwise 5 mg Hold ()   Wed 5 mg 5 mg 5 mg   Thu 5 mg 5  mg 5 mg   Fri 5 mg 5 mg 2.5 mg   Sat 5 mg 5 mg 5 mg   Visit Report - - -   Some recent data might be hidden       Plan:  1. INR is Supratherapeutic today- see above in Anticoagulation Summary.  Will instruct Lana Yañez to HOLD warfarin today and take 2.5 mg on Friday.- see above in Anticoagulation Summary.  2. Follow up in 1 weeks  3. Patient declines warfarin refills.  4. Verbal and written information provided. Counseled on sign/sx of bleeding and when to get medical help. Patient expresses understanding and has no further questions at this time.    Jamila Jesus, Pharmacy Intern

## 2020-08-24 RX ORDER — LISINOPRIL 10 MG/1
TABLET ORAL
Qty: 90 TABLET | Refills: 0 | Status: SHIPPED | OUTPATIENT
Start: 2020-08-24 | End: 2020-12-17 | Stop reason: SDUPTHER

## 2020-08-25 ENCOUNTER — ANTICOAGULATION VISIT (OUTPATIENT)
Dept: PHARMACY | Facility: HOSPITAL | Age: 75
End: 2020-08-25

## 2020-08-25 ENCOUNTER — HOSPITAL ENCOUNTER (OUTPATIENT)
Dept: CARDIOLOGY | Facility: HOSPITAL | Age: 75
Discharge: HOME | End: 2020-08-25
Admitting: NURSE PRACTITIONER

## 2020-08-25 VITALS
HEART RATE: 107 BPM | DIASTOLIC BLOOD PRESSURE: 80 MMHG | SYSTOLIC BLOOD PRESSURE: 130 MMHG | BODY MASS INDEX: 38.89 KG/M2 | HEIGHT: 66 IN | WEIGHT: 242 LBS | OXYGEN SATURATION: 98 %

## 2020-08-25 DIAGNOSIS — I27.20 PULMONARY HYPERTENSION (HCC): ICD-10-CM

## 2020-08-25 LAB
INR PPP: 2.3 (ref 0.91–1.09)
PROTHROMBIN TIME: 27 SECONDS (ref 10–13.8)

## 2020-08-25 PROCEDURE — 93306 TTE W/DOPPLER COMPLETE: CPT

## 2020-08-25 PROCEDURE — 93306 TTE W/DOPPLER COMPLETE: CPT | Performed by: INTERNAL MEDICINE

## 2020-08-25 PROCEDURE — 85610 PROTHROMBIN TIME: CPT

## 2020-08-25 PROCEDURE — 36416 COLLJ CAPILLARY BLOOD SPEC: CPT

## 2020-08-25 NOTE — PROGRESS NOTES
Anticoagulation Clinic Progress Note    Anticoagulation Summary  As of 2020    INR goal:   2.0-3.0   TTR:   71.7 % (1.9 y)   INR used for dosin.3 (2020)   Warfarin maintenance plan:   2.5 mg every Fri; 5 mg all other days   Weekly warfarin total:   32.5 mg   No change documented:   Maria Alejandra Guerrero   Plan last modified:   Nano Cool [facility] (2020)   Next INR check:   2020   Priority:   Maintenance   Target end date:   Indefinite    Indications    Atrial fibrillation (CMS/[facility]) [I48.91]             Anticoagulation Episode Summary     INR check location:       Preferred lab:       Send INR reminders to:    [facility]    Comments:         Anticoagulation Care Providers     Provider Role Specialty Phone number    Pia Dueñas MD Referring Cardiology [phone]          Clinic Interview:      INR History:  Anticoagulation Monitoring 2020   INR 3.2 4.2 2.3   INR Date 2020   INR Goal 2.0-3.0 2.0-3.0 2.0-3.0   Trend Same Down Same   Last Week Total 35 mg 35 mg 27.5 mg   Next Week Total 32.5 mg 27.5 mg 32.5 mg   Sun 5 mg 5 mg 5 mg   Mon 5 mg 5 mg 5 mg   Tue 2.5 mg (); Otherwise 5 mg Hold () 5 mg   Wed 5 mg 5 mg 5 mg   Thu 5 mg 5 mg 5 mg   Fri 5 mg 2.5 mg 2.5 mg   Sat 5 mg 5 mg 5 mg   Visit Report - - -   Some recent data might be hidden       Plan:  1. INR is therapeutic today- see above in Anticoagulation Summary.   Will instruct Lana Kiana to continue their warfarin regimen- see above in Anticoagulation Summary.  2. Follow up in 2 weeks.  3. Patient declines warfarin refills.  4. Verbal and written information provided. Patient expresses understanding and has no further questions at this time.    Maria Alejandra Guerrero

## 2020-08-26 ENCOUNTER — TELEPHONE (OUTPATIENT)
Dept: CARDIOLOGY | Facility: CLINIC | Age: 75
End: 2020-08-26

## 2020-08-26 LAB
AORTIC ARCH: 2.8 CM
ASCENDING AORTA: 3.3 CM
BH CV ECHO MEAS - ACS: 2.1 CM
BH CV ECHO MEAS - AI DEC SLOPE: 196.7 CM/SEC^2
BH CV ECHO MEAS - AI MAX PG: 64.7 MMHG
BH CV ECHO MEAS - AI MAX VEL: 402.3 CM/SEC
BH CV ECHO MEAS - AI P1/2T: 598.9 MSEC
BH CV ECHO MEAS - AO MAX PG (FULL): 8 MMHG
BH CV ECHO MEAS - AO MAX PG: 12.3 MMHG
BH CV ECHO MEAS - AO MEAN PG (FULL): 4.8 MMHG
BH CV ECHO MEAS - AO MEAN PG: 7.2 MMHG
BH CV ECHO MEAS - AO ROOT AREA (BSA CORRECTED): 1.4
BH CV ECHO MEAS - AO ROOT AREA: 6.8 CM^2
BH CV ECHO MEAS - AO ROOT DIAM: 2.9 CM
BH CV ECHO MEAS - AO V2 MAX: 175.3 CM/SEC
BH CV ECHO MEAS - AO V2 MEAN: 125.2 CM/SEC
BH CV ECHO MEAS - AO V2 VTI: 33.7 CM
BH CV ECHO MEAS - ASC AORTA: 3.3 CM
BH CV ECHO MEAS - BSA(HAYCOCK): 2.3 M^2
BH CV ECHO MEAS - BSA: 2.2 M^2
BH CV ECHO MEAS - BZI_BMI: 39.1 KILOGRAMS/M^2
BH CV ECHO MEAS - BZI_METRIC_HEIGHT: 167.6 CM
BH CV ECHO MEAS - BZI_METRIC_WEIGHT: 109.8 KG
BH CV ECHO MEAS - EDV(MOD-SP2): 61 ML
BH CV ECHO MEAS - EDV(MOD-SP4): 75 ML
BH CV ECHO MEAS - EDV(TEICH): 103.6 ML
BH CV ECHO MEAS - EF(CUBED): 68.1 %
BH CV ECHO MEAS - EF(MOD-BP): 61.8 %
BH CV ECHO MEAS - EF(MOD-SP2): 57.4 %
BH CV ECHO MEAS - EF(MOD-SP4): 65.3 %
BH CV ECHO MEAS - EF(TEICH): 59.6 %
BH CV ECHO MEAS - ESV(MOD-SP2): 26 ML
BH CV ECHO MEAS - ESV(MOD-SP4): 26 ML
BH CV ECHO MEAS - ESV(TEICH): 41.8 ML
BH CV ECHO MEAS - FS: 31.7 %
BH CV ECHO MEAS - IVS/LVPW: 1
BH CV ECHO MEAS - IVSD: 1.3 CM
BH CV ECHO MEAS - LV DIASTOLIC VOL/BSA (35-75): 34.6 ML/M^2
BH CV ECHO MEAS - LV MASS(C)D: 222.1 GRAMS
BH CV ECHO MEAS - LV MASS(C)DI: 102.4 GRAMS/M^2
BH CV ECHO MEAS - LV MAX PG: 4.3 MMHG
BH CV ECHO MEAS - LV MEAN PG: 2.4 MMHG
BH CV ECHO MEAS - LV SYSTOLIC VOL/BSA (12-30): 12 ML/M^2
BH CV ECHO MEAS - LV V1 MAX: 104.1 CM/SEC
BH CV ECHO MEAS - LV V1 MEAN: 72.5 CM/SEC
BH CV ECHO MEAS - LV V1 VTI: 18.8 CM
BH CV ECHO MEAS - LVIDD: 4.7 CM
BH CV ECHO MEAS - LVIDS: 3.2 CM
BH CV ECHO MEAS - LVLD AP2: 5.9 CM
BH CV ECHO MEAS - LVLD AP4: 6.2 CM
BH CV ECHO MEAS - LVLS AP2: 5.2 CM
BH CV ECHO MEAS - LVLS AP4: 5.4 CM
BH CV ECHO MEAS - LVPWD: 1.2 CM
BH CV ECHO MEAS - MR MAX PG: 65.2 MMHG
BH CV ECHO MEAS - MR MAX VEL: 403.9 CM/SEC
BH CV ECHO MEAS - MV DEC SLOPE: 762.1 CM/SEC^2
BH CV ECHO MEAS - MV DEC TIME: 0.22 SEC
BH CV ECHO MEAS - MV E MAX VEL: 114 CM/SEC
BH CV ECHO MEAS - MV MAX PG: 8.8 MMHG
BH CV ECHO MEAS - MV MEAN PG: 4.4 MMHG
BH CV ECHO MEAS - MV P1/2T MAX VEL: 144.8 CM/SEC
BH CV ECHO MEAS - MV P1/2T: 55.6 MSEC
BH CV ECHO MEAS - MV V2 MAX: 148.6 CM/SEC
BH CV ECHO MEAS - MV V2 MEAN: 98.7 CM/SEC
BH CV ECHO MEAS - MV V2 VTI: 26.6 CM
BH CV ECHO MEAS - MVA P1/2T LCG: 1.5 CM^2
BH CV ECHO MEAS - MVA(P1/2T): 4 CM^2
BH CV ECHO MEAS - PA ACC TIME: 0.06 SEC
BH CV ECHO MEAS - PA MAX PG (FULL): 2.2 MMHG
BH CV ECHO MEAS - PA MAX PG: 5.7 MMHG
BH CV ECHO MEAS - PA PR(ACCEL): 52.9 MMHG
BH CV ECHO MEAS - PA V2 MAX: 119.4 CM/SEC
BH CV ECHO MEAS - PULM DIAS VEL: 85.7 CM/SEC
BH CV ECHO MEAS - PULM S/D: 0.29
BH CV ECHO MEAS - PULM SYS VEL: 24.8 CM/SEC
BH CV ECHO MEAS - PVA(V,A): 2.8 CM^2
BH CV ECHO MEAS - PVA(V,D): 2.8 CM^2
BH CV ECHO MEAS - RAP SYSTOLE: 15 MMHG
BH CV ECHO MEAS - RV MAX PG: 3.6 MMHG
BH CV ECHO MEAS - RV MEAN PG: 1.6 MMHG
BH CV ECHO MEAS - RV V1 MAX: 94.3 CM/SEC
BH CV ECHO MEAS - RV V1 MEAN: 55.2 CM/SEC
BH CV ECHO MEAS - RV V1 VTI: 16.8 CM
BH CV ECHO MEAS - RVOT AREA: 3.5 CM^2
BH CV ECHO MEAS - RVOT DIAM: 2.1 CM
BH CV ECHO MEAS - RVSP: 48.1 MMHG
BH CV ECHO MEAS - SI(AO): 106.2 ML/M^2
BH CV ECHO MEAS - SI(CUBED): 33.1 ML/M^2
BH CV ECHO MEAS - SI(MOD-SP2): 16.1 ML/M^2
BH CV ECHO MEAS - SI(MOD-SP4): 22.6 ML/M^2
BH CV ECHO MEAS - SI(TEICH): 28.5 ML/M^2
BH CV ECHO MEAS - SUP REN AO DIAM: 2.3 CM
BH CV ECHO MEAS - SV(AO): 230.4 ML
BH CV ECHO MEAS - SV(CUBED): 71.7 ML
BH CV ECHO MEAS - SV(MOD-SP2): 35 ML
BH CV ECHO MEAS - SV(MOD-SP4): 49 ML
BH CV ECHO MEAS - SV(RVOT): 58.9 ML
BH CV ECHO MEAS - SV(TEICH): 61.7 ML
BH CV ECHO MEAS - TR MAX VEL: 287.7 CM/SEC
BH CV XLRA - RV BASE: 3.2 CM
BH CV XLRA - RV LENGTH: 6.2 CM
BH CV XLRA - RV MID: 3.2 CM
BH CV XLRA - TDI S': 9.9 CM/SEC
LEFT ATRIUM VOLUME INDEX: 38 ML/M2
LV EF 2D ECHO EST: 62 %
MAXIMAL PREDICTED HEART RATE: 147 BPM
SINUS: 3.6 CM
STJ: 3.5 CM
STRESS TARGET HR: 125 BPM

## 2020-08-26 RX ORDER — DIGOXIN 125 MCG
125 TABLET ORAL EVERY OTHER DAY
Qty: 30 TABLET | Refills: 5 | Status: SHIPPED | OUTPATIENT
Start: 2020-08-26 | End: 2021-02-04 | Stop reason: SDUPTHER

## 2020-08-26 RX ORDER — WARFARIN SODIUM 5 MG/1
TABLET ORAL
Qty: 90 TABLET | Refills: 0 | Status: SHIPPED | OUTPATIENT
Start: 2020-08-26 | End: 2020-12-23 | Stop reason: SDUPTHER

## 2020-08-26 NOTE — TELEPHONE ENCOUNTER
"Called and discussed echo results. No significant change. She started CPAP again \"one night\" ago. She reports having issues with the machine. She is to contact Dr. Morrissey office regarding that.     AL  "

## 2020-08-27 ENCOUNTER — HOSPITAL ENCOUNTER (OUTPATIENT)
Dept: DIABETES SERVICES | Facility: HOSPITAL | Age: 75
Discharge: HOME | End: 2020-08-27
Admitting: INTERNAL MEDICINE

## 2020-08-27 PROCEDURE — 97802 MEDICAL NUTRITION INDIV IN: CPT | Performed by: DIETITIAN, REGISTERED

## 2020-09-01 RX ORDER — HYDROCODONE BITARTRATE AND ACETAMINOPHEN 5; 325 MG/1; MG/1
1 TABLET ORAL 2 TIMES DAILY PRN
Qty: 40 TABLET | Refills: 0 | Status: SHIPPED | OUTPATIENT
Start: 2020-09-01 | End: 2020-09-24 | Stop reason: SDUPTHER

## 2020-09-01 NOTE — TELEPHONE ENCOUNTER
Medication Refill Request    Date of phone call: 20    Medication being requested: Norco 5-325 mg  si tab po BID prn  Qty: 40    Date of last visit: 20    Date of last refill: 20    MARCO up to date?: yes    Next Follow up?: 20    Any new pertinent information? (i.e, new medication allergies, new use of medications, change in patient's health or condition, non-compliance or inconsistency with prescribing agreement?):

## 2020-09-10 ENCOUNTER — ANTICOAGULATION VISIT (OUTPATIENT)
Dept: PHARMACY | Facility: HOSPITAL | Age: 75
End: 2020-09-10

## 2020-09-10 LAB
INR PPP: 2.3 (ref 0.91–1.09)
PROTHROMBIN TIME: 28.2 SECONDS (ref 10–13.8)

## 2020-09-10 PROCEDURE — 85610 PROTHROMBIN TIME: CPT

## 2020-09-10 PROCEDURE — 36416 COLLJ CAPILLARY BLOOD SPEC: CPT

## 2020-09-10 RX ORDER — ERGOCALCIFEROL 1.25 MG/1
CAPSULE ORAL
Qty: 12 CAPSULE | Refills: 0 | Status: SHIPPED | OUTPATIENT
Start: 2020-09-10 | End: 2020-11-30

## 2020-09-10 NOTE — PROGRESS NOTES
Anticoagulation Clinic Progress Note    Anticoagulation Summary  As of 9/10/2020    INR goal:   2.0-3.0   TTR:   72.4 % (1.9 y)   INR used for dosin.3 (9/10/2020)   Warfarin maintenance plan:   2.5 mg every Fri; 5 mg all other days   Weekly warfarin total:   32.5 mg   No change documented:   Divine Lea   Plan last modified:   Nano Cool RPH (2020)   Next INR check:   10/8/2020   Priority:   Maintenance   Target end date:   Indefinite    Indications    Atrial fibrillation (CMS/HCC) [I48.91]             Anticoagulation Episode Summary     INR check location:       Preferred lab:       Send INR reminders to:    [facility]    Comments:         Anticoagulation Care Providers     Provider Role Specialty Phone number    Pia Dueñas MD Referring Cardiology [phone]          Clinic Interview:  Patient Findings     Negatives:   Signs/symptoms of thrombosis, Signs/symptoms of bleeding,   Laboratory test error suspected, Change in health, Change in alcohol use,   Change in activity, Upcoming invasive procedure, Emergency department   visit, Upcoming dental procedure, Missed doses, Extra doses, Change in   medications, Change in diet/appetite, Hospital admission, Bruising, Other   complaints      Clinical Outcomes     Negatives:   Major bleeding event, Thromboembolic event,   Anticoagulation-related hospital admission, Anticoagulation-related ED   visit, Anticoagulation-related fatality        INR History:  Anticoagulation Monitoring 2020 2020 9/10/2020   INR 4.2 2.3 2.3   INR Date 2020 2020 9/10/2020   INR Goal 2.0-3.0 2.0-3.0 2.0-3.0   Trend Down Same Same   Last Week Total 35 mg 27.5 mg 32.5 mg   Next Week Total 27.5 mg 32.5 mg 32.5 mg   Sun 5 mg 5 mg 5 mg   Mon 5 mg 5 mg 5 mg   Tue Hold () 5 mg 5 mg   Wed 5 mg 5 mg 5 mg   Thu 5 mg 5 mg 5 mg   Fri 2.5 mg 2.5 mg 2.5 mg   Sat 5 mg 5 mg 5 mg   Visit Report - - -   Some recent data might be hidden       Plan:  1.  INR is therapeutic today- see above in Anticoagulation Summary.   Will instruct Lana Nielsonb to continue their warfarin regimen- see above in Anticoagulation Summary.  2. Follow up in 4 weeks.  3. Patient declines warfarin refills.  4. Verbal and written information provided. Patient expresses understanding and has no further questions at this time.    Divine Lea

## 2020-09-17 ENCOUNTER — OFFICE VISIT (OUTPATIENT)
Dept: INTERNAL MEDICINE | Facility: CLINIC | Age: 75
End: 2020-09-17

## 2020-09-17 VITALS
BODY MASS INDEX: 38.73 KG/M2 | SYSTOLIC BLOOD PRESSURE: 146 MMHG | WEIGHT: 241 LBS | DIASTOLIC BLOOD PRESSURE: 90 MMHG | OXYGEN SATURATION: 96 % | HEART RATE: 87 BPM | TEMPERATURE: 96.7 F | HEIGHT: 66 IN

## 2020-09-17 DIAGNOSIS — I10 ESSENTIAL HYPERTENSION: ICD-10-CM

## 2020-09-17 DIAGNOSIS — I24.0 CORONARY ARTERY EMBOLISM (HCC): ICD-10-CM

## 2020-09-17 DIAGNOSIS — E13.9 DIABETES 1.5, MANAGED AS TYPE 2 (HCC): ICD-10-CM

## 2020-09-17 DIAGNOSIS — F51.01 PRIMARY INSOMNIA: ICD-10-CM

## 2020-09-17 DIAGNOSIS — I25.119 CORONARY ARTERY DISEASE INVOLVING NATIVE CORONARY ARTERY OF NATIVE HEART WITH ANGINA PECTORIS (HCC): ICD-10-CM

## 2020-09-17 DIAGNOSIS — E78.2 MIXED HYPERLIPIDEMIA: ICD-10-CM

## 2020-09-17 DIAGNOSIS — Z23 NEED FOR IMMUNIZATION AGAINST INFLUENZA: Primary | ICD-10-CM

## 2020-09-17 DIAGNOSIS — I48.91 ATRIAL FIBRILLATION WITH RVR (HCC): ICD-10-CM

## 2020-09-17 PROCEDURE — G0008 ADMIN INFLUENZA VIRUS VAC: HCPCS | Performed by: INTERNAL MEDICINE

## 2020-09-17 PROCEDURE — 99214 OFFICE O/P EST MOD 30 MIN: CPT | Performed by: INTERNAL MEDICINE

## 2020-09-17 PROCEDURE — 90694 VACC AIIV4 NO PRSRV 0.5ML IM: CPT | Performed by: INTERNAL MEDICINE

## 2020-09-17 RX ORDER — TEMAZEPAM 22.5 MG/1
22.5 CAPSULE ORAL NIGHTLY PRN
Qty: 30 CAPSULE | Refills: 3 | Status: SHIPPED | OUTPATIENT
Start: 2020-09-17 | End: 2021-02-04

## 2020-09-17 NOTE — PROGRESS NOTES
"Karl Yañez is a 75 y.o. female.  Patient presents with a chief complaint of needing a flu shot which she got today, hyperlipidemia, hypertension, coronary artery disease that stable, atrial fibrillation that is stable, type 2 diabetes mellitus, and primary insomnia that has gone untreated for quite some time.  We went over her options and I recommended something safe like Restoril 30 mg nightly.      /90   Pulse 87   Temp 96.7 °F (35.9 °C)   Ht 167.6 cm (66\")   Wt 109 kg (241 lb)   LMP  (LMP Unknown)   SpO2 96%   BMI 38.90 kg/m²     Body mass index is 38.9 kg/m².    History of Present Illness ongoing issues with insomnia    The following portions of the patient's history were reviewed and updated as appropriate: allergies, current medications, past family history, past medical history, past social history, past surgical history and problem list.    Review of Systems   Constitutional: Positive for fatigue.   HENT: Negative.    Respiratory: Negative.    Cardiovascular: Negative.    Gastrointestinal: Negative.    Musculoskeletal: Positive for arthralgias.   Neurological: Negative.    Psychiatric/Behavioral: Negative.        Objective   Physical Exam  Vitals signs and nursing note reviewed.   HENT:      Head: Normocephalic and atraumatic.   Neck:      Musculoskeletal: Normal range of motion and neck supple.   Cardiovascular:      Rate and Rhythm: Normal rate and regular rhythm.      Pulses: Normal pulses.      Heart sounds: Normal heart sounds.   Pulmonary:      Effort: Pulmonary effort is normal.      Breath sounds: Normal breath sounds.   Abdominal:      General: Abdomen is flat.      Palpations: Abdomen is soft.   Musculoskeletal:      Comments: Chronic low back pain with a somewhat unstable gait requiring a cane at all times   Neurological:      General: No focal deficit present.      Mental Status: She is oriented to person, place, and time.   Psychiatric:         Mood and Affect: " Mood normal.         Behavior: Behavior normal.           Assessment/Plan   Lana was seen today for diabetes, hypertension and hyperlipidemia.    Diagnoses and all orders for this visit:    Need for immunization against influenza  Comments:  Patient was given a flu shot today  Orders:  -     Fluad Quad 65+ yrs ([id number])    Mixed hyperlipidemia  Comments:  I will check a lipid panel today    Essential hypertension  Comments:  Moderately well-controlled no changes as of yet    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CMS/HCC)  Comments:  Stable no residual chest pain    Coronary artery disease involving native coronary artery of native heart with angina pectoris (CMS/HCC)  Comments:  Doing well overall no chest pain or shortness of breath    Atrial fibrillation with RVR (CMS/HCC)  Comments:  In normal sinus rhythm today    Diabetes 1.5, 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HCC)  Comments:  I am going to check an A1c and CMP  Orders:  -     Comprehensive metabolic panel; Future  -     Lipid Panel With LDL/HDL Ratio; Future  -     Hemoglobin A1c; Future    Primary insomnia  Comments:  Restoril 30 mg/day  Orders:  -     temazepam (RESTORIL) 22.5 MG capsule; Take 1 capsule by mouth At Night As Needed for Sleep.

## 2020-09-18 LAB
ALBUMIN SERPL-MCNC: 4.7 G/DL (ref 3.5–5.2)
ALBUMIN/GLOB SERPL: 1.9 G/DL
ALP SERPL-CCNC: 64 U/L (ref 39–117)
ALT SERPL-CCNC: 12 U/L (ref 1–33)
AST SERPL-CCNC: 13 U/L (ref 1–32)
BILIRUB SERPL-MCNC: 0.2 MG/DL (ref 0–1.2)
BUN SERPL-MCNC: 21 MG/DL (ref 8–23)
BUN/CREAT SERPL: 20.6 (ref 7–25)
CALCIUM SERPL-MCNC: 9.6 MG/DL (ref 8.6–10.5)
CHLORIDE SERPL-SCNC: 106 MMOL/L (ref 98–107)
CHOLEST SERPL-MCNC: 102 MG/DL (ref 0–200)
CO2 SERPL-SCNC: 27.5 MMOL/L (ref 22–29)
CREAT SERPL-MCNC: 1.02 MG/DL (ref 0.57–1)
GLOBULIN SER CALC-MCNC: 2.5 GM/DL
GLUCOSE SERPL-MCNC: 125 MG/DL (ref 65–99)
HBA1C MFR BLD: 6.3 % (ref 4.8–5.6)
HDLC SERPL-MCNC: 56 MG/DL (ref 40–60)
LDLC SERPL CALC-MCNC: 27 MG/DL (ref 0–100)
LDLC/HDLC SERPL: 0.49 {RATIO}
POTASSIUM SERPL-SCNC: 4.8 MMOL/L (ref 3.5–5.2)
PROT SERPL-MCNC: 7.2 G/DL (ref 6–8.5)
SODIUM SERPL-SCNC: 143 MMOL/L (ref 136–145)
TRIGL SERPL-MCNC: 94 MG/DL (ref 0–150)
VLDLC SERPL CALC-MCNC: 18.8 MG/DL

## 2020-09-23 ENCOUNTER — TELEPHONE (OUTPATIENT)
Dept: INTERNAL MEDICINE | Facility: CLINIC | Age: 75
End: 2020-09-23

## 2020-09-24 ENCOUNTER — OFFICE VISIT (OUTPATIENT)
Dept: PAIN MEDICINE | Facility: CLINIC | Age: 75
End: 2020-09-24

## 2020-09-24 VITALS
BODY MASS INDEX: 38.44 KG/M2 | TEMPERATURE: 97.1 F | DIASTOLIC BLOOD PRESSURE: 64 MMHG | HEIGHT: 66 IN | SYSTOLIC BLOOD PRESSURE: 100 MMHG | WEIGHT: 239.2 LBS | OXYGEN SATURATION: 98 % | HEART RATE: 60 BPM | RESPIRATION RATE: 20 BRPM

## 2020-09-24 DIAGNOSIS — G89.29 OTHER CHRONIC PAIN: Primary | ICD-10-CM

## 2020-09-24 DIAGNOSIS — M51.26 DISPLACEMENT OF LUMBAR INTERVERTEBRAL DISC WITHOUT MYELOPATHY: ICD-10-CM

## 2020-09-24 PROCEDURE — 99213 OFFICE O/P EST LOW 20 MIN: CPT | Performed by: NURSE PRACTITIONER

## 2020-09-24 RX ORDER — HYDROCODONE BITARTRATE AND ACETAMINOPHEN 5; 325 MG/1; MG/1
1 TABLET ORAL 2 TIMES DAILY PRN
Qty: 40 TABLET | Refills: 0 | Status: SHIPPED | OUTPATIENT
Start: 2020-09-24 | End: 2020-12-01 | Stop reason: SDUPTHER

## 2020-09-24 NOTE — PROGRESS NOTES
CHIEF COMPLAINT  F/u back pain. Pt sts pain is the same.     Karl Yañez is a 75 y.o. female  who presents for follow-up.  She has a history of chronic back pain. Reports her pain is unchanged since last evaluation.    Complains of pain in her low back. Today her pain is 5/10VAS. Describes the pain as intermittent aching and throbbing.  Is trying to do activity and walking around house and around court when weather is nice. Continues with Hydrocodone 5/325 1-2/day PRN. Denies any side effects from the regimen. The regimen helps decrease her pain by 25-50%. Notes improvement in activity with regimen.   ADL's by self.  Denies any bowel or bladder changes.     Prior to COVID pandemic, she was going to pool and doing aquatherapy. Was doing better while doing this. Concerned about retuning right now.      She needs a left shoulder replacement, but this is not planned, as she would need bridge for Coumadin.  She states she did ask Dr Tavarez about pain medication, but he wanted her to see pain management.    Recently bought a cane and has been using. Uses on right side of body. Cannot use on left side due to shoulder pain.     She was initially evaluated in the office by Dr. Carmen Kirk on 8/28/2018.  She was referred here by Dr. Perez for evaluation of back pain.  Has had back pain for 10 years on and off.  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 fib not being able to stop anticoagulant.  No stents were placed but is high risk for surgery.  Started on tramadol for pain.  No previous pain clinics, back injections, no history of opioids prior to current tramadol by neurosurgery.  Currently followed by Dr. Dueñas for cardiacs issues.  Reviewed imaging with patient.  May benefit from LESI but would have to hold anticoagulant.  We will reach out to Dr. Mota in regards to holding anticoagulant if possible.  Random urine drug screen per office policy.  No benefit  with tramadol.  Trial of hydrocodone 5-325 2-3 times a day as needed as needed.  Plan to start low-dose gabapentin.  Prescription for gabapentin 100 mg to increase up to twice daily as tolerated.    Patient remained masked during entire encounter. No cough present. I donned a mask and faceshield throughout entire visit. Prior to donning mask and faceshield, hand hygiene was performed, as well as when it was doffed.  I was closer than 6 feet, but not for an extended period of time. No obvious exposure to any bodily fluids.    Back Pain  This is a chronic problem. The current episode started more than 1 year ago. The problem occurs constantly. The problem has been gradually worsening (unchanged since last evaluation) since onset. The pain is present in the lumbar spine and sacro-iliac. The quality of the pain is described as aching. Radiates to: LLE. The pain is at a severity of 5/10. The pain is moderate. The pain is worse during the day. The symptoms are aggravated by bending, position, standing and twisting. Associated symptoms include weakness (L leg). Pertinent negatives include no abdominal pain, chest pain, dysuria, fever, headaches or numbness. Risk factors include lack of exercise, menopause, obesity and sedentary lifestyle. She has tried analgesics, bed rest, chiropractic manipulation, heat, home exercises, ice, muscle relaxant, walking and NSAIDs for the symptoms. The treatment provided mild relief.      Past pain medications:   norco 5/325 mg - helpful  Gabapentin 800 - dizzy, hurt stomach  Tramadol 50 mg up to 4/day - not helping     Current pain medications:   Tylenol OTC  Hydrocodone 5/325 1-2/day PRN      Past therapies:  Physical Therapy: yes- minimal  Chiropractor: no  Massage Therapy: no  TENS: yes  Neck or back surgery: no  Past pain management: no     Previous Injections: none-- Does not want procedures due to risk with anticoagulants.         PEG Assessment   What number best describes your pain  "on average in the past week?5  What number best describes how, during the past week, pain has interfered with your enjoyment of life?5  What number best describes how, during the past week, pain has interfered with your general activity?  5    The following portions of the patient's history were reviewed and updated as appropriate: allergies, current medications, past family history, past medical history, past social history, past surgical history and problem list.    Review of Systems   Constitutional: Negative for activity change, fatigue and fever.   HENT: Negative for congestion.    Eyes: Negative for visual disturbance.   Respiratory: Negative for cough and shortness of breath.    Cardiovascular: Negative for chest pain.   Gastrointestinal: Negative for abdominal pain, constipation and diarrhea.   Genitourinary: Negative for difficulty urinating and dysuria.   Musculoskeletal: Positive for back pain, gait problem (cane) and joint swelling (bilat feet).   Allergic/Immunologic: Negative for immunocompromised state.   Neurological: Positive for weakness (L leg). Negative for dizziness, light-headedness, numbness and headaches.   Psychiatric/Behavioral: Negative for agitation, sleep disturbance and suicidal ideas. The patient is not nervous/anxious.      I have reviewed and confirmed the accuracy of the ROS as documented by the MA/LPN/RN ANGELA Burt      Vitals:    09/24/20 1420   BP: 100/64   Pulse: 60   Resp: 20   Temp: 97.1 °F (36.2 °C)   SpO2: 98%   Weight: 109 kg (239 lb 3.2 oz)   Height: 167.6 cm (66\")   PainSc:   5   PainLoc: Back     Objective   Physical Exam  Vitals signs and nursing note reviewed.   Constitutional:       Appearance: Normal appearance. She is well-developed.   HENT:      Head: Normocephalic and atraumatic.      Nose: Nose normal.   Eyes:      General: Lids are normal.      Conjunctiva/sclera: Conjunctivae normal.   Cardiovascular:      Rate and Rhythm: Normal rate.   Pulmonary: "      Effort: Pulmonary effort is normal. No respiratory distress.   Musculoskeletal:      Lumbar back: She exhibits decreased range of motion and tenderness.   Skin:     General: Skin is warm and dry.   Neurological:      Mental Status: She is alert and oriented to person, place, and time.      Gait: Gait abnormal (cane).   Psychiatric:         Speech: Speech normal.         Behavior: Behavior normal. Behavior is cooperative.         Thought Content: Thought content normal.         Judgment: Judgment normal.         Assessment/Plan   Lana was seen today for back pain.    Diagnoses and all orders for this visit:    Other chronic pain    Displacement of lumbar intervertebral disc without myelopathy    Other orders  -     HYDROcodone-acetaminophen (NORCO) 5-325 MG per tablet; Take 1 tablet by mouth 2 (Two) Times a Day As Needed for Severe Pain .      --- The urine drug screen confirmation from 7-22-20 has been reviewed and the result is APPROPRIATE based on patient history and MARCO report  --- refill Hydrocodone. Patient appears stable with current regimen. No adverse effects. Regarding continuation of opioids, there is no evidence of aberrant behavior or any red flags.  The patient continues with appropriate response to opioid therapy. ADL's remain intact by self.   --- declines interventions at this time.  --- Follow-up 3 months or sooner if needed.       MARCO REPORT  As part of the patient's treatment plan, I am prescribing controlled substances. The patient has been made aware of appropriate use of such medications, including potential risk of somnolence, limited ability to drive and/or work safely, and the potential for dependence or overdose. It has also bee made clear that these medications are for use by this patient only, without concomitant use of alcohol or other substances unless prescribed.     Patient has completed prescribing agreement detailing terms of continued prescribing of controlled  substances, including monitoring MARCO reports, urine drug screening, and pill counts if necessary. The patient is aware that inappropriate use will results in cessation of prescribing such medications.    MARCO report has been reviewed and scanned into the patient's chart.    As the clinician, I personally reviewed the MARCO from 9-23-20 while the patient was in the office today.    History and physical exam exhibit continued safe and appropriate use of controlled substances.        EMR Dragon/Transcription disclaimer:   Much of this encounter note is an electronic transcription/translation of spoken language to printed text. The electronic translation of spoken language may permit erroneous, or at times, nonsensical words or phrases to be inadvertently transcribed; Although I have reviewed the note for such errors, some may still exist.

## 2020-10-07 ENCOUNTER — ANTICOAGULATION VISIT (OUTPATIENT)
Dept: PHARMACY | Facility: HOSPITAL | Age: 75
End: 2020-10-07

## 2020-10-07 LAB
INR PPP: 3.2 (ref 0.91–1.09)
PROTHROMBIN TIME: 38.6 SECONDS (ref 10–13.8)

## 2020-10-07 PROCEDURE — 36416 COLLJ CAPILLARY BLOOD SPEC: CPT

## 2020-10-07 PROCEDURE — 85610 PROTHROMBIN TIME: CPT

## 2020-10-07 PROCEDURE — G0463 HOSPITAL OUTPT CLINIC VISIT: HCPCS

## 2020-10-07 NOTE — PROGRESS NOTES
I have supervised and reviewed the notes, assessments, and/or procedures performed by our Pharmacy Intern. The documented assessment and plan were developed cooperatively, and the plan was implemented in my presence. I concur with the documentation of this patient encounter.    Nano Cool [facility]

## 2020-10-07 NOTE — PROGRESS NOTES
Anticoagulation Clinic Progress Note    Anticoagulation Summary  As of 10/7/2020    INR goal:  2.0-3.0   TTR:  72.6 % (2 y)   INR used for dosing:  3.2 (10/7/2020)   Warfarin maintenance plan:  2.5 mg every Wed; 5 mg all other days   Weekly warfarin total:  32.5 mg   Plan last modified:  Nano Cool RPH (10/7/2020)   Next INR check:  10/21/2020   Priority:  Maintenance   Target end date:  Indefinite    Indications    Atrial fibrillation (CMS/HCC) [I48.91]             Anticoagulation Episode Summary     INR check location:      Preferred lab:      Send INR reminders to:  [facility]    Comments:        Anticoagulation Care Providers     Provider Role Specialty Phone number    Pia Dueñas MD Referring Cardiology [phone]          Clinic Interview:  Patient Findings     Negatives:  Signs/symptoms of thrombosis, Signs/symptoms of bleeding,   Laboratory test error suspected, Change in health, Change in alcohol use,   Change in activity, Upcoming invasive procedure, Emergency department   visit, Upcoming dental procedure, Missed doses, Extra doses, Change in   medications, Change in diet/appetite, Hospital admission, Bruising, Other   complaints      Clinical Outcomes     Negatives:  Major bleeding event, Thromboembolic event,   Anticoagulation-related hospital admission, Anticoagulation-related ED   visit, Anticoagulation-related fatality        INR History:  Anticoagulation Monitoring 8/25/2020 9/10/2020 10/7/2020   INR 2.3 2.3 3.2   INR Date 8/25/2020 9/10/2020 10/7/2020   INR Goal 2.0-3.0 2.0-3.0 2.0-3.0   Trend Same Same Same   Last Week Total 27.5 mg 32.5 mg 32.5 mg   Next Week Total 32.5 mg 32.5 mg 32.5 mg   Sun 5 mg 5 mg 5 mg   Mon 5 mg 5 mg 5 mg   Tue 5 mg 5 mg 5 mg   Wed 5 mg 5 mg 2.5 mg   Thu 5 mg 5 mg 5 mg   Fri 2.5 mg 2.5 mg 5 mg   Sat 5 mg 5 mg 5 mg   Visit Report - - -   Some recent data might be hidden       Plan:  1. INR is Supratherapeutic today- see above in Anticoagulation  Summary.  Will instruct Lana Yañez to Change their warfarin regimen- see above in Anticoagulation Summary.  2. Follow up in 2 weeks  3. Patient declines warfarin refills.  4. Verbal and written information provided. Patient expresses understanding and has no further questions at this time.    Stefany Armando, Pharmacy Intern

## 2020-10-08 ENCOUNTER — APPOINTMENT (OUTPATIENT)
Dept: PHARMACY | Facility: HOSPITAL | Age: 75
End: 2020-10-08

## 2020-10-20 ENCOUNTER — TRANSCRIBE ORDERS (OUTPATIENT)
Dept: ADMINISTRATIVE | Facility: HOSPITAL | Age: 75
End: 2020-10-20

## 2020-10-20 DIAGNOSIS — Z12.31 VISIT FOR SCREENING MAMMOGRAM: Primary | ICD-10-CM

## 2020-10-21 ENCOUNTER — ANTICOAGULATION VISIT (OUTPATIENT)
Dept: PHARMACY | Facility: HOSPITAL | Age: 75
End: 2020-10-21

## 2020-10-21 ENCOUNTER — HOSPITAL ENCOUNTER (OUTPATIENT)
Dept: MAMMOGRAPHY | Facility: HOSPITAL | Age: 75
Discharge: HOME | End: 2020-10-21
Admitting: INTERNAL MEDICINE

## 2020-10-21 DIAGNOSIS — Z12.31 VISIT FOR SCREENING MAMMOGRAM: ICD-10-CM

## 2020-10-21 LAB
INR PPP: 2.6 (ref 0.91–1.09)
PROTHROMBIN TIME: 31 SECONDS (ref 10–13.8)

## 2020-10-21 PROCEDURE — 77063 BREAST TOMOSYNTHESIS BI: CPT

## 2020-10-21 PROCEDURE — 36416 COLLJ CAPILLARY BLOOD SPEC: CPT

## 2020-10-21 PROCEDURE — 77067 SCR MAMMO BI INCL CAD: CPT

## 2020-10-21 PROCEDURE — 85610 PROTHROMBIN TIME: CPT

## 2020-10-21 NOTE — PROGRESS NOTES
Anticoagulation Clinic Progress Note    Anticoagulation Summary  As of 10/21/2020    INR goal:  2.0-3.0   TTR:  72.6 % (2 y)   INR used for dosin.6 (10/21/2020)   Warfarin maintenance plan:  2.5 mg every Wed; 5 mg all other days   Weekly warfarin total:  32.5 mg   No change documented:  Maria Alejandra Guerrero   Plan last modified:  Nano Cool RPH (10/7/2020)   Next INR check:  2020   Priority:  High   Target end date:  Indefinite    Indications    Atrial fibrillation (CMS/HCC) [I48.91]             Anticoagulation Episode Summary     INR check location:      Preferred lab:      Send INR reminders to:   [facility]    Comments:        Anticoagulation Care Providers     Provider Role Specialty Phone number    Pia Dueñas MD Referring Cardiology [phone]          Clinic Interview:      INR History:  Anticoagulation Monitoring 9/10/2020 10/7/2020 10/21/2020   INR 2.3 3.2 2.6   INR Date 9/10/2020 10/7/2020 10/21/2020   INR Goal 2.0-3.0 2.0-3.0 2.0-3.0   Trend Same Same Same   Last Week Total 32.5 mg 35 mg 32.5 mg   Next Week Total 32.5 mg 32.5 mg 32.5 mg   Sun 5 mg 5 mg 5 mg   Mon 5 mg 5 mg 5 mg   Tue 5 mg 5 mg 5 mg   Wed 5 mg 2.5 mg 2.5 mg   Thu 5 mg 5 mg 5 mg   Fri 2.5 mg 5 mg 5 mg   Sat 5 mg 5 mg 5 mg   Visit Report - - -   Some recent data might be hidden       Plan:  1. INR is therapeutic today- see above in Anticoagulation Summary.   Will instruct Lanaantonio Aguerocomb to continue their warfarin regimen- see above in Anticoagulation Summary.  2. Follow up in 2 weeks.  3. Patient declines warfarin refills.  4. Verbal and written information provided. Patient expresses understanding and has no further questions at this time.    Maria Alejandra Guerrero

## 2020-11-02 ENCOUNTER — RESULTS ENCOUNTER (OUTPATIENT)
Dept: ENDOCRINOLOGY | Age: 75
End: 2020-11-02

## 2020-11-02 ENCOUNTER — OFFICE VISIT (OUTPATIENT)
Dept: ORTHOPEDIC SURGERY | Facility: CLINIC | Age: 75
End: 2020-11-02

## 2020-11-02 ENCOUNTER — ANTICOAGULATION VISIT (OUTPATIENT)
Dept: PHARMACY | Facility: HOSPITAL | Age: 75
End: 2020-11-02

## 2020-11-02 VITALS — HEIGHT: 66 IN | WEIGHT: 245 LBS | BODY MASS INDEX: 39.37 KG/M2 | TEMPERATURE: 98 F

## 2020-11-02 DIAGNOSIS — IMO0002 DM (DIABETES MELLITUS), TYPE 2, UNCONTROLLED W/NEUROLOGIC COMPLICATION: ICD-10-CM

## 2020-11-02 DIAGNOSIS — E78.2 HYPERLIPEMIA, MIXED: ICD-10-CM

## 2020-11-02 DIAGNOSIS — M19.019 GLENOHUMERAL ARTHRITIS: Primary | ICD-10-CM

## 2020-11-02 LAB
INR PPP: 2.5 (ref 0.91–1.09)
PROTHROMBIN TIME: 30.1 SECONDS (ref 10–13.8)

## 2020-11-02 PROCEDURE — 20610 DRAIN/INJ JOINT/BURSA W/O US: CPT | Performed by: ORTHOPAEDIC SURGERY

## 2020-11-02 PROCEDURE — 99212 OFFICE O/P EST SF 10 MIN: CPT | Performed by: ORTHOPAEDIC SURGERY

## 2020-11-02 PROCEDURE — 36416 COLLJ CAPILLARY BLOOD SPEC: CPT

## 2020-11-02 PROCEDURE — 85610 PROTHROMBIN TIME: CPT

## 2020-11-02 RX ORDER — METHYLPREDNISOLONE ACETATE 80 MG/ML
80 INJECTION, SUSPENSION INTRA-ARTICULAR; INTRALESIONAL; INTRAMUSCULAR; SOFT TISSUE
Status: COMPLETED | OUTPATIENT
Start: 2020-11-02 | End: 2020-11-02

## 2020-11-02 RX ADMIN — METHYLPREDNISOLONE ACETATE 80 MG: 80 INJECTION, SUSPENSION INTRA-ARTICULAR; INTRALESIONAL; INTRAMUSCULAR; SOFT TISSUE at 08:33

## 2020-11-02 NOTE — PROGRESS NOTES
Patient: Lana Yañez  YOB: 1945  Date of Service: 11/2/2020    Chief Complaints: Left shoulder pain    Subjective:    History of Present Illness: Pt is seen in the office today with complaints of left shoulder pain she has known degenerative changes she last got an injection in June did well with it.  She is complaining today to both thumbs the basilar joint and some numbness and tingling in the thumb and index finger particularly at night no new history injury change in activity symptoms are moderate intermittent aching burning worse with activity somewhat better with rest.          Allergies:   Allergies   Allergen Reactions   • Contrast Dye Hives and Itching       Medications:   Home Medications:  Current Outpatient Medications on File Prior to Visit   Medication Sig   • acetaminophen (TYLENOL) 500 MG tablet Take 500 mg by mouth Every 6 (Six) Hours As Needed for Mild Pain .   • albuterol (PROVENTIL HFA;VENTOLIN HFA) 108 (90 Base) MCG/ACT inhaler Inhale 2 puffs Every 6 (Six) Hours As Needed.   • aspirin 81 MG EC tablet Take 1 tablet by mouth Daily.   • atorvastatin (LIPITOR) 10 MG tablet Take 1 tablet by mouth Daily.   • Blood Glucose Monitoring Suppl (ACCU-CHEK GUIDE) w/Device kit See Admin Instructions.   • carvedilol (COREG) 25 MG tablet TAKE 1 & 1/2 (ONE & ONE-HALF) TABLETS BY MOUTH TWICE DAILY WITH  MEALS   • digoxin (LANOXIN) 125 MCG tablet TAKE 1 TABLET BY MOUTH EVERY OTHER DAY   • furosemide (LASIX) 40 MG tablet Take 1 tablet by mouth Daily.   • glucose blood test strip Use as instructed   • HYDROcodone-acetaminophen (NORCO) 5-325 MG per tablet Take 1 tablet by mouth 2 (Two) Times a Day As Needed for Severe Pain .   • ipratropium-albuterol (DUO-NEB) 0.5-2.5 mg/mL nebulizer As Needed.   • Lancets (ACCU-CHEK MULTICLIX) lancets Use 1 lancet per finger stick   • lisinopril (PRINIVIL,ZESTRIL) 10 MG tablet Take 1 tablet by mouth once daily   • magnesium oxide (MAG-OX) 400 MG tablet Take  400 mg by mouth Daily.   • metFORMIN (Glucophage) 500 MG tablet Take 1 tablet by mouth 2 (Two) Times a Day With Meals. (Patient taking differently: Take 1,000 mg by mouth 2 (Two) Times a Day With Meals.)   • potassium chloride (K-DUR) 10 MEQ CR tablet Take 4 tablets by mouth Daily.   • temazepam (RESTORIL) 22.5 MG capsule Take 1 capsule by mouth At Night As Needed for Sleep.   • TRAVATAN Z 0.004 % solution ophthalmic solution    • vitamin D (ERGOCALCIFEROL) 1.25 MG (15117 UT) capsule capsule Take 1 capsule by mouth once a week   • warfarin (COUMADIN) 5 MG tablet TAKE 1 TABLET BY MOUTH ONCE DAILY OR  AS  DIRECTED  BY  MEDICATION  MANAGEMENT  CLINIC     No current facility-administered medications on file prior to visit.      Current Medications:  Scheduled Meds:  Continuous Infusions:No current facility-administered medications for this visit.     PRN Meds:.    I have reviewed the patient's medical history in detail and updated the computerized patient record.  Review and summarization of old records include:    Past Medical History:   Diagnosis Date   • Acute on chronic diastolic heart failure (CMS/HCC)    • Arthritis    • Asthma    • Atrial fibrillation (CMS/HCC)     Persistent; on warfarin   • Atypical chest pain    • Bronchitis    • Cellulitis of right elbow     due to MRSA   • CHF (congestive heart failure) (CMS/HCC)    • COPD (chronic obstructive pulmonary disease) (CMS/HCC)    • Coronary artery disease     Cardiac catheterization completed; 90% PDA stenosis with medical management recommended   • Coronary artery disease involving native coronary artery of native heart with angina pectoris with documented spasm (CMS/HCC)    • Diabetes 1.5, 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HCC)    • Disease of thyroid gland    • Displacement of lumbar intervertebral disc without myelopathy    • Dizziness    • ANTOINE (dyspnea on exertion)    • Essential hypertension    • Fatigue    • Generalized osteoarthritis of multiple sites    • History  of rheumatic fever    • History of transfusion    • Hx of bone density study     10/23/2014   • Hyperglycemia    • Hyperlipidemia    • Hypovitaminosis D    • Left arm pain    • Left-sided weakness    • Leg swelling    • Low back pain    • Lower extremity edema    • Malaise and fatigue    • Mitral valve disease     Moderate mitral valve prolapse and moderate mitral regurgitation   • Mitral valve insufficiency    • Morbid obesity with BMI of 40.0-44.9, adult (CMS/[facility])    • MRSA infection    • NSTEMI (non-ST elevated myocardial infarction) (CMS/[facility])    • PAF (paroxysmal atrial fibrillation) (CMS/[facility])    • RA (rheumatoid arthritis) (CMS/[facility])     involving both hands   • Sleep apnea    • SOB (shortness of breath)    • Stroke (CMS/[facility])     left side weakness   • Urge incontinence of urine    • UTI (urinary tract infection) 05/2020   • Vitamin D deficiency         Past Surgical History:   Procedure Laterality Date   • BREAST BIOPSY     • BREAST SURGERY      right side lumpectomy with biopsy   • CARDIAC CATHETERIZATION Left 10/20/2017    Procedure: Cardiac Catheterization/Vascular Study;  Surgeon: Alphonso Olmedo MD;  Location: [facility] INVASIVE LOCATION;  Service:    • CARDIAC CATHETERIZATION N/A 10/20/2017    +2 mitral regurgitation, left main 10% stenosis, mid to distal LAD 10% diffuse stenosis, circumflex 10% diffuse stenosis, RCA 10% proximal stenosis, and distal PDA consistent with coronary embolus with a lesion of 90% too small to consider coronary intervention; medical management recommended   • EYE SURGERY      laser surgery for glaucoma and left eye cataracts removed   • HYSTERECTOMY      10+ years ago   • JOINT REPLACEMENT  2005; 2006    bilateral knees and left rotater   • KNEE SURGERY     • MAMMO BILATERAL  2016    [facility]         Social History     Occupational History   • 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     Employer: RETIRED   Tobacco Use   • Smoking status: Former Smoker      Packs/day: 3.00     Types: Cigarettes     Start date: 1967     Quit date: 10/19/1968     Years since quittin.0   • Smokeless tobacco: Never Used   • Tobacco comment: caffeine use - decaf coffee   Substance and Sexual Activity   • Alcohol use: No     Comment: No caffeine use   • Drug use: No   • Sexual activity: Defer      Social History     Social History Narrative   • Not on file        Family History   Problem Relation Age of Onset   • Colon cancer Mother    • Glaucoma Mother    • Stroke Mother    • Arthritis Mother    • Hypertension Mother    • Glaucoma Sister    • Diabetes Sister    • Heart disease Sister    • Arthritis Sister    • Asthma Sister    • Hypertension Sister    • Miscarriages / Stillbirths Sister    • Lung disease Sister    • Heart disease Brother    • Diabetes Brother    • Arthritis Brother    • Drug abuse Brother    • Hypertension Brother    • Arthritis Father    • COPD Father    • Lung disease Father    • Arthritis Daughter    • Depression Daughter    • Alcohol abuse Maternal Uncle    • Heart disease Sister    • Heart disease Sister    • Heart disease Brother        ROS: 14 point review of systems was performed and was negative except for documented findings in HPI and today's encounter.     Allergies:   Allergies   Allergen Reactions   • Contrast Dye Hives and Itching     Constitutional:  Denies fever, shaking or chills   Eyes:  Denies change in visual acuity   HENT:  Denies nasal congestion or sore throat   Respiratory:  Denies cough or shortness of breath   Cardiovascular:  Denies chest pain or severe LE edema   GI:  Denies abdominal pain, nausea, vomiting, bloody stools or diarrhea   Musculoskeletal:  Numbness, tingling, or loss of motor function only as noted above in history of present illness.  : Denies painful urination or hematuria  Integument:  Denies rash, lesion or ulceration   Neurologic:  Denies headache or focal weakness  Endocrine:  Denies lymphadenopathy  Psych:   Denies confusion or change in mental status   Hem:  Denies active bleeding      Physical Exam: 75 y.o. female  Wt Readings from Last 3 Encounters:   09/24/20 109 kg (239 lb 3.2 oz)   09/17/20 109 kg (241 lb)   08/25/20 110 kg (242 lb)       There is no height or weight on file to calculate BMI.  No height and weight on file for this encounter.  There were no vitals filed for this visit.  Vital signs reviewed.   General Appearance:    Alert, cooperative, in no acute distress                    Ortho exam    Physical exam the left shoulder reveals no overlying skin changes no lymphedema lymphadenopathy the patient can actively flex to about 150 passively I get them to 160 abduction is similar external rotation is 40 internal rotation to there buttock.  Rotator cuff strength is 4+ over 5 with isometric strength testing no overlying skin changes.  Patient has reasonable cervical range of motion for their age no radicular symptoms and a normal elbow exam.  There are good distal pulses.         Exam of both thumbs she has some prominence at her basilar joint pain in both those areas also pain with compression grind she has a positive Tinel's no thenar atrophy    Assessment: Left shoulder OA plan to treat with injection she is really not a great operative candidate as far as her thumbs go I will give her hand wrist thumb orthoses have her use some Voltaren gel which is over-the-counter she fails this I will have her see a dedicated hand person    Plan:   Follow up as indicated.  Ice, elevate, and rest as needed.  Discussed conservative measures of pain control including ice, bracing.  Also talked about the importance of strengthening   Asya Rodriges M.D.  Large Joint Arthrocentesis: L glenohumeral  Date/Time: 11/2/2020 8:33 AM  Consent given by: patient  Site marked: site marked  Timeout: Immediately prior to procedure a time out was called to verify the correct patient, procedure, equipment, support staff and site/side  marked as required   Supporting Documentation  Indications: pain   Procedure Details  Location: shoulder - L glenohumeral  Preparation: Patient was prepped and draped in the usual sterile fashion  Needle size: 22 G  Approach: posterior  Medications administered: 4 mL lidocaine (cardiac); 80 mg methylPREDNISolone acetate 80 MG/ML  Patient tolerance: patient tolerated the procedure well with no immediate complications

## 2020-11-16 RX ORDER — CARVEDILOL 25 MG/1
TABLET ORAL
Qty: 270 TABLET | Refills: 0 | Status: SHIPPED | OUTPATIENT
Start: 2020-11-16 | End: 2021-02-16

## 2020-11-18 LAB
CHOLEST SERPL-MCNC: 121 MG/DL (ref 100–199)
CREAT SERPL-MCNC: 1.04 MG/DL (ref 0.57–1)
FOLATE SERPL-MCNC: 10.9 NG/ML
HBA1C MFR BLD: 6.7 % (ref 4.8–5.6)
HDLC SERPL-MCNC: 61 MG/DL
INTERPRETATION: NORMAL
INTERPRETATION: NORMAL
LDLC SERPL CALC-MCNC: 40 MG/DL (ref 0–99)
Lab: NORMAL
Lab: NORMAL
T4 FREE SERPL-MCNC: 1.35 NG/DL (ref 0.82–1.77)
TRIGL SERPL-MCNC: 110 MG/DL (ref 0–149)
TSH SERPL DL<=0.005 MIU/L-ACNC: 1.02 UIU/ML (ref 0.45–4.5)
VIT B12 SERPL-MCNC: 657 PG/ML (ref 232–1245)
VLDLC SERPL CALC-MCNC: 20 MG/DL (ref 5–40)

## 2020-11-19 ENCOUNTER — OFFICE VISIT (OUTPATIENT)
Dept: ENDOCRINOLOGY | Age: 75
End: 2020-11-19

## 2020-11-19 VITALS
OXYGEN SATURATION: 96 % | RESPIRATION RATE: 16 BRPM | TEMPERATURE: 97 F | DIASTOLIC BLOOD PRESSURE: 66 MMHG | BODY MASS INDEX: 38.57 KG/M2 | HEART RATE: 73 BPM | HEIGHT: 66 IN | SYSTOLIC BLOOD PRESSURE: 118 MMHG | WEIGHT: 240 LBS

## 2020-11-19 DIAGNOSIS — E78.2 HYPERLIPEMIA, MIXED: ICD-10-CM

## 2020-11-19 DIAGNOSIS — IMO0002 DM (DIABETES MELLITUS), TYPE 2, UNCONTROLLED W/NEUROLOGIC COMPLICATION: Primary | ICD-10-CM

## 2020-11-19 PROCEDURE — 99214 OFFICE O/P EST MOD 30 MIN: CPT | Performed by: INTERNAL MEDICINE

## 2020-11-19 NOTE — PROGRESS NOTES
75 y.o.    Patient Care Team:  Jeremiah Tavarez MD as PCP - General (Internal Medicine)  Pia Dueñas MD as Consulting Physician (Cardiology)  Iain Hill MD as Consulting Physician (Pulmonary Disease)  Carmen Kirk MD as Consulting Physician (Pain Medicine)  Nano Cool [facility] as Pharmacist  Shaw Hand RPH as Pharmacist (Pharmacy)    Chief Complaint:      Subjective     HPI    Lana Escambia 75 y.o. presents with Type 2 dm as a follow-up patient. Consulted by Dr. Tavarez      Type 2 dm - Diagnosed about 6 - 8 months  Today in clinic patient reports that she is on metformin 1000 mg twice daily with meals.  However she has been reporting extreme nausea, vomiting.  She checks her blood sugars once a day  Average blood sugars are around 110-140.  No prior history of diabetic retinopathy.  Does have history of CKD stage II.  Does have complaints of diabetic neuropathy which has been stable.  CAD - yes, treated with medication, hx of afib.   CVA - yes.   Episodes of hypoglycemia -  none  Pt is physically active. weight has been stable.   Pt tries to follow DM diet for most part.   On Ace inb.       Hyperlipidemia  On Lipitor 10 mg oral daily    Reviewed primary care physician's/consulting physician documentation and lab results       Interval History      The following portions of the patient's history were reviewed and updated as appropriate: allergies, current medications, past family history, past medical history, past social history, past surgical history and problem list.    Past Medical History:   Diagnosis Date   • Acute on chronic diastolic heart failure (CMS/HCC)    • Arthritis    • Asthma    • Atrial fibrillation (CMS/HCC)     Persistent; on warfarin   • Atypical chest pain    • Bronchitis    • Cellulitis of right elbow     due to MRSA   • CHF (congestive heart failure) (CMS/HCC)    • COPD (chronic obstructive pulmonary disease) (CMS/HCC)    • Coronary artery disease     Cardiac  catheterization completed; 90% PDA stenosis with medical management recommended   • Coronary artery disease involving native coronary artery of native heart with angina pectoris with documented spasm (CMS/[facility])    • Diabetes 1.5, 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[facility])    • Disease of thyroid gland    • Displacement of lumbar intervertebral disc without myelopathy    • Dizziness    • ANTOINE (dyspnea on exertion)    • Essential hypertension    • Fatigue    • Generalized osteoarthritis of multiple sites    • History of rheumatic fever    • History of transfusion    • Hx of bone density study     10/23/2014   • Hyperglycemia    • Hyperlipidemia    • Hypovitaminosis D    • Left arm pain    • Left-sided weakness    • Leg swelling    • Low back pain    • Lower extremity edema    • Malaise and fatigue    • Mitral valve disease     Moderate mitral valve prolapse and moderate mitral regurgitation   • Mitral valve insufficiency    • Morbid obesity with BMI of 40.0-44.9, adult (CMS/[facility])    • MRSA infection    • NSTEMI (non-ST elevated myocardial infarction) (CMS/[facility])    • PAF (paroxysmal atrial fibrillation) (CMS/[facility])    • RA (rheumatoid arthritis) (CMS/[facility])     involving both hands   • Sleep apnea    • SOB (shortness of breath)    • Stroke (CMS/[facility])     left side weakness   • Urge incontinence of urine    • UTI (urinary tract infection) 05/2020   • Vitamin D deficiency      Family History   Problem Relation Age of Onset   • Colon cancer Mother    • Glaucoma Mother    • Stroke Mother    • Arthritis Mother    • Hypertension Mother    • Glaucoma Sister    • Diabetes Sister    • Heart disease Sister    • Arthritis Sister    • Asthma Sister    • Hypertension Sister    • Miscarriages / Stillbirths Sister    • Lung disease Sister    • Heart disease Brother    • Diabetes Brother    • Arthritis Brother    • Drug abuse Brother    • Hypertension Brother    • Arthritis Father    • COPD Father    • Lung disease Father    • Arthritis Daughter    •  Depression Daughter    • Alcohol abuse Maternal Uncle    • Heart disease Sister    • Heart disease Sister    • Heart disease Brother      Social History     Socioeconomic History   • Marital status:      Spouse name: Suresh   • Number of children: 5   • Years of education: 13   • Highest education level: Not on file   Occupational History   • Occupation:  for [employer]     Employer: RETIRED   Tobacco Use   • Smoking status: Former Smoker     Packs/day: 3.00     Types: Cigarettes     Start date: 1967     Quit date: 10/19/1968     Years since quittin.1   • Smokeless tobacco: Never Used   • Tobacco comment: caffeine use - decaf coffee   Substance and Sexual Activity   • Alcohol use: No     Comment: No caffeine use   • Drug use: No   • Sexual activity: Defer     Allergies   Allergen Reactions   • Contrast Dye Hives and Itching       Current Outpatient Medications:   •  acetaminophen (TYLENOL) 500 MG tablet, Take 500 mg by mouth Every 6 (Six) Hours As Needed for Mild Pain ., Disp: , Rfl:   •  aspirin 81 MG EC tablet, Take 1 tablet by mouth Daily., Disp: , Rfl:   •  atorvastatin (LIPITOR) 10 MG tablet, Take 1 tablet by mouth Daily., Disp: 90 tablet, Rfl: 3  •  Blood Glucose Monitoring Suppl (ACCU-CHEK GUIDE) w/Device kit, See Admin Instructions., Disp: , Rfl:   •  carvedilol (COREG) 25 MG tablet, TAKE 1 & 1/2 (ONE & ONE-HALF) TABLETS BY MOUTH TWICE DAILY WITH MEALS, Disp: 270 tablet, Rfl: 0  •  digoxin (LANOXIN) 125 MCG tablet, TAKE 1 TABLET BY MOUTH EVERY OTHER DAY, Disp: 30 tablet, Rfl: 5  •  furosemide (LASIX) 40 MG tablet, Take 1 tablet by mouth Daily., Disp: 30 tablet, Rfl: 6  •  glucose blood test strip, Use as instructed, Disp: 300 each, Rfl: 12  •  HYDROcodone-acetaminophen (NORCO) 5-325 MG per tablet, Take 1 tablet by mouth 2 (Two) Times a Day As Needed for Severe Pain ., Disp: 40 tablet, Rfl: 0  •  Lancets (ACCU-CHEK MULTICLIX) lancets, Use 1 lancet per finger stick, Disp:  "204 each, Rfl: 12  •  lisinopril (PRINIVIL,ZESTRIL) 10 MG tablet, Take 1 tablet by mouth once daily, Disp: 90 tablet, Rfl: 0  •  magnesium oxide (MAG-OX) 400 MG tablet, Take 400 mg by mouth Daily., Disp: , Rfl:   •  potassium chloride (K-DUR) 10 MEQ CR tablet, Take 4 tablets by mouth Daily., Disp: 360 tablet, Rfl: 1  •  temazepam (RESTORIL) 22.5 MG capsule, Take 1 capsule by mouth At Night As Needed for Sleep., Disp: 30 capsule, Rfl: 3  •  TRAVATAN Z 0.004 % solution ophthalmic solution, , Disp: , Rfl:   •  vitamin D (ERGOCALCIFEROL) 1.25 MG (47469 UT) capsule capsule, Take 1 capsule by mouth once a week, Disp: 12 capsule, Rfl: 0  •  warfarin (COUMADIN) 5 MG tablet, TAKE 1 TABLET BY MOUTH ONCE DAILY OR  AS  DIRECTED  BY  MEDICATION  MANAGEMENT  CLINIC, Disp: 90 tablet, Rfl: 0  •  SITagliptin (Januvia) 100 MG tablet, Take 1 tablet by mouth Daily., Disp: 30 tablet, Rfl: 11        Review of Systems   Constitutional: Positive for appetite change and fatigue. Negative for fever.   Eyes: Negative for visual disturbance.   Respiratory: Negative for shortness of breath.    Cardiovascular: Negative for palpitations and leg swelling.   Gastrointestinal: Negative for abdominal pain and vomiting.   Endocrine: Negative for polydipsia and polyuria.   Musculoskeletal: Negative for joint swelling and neck pain.   Skin: Negative for rash.   Neurological: Negative for weakness and numbness.   Psychiatric/Behavioral: Negative for behavioral problems.     I have reviewed and confirmed the accuracy of the ROS as documented by the MA/LPN/RN Ed Ho MD      Objective       Vitals:    11/19/20 1333   BP: 118/66   Pulse: 73   Resp: 16   Temp: 97 °F (36.1 °C)   SpO2: 96%   Weight: 109 kg (240 lb)   Height: 167.6 cm (66\")     Body mass index is 38.74 kg/m².      Physical Exam  Vitals signs reviewed.   Constitutional:       Appearance: She is not diaphoretic.      Comments: Obese     HENT:      Head: Normocephalic and atraumatic. "   Eyes:      General: No scleral icterus.     Conjunctiva/sclera: Conjunctivae normal.   Neck:      Musculoskeletal: Normal range of motion and neck supple.      Thyroid: No thyromegaly.      Comments: Acanthosis nigricans  Cardiovascular:      Rate and Rhythm: Normal rate.      Heart sounds: Normal heart sounds.   Pulmonary:      Effort: Pulmonary effort is normal.      Breath sounds: Normal breath sounds. No stridor. No wheezing.   Abdominal:      General: Bowel sounds are normal. There is no distension.      Palpations: Abdomen is soft.      Tenderness: There is no abdominal tenderness.      Comments: Central obesity   Musculoskeletal:         General: No tenderness.   Skin:     General: Skin is warm and dry.   Neurological:      Mental Status: She is alert and oriented to person, place, and time.         Results Review:     I reviewed the patient's new clinical results and mentioned them above in HPI and in plan as well.    Medical records reviewed  Summary:Done      Results Encounter on 11/02/2020   Component Date Value Ref Range Status   • Hemoglobin A1C 11/16/2020 6.7* 4.8 - 5.6 % Final    Comment:          Prediabetes: 5.7 - 6.4           Diabetes: >6.4           Glycemic control for adults with diabetes: <7.0     • Creatinine 11/16/2020 1.04* 0.57 - 1.00 mg/dL Final   • eGFR Non African Am 11/16/2020 53* >59 mL/min/1.73 Final   • eGFR African Am 11/16/2020 62  >59 mL/min/1.73 Final   • Total Cholesterol 11/16/2020 121  100 - 199 mg/dL Final   • Triglycerides 11/16/2020 110  0 - 149 mg/dL Final   • HDL Cholesterol 11/16/2020 61  >39 mg/dL Final   • VLDL Cholesterol Ned 11/16/2020 20  5 - 40 mg/dL Final   • LDL Chol Calc ([facility]) 11/16/2020 40  0 - 99 mg/dL Final   • TSH 11/16/2020 1.020  0.450 - 4.500 uIU/mL Final   • Free T4 11/16/2020 1.35  0.82 - 1.77 ng/dL Final   • Vitamin B-12 11/16/2020 657  232 - 1,245 pg/mL Final   • Folate 11/16/2020 10.9  >3.0 ng/mL Final    Comment: A serum folate concentration of  less than 3.1 ng/mL is  considered to represent clinical deficiency.     • Interpretation 11/16/2020 Note   Corrected    Supplemental report is available.   • PDF Image 11/16/2020 Not applicable   Corrected   • Interpretation 11/16/2020 Note   Corrected    Comment: -------------------------------  CHRONIC KIDNEY DISEASE:  EGFR, BLOOD PRESSURE, AND PROTEINURIA ASSESSMENT  Most recent order does not include a Renal Panel.  EGFR, BLOOD PRESSURE, AND PROTEINURIA FOLLOW-UP  -  Spot Urine Panel is recommended by KDOQI guidelines, at  least yearly;  -  BONE and MINERAL ASSESSMENT  Most recent order does not include a Renal Panel.  BONE and MINERAL FOLLOW-UP  -  25-Hydroxy Vitamin D is recommended by KDOQI guidelines, at  least yearly;  -  LIPIDS ASSESSMENT  LDL-C is optimal, 40 mg/dL. Triglyceride is normal, 110  mg/dL. Non-HDL Cholesterol is optimal, 60 mg/dL. HDL-C is  high, 61 mg/dL.  LIPIDS TREATMENT SUGGESTIONS  -  Therapeutic lifestyle changes are always valuable to  maintain optimal blood lipid status (diet, exercise, weight  management). Continue statin if in use. Consider measurement  of LDL particle number or Apo B to adjudicate need for  further LDL lowering therapy. If statin cannot be tolerated  or increased, alternatives include use of an                            intestinal  agent (ezetimibe or bile acid sequestrant) or niacin.  LIPIDS FOLLOW-UP  -  fasting Lipid Panel within 12 months;  -  ANEMIA ASSESSMENT  Most recent order does not include a CBC Panel or iron  studies.  ANEMIA FOLLOW-UP  -  CBC is recommended by KDOQI guidelines, at least yearly;  -------------------------------  DISCLAIMER  These assessments and treatment suggestions are provided as  a convenience in support of the physician-patient  relationship and are not intended to replace the physician's  clinical judgment. They are derived from national guidelines  in addition to other evidence and expert opinion. The  clinician should consider  this information within the  context of clinical opinion and the individual patient.  SEE GUIDANCE FOR CHRONIC KIDNEY DISEASE PROGRAM: Kidney  Disease Improving Global Outcomes (KDIGO) clinical practice  guidelines are at http://kdigo.org/home/guidelines/.  National Kidney Foundation Kidney Disease Outcomes Quality  Initiative (KDOQI (TM)), w                           ith its limitations and  disclaimers, are at www.kidney.org/professionals/KDOQI. This  program is intended for patients who have been diagnosed  with stages 3, 4, or pre-dialysis 5 CKD. It is not intended  for children, pregnant patients, or transplant patients.     • PDF Image 11/16/2020 Not applicable   Corrected     Lab Results   Component Value Date    HGBA1C 6.7 (H) 11/16/2020    HGBA1C 6.30 (H) 09/17/2020    HGBA1C 7.80 (H) 06/04/2020     Lab Results   Component Value Date    CREATININE 1.04 (H) 11/16/2020     Imaging Results (Most Recent)     None                Assessment and Plan:    Diagnoses and all orders for this visit:    1. DM (diabetes mellitus), type 2, uncontrolled w/neurologic complication (CMS/HCC) (Primary)  -     Hemoglobin A1c; Future  -     Creatinine, Serum; Future  -     eGFR-Glomerular Filtration; Future  -     Lipid Panel; Future  -     Microalbumin / Creatinine Urine Ratio - Urine, Clean Catch; Future  -     TSH; Future  -     Vitamin B12 & Folate; Future  -     T4, Free; Future    2. Hyperlipemia, mixed  -     Hemoglobin A1c; Future  -     Creatinine, Serum; Future  -     eGFR-Glomerular Filtration; Future  -     Lipid Panel; Future  -     Microalbumin / Creatinine Urine Ratio - Urine, Clean Catch; Future  -     TSH; Future  -     Vitamin B12 & Folate; Future  -     T4, Free; Future    Other orders  -     SITagliptin (Januvia) 100 MG tablet; Take 1 tablet by mouth Daily.  Dispense: 30 tablet; Refill: 11      Type 2 diabetes mellitus-uncontrolled, complicated with the side effects of Metformin  Stop Metformin  Start  "Januvia.    Discussed the benefits and the side effects of the medication.    Hyperlipidemia  Continue Lipitor.    Obesity  Discussed with the patient about dietary restrictions and behavioral modifications.  Advised the patient to restrict her calories to 1200 and to be as active as she could by exercising 2-3 times a week.    Reviewed Lab results with the patient.               Ed Ho MD  11/19/20    EMR Dragon / transcription disclaimer:     \"Dictated utilizing Dragon dictation\".      "

## 2020-11-29 NOTE — PROGRESS NOTES
Case Management Discharge Note    Final Note: Pt was dc'd home over the weekend    Discharge Placement     No information found        Other: Other    Discharge Codes: 01  Discharge to home   0

## 2020-11-30 RX ORDER — ERGOCALCIFEROL 1.25 MG/1
CAPSULE ORAL
Qty: 12 CAPSULE | Refills: 0 | Status: SHIPPED | OUTPATIENT
Start: 2020-11-30 | End: 2021-03-04 | Stop reason: SDUPTHER

## 2020-12-01 RX ORDER — HYDROCODONE BITARTRATE AND ACETAMINOPHEN 5; 325 MG/1; MG/1
1 TABLET ORAL 2 TIMES DAILY PRN
Qty: 40 TABLET | Refills: 0 | Status: SHIPPED | OUTPATIENT
Start: 2020-12-01 | End: 2020-12-28 | Stop reason: SDUPTHER

## 2020-12-01 NOTE — TELEPHONE ENCOUNTER
Medication Refill Request    Date of phone call: 20    Medication being requested: Norco 5-325 mg si tab po BID prn  Qty: 40    Date of last visit: 20    Date of last refill:     MARCO up to date?: no    Next Follow up?: 20    Any new pertinent information? (i.e, new medication allergies, new use of medications, change in patient's health or condition, non-compliance or inconsistency with prescribing agreement?):     Reviewed UDS and MARCO. Both updated and appropriate. Refill appropriate.

## 2020-12-03 ENCOUNTER — ANTICOAGULATION VISIT (OUTPATIENT)
Dept: PHARMACY | Facility: HOSPITAL | Age: 75
End: 2020-12-03

## 2020-12-03 LAB
INR PPP: 2.7 (ref 0.91–1.09)
PROTHROMBIN TIME: 32.4 SECONDS (ref 10–13.8)

## 2020-12-03 PROCEDURE — 36416 COLLJ CAPILLARY BLOOD SPEC: CPT

## 2020-12-03 PROCEDURE — 85610 PROTHROMBIN TIME: CPT

## 2020-12-03 NOTE — PROGRESS NOTES
Anticoagulation Clinic Progress Note    Anticoagulation Summary  As of 12/3/2020    INR goal:  2.0-3.0   TTR:  74.0 % (2.1 y)   INR used for dosin.7 (12/3/2020)   Warfarin maintenance plan:  2.5 mg every Wed; 5 mg all other days   Weekly warfarin total:  32.5 mg   No change documented:  Lillian Lloyd, Pharmacy Intern   Plan last modified:  Nano Cool RPH (10/7/2020)   Next INR check:  2020   Priority:  Maintenance   Target end date:  Indefinite    Indications    Atrial fibrillation (CMS/[facility]) [I48.91]             Anticoagulation Episode Summary     INR check location:      Preferred lab:      Send INR reminders to:   [facility] [place]    Comments:        Anticoagulation Care Providers     Provider Role Specialty Phone number    Pia Dueñas MD Referring Cardiology [phone]          Clinic Interview:  Patient Findings     Negatives:  Signs/symptoms of thrombosis, Signs/symptoms of bleeding,   Laboratory test error suspected, Change in health, Change in alcohol use,   Change in activity, Upcoming invasive procedure, Emergency department   visit, Upcoming dental procedure, Missed doses, Extra doses, Change in   medications, Change in diet/appetite, Hospital admission, Bruising, Other   complaints    Comments:  Pt thinks she might have taken 5 mg yesterday () instead   of 2.5 mg but isn't sure      Clinical Outcomes     Negatives:  Major bleeding event, Thromboembolic event,   Anticoagulation-related hospital admission, Anticoagulation-related ED   visit, Anticoagulation-related fatality    Comments:  Pt thinks she might have taken 5 mg yesterday () instead   of 2.5 mg but isn't sure        INR History:  Anticoagulation Monitoring 10/21/2020 2020 12/3/2020   INR 2.6 2.5 2.7   INR Date 10/21/2020 2020 12/3/2020   INR Goal 2.0-3.0 2.0-3.0 2.0-3.0   Trend Same Same Same   Last Week Total 32.5 mg 32.5 mg 32.5 mg   Next Week Total 32.5 mg 32.5 mg 32.5 mg   Sun 5 mg 5 mg 5  mg   Mon 5 mg 5 mg 5 mg   Tue 5 mg 5 mg 5 mg   Wed 2.5 mg 2.5 mg 2.5 mg   Thu 5 mg 5 mg 5 mg   Fri 5 mg 5 mg 5 mg   Sat 5 mg 5 mg 5 mg   Visit Report - - -   Some recent data might be hidden       Plan:  1. INR is Therapeutic today- see above in Anticoagulation Summary.  Will instruct Lana Otilio to Continue their warfarin regimen- see above in Anticoagulation Summary.  2. Follow up in 1 month  3. Patient declines warfarin refills.  4. Verbal and written information provided. Patient expresses understanding and has no further questions at this time.    Lillian Lloyd, Pharmacy Intern

## 2020-12-03 NOTE — PROGRESS NOTES
I have supervised and reviewed the notes, assessments, and/or procedures performed by our Pharmacy Intern. The documented assessment and plan were developed cooperatively. I concur with the documentation of this patient encounter.    Shaw Hand RP

## 2020-12-17 ENCOUNTER — OFFICE VISIT (OUTPATIENT)
Dept: PAIN MEDICINE | Facility: CLINIC | Age: 75
End: 2020-12-17

## 2020-12-17 ENCOUNTER — OFFICE VISIT (OUTPATIENT)
Dept: INTERNAL MEDICINE | Facility: CLINIC | Age: 75
End: 2020-12-17

## 2020-12-17 VITALS
HEIGHT: 66 IN | HEART RATE: 94 BPM | TEMPERATURE: 96.8 F | OXYGEN SATURATION: 97 % | RESPIRATION RATE: 20 BRPM | BODY MASS INDEX: 40.02 KG/M2 | WEIGHT: 249 LBS | DIASTOLIC BLOOD PRESSURE: 67 MMHG | SYSTOLIC BLOOD PRESSURE: 106 MMHG

## 2020-12-17 DIAGNOSIS — M05.741 RHEUMATOID ARTHRITIS INVOLVING BOTH HANDS WITH POSITIVE RHEUMATOID FACTOR (HCC): ICD-10-CM

## 2020-12-17 DIAGNOSIS — M15.9 GENERALIZED OSTEOARTHRITIS OF MULTIPLE SITES: ICD-10-CM

## 2020-12-17 DIAGNOSIS — E78.2 MIXED HYPERLIPIDEMIA: ICD-10-CM

## 2020-12-17 DIAGNOSIS — E13.9 DIABETES 1.5, MANAGED AS TYPE 2 (HCC): ICD-10-CM

## 2020-12-17 DIAGNOSIS — I10 ESSENTIAL HYPERTENSION: Primary | ICD-10-CM

## 2020-12-17 DIAGNOSIS — G89.29 OTHER CHRONIC PAIN: Primary | ICD-10-CM

## 2020-12-17 DIAGNOSIS — Z79.899 CONTROLLED SUBSTANCE AGREEMENT SIGNED: ICD-10-CM

## 2020-12-17 DIAGNOSIS — M51.26 DISPLACEMENT OF LUMBAR INTERVERTEBRAL DISC WITHOUT MYELOPATHY: ICD-10-CM

## 2020-12-17 DIAGNOSIS — B37.31 VAGINAL CANDIDIASIS: ICD-10-CM

## 2020-12-17 DIAGNOSIS — M05.742 RHEUMATOID ARTHRITIS INVOLVING BOTH HANDS WITH POSITIVE RHEUMATOID FACTOR (HCC): ICD-10-CM

## 2020-12-17 DIAGNOSIS — I25.119 CORONARY ARTERY DISEASE INVOLVING NATIVE CORONARY ARTERY OF NATIVE HEART WITH ANGINA PECTORIS (HCC): ICD-10-CM

## 2020-12-17 DIAGNOSIS — I48.0 PAROXYSMAL ATRIAL FIBRILLATION (HCC): ICD-10-CM

## 2020-12-17 PROCEDURE — 99442 PR PHYS/QHP TELEPHONE EVALUATION 11-20 MIN: CPT | Performed by: INTERNAL MEDICINE

## 2020-12-17 PROCEDURE — 96372 THER/PROPH/DIAG INJ SC/IM: CPT | Performed by: NURSE PRACTITIONER

## 2020-12-17 PROCEDURE — 99214 OFFICE O/P EST MOD 30 MIN: CPT | Performed by: NURSE PRACTITIONER

## 2020-12-17 RX ORDER — KETOCONAZOLE 200 MG/1
200 TABLET ORAL DAILY
Qty: 7 TABLET | Refills: 0 | Status: SHIPPED | OUTPATIENT
Start: 2020-12-17 | End: 2021-02-04

## 2020-12-17 RX ORDER — LISINOPRIL 10 MG/1
10 TABLET ORAL DAILY
Qty: 90 TABLET | Refills: 3 | Status: SHIPPED | OUTPATIENT
Start: 2020-12-17 | End: 2021-08-12 | Stop reason: SDUPTHER

## 2020-12-17 RX ORDER — METHYLPREDNISOLONE ACETATE 40 MG/ML
40 INJECTION, SUSPENSION INTRA-ARTICULAR; INTRALESIONAL; INTRAMUSCULAR; SOFT TISSUE ONCE
Status: COMPLETED | OUTPATIENT
Start: 2020-12-17 | End: 2020-12-17

## 2020-12-17 RX ADMIN — METHYLPREDNISOLONE ACETATE 40 MG: 40 INJECTION, SUSPENSION INTRA-ARTICULAR; INTRALESIONAL; INTRAMUSCULAR; SOFT TISSUE at 12:16

## 2020-12-17 NOTE — PROGRESS NOTES
CHIEF COMPLAINT  F/u back pain. Pt sts pain has worsened since last ov.     Karl Yañez is a 75 y.o. female  who presents for follow-up.  She has a history of chronic back and joint pain. Reports her pain is worse since last evaluation. She is unsure if this is due to weather changes and inactivity.     Complains of pain in her low back and joints. Today her pain is 6/10VAs. Describes her pain as nearly continuous aching and throbbing. Pain increases with activity, household chores, prolonged position; pain decreases with medication, rest and heat. Continues with Hydrocodone 5/325 1-2/day PRN. Denies any side effects from the regimen. The regimen helps decrease her pain by 25-50%.   ADL's by self.  Denies any bowel or bladder changes.     Reports she has a diagnosis of rheumatoid arthritis but is not currently being treated for this.     Reports she had left shoulder injection with Dr robel Rodriges last month. Does notice improvement with this.  Gets this every 3 months.  Needs a replacement but not a surgery candidate.     Prior to COVID pandemic, she was going to pool and doing aquatherapy. Was doing better while doing this. Concerned about retuning right now.      She needs a left shoulder replacement, but this is not planned, as she would need bridge for Coumadin.  She states she did ask Dr Tavarez about pain medication, but he wanted her to see pain management.     Recently bought a cane and has been using. Uses on right side of body. Cannot use on left side due to shoulder pain.     She was initially evaluated in the office by Dr. Carmen Kirk on 8/28/2018.  She was referred here by Dr. Perez for evaluation of back pain.  Has had back pain for 10 years on and off.  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 fib not being able to stop anticoagulant.  No stents were placed but is high risk for surgery.  Started on tramadol for pain.  No previous  pain clinics, back injections, no history of opioids prior to current tramadol by neurosurgery.  Currently followed by Dr. Dueñas for cardiacs issues.  Reviewed imaging with patient.  May benefit from LESI but would have to hold anticoagulant.  We will reach out to Dr. Mota in regards to holding anticoagulant if possible.  Random urine drug screen per office policy.  No benefit with tramadol.  Trial of hydrocodone 5-325 2-3 times a day as needed as needed.  Plan to start low-dose gabapentin.  Prescription for gabapentin 100 mg to increase up to twice daily as tolerated.    Patient remained masked during entire encounter. No cough present. I donned a mask and eye protection throughout entire visit. Prior to donning mask and eye protection, hand hygiene was performed, as well as when it was doffed.  I was closer than 6 feet, but not for an extended period of time. No obvious exposure to any bodily fluids.    Back Pain  This is a chronic problem. The current episode started more than 1 year ago. The problem occurs constantly. The problem has been gradually worsening (worse since last evaluation) since onset. The pain is present in the lumbar spine and sacro-iliac. The quality of the pain is described as aching. Radiates to: LLE. The pain is at a severity of 6/10. The pain is moderate. The pain is worse during the day. The symptoms are aggravated by bending, position, standing and twisting. Pertinent negatives include no abdominal pain, chest pain, dysuria, fever, headaches, numbness or weakness. Risk factors include lack of exercise, menopause, obesity and sedentary lifestyle. She has tried analgesics, bed rest, chiropractic manipulation, heat, home exercises, ice, muscle relaxant, walking and NSAIDs for the symptoms. The treatment provided mild relief.   Joint Pain  The problem occurs constantly. The problem has been gradually worsening. Associated symptoms include arthralgias. Pertinent negatives include no abdominal  pain, chest pain, congestion, coughing, fatigue, fever, headaches, numbness or weakness. She has tried oral narcotics for the symptoms. The treatment provided mild relief.      Past pain medications:   norco 5/325 mg - helpful  Gabapentin 800 - dizzy, hurt stomach  Tramadol 50 mg up to 4/day - not helping     Current pain medications:   Tylenol OTC  Hydrocodone 5/325 1-2/day PRN      Past therapies:  Physical Therapy: yes- minimal  Chiropractor: no  Massage Therapy: no  TENS: yes  Neck or back surgery: no  Past pain management: no     Previous Injections: none-- Does not want procedures due to risk with anticoagulants.      PEG Assessment   What number best describes your pain on average in the past week?6  What number best describes how, during the past week, pain has interfered with your enjoyment of life?6  What number best describes how, during the past week, pain has interfered with your general activity?  6    The following portions of the patient's history were reviewed and updated as appropriate: allergies, current medications, past family history, past medical history, past social history, past surgical history and problem list.    Review of Systems   Constitutional: Negative for activity change, fatigue, fever and unexpected weight change.   HENT: Negative for congestion.    Eyes: Negative for visual disturbance.   Respiratory: Negative for cough and shortness of breath.    Cardiovascular: Negative for chest pain.   Gastrointestinal: Negative for abdominal pain, constipation and diarrhea.   Genitourinary: Negative for difficulty urinating and dysuria.   Musculoskeletal: Positive for arthralgias and back pain.   Allergic/Immunologic: Negative for immunocompromised state.   Neurological: Negative for dizziness, weakness, light-headedness, numbness and headaches.   Psychiatric/Behavioral: Negative for agitation, sleep disturbance and suicidal ideas. The patient is not nervous/anxious.      I have reviewed and  "confirmed the accuracy of the ROS as documented by the MA/LPN/RN Kalpana Kaiser, ANGELA    Vitals:    12/17/20 1142   BP: 106/67   Pulse: 94   Resp: 20   Temp: 96.8 °F (36 °C)   SpO2: 97%   Weight: 113 kg (249 lb)   Height: 167.6 cm (66\")   PainSc:   6   PainLoc: Back     Objective   Physical Exam  Vitals signs and nursing note reviewed.   Constitutional:       Appearance: Normal appearance. She is well-developed.   HENT:      Head: Normocephalic and atraumatic.      Nose: Nose normal.   Eyes:      General: Lids are normal.      Conjunctiva/sclera: Conjunctivae normal.   Cardiovascular:      Rate and Rhythm: Normal rate.   Pulmonary:      Effort: Pulmonary effort is normal. No respiratory distress.   Musculoskeletal:      Lumbar back: She exhibits decreased range of motion and tenderness.      Comments: Widespread OA changes with no acute synovitis noted   Skin:     General: Skin is warm and dry.   Neurological:      Mental Status: She is alert and oriented to person, place, and time.      Gait: Gait abnormal (walker).   Psychiatric:         Speech: Speech normal.         Behavior: Behavior normal. Behavior is cooperative.         Thought Content: Thought content normal.         Judgment: Judgment normal.         Assessment/Plan   Diagnoses and all orders for this visit:    1. Other chronic pain (Primary)    2. Displacement of lumbar intervertebral disc without myelopathy  -     methylPREDNISolone acetate (DEPO-medrol) injection 40 mg    3. Generalized osteoarthritis of multiple sites  -     methylPREDNISolone acetate (DEPO-medrol) injection 40 mg    4. Controlled substance agreement signed    5. Rheumatoid arthritis involving both hands with positive rheumatoid factor (CMS/[facility])  -     Ambulatory Referral to Rheumatology      --- The urine drug screen confirmation from 7-22-20 has been reviewed and the result is APPROPRIATE based on patient history and MARCO report  --- Continue with regimen. Not due for refill. " Will call as needed. Patient appears stable with current regimen. No adverse effects. Regarding continuation of opioids, there is no evidence of aberrant behavior or any red flags.  The patient continues with appropriate response to opioid therapy. ADL's remain intact by self.   --- DepoMedrol 40 mg IM now.  --- Referral to rheumatologist.  --- Follow-up 3 months or sooner if needed.       MARCO REPORT  As part of the patient's treatment plan, I am prescribing controlled substances. The patient has been made aware of appropriate use of such medications, including potential risk of somnolence, limited ability to drive and/or work safely, and the potential for dependence or overdose. It has also bee made clear that these medications are for use by this patient only, without concomitant use of alcohol or other substances unless prescribed.     Patient has completed prescribing agreement detailing terms of continued prescribing of controlled substances, including monitoring MARCO reports, urine drug screening, and pill counts if necessary. The patient is aware that inappropriate use will results in cessation of prescribing such medications.    MARCO report has been reviewed and scanned into the patient's chart.    As the clinician, I personally reviewed the MARCO from 12-17-20 while the patient was in the office today.    History and physical exam exhibit continued safe and appropriate use of controlled substances.        EMR Dragon/Transcription disclaimer:   Much of this encounter note is an electronic transcription/translation of spoken language to printed text. The electronic translation of spoken language may permit erroneous, or at times, nonsensical words or phrases to be inadvertently transcribed; Although I have reviewed the note for such errors, some may still exist.

## 2020-12-17 NOTE — PROGRESS NOTES
Karl Yañez is a 75 y.o. female.  Patient presents by telephone with chief complaint of essential hypertension, hyperlipidemia, coronary artery disease with stable, paroxysmal atrial fibrillation, type 2 diabetes mellitus that is relatively well controlled, rheumatoid arthritis for which she is going to see a rheumatologist shortly and signs and symptoms of vaginal candidiasis for the last week which not unusual for this patient given her diabetes and body habitus.  I spent approximately 15 minutes on the phone with the patient reviewed all of her medical problems and her medicines at this time and she is doing relatively well other than the candidiasis.      LMP  (LMP Unknown)     There is no height or weight on file to calculate BMI.    History of Present Illness signs and symptoms of vaginal candidiasis for the last 2 weeks    The following portions of the patient's history were reviewed and updated as appropriate: allergies, current medications, past family history, past medical history, past social history, past surgical history and problem list.    Review of Systems   Constitutional: Negative.    HENT: Negative.    Respiratory: Negative.    Cardiovascular: Negative.    Gastrointestinal: Negative.    Genitourinary: Positive for vaginal discharge.   Musculoskeletal: Positive for arthralgias, back pain and myalgias.   Neurological: Negative.    Psychiatric/Behavioral: Negative.        Objective   Physical Exam  Nursing note reviewed.   Cardiovascular:      Comments: No cardiovascular complaints at this time  Pulmonary:      Comments: No pulmonary complaints at this time  Abdominal:      Comments: No abdominal complaints at this time   Genitourinary:     Comments: Patient has vaginal pruritus and a white discharge consistent with vaginal candidiasis  Musculoskeletal:      Comments: Extensive joint pain secondary to her rheumatoid arthritis as reported by the patient   Neurological:      General:  No focal deficit present.      Mental Status: She is alert.   Psychiatric:         Mood and Affect: Mood normal.         Behavior: Behavior normal.           Assessment/Plan   Diagnoses and all orders for this visit:    1. Essential hypertension (Primary)  Comments:  I have renewed her lisinopril 10 mg/day    2. Mixed hyperlipidemia  Comments:  No change in therapy warranted at this time    3. Coronary artery disease involving native coronary artery of native heart with angina pectoris (CMS/[facility])  Comments:  Stable coronary disease no change in therapy required    4. Paroxysmal atrial fibrillation (CMS/[facility])  Comments:  Doing well overall no breakthrough at this time    5. Diabetes 1.5, 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[facility])  Comments:  Not having any issues with hypoglycemia at this point    6. Rheumatoid arthritis involving both hands with positive rheumatoid factor (CMS/[facility])  Comments:  Working with pain management currently    7. Vaginal candidiasis  Comments:  Nizoral 200 mg/day for 3 days    Other orders  -     lisinopril (PRINIVIL,ZESTRIL) 10 MG tablet; Take 1 tablet by mouth Daily.  Dispense: 90 tablet; Refill: 3  -     ketoconazole (NIZORAL) 200 MG tablet; Take 1 tablet by mouth Daily.  Dispense: 7 tablet; Refill: 0

## 2020-12-23 RX ORDER — WARFARIN SODIUM 5 MG/1
TABLET ORAL
Qty: 90 TABLET | Refills: 1 | Status: SHIPPED | OUTPATIENT
Start: 2020-12-23 | End: 2021-07-02 | Stop reason: SDUPTHER

## 2020-12-28 NOTE — TELEPHONE ENCOUNTER
Medication Refill Request    Date of phone call: 20    Medication being requested: Norco 5/325 mg sixday PRN  Qty: 40    Date of last visit: 20    Date of last refill: 20    MARCO up to date?: 20    Next Follow up?: 3/11/20    Any new pertinent information? (i.e, new medication allergies, new use of medications, change in patient's health or condition, non-compliance or inconsistency with prescribing agreement?): Kalpana hubbard

## 2020-12-29 RX ORDER — HYDROCODONE BITARTRATE AND ACETAMINOPHEN 5; 325 MG/1; MG/1
1 TABLET ORAL 2 TIMES DAILY PRN
Qty: 40 TABLET | Refills: 0 | Status: SHIPPED | OUTPATIENT
Start: 2020-12-29 | End: 2021-02-25 | Stop reason: SDUPTHER

## 2020-12-30 ENCOUNTER — ANTICOAGULATION VISIT (OUTPATIENT)
Dept: PHARMACY | Facility: HOSPITAL | Age: 75
End: 2020-12-30

## 2020-12-30 LAB
INR PPP: 1.5 (ref 0.91–1.09)
PROTHROMBIN TIME: 18 SECONDS (ref 10–13.8)

## 2020-12-30 PROCEDURE — 85610 PROTHROMBIN TIME: CPT

## 2020-12-30 PROCEDURE — G0463 HOSPITAL OUTPT CLINIC VISIT: HCPCS

## 2020-12-30 PROCEDURE — 36416 COLLJ CAPILLARY BLOOD SPEC: CPT

## 2020-12-30 NOTE — PROGRESS NOTES
Anticoagulation Clinic Progress Note    Anticoagulation Summary  As of 2020    INR goal:  2.0-3.0   TTR:  73.4 % (2.2 y)   INR used for dosin.5 (2020)   Warfarin maintenance plan:  2.5 mg every Wed; 5 mg all other days   Weekly warfarin total:  32.5 mg   Plan last modified:  Nano Cool RPH (10/7/2020)   Next INR check:  2021   Priority:  Maintenance   Target end date:  Indefinite    Indications    Atrial fibrillation (CMS/HCC) [I48.91]             Anticoagulation Episode Summary     INR check location:      Preferred lab:      Send INR reminders to:  [facility]    Comments:        Anticoagulation Care Providers     Provider Role Specialty Phone number    Pia Dueñas MD Referring Cardiology [phone]          Clinic Interview:  Patient Findings     Positives:  Change in diet/appetite, Other complaints    Negatives:  Signs/symptoms of thrombosis, Signs/symptoms of bleeding,   Laboratory test error suspected, Change in health, Change in alcohol use,   Change in activity, Upcoming invasive procedure, Emergency department   visit, Upcoming dental procedure, Missed doses, Extra doses, Change in   medications, Hospital admission, Bruising    Comments:  Cooked prisca greens on [place] and leftover. Accidentally   took 2.5-mg dose on Sat instead of Wed last wk.      Clinical Outcomes     Negatives:  Major bleeding event, Thromboembolic event,   Anticoagulation-related hospital admission, Anticoagulation-related ED   visit, Anticoagulation-related fatality    Comments:  Cooked prisca greens on [place] and leftover. Accidentally   took 2.5-mg dose on Sat instead of Wed last wk.        INR History:  Anticoagulation Monitoring 2020 12/3/2020 2020   INR 2.5 2.7 1.5   INR Date 2020 12/3/2020 2020   INR Goal 2.0-3.0 2.0-3.0 2.0-3.0   Trend Same Same Same   Last Week Total 32.5 mg 32.5 mg 32.5 mg   Next Week Total 32.5 mg 32.5 mg 35 mg   Sun 5 mg 5 mg 5 mg      Mon 5 mg 5 mg 5 mg   Tue 5 mg 5 mg 5 mg   Wed 2.5 mg 2.5 mg 5 mg (12/30); Otherwise 2.5 mg   Thu 5 mg 5 mg 5 mg   Fri 5 mg 5 mg 5 mg   Sat 5 mg 5 mg 5 mg   Visit Report - - -   Some recent data might be hidden       Plan:  1. INR is Subtherapeutic today- see above in Anticoagulation Summary.  Will instruct Lana Otilio to Change their warfarin regimen- see above in Anticoagulation Summary.  2. Follow up in 2 weeks  3. Patient declines warfarin refills.  4. Verbal and written information provided. Patient expresses understanding and has no further questions at this time.    Shaw Hand [facility]

## 2021-01-05 ENCOUNTER — OFFICE VISIT (OUTPATIENT)
Dept: INTERNAL MEDICINE | Facility: CLINIC | Age: 76
End: 2021-01-05

## 2021-01-05 ENCOUNTER — TELEPHONE (OUTPATIENT)
Dept: INTERNAL MEDICINE | Facility: CLINIC | Age: 76
End: 2021-01-05

## 2021-01-05 DIAGNOSIS — J40 BRONCHITIS: Primary | ICD-10-CM

## 2021-01-05 DIAGNOSIS — R05.9 COUGH: ICD-10-CM

## 2021-01-05 PROCEDURE — 99441 PR PHYS/QHP TELEPHONE EVALUATION 5-10 MIN: CPT | Performed by: FAMILY MEDICINE

## 2021-01-05 RX ORDER — AZITHROMYCIN 250 MG/1
TABLET, FILM COATED ORAL
Qty: 6 TABLET | Refills: 0 | Status: SHIPPED | OUTPATIENT
Start: 2021-01-05 | End: 2021-02-04

## 2021-01-05 RX ORDER — AMOXICILLIN 250 MG/1
250 CAPSULE ORAL 3 TIMES DAILY
Qty: 15 CAPSULE | Refills: 0 | Status: SHIPPED | OUTPATIENT
Start: 2021-01-05 | End: 2021-02-04

## 2021-01-05 NOTE — TELEPHONE ENCOUNTER
Caller: Lana Yañez    Relationship: Self    Best call back number: [phone]  What medication are you requesting: FOR A COLD    What are your current symptoms: COUGHING, SNEEZING    How long have you been experiencing symptoms: 1 WEEK      Have you had these symptoms before:    [x] Yes  [] No    Have you been treated for these symptoms before:   [x] Yes  [] No    If a prescription is needed, what is your preferred pharmacy and phone number:      Additional notes: DR. SHEPHERD GAVE THE PATIENT THIS MEDICATION PROMETHAZINE DM 5MG. PATIENT HAS HAD IT SINCE MARCH AND IS WANTING TO KNOW IF IT OK TO TAKE

## 2021-01-07 LAB — SARS-COV-2 RNA RESP QL NAA+PROBE: NOT DETECTED

## 2021-01-11 ENCOUNTER — TELEPHONE (OUTPATIENT)
Dept: INTERNAL MEDICINE | Facility: CLINIC | Age: 76
End: 2021-01-11

## 2021-01-11 RX ORDER — POTASSIUM CHLORIDE 750 MG/1
TABLET, FILM COATED, EXTENDED RELEASE ORAL
Qty: 360 TABLET | Refills: 0 | Status: SHIPPED | OUTPATIENT
Start: 2021-01-11 | End: 2021-06-15 | Stop reason: SDUPTHER

## 2021-01-11 NOTE — TELEPHONE ENCOUNTER
Pt calling for result of COVID testing that was done on 1/5/2021.  Per chart result was sent to MY CHART and pt did view.  Confirmed that results were negative pt did not understand.  DESK MA's not available

## 2021-01-12 ENCOUNTER — TELEPHONE (OUTPATIENT)
Dept: INTERNAL MEDICINE | Facility: CLINIC | Age: 76
End: 2021-01-12

## 2021-01-12 NOTE — TELEPHONE ENCOUNTER
Patient has completed amoxicillin for the bronchitis and now is having increased urination with burning, please advise.

## 2021-01-14 ENCOUNTER — ANTICOAGULATION VISIT (OUTPATIENT)
Dept: PHARMACY | Facility: HOSPITAL | Age: 76
End: 2021-01-14

## 2021-01-14 LAB
INR PPP: 2 (ref 0.91–1.09)
PROTHROMBIN TIME: 23.8 SECONDS (ref 10–13.8)

## 2021-01-14 PROCEDURE — 85610 PROTHROMBIN TIME: CPT

## 2021-01-14 PROCEDURE — 36416 COLLJ CAPILLARY BLOOD SPEC: CPT

## 2021-01-14 NOTE — PROGRESS NOTES
Anticoagulation Clinic Progress Note    Anticoagulation Summary  As of 2021    INR goal:  2.0-3.0   TTR:  72.1 % (2.3 y)   INR used for dosin.0 (2021)   Warfarin maintenance plan:  2.5 mg every Wed; 5 mg all other days   Weekly warfarin total:  32.5 mg   No change documented:  Anisa Castellon   Plan last modified:  Nano Cool RP (10/7/2020)   Next INR check:  2021   Priority:  Maintenance   Target end date:  Indefinite    Indications    Atrial fibrillation (CMS/HCC) [I48.91]             Anticoagulation Episode Summary     INR check location:      Preferred lab:      Send INR reminders to:   [facility] [place]    Comments:        Anticoagulation Care Providers     Provider Role Specialty Phone number    Pia Dueñas MD Referring Cardiology [phone]          Clinic Interview:  Patient Findings     Positives:  Change in medications        INR History:  Anticoagulation Monitoring 12/3/2020 2020 2021   INR 2.7 1.5 2.0   INR Date 12/3/2020 2020 2021   INR Goal 2.0-3.0 2.0-3.0 2.0-3.0   Trend Same Same Same   Last Week Total 32.5 mg 32.5 mg 32.5 mg   Next Week Total 32.5 mg 35 mg 32.5 mg   Sun 5 mg 5 mg 5 mg   Mon 5 mg 5 mg 5 mg   Tue 5 mg 5 mg 5 mg   Wed 2.5 mg 5 mg (); Otherwise 2.5 mg 2.5 mg   Thu 5 mg 5 mg 5 mg   Fri 5 mg 5 mg 5 mg   Sat 5 mg 5 mg 5 mg   Visit Report - - -   Some recent data might be hidden       Plan:  1. INR is therapeutic today- see above in Anticoagulation Summary.   Will instruct Lana Aguerocomb to continue their warfarin regimen- see above in Anticoagulation Summary.  2. Follow up in 3 weeks.  3. Patient declines warfarin refills.  4. Verbal and written information provided. Patient expresses understanding and has no further questions at this time.    Anisa Castellon

## 2021-01-15 NOTE — TELEPHONE ENCOUNTER
Pt informed, will try to come in Monday to see NP or leave urine sample, advised her if it's got worse to go to UC to get tested.

## 2021-01-19 NOTE — TELEPHONE ENCOUNTER
6/26/19  Patient left sg for Dr. Dueñas on Christine's vm  - states her pcp Dr. Tavarez did labs and her A1C was elevated and he wants her to start on Metformin 500 mg.  She wants Dr. Dueñas approval before agreeing to start this medication - concerned that it will interfere with her heart medications.  (labs are in epic)   Patient's ph [phone]/rachana   No

## 2021-01-20 ENCOUNTER — DOCUMENTATION (OUTPATIENT)
Dept: PHYSICAL THERAPY | Facility: CLINIC | Age: 76
End: 2021-01-20

## 2021-01-20 DIAGNOSIS — M54.50 CHRONIC MIDLINE LOW BACK PAIN WITHOUT SCIATICA: ICD-10-CM

## 2021-01-20 DIAGNOSIS — G89.29 CHRONIC MIDLINE LOW BACK PAIN WITHOUT SCIATICA: ICD-10-CM

## 2021-01-20 DIAGNOSIS — G89.29 CHRONIC BILATERAL LOW BACK PAIN WITH LEFT-SIDED SCIATICA: Primary | ICD-10-CM

## 2021-01-20 DIAGNOSIS — M54.42 CHRONIC BILATERAL LOW BACK PAIN WITH LEFT-SIDED SCIATICA: Primary | ICD-10-CM

## 2021-01-20 DIAGNOSIS — R26.2 DIFFICULTY WALKING: ICD-10-CM

## 2021-01-20 NOTE — PROGRESS NOTES
Discharge Summary  Discharge Summary from Physical Therapy Report      Dates  PT visit: 20-3/11/20  Number of Visits: 6       Goals: Partially Met    ST wks  1. Patient will be independent with education for symptom management, joint protection and strategies to minimize stress on affected tissues  NOT MET  2. Pt is able to tolerate 30 min of aquatic therapy with reports of reduced pain levels after treatment for 6 hours MET  3. Pt able to walk 10 min in the water without increased pain and with good posture NOT MET  4. Pt to improve pain complaints to no more than 6/10 NOT MET    LT wks  1. Pt to report no more than 4/10 pain in the LB with ADLs NOT MET  2. Pt to improve trunk and LE strength by 1/2 to 1 MMT grade NOT MET  3. Pt able to exit the pool on the stairs using a reciprocal pattern NOT MET  4. Pt to improve Oswestry Back index score from 76% to  50% for overall functional improvement NOT MET  5. Pt to improve 5x STS test from 38.15 sec to 30 sec for improved strength and balance NOT MET  6. Pt to be independent with progressive HEP for core and LE strength  NOT MET      Discharge Plan: Continue with current home exercise program as instructed    Discontinued therapy due to COVID 19 outbreak    Date of Discharge: 2021        Atif Pickens, PT  Physical Therapist

## 2021-02-01 ENCOUNTER — CLINICAL SUPPORT (OUTPATIENT)
Dept: ORTHOPEDIC SURGERY | Facility: CLINIC | Age: 76
End: 2021-02-01

## 2021-02-01 VITALS — WEIGHT: 240 LBS | HEIGHT: 66 IN | TEMPERATURE: 97 F | BODY MASS INDEX: 38.57 KG/M2

## 2021-02-01 DIAGNOSIS — R52 PAIN: Primary | ICD-10-CM

## 2021-02-01 DIAGNOSIS — M19.019 PRIMARY LOCALIZED OSTEOARTHROSIS OF SHOULDER REGION, UNSPECIFIED LATERALITY: ICD-10-CM

## 2021-02-01 PROCEDURE — 99213 OFFICE O/P EST LOW 20 MIN: CPT | Performed by: ORTHOPAEDIC SURGERY

## 2021-02-01 PROCEDURE — 73030 X-RAY EXAM OF SHOULDER: CPT | Performed by: ORTHOPAEDIC SURGERY

## 2021-02-01 NOTE — PROGRESS NOTES
Shoulder Injection Glenohumeral      Patient: Lana Yañez        YOB: 1945            Chief Complaints: Shoulder pain      History of Present Illness: Pt gets intermittent shoulder injections with good relief. Is here for repeat injection.      Physical Exam: 75 y.o. female  General Appearance:    Alert, cooperative, in no acute distress                 There were no vitals filed for this visit.   Patient is alert and read ×3 no acute distress appears her above-listed at height weight and age.  Affect is normal respiratory rate is normal unlabored. Heart rate regular rate rhythm, sclera, dentition and hearing are normal for the purpose of this exam.  Exam and complaints are unchanged.      Procedure:  Large Joint Arthrocentesis: L glenohumeral  Date/Time: 2/1/2021 7:54 AM  Consent given by: patient  Site marked: site marked  Timeout: Immediately prior to procedure a time out was called to verify the correct patient, procedure, equipment, support staff and site/side marked as required   Supporting Documentation  Indications: pain   Procedure Details  Location: shoulder - L glenohumeral  Preparation: Patient was prepped and draped in the usual sterile fashion  Needle gauge: 21 G.  Approach: posterior  Medications administered: 2 mL lidocaine (cardiac); 80 mg methylPREDNISolone acetate 80 MG/ML  Patient tolerance: patient tolerated the procedure well with no immediate complications                Assessment. Persistent shoulder pain with glenohumeral arthritis          Plan: Is to proceed with injection    The glenohumeral was injected under strict sterile technique is form on a Marcaine and Depo-Medrol this was done sterilely and tolerated tolerated  well

## 2021-02-01 NOTE — PROGRESS NOTES
New Shoulder      Patient: Lana Yañez        YOB: 1945    Medical Record Number: [id number]        Chief Complaints: Patient is here with new complaints of right shoulder pain of seen her previously for left she has known degenerative changes she states his last shot she got left shoulder helped significantly but now both are hurting she would like those seen.      History of Present Illness: This is a    Patient is here with new complaints of right shoulder pain of seen her previously for left she has known degenerative changes she states his last shot she got left shoulder helped significantly but now both are hurting she would like those seen.  No no history injury change in activities that she can recall she is a very complex past medical history which is well listed below reviewed by me medications and allergies are also well listed below      Allergies:   Allergies   Allergen Reactions   • Contrast Dye Hives and Itching       Medications:   Home Medications:  Current Outpatient Medications on File Prior to Visit   Medication Sig   • acetaminophen (TYLENOL) 500 MG tablet Take 500 mg by mouth Every 6 (Six) Hours As Needed for Mild Pain .   • aspirin 81 MG EC tablet Take 1 tablet by mouth Daily.   • atorvastatin (LIPITOR) 10 MG tablet Take 1 tablet by mouth Daily.   • azithromycin (Zithromax Z-Micha) 250 MG tablet Take 2 tablets the first day, then 1 tablet daily for 4 days.   • Blood Glucose Monitoring Suppl (ACCU-CHEK GUIDE) w/Device kit See Admin Instructions.   • carvedilol (COREG) 25 MG tablet TAKE 1 & 1/2 (ONE & ONE-HALF) TABLETS BY MOUTH TWICE DAILY WITH MEALS   • digoxin (LANOXIN) 125 MCG tablet TAKE 1 TABLET BY MOUTH EVERY OTHER DAY   • furosemide (LASIX) 40 MG tablet Take 1 tablet by mouth Daily.   • glucose blood test strip Use as instructed   • HYDROcodone-acetaminophen (NORCO) 5-325 MG per tablet Take 1 tablet by mouth 2 (Two) Times a Day As Needed for Severe Pain . DNF  12/30/2020   • ketoconazole (NIZORAL) 200 MG tablet Take 1 tablet by mouth Daily.   • Lancets (ACCU-CHEK MULTICLIX) lancets Use 1 lancet per finger stick   • lisinopril (PRINIVIL,ZESTRIL) 10 MG tablet Take 1 tablet by mouth Daily.   • magnesium oxide (MAG-OX) 400 MG tablet Take 400 mg by mouth Daily.   • potassium chloride 10 MEQ CR tablet TAKE 4 TABLETS BY MOUTH ONCE DAILY   • SITagliptin (Januvia) 100 MG tablet Take 1 tablet by mouth Daily.   • temazepam (RESTORIL) 22.5 MG capsule Take 1 capsule by mouth At Night As Needed for Sleep.   • TRAVATAN Z 0.004 % solution ophthalmic solution    • vitamin D (ERGOCALCIFEROL) 1.25 MG (05482 UT) capsule capsule Take 1 capsule by mouth once a week   • warfarin (COUMADIN) 5 MG tablet TAKE 1 TABLET BY MOUTH ONCE DAILY OR  AS  DIRECTED  BY  MEDICATION  MANAGEMENT  CLINIC   • amoxicillin (AMOXIL) 250 MG capsule Take 1 capsule by mouth 3 (Three) Times a Day.     No current facility-administered medications on file prior to visit.      Current Medications:  Scheduled Meds:  Continuous Infusions:No current facility-administered medications for this visit.     PRN Meds:.    Past Medical History:   Diagnosis Date   • Acute on chronic diastolic heart failure (CMS/HCC)    • Arthritis    • Asthma    • Atrial fibrillation (CMS/HCC)     Persistent; on warfarin   • Atypical chest pain    • Bronchitis    • Cellulitis of right elbow     due to MRSA   • CHF (congestive heart failure) (CMS/HCC)    • COPD (chronic obstructive pulmonary disease) (CMS/HCC)    • Coronary artery disease     Cardiac catheterization completed; 90% PDA stenosis with medical management recommended   • Coronary artery disease involving native coronary artery of native heart with angina pectoris with documented spasm (CMS/HCC)    • Diabetes 1.5, 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HCC)    • Disease of thyroid gland    • Displacement of lumbar intervertebral disc without myelopathy    • Dizziness    • ANTOINE (dyspnea on exertion)    •  Essential hypertension    • Fatigue    • Generalized osteoarthritis of multiple sites    • History of rheumatic fever    • History of transfusion    • Hx of bone density study     10/23/2014   • Hyperglycemia    • Hyperlipidemia    • Hypovitaminosis D    • Left arm pain    • Left-sided weakness    • Leg swelling    • Low back pain    • Lower extremity edema    • Malaise and fatigue    • Mitral valve disease     Moderate mitral valve prolapse and moderate mitral regurgitation   • Mitral valve insufficiency    • Morbid obesity with BMI of 40.0-44.9, adult (CMS/[facility])    • MRSA infection    • NSTEMI (non-ST elevated myocardial infarction) (CMS/[facility])    • PAF (paroxysmal atrial fibrillation) (CMS/[facility])    • RA (rheumatoid arthritis) (CMS/[facility])     involving both hands   • Sleep apnea    • SOB (shortness of breath)    • Stroke (CMS/[facility])     left side weakness   • Urge incontinence of urine    • UTI (urinary tract infection) 05/2020   • Vitamin D deficiency         Past Surgical History:   Procedure Laterality Date   • BREAST BIOPSY     • BREAST SURGERY      right side lumpectomy with biopsy   • CARDIAC CATHETERIZATION Left 10/20/2017    Procedure: Cardiac Catheterization/Vascular Study;  Surgeon: Alphonso Olmedo MD;  Location: [facility] INVASIVE LOCATION;  Service:    • CARDIAC CATHETERIZATION N/A 10/20/2017    +2 mitral regurgitation, left main 10% stenosis, mid to distal LAD 10% diffuse stenosis, circumflex 10% diffuse stenosis, RCA 10% proximal stenosis, and distal PDA consistent with coronary embolus with a lesion of 90% too small to consider coronary intervention; medical management recommended   • EYE SURGERY      laser surgery for glaucoma and left eye cataracts removed   • HYSTERECTOMY      10+ years ago   • JOINT REPLACEMENT  2005; 2006    bilateral knees and left rotater   • KNEE SURGERY     • MAMMO BILATERAL  2016    [facility]         Social History     Occupational History   • 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     Employer: RETIRED   Tobacco Use   • Smoking status: Former Smoker     Packs/day: 3.00     Types: Cigarettes     Start date: 1967     Quit date: 10/19/1968     Years since quittin.3   • Smokeless tobacco: Never Used   • Tobacco comment: caffeine use - decaf coffee   Substance and Sexual Activity   • Alcohol use: No     Comment: No caffeine use   • Drug use: No   • Sexual activity: Defer      Social History     Social History Narrative   • Not on file        Family History   Problem Relation Age of Onset   • Colon cancer Mother    • Glaucoma Mother    • Stroke Mother    • Arthritis Mother    • Hypertension Mother    • Glaucoma Sister    • Diabetes Sister    • Heart disease Sister    • Arthritis Sister    • Asthma Sister    • Hypertension Sister    • Miscarriages / Stillbirths Sister    • Lung disease Sister    • Heart disease Brother    • Diabetes Brother    • Arthritis Brother    • Drug abuse Brother    • Hypertension Brother    • Arthritis Father    • COPD Father    • Lung disease Father    • Arthritis Daughter    • Depression Daughter    • Alcohol abuse Maternal Uncle    • Heart disease Sister    • Heart disease Sister    • Heart disease Brother              Review of Systems: 14 point review of systems are remarkable for the bilateral shoulder pains today the remainder negative per the patient    Review of Systems      Physical Exam: 75 y.o. female  General Appearance:    Alert, cooperative, in no acute distress                   Vitals:    21 1252   Temp: 97 °F (36.1 °C)   TempSrc: Temporal   Weight: 109 kg (240 lb)   Height: 167.6 cm (66\")      Patient is alert and read ×3 no acute distress appears her above-listed at height weight and age.  Affect is normal respiratory rate is normal unlabored. Heart rate regular rate rhythm, sclera, dentition and hearing are normal for the purpose of this exam.    Ortho Exam Physical exam the left shoulder reveals no overlying skin " changes no lymphedema lymphadenopathy the patient can actively flex to about 150 passively I get them to 160 abduction is similar external rotation is 40 internal rotation to there buttock.  Rotator cuff strength is 4+ over 5 with isometric strength testing no overlying skin changes.  Patient has reasonable cervical range of motion for their age no radicular symptoms and a normal elbow exam.  There are good distal pulses.  Physical exam the right shoulder reveals no overlying skin changes no lymphedema lymphadenopathy the patient can actively flex to about 170 passively I get them to 175 abduction is similar external rotation is 40 internal rotation to there buttock.  Rotator cuff strength is 4+ over 5 with isometric strength testing no overlying skin changes.  Patient has reasonable cervical range of motion for their age no radicular symptoms and a normal elbow exam.  There are good distal pulses.  Procedures          Radiology:   AP, Scapular Y and Axillary Lateral of the right and left shoulder were ordered/reviewed to evauate shoulder pain.  I have compared to x-rays done on the left approximate 1 year ago she has significant degenerative changes about the left shoulder with complete loss of joint space flattening the humeral head and osteophyte formation.  On the right she does have degenerative changes not as severe as the left.  On the left there is been mild progression over the last year  Imaging Results (Most Recent)     Procedure Component Value Units Date/Time    XR Shoulder 2+ View Right [[id number]] Resulted: 02/01/21 1245     Updated: 02/01/21 1245    Impression:      Ordering physician's impression is located in the Encounter Note dated 02/01/21. X-ray performed in the DR room.      XR Shoulder 2+ View Left [[id number]] Resulted: 02/01/21 1245     Updated: 02/01/21 1245    Impression:      Ordering physician's impression is located in the Encounter Note dated 02/01/21. X-ray performed in the DR room.           Assessment/Plan: Bilateral shoulder DJD.  She responded well to an injection the left thing is reasonable inject both today she is not a great operative candidate for elective case she understands all her options they were all discussed in detail  Cortisone Injection. See procedure note.  Cortisone Injection for DIAGNOSTIC and THERAPUTIC purposes.

## 2021-02-04 ENCOUNTER — ANTICOAGULATION VISIT (OUTPATIENT)
Dept: PHARMACY | Facility: HOSPITAL | Age: 76
End: 2021-02-04

## 2021-02-04 ENCOUNTER — OFFICE VISIT (OUTPATIENT)
Dept: INTERNAL MEDICINE | Facility: CLINIC | Age: 76
End: 2021-02-04

## 2021-02-04 ENCOUNTER — OFFICE VISIT (OUTPATIENT)
Dept: CARDIOLOGY | Facility: CLINIC | Age: 76
End: 2021-02-04

## 2021-02-04 ENCOUNTER — LAB (OUTPATIENT)
Dept: LAB | Facility: HOSPITAL | Age: 76
End: 2021-02-04

## 2021-02-04 VITALS
BODY MASS INDEX: 38.89 KG/M2 | DIASTOLIC BLOOD PRESSURE: 82 MMHG | WEIGHT: 242 LBS | SYSTOLIC BLOOD PRESSURE: 130 MMHG | OXYGEN SATURATION: 100 % | HEIGHT: 66 IN | TEMPERATURE: 97.3 F | HEART RATE: 56 BPM

## 2021-02-04 VITALS
WEIGHT: 242.6 LBS | HEART RATE: 82 BPM | DIASTOLIC BLOOD PRESSURE: 78 MMHG | BODY MASS INDEX: 38.99 KG/M2 | SYSTOLIC BLOOD PRESSURE: 132 MMHG | HEIGHT: 66 IN

## 2021-02-04 DIAGNOSIS — I27.20 PULMONARY HYPERTENSION (HCC): Primary | ICD-10-CM

## 2021-02-04 DIAGNOSIS — E11.9 TYPE 2 DIABETES MELLITUS WITHOUT COMPLICATION, WITHOUT LONG-TERM CURRENT USE OF INSULIN (HCC): ICD-10-CM

## 2021-02-04 DIAGNOSIS — I25.119 CORONARY ARTERY DISEASE INVOLVING NATIVE CORONARY ARTERY OF NATIVE HEART WITH ANGINA PECTORIS (HCC): ICD-10-CM

## 2021-02-04 DIAGNOSIS — E13.9 DIABETES 1.5, MANAGED AS TYPE 2 (HCC): ICD-10-CM

## 2021-02-04 DIAGNOSIS — Z00.00 MEDICARE ANNUAL WELLNESS VISIT, SUBSEQUENT: Primary | ICD-10-CM

## 2021-02-04 DIAGNOSIS — I10 ESSENTIAL HYPERTENSION: ICD-10-CM

## 2021-02-04 DIAGNOSIS — E78.2 HYPERLIPEMIA, MIXED: ICD-10-CM

## 2021-02-04 DIAGNOSIS — I48.21 PERMANENT ATRIAL FIBRILLATION (HCC): ICD-10-CM

## 2021-02-04 DIAGNOSIS — IMO0002 DM (DIABETES MELLITUS), TYPE 2, UNCONTROLLED W/NEUROLOGIC COMPLICATION: ICD-10-CM

## 2021-02-04 DIAGNOSIS — E78.2 MIXED HYPERLIPIDEMIA: ICD-10-CM

## 2021-02-04 LAB
ALBUMIN UR-MCNC: <1.2 MG/DL
CHOLEST SERPL-MCNC: 132 MG/DL (ref 0–200)
CREAT SERPL-MCNC: 1.12 MG/DL (ref 0.57–1)
CREAT UR-MCNC: 131 MG/DL
FOLATE SERPL-MCNC: 8.89 NG/ML (ref 4.78–24.2)
GFR SERPL CREATININE-BSD FRML MDRD: 58 ML/MIN/1.73
HBA1C MFR BLD: 6.9 % (ref 4.8–5.6)
HDLC SERPL-MCNC: 86 MG/DL (ref 40–60)
INR PPP: 1.8 (ref 0.91–1.09)
LDLC SERPL CALC-MCNC: 34 MG/DL (ref 0–100)
LDLC/HDLC SERPL: 0.41 {RATIO}
MICROALBUMIN/CREAT UR: NORMAL MG/G{CREAT}
PROTHROMBIN TIME: 21.6 SECONDS (ref 10–13.8)
T4 FREE SERPL-MCNC: 1.39 NG/DL (ref 0.93–1.7)
TRIGL SERPL-MCNC: 54 MG/DL (ref 0–150)
TSH SERPL DL<=0.05 MIU/L-ACNC: 0.98 UIU/ML (ref 0.27–4.2)
VIT B12 BLD-MCNC: 706 PG/ML (ref 211–946)
VLDLC SERPL-MCNC: 12 MG/DL (ref 5–40)

## 2021-02-04 PROCEDURE — 84439 ASSAY OF FREE THYROXINE: CPT

## 2021-02-04 PROCEDURE — 93000 ELECTROCARDIOGRAM COMPLETE: CPT | Performed by: INTERNAL MEDICINE

## 2021-02-04 PROCEDURE — 83036 HEMOGLOBIN GLYCOSYLATED A1C: CPT

## 2021-02-04 PROCEDURE — 85610 PROTHROMBIN TIME: CPT

## 2021-02-04 PROCEDURE — 1125F AMNT PAIN NOTED PAIN PRSNT: CPT | Performed by: FAMILY MEDICINE

## 2021-02-04 PROCEDURE — 36416 COLLJ CAPILLARY BLOOD SPEC: CPT

## 2021-02-04 PROCEDURE — G0463 HOSPITAL OUTPT CLINIC VISIT: HCPCS

## 2021-02-04 PROCEDURE — 82746 ASSAY OF FOLIC ACID SERUM: CPT

## 2021-02-04 PROCEDURE — 82607 VITAMIN B-12: CPT

## 2021-02-04 PROCEDURE — 82043 UR ALBUMIN QUANTITATIVE: CPT

## 2021-02-04 PROCEDURE — 82565 ASSAY OF CREATININE: CPT

## 2021-02-04 PROCEDURE — 99213 OFFICE O/P EST LOW 20 MIN: CPT | Performed by: INTERNAL MEDICINE

## 2021-02-04 PROCEDURE — 1160F RVW MEDS BY RX/DR IN RCRD: CPT | Performed by: FAMILY MEDICINE

## 2021-02-04 PROCEDURE — 36415 COLL VENOUS BLD VENIPUNCTURE: CPT

## 2021-02-04 PROCEDURE — G0439 PPPS, SUBSEQ VISIT: HCPCS | Performed by: FAMILY MEDICINE

## 2021-02-04 PROCEDURE — 96160 PT-FOCUSED HLTH RISK ASSMT: CPT | Performed by: FAMILY MEDICINE

## 2021-02-04 PROCEDURE — 82570 ASSAY OF URINE CREATININE: CPT

## 2021-02-04 PROCEDURE — 1170F FXNL STATUS ASSESSED: CPT | Performed by: FAMILY MEDICINE

## 2021-02-04 PROCEDURE — 80061 LIPID PANEL: CPT

## 2021-02-04 PROCEDURE — 84443 ASSAY THYROID STIM HORMONE: CPT

## 2021-02-04 RX ORDER — DIGOXIN 125 MCG
125 TABLET ORAL EVERY OTHER DAY
Qty: 45 TABLET | Refills: 1 | Status: SHIPPED | OUTPATIENT
Start: 2021-02-04 | End: 2022-02-10 | Stop reason: SDUPTHER

## 2021-02-04 NOTE — PROGRESS NOTES
Date of Office Visit: 2021  Encounter Provider: Pia Dueñas MD  Place of Service: [facility] CARDIOLOGY  Patient Name: Lana Yañez  :1945    Chief complaint  Atrial fibrillation, coronary artery disease, pulmonary hypertension    History of Present Illness  The patient is a 74 yo female with with history of retention, hyperlipidemia, rheumatoid arthritis, obstructive sleep apnea who in early October was found to be in atrial fibrillation with rapid ventricular rates and mild diastolic heart failure.  She was placed on IV heparin and Pradaxa.  Echocardiogram revealed normal systolic function mild left ventricular hypertrophy, moderate atrial enlargement with aortic valve sclerosis and moderate pulmonary hypertension and moderate tricuspid regurgitation.  The RV systolic pressure is 54 mmHg.  She had a stress perfusion study that was negative for ischemia and a CT and her grandmother revealed a mildly dilated aorta without pulmonary emboli.  She then presented several days later with an acute stroke.  CLARIBEL was not pursued as it was felt to be embolic in nature.  However on  and she was diaphoretic and was found to have a non-ST elevation myocardial infarction.  This was on full dose Pradaxa which was not held.  CLARIBEL was pursued that revealed normal systolic function with moderate mitral valve prolapse without significant regurgitation.  There was right-sided spontaneous echo contrast without thrombus formation.  Cardiac catheterization revealed normal systolic function with 2+ mitral regurgitation, mild coronary disease except for a 90% stenosis of the distal PDA which was felt to be too small to be amenable PCI.  She was treated medically and switched to Coumadin. She has struggled with intermittent heart failure dietary indiscretions with salt since then.  She also had a 24-hour Holter that revealed persistent atrial fibrillation with reasonable rate control  and frequent PVCs. No other arrhythmia was present.  Last echocardiogram in August 2020 showed an ejection fraction of 62% with moderate left ventricular hypertrophy with indeterminate diastolic function, mild aortic regurgitation, mitral valve thickening with moderate regurgitation without stenosis, severe tricuspid regurgitation with an RV systolic pressure 48 mmHg is present.    She is not exercising.  She does not have chest pain.  She complains of shortness of breath that is slightly worse than it was 6 months ago but she admittedly has not been active due to arthritis.  She believes this was playing a role.  She has edema that occurs when her legs are and this dependent position.  She wore support stockings and this resolved.  Earlier in the week she got bilateral shoulder injections and glucose was quite high in the 300s range.  It is slowly improving.  She does not have palpitations very often over the past year she states this week it has been slightly more pronounced.  INR was subtherapeutic a month ago otherwise seems to be fairly stable.  Creatinine in 9/20 was 1.03 with GFR 53.  She is seeing a diabetologist    Past Medical History:   Diagnosis Date   • Acute on chronic diastolic heart failure (CMS/HCC)    • Arthritis    • Asthma    • Atrial fibrillation (CMS/HCC)     Persistent; on warfarin   • Atypical chest pain    • Bronchitis    • Cellulitis of right elbow     due to MRSA   • CHF (congestive heart failure) (CMS/HCC)    • COPD (chronic obstructive pulmonary disease) (CMS/HCC)    • Coronary artery disease     Cardiac catheterization completed; 90% PDA stenosis with medical management recommended   • Coronary artery disease involving native coronary artery of native heart with angina pectoris with documented spasm (CMS/HCC)    • Diabetes 1.5, 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HCC)    • Disease of thyroid gland    • Displacement of lumbar intervertebral disc without myelopathy    • Dizziness    • ANTOINE (dyspnea on  exertion)    • Essential hypertension    • Fatigue    • Generalized osteoarthritis of multiple sites    • History of rheumatic fever    • History of transfusion    • Hx of bone density study     10/23/2014   • Hyperglycemia    • Hyperlipidemia    • Hypovitaminosis D    • Left arm pain    • Left-sided weakness    • Leg swelling    • Low back pain    • Lower extremity edema    • Malaise and fatigue    • Mitral valve disease     Moderate mitral valve prolapse and moderate mitral regurgitation   • Mitral valve insufficiency    • Morbid obesity with BMI of 40.0-44.9, adult (CMS/[facility])    • MRSA infection    • NSTEMI (non-ST elevated myocardial infarction) (CMS/[facility])    • PAF (paroxysmal atrial fibrillation) (CMS/[facility])    • RA (rheumatoid arthritis) (CMS/[facility])     involving both hands   • Sleep apnea    • SOB (shortness of breath)    • Stroke (CMS/[facility])     left side weakness   • Urge incontinence of urine    • UTI (urinary tract infection) 05/2020   • Vitamin D deficiency      Past Surgical History:   Procedure Laterality Date   • BREAST BIOPSY     • BREAST SURGERY      right side lumpectomy with biopsy   • CARDIAC CATHETERIZATION Left 10/20/2017    Procedure: Cardiac Catheterization/Vascular Study;  Surgeon: Alphonso Olmedo MD;  Location: [facility] INVASIVE LOCATION;  Service:    • CARDIAC CATHETERIZATION N/A 10/20/2017    +2 mitral regurgitation, left main 10% stenosis, mid to distal LAD 10% diffuse stenosis, circumflex 10% diffuse stenosis, RCA 10% proximal stenosis, and distal PDA consistent with coronary embolus with a lesion of 90% too small to consider coronary intervention; medical management recommended   • EYE SURGERY      laser surgery for glaucoma and left eye cataracts removed   • HYSTERECTOMY      10+ years ago   • JOINT REPLACEMENT  2005; 2006    bilateral knees and left rotater   • KNEE SURGERY     • MAMMO BILATERAL  2016    [facility]      Outpatient Medications Prior to Visit   Medication Sig Dispense  Refill   • aspirin 81 MG EC tablet Take 1 tablet by mouth Daily.     • atorvastatin (LIPITOR) 10 MG tablet Take 1 tablet by mouth Daily. 90 tablet 3   • Blood Glucose Monitoring Suppl (ACCU-CHEK GUIDE) w/Device kit See Admin Instructions.     • carvedilol (COREG) 25 MG tablet TAKE 1 & 1/2 (ONE & ONE-HALF) TABLETS BY MOUTH TWICE DAILY WITH MEALS 270 tablet 0   • furosemide (LASIX) 40 MG tablet Take 1 tablet by mouth Daily. 30 tablet 6   • glucose blood test strip Use as instructed 300 each 12   • HYDROcodone-acetaminophen (NORCO) 5-325 MG per tablet Take 1 tablet by mouth 2 (Two) Times a Day As Needed for Severe Pain . DNF 12/30/2020 (Patient taking differently: Take 1 tablet by mouth As Needed for Severe Pain . DNF 12/30/2020) 40 tablet 0   • Lancets (ACCU-CHEK MULTICLIX) lancets Use 1 lancet per finger stick 204 each 12   • lisinopril (PRINIVIL,ZESTRIL) 10 MG tablet Take 1 tablet by mouth Daily. 90 tablet 3   • magnesium oxide (MAG-OX) 400 MG tablet Take 400 mg by mouth As Needed.     • potassium chloride 10 MEQ CR tablet TAKE 4 TABLETS BY MOUTH ONCE DAILY 360 tablet 0   • SITagliptin (Januvia) 100 MG tablet Take 1 tablet by mouth Daily. 30 tablet 11   • TRAVATAN Z 0.004 % solution ophthalmic solution Administer 1 drop to both eyes Every Night.     • vitamin D (ERGOCALCIFEROL) 1.25 MG (00058 UT) capsule capsule Take 1 capsule by mouth once a week 12 capsule 0   • warfarin (COUMADIN) 5 MG tablet TAKE 1 TABLET BY MOUTH ONCE DAILY OR  AS  DIRECTED  BY  MEDICATION  MANAGEMENT  CLINIC 90 tablet 1   • digoxin (LANOXIN) 125 MCG tablet TAKE 1 TABLET BY MOUTH EVERY OTHER DAY 30 tablet 5   • temazepam (RESTORIL) 22.5 MG capsule Take 1 capsule by mouth At Night As Needed for Sleep. (Patient taking differently: Take 22.5 mg by mouth As Needed for Sleep.) 30 capsule 3   • acetaminophen (TYLENOL) 500 MG tablet Take 500 mg by mouth Every 6 (Six) Hours As Needed for Mild Pain .     • amoxicillin (AMOXIL) 250 MG capsule Take 1  capsule by mouth 3 (Three) Times a Day. 15 capsule 0   • azithromycin (Zithromax Z-Micha) 250 MG tablet Take 2 tablets the first day, then 1 tablet daily for 4 days. 6 tablet 0   • ketoconazole (NIZORAL) 200 MG tablet Take 1 tablet by mouth Daily. 7 tablet 0     No facility-administered medications prior to visit.        Allergies as of 2021 - Reviewed 2021   Allergen Reaction Noted   • Contrast dye Hives and Itching 2018     Social History     Socioeconomic History   • Marital status:      Spouse name: Suresh   • Number of children: 5   • Years of education: 13   • Highest education level: Not on file   Occupational History   • Occupation:  for [employer]     Employer: RETIRED   Tobacco Use   • Smoking status: Former Smoker     Packs/day: 3.00     Types: Cigarettes     Start date: 1967     Quit date: 10/19/1968     Years since quittin.3   • Smokeless tobacco: Never Used   • Tobacco comment: caffeine use - decaf coffee   Substance and Sexual Activity   • Alcohol use: No     Comment: No caffeine use   • Drug use: No   • Sexual activity: Defer     Family History   Problem Relation Age of Onset   • Colon cancer Mother    • Glaucoma Mother    • Stroke Mother    • Arthritis Mother    • Hypertension Mother    • Glaucoma Sister    • Diabetes Sister    • Heart disease Sister    • Arthritis Sister    • Asthma Sister    • Hypertension Sister    • Miscarriages / Stillbirths Sister    • Lung disease Sister    • Heart disease Brother    • Diabetes Brother    • Arthritis Brother    • Drug abuse Brother    • Hypertension Brother    • Arthritis Father    • COPD Father    • Lung disease Father    • Arthritis Daughter    • Depression Daughter    • Alcohol abuse Maternal Uncle    • Heart disease Sister    • Heart disease Sister    • Heart disease Brother      Review of Systems   Constitution: Positive for malaise/fatigue. Negative for fever, weight gain and weight loss.   HENT:  "Negative for ear pain, hearing loss, nosebleeds and sore throat.    Eyes: Negative for double vision, pain, vision loss in left eye and vision loss in right eye.   Cardiovascular: Positive for dyspnea on exertion, leg swelling and palpitations.        See history of present illness.   Respiratory: Positive for shortness of breath. Negative for cough, sleep disturbances due to breathing, snoring and wheezing.    Endocrine: Negative for cold intolerance, heat intolerance and polyuria.   Skin: Negative for itching, poor wound healing and rash.   Musculoskeletal: Negative for joint pain, joint swelling and myalgias.   Gastrointestinal: Negative for abdominal pain, diarrhea, hematochezia, nausea and vomiting.   Genitourinary: Negative for hematuria and hesitancy.   Neurological: Negative for numbness, paresthesias and seizures.   Psychiatric/Behavioral: Negative for depression. The patient is not nervous/anxious.         Objective:     Vitals:    02/04/21 1153   BP: 132/78   BP Location: Right arm   Cuff Size: Large Adult   Pulse: 82   Weight: 110 kg (242 lb 9.6 oz)   Height: 167.6 cm (66\")     Body mass index is 39.16 kg/m².    Vitals signs reviewed.   Constitutional:       Appearance: Well-developed.      Comments: Obese   Eyes:      General: No scleral icterus.        Right eye: No discharge.      Conjunctiva/sclera: Conjunctivae normal.      Pupils: Pupils are equal, round, and reactive to light.   HENT:      Head: Normocephalic.      Nose: Nose normal.   Neck:      Musculoskeletal: Normal range of motion and neck supple.      Thyroid: No thyromegaly.      Vascular: No JVD.   Pulmonary:      Effort: Pulmonary effort is normal. No respiratory distress.      Breath sounds: Normal breath sounds. No wheezing. No rales.   Cardiovascular:      Normal rate. Irregularly irregular rhythm. Normal S1. Normal S2.      Murmurs: There is no murmur.      No gallop.   Pulses:     Intact distal pulses.   Edema:     Pretibial: " bilateral trace edema of the pretibial area.     Ankle: bilateral trace edema of the ankle.  Abdominal:      General: Bowel sounds are normal. There is no distension.      Palpations: Abdomen is soft.      Tenderness: There is no abdominal tenderness. There is no rebound.   Musculoskeletal: Normal range of motion.         General: No tenderness.   Skin:     General: Skin is warm and dry.      Findings: No erythema or rash.   Neurological:      Mental Status: Alert and oriented to person, place, and time.   Psychiatric:         Behavior: Behavior normal.         Thought Content: Thought content normal.         Judgment: Judgment normal.       Lab Review:     ECG 12 Lead    Date/Time: 2/4/2021 12:05 PM  Performed by: Pia Dueñas MD  Authorized by: Pia Dueñas MD   Comparison: compared with previous ECG   Similar to previous ECG  Rhythm: atrial fibrillation  Conduction: left anterior fascicular block  Other findings: non-specific ST-T wave changes and poor R wave progression    Clinical impression: abnormal EKG          Assessment:       Diagnosis Plan   1. Pulmonary hypertension (CMS/HCC)  ECG 12 Lead   2. Permanent atrial fibrillation (CMS/HCC)  ECG 12 Lead    Digoxin Level   3. Coronary artery disease involving native coronary artery of native heart with angina pectoris (CMS/HCC)     4. Essential hypertension     5. Diabetes 1.5, 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HCC)       Plan:       1.  Pulmonary hypertension.  Remained stable.  She will try to use her CPAP again and fight claustrophobia by trying to use this during the day while awake to get used to the device.  Will return for follow-up in 6 months hopefully can repeat an echocardiogram at that time depending on compliance.  2.  Mitral regurgitation, moderate by echo 8 /2020.  As above  3.  Severe small vessel coronary artery disease.  No anginal symptoms  4.  Persistent atrial fibrillation.  Rates controlled and on warfarin Coumadin for dig level next week  5.  Edema.   Minimal on exam and recommended leg elevation support stocking  6. Hypertension, fairly well controlled usually and lower than it is today  7. History of embolic non ST-elevation myocardial infarction. Now on warfarin  8. History of embolic stroke.   9. FRANCY, as above.  10. Lung nodule followed by  and felt to be benign per patient.  No further follow-up felt to be needed per patient  11. Chronic anticoagulation, as above tolerating this well without falls or bleeding.  INR slightly subtherapeutic at times but most the time fairly well controlled      I spent  30minutes spent on reviewing records and 15 minutes of face to face time with this patient.       Your medication list          Accurate as of February 4, 2021 11:59 PM. If you have any questions, ask your nurse or doctor.            CHANGE how you take these medications      Instructions Last Dose Given Next Dose Due   HYDROcodone-acetaminophen 5-325 MG per tablet  Commonly known as: NORCO  What changed: when to take this      Take 1 tablet by mouth 2 (Two) Times a Day As Needed for Severe Pain . DNF 12/30/2020          CONTINUE taking these medications      Instructions Last Dose Given Next Dose Due   Accu-Chek Guide w/Device kit      See Admin Instructions.       accu-chek multiclix lancets      Use 1 lancet per finger stick       acetaminophen 500 MG tablet  Commonly known as: TYLENOL      Take 500 mg by mouth Every 6 (Six) Hours As Needed for Mild Pain .       aspirin 81 MG EC tablet      Take 1 tablet by mouth Daily.       atorvastatin 10 MG tablet  Commonly known as: LIPITOR      Take 1 tablet by mouth Daily.       carvedilol 25 MG tablet  Commonly known as: COREG      TAKE 1 & 1/2 (ONE & ONE-HALF) TABLETS BY MOUTH TWICE DAILY WITH MEALS       digoxin 125 MCG tablet  Commonly known as: LANOXIN      Take 1 tablet by mouth Every Other Day.       furosemide 40 MG tablet  Commonly known as: LASIX      Take 1 tablet by mouth Daily.       glucose blood  test strip      Use as instructed       lisinopril 10 MG tablet  Commonly known as: PRINIVIL,ZESTRIL      Take 1 tablet by mouth Daily.       magnesium oxide 400 MG tablet  Commonly known as: MAG-OX      Take 400 mg by mouth As Needed.       potassium chloride 10 MEQ CR tablet      TAKE 4 TABLETS BY MOUTH ONCE DAILY       SITagliptin 100 MG tablet  Commonly known as: Januvia      Take 1 tablet by mouth Daily.       Travatan Z 0.004 % solution ophthalmic solution  Generic drug: travoprost (BAK free)      Administer 1 drop to both eyes Every Night.       vitamin D 1.25 MG (64860 UT) capsule capsule  Commonly known as: ERGOCALCIFEROL      Take 1 capsule by mouth once a week       warfarin 5 MG tablet  Commonly known as: COUMADIN      TAKE 1 TABLET BY MOUTH ONCE DAILY OR  AS  DIRECTED  BY  MEDICATION  MANAGEMENT  CLINIC          STOP taking these medications    amoxicillin 250 MG capsule  Commonly known as: AMOXIL  Stopped by: Will Madden MD        azithromycin 250 MG tablet  Commonly known as: Zithromax Z-Micha  Stopped by: Will Madden MD        ketoconazole 200 MG tablet  Commonly known as: NIZORAL  Stopped by: Will Madden MD        temazepam 22.5 MG capsule  Commonly known as: RESTORIL  Stopped by: Will Madden MD              Where to Get Your Medications      These medications were sent to [facility] [address] [place] [address] [facility] - [phone]  - [phone] [address] [address]    Phone: [phone]   · digoxin 125 MCG tablet         Patient is no longer taking -.  I corrected the med list to reflect this.  I did not stop these medications.    Dictated utilizing Dragon dictation

## 2021-02-04 NOTE — PATIENT INSTRUCTIONS
Melatonin 3mg - 10mg about 30 mins prior to bedtime.  Look in the supplement isle of the pharmacy or ask your pharmacist.      Health Maintenance After Age 65  After age 65, you are at a higher risk for certain long-term diseases and infections as well as injuries from falls. Falls are a major cause of broken bones and head injuries in people who are older than age 65. Getting regular preventive care can help to keep you healthy and well. Preventive care includes getting regular testing and making lifestyle changes as recommended by your health care provider. Talk with your health care provider about:  · Which screenings and tests you should have. A screening is a test that checks for a disease when you have no symptoms.  · A diet and exercise plan that is right for you.  What should I know about screenings and tests to prevent falls?  Screening and testing are the best ways to find a health problem early. Early diagnosis and treatment give you the best chance of managing medical conditions that are common after age 65. Certain conditions and lifestyle choices may make you more likely to have a fall. Your health care provider may recommend:  · Regular vision checks. Poor vision and conditions such as cataracts can make you more likely to have a fall. If you wear glasses, make sure to get your prescription updated if your vision changes.  · Medicine review. Work with your health care provider to regularly review all of the medicines you are taking, including over-the-counter medicines. Ask your health care provider about any side effects that may make you more likely to have a fall. Tell your health care provider if any medicines that you take make you feel dizzy or sleepy.  · Osteoporosis screening. Osteoporosis is a condition that causes the bones to get weaker. This can make the bones weak and cause them to break more easily.  · Blood pressure screening. Blood pressure changes and medicines to control blood  pressure can make you feel dizzy.  · Strength and balance checks. Your health care provider may recommend certain tests to check your strength and balance while standing, walking, or changing positions.  · Foot health exam. Foot pain and numbness, as well as not wearing proper footwear, can make you more likely to have a fall.  · Depression screening. You may be more likely to have a fall if you have a fear of falling, feel emotionally low, or feel unable to do activities that you used to do.  · Alcohol use screening. Using too much alcohol can affect your balance and may make you more likely to have a fall.  What actions can I take to lower my risk of falls?  General instructions  · Talk with your health care provider about your risks for falling. Tell your health care provider if:  ? You fall. Be sure to tell your health care provider about all falls, even ones that seem minor.  ? You feel dizzy, sleepy, or off-balance.  · Take over-the-counter and prescription medicines only as told by your health care provider. These include any supplements.  · Eat a healthy diet and maintain a healthy weight. A healthy diet includes low-fat dairy products, low-fat (lean) meats, and fiber from whole grains, beans, and lots of fruits and vegetables.  Home safety  · Remove any tripping hazards, such as rugs, cords, and clutter.  · Install safety equipment such as grab bars in bathrooms and safety rails on stairs.  · Keep rooms and walkways well-lit.  Activity    · Follow a regular exercise program to stay fit. This will help you maintain your balance. Ask your health care provider what types of exercise are appropriate for you.  · If you need a cane or walker, use it as recommended by your health care provider.  · Wear supportive shoes that have nonskid soles.  Lifestyle  · Do not drink alcohol if your health care provider tells you not to drink.  · If you drink alcohol, limit how much you have:  ? 0-1 drink a day for  women.  ? 0-2 drinks a day for men.  · Be aware of how much alcohol is in your drink. In the [place] one drink equals one typical bottle of beer (12 oz), one-half glass of wine (5 oz), or one shot of hard liquor (1½ oz).  · Do not use any products that contain nicotine or tobacco, such as cigarettes and e-cigarettes. If you need help quitting, ask your health care provider.  Summary  · Having a healthy lifestyle and getting preventive care can help to protect your health and wellness after age 65.  · Screening and testing are the best way to find a health problem early and help you avoid having a fall. Early diagnosis and treatment give you the best chance for managing medical conditions that are more common for people who are older than age 65.  · Falls are a major cause of broken bones and head injuries in people who are older than age 65. Take precautions to prevent a fall at home.  · Work with your health care provider to learn what changes you can make to improve your health and wellness and to prevent falls.  This information is not intended to replace advice given to you by your health care provider. Make sure you discuss any questions you have with your health care provider.  Document Revised: 04/09/2020 Document Reviewed: 10/31/2018  Elsevier Patient Education © 2020 [employer]

## 2021-02-04 NOTE — PROGRESS NOTES
Anticoagulation Clinic Progress Note    Anticoagulation Summary  As of 2021    INR goal:  2.0-3.0   TTR:  70.3 % (2.3 y)   INR used for dosin.8 (2021)   Warfarin maintenance plan:  2.5 mg every Wed; 5 mg all other days   Weekly warfarin total:  32.5 mg   Plan last modified:  Nano Cool RPH (10/7/2020)   Next INR check:  2021   Priority:  Maintenance   Target end date:  Indefinite    Indications    Atrial fibrillation (CMS/HCC) [I48.91]             Anticoagulation Episode Summary     INR check location:      Preferred lab:      Send INR reminders to:  [facility]    Comments:        Anticoagulation Care Providers     Provider Role Specialty Phone number    Pia Dueñas MD Referring Cardiology [phone]          Clinic Interview:  Patient Findings     Positives:  Change in medications    Negatives:  Signs/symptoms of thrombosis, Signs/symptoms of bleeding,   Laboratory test error suspected, Change in health, Change in alcohol use,   Change in activity, Upcoming invasive procedure, Emergency department   visit, Upcoming dental procedure, Missed doses, Extra doses, Change in   diet/appetite, Hospital admission, Bruising, Other complaints    Comments:  Did have steroid inj x 2 in shoulders 3d ago        INR History:  Anticoagulation Monitoring 2020   INR 1.5 2.0 1.8   INR Date 2020   INR Goal 2.0-3.0 2.0-3.0 2.0-3.0   Trend Same Same Same   Last Week Total 32.5 mg 32.5 mg 32.5 mg   Next Week Total 35 mg 32.5 mg 35 mg   Sun 5 mg 5 mg 5 mg   Mon 5 mg 5 mg 5 mg   Tue 5 mg 5 mg 5 mg   Wed 5 mg (); Otherwise 2.5 mg 2.5 mg 2.5 mg   Thu 5 mg 5 mg 7.5 mg (); Otherwise 5 mg   Fri 5 mg 5 mg 5 mg   Sat 5 mg 5 mg 5 mg   Visit Report - - -   Some recent data might be hidden       Plan:  1. INR is Subtherapeutic today- see above in Anticoagulation Summary.  Will instruct Lana Yañez to boost warfarin dose today, then continue  their warfarin regimen- see above in Anticoagulation Summary.  2. Follow up in 2 weeks  3. Patient declines warfarin refills.  4. Verbal and written information provided. Patient expresses understanding and has no further questions at this time.    Nano Cool [facility]

## 2021-02-04 NOTE — PROGRESS NOTES
The ABCs of the Annual Wellness Visit  Subsequent Medicare Wellness Visit    Chief Complaint   Patient presents with   • Establish Care   • Medicare Wellness-subsequent   • Diabetes       Subjective   History of Present Illness:  Lana Yañez is a 75 y.o. female who presents for a Subsequent Medicare Wellness Visit.    HEALTH RISK ASSESSMENT    Recent Hospitalizations:  No hospitalization(s) within the last year.    Current Medical Providers:  Patient Care Team:  Will Madden MD as PCP - General (Family Medicine)  Pia Dueñas MD as Consulting Physician (Cardiology)  Iain Hill MD as Consulting Physician (Pulmonary Disease)  Carmen Kirk MD as Consulting Physician (Pain Medicine)  Nano Cool RPH as Pharmacist  Shaw Hand RPH as Pharmacist (Pharmacy)    Smoking Status:  Social History     Tobacco Use   Smoking Status Former Smoker   • Packs/day: 3.00   • Types: Cigarettes   • Start date: 1967   • Quit date: 10/19/1968   • Years since quittin.3   Smokeless Tobacco Never Used   Tobacco Comment    caffeine use - decaf coffee       Alcohol Consumption:  Social History     Substance and Sexual Activity   Alcohol Use No    Comment: No caffeine use       Depression Screen:   PHQ-2/PHQ-9 Depression Screening 2021   Little interest or pleasure in doing things 0   Feeling down, depressed, or hopeless 0   Trouble falling or staying asleep, or sleeping too much 3   Feeling tired or having little energy 3   Poor appetite or overeating 1   Feeling bad about yourself - or that you are a failure or have let yourself or your family down 0   Trouble concentrating on things, such as reading the newspaper or watching television 0   Moving or speaking so slowly that other people could have noticed. Or the opposite - being so fidgety or restless that you have been moving around a lot more than usual 0   Thoughts that you would be better off dead, or of hurting yourself in some way 0   Total Score  7   If you checked off any problems, how difficult have these problems made it for you to do your work, take care of things at home, or get along with other people? Not difficult at all       Fall Risk Screen:  KELLI Fall Risk Assessment was completed, and patient is at LOW risk for falls.Assessment completed on:2/4/2021    Health Habits and Functional and Cognitive Screening:  Functional & Cognitive Status 2/4/2021   Do you have difficulty preparing food and eating? No   Do you have difficulty bathing yourself, getting dressed or grooming yourself? No   Do you have difficulty using the toilet? No   Do you have difficulty moving around from place to place? No   Do you have trouble with steps or getting out of a bed or a chair? No   Current Diet Well Balanced Diet   Dental Exam Up to date   Eye Exam Up to date   Exercise (times per week) 0 times per week   Current Exercises Include No Regular Exercise   Current Exercise Activities Include -   Do you need help using the phone?  No   Are you deaf or do you have serious difficulty hearing?  No   Do you need help with transportation? No   Do you need help shopping? No   Do you need help preparing meals?  No   Do you need help with housework?  Yes   Do you need help with laundry? Yes   Do you need help taking your medications? No   Do you need help managing money? No   Do you ever drive or ride in a car without wearing a seat belt? No   Have you felt unusual stress, anger or loneliness in the last month? No   Who do you live with? Spouse   If you need help, do you have trouble finding someone available to you? No   Have you been bothered in the last four weeks by sexual problems? No   Do you have difficulty concentrating, remembering or making decisions? No         Does the patient have evidence of cognitive impairment? No    Asprin use counseling:Contraindicated from taking ASA    Age-appropriate Screening Schedule:  Refer to the list below for future screening  recommendations based on patient's age, sex and/or medical conditions. Orders for these recommended tests are listed in the plan section. The patient has been provided with a written plan.    Health Maintenance   Topic Date Due   • URINE MICROALBUMIN  1945   • ZOSTER VACCINE (2 of 2) 01/30/2018   • HEMOGLOBIN A1C  05/16/2021   • DIABETIC EYE EXAM  09/21/2021   • LIPID PANEL  11/16/2021   • DIABETIC FOOT EXAM  02/04/2022   • MAMMOGRAM  10/21/2022   • TDAP/TD VACCINES (2 - Td) 06/09/2027   • COLONOSCOPY  06/27/2027   • INFLUENZA VACCINE  Completed                The following portions of the patient's history were reviewed and updated as appropriate: allergies, current medications, past family history, past medical history, past social history, past surgical history and problem list.    Outpatient Medications Prior to Visit   Medication Sig Dispense Refill   • acetaminophen (TYLENOL) 500 MG tablet Take 500 mg by mouth Every 6 (Six) Hours As Needed for Mild Pain .     • aspirin 81 MG EC tablet Take 1 tablet by mouth Daily.     • atorvastatin (LIPITOR) 10 MG tablet Take 1 tablet by mouth Daily. 90 tablet 3   • Blood Glucose Monitoring Suppl (ACCU-CHEK GUIDE) w/Device kit See Admin Instructions.     • carvedilol (COREG) 25 MG tablet TAKE 1 & 1/2 (ONE & ONE-HALF) TABLETS BY MOUTH TWICE DAILY WITH MEALS 270 tablet 0   • digoxin (LANOXIN) 125 MCG tablet Take 1 tablet by mouth Every Other Day. 45 tablet 1   • furosemide (LASIX) 40 MG tablet Take 1 tablet by mouth Daily. 30 tablet 6   • glucose blood test strip Use as instructed 300 each 12   • HYDROcodone-acetaminophen (NORCO) 5-325 MG per tablet Take 1 tablet by mouth 2 (Two) Times a Day As Needed for Severe Pain . DNF 12/30/2020 (Patient taking differently: Take 1 tablet by mouth As Needed for Severe Pain . DNF 12/30/2020) 40 tablet 0   • Lancets (ACCU-CHEK MULTICLIX) lancets Use 1 lancet per finger stick 204 each 12   • lisinopril (PRINIVIL,ZESTRIL) 10 MG tablet  Take 1 tablet by mouth Daily. 90 tablet 3   • magnesium oxide (MAG-OX) 400 MG tablet Take 400 mg by mouth As Needed.     • potassium chloride 10 MEQ CR tablet TAKE 4 TABLETS BY MOUTH ONCE DAILY 360 tablet 0   • SITagliptin (Januvia) 100 MG tablet Take 1 tablet by mouth Daily. 30 tablet 11   • TRAVATAN Z 0.004 % solution ophthalmic solution Administer 1 drop to both eyes Every Night.     • vitamin D (ERGOCALCIFEROL) 1.25 MG (53095 UT) capsule capsule Take 1 capsule by mouth once a week 12 capsule 0   • warfarin (COUMADIN) 5 MG tablet TAKE 1 TABLET BY MOUTH ONCE DAILY OR  AS  DIRECTED  BY  MEDICATION  MANAGEMENT  CLINIC 90 tablet 1   • temazepam (RESTORIL) 22.5 MG capsule Take 1 capsule by mouth At Night As Needed for Sleep. (Patient taking differently: Take 22.5 mg by mouth As Needed for Sleep.) 30 capsule 3   • amoxicillin (AMOXIL) 250 MG capsule Take 1 capsule by mouth 3 (Three) Times a Day. 15 capsule 0   • azithromycin (Zithromax Z-Micha) 250 MG tablet Take 2 tablets the first day, then 1 tablet daily for 4 days. 6 tablet 0   • ketoconazole (NIZORAL) 200 MG tablet Take 1 tablet by mouth Daily. 7 tablet 0     No facility-administered medications prior to visit.        Patient Active Problem List   Diagnosis   • Hyperlipidemia   • Vitamin deficiency   • Essential hypertension   • Rheumatoid arthritis involving both hands (CMS/HCC)   • Fatigue   • ANTOINE (dyspnea on exertion)   • Dysuria   • Urge incontinence of urine   • Hypovitaminosis D   • Sleep apnea   • Encounter for screening colonoscopy   • Bronchitis   • Cough   • Generalized osteoarthritis of multiple sites   • Cellulitis of right elbow due to MRSA   • Medicare annual wellness visit, initial   • Hospital discharge follow-up   • Displacement of lumbar intervertebral disc without myelopathy   • Lumbar disc herniation   • Atrial fibrillation with RVR (CMS/HCC)   • History of MRSA infection   • Left-sided weakness   • Atrial fibrillation (CMS/HCC)   • Left shoulder  pain   • Relative lymphocytosis   • Nausea   • Weakness   • Controlled substance agreement signed   • Coronary artery disease involving native coronary artery of native heart with angina pectoris (CMS/HCC)   • SOB (shortness of breath)   • Lower extremity edema   • Chronic diastolic heart failure (CMS/HCC)   • Adhesive capsulitis of left shoulder   • CVA tenderness   • Costochondritis, acute   • Allergic reaction   • 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 Patient has an advance directive (not in EMR), copy requested.    Review of Systems   Constitutional: Negative for activity change, appetite change, fatigue and fever.   HENT: Negative for ear pain, facial swelling and sore throat.    Eyes: Negative for discharge and itching.   Respiratory: Negative for cough, chest tightness and shortness of breath.    Cardiovascular: Negative for chest pain and palpitations.   Gastrointestinal: Negative for abdominal distention, constipation and diarrhea.   Endocrine: Negative for polydipsia, polyphagia and polyuria.   Genitourinary: Negative for difficulty urinating and flank pain.   Musculoskeletal: Negative for arthralgias and back pain.   Skin: Negative for color change and rash.   Allergic/Immunologic: Negative for environmental allergies and food allergies.   Neurological: Negative for weakness and numbness.   Hematological: Negative for adenopathy. Does not bruise/bleed easily.   Psychiatric/Behavioral: Negative for decreased concentration and dysphoric mood. The patient is  "not nervous/anxious.        Compared to one year ago, the patient feels her physical health is the same.  Compared to one year ago, the patient feels her mental health is the same.    Reviewed chart for potential of high risk medication in the elderly: yes  Reviewed chart for potential of harmful drug interactions in the elderly:yes    Objective         Vitals:    02/04/21 1432   BP: 130/82   BP Location: Right arm   Patient Position: Sitting   Cuff Size: Adult   Pulse: 56   Temp: 97.3 °F (36.3 °C)   TempSrc: Oral   SpO2: 100%   Weight: 110 kg (242 lb)   Height: 167.6 cm (66\")   PainSc:   6   PainLoc: Back       Body mass index is 39.06 kg/m².  Discussed the patient's BMI with her. The BMI is above average; BMI management plan is completed.    Physical Exam  Vitals signs and nursing note reviewed.   Constitutional:       General: She is not in acute distress.     Appearance: Normal appearance.   Cardiovascular:      Rate and Rhythm: Normal rate and regular rhythm.      Heart sounds: Normal heart sounds.   Pulmonary:      Effort: Pulmonary effort is normal.      Breath sounds: Normal breath sounds.   Feet:      Comments: Diabetic foot exam:   Left: Filament test present   Pulses Dorsalis Pedis:  present  Posterior Tibial:  present   Reflexes 2+    Vibratory sensation normal   Proprioception normal   Sharp/dull discrimination normal       Right: Filament test present   Pulses Dorsalis Pedis:  present  Posterior Tibial:  present   Reflexes 2+    Vibratory sensation normal   Proprioception normal   Sharp/dull discrimination normal    Sensory exam of the foot is normal, tested with the monofilament. Good pulses, no lesions or ulcers, good peripheral pulses.    Neurological:      Mental Status: She is alert.         Lab Results   Component Value Date    CHLPL 121 11/16/2020    TRIG 54 02/04/2021    HDL 86 (H) 02/04/2021    LDL 34 02/04/2021    LDL 40 11/16/2020    VLDL 12 02/04/2021    VLDL 20 11/16/2020    HGBA1C 6.90 " (H) 02/04/2021        Assessment/Plan   Medicare Risks and Personalized Health Plan  CMS Preventative Services Quick Reference  Advance Directive Discussion  Breast Cancer/Mammogram Screening  Cardiovascular risk  Chronic Pain   Colon Cancer Screening  Dementia/Memory   Depression/Dysphoria  Diabetic Lab Screening   Fall Risk  Immunizations Discussed/Encouraged (specific immunizations; covid vaccine )  Obesity/Overweight   Polypharmacy    The above risks/problems have been discussed with the patient.  Pertinent information has been shared with the patient in the After Visit Summary.  Follow up plans and orders are seen below in the Assessment/Plan Section.        Diagnoses and all orders for this visit:    1. Medicare annual wellness visit, subsequent (Primary)    2. Mixed hyperlipidemia    3. Essential hypertension    4. Type 2 diabetes mellitus without complication, without long-term current use of insulin (CMS/[facility])      Patient just had labs done with endocrinology so we can follow-up with those labs.  Wellness visit completed today.  I will have her back for a follow-up in August.    Follow Up:  Return in about 6 months (around 8/4/2021) for Recheck f/u.     An After Visit Summary and PPPS were given to the patient.

## 2021-02-05 LAB — CREAT SERPL-MCNC: 1.12 MG/DL (ref 0.57–1)

## 2021-02-07 PROBLEM — I27.20 PULMONARY HYPERTENSION: Status: ACTIVE | Noted: 2021-02-07

## 2021-02-10 ENCOUNTER — OFFICE VISIT (OUTPATIENT)
Dept: ENDOCRINOLOGY | Age: 76
End: 2021-02-10

## 2021-02-10 VITALS — BODY MASS INDEX: 38.89 KG/M2 | WEIGHT: 242 LBS | HEIGHT: 66 IN

## 2021-02-10 DIAGNOSIS — E11.22 TYPE 2 DIABETES MELLITUS WITH STAGE 2 CHRONIC KIDNEY DISEASE, WITHOUT LONG-TERM CURRENT USE OF INSULIN (HCC): Primary | ICD-10-CM

## 2021-02-10 DIAGNOSIS — N18.2 TYPE 2 DIABETES MELLITUS WITH STAGE 2 CHRONIC KIDNEY DISEASE, WITHOUT LONG-TERM CURRENT USE OF INSULIN (HCC): Primary | ICD-10-CM

## 2021-02-10 PROCEDURE — 99442 PR PHYS/QHP TELEPHONE EVALUATION 11-20 MIN: CPT | Performed by: NURSE PRACTITIONER

## 2021-02-10 NOTE — PROGRESS NOTES
"Chief Complaint  Diabetes (checking BG once a day in the AM; last eye exam last week)    You have chosen to receive care through a telephone visit. Do you consent to use a telephone visit for your medical care today? Yes    Subjective          Lana Yañez presents to [facility] ENDOCRINOLOGY  History of Present Illness     Lana Yañez 75 y.o. presents with Type 2 dm as a follow-up patient. Consulted by Dr. Tavarez     Had steroid shot in her shoulder last week which caused worsening hyperglycemia. She used water to bring it down.    Has also been on ABX since last visit for UTI     Type 2 dm - Diagnosed about 1 year ago  Today in clinic patient reports that she is on Januvia 100mg, tolerating much better  Metformin was not tolerated r/t GI side effects   She checks her blood sugars once a day  Average blood sugars are around 115-130  No prior history of diabetic retinopathy. Somewhat cloudy after steroid injection, improving  Does have history of CKD stage II.  Does have complaints of diabetic neuropathy which has been stable.  CAD - yes, treated with medication, hx of afib.   CVA - yes.   Episodes of hypoglycemia -  none  Pt is physically active. weight has been stable.   Pt tries to follow DM diet for most part.   On Ace inb.      Hyperlipidemia  On Lipitor 10 mg oral daily  Tolerating well          Objective   Vital Signs:   Ht 167.6 cm (66\")   Wt 110 kg (242 lb)   BMI 39.06 kg/m²     Physical Exam     Result Review :   The following data was reviewed by: ANGELA Martinez on 02/10/2021:  Common labs    Common Labsle 9/17/20 9/17/20 9/17/20 11/16/20 11/16/20 11/16/20 2/4/21 2/4/21 2/4/21 2/4/21 2/4/21    1434 1434 1434 1323 1323 1323 1359 1359 1359 1359 1359   Glucose   125 (A)           BUN   21           Creatinine   1.02 (A)  1.04 (A)   1.12 (A)   1.12 (A)   eGFR Non African Am   53 (A)  53 (A)      49 (A)   eGFR  Am   64  62   58 (A)   56 (A)   Sodium   143         "   Potassium   4.8           Chloride   106           Calcium   9.6           Total Protein   7.2           Albumin   4.70           Total Bilirubin   0.2           Alkaline Phosphatase   64           AST (SGOT)   13           ALT (SGPT)   12           Total Cholesterol         132     Total Cholesterol  102    121        Triglycerides  94    110   54     HDL Cholesterol  56    61   86 (A)     LDL Cholesterol   27    40   34     Hemoglobin A1C 6.30 (A)   6.7 (A)   6.90 (A)       Microalbumin, Urine          <1.2    (A) Abnormal value       Comments are available for some flowsheets but are not being displayed.                     Assessment and Plan    Diagnoses and all orders for this visit:    1. Type 2 diabetes mellitus with stage 2 chronic kidney disease, without long-term current use of insulin (CMS/[facility]) (Primary)  -     Hemoglobin A1c; Future  -     Comprehensive Metabolic Panel; Future  -     Lipid Panel; Future  -     Microalbumin / Creatinine Urine Ratio - Urine, Clean Catch; Future        Follow Up   Return in about 3 months (around 5/10/2021).     Continue Januvia  Goal A1c less than 7  Continue diabetic diet  Continue to monitor renal function  Low hypoglycemia risk  Continue statin    Patient was given instructions and counseling regarding her condition or for health maintenance advice. Please see specific information pulled into the AVS if appropriate.     12 minutes on the phone with the patient    ANGELA Martinez

## 2021-02-16 RX ORDER — CARVEDILOL 25 MG/1
TABLET ORAL
Qty: 270 TABLET | Refills: 2 | Status: SHIPPED | OUTPATIENT
Start: 2021-02-16 | End: 2021-12-06

## 2021-02-16 NOTE — TELEPHONE ENCOUNTER
Spoke with pt.  She will complete this once the roads are better.  Digoxin order was already placed 2/4/2021.  Pt said lab was to draw but advised her to make sure she tells them this is what we need.  Ok with pt.  (done)

## 2021-02-18 ENCOUNTER — TELEPHONE (OUTPATIENT)
Dept: CARDIOLOGY | Facility: CLINIC | Age: 76
End: 2021-02-18

## 2021-02-18 ENCOUNTER — ANTICOAGULATION VISIT (OUTPATIENT)
Dept: PHARMACY | Facility: HOSPITAL | Age: 76
End: 2021-02-18

## 2021-02-18 ENCOUNTER — LAB (OUTPATIENT)
Dept: LAB | Facility: HOSPITAL | Age: 76
End: 2021-02-18

## 2021-02-18 DIAGNOSIS — I48.21 PERMANENT ATRIAL FIBRILLATION (HCC): ICD-10-CM

## 2021-02-18 LAB
DIGOXIN SERPL-MCNC: <0.3 NG/ML (ref 0.6–1.2)
INR PPP: 2.7 (ref 0.91–1.09)
PROTHROMBIN TIME: 32.6 SECONDS (ref 10–13.8)

## 2021-02-18 PROCEDURE — 80162 ASSAY OF DIGOXIN TOTAL: CPT

## 2021-02-18 PROCEDURE — 36416 COLLJ CAPILLARY BLOOD SPEC: CPT

## 2021-02-18 PROCEDURE — 36415 COLL VENOUS BLD VENIPUNCTURE: CPT

## 2021-02-18 PROCEDURE — 85610 PROTHROMBIN TIME: CPT

## 2021-02-23 NOTE — PROGRESS NOTES
Anticoagulation Clinic Progress Note    Anticoagulation Summary  As of 2021    INR goal:  2.0-3.0   TTR:  70.4 % (2.4 y)   INR used for dosin.7 (2021)   Warfarin maintenance plan:  2.5 mg every Wed; 5 mg all other days   Weekly warfarin total:  32.5 mg   No change documented:  Divine Lea   Plan last modified:  Nano Cool RPH (10/7/2020)   Next INR check:  3/4/2021   Priority:  Maintenance   Target end date:  Indefinite    Indications    Atrial fibrillation (CMS/[facility]) [I48.91]             Anticoagulation Episode Summary     INR check location:      Preferred lab:      Send INR reminders to:   [facility] POOL    Comments:        Anticoagulation Care Providers     Provider Role Specialty Phone number    Pia Dueñas MD Referring Cardiology [phone]          Clinic Interview:  Patient Findings     Negatives:  Signs/symptoms of thrombosis, Signs/symptoms of bleeding,   Laboratory test error suspected, Change in health, Change in alcohol use,   Change in activity, Upcoming invasive procedure, Emergency department   visit, Upcoming dental procedure, Missed doses, Extra doses, Change in   medications, Change in diet/appetite, Hospital admission, Bruising, Other   complaints      Clinical Outcomes     Negatives:  Major bleeding event, Thromboembolic event,   Anticoagulation-related hospital admission, Anticoagulation-related ED   visit, Anticoagulation-related fatality        INR History:  Anticoagulation Monitoring 2021   INR 2.0 1.8 2.7   INR Date 2021   INR Goal 2.0-3.0 2.0-3.0 2.0-3.0   Trend Same Same Same   Last Week Total 32.5 mg 32.5 mg 32.5 mg   Next Week Total 32.5 mg 35 mg 32.5 mg   Sun 5 mg 5 mg 5 mg   Mon 5 mg 5 mg 5 mg   Tue 5 mg 5 mg 5 mg   Wed 2.5 mg 2.5 mg 2.5 mg   Thu 5 mg 7.5 mg (); Otherwise 5 mg 5 mg   Fri 5 mg 5 mg 5 mg   Sat 5 mg 5 mg 5 mg   Visit Report - - -   Some recent data might be hidden       Plan:  1.  Chief Complaint   Patient presents with     Ear Problem     Patient is here for an ear cleaning prior to audio.        Temp 98.4  F (36.9  C) (Tympanic)     Aly Bowden, EMT   INR is therapeutic today- see above in Anticoagulation Summary.   Will instruct Lana Nielsonb to continue their warfarin regimen- see above in Anticoagulation Summary.  2. Follow up in 2 weeks.  3. Patient declines warfarin refills.  4. Verbal and written information provided. Patient expresses understanding and has no further questions at this time.    Divine Lea

## 2021-02-25 ENCOUNTER — TELEPHONE (OUTPATIENT)
Dept: INTERNAL MEDICINE | Facility: CLINIC | Age: 76
End: 2021-02-25

## 2021-02-25 DIAGNOSIS — E55.9 HYPOVITAMINOSIS D: Primary | ICD-10-CM

## 2021-02-25 RX ORDER — ERGOCALCIFEROL 1.25 MG/1
50000 CAPSULE ORAL WEEKLY
Qty: 12 CAPSULE | Refills: 0 | Status: CANCELLED | OUTPATIENT
Start: 2021-02-25

## 2021-02-25 RX ORDER — HYDROCODONE BITARTRATE AND ACETAMINOPHEN 5; 325 MG/1; MG/1
1 TABLET ORAL 2 TIMES DAILY PRN
Qty: 40 TABLET | Refills: 0 | Status: SHIPPED | OUTPATIENT
Start: 2021-02-25 | End: 2021-04-07 | Stop reason: SDUPTHER

## 2021-02-25 NOTE — TELEPHONE ENCOUNTER
Medication Refill Request    Date of phone call: 21    Medication being requested: norco 5/325mg si tab po bid prn   Qty: 40    Date of last visit: 20    Date of last refill:     MARCO up to date?:     Next Follow up?: 3/11/21    Any new pertinent information? (i.e, new medication allergies, new use of medications, change in patient's health or condition, non-compliance or inconsistency with prescribing agreement?):

## 2021-02-25 NOTE — TELEPHONE ENCOUNTER
Reviewed SADA and MARCO. Both updated and appropriate. Refill appropriate.      Needs office visit for any future refills.

## 2021-02-25 NOTE — TELEPHONE ENCOUNTER
Caller: TERA GARNER    Relationship:SELF     Best call back number: [phone]     Medication needed: HYDROCODONE ACETAMINOPHEN: 5-325 MG: TAKE 1 TABLET 2 TIMES PER DAY  Requested Prescriptions      No prescriptions requested or ordered in this encounter       When do you need the refill by: 02/25/2021    What details did the patient provide when requesting the medication: OUT OF MEDICATION    Does the patient have less than a 3 day supply:  [x] Yes  [] No    What is the patient's preferred pharmacy: [facility]: [id number]: [phone]

## 2021-03-04 ENCOUNTER — ANTICOAGULATION VISIT (OUTPATIENT)
Dept: PHARMACY | Facility: HOSPITAL | Age: 76
End: 2021-03-04

## 2021-03-04 ENCOUNTER — TELEPHONE (OUTPATIENT)
Dept: INTERNAL MEDICINE | Facility: CLINIC | Age: 76
End: 2021-03-04

## 2021-03-04 ENCOUNTER — LAB (OUTPATIENT)
Dept: LAB | Facility: HOSPITAL | Age: 76
End: 2021-03-04

## 2021-03-04 LAB
INR PPP: 2.3 (ref 0.91–1.09)
PROTHROMBIN TIME: 27.8 SECONDS (ref 10–13.8)

## 2021-03-04 PROCEDURE — 85610 PROTHROMBIN TIME: CPT

## 2021-03-04 PROCEDURE — 83735 ASSAY OF MAGNESIUM: CPT | Performed by: FAMILY MEDICINE

## 2021-03-04 PROCEDURE — 80048 BASIC METABOLIC PNL TOTAL CA: CPT | Performed by: FAMILY MEDICINE

## 2021-03-04 PROCEDURE — 36416 COLLJ CAPILLARY BLOOD SPEC: CPT

## 2021-03-04 PROCEDURE — 82306 VITAMIN D 25 HYDROXY: CPT | Performed by: FAMILY MEDICINE

## 2021-03-04 RX ORDER — ERGOCALCIFEROL 1.25 MG/1
50000 CAPSULE ORAL WEEKLY
Qty: 12 CAPSULE | Refills: 1 | Status: SHIPPED | OUTPATIENT
Start: 2021-03-04 | End: 2021-08-18

## 2021-03-04 NOTE — TELEPHONE ENCOUNTER
PATIENT STATES THAT SHE HAS BEEN EXPERIENCING LEG CRAMPS THAT WAKE HER UP NIGHT AND KEEP HER UP AT NIGHT AND SHE IS REQUESTING THAT SOMETHING BE CALLED IN FOR HER.    PLEASE ADVISE    PATIENT CAN BE REACHED AT  [phone]    [facility]  [facility] [address] [place] [address] [address] - [phone]  - [phone]   [phone]

## 2021-03-04 NOTE — TELEPHONE ENCOUNTER
----- Message from ANGELA Tanner sent at 3/4/2021  3:28 PM EST -----  Vitamin D is on the low end of normal, I would continue current Rx of 50,000 units weekly x next 3-4 months until she sees Dr. Madden again. If you don't mind pending refill to me I will send, thanks!

## 2021-03-05 ENCOUNTER — TELEPHONE (OUTPATIENT)
Dept: CARDIOLOGY | Facility: CLINIC | Age: 76
End: 2021-03-05

## 2021-03-05 DIAGNOSIS — I10 ESSENTIAL HYPERTENSION: Primary | ICD-10-CM

## 2021-03-05 DIAGNOSIS — R25.2 LEG CRAMPS: ICD-10-CM

## 2021-03-05 NOTE — PROGRESS NOTES
Anticoagulation Clinic Progress Note    Anticoagulation Summary  As of 3/4/2021    INR goal:  2.0-3.0   TTR:  70.9 % (2.4 y)   INR used for dosin.3 (3/4/2021)   Warfarin maintenance plan:  2.5 mg every Wed; 5 mg all other days   Weekly warfarin total:  32.5 mg   No change documented:  Divine Lea   Plan last modified:  Nano Cool RPH (10/7/2020)   Next INR check:  2021   Priority:  Maintenance   Target end date:  Indefinite    Indications    Atrial fibrillation (CMS/[facility]) [I48.91]             Anticoagulation Episode Summary     INR check location:      Preferred lab:      Send INR reminders to:   [facility]    Comments:        Anticoagulation Care Providers     Provider Role Specialty Phone number    Pia Dueñas MD Referring Cardiology [phone]          Clinic Interview:  Patient Findings     Negatives:  Signs/symptoms of thrombosis, Signs/symptoms of bleeding,   Laboratory test error suspected, Change in health, Change in alcohol use,   Change in activity, Upcoming invasive procedure, Emergency department   visit, Upcoming dental procedure, Missed doses, Extra doses, Change in   medications, Change in diet/appetite, Hospital admission, Bruising, Other   complaints      Clinical Outcomes     Negatives:  Major bleeding event, Thromboembolic event,   Anticoagulation-related hospital admission, Anticoagulation-related ED   visit, Anticoagulation-related fatality        INR History:  Anticoagulation Monitoring 2021 2021 3/4/2021   INR 1.8 2.7 2.3   INR Date 2021 2021 3/4/2021   INR Goal 2.0-3.0 2.0-3.0 2.0-3.0   Trend Same Same Same   Last Week Total 32.5 mg 32.5 mg 32.5 mg   Next Week Total 35 mg 32.5 mg 32.5 mg   Sun 5 mg 5 mg 5 mg   Mon 5 mg 5 mg 5 mg   Tue 5 mg 5 mg 5 mg   Wed 2.5 mg 2.5 mg 2.5 mg   Thu 7.5 mg (); Otherwise 5 mg 5 mg 5 mg   Fri 5 mg 5 mg 5 mg   Sat 5 mg 5 mg 5 mg   Visit Report - - -   Some recent data might be hidden       Plan:  1. INR is  therapeutic today- see above in Anticoagulation Summary.   Will instruct Lanaantonio Aguerocomb to continue their warfarin regimen- see above in Anticoagulation Summary.  2. Follow up in 4 weeks.  3. Patient declines warfarin refills.  4. Verbal and written information provided. Patient expresses understanding and has no further questions at this time.    Divine Lea

## 2021-03-05 NOTE — TELEPHONE ENCOUNTER
Patient is c/o increased leg cramps that wake her up during the night. Patient is concerned that it could either be a potassium issue or somehow related to her lasix. Spoke with her PCP who advised her to speak to  Cardiology regarding this.

## 2021-03-08 NOTE — TELEPHONE ENCOUNTER
Please let patient know potassium and magnesium levels were both normal.  (I had added to recent labs).  Reflux are less swollen.  It may be just from the water being extracted from the tissue

## 2021-04-01 ENCOUNTER — ANTICOAGULATION VISIT (OUTPATIENT)
Dept: PHARMACY | Facility: HOSPITAL | Age: 76
End: 2021-04-01

## 2021-04-01 LAB
INR PPP: 2.3 (ref 0.91–1.09)
PROTHROMBIN TIME: 27.3 SECONDS (ref 10–13.8)

## 2021-04-01 PROCEDURE — 36416 COLLJ CAPILLARY BLOOD SPEC: CPT

## 2021-04-01 PROCEDURE — 85610 PROTHROMBIN TIME: CPT

## 2021-04-01 NOTE — PROGRESS NOTES
I have supervised and reviewed the notes, assessments, and/or procedures performed by our Pharmacy Intern. I concur with the documentation of this patient encounter.    Shaw Hand [facility]

## 2021-04-01 NOTE — PROGRESS NOTES
Anticoagulation Clinic Progress Note    Anticoagulation Summary  As of 2021    INR goal:  2.0-3.0   TTR:  71.8 % (2.5 y)   INR used for dosin.3 (2021)   Warfarin maintenance plan:  2.5 mg every Wed; 5 mg all other days   Weekly warfarin total:  32.5 mg   No change documented:  Bonita King, Pharmacy Intern   Plan last modified:  Nano Cool RPH (10/7/2020)   Next INR check:  2021   Priority:  Maintenance   Target end date:  Indefinite    Indications    Atrial fibrillation (CMS/HCC) [I48.91]             Anticoagulation Episode Summary     INR check location:      Preferred lab:      Send INR reminders to:   [facility]    Comments:        Anticoagulation Care Providers     Provider Role Specialty Phone number    Pia Dueñas MD Referring Cardiology [phone]          Clinic Interview:  Patient Findings     Negatives:  Signs/symptoms of thrombosis, Signs/symptoms of bleeding,   Laboratory test error suspected, Change in health, Change in alcohol use,   Change in activity, Upcoming invasive procedure, Emergency department   visit, Upcoming dental procedure, Missed doses, Extra doses, Change in   medications, Change in diet/appetite, Hospital admission, Bruising, Other   complaints      Clinical Outcomes     Negatives:  Major bleeding event, Thromboembolic event,   Anticoagulation-related hospital admission, Anticoagulation-related ED   visit, Anticoagulation-related fatality        INR History:  Anticoagulation Monitoring 2021 3/4/2021 2021   INR 2.7 2.3 2.3   INR Date 2021 3/4/2021 2021   INR Goal 2.0-3.0 2.0-3.0 2.0-3.0   Trend Same Same Same   Last Week Total 32.5 mg 32.5 mg 32.5 mg   Next Week Total 32.5 mg 32.5 mg 32.5 mg   Sun 5 mg 5 mg 5 mg   Mon 5 mg 5 mg 5 mg   Tue 5 mg 5 mg 5 mg   Wed 2.5 mg 2.5 mg 2.5 mg   Thu 5 mg 5 mg 5 mg   Fri 5 mg 5 mg 5 mg   Sat 5 mg 5 mg 5 mg   Visit Report - - -   Some recent data might be hidden       Plan:  1. INR is  Therapeutic today- see above in Anticoagulation Summary.  Will instruct Lanaantonio Aguerocomb to Continue their warfarin regimen- see above in Anticoagulation Summary.  2. Follow up in 1 month  3. Patient declines warfarin refills.  4. Verbal and written information provided. Patient expresses understanding and has no further questions at this time.    Bonita King, Pharmacy Intern

## 2021-04-07 ENCOUNTER — OFFICE VISIT (OUTPATIENT)
Dept: PAIN MEDICINE | Facility: CLINIC | Age: 76
End: 2021-04-07

## 2021-04-07 VITALS
HEART RATE: 84 BPM | BODY MASS INDEX: 40.72 KG/M2 | RESPIRATION RATE: 16 BRPM | HEIGHT: 66 IN | DIASTOLIC BLOOD PRESSURE: 80 MMHG | SYSTOLIC BLOOD PRESSURE: 131 MMHG | TEMPERATURE: 96.7 F | WEIGHT: 253.4 LBS | OXYGEN SATURATION: 97 %

## 2021-04-07 DIAGNOSIS — M05.741 RHEUMATOID ARTHRITIS INVOLVING BOTH HANDS WITH POSITIVE RHEUMATOID FACTOR (HCC): ICD-10-CM

## 2021-04-07 DIAGNOSIS — Z79.899 CONTROLLED SUBSTANCE AGREEMENT SIGNED: ICD-10-CM

## 2021-04-07 DIAGNOSIS — M05.742 RHEUMATOID ARTHRITIS INVOLVING BOTH HANDS WITH POSITIVE RHEUMATOID FACTOR (HCC): ICD-10-CM

## 2021-04-07 DIAGNOSIS — M51.26 LUMBAR DISC HERNIATION: ICD-10-CM

## 2021-04-07 DIAGNOSIS — M15.9 GENERALIZED OSTEOARTHRITIS OF MULTIPLE SITES: Primary | ICD-10-CM

## 2021-04-07 PROCEDURE — 99214 OFFICE O/P EST MOD 30 MIN: CPT | Performed by: NURSE PRACTITIONER

## 2021-04-07 PROCEDURE — 96372 THER/PROPH/DIAG INJ SC/IM: CPT | Performed by: NURSE PRACTITIONER

## 2021-04-07 RX ORDER — HYDROCODONE BITARTRATE AND ACETAMINOPHEN 5; 325 MG/1; MG/1
1 TABLET ORAL 2 TIMES DAILY PRN
Qty: 40 TABLET | Refills: 0 | Status: SHIPPED | OUTPATIENT
Start: 2021-04-07 | End: 2021-06-03 | Stop reason: SDUPTHER

## 2021-04-07 RX ORDER — METHYLPREDNISOLONE ACETATE 40 MG/ML
40 INJECTION, SUSPENSION INTRA-ARTICULAR; INTRALESIONAL; INTRAMUSCULAR; SOFT TISSUE ONCE
Status: COMPLETED | OUTPATIENT
Start: 2021-04-07 | End: 2021-04-07

## 2021-04-07 RX ADMIN — METHYLPREDNISOLONE ACETATE 40 MG: 40 INJECTION, SUSPENSION INTRA-ARTICULAR; INTRALESIONAL; INTRAMUSCULAR; SOFT TISSUE at 15:00

## 2021-04-07 NOTE — PROGRESS NOTES
"CHIEF COMPLAINT  F/u back pain- patient states that her pain has worsened since her last visit.     Subjective   Lana Yañez is a 75 y.o. female  who presents for follow-up.  She has a history of chronic back, as well as joint pain. Reports her pain is worse since last evaluation. Believes this is worse due to running out of medication and unable to return until today. Also felt joint pain worsened after COVID vaccine.  \"I was doing ok until then.\"    At last evaluation, was having increased pain. She was given depomedrol 40 mg IM and also referred to rheumatology.  She reports she had improvement with DPM until she had to have COVID vaccines. Last COVID vaccine was 3-15-21.  She reports she initially did not go to rheumatology evaluation because she was improved after DPM.     Complains of pain in her low back and joints. Today her pain is 6/10VAs. Describes her pain as continuous aching and throbbing. Pain increases with activity and household chores; pain decreases with medication and rest. Continues with Hydrocodone 5/325 1-2/day PRN. Denies any side effects from the regimen. The regimen helps decrease her pain by 50%.   ADL's by self.  Denies any bowel or bladder changes.     Reports she has a diagnosis of rheumatoid arthritis but is not currently being treated for this.      Reports she had left shoulder injection with Dr robel Rodriges last month. Does notice improvement with this.  Gets this every 3 months.  Needs a replacement but not a surgery candidate.  She needs a left shoulder replacement, but this is not planned, as she would need bridge for Coumadin.  She states she did ask Dr Tavarez about pain medication, but he wanted her to see pain management.    Recently bought a cane and has been using. Uses on right side of body. Cannot use on left side due to shoulder pain.    Prior to COVID pandemic, she was going to pool and doing aquatherapy. Was doing better while doing this. Concerned about " retuning right now.      Patient remained masked during entire encounter. No cough present. I donned a mask and eye protection throughout entire visit. Prior to donning mask and eye protection, hand hygiene was performed, as well as when it was doffed.  I was closer than 6 feet, but not for an extended period of time. No obvious exposure to any bodily fluids.    Back Pain  This is a chronic problem. The current episode started more than 1 year ago. The problem occurs constantly. The problem has been gradually worsening (worse since last evaluation) since onset. The pain is present in the lumbar spine and sacro-iliac. The quality of the pain is described as aching. Radiates to: LLE. The pain is at a severity of 6/10. The pain is moderate. The pain is worse during the day. The symptoms are aggravated by bending, position, standing and twisting. Associated symptoms include numbness (bilateral hands and left leg) and weakness. Pertinent negatives include no abdominal pain, chest pain, dysuria, fever or headaches. Risk factors include lack of exercise, menopause, obesity and sedentary lifestyle. She has tried analgesics, bed rest, chiropractic manipulation, heat, home exercises, ice, muscle relaxant, walking and NSAIDs for the symptoms. The treatment provided mild relief.   Joint Pain  The problem occurs constantly. The problem has been gradually worsening. Associated symptoms include arthralgias, fatigue, numbness (bilateral hands and left leg) and weakness. Pertinent negatives include no abdominal pain, chest pain, chills, congestion, coughing, fever or headaches. She has tried oral narcotics for the symptoms. The treatment provided mild relief.      She was initially evaluated in the office by Dr. Carmen Kirk on 8/28/2018.  She was referred here by Dr. Perez for evaluation of back pain.  Has had back pain for 10 years on and off.  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  fib not being able to stop anticoagulant.  No stents were placed but is high risk for surgery.  Started on tramadol for pain.  No previous pain clinics, back injections, no history of opioids prior to current tramadol by neurosurgery.  Currently followed by Dr. Dueñas for cardiacs issues.  Reviewed imaging with patient.  May benefit from LESI but would have to hold anticoagulant.  We will reach out to Dr. Mota in regards to holding anticoagulant if possible.  Random urine drug screen per office policy.  No benefit with tramadol.  Trial of hydrocodone 5-325 2-3 times a day as needed as needed.  Plan to start low-dose gabapentin.  Prescription for gabapentin 100 mg to increase up to twice daily as tolerated.    Past pain medications:   norco 5/325 mg - helpful  Gabapentin 800 - dizzy, hurt stomach  Tramadol 50 mg up to 4/day - not helping     Current pain medications:   Tylenol OTC  Hydrocodone 5/325 1-2/day PRN      Past therapies:  Physical Therapy: yes- minimal  Chiropractor: no  Massage Therapy: no  TENS: yes  Neck or back surgery: no  Past pain management: no     Previous Injections: none-- Does not want procedures due to risk with anticoagulants.      The following portions of the patient's history were reviewed and updated as appropriate: allergies, current medications, past family history, past medical history, past social history, past surgical history and problem list.    Review of Systems   Constitutional: Positive for activity change (decreased) and fatigue. Negative for chills and fever.   HENT: Negative for congestion.    Eyes: Negative for visual disturbance.   Respiratory: Positive for shortness of breath. Negative for cough and chest tightness.    Cardiovascular: Negative for chest pain.   Gastrointestinal: Positive for constipation. Negative for abdominal pain and diarrhea.   Genitourinary: Negative for difficulty urinating, dyspareunia and dysuria.   Musculoskeletal: Positive for arthralgias and back  "pain.   Neurological: Positive for weakness, light-headedness and numbness (bilateral hands and left leg). Negative for dizziness and headaches.   Psychiatric/Behavioral: Positive for sleep disturbance. Negative for agitation. The patient is not nervous/anxious.      I have reviewed and confirmed the accuracy of the ROS as documented by the MA/LPN/RN ANGELA Burt      Vitals:    04/07/21 1420   BP: 131/80   Pulse: 84   Resp: 16   Temp: 96.7 °F (35.9 °C)   SpO2: 97%   Weight: 115 kg (253 lb 6.4 oz)   Height: 167.6 cm (66\")   PainSc:   6   PainLoc: Back     Objective   Physical Exam  Vitals and nursing note reviewed.   Constitutional:       Appearance: Normal appearance. She is well-developed.   HENT:      Head: Normocephalic and atraumatic.   Eyes:      General: Lids are normal.   Musculoskeletal:      Left shoulder: Tenderness and bony tenderness present. Decreased range of motion.      Lumbar back: Tenderness present. Decreased range of motion.      Comments: Widespread OA changes with no acute synovitis noted   Skin:     General: Skin is warm and dry.   Neurological:      Mental Status: She is alert.      Gait: Gait abnormal (cane).   Psychiatric:         Speech: Speech normal.         Behavior: Behavior normal. Behavior is cooperative.         Thought Content: Thought content normal.         Judgment: Judgment normal.         Assessment/Plan   Diagnoses and all orders for this visit:    1. Generalized osteoarthritis of multiple sites (Primary)  -     methylPREDNISolone acetate (DEPO-medrol) injection 40 mg    2. Lumbar disc herniation  -     methylPREDNISolone acetate (DEPO-medrol) injection 40 mg    3. Rheumatoid arthritis involving both hands with positive rheumatoid factor (CMS/HCC)  -     methylPREDNISolone acetate (DEPO-medrol) injection 40 mg    4. Controlled substance agreement signed    Other orders  -     HYDROcodone-acetaminophen (NORCO) 5-325 MG per tablet; Take 1 tablet by mouth 2 (Two) " Times a Day As Needed for Severe Pain .  Dispense: 40 tablet; Refill: 0      --- The urine drug screen confirmation from 7-22-20 has been reviewed and the result is APPROPRIATE based on patient history and MARCO report  --- Refill Hydrocodone. Patient appears stable with current regimen. No adverse effects. Regarding continuation of opioids, there is no evidence of aberrant behavior or any red flags.  The patient continues with appropriate response to opioid therapy. ADL's remain intact by self.   --- DepoMedrol 40 mg IM now. Reviewed only once every 3 months. Also receives shoulder injections.  --- Continue with other specialists as planned. Reviewed seeing rheumatology if no improvement. Patient verbalized understanding.  --- Follow-up 3 months or sooner if needed.       MARCO REPORT  As part of the patient's treatment plan, I am prescribing controlled substances. The patient has been made aware of appropriate use of such medications, including potential risk of somnolence, limited ability to drive and/or work safely, and the potential for dependence or overdose. It has also bee made clear that these medications are for use by this patient only, without concomitant use of alcohol or other substances unless prescribed.     Patient has completed prescribing agreement detailing terms of continued prescribing of controlled substances, including monitoring MARCO reports, urine drug screening, and pill counts if necessary. The patient is aware that inappropriate use will results in cessation of prescribing such medications.    MARCO report has been reviewed and scanned into the patient's chart.    As the clinician, I personally reviewed the MARCO from 4-7-21 while the patient was in the office today.    History and physical exam exhibit continued safe and appropriate use of controlled substances.        EMR Dragon/Transcription disclaimer:   Much of this encounter note is an electronic transcription/translation of  spoken language to printed text. The electronic translation of spoken language may permit erroneous, or at times, nonsensical words or phrases to be inadvertently transcribed; Although I have reviewed the note for such errors, some may still exist.

## 2021-04-19 ENCOUNTER — TELEPHONE (OUTPATIENT)
Dept: CARDIOLOGY | Facility: CLINIC | Age: 76
End: 2021-04-19

## 2021-04-19 ENCOUNTER — ANTICOAGULATION VISIT (OUTPATIENT)
Dept: PHARMACY | Facility: HOSPITAL | Age: 76
End: 2021-04-19

## 2021-04-19 LAB
INR PPP: 2.4 (ref 0.91–1.09)
PROTHROMBIN TIME: 29 SECONDS (ref 10–13.8)

## 2021-04-19 PROCEDURE — 85610 PROTHROMBIN TIME: CPT

## 2021-04-19 PROCEDURE — 36416 COLLJ CAPILLARY BLOOD SPEC: CPT

## 2021-04-19 NOTE — PROGRESS NOTES
Anticoagulation Clinic Progress Note    Anticoagulation Summary  As of 2021    INR goal:  2.0-3.0   TTR:  72.4 % (2.5 y)   INR used for dosin.4 (2021)   Warfarin maintenance plan:  2.5 mg every Wed; 5 mg all other days   Weekly warfarin total:  32.5 mg   No change documented:  Shaw Hand RPH   Plan last modified:  Nano Cool RPH (10/7/2020)   Next INR check:  2021   Priority:  Maintenance   Target end date:  Indefinite    Indications    Atrial fibrillation (CMS/[facility]) [I48.91]             Anticoagulation Episode Summary     INR check location:      Preferred lab:      Send INR reminders to:   [facility] [place]    Comments:        Anticoagulation Care Providers     Provider Role Specialty Phone number    Pia Dueñas MD Referring Cardiology [phone]          Clinic Interview:  Patient Findings     Positives:  Change in health, Change in medications    Negatives:  Signs/symptoms of thrombosis, Signs/symptoms of bleeding,   Laboratory test error suspected, Change in alcohol use, Change in   activity, Upcoming invasive procedure, Emergency department visit,   Upcoming dental procedure, Missed doses, Extra doses, Change in   diet/appetite, Hospital admission, Bruising, Other complaints    Comments:  Reports palpitations, SOA, dizziness, and headaches over the   past several days. Reports steroid injection ~12 days ago.       Clinical Outcomes     Negatives:  Major bleeding event, Thromboembolic event,   Anticoagulation-related hospital admission, Anticoagulation-related ED   visit, Anticoagulation-related fatality    Comments:  Reports palpitations, SOA, dizziness, and headaches over the   past several days. Reports steroid injection ~12 days ago.         INR History:  Anticoagulation Monitoring 3/4/2021 2021 2021   INR 2.3 2.3 2.4   INR Date 3/4/2021 2021 2021   INR Goal 2.0-3.0 2.0-3.0 2.0-3.0   Trend Same Same Same   Last Week Total 32.5 mg 32.5 mg 32.5 mg    Next Week Total 32.5 mg 32.5 mg 32.5 mg   Sun 5 mg 5 mg 5 mg   Mon 5 mg 5 mg 5 mg   Tue 5 mg 5 mg 5 mg   Wed 2.5 mg 2.5 mg 2.5 mg   Thu 5 mg 5 mg 5 mg   Fri 5 mg 5 mg 5 mg   Sat 5 mg 5 mg 5 mg   Visit Report - - -   Some recent data might be hidden       Plan:  1. INR is Therapeutic today- see above in Anticoagulation Summary.  Will instruct Lana [place] to Continue their warfarin regimen- see above in Anticoagulation Summary.  2. Follow up in 1 month  3. Patient declines warfarin refills.  4. Advised contacting Caridology to discuss symptoms reported. Verbal and written information provided. Patient expresses understanding and has no further questions at this time.    Shaw Hand RP

## 2021-04-19 NOTE — TELEPHONE ENCOUNTER
No worries, that makes sense.  Agree, thank you.  Would have her bring her blood pressure cuff with her to the office to check it for accuracy and bring blood pressure and weight log with her as well.  Please make sure she is using compression socks.  Call sooner if she develops worsening shortness of breath or weight gain at which point could have her take an extra dose of Lasix and potassium but will hold off for now as hard to tell if this would make lightheadedness worse.  Really avoid high salt foods.  Really seems like she does not react well to the steroid injections and may need to consider alternative in the future.

## 2021-04-19 NOTE — TELEPHONE ENCOUNTER
"Celina  Pt is calling in to let you know that she is SOA, feeling a flutter sensation in her chest, and dizzy.  She doesn't know what her heart rate is not, but stated it \"is not racing as bad as it was this am\"    She is reporting a bp of 116/74 this am she didn't pay attention to what the rate was.  But her machine didn't \"allert her\"  Blood sugar 130    She is keeping her self hydrated  She added that she got a steroid injection in her spine and stated this started about a week after the injection.  She just doesn't feel good.    Cardiac meds  Carvedilol 25mg 1.5 tab BID  Digoxin 125mcg every other day-took this am  Warfarin- INR checked today 2.4  Lisinopril 10mg daily  Furosemide 40mg daily  ASA 81mg    Please advise on how to proceed  Thanks  Maribell Hathaway RN  Triage nurse    "

## 2021-04-19 NOTE — TELEPHONE ENCOUNTER
Richar john, when she called she wasn't at home.  She was driving back from the INR clinic    I had her check bp and hr while we were on the phone 128/83 hr 97    She does report that when she is flat her SOA is slightly worse.  She said that she is not weighing every day.  But stated when she was seen in our office in feb she weighed 242 and at pain management yesterday her wt was 253.  I have asked her to start weighing daily and to call for 2 or more lbs over night or 5 or more lbs in a week.  She does have some edema only on her left foot.      She said her lightheadedness is all the time, but is worse lately.    You are correct IM steroid injection at pain management.    Lastly I have scheduled her for further evaluation with [facility] on wed.  I have instructed her to go to the ER for any worsening symptoms between now and then.  Thanks  Maribell Hathaway RN  Triage nurse

## 2021-04-19 NOTE — TELEPHONE ENCOUNTER
Needs to find out what heart rate is, is she able to recheck blood pressure and see what her heart rate is on her blood pressure cuff is?     Is the shortness of breath worse with laying down?  Any lower extremity edema?  Any short-term weight gain?  Is the lightheadedness worse with standing or just constant?    Looks like last several INRs have been therapeutic and I do not see that she actually got a steroid injection in her spine, it looks like she was just given an IM steroid injection, is this correct?      If she is having any severe symptoms then needs to go to the ER now.  Otherwise can see if she can get into see an APRN within the next couple of days, ER sooner if symptoms worsen.

## 2021-04-21 NOTE — TELEPHONE ENCOUNTER
Kassandra,  I was unable to reach the pt to pass on Celina's recommendations.  She is scheduled to see you today.  Thanks  Maribell Hathaway RN  Triage nurse

## 2021-05-04 ENCOUNTER — APPOINTMENT (OUTPATIENT)
Dept: GENERAL RADIOLOGY | Facility: HOSPITAL | Age: 76
End: 2021-05-04

## 2021-05-04 ENCOUNTER — APPOINTMENT (OUTPATIENT)
Dept: CT IMAGING | Facility: HOSPITAL | Age: 76
End: 2021-05-04

## 2021-05-04 ENCOUNTER — HOSPITAL ENCOUNTER (EMERGENCY)
Facility: HOSPITAL | Age: 76
Discharge: HOME | End: 2021-05-04
Attending: EMERGENCY MEDICINE | Admitting: EMERGENCY MEDICINE

## 2021-05-04 VITALS
SYSTOLIC BLOOD PRESSURE: 151 MMHG | RESPIRATION RATE: 18 BRPM | DIASTOLIC BLOOD PRESSURE: 84 MMHG | TEMPERATURE: 97.8 F | OXYGEN SATURATION: 95 % | HEART RATE: 77 BPM | WEIGHT: 253 LBS | BODY MASS INDEX: 40.66 KG/M2 | HEIGHT: 66 IN

## 2021-05-04 DIAGNOSIS — M25.562 ACUTE PAIN OF BOTH KNEES: ICD-10-CM

## 2021-05-04 DIAGNOSIS — M25.561 ACUTE PAIN OF BOTH KNEES: ICD-10-CM

## 2021-05-04 DIAGNOSIS — S00.83XA FACIAL CONTUSION, INITIAL ENCOUNTER: ICD-10-CM

## 2021-05-04 DIAGNOSIS — S06.2X0A: ICD-10-CM

## 2021-05-04 DIAGNOSIS — S50.12XA CONTUSION OF LEFT FOREARM, INITIAL ENCOUNTER: ICD-10-CM

## 2021-05-04 DIAGNOSIS — S40.012A CONTUSION OF LEFT SHOULDER, INITIAL ENCOUNTER: Primary | ICD-10-CM

## 2021-05-04 LAB
INR PPP: 1.61 (ref 0.9–1.1)
PROTHROMBIN TIME: 18.9 SECONDS (ref 11.7–14.2)

## 2021-05-04 PROCEDURE — 73560 X-RAY EXAM OF KNEE 1 OR 2: CPT

## 2021-05-04 PROCEDURE — 70486 CT MAXILLOFACIAL W/O DYE: CPT

## 2021-05-04 PROCEDURE — 72125 CT NECK SPINE W/O DYE: CPT

## 2021-05-04 PROCEDURE — 70450 CT HEAD/BRAIN W/O DYE: CPT

## 2021-05-04 PROCEDURE — 73030 X-RAY EXAM OF SHOULDER: CPT

## 2021-05-04 PROCEDURE — 72110 X-RAY EXAM L-2 SPINE 4/>VWS: CPT

## 2021-05-04 PROCEDURE — 73090 X-RAY EXAM OF FOREARM: CPT

## 2021-05-04 PROCEDURE — 85610 PROTHROMBIN TIME: CPT | Performed by: NURSE PRACTITIONER

## 2021-05-04 PROCEDURE — 99284 EMERGENCY DEPT VISIT MOD MDM: CPT

## 2021-05-04 PROCEDURE — 63710000001 ONDANSETRON ODT 4 MG TABLET DISPERSIBLE: Performed by: NURSE PRACTITIONER

## 2021-05-04 RX ORDER — HYDROCODONE BITARTRATE AND ACETAMINOPHEN 7.5; 325 MG/1; MG/1
1 TABLET ORAL ONCE
Status: COMPLETED | OUTPATIENT
Start: 2021-05-04 | End: 2021-05-04

## 2021-05-04 RX ORDER — ONDANSETRON 4 MG/1
4 TABLET, ORALLY DISINTEGRATING ORAL ONCE
Status: COMPLETED | OUTPATIENT
Start: 2021-05-04 | End: 2021-05-04

## 2021-05-04 RX ADMIN — ONDANSETRON 4 MG: 4 TABLET, ORALLY DISINTEGRATING ORAL at 18:01

## 2021-05-04 RX ADMIN — HYDROCODONE BITARTRATE AND ACETAMINOPHEN 1 TABLET: 7.5; 325 TABLET ORAL at 18:01

## 2021-05-06 ENCOUNTER — ANTICOAGULATION VISIT (OUTPATIENT)
Dept: PHARMACY | Facility: HOSPITAL | Age: 76
End: 2021-05-06

## 2021-05-06 ENCOUNTER — TELEPHONE (OUTPATIENT)
Dept: INTERNAL MEDICINE | Facility: CLINIC | Age: 76
End: 2021-05-06
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
## 2021-05-11 ENCOUNTER — HOSPITAL ENCOUNTER (OUTPATIENT)
Dept: GENERAL RADIOLOGY | Facility: HOSPITAL | Age: 76
Discharge: HOME | End: 2021-05-11

## 2021-05-11 ENCOUNTER — OFFICE VISIT (OUTPATIENT)
Dept: INTERNAL MEDICINE | Facility: CLINIC | Age: 76
End: 2021-05-11

## 2021-05-11 ENCOUNTER — ANTICOAGULATION VISIT (OUTPATIENT)
Dept: PHARMACY | Facility: HOSPITAL | Age: 76
End: 2021-05-11

## 2021-05-11 VITALS
HEIGHT: 66 IN | WEIGHT: 258 LBS | BODY MASS INDEX: 41.46 KG/M2 | TEMPERATURE: 97.3 F | DIASTOLIC BLOOD PRESSURE: 76 MMHG | OXYGEN SATURATION: 100 % | SYSTOLIC BLOOD PRESSURE: 126 MMHG | HEART RATE: 63 BPM

## 2021-05-11 DIAGNOSIS — M25.572 PAIN AND SWELLING OF LEFT ANKLE: ICD-10-CM

## 2021-05-11 DIAGNOSIS — M25.472 PAIN AND SWELLING OF LEFT ANKLE: ICD-10-CM

## 2021-05-11 DIAGNOSIS — R68.84 JAW PAIN: ICD-10-CM
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
## 2021-05-11 DIAGNOSIS — Z09 HOSPITAL DISCHARGE FOLLOW-UP: ICD-10-CM

## 2021-05-11 DIAGNOSIS — M62.838 NECK MUSCLE SPASM: ICD-10-CM

## 2021-05-11 DIAGNOSIS — R93.89 ABNORMAL CT SCAN: Primary | ICD-10-CM

## 2021-05-11 LAB
INR PPP: 1.8 (ref 0.91–1.09)
PROTHROMBIN TIME: 21.6 SECONDS (ref 10–13.8)

## 2021-05-11 PROCEDURE — 85610 PROTHROMBIN TIME: CPT

## 2021-05-11 PROCEDURE — 73610 X-RAY EXAM OF ANKLE: CPT

## 2021-05-11 PROCEDURE — 36416 COLLJ CAPILLARY BLOOD SPEC: CPT

## 2021-05-11 PROCEDURE — 73630 X-RAY EXAM OF FOOT: CPT

## 2021-05-11 PROCEDURE — G0463 HOSPITAL OUTPT CLINIC VISIT: HCPCS

## 2021-05-11 PROCEDURE — 99214 OFFICE O/P EST MOD 30 MIN: CPT | Performed by: NURSE PRACTITIONER

## 2021-05-11 RX ORDER — TIZANIDINE 2 MG/1
1-2 TABLET ORAL EVERY 8 HOURS PRN
Qty: 30 TABLET | Refills: 0 | Status: SHIPPED | OUTPATIENT
Start: 2021-05-11 | End: 2021-07-19
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
## 2021-05-12 NOTE — PROGRESS NOTES
Please call pt-she has some soft tissue swelling of the ankle but no underlying fracture is seen.  Let us know if the pain persists or worsens over the next couple of weeks.  Foot x-ray is also showing no fracture.

## 2021-05-27 ENCOUNTER — OFFICE VISIT (OUTPATIENT)
Dept: ENDOCRINOLOGY | Age: 76
End: 2021-05-27

## 2021-05-27 ENCOUNTER — APPOINTMENT (OUTPATIENT)
Dept: PHARMACY | Facility: HOSPITAL | Age: 76
End: 2021-05-27

## 2021-05-27 VITALS
WEIGHT: 260.4 LBS | HEIGHT: 66 IN | DIASTOLIC BLOOD PRESSURE: 82 MMHG | OXYGEN SATURATION: 98 % | BODY MASS INDEX: 41.85 KG/M2 | SYSTOLIC BLOOD PRESSURE: 138 MMHG

## 2021-05-27 DIAGNOSIS — E11.22 TYPE 2 DIABETES MELLITUS WITH STAGE 2 CHRONIC KIDNEY DISEASE, WITHOUT LONG-TERM CURRENT USE OF INSULIN (HCC): Primary | ICD-10-CM

## 2021-05-27 DIAGNOSIS — N18.2 TYPE 2 DIABETES MELLITUS WITH STAGE 2 CHRONIC KIDNEY DISEASE, WITHOUT LONG-TERM CURRENT USE OF INSULIN (HCC): Primary | ICD-10-CM

## 2021-05-27 DIAGNOSIS — E78.2 HYPERLIPEMIA, MIXED: ICD-10-CM

## 2021-05-27 PROCEDURE — 99214 OFFICE O/P EST MOD 30 MIN: CPT | Performed by: INTERNAL MEDICINE
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
## 2021-05-28 LAB
BUN SERPL-MCNC: 15 MG/DL (ref 8–23)
BUN/CREAT SERPL: 12.5 (ref 7–25)
CALCIUM SERPL-MCNC: 9.8 MG/DL (ref 8.6–10.5)
CHLORIDE SERPL-SCNC: 105 MMOL/L (ref 98–107)
CHOLEST SERPL-MCNC: 113 MG/DL (ref 0–200)
CO2 SERPL-SCNC: 24.9 MMOL/L (ref 22–29)
CREAT SERPL-MCNC: 1.2 MG/DL (ref 0.57–1)
GLUCOSE SERPL-MCNC: 152 MG/DL (ref 65–99)
HBA1C MFR BLD: 7 % (ref 4.8–5.6)
HDLC SERPL-MCNC: 56 MG/DL (ref 40–60)
IMP & REVIEW OF LAB RESULTS: NORMAL
LDLC SERPL CALC-MCNC: 39 MG/DL (ref 0–100)
POTASSIUM SERPL-SCNC: 4.5 MMOL/L (ref 3.5–5.2)
SODIUM SERPL-SCNC: 141 MMOL/L (ref 136–145)
TRIGL SERPL-MCNC: 95 MG/DL (ref 0–150)
VLDLC SERPL CALC-MCNC: 18 MG/DL (ref 5–40)

## 2021-06-01 ENCOUNTER — APPOINTMENT (OUTPATIENT)
Dept: PHARMACY | Facility: HOSPITAL | Age: 76
End: 2021-06-01

## 2021-06-01 ENCOUNTER — TELEPHONE (OUTPATIENT)
Dept: PHARMACY | Facility: HOSPITAL | Age: 76
End: 2021-06-01

## 2021-06-02 ENCOUNTER — TELEPHONE (OUTPATIENT)
Dept: PAIN MEDICINE | Facility: CLINIC | Age: 76
End: 2021-06-02

## 2021-06-02 NOTE — TELEPHONE ENCOUNTER
Caller: TERA GARNER    Best call back number: [phone]     Medication needed: HYDROCODONE 5-325MG    When do you need the refill by: ASAP    What additional details did the patient provide when requesting the medication: PT HAS 2 DAYS LEFT     Does the patient have less than a 3 day supply:  [x] Yes  [] No    What is the patient's preferred pharmacy:  [facility]

## 2021-06-03 RX ORDER — HYDROCODONE BITARTRATE AND ACETAMINOPHEN 5; 325 MG/1; MG/1
1 TABLET ORAL 2 TIMES DAILY PRN
Qty: 40 TABLET | Refills: 0 | Status: SHIPPED | OUTPATIENT
Start: 2021-06-03 | End: 2021-07-07 | Stop reason: SDUPTHER

## 2021-06-03 NOTE — TELEPHONE ENCOUNTER
Medication Refill Request    Date of phone call: 6/3/21    Medication being requested: Norco 5-325 mg si tab po bid prn  Qty: 40    Date of last visit: 21    Date of last refill:     MARCO up to date?: no    Next Follow up?: 21    Any new pertinent information? (i.e, new medication allergies, new use of medications, change in patient's health or condition, non-compliance or inconsistency with prescribing agreement?):    Azathioprine Pregnancy And Lactation Text: This medication is Pregnancy Category D and isn't considered safe during pregnancy. It is unknown if this medication is excreted in breast milk.

## 2021-06-11 ENCOUNTER — ANTICOAGULATION VISIT (OUTPATIENT)
Dept: PHARMACY | Facility: HOSPITAL | Age: 76
End: 2021-06-11

## 2021-06-11 LAB
INR PPP: 1.5 (ref 0.91–1.09)
PROTHROMBIN TIME: 18.2 SECONDS (ref 10–13.8)

## 2021-06-11 PROCEDURE — G0463 HOSPITAL OUTPT CLINIC VISIT: HCPCS

## 2021-06-11 PROCEDURE — 36416 COLLJ CAPILLARY BLOOD SPEC: CPT

## 2021-06-11 PROCEDURE — 85610 PROTHROMBIN TIME: CPT

## 2021-06-11 NOTE — PROGRESS NOTES
Anticoagulation Clinic Progress Note    Anticoagulation Summary  As of 2021    INR goal:  2.0-3.0   TTR:  69.2 % (2.7 y)   INR used for dosin.5 (2021)   Warfarin maintenance plan:  5 mg every day   Weekly warfarin total:  35 mg   Plan last modified:  Shaw Hand RPH (2021)   Next INR check:  2021   Priority:  Maintenance   Target end date:  Indefinite    Indications    Atrial fibrillation (CMS/HCC) [I48.91]             Anticoagulation Episode Summary     INR check location:      Preferred lab:      Send INR reminders to:   [facility]    Comments:        Anticoagulation Care Providers     Provider Role Specialty Phone number    Pia Dueñas MD Referring Cardiology [phone]          Clinic Interview:  Patient Findings     Negatives:  Signs/symptoms of thrombosis, Signs/symptoms of bleeding,   Laboratory test error suspected, Change in health, Change in alcohol use,   Change in activity, Upcoming invasive procedure, Emergency department   visit, Upcoming dental procedure, Missed doses, Extra doses, Change in   medications, Change in diet/appetite, Hospital admission, Bruising, Other   complaints    Comments:  She indicates it may be possible that she missed a dose, but   she does not remember missing a dose.       Clinical Outcomes     Negatives:  Major bleeding event, Thromboembolic event,   Anticoagulation-related hospital admission, Anticoagulation-related ED   visit, Anticoagulation-related fatality    Comments:  She indicates it may be possible that she missed a dose, but   she does not remember missing a dose.         INR History:  Anticoagulation Monitoring 2021   INR 1.61 1.8 1.5   INR Date 2021   INR Goal 2.0-3.0 2.0-3.0 2.0-3.0   Trend Same Same Up   Last Week Total 32.5 mg 32.5 mg 32.5 mg   Next Week Total 32.5 mg 35 mg 37.5 mg   Sun 5 mg 5 mg 5 mg   Mon 5 mg 5 mg 5 mg   Tue - 5 mg 5 mg   Wed - 5 mg ();  Otherwise 2.5 mg 5 mg   Thu 5 mg 5 mg 5 mg   Fri 5 mg 5 mg 7.5 mg (6/11); Otherwise 5 mg   Sat 5 mg 5 mg 5 mg   Visit Report - - -   Some recent data might be hidden       Plan:  1. INR is Subtherapeutic today- see above in Anticoagulation Summary.  Will instruct Lana [place] to Increase their warfarin regimen- see above in Anticoagulation Summary.  2. Follow up in 2 weeks  3. Patient declines warfarin refills.  4. Verbal and written information provided. Patient expresses understanding and has no further questions at this time.    Shaw Hand, [facility]

## 2021-06-15 RX ORDER — POTASSIUM CHLORIDE 750 MG/1
40 TABLET, FILM COATED, EXTENDED RELEASE ORAL DAILY
Qty: 360 TABLET | Refills: 1 | Status: SHIPPED | OUTPATIENT
Start: 2021-06-15 | End: 2021-07-19 | Stop reason: SDUPTHER

## 2021-06-15 NOTE — TELEPHONE ENCOUNTER
Caller: Lana Yañez    Relationship: Self    Best call back number:[phone]    Medication needed:   Requested Prescriptions     Pending Prescriptions Disp Refills   • potassium chloride 10 MEQ CR tablet 360 tablet 0     Sig: Take 4 tablets by mouth Daily.       When do you need the refill by: TODAY  Does the patient have less than a 3 day supply:  [x] Yes  [] No    What is the patient's preferred pharmacy: [facility] [address] [place] [address] [address] [place] [address] [phone] [facility] [phone]

## 2021-06-18 ENCOUNTER — TELEPHONE (OUTPATIENT)
Dept: ORTHOPEDIC SURGERY | Facility: CLINIC | Age: 76
End: 2021-06-18

## 2021-06-25 ENCOUNTER — ANTICOAGULATION VISIT (OUTPATIENT)
Dept: PHARMACY | Facility: HOSPITAL | Age: 76
End: 2021-06-25

## 2021-06-25 LAB
INR PPP: 1.8 (ref 0.91–1.09)
PROTHROMBIN TIME: 21.1 SECONDS (ref 10–13.8)

## 2021-06-25 PROCEDURE — 85610 PROTHROMBIN TIME: CPT

## 2021-06-25 PROCEDURE — 36416 COLLJ CAPILLARY BLOOD SPEC: CPT

## 2021-06-25 PROCEDURE — G0463 HOSPITAL OUTPT CLINIC VISIT: HCPCS

## 2021-06-25 NOTE — PROGRESS NOTES
Anticoagulation Clinic Progress Note    Anticoagulation Summary  As of 2021    INR goal:  2.0-3.0   TTR:  68.2 % (2.7 y)   INR used for dosin.8 (2021)   Warfarin maintenance plan:  5 mg every day   Weekly warfarin total:  35 mg   Plan last modified:  Shaw Hand RPH (2021)   Next INR check:  2021   Priority:  Maintenance   Target end date:  Indefinite    Indications    Atrial fibrillation (CMS/HCC) [I48.91]             Anticoagulation Episode Summary     INR check location:      Preferred lab:      Send INR reminders to:  [facility]    Comments:        Anticoagulation Care Providers     Provider Role Specialty Phone number    Pia Dueñas MD Referring Cardiology [phone]          Clinic Interview:  Patient Findings     Positives:  Missed doses, Change in diet/appetite    Negatives:  Signs/symptoms of thrombosis, Signs/symptoms of bleeding,   Laboratory test error suspected, Change in health, Change in alcohol use,   Change in activity, Upcoming invasive procedure, Emergency department   visit, Upcoming dental procedure, Extra doses, Change in medications,   Hospital admission, Bruising, Other complaints    Comments:  Patient may have missed a dose last week but is unsure if she   did or note. She also reports eating some cabbage last night but doesn't   typically eat greens. Provider patient with a medication organizer to help   with medication compliance.       Clinical Outcomes     Negatives:  Major bleeding event, Thromboembolic event,   Anticoagulation-related hospital admission, Anticoagulation-related ED   visit, Anticoagulation-related fatality    Comments:  Patient may have missed a dose last week but is unsure if she   did or note. She also reports eating some cabbage last night but doesn't   typically eat greens. Provider patient with a medication organizer to help   with medication compliance.         INR History:  Anticoagulation Monitoring 2021  6/11/2021 6/25/2021   INR 1.8 1.5 1.8   INR Date 5/11/2021 6/11/2021 6/25/2021   INR Goal 2.0-3.0 2.0-3.0 2.0-3.0   Trend Same Up Same   Last Week Total 32.5 mg 32.5 mg 35 mg   Next Week Total 35 mg 37.5 mg 37.5 mg   Sun 5 mg 5 mg 5 mg   Mon 5 mg 5 mg 5 mg   Tue 5 mg 5 mg 5 mg   Wed 5 mg (5/12); Otherwise 2.5 mg 5 mg 5 mg   Thu 5 mg 5 mg 5 mg   Fri 5 mg 7.5 mg (6/11); Otherwise 5 mg 7.5 mg (6/25)   Sat 5 mg 5 mg 5 mg   Visit Report - - -   Some recent data might be hidden       Plan:  1. INR is Subtherapeutic today- see above in Anticoagulation Summary.  Will instruct Lana [place] to Change their warfarin regimen- see above in Anticoagulation Summary. Boost today with 7.5 mg then resume taking 5 mg daily until next INR check.   2. Follow up in 1 week  3. Patient declines warfarin refills.  4. Verbal and written information provided. Patient expresses understanding and has no further questions at this time.    Paola Tapia, PharmD

## 2021-07-02 ENCOUNTER — ANTICOAGULATION VISIT (OUTPATIENT)
Dept: PHARMACY | Facility: HOSPITAL | Age: 76
End: 2021-07-02

## 2021-07-02 LAB
INR PPP: 3 (ref 0.91–1.09)
PROTHROMBIN TIME: 35.6 SECONDS (ref 10–13.8)

## 2021-07-02 PROCEDURE — 36416 COLLJ CAPILLARY BLOOD SPEC: CPT

## 2021-07-02 PROCEDURE — 85610 PROTHROMBIN TIME: CPT

## 2021-07-02 RX ORDER — WARFARIN SODIUM 5 MG/1
TABLET ORAL
Qty: 90 TABLET | Refills: 1 | Status: SHIPPED | OUTPATIENT
Start: 2021-07-02 | End: 2022-01-27 | Stop reason: SDUPTHER

## 2021-07-02 NOTE — PROGRESS NOTES
Anticoagulation Clinic Progress Note    Anticoagulation Summary  As of 7/2/2021    INR goal:  2.0-3.0   TTR:  68.3 % (2.7 y)   INR used for dosing:  3.0 (7/2/2021)   Warfarin maintenance plan:  5 mg every day   Weekly warfarin total:  35 mg   No change documented:  Anisa Castellon   Plan last modified:  Shaw Hand RP (6/11/2021)   Next INR check:  7/16/2021   Priority:  Maintenance   Target end date:  Indefinite    Indications    Atrial fibrillation (CMS/HCC) [I48.91]             Anticoagulation Episode Summary     INR check location:      Preferred lab:      Send INR reminders to:   [facility]    Comments:        Anticoagulation Care Providers     Provider Role Specialty Phone number    Pia Dueñas MD Referring Cardiology [phone]          Clinic Interview:  Patient Findings     Negatives:  Signs/symptoms of thrombosis, Signs/symptoms of bleeding,   Laboratory test error suspected, Change in health, Change in alcohol use,   Change in activity, Upcoming invasive procedure, Emergency department   visit, Upcoming dental procedure, Missed doses, Extra doses, Change in   medications, Change in diet/appetite, Hospital admission, Bruising, Other   complaints      Clinical Outcomes     Negatives:  Major bleeding event, Thromboembolic event,   Anticoagulation-related hospital admission, Anticoagulation-related ED   visit, Anticoagulation-related fatality        INR History:  Anticoagulation Monitoring 6/11/2021 6/25/2021 7/2/2021   INR 1.5 1.8 3.0   INR Date 6/11/2021 6/25/2021 7/2/2021   INR Goal 2.0-3.0 2.0-3.0 2.0-3.0   Trend Up Same Same   Last Week Total 32.5 mg 35 mg 37.5 mg   Next Week Total 37.5 mg 37.5 mg 35 mg   Sun 5 mg 5 mg 5 mg   Mon 5 mg 5 mg 5 mg   Tue 5 mg 5 mg 5 mg   Wed 5 mg 5 mg 5 mg   Thu 5 mg 5 mg 5 mg   Fri 7.5 mg (6/11); Otherwise 5 mg 7.5 mg (6/25) 5 mg   Sat 5 mg 5 mg 5 mg   Visit Report - - -   Some recent data might be hidden       Plan:  1. INR is therapeutic  today- see above in Anticoagulation Summary.   Will instruct Lanaantonio Aguerocomb to continue their warfarin regimen- see above in Anticoagulation Summary.  2. Follow up in 2 weeks.  3. Patient desires warfarin refills.  4. Verbal and written information provided. Patient expresses understanding and has no further questions at this time.    Anisa Castellon

## 2021-07-07 ENCOUNTER — OFFICE VISIT (OUTPATIENT)
Dept: PAIN MEDICINE | Facility: CLINIC | Age: 76
End: 2021-07-07

## 2021-07-07 VITALS
HEART RATE: 94 BPM | BODY MASS INDEX: 41.3 KG/M2 | DIASTOLIC BLOOD PRESSURE: 74 MMHG | WEIGHT: 257 LBS | RESPIRATION RATE: 20 BRPM | SYSTOLIC BLOOD PRESSURE: 108 MMHG | TEMPERATURE: 96.1 F | OXYGEN SATURATION: 97 % | HEIGHT: 66 IN

## 2021-07-07 DIAGNOSIS — M15.9 GENERALIZED OSTEOARTHRITIS OF MULTIPLE SITES: ICD-10-CM

## 2021-07-07 DIAGNOSIS — G89.29 OTHER CHRONIC PAIN: Primary | ICD-10-CM

## 2021-07-07 DIAGNOSIS — M51.26 LUMBAR DISC HERNIATION: ICD-10-CM

## 2021-07-07 DIAGNOSIS — Z79.899 CONTROLLED SUBSTANCE AGREEMENT SIGNED: ICD-10-CM

## 2021-07-07 PROCEDURE — 80305 DRUG TEST PRSMV DIR OPT OBS: CPT | Performed by: NURSE PRACTITIONER

## 2021-07-07 PROCEDURE — 99214 OFFICE O/P EST MOD 30 MIN: CPT | Performed by: NURSE PRACTITIONER

## 2021-07-07 RX ORDER — HYDROCODONE BITARTRATE AND ACETAMINOPHEN 5; 325 MG/1; MG/1
1 TABLET ORAL 2 TIMES DAILY PRN
Qty: 40 TABLET | Refills: 0 | Status: SHIPPED | OUTPATIENT
Start: 2021-07-07 | End: 2021-09-13 | Stop reason: SDUPTHER
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
## 2021-07-16 ENCOUNTER — ANTICOAGULATION VISIT (OUTPATIENT)
Dept: PHARMACY | Facility: HOSPITAL | Age: 76
End: 2021-07-16

## 2021-07-16 LAB
INR PPP: 3.5 (ref 0.91–1.09)
PROTHROMBIN TIME: 41.8 SECONDS (ref 10–13.8)

## 2021-07-16 PROCEDURE — G0463 HOSPITAL OUTPT CLINIC VISIT: HCPCS

## 2021-07-16 PROCEDURE — 36416 COLLJ CAPILLARY BLOOD SPEC: CPT

## 2021-07-16 PROCEDURE — 85610 PROTHROMBIN TIME: CPT

## 2021-07-16 NOTE — PROGRESS NOTES
Anticoagulation Clinic Progress Note    Anticoagulation Summary  As of 7/16/2021    INR goal:  2.0-3.0   TTR:  67.4 % (2.8 y)   INR used for dosing:  3.5 (7/16/2021)   Warfarin maintenance plan:  5 mg every day   Weekly warfarin total:  35 mg   Plan last modified:  Shaw Hand RPH (6/11/2021)   Next INR check:  8/5/2021   Priority:  Maintenance   Target end date:  Indefinite    Indications    Atrial fibrillation (CMS/HCC) [I48.91]             Anticoagulation Episode Summary     INR check location:      Preferred lab:      Send INR reminders to:   [facility]    Comments:        Anticoagulation Care Providers     Provider Role Specialty Phone number    Pia Dueñas MD Referring Cardiology [phone]          Clinic Interview:  Patient Findings     Positives:  Change in health    Negatives:  Signs/symptoms of thrombosis, Signs/symptoms of bleeding,   Laboratory test error suspected, Change in alcohol use, Change in   activity, Upcoming invasive procedure, Emergency department visit,   Upcoming dental procedure, Missed doses, Extra doses, Change in   medications, Change in diet/appetite, Hospital admission, Bruising, Other   complaints    Comments:  Reports BLE edema (L>R); however, reports this is improving.       Clinical Outcomes     Negatives:  Major bleeding event, Thromboembolic event,   Anticoagulation-related hospital admission, Anticoagulation-related ED   visit, Anticoagulation-related fatality    Comments:  Reports BLE edema (L>R); however, reports this is improving.         INR History:  Anticoagulation Monitoring 6/25/2021 7/2/2021 7/16/2021   INR 1.8 3.0 3.5   INR Date 6/25/2021 7/2/2021 7/16/2021   INR Goal 2.0-3.0 2.0-3.0 2.0-3.0   Trend Same Same Same   Last Week Total 35 mg 37.5 mg 35 mg   Next Week Total 37.5 mg 35 mg 32.5 mg   Sun 5 mg 5 mg 5 mg   Mon 5 mg 5 mg 5 mg   Tue 5 mg 5 mg 5 mg   Wed 5 mg 5 mg 5 mg   Thu 5 mg 5 mg 5 mg   Fri 7.5 mg (6/25) 5 mg 2.5 mg (7/16); Otherwise 5  mg   Sat 5 mg 5 mg 5 mg   Visit Report - - -   Some recent data might be hidden       Plan:  1. INR is Supratherapeutic today- see above in Anticoagulation Summary.  Will instruct Lana Yañez to Change their warfarin regimen- see above in Anticoagulation Summary.  2. Follow up in 3 weeks (per pt request to coordinate with Cardiology appt).  3. Patient declines warfarin refills.  4. Verbal and written information provided. Patient expresses understanding and has no further questions at this time.    Shaw Hand RP

## 2021-07-19 ENCOUNTER — OFFICE VISIT (OUTPATIENT)
Dept: INTERNAL MEDICINE | Facility: CLINIC | Age: 76
End: 2021-07-19

## 2021-07-19 VITALS
WEIGHT: 261 LBS | OXYGEN SATURATION: 97 % | RESPIRATION RATE: 16 BRPM | BODY MASS INDEX: 41.95 KG/M2 | HEIGHT: 66 IN | SYSTOLIC BLOOD PRESSURE: 130 MMHG | HEART RATE: 80 BPM | DIASTOLIC BLOOD PRESSURE: 80 MMHG | TEMPERATURE: 98.4 F

## 2021-07-19 DIAGNOSIS — I50.32 CHRONIC DIASTOLIC HEART FAILURE (HCC): ICD-10-CM

## 2021-07-19 DIAGNOSIS — R60.9 PERIPHERAL EDEMA: Primary | ICD-10-CM

## 2021-07-19 DIAGNOSIS — R63.5 ABNORMAL WEIGHT GAIN: ICD-10-CM

## 2021-07-19 DIAGNOSIS — I48.91 ATRIAL FIBRILLATION WITH RVR (HCC): ICD-10-CM

## 2021-07-19 PROBLEM — E13.9 DIABETES 1.5, MANAGED AS TYPE 2 (HCC): Chronic | Status: ACTIVE | Noted: 2019-12-03

## 2021-07-19 PROBLEM — I25.119 CORONARY ARTERY DISEASE INVOLVING NATIVE CORONARY ARTERY OF NATIVE HEART WITH ANGINA PECTORIS (HCC): Chronic | Status: ACTIVE | Noted: 2017-12-14

## 2021-07-19 PROCEDURE — 99214 OFFICE O/P EST MOD 30 MIN: CPT | Performed by: FAMILY MEDICINE

## 2021-07-19 RX ORDER — FUROSEMIDE 40 MG/1
TABLET ORAL
Qty: 135 TABLET | Refills: 3 | Status: SHIPPED | OUTPATIENT
Start: 2021-07-19 | End: 2021-08-23 | Stop reason: SDUPTHER

## 2021-07-19 RX ORDER — POTASSIUM CHLORIDE 750 MG/1
40 TABLET, FILM COATED, EXTENDED RELEASE ORAL DAILY
Qty: 360 TABLET | Refills: 3 | Status: SHIPPED | OUTPATIENT
Start: 2021-07-19 | End: 2021-08-06 | Stop reason: SDUPTHER

## 2021-07-19 NOTE — PROGRESS NOTES
"Chief Complaint  Edema    Subjective          Lana Yañez presents to [facility]  History of Present Illness     Peripheral edema-the patient has gained about 4 pounds over a couple weeks.  Looks like at that time she was following up with pain management.  Her edema stays around the bilateral ankles and feet.  She is being treated for CHF diastolic with furosemide 40mg daily, digoxin 125mcg, carvedilol 25mg, lisinopril 10mg.  Also is being treated for atrial fibrillation which is controlled.        Objective   Vital Signs:   /80 (BP Location: Right arm, Patient Position: Sitting, Cuff Size: Adult)   Pulse 80   Temp 98.4 °F (36.9 °C)   Resp 16   Ht 167.6 cm (66\")   Wt 118 kg (261 lb)   SpO2 97%   BMI 42.13 kg/m²     Physical Exam  Vitals and nursing note reviewed.   Constitutional:       General: She is not in acute distress.     Appearance: Normal appearance.   Cardiovascular:      Rate and Rhythm: Normal rate and regular rhythm.      Heart sounds: Normal heart sounds. No murmur heard.     Pulmonary:      Effort: Pulmonary effort is normal.      Breath sounds: Normal breath sounds.   Musculoskeletal:      Right lower le+ Edema present.      Left lower le+ Edema present.   Neurological:      Mental Status: She is alert.        Result Review :     Common labs    Common Labsle 2/4/21 2/4/21 2/4/21 2/4/21 2/4/21 3/4/21 5/27/21 5/27/21 5/27/21    1359 1359 1359 1359 1359  1410 1410 1410   Glucose      131 (A)      Glucose       152 (A)     BUN      14 15     Creatinine  1.12 (A)   1.12 (A) 0.89 1.20 (A)     eGFR Non  Am     49 (A)  44 (A)     eGFR  Am  58 (A)   56 (A) 75 53 (A)     Sodium      144 141     Potassium      4.6 4.5     Chloride      107 105     Calcium      9.3 9.8     Total Cholesterol   132         Total Cholesterol        113    Triglycerides   54     95    HDL Cholesterol   86 (A)     56    LDL Cholesterol    34     39    Hemoglobin " A1C 6.90 (A)        7.00 (A)   Microalbumin, Urine    <1.2        (A) Abnormal value       Comments are available for some flowsheets but are not being displayed.                     Assessment and Plan    Diagnoses and all orders for this visit:    1. Peripheral edema (Primary)  -     furosemide (LASIX) 40 MG tablet; Take 1 tablet in the morning and 1/2 tablet at noon.  Dispense: 135 tablet; Refill: 3  -     potassium chloride 10 MEQ CR tablet; Take 4 tablets by mouth Daily.  Dispense: 360 tablet; Refill: 3    2. Chronic diastolic heart failure (CMS/HCC)  -     furosemide (LASIX) 40 MG tablet; Take 1 tablet in the morning and 1/2 tablet at noon.  Dispense: 135 tablet; Refill: 3  -     potassium chloride 10 MEQ CR tablet; Take 4 tablets by mouth Daily.  Dispense: 360 tablet; Refill: 3    3. Atrial fibrillation with RVR (CMS/HCC)    4. Abnormal weight gain    Will increase her Lasix at least temporarily to 1 tablet in the morning and half tablet at midday.  She is following up with cardiology in the next couple of weeks and then I will see her about a week after that.  I advised her to make sure she is staying away from the salt which she assured me that she is.  Prop legs up when possible.  She has some stockings that she can wear and I advised that she try using them if possible.  I will copy her cardiology group on this note.      Follow Up   Return in about 24 days (around 8/12/2021) for Next scheduled follow up.  Patient was given instructions and counseling regarding her condition or for health maintenance advice. Please see specific information pulled into the AVS if appropriate.

## 2021-07-19 NOTE — PATIENT INSTRUCTIONS
Edema    Edema is when you have too much fluid in your body or under your skin. Edema may make your legs, feet, and ankles swell up. Swelling is also common in looser tissues, like around your eyes. This is a common condition. It gets more common as you get older. There are many possible causes of edema. Eating too much salt (sodium) and being on your feet or sitting for a long time can cause edema in your legs, feet, and ankles. Hot weather may make edema worse.  Edema is usually painless. Your skin may look swollen or shiny.  Follow these instructions at home:  · Keep the swollen body part raised (elevated) above the level of your heart when you are sitting or lying down.  · Do not sit still or stand for a long time.  · Do not wear tight clothes. Do not wear garters on your upper legs.  · Exercise your legs. This can help the swelling go down.  · Wear elastic bandages or support stockings as told by your doctor.  · Eat a low-salt (low-sodium) diet to reduce fluid as told by your doctor.  · Depending on the cause of your swelling, you may need to limit how much fluid you drink (fluid restriction).  · Take over-the-counter and prescription medicines only as told by your doctor.  Contact a doctor if:  · Treatment is not working.  · You have heart, liver, or kidney disease and have symptoms of edema.  · You have sudden and unexplained weight gain.  Get help right away if:  · You have shortness of breath or chest pain.  · You cannot breathe when you lie down.  · You have pain, redness, or warmth in the swollen areas.  · You have heart, liver, or kidney disease and get edema all of a sudden.  · You have a fever and your symptoms get worse all of a sudden.  Summary  · Edema is when you have too much fluid in your body or under your skin.  · Edema may make your legs, feet, and ankles swell up. Swelling is also common in looser tissues, like around your eyes.  · Raise (elevate) the swollen body part above the level of your  heart when you are sitting or lying down.  · Follow your doctor's instructions about diet and how much fluid you can drink (fluid restriction).  This information is not intended to replace advice given to you by your health care provider. Make sure you discuss any questions you have with your health care provider.  Document Revised: 12/21/2018 Document Reviewed: 01/05/2018  [employer] Patient Education © 2021 [employer]

## 2021-07-29 ENCOUNTER — TELEPHONE (OUTPATIENT)
Dept: CARDIOLOGY | Facility: CLINIC | Age: 76
End: 2021-07-29

## 2021-07-29 DIAGNOSIS — I25.119 CORONARY ARTERY DISEASE INVOLVING NATIVE CORONARY ARTERY OF NATIVE HEART WITH ANGINA PECTORIS: Primary | ICD-10-CM

## 2021-08-04 ENCOUNTER — TELEPHONE (OUTPATIENT)
Dept: CARDIOLOGY | Facility: CLINIC | Age: 76
End: 2021-08-04

## 2021-08-04 NOTE — TELEPHONE ENCOUNTER
Spoke with pt.  She is having SOA with exertion, she is very fatigue, increased pain in her legs with no weeping.  She wants to know if she can have anything to help.  She currently was told by the PCP to increase Lasix to 1 and 1/2 (Lasix 60mg) QD.  It did help a little but not all the way to baseline.

## 2021-08-04 NOTE — TELEPHONE ENCOUNTER
----- Message from Lana Yañez sent at 8/3/2021 10:26 PM EDT -----  Regarding: Visit Follow-Up Question  Contact: [phone]  Kelin Dueñas, I have a  appointment on the 5th w [facility]. I've been suffering from water retention,  is there a shot I can have to help me get rid of extra water. I think I  had this shot a year or two ago.  My feet and legs are swelling and hands. I have watched sodium. Again I have appointment on 5th.   Thank you, Lana Yañez.

## 2021-08-05 ENCOUNTER — ANTICOAGULATION VISIT (OUTPATIENT)
Dept: PHARMACY | Facility: HOSPITAL | Age: 76
End: 2021-08-05

## 2021-08-05 ENCOUNTER — LAB (OUTPATIENT)
Dept: LAB | Facility: HOSPITAL | Age: 76
End: 2021-08-05

## 2021-08-05 ENCOUNTER — OFFICE VISIT (OUTPATIENT)
Dept: CARDIOLOGY | Facility: CLINIC | Age: 76
End: 2021-08-05

## 2021-08-05 VITALS
HEIGHT: 66 IN | SYSTOLIC BLOOD PRESSURE: 110 MMHG | BODY MASS INDEX: 40.98 KG/M2 | WEIGHT: 255 LBS | DIASTOLIC BLOOD PRESSURE: 70 MMHG | HEART RATE: 83 BPM

## 2021-08-05 DIAGNOSIS — I10 ESSENTIAL HYPERTENSION: ICD-10-CM

## 2021-08-05 DIAGNOSIS — I48.19 ATRIAL FIBRILLATION, PERSISTENT (HCC): ICD-10-CM

## 2021-08-05 DIAGNOSIS — I50.33 ACUTE ON CHRONIC DIASTOLIC CHF (CONGESTIVE HEART FAILURE) (HCC): Primary | ICD-10-CM

## 2021-08-05 DIAGNOSIS — I25.119 CORONARY ARTERY DISEASE INVOLVING NATIVE CORONARY ARTERY OF NATIVE HEART WITH ANGINA PECTORIS (HCC): ICD-10-CM

## 2021-08-05 DIAGNOSIS — R06.09 DOE (DYSPNEA ON EXERTION): ICD-10-CM

## 2021-08-05 LAB
ANION GAP SERPL CALCULATED.3IONS-SCNC: 11.6 MMOL/L (ref 5–15)
BUN SERPL-MCNC: 31 MG/DL (ref 8–23)
BUN/CREAT SERPL: 23 (ref 7–25)
CALCIUM SPEC-SCNC: 9.8 MG/DL (ref 8.6–10.5)
CHLORIDE SERPL-SCNC: 100 MMOL/L (ref 98–107)
CO2 SERPL-SCNC: 28.4 MMOL/L (ref 22–29)
CREAT SERPL-MCNC: 1.35 MG/DL (ref 0.57–1)
GFR SERPL CREATININE-BSD FRML MDRD: 46 ML/MIN/1.73
GLUCOSE SERPL-MCNC: 159 MG/DL (ref 65–99)
INR PPP: 4.6 (ref 0.91–1.09)
NT-PROBNP SERPL-MCNC: 1076 PG/ML (ref 0–900)
POTASSIUM SERPL-SCNC: 4.6 MMOL/L (ref 3.5–5.2)
PROTHROMBIN TIME: 54.9 SECONDS (ref 10–13.8)
SODIUM SERPL-SCNC: 140 MMOL/L (ref 136–145)

## 2021-08-05 PROCEDURE — 80162 ASSAY OF DIGOXIN TOTAL: CPT | Performed by: NURSE PRACTITIONER

## 2021-08-05 PROCEDURE — 83880 ASSAY OF NATRIURETIC PEPTIDE: CPT

## 2021-08-05 PROCEDURE — 36416 COLLJ CAPILLARY BLOOD SPEC: CPT

## 2021-08-05 PROCEDURE — 80048 BASIC METABOLIC PNL TOTAL CA: CPT

## 2021-08-05 PROCEDURE — G0463 HOSPITAL OUTPT CLINIC VISIT: HCPCS

## 2021-08-05 PROCEDURE — 83735 ASSAY OF MAGNESIUM: CPT | Performed by: NURSE PRACTITIONER

## 2021-08-05 PROCEDURE — 99214 OFFICE O/P EST MOD 30 MIN: CPT | Performed by: NURSE PRACTITIONER

## 2021-08-05 PROCEDURE — 93000 ELECTROCARDIOGRAM COMPLETE: CPT | Performed by: NURSE PRACTITIONER

## 2021-08-05 PROCEDURE — 85610 PROTHROMBIN TIME: CPT

## 2021-08-05 PROCEDURE — 36415 COLL VENOUS BLD VENIPUNCTURE: CPT

## 2021-08-05 NOTE — TELEPHONE ENCOUNTER
Called and spoke with patient.  She feels like her shortness of breath is improved today.  She declines coming in sooner than her scheduled appointment today.  She is going to stop by the outpatient lab to get labs drawn prior to this.  Will call sooner for issues or concerns.

## 2021-08-05 NOTE — PROGRESS NOTES
Date of Office Visit: 2021  Encounter Provider: Christine Coe, FABIAN, APRN  Place of Service: [facility] CARDIOLOGY  Patient Name: Lana Yañez  :1945        Subjective:     Chief Complaint:  Acute on chronic diastolic CHF, bilateral lower extremity edema, atrial fibrillation      History of Present Illness:  Lana Yañez is a 76 y.o. female patient of Dr. Dueñas.  I am seeing this patient in the office today and I reviewed her records.    Patient has a history of coronary artery disease, atrial fibrillation, embolic stroke, hyperlipidemia, hypertension, rheumatoid arthritis, obstructive sleep apnea, diastolic heart failure.     Per previous office note, in early October patient was found to be in atrial fibrillation with rapid ventricular rates and mild diastolic heart failure.  She was placed on IV heparin and Pradaxa.  Echocardiogram revealed normal systolic function mild left ventricular hypertrophy, moderate atrial enlargement with aortic valve sclerosis and moderate pulmonary hypertension and moderate tricuspid regurgitation.  The RV systolic pressure was 54 mmHg.  She had a stress perfusion study that was negative for ischemia and a CT angiogram revealed a mildly dilated aorta without pulmonary emboli.  She then presented several days later with an acute stroke.  CLARIBEL was not pursued as it was felt to be embolic in nature.  However on  she was diaphoretic and was found to have a non-ST elevation myocardial infarction.  This was on full dose Pradaxa which had not been held.  CLARIBEL was pursued that revealed normal systolic function with moderate mitral valve prolapse without significant regurgitation.  There was right-sided spontaneous echo contrast without thrombus formation.  Cardiac catheterization revealed normal systolic function with 2+ mitral regurgitation, and 90% stenosis was noted distally which is felt to be too small to be amenable PCI.  She  was treated medically and switched to Coumadin.  She has had intermittent heart failure after dietary indiscretions with salt since that time.  Patient had Holter monitor study done 9/13/18 showing the predominant rhythm during the testing period to be atrial fibrillation.  Premature ventricular contractions occurred frequently.  There were no episodes of ventricular tachycardia.  No AV block noted.  Average heart rate was 86 bpm.  Echocardiogram showed normal systolic function with EF of 51%, moderate to severe left atrial enlargement, aortic valve calcification with mild aortic regurgitation, mild mitral regurgitation with severe tricuspid regurgitation, RV systolic pressure 49 mmHg.  Subsequent VQ scan was low risk for PE.  Follow-up echo 8/2020 showed normal LV systolic function with EF of 62%, moderate LVH, indeterminate diastolic function but increased left atrial volume, mild aortic regurgitation, mild thickening of the mitral valve with moderate regurgitation, severe tricuspid regurgitation with RVSP of 48 mmHg.      Patient presents to office today for follow-up appointment.  Patient reports that she has had increased swelling to bilateral lower extremities over the last month as well as increased shortness of breath symptoms. She reports INRs have been therapeutic or even high recently but no bleeding issues. She reports that shortness of breath increased from baseline but not significant and denies orthopnea. She reports her weight was up 5 pounds and she saw her PCP a couple weeks ago and Lasix was increased from 40 mg daily to 60 mg daily. Labs today show slight bump in her creatinine with normal potassium and proBNP slightly elevated at 1076. She does feel like she has noticed some improvement in her lower extremity edema and shortness of breath since being on the increased diuretic dose though they are still present. She uses compression socks occasionally at home. She also notes a recent study from  outside hospital indicating some possible venous insufficiency and reports she sees vascular doctor this month. Does feel like she has had some increased palpitations recently since she has been dealing with fluid overload. Denies chest pain or discomfort, syncope, near syncope, falls, or abnormal bleeding. She is using her CPAP regularly. Blood pressure and heart rate well controlled in the office today. She initially reported low-salt diet but on further discussion it turns out she is eating a lot of takeout and some fast food including sandwiches from [employer] and [employer] sandwich.          Past Medical History:   Diagnosis Date   • Acute on chronic diastolic heart failure (CMS/HCC)    • Arthritis    • Asthma    • Atrial fibrillation (CMS/HCC)     Persistent; on warfarin   • Atypical chest pain    • Bronchitis    • Cellulitis of right elbow     due to MRSA   • CHF (congestive heart failure) (CMS/HCC)    • COPD (chronic obstructive pulmonary disease) (CMS/[facility])    • Coronary artery disease     Cardiac catheterization completed; 90% PDA stenosis with medical management recommended   • Coronary artery disease involving native coronary artery of native heart with angina pectoris with documented spasm (CMS/[facility])    • Diabetes 1.5, 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[facility])    • Disease of thyroid gland    • Displacement of lumbar intervertebral disc without myelopathy    • Dizziness    • ANTOINE (dyspnea on exertion)    • Essential hypertension    • Fatigue    • Generalized osteoarthritis of multiple sites    • History of rheumatic fever    • History of transfusion    • Hx of bone density study     10/23/2014   • Hyperglycemia    • Hyperlipidemia    • Hypovitaminosis D    • Left arm pain    • Left-sided weakness    • Leg swelling    • Low back pain    • Lower extremity edema    • Malaise and fatigue    • Mitral valve disease     Moderate mitral valve prolapse and moderate mitral regurgitation   • Mitral valve insufficiency    •  Morbid obesity with BMI of 40.0-44.9, adult (CMS/[facility])    • MRSA infection    • NSTEMI (non-ST elevated myocardial infarction) (CMS/[facility])    • PAF (paroxysmal atrial fibrillation) (CMS/[facility])    • RA (rheumatoid arthritis) (CMS/[facility])     involving both hands   • Sleep apnea    • SOB (shortness of breath)    • Stroke (CMS/[facility])     left side weakness   • Urge incontinence of urine    • UTI (urinary tract infection) 05/2020   • Vitamin D deficiency      Past Surgical History:   Procedure Laterality Date   • BREAST BIOPSY     • BREAST SURGERY      right side lumpectomy with biopsy   • CARDIAC CATHETERIZATION Left 10/20/2017    Procedure: Cardiac Catheterization/Vascular Study;  Surgeon: Alphonso Olmedo MD;  Location: [facility] CATH INVASIVE LOCATION;  Service:    • CARDIAC CATHETERIZATION N/A 10/20/2017    +2 mitral regurgitation, left main 10% stenosis, mid to distal LAD 10% diffuse stenosis, circumflex 10% diffuse stenosis, RCA 10% proximal stenosis, and distal PDA consistent with coronary embolus with a lesion of 90% too small to consider coronary intervention; medical management recommended   • EYE SURGERY      laser surgery for glaucoma and left eye cataracts removed   • HYSTERECTOMY      10+ years ago   • JOINT REPLACEMENT  2005; 2006    bilateral knees and left rotater   • KNEE SURGERY     • MAMMO BILATERAL  2016    [facility]      Outpatient Medications Prior to Visit   Medication Sig Dispense Refill   • aspirin 81 MG EC tablet Take 1 tablet by mouth Daily.     • atorvastatin (LIPITOR) 10 MG tablet Take 1 tablet by mouth Daily. 90 tablet 3   • Blood Glucose Monitoring Suppl (ACCU-CHEK GUIDE) w/Device kit See Admin Instructions.     • carvedilol (COREG) 25 MG tablet TAKE 1 & 1/2 (ONE & ONE-HALF) TABLETS BY MOUTH TWICE DAILY WITH MEALS 270 tablet 2   • digoxin (LANOXIN) 125 MCG tablet Take 1 tablet by mouth Every Other Day. 45 tablet 1   • furosemide (LASIX) 40 MG tablet Take 1 tablet in the morning and 1/2 tablet at  noon. 135 tablet 3   • glucose blood test strip Use as instructed 300 each 12   • HYDROcodone-acetaminophen (NORCO) 5-325 MG per tablet Take 1 tablet by mouth 2 (Two) Times a Day As Needed for Severe Pain . 40 tablet 0   • Lancets (ACCU-CHEK MULTICLIX) lancets Use 1 lancet per finger stick 204 each 12   • lisinopril (PRINIVIL,ZESTRIL) 10 MG tablet Take 1 tablet by mouth Daily. 90 tablet 3   • magnesium oxide (MAG-OX) 400 MG tablet Take 400 mg by mouth As Needed.     • potassium chloride 10 MEQ CR tablet Take 4 tablets by mouth Daily. 360 tablet 3   • SITagliptin (Januvia) 100 MG tablet Take 1 tablet by mouth Daily. 30 tablet 11   • TRAVATAN Z 0.004 % solution ophthalmic solution Administer 1 drop to both eyes Every Night.     • vitamin D (ERGOCALCIFEROL) 1.25 MG (13723 UT) capsule capsule Take 1 capsule by mouth 1 (One) Time Per Week. 12 capsule 1   • warfarin (COUMADIN) 5 MG tablet TAKE 1 TABLET BY MOUTH ONCE DAILY OR  AS  DIRECTED  BY  MEDICATION  MANAGEMENT  CLINIC 90 tablet 1   • acetaminophen (TYLENOL) 500 MG tablet Take 500 mg by mouth Every 6 (Six) Hours As Needed for Mild Pain .       No facility-administered medications prior to visit.       Allergies as of 2021 - Reviewed 2021   Allergen Reaction Noted   • Contrast dye Hives and Itching 2018     Social History     Socioeconomic History   • Marital status:      Spouse name: Suresh   • Number of children: 5   • Years of education: 13   • Highest education level: Not on file   Tobacco Use   • Smoking status: Former Smoker     Packs/day: 3.00     Types: Cigarettes     Start date: 1967     Quit date: 10/19/1968     Years since quittin.8   • Smokeless tobacco: Never Used   • Tobacco comment: caffeine use - decaf coffee   Substance and Sexual Activity   • Alcohol use: No     Comment: No caffeine use   • Drug use: No   • Sexual activity: Defer     Family History   Problem Relation Age of Onset   • Colon cancer Mother    •  "Glaucoma Mother    • Stroke Mother    • Arthritis Mother    • Hypertension Mother    • Glaucoma Sister    • Diabetes Sister    • Heart disease Sister    • Arthritis Sister    • Asthma Sister    • Hypertension Sister    • Miscarriages / Stillbirths Sister    • Lung disease Sister    • Heart disease Brother    • Diabetes Brother    • Arthritis Brother    • Drug abuse Brother    • Hypertension Brother    • Arthritis Father    • COPD Father    • Lung disease Father    • Arthritis Daughter    • Depression Daughter    • Alcohol abuse Maternal Uncle    • Heart disease Sister    • Heart disease Sister    • Heart disease Brother        Review of Systems   Constitutional: Positive for malaise/fatigue and weight gain.   Cardiovascular:        SEE HPI   Respiratory: Positive for shortness of breath.    Hematologic/Lymphatic: Negative for bleeding problem.   Musculoskeletal: Negative for falls.   Gastrointestinal: Negative for melena.   Genitourinary: Negative for hematuria.   Psychiatric/Behavioral: Negative for altered mental status.          Objective:     Vitals:    08/05/21 1320   BP: 110/70   Pulse: 83   Weight: 116 kg (255 lb)   Height: 167.6 cm (66\")     Body mass index is 41.16 kg/m².      PHYSICAL EXAM:  Constitutional:       General: Not in acute distress.     Appearance: Well-developed. Obese. Not diaphoretic.   Eyes:      Pupils: Pupils are equal, round, and reactive to light.   HENT:      Head: Normocephalic and atraumatic.   Neck:      Vascular: No carotid bruit or JVD.   Pulmonary:      Effort: Pulmonary effort is normal. No respiratory distress.      Breath sounds: Normal breath sounds. No wheezing. No rales.   Cardiovascular:      Normal rate. Irregularly irregular rhythm.      Murmurs: There is no murmur.      No gallop. No click. No rub.   Edema:     Peripheral edema (1+-2+, bilateral feet/ankles) present.  Abdominal:      General: Bowel sounds are normal. There is no distension.      Palpations: Abdomen is " soft.   Musculoskeletal:         General: No tenderness or deformity.      Cervical back: Neck supple. Skin:     General: Skin is warm and dry.      Findings: No erythema or rash.   Neurological:      Mental Status: Alert and oriented to person, place, and time.   Psychiatric:         Behavior: Behavior normal.         Judgment: Judgment normal.             ECG 12 Lead    Date/Time: 8/5/2021 2:00 PM  Performed by: Christine Coe DNP, ANGELA  Authorized by: Christine Coe DNP, ANGELA   Comparison: compared with previous ECG from 2/4/2021  Rhythm: atrial fibrillation  BPM: 85  Conduction: left anterior fascicular block  Other findings: non-specific ST-T wave changes  Comments: No significant changes from previous EKG              Assessment:       Diagnosis Plan   1. Acute on chronic diastolic CHF (congestive heart failure) (CMS/HCC)  Basic Metabolic Panel    ECG 12 Lead   2. ANTOINE (dyspnea on exertion)  Adult Transthoracic Echo Complete w/ Color, Spectral and Contrast if Necessary Per Protocol    ECG 12 Lead   3. Atrial fibrillation, persistent (CMS/HCC)  Magnesium    ECG 12 Lead    Digoxin Level   4. Essential hypertension  ECG 12 Lead         Plan:     1. Acute on chronic diastolic CHF: She continues to have increased shortness of breath and increased lower extremity edema despite increase of Lasix from 40 mg to 60 mg. She is eating a lot of salty foods. We had a very long detailed discussion about healthy diet and avoiding high salt foods. At this point I am going to increase her Lasix to 80 mg a day for the next 2 days and then have her return to the 60 mg daily in addition to cutting back on salt intake and decreasing water intake slightly as she is currently drinking approximately 80 to 96 ounces a day. She is going to check daily weights and call if she gains more than 2 pounds in 1 day or 5 pounds in a week or for increased swelling or shortness of breath. We are going to monitor her kidney function closely  and recheck a metabolic panel early next week given slight bump in creatinine today on increased Lasix dose. If creatinine increases further we discussed nephrology referral.  However really hoping that by cutting back on salt intake and slightly cutting back on water intake we can eventually decrease her daily Lasix dose with improvement in kidney enzymes back to baseline.  Will also like to recheck an echocardiogram.  She will call Monday with an update or sooner for new or worsening issues or concerns.  2. Bilateral lower extremity edema: Some of this is likely due to excess fluid as above however suspect a lot of this may be dependent in nature or may be related to some venous insufficiency. Recommended using compression socks daily and elevating legs when sitting. Keep upcoming vascular appointment as well.  3. Palpitations: These are chronic for her however have increased recently since she has been dealing with weight gain and fluid overload. Sounds like they are likely volume related at this time however if these do not improve once fluid status is improved then would consider monitor study.  4. Persistent atrial fibrillation: Rates controlled. Anticoagulated with warfarin. Follows with INR clinic. She reports INR has been slightly supratherapeutic recently but will continue to follow with INR clinic to get this regulated and notify us if she has any issues with this. Denies bleeding issues.  We will check a digoxin level.  5. Hypertension: Blood pressure well controlled in the office today. Patient continue to monitor at home and call for high or low readings.  6. Pulmonary hypertension: Recheck echo as above.  7. Obstructive sleep apnea: On CPAP therapy. Reports she is using nightly. Follows with Dr. Hill.  8. Severe small vessel coronary artery disease: Denies anginal symptoms at this time. Continue current regimen.  9. Lung nodule: Follows with Dr. Hill  10. History of embolic non-ST elevation MI: Now on  warfarin as above  11. History of embolic stroke: Remains on warfarin as above    Patient to follow-up with APRN in 2 to 3 weeks and keep 6-month follow-up with Dr. Dueñas but call sooner if needed for any new, recurrent, or worsening symptoms or other issues or concerns.  Discussed in detail signs/symptoms that warrant sooner call or follow-up to the office or ER visit.        Records reviewed include not limited to 8/2020 echo, 2/2021 EKG, 7/21 BMP, 7/21 proBNP, 2/21 digoxin level           Your medication list          Accurate as of August 5, 2021 11:59 PM. If you have any questions, ask your nurse or doctor.            CONTINUE taking these medications      Instructions Last Dose Given Next Dose Due   Accu-Chek Guide w/Device kit      See Admin Instructions.       accu-chek multiclix lancets      Use 1 lancet per finger stick       aspirin 81 MG EC tablet      Take 1 tablet by mouth Daily.       atorvastatin 10 MG tablet  Commonly known as: LIPITOR      Take 1 tablet by mouth Daily.       carvedilol 25 MG tablet  Commonly known as: COREG      TAKE 1 & 1/2 (ONE & ONE-HALF) TABLETS BY MOUTH TWICE DAILY WITH MEALS       digoxin 125 MCG tablet  Commonly known as: LANOXIN      Take 1 tablet by mouth Every Other Day.       furosemide 40 MG tablet  Commonly known as: LASIX      Take 1 tablet in the morning and 1/2 tablet at noon.       glucose blood test strip      Use as instructed       HYDROcodone-acetaminophen 5-325 MG per tablet  Commonly known as: NORCO      Take 1 tablet by mouth 2 (Two) Times a Day As Needed for Severe Pain .       lisinopril 10 MG tablet  Commonly known as: PRINIVIL,ZESTRIL      Take 1 tablet by mouth Daily.       magnesium oxide 400 MG tablet  Commonly known as: MAG-OX      Take 400 mg by mouth As Needed.       potassium chloride 10 MEQ CR tablet      Take 4 tablets by mouth Daily.       SITagliptin 100 MG tablet  Commonly known as: Januvia      Take 1 tablet by mouth Daily.       Travatan Z  0.004 % solution ophthalmic solution  Generic drug: travoprost (ANNIE free)      Administer 1 drop to both eyes Every Night.       vitamin D 1.25 MG (92923 UT) capsule capsule  Commonly known as: ERGOCALCIFEROL      Take 1 capsule by mouth 1 (One) Time Per Week.       warfarin 5 MG tablet  Commonly known as: COUMADIN      TAKE 1 TABLET BY MOUTH ONCE DAILY OR  AS  DIRECTED  BY  MEDICATION  MANAGEMENT  CLINIC          STOP taking these medications    acetaminophen 500 MG tablet  Commonly known as: TYLENOL  Stopped by: Christine Coe DNP, APRN               I did not stop or change the above medications except for temporarily increasing Lasix, as above.  Patient's medication list was updated to reflect medications they are currently taking including medication changes made by other providers.            Thanks,    Christine Coe DNP, APRN  08/05/2021         Dictated utilizing Dragon dictation

## 2021-08-05 NOTE — PROGRESS NOTES
Anticoagulation Clinic Progress Note    Anticoagulation Summary  As of 2021    INR goal:  2.0-3.0   TTR:  66.1 % (2.8 y)   INR used for dosin.6 (2021)   Warfarin maintenance plan:  5 mg every day   Weekly warfarin total:  35 mg   Plan last modified:  Shaw Hand, PharmD (2021)   Next INR check:  2021   Priority:  Maintenance   Target end date:  Indefinite    Indications    Atrial fibrillation (CMS/HCC) [I48.91]             Anticoagulation Episode Summary     INR check location:      Preferred lab:      Send INR reminders to:   [facility]    Comments:        Anticoagulation Care Providers     Provider Role Specialty Phone number    Pia Dueñas MD Referring Cardiology [phone]          Clinic Interview:  Patient Findings     Positives:  Change in health    Negatives:  Signs/symptoms of thrombosis, Signs/symptoms of bleeding,   Laboratory test error suspected, Change in alcohol use, Change in   activity, Upcoming invasive procedure, Emergency department visit,   Upcoming dental procedure, Missed doses, Extra doses, Change in   medications, Change in diet/appetite, Hospital admission, Bruising, Other   complaints    Comments:  Increased bilateral edema. Patient is going to lab today per   Dr. Dueñas request to evaluate fluids. Patient is scheduled for follow up   next Thursday once we know more about fluid status. Will likely need a   decrease in dose.       Clinical Outcomes     Negatives:  Major bleeding event, Thromboembolic event,   Anticoagulation-related hospital admission, Anticoagulation-related ED   visit, Anticoagulation-related fatality    Comments:  Increased bilateral edema. Patient is going to lab today per   Dr. Dueñas request to evaluate fluids. Patient is scheduled for follow up   next Thursday once we know more about fluid status. Will likely need a   decrease in dose.         INR History:  Anticoagulation Monitoring 2021   INR 3.0 3.5 4.6    INR Date 7/2/2021 7/16/2021 8/5/2021   INR Goal 2.0-3.0 2.0-3.0 2.0-3.0   Trend Same Same Same   Last Week Total 37.5 mg 35 mg 35 mg   Next Week Total 35 mg 32.5 mg 27.5 mg   Sun 5 mg 5 mg 5 mg   Mon 5 mg 5 mg 5 mg   Tue 5 mg 5 mg 5 mg   Wed 5 mg 5 mg 5 mg   Thu 5 mg 5 mg Hold (8/5)   Fri 5 mg 2.5 mg (7/16); Otherwise 5 mg 2.5 mg (8/6)   Sat 5 mg 5 mg 5 mg   Visit Report - - -   Some recent data might be hidden       Plan:  1. INR is Supratherapeutic today- see above in Anticoagulation Summary.  Will instruct Lana Otilio to Change their warfarin regimen- see above in Anticoagulation Summary. Increased bilateral edema. Patient is going to lab today per Dr. Dueñas request to evaluate fluids. Patient is scheduled for follow up next Thursday once we know more about fluid status. Will likely need a decrease in dose.   2. Follow up in 1 week  3. Patient declines warfarin refills.  4. Verbal and written information provided. Patient expresses understanding and has no further questions at this time.    [employer] [facility]

## 2021-08-06 ENCOUNTER — TELEPHONE (OUTPATIENT)
Dept: CARDIOLOGY | Facility: CLINIC | Age: 76
End: 2021-08-06

## 2021-08-06 DIAGNOSIS — R60.9 PERIPHERAL EDEMA: ICD-10-CM

## 2021-08-06 DIAGNOSIS — E78.2 MIXED HYPERLIPIDEMIA: Primary | ICD-10-CM

## 2021-08-06 DIAGNOSIS — I48.19 ATRIAL FIBRILLATION, PERSISTENT (HCC): Primary | ICD-10-CM

## 2021-08-06 DIAGNOSIS — I50.32 CHRONIC DIASTOLIC HEART FAILURE (HCC): ICD-10-CM

## 2021-08-06 LAB
DIGOXIN SERPL-MCNC: 1 NG/ML (ref 0.6–1.2)
MAGNESIUM SERPL-MCNC: 2.1 MG/DL (ref 1.6–2.4)

## 2021-08-06 RX ORDER — POTASSIUM CHLORIDE 750 MG/1
40 TABLET, FILM COATED, EXTENDED RELEASE ORAL DAILY
Qty: 360 TABLET | Refills: 1 | Status: SHIPPED | OUTPATIENT
Start: 2021-08-06 | End: 2021-08-12 | Stop reason: SDUPTHER

## 2021-08-06 RX ORDER — ATORVASTATIN CALCIUM 10 MG/1
10 TABLET, FILM COATED ORAL DAILY
Qty: 90 TABLET | Refills: 1 | Status: SHIPPED | OUTPATIENT
Start: 2021-08-06 | End: 2022-02-14 | Stop reason: SDUPTHER

## 2021-08-06 NOTE — TELEPHONE ENCOUNTER
Caller: Lana Yañez    Relationship: Self    Best call back number: [phone]    Medication needed:   Requested Prescriptions     Pending Prescriptions Disp Refills   • potassium chloride 10 MEQ CR tablet 360 tablet 3     Sig: Take 4 tablets by mouth Daily.   • atorvastatin (LIPITOR) 10 MG tablet 90 tablet 3     Sig: Take 1 tablet by mouth Daily.       When do you need the refill by: ASAP    What additional details did the patient provide when requesting the medication: THE PATIENT WAS TOLD BY HER PHARMACY THAT SHE COULDN'T GET A REFILL FOR HER MEDICATION potassium chloride 10 MEQ CR tablet UNTIL THE END OF AUGUST BUT SHE IS OUT OF THE MEDICATION. THE PATIENT HAS TWO PILLS LEFT OF THE atorvastatin (LIPITOR) 10 MG tablet.     Does the patient have less than a 3 day supply:  [x] Yes  [] No    What is the patient's preferred pharmacy: [address] [phone] [facility] [phone]

## 2021-08-06 NOTE — TELEPHONE ENCOUNTER
Spoke to pt who verbalized understanding of the msg as well as writing it down.  She said to tell you she DID take her digoxin yesterday and will make sure she doesn't prior to lab recheck./prt

## 2021-08-06 NOTE — TELEPHONE ENCOUNTER
Please let patient know that her digoxin level yesterday was elevated more than normal but still within normal range.  I am wondering if she did take her digoxin yesterday.  Lets recheck this early next week when we recheck her metabolic panel.  Lets make sure she does not take her digoxin before we draw the blood but okay to take it afterwards if it is a digoxin day (only takes digoxin every other day and levels can be falsely elevated if takes digoxin right before lab drawn).  Would have her stop by the outpatient lab after her appointment with Dr. Madden if he does not draw labs in his office.

## 2021-08-12 ENCOUNTER — ANTICOAGULATION VISIT (OUTPATIENT)
Dept: PHARMACY | Facility: HOSPITAL | Age: 76
End: 2021-08-12

## 2021-08-12 ENCOUNTER — OFFICE VISIT (OUTPATIENT)
Dept: INTERNAL MEDICINE | Facility: CLINIC | Age: 76
End: 2021-08-12

## 2021-08-12 ENCOUNTER — CLINICAL SUPPORT (OUTPATIENT)
Dept: INTERNAL MEDICINE | Facility: CLINIC | Age: 76
End: 2021-08-12

## 2021-08-12 VITALS
DIASTOLIC BLOOD PRESSURE: 72 MMHG | OXYGEN SATURATION: 98 % | SYSTOLIC BLOOD PRESSURE: 138 MMHG | BODY MASS INDEX: 41.05 KG/M2 | HEART RATE: 77 BPM | TEMPERATURE: 98.6 F | HEIGHT: 66 IN | WEIGHT: 255.4 LBS

## 2021-08-12 DIAGNOSIS — Z00.00 ENCOUNTER FOR HEALTH MAINTENANCE EXAMINATION IN ADULT: ICD-10-CM

## 2021-08-12 DIAGNOSIS — Z78.0 POSTMENOPAUSAL: ICD-10-CM

## 2021-08-12 DIAGNOSIS — I10 ESSENTIAL HYPERTENSION: ICD-10-CM

## 2021-08-12 DIAGNOSIS — I50.32 CHRONIC DIASTOLIC HEART FAILURE (HCC): ICD-10-CM

## 2021-08-12 DIAGNOSIS — T50.905A DRUG-INDUCED HYPOKALEMIA: ICD-10-CM

## 2021-08-12 DIAGNOSIS — Z12.11 SCREEN FOR COLON CANCER: ICD-10-CM

## 2021-08-12 DIAGNOSIS — Z00.00 ENCOUNTER FOR HEALTH MAINTENANCE EXAMINATION IN ADULT: Primary | ICD-10-CM

## 2021-08-12 DIAGNOSIS — R60.9 PERIPHERAL EDEMA: ICD-10-CM

## 2021-08-12 DIAGNOSIS — Z13.820 SCREENING FOR OSTEOPOROSIS: ICD-10-CM

## 2021-08-12 DIAGNOSIS — E87.6 DRUG-INDUCED HYPOKALEMIA: ICD-10-CM

## 2021-08-12 LAB
INR PPP: 3.2 (ref 0.91–1.09)
PROTHROMBIN TIME: 38.3 SECONDS (ref 10–13.8)

## 2021-08-12 PROCEDURE — 36416 COLLJ CAPILLARY BLOOD SPEC: CPT

## 2021-08-12 PROCEDURE — 99397 PER PM REEVAL EST PAT 65+ YR: CPT | Performed by: FAMILY MEDICINE

## 2021-08-12 PROCEDURE — 85610 PROTHROMBIN TIME: CPT

## 2021-08-12 PROCEDURE — 77080 DXA BONE DENSITY AXIAL: CPT | Performed by: FAMILY MEDICINE

## 2021-08-12 PROCEDURE — G0463 HOSPITAL OUTPT CLINIC VISIT: HCPCS

## 2021-08-12 RX ORDER — POTASSIUM CHLORIDE 750 MG/1
20 TABLET, FILM COATED, EXTENDED RELEASE ORAL DAILY
Qty: 180 TABLET | Refills: 3 | Status: SHIPPED | OUTPATIENT
Start: 2021-08-12 | End: 2022-05-27 | Stop reason: HOSPADM

## 2021-08-12 RX ORDER — LISINOPRIL 10 MG/1
10 TABLET ORAL DAILY
Qty: 90 TABLET | Refills: 3 | Status: SHIPPED | OUTPATIENT
Start: 2021-08-12 | End: 2021-11-22

## 2021-08-12 NOTE — PROGRESS NOTES
"Chief Complaint  Annual Exam (physical ), Back Pain (lower back pain), and Foot Swelling    Subjective            Lanaantonio Yañez presents to [facility]  History of Present Illness    CPE- Patient reporting that health is doing well overall.  Patient is here today for annual physical.  Being careful of what she is eating due to her edema which is being treated with cardiology on board    Labs: Recently had labs completed at cardiology which were reviewed.  Due for vaccines: Up-to-date    Last colonoscopy: She has a Cologuard at home she needs to complete it  Last Mammogram: Up-to-date  Last PAP: Not applicable        Review of Systems   Constitutional: Negative for activity change, appetite change, fatigue and fever.   HENT: Negative for ear pain, facial swelling and sore throat.    Eyes: Negative for discharge and itching.   Respiratory: Negative for cough, chest tightness and shortness of breath.    Cardiovascular: Positive for leg swelling. Negative for chest pain and palpitations.   Gastrointestinal: Negative for abdominal distention, constipation and diarrhea.   Endocrine: Negative for polydipsia, polyphagia and polyuria.   Genitourinary: Negative for difficulty urinating and flank pain.   Musculoskeletal: Negative for arthralgias and back pain.   Skin: Negative for color change and rash.   Allergic/Immunologic: Negative for environmental allergies and food allergies.   Neurological: Negative for weakness and numbness.   Hematological: Negative for adenopathy. Does not bruise/bleed easily.   Psychiatric/Behavioral: Negative for decreased concentration and dysphoric mood. The patient is not nervous/anxious.          Objective   Vital Signs:   /72 (BP Location: Right arm, Patient Position: Sitting, Cuff Size: Adult)   Pulse 77   Temp 98.6 °F (37 °C)   Ht 167.6 cm (65.98\")   Wt 116 kg (255 lb 6.4 oz)   SpO2 98%   BMI 41.24 kg/m²     Physical Exam  Vitals and nursing " note reviewed.   Constitutional:       General: She is not in acute distress.     Appearance: Normal appearance.   HENT:      Right Ear: Tympanic membrane, ear canal and external ear normal.      Left Ear: Tympanic membrane, ear canal and external ear normal.      Nose: Nose normal.      Mouth/Throat:      Mouth: Mucous membranes are moist.   Eyes:      General:         Right eye: No discharge.         Left eye: No discharge.      Conjunctiva/sclera: Conjunctivae normal.   Cardiovascular:      Rate and Rhythm: Normal rate and regular rhythm.      Pulses: Normal pulses.      Heart sounds: Normal heart sounds.   Pulmonary:      Effort: Pulmonary effort is normal.      Breath sounds: Normal breath sounds.   Abdominal:      General: Bowel sounds are normal. There is no distension.      Palpations: Abdomen is soft.      Tenderness: There is no abdominal tenderness.   Musculoskeletal:      Cervical back: Neck supple.      Right lower le+ Pitting Edema present.      Left lower le+ Pitting Edema present.   Lymphadenopathy:      Cervical: No cervical adenopathy.   Skin:     General: Skin is warm.      Findings: No rash.   Neurological:      General: No focal deficit present.      Mental Status: She is alert. Mental status is at baseline.   Psychiatric:         Mood and Affect: Mood normal.         Behavior: Behavior normal.        Result Review :   The following data was reviewed by: Will Madden MD on 2021:  Common labs    Common Labsle 3/4/21 5/27/21 5/27/21 5/27/21 8/5/21     1410 1410 1410    Glucose 131 (A)    159 (A)   Glucose  152 (A)      BUN 14 15   31 (A)   Creatinine 0.89 1.20 (A)   1.35 (A)   eGFR Non  Am  44 (A)      eGFR  Am 75 53 (A)   46 (A)   Sodium 144 141   140   Potassium 4.6 4.5   4.6   Chloride 107 105   100   Calcium 9.3 9.8   9.8   Total Cholesterol   113     Triglycerides   95     HDL Cholesterol   56     LDL Cholesterol    39     Hemoglobin A1C    7.00 (A)    (A)  Abnormal value       Comments are available for some flowsheets but are not being displayed.                     Assessment and Plan    Diagnoses and all orders for this visit:    1. Encounter for health maintenance examination in adult (Primary)  -     Cancel: DEXA Bone Density Axial  -     Cologuard - Stool, Per Rectum; Future  -     DEXA Bone Density Axial; Future    2. Postmenopausal  -     Cancel: DEXA Bone Density Axial  -     DEXA Bone Density Axial; Future    3. Screening for osteoporosis  -     Cancel: DEXA Bone Density Axial  -     DEXA Bone Density Axial; Future    4. Screen for colon cancer  -     Cologuard - Stool, Per Rectum; Future    5. Peripheral edema  -     potassium chloride 10 MEQ CR tablet; Take 2 tablets by mouth Daily.  Dispense: 180 tablet; Refill: 3    6. Chronic diastolic heart failure (CMS/HCC)  -     potassium chloride 10 MEQ CR tablet; Take 2 tablets by mouth Daily.  Dispense: 180 tablet; Refill: 3  -     lisinopril (PRINIVIL,ZESTRIL) 10 MG tablet; Take 1 tablet by mouth Daily.  Dispense: 90 tablet; Refill: 3    7. Drug-induced hypokalemia  -     potassium chloride 10 MEQ CR tablet; Take 2 tablets by mouth Daily.  Dispense: 180 tablet; Refill: 3    8. Essential hypertension  -     lisinopril (PRINIVIL,ZESTRIL) 10 MG tablet; Take 1 tablet by mouth Daily.  Dispense: 90 tablet; Refill: 3          The patient was counseled regarding nutrition, physical activity, healthy weight, injury prevention, misuse of tobacco, alcohol and illicit drugs, mental health, immunizations, and screenings.    Refilled her medications as above.  DEXA scan when she is available.  See back in about 6 months for follow-up.    Follow Up   Return in about 6 months (around 2/21/2022) for Recheck - HTN, edema.  Patient was given instructions and counseling regarding her condition or for health maintenance advice. Please see specific information pulled into the AVS if appropriate.

## 2021-08-12 NOTE — PROGRESS NOTES
Anticoagulation Clinic Progress Note    Anticoagulation Summary  As of 8/12/2021    INR goal:  2.0-3.0   TTR:  65.6 % (2.8 y)   INR used for dosing:  3.2 (8/12/2021)   Warfarin maintenance plan:  2.5 mg every Wed; 5 mg all other days   Weekly warfarin total:  32.5 mg   Plan last modified:  Shaw Hand, PharmD (8/12/2021)   Next INR check:  8/20/2021   Priority:  Maintenance   Target end date:  Indefinite    Indications    Atrial fibrillation (CMS/HCC) [I48.91]             Anticoagulation Episode Summary     INR check location:      Preferred lab:      Send INR reminders to:  [facility]    Comments:        Anticoagulation Care Providers     Provider Role Specialty Phone number    Pia Dueñas MD Referring Cardiology [phone]          Clinic Interview:  Patient Findings     Negatives:  Signs/symptoms of thrombosis, Signs/symptoms of bleeding,   Laboratory test error suspected, Change in health, Change in alcohol use,   Change in activity, Upcoming invasive procedure, Emergency department   visit, Upcoming dental procedure, Missed doses, Extra doses, Change in   medications, Change in diet/appetite, Hospital admission, Bruising, Other   complaints      Clinical Outcomes     Negatives:  Major bleeding event, Thromboembolic event,   Anticoagulation-related hospital admission, Anticoagulation-related ED   visit, Anticoagulation-related fatality        INR History:  Anticoagulation Monitoring 7/16/2021 8/5/2021 8/12/2021   INR 3.5 4.6 3.2   INR Date 7/16/2021 8/5/2021 8/12/2021   INR Goal 2.0-3.0 2.0-3.0 2.0-3.0   Trend Same Same Down   Last Week Total 35 mg 35 mg 27.5 mg   Next Week Total 32.5 mg 27.5 mg 30 mg   Sun 5 mg 5 mg 5 mg   Mon 5 mg 5 mg 5 mg   Tue 5 mg 5 mg 5 mg   Wed 5 mg 5 mg 2.5 mg   Thu 5 mg Hold (8/5) 2.5 mg (8/12); Otherwise 5 mg   Fri 2.5 mg (7/16); Otherwise 5 mg 2.5 mg (8/6) 5 mg   Sat 5 mg 5 mg 5 mg   Visit Report - - -   Some recent data might be hidden       Plan:  1. INR is  Supratherapeutic today- see above in Anticoagulation Summary.  Will instruct Lana [place] to Change their warfarin regimen- see above in Anticoagulation Summary.  2. Follow up in 1 week  3. Patient declines warfarin refills.  4. Verbal and written information provided. Patient expresses understanding and has no further questions at this time.    Shaw Hand, PharmD

## 2021-08-16 ENCOUNTER — TELEPHONE (OUTPATIENT)
Dept: CARDIOLOGY | Facility: CLINIC | Age: 76
End: 2021-08-16

## 2021-08-18 RX ORDER — ERGOCALCIFEROL 1.25 MG/1
CAPSULE ORAL
Qty: 12 CAPSULE | Refills: 0 | Status: SHIPPED | OUTPATIENT
Start: 2021-08-18 | End: 2021-11-16

## 2021-08-20 ENCOUNTER — HOSPITAL ENCOUNTER (OUTPATIENT)
Dept: CARDIOLOGY | Facility: HOSPITAL | Age: 76
Discharge: HOME | End: 2021-08-20

## 2021-08-20 ENCOUNTER — TELEPHONE (OUTPATIENT)
Dept: CARDIOLOGY | Facility: CLINIC | Age: 76
End: 2021-08-20

## 2021-08-20 ENCOUNTER — ANTICOAGULATION VISIT (OUTPATIENT)
Dept: PHARMACY | Facility: HOSPITAL | Age: 76
End: 2021-08-20

## 2021-08-20 ENCOUNTER — LAB (OUTPATIENT)
Dept: LAB | Facility: HOSPITAL | Age: 76
End: 2021-08-20

## 2021-08-20 VITALS
DIASTOLIC BLOOD PRESSURE: 80 MMHG | HEIGHT: 65 IN | HEART RATE: 104 BPM | SYSTOLIC BLOOD PRESSURE: 110 MMHG | OXYGEN SATURATION: 97 % | WEIGHT: 255 LBS | BODY MASS INDEX: 42.49 KG/M2

## 2021-08-20 DIAGNOSIS — R06.09 DOE (DYSPNEA ON EXERTION): ICD-10-CM

## 2021-08-20 DIAGNOSIS — I77.810 THORACIC AORTIC ECTASIA (HCC): ICD-10-CM

## 2021-08-20 DIAGNOSIS — I50.33 ACUTE ON CHRONIC DIASTOLIC CHF (CONGESTIVE HEART FAILURE) (HCC): ICD-10-CM

## 2021-08-20 DIAGNOSIS — I10 ESSENTIAL HYPERTENSION: Primary | ICD-10-CM

## 2021-08-20 DIAGNOSIS — I50.32 CHRONIC DIASTOLIC HEART FAILURE (HCC): ICD-10-CM

## 2021-08-20 DIAGNOSIS — R60.9 PERIPHERAL EDEMA: ICD-10-CM

## 2021-08-20 DIAGNOSIS — I48.19 ATRIAL FIBRILLATION, PERSISTENT (HCC): ICD-10-CM

## 2021-08-20 LAB
ANION GAP SERPL CALCULATED.3IONS-SCNC: 11 MMOL/L (ref 5–15)
AORTIC ARCH: 3.1 CM
ASCENDING AORTA: 3.6 CM
BH CV ECHO MEAS - ACS: 1.8 CM
BH CV ECHO MEAS - AI DEC SLOPE: 212.2 CM/SEC^2
BH CV ECHO MEAS - AI MAX PG: 61.1 MMHG
BH CV ECHO MEAS - AI MAX VEL: 390.9 CM/SEC
BH CV ECHO MEAS - AI P1/2T: 539.6 MSEC
BH CV ECHO MEAS - AO MAX PG (FULL): 8.4 MMHG
BH CV ECHO MEAS - AO MAX PG: 10.9 MMHG
BH CV ECHO MEAS - AO MEAN PG (FULL): 4.7 MMHG
BH CV ECHO MEAS - AO MEAN PG: 6.1 MMHG
BH CV ECHO MEAS - AO ROOT AREA (BSA CORRECTED): 1.2
BH CV ECHO MEAS - AO ROOT AREA: 5.2 CM^2
BH CV ECHO MEAS - AO ROOT DIAM: 2.6 CM
BH CV ECHO MEAS - AO V2 MAX: 165.4 CM/SEC
BH CV ECHO MEAS - AO V2 MEAN: 117.2 CM/SEC
BH CV ECHO MEAS - AO V2 VTI: 31 CM
BH CV ECHO MEAS - ASC AORTA: 3.6 CM
BH CV ECHO MEAS - AVA(I,A): 1.5 CM^2
BH CV ECHO MEAS - AVA(I,D): 1.5 CM^2
BH CV ECHO MEAS - AVA(V,A): 1.4 CM^2
BH CV ECHO MEAS - AVA(V,D): 1.4 CM^2
BH CV ECHO MEAS - BSA(HAYCOCK): 2.4 M^2
BH CV ECHO MEAS - BSA: 2.2 M^2
BH CV ECHO MEAS - BZI_BMI: 42.4 KILOGRAMS/M^2
BH CV ECHO MEAS - BZI_METRIC_HEIGHT: 165.1 CM
BH CV ECHO MEAS - BZI_METRIC_WEIGHT: 115.7 KG
BH CV ECHO MEAS - EDV(CUBED): 104.6 ML
BH CV ECHO MEAS - EDV(MOD-SP2): 89 ML
BH CV ECHO MEAS - EDV(MOD-SP4): 81 ML
BH CV ECHO MEAS - EDV(TEICH): 103 ML
BH CV ECHO MEAS - EF(CUBED): 71.3 %
BH CV ECHO MEAS - EF(MOD-BP): 65.1 %
BH CV ECHO MEAS - EF(MOD-SP2): 64 %
BH CV ECHO MEAS - EF(MOD-SP4): 63 %
BH CV ECHO MEAS - EF(TEICH): 62.9 %
BH CV ECHO MEAS - ESV(CUBED): 30.1 ML
BH CV ECHO MEAS - ESV(MOD-SP2): 32 ML
BH CV ECHO MEAS - ESV(MOD-SP4): 30 ML
BH CV ECHO MEAS - ESV(TEICH): 38.2 ML
BH CV ECHO MEAS - FS: 34 %
BH CV ECHO MEAS - IVS/LVPW: 0.88
BH CV ECHO MEAS - IVSD: 1 CM
BH CV ECHO MEAS - LAT PEAK E' VEL: 13.1 CM/SEC
BH CV ECHO MEAS - LV DIASTOLIC VOL/BSA (35-75): 36.9 ML/M^2
BH CV ECHO MEAS - LV MASS(C)D: 186.7 GRAMS
BH CV ECHO MEAS - LV MASS(C)DI: 85.1 GRAMS/M^2
BH CV ECHO MEAS - LV MAX PG: 2.5 MMHG
BH CV ECHO MEAS - LV MEAN PG: 1.4 MMHG
BH CV ECHO MEAS - LV SYSTOLIC VOL/BSA (12-30): 13.7 ML/M^2
BH CV ECHO MEAS - LV V1 MAX: 79.3 CM/SEC
BH CV ECHO MEAS - LV V1 MEAN: 55.2 CM/SEC
BH CV ECHO MEAS - LV V1 VTI: 15.7 CM
BH CV ECHO MEAS - LVIDD: 4.7 CM
BH CV ECHO MEAS - LVIDS: 3.1 CM
BH CV ECHO MEAS - LVLD AP2: 6.7 CM
BH CV ECHO MEAS - LVLD AP4: 6.3 CM
BH CV ECHO MEAS - LVLS AP2: 5.8 CM
BH CV ECHO MEAS - LVLS AP4: 5.8 CM
BH CV ECHO MEAS - LVOT AREA (M): 2.8 CM^2
BH CV ECHO MEAS - LVOT AREA: 2.9 CM^2
BH CV ECHO MEAS - LVOT DIAM: 1.9 CM
BH CV ECHO MEAS - LVPWD: 1.2 CM
BH CV ECHO MEAS - MED PEAK E' VEL: 6.7 CM/SEC
BH CV ECHO MEAS - MV DEC SLOPE: 400.9 CM/SEC^2
BH CV ECHO MEAS - MV DEC TIME: 0.18 SEC
BH CV ECHO MEAS - MV E MAX VEL: 111 CM/SEC
BH CV ECHO MEAS - MV MAX PG: 6.7 MMHG
BH CV ECHO MEAS - MV MEAN PG: 3.4 MMHG
BH CV ECHO MEAS - MV P1/2T MAX VEL: 116.1 CM/SEC
BH CV ECHO MEAS - MV P1/2T: 84.8 MSEC
BH CV ECHO MEAS - MV V2 MAX: 129.7 CM/SEC
BH CV ECHO MEAS - MV V2 MEAN: 87 CM/SEC
BH CV ECHO MEAS - MV V2 VTI: 27.6 CM
BH CV ECHO MEAS - MVA P1/2T LCG: 1.9 CM^2
BH CV ECHO MEAS - MVA(P1/2T): 2.6 CM^2
BH CV ECHO MEAS - MVA(VTI): 1.7 CM^2
BH CV ECHO MEAS - PA ACC TIME: 0.11 SEC
BH CV ECHO MEAS - PA MAX PG (FULL): 0.78 MMHG
BH CV ECHO MEAS - PA MAX PG: 3.2 MMHG
BH CV ECHO MEAS - PA PR(ACCEL): 31.5 MMHG
BH CV ECHO MEAS - PA V2 MAX: 89.6 CM/SEC
BH CV ECHO MEAS - PULM DIAS VEL: 41.4 CM/SEC
BH CV ECHO MEAS - PULM S/D: 0.34
BH CV ECHO MEAS - PULM SYS VEL: 14.3 CM/SEC
BH CV ECHO MEAS - PVA(V,A): 4.1 CM^2
BH CV ECHO MEAS - PVA(V,D): 4.1 CM^2
BH CV ECHO MEAS - QP/QS: 1.2
BH CV ECHO MEAS - RAP SYSTOLE: 15 MMHG
BH CV ECHO MEAS - RV MAX PG: 2.4 MMHG
BH CV ECHO MEAS - RV MEAN PG: 1.3 MMHG
BH CV ECHO MEAS - RV V1 MAX: 78 CM/SEC
BH CV ECHO MEAS - RV V1 MEAN: 53.9 CM/SEC
BH CV ECHO MEAS - RV V1 VTI: 11.6 CM
BH CV ECHO MEAS - RVOT AREA: 4.7 CM^2
BH CV ECHO MEAS - RVOT DIAM: 2.4 CM
BH CV ECHO MEAS - RVSP: 49 MMHG
BH CV ECHO MEAS - SI(AO): 73.5 ML/M^2
BH CV ECHO MEAS - SI(CUBED): 34 ML/M^2
BH CV ECHO MEAS - SI(LVOT): 20.8 ML/M^2
BH CV ECHO MEAS - SI(MOD-SP2): 26 ML/M^2
BH CV ECHO MEAS - SI(MOD-SP4): 23.3 ML/M^2
BH CV ECHO MEAS - SI(TEICH): 29.5 ML/M^2
BH CV ECHO MEAS - SUP REN AO DIAM: 2 CM
BH CV ECHO MEAS - SV(AO): 161.2 ML
BH CV ECHO MEAS - SV(CUBED): 74.6 ML
BH CV ECHO MEAS - SV(LVOT): 45.6 ML
BH CV ECHO MEAS - SV(MOD-SP2): 57 ML
BH CV ECHO MEAS - SV(MOD-SP4): 51 ML
BH CV ECHO MEAS - SV(RVOT): 54.4 ML
BH CV ECHO MEAS - SV(TEICH): 64.8 ML
BH CV ECHO MEAS - TAPSE (>1.6): 1.7 CM
BH CV ECHO MEAS - TR MAX VEL: 290.7 CM/SEC
BH CV ECHO MEASUREMENTS AVERAGE E/E' RATIO: 11.21
BH CV VAS BP RIGHT ARM: NORMAL MMHG
BH CV XLRA - RV BASE: 3 CM
BH CV XLRA - RV LENGTH: 6.2 CM
BH CV XLRA - RV MID: 3.3 CM
BH CV XLRA - TDI S': 7.4 CM/SEC
BUN SERPL-MCNC: 21 MG/DL (ref 8–23)
BUN/CREAT SERPL: 15.1 (ref 7–25)
CALCIUM SPEC-SCNC: 9.4 MG/DL (ref 8.6–10.5)
CHLORIDE SERPL-SCNC: 103 MMOL/L (ref 98–107)
CO2 SERPL-SCNC: 27 MMOL/L (ref 22–29)
CREAT SERPL-MCNC: 1.39 MG/DL (ref 0.57–1)
DIGOXIN SERPL-MCNC: <0.3 NG/ML (ref 0.6–1.2)
GFR SERPL CREATININE-BSD FRML MDRD: 45 ML/MIN/1.73
GLUCOSE SERPL-MCNC: 152 MG/DL (ref 65–99)
INR PPP: 3 (ref 0.91–1.09)
LEFT ATRIUM VOLUME INDEX: 51 ML/M2
LV EF 2D ECHO EST: 65 %
POTASSIUM SERPL-SCNC: 4 MMOL/L (ref 3.5–5.2)
PROTHROMBIN TIME: 35.7 SECONDS (ref 10–13.8)
SINUS: 3.1 CM
SODIUM SERPL-SCNC: 141 MMOL/L (ref 136–145)
STJ: 3.3 CM

## 2021-08-20 PROCEDURE — 83880 ASSAY OF NATRIURETIC PEPTIDE: CPT | Performed by: NURSE PRACTITIONER

## 2021-08-20 PROCEDURE — 80048 BASIC METABOLIC PNL TOTAL CA: CPT

## 2021-08-20 PROCEDURE — 93306 TTE W/DOPPLER COMPLETE: CPT

## 2021-08-20 PROCEDURE — 93306 TTE W/DOPPLER COMPLETE: CPT | Performed by: INTERNAL MEDICINE

## 2021-08-20 PROCEDURE — 36416 COLLJ CAPILLARY BLOOD SPEC: CPT

## 2021-08-20 PROCEDURE — 36415 COLL VENOUS BLD VENIPUNCTURE: CPT

## 2021-08-20 PROCEDURE — 85610 PROTHROMBIN TIME: CPT

## 2021-08-20 PROCEDURE — 80162 ASSAY OF DIGOXIN TOTAL: CPT

## 2021-08-20 NOTE — PROGRESS NOTES
Anticoagulation Clinic Progress Note    Anticoagulation Summary  As of 8/20/2021    INR goal:  2.0-3.0   TTR:  65.1 % (2.9 y)   INR used for dosing:  3.0 (8/20/2021)   Warfarin maintenance plan:  2.5 mg every Wed; 5 mg all other days   Weekly warfarin total:  32.5 mg   No change documented:  Maria Alejandra Guerrero   Plan last modified:  Shaw Hand, PharmD (8/12/2021)   Next INR check:  8/26/2021   Priority:  Maintenance   Target end date:  Indefinite    Indications    Atrial fibrillation (CMS/HCC) [I48.91]             Anticoagulation Episode Summary     INR check location:      Preferred lab:      [facility]ers [facility]   [facility] [place]    Comments:        Anticoagulation Care Providers     Provider Role Specialty Phone number    Pia Dueñas MD Referring Cardiology [phone]          Clinic Interview:      INR History:  Anticoagulation Monitoring 8/5/2021 8/12/2021 8/20/2021   INR 4.6 3.2 3.0   INR Date 8/5/2021 8/12/2021 8/20/2021   INR Goal 2.0-3.0 2.0-3.0 2.0-3.0   Trend Same Down Same   Last Week Total 35 mg 27.5 mg 32.5 mg   Next Week Total 27.5 mg 30 mg 32.5 mg   Sun 5 mg 5 mg 5 mg   Mon 5 mg 5 mg 5 mg   Tue 5 mg 5 mg 5 mg   Wed 5 mg 2.5 mg 2.5 mg   Thu Hold (8/5) 2.5 mg (8/12); Otherwise 5 mg -   Fri 2.5 mg (8/6) 5 mg 5 mg   Sat 5 mg 5 mg 5 mg   Visit Report - - -   Some recent data might be hidden       Plan:  1. INR is therapeutic today- see above in Anticoagulation Summary.   Will instruct Lana Otilio to continue their warfarin regimen- see above in Anticoagulation Summary.  2. Follow up in 1 week.  3. Patient declines warfarin refills.  4. Verbal and written information provided. Patient expresses understanding and has no further questions at this time.    Maria Alejandra Guerrero

## 2021-08-20 NOTE — TELEPHONE ENCOUNTER
Digoxin level returned to baseline after checking lab correctly.  Creatinine looks a little bit higher than what it was last time.  Echo results from today pending.  Will await these and look at possibly decreasing diuretic.  May need to consider nephrology referral.

## 2021-08-23 DIAGNOSIS — R06.09 DOE (DYSPNEA ON EXERTION): Primary | ICD-10-CM

## 2021-08-23 LAB — NT-PROBNP SERPL-MCNC: 1203 PG/ML (ref 0–900)

## 2021-08-23 RX ORDER — FUROSEMIDE 40 MG/1
TABLET ORAL
Qty: 90 TABLET | Refills: 0
Start: 2021-08-23 | End: 2022-05-27 | Stop reason: HOSPADM

## 2021-08-23 NOTE — TELEPHONE ENCOUNTER
Echo shows normal LV systolic function with EF of 65%, increased left and right atrial volume, mild aortic regurgitation with mild aortic valve calcification, severe bileaflet mitral valve thickening with mild to moderate mitral regurgitation, moderate tricuspid regurgitation with moderately elevated RVSP of 49 mmHg.  Borderline dilated aortic arch noted.    Overall echo without significant changes from 8/2020 echo when RVSP was 48 mmHg.  We will add proBNP onto recent labs that she had on Friday.  Needs to follow-up with pulmonology as well for further recommendations on the pulmonary hypertension.  Would like to consider CT chest without contrast given renal insufficiency to rule out thoracic aortic ectasia.  Creatinine trending up.  Would favor nephrology referral at this point.    ---------------------    She is not using CPAP every night--strongly recommended trying to use CPAP all night every night and we discussed in detail the effects of untreated sleep apnea on diastolic heart failure and pulmonary hypertension.    Swelling much improved.  Dyspnea much improved, just mild occasional, at baseline.   She has cut back on salt.   Daily weight stable around 250lb.    She is currently taking 2 of the 10mEq K+ BID.   She has been taking 60mg lasix daily.    Decrease to 40mg in AM only.  Decrease K+ to 2 tablets in AM only.   Will recheck BMP in 1 week.    Strongly recommended nephrology referral at this point given creatinine continuing to rise but she really wants to try to go down on the Lasix first to see if this improves, declines nephrology referral at this time but would consider if creatinine does not improve or she does not tolerate decrease Lasix dose from swelling/heart failure standpoint.    Will check daily weights and call if she gains more than 2lb in 1 day or 5lb in 1 week or for increased swelling or shortness of breath.     Would also like to check CT chest without contrast to rule out thoracic  aortic ectasia.      We will push out her follow-up appointment 1 week per patient preference and again she will call sooner if she has any issues or concerns.

## 2021-08-26 ENCOUNTER — TELEPHONE (OUTPATIENT)
Dept: CARDIOLOGY | Facility: CLINIC | Age: 76
End: 2021-08-26

## 2021-08-26 DIAGNOSIS — N28.9 RENAL INSUFFICIENCY: Primary | ICD-10-CM

## 2021-08-30 ENCOUNTER — OFFICE VISIT (OUTPATIENT)
Dept: CARDIOLOGY | Facility: CLINIC | Age: 76
End: 2021-08-30

## 2021-08-30 ENCOUNTER — ANTICOAGULATION VISIT (OUTPATIENT)
Dept: PHARMACY | Facility: HOSPITAL | Age: 76
End: 2021-08-30

## 2021-08-30 VITALS
WEIGHT: 259 LBS | SYSTOLIC BLOOD PRESSURE: 140 MMHG | DIASTOLIC BLOOD PRESSURE: 88 MMHG | BODY MASS INDEX: 43.15 KG/M2 | HEART RATE: 73 BPM | HEIGHT: 65 IN

## 2021-08-30 DIAGNOSIS — I27.20 PULMONARY HYPERTENSION (HCC): ICD-10-CM

## 2021-08-30 DIAGNOSIS — I48.11 LONGSTANDING PERSISTENT ATRIAL FIBRILLATION (HCC): ICD-10-CM

## 2021-08-30 DIAGNOSIS — G47.33 OSA (OBSTRUCTIVE SLEEP APNEA): Primary | ICD-10-CM

## 2021-08-30 DIAGNOSIS — I25.10 CORONARY ARTERY DISEASE INVOLVING NATIVE CORONARY ARTERY OF NATIVE HEART WITHOUT ANGINA PECTORIS: ICD-10-CM

## 2021-08-30 DIAGNOSIS — I50.32 CHRONIC DIASTOLIC HEART FAILURE (HCC): Chronic | ICD-10-CM

## 2021-08-30 LAB
INR PPP: 2.9 (ref 0.91–1.09)
PROTHROMBIN TIME: 35.2 SECONDS (ref 10–13.8)

## 2021-08-30 PROCEDURE — 36416 COLLJ CAPILLARY BLOOD SPEC: CPT

## 2021-08-30 PROCEDURE — 99214 OFFICE O/P EST MOD 30 MIN: CPT | Performed by: NURSE PRACTITIONER

## 2021-08-30 PROCEDURE — 85610 PROTHROMBIN TIME: CPT

## 2021-08-30 NOTE — PROGRESS NOTES
Date of Office Visit: 2021  Encounter Provider: Christine Coe, FABIAN, APRN  Place of Service: [facility] CARDIOLOGY  Patient Name: Lana Yañez  :1945        Subjective:     Chief Complaint:  Chronic diastolic CHF, pulmonary hypertension, persistent atrial fibrillation      History of Present Illness:  Lana Yañez is a 76 y.o. female patient of Dr. Dueñas.  I am seeing this patient in the office today and I reviewed her records.    Patient has a history of coronary artery disease, atrial fibrillation, embolic stroke, hyperlipidemia, hypertension, rheumatoid arthritis, obstructive sleep apnea, diastolic heart failure.     Per previous office note, in early October patient was found to be in atrial fibrillation with rapid ventricular rates and mild diastolic heart failure.  She was placed on IV heparin and Pradaxa.  Echocardiogram revealed normal systolic function mild left ventricular hypertrophy, moderate atrial enlargement with aortic valve sclerosis and moderate pulmonary hypertension and moderate tricuspid regurgitation.  The RV systolic pressure was 54 mmHg.  She had a stress perfusion study that was negative for ischemia and a CT angiogram revealed a mildly dilated aorta without pulmonary emboli.  She then presented several days later with an acute stroke.  CLARIBEL was not pursued as it was felt to be embolic in nature.  However on  she was diaphoretic and was found to have a non-ST elevation myocardial infarction.  This was on full dose Pradaxa which had not been held.  CLARIBEL was pursued that revealed normal systolic function with moderate mitral valve prolapse without significant regurgitation.  There was right-sided spontaneous echo contrast without thrombus formation.  Cardiac catheterization revealed normal systolic function with 2+ mitral regurgitation, and 90% stenosis was noted distally which is felt to be too small to be amenable PCI.  She was  treated medically and switched to Coumadin.  She has had intermittent heart failure after dietary indiscretions with salt since that time.  Patient had Holter monitor study done 9/13/18 showing the predominant rhythm during the testing period to be atrial fibrillation.  Premature ventricular contractions occurred frequently.  There were no episodes of ventricular tachycardia.  No AV block noted.  Average heart rate was 86 bpm.  Echocardiogram showed normal systolic function with EF of 51%, moderate to severe left atrial enlargement, aortic valve calcification with mild aortic regurgitation, mild mitral regurgitation with severe tricuspid regurgitation, RV systolic pressure 49 mmHg.  Subsequent VQ scan was low risk for PE.  Follow-up echo 8/2020 showed normal LV systolic function with EF of 62%, moderate LVH, indeterminate diastolic function but increased left atrial volume, mild aortic regurgitation, mild thickening of the mitral valve with moderate regurgitation, severe tricuspid regurgitation with RVSP of 48 mmHg.    She was seen in the office 8/5/2021 for acute on chronic CHF.  Lasix had been increased per PCP.  She had not been watching salty foods.  She had follow-up echo showing normal LV systolic function with EF of 65%, increased left and right atrial volume, mildly calcified aortic valve with mild aortic regurgitation, severe bileaflet mitral valve thickening with mild to moderate mitral regurgitation, moderate tricuspid regurgitation with moderately elevated RVSP of 49 mmHg which was overall unchanged from previous.  Also with some borderline dilation of the aortic arch and CT chest without contrast was recommended and ordered.  She had some worsening renal insufficiency and Lasix was decreased back to 40 mg a day.  Nephrology referral was also placed with plans to recheck BMP on decrease Lasix dose.  She was given strict instructions on when to call the office for any new or worsening  symptoms.      Patient presents to office today for follow-up appointment.  Patient reports she is feeling improved since last visit.  She reports she has some chronic intermittent mild shortness of breath with exertion which is improved since last visit, back to baseline.  She has some mild dependent edema but also feels like this is improved since last visit, back to baseline.  She has really cut back on salt.  She is not using compression socks though I did highly recommend this.  She continues to get a rare isolated skipped beat sensation but nothing new or worsening or prolonged.  Denies any chest pain or discomfort, syncope, near syncope, falls, or abnormal bleeding.  Unfortunately she has not been using her CPAP recently.        Past Medical History:   Diagnosis Date   • Acute on chronic diastolic heart failure (CMS/HCC)    • Arthritis    • Asthma    • Atrial fibrillation (CMS/[facility])     Persistent; on warfarin   • Atypical chest pain    • Bronchitis    • Cellulitis of right elbow     due to MRSA   • CHF (congestive heart failure) (CMS/HCC)    • COPD (chronic obstructive pulmonary disease) (CMS/[facility])    • Coronary artery disease     Cardiac catheterization completed; 90% PDA stenosis with medical management recommended   • Coronary artery disease involving native coronary artery of native heart with angina pectoris with documented spasm (CMS/HCC)    • Diabetes 1.5, 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HCC)    • Disease of thyroid gland    • Displacement of lumbar intervertebral disc without myelopathy    • Dizziness    • ANTOINE (dyspnea on exertion)    • Essential hypertension    • Fatigue    • Generalized osteoarthritis of multiple sites    • History of rheumatic fever    • History of transfusion    • Hx of bone density study     10/23/2014   • Hyperglycemia    • Hyperlipidemia    • Hypovitaminosis D    • Left arm pain    • Left-sided weakness    • Leg swelling    • Low back pain    • Lower extremity edema    • Malaise and  fatigue    • Mitral valve disease     Moderate mitral valve prolapse and moderate mitral regurgitation   • Mitral valve insufficiency    • Morbid obesity with BMI of 40.0-44.9, adult (CMS/[facility])    • MRSA infection    • NSTEMI (non-ST elevated myocardial infarction) (CMS/[facility])    • PAF (paroxysmal atrial fibrillation) (CMS/[facility])    • RA (rheumatoid arthritis) (CMS/[facility])     involving both hands   • Sleep apnea    • SOB (shortness of breath)    • Stroke (CMS/[facility])     left side weakness   • Urge incontinence of urine    • UTI (urinary tract infection) 05/2020   • Vitamin D deficiency      Past Surgical History:   Procedure Laterality Date   • BREAST BIOPSY     • BREAST SURGERY      right side lumpectomy with biopsy   • CARDIAC CATHETERIZATION Left 10/20/2017    Procedure: Cardiac Catheterization/Vascular Study;  Surgeon: Alphonso Olmedo MD;  Location: [facility] CATH INVASIVE LOCATION;  Service:    • CARDIAC CATHETERIZATION N/A 10/20/2017    +2 mitral regurgitation, left main 10% stenosis, mid to distal LAD 10% diffuse stenosis, circumflex 10% diffuse stenosis, RCA 10% proximal stenosis, and distal PDA consistent with coronary embolus with a lesion of 90% too small to consider coronary intervention; medical management recommended   • EYE SURGERY      laser surgery for glaucoma and left eye cataracts removed   • HYSTERECTOMY      10+ years ago   • JOINT REPLACEMENT  2005; 2006    bilateral knees and left rotater   • KNEE SURGERY     • MAMMO BILATERAL  2016    [facility]      Outpatient Medications Prior to Visit   Medication Sig Dispense Refill   • aspirin 81 MG EC tablet Take 1 tablet by mouth Daily.     • atorvastatin (LIPITOR) 10 MG tablet Take 1 tablet by mouth Daily. 90 tablet 1   • Blood Glucose Monitoring Suppl (ACCU-CHEK GUIDE) w/Device kit See Admin Instructions.     • carvedilol (COREG) 25 MG tablet TAKE 1 & 1/2 (ONE & ONE-HALF) TABLETS BY MOUTH TWICE DAILY WITH MEALS 270 tablet 2   • digoxin (LANOXIN) 125 MCG  tablet Take 1 tablet by mouth Every Other Day. 45 tablet 1   • furosemide (LASIX) 40 MG tablet Take 1 tablet in the morning 90 tablet 0   • glucose blood test strip Use as instructed 300 each 12   • HYDROcodone-acetaminophen (NORCO) 5-325 MG per tablet Take 1 tablet by mouth 2 (Two) Times a Day As Needed for Severe Pain . 40 tablet 0   • Lancets (ACCU-CHEK MULTICLIX) lancets Use 1 lancet per finger stick 204 each 12   • lisinopril (PRINIVIL,ZESTRIL) 10 MG tablet Take 1 tablet by mouth Daily. 90 tablet 3   • magnesium oxide (MAG-OX) 400 MG tablet Take 400 mg by mouth As Needed.     • potassium chloride 10 MEQ CR tablet Take 2 tablets by mouth Daily. (Patient taking differently: Take 10 mEq by mouth Daily.) 180 tablet 3   • SITagliptin (Januvia) 100 MG tablet Take 1 tablet by mouth Daily. 30 tablet 11   • TRAVATAN Z 0.004 % solution ophthalmic solution Administer 1 drop to both eyes Every Night.     • vitamin D (ERGOCALCIFEROL) 1.25 MG (14647 UT) capsule capsule Take 1 capsule by mouth once a week 12 capsule 0   • warfarin (COUMADIN) 5 MG tablet TAKE 1 TABLET BY MOUTH ONCE DAILY OR  AS  DIRECTED  BY  MEDICATION  MANAGEMENT  CLINIC 90 tablet 1     No facility-administered medications prior to visit.       Allergies as of 2021 - Reviewed 2021   Allergen Reaction Noted   • Contrast dye Hives and Itching 2018     Social History     Socioeconomic History   • Marital status:      Spouse name: Suresh   • Number of children: 5   • Years of education: 13   • Highest education level: Not on file   Tobacco Use   • Smoking status: Former Smoker     Packs/day: 3.00     Types: Cigarettes     Start date: 1967     Quit date: 10/19/1968     Years since quittin.8   • Smokeless tobacco: Never Used   • Tobacco comment: caffeine use - decaf coffee   Substance and Sexual Activity   • Alcohol use: No     Comment: No caffeine use   • Drug use: No   • Sexual activity: Defer     Family History   Problem  "Relation Age of Onset   • Colon cancer Mother    • Glaucoma Mother    • Stroke Mother    • Arthritis Mother    • Hypertension Mother    • Glaucoma Sister    • Diabetes Sister    • Heart disease Sister    • Arthritis Sister    • Asthma Sister    • Hypertension Sister    • Miscarriages / Stillbirths Sister    • Lung disease Sister    • Heart disease Brother    • Diabetes Brother    • Arthritis Brother    • Drug abuse Brother    • Hypertension Brother    • Arthritis Father    • COPD Father    • Lung disease Father    • Arthritis Daughter    • Depression Daughter    • Alcohol abuse Maternal Uncle    • Heart disease Sister    • Heart disease Sister    • Heart disease Brother        Review of Systems   Constitutional: Positive for malaise/fatigue.   Cardiovascular:        SEE HPI   Hematologic/Lymphatic: Negative for bleeding problem.   Musculoskeletal: Negative for falls.   Gastrointestinal: Negative for melena.   Genitourinary: Negative for hematuria.   Psychiatric/Behavioral: Negative for altered mental status.          Objective:     Vitals:    08/30/21 1117   BP: 140/88   Pulse: 73   Weight: 117 kg (259 lb)   Height: 165.1 cm (65\")     Body mass index is 43.1 kg/m².      PHYSICAL EXAM:  Constitutional:       General: Not in acute distress.     Appearance: Well-developed. Not diaphoretic.   Eyes:      Pupils: Pupils are equal, round, and reactive to light.   HENT:      Head: Normocephalic and atraumatic.   Neck:      Vascular: No carotid bruit or JVD.   Pulmonary:      Effort: Pulmonary effort is normal. No respiratory distress.      Breath sounds: Normal breath sounds. No wheezing. No rales.   Cardiovascular:      Normal rate. Irregularly irregular rhythm.      Murmurs: There is no murmur.      No gallop. No click. No rub.   Edema:     Peripheral edema (Trace to 1+, bilateral feet, has missed last few days of lasix) present.  Abdominal:      General: Bowel sounds are normal. There is no distension.      Palpations: " Abdomen is soft.   Musculoskeletal:         General: No tenderness or deformity.      Cervical back: Neck supple. Skin:     General: Skin is warm and dry.      Findings: No erythema or rash.   Neurological:      Mental Status: Alert and oriented to person, place, and time.   Psychiatric:         Behavior: Behavior normal.         Judgment: Judgment normal.             Assessment:       Diagnosis Plan   1. FRANCY (obstructive sleep apnea)  Ambulatory Referral to Pulmonology   2. Pulmonary hypertension (CMS/[facility])  Ambulatory Referral to Pulmonology   3. Coronary artery disease involving native coronary artery of native heart without angina pectoris     4. Chronic diastolic heart failure (CMS/[facility])     5. Longstanding persistent atrial fibrillation (CMS/[facility])           Plan:     1. Chronic diastolic CHF: She appears and feels clinically improved.  Unfortunately she has not taken Lasix for the last several days as she was busy and did not feel she had time to stop and urinate.  Does have some mild edema on exam attributed to this.  Did highly recommend resuming and trying not to skip medication.  She will check daily weights and call if she gains more than 2 pounds in 1 day or 5 pounds in a week or for increased swelling or shortness of breath (or if symptoms fail to improve further with resumption of Lasix) at which point may need to increase Lasix back up to dose he was previously taking with careful continued monitoring of renal function/assistance from nephrology.  However currently she feels improved/stable on 40 mg a day after cutting back on salt so we will continue with this for now.  Also awaiting nephrology referral as renal insufficiency complicating care.  2. Bilateral lower extremity edema: This is improved and sounds overall dependent in nature.  Highly recommend resuming use of compression socks as long as okay with vascular surgeon.  Recommend elevating legs when sitting.  Continue to avoid salty  foods.  3. Pulmonary hypertension: Still present on recent echo.  Has not been treating sleep apnea.  Fluid status now improved.  Highly recommend looking back into treating sleep apnea and following up with pulmonologist.  She believes she needs a new machine.  She is not wanting to follow-up with previous pulmonologist/sleep medicine provider and wants a referral to a new pulmonologist.  Referral placed per patient request.  4. Obstructive sleep apnea: Highly recommend CPAP use.  Has been referred as above.  5. Hypertension: Blood pressure 120s to 130s over 70s.  Recommended checking more regularly and calling if staying greater than 130/80 on average.  6. Persistent atrial fibrillation: Rate controlled.  Anticoagulated with warfarin.  Denies falls or abnormal bleeding.  7. Chronic renal insufficiency: Referral has been placed for nephrologist.  8. Severe small vessel coronary artery disease: Denies anginal symptoms.  9. History of embolic non-ST elevation MI: On chronic warfarin therapy  10. History of embolic stroke: On warfarin  11. Lung nodule: Pulmonology managing    Patient to keep February follow-up with Dr. Dueñas as scheduled or follow-up sooner if needed for any new, recurrent, or worsening symptoms or other issues or concerns.  Discussed in detail signs/symptoms that warrant sooner call or follow-up to the office or ER visit.        Records reviewed including but not limited to 8/2021 metabolic panel, 8/2021 digoxin level, 8/2021 proBNP, 8/2021 echo.           Your medication list          Accurate as of August 30, 2021 12:56 PM. If you have any questions, ask your nurse or doctor.            CHANGE how you take these medications      Instructions Last Dose Given Next Dose Due   potassium chloride 10 MEQ CR tablet  What changed: how much to take      Take 2 tablets by mouth Daily.          CONTINUE taking these medications      Instructions Last Dose Given Next Dose Due   Accu-Chek Guide w/Device kit       See Admin Instructions.       accu-chek multiclix lancets      Use 1 lancet per finger stick       aspirin 81 MG EC tablet      Take 1 tablet by mouth Daily.       atorvastatin 10 MG tablet  Commonly known as: LIPITOR      Take 1 tablet by mouth Daily.       carvedilol 25 MG tablet  Commonly known as: COREG      TAKE 1 & 1/2 (ONE & ONE-HALF) TABLETS BY MOUTH TWICE DAILY WITH MEALS       digoxin 125 MCG tablet  Commonly known as: LANOXIN      Take 1 tablet by mouth Every Other Day.       furosemide 40 MG tablet  Commonly known as: LASIX      Take 1 tablet in the morning       glucose blood test strip      Use as instructed       HYDROcodone-acetaminophen 5-325 MG per tablet  Commonly known as: NORCO      Take 1 tablet by mouth 2 (Two) Times a Day As Needed for Severe Pain .       lisinopril 10 MG tablet  Commonly known as: PRINIVIL,ZESTRIL      Take 1 tablet by mouth Daily.       magnesium oxide 400 MG tablet  Commonly known as: MAG-OX      Take 400 mg by mouth As Needed.       SITagliptin 100 MG tablet  Commonly known as: Januvia      Take 1 tablet by mouth Daily.       Travatan Z 0.004 % solution ophthalmic solution  Generic drug: travoprost (BAK free)      Administer 1 drop to both eyes Every Night.       vitamin D 1.25 MG (16535 UT) capsule capsule  Commonly known as: ERGOCALCIFEROL      Take 1 capsule by mouth once a week       warfarin 5 MG tablet  Commonly known as: COUMADIN      TAKE 1 TABLET BY MOUTH ONCE DAILY OR  AS  DIRECTED  BY  MEDICATION  MANAGEMENT  CLINIC              The above medication changes may not have been made by this provider.  Patient's medication list was updated to reflect medications they are currently taking including medication changes made by other providers.            Thanks,    Christine Coe, FABIAN, APRN  08/30/2021         Dictated utilizing Dragon dictation

## 2021-08-30 NOTE — PROGRESS NOTES
Anticoagulation Clinic Progress Note    Anticoagulation Summary  As of 2021    INR goal:  2.0-3.0   TTR:  65.5 % (2.9 y)   INR used for dosin.9 (2021)   Warfarin maintenance plan:  2.5 mg every Wed; 5 mg all other days   Weekly warfarin total:  32.5 mg   No change documented:  Anisa Castellon   Plan last modified:  Shaw Hand, PharmD (2021)   Next INR check:  2021   Priority:  Maintenance   Target end date:  Indefinite    Indications    Atrial fibrillation (CMS/HCC) [I48.91]             Anticoagulation Episode Summary     INR check location:      Preferred lab:      Send INR reminders to:   [facility]    Comments:        Anticoagulation Care Providers     Provider Role Specialty Phone number    Pia Dueñas MD Referring Cardiology [phone]          Clinic Interview:  Patient Findings     Negatives:  Signs/symptoms of thrombosis, Signs/symptoms of bleeding,   Laboratory test error suspected, Change in health, Change in alcohol use,   Change in activity, Upcoming invasive procedure, Emergency department   visit, Upcoming dental procedure, Missed doses, Extra doses, Change in   medications, Change in diet/appetite, Hospital admission, Bruising, Other   complaints      Clinical Outcomes     Negatives:  Major bleeding event, Thromboembolic event,   Anticoagulation-related hospital admission, Anticoagulation-related ED   visit, Anticoagulation-related fatality        INR History:  Anticoagulation Monitoring 2021   INR 3.2 3.0 2.9   INR Date 2021   INR Goal 2.0-3.0 2.0-3.0 2.0-3.0   Trend Down Same Same   Last Week Total 27.5 mg 32.5 mg 32.5 mg   Next Week Total 30 mg 32.5 mg 32.5 mg   Sun 5 mg 5 mg 5 mg   Mon 5 mg 5 mg 5 mg   Tue 5 mg 5 mg 5 mg   Wed 2.5 mg 2.5 mg 2.5 mg   Thu 2.5 mg (); Otherwise 5 mg 5 mg 5 mg   Fri 5 mg 5 mg 5 mg   Sat 5 mg 5 mg 5 mg   Visit Report - - -   Some recent data might be hidden        Plan:  1. INR is therapeutic today- see above in Anticoagulation Summary.   Will instruct Lana Yañez to continue their warfarin regimen- see above in Anticoagulation Summary.  2. Follow up in 4 weeks.  3. Patient declines warfarin refills.  4. Verbal and written information provided. Patient expresses understanding and has no further questions at this time.    Anisa Castellon

## 2021-09-01 ENCOUNTER — TELEPHONE (OUTPATIENT)
Dept: CARDIOLOGY | Facility: CLINIC | Age: 76
End: 2021-09-01

## 2021-09-01 NOTE — TELEPHONE ENCOUNTER
Patient calling to let you know that she still has not heard from Pulmonary, and is unable to use her CPAP due to the recall.

## 2021-09-01 NOTE — TELEPHONE ENCOUNTER
The referral was just placed 8/30.  It is probably going to take at least a week to schedule and then may take up to a month to get a new patient appointment.  Would consider following up with her current provider for additional recommendations in the meantime.

## 2021-09-08 NOTE — PROGRESS NOTES
Anticoagulation Clinic Progress Note    Anticoagulation Summary  As of 2020    INR goal:  2.0-3.0   TTR:  72.9 % (2.1 y)   INR used for dosin.5 (2020)   Warfarin maintenance plan:  2.5 mg every Wed; 5 mg all other days   Weekly warfarin total:  32.5 mg   No change documented:  Maria Alejandra Guerrero   Plan last modified:  Nano Cool RP (10/7/2020)   Next INR check:  12/3/2020   Priority:  Maintenance   Target end date:  Indefinite    Indications    Atrial fibrillation (CMS/HCC) [I48.91]             Anticoagulation Episode Summary     INR check location:      Preferred lab:      Send INR reminders to:   [facility]    Comments:        Anticoagulation Care Providers     Provider Role Specialty Phone number    Pia Dueñas MD Referring Cardiology [phone]          Clinic Interview:      INR History:  Anticoagulation Monitoring 10/7/2020 10/21/2020 2020   INR 3.2 2.6 2.5   INR Date 10/7/2020 10/21/2020 2020   INR Goal 2.0-3.0 2.0-3.0 2.0-3.0   Trend Same Same Same   Last Week Total 35 mg 32.5 mg 32.5 mg   Next Week Total 32.5 mg 32.5 mg 32.5 mg   Sun 5 mg 5 mg 5 mg   Mon 5 mg 5 mg 5 mg   Tue 5 mg 5 mg 5 mg   Wed 2.5 mg 2.5 mg 2.5 mg   Thu 5 mg 5 mg 5 mg   Fri 5 mg 5 mg 5 mg   Sat 5 mg 5 mg 5 mg   Visit Report - - -   Some recent data might be hidden       Plan:  1. INR is therapeutic today- see above in Anticoagulation Summary.   Will instruct Lana Aguerocomb to continue their warfarin regimen- see above in Anticoagulation Summary.  2. Follow up in 4 weeks.  3. Patient declines warfarin refills.  4. Verbal and written information provided. Patient expresses understanding and has no further questions at this time.    Maria Alejandra Guerrero  
,DirectAddress_Unknown

## 2021-09-10 ENCOUNTER — TELEPHONE (OUTPATIENT)
Dept: CARDIOLOGY | Facility: CLINIC | Age: 76
End: 2021-09-10

## 2021-09-10 NOTE — TELEPHONE ENCOUNTER
Do we know which nephrologist she is going to see? The patient called and would like to know who so she can schedule the appointment.

## 2021-09-13 NOTE — TELEPHONE ENCOUNTER
Medication Refill Request    Date of phone call: 21    Medication being requested: norco 5/325mg si tab po bid prn   Qty: 40    Date of last visit: 21    Date of last refill:     MARCO up to date?:     Next Follow up?: 10/7/21    Any new pertinent information? (i.e, new medication allergies, new use of medications, change in patient's health or condition, non-compliance or inconsistency with prescribing agreement?):

## 2021-09-14 RX ORDER — HYDROCODONE BITARTRATE AND ACETAMINOPHEN 5; 325 MG/1; MG/1
1 TABLET ORAL 2 TIMES DAILY PRN
Qty: 40 TABLET | Refills: 0 | Status: SHIPPED | OUTPATIENT
Start: 2021-09-14 | End: 2021-11-04 | Stop reason: SDUPTHER

## 2021-09-16 NOTE — TELEPHONE ENCOUNTER
Patients daughter called wanting to know if the referral to an nephrologist was put in.    Patient's call back number: [phone]

## 2021-09-23 ENCOUNTER — HOSPITAL ENCOUNTER (OUTPATIENT)
Dept: CT IMAGING | Facility: HOSPITAL | Age: 76
Discharge: HOME | End: 2021-09-23
Admitting: NURSE PRACTITIONER

## 2021-09-23 DIAGNOSIS — I77.810 THORACIC AORTIC ECTASIA (HCC): ICD-10-CM

## 2021-09-23 PROCEDURE — 71250 CT THORAX DX C-: CPT

## 2021-09-24 ENCOUNTER — TELEPHONE (OUTPATIENT)
Dept: CARDIOLOGY | Facility: CLINIC | Age: 76
End: 2021-09-24

## 2021-09-24 ENCOUNTER — TELEPHONE (OUTPATIENT)
Dept: INTERNAL MEDICINE | Facility: CLINIC | Age: 76
End: 2021-09-24

## 2021-09-24 NOTE — TELEPHONE ENCOUNTER
Caller: Lana Yañez    Relationship: Self      Medication requested (name and dosage): NEBULIZER SOLUTION, DOESN'T SHOW IN CHART    Pharmacy where request should be sent: [facility] [address] [place] [address] [address] [facility] [phone] [facility] [phone]        Additional details provided by patient: PATIENTS CPAP MACHINE HAS BEEN RECALLED, SO SHE WOULD LIKE TO GET A NEW ONE SENT TO [facility], WHERE SHE GETS HER MASKS AND OTHER SUPPLIES.         Best call back number: [phone]    Does the patient have less than a 3 day supply:  [x] Yes  [] No    Justo Harman Rep   09/24/21 11:48 EDT

## 2021-09-24 NOTE — TELEPHONE ENCOUNTER
Did you want to take care of this? Advise to go through her Pulmonary Dr Hill or put in a referral for sleep medicine?

## 2021-09-27 NOTE — TELEPHONE ENCOUNTER
I would have her contact her pulm first.  If that physican cannot do it, I can send sleep referral.

## 2021-09-29 ENCOUNTER — ANTICOAGULATION VISIT (OUTPATIENT)
Dept: PHARMACY | Facility: HOSPITAL | Age: 76
End: 2021-09-29

## 2021-09-29 LAB
INR PPP: 3.4 (ref 0.91–1.09)
PROTHROMBIN TIME: 40.6 SECONDS (ref 10–13.8)

## 2021-09-29 PROCEDURE — 36416 COLLJ CAPILLARY BLOOD SPEC: CPT

## 2021-09-29 PROCEDURE — 85610 PROTHROMBIN TIME: CPT

## 2021-09-29 PROCEDURE — G0463 HOSPITAL OUTPT CLINIC VISIT: HCPCS

## 2021-09-29 NOTE — PROGRESS NOTES
Anticoagulation Clinic Progress Note    Anticoagulation Summary  As of 9/29/2021    INR goal:  2.0-3.0   TTR:  64.2 % (3 y)   INR used for dosing:  3.4 (9/29/2021)   Warfarin maintenance plan:  2.5 mg every Wed; 5 mg all other days   Weekly warfarin total:  32.5 mg   Plan last modified:  Shaw Hand, PharmD (8/12/2021)   Next INR check:  10/15/2021   Priority:  Maintenance   Target end date:  Indefinite    Indications    Atrial fibrillation (CMS/HCC) [I48.91]             Anticoagulation Episode Summary     INR check location:      Preferred lab:      Send INR reminders to:  [facility]    Comments:        Anticoagulation Care Providers     Provider Role Specialty Phone number    Pia Dueñas MD Referring Cardiology [phone]          Clinic Interview:  Patient Findings     Positives:  Change in diet/appetite    Negatives:  Signs/symptoms of thrombosis, Signs/symptoms of bleeding,   Laboratory test error suspected, Change in health, Change in alcohol use,   Change in activity, Upcoming invasive procedure, Emergency department   visit, Upcoming dental procedure, Missed doses, Extra doses, Change in   medications, Hospital admission, Bruising, Other complaints    Comments:  Patient did not have mustard greens this week       Clinical Outcomes     Negatives:  Major bleeding event, Thromboembolic event,   Anticoagulation-related hospital admission, Anticoagulation-related ED   visit, Anticoagulation-related fatality    Comments:  Patient did not have mustard greens this week         INR History:  Anticoagulation Monitoring 8/20/2021 8/30/2021 9/29/2021   INR 3.0 2.9 3.4   INR Date 8/20/2021 8/30/2021 9/29/2021   INR Goal 2.0-3.0 2.0-3.0 2.0-3.0   Trend Same Same Same   Last Week Total 32.5 mg 32.5 mg 32.5 mg   Next Week Total 32.5 mg 32.5 mg 30 mg   Sun 5 mg 5 mg 5 mg   Mon 5 mg 5 mg 5 mg   Tue 5 mg 5 mg 5 mg   Wed 2.5 mg 2.5 mg 2.5 mg   Thu 5 mg 5 mg 2.5 mg (9/30); Otherwise 5 mg   Fri 5 mg 5 mg 5  mg   Sat 5 mg 5 mg 5 mg   Visit Report - - -   Some recent data might be hidden       Plan:  1. INR is Supratherapeutic today- see above in Anticoagulation Summary.  Will instruct Lana Yañez to Change their warfarin regimen- see above in Anticoagulation Summary.  2. Follow up in 2 weeks. Patient is under increased stressed with husbands biopsy on Monday. Would really prefer to space out appointments if possible.   3. Patient declines warfarin refills.  4. Verbal and written information provided. Patient expresses understanding and has no further questions at this time.    Love Allison, [facility]

## 2021-10-15 ENCOUNTER — ANTICOAGULATION VISIT (OUTPATIENT)
Dept: PHARMACY | Facility: HOSPITAL | Age: 76
End: 2021-10-15

## 2021-10-15 LAB
INR PPP: 3.1 (ref 0.91–1.09)
PROTHROMBIN TIME: 37.6 SECONDS (ref 10–13.8)

## 2021-10-15 PROCEDURE — 36416 COLLJ CAPILLARY BLOOD SPEC: CPT

## 2021-10-15 PROCEDURE — 85610 PROTHROMBIN TIME: CPT

## 2021-10-15 NOTE — PROGRESS NOTES
Anticoagulation Clinic Progress Note    Anticoagulation Summary  As of 10/15/2021    INR goal:  2.0-3.0   TTR:  63.3 % (3 y)   INR used for dosing:  3.1 (10/15/2021)   Warfarin maintenance plan:  2.5 mg every Wed; 5 mg all other days   Weekly warfarin total:  32.5 mg   No change documented:  Divine Lea   Plan last modified:  Shaw Hand, PharmD (8/12/2021)   Next INR check:  10/29/2021   Priority:  Maintenance   Target end date:  Indefinite    Indications    Atrial fibrillation (HCC) [I48.91]             Anticoagulation Episode Summary     INR check location:      Preferred lab:      Send INR reminders to:   [facility]    Comments:        Anticoagulation Care Providers     Provider Role Specialty Phone number    Pia Dueñas MD Referring Cardiology [phone]          Clinic Interview:  Patient Findings     Negatives:  Signs/symptoms of thrombosis, Signs/symptoms of bleeding,   Laboratory test error suspected, Change in health, Change in alcohol use,   Change in activity, Upcoming invasive procedure, Emergency department   visit, Upcoming dental procedure, Missed doses, Extra doses, Change in   medications, Change in diet/appetite, Hospital admission, Bruising, Other   complaints      Clinical Outcomes     Negatives:  Major bleeding event, Thromboembolic event,   Anticoagulation-related hospital admission, Anticoagulation-related ED   visit, Anticoagulation-related fatality        INR History:  Anticoagulation Monitoring 8/30/2021 9/29/2021 10/15/2021   INR 2.9 3.4 3.1   INR Date 8/30/2021 9/29/2021 10/15/2021   INR Goal 2.0-3.0 2.0-3.0 2.0-3.0   Trend Same Same Same   Last Week Total 32.5 mg 32.5 mg 32.5 mg   Next Week Total 32.5 mg 30 mg 32.5 mg   Sun 5 mg 5 mg 5 mg   Mon 5 mg 5 mg 5 mg   Tue 5 mg 5 mg 5 mg   Wed 2.5 mg 2.5 mg 2.5 mg   Thu 5 mg 2.5 mg (9/30); Otherwise 5 mg 5 mg   Fri 5 mg 5 mg 5 mg   Sat 5 mg 5 mg 5 mg   Visit Report - - -   Some recent data might be hidden        Plan:  1. INR is out of range- see above in Anticoagulation Summary.   Will instruct Lana Nielsonb to Continue  their warfarin regimen- see above in Anticoagulation Summary.  2. Follow up in 2 weeks.   3. Patient declines warfarin refills.  4. Verbal and written information provided. Patient expresses understanding and has no further questions at this time.    Divine Lea

## 2021-10-25 ENCOUNTER — TELEPHONE (OUTPATIENT)
Dept: INTERNAL MEDICINE | Facility: CLINIC | Age: 76
End: 2021-10-25

## 2021-10-25 NOTE — TELEPHONE ENCOUNTER
That could be a UTI or a yeast infection.  She should see a midlevel in the office for evaluation.

## 2021-10-25 NOTE — TELEPHONE ENCOUNTER
Caller: Lana Yañez    Relationship: Self    Best call back number: [phone]    What medication are you requesting: POSSIBLE YEAST INFECTION    What are your current symptoms: ITCHING AND BURNING WHEN URINATING    How long have you been experiencing symptoms: ABOUT 2 WEEKS GETTING WORSE    Have you had these symptoms before:    [x] Yes  [] No    Have you been treated for these symptoms before:   [x] Yes  [] No    If a prescription is needed, what is your preferred pharmacy and phone number:    [facility] [address], [place] - [address] [facility] [phone] [facility] [phone]   [phone]      PLEASE CALL PATIENT IF SHE NEEDS TO BE SEEN IN OFFICE FIRST

## 2021-11-02 ENCOUNTER — ANTICOAGULATION VISIT (OUTPATIENT)
Dept: PHARMACY | Facility: HOSPITAL | Age: 76
End: 2021-11-02

## 2021-11-02 ENCOUNTER — OFFICE VISIT (OUTPATIENT)
Dept: INTERNAL MEDICINE | Facility: CLINIC | Age: 76
End: 2021-11-02

## 2021-11-02 VITALS
DIASTOLIC BLOOD PRESSURE: 74 MMHG | WEIGHT: 259.9 LBS | TEMPERATURE: 97.3 F | OXYGEN SATURATION: 96 % | BODY MASS INDEX: 43.3 KG/M2 | SYSTOLIC BLOOD PRESSURE: 136 MMHG | HEIGHT: 65 IN | HEART RATE: 90 BPM

## 2021-11-02 DIAGNOSIS — R30.0 DYSURIA: Primary | ICD-10-CM

## 2021-11-02 PROBLEM — I48.91 ATRIAL FIBRILLATION: Chronic | Status: ACTIVE | Noted: 2017-10-15

## 2021-11-02 PROBLEM — I25.10 CORONARY ARTERY DISEASE INVOLVING NATIVE CORONARY ARTERY OF NATIVE HEART WITHOUT ANGINA PECTORIS: Chronic | Status: ACTIVE | Noted: 2017-12-14

## 2021-11-02 LAB
BACTERIA UR QL AUTO: ABNORMAL /HPF
BILIRUB UR QL STRIP: NEGATIVE
CLARITY UR: ABNORMAL
COLOR UR: ABNORMAL
GLUCOSE UR STRIP-MCNC: NEGATIVE MG/DL
HGB UR QL STRIP.AUTO: ABNORMAL
HYALINE CASTS UR QL AUTO: ABNORMAL /LPF
INR PPP: 2.1 (ref 0.91–1.09)
KETONES UR QL STRIP: NEGATIVE
LEUKOCYTE ESTERASE UR QL STRIP.AUTO: ABNORMAL
MUCOUS THREADS URNS QL MICRO: ABNORMAL /HPF
NITRITE UR QL STRIP: POSITIVE
PH UR STRIP.AUTO: 5.5 [PH] (ref 5–8)
PROT UR QL STRIP: ABNORMAL
PROTHROMBIN TIME: 25.2 SECONDS (ref 10–13.8)
RBC # UR: ABNORMAL /HPF
REF LAB TEST METHOD: ABNORMAL
SP GR UR STRIP: >=1.03 (ref 1–1.03)
SQUAMOUS #/AREA URNS HPF: ABNORMAL /HPF
UROBILINOGEN UR QL STRIP: ABNORMAL
WBC UR QL AUTO: ABNORMAL /HPF

## 2021-11-02 PROCEDURE — 81001 URINALYSIS AUTO W/SCOPE: CPT | Performed by: FAMILY MEDICINE

## 2021-11-02 PROCEDURE — 99213 OFFICE O/P EST LOW 20 MIN: CPT | Performed by: FAMILY MEDICINE

## 2021-11-02 PROCEDURE — 85610 PROTHROMBIN TIME: CPT

## 2021-11-02 PROCEDURE — 36416 COLLJ CAPILLARY BLOOD SPEC: CPT

## 2021-11-02 RX ORDER — NITROFURANTOIN 25; 75 MG/1; MG/1
100 CAPSULE ORAL 2 TIMES DAILY
Qty: 10 CAPSULE | Refills: 0 | Status: SHIPPED | OUTPATIENT
Start: 2021-11-02 | End: 2021-11-11 | Stop reason: HOSPADM

## 2021-11-02 NOTE — PATIENT INSTRUCTIONS
Urinary Tract Infection, Adult  A urinary tract infection (UTI) is an infection of any part of the urinary tract. The urinary tract includes:  · The kidneys.  · The ureters.  · The bladder.  · The urethra.  These organs make, store, and get rid of pee (urine) in the body.  What are the causes?  This is caused by germs (bacteria) in your genital area. These germs grow and cause swelling (inflammation) of your urinary tract.  What increases the risk?  You are more likely to develop this condition if:  · You have a small, thin tube (catheter) to drain pee.  · You cannot control when you pee or poop (incontinence).  · You are female, and:  ? You use these methods to prevent pregnancy:  § A medicine that kills sperm (spermicide).  § A device that blocks sperm (diaphragm).  ? You have low levels of a female hormone (estrogen).  ? You are pregnant.  · You have genes that add to your risk.  · You are sexually active.  · You take antibiotic medicines.  · You have trouble peeing because of:  ? A prostate that is bigger than normal, if you are male.  ? A blockage in the part of your body that drains pee from the bladder (urethra).  ? A kidney stone.  ? A nerve condition that affects your bladder (neurogenic bladder).  ? Not getting enough to drink.  ? Not peeing often enough.  · You have other conditions, such as:  ? Diabetes.  ? A weak disease-fighting system (immune system).  ? Sickle cell disease.  ? Gout.  ? Injury of the spine.  What are the signs or symptoms?  Symptoms of this condition include:  · Needing to pee right away (urgently).  · Peeing often.  · Peeing small amounts often.  · Pain or burning when peeing.  · Blood in the pee.  · Pee that smells bad or not like normal.  · Trouble peeing.  · Pee that is cloudy.  · Fluid coming from the vagina, if you are female.  · Pain in the belly or lower back.  Other symptoms include:  · Throwing up (vomiting).  · No urge to eat.  · Feeling mixed up (confused).  · Being tired  and grouchy (irritable).  · A fever.  · Watery poop (diarrhea).  How is this treated?  This condition may be treated with:  · Antibiotic medicine.  · Other medicines.  · Drinking enough water.  Follow these instructions at home:    Medicines  · Take over-the-counter and prescription medicines only as told by your doctor.  · If you were prescribed an antibiotic medicine, take it as told by your doctor. Do not stop taking it even if you start to feel better.  General instructions  · Make sure you:  ? Pee until your bladder is empty.  ? Do not hold pee for a long time.  ? Empty your bladder after sex.  ? Wipe from front to back after pooping if you are a female. Use each tissue one time when you wipe.  · Drink enough fluid to keep your pee pale yellow.  · Keep all follow-up visits as told by your doctor. This is important.  Contact a doctor if:  · You do not get better after 1-2 days.  · Your symptoms go away and then come back.  Get help right away if:  · You have very bad back pain.  · You have very bad pain in your lower belly.  · You have a fever.  · You are sick to your stomach (nauseous).  · You are throwing up.  Summary  · A urinary tract infection (UTI) is an infection of any part of the urinary tract.  · This condition is caused by germs in your genital area.  · There are many risk factors for a UTI. These include having a small, thin tube to drain pee and not being able to control when you pee or poop.  · Treatment includes antibiotic medicines for germs.  · Drink enough fluid to keep your pee pale yellow.  This information is not intended to replace advice given to you by your health care provider. Make sure you discuss any questions you have with your health care provider.  Document Revised: 12/05/2019 Document Reviewed: 06/27/2019  [employer] Patient Education © 2021 [employer]

## 2021-11-03 NOTE — PROGRESS NOTES
Anticoagulation Clinic Progress Note    Anticoagulation Summary  As of 2021    INR goal:  2.0-3.0   TTR:  63.7 % (3.1 y)   INR used for dosin.1 (2021)   Warfarin maintenance plan:  2.5 mg every Wed; 5 mg all other days   Weekly warfarin total:  32.5 mg   No change documented:  Divine Lea   Plan last modified:  Shaw Hand, PharmD (2021)   Next INR check:  2021   Priority:  Maintenance   Target end date:  Indefinite    Indications    Atrial fibrillation (HCC) [I48.91]             Anticoagulation Episode Summary     INR check location:      Preferred lab:      Send INR reminders to:   [facility]    Comments:        Anticoagulation Care Providers     Provider Role Specialty Phone number    Pia Dueñas MD Referring Cardiology [phone]          Clinic Interview:      INR History:  Anticoagulation Monitoring 2021 10/15/2021 2021   INR 3.4 3.1 2.1   INR Date 2021 10/15/2021 2021   INR Goal 2.0-3.0 2.0-3.0 2.0-3.0   Trend Same Same Same   Last Week Total 32.5 mg 32.5 mg 32.5 mg   Next Week Total 30 mg 32.5 mg 32.5 mg   Sun 5 mg 5 mg 5 mg   Mon 5 mg 5 mg 5 mg   Tue 5 mg 5 mg 5 mg   Wed 2.5 mg 2.5 mg 2.5 mg   Thu 2.5 mg (); Otherwise 5 mg 5 mg 5 mg   Fri 5 mg 5 mg 5 mg   Sat 5 mg 5 mg 5 mg   Visit Report - - -   Some recent data might be hidden       Plan:  1. INR is therapeutic today- see above in Anticoagulation Summary.   Will instruct Lanaantonio Aguerocomb to continue their warfarin regimen- see above in Anticoagulation Summary.  2. Follow up in 4 weeks.  3. Patient declines warfarin refills.  4. Verbal and written information provided. Patient expresses understanding and has no further questions at this time.    Divine Lea

## 2021-11-04 ENCOUNTER — OFFICE VISIT (OUTPATIENT)
Dept: PAIN MEDICINE | Facility: CLINIC | Age: 76
End: 2021-11-04

## 2021-11-04 VITALS
DIASTOLIC BLOOD PRESSURE: 93 MMHG | SYSTOLIC BLOOD PRESSURE: 145 MMHG | HEIGHT: 65 IN | BODY MASS INDEX: 43.32 KG/M2 | TEMPERATURE: 97.3 F | WEIGHT: 260 LBS | RESPIRATION RATE: 16 BRPM | HEART RATE: 99 BPM | OXYGEN SATURATION: 97 %

## 2021-11-04 DIAGNOSIS — M51.26 DISPLACEMENT OF LUMBAR INTERVERTEBRAL DISC WITHOUT MYELOPATHY: ICD-10-CM

## 2021-11-04 DIAGNOSIS — G89.29 OTHER CHRONIC PAIN: Primary | ICD-10-CM

## 2021-11-04 DIAGNOSIS — M05.741 RHEUMATOID ARTHRITIS INVOLVING BOTH HANDS WITH POSITIVE RHEUMATOID FACTOR (HCC): ICD-10-CM

## 2021-11-04 DIAGNOSIS — Z79.899 CONTROLLED SUBSTANCE AGREEMENT SIGNED: ICD-10-CM

## 2021-11-04 DIAGNOSIS — M05.742 RHEUMATOID ARTHRITIS INVOLVING BOTH HANDS WITH POSITIVE RHEUMATOID FACTOR (HCC): ICD-10-CM

## 2021-11-04 LAB
BACTERIA UR CULT: NORMAL
BACTERIA UR CULT: NORMAL

## 2021-11-04 PROCEDURE — 99214 OFFICE O/P EST MOD 30 MIN: CPT | Performed by: NURSE PRACTITIONER

## 2021-11-04 RX ORDER — HYDROCODONE BITARTRATE AND ACETAMINOPHEN 5; 325 MG/1; MG/1
1 TABLET ORAL 2 TIMES DAILY PRN
Qty: 40 TABLET | Refills: 0 | Status: SHIPPED | OUTPATIENT
Start: 2021-11-04 | End: 2022-02-28 | Stop reason: SDUPTHER

## 2021-11-04 NOTE — PROGRESS NOTES
CHIEF COMPLAINT  F/U for back pain .  Pt states her pain level has stayed the same .     Subjective   Lana Yañez is a 76 y.o. female  who presents for follow-up.  She has a history of chronic back and joint pain. Reports her back pain is slightly worse since last evaluation. Having difficulty with prolonged standing. Not radiating to legs.    Complains of pain in her low back and joints. Today her pain is 4/10VAS.  Describes her pain as continuous aching and throbbing. Pain increases with walking, standing, activity, prolonged position; pain decreases with medication, rest/sit/lay and repositioning.  Continues with Hydrocodone 5/325 1-2/day PRN(does not always take daily). Denies any side effects from the regimen, except mild constipation. Takes OTC stool softeners PRN.  The regimen helps decrease her pain by 50%.   ADL's by self.  Denies any bowel or bladder changes.     Reports she has a diagnosis of rheumatoid arthritis but is not currently being treated for this. Previously referred to rheumatology.  She reports she had improvement with DPM until she had to have COVID vaccines. Last COVID vaccine was 3-15-21.  She reports she initially did not go to rheumatology evaluation because she was improved after DPM.      Reports she had left shoulder injection with Dr robel Rodriges last month. Does notice improvement with this.  Gets this every 3 months.  Needs a replacement but not a surgery candidate.  She needs a left shoulder replacement, but this is not planned, as she would need bridge for Coumadin.  She states she did ask Dr Tavarez about pain medication, but he wanted her to see pain management.     Recently bought a cane and has been using. Uses on right side of body. Cannot use on left side due to shoulder pain.     Prior to COVID pandemic, she was going to pool and doing aquatherapy. Was doing better while doing this. Concerned about retuning right now.     diagnosed with lung cancer 2 weeks.  Having surgery next week. No evidence of metastasis.     Patient remained masked during entire encounter. No cough present. I donned a mask and eye protection throughout entire visit. Prior to donning mask and eye protection, hand hygiene was performed, as well as when it was doffed.  I was closer than 6 feet, but not for an extended period of time. No obvious exposure to any bodily fluids.    Back Pain  This is a chronic problem. The current episode started more than 1 year ago. The problem occurs constantly. The problem has been gradually worsening (slightly worse since last evaluation) since onset. The pain is present in the lumbar spine and sacro-iliac. The quality of the pain is described as aching. Radiates to: LLE. The pain is at a severity of 4/10. The pain is moderate. The pain is worse during the day. The symptoms are aggravated by bending, position, standing and twisting. Pertinent negatives include no abdominal pain, chest pain, dysuria, fever, headaches, numbness or weakness. Risk factors include lack of exercise, menopause, obesity and sedentary lifestyle. She has tried analgesics, bed rest, chiropractic manipulation, heat, home exercises, ice, muscle relaxant, walking and NSAIDs for the symptoms. The treatment provided mild relief.   Joint Pain  Associated symptoms include arthralgias. Pertinent negatives include no abdominal pain, chest pain, chills, congestion, coughing, fatigue, fever, headaches, numbness or weakness. She has tried oral narcotics for the symptoms. The treatment provided mild relief.       Past pain medications:   norco 5/325 mg - helpful  Gabapentin 800 - dizzy, hurt stomach  Tramadol 50 mg up to 4/day - not helping     Current pain medications:   Tylenol OTC  Hydrocodone 5/325 1-2/day PRN      Past therapies:  Physical Therapy: yes- minimal  Chiropractor: no  Massage Therapy: no  TENS: yes  Neck or back surgery: no  Past pain management: no     Previous Injections: none-- Does not  "want procedures due to risk with anticoagulants.    PEG Assessment   What number best describes your pain on average in the past week?6  What number best describes how, during the past week, pain has interfered with your enjoyment of life?6  What number best describes how, during the past week, pain has interfered with your general activity?  6    The following portions of the patient's history were reviewed and updated as appropriate: allergies, current medications, past family history, past medical history, past social history, past surgical history and problem list.    Review of Systems   Constitutional: Negative for activity change, chills, fatigue and fever.   HENT: Negative for congestion.    Eyes: Negative for visual disturbance.   Respiratory: Negative for cough and chest tightness.    Cardiovascular: Negative for chest pain.   Gastrointestinal: Negative for abdominal pain, constipation and diarrhea.   Genitourinary: Negative for difficulty urinating and dysuria.   Musculoskeletal: Positive for arthralgias and back pain.   Neurological: Negative for dizziness, weakness, light-headedness, numbness and headaches.   Psychiatric/Behavioral: Negative for agitation, sleep disturbance and suicidal ideas. The patient is not nervous/anxious.      I have reviewed and confirmed the accuracy of the ROS as documented by the MA/LPN/RN Kalpana Kaiser, ANGELA    Vitals:    11/04/21 1149   BP: 145/93   Pulse: 99   Resp: 16   Temp: 97.3 °F (36.3 °C)   SpO2: 97%   Weight: 118 kg (260 lb)   Height: 165.1 cm (65\")   PainSc:   4   PainLoc: Back     Objective   Physical Exam  Vitals and nursing note reviewed.   Constitutional:       Appearance: Normal appearance. She is well-developed.   HENT:      Head: Normocephalic and atraumatic.   Eyes:      General: Lids are normal.   Musculoskeletal:      Lumbar back: Tenderness present. Decreased range of motion.      Comments: Widespread OA changes with no acute synovitis noted   Skin:   "   General: Skin is warm and dry.   Neurological:      Mental Status: She is alert.      Gait: Gait abnormal (rolling walker).   Psychiatric:         Speech: Speech normal.         Behavior: Behavior normal. Behavior is cooperative.         Thought Content: Thought content normal.         Judgment: Judgment normal.         Assessment/Plan   Diagnoses and all orders for this visit:    1. Other chronic pain (Primary)    2. Displacement of lumbar intervertebral disc without myelopathy    3. Rheumatoid arthritis involving both hands with positive rheumatoid factor (HCC)    4. Controlled substance agreement signed    Other orders  -     HYDROcodone-acetaminophen (NORCO) 5-325 MG per tablet; Take 1 tablet by mouth 2 (Two) Times a Day As Needed for Severe Pain .  Dispense: 40 tablet; Refill: 0      --- The urine drug screen confirmation from 7-7-21 has been reviewed and the result is APPROPRIATE based on patient history and MARCO report  --- Refill Hydrocodone. Patient appears stable with current regimen. No adverse effects. Regarding continuation of opioids, there is no evidence of aberrant behavior or any red flags.  The patient continues with appropriate response to opioid therapy. ADL's remain intact by self.   --- Declines lumbar interventions at this time.  --- Will call for PT referral when  is stable from lung cancer treatment/surgery.  --- Follow-up 3 months or sooner if needed.       MARCO REPORT  As part of the patient's treatment plan, I am prescribing controlled substances. The patient has been made aware of appropriate use of such medications, including potential risk of somnolence, limited ability to drive and/or work safely, and the potential for dependence or overdose. It has also bee made clear that these medications are for use by this patient only, without concomitant use of alcohol or other substances unless prescribed.     Patient has completed prescribing agreement detailing terms of  continued prescribing of controlled substances, including monitoring MARCO reports, urine drug screening, and pill counts if necessary. The patient is aware that inappropriate use will results in cessation of prescribing such medications.    As the clinician, I personally reviewed the MARCO from 11-4-21 while the patient was in the office today.    History and physical exam exhibit continued safe and appropriate use of controlled substances.       Dictated utilizing Dragon dictation.

## 2021-11-08 ENCOUNTER — HOSPITAL ENCOUNTER (INPATIENT)
Facility: HOSPITAL | Age: 76
LOS: 3 days | Discharge: HOME HEALTH | End: 2021-11-11
Attending: EMERGENCY MEDICINE | Admitting: HOSPITALIST

## 2021-11-08 ENCOUNTER — APPOINTMENT (OUTPATIENT)
Dept: GENERAL RADIOLOGY | Facility: HOSPITAL | Age: 76
End: 2021-11-08

## 2021-11-08 DIAGNOSIS — I50.9 CONGESTIVE HEART FAILURE, UNSPECIFIED HF CHRONICITY, UNSPECIFIED HEART FAILURE TYPE (HCC): ICD-10-CM

## 2021-11-08 DIAGNOSIS — I48.91 ATRIAL FIBRILLATION WITH RVR (HCC): ICD-10-CM

## 2021-11-08 DIAGNOSIS — J96.01 ACUTE RESPIRATORY FAILURE WITH HYPOXIA (HCC): Primary | ICD-10-CM

## 2021-11-08 PROBLEM — I48.0 PAF (PAROXYSMAL ATRIAL FIBRILLATION): Status: ACTIVE | Noted: 2017-10-11

## 2021-11-08 PROBLEM — E11.65 TYPE 2 DIABETES MELLITUS WITH HYPERGLYCEMIA, WITHOUT LONG-TERM CURRENT USE OF INSULIN (HCC): Status: ACTIVE | Noted: 2021-11-08

## 2021-11-08 PROBLEM — D72.829 LEUKOCYTOSIS: Status: ACTIVE | Noted: 2021-11-08

## 2021-11-08 PROBLEM — A04.72 CLOSTRIDIUM DIFFICILE COLITIS: Status: ACTIVE | Noted: 2021-11-08

## 2021-11-08 PROBLEM — E66.01 MORBID OBESITY WITH BMI OF 40.0-44.9, ADULT: Status: ACTIVE | Noted: 2021-11-08

## 2021-11-08 PROBLEM — E11.9 TYPE 2 DIABETES MELLITUS, WITHOUT LONG-TERM CURRENT USE OF INSULIN: Status: ACTIVE | Noted: 2021-11-08

## 2021-11-08 PROBLEM — A41.9 SEPSIS WITHOUT ACUTE ORGAN DYSFUNCTION (HCC): Status: ACTIVE | Noted: 2021-11-08

## 2021-11-08 PROBLEM — J96.21 ACUTE ON CHRONIC RESPIRATORY FAILURE WITH HYPOXIA (HCC): Status: ACTIVE | Noted: 2021-11-08

## 2021-11-08 PROBLEM — E87.6 HYPOKALEMIA: Status: ACTIVE | Noted: 2021-11-08

## 2021-11-08 PROBLEM — I48.20 CHRONIC ATRIAL FIBRILLATION: Status: ACTIVE | Noted: 2017-10-11

## 2021-11-08 PROBLEM — R19.7 DIARRHEA: Status: ACTIVE | Noted: 2021-11-08

## 2021-11-08 PROBLEM — E83.42 HYPOMAGNESEMIA: Status: ACTIVE | Noted: 2021-11-08

## 2021-11-08 LAB
ADV 40+41 DNA STL QL NAA+NON-PROBE: NOT DETECTED
ALBUMIN SERPL-MCNC: 4.3 G/DL (ref 3.5–5.2)
ALBUMIN/GLOB SERPL: 1.2 G/DL
ALP SERPL-CCNC: 93 U/L (ref 39–117)
ALT SERPL W P-5'-P-CCNC: 35 U/L (ref 1–33)
ANION GAP SERPL CALCULATED.3IONS-SCNC: 12.6 MMOL/L (ref 5–15)
ANION GAP SERPL CALCULATED.3IONS-SCNC: 13.5 MMOL/L (ref 5–15)
APTT PPP: 46 SECONDS (ref 22.7–35.4)
AST SERPL-CCNC: 64 U/L (ref 1–32)
ASTRO TYP 1-8 RNA STL QL NAA+NON-PROBE: NOT DETECTED
B PARAPERT DNA SPEC QL NAA+PROBE: NOT DETECTED
B PERT DNA SPEC QL NAA+PROBE: NOT DETECTED
BASOPHILS # BLD AUTO: 0.01 10*3/MM3 (ref 0–0.2)
BASOPHILS NFR BLD AUTO: 0.1 % (ref 0–1.5)
BILIRUB SERPL-MCNC: 2.9 MG/DL (ref 0–1.2)
BILIRUB UR QL STRIP: NEGATIVE
BUN SERPL-MCNC: 16 MG/DL (ref 8–23)
BUN SERPL-MCNC: 16 MG/DL (ref 8–23)
BUN/CREAT SERPL: 14 (ref 7–25)
BUN/CREAT SERPL: 14.4 (ref 7–25)
C CAYETANENSIS DNA STL QL NAA+NON-PROBE: NOT DETECTED
C COLI+JEJ+UPSA DNA STL QL NAA+NON-PROBE: NOT DETECTED
C DIFF TOX GENS STL QL NAA+PROBE: NEGATIVE
C PNEUM DNA NPH QL NAA+NON-PROBE: NOT DETECTED
CALCIUM SPEC-SCNC: 9 MG/DL (ref 8.6–10.5)
CALCIUM SPEC-SCNC: 9.4 MG/DL (ref 8.6–10.5)
CHLORIDE SERPL-SCNC: 101 MMOL/L (ref 98–107)
CHLORIDE SERPL-SCNC: 99 MMOL/L (ref 98–107)
CLARITY UR: CLEAR
CO2 SERPL-SCNC: 24.4 MMOL/L (ref 22–29)
CO2 SERPL-SCNC: 25.5 MMOL/L (ref 22–29)
COLOR UR: YELLOW
CREAT SERPL-MCNC: 1.11 MG/DL (ref 0.57–1)
CREAT SERPL-MCNC: 1.14 MG/DL (ref 0.57–1)
CRYPTOSP DNA STL QL NAA+NON-PROBE: NOT DETECTED
D-LACTATE SERPL-SCNC: 1.5 MMOL/L (ref 0.5–2)
DEPRECATED RDW RBC AUTO: 44.6 FL (ref 37–54)
DEPRECATED RDW RBC AUTO: 44.7 FL (ref 37–54)
E HISTOLYT DNA STL QL NAA+NON-PROBE: NOT DETECTED
EAEC PAA PLAS AGGR+AATA ST NAA+NON-PRB: NOT DETECTED
EC STX1+STX2 GENES STL QL NAA+NON-PROBE: NOT DETECTED
EOSINOPHIL # BLD AUTO: 0.04 10*3/MM3 (ref 0–0.4)
EOSINOPHIL NFR BLD AUTO: 0.4 % (ref 0.3–6.2)
EPEC EAE GENE STL QL NAA+NON-PROBE: NOT DETECTED
ERYTHROCYTE [DISTWIDTH] IN BLOOD BY AUTOMATED COUNT: 13.6 % (ref 12.3–15.4)
ERYTHROCYTE [DISTWIDTH] IN BLOOD BY AUTOMATED COUNT: 13.6 % (ref 12.3–15.4)
ETEC LTA+ST1A+ST1B TOX ST NAA+NON-PROBE: NOT DETECTED
FLUAV SUBTYP SPEC NAA+PROBE: NOT DETECTED
FLUBV RNA ISLT QL NAA+PROBE: NOT DETECTED
G LAMBLIA DNA STL QL NAA+NON-PROBE: NOT DETECTED
GFR SERPL CREATININE-BSD FRML MDRD: 56 ML/MIN/1.73
GFR SERPL CREATININE-BSD FRML MDRD: 58 ML/MIN/1.73
GLOBULIN UR ELPH-MCNC: 3.6 GM/DL
GLUCOSE BLDC GLUCOMTR-MCNC: 141 MG/DL (ref 70–130)
GLUCOSE BLDC GLUCOMTR-MCNC: 160 MG/DL (ref 70–130)
GLUCOSE BLDC GLUCOMTR-MCNC: 222 MG/DL (ref 70–130)
GLUCOSE SERPL-MCNC: 155 MG/DL (ref 65–99)
GLUCOSE SERPL-MCNC: 168 MG/DL (ref 65–99)
GLUCOSE UR STRIP-MCNC: NEGATIVE MG/DL
HADV DNA SPEC NAA+PROBE: NOT DETECTED
HBA1C MFR BLD: 7.11 % (ref 4.8–5.6)
HCOV 229E RNA SPEC QL NAA+PROBE: NOT DETECTED
HCOV HKU1 RNA SPEC QL NAA+PROBE: NOT DETECTED
HCOV NL63 RNA SPEC QL NAA+PROBE: NOT DETECTED
HCOV OC43 RNA SPEC QL NAA+PROBE: NOT DETECTED
HCT VFR BLD AUTO: 31.7 % (ref 34–46.6)
HCT VFR BLD AUTO: 32.7 % (ref 34–46.6)
HGB BLD-MCNC: 10.8 G/DL (ref 12–15.9)
HGB BLD-MCNC: 11.1 G/DL (ref 12–15.9)
HGB UR QL STRIP.AUTO: NEGATIVE
HMPV RNA NPH QL NAA+NON-PROBE: NOT DETECTED
HPIV1 RNA SPEC QL NAA+PROBE: NOT DETECTED
HPIV2 RNA SPEC QL NAA+PROBE: NOT DETECTED
HPIV3 RNA NPH QL NAA+PROBE: NOT DETECTED
HPIV4 P GENE NPH QL NAA+PROBE: NOT DETECTED
INR PPP: 2.16 (ref 0.9–1.1)
INR PPP: 2.68 (ref 0.9–1.1)
KETONES UR QL STRIP: NEGATIVE
LEUKOCYTE ESTERASE UR QL STRIP.AUTO: NEGATIVE
LYMPHOCYTES # BLD AUTO: 0.53 10*3/MM3 (ref 0.7–3.1)
LYMPHOCYTES NFR BLD AUTO: 4.9 % (ref 19.6–45.3)
M PNEUMO IGG SER IA-ACNC: NOT DETECTED
MAGNESIUM SERPL-MCNC: 1.5 MG/DL (ref 1.6–2.4)
MCH RBC QN AUTO: 30.4 PG (ref 26.6–33)
MCH RBC QN AUTO: 30.5 PG (ref 26.6–33)
MCHC RBC AUTO-ENTMCNC: 33.9 G/DL (ref 31.5–35.7)
MCHC RBC AUTO-ENTMCNC: 34.1 G/DL (ref 31.5–35.7)
MCV RBC AUTO: 89.5 FL (ref 79–97)
MCV RBC AUTO: 89.6 FL (ref 79–97)
MONOCYTES # BLD AUTO: 0.77 10*3/MM3 (ref 0.1–0.9)
MONOCYTES NFR BLD AUTO: 7.1 % (ref 5–12)
NEUTROPHILS NFR BLD AUTO: 87 % (ref 42.7–76)
NEUTROPHILS NFR BLD AUTO: 9.38 10*3/MM3 (ref 1.7–7)
NITRITE UR QL STRIP: NEGATIVE
NOROVIRUS GI+II RNA STL QL NAA+NON-PROBE: NOT DETECTED
NT-PROBNP SERPL-MCNC: 1344 PG/ML (ref 0–900)
P SHIGELLOIDES DNA STL QL NAA+NON-PROBE: NOT DETECTED
PH UR STRIP.AUTO: <=5 [PH] (ref 5–8)
PLATELET # BLD AUTO: 129 10*3/MM3 (ref 140–450)
PLATELET # BLD AUTO: 132 10*3/MM3 (ref 140–450)
PMV BLD AUTO: 11.2 FL (ref 6–12)
PMV BLD AUTO: 11.4 FL (ref 6–12)
POTASSIUM SERPL-SCNC: 3.2 MMOL/L (ref 3.5–5.2)
POTASSIUM SERPL-SCNC: 3.4 MMOL/L (ref 3.5–5.2)
POTASSIUM SERPL-SCNC: 3.9 MMOL/L (ref 3.5–5.2)
PROCALCITONIN SERPL-MCNC: 3.1 NG/ML (ref 0–0.25)
PROT SERPL-MCNC: 7.9 G/DL (ref 6–8.5)
PROT UR QL STRIP: NEGATIVE
PROTHROMBIN TIME: 23.8 SECONDS (ref 11.7–14.2)
PROTHROMBIN TIME: 28.3 SECONDS (ref 11.7–14.2)
QT INTERVAL: 308 MS
RBC # BLD AUTO: 3.54 10*6/MM3 (ref 3.77–5.28)
RBC # BLD AUTO: 3.65 10*6/MM3 (ref 3.77–5.28)
RHINOVIRUS RNA SPEC NAA+PROBE: NOT DETECTED
RSV RNA NPH QL NAA+NON-PROBE: NOT DETECTED
RVA RNA STL QL NAA+NON-PROBE: NOT DETECTED
S ENT+BONG DNA STL QL NAA+NON-PROBE: NOT DETECTED
SAPO I+II+IV+V RNA STL QL NAA+NON-PROBE: NOT DETECTED
SARS-COV-2 RNA NPH QL NAA+NON-PROBE: NOT DETECTED
SHIGELLA SP+EIEC IPAH ST NAA+NON-PROBE: NOT DETECTED
SODIUM SERPL-SCNC: 138 MMOL/L (ref 136–145)
SODIUM SERPL-SCNC: 138 MMOL/L (ref 136–145)
SP GR UR STRIP: 1.01 (ref 1–1.03)
TROPONIN T SERPL-MCNC: <0.01 NG/ML (ref 0–0.03)
UROBILINOGEN UR QL STRIP: NORMAL
V CHOL+PARA+VUL DNA STL QL NAA+NON-PROBE: NOT DETECTED
V CHOLERAE DNA STL QL NAA+NON-PROBE: NOT DETECTED
WBC # BLD AUTO: 10.78 10*3/MM3 (ref 3.4–10.8)
WBC # BLD AUTO: 14.11 10*3/MM3 (ref 3.4–10.8)
Y ENTEROCOL DNA STL QL NAA+NON-PROBE: NOT DETECTED

## 2021-11-08 PROCEDURE — 85610 PROTHROMBIN TIME: CPT | Performed by: EMERGENCY MEDICINE

## 2021-11-08 PROCEDURE — 87493 C DIFF AMPLIFIED PROBE: CPT | Performed by: HOSPITALIST

## 2021-11-08 PROCEDURE — 84132 ASSAY OF SERUM POTASSIUM: CPT | Performed by: INTERNAL MEDICINE

## 2021-11-08 PROCEDURE — 82962 GLUCOSE BLOOD TEST: CPT

## 2021-11-08 PROCEDURE — 94799 UNLISTED PULMONARY SVC/PX: CPT

## 2021-11-08 PROCEDURE — 93005 ELECTROCARDIOGRAM TRACING: CPT | Performed by: EMERGENCY MEDICINE

## 2021-11-08 PROCEDURE — P9612 CATHETERIZE FOR URINE SPEC: HCPCS

## 2021-11-08 PROCEDURE — 36415 COLL VENOUS BLD VENIPUNCTURE: CPT

## 2021-11-08 PROCEDURE — 83036 HEMOGLOBIN GLYCOSYLATED A1C: CPT | Performed by: NURSE PRACTITIONER

## 2021-11-08 PROCEDURE — 87040 BLOOD CULTURE FOR BACTERIA: CPT | Performed by: EMERGENCY MEDICINE

## 2021-11-08 PROCEDURE — 25010000002 FUROSEMIDE PER 20 MG: Performed by: EMERGENCY MEDICINE

## 2021-11-08 PROCEDURE — 85025 COMPLETE CBC W/AUTO DIFF WBC: CPT | Performed by: EMERGENCY MEDICINE

## 2021-11-08 PROCEDURE — 83605 ASSAY OF LACTIC ACID: CPT | Performed by: EMERGENCY MEDICINE

## 2021-11-08 PROCEDURE — 0097U HC BIOFIRE FILMARRAY GI PANEL: CPT | Performed by: NURSE PRACTITIONER

## 2021-11-08 PROCEDURE — 99222 1ST HOSP IP/OBS MODERATE 55: CPT | Performed by: INTERNAL MEDICINE

## 2021-11-08 PROCEDURE — 83880 ASSAY OF NATRIURETIC PEPTIDE: CPT | Performed by: EMERGENCY MEDICINE

## 2021-11-08 PROCEDURE — 25010000002 MAGNESIUM SULFATE 2 GM/50ML SOLUTION: Performed by: EMERGENCY MEDICINE

## 2021-11-08 PROCEDURE — 83735 ASSAY OF MAGNESIUM: CPT | Performed by: EMERGENCY MEDICINE

## 2021-11-08 PROCEDURE — 94660 CPAP INITIATION&MGMT: CPT

## 2021-11-08 PROCEDURE — 84484 ASSAY OF TROPONIN QUANT: CPT | Performed by: EMERGENCY MEDICINE

## 2021-11-08 PROCEDURE — 25010000002 FUROSEMIDE PER 20 MG: Performed by: INTERNAL MEDICINE

## 2021-11-08 PROCEDURE — 93005 ELECTROCARDIOGRAM TRACING: CPT

## 2021-11-08 PROCEDURE — 99284 EMERGENCY DEPT VISIT MOD MDM: CPT

## 2021-11-08 PROCEDURE — 80053 COMPREHEN METABOLIC PANEL: CPT | Performed by: EMERGENCY MEDICINE

## 2021-11-08 PROCEDURE — 81003 URINALYSIS AUTO W/O SCOPE: CPT | Performed by: HOSPITALIST

## 2021-11-08 PROCEDURE — 85610 PROTHROMBIN TIME: CPT | Performed by: NURSE PRACTITIONER

## 2021-11-08 PROCEDURE — 85730 THROMBOPLASTIN TIME PARTIAL: CPT | Performed by: EMERGENCY MEDICINE

## 2021-11-08 PROCEDURE — 71045 X-RAY EXAM CHEST 1 VIEW: CPT

## 2021-11-08 PROCEDURE — 84145 PROCALCITONIN (PCT): CPT | Performed by: EMERGENCY MEDICINE

## 2021-11-08 PROCEDURE — 63710000001 INSULIN LISPRO (HUMAN) PER 5 UNITS: Performed by: NURSE PRACTITIONER

## 2021-11-08 PROCEDURE — 0202U NFCT DS 22 TRGT SARS-COV-2: CPT | Performed by: EMERGENCY MEDICINE

## 2021-11-08 PROCEDURE — 93010 ELECTROCARDIOGRAM REPORT: CPT | Performed by: INTERNAL MEDICINE

## 2021-11-08 PROCEDURE — 85027 COMPLETE CBC AUTOMATED: CPT | Performed by: NURSE PRACTITIONER

## 2021-11-08 RX ORDER — CARVEDILOL 25 MG/1
25 TABLET ORAL 2 TIMES DAILY WITH MEALS
Status: DISCONTINUED | OUTPATIENT
Start: 2021-11-08 | End: 2021-11-11 | Stop reason: HOSPADM

## 2021-11-08 RX ORDER — DIGOXIN 125 MCG
125 TABLET ORAL EVERY OTHER DAY
Status: DISCONTINUED | OUTPATIENT
Start: 2021-11-08 | End: 2021-11-11 | Stop reason: HOSPADM

## 2021-11-08 RX ORDER — ACETAMINOPHEN 650 MG/1
650 SUPPOSITORY RECTAL EVERY 4 HOURS PRN
Status: DISCONTINUED | OUTPATIENT
Start: 2021-11-08 | End: 2021-11-08

## 2021-11-08 RX ORDER — SODIUM CHLORIDE 0.9 % (FLUSH) 0.9 %
10 SYRINGE (ML) INJECTION AS NEEDED
Status: DISCONTINUED | OUTPATIENT
Start: 2021-11-08 | End: 2021-11-08

## 2021-11-08 RX ORDER — FUROSEMIDE 10 MG/ML
80 INJECTION INTRAMUSCULAR; INTRAVENOUS ONCE
Status: COMPLETED | OUTPATIENT
Start: 2021-11-08 | End: 2021-11-08

## 2021-11-08 RX ORDER — VANCOMYCIN HYDROCHLORIDE 125 MG/1
125 CAPSULE ORAL EVERY 6 HOURS SCHEDULED
Status: DISCONTINUED | OUTPATIENT
Start: 2021-11-08 | End: 2021-11-08

## 2021-11-08 RX ORDER — ASPIRIN 81 MG/1
81 TABLET ORAL DAILY
Status: DISCONTINUED | OUTPATIENT
Start: 2021-11-08 | End: 2021-11-11 | Stop reason: HOSPADM

## 2021-11-08 RX ORDER — SODIUM CHLORIDE 0.9 % (FLUSH) 0.9 %
10 SYRINGE (ML) INJECTION EVERY 12 HOURS SCHEDULED
Status: DISCONTINUED | OUTPATIENT
Start: 2021-11-08 | End: 2021-11-08

## 2021-11-08 RX ORDER — FUROSEMIDE 10 MG/ML
80 INJECTION INTRAMUSCULAR; INTRAVENOUS EVERY 12 HOURS
Status: DISCONTINUED | OUTPATIENT
Start: 2021-11-08 | End: 2021-11-09

## 2021-11-08 RX ORDER — WARFARIN SODIUM 5 MG/1
5 TABLET ORAL
Status: DISCONTINUED | OUTPATIENT
Start: 2021-11-08 | End: 2021-11-09 | Stop reason: DRUGHIGH

## 2021-11-08 RX ORDER — LISINOPRIL 10 MG/1
10 TABLET ORAL DAILY
Status: DISCONTINUED | OUTPATIENT
Start: 2021-11-08 | End: 2021-11-11 | Stop reason: HOSPADM

## 2021-11-08 RX ORDER — NICOTINE POLACRILEX 4 MG
15 LOZENGE BUCCAL
Status: DISCONTINUED | OUTPATIENT
Start: 2021-11-08 | End: 2021-11-11 | Stop reason: HOSPADM

## 2021-11-08 RX ORDER — MAGNESIUM SULFATE HEPTAHYDRATE 40 MG/ML
2 INJECTION, SOLUTION INTRAVENOUS ONCE
Status: COMPLETED | OUTPATIENT
Start: 2021-11-08 | End: 2021-11-08

## 2021-11-08 RX ORDER — INSULIN LISPRO 100 [IU]/ML
0-9 INJECTION, SOLUTION INTRAVENOUS; SUBCUTANEOUS
Status: DISCONTINUED | OUTPATIENT
Start: 2021-11-08 | End: 2021-11-11 | Stop reason: HOSPADM

## 2021-11-08 RX ORDER — HYDROCODONE BITARTRATE AND ACETAMINOPHEN 5; 325 MG/1; MG/1
1 TABLET ORAL 2 TIMES DAILY PRN
Status: DISCONTINUED | OUTPATIENT
Start: 2021-11-08 | End: 2021-11-11 | Stop reason: HOSPADM

## 2021-11-08 RX ORDER — NITROGLYCERIN 20 MG/100ML
50-400 INJECTION INTRAVENOUS
Status: DISCONTINUED | OUTPATIENT
Start: 2021-11-08 | End: 2021-11-09

## 2021-11-08 RX ORDER — CALCIUM CARBONATE 200(500)MG
2 TABLET,CHEWABLE ORAL 2 TIMES DAILY PRN
Status: DISCONTINUED | OUTPATIENT
Start: 2021-11-08 | End: 2021-11-08

## 2021-11-08 RX ORDER — ENALAPRILAT 2.5 MG/2ML
1.25 INJECTION INTRAVENOUS ONCE
Status: COMPLETED | OUTPATIENT
Start: 2021-11-08 | End: 2021-11-08

## 2021-11-08 RX ORDER — ONDANSETRON 4 MG/1
4 TABLET, FILM COATED ORAL EVERY 6 HOURS PRN
Status: DISCONTINUED | OUTPATIENT
Start: 2021-11-08 | End: 2021-11-11 | Stop reason: HOSPADM

## 2021-11-08 RX ORDER — POTASSIUM CHLORIDE 7.45 MG/ML
10 INJECTION INTRAVENOUS
Status: DISCONTINUED | OUTPATIENT
Start: 2021-11-08 | End: 2021-11-11 | Stop reason: HOSPADM

## 2021-11-08 RX ORDER — ATORVASTATIN CALCIUM 20 MG/1
10 TABLET, FILM COATED ORAL DAILY
Status: DISCONTINUED | OUTPATIENT
Start: 2021-11-08 | End: 2021-11-11 | Stop reason: HOSPADM

## 2021-11-08 RX ORDER — MAGNESIUM SULFATE 1 G/100ML
1 INJECTION INTRAVENOUS AS NEEDED
Status: DISCONTINUED | OUTPATIENT
Start: 2021-11-08 | End: 2021-11-11 | Stop reason: HOSPADM

## 2021-11-08 RX ORDER — LATANOPROST 50 UG/ML
1 SOLUTION/ DROPS OPHTHALMIC DAILY
Status: DISCONTINUED | OUTPATIENT
Start: 2021-11-08 | End: 2021-11-11 | Stop reason: HOSPADM

## 2021-11-08 RX ORDER — NITROGLYCERIN 0.4 MG/1
0.4 TABLET SUBLINGUAL
Status: DISCONTINUED | OUTPATIENT
Start: 2021-11-08 | End: 2021-11-11 | Stop reason: HOSPADM

## 2021-11-08 RX ORDER — CAPTOPRIL 12.5 MG/1
12.5 TABLET ORAL ONCE
Status: DISCONTINUED | OUTPATIENT
Start: 2021-11-08 | End: 2021-11-08

## 2021-11-08 RX ORDER — MAGNESIUM SULFATE HEPTAHYDRATE 40 MG/ML
2 INJECTION, SOLUTION INTRAVENOUS AS NEEDED
Status: DISCONTINUED | OUTPATIENT
Start: 2021-11-08 | End: 2021-11-11 | Stop reason: HOSPADM

## 2021-11-08 RX ORDER — FUROSEMIDE 10 MG/ML
40 INJECTION INTRAMUSCULAR; INTRAVENOUS EVERY 12 HOURS
Status: DISCONTINUED | OUTPATIENT
Start: 2021-11-08 | End: 2021-11-08

## 2021-11-08 RX ORDER — ACETAMINOPHEN 325 MG/1
650 TABLET ORAL EVERY 4 HOURS PRN
Status: DISCONTINUED | OUTPATIENT
Start: 2021-11-08 | End: 2021-11-11 | Stop reason: HOSPADM

## 2021-11-08 RX ORDER — ACETAMINOPHEN 160 MG/5ML
650 SOLUTION ORAL EVERY 4 HOURS PRN
Status: DISCONTINUED | OUTPATIENT
Start: 2021-11-08 | End: 2021-11-08

## 2021-11-08 RX ORDER — ONDANSETRON 2 MG/ML
4 INJECTION INTRAMUSCULAR; INTRAVENOUS EVERY 6 HOURS PRN
Status: DISCONTINUED | OUTPATIENT
Start: 2021-11-08 | End: 2021-11-11 | Stop reason: HOSPADM

## 2021-11-08 RX ORDER — DILTIAZEM HYDROCHLORIDE 5 MG/ML
10 INJECTION INTRAVENOUS ONCE
Status: COMPLETED | OUTPATIENT
Start: 2021-11-08 | End: 2021-11-08

## 2021-11-08 RX ORDER — POTASSIUM CHLORIDE 1.5 G/1.77G
40 POWDER, FOR SOLUTION ORAL AS NEEDED
Status: DISCONTINUED | OUTPATIENT
Start: 2021-11-08 | End: 2021-11-11 | Stop reason: HOSPADM

## 2021-11-08 RX ORDER — POTASSIUM CHLORIDE 750 MG/1
40 TABLET, FILM COATED, EXTENDED RELEASE ORAL AS NEEDED
Status: DISCONTINUED | OUTPATIENT
Start: 2021-11-08 | End: 2021-11-11 | Stop reason: HOSPADM

## 2021-11-08 RX ORDER — DEXTROSE MONOHYDRATE 25 G/50ML
25 INJECTION, SOLUTION INTRAVENOUS
Status: DISCONTINUED | OUTPATIENT
Start: 2021-11-08 | End: 2021-11-11 | Stop reason: HOSPADM

## 2021-11-08 RX ADMIN — FUROSEMIDE 80 MG: 10 INJECTION, SOLUTION INTRAMUSCULAR; INTRAVENOUS at 13:25

## 2021-11-08 RX ADMIN — POTASSIUM CHLORIDE 40 MEQ: 750 TABLET, EXTENDED RELEASE ORAL at 09:27

## 2021-11-08 RX ADMIN — INSULIN LISPRO 2 UNITS: 100 INJECTION, SOLUTION INTRAVENOUS; SUBCUTANEOUS at 09:26

## 2021-11-08 RX ADMIN — VANCOMYCIN HYDROCHLORIDE 125 MG: 125 CAPSULE ORAL at 17:32

## 2021-11-08 RX ADMIN — ENALAPRILAT 1.25 MG: 2.5 INJECTION INTRAVENOUS at 02:41

## 2021-11-08 RX ADMIN — INSULIN LISPRO 2 UNITS: 100 INJECTION, SOLUTION INTRAVENOUS; SUBCUTANEOUS at 11:11

## 2021-11-08 RX ADMIN — FUROSEMIDE 80 MG: 10 INJECTION, SOLUTION INTRAMUSCULAR; INTRAVENOUS at 02:35

## 2021-11-08 RX ADMIN — NITROGLYCERIN 50 MCG/MIN: 20 INJECTION INTRAVENOUS at 02:45

## 2021-11-08 RX ADMIN — DILTIAZEM HYDROCHLORIDE 10 MG: 5 INJECTION INTRAVENOUS at 02:38

## 2021-11-08 RX ADMIN — WARFARIN SODIUM 5 MG: 5 TABLET ORAL at 17:31

## 2021-11-08 RX ADMIN — ASPIRIN 81 MG: 81 TABLET, COATED ORAL at 11:11

## 2021-11-08 RX ADMIN — LISINOPRIL 10 MG: 10 TABLET ORAL at 11:11

## 2021-11-08 RX ADMIN — POTASSIUM CHLORIDE 40 MEQ: 1.5 POWDER, FOR SOLUTION ORAL at 13:42

## 2021-11-08 RX ADMIN — ATORVASTATIN CALCIUM 10 MG: 20 TABLET, FILM COATED ORAL at 11:11

## 2021-11-08 RX ADMIN — MAGNESIUM SULFATE HEPTAHYDRATE 2 G: 40 INJECTION, SOLUTION INTRAVENOUS at 02:50

## 2021-11-08 RX ADMIN — ACETAMINOPHEN 650 MG: 325 TABLET, FILM COATED ORAL at 08:12

## 2021-11-08 RX ADMIN — INSULIN LISPRO 4 UNITS: 100 INJECTION, SOLUTION INTRAVENOUS; SUBCUTANEOUS at 23:04

## 2021-11-08 RX ADMIN — CARVEDILOL 25 MG: 25 TABLET, FILM COATED ORAL at 17:31

## 2021-11-08 RX ADMIN — CARVEDILOL 25 MG: 25 TABLET, FILM COATED ORAL at 11:11

## 2021-11-08 NOTE — H&P
Patient Name:  Lana Yañez  YOB: 1945  MRN:  [id number]  Admit Date:  11/8/2021  Patient Care Team:  Will Madden MD as PCP - General (Family Medicine)  Pia Dueñas MD as Consulting Physician (Cardiology)  Iain Hill MD as Consulting Physician (Pulmonary Disease)  Carmen Kirk MD as Consulting Physician (Pain Medicine)  Nano Cool RPH as Pharmacist  Shaw Hand PharmD as Pharmacist (Pharmacy)      Subjective   History Present Illness     Chief Complaint   Patient presents with   • Shortness of Breath     HPI  Ms. Yañez is a 76 y.o.  with a history of PAF see Coumadin), diastolic heart failure, FRANCY (he was on CPAP, unfortunately was recalled and she has been unable to get this replaced), HTN and HLD that presents to [facility] complaining of shortness of breath and chest tightness.  Patient reports symptoms began around 4 days ago and was associated with productive cough producing clear sputum, orthopnea, wheezing and increased lower extremity edema.  Denies recent weight gain.  She was recently treated for a suspected UTI with Macrobid and developed a diarrhea.  On admission she was found to be hypoxic with O2 saturations 85% and was placed on BiPAP, she was given 80 mg of IV Lasix and currently is on 2 L nasal cannula with breathing much improved.  She typically does not require oxygen.  She reports medication compliance, diet compliance.  Lab work-up showed an elevated pro calcitonin and leukocytosis with unknown etiology-CXR negative for any infectious findings, UA negative.    Review of Systems   Constitutional: Positive for chills. Negative for fever.   HENT: Negative for congestion and sore throat.    Eyes: Negative for discharge.   Respiratory: Positive for cough, chest tightness, shortness of breath and wheezing.    Cardiovascular: Positive for chest pain, palpitations and leg swelling.   Gastrointestinal: Positive for diarrhea. Negative for  abdominal pain, nausea and vomiting.   Endocrine: Negative for cold intolerance and heat intolerance.   Genitourinary: Positive for dysuria and frequency. Negative for difficulty urinating.   Musculoskeletal: Positive for gait problem. Negative for back pain.   Skin: Negative for color change and pallor.   Allergic/Immunologic: Negative for environmental allergies and food allergies.   Neurological: Negative for dizziness and headaches.   Hematological: Negative for adenopathy. Bruises/bleeds easily.   Psychiatric/Behavioral: Negative for agitation and confusion.        Personal History     Past Medical History:   Diagnosis Date   • Acute on chronic diastolic heart failure ([facility])    • Arthritis    • Asthma    • Atrial fibrillation ([facility])     Persistent; on warfarin   • Atypical chest pain    • Bronchitis    • Cellulitis of right elbow     due to MRSA   • CHF (congestive heart failure) ([facility])    • COPD (chronic obstructive pulmonary disease) ([facility])    • Coronary artery disease     Cardiac catheterization completed; 90% PDA stenosis with medical management recommended   • Coronary artery disease involving native coronary artery of native heart with angina pectoris with documented spasm ([facility])    • Diabetes 1.5, managed as type 2 ([facility])    • Disease of thyroid gland    • Displacement of lumbar intervertebral disc without myelopathy    • Dizziness    • ANTOINE (dyspnea on exertion)    • Essential hypertension    • Fatigue    • Generalized osteoarthritis of multiple sites    • History of rheumatic fever    • History of transfusion    • Hx of bone density study     10/23/2014   • Hyperglycemia    • Hyperlipidemia    • Hypovitaminosis D    • Left arm pain    • Left-sided weakness    • Leg swelling    • Low back pain    • Lower extremity edema    • Malaise and fatigue    • Mitral valve disease     Moderate mitral valve prolapse and moderate mitral regurgitation   • Mitral valve insufficiency    • Morbid obesity with BMI of 40.0-44.9,  adult ([facility])    • MRSA infection    • NSTEMI (non-ST elevated myocardial infarction) ([facility])    • PAF (paroxysmal atrial fibrillation) ([facility])    • RA (rheumatoid arthritis) ([facility])     involving both hands   • Sleep apnea    • SOB (shortness of breath)    • Stroke ([facility])     left side weakness   • Urge incontinence of urine    • UTI (urinary tract infection) 05/2020   • Vitamin D deficiency      Past Surgical History:   Procedure Laterality Date   • BREAST BIOPSY     • BREAST SURGERY      right side lumpectomy with biopsy   • CARDIAC CATHETERIZATION Left 10/20/2017    Procedure: Cardiac Catheterization/Vascular Study;  Surgeon: Alphonso Olmedo MD;  Location: [facility] CATH INVASIVE LOCATION;  Service:    • CARDIAC CATHETERIZATION N/A 10/20/2017    +2 mitral regurgitation, left main 10% stenosis, mid to distal LAD 10% diffuse stenosis, circumflex 10% diffuse stenosis, RCA 10% proximal stenosis, and distal PDA consistent with coronary embolus with a lesion of 90% too small to consider coronary intervention; medical management recommended   • EYE SURGERY      laser surgery for glaucoma and left eye cataracts removed   • HYSTERECTOMY      10+ years ago   • JOINT REPLACEMENT  2005; 2006    bilateral knees and left rotater   • KNEE SURGERY     • MAMMO BILATERAL  2016    [facility]      Family History   Problem Relation Age of Onset   • Colon cancer Mother    • Glaucoma Mother    • Stroke Mother    • Arthritis Mother    • Hypertension Mother    • Glaucoma Sister    • Diabetes Sister    • Heart disease Sister    • Arthritis Sister    • Asthma Sister    • Hypertension Sister    • Miscarriages / Stillbirths Sister    • Lung disease Sister    • Heart disease Brother    • Diabetes Brother    • Arthritis Brother    • Drug abuse Brother    • Hypertension Brother    • Arthritis Father    • COPD Father    • Lung disease Father    • Arthritis Daughter    • Depression Daughter    • Alcohol abuse Maternal Uncle    • Heart disease Sister     • Heart disease Sister    • Heart disease Brother      Social History     Tobacco Use   • Smoking status: Former Smoker     Packs/day: 3.00     Types: Cigarettes     Start date: 1967     Quit date: 10/19/1968     Years since quittin.0   • Smokeless tobacco: Never Used   • Tobacco comment: caffeine use - decaf coffee   Substance Use Topics   • Alcohol use: No     Comment: No caffeine use   • Drug use: No     No current facility-administered medications on file prior to encounter.     Current Outpatient Medications on File Prior to Encounter   Medication Sig Dispense Refill   • aspirin 81 MG EC tablet Take 1 tablet by mouth Daily.     • atorvastatin (LIPITOR) 10 MG tablet Take 1 tablet by mouth Daily. 90 tablet 1   • carvedilol (COREG) 25 MG tablet TAKE 1 & 1/2 (ONE & ONE-HALF) TABLETS BY MOUTH TWICE DAILY WITH MEALS 270 tablet 2   • digoxin (LANOXIN) 125 MCG tablet Take 1 tablet by mouth Every Other Day. 45 tablet 1   • furosemide (LASIX) 40 MG tablet Take 1 tablet in the morning 90 tablet 0   • HYDROcodone-acetaminophen (NORCO) 5-325 MG per tablet Take 1 tablet by mouth 2 (Two) Times a Day As Needed for Severe Pain . 40 tablet 0   • lisinopril (PRINIVIL,ZESTRIL) 10 MG tablet Take 1 tablet by mouth Daily. 90 tablet 3   • magnesium oxide (MAG-OX) 400 MG tablet Take 400 mg by mouth As Needed.     • potassium chloride 10 MEQ CR tablet Take 2 tablets by mouth Daily. (Patient taking differently: Take 10 mEq by mouth Daily. No longer BID) 180 tablet 3   • SITagliptin (Januvia) 100 MG tablet Take 1 tablet by mouth Daily. 30 tablet 11   • TRAVATAN Z 0.004 % solution ophthalmic solution Administer 1 drop to both eyes Every Morning. Not night     • vitamin D (ERGOCALCIFEROL) 1.25 MG (42122 UT) capsule capsule Take 1 capsule by mouth once a week 12 capsule 0   • warfarin (COUMADIN) 5 MG tablet TAKE 1 TABLET BY MOUTH ONCE DAILY OR  AS  DIRECTED  BY  MEDICATION  MANAGEMENT  CLINIC (Patient taking differently: Take 5  mg by mouth. TAKE 1 TABLET BY MOUTH ONCE DAILY and 1/2 tablet Wednesday) 90 tablet 1   • Blood Glucose Monitoring Suppl (ACCU-CHEK GUIDE) w/Device kit See Admin Instructions.     • glucose blood test strip Use as instructed 300 each 12   • Lancets (ACCU-CHEK MULTICLIX) lancets Use 1 lancet per finger stick 204 each 12   • [] nitrofurantoin, macrocrystal-monohydrate, (Macrobid) 100 MG capsule Take 1 capsule by mouth 2 (Two) Times a Day for 5 days. 10 capsule 0     Allergies   Allergen Reactions   • Contrast Dye Hives and Itching       Objective    Objective     Vital Signs  Temp:  [98.7 °F (37.1 °C)-99.3 °F (37.4 °C)] 98.7 °F (37.1 °C)  Heart Rate:  [] 87  Resp:  [20-26] 20  BP: (106-150)/(63-93) 106/73  SpO2:  [85 %-96 %] 96 %  on  Flow (L/min):  [2] 2;   Device (Oxygen Therapy): nasal cannula  Body mass index is 40.35 kg/m².    Physical Exam  Vitals and nursing note reviewed. Exam conducted with a chaperone present.   Constitutional:       Appearance: She is obese. She is ill-appearing (Chronically).   Eyes:      Extraocular Movements: Extraocular movements intact.      Conjunctiva/sclera: Conjunctivae normal.   Cardiovascular:      Rate and Rhythm: Normal rate. Rhythm irregularly irregular.   Pulmonary:      Effort: Pulmonary effort is normal. No respiratory distress.      Breath sounds: Wheezing (Mild expiratory) and rales present.      Comments: Breath sounds diminished  Abdominal:      General: Bowel sounds are normal. There is no distension.      Palpations: Abdomen is soft.      Tenderness: There is no abdominal tenderness.   Musculoskeletal:         General: Swelling (1-2+ BLE) present. Normal range of motion.      Cervical back: Normal range of motion and neck supple.   Skin:     General: Skin is warm and dry.   Neurological:      Mental Status: She is alert and oriented to person, place, and time. Mental status is at baseline.   Psychiatric:         Mood and Affect: Mood normal.          Behavior: Behavior normal.         Results Review:  I reviewed the patient's new clinical results.  I reviewed the patient's new imaging results and agree with the interpretation.  I reviewed the patient's other test results and agree with the interpretation  I personally viewed and interpreted the patient's EKG/Telemetry data  Discussed with ED provider.    Lab Results (last 24 hours)     Procedure Component Value Units Date/Time    Respiratory Panel PCR w/COVID-19(SARS-CoV-2) MORGAN/EMERALD/EVELIO/PAD/COR/MAD/RAJEEV In-House, NP Swab in UTM/VTM, 3-4 HR TAT - Swab, Nasopharynx [[id number]]  (Normal) Collected: 11/08/21 0133    Specimen: Swab from Nasopharynx Updated: 11/08/21 0243     ADENOVIRUS, PCR Not Detected     Coronavirus 229E Not Detected     Coronavirus HKU1 Not Detected     Coronavirus NL63 Not Detected     Coronavirus OC43 Not Detected     COVID19 Not Detected     Human Metapneumovirus Not Detected     Human Rhinovirus/Enterovirus Not Detected     Influenza A PCR Not Detected     Influenza B PCR Not Detected     Parainfluenza Virus 1 Not Detected     Parainfluenza Virus 2 Not Detected     Parainfluenza Virus 3 Not Detected     Parainfluenza Virus 4 Not Detected     RSV, PCR Not Detected     Bordetella pertussis pcr Not Detected     Bordetella parapertussis PCR Not Detected     Chlamydophila pneumoniae PCR Not Detected     Mycoplasma pneumo by PCR Not Detected    Narrative:      In the setting of a positive respiratory panel with a viral infection PLUS a negative procalcitonin without other underlying concern for bacterial infection, consider observing off antibiotics or discontinuation of antibiotics and continue supportive care. If the respiratory panel is positive for atypical bacterial infection (Bordetella pertussis, Chlamydophila pneumoniae, or Mycoplasma pneumoniae), consider antibiotic de-escalation to target atypical bacterial infection.    CBC & Differential [[id number]]  (Abnormal) Collected: 11/08/21 0210     Specimen: Blood Updated: 11/08/21 0232    Narrative:      The following orders were created for panel order CBC & Differential.  Procedure                               Abnormality         Status                     ---------                               -----------         ------                     CBC Auto Differential[[id number]]        Abnormal            Final result                 Please view results for these tests on the individual orders.    Comprehensive Metabolic Panel [[id number]]  (Abnormal) Collected: 11/08/21 0210    Specimen: Blood Updated: 11/08/21 0249     Glucose 168 mg/dL      BUN 16 mg/dL      Creatinine 1.11 mg/dL      Sodium 138 mmol/L      Potassium 3.4 mmol/L      Chloride 101 mmol/L      CO2 24.4 mmol/L      Calcium 9.4 mg/dL      Total Protein 7.9 g/dL      Albumin 4.30 g/dL      ALT (SGPT) 35 U/L      AST (SGOT) 64 U/L      Alkaline Phosphatase 93 U/L      Total Bilirubin 2.9 mg/dL      eGFR  African Amer 58 mL/min/1.73      Globulin 3.6 gm/dL      A/G Ratio 1.2 g/dL      BUN/Creatinine Ratio 14.4     Anion Gap 12.6 mmol/L     Narrative:      GFR Normal >60  Chronic Kidney Disease <60  Kidney Failure <15      Troponin [[id number]]  (Normal) Collected: 11/08/21 0210    Specimen: Blood Updated: 11/08/21 0240     Troponin T <0.010 ng/mL     Narrative:      Troponin T Reference Range:  <= 0.03 ng/mL-   Negative for AMI  >0.03 ng/mL-     Abnormal for myocardial necrosis.  Clinicians would have to utilize clinical acumen, EKG, Troponin and serial changes to determine if it is an Acute Myocardial Infarction or myocardial injury due to an underlying chronic condition.       Results may be falsely decreased if patient taking Biotin.      BNP [[id number]]  (Abnormal) Collected: 11/08/21 0210    Specimen: Blood Updated: 11/08/21 0236     proBNP 1,344.0 pg/mL     Narrative:      Among patients with dyspnea, NT-proBNP is highly sensitive for the detection of acute congestive heart failure. In addition  "NT-proBNP of <300 pg/ml effectively rules out acute congestive heart failure with 99% negative predictive value.    Results may be falsely decreased if patient taking Biotin.      Magnesium [[id number]]  (Abnormal) Collected: 11/08/21 0210    Specimen: Blood Updated: 11/08/21 0248     Magnesium 1.5 mg/dL     Procalcitonin [[id number]]  (Abnormal) Collected: 11/08/21 0210    Specimen: Blood Updated: 11/08/21 0325     Procalcitonin 3.10 ng/mL     Narrative:      As a Marker for Sepsis (Non-Neonates):     1. <0.5 ng/mL represents a low risk of severe sepsis and/or septic shock.  2. >2 ng/mL represents a high risk of severe sepsis and/or septic shock.    As a Marker for Lower Respiratory Tract Infections that require antibiotic therapy:  PCT on Admission     Antibiotic Therapy             6-12 Hrs later  >0.5                          Strongly Recommended            >0.25 - <0.5             Recommended  0.1 - 0.25                  Discouraged                       Remeasure/reassess PCT  <0.1                         Strongly Discouraged         Remeasure/reassess PCT      As 28 day mortality risk marker: \"Change in Procalcitonin Result\" (>80% or <=80%) if Day 0 (or Day 1) and Day 4 values are available. Refer to http://[email or web address]-pct-calculator.com/    Change in PCT <=80 %   A decrease of PCT levels below or equal to 80% defines a positive change in PCT test result representing a higher risk for 28-day all-cause mortality of patients diagnosed with severe sepsis or septic shock.    Change in PCT >80 %   A decrease of PCT levels of more than 80% defines a negative change in PCT result representing a lower risk for 28-day all-cause mortality of patients diagnosed with severe sepsis or septic shock.                Blood Culture - Blood, Arm, Right [[id number]] Collected: 11/08/21 0210    Specimen: Blood from Arm, Right Updated: 11/08/21 0217    Blood Culture - Blood, Arm, Right [[id number]] Collected: 11/08/21 0210    Specimen: " Blood from Arm, Right Updated: 11/08/21 0217    Lactic Acid, Plasma [[id number]]  (Normal) Collected: 11/08/21 0210    Specimen: Blood Updated: 11/08/21 0236     Lactate 1.5 mmol/L     Protime-INR [[id number]]  (Abnormal) Collected: 11/08/21 0210    Specimen: Blood Updated: 11/08/21 0302     Protime 23.8 Seconds      INR 2.16    aPTT [[id number]]  (Abnormal) Collected: 11/08/21 0210    Specimen: Blood Updated: 11/08/21 0302     PTT 46.0 seconds     CBC Auto Differential [[id number]]  (Abnormal) Collected: 11/08/21 0210    Specimen: Blood Updated: 11/08/21 0232     WBC 10.78 10*3/mm3      RBC 3.65 10*6/mm3      Hemoglobin 11.1 g/dL      Hematocrit 32.7 %      MCV 89.6 fL      MCH 30.4 pg      MCHC 33.9 g/dL      RDW 13.6 %      RDW-SD 44.6 fl      MPV 11.4 fL      Platelets 129 10*3/mm3      Neutrophil % 87.0 %      Lymphocyte % 4.9 %      Monocyte % 7.1 %      Eosinophil % 0.4 %      Basophil % 0.1 %      Neutrophils, Absolute 9.38 10*3/mm3      Lymphocytes, Absolute 0.53 10*3/mm3      Monocytes, Absolute 0.77 10*3/mm3      Eosinophils, Absolute 0.04 10*3/mm3      Basophils, Absolute 0.01 10*3/mm3     Basic Metabolic Panel [[id number]]  (Abnormal) Collected: 11/08/21 0535    Specimen: Blood Updated: 11/08/21 0608     Glucose 155 mg/dL      BUN 16 mg/dL      Creatinine 1.14 mg/dL      Sodium 138 mmol/L      Potassium 3.2 mmol/L      Chloride 99 mmol/L      CO2 25.5 mmol/L      Calcium 9.0 mg/dL      eGFR   Amer 56 mL/min/1.73      BUN/Creatinine Ratio 14.0     Anion Gap 13.5 mmol/L     Narrative:      GFR Normal >60  Chronic Kidney Disease <60  Kidney Failure <15      CBC (No Diff) [[id number]]  (Abnormal) Collected: 11/08/21 0535    Specimen: Blood Updated: 11/08/21 0607     WBC 14.11 10*3/mm3      RBC 3.54 10*6/mm3      Hemoglobin 10.8 g/dL      Hematocrit 31.7 %      MCV 89.5 fL      MCH 30.5 pg      MCHC 34.1 g/dL      RDW 13.6 %      RDW-SD 44.7 fl      MPV 11.2 fL      Platelets 132 10*3/mm3     Hemoglobin A1c  [[id number]]  (Abnormal) Collected: 11/08/21 0535    Specimen: Blood Updated: 11/08/21 0605     Hemoglobin A1C 7.11 %     Narrative:      Hemoglobin A1C Ranges:    Increased Risk for Diabetes  5.7% to 6.4%  Diabetes                     >= 6.5%  Diabetic Goal                < 7.0%    Urinalysis With Culture If Indicated - Urine, Catheter In/Out [[id number]] Collected: 11/08/21 1008    Specimen: Urine, Catheter In/Out Updated: 11/08/21 1018          Imaging Results (Last 24 Hours)     Procedure Component Value Units Date/Time    XR Chest 1 View [[id number]] Collected: 11/08/21 0317     Updated: 11/08/21 0317    Narrative:        Patient: TERA GARNER  Time Out: 03:16  Exam(s): FILM CXR 1 VIEW     EXAM:    XR Chest, 1 View    CLINICAL HISTORY:     Reason for exam: SOA.    TECHNIQUE:    Frontal view of the chest.    COMPARISON:    Chest CT from September 23, 2021 and chest x-ray from October 20, 2018    FINDINGS:    Lungs:  See below.      Pleural space:  Unremarkable.  No pneumothorax.    Heart:  Moderate cardiomegaly with prominent vascularity.  Perihilar   bibasilar hazy increased densities may represent CHF versus artifact from   massive body habitus and portable technique.    Mediastinum:  Unremarkable.    Bones joints:  Unremarkable.    Vasculature:  The thoracic aorta is mildly calcified and upper normal   in size.    Upper abdomen:  No pneumoperitoneum is seen under the diaphragm.    IMPRESSION:         Moderate cardiomegaly with prominent vascularity.  Perihilar bibasilar   hazy increased densities may represent CHF versus artifact from massive   body habitus and portable technique.      Impression:          Electronically signed by Jerome Garcia MD on 11-08-21 at 0316          Results for orders placed during the hospital encounter of 08/20/21    Adult Transthoracic Echo Complete w/ Color, Spectral and Contrast if Necessary Per Protocol    Interpretation Summary  · Calculated left ventricular EF = 65.1%  Estimated left ventricular EF = 65% Estimated left ventricular EF was in agreement with the calculated left ventricular EF. Left ventricular systolic function is normal. Normal left ventricular cavity size and wall thickness noted. All left ventricular wall segments contract normally. Left ventricular diastolic function was indeterminate.  · Left atrial volume is severely increased.  · The right atrial cavity is severely dilated.  · There is mild calcification of the aortic valve. The aortic valve appears trileaflet. Mild aortic valve regurgitation is present. No aortic valve stenosis is present.  · There is severe, bileaflet mitral valve thickening present. Mild to moderate mitral valve regurgitation is present. No significant mitral valve stenosis is present.  · Moderate tricuspid valve regurgitation is present. Estimated right ventricular systolic pressure from tricuspid regurgitation is moderately elevated (45-55 mmHg). Calculated right ventricular systolic pressure from tricuspid regurgitation is 49 mmHg.  · Borderline dilation of the aortic arch is present.      ECG 12 Lead   Final Result   HEART RATE= 122  bpm   RR Interval= 492  ms   [place] Interval=   ms   P Horizontal Axis=   deg   P Front Axis=   deg   QRSD Interval= 97  ms   QT Interval= 308  ms   QRS Axis= -71  deg   T Wave Axis= 92  deg   - ABNORMAL ECG -   Atrial fibrillation   Paired ventricular premature complexes new vs previous   LAD, consider LAFB or inferior infarct   Anterior infarct, old   Nonspecific T abnormalities, lateral leads   Electronically Signed By: Favio Zacarias ([facility]) 08-Nov-2021 07:53:06   Date and Time of Study: 2021-11-08 01:16:45           Assessment/Plan     Active Hospital Problems    Diagnosis  POA   • **Acute on chronic diastolic heart failure (HCC) [I50.33]  Yes   • Acute respiratory failure with hypoxia ([facility]) [J96.01]  Unknown   • Pulmonary hypertension ([facility]) [I27.20]  Yes   • PAF (paroxysmal atrial fibrillation) ([facility])  [I48.0]  Yes   • Sleep apnea [G47.30]  Yes   • Hyperlipidemia [E78.5]  Yes   • Essential hypertension [I10]  Yes      Resolved Hospital Problems   No resolved problems to display.       Acute respiratory failure secondary to acute on chronic diastolic heart failure: Appreciate cardiology.  They will manage IV diuretics, fluid restriction, daily weights, strict I&O.  Monitor renal function closely.  Wean O2 as tolerated.  PAF/chest pain: Patient was started on a nitro drip and has weaned off.  Continue warfarin, pharmacy to dose, daily INR.  Rate is now controlled as home medications have been resumed.  Leukocytosis/elevated pro-Ned: Unsure of etiology at this moment, she reports chills and urinary symptoms but states she was just treated with Macrobid for suspected UTI.  Last dose was yesterday, she developed diarrhea, check GI PCR.  UA here is negative, as suspected.  Blood cultures pending.  CXR negative for any infectious findings.  DM2: A1c 7.1.  Hold oral antidiabetic medication and continue moderate correctional insulin.  ACHS, hypoglycemic protocol, carb consistent diet.  Correct electrolytes  HTN: Stable, has been a little on the hypotensive side, monitor  FRANCY: Patient reports CPAP has been recalled and she is been having issues obtaining a new one.  Will ask CCP to evaluate.  I discussed the patient's findings and my recommendations with patient, family and nursing staff.    VTE Prophylaxis - Warfarin (home med).  Code Status - Full code.       ANGELA Alex  [facility]  11/08/21  10:23 EST

## 2021-11-08 NOTE — CONSULTS
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  While on Pradaxa the patient had an acute stroke and was switched over to Coumadin.  CLARIBEL in  showed mitral valve prolapse with mild to moderate mitral regurgitation and spontaneous echo contrast in the atria and appendage.  Cardiac catheterization at the same time showed small vessel occlusion which was not amenable to PCI.  Most recent echocardiogram in 2021 showed normal EF with severe LAE, mild to moderate MR and moderate pulmonary hypertension.  She was hospitalized at time of last echo for acute on chronic diastolic heart failure.    She presented for 4-day history of shortness of air worse with exertion.  No chest pain but positive for orthopnea, PND and lower extremity swelling.  She noted increased cough and wheezing.  Subjective fevers but no sick contacts.  She claims compliance with dietary sodium.  She tries to restrict her daily volume intake to less than 2 L.  EKG in the emergency room showed A. fib with RVR.  Chest x-ray with pulmonary edema.  Leukocytosis with white blood cell count of 14,000 is noted.  Procalcitonin is elevated at 3.1.            ECHO 21    · Calculated left ventricular EF = 65.1% Estimated left ventricular EF = 65% Estimated left ventricular EF was in agreement with the calculated left ventricular EF. Left ventricular systolic function is normal. Normal left ventricular cavity size and wall thickness noted. All  left ventricular wall segments contract normally. Left ventricular diastolic function was indeterminate.  · Left atrial volume is severely increased.  · The right atrial cavity is severely dilated.  · There is mild calcification of the aortic valve. The aortic valve appears trileaflet. Mild aortic valve regurgitation is present. No aortic valve stenosis is present.  · There is severe, bileaflet mitral valve thickening present. Mild to moderate mitral valve regurgitation is present. No significant mitral valve stenosis is present.  · Moderate tricuspid valve regurgitation is present. Estimated right ventricular systolic pressure from tricuspid regurgitation is moderately elevated (45-55 mmHg). Calculated right ventricular systolic pressure from tricuspid regurgitation is 49 mmHg.  · Borderline dilation of the aortic arch is present.          CATH 10/20/2017    SUMMARY: Normal LV systolic function.  Moderate MR.  Coronary embolus in distal PDA.     RECOMMENDATIONS: Medical management.          Stress Test 10/13/2017    · Left ventricular ejection fraction is normal (Calculated EF = 53%).  · Myocardial perfusion imaging indicates a normal myocardial perfusion study with no evidence of ischemia.  · Impressions are consistent with a low risk study.  · LVEF with stress is 57%.              Past Medical History:   Diagnosis Date   • Acute on chronic diastolic heart failure (HCC)    • Arthritis    • Asthma    • Atrial fibrillation ([facility])     Persistent; on warfarin   • Atypical chest pain    • Bronchitis    • Cellulitis of right elbow     due to MRSA   • CHF (congestive heart failure) ([facility])    • COPD (chronic obstructive pulmonary disease) ([facility])    • Coronary artery disease     Cardiac catheterization completed; 90% PDA stenosis with medical management recommended   • Coronary artery disease involving native coronary artery of native heart with angina pectoris with documented spasm ([facility])    • Diabetes 1.5, managed as type 2  ([facility])    • Disease of thyroid gland    • Displacement of lumbar intervertebral disc without myelopathy    • Dizziness    • ANTOINE (dyspnea on exertion)    • Essential hypertension    • Fatigue    • Generalized osteoarthritis of multiple sites    • History of rheumatic fever    • History of transfusion    • Hx of bone density study     10/23/2014   • Hyperglycemia    • Hyperlipidemia    • Hypovitaminosis D    • Left arm pain    • Left-sided weakness    • Leg swelling    • Low back pain    • Lower extremity edema    • Malaise and fatigue    • Mitral valve disease     Moderate mitral valve prolapse and moderate mitral regurgitation   • Mitral valve insufficiency    • Morbid obesity with BMI of 40.0-44.9, adult ([facility])    • MRSA infection    • NSTEMI (non-ST elevated myocardial infarction) ([facility])    • PAF (paroxysmal atrial fibrillation) ([facility])    • RA (rheumatoid arthritis) ([facility])     involving both hands   • Sleep apnea    • SOB (shortness of breath)    • Stroke ([facility])     left side weakness   • Urge incontinence of urine    • UTI (urinary tract infection) 05/2020   • Vitamin D deficiency        Past Surgical History:   Procedure Laterality Date   • BREAST BIOPSY     • BREAST SURGERY      right side lumpectomy with biopsy   • CARDIAC CATHETERIZATION Left 10/20/2017    Procedure: Cardiac Catheterization/Vascular Study;  Surgeon: Alphonso Olmedo MD;  Location: [facility] INVASIVE LOCATION;  Service:    • CARDIAC CATHETERIZATION N/A 10/20/2017    +2 mitral regurgitation, left main 10% stenosis, mid to distal LAD 10% diffuse stenosis, circumflex 10% diffuse stenosis, RCA 10% proximal stenosis, and distal PDA consistent with coronary embolus with a lesion of 90% too small to consider coronary intervention; medical management recommended   • EYE SURGERY      laser surgery for glaucoma and left eye cataracts removed   • HYSTERECTOMY      10+ years ago   • JOINT REPLACEMENT  2005; 2006    bilateral knees and left rotater   • KNEE SURGERY      • MAMMO BILATERAL      [facility]        Medications Prior to Admission   Medication Sig Dispense Refill Last Dose   • aspirin 81 MG EC tablet Take 1 tablet by mouth Daily.      • atorvastatin (LIPITOR) 10 MG tablet Take 1 tablet by mouth Daily. 90 tablet 1    • carvedilol (COREG) 25 MG tablet TAKE 1 & 1/2 (ONE & ONE-HALF) TABLETS BY MOUTH TWICE DAILY WITH MEALS 270 tablet 2    • digoxin (LANOXIN) 125 MCG tablet Take 1 tablet by mouth Every Other Day. 45 tablet 1    • furosemide (LASIX) 40 MG tablet Take 1 tablet in the morning 90 tablet 0    • HYDROcodone-acetaminophen (NORCO) 5-325 MG per tablet Take 1 tablet by mouth 2 (Two) Times a Day As Needed for Severe Pain . 40 tablet 0    • lisinopril (PRINIVIL,ZESTRIL) 10 MG tablet Take 1 tablet by mouth Daily. 90 tablet 3    • magnesium oxide (MAG-OX) 400 MG tablet Take 400 mg by mouth As Needed.      • potassium chloride 10 MEQ CR tablet Take 2 tablets by mouth Daily. (Patient taking differently: Take 10 mEq by mouth Daily. No longer BID) 180 tablet 3    • SITagliptin (Januvia) 100 MG tablet Take 1 tablet by mouth Daily. 30 tablet 11    • TRAVATAN Z 0.004 % solution ophthalmic solution Administer 1 drop to both eyes Every Morning. Not night      • vitamin D (ERGOCALCIFEROL) 1.25 MG (72369 UT) capsule capsule Take 1 capsule by mouth once a week 12 capsule 0    • warfarin (COUMADIN) 5 MG tablet TAKE 1 TABLET BY MOUTH ONCE DAILY OR  AS  DIRECTED  BY  MEDICATION  MANAGEMENT  CLINIC (Patient taking differently: Take 5 mg by mouth. TAKE 1 TABLET BY MOUTH ONCE DAILY and 1/2 tablet Wednesday) 90 tablet 1    • Blood Glucose Monitoring Suppl (ACCU-CHEK GUIDE) w/Device kit See Admin Instructions.      • glucose blood test strip Use as instructed 300 each 12    • Lancets (ACCU-CHEK MULTICLIX) lancets Use 1 lancet per finger stick 204 each 12    • [] nitrofurantoin, macrocrystal-monohydrate, (Macrobid) 100 MG capsule Take 1 capsule by mouth 2 (Two) Times a Day  for 5 days. 10 capsule 0        Current Meds  Scheduled Meds:furosemide, 40 mg, Intravenous, Q12H  insulin lispro, 0-9 Units, Subcutaneous, 4x Daily With Meals & Nightly  sodium chloride, 10 mL, Intravenous, Q12H      Continuous Infusions:nitroglycerin,  mcg/min, Last Rate: Stopped (21 0431)      PRN Meds:.•  acetaminophen **OR** acetaminophen **OR** acetaminophen  •  calcium carbonate  •  dextrose  •  dextrose  •  glucagon (human recombinant)  •  nitroglycerin  •  ondansetron **OR** ondansetron  •  sodium chloride    Allergies as of 2021 - Reviewed 2021   Allergen Reaction Noted   • Contrast dye Hives and Itching 2018       Social History     Socioeconomic History   • Marital status:      Spouse name: Suresh   • Number of children: 5   • Years of education: 13   Tobacco Use   • Smoking status: Former Smoker     Packs/day: 3.00     Types: Cigarettes     Start date: 1967     Quit date: 10/19/1968     Years since quittin.0   • Smokeless tobacco: Never Used   • Tobacco comment: caffeine use - decaf coffee   Substance and Sexual Activity   • Alcohol use: No     Comment: No caffeine use   • Drug use: No   • Sexual activity: Defer       Family History   Problem Relation Age of Onset   • Colon cancer Mother    • Glaucoma Mother    • Stroke Mother    • Arthritis Mother    • Hypertension Mother    • Glaucoma Sister    • Diabetes Sister    • Heart disease Sister    • Arthritis Sister    • Asthma Sister    • Hypertension Sister    • Miscarriages / Stillbirths Sister    • Lung disease Sister    • Heart disease Brother    • Diabetes Brother    • Arthritis Brother    • Drug abuse Brother    • Hypertension Brother    • Arthritis Father    • COPD Father    • Lung disease Father    • Arthritis Daughter    • Depression Daughter    • Alcohol abuse Maternal Uncle    • Heart disease Sister    • Heart disease Sister    • Heart disease Brother      Past social, family, surgical and medical  "history were reviewed, updated and amended when necessary.    Review of Systems   Constitutional: Negative for chills and fever.   HENT: Negative for hoarse voice and sore throat.    Eyes: Negative for double vision and photophobia.   Cardiovascular: Positive for dyspnea on exertion, leg swelling, orthopnea, palpitations and paroxysmal nocturnal dyspnea. Negative for chest pain, near-syncope and syncope.   Respiratory: Positive for cough and shortness of breath. Negative for wheezing.    Skin: Negative for poor wound healing and rash.   Musculoskeletal: Negative for arthritis and joint swelling.   Gastrointestinal: Negative for bloating, abdominal pain, hematemesis and hematochezia.   Neurological: Negative for dizziness and focal weakness.   Psychiatric/Behavioral: Negative for depression and suicidal ideas.            Objective:   Temp:  [98.7 °F (37.1 °C)-99.3 °F (37.4 °C)] 98.7 °F (37.1 °C)  Heart Rate:  [] 97  Resp:  [20-26] 20  BP: (113-150)/(70-93) 130/72  Body mass index is 40.35 kg/m².  Flowsheet Rows      First Filed Value   Admission Height 167.6 cm (66\") Documented at 11/08/2021 0131   Admission Weight 113 kg (250 lb) Documented at 11/08/2021 0131        Vitals:    11/08/21 0728   BP: 130/72   Pulse: 97   Resp: 20   Temp: 98.7 °F (37.1 °C)   SpO2: 94%       Vitals reviewed.   Constitutional:       Appearance: Frail. Chronically ill-appearing.   Neck:      Vascular: JVR present. JVD elevated.   Pulmonary:      Effort: Pulmonary effort is normal.      Breath sounds: No wheezing. No rhonchi. No rales.      Comments: Diffuse lower lobe crackles and mild expiratory wheeze  Chest:      Chest wall: Not tender to palpatation.   Cardiovascular:      PMI at left midclavicular line. Normal rate. Irregularly irregular rhythm. Normal S1. Normal S2.      Murmurs: There is no murmur.      No gallop. No click. No rub.   Pulses:     Intact distal pulses.   Edema:     Peripheral edema present.  Abdominal:      " General: Bowel sounds are normal.      Palpations: Abdomen is soft.      Tenderness: There is no abdominal tenderness.   Musculoskeletal: Normal range of motion.         General: No tenderness. Skin:     General: Skin is warm and dry.   Neurological:      General: No focal deficit present.      Mental Status: Alert and oriented to person, place and time.                 Lab Review:      Results from last 7 days   Lab Units 11/08/21  0535 11/08/21 0210 11/08/21 0210   SODIUM mmol/L 138   < > 138   POTASSIUM mmol/L 3.2*   < > 3.4*   CHLORIDE mmol/L 99   < > 101   CO2 mmol/L 25.5   < > 24.4   BUN mg/dL 16   < > 16   CREATININE mg/dL 1.14*   < > 1.11*   CALCIUM mg/dL 9.0   < > 9.4   BILIRUBIN mg/dL  --   --  2.9*   ALK PHOS U/L  --   --  93   ALT (SGPT) U/L  --   --  35*   AST (SGOT) U/L  --   --  64*   GLUCOSE mg/dL 155*   < > 168*    < > = values in this interval not displayed.     Results from last 7 days   Lab Units 11/08/21 0210   TROPONIN T ng/mL <0.010     @LABRCNTbnp@  Results from last 7 days   Lab Units 11/08/21  0535 11/08/21 0210   WBC 10*3/mm3 14.11* 10.78   HEMOGLOBIN g/dL 10.8* 11.1*   HEMATOCRIT % 31.7* 32.7*   PLATELETS 10*3/mm3 132* 129*     Results from last 7 days   Lab Units 11/08/21 0210 11/02/21  1411   INR  2.16* 2.1*   APTT seconds 46.0*  --      Results from last 7 days   Lab Units 11/08/21 0210   MAGNESIUM mg/dL 1.5*     @LABRCNTIP(chol,trig,hdl,ldl)                I personally viewed and interpreted the patient's EKG/Telemetry data  )  Patient Active Problem List   Diagnosis   • Hyperlipidemia   • Vitamin deficiency   • Essential hypertension   • Rheumatoid arthritis involving both hands (HCC)   • Fatigue   • ANTOINE (dyspnea on exertion)   • Dysuria   • Urge incontinence of urine   • Hypovitaminosis D   • Sleep apnea   • Encounter for screening colonoscopy   • Bronchitis   • Cough   • Generalized osteoarthritis of multiple sites   • Cellulitis of right elbow due to MRSA   • Medicare annual  wellness visit, initial   • Hospital discharge follow-up   • Displacement of lumbar intervertebral disc without myelopathy   • Lumbar disc herniation   • Atrial fibrillation with RVR ([facility])   • History of MRSA infection   • Left-sided weakness   • Atrial fibrillation ([facility])   • Left shoulder pain   • Relative lymphocytosis   • Nausea   • Weakness   • Controlled substance agreement signed   • Coronary artery disease involving native coronary artery of native heart without angina pectoris   • SOB (shortness of breath)   • Lower extremity edema   • Chronic diastolic heart failure ([facility])   • Adhesive capsulitis of left shoulder   • CVA tenderness   • Costochondritis, acute   • Allergic reaction   • 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[facility])   • Visit for screening mammogram   • Low back pain   • Urgency of urination   • Hyperglycemia   • Carpal tunnel syndrome of left wrist/ wakes her up   • Localized edema   • Acute cystitis without hematuria   • Nitrofurantoin adverse reaction   • Bruising   • History of stroke   • Diabetes 1.5, managed as type 2 ([facility])   • Other chronic pain   • Vaginal candidiasis   • Pulmonary hypertension ([facility])     Assessment and Plan:    1. Acute on chronic diastolic heart failure -continue IV diuretics.  Blood pressures well controlled.  Will restart home blood pressure medications and wean nitroglycerin drip.  Continue sodium and volume restriction.  Elevated leukocytosis is noted.  Will discuss with internal medicine team.  2. Leukocytosis and elevated procalcitonin -work-up and management per internal medicine  3. Chronic atrial fibrillation -better controlled will restart digoxin and carvedilol.  Check digoxin levels in the morning.  INR within therapeutic range.  4. Diabetes  5. CAD -continue aspirin, beta-blocker and statin    Andrés Singer MD  11/08/21  07:36 EST.  Time spent in reviewing chart, discussion and examination:

## 2021-11-08 NOTE — ED PROVIDER NOTES
EMERGENCY DEPARTMENT ENCOUNTER  Patient was placed in face mask in first look and the following protective measures were taken unless  documented below in the ED course. Patient was wearing facemask when I entered the room and throughout our encounter. I wore full protective equipment throughout this patient encounter including a N95 mask, eye shield, gown and gloves. Hand hygiene was performed before donning protective equipment and after removal when leaving the room.    Room Number:  Room/bed info not found  Date of encounter:  11/8/2021  PCP: Will Madden MD    HPI:  Context: Lana Yañez is a 76 y.o. female who presents to the ED c/o chief complaint of shortness of breath.  Patient complains of shortness of breath for the last 4 days.  Patient complains of dyspnea at rest, worse with exertion.  Patient endorses orthopnea and paroxysmal nocturnal dyspnea, endorses swelling of her lower extremities, denies any unexplained weight gain.  Patient reports history of CHF, on Lasix, 40 mg daily, has been compliant with her medication.  Patient also reports history of asthma.  Patient has been wheezing.  Patient reports that she has been using breathing treatments with minimal relief.  Patient denies any chest pain, has had cough, cough is nonproductive in nature.  No loss of sense of smell or taste, patient does report vomiting and diarrhea, 2 episodes of emesis today, emesis nonbloody nonbilious.  Patient reports multiple episodes of diarrhea, unable to quantify, no blood or mucus.  Triage reported abdominal pain but patient denies any abdominal pain to me.  Patient denies any fevers but does endorse shakes chills and night sweats.  Patient denies any recent sick contacts, reports that she has been vaccinated against COVID-19.    MEDICAL HISTORY REVIEW  Reviewed in EPIC    PAST MEDICAL HISTORY  Active Ambulatory Problems     Diagnosis Date Noted   • Hyperlipidemia 03/21/2016   • Vitamin deficiency  03/21/2016   • Essential hypertension 03/21/2016   • Rheumatoid arthritis involving both hands ([facility]) 03/21/2016   • Fatigue 04/06/2016   • ANTOINE (dyspnea on exertion) 04/06/2016   • Dysuria 08/02/2016   • Urge incontinence of urine 08/02/2016   • Hypovitaminosis D 08/02/2016   • Sleep apnea 08/02/2016   • Encounter for screening colonoscopy 08/02/2016   • Bronchitis 08/12/2016   • Cough 08/12/2016   • Generalized osteoarthritis of multiple sites 12/15/2016   • Cellulitis of right elbow due to MRSA 06/12/2017   • Medicare annual wellness visit, initial 06/27/2017   • Hospital discharge follow-up 06/27/2017   • Displacement of lumbar intervertebral disc without myelopathy 10/11/2017   • Lumbar disc herniation 10/11/2017   • Atrial fibrillation with RVR ([facility]) 10/11/2017   • History of MRSA infection 10/11/2017   • Left-sided weakness 10/15/2017   • Atrial fibrillation ([facility]) 10/15/2017   • Left shoulder pain 10/20/2017   • Relative lymphocytosis 11/13/2017   • Nausea 11/28/2017   • Weakness 11/28/2017   • Controlled substance agreement signed 12/05/2017   • Coronary artery disease involving native coronary artery of native heart without angina pectoris 12/14/2017   • SOB (shortness of breath) 12/14/2017   • Lower extremity edema 12/14/2017   • Chronic diastolic heart failure ([facility]) 12/14/2017   • Adhesive capsulitis of left shoulder 12/28/2017   • CVA tenderness 12/28/2017   • Costochondritis, acute 01/04/2018   • Allergic reaction 02/09/2018   • 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   • Visit for screening mammogram 04/02/2018   • Low back pain 04/02/2018   • Urgency of urination 10/04/2018   • Hyperglycemia 10/04/2018   • Carpal tunnel syndrome of left wrist/ wakes her up 11/28/2018   • Localized edema 03/10/2019   • Acute cystitis without hematuria 04/05/2019   • Nitrofurantoin adverse reaction 04/05/2019   • Bruising 05/21/2019   • History of stroke 09/26/2019   • Diabetes 1.5, 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    • Other chronic pain 12/19/2019   • Vaginal candidiasis 12/17/2020   • Pulmonary hypertension ([facility]) 02/07/2021     Resolved Ambulatory Problems     Diagnosis Date Noted   • Morbid obesity with BMI of 40.0-44.9, adult ([facility]) 12/15/2016   • Acute CVA (cerebrovascular accident) ([facility]) 10/15/2017   • NSTEMI (non-ST elevated myocardial infarction) ([facility]) 10/19/2017   • Cerebrovascular accident (CVA) due to occlusion of right middle cerebral artery ([facility]) 10/30/2017     Past Medical History:   Diagnosis Date   • Acute on chronic diastolic heart failure ([facility])    • Arthritis    • Asthma    • Atypical chest pain    • CHF (congestive heart failure) ([facility])    • COPD (chronic obstructive pulmonary disease) ([facility])    • Coronary artery disease    • Coronary artery disease involving native coronary artery of native heart with angina pectoris with documented spasm ([facility])    • Disease of thyroid gland    • Dizziness    • History of rheumatic fever    • History of transfusion    • Hx of bone density study    • Left arm pain    • Leg swelling    • Malaise and fatigue    • Mitral valve disease    • Mitral valve insufficiency    • MRSA infection    • PAF (paroxysmal atrial fibrillation) ([facility])    • RA (rheumatoid arthritis) ([facility])    • Stroke ([facility])    • UTI (urinary tract infection) 05/2020   • Vitamin D deficiency        PAST SURGICAL HISTORY  Past Surgical History:   Procedure Laterality Date   • BREAST BIOPSY     • BREAST SURGERY      right side lumpectomy with biopsy   • CARDIAC CATHETERIZATION Left 10/20/2017    Procedure: Cardiac Catheterization/Vascular Study;  Surgeon: Alphonso Olmedo MD;  Location: [facility] INVASIVE LOCATION;  Service:    • CARDIAC CATHETERIZATION N/A 10/20/2017    +2 mitral regurgitation, left main 10% stenosis, mid to distal LAD 10% diffuse stenosis, circumflex 10% diffuse stenosis, RCA 10% proximal stenosis, and distal PDA consistent with coronary embolus with a lesion of 90% too small to consider coronary  intervention; medical management recommended   • EYE SURGERY      laser surgery for glaucoma and left eye cataracts removed   • HYSTERECTOMY      10+ years ago   • JOINT REPLACEMENT  ;     bilateral knees and left rotater   • KNEE SURGERY     • M[facility]ILATERAL  2016    [facility]        FAMILY HISTORY  Family History   Problem Relation Age of Onset   • Colon cancer Mother    • Glaucoma Mother    • Stroke Mother    • Arthritis Mother    • Hypertension Mother    • Glaucoma Sister    • Diabetes Sister    • Heart disease Sister    • Arthritis Sister    • Asthma Sister    • Hypertension Sister    • Miscarriages / Stillbirths Sister    • Lung disease Sister    • Heart disease Brother    • Diabetes Brother    • Arthritis Brother    • Drug abuse Brother    • Hypertension Brother    • Arthritis Father    • COPD Father    • Lung disease Father    • Arthritis Daughter    • Depression Daughter    • Alcohol abuse Maternal Uncle    • Heart disease Sister    • Heart disease Sister    • Heart disease Brother        SOCIAL HISTORY  Social History     Socioeconomic History   • Marital status:      Spouse name: Suresh   • Number of children: 5   • Years of education: 13   Tobacco Use   • Smoking status: Former Smoker     Packs/day: 3.00     Types: Cigarettes     Start date: 1967     Quit date: 10/19/1968     Years since quittin.0   • Smokeless tobacco: Never Used   • Tobacco comment: caffeine use - decaf coffee   Substance and Sexual Activity   • Alcohol use: No     Comment: No caffeine use   • Drug use: No   • Sexual activity: Defer       ALLERGIES  Contrast dye    The patient's allergies have been reviewed    REVIEW OF SYSTEMS  All systems reviewed and negative except for those discussed in HPI.     PHYSICAL EXAM  I have reviewed the triage vital signs and nursing notes.  ED Triage Vitals   Temp Heart Rate Resp BP SpO2   21 0109 21 0109 21 0104 -- 21 0104   99.3 °F (37.4 °C)  114 26  (!) 85 %      Temp src Heart Rate Source Patient Position BP Location FiO2 (%)   11/08/21 0109 -- -- -- --   Temporal           General: No acute distress.  HENT: NCAT, PERRL, Nares patent.  Eyes: no scleral icterus.  Neck: trachea midline, no ROM limitations.  CV: Tachycardic rate, irregularly irregular.  Unable to assess JVD secondary to habitus.  1+ pitting edema bilateral lower extremities.  Respiratory: Tachypneic, mild accessory muscle use, extremely diminished  Abdomen: soft, nondistended, NTTP, no rebound tenderness, no guarding or rigidity.  : deferred.  Musculoskeletal: no deformity.  Neuro: alert, moves all extremities, follows commands.  Skin: warm, dry.    LAB RESULTS  Procedure Component Value Ref Range Date/Time   CBC (No Diff) [[id number]] Collected: 11/09/21 0440   Order Status: Sent Specimen: Blood Updated: 11/09/21 0525   Magnesium [[id number]] Collected: 11/09/21 0440   Order Status: Sent Specimen: Blood    Protime-INR [[id number]] Collected: 11/09/21 0440   Order Status: Sent Specimen: Blood    Comprehensive Metabolic Panel [[id number]] Collected: 11/09/21 0440   Order Status: Sent Specimen: Blood    Procalcitonin [[id number]] Collected: 11/09/21 0440   Order Status: Sent Specimen: Blood    Magnesium [[id number]]    Order Status: Sent Specimen: Blood    Blood Culture - Blood, Arm, Right [[id number]] (Normal) Collected: 11/08/21 0210   Order Status: Completed Specimen: Blood from Arm, Right Updated: 11/09/21 0230    Blood Culture No growth at 24 hours   Blood Culture - Blood, Arm, Right [[id number]] (Normal) Collected: 11/08/21 0210   Order Status: Completed Specimen: Blood from Arm, Right Updated: 11/09/21 0230    Blood Culture No growth at 24 hours   POC Glucose Once [[id number]] (Abnormal) Collected: 11/08/21 2249   Order Status: Completed Specimen: Blood Updated: 11/08/21 2251    Glucose 222 High  70 - 130 mg/dL     Comment: Meter: [id number] : [id number] Afsaneh HUFF      POC  Glucose 4x Daily AC & at Bedtime [[id number]]    Order Status: No result Specimen: Blood    Potassium [[id number]] (Normal) Collected: 11/08/21 1652   Order Status: Completed Specimen: Blood Updated: 11/08/21 1744    Potassium 3.9 3.5 - 5.2 mmol/L    POC Glucose 4x Daily AC & at Bedtime [[id number]]    Order Status: No result Specimen: Blood    Gastrointestinal Panel, PCR - Stool, Per Rectum [[id number]] (Normal) Collected: 11/08/21 1454   Order Status: Completed Specimen: Stool from Per Rectum Updated: 11/08/21 1640    Campylobacter Not Detected Not Detected     Plesiomonas shigelloides Not Detected Not Detected     Salmonella Not Detected Not Detected     Vibrio Not Detected Not Detected     Vibrio cholerae Not Detected Not Detected     Yersinia enterocolitica Not Detected Not Detected     Enteroaggregative E. coli (EAEC) Not Detected Not Detected     Enteropathogenic E. coli (EPEC) Not Detected Not Detected     Enterotoxigenic E. coli (ETEC) lt/st Not Detected Not Detected     Shiga-like toxin-producing E. coli (STEC) stx1/stx2 Not Detected Not Detected     Shigella/Enteroinvasive E. coli (EIEC) Not Detected Not Detected     Cryptosporidium Not Detected Not Detected     Cyclospora cayetanensis Not Detected Not Detected     Entamoeba histolytica Not Detected Not Detected     Giardia lamblia Not Detected Not Detected     Adenovirus F40/41 Not Detected Not Detected     Astrovirus Not Detected Not Detected     Norovirus GI/GII Not Detected Not Detected     Rotavirus A Not Detected Not Detected     Sapovirus (I, II, IV or V) Not Detected Not Detected    Narrative:     If Aeromonas, Staphylococcus aureus or Bacillus cereus are suspected, please order [id number]: Stool Culture, Aeromonas, S aureus, B Cereus.   Clostridium Difficile Toxin - Stool, Per Rectum [[id number]] (Normal) Collected: 11/08/21 1454   Order Status: Completed Specimen: Stool from Per Rectum Updated: 11/08/21 1612   Narrative:     The following orders were  created for panel order Clostridium Difficile Toxin - Stool, Per Rectum.   Procedure                               Abnormality         Status                     ---------                               -----------         ------                     Clostridium Difficile To...[[id number]]  Normal              Final result                 Please view results for these tests on the individual orders.   Clostridium Difficile Toxin, PCR - Stool, Per Rectum [[id number]] (Normal) Collected: 11/08/21 1454   Order Status: Completed Specimen: Stool from Per Rectum Updated: 11/08/21 1612    C. Difficile Toxins by PCR Negative Negative    POC Glucose Once [[id number]] (Abnormal) Collected: 11/08/21 1602   Order Status: Completed Specimen: Blood Updated: 11/08/21 1604    Glucose 141 High  70 - 130 mg/dL     Comment: Meter: [id number] : [id number] Cappetti Colt NA      Protime-INR [[id number]] (Abnormal) Collected: 11/08/21 1237   Order Status: Completed Specimen: Blood Updated: 11/08/21 1423    Protime 28.3 High  11.7 - 14.2 Seconds     INR 2.68 High  0.90 - 1.10    POC Glucose Once [[id number]] (Abnormal) Collected: 11/08/21 1102   Order Status: Completed Specimen: Blood Updated: 11/08/21 1104    Glucose 160 High  70 - 130 mg/dL     Comment: Meter: [id number] : [id number] Cappetti Colt NA      POC Glucose 4x Daily AC & at Bedtime [[id number]]    Order Status: No result Specimen: Blood    Urinalysis With Culture If Indicated - Urine, Catheter In/Out [[id number]] (Normal) Collected: 11/08/21 1008   Order Status: Completed Specimen: Urine, Catheter In/Out Updated: 11/08/21 1027    Color, UA Yellow Yellow, Straw     Appearance, UA Clear Clear     pH, UA <=5.0 5.0 - 8.0     Specific Gravity, UA 1.011 1.005 - 1.030     Glucose, UA Negative Negative     Ketones, UA Negative Negative     Bilirubin, UA Negative Negative     Blood, UA Negative Negative     Protein, UA Negative Negative     Leuk Esterase, UA Negative Negative      Nitrite, UA Negative Negative     Urobilinogen, UA 1.0 E.U./dL 0.2 - 1.0 E.U./dL    Narrative:     Urine microscopic not indicated.   POC Glucose 4x Daily AC & at Bedtime [[id number]]    Order Status: No result Specimen: Blood    Basic Metabolic Panel [[id number]] (Abnormal) Collected: 11/08/21 0535   Order Status: Completed Specimen: Blood Updated: 11/08/21 0608    Glucose 155 High  65 - 99 mg/dL     BUN 16 8 - 23 mg/dL     Creatinine 1.14 High  0.57 - 1.00 mg/dL     Sodium 138 136 - 145 mmol/L     Potassium 3.2 Low  3.5 - 5.2 mmol/L     Chloride 99 98 - 107 mmol/L     CO2 25.5 22.0 - 29.0 mmol/L     Calcium 9.0 8.6 - 10.5 mg/dL     eGFR   Amer 56 Low  >60 mL/min/1.73     BUN/Creatinine Ratio 14.0 7.0 - 25.0     Anion Gap 13.5 5.0 - 15.0 mmol/L    Narrative:     GFR Normal >60   Chronic Kidney Disease <60   Kidney Failure <15    CBC (No Diff) [[id number]] (Abnormal) Collected: 11/08/21 0535   Order Status: Completed Specimen: Blood Updated: 11/08/21 0607    WBC 14.11 High  3.40 - 10.80 10*3/mm3     RBC 3.54 Low  3.77 - 5.28 10*6/mm3     Hemoglobin 10.8 Low  12.0 - 15.9 g/dL     Hematocrit 31.7 Low  34.0 - 46.6 %     MCV 89.5 79.0 - 97.0 fL     MCH 30.5 26.6 - 33.0 pg     MCHC 34.1 31.5 - 35.7 g/dL     RDW 13.6 12.3 - 15.4 %     RDW-SD 44.7 37.0 - 54.0 fl     MPV 11.2 6.0 - 12.0 fL     Platelets 132 Low  140 - 450 10*3/mm3    Hemoglobin A1c [[id number]] (Abnormal) Collected: 11/08/21 0535   Order Status: Completed Specimen: Blood Updated: 11/08/21 0605    Hemoglobin A1C 7.11 High  4.80 - 5.60 %    Narrative:     Hemoglobin A1C Ranges:     Increased Risk for Diabetes  5.7% to 6.4%   Diabetes                     >= 6.5%   Diabetic Goal                < 7.0%   POC Glucose 4x Daily AC & at Bedtime [[id number]]    Order Status: Completed Specimen: Blood    POC Glucose 4x Daily AC & at Bedtime [[id number]]    Order Status: Completed Specimen: Blood    POC Glucose 4x Daily AC & at Bedtime [[id number]]    Order Status:  "Completed Specimen: Blood    POC Glucose 4x Daily AC & at Bedtime [[id number]]    Order Status: Completed Specimen: Blood    Procalcitonin [[id number]] (Abnormal) Collected: 11/08/21 0210   Order Status: Completed Specimen: Blood Updated: 11/08/21 0325    Procalcitonin 3.10 High  0.00 - 0.25 ng/mL    Narrative:     As a Marker for Sepsis (Non-Neonates):     1. <0.5 ng/mL represents a low risk of severe sepsis and/or septic shock.   2. >2 ng/mL represents a high risk of severe sepsis and/or septic shock.     As a Marker for Lower Respiratory Tract Infections that require antibiotic therapy:   PCT on Admission     Antibiotic Therapy             6-12 Hrs later   >0.5                          Strongly Recommended             >0.25 - <0.5             Recommended   0.1 - 0.25                  Discouraged                       Remeasure/reassess PCT   <0.1                         Strongly Discouraged         Remeasure/reassess PCT       As 28 day mortality risk marker: \"Change in Procalcitonin Result\" (>80% or <=80%) if Day 0 (or Day 1) and Day 4 values are available. Refer to http://www.Workboard-pct-calculator.com/     Change in PCT <=80 %   A decrease of PCT levels below or equal to 80% defines a positive change in PCT test result representing a higher risk for 28-day all-cause mortality of patients diagnosed with severe sepsis or septic shock.     Change in PCT >80 %   A decrease of PCT levels of more than 80% defines a negative change in PCT result representing a lower risk for 28-day all-cause mortality of patients diagnosed with severe sepsis or septic shock.              aPTT [[id number]] (Abnormal) Collected: 11/08/21 0210   Order Status: Completed Specimen: Blood Updated: 11/08/21 0302    PTT 46.0 High  22.7 - 35.4 seconds    Protime-INR [[id number]] (Abnormal) Collected: 11/08/21 0210   Order Status: Completed Specimen: Blood Updated: 11/08/21 0302    Protime 23.8 High  11.7 - 14.2 Seconds     INR 2.16 High  0.90 - " 1.10    Comprehensive Metabolic Panel [[id number]] (Abnormal) Collected: 11/08/21 0210   Order Status: Completed Specimen: Blood Updated: 11/08/21 0249    Glucose 168 High  65 - 99 mg/dL     BUN 16 8 - 23 mg/dL     Creatinine 1.11 High  0.57 - 1.00 mg/dL     Sodium 138 136 - 145 mmol/L     Potassium 3.4 Low  3.5 - 5.2 mmol/L     Chloride 101 98 - 107 mmol/L     CO2 24.4 22.0 - 29.0 mmol/L     Calcium 9.4 8.6 - 10.5 mg/dL     Total Protein 7.9 6.0 - 8.5 g/dL     Albumin 4.30 3.50 - 5.20 g/dL     ALT (SGPT) 35 High  1 - 33 U/L     AST (SGOT) 64 High  1 - 32 U/L     Alkaline Phosphatase 93 39 - 117 U/L     Total Bilirubin 2.9 High  0.0 - 1.2 mg/dL     eGFR   Amer 58 Low  >60 mL/min/1.73     Globulin 3.6 gm/dL     A/G Ratio 1.2 g/dL     BUN/Creatinine Ratio 14.4 7.0 - 25.0     Anion Gap 12.6 5.0 - 15.0 mmol/L    Narrative:     GFR Normal >60   Chronic Kidney Disease <60   Kidney Failure <15    Magnesium [[id number]] (Abnormal) Collected: 11/08/21 0210   Order Status: Completed Specimen: Blood Updated: 11/08/21 0248    Magnesium 1.5 Low  1.6 - 2.4 mg/dL    Respiratory Panel PCR w/COVID-19(SARS-CoV-2) MORGAN/EMERALD/EVELIO/PAD/COR/MAD/RAJEEV In-House, NP Swab in UTM/VTM, 3-4 HR TAT - Swab, Nasopharynx [[id number]] (Normal) Collected: 11/08/21 0133   Order Status: Completed Specimen: Swab from Nasopharynx Updated: 11/08/21 0243    ADENOVIRUS, PCR Not Detected Not Detected     Coronavirus 229E Not Detected Not Detected     Coronavirus HKU1 Not Detected Not Detected     Coronavirus NL63 Not Detected Not Detected     Coronavirus OC43 Not Detected Not Detected     COVID19 Not Detected Not Detected - Ref. Range     Human Metapneumovirus Not Detected Not Detected     Human Rhinovirus/Enterovirus Not Detected Not Detected     Influenza A PCR Not Detected Not Detected     Influenza B PCR Not Detected Not Detected     Parainfluenza Virus 1 Not Detected Not Detected     Parainfluenza Virus 2 Not Detected Not Detected     Parainfluenza Virus  3 Not Detected Not Detected     Parainfluenza Virus 4 Not Detected Not Detected     RSV, PCR Not Detected Not Detected     Bordetella pertussis pcr Not Detected Not Detected     Bordetella parapertussis PCR Not Detected Not Detected     Chlamydophila pneumoniae PCR Not Detected Not Detected     Mycoplasma pneumo by PCR Not Detected Not Detected    Narrative:     In the setting of a positive respiratory panel with a viral infection PLUS a negative procalcitonin without other underlying concern for bacterial infection, consider observing off antibiotics or discontinuation of antibiotics and continue supportive care. If the respiratory panel is positive for atypical bacterial infection (Bordetella pertussis, Chlamydophila pneumoniae, or Mycoplasma pneumoniae), consider antibiotic de-escalation to target atypical bacterial infection.   Troponin [[id number]] (Normal) Collected: 11/08/21 0210   Order Status: Completed Specimen: Blood Updated: 11/08/21 0240    Troponin T <0.010 0.000 - 0.030 ng/mL    Narrative:     Troponin T Reference Range:   <= 0.03 ng/mL-   Negative for AMI   >0.03 ng/mL-     Abnormal for myocardial necrosis.  Clinicians would have to utilize clinical acumen, EKG, Troponin and serial changes to determine if it is an Acute Myocardial Infarction or myocardial injury due to an underlying chronic condition.       Results may be falsely decreased if patient taking Biotin.    Lactic Acid, Plasma [[id number]] (Normal) Collected: 11/08/21 0210   Order Status: Completed Specimen: Blood Updated: 11/08/21 0236    Lactate 1.5 0.5 - 2.0 mmol/L    BNP [[id number]] (Abnormal) Collected: 11/08/21 0210   Order Status: Completed Specimen: Blood Updated: 11/08/21 0236    proBNP 1,344.0 High  0.0 - 900.0 pg/mL    Narrative:     Among patients with dyspnea, NT-proBNP is highly sensitive for the detection of acute congestive heart failure. In addition NT-proBNP of <300 pg/ml effectively rules out acute congestive heart failure  with 99% negative predictive value.     Results may be falsely decreased if patient taking Biotin.    CBC Auto Differential [[id number]] (Abnormal) Collected: 11/08/21 0210   Order Status: Completed Specimen: Blood Updated: 11/08/21 0232    WBC 10.78 3.40 - 10.80 10*3/mm3     RBC 3.65 Low  3.77 - 5.28 10*6/mm3     Hemoglobin 11.1 Low  12.0 - 15.9 g/dL     Hematocrit 32.7 Low  34.0 - 46.6 %     MCV 89.6 79.0 - 97.0 fL     MCH 30.4 26.6 - 33.0 pg     MCHC 33.9 31.5 - 35.7 g/dL     RDW 13.6 12.3 - 15.4 %     RDW-SD 44.6 37.0 - 54.0 fl     MPV 11.4 6.0 - 12.0 fL     Platelets 129 Low  140 - 450 10*3/mm3     Neutrophil % 87.0 High  42.7 - 76.0 %     Lymphocyte % 4.9 Low  19.6 - 45.3 %     Monocyte % 7.1 5.0 - 12.0 %     Eosinophil % 0.4 0.3 - 6.2 %     Basophil % 0.1 0.0 - 1.5 %     Neutrophils, Absolute 9.38 High  1.70 - 7.00 10*3/mm3     Lymphocytes, Absolute 0.53 Low  0.70 - 3.10 10*3/mm3     Monocytes, Absolute 0.77 0.10 - 0.90 10*3/mm3     Eosinophils, Absolute 0.04 0.00 - 0.40 10*3/mm3     Basophils, Absolute 0.01 0.00 - 0.20 10*3/mm3          I ordered the above labs and reviewed the results.    RADIOLOGY        XR Chest 1 View (Final result)  Result time 11/08/21 03:16:20  Final result by Jerome Garcia MD (11/08/21 03:16:20)                Impression:        Electronically signed by Jerome Garcia MD on 11-08-21 at 0316            Narrative:      Patient: TERA GARNER  Time Out: 03:16   Exam(s): FILM CXR 1 VIEW     EXAM:     XR Chest, 1 View     CLINICAL HISTORY:      Reason for exam: SOA.     TECHNIQUE:     Frontal view of the chest.     COMPARISON:     Chest CT from September 23, 2021 and chest x-ray from October 20, 2018     FINDINGS:     Lungs:  See below.       Pleural space:  Unremarkable.  No pneumothorax.     Heart:  Moderate cardiomegaly with prominent vascularity.  Perihilar   bibasilar hazy increased densities may represent CHF versus artifact from   massive body habitus and portable technique.      Mediastinum:  Unremarkable.     Bones joints:  Unremarkable.     Vasculature:  The thoracic aorta is mildly calcified and upper normal   in size.     Upper abdomen:  No pneumoperitoneum is seen under the diaphragm.     IMPRESSION:         Moderate cardiomegaly with prominent vascularity.  Perihilar bibasilar   hazy increased densities may represent CHF versus artifact from massive   body habitus and portable technique.                   I ordered the above noted radiological studies. I reviewed the images and results. I agree with the radiologist interpretation.    PROCEDURES  Procedures    MEDICATIONS GIVEN IN ER  Medications - No data to display    PROGRESS, DATA ANALYSIS, CONSULTS, AND MEDICAL DECISION MAKING  A complete history and physical exam have been performed.  All available laboratory and imaging results have been reviewed by myself prior to disposition.    MDM  After the initial H&P, I discussed pertinent information from history and physical exam with patient/family.  Discussed differential diagnosis.  Discussed plan for ED evaluation/work-up/treatment.  All questions answered.  Patient/family is agreeable with plan.  ED Course as of 11/09/21 0527   Mon Nov 08, 2021   0121 My differential diagnosis for dyspnea includes but is not limited to:  Asthma, COPD, pneumonia, pulmonary embolus, acute respiratory distress syndrome, pneumothorax, pleural effusion, pulmonary fibrosis, congestive heart failure, myocardial infarction, DKA, uremia, acidosis, sepsis, anemia, drug related, hyperventilation, CNS disease     [JG]   0122 EKG independently viewed and contemporaneously interpreted by ED physician. Time: 1:16 AM.  Heart rate 22.  Interpretation: Atrial fibrillation with RVR, left axis deviation, inferior Q waves, loss of anterior forces, no acute ST changes. [JG]   0123 When compared with prior EKG on 8/5/2021, inferior Q waves are new but otherwise no acute changes are present. [JG]   0150 I reviewed  chest x-ray in PACS, my findings are: Pulmonary edema. [JG]   0151 She has pulmonary edema, increased work of breathing, use accessory muscles.  Blood pressure is elevated.  Using nitroglycerin drip for afterload reducer, ordering BiPAP. [JG]   0156 Nursing staff unable to obtain IV access.  IV therapy paged.  Given inability to give IV medications, will give captopril and placing Nitropaste. [JG]   0219 IV access obtained.  Canceling nitroglycerin paste and captopril, will give Vasotec, initiating therapy with nitroglycerin drip, giving Lasix. [JG]   0438 Discussed patient's case with ANGELA Brink with [facility].  To admit the patient to a telemetry bed under Dr. Christian's care [RC]      ED Course User Index  [JG] Checo Polk MD  [RC] Ernst Ghotra III, PA       AS OF 01:19 EST VITALS:    BP -    HR - 114  TEMP - 99.3 °F (37.4 °C) (Temporal)  O2 SATS - 96%    DIAGNOSIS  Final diagnoses:   None     Critical care:  Total critical care time of 40 minutes is exclusive of any other billable procedures and includes time spent with direct patient care and observation, retrospective chart review, management of acute condition, and consultation with other physicians.      DISPOSITION  ADMISSION    Discussed treatment plan and reason for admission with pt/family and admitting physician.  Pt/family voiced understanding of the plan for admission for further testing/treatment as needed.          Checo Polk MD  11/09/21 0518

## 2021-11-08 NOTE — ED NOTES
Keeping nitroglycerin drip at 50 mcg/min until pt is off of CPAP per YEYO Ghotra.      Narda Haji RN  11/08/21 0301

## 2021-11-08 NOTE — ED NOTES
Nursing report ED to floor  Lana Yañez  76 y.o.  female    HPI :   Chief Complaint   Patient presents with   • Shortness of Breath       Admitting doctor:   Shelbi Christian MD    Admitting diagnosis:   The primary encounter diagnosis was Atrial fibrillation with RVR (HCC). Diagnoses of Congestive heart failure, unspecified HF chronicity, unspecified heart failure type (HCC) and Acute respiratory failure with hypoxia (HCC) were also pertinent to this visit.    Code status:   Current Code Status     Date Active Code Status Order ID Comments User Context       11/8/2021 0441 CPR (Attempt to Resuscitate) [id number]  Cindy Sanderson APRN ED     Advance Care Planning Activity      Questions for Current Code Status     Question Answer    Code Status (Patient has no pulse and is not breathing) CPR (Attempt to Resuscitate)    Medical Interventions (Patient has pulse or is breathing) Full Support          Allergies:   Contrast dye    Intake and Output    Intake/Output Summary (Last 24 hours) at 11/8/2021 0502  Last data filed at 11/8/2021 0451  Gross per 24 hour   Intake 76.5 ml   Output 900 ml   Net -823.5 ml       Weight:       11/08/21  0131   Weight: 113 kg (250 lb)       Most recent vitals:   Vitals:    11/08/21 0441 11/08/21 0446 11/08/21 0451 11/08/21 0456   BP: 115/87 118/79 114/78 121/78   BP Location:       Patient Position:       Pulse: 102 101 94 95   Resp:       Temp:       TempSrc:       SpO2: 94% 95% 96% 96%   Weight:       Height:           Active LDAs/IV Access:   Lines, Drains & Airways     Active LDAs     Name Placement date Placement time Site Days    Peripheral IV 11/08/21 0213 Right Antecubital 11/08/21 0213  Antecubital  less than 1    External Urinary Catheter 11/08/21  0258  --  less than 1                Labs (abnormal labs have a star):   Labs Reviewed   COMPREHENSIVE METABOLIC PANEL - Abnormal; Notable for the following components:       Result Value    Glucose 168 (*)      "Creatinine 1.11 (*)     Potassium 3.4 (*)     ALT (SGPT) 35 (*)     AST (SGOT) 64 (*)     Total Bilirubin 2.9 (*)     eGFR   Amer 58 (*)     All other components within normal limits    Narrative:     GFR Normal >60  Chronic Kidney Disease <60  Kidney Failure <15     BNP (IN-HOUSE) - Abnormal; Notable for the following components:    proBNP 1,344.0 (*)     All other components within normal limits    Narrative:     Among patients with dyspnea, NT-proBNP is highly sensitive for the detection of acute congestive heart failure. In addition NT-proBNP of <300 pg/ml effectively rules out acute congestive heart failure with 99% negative predictive value.    Results may be falsely decreased if patient taking Biotin.     MAGNESIUM - Abnormal; Notable for the following components:    Magnesium 1.5 (*)     All other components within normal limits   PROCALCITONIN - Abnormal; Notable for the following components:    Procalcitonin 3.10 (*)     All other components within normal limits    Narrative:     As a Marker for Sepsis (Non-Neonates):     1. <0.5 ng/mL represents a low risk of severe sepsis and/or septic shock.  2. >2 ng/mL represents a high risk of severe sepsis and/or septic shock.    As a Marker for Lower Respiratory Tract Infections that require antibiotic therapy:  PCT on Admission     Antibiotic Therapy             6-12 Hrs later  >0.5                          Strongly Recommended            >0.25 - <0.5             Recommended  0.1 - 0.25                  Discouraged                       Remeasure/reassess PCT  <0.1                         Strongly Discouraged         Remeasure/reassess PCT      As 28 day mortality risk marker: \"Change in Procalcitonin Result\" (>80% or <=80%) if Day 0 (or Day 1) and Day 4 values are available. Refer to http://www.[employer]-pct-calculator.com/    Change in PCT <=80 %   A decrease of PCT levels below or equal to 80% defines a positive change in PCT test result representing a " higher risk for 28-day all-cause mortality of patients diagnosed with severe sepsis or septic shock.    Change in PCT >80 %   A decrease of PCT levels of more than 80% defines a negative change in PCT result representing a lower risk for 28-day all-cause mortality of patients diagnosed with severe sepsis or septic shock.               PROTIME-INR - Abnormal; Notable for the following components:    Protime 23.8 (*)     INR 2.16 (*)     All other components within normal limits   APTT - Abnormal; Notable for the following components:    PTT 46.0 (*)     All other components within normal limits   CBC WITH AUTO DIFFERENTIAL - Abnormal; Notable for the following components:    RBC 3.65 (*)     Hemoglobin 11.1 (*)     Hematocrit 32.7 (*)     Platelets 129 (*)     Neutrophil % 87.0 (*)     Lymphocyte % 4.9 (*)     Neutrophils, Absolute 9.38 (*)     Lymphocytes, Absolute 0.53 (*)     All other components within normal limits   RESPIRATORY PANEL PCR W/ COVID-19 (SARS-COV-2) MORGAN/EMERALD/EVELIO/PAD/COR/MAD/RAJEEV IN-HOUSE, NP SWAB IN [facility], 3-4 HR TAT - Normal    Narrative:     In the setting of a positive respiratory panel with a viral infection PLUS a negative procalcitonin without other underlying concern for bacterial infection, consider observing off antibiotics or discontinuation of antibiotics and continue supportive care. If the respiratory panel is positive for atypical bacterial infection (Bordetella pertussis, Chlamydophila pneumoniae, or Mycoplasma pneumoniae), consider antibiotic de-escalation to target atypical bacterial infection.   TROPONIN (IN-HOUSE) - Normal    Narrative:     Troponin T Reference Range:  <= 0.03 ng/mL-   Negative for AMI  >0.03 ng/mL-     Abnormal for myocardial necrosis.  Clinicians would have to utilize clinical acumen, EKG, Troponin and serial changes to determine if it is an Acute Myocardial Infarction or myocardial injury due to an underlying chronic condition.       Results may be falsely  decreased if patient taking Biotin.     LACTIC ACID, PLASMA - Normal   BLOOD CULTURE   BLOOD CULTURE   BASIC METABOLIC PANEL   CBC (NO DIFF)   HEMOGLOBIN A1C   POCT GLUCOSE FINGERSTICK   POCT GLUCOSE FINGERSTICK   POCT GLUCOSE FINGERSTICK   POCT GLUCOSE FINGERSTICK   CBC AND DIFFERENTIAL    Narrative:     The following orders were created for panel order CBC & Differential.  Procedure                               Abnormality         Status                     ---------                               -----------         ------                     CBC Auto Differential[[id number]]        Abnormal            Final result                 Please view results for these tests on the individual orders.       EKG:   ECG 12 Lead   Preliminary Result   HEART RATE= 122  bpm   RR Interval= 492  ms   KS Interval=   ms   P Horizontal Axis=   deg   P Front Axis=   deg   QRSD Interval= 97  ms   QT Interval= 308  ms   QRS Axis= -71  deg   T Wave Axis= 92  deg   - ABNORMAL ECG -   Atrial fibrillation   Paired ventricular premature complexes   LAD, consider LAFB or inferior infarct   Anterior infarct, old   Nonspecific T abnormalities, lateral leads   Electronically Signed By:    Date and Time of Study: 2021-11-08 01:16:45          Meds given in ED:   Medications   nitroglycerin (TRIDIL) 200 mcg/ml infusion for pulmonary edema (0 mcg/min Intravenous Stopped 11/8/21 0261)   sodium chloride 0.9 % flush 10 mL (has no administration in time range)   sodium chloride 0.9 % flush 10 mL (has no administration in time range)   nitroglycerin (NITROSTAT) SL tablet 0.4 mg (has no administration in time range)   acetaminophen (TYLENOL) tablet 650 mg (has no administration in time range)     Or   acetaminophen (TYLENOL) 160 MG/5ML solution 650 mg (has no administration in time range)     Or   acetaminophen (TYLENOL) suppository 650 mg (has no administration in time range)   ondansetron (ZOFRAN) tablet 4 mg (has no administration in time range)     Or    ondansetron (ZOFRAN) injection 4 mg (has no administration in time range)   calcium carbonate (TUMS) chewable tablet 500 mg (200 mg elemental) (has no administration in time range)   furosemide (LASIX) injection 40 mg (has no administration in time range)   dextrose (GLUTOSE) oral gel 15 g (has no administration in time range)   dextrose (D50W) (25 g/50 mL) IV injection 25 g (has no administration in time range)   glucagon (human recombinant) (GLUCAGEN DIAGNOSTIC) injection 1 mg (has no administration in time range)   insulin lispro (ADMELOG) injection 0-9 Units (has no administration in time range)   dilTIAZem (CARDIZEM) injection 10 mg (10 mg Intravenous Given 21 0238)   magnesium sulfate 2g/50 mL (PREMIX) infusion (0 g Intravenous Stopped 21 0353)   furosemide (LASIX) injection 80 mg (80 mg Intravenous Given 21 0235)   enalaprilat (VASOTEC) injection 1.25 mg (1.25 mg Intravenous Given 21 0241)       Imaging results:  XR Chest 1 View    Result Date: 2021  Electronically signed by Jerome Garcia MD on 21 at 0316      Ambulatory status:   - assist    Social issues:   Social History     Socioeconomic History   • Marital status:      Spouse name: Suresh   • Number of children: 5   • Years of education: 13   Tobacco Use   • Smoking status: Former Smoker     Packs/day: 3.00     Types: Cigarettes     Start date: 1967     Quit date: 10/19/1968     Years since quittin.0   • Smokeless tobacco: Never Used   • Tobacco comment: caffeine use - decaf coffee   Substance and Sexual Activity   • Alcohol use: No     Comment: No caffeine use   • Drug use: No   • Sexual activity: Defer        Nursing report ED to floor:       Zayra Delacruz RN  21 [phone]

## 2021-11-08 NOTE — ED NOTES
This RN wore gloves, mask, eye protection and all other necessary PPE while performing pt care.     Zayra Delacruz, OLEGARIO  11/08/21 0214

## 2021-11-08 NOTE — ED PROVIDER NOTES
My history and physical exam mirror that of my supervising physician Dr. Polk.  The patient was placed on BiPAP for approximately an hour due to her fluid overload and this was removed.  Patient states she is feeling significantly better at this time and is now requiring 2 L of O2 to maintain an SPO2 of 95%.  I feel she is stable for the floor at this time in a telemetry bed and will place a call to the hospitalist to discuss admission.  Please see the body of Dr. Hernandez's note under the work-up tab for conversations had with the hospitalist and timeline of events.     Ernst Ghotra III, PA  11/08/21 0416       Ernst Ghotra III, PA  11/08/21 0441

## 2021-11-08 NOTE — PROGRESS NOTES
"[facility] Clinical Pharmacy Services: C. Difficile Medication Changes    Pharmacy has been consulted to look over Lana Yañez's profile to check patient's medications for changes due to C. Difficile diagnosis per Dr. Rodriguez's request.    Relevant clinical data and objective history reviewed:  76 y.o. female 167.6 cm (66\") 113 kg (250 lb)    Past Medical History:   Diagnosis Date   • Acute on chronic diastolic heart failure (Formerly [facility])    • Arthritis    • Asthma    • Atrial fibrillation (Former[facility])     Persistent; on warfarin   • Atypical chest pain    • Bronchitis    • Cellulitis of right elbow     due to MRSA   • CHF (congestive heart failure) (Formerly [facility])    • COPD (chronic obstructive pulmonary disease) (Formerly [facility])    • Coronary artery disease     Cardiac catheterization completed; 90% PDA stenosis with medical management recommended   • Coronary artery disease involving native coronary artery of native heart with angina pectoris with documented spasm ([facility])    • Diabetes 1.5, managed as type 2 (Formerly [facility])    • Disease of thyroid gland    • Displacement of lumbar intervertebral disc without myelopathy    • Dizziness    • ANTOINE (dyspnea on exertion)    • Essential hypertension    • Fatigue    • Generalized osteoarthritis of multiple sites    • History of rheumatic fever    • History of transfusion    • Hx of bone density study     10/23/2014   • Hyperglycemia    • Hyperlipidemia    • Hypovitaminosis D    • Left arm pain    • Left-sided weakness    • Leg swelling    • Low back pain    • Lower extremity edema    • Malaise and fatigue    • Mitral valve disease     Moderate mitral valve prolapse and moderate mitral regurgitation   • Mitral valve insufficiency    • Morbid obesity with BMI of 40.0-44.9, adult ([facility])    • MRSA infection    • NSTEMI (non-ST elevated myocardial infarction) ([facility])    • PAF (paroxysmal atrial fibrillation) ([facility])    • RA (rheumatoid arthritis) ([facility]ly [facility])     involving both hands   • Sleep apnea    • SOB (shortness of breath) "    • Stroke (HCC)     left side weakness   • Urge incontinence of urine    • UTI (urinary tract infection) 05/2020   • Vitamin D deficiency      Creatinine   Date Value Ref Range Status   11/08/2021 1.14 (H) 0.57 - 1.00 mg/dL Final   11/08/2021 1.11 (H) 0.57 - 1.00 mg/dL Final     BUN   Date Value Ref Range Status   11/08/2021 16 8 - 23 mg/dL Final     Estimated Creatinine Clearance: 55.2 mL/min (A) (by C-G formula based on SCr of 1.14 mg/dL (H)).    Assessment/Plan    1. Proton pump inhibitor - none    2. Antiperistalic agents or stool softeners/laxatives - none    Thank you for allowing me to participate in your patient's care.  Please call pharmacy with any questions or concerns.  Seth Bowman [facility]

## 2021-11-08 NOTE — ED TRIAGE NOTES
Patient from home via private vehicle complaining of shortness of breath, diarrhea, abdominal pain, and headache for past 2 days.

## 2021-11-09 ENCOUNTER — APPOINTMENT (OUTPATIENT)
Dept: CT IMAGING | Facility: HOSPITAL | Age: 76
End: 2021-11-09

## 2021-11-09 ENCOUNTER — TELEPHONE (OUTPATIENT)
Dept: INTERNAL MEDICINE | Facility: CLINIC | Age: 76
End: 2021-11-09

## 2021-11-09 LAB
ALBUMIN SERPL-MCNC: 3.6 G/DL (ref 3.5–5.2)
ALBUMIN/GLOB SERPL: 0.9 G/DL
ALP SERPL-CCNC: 90 U/L (ref 39–117)
ALT SERPL W P-5'-P-CCNC: 49 U/L (ref 1–33)
ANION GAP SERPL CALCULATED.3IONS-SCNC: 12.1 MMOL/L (ref 5–15)
AST SERPL-CCNC: 64 U/L (ref 1–32)
BILIRUB SERPL-MCNC: 2.1 MG/DL (ref 0–1.2)
BUN SERPL-MCNC: 18 MG/DL (ref 8–23)
BUN/CREAT SERPL: 18 (ref 7–25)
CALCIUM SPEC-SCNC: 9.3 MG/DL (ref 8.6–10.5)
CHLORIDE SERPL-SCNC: 99 MMOL/L (ref 98–107)
CO2 SERPL-SCNC: 25.9 MMOL/L (ref 22–29)
CREAT SERPL-MCNC: 1 MG/DL (ref 0.57–1)
DEPRECATED RDW RBC AUTO: 44.1 FL (ref 37–54)
DIGOXIN SERPL-MCNC: 0.4 NG/ML (ref 0.6–1.2)
ERYTHROCYTE [DISTWIDTH] IN BLOOD BY AUTOMATED COUNT: 13.5 % (ref 12.3–15.4)
GFR SERPL CREATININE-BSD FRML MDRD: 66 ML/MIN/1.73
GLOBULIN UR ELPH-MCNC: 4.1 GM/DL
GLUCOSE BLDC GLUCOMTR-MCNC: 112 MG/DL (ref 70–130)
GLUCOSE BLDC GLUCOMTR-MCNC: 161 MG/DL (ref 70–130)
GLUCOSE BLDC GLUCOMTR-MCNC: 168 MG/DL (ref 70–130)
GLUCOSE BLDC GLUCOMTR-MCNC: 171 MG/DL (ref 70–130)
GLUCOSE SERPL-MCNC: 116 MG/DL (ref 65–99)
HCT VFR BLD AUTO: 32.2 % (ref 34–46.6)
HGB BLD-MCNC: 10.9 G/DL (ref 12–15.9)
INR PPP: 3.31 (ref 0.9–1.1)
MAGNESIUM SERPL-MCNC: 1.8 MG/DL (ref 1.6–2.4)
MCH RBC QN AUTO: 30.2 PG (ref 26.6–33)
MCHC RBC AUTO-ENTMCNC: 33.9 G/DL (ref 31.5–35.7)
MCV RBC AUTO: 89.2 FL (ref 79–97)
PLATELET # BLD AUTO: 136 10*3/MM3 (ref 140–450)
PMV BLD AUTO: 11.3 FL (ref 6–12)
POTASSIUM SERPL-SCNC: 3.3 MMOL/L (ref 3.5–5.2)
PROCALCITONIN SERPL-MCNC: 3.43 NG/ML (ref 0–0.25)
PROT SERPL-MCNC: 7.7 G/DL (ref 6–8.5)
PROTHROMBIN TIME: 33.4 SECONDS (ref 11.7–14.2)
RBC # BLD AUTO: 3.61 10*6/MM3 (ref 3.77–5.28)
SODIUM SERPL-SCNC: 137 MMOL/L (ref 136–145)
WBC # BLD AUTO: 9.53 10*3/MM3 (ref 3.4–10.8)

## 2021-11-09 PROCEDURE — 25010000002 CEFTRIAXONE PER 250 MG: Performed by: INTERNAL MEDICINE

## 2021-11-09 PROCEDURE — 84132 ASSAY OF SERUM POTASSIUM: CPT | Performed by: INTERNAL MEDICINE

## 2021-11-09 PROCEDURE — 82962 GLUCOSE BLOOD TEST: CPT

## 2021-11-09 PROCEDURE — 80162 ASSAY OF DIGOXIN TOTAL: CPT | Performed by: NURSE PRACTITIONER

## 2021-11-09 PROCEDURE — 25010000002 FUROSEMIDE PER 20 MG: Performed by: INTERNAL MEDICINE

## 2021-11-09 PROCEDURE — 74176 CT ABD & PELVIS W/O CONTRAST: CPT

## 2021-11-09 PROCEDURE — 99223 1ST HOSP IP/OBS HIGH 75: CPT | Performed by: INTERNAL MEDICINE

## 2021-11-09 PROCEDURE — 99232 SBSQ HOSP IP/OBS MODERATE 35: CPT | Performed by: NURSE PRACTITIONER

## 2021-11-09 PROCEDURE — 83735 ASSAY OF MAGNESIUM: CPT | Performed by: HOSPITALIST

## 2021-11-09 PROCEDURE — 85027 COMPLETE CBC AUTOMATED: CPT | Performed by: HOSPITALIST

## 2021-11-09 PROCEDURE — 85610 PROTHROMBIN TIME: CPT | Performed by: NURSE PRACTITIONER

## 2021-11-09 PROCEDURE — 63710000001 INSULIN LISPRO (HUMAN) PER 5 UNITS: Performed by: NURSE PRACTITIONER

## 2021-11-09 PROCEDURE — 83690 ASSAY OF LIPASE: CPT | Performed by: HOSPITALIST

## 2021-11-09 PROCEDURE — 84145 PROCALCITONIN (PCT): CPT | Performed by: HOSPITALIST

## 2021-11-09 PROCEDURE — 25010000002 MAGNESIUM SULFATE IN D5W 1G/100ML (PREMIX) 1-5 GM/100ML-% SOLUTION: Performed by: HOSPITALIST

## 2021-11-09 PROCEDURE — 80053 COMPREHEN METABOLIC PANEL: CPT | Performed by: HOSPITALIST

## 2021-11-09 RX ORDER — BENZONATATE 100 MG/1
100 CAPSULE ORAL 3 TIMES DAILY PRN
Status: DISCONTINUED | OUTPATIENT
Start: 2021-11-09 | End: 2021-11-11 | Stop reason: HOSPADM

## 2021-11-09 RX ORDER — FUROSEMIDE 10 MG/ML
80 INJECTION INTRAMUSCULAR; INTRAVENOUS EVERY 12 HOURS
Status: DISCONTINUED | OUTPATIENT
Start: 2021-11-10 | End: 2021-11-11 | Stop reason: HOSPADM

## 2021-11-09 RX ORDER — LOPERAMIDE HYDROCHLORIDE 2 MG/1
2 CAPSULE ORAL ONCE
Status: COMPLETED | OUTPATIENT
Start: 2021-11-09 | End: 2021-11-09

## 2021-11-09 RX ADMIN — POTASSIUM CHLORIDE 40 MEQ: 1.5 POWDER, FOR SOLUTION ORAL at 18:00

## 2021-11-09 RX ADMIN — POTASSIUM CHLORIDE 40 MEQ: 1.5 POWDER, FOR SOLUTION ORAL at 06:35

## 2021-11-09 RX ADMIN — ASPIRIN 81 MG: 81 TABLET, COATED ORAL at 08:31

## 2021-11-09 RX ADMIN — INSULIN LISPRO 2 UNITS: 100 INJECTION, SOLUTION INTRAVENOUS; SUBCUTANEOUS at 18:00

## 2021-11-09 RX ADMIN — LISINOPRIL 10 MG: 10 TABLET ORAL at 08:31

## 2021-11-09 RX ADMIN — MAGNESIUM SULFATE HEPTAHYDRATE 1 G: 1 INJECTION, SOLUTION INTRAVENOUS at 06:36

## 2021-11-09 RX ADMIN — ATORVASTATIN CALCIUM 10 MG: 20 TABLET, FILM COATED ORAL at 08:31

## 2021-11-09 RX ADMIN — CARVEDILOL 25 MG: 25 TABLET, FILM COATED ORAL at 08:31

## 2021-11-09 RX ADMIN — CARVEDILOL 25 MG: 25 TABLET, FILM COATED ORAL at 18:00

## 2021-11-09 RX ADMIN — FUROSEMIDE 80 MG: 10 INJECTION, SOLUTION INTRAMUSCULAR; INTRAVENOUS at 15:47

## 2021-11-09 RX ADMIN — CEFTRIAXONE 2 G: 2 INJECTION, POWDER, FOR SOLUTION INTRAMUSCULAR; INTRAVENOUS at 11:59

## 2021-11-09 RX ADMIN — LOPERAMIDE HYDROCHLORIDE 2 MG: 2 CAPSULE ORAL at 12:00

## 2021-11-09 RX ADMIN — LATANOPROST 1 DROP: 50 SOLUTION OPHTHALMIC at 08:32

## 2021-11-09 RX ADMIN — FUROSEMIDE 80 MG: 10 INJECTION, SOLUTION INTRAMUSCULAR; INTRAVENOUS at 02:49

## 2021-11-09 NOTE — CASE MANAGEMENT/SOCIAL WORK
"Continued Stay Note  [facility]     Patient Name: Lana Yañez  MRN: [id number]  Today's Date: 11/9/2021    Admit Date: 11/8/2021     Discharge Plan     Row Name 11/09/21 1658       Plan    Plan Home w/ [facility]; Pt does NOT give staff permission to speak with her children as long as she is alert & oriented.    Plan Comments CCP received a voicemail from Pt daughterKalpesh wanting an update on Pt, \"course of treatment\".  CCP spoke with Pt to get permission to call her daughter back but Pt does not approve.  Pt reports she is alert and oriented and makes her own decisions and she does NOT give CCP permission to speak with her children.  Nir/[facility] notified that Pt wanted a new CPAP.  Nir advised she would notify the CPAP team and have them call the patient.  CCP following.  SS/CCP               Discharge Codes    No documentation.               Expected Discharge Date and Time     Expected Discharge Date Expected Discharge Time    Nov 10, 2021             Belkys Gallego RN    "

## 2021-11-09 NOTE — CONSULTS
Referring Provider: Dr. Rodriguez  Subjective   History of present illness: This very nice 76-year-old who reports that she was feeling unwell about 4 days prior to admission marked by cramping abdominal pain, copious watery diarrhea which was worse with food consumption and dysuria.  She thinks the diarrhea predates the dysuria although she is not entirely sure.  In any event, she was started on Macrobid as an outpatient urine culture resulted as contaminated as below.  She start developing headache and shortness of breath prompting admission to [facility] on 11/8/2021 where she was diagnosed with CHF and has been receiving diuretics.  Initially there was concern for potential C. difficile and she was started on oral vancomycin but C. difficile testing came back negative and this was discontinued.  She does have an elevated procalcitonin.  Discussed with Dr. Rodriguez this morning and he was concerned that given her elevated procalcitonin and lack of verifiable GI illness whether or not the patient may need systemic antibiotics.      Past medical history: Diastolic CHF, A. fib on warfarin, right elbow cellulitis, COPD, CAD, diabetes mellitus 2, hypothyroidism, hypertension, hyperlipidemia, obesity, rheumatoid arthritis, breast biopsy with lumpectomy on the right, coronary catheterization, glaucoma and cataract surgery, hysterectomy, bilateral TKA, left rotator cuff  No family history of infection  Social history: She is  and lives here in [place], [place].  She was originally from [place], [place] but moved to [place] in 1977.  Denies smoking.  Allergies: Contrast dye    Review of Systems  Pertinent items are noted in HPI, all other systems reviewed and negative    Objective     Physical Exam:   Vital Signs   Temp:  [97.9 °F (36.6 °C)-98.8 °F (37.1 °C)] 98.7 °F (37.1 °C)  Heart Rate:  [] 97  Resp:  [18-20] 18  BP: (100-118)/(58-82) 113/73    GENERAL: Awake and alert, in no acute distress.    HEENT: Oropharynx is clear. Hearing is grossly normal.   EYES: PERRL. No conjunctival injection. No lid lag.   LYMPHATICS: No lymphadenopathy of the neck or inguinal regions.   HEART: Regular rate and irregular rhythm. No peripheral edema.   LUNGS: Clear to auscultation anteriorly with normal respiratory effort.   GI: Soft, tender to palpation especially right upper quadrant, nondistended. No appreciable organomegaly.   SKIN: Warm and dry without cutaneous eruptions   PSYCHIATRIC: Appropriate mood, affect, insight, and judgment.     Results Review:  Creatinine 1  ALT 49, AST 64, total bilirubin 2.1  Procalcitonin 3.4  White count 9.5  Hemoglobin 10.9  Platelets 136     Microbiology:  11/2 urine culture at least 3 organisms recovered  11/8 respiratory pathogen panel not detected  11/8 blood cultures no growth to date  11/8 C. difficile negative  11/8 GI PCR negative  Radiology:  Chest x-ray, independently interpreted: Vascular engorgement with hazy opacities at the bases    Assessment/Plan   1.  Sepsis  2.  Elevated procalcitonin  3.  Acute on chronic diastolic CHF  4.  Mild hepatitis  5.  Diabetes mellitus type 2, continue glycemic control efforts to prevent/control infectious complications    She has acute elevations in her liver function tests, which could be related to CHF.  Her symptoms are more consistent with colitis or enteritis, but given some tenderness over the right upper quadrant, elevated procalcitonin and liver function test would also consider gallbladder pathology or biliary sepsis.  Will need to give her dose of ceftriaxone and discussed with Dr. Rodriguez and we will check a CT of the abdomen pelvis for better elucidation of her GI anatomy.  Follow-up blood cultures         Thank you for this consult.  We will continue to follow along and tailor antibiotics as the patient's clinical course evolves.      Henrik Kellogg MD  11/09/21  11:21 EST

## 2021-11-09 NOTE — CASE MANAGEMENT/SOCIAL WORK
Discharge Planning Assessment  [facility]     Patient Name: Lana Yañez  MRN: [id number]  Today's Date: 11/9/2021    Admit Date: 11/8/2021     Discharge Needs Assessment     Row Name 11/09/21 1126       Discharge Needs Assessment    Provided Post Acute Provider List? Refused    Refused Provider List Comment Requested [facility] home Health    Provided Post Acute Provider Quality & Resource List? Refused    Current Discharge Risk chronically ill; physical impairment    Row Name 11/09/21 1122       Living Environment    Lives With spouse    Name(s) of Who Lives With Patient Suresh Yañez  [phone]    Current Living Arrangements home/apartment/condo    Primary Care Provided by self    Provides Primary Care For no one    Family Caregiver if Needed child(nubia), adult; spouse    Family Caregiver Names Suresh Yañez  [phone] Delaney Yañez [facility] [phone]    Quality of Family Relationships supportive    Able to Return to Prior Arrangements yes       Resource/Environmental Concerns    Resource/Environmental Concerns none    Transportation Concerns car, none       Transition Planning    Patient/Family Anticipates Transition to home with family    Transportation Anticipated family or friend will provide       Discharge Needs Assessment    Readmission Within the Last 30 Days no previous admission in last 30 days    Current Outpatient/Agency/Support Group homecare agency    Equipment Currently Used at Home cpap; rollator; cane, straight    Concerns to be Addressed discharge planning               Discharge Plan     Row Name 11/09/21 1137       Plan    Plan Home with [facility] and family support    Patient/Family in Agreement with Plan yes    Plan Comments IMM 11/8/2021. Met with patient at bedside. Face sheet verified. Prior to admission patient was able to perform her own ADL’s, she has a rollator, cane, CPAP and glucometer. She lives with her  who is having surgery this coming Friday.  Patient uses the [facility] on [address] she states her some of her medications are expensive, but that she does get and take as prescribed, family will help with costs. Declined Meds to Beds. Patient CPAP is from [facility] spoke with Nir, she will contact the CPAP team. Patient was also interested in a scooter- per Nir, not covered by Medicare but can rent for $75.00/month.  Patient also wants the frozen meals from [employer] to resume at discharge, per Mayte Garcia, [employer] will call patient at discharge. [facility] to follow for respiratory monitoring and PT. Patient states she has a living will and the entire family is her health care surrogate. Updated Delaney Yañez [facility] [phone] regarding CPAP, [employer]als, [facility] and scooter.  Family will transport home. Will continue to monitor for new or changing discharge needs.              Continued Care and Services - Admitted Since 11/8/2021     Home Medical Care     Service Provider Request Status Selected Services Address Phone Fax Patient Preferred     [facility]  Accepted N/A [address] [address] [phone] --                 Demographic Summary     Row Name 11/09/21 1121       General Information    Admission Type inpatient    Arrived From emergency department    Required Notices Provided Important Message from Medicare    Referral Source admission list    Reason for Consult discharge planning    Preferred Language English     Used During This Interaction no       Contact Information    Permission Granted to Share Info With family/designee  Delaney Yañez dt [phone]               Functional Status     Row Name 11/09/21 1121       Functional Status    Usual Activity Tolerance moderate    Current Activity Tolerance moderate       Functional Status, IADL    Medications independent    Meal Preparation assistive person    Housekeeping assistive person    Laundry assistive person     Shopping assistive person       Mental Status    General Appearance WDL WDL       Mental Status Summary    Recent Changes in Mental Status/Cognitive Functioning no changes       Employment/    Employment Status retired               Psychosocial    No documentation.                Abuse/Neglect    No documentation.                Legal    No documentation.                Substance Abuse    No documentation.                Patient Forms    No documentation.                   Faith Suárez RN

## 2021-11-09 NOTE — PROGRESS NOTES
"    Patient Name: Lana Yañez  :1945  76 y.o.      Patient Care Team:  Will Madden MD as PCP - General (Family Medicine)  Pia Dueñas MD as Consulting Physician (Cardiology)  Iain Hill MD as Consulting Physician (Pulmonary Disease)  Carmen Kirk MD as Consulting Physician (Pain Medicine)  Nano Cool [facility] as Pharmacist  Shaw Hand PharmD as Pharmacist (Pharmacy)    Chief Complaint: follow up heart failure, abdominal pain/diarrhea    Interval History: she feels better than when she came in. Still short of breath with minimal exertion.       Objective   Vital Signs  Temp:  [97.9 °F (36.6 °C)-98.8 °F (37.1 °C)] 98.5 °F (36.9 °C)  Heart Rate:  [] 84  Resp:  [18-20] 18  BP: (100-118)/(58-82) 101/64    Intake/Output Summary (Last 24 hours) at 2021 1418  Last data filed at 2021 1200  Gross per 24 hour   Intake 340 ml   Output 1300 ml   Net -960 ml     Flowsheet Rows      First Filed Value   Admission Height 167.6 cm (66\") Documented at 2021   Admission Weight 113 kg (250 lb) Documented at 2021 013          Physical Exam:   General Appearance:    Alert, cooperative, in no acute distress   Lungs:     Clear to auscultation.  Normal respiratory effort and rate.      Heart:    Regular rhythm and normal rate, normal S1 and S2, no murmurs, gallops or rubs.     Chest Wall:    No abnormalities observed   Abdomen:     Soft, nontender, positive bowel sounds.     Extremities:   no cyanosis, clubbing or edema.  No marked joint deformities.  Adequate musculoskeletal strength.       Results Review:    Results from last 7 days   Lab Units 21  0440   SODIUM mmol/L 137   POTASSIUM mmol/L 3.3*   CHLORIDE mmol/L 99   CO2 mmol/L 25.9   BUN mg/dL 18   CREATININE mg/dL 1.00   GLUCOSE mg/dL 116*   CALCIUM mg/dL 9.3     Results from last 7 days   Lab Units 21  0210   TROPONIN T ng/mL <0.010     Results from last 7 days   Lab Units 21  0440   WBC 10*3/mm3 " 9.53   HEMOGLOBIN g/dL 10.9*   HEMATOCRIT % 32.2*   PLATELETS 10*3/mm3 136*     Results from last 7 days   Lab Units 11/09/21  0440 11/08/21  1237 11/08/21  0210   INR  3.31* 2.68* 2.16*   APTT seconds  --   --  46.0*     Results from last 7 days   Lab Units 11/09/21  0440   MAGNESIUM mg/dL 1.8                   Medication Review:   aspirin, 81 mg, Oral, Daily  atorvastatin, 10 mg, Oral, Daily  carvedilol, 25 mg, Oral, BID With Meals  cefTRIAXone, 2 g, Intravenous, Q24H  digoxin, 125 mcg, Oral, Every Other Day  furosemide, 80 mg, Intravenous, Q12H  insulin lispro, 0-9 Units, Subcutaneous, 4x Daily With Meals & Nightly  latanoprost, 1 drop, Both Eyes, Daily  lisinopril, 10 mg, Oral, Daily         Pharmacy to dose warfarin,         Assessment/Plan   1. Acute on chronic diastolic heart failure, continue IV diuretics. Blood pressure improved with resumption of home medications. Last echo August 2021. LVEF 65%.  2. Leukocytosis and elevated procalcitonin - ID following. Getting CT abd/pelvis  3. Chronic atrial fibrillation - rate ok. Continue dig and beta blocker. Dig level ok. She is on warfarin. INR mildly supratherapeutic. Pharmacy is managing.  4. Diabetes mellitus type II  5. Coronary artery disease - small vessel not amendable to PCI in 2017 continue aspirin and statin. Trop negative.   6. Elevated liver function tests  7. Mild to moderate MR    Continue IV diuretics.     ANGELA Wright  [facility]  11/09/21  14:18 EST

## 2021-11-09 NOTE — PROGRESS NOTES
Name: Lnaa Yañez ADMIT: 2021   : 1945  PCP: Will Madden MD    MRN: [id number] LOS: 1 days   AGE/SEX: 76 y.o. female  ROOM: 5/1     Subjective   Subjective   feels a little worse today, still with cough and diarrhea. also reports decreased appetite. breathing is better today, she is on room air and edema has improved.     Review of Systems   Constitutional: Positive for appetite change and chills. Negative for fever.   HENT: Negative for congestion and sore throat.    Respiratory: Positive for shortness of breath. Negative for chest tightness.    Cardiovascular: Positive for leg swelling. Negative for chest pain.   Gastrointestinal: Positive for abdominal pain and diarrhea. Negative for nausea and vomiting.   Genitourinary: Positive for dysuria and frequency. Negative for difficulty urinating.   Musculoskeletal: Positive for gait problem. Negative for back pain.   Neurological: Negative for dizziness and headaches.   Hematological: Negative for adenopathy. Bruises/bleeds easily.   Psychiatric/Behavioral: Negative for behavioral problems and confusion.        Objective   Objective   Vital Signs  Temp:  [97.6 °F (36.4 °C)-98.8 °F (37.1 °C)] 98.7 °F (37.1 °C)  Heart Rate:  [] 97  Resp:  [18-20] 18  BP: (100-119)/(58-82) 113/73  SpO2:  [91 %-94 %] 94 %  on  Flow (L/min):  [2] 2;   Device (Oxygen Therapy): room air  Body mass index is 39.87 kg/m².  Physical Exam  Vitals and nursing note reviewed.   Constitutional:       Appearance: She is obese. She is ill-appearing.   Cardiovascular:      Rate and Rhythm: Normal rate. Rhythm irregularly irregular.   Pulmonary:      Effort: Pulmonary effort is normal. No respiratory distress.      Breath sounds: Normal breath sounds.      Comments: diminished breath sounds  Abdominal:      General: Bowel sounds are normal. There is no distension.      Palpations: Abdomen is soft.      Tenderness: There is no abdominal tenderness.   Musculoskeletal:          General: Swelling (1+ BLE) present. Normal range of motion.   Skin:     General: Skin is warm and dry.   Neurological:      Mental Status: She is alert and oriented to person, place, and time. Mental status is at baseline.   Psychiatric:         Mood and Affect: Mood normal.         Behavior: Behavior normal.         Results Review     I reviewed the patient's new clinical results.  Results from last 7 days   Lab Units 11/09/21 0440 11/08/21 0535 11/08/21 0210   WBC 10*3/mm3 9.53 14.11* 10.78   HEMOGLOBIN g/dL 10.9* 10.8* 11.1*   PLATELETS 10*3/mm3 136* 132* 129*     Results from last 7 days   Lab Units 11/09/21 0440 11/08/21  1652 11/08/21 0535 11/08/21 0210   SODIUM mmol/L 137  --  138 138   POTASSIUM mmol/L 3.3* 3.9 3.2* 3.4*   CHLORIDE mmol/L 99  --  99 101   CO2 mmol/L 25.9  --  25.5 24.4   BUN mg/dL 18  --  16 16   CREATININE mg/dL 1.00  --  1.14* 1.11*   GLUCOSE mg/dL 116*  --  155* 168*   EGFR IF AFRICN AM mL/min/1.73 66  --  56* 58*     Results from last 7 days   Lab Units 11/09/21 0440 11/08/21 0210   ALBUMIN g/dL 3.60 4.30   BILIRUBIN mg/dL 2.1* 2.9*   ALK PHOS U/L 90 93   AST (SGOT) U/L 64* 64*   ALT (SGPT) U/L 49* 35*     Results from last 7 days   Lab Units 11/09/21 0440 11/08/21  0535 11/08/21 0210   CALCIUM mg/dL 9.3 9.0 9.4   ALBUMIN g/dL 3.60  --  4.30   MAGNESIUM mg/dL 1.8  --  1.5*     Results from last 7 days   Lab Units 11/09/21 0440 11/08/21 0210   PROCALCITONIN ng/mL 3.43* 3.10*   LACTATE mmol/L  --  1.5     COVID19   Date Value Ref Range Status   11/08/2021 Not Detected Not Detected - Ref. Range Final     SARS-CoV-2, DONA   Date Value Ref Range Status   01/05/2021 Not Detected Not Detected Final     Comment:     This nucleic acid amplification test was developed and its performance  characteristics determined by [employer]. Nucleic acid  amplification tests include PCR and TMA. This test has not been FDA  cleared or approved. This test has been authorized by FDA  under an  Emergency Use Authorization (EUA). This test is only authorized for  the duration of time the declaration that circumstances exist  justifying the authorization of the emergency use of in vitro  diagnostic tests for detection of SARS-CoV-2 virus and/or diagnosis  of COVID-19 infection under section 564(b)(1) of the Act, 21 U.S.C.  360bbb-3(b) (1), unless the authorization is terminated or revoked  sooner.  When diagnostic testing is negative, the possibility of a false  negative result should be considered in the context of a patient's  recent exposures and the presence of clinical signs and symptoms  consistent with COVID-19. An individual without symptoms of COVID-19  and who is not shedding SARS-CoV-2 virus would expect to have a  negative (not detected) result in this assay.       Hemoglobin A1C   Date/Time Value Ref Range Status   11/08/2021 0535 7.11 (H) 4.80 - 5.60 % Final     Glucose   Date/Time Value Ref Range Status   11/09/2021 0535 112 70 - 130 mg/dL Final     Comment:     Meter: [id number] : [id number] [place] Precious NA   11/08/2021 2249 222 (H) 70 - 130 mg/dL Final     Comment:     Meter: [id number] : [id number] [place] Precious NA   11/08/2021 1602 141 (H) 70 - 130 mg/dL Final     Comment:     Meter: [id number] : [id number] Cappetti Colt NA   11/08/2021 1102 160 (H) 70 - 130 mg/dL Final     Comment:     Meter: [id number] : [id number] Cappetti Colt NA       XR Chest 1 View  Narrative: Patient: TERA GARNER  Time Out: 03:16  Exam(s): FILM CXR 1 VIEW     EXAM:    XR Chest, 1 View    CLINICAL HISTORY:     Reason for exam: SOA.    TECHNIQUE:    Frontal view of the chest.    COMPARISON:    Chest CT from September 23, 2021 and chest x-ray from October 20, 2018    FINDINGS:    Lungs:  See below.      Pleural space:  Unremarkable.  No pneumothorax.    Heart:  Moderate cardiomegaly with prominent vascularity.  Perihilar   bibasilar hazy increased densities may represent  CHF versus artifact from   massive body habitus and portable technique.    Mediastinum:  Unremarkable.    Bones joints:  Unremarkable.    Vasculature:  The thoracic aorta is mildly calcified and upper normal   in size.    Upper abdomen:  No pneumoperitoneum is seen under the diaphragm.    IMPRESSION:         Moderate cardiomegaly with prominent vascularity.  Perihilar bibasilar   hazy increased densities may represent CHF versus artifact from massive   body habitus and portable technique.  Impression: Electronically signed by Jerome Garcia MD on 11-08-21 at 0316    Scheduled Medications  aspirin, 81 mg, Oral, Daily  atorvastatin, 10 mg, Oral, Daily  carvedilol, 25 mg, Oral, BID With Meals  digoxin, 125 mcg, Oral, Every Other Day  furosemide, 80 mg, Intravenous, Q12H  insulin lispro, 0-9 Units, Subcutaneous, 4x Daily With Meals & Nightly  latanoprost, 1 drop, Both Eyes, Daily  lisinopril, 10 mg, Oral, Daily    Infusions  nitroglycerin,  mcg/min, Last Rate: Stopped (11/08/21 0431)  Pharmacy to dose warfarin,     Diet  Diet Regular; Cardiac, Daily Fluid Restriction; 1500 mL Fluid Per Day       Assessment/Plan     Active Hospital Problems    Diagnosis  POA   • **Acute on chronic diastolic heart failure (HCC) [I50.33]  Yes   • Acute respiratory failure with hypoxia ([facility]) [J96.01]  Yes   • Hypokalemia [E87.6]  Yes   • Hypomagnesemia [E83.42]  Yes   • Type 2 diabetes mellitus with hyperglycemia, without long-term current use of insulin ([facility]) [E11.65]  Yes   • Diarrhea [R19.7]  Yes   • Sepsis without acute organ dysfunction ([facility]) [A41.9]  Yes   • Morbid obesity with BMI of 40.0-44.9, adult ([facility]) [E66.01, Z68.41]  Not Applicable   • Pulmonary hypertension (HCC) [I27.20]  Yes   • Chronic atrial fibrillation (HCC) [I48.20]  Yes   • Sleep apnea [G47.30]  Yes   • Essential hypertension [I10]  Yes      Resolved Hospital Problems   No resolved problems to display.       Acute respiratory failure secondary to acute on  chronic diastolic heart failure: Appreciate cardiology.  They will manage IV diuretics, fluid restriction, daily weights, strict I&O.  Monitor renal function closely.  Wean O2 as tolerated-currently on room air.  PAF/chest pain: Patient was started on a nitro drip and has weaned off.  Continue warfarin, pharmacy to dose, daily INR.  Rate is now controlled as home medications have been resumed.  Leukocytosis/elevated pro-Ned: Unsure of etiology at this moment, she reports chills and urinary symptoms but states she was just treated with Macrobid for suspected UTI and developed diarrhea following. GI PCR negative- give one time dose of immodium.  UA here is negative, as suspected given recent abx use though still symptomatic. given even higher pro ned today, will ask ID to evaluate and defer abx to them, in the meantime, check CT abdomen with and without contrast.  Blood cultures NGTD.  CXR negative for any infectious findings.  DM2: A1c 7.1.  Hold oral antidiabetic medication and continue moderate correctional insulin.  ACHS, hypoglycemic protocol, carb consistent diet.  Correct electrolytes  HTN: Stable, has been a little on the hypotensive side, monitor  FRANCY: Patient reports CPAP has been recalled and she is been having issues obtaining a new one.  Will ask CCP to evaluate.    Warfarin (home med) for DVT prophylaxis.  Full code.  Discussed with patient and nursing staff.  Anticipate discharge TBANGELA Avendano  [facility]  11/09/21  09:41 EST

## 2021-11-09 NOTE — PROGRESS NOTES
Pharmacy Consult: Warfarin Dosing/ Monitoring    Lana Yañez is a 76 y.o. female, estimated creatinine clearance is 62.6 mL/min (by C-G formula based on SCr of 1 mg/dL). weighing 112 kg (247 lb).     has a past medical history of Acute on chronic diastolic heart failure ([facility]), Arthritis, Asthma, Atrial fibrillation ([facility]), Atypical chest pain, Bronchitis, Cellulitis of right elbow, CHF (congestive heart failure) ([facility]), COPD (chronic obstructive pulmonary disease) ([facility]), Coronary artery disease, Coronary artery disease involving native coronary artery of native heart with angina pectoris with documented spasm ([facility]), Diabetes 1.5, managed as type 2 ([facility]), Disease of thyroid gland, Displacement of lumbar intervertebral disc without myelopathy, Dizziness, ANTOINE (dyspnea on exertion), Essential hypertension, Fatigue, Generalized osteoarthritis of multiple sites, History of rheumatic fever, History of transfusion, bone density study, Hyperglycemia, Hyperlipidemia, Hypovitaminosis D, Left arm pain, Left-sided weakness, Leg swelling, Low back pain, Lower extremity edema, Malaise and fatigue, Mitral valve disease, Mitral valve insufficiency, Morbid obesity with BMI of 40.0-44.9, adult ([facility]), MRSA infection, NSTEMI (non-ST elevated myocardial infarction) ([facility]), PAF (paroxysmal atrial fibrillation) ([facility]), RA (rheumatoid arthritis) ([facility]), Sleep apnea, SOB (shortness of breath), Stroke ([facility]), Urge incontinence of urine, UTI (urinary tract infection) (2020), and Vitamin D deficiency.    Social History     Tobacco Use    Smoking status: Former Smoker     Packs/day: 3.00     Types: Cigarettes     Start date: 1967     Quit date: 10/19/1968     Years since quittin.0    Smokeless tobacco: Never Used    Tobacco comment: caffeine use - decaf coffee   Substance Use Topics    Alcohol use: No     Comment: No caffeine use    Drug use: No       Results from last 7 days   Lab Units 21  0440 21  1237 21  0545  11/08/21  0210 11/02/21  1411   INR  3.31* 2.68*  --  2.16* 2.1*   APTT seconds  --   --   --  46.0*  --    HEMOGLOBIN g/dL 10.9*  --  10.8* 11.1*  --    HEMATOCRIT % 32.2*  --  31.7* 32.7*  --    PLATELETS 10*3/mm3 136*  --  132* 129*  --      Results from last 7 days   Lab Units 11/09/21  0440 11/08/21  1652 11/08/21  0535 11/08/21  0210 11/08/21  0210   SODIUM mmol/L 137  --  138  --  138   POTASSIUM mmol/L 3.3* 3.9 3.2*   < > 3.4*   CHLORIDE mmol/L 99  --  99  --  101   CO2 mmol/L 25.9  --  25.5  --  24.4   BUN mg/dL 18  --  16  --  16   CREATININE mg/dL 1.00  --  1.14*  --  1.11*   CALCIUM mg/dL 9.3  --  9.0  --  9.4   BILIRUBIN mg/dL 2.1*  --   --   --  2.9*   ALK PHOS U/L 90  --   --   --  93   ALT (SGPT) U/L 49*  --   --   --  35*   AST (SGOT) U/L 64*  --   --   --  64*   GLUCOSE mg/dL 116*  --  155*  --  168*    < > = values in this interval not displayed.     Anticoagulation history: followed by [facility] clinic. Last seen on 11/3  Dose was 5 mg daily except Wednesdays when takes 2.5 mg instead    Hospital Anticoagulation:  Consulting provider: Rosaline Means APRN  Start date: 11/9  Indication: afib  Target INR: 2-3  Expected duration: n/a   Bridge Therapy: No                  Date 11/8 11/9           INR 2.16  2.68 3.31           Warfarin dose  5 mg hold             Potential drug interactions: no new ddi's noted    Relevant nutrition status:     Other:     Education complete?/ Date:     Assessment/Plan:  Dose holding for supratherapuetic INR. RN reports 'black diarrhea' to me as well.  Monitor INR  Follow up tomorrow    Pharmacy will continue to follow until discharge or discontinuation of warfarin.   Seth Bowman, [facility]  11/9/2021

## 2021-11-09 NOTE — TELEPHONE ENCOUNTER
Caller: ESDRAS    Relationship to patient: Home Health    Best call back number:[phone]    Patient is needing: ESDRAS WITH [facility] CALLED IN TO GET A VERBAL ORDER FOR NURSING HOME HEALTH. PLEASE ADVISE. THANK YOU.

## 2021-11-09 NOTE — DISCHARGE PLACEMENT REQUEST
"Tiffany Yañezline (76 y.o. Female)             Date of Birth Social Security Number Address Home Phone MRN    1945  [id number] [place] [id number] [phone] [id number]    Quaker Marital Status             Sikh        Admission Date Admission Type Admitting Provider Attending Provider Department, Room/Bed    11/8/21 Emergency Khoa Rodriguez MD Ray, Jonathan, MD [address] CVI, 2205/1    Discharge Date Discharge Disposition Discharge Destination                         Attending Provider: Khoa Rodriguez MD    Allergies: Contrast Dye    Isolation: None   Infection: MRSA/History Only (11/08/21)   Code Status: CPR   Advance Care Planning Activity    Ht: 167.6 cm (66\")   Wt: 112 kg (247 lb)    Admission Cmt: None   Principal Problem: Acute on chronic diastolic heart failure (HCC) [I50.33]                 Active Insurance as of 11/8/2021     Primary Coverage     Payor Plan Insurance Group Employer/Plan Group    HUMANA MEDICARE REPLACEMENT HUMANA MEDICARE REPLACEMENT [id number]     Payor Plan Address Payor Plan Phone Number Payor Plan Fax Number Effective Dates    [address] [address] [phone]  1/1/2018 - None Entered    [facility] [address]       Subscriber Name Subscriber Birth Date Member ID       TERA YAÑEZ 1945 [id number]                 Emergency Contacts      (Rel.) Home Phone Work Phone Mobile Phone    OtilioSuresh (Spouse) [phone] -- [phone]    OtilioDelaney (Daughter) [phone] -- [phone]            "

## 2021-11-10 ENCOUNTER — APPOINTMENT (OUTPATIENT)
Dept: ULTRASOUND IMAGING | Facility: HOSPITAL | Age: 76
End: 2021-11-10

## 2021-11-10 PROBLEM — K85.10 BILIARY ACUTE PANCREATITIS WITHOUT NECROSIS OR INFECTION: Status: ACTIVE | Noted: 2021-11-10

## 2021-11-10 LAB
ALBUMIN SERPL-MCNC: 3.7 G/DL (ref 3.5–5.2)
ALBUMIN/GLOB SERPL: 1 G/DL
ALP SERPL-CCNC: 94 U/L (ref 39–117)
ALT SERPL W P-5'-P-CCNC: 39 U/L (ref 1–33)
ANION GAP SERPL CALCULATED.3IONS-SCNC: 10.2 MMOL/L (ref 5–15)
AST SERPL-CCNC: 33 U/L (ref 1–32)
BASOPHILS # BLD AUTO: 0.03 10*3/MM3 (ref 0–0.2)
BASOPHILS NFR BLD AUTO: 0.3 % (ref 0–1.5)
BILIRUB SERPL-MCNC: 0.8 MG/DL (ref 0–1.2)
BUN SERPL-MCNC: 18 MG/DL (ref 8–23)
BUN/CREAT SERPL: 16.1 (ref 7–25)
CALCIUM SPEC-SCNC: 9.1 MG/DL (ref 8.6–10.5)
CHLORIDE SERPL-SCNC: 98 MMOL/L (ref 98–107)
CO2 SERPL-SCNC: 28.8 MMOL/L (ref 22–29)
CREAT SERPL-MCNC: 1.12 MG/DL (ref 0.57–1)
DEPRECATED RDW RBC AUTO: 44.4 FL (ref 37–54)
EOSINOPHIL # BLD AUTO: 0.18 10*3/MM3 (ref 0–0.4)
EOSINOPHIL NFR BLD AUTO: 2 % (ref 0.3–6.2)
ERYTHROCYTE [DISTWIDTH] IN BLOOD BY AUTOMATED COUNT: 13.5 % (ref 12.3–15.4)
GFR SERPL CREATININE-BSD FRML MDRD: 58 ML/MIN/1.73
GLOBULIN UR ELPH-MCNC: 3.7 GM/DL
GLUCOSE BLDC GLUCOMTR-MCNC: 115 MG/DL (ref 70–130)
GLUCOSE BLDC GLUCOMTR-MCNC: 180 MG/DL (ref 70–130)
GLUCOSE BLDC GLUCOMTR-MCNC: 202 MG/DL (ref 70–130)
GLUCOSE BLDC GLUCOMTR-MCNC: 213 MG/DL (ref 70–130)
GLUCOSE SERPL-MCNC: 197 MG/DL (ref 65–99)
HCT VFR BLD AUTO: 34.8 % (ref 34–46.6)
HGB BLD-MCNC: 11.5 G/DL (ref 12–15.9)
IMM GRANULOCYTES # BLD AUTO: 0.05 10*3/MM3 (ref 0–0.05)
IMM GRANULOCYTES NFR BLD AUTO: 0.6 % (ref 0–0.5)
INR PPP: 3.11 (ref 0.9–1.1)
LIPASE SERPL-CCNC: 453 U/L (ref 13–60)
LYMPHOCYTES # BLD AUTO: 1.88 10*3/MM3 (ref 0.7–3.1)
LYMPHOCYTES NFR BLD AUTO: 21.4 % (ref 19.6–45.3)
MAGNESIUM SERPL-MCNC: 1.8 MG/DL (ref 1.6–2.4)
MCH RBC QN AUTO: 29.9 PG (ref 26.6–33)
MCHC RBC AUTO-ENTMCNC: 33 G/DL (ref 31.5–35.7)
MCV RBC AUTO: 90.6 FL (ref 79–97)
MONOCYTES # BLD AUTO: 0.93 10*3/MM3 (ref 0.1–0.9)
MONOCYTES NFR BLD AUTO: 10.6 % (ref 5–12)
NEUTROPHILS NFR BLD AUTO: 5.72 10*3/MM3 (ref 1.7–7)
NEUTROPHILS NFR BLD AUTO: 65.1 % (ref 42.7–76)
NRBC BLD AUTO-RTO: 0 /100 WBC (ref 0–0.2)
PLATELET # BLD AUTO: 172 10*3/MM3 (ref 140–450)
PMV BLD AUTO: 11.5 FL (ref 6–12)
POTASSIUM SERPL-SCNC: 3.6 MMOL/L (ref 3.5–5.2)
POTASSIUM SERPL-SCNC: 3.7 MMOL/L (ref 3.5–5.2)
PROT SERPL-MCNC: 7.4 G/DL (ref 6–8.5)
PROTHROMBIN TIME: 31.7 SECONDS (ref 11.7–14.2)
RBC # BLD AUTO: 3.84 10*6/MM3 (ref 3.77–5.28)
SODIUM SERPL-SCNC: 137 MMOL/L (ref 136–145)
WBC # BLD AUTO: 8.79 10*3/MM3 (ref 3.4–10.8)

## 2021-11-10 PROCEDURE — 63710000001 INSULIN LISPRO (HUMAN) PER 5 UNITS: Performed by: NURSE PRACTITIONER

## 2021-11-10 PROCEDURE — 99232 SBSQ HOSP IP/OBS MODERATE 35: CPT | Performed by: NURSE PRACTITIONER

## 2021-11-10 PROCEDURE — 25010000002 FUROSEMIDE PER 20 MG: Performed by: HOSPITALIST

## 2021-11-10 PROCEDURE — 85610 PROTHROMBIN TIME: CPT | Performed by: NURSE PRACTITIONER

## 2021-11-10 PROCEDURE — 94640 AIRWAY INHALATION TREATMENT: CPT

## 2021-11-10 PROCEDURE — 94664 DEMO&/EVAL PT USE INHALER: CPT

## 2021-11-10 PROCEDURE — 80053 COMPREHEN METABOLIC PANEL: CPT | Performed by: HOSPITALIST

## 2021-11-10 PROCEDURE — 83735 ASSAY OF MAGNESIUM: CPT | Performed by: INTERNAL MEDICINE

## 2021-11-10 PROCEDURE — 94799 UNLISTED PULMONARY SVC/PX: CPT

## 2021-11-10 PROCEDURE — 99232 SBSQ HOSP IP/OBS MODERATE 35: CPT | Performed by: INTERNAL MEDICINE

## 2021-11-10 PROCEDURE — 99222 1ST HOSP IP/OBS MODERATE 55: CPT | Performed by: SURGERY

## 2021-11-10 PROCEDURE — 85025 COMPLETE CBC W/AUTO DIFF WBC: CPT | Performed by: NURSE PRACTITIONER

## 2021-11-10 PROCEDURE — 82962 GLUCOSE BLOOD TEST: CPT

## 2021-11-10 PROCEDURE — 76705 ECHO EXAM OF ABDOMEN: CPT

## 2021-11-10 PROCEDURE — 25010000002 CEFTRIAXONE PER 250 MG: Performed by: INTERNAL MEDICINE

## 2021-11-10 RX ORDER — ALBUTEROL SULFATE 2.5 MG/3ML
2.5 SOLUTION RESPIRATORY (INHALATION) EVERY 6 HOURS PRN
Status: DISCONTINUED | OUTPATIENT
Start: 2021-11-10 | End: 2021-11-11 | Stop reason: HOSPADM

## 2021-11-10 RX ADMIN — ALBUTEROL SULFATE 2.5 MG: 2.5 SOLUTION RESPIRATORY (INHALATION) at 12:25

## 2021-11-10 RX ADMIN — BENZONATATE 100 MG: 100 CAPSULE ORAL at 00:58

## 2021-11-10 RX ADMIN — DIGOXIN 125 MCG: 125 TABLET ORAL at 09:26

## 2021-11-10 RX ADMIN — LISINOPRIL 10 MG: 10 TABLET ORAL at 09:25

## 2021-11-10 RX ADMIN — INSULIN LISPRO 2 UNITS: 100 INJECTION, SOLUTION INTRAVENOUS; SUBCUTANEOUS at 17:50

## 2021-11-10 RX ADMIN — CARVEDILOL 25 MG: 25 TABLET, FILM COATED ORAL at 09:25

## 2021-11-10 RX ADMIN — FUROSEMIDE 80 MG: 10 INJECTION, SOLUTION INTRAMUSCULAR; INTRAVENOUS at 19:40

## 2021-11-10 RX ADMIN — ALBUTEROL SULFATE 2.5 MG: 2.5 SOLUTION RESPIRATORY (INHALATION) at 06:00

## 2021-11-10 RX ADMIN — ASPIRIN 81 MG: 81 TABLET, COATED ORAL at 09:25

## 2021-11-10 RX ADMIN — INSULIN LISPRO 4 UNITS: 100 INJECTION, SOLUTION INTRAVENOUS; SUBCUTANEOUS at 12:10

## 2021-11-10 RX ADMIN — CARVEDILOL 25 MG: 25 TABLET, FILM COATED ORAL at 17:50

## 2021-11-10 RX ADMIN — CEFTRIAXONE 2 G: 2 INJECTION, POWDER, FOR SOLUTION INTRAMUSCULAR; INTRAVENOUS at 12:09

## 2021-11-10 RX ADMIN — FUROSEMIDE 80 MG: 10 INJECTION, SOLUTION INTRAMUSCULAR; INTRAVENOUS at 05:45

## 2021-11-10 RX ADMIN — ATORVASTATIN CALCIUM 10 MG: 20 TABLET, FILM COATED ORAL at 09:25

## 2021-11-10 NOTE — PLAN OF CARE
Problem: Adult Inpatient Plan of Care  Goal: Plan of Care Review  Outcome: Ongoing, Progressing  Goal: Patient-Specific Goal (Individualized)  Outcome: Ongoing, Progressing  Goal: Absence of Hospital-Acquired Illness or Injury  Outcome: Ongoing, Progressing  Intervention: Identify and Manage Fall Risk  Recent Flowsheet Documentation  Taken 11/9/2021 2130 by Kassandra Gabriel RN  Safety Promotion/Fall Prevention:   activity supervised   fall prevention program maintained   nonskid shoes/slippers when out of bed   safety round/check completed  Goal: Optimal Comfort and Wellbeing  Outcome: Ongoing, Progressing  Intervention: Provide Person-Centered Care  Recent Flowsheet Documentation  Taken 11/9/2021 2130 by Kassandra Gabriel RN  Trust Relationship/Rapport:   care explained   choices provided  Goal: Readiness for Transition of Care  Outcome: Ongoing, Progressing     Problem: Fall Injury Risk  Goal: Absence of Fall and Fall-Related Injury  Outcome: Ongoing, Progressing  Intervention: Promote Injury-Free Environment  Recent Flowsheet Documentation  Taken 11/9/2021 2130 by Kassandra Gabriel RN  Safety Promotion/Fall Prevention:   activity supervised   fall prevention program maintained   nonskid shoes/slippers when out of bed   safety round/check completed     Problem: Skin Injury Risk Increased  Goal: Skin Health and Integrity  Outcome: Ongoing, Progressing   Goal Outcome Evaluation:   Transfer from [facility]. VSS, IV diuretics per MDO.

## 2021-11-10 NOTE — PROGRESS NOTES
ID note for sepsis?  Subjective: She is continue to have some abdominal pain.  No fevers or chills but is having some sweats at night.  Objective: Afebrile, no acute distress, abdomen tender in the upper quadrants without rebound or guarding, appropriate mood and affect    CT abdomen pelvis showed contracted gallbladder with gallstones and pancreatic inflammation  Lipase was 453  Microbiology:  11/2 urine culture at least 3 organisms recovered  11/8 respiratory pathogen panel not detected  11/8 blood cultures no growth to date  11/8 C. difficile negative  11/8 GI PCR negative  Assessment and plan  1.    Pancreatitis, presumably gallstone  2.  Elevated procalcitonin, likely secondary #1  3.  Acute on chronic diastolic CHF  4.  Mild hepatitis  5.  Diabetes mellitus type 2, continue glycemic control efforts to prevent/control infectious complications    Elevated procalcitonin likely due to pancreatitis.  I suspect she probably had gallstone pancreatitis and has passed a stone given elevated bilirubin which has been decreasing.  Repeat is pending.  Probably would benefit from cholecystectomy at some point, but would defer timing to others.  We will give her 1 more dose of ceftriaxone today and see how she does.  I will go ahead and check a right upper quadrant ultrasound to make sure there is no biliary dilation although none was seen on the CT and lower suspicion for GI/biliary infection given evidence of pancreatitis on radiographs and elevated lipase

## 2021-11-10 NOTE — NURSING NOTE
11/10/21    Pt has requested a pulmonology consult.  She has a cpap that has been recalled while ago and has not been able to have it replaced.  She also had some sob this am that was helped with breathing treatments.  She told me that she has an old nebulizer at home but hasn't been able to use it as she doesn't have current medications for it.  I asked if she used long acting inhalers.  She stated no but felt that she might have been on them in the past.  She told me at one time she saw Dr. Hill and then Dr. Morrissey. will ask attending to see if we can help with any of this when he rounds on the pt.     Azalea vaughn RN

## 2021-11-10 NOTE — PROGRESS NOTES
"    Patient Name: Lana Yañez  :1945  76 y.o.      Patient Care Team:  Will Madden MD as PCP - General (Family Medicine)  Pia Dueñas MD as Consulting Physician (Cardiology)  Iain Hill MD as Consulting Physician (Pulmonary Disease)  Carmen Kirk MD as Consulting Physician (Pain Medicine)  Nano Cool [facility] as Pharmacist  Shaw Hand PharmD as Pharmacist (Pharmacy)    Chief Complaint: follow up heart failure, abdominal pain/diarrhea    Interval History: edema much better. Weight up, although bed scale used. She still has SOA with exertion. Decent diuresis noted.        Objective   Vital Signs  Temp:  [97.2 °F (36.2 °C)-98.1 °F (36.7 °C)] 98.1 °F (36.7 °C)  Heart Rate:  [] 82  Resp:  [18-24] 18  BP: (105-125)/(65-78) 117/78    Intake/Output Summary (Last 24 hours) at 11/10/2021 1222  Last data filed at 11/10/2021 0906  Gross per 24 hour   Intake 600 ml   Output 2500 ml   Net -1900 ml     Flowsheet Rows      First Filed Value   Admission Height 167.6 cm (66\") Documented at 2021 013   Admission Weight 113 kg (250 lb) Documented at 2021 013          Physical Exam:   General Appearance:    Alert, cooperative, in no acute distress   Lungs:     Clear to auscultation.  Normal respiratory effort and rate.      Heart:    Regular rhythm and normal rate, normal S1 and S2, no murmurs, gallops or rubs.     Chest Wall:    No abnormalities observed   Abdomen:     Soft, nontender, positive bowel sounds.     Extremities:   no cyanosis, clubbing or edema.  No marked joint deformities.  Adequate musculoskeletal strength.       Results Review:    Results from last 7 days   Lab Units 21  5269 21  0440 21  044   SODIUM mmol/L  --   --  137   POTASSIUM mmol/L 3.7   < > 3.3*   CHLORIDE mmol/L  --   --  99   CO2 mmol/L  --   --  25.9   BUN mg/dL  --   --  18   CREATININE mg/dL  --   --  1.00   GLUCOSE mg/dL  --   --  116*   CALCIUM mg/dL  --   --  9.3    < > = values " in this interval not displayed.     Results from last 7 days   Lab Units 11/08/21  0210   TROPONIN T ng/mL <0.010     Results from last 7 days   Lab Units 11/09/21  0440   WBC 10*3/mm3 9.53   HEMOGLOBIN g/dL 10.9*   HEMATOCRIT % 32.2*   PLATELETS 10*3/mm3 136*     Results from last 7 days   Lab Units 11/09/21  0440 11/08/21  1237 11/08/21  0210   INR  3.31* 2.68* 2.16*   APTT seconds  --   --  46.0*     Results from last 7 days   Lab Units 11/09/21  0440   MAGNESIUM mg/dL 1.8                   Medication Review:   aspirin, 81 mg, Oral, Daily  atorvastatin, 10 mg, Oral, Daily  carvedilol, 25 mg, Oral, BID With Meals  cefTRIAXone, 2 g, Intravenous, Q24H  digoxin, 125 mcg, Oral, Every Other Day  furosemide, 80 mg, Intravenous, Q12H  insulin lispro, 0-9 Units, Subcutaneous, 4x Daily With Meals & Nightly  latanoprost, 1 drop, Both Eyes, Daily  lisinopril, 10 mg, Oral, Daily         Pharmacy to dose warfarin,         Assessment/Plan   1. Acute on chronic diastolic heart failure, continue IV diuretics. Blood pressure improved with resumption of home medications. Last echo August 2021. LVEF 65%.  2. Leukocytosis and elevated procalcitonin - ID following. Getting CT abd/pelvis  3. Chronic atrial fibrillation - rate ok. Continue dig and beta blocker. Dig level ok. She is on warfarin. INR was mildly supratherapeutic. Pharmacy is managing. INR today is pending.  4. Diabetes mellitus type II  5. Coronary artery disease - small vessel not amendable to PCI in 2017 continue aspirin and statin. Trop negative.   6. Elevated liver function tests  7. Mild to moderate MR  8. Acute pancreatitis     Labs pending. Continue IV lasix for now. She doesn't feel like her breathing has improved very much. Had wheezing this morning.     Consider changing to IV bumex if renal function stable. We will continue to diurese as renal function allows.     ANGELA Wright  [facility]  11/10/21  12:22 EST

## 2021-11-10 NOTE — CASE MANAGEMENT/SOCIAL WORK
Continued Stay Note  [facility]     Patient Name: Lana Yañez  MRN: [id number]  Today's Date: 11/10/2021    Admit Date: 11/8/2021     Discharge Plan     Row Name 11/10/21 1214       Plan    Plan Home with [facility]    Patient/Family in Agreement with Plan yes    Plan Comments Met with pt at bedside who confirms plan to return home at discharge with [facility].  Pt states she is wanting a motorized scooter.  Informed pt that she will need to discuss with her PCP.  Pt to follow up with [facility] about CPAP.  Notified [facility] that pt might need new nebulizer.  Nir to follow up on eligibility.  No further needs identified at this.  CCP continues to follow.  CARLOS Villafana RN               Discharge Codes    No documentation.               Expected Discharge Date and Time     Expected Discharge Date Expected Discharge Time    Nov 10, 2021             Katia Villafana RN

## 2021-11-10 NOTE — PLAN OF CARE
Goal Outcome Evaluation:  Plan of Care Reviewed With: patient        Progress: no change  Outcome Summary: VSS, CT completed. Report called to [facility]. A-fib HR 's. Monitor vital signs, labs, telemetry,daily weights and output.

## 2021-11-10 NOTE — PROGRESS NOTES
Pharmacy Consult: Warfarin Dosing/ Monitoring    Lana Yañez is a 76 y.o. female, estimated creatinine clearance is 56.8 mL/min (A) (by C-G formula based on SCr of 1.12 mg/dL (H)). weighing 115 kg (252 lb 8 oz).     has a past medical history of Acute on chronic diastolic heart failure ([facility]), Arthritis, Asthma, Atrial fibrillation ([facility]), Atypical chest pain, Bronchitis, Cellulitis of right elbow, CHF (congestive heart failure) ([facility]), COPD (chronic obstructive pulmonary disease) ([facility]), Coronary artery disease, Coronary artery disease involving native coronary artery of native heart with angina pectoris with documented spasm ([facility]), Diabetes 1.5, managed as type 2 ([facility]), Disease of thyroid gland, Displacement of lumbar intervertebral disc without myelopathy, Dizziness, ANTOINE (dyspnea on exertion), Essential hypertension, Fatigue, Generalized osteoarthritis of multiple sites, History of rheumatic fever, History of transfusion, bone density study, Hyperglycemia, Hyperlipidemia, Hypovitaminosis D, Left arm pain, Left-sided weakness, Leg swelling, Low back pain, Lower extremity edema, Malaise and fatigue, Mitral valve disease, Mitral valve insufficiency, Morbid obesity with BMI of 40.0-44.9, adult ([facility]), MRSA infection, NSTEMI (non-ST elevated myocardial infarction) ([facility]), PAF (paroxysmal atrial fibrillation) ([facility]), RA (rheumatoid arthritis) ([facility]), Sleep apnea, SOB (shortness of breath), Stroke ([facility]), Urge incontinence of urine, UTI (urinary tract infection) (2020), and Vitamin D deficiency.    Social History     Tobacco Use    Smoking status: Former Smoker     Packs/day: 3.00     Types: Cigarettes     Start date: 1967     Quit date: 10/19/1968     Years since quittin.0    Smokeless tobacco: Never Used    Tobacco comment: caffeine use - decaf coffee   Substance Use Topics    Alcohol use: No     Comment: No caffeine use    Drug use: No       Results from last 7 days   Lab Units 11/10/21  1122 21  0469  11/08/21  1237 11/08/21  0535 11/08/21  0210   INR  3.11* 3.31* 2.68*  --  2.16*   APTT seconds  --   --   --   --  46.0*   HEMOGLOBIN g/dL 11.5* 10.9*  --  10.8* 11.1*   HEMATOCRIT % 34.8 32.2*  --  31.7* 32.7*   PLATELETS 10*3/mm3 172 136*  --  132* 129*     Results from last 7 days   Lab Units 11/10/21  1122 11/09/21  2339 11/09/21  0440 11/08/21  1652 11/08/21  0535 11/08/21  0210 11/08/21  0210   SODIUM mmol/L 137  --  137  --  138   < > 138   POTASSIUM mmol/L 3.6 3.7 3.3*   < > 3.2*   < > 3.4*   CHLORIDE mmol/L 98  --  99  --  99   < > 101   CO2 mmol/L 28.8  --  25.9  --  25.5   < > 24.4   BUN mg/dL 18  --  18  --  16   < > 16   CREATININE mg/dL 1.12*  --  1.00  --  1.14*   < > 1.11*   CALCIUM mg/dL 9.1  --  9.3  --  9.0   < > 9.4   BILIRUBIN mg/dL 0.8  --  2.1*  --   --   --  2.9*   ALK PHOS U/L 94  --  90  --   --   --  93   ALT (SGPT) U/L 39*  --  49*  --   --   --  35*   AST (SGOT) U/L 33*  --  64*  --   --   --  64*   GLUCOSE mg/dL 197*  --  116*  --  155*   < > 168*    < > = values in this interval not displayed.     Anticoagulation history: followed by [facility]. Last seen on 11/3  Dose was 5 mg daily except Wednesdays when takes 2.5 mg instead    Hospital Anticoagulation:  Consulting provider: Rosaline Means APRN  Start date: 11/9  Indication: afib  Target INR: 2-3  Expected duration: n/a   Bridge Therapy: No                  Date 11/8 11/9 11/10          INR 2.16  2.68 3.31 3.11          Warfarin dose  5 mg hold hold            Potential drug interactions:   APAP - may result in an increased risk of bleeding.  Aspirin - may result in increased risk of bleeding.   Ceftriaxone - may result in an increased risk of bleeding.       Relevant nutrition status: NPO as of today at 1217 - previous regular diet - last charted 11/10 75% Lunch    Other:     Education complete?/ Date:     Assessment/Plan:  Will HOLD today's dose for supratherapuetic INR.   Daily INR ordered. Follow up tomorrow  11/11.    Pharmacy will continue to follow until discharge or discontinuation of warfarin.   Yonatan Crenshaw [facility]  11/10/2021

## 2021-11-10 NOTE — CONSULTS
Group: [facility]         CONSULT NOTE    Patient Identification:    Lana Yañez  76 y.o.  female  1945  [id number]            Patient Care Team:  Will Madden MD as PCP - General (Family Medicine)  Pia Dueñas MD as Consulting Physician (Cardiology)  Iain Hill MD as Consulting Physician (Pulmonary Disease)  Carmen Kirk MD as Consulting Physician (Pain Medicine)  Nnao Cool RPH as Pharmacist  Shaw Hand PharmD as Pharmacist (Pharmacy)    Requesting physician: Dr. Rodriguez    Reason for Consultation: Respiratory failure, congestive heart failure, FRANCY    CC: Shortness of breath    History of Present Illness: Patient is a pleasant 76-year-old morbidly obese white female with a past medical history significant for chronic congestive heart failure, A. fib on anticoagulation, FRANCY on PAP recently held due to concern for recall who was admitted to the hospital due to abdominal discomfort and diarrhea and is currently being worked up and has potential concern for gall stone related pancreatitis along with congestive heart failure for which cardiology/GI and infectious disease was consulted.  Patient has been diuresed and is noticing improvement with regards to her respiratory symptoms.  She is continuing to have issues with diarrhea for which she is getting work-up as well.  We have been asked to evaluate the patient for potential issues with sleep apnea and noncompliance with CPAP and patient's concerns about the machine.    Patient states that she has been usually using the machine well before and has decided to stop after this concerned about recall and states that she has not seen her sleep physician for the last several years and did not know home to talk to about it.    I have reviewed the H&P as well as the notes from the other consultants taking care of the patient this admission as well as previous hospital notes (if any available) from our group physicians and  summarized above      Objective     Review of Systems:  Constitutional: No fever, chills, weight loss or loss of appetite.   ENMT: No sinus congestion, post nasal drip, epistaxis, sore throat  Cardiovascular: No chest pain, palpitation. + legs swelling.    Respiratory: No hemoptysis, orthopnea, PND.  Gastrointestinal: No constipation. + diarrhea & abdominal pain   Neurology: No headache, focal weakness, numbness or dizziness.   Musculoskeletal: No joint stiffness or swelling.   Psychiatry: No agitation or behavioral changes  Lymphatic: No swollen neck glands.  Integumentary: No rash.    Past Medical History:  Past Medical History:   Diagnosis Date   • Acute on chronic diastolic heart failure ([facility])    • Arthritis    • Asthma    • Atrial fibrillation ([facility])     Persistent; on warfarin   • Atypical chest pain    • Bronchitis    • Cellulitis of right elbow     due to MRSA   • CHF (congestive heart failure) ([facility])    • COPD (chronic obstructive pulmonary disease) ([facility])    • Coronary artery disease     Cardiac catheterization completed; 90% PDA stenosis with medical management recommended   • Coronary artery disease involving native coronary artery of native heart with angina pectoris with documented spasm ([facility])    • Diabetes 1.5, managed as type 2 ([facility])    • Disease of thyroid gland    • Displacement of lumbar intervertebral disc without myelopathy    • Dizziness    • ANTOINE (dyspnea on exertion)    • Essential hypertension    • Fatigue    • Generalized osteoarthritis of multiple sites    • History of rheumatic fever    • History of transfusion    • Hx of bone density study     10/23/2014   • Hyperglycemia    • Hyperlipidemia    • Hypovitaminosis D    • Left arm pain    • Left-sided weakness    • Leg swelling    • Low back pain    • Lower extremity edema    • Malaise and fatigue    • Mitral valve disease     Moderate mitral valve prolapse and moderate mitral regurgitation   • Mitral valve insufficiency    • Morbid obesity  with BMI of 40.0-44.9, adult ([facility])    • MRSA infection    • NSTEMI (non-ST elevated myocardial infarction) ([facility])    • PAF (paroxysmal atrial fibrillation) ([facility])    • RA (rheumatoid arthritis) ([facility])     involving both hands   • Sleep apnea    • SOB (shortness of breath)    • Stroke ([facility])     left side weakness   • Urge incontinence of urine    • UTI (urinary tract infection) 05/2020   • Vitamin D deficiency        Past Surgical History:  Past Surgical History:   Procedure Laterality Date   • BREAST BIOPSY     • BREAST SURGERY      right side lumpectomy with biopsy   • CARDIAC CATHETERIZATION Left 10/20/2017    Procedure: Cardiac Catheterization/Vascular Study;  Surgeon: Alphonso Olmedo MD;  Location: [facility] CATH INVASIVE LOCATION;  Service:    • CARDIAC CATHETERIZATION N/A 10/20/2017    +2 mitral regurgitation, left main 10% stenosis, mid to distal LAD 10% diffuse stenosis, circumflex 10% diffuse stenosis, RCA 10% proximal stenosis, and distal PDA consistent with coronary embolus with a lesion of 90% too small to consider coronary intervention; medical management recommended   • EYE SURGERY      laser surgery for glaucoma and left eye cataracts removed   • HYSTERECTOMY      10+ years ago   • JOINT REPLACEMENT  2005; 2006    bilateral knees and left rotater   • KNEE SURGERY     • MAMMO BILATERAL  2016    [facility]         Home Meds:  Medications Prior to Admission   Medication Sig Dispense Refill Last Dose   • aspirin 81 MG EC tablet Take 1 tablet by mouth Daily.      • atorvastatin (LIPITOR) 10 MG tablet Take 1 tablet by mouth Daily. 90 tablet 1    • carvedilol (COREG) 25 MG tablet TAKE 1 & 1/2 (ONE & ONE-HALF) TABLETS BY MOUTH TWICE DAILY WITH MEALS (Patient taking differently: Take 37.5 mg by mouth 2 (Two) Times a Day With Meals.) 270 tablet 2    • digoxin (LANOXIN) 125 MCG tablet Take 1 tablet by mouth Every Other Day. 45 tablet 1    • furosemide (LASIX) 40 MG tablet Take 1 tablet in the morning 90 tablet 0     • HYDROcodone-acetaminophen (NORCO) 5-325 MG per tablet Take 1 tablet by mouth 2 (Two) Times a Day As Needed for Severe Pain . 40 tablet 0    • lisinopril (PRINIVIL,ZESTRIL) 10 MG tablet Take 1 tablet by mouth Daily. 90 tablet 3    • magnesium oxide (MAG-OX) 400 MG tablet Take 400 mg by mouth As Needed.      • potassium chloride 10 MEQ CR tablet Take 2 tablets by mouth Daily. (Patient taking differently: Take 10 mEq by mouth Daily. No longer BID) 180 tablet 3    • SITagliptin (Januvia) 100 MG tablet Take 1 tablet by mouth Daily. 30 tablet 11    • TRAVATAN Z 0.004 % solution ophthalmic solution Administer 1 drop to both eyes Every Morning. Not night      • vitamin D (ERGOCALCIFEROL) 1.25 MG (60714 UT) capsule capsule Take 1 capsule by mouth once a week 12 capsule 0    • warfarin (COUMADIN) 5 MG tablet TAKE 1 TABLET BY MOUTH ONCE DAILY OR  AS  DIRECTED  BY  MEDICATION  MANAGEMENT  CLINIC (Patient taking differently: Take 5 mg by mouth. TAKE 1 TABLET BY MOUTH ONCE DAILY and 1/2 tablet Wednesday) 90 tablet 1    • Blood Glucose Monitoring Suppl (ACCU-CHEK GUIDE) w/Device kit See Admin Instructions.      • glucose blood test strip Use as instructed 300 each 12    • Lancets (ACCU-CHEK MULTICLIX) lancets Use 1 lancet per finger stick 204 each 12    • [] nitrofurantoin, macrocrystal-monohydrate, (Macrobid) 100 MG capsule Take 1 capsule by mouth 2 (Two) Times a Day for 5 days. 10 capsule 0        Allergies:  Allergies   Allergen Reactions   • Contrast Dye Hives and Itching       Social History:   Social History     Socioeconomic History   • Marital status:      Spouse name: Suresh   • Number of children: 5   • Years of education: 13   Tobacco Use   • Smoking status: Former Smoker     Packs/day: 3.00     Types: Cigarettes     Start date: 1967     Quit date: 10/19/1968     Years since quittin.0   • Smokeless tobacco: Never Used   • Tobacco comment: caffeine use - decaf coffee   Substance and Sexual  "Activity   • Alcohol use: No     Comment: No caffeine use   • Drug use: No   • Sexual activity: Defer       Family History:  Family History   Problem Relation Age of Onset   • Colon cancer Mother    • Glaucoma Mother    • Stroke Mother    • Arthritis Mother    • Hypertension Mother    • Glaucoma Sister    • Diabetes Sister    • Heart disease Sister    • Arthritis Sister    • Asthma Sister    • Hypertension Sister    • Miscarriages / Stillbirths Sister    • Lung disease Sister    • Heart disease Brother    • Diabetes Brother    • Arthritis Brother    • Drug abuse Brother    • Hypertension Brother    • Arthritis Father    • COPD Father    • Lung disease Father    • Arthritis Daughter    • Depression Daughter    • Alcohol abuse Maternal Uncle    • Heart disease Sister    • Heart disease Sister    • Heart disease Brother        Physical Exam:  /86 (BP Location: Right arm, Patient Position: Lying)   Pulse 86   Temp 97.8 °F (36.6 °C) (Oral)   Resp 18   Ht 167.6 cm (66\")   Wt 115 kg (252 lb 8 oz)   LMP  (LMP Unknown)   SpO2 96%   BMI 40.75 kg/m²  Body mass index is 40.75 kg/m². 96% 115 kg (252 lb 8 oz)    Physical Exam     Constitutional: Middle aged pt in bed, No acute respiratory distress, + accessory muscle use  Head: - NCAT  Eyes: No pallor, Anicteric conjunctiva, EOMI.  ENMT:  Mallampati 4, no oral thrush. Moist MM.   NECK: Trachea midline, No thyromegaly, no palpable cervical LNpathy  Heart: RRR, no murmur. No pedal edema   Lungs: DANIELA +, No wheezes/ crackles heard    Abdomen: Soft.  Obese, no tenderness, guarding or rigidity. No palpable masses  Extremities: Extremities warm and well perfused. No cyanosis/ clubbing  Neuro: Conscious, answers appropriately, no gross focal neuro deficits  Psych: Mood and affect appropriate    PPE recommended per [facility] infectious disease Isolation protocol for the current clinical scenario(as mentioned below) was followed.     LABS:  Results from last 7 days "   Lab Units 11/10/21  1122 11/09/21  2339 11/09/21  0440 11/08/21  1652 11/08/21  1237 11/08/21  0535 11/08/21  0210 11/08/21  0210   SODIUM mmol/L 137  --  137  --   --  138   < > 138   POTASSIUM mmol/L 3.6 3.7 3.3*   < >  --  3.2*   < > 3.4*   CHLORIDE mmol/L 98  --  99  --   --  99   < > 101   CO2 mmol/L 28.8  --  25.9  --   --  25.5   < > 24.4   BUN mg/dL 18  --  18  --   --  16   < > 16   CREATININE mg/dL 1.12*  --  1.00  --   --  1.14*   < > 1.11*   CALCIUM mg/dL 9.1  --  9.3  --   --  9.0   < > 9.4   BILIRUBIN mg/dL 0.8  --  2.1*  --   --   --   --  2.9*   ALK PHOS U/L 94  --  90  --   --   --   --  93   ALT (SGPT) U/L 39*  --  49*  --   --   --   --  35*   AST (SGOT) U/L 33*  --  64*  --   --   --   --  64*   GLUCOSE mg/dL 197*  --  116*  --   --  155*   < > 168*   WBC 10*3/mm3 8.79  --  9.53  --   --  14.11*   < > 10.78   HEMOGLOBIN g/dL 11.5*  --  10.9*  --   --  10.8*   < > 11.1*   PLATELETS 10*3/mm3 172  --  136*  --   --  132*   < > 129*   INR  3.11*  --  3.31*  --  2.68*  --    < > 2.16*   PROBNP pg/mL  --   --   --   --   --   --   --  1,344.0*   PROCALCITONIN ng/mL  --   --  3.43*  --   --   --   --  3.10*    < > = values in this interval not displayed.       Lab Results   Component Value Date    CALCIUM 9.1 11/10/2021       Results from last 7 days   Lab Units 11/08/21  0210   BLOODCX  No growth at 2 days  No growth at 2 days              Results Review:    I have reviewed the relevant laboratory results and independently reviewed the chest imaging from this hospitalization including the available echocardiogram reports personally and summarized it if/ when appropriate below    Assessment    Acute hypoxic respiratory failure  Acute on chronic congestive heart failure; diastolic  Persistent diarrhea  Suspected gallstone pancreatitis  Paroxysmal A. fib on anticoagulation  FRANCY on PAP  Morbid obesity    RECOMMENDATIONS:  Patient is stable from a respiratory standpoint.  Agree with diuretics to keep her  as negative as possible  Continue with bronchodilators to help with mucus clearance  Discussed with the patient about the recall that she is concerned about.  Notified that she will need to contact the company to get on the list of replacement but at this point we recommend to continue with her home PAP as the risk of undiagnosed sleep apnea is higher than potential complications due to device filter issue.  She understands and states that she will get her home machine to use in the hospital at night.    Would want to get a sleep physician as she does not have one and we will recommend to follow-up at the [facility] sleep clinic with Dr. Hill as he saw her in 2017 and diagnosed her with sleep apnea.    Thank you for letting me participate in the care of this pleasant patient.  I have discussed my findings and recommendations with patient.     Dayron Hewitt MD  11/10/2021  16:25 EST

## 2021-11-10 NOTE — NURSING NOTE
Pt with c/o wheezing, hx of asthma, reports using nebulizer at home. Call placed to [facility], ordered received. Call placed to [facility] for tx, awaiting arrival.

## 2021-11-10 NOTE — PROGRESS NOTES
Name: Lana Yañez ADMIT: 2021   : 1945  PCP: Will Madden MD    MRN: [id number] LOS: 2 days   AGE/SEX: 76 y.o. female  ROOM: [facility]     Subjective   Subjective   feels about the same today. states breathing was worse this morning associated with wheezing, improved a little after a breathing treatment. still not with much of an appetite. lower extremity much improved today.     Review of Systems   Constitutional: Positive for appetite change. Negative for chills and fever.   HENT: Negative for congestion and sore throat.    Respiratory: Positive for shortness of breath and wheezing. Negative for chest tightness.    Cardiovascular: Negative for chest pain and leg swelling.   Gastrointestinal: Positive for abdominal pain and diarrhea. Negative for nausea and vomiting.   Genitourinary: Negative for difficulty urinating, dysuria and frequency.   Musculoskeletal: Positive for gait problem. Negative for back pain.   Neurological: Negative for dizziness and headaches.   Hematological: Negative for adenopathy. Bruises/bleeds easily.   Psychiatric/Behavioral: Negative for behavioral problems and confusion.        Objective   Objective   Vital Signs  Temp:  [97.2 °F (36.2 °C)-98.1 °F (36.7 °C)] 98.1 °F (36.7 °C)  Heart Rate:  [] 85  Resp:  [18-24] 18  BP: (105-125)/(65-78) 117/78  SpO2:  [91 %-100 %] 100 %  on   ;   Device (Oxygen Therapy): room air  Body mass index is 40.75 kg/m².  Physical Exam  Vitals and nursing note reviewed.   Constitutional:       Appearance: She is obese. She is ill-appearing.   Cardiovascular:      Rate and Rhythm: Normal rate. Rhythm irregularly irregular.   Pulmonary:      Effort: Pulmonary effort is normal. No respiratory distress.      Breath sounds: Normal breath sounds.      Comments: diminished breath sounds  Abdominal:      General: Bowel sounds are normal. There is no distension.      Palpations: Abdomen is soft.      Tenderness: There is abdominal  tenderness.   Musculoskeletal:         General: Swelling (trace BLE) present. Normal range of motion.   Skin:     General: Skin is warm and dry.   Neurological:      Mental Status: She is alert and oriented to person, place, and time. Mental status is at baseline.   Psychiatric:         Mood and Affect: Mood normal.         Behavior: Behavior normal.         Results Review     I reviewed the patient's new clinical results.  Results from last 7 days   Lab Units 11/10/21  1122 11/09/21 0440 11/08/21 0535 11/08/21 0210   WBC 10*3/mm3 8.79 9.53 14.11* 10.78   HEMOGLOBIN g/dL 11.5* 10.9* 10.8* 11.1*   PLATELETS 10*3/mm3 172 136* 132* 129*     Results from last 7 days   Lab Units 11/10/21  1122 11/09/21  2339 11/09/21 0440 11/08/21  1652 11/08/21 0535 11/08/21 0535 11/08/21 0210 11/08/21 0210   SODIUM mmol/L 137  --  137  --   --  138  --  138   POTASSIUM mmol/L 3.6 3.7 3.3* 3.9   < > 3.2*   < > 3.4*   CHLORIDE mmol/L 98  --  99  --   --  99  --  101   CO2 mmol/L 28.8  --  25.9  --   --  25.5  --  24.4   BUN mg/dL 18  --  18  --   --  16  --  16   CREATININE mg/dL 1.12*  --  1.00  --   --  1.14*  --  1.11*   GLUCOSE mg/dL 197*  --  116*  --   --  155*  --  168*   EGFR IF AFRICN AM mL/min/1.73 58*  --  66  --   --  56*  --  58*    < > = values in this interval not displayed.     Results from last 7 days   Lab Units 11/10/21  1122 11/09/21 0440 11/08/21 0210   ALBUMIN g/dL 3.70 3.60 4.30   BILIRUBIN mg/dL 0.8 2.1* 2.9*   ALK PHOS U/L 94 90 93   AST (SGOT) U/L 33* 64* 64*   ALT (SGPT) U/L 39* 49* 35*     Results from last 7 days   Lab Units 11/10/21  1122 11/09/21  0440 11/08/21  0535 11/08/21  0210   CALCIUM mg/dL 9.1 9.3 9.0 9.4   ALBUMIN g/dL 3.70 3.60  --  4.30   MAGNESIUM mg/dL  --  1.8  --  1.5*     Results from last 7 days   Lab Units 11/09/21  0440 11/08/21  0210   PROCALCITONIN ng/mL 3.43* 3.10*   LACTATE mmol/L  --  1.5     COVID19   Date Value Ref Range Status   11/08/2021 Not Detected Not Detected -  Ref. Range Final     SARS-CoV-2, DONA   Date Value Ref Range Status   01/05/2021 Not Detected Not Detected Final     Comment:     This nucleic acid amplification test was developed and its performance  characteristics determined by [employer]. Nucleic acid  amplification tests include PCR and TMA. This test has not been FDA  cleared or approved. This test has been authorized by FDA under an  Emergency Use Authorization (EUA). This test is only authorized for  the duration of time the declaration that circumstances exist  justifying the authorization of the emergency use of in vitro  diagnostic tests for detection of SARS-CoV-2 virus and/or diagnosis  of COVID-19 infection under section 564(b)(1) of the Act, 21 U.S.C.  360bbb-3(b) (1), unless the authorization is terminated or revoked  sooner.  When diagnostic testing is negative, the possibility of a false  negative result should be considered in the context of a patient's  recent exposures and the presence of clinical signs and symptoms  consistent with COVID-19. An individual without symptoms of COVID-19  and who is not shedding SARS-CoV-2 virus would expect to have a  negative (not detected) result in this assay.       Hemoglobin A1C   Date/Time Value Ref Range Status   11/08/2021 0535 7.11 (H) 4.80 - 5.60 % Final     Glucose   Date/Time Value Ref Range Status   11/10/2021 1117 202 (H) 70 - 130 mg/dL Final     Comment:     Meter: [id number] : [id number] Silvia Echeverria NA   11/10/2021 0616 115 70 - 130 mg/dL Final     Comment:     Meter: [id number] : [id number] Frank Bravo NA   11/09/2021 2032 171 (H) 70 - 130 mg/dL Final     Comment:     Meter: [id number] : [id number] London Miller NA   11/09/2021 1751 168 (H) 70 - 130 mg/dL Final     Comment:     Meter: [id number] : [id number] Atul Perez RN   11/09/2021 1109 161 (H) 70 - 130 mg/dL Final     Comment:     Meter: [id number] : [id number] Jason Gagnon BETTIE   11/09/2021 0535 112 70 - 130  mg/dL Final     Comment:     Meter: [id number] : [id number] Afsaneh HUFF   11/08/2021 2249 222 (H) 70 - 130 mg/dL Final     Comment:     Meter: [id number] : [id number] HUFF       CT Abdomen Pelvis Without Contrast  Narrative: CT ABDOMEN AND PELVIS WITHOUT IV CONTRAST     HISTORY: Sepsis, heart failure, shortness of breath     TECHNIQUE: Radiation dose reduction techniques were utilized, including  automated exposure control and exposure modulation based on body size.  Axial images were obtained through the abdomen and pelvis without the  administration of IV contrast. Coronal and sagittal reformatted images  were obtained.     COMPARISON: Chest CT 09/23/2021, CT of the abdomen and pelvis 09/29/2017     FINDINGS:      ABDOMEN:  Evaluation lung bases demonstrates very small bilateral pleural  effusions. There is overlying lung atelectasis. Noncontrast appearance  of the liver is unremarkable. The gallbladder is contracted though there  does appear to be a tiny gallstone. Spleen is unremarkable. The kidneys  are unremarkable. The adrenal glands are unremarkable. There is some  mild stranding around the pancreas suggesting pancreatitis. Recommend  correlation with pancreatic enzyme levels. There is no abscess or fluid  collection. There is some thickening of the adjacent duodenum which may  be reactive versus enteritis. No evidence of ascites or free air.     PELVIS:  Hysterectomy. No free fluid. The bladder is decompressed. There are  scattered diverticula within the colon without evidence for colitis or  diverticulitis. Degenerative changes lumbar spine     Impression: 1. There is some mild stranding around the pancreas suggesting  pancreatitis. Recommend correlation with pancreatic enzyme levels. No  abscess or fluid collection. Thickening of the adjacent duodenum may be  reactive versus enteritis.  2. Very small bilateral pleural effusions  3. Contracted gallbladder which does appear  to contain a tiny gallstone     Radiation dose reduction techniques were utilized, including automated  exposure control and exposure modulation based on body size.     This report was finalized on 11/9/2021 4:49 PM by Dr. Jerome Lee M.D.       Scheduled Medications  aspirin, 81 mg, Oral, Daily  atorvastatin, 10 mg, Oral, Daily  carvedilol, 25 mg, Oral, BID With Meals  cefTRIAXone, 2 g, Intravenous, Q24H  digoxin, 125 mcg, Oral, Every Other Day  furosemide, 80 mg, Intravenous, Q12H  insulin lispro, 0-9 Units, Subcutaneous, 4x Daily With Meals & Nightly  latanoprost, 1 drop, Both Eyes, Daily  lisinopril, 10 mg, Oral, Daily  warfarin (COUMADIN) (dosing per levels), , Does not apply, Daily    Infusions  Pharmacy to dose warfarin,     Diet  NPO Diet       Assessment/Plan     Active Hospital Problems    Diagnosis  POA   • **Acute on chronic diastolic heart failure (HCC) [I50.33]  Yes   • Acute respiratory failure with hypoxia ([facility]) [J96.01]  Yes   • Hypokalemia [E87.6]  Yes   • Hypomagnesemia [E83.42]  Yes   • Type 2 diabetes mellitus with hyperglycemia, without long-term current use of insulin ([facility]) [E11.65]  Yes   • Diarrhea [R19.7]  Yes   • Sepsis without acute organ dysfunction ([facility]) [A41.9]  Yes   • Morbid obesity with BMI of 40.0-44.9, adult ([facility]) [E66.01, Z68.41]  Not Applicable   • Pulmonary hypertension (HCC) [I27.20]  Yes   • Chronic atrial fibrillation (HCC) [I48.20]  Yes   • Sleep apnea [G47.30]  Yes   • Essential hypertension [I10]  Yes      Resolved Hospital Problems   No resolved problems to display.       Acute respiratory failure secondary to acute on chronic diastolic heart failure: cardiology managing diuretics and possibly changing to IV bumex. continue fluid restriction, daily weights, strict I&O.  Monitor renal function closely. She was weaned to room air.  PAF/chest pain: s/p nitro gtt.  Continue warfarin, pharmacy to dose, daily INR- has been mildly supratherapeutic and being held.  Heart  rate is controlled.   Leukocytosis/elevated pro-Ned: CT abdomen shows likely gallstone pancreatitis explaining abnormal lab values. Lipase also elevated, recheck in AM. General surgery has been consulted. ID following and managing abx. US RUQ pending. She has been made  NPO. Unfortunately we cannot give IVFs as we are attempting to diurese.   DM2: A1c 7.1.  Hold oral antidiabetic medication and continue moderate correctional insulin.  ACHS, hypoglycemic protocol, carb consistent diet when not NPO.  Correct electrolytes  HTN: Stable  FRANCY: Patient reports CPAP has been recalled and she is been having issues obtaining a new one. Pulmonology has been consulted for assistance. supplemental o2 at night as needed.  patient has not really been out of bed, will ask PT/OT to see and encourage any activity.     Warfarin (home med) for DVT prophylaxis, pharmacy dosing.  Full code.  Discussed with patient and nursing staff.  Anticipate discharge TBANGELA Avendano  [facility]  11/10/21  14:40 EST

## 2021-11-10 NOTE — PLAN OF CARE
Problem: Adult Inpatient Plan of Care  Goal: Plan of Care Review  Outcome: Ongoing, Progressing  Flowsheets (Taken 11/10/2021 1822)  Plan of Care Reviewed With: patient  Outcome Summary: VSS,  pt on room air.  iv lasix continues, us of liver ordered, afib controlled.  no pain meds given on shift.  new consults for gen surgery, pulm, pt ot.  Pt has cpap at bedside RT to to evaluate machine to see if in working order.  Goal: Patient-Specific Goal (Individualized)  Outcome: Ongoing, Progressing  Goal: Absence of Hospital-Acquired Illness or Injury  Outcome: Ongoing, Progressing  Intervention: Identify and Manage Fall Risk  Recent Flowsheet Documentation  Taken 11/10/2021 1821 by Azalea Lebron RN  Safety Promotion/Fall Prevention: activity supervised  Taken 11/10/2021 1600 by Azalea Lebron RN  Safety Promotion/Fall Prevention: safety round/check completed  Taken 11/10/2021 1400 by Azalea Lebron RN  Safety Promotion/Fall Prevention: activity supervised  Taken 11/10/2021 1200 by Azalea Lebron RN  Safety Promotion/Fall Prevention: activity supervised  Taken 11/10/2021 1000 by Azalea Lebron RN  Safety Promotion/Fall Prevention: safety round/check completed  Taken 11/10/2021 0906 by Azalea Lebron RN  Safety Promotion/Fall Prevention: activity supervised  Intervention: Prevent Skin Injury  Recent Flowsheet Documentation  Taken 11/10/2021 1821 by Azalea Lebron RN  Body Position: position changed independently  Taken 11/10/2021 1600 by Azalea Lebron RN  Body Position: position changed independently  Taken 11/10/2021 1400 by Azalea Lebron RN  Body Position: position changed independently  Taken 11/10/2021 1200 by Azalea Lebron RN  Body Position: position changed independently  Taken 11/10/2021 1000 by Azalea Lebron RN  Body Position: position changed independently  Taken 11/10/2021 0906 by Azalea Lebron RN  Body Position: position changed independently  Skin  Protection: adhesive use limited  Goal: Optimal Comfort and Wellbeing  Outcome: Ongoing, Progressing  Goal: Readiness for Transition of Care  Outcome: Ongoing, Progressing     Problem: Fall Injury Risk  Goal: Absence of Fall and Fall-Related Injury  Outcome: Ongoing, Progressing  Intervention: Identify and Manage Contributors to Fall Injury Risk  Recent Flowsheet Documentation  Taken 11/10/2021 1400 by Azalea Lebron RN  Medication Review/Management: medications reviewed  Taken 11/10/2021 0906 by Azalea Lebron RN  Self-Care Promotion: independence encouraged  Intervention: Promote Injury-Free Environment  Recent Flowsheet Documentation  Taken 11/10/2021 1821 by Azalea Lebron RN  Safety Promotion/Fall Prevention: activity supervised  Taken 11/10/2021 1600 by Azalea Lebron RN  Safety Promotion/Fall Prevention: safety round/check completed  Taken 11/10/2021 1400 by Azalea Lebron RN  Safety Promotion/Fall Prevention: activity supervised  Taken 11/10/2021 1200 by Azalea Lebron RN  Safety Promotion/Fall Prevention: activity supervised  Taken 11/10/2021 1000 by Azalea Lebron RN  Safety Promotion/Fall Prevention: safety round/check completed  Taken 11/10/2021 0906 by Azalea Lebron RN  Safety Promotion/Fall Prevention: activity supervised     Problem: Skin Injury Risk Increased  Goal: Skin Health and Integrity  Outcome: Ongoing, Progressing  Intervention: Optimize Skin Protection  Recent Flowsheet Documentation  Taken 11/10/2021 1821 by Azalea Lebron RN  Head of Bed (HOB): HOB elevated  Taken 11/10/2021 1600 by Azalea Leborn RN  Head of Bed (HOB): HOB elevated  Taken 11/10/2021 1400 by Azalea Lebron RN  Head of Bed (HOB): HOB elevated  Taken 11/10/2021 1200 by Azalea Lebron RN  Head of Bed (HOB): HOB elevated  Taken 11/10/2021 1000 by Azalea Lebron RN  Head of Bed (HOB): HOB elevated  Taken 11/10/2021 0906 by Azalea Lebron RN  Pressure Reduction Techniques:  frequent weight shift encouraged  Head of Bed (HOB): HOB at 30-45 degrees  Skin Protection: adhesive use limited   Goal Outcome Evaluation:  Plan of Care Reviewed With: patient           Outcome Summary: VSS,  pt on room air.  iv lasix continues, us of liver ordered, afib controlled.  no pain meds given on shift.  new consults for gen surgery, pulm, pt ot.  Pt has cpap at bedside RT to to evaluate machine to see if in working order.

## 2021-11-10 NOTE — CONSULTS
[facility]   Consult Note    Patient Name: Lana Yañez  : 1945  MRN: [id number]  Primary Care Physician:  Will Madden MD  Referring Physician: Shelbi Christian MD  Date of admission: 2021    Inpatient General Surgery Consult  Consult performed by: Jose Rodriges Jr., MD  Consult ordered by: Khoa Rodriguez MD        Subjective   Subjective     Reason for Consult/ Chief Complaint: Possible gallstone pancreatitis    History of Present Illness  Lana Yañez is a very pleasant 76 y.o. female with multiple medical problems who presented to the emergency room with shortness of breath and chest tightness. She had several days of diarrhea with no significant abdominal pain. The diarrhea was watery and severe. There was no blood. She then developed shortness of breath and chest pain associated with a cough. She presented to the emergency room and was admitted with congestive heart failure. She was evaluated for C. difficile colitis and this was negative.    Her initial lab work in the emergency room did show mild elevation of the transaminases with an ALT of 35 and AST of 64. The alkaline phosphatase was normal at 93 but the total bilirubin was elevated at 2.9. These labs have improved. A CT scan of the abdomen and pelvis was performed that showed mild stranding around the pancreas suggestive of possible pancreatitis. She also appeared to have multiple tiny gallstones. Concern was raised over the possibility of gallstone pancreatitis. Her lipase was checked and noted to be elevated at 453.    She has not had any abdominal pain. She had no prior knowledge of gallstones and has no food intolerance. Her primary symptom was diarrhea without abdominal pain. She has been tolerating a diet prior to her presentation to the emergency room.    Review of Systems   Constitutional: Positive for fatigue. Negative for fever.   Respiratory: Positive for shortness of breath. Negative for chest tightness.     Cardiovascular: Positive for chest pain. Negative for palpitations.   Gastrointestinal: Positive for diarrhea. Negative for abdominal pain, blood in stool, constipation, nausea and vomiting.        Personal History     Past Medical History:   Diagnosis Date   • Acute on chronic diastolic heart failure ([facility])    • Arthritis    • Asthma    • Atrial fibrillation ([facility])     Persistent; on warfarin   • Atypical chest pain    • Biliary acute pancreatitis without necrosis or infection 11/10/2021   • Bronchitis    • Cellulitis of right elbow     due to MRSA   • CHF (congestive heart failure) ([facility])    • COPD (chronic obstructive pulmonary disease) ([facility])    • Coronary artery disease     Cardiac catheterization completed; 90% PDA stenosis with medical management recommended   • Coronary artery disease involving native coronary artery of native heart with angina pectoris with documented spasm ([facility])    • Diabetes 1.5, managed as type 2 ([facility])    • Disease of thyroid gland    • Displacement of lumbar intervertebral disc without myelopathy    • Dizziness    • ANTOINE (dyspnea on exertion)    • Essential hypertension    • Fatigue    • Generalized osteoarthritis of multiple sites    • History of rheumatic fever    • History of transfusion    • Hx of bone density study     10/23/2014   • Hyperglycemia    • Hyperlipidemia    • Hypovitaminosis D    • Left arm pain    • Left-sided weakness    • Leg swelling    • Low back pain    • Lower extremity edema    • Malaise and fatigue    • Mitral valve disease     Moderate mitral valve prolapse and moderate mitral regurgitation   • Mitral valve insufficiency    • Morbid obesity with BMI of 40.0-44.9, adult ([facility])    • MRSA infection    • NSTEMI (non-ST elevated myocardial infarction) ([facility])    • PAF (paroxysmal atrial fibrillation) ([facility])    • RA (rheumatoid arthritis) ([facility])     involving both hands   • Sleep apnea    • SOB (shortness of breath)    • Stroke ([facility])     left side weakness   • Urge  incontinence of urine    • UTI (urinary tract infection) 05/2020   • Vitamin D deficiency        Past Surgical History:   Procedure Laterality Date   • BREAST BIOPSY     • BREAST SURGERY      right side lumpectomy with biopsy   • CARDIAC CATHETERIZATION Left 10/20/2017    Procedure: Cardiac Catheterization/Vascular Study;  Surgeon: Alphonso Olmedo MD;  Location: [facility] INVASIVE LOCATION;  Service:    • CARDIAC CATHETERIZATION N/A 10/20/2017    +2 mitral regurgitation, left main 10% stenosis, mid to distal LAD 10% diffuse stenosis, circumflex 10% diffuse stenosis, RCA 10% proximal stenosis, and distal PDA consistent with coronary embolus with a lesion of 90% too small to consider coronary intervention; medical management recommended   • EYE SURGERY      laser surgery for glaucoma and left eye cataracts removed   • HYSTERECTOMY      10+ years ago   • JOINT REPLACEMENT  2005; 2006    bilateral knees and left rotater   • KNEE SURGERY     • MAMMO BILATERAL  2016    [facility]        Family History: family history includes Alcohol abuse in her maternal uncle; Arthritis in her brother, daughter, father, mother, and sister; Asthma in her sister; COPD in her father; Colon cancer in her mother; Depression in her daughter; Diabetes in her brother and sister; Drug abuse in her brother; Glaucoma in her mother and sister; Heart disease in her brother, brother, sister, sister, and sister; Hypertension in her brother, mother, and sister; Lung disease in her father and sister; Miscarriages / Stillbirths in her sister; Stroke in her mother. Otherwise pertinent FHx was reviewed and not pertinent to current issue.    Social History:  reports that she quit smoking about 53 years ago. Her smoking use included cigarettes. She started smoking about 54 years ago. She smoked 3.00 packs per day. She has never used smokeless tobacco. She reports that she does not drink alcohol and does not use drugs.    Home Medications:   Accu-Chek  Guide, HYDROcodone-acetaminophen, SITagliptin, accu-chek multiclix, aspirin, atorvastatin, carvedilol, digoxin, furosemide, glucose blood, lisinopril, magnesium oxide, potassium chloride, travoprost (ANNIE free), vitamin D, and warfarin    Allergies:  Allergies   Allergen Reactions   • Contrast Dye Hives and Itching       Objective    Objective     Vitals:  Temp:  [97.2 °F (36.2 °C)-98.1 °F (36.7 °C)] 97.8 °F (36.6 °C)  Heart Rate:  [] 86  Resp:  [18-24] 18  BP: (105-125)/(65-86) 114/86    Physical Exam  Constitutional:       Appearance: Normal appearance. She is well-developed. She is not toxic-appearing.   Eyes:      General: No scleral icterus.  Pulmonary:      Effort: Pulmonary effort is normal. No respiratory distress.   Abdominal:      Palpations: Abdomen is soft.      Tenderness: There is no abdominal tenderness.   Skin:     General: Skin is warm and dry.   Neurological:      Mental Status: She is alert and oriented to person, place, and time.   Psychiatric:         Behavior: Behavior normal.         Thought Content: Thought content normal.         Judgment: Judgment normal.         Result Review    Result Review:  I have personally reviewed the results from the time of this admission to 11/10/2021 17:34 EST and agree with these findings:  [x]  Laboratory  []  Microbiology  [x]  Radiology  []  EKG/Telemetry   []  Cardiology/Vascular   []  Pathology  [x]  Old records  []  Other:    Assessment/Plan   Assessment / Plan     Brief Patient Summary with assessment and plan:  Lana Yañez is a 76 y.o. female who presented to the emergency room with shortness of breath and coughing related to congestive heart failure. She also had diarrhea. Evaluation included a CT scan of the abdomen and pelvis that showed mild inflammatory changes around the pancreas suggestive of pancreatitis. She did have a mild elevation of her liver function test and lipase. She has not had any abdominal pain to suggest acute  pancreatitis. I doubt that she has clinically significant pancreatitis. I will check a right upper quadrant ultrasound to further evaluate her gallbladder. There are no plans for cholecystectomy in the near future as she is at increased surgical risk based on her multiple medical problems and her admission for congestive heart failure with no symptoms consistent with gallbladder disease or pancreatitis.    Active Hospital Problems:  Active Hospital Problems    Diagnosis    • **Acute on chronic diastolic heart failure ([facility])    • Biliary acute pancreatitis without necrosis or infection    • Acute respiratory failure with hypoxia ([facility])    • Hypokalemia    • Hypomagnesemia    • Type 2 diabetes mellitus with hyperglycemia, without long-term current use of insulin ([facility])    • Diarrhea    • Sepsis without acute organ dysfunction ([facility])    • Morbid obesity with BMI of 40.0-44.9, adult ([facility])    • Pulmonary hypertension ([facility])    • Chronic atrial fibrillation ([facility])    • Sleep apnea    • Essential hypertension        Electronically signed by Jose Rodriges Jr., MD, 11/10/21, 5:34 PM EST.

## 2021-11-11 ENCOUNTER — READMISSION MANAGEMENT (OUTPATIENT)
Dept: CALL CENTER | Facility: HOSPITAL | Age: 76
End: 2021-11-11

## 2021-11-11 ENCOUNTER — TRANSCRIBE ORDERS (OUTPATIENT)
Dept: HOME HEALTH SERVICES | Facility: HOME HEALTHCARE | Age: 76
End: 2021-11-11

## 2021-11-11 ENCOUNTER — HOME HEALTH ADMISSION (OUTPATIENT)
Dept: HOME HEALTH SERVICES | Facility: HOME HEALTHCARE | Age: 76
End: 2021-11-11

## 2021-11-11 VITALS
WEIGHT: 250.9 LBS | HEIGHT: 66 IN | SYSTOLIC BLOOD PRESSURE: 128 MMHG | DIASTOLIC BLOOD PRESSURE: 84 MMHG | RESPIRATION RATE: 20 BRPM | HEART RATE: 91 BPM | TEMPERATURE: 97.5 F | OXYGEN SATURATION: 94 % | BODY MASS INDEX: 40.32 KG/M2

## 2021-11-11 DIAGNOSIS — I50.43 CHF (CONGESTIVE HEART FAILURE), NYHA CLASS I, ACUTE ON CHRONIC, COMBINED (HCC): Primary | ICD-10-CM

## 2021-11-11 LAB
ALBUMIN SERPL-MCNC: 3.8 G/DL (ref 3.5–5.2)
ALBUMIN/GLOB SERPL: 1 G/DL
ALP SERPL-CCNC: 97 U/L (ref 39–117)
ALT SERPL W P-5'-P-CCNC: 30 U/L (ref 1–33)
ANION GAP SERPL CALCULATED.3IONS-SCNC: 8.7 MMOL/L (ref 5–15)
AST SERPL-CCNC: 32 U/L (ref 1–32)
BASOPHILS # BLD AUTO: 0.04 10*3/MM3 (ref 0–0.2)
BASOPHILS NFR BLD AUTO: 0.5 % (ref 0–1.5)
BILIRUB SERPL-MCNC: 0.5 MG/DL (ref 0–1.2)
BUN SERPL-MCNC: 18 MG/DL (ref 8–23)
BUN/CREAT SERPL: 16.4 (ref 7–25)
CALCIUM SPEC-SCNC: 9.4 MG/DL (ref 8.6–10.5)
CHLORIDE SERPL-SCNC: 98 MMOL/L (ref 98–107)
CO2 SERPL-SCNC: 31.3 MMOL/L (ref 22–29)
CREAT SERPL-MCNC: 1.1 MG/DL (ref 0.57–1)
DEPRECATED RDW RBC AUTO: 46.8 FL (ref 37–54)
EOSINOPHIL # BLD AUTO: 0.22 10*3/MM3 (ref 0–0.4)
EOSINOPHIL NFR BLD AUTO: 2.8 % (ref 0.3–6.2)
ERYTHROCYTE [DISTWIDTH] IN BLOOD BY AUTOMATED COUNT: 13.6 % (ref 12.3–15.4)
GFR SERPL CREATININE-BSD FRML MDRD: 59 ML/MIN/1.73
GLOBULIN UR ELPH-MCNC: 4 GM/DL
GLUCOSE BLDC GLUCOMTR-MCNC: 127 MG/DL (ref 70–130)
GLUCOSE BLDC GLUCOMTR-MCNC: 193 MG/DL (ref 70–130)
GLUCOSE SERPL-MCNC: 129 MG/DL (ref 65–99)
HCT VFR BLD AUTO: 38.8 % (ref 34–46.6)
HGB BLD-MCNC: 12.6 G/DL (ref 12–15.9)
IMM GRANULOCYTES # BLD AUTO: 0.04 10*3/MM3 (ref 0–0.05)
IMM GRANULOCYTES NFR BLD AUTO: 0.5 % (ref 0–0.5)
INR PPP: 2.67 (ref 0.9–1.1)
LIPASE SERPL-CCNC: 103 U/L (ref 13–60)
LYMPHOCYTES # BLD AUTO: 2.27 10*3/MM3 (ref 0.7–3.1)
LYMPHOCYTES NFR BLD AUTO: 28.4 % (ref 19.6–45.3)
MAGNESIUM SERPL-MCNC: 2.1 MG/DL (ref 1.6–2.4)
MCH RBC QN AUTO: 30.1 PG (ref 26.6–33)
MCHC RBC AUTO-ENTMCNC: 32.5 G/DL (ref 31.5–35.7)
MCV RBC AUTO: 92.6 FL (ref 79–97)
MONOCYTES # BLD AUTO: 1.13 10*3/MM3 (ref 0.1–0.9)
MONOCYTES NFR BLD AUTO: 14.1 % (ref 5–12)
NEUTROPHILS NFR BLD AUTO: 4.3 10*3/MM3 (ref 1.7–7)
NEUTROPHILS NFR BLD AUTO: 53.7 % (ref 42.7–76)
NRBC BLD AUTO-RTO: 0 /100 WBC (ref 0–0.2)
PLATELET # BLD AUTO: 216 10*3/MM3 (ref 140–450)
PMV BLD AUTO: 11.1 FL (ref 6–12)
POTASSIUM SERPL-SCNC: 3.6 MMOL/L (ref 3.5–5.2)
PROT SERPL-MCNC: 7.8 G/DL (ref 6–8.5)
PROTHROMBIN TIME: 28.2 SECONDS (ref 11.7–14.2)
RBC # BLD AUTO: 4.19 10*6/MM3 (ref 3.77–5.28)
SODIUM SERPL-SCNC: 138 MMOL/L (ref 136–145)
WBC # BLD AUTO: 8 10*3/MM3 (ref 3.4–10.8)

## 2021-11-11 PROCEDURE — 97165 OT EVAL LOW COMPLEX 30 MIN: CPT

## 2021-11-11 PROCEDURE — 97535 SELF CARE MNGMENT TRAINING: CPT

## 2021-11-11 PROCEDURE — 99232 SBSQ HOSP IP/OBS MODERATE 35: CPT | Performed by: INTERNAL MEDICINE

## 2021-11-11 PROCEDURE — 97162 PT EVAL MOD COMPLEX 30 MIN: CPT

## 2021-11-11 PROCEDURE — 85025 COMPLETE CBC W/AUTO DIFF WBC: CPT | Performed by: NURSE PRACTITIONER

## 2021-11-11 PROCEDURE — 97530 THERAPEUTIC ACTIVITIES: CPT

## 2021-11-11 PROCEDURE — 80053 COMPREHEN METABOLIC PANEL: CPT | Performed by: HOSPITALIST

## 2021-11-11 PROCEDURE — 83690 ASSAY OF LIPASE: CPT | Performed by: HOSPITALIST

## 2021-11-11 PROCEDURE — 25010000002 MAGNESIUM SULFATE IN D5W 1G/100ML (PREMIX) 1-5 GM/100ML-% SOLUTION: Performed by: HOSPITALIST

## 2021-11-11 PROCEDURE — 85610 PROTHROMBIN TIME: CPT | Performed by: NURSE PRACTITIONER

## 2021-11-11 PROCEDURE — 99232 SBSQ HOSP IP/OBS MODERATE 35: CPT | Performed by: NURSE PRACTITIONER

## 2021-11-11 PROCEDURE — 97116 GAIT TRAINING THERAPY: CPT

## 2021-11-11 PROCEDURE — 25010000002 FUROSEMIDE PER 20 MG: Performed by: HOSPITALIST

## 2021-11-11 PROCEDURE — 63710000001 INSULIN LISPRO (HUMAN) PER 5 UNITS: Performed by: NURSE PRACTITIONER

## 2021-11-11 PROCEDURE — 83735 ASSAY OF MAGNESIUM: CPT | Performed by: INTERNAL MEDICINE

## 2021-11-11 PROCEDURE — 82962 GLUCOSE BLOOD TEST: CPT

## 2021-11-11 PROCEDURE — 99231 SBSQ HOSP IP/OBS SF/LOW 25: CPT | Performed by: SURGERY

## 2021-11-11 RX ORDER — ALBUTEROL SULFATE 2.5 MG/3ML
2.5 SOLUTION RESPIRATORY (INHALATION) EVERY 4 HOURS PRN
Qty: 40 EACH | Refills: 0 | Status: SHIPPED | OUTPATIENT
Start: 2021-11-11 | End: 2021-11-22 | Stop reason: SDUPTHER

## 2021-11-11 RX ORDER — BENZONATATE 100 MG/1
100 CAPSULE ORAL 3 TIMES DAILY PRN
Qty: 15 CAPSULE | Refills: 0 | Status: SHIPPED | OUTPATIENT
Start: 2021-11-11 | End: 2021-11-22

## 2021-11-11 RX ORDER — WARFARIN SODIUM 2.5 MG/1
2.5 TABLET ORAL
Status: DISCONTINUED | OUTPATIENT
Start: 2021-11-11 | End: 2021-11-11 | Stop reason: HOSPADM

## 2021-11-11 RX ADMIN — ASPIRIN 81 MG: 81 TABLET, COATED ORAL at 08:18

## 2021-11-11 RX ADMIN — POTASSIUM CHLORIDE 40 MEQ: 750 TABLET, EXTENDED RELEASE ORAL at 08:18

## 2021-11-11 RX ADMIN — BENZONATATE 100 MG: 100 CAPSULE ORAL at 00:05

## 2021-11-11 RX ADMIN — ATORVASTATIN CALCIUM 10 MG: 20 TABLET, FILM COATED ORAL at 08:18

## 2021-11-11 RX ADMIN — LATANOPROST 1 DROP: 50 SOLUTION OPHTHALMIC at 11:14

## 2021-11-11 RX ADMIN — LISINOPRIL 10 MG: 10 TABLET ORAL at 08:18

## 2021-11-11 RX ADMIN — INSULIN LISPRO 2 UNITS: 100 INJECTION, SOLUTION INTRAVENOUS; SUBCUTANEOUS at 11:14

## 2021-11-11 RX ADMIN — FUROSEMIDE 80 MG: 10 INJECTION, SOLUTION INTRAMUSCULAR; INTRAVENOUS at 06:09

## 2021-11-11 RX ADMIN — CARVEDILOL 25 MG: 25 TABLET, FILM COATED ORAL at 08:18

## 2021-11-11 RX ADMIN — MAGNESIUM SULFATE HEPTAHYDRATE 1 G: 1 INJECTION, SOLUTION INTRAVENOUS at 00:06

## 2021-11-11 NOTE — PROGRESS NOTES
ID note for sepsis?  Subjective: She feels better today.  Asking to go home.  Minimal pain.  No fevers or chills or night sweats  Objective: Afebrile, no acute distress, abdomen tender in the upper quadrants without rebound or guarding, appropriate mood and affect    White blood cell count 8, hemoglobin 12.6, platelets 216  Creatinine 1.1  Liver function test normal with total bilirubin of 0.5  Glucose 108 through 213  Liver ultrasound showed no cholelithiasis or common bile duct dilatation.  Small pleural effusion on the right, likely secondary to CHF  Microbiology:  11/2 urine culture at least 3 organisms recovered  11/8 respiratory pathogen panel not detected  11/8 blood cultures no growth to date  11/8 C. difficile negative  11/8 GI PCR negative  Assessment and plan  1.    Pancreatitis, presumably gallstone  2.  Elevated procalcitonin, likely secondary #1  3.  Acute on chronic diastolic CHF  4.  Mild hepatitis  5.  Diabetes mellitus type 2, continue glycemic control efforts to prevent/control infectious complications    Blood cultures remain negative.  Lipase improved.  Elevated bilirubin resolved.  Abdominal pain improved.  No fevers or chills or night sweats.  No clear evidence of infection and will discontinue antibiotics.  We will be available should infectious concerns evolve.

## 2021-11-11 NOTE — PLAN OF CARE
Problem: Adult Inpatient Plan of Care  Goal: Plan of Care Review  Outcome: Ongoing, Progressing  Goal: Patient-Specific Goal (Individualized)  Outcome: Ongoing, Progressing  Goal: Absence of Hospital-Acquired Illness or Injury  Outcome: Ongoing, Progressing  Intervention: Identify and Manage Fall Risk  Recent Flowsheet Documentation  Taken 11/10/2021 2130 by Kassandra Gabriel RN  Safety Promotion/Fall Prevention:   activity supervised   fall prevention program maintained   nonskid shoes/slippers when out of bed   safety round/check completed  Goal: Optimal Comfort and Wellbeing  Outcome: Ongoing, Progressing  Goal: Readiness for Transition of Care  Outcome: Ongoing, Progressing     Problem: Fall Injury Risk  Goal: Absence of Fall and Fall-Related Injury  Outcome: Ongoing, Progressing  Intervention: Promote Injury-Free Environment  Recent Flowsheet Documentation  Taken 11/10/2021 2130 by Kassandra Gabriel RN  Safety Promotion/Fall Prevention:   activity supervised   fall prevention program maintained   nonskid shoes/slippers when out of bed   safety round/check completed     Problem: Skin Injury Risk Increased  Goal: Skin Health and Integrity  Outcome: Ongoing, Progressing   Goal Outcome Evaluation:      VSS, IV diuretics per MDO. Mg replaced, redraw this AM.             + urinary retention.  + history of BPH.  no dysuria, no frequency, and no hematuria.

## 2021-11-11 NOTE — PROGRESS NOTES
Chief Complaint:    Concern for gallstone pancreatitis    Subjective:    The patient is feeling well today with no abdominal pain.  The right upper quadrant ultrasound shows no gallstones and no gallbladder disease.    Objective:    Temp:  [97.5 °F (36.4 °C)-98 °F (36.7 °C)] 97.5 °F (36.4 °C)  Heart Rate:  [85-98] 91  Resp:  [16-20] 20  BP: (114-128)/(65-89) 128/84    Physical Exam  Constitutional:       Appearance: She is not ill-appearing or toxic-appearing.   Neurological:      Mental Status: She is alert.   Psychiatric:         Behavior: Behavior is cooperative.         Results:    WBC is 8.00.  H/H is 12.6/30.8.  BUN is 18 creatinine is 1.10.  ALT is 30, AST is 32, alkaline phosphatase is 97, total bilirubin 0.5  Lipase is 103.    Assessment/Plan:    The patient has inflammatory changes around pancreas on CT scan of the abdomen and pelvis and had an elevated lipase that is decreasing.  She never had clinical symptoms of pancreatitis such as pain or nausea and vomiting.  She is tolerating a diet.  There is no need to treat the pancreatitis specifically as it is asymptomatic.    There is no evidence of gallbladder disease no gallstones.  There is no need for cholecystectomy.    I will sign off but remain available if needed.    Jose Rodriges Jr., M.D.

## 2021-11-11 NOTE — DISCHARGE SUMMARY
Patient Name: Lana Yañez  : 1945  MRN: [id number]    Date of Admission: 2021  Date of Discharge:  2021  Primary Care Physician: Will Madden MD      Chief Complaint:   Shortness of Breath      Discharge Diagnoses     Active Hospital Problems    Diagnosis  POA   • **Acute on chronic diastolic heart failure (HCC) [I50.33]  Yes   • Biliary acute pancreatitis without necrosis or infection [K85.10]  Yes   • Acute respiratory failure with hypoxia (HCC) [J96.01]  Yes   • Hypokalemia [E87.6]  Yes   • Hypomagnesemia [E83.42]  Yes   • Type 2 diabetes mellitus with hyperglycemia, without long-term current use of insulin (HCC) [E11.65]  Yes   • Diarrhea [R19.7]  Yes   • Sepsis without acute organ dysfunction (HCC) [A41.9]  Yes   • Morbid obesity with BMI of 40.0-44.9, adult (HCC) [E66.01, Z68.41]  Not Applicable   • Pulmonary hypertension (HCC) [I27.20]  Yes   • Chronic atrial fibrillation (HCC) [I48.20]  Yes   • Sleep apnea [G47.30]  Yes   • Essential hypertension [I10]  Yes      Resolved Hospital Problems   No resolved problems to display.        Hospital Course     Ms. Yañez is a 76 y.o. female with a history of PAF (Coumadin), diastolic congestive heart failure, FRANCY, HTN and HLD who presented to [facility] initially complaining of shortness of breath and chest tightness.  Please see the admitting history and physical for further details.  She was found to have sepsis and acute respiratory failure secondary to acute diastolic CHF. She was admitted to the hospital for further evaluation and treatment.  On admission her heart rate was elevated, o2 saturations mid 80s, she was placed on a nitro gtt (was quickly weaned off) and BiPAP (was able to wean to room air).  Cardiology tailored IV diuretics in which were transitioned to PO at discharge, renal function remained stable.  Her lactate was normal, however pro-gil was elevated due to unknown etiology and there was concern  for c difficile colitis in which she was empirically placed on vancomycin. Stool studies came back negative and vanc was discontinued. Infectious disease evaluated. CT imaging demonstrated possible gallstone pancreatitis, therefore she was placed on empiric antibiotics with ceftriaxone and general surgery was consulted. They did not think she clinically appeared to have pancreatitis and did not recommend any further evaluation. Surgical intervention not indicated. Blood cultures remained NGTD. Her CPAP was recalled a few months ago and she has had trouble getting a new one. Pulmonology recommends continuing the current recalled CPAP as risk of undiagnosed sleep apnea is higher than potential of complications due to the device filter issue. She will need to follow up at the sleep clinic with Dr. Hill who she has seen in the past. The patient is stable for discharge home today and should follow up with her PCP in 1-2 weeks and keep all scheduled follow up appointments with specialists.      Day of Discharge     Subjective:  No new complaints or events overnight, she is more than ready to go home.    Denies chest pain, palpitations, SOA, edema, fever, chills, nausea, vomiting and diarrhea.    Physical Exam:  Temp:  [97.5 °F (36.4 °C)-98 °F (36.7 °C)] 97.5 °F (36.4 °C)  Heart Rate:  [85-98] 91  Resp:  [16-20] 20  BP: (114-128)/(65-89) 128/84  Body mass index is 40.5 kg/m².  Physical Exam  Vitals and nursing note reviewed.   Constitutional:       Appearance: She is obese. She is ill-appearing (chronically).   HENT:      Head: Normocephalic and atraumatic.   Eyes:      Extraocular Movements: Extraocular movements intact.      Conjunctiva/sclera: Conjunctivae normal.   Cardiovascular:      Rate and Rhythm: Normal rate and regular rhythm.   Pulmonary:      Effort: Pulmonary effort is normal.      Breath sounds: Normal breath sounds.      Comments: decreased breath sounds  Abdominal:      General: Bowel sounds are normal.  There is no distension.      Palpations: Abdomen is soft.      Tenderness: There is no abdominal tenderness.   Musculoskeletal:         General: No swelling. Normal range of motion.      Cervical back: Normal range of motion and neck supple.   Skin:     General: Skin is warm and dry.   Neurological:      Mental Status: She is alert and oriented to person, place, and time. Mental status is at baseline.   Psychiatric:         Mood and Affect: Mood normal.         Behavior: Behavior normal.         Consultants     Consult Orders (all) (From admission, onward)     Start     Ordered    11/10/21 1252  Inpatient Pulmonology Consult  Once        Specialty:  Pulmonary Disease  Provider:  Kishan Shetty MD    11/10/21 1253    11/10/21 0709  Inpatient General Surgery Consult  Once        Specialty:  General Surgery  Provider:  Jose Rodriges Jr., MD    11/10/21 0709    11/09/21 0941  Inpatient Infectious Diseases Consult  Once        Provider:  Al Butler MD    11/09/21 0940    11/08/21 0439  Inpatient Cardiology Consult  Once        Specialty:  Cardiology  Provider:  Arun Henning MD    11/08/21 0441              Procedures     * Surgery not found *      Imaging Results (All)     Procedure Component Value Units Date/Time    US Liver [[id number]] Collected: 11/10/21 2031     Updated: 11/10/21 2040    Narrative:      Right upper quadrant abdominal sonogram     TECHNIQUE: Grayscale and color Doppler images of the right upper  quadrant of the abdomen were obtained.     HISTORY: Gallstones, right quadrant pain     COMPARISON: CT abdomen and pelvis 11/09/2021     FINDINGS:     The gallbladder is unremarkable. There is no evidence of cholelithiasis.        The common bile duct measures 4 mm. in diameter. There is no  intrahepatic biliary ductal dilation.      The liver has a normal sonographic appearance.      The right kidney measures 11.3 cm in length.  The right kidney  demonstrates normal echogenicity  and cortical thickness. There is no  right-sided hydronephrosis. There are no right-sided cysts, masses or  renal calculi. Small amount of right perinephric fluid is present, as  seen on recent CT.     No focal sonographic abnormality is visualized within the pancreas.     Visualized portions of the aorta and IVC appear normal. Right pleural  effusion is incompletely evaluated, as seen on recent CT.       Impression:      1.  Right pleural effusion which is incompletely visualized, as seen on  recent CT.  2.  Present pancreas is not well evaluated on ultrasound and please  refer to CT abdomen and pelvis 04/11/2021 for further information.  3.  Small amount of right perinephric fluid, as seen on recent CT.  4.  Other findings as above.     This report was finalized on 11/10/2021 8:37 PM by Dr. Bright Lua M.D.       CT Abdomen Pelvis Without Contrast [[id number]] Collected: 11/09/21 1639     Updated: 11/09/21 1652    Narrative:      CT ABDOMEN AND PELVIS WITHOUT IV CONTRAST     HISTORY: Sepsis, heart failure, shortness of breath     TECHNIQUE: Radiation dose reduction techniques were utilized, including  automated exposure control and exposure modulation based on body size.  Axial images were obtained through the abdomen and pelvis without the  administration of IV contrast. Coronal and sagittal reformatted images  were obtained.     COMPARISON: Chest CT 09/23/2021, CT of the abdomen and pelvis 09/29/2017     FINDINGS:      ABDOMEN:  Evaluation lung bases demonstrates very small bilateral pleural  effusions. There is overlying lung atelectasis. Noncontrast appearance  of the liver is unremarkable. The gallbladder is contracted though there  does appear to be a tiny gallstone. Spleen is unremarkable. The kidneys  are unremarkable. The adrenal glands are unremarkable. There is some  mild stranding around the pancreas suggesting pancreatitis. Recommend  correlation with pancreatic enzyme levels. There is no abscess  or fluid  collection. There is some thickening of the adjacent duodenum which may  be reactive versus enteritis. No evidence of ascites or free air.     PELVIS:  Hysterectomy. No free fluid. The bladder is decompressed. There are  scattered diverticula within the colon without evidence for colitis or  diverticulitis. Degenerative changes lumbar spine       Impression:      1. There is some mild stranding around the pancreas suggesting  pancreatitis. Recommend correlation with pancreatic enzyme levels. No  abscess or fluid collection. Thickening of the adjacent duodenum may be  reactive versus enteritis.  2. Very small bilateral pleural effusions  3. Contracted gallbladder which does appear to contain a tiny gallstone     Radiation dose reduction techniques were utilized, including automated  exposure control and exposure modulation based on body size.     This report was finalized on 11/9/2021 4:49 PM by Dr. Jerome Lee M.D.       XR Chest 1 View [[id number]] Collected: 11/08/21 0317     Updated: 11/08/21 0317    Narrative:        Patient: TERA GARNER  Time Out: 03:16  Exam(s): FILM CXR 1 VIEW     EXAM:    XR Chest, 1 View    CLINICAL HISTORY:     Reason for exam: SOA.    TECHNIQUE:    Frontal view of the chest.    COMPARISON:    Chest CT from September 23, 2021 and chest x-ray from October 20, 2018    FINDINGS:    Lungs:  See below.      Pleural space:  Unremarkable.  No pneumothorax.    Heart:  Moderate cardiomegaly with prominent vascularity.  Perihilar   bibasilar hazy increased densities may represent CHF versus artifact from   massive body habitus and portable technique.    Mediastinum:  Unremarkable.    Bones joints:  Unremarkable.    Vasculature:  The thoracic aorta is mildly calcified and upper normal   in size.    Upper abdomen:  No pneumoperitoneum is seen under the diaphragm.    IMPRESSION:         Moderate cardiomegaly with prominent vascularity.  Perihilar bibasilar   hazy increased  densities may represent CHF versus artifact from massive   body habitus and portable technique.      Impression:          Electronically signed by Jerome Garcia MD on 11-08-21 at 0316        Results for orders placed during the hospital encounter of 10/19/17    Duplex Venous Lower Extremity - Bilateral CAR    Interpretation Summary  · Normal bilateral lower extremity venous duplex scan.    Results for orders placed during the hospital encounter of 08/20/21    Adult Transthoracic Echo Complete w/ Color, Spectral and Contrast if Necessary Per Protocol    Interpretation Summary  · Calculated left ventricular EF = 65.1% Estimated left ventricular EF = 65% Estimated left ventricular EF was in agreement with the calculated left ventricular EF. Left ventricular systolic function is normal. Normal left ventricular cavity size and wall thickness noted. All left ventricular wall segments contract normally. Left ventricular diastolic function was indeterminate.  · Left atrial volume is severely increased.  · The right atrial cavity is severely dilated.  · There is mild calcification of the aortic valve. The aortic valve appears trileaflet. Mild aortic valve regurgitation is present. No aortic valve stenosis is present.  · There is severe, bileaflet mitral valve thickening present. Mild to moderate mitral valve regurgitation is present. No significant mitral valve stenosis is present.  · Moderate tricuspid valve regurgitation is present. Estimated right ventricular systolic pressure from tricuspid regurgitation is moderately elevated (45-55 mmHg). Calculated right ventricular systolic pressure from tricuspid regurgitation is 49 mmHg.  · Borderline dilation of the aortic arch is present.    Pertinent Labs     Results from last 7 days   Lab Units 11/11/21  0623 11/10/21  1122 11/09/21  0440 11/08/21  0535   WBC 10*3/mm3 8.00 8.79 9.53 14.11*   HEMOGLOBIN g/dL 12.6 11.5* 10.9* 10.8*   PLATELETS 10*3/mm3 216 172 136* 132*      Results from last 7 days   Lab Units 11/11/21  0623 11/10/21  1122 11/09/21  2339 11/09/21  0440 11/08/21  1652 11/08/21  0535   SODIUM mmol/L 138 137  --  137  --  138   POTASSIUM mmol/L 3.6 3.6 3.7 3.3*   < > 3.2*   CHLORIDE mmol/L 98 98  --  99  --  99   CO2 mmol/L 31.3* 28.8  --  25.9  --  25.5   BUN mg/dL 18 18  --  18  --  16   CREATININE mg/dL 1.10* 1.12*  --  1.00  --  1.14*   GLUCOSE mg/dL 129* 197*  --  116*  --  155*   EGFR IF AFRICN AM mL/min/1.73 59* 58*  --  66  --  56*    < > = values in this interval not displayed.     Results from last 7 days   Lab Units 11/11/21  0623 11/10/21  1122 11/09/21  0440 11/08/21  0210   ALBUMIN g/dL 3.80 3.70 3.60 4.30   BILIRUBIN mg/dL 0.5 0.8 2.1* 2.9*   ALK PHOS U/L 97 94 90 93   AST (SGOT) U/L 32 33* 64* 64*   ALT (SGPT) U/L 30 39* 49* 35*     Results from last 7 days   Lab Units 11/11/21  0623 11/10/21  2314 11/10/21  1122 11/09/21 0440 11/08/21  0535 11/08/21  0210 11/08/21  0210   CALCIUM mg/dL 9.4  --  9.1 9.3 9.0   < > 9.4   ALBUMIN g/dL 3.80  --  3.70 3.60  --   --  4.30   MAGNESIUM mg/dL 2.1 1.8  --  1.8  --   --  1.5*    < > = values in this interval not displayed.     Results from last 7 days   Lab Units 11/11/21 0623 11/09/21  2339   LIPASE U/L 103* 453*     Results from last 7 days   Lab Units 11/09/21 0440 11/08/21  0210   TROPONIN T ng/mL  --  <0.010   PROBNP pg/mL  --  1,344.0*   DIGOXIN LVL ng/mL 0.40*  --            Invalid input(s): LDLCALC  Results from last 7 days   Lab Units 11/08/21  0210   BLOODCX  No growth at 3 days  No growth at 3 days     Results from last 7 days   Lab Units 11/08/21  0133   COVID19  Not Detected       Test Results Pending at Discharge     Pending Labs     Order Current Status    Blood Culture - Blood, Arm, Right Preliminary result    Blood Culture - Blood, Arm, Right Preliminary result          Discharge Details        Discharge Medications      New Medications      Instructions Start Date   albuterol (2.5 MG/3ML)  0.083% nebulizer solution  Commonly known as: PROVENTIL   2.5 mg, Nebulization, Every 4 Hours PRN      benzonatate 100 MG capsule  Commonly known as: TESSALON   100 mg, Oral, 3 Times Daily PRN         Changes to Medications      Instructions Start Date   carvedilol 25 MG tablet  Commonly known as: COREG  What changed: See the new instructions.   TAKE 1 & 1/2 (ONE & ONE-HALF) TABLETS BY MOUTH TWICE DAILY WITH MEALS      potassium chloride 10 MEQ CR tablet  What changed:   how much to take  additional instructions   20 mEq, Oral, Daily      warfarin 5 MG tablet  Commonly known as: COUMADIN  What changed:   how much to take  how to take this  additional instructions  Notes to patient: INR this morning 11/11/2021 was 2.67   TAKE 1 TABLET BY MOUTH ONCE DAILY OR  AS  DIRECTED  BY  MEDICATION  MANAGEMENT  CLINIC         Continue These Medications      Instructions Start Date   Accu-Chek Guide w/Device kit   See Admin Instructions      accu-chek multiclix lancets   Use 1 lancet per finger stick      aspirin 81 MG EC tablet   81 mg, Oral, Daily      atorvastatin 10 MG tablet  Commonly known as: LIPITOR   10 mg, Oral, Daily      digoxin 125 MCG tablet  Commonly known as: LANOXIN   125 mcg, Oral, Every Other Day      furosemide 40 MG tablet  Commonly known as: LASIX   Take 1 tablet in the morning      glucose blood test strip   Use as instructed      HYDROcodone-acetaminophen 5-325 MG per tablet  Commonly known as: NORCO   1 tablet, Oral, 2 Times Daily PRN      lisinopril 10 MG tablet  Commonly known as: PRINIVIL,ZESTRIL   10 mg, Oral, Daily      magnesium oxide 400 MG tablet  Commonly known as: MAG-OX   400 mg, Oral, As Needed      SITagliptin 100 MG tablet  Commonly known as: Januvia   100 mg, Oral, Daily      Travatan Z 0.004 % solution ophthalmic solution  Generic drug: travoprost (BAK free)   1 drop, Both Eyes, Every Morning, Not night      vitamin D 1.25 MG (62168 UT) capsule capsule  Commonly known as: ERGOCALCIFEROL    Take 1 capsule by mouth once a week         Stop These Medications    nitrofurantoin (macrocrystal-monohydrate) 100 MG capsule  Commonly known as: Macrobid            Allergies   Allergen Reactions   • Contrast Dye Hives and Itching       Discharge Disposition:  Home or Self Care      Discharge Diet:  Diet Order   Procedures   • Diet Regular; Cardiac, Consistent Carbohydrate, Low Sodium, Daily Fluid Restriction; 1500 mL Fluid Per Day; No Added Salt       Discharge Activity:   Activity Instructions     Activity as Tolerated            CODE STATUS:    Code Status and Medical Interventions:   Ordered at: 11/08/21 0441     Code Status (Patient has no pulse and is not breathing):    CPR (Attempt to Resuscitate)     Medical Interventions (Patient has pulse or is breathing):    Full Support       Future Appointments   Date Time Provider Department Center   12/1/2021  1:00 PM [facility] None   1/31/2022  2:00 PM [facility]   2/10/2022 12:00 PM Pia Dueñas MD [facility]   2/10/2022  1:00 PM Will Madden MD [facility] PC [facility]   2/14/2022  1:00 PM Will Madden MD [facility]   2/14/2022  2:00 PM Ed Ho MD [facility]   2/28/2022  1:00 PM Kalpana Kaiser APRN [facility] PM E[facility]PT [facility]     Additional Instructions for the Follow-ups that You Need to Schedule     Discharge Follow-up with PCP   As directed       Currently Documented PCP:    Will Madden MD    PCP Phone Number:    [phone]     Follow Up Details: 1-2 weeks            Contact information for follow-up providers     Will Madden MD .    Specialties: Family Medicine, Emergency Medicine  Why: 1-2 weeks  Contact information:  [address] [place] [address]  [phone]                   Contact information for after-discharge care     Home Medical Care     [facility] .    Service: Home Health Services  Contact information:  [address] [address] [phone]                             Additional Instructions for the Follow-ups that You Need to Schedule     Discharge Follow-up with PCP   As directed       Currently Documented PCP:    Will Madden MD    PCP Phone Number:    [phone]     Follow Up Details: 1-2 weeks           Time Spent on Discharge:  Greater than 30 minutes      ANGELA Alex  [facility]  11/11/21  11:30 EST

## 2021-11-11 NOTE — THERAPY EVALUATION
Patient Name: Lana Yañez  : 1945    MRN: [id number]                              Today's Date: 2021       Admit Date: 2021    Visit Dx:     ICD-10-CM ICD-9-CM   1. Atrial fibrillation with RVR ([facility])  I48.91 427.31   2. Congestive heart failure, unspecified HF chronicity, unspecified heart failure type ([facility])  I50.9 428.0   3. Acute respiratory failure with hypoxia ([facility])  J96.01 518.81     Patient Active Problem List   Diagnosis   • Hyperlipidemia   • Vitamin deficiency   • Essential hypertension   • Rheumatoid arthritis involving both hands ([facility])   • Fatigue   • ANTOINE (dyspnea on exertion)   • Dysuria   • Urge incontinence of urine   • Hypovitaminosis D   • Sleep apnea   • Encounter for screening colonoscopy   • Bronchitis   • Cough   • Generalized osteoarthritis of multiple sites   • Cellulitis of right elbow due to MRSA   • Medicare annual wellness visit, initial   • Hospital discharge follow-up   • Displacement of lumbar intervertebral disc without myelopathy   • Lumbar disc herniation   • Chronic atrial fibrillation ([facility])   • History of MRSA infection   • Left-sided weakness   • Atrial fibrillation ([facility])   • Left shoulder pain   • Relative lymphocytosis   • Nausea   • Weakness   • Controlled substance agreement signed   • Coronary artery disease involving native coronary artery of native heart without angina pectoris   • SOB (shortness of breath)   • Lower extremity edema   • Acute on chronic diastolic heart failure ([facility])   • Adhesive capsulitis of left shoulder   • CVA tenderness   • Costochondritis, acute   • Allergic reaction   • 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[facility])   • Visit for screening mammogram   • Low back pain   • Urgency of urination   • Hyperglycemia   • Carpal tunnel syndrome of left wrist/ wakes her up   • Localized edema   • Acute cystitis without hematuria   • Nitrofurantoin adverse reaction   • Bruising   • History of stroke   • Diabetes 1.5, managed as type 2 ([facility])   • Other  chronic pain   • Vaginal candidiasis   • Pulmonary hypertension ([facility])   • Acute respiratory failure with hypoxia ([facility])   • Hypokalemia   • Hypomagnesemia   • Type 2 diabetes mellitus with hyperglycemia, without long-term current use of insulin ([facility])   • Diarrhea   • Sepsis without acute organ dysfunction ([facility])   • Morbid obesity with BMI of 40.0-44.9, adult ([facility])   • Biliary acute pancreatitis without necrosis or infection     Past Medical History:   Diagnosis Date   • Acute on chronic diastolic heart failure ([facility])    • Arthritis    • Asthma    • Atrial fibrillation ([facility])     Persistent; on warfarin   • Atypical chest pain    • Biliary acute pancreatitis without necrosis or infection 11/10/2021   • Bronchitis    • Cellulitis of right elbow     due to MRSA   • CHF (congestive heart failure) ([facility])    • COPD (chronic obstructive pulmonary disease) ([facility])    • Coronary artery disease     Cardiac catheterization completed; 90% PDA stenosis with medical management recommended   • Coronary artery disease involving native coronary artery of native heart with angina pectoris with documented spasm ([facility])    • Diabetes 1.5, managed as type 2 ([facility])    • Disease of thyroid gland    • Displacement of lumbar intervertebral disc without myelopathy    • Dizziness    • ANTOINE (dyspnea on exertion)    • Essential hypertension    • Fatigue    • Generalized osteoarthritis of multiple sites    • History of rheumatic fever    • History of transfusion    • Hx of bone density study     10/23/2014   • Hyperglycemia    • Hyperlipidemia    • Hypovitaminosis D    • Left arm pain    • Left-sided weakness    • Leg swelling    • Low back pain    • Lower extremity edema    • Malaise and fatigue    • Mitral valve disease     Moderate mitral valve prolapse and moderate mitral regurgitation   • Mitral valve insufficiency    • Morbid obesity with BMI of 40.0-44.9, adult ([facility])    • MRSA infection    • NSTEMI (non-ST elevated myocardial infarction) ([facility])    •  PAF (paroxysmal atrial fibrillation) ([facility])    • RA (rheumatoid arthritis) ([facility])     involving both hands   • Sleep apnea    • SOB (shortness of breath)    • Stroke ([facility])     left side weakness   • Urge incontinence of urine    • UTI (urinary tract infection) 05/2020   • Vitamin D deficiency      Past Surgical History:   Procedure Laterality Date   • BREAST BIOPSY     • BREAST SURGERY      right side lumpectomy with biopsy   • CARDIAC CATHETERIZATION Left 10/20/2017    Procedure: Cardiac Catheterization/Vascular Study;  Surgeon: Alphonso Olmedo MD;  Location: [facility] CATH INVASIVE LOC[facility];  Service:    • CARDIAC CATHETERIZATION N/A 10/20/2017    +2 mitral regurgitation, left main 10% stenosis, mid to distal LAD 10% diffuse stenosis, circumflex 10% diffuse stenosis, RCA 10% proximal stenosis, and distal PDA consistent with coronary embolus with a lesion of 90% too small to consider coronary intervention; medical management recommended   • EYE SURGERY      laser surgery for glaucoma and left eye cataracts removed   • HYSTERECTOMY      10+ years ago   • JOINT REPLACEMENT  2005; 2006    bilateral knees and left rotater   • KNEE SURGERY     • MAMMO BILATERAL  2016    [facility]       General Information     Row Name 11/11/21 1108          Physical Therapy Time and Intention    Document Type evaluation  -     Mode of Treatment individual therapy; physical therapy  -     Row Name 11/11/21 1108          General Information    Patient Profile Reviewed yes  -     Prior Level of Function independent:  Independent with all mobility and ADLs prior to admission, reports utilizing cane and rollator with community distances more frequently.  -     Existing Precautions/Restrictions fall  -     Barriers to Rehab none identified  -     Row Name 11/11/21 1108          Living Environment    Lives With spouse  -     Row Name 11/11/21 1108          Home Main Entrance    Number of Stairs, Main Entrance six  -KH     Stair  Railings, Main Entrance other (see comments)  unilateral HR  -     Row Name 11/11/21 1108          Stairs Within Home, Primary    Stairs, Within Home, Primary 8  -KH     Number of Stairs, Within Home, Primary eight  -KH     Stair Railings, Within Home, Primary none  -     Row Name 11/11/21 1108          Cognition    Orientation Status (Cognition) oriented x 4  -     Row Name 11/11/21 1108          Safety Issues, Functional Mobility    Safety Issues Affecting Function (Mobility) awareness of need for assistance  -     Impairments Affecting Function (Mobility) balance; endurance/activity tolerance  -           User Key  (r) = Recorded By, (t) = Taken By, (c) = Cosigned By    Initials Name Provider Type    Adore Guzman PT Physical Therapist               Mobility     Row Name 11/11/21 1110          Bed Mobility    Comment (Bed Mobility) NT as pt was sitting EOB at start of evaluation  -     Row Name 11/11/21 1110          Sit-Stand Transfer    Sit-Stand [place] (Transfers) contact guard  -     Row Name 11/11/21 1110          Gait/Stairs (Locomotion)    [place] Level (Gait) contact guard  -     Distance in Feet (Gait) 8', 20', 30'  -     Deviations/Abnormal Patterns (Gait) héctor decreased; stride length decreased; gait speed decreased; base of support, wide  -     Bilateral Gait Deviations forward flexed posture  -     Comment (Gait/Stairs) Attempted to utilize RW with ambulation to improve steadiness, but pt deferred. Pt mildly unsteady performing furniture surfing within room.  -           User Key  (r) = Recorded By, (t) = Taken By, (c) = Cosigned By    Initials Name Provider Type    Adore Guzman PT Physical Therapist               Obj/Interventions     Row Name 11/11/21 1113          Range of Motion Comprehensive    General Range of Motion bilateral upper extremity ROM WFL; bilateral lower extremity ROM WFL  -     Row Name 11/11/21 1113          Strength  Comprehensive (MMT)    General Manual Muscle Testing (MMT) Assessment no strength deficits identified  -     Row Name 11/11/21 1113          Balance    Balance Assessment sitting static balance; sitting dynamic balance; standing static balance; standing dynamic balance  -     Static Sitting Balance WFL; unsupported  -KH     Dynamic Sitting Balance WFL; unsupported  -KH     Static Standing Balance WFL; unsupported  -KH     Dynamic Standing Balance mild impairment; supported; WFL  -     Comment, Balance Pt mildly unsteady with ambulation, performs furniture surfing. Stood at sink performing self hygiene ADLs with unilateral HR. Requested to sit but encouraged to perform standing to improve balance and endurance. Requested to sit after standing for ~2 minutes 2/2 SOB. SpO2 recorded at 94% on room.  -           User Key  (r) = Recorded By, (t) = Taken By, (c) = Cosigned By    Initials Name Provider Type    Adore Guzman, PT Physical Therapist               Goals/Plan     Row Name 11/11/21 1120          Transfer Goal 1 (PT)    Activity/Assistive Device (Transfer Goal 1, PT) transfers, all  -KH     [place] Level/Cues Needed (Transfer Goal 1, PT) independent  -KH     Time Frame (Transfer Goal 1, PT) 2 weeks  -     Row Name 11/11/21 1120          Gait Training Goal 1 (PT)    Activity/Assistive Device (Gait Training Goal 1, PT) gait (walking locomotion); assistive device use; increase endurance/gait distance  -KH     [place] Level (Gait Training Goal 1, PT) modified independence  -KH     Distance (Gait Training Goal 1, PT) 100'  -KH     Time Frame (Gait Training Goal 1, PT) 2 weeks  -     Row Name 11/11/21 1120          Stairs Goal 1 (PT)    Activity/Assistive Device (Stairs Goal 1, PT) stairs, all skills; other (see comments)  unilateral HR  -KH     [place] Level/Cues Needed (Stairs Goal 1, PT) independent  -KH     Number of Stairs (Stairs Goal 1, PT) 8  -KH     Time Frame (Stairs Goal 1, PT)  2 weeks  -           User Key  (r) = Recorded By, (t) = Taken By, (c) = Cosigned By    Initials Name Provider Type    Adore Guzman, PT Physical Therapist               Clinical Impression     Row Name 11/11/21 1114          Pain    Additional Documentation Pain Scale: Numbers Pre/Post-Treatment (Group)  -     Row Name 11/11/21 1114          Pain Scale: Numbers Pre/Post-Treatment    Pretreatment Pain Rating 6/10  -     Posttreatment Pain Rating 6/10  -     Pain Location other (see comments)  L shoulder  -     Pain Intervention(s) Repositioned; Ambulation/increased activity; Nursing Notified  -     Row Name 11/11/21 1114          Plan of Care Review    Plan of Care Reviewed With patient  -     Progress no change  -     Outcome Summary Pt is a 75 y/o female with PMHx of COPD presenting to [facility] for acute respiratory failure secondary to acute on chronic diastolic heart failure and pancreatitis. Reports utilizing cane/rollator more frequently with community distances. On eval, performed sit <> stand with CGA. Poor standing tolerance requiring seated rest breaks throughout session. SpO2 > 94% on room throughout session. Pt ambulated 8', 20', 30' with CGA. Mildly unsteady with ambulation. Educated and encouraged pt to use cane with household ambulation and rollator with community ambulation to have seat available if rest break is needed. Functional mobility limited 2/2 weakness and decrease activity tolerance. Pt will benefit from skilled PT to improve upon functional status prior to d/c home with assist and home health.  -     Row Name 11/11/21 1110          Therapy Assessment/Plan (PT)    Rehab Potential (PT) good, to achieve stated therapy goals  -     Criteria for Skilled Interventions Met (PT) yes  -     Predicted Duration of Therapy Intervention (PT) 2 weeks  -     Row Name 11/11/21 1114          Vital Signs    O2 Delivery Pre Treatment room air  -     Intra SpO2 (%) 94  -     O2  Delivery Intra Treatment room air  -KH     Post SpO2 (%) 99  -KH     O2 Delivery Post Treatment room air  -     Row Name 11/11/21 1114          Positioning and Restraints    Pre-Treatment Position --  pt sitting EOB  -KH     Post Treatment Position chair  -KH     In Chair notified nsg; sitting; call light within reach; encouraged to call for assist  -           User Key  (r) = Recorded By, (t) = Taken By, (c) = Cosigned By    Initials Name Provider Type    Adore Guzman PT Physical Therapist               Outcome Measures     Row Name 11/11/21 1121          How much help from another person do you currently need...    Turning from your back to your side while in flat bed without using bedrails? 4  -KH     Moving from lying on back to sitting on the side of a flat bed without bedrails? 3  -KH     Moving to and from a bed to a chair (including a wheelchair)? 3  -KH     Standing up from a chair using your arms (e.g., wheelchair, bedside chair)? 3  -KH     Climbing 3-5 steps with a railing? 3  -KH     To walk in hospital room? 3  -KH     AM-PAC 6 Clicks Score (PT) 19  -KH     Row Name 11/11/21 1121          Functional Assessment    Outcome Measure Options AM-PAC 6 Clicks Basic Mobility (PT)  -           User Key  (r) = Recorded By, (t) = Taken By, (c) = Cosigned By    Initials Name Provider Type    Adore Guzman PT Physical Therapist                             Physical Therapy Education                 Title: PT OT SLP Therapies (Resolved)     Topic: Physical Therapy (Resolved)     Point: Mobility training (Resolved)     Learning Progress Summary           Patient Acceptance, E, VU,NR by JAI at 11/11/2021 1122                   Point: Home exercise program (Resolved)     Learning Progress Summary           Patient Acceptance, E, VU,NR by JAI at 11/11/2021 1122                   Point: Body mechanics (Resolved)     Learning Progress Summary           Patient Acceptance, E, VU,NR by JAI at 11/11/2021 1122                    Point: Precautions (Resolved)     Learning Progress Summary           Patient Acceptance, E, VU,NR by  at 11/11/2021 1122                               User Key     Initials Effective Dates Name Provider Type Discipline     11/03/21 -  Adore Laguna, PT Physical Therapist PT              PT Recommendation and Plan     Plan of Care Reviewed With: patient  Progress: no change  Outcome Summary: Pt is a 75 y/o female with PMHx of COPD presenting to [facility] [place] for acute respiratory failure secondary to acute on chronic diastolic heart failure and pancreatitis. Reports utilizing cane/rollator more frequently with community distances. On eval, performed sit <> stand with CGA. Poor standing tolerance requiring seated rest breaks throughout session. SpO2 > 94% on room throughout session. Pt ambulated 8', 20', 30' with CGA. Mildly unsteady with ambulation. Educated and encouraged pt to use cane with household ambulation and rollator with community ambulation to have seat available if rest break is needed. Functional mobility limited 2/2 weakness and decrease activity tolerance. Pt will benefit from skilled PT to improve upon functional status prior to d/c home with assist and home health.     Time Calculation:    PT Charges     Row Name 11/11/21 1122 11/11/21 0914          Time Calculation    Start Time 0959  - --     Stop Time 1025  -KH --     Time Calculation (min) 26 min  - --     PT Non-Billable Time (min) 8 min  - --     PT Received On 11/11/21  - --     PT - Next Appointment 11/12/21  - 11/12/21  -RD     PT Goal Re-Cert Due Date 11/25/21  - --            Time Calculation- PT    Total Timed Code Minutes- PT 18 minute(s)  - --            Timed Charges    90546 - Gait Training Minutes  --  -KH --     86762 - PT Therapeutic Activity Minutes 18  -KH --            Total Minutes    Timed Charges Total Minutes 18  -KH --      Total Minutes 18 -KH --           User Key  (r) = Recorded By, (t)  = Taken By, (c) = Cosigned By    Initials Name Provider Type    Ave Laughlin, PT Physical Therapist     Adore Ernandez, PT Physical Therapist              Therapy Charges for Today     Code Description Service Date Service Provider Modifiers Qty    73027120726 HC PT EVAL MOD COMPLEXITY 2 11/11/2021 Adore Ernandez, PT GP 1    80337901308 HC PT THERAPEUTIC ACT EA 15 MIN 11/11/2021 Adore Ernandez, PT GP 1          PT G-Codes  Outcome Measure Options: AM-PAC 6 Clicks Basic Mobility (PT)  AM-PAC 6 Clicks Score (PT): 19    ADORE ERNANDEZ, PT  11/11/2021

## 2021-11-11 NOTE — PLAN OF CARE
Goal Outcome Evaluation:  Plan of Care Reviewed With: patient        Progress: no change  Outcome Summary: Pt is a 77 y/o female with PMHx of COPD presenting to [facility] for acute respiratory failure secondary to acute on chronic diastolic heart failure and pancreatitis. Reports utilizing cane/rollator more frequently with community distances. On eval, performed sit <> stand with CGA. Poor standing tolerance requiring seated rest breaks throughout session. SpO2 > 94% on room throughout session. Pt ambulated 8', 20', 30' with CGA. Mildly unsteady with ambulation. Educated and encouraged pt to use cane with household ambulation and rollator with community ambulation to have seat available if rest break is needed. Functional mobility limited 2/2 weakness and decrease activity tolerance. Pt will benefit from skilled PT to improve upon functional status prior to d/c home with assist and home health.

## 2021-11-11 NOTE — PROGRESS NOTES
"    Patient Name: Lana Yañez  :1945  76 y.o.      Patient Care Team:  Will Madden MD as PCP - General (Family Medicine)  Pia Dueñas MD as Consulting Physician (Cardiology)  Iain Hill MD as Consulting Physician (Pulmonary Disease)  Carmen Kirk MD as Consulting Physician (Pain Medicine)  [facility] as Pharmacist  Shaw Hand PharmD as Pharmacist (Pharmacy)    Chief Complaint: follow up heart failure, abdominal pain/diarrhea    Interval History: she is on room air. She feels well. She really wants to go home. Her  is having surgery tomorrow.        Objective   Vital Signs  Temp:  [97.5 °F (36.4 °C)-98 °F (36.7 °C)] 97.5 °F (36.4 °C)  Heart Rate:  [87-96] 91  Resp:  [16-20] 20  BP: (114-128)/(65-89) 128/84    Intake/Output Summary (Last 24 hours) at 2021 1544  Last data filed at 2021 1300  Gross per 24 hour   Intake 480 ml   Output 1400 ml   Net -920 ml     Flowsheet Rows      First Filed Value   Admission Height 167.6 cm (66\") Documented at 2021 013   Admission Weight 113 kg (250 lb) Documented at 2021 013          Physical Exam:   General Appearance:    Alert, cooperative, in no acute distress   Lungs:     Clear to auscultation.  Normal respiratory effort and rate.      Heart:    Regular rhythm and normal rate, normal S1 and S2, no murmurs, gallops or rubs.     Chest Wall:    No abnormalities observed   Abdomen:     Soft, nontender, positive bowel sounds.     Extremities:   no cyanosis, clubbing or edema.  No marked joint deformities.  Adequate musculoskeletal strength.       Results Review:    Results from last 7 days   Lab Units 21  0623   SODIUM mmol/L 138   POTASSIUM mmol/L 3.6   CHLORIDE mmol/L 98   CO2 mmol/L 31.3*   BUN mg/dL 18   CREATININE mg/dL 1.10*   GLUCOSE mg/dL 129*   CALCIUM mg/dL 9.4     Results from last 7 days   Lab Units 21  0210   TROPONIN T ng/mL <0.010     Results from last 7 days   Lab Units " 11/11/21  0623   WBC 10*3/mm3 8.00   HEMOGLOBIN g/dL 12.6   HEMATOCRIT % 38.8   PLATELETS 10*3/mm3 216     Results from last 7 days   Lab Units 11/11/21  0623 11/10/21  1122 11/09/21  0440 11/08/21  1237 11/08/21  0210   INR  2.67* 3.11* 3.31*   < > 2.16*   APTT seconds  --   --   --   --  46.0*    < > = values in this interval not displayed.     Results from last 7 days   Lab Units 11/11/21  0623   MAGNESIUM mg/dL 2.1                   Medication Review:   aspirin, 81 mg, Oral, Daily  atorvastatin, 10 mg, Oral, Daily  carvedilol, 25 mg, Oral, BID With Meals  digoxin, 125 mcg, Oral, Every Other Day  furosemide, 80 mg, Intravenous, Q12H  insulin lispro, 0-9 Units, Subcutaneous, 4x Daily With Meals & Nightly  latanoprost, 1 drop, Both Eyes, Daily  lisinopril, 10 mg, Oral, Daily  warfarin (COUMADIN) (dosing per levels), , Does not apply, Daily  warfarin, 2.5 mg, Oral, Once         Pharmacy to dose warfarin,         Assessment/Plan   1. Acute on chronic diastolic heart failure, continue IV diuretics. Blood pressure improved with resumption of home medications. Last echo August 2021. LVEF 65%.  2. Leukocytosis and elevated procalcitonin - ID following. Getting CT abd/pelvis  3. Chronic atrial fibrillation - rate ok. Continue dig and beta blocker. Dig level ok. She is on warfarin. INR was mildly supratherapeutic. Pharmacy is managing.   4. Diabetes mellitus type II  5. Coronary artery disease - small vessel not amendable to PCI in 2017 continue aspirin and statin. Trop negative.   6. Elevated liver function tests  7. Mild to moderate MR  8. Acute pancreatitis , not significant. General surgery signed off.     Change to oral diuretics. No objection to dc. See ANGELA Alvarez in 2 weeks.     ANGELA Wright  [facility]  11/11/21  15:44 EST

## 2021-11-11 NOTE — PROGRESS NOTES
Clinical Pharmacy Services: Heart Failure Education    Lana Yañez was counseled over heart failure prior to discharge.     The patient was also counseled on all heart failure medications prior to discharge including name of medication, indication, and possible side effects.    Additional counseling points included but were not limited to:  1. Weigh yourself every morning after emptying your bladder and compare your weight to the day before  2. Keep the total amount of fluids you drink to only 6-8 glasses each day (48-64 ounces) or as otherwise directed by your physician  3. Take your medicine exactly as prescribed  4. Check for swelling in your feet, ankles, legs, and stomach  5. Eat foods that are low in salt or salt-free. Limit your sodium intake to <2000mg/day  6. Balance activity and rest periods  7. Do not smoke, and limit alcohol intake (No more than 2 drinks a day for men and 1 drink a day for women)    She was also instructed to contact her heart doctor immediately if she notices any of the following signs:  1. Weight gain of >2 pounds in 1 day or 5 pounds in 1 week  2. Vomiting and/or diarrhea that lasts more than 2 days  3. Feeling more shortness of breath than usual  4. Increased swelling in your feet, ankles, legs, or stomach  5. Development of a dry, hacking cough OR a productive cough with frothy sputum  6. Feeling more tired and having less energy to complete daily tasks  7. Feeling light-headed or dizzy  8. Having difficulty breathing when lying down flat     The patient was provided with educational materials for the above counseling points we reviewed to keep as a reference.     All other questions from the patient were answered at this time.   ?  Nelson Edwards [facility]    COVID-19 Precautions:  -The patient was wearing a mask during the interview session.  -I remained 6 feet away from the patient and was not present in the room for longer than 15 minutes.  -I was wearing a face mask and eye  protection during the interview session.

## 2021-11-11 NOTE — PROGRESS NOTES
Pharmacy Consult: Warfarin Dosing/ Monitoring    Lana Yañez is a 76 y.o. female, estimated creatinine clearance is 57.5 mL/min (A) (by C-G formula based on SCr of 1.1 mg/dL (H)). weighing 114 kg (250 lb 14.4 oz).     has a past medical history of Acute on chronic diastolic heart failure ([facility]), Arthritis, Asthma, Atrial fibrillation ([facility]), Atypical chest pain, Biliary acute pancreatitis without necrosis or infection (11/10/2021), Bronchitis, Cellulitis of right elbow, CHF (congestive heart failure) ([facility]), COPD (chronic obstructive pulmonary disease) ([facility]), Coronary artery disease, Coronary artery disease involving native coronary artery of native heart with angina pectoris with documented spasm ([facility]), Diabetes 1.5, managed as type 2 ([facility]), Disease of thyroid gland, Displacement of lumbar intervertebral disc without myelopathy, Dizziness, ANTOINE (dyspnea on exertion), Essential hypertension, Fatigue, Generalized osteoarthritis of multiple sites, History of rheumatic fever, History of transfusion, bone density study, Hyperglycemia, Hyperlipidemia, Hypovitaminosis D, Left arm pain, Left-sided weakness, Leg swelling, Low back pain, Lower extremity edema, Malaise and fatigue, Mitral valve disease, Mitral valve insufficiency, Morbid obesity with BMI of 40.0-44.9, adult ([facility]), MRSA infection, NSTEMI (non-ST elevated myocardial infarction) ([facility]), PAF (paroxysmal atrial fibrillation) ([facility]), RA (rheumatoid arthritis) ([facility]), Sleep apnea, SOB (shortness of breath), Stroke ([facility]), Urge incontinence of urine, UTI (urinary tract infection) (2020), and Vitamin D deficiency.    Social History     Tobacco Use    Smoking status: Former Smoker     Packs/day: 3.00     Types: Cigarettes     Start date: 1967     Quit date: 10/19/1968     Years since quittin.0    Smokeless tobacco: Never Used    Tobacco comment: caffeine use - decaf coffee   Substance Use Topics    Alcohol use: No     Comment: No caffeine use    Drug use: No        Results from last 7 days   Lab Units 11/11/21  0623 11/10/21  1122 11/09/21 0440 11/08/21  1237 11/08/21  0535 11/08/21  0210   INR  2.67* 3.11* 3.31* 2.68*  --  2.16*   APTT seconds  --   --   --   --   --  46.0*   HEMOGLOBIN g/dL 12.6 11.5* 10.9*  --  10.8* 11.1*   HEMATOCRIT % 38.8 34.8 32.2*  --  31.7* 32.7*   PLATELETS 10*3/mm3 216 172 136*  --  132* 129*     Results from last 7 days   Lab Units 11/11/21  0623 11/10/21  1122 11/09/21  2339 11/09/21 0440 11/09/21 0440   SODIUM mmol/L 138 137  --   --  137   POTASSIUM mmol/L 3.6 3.6 3.7   < > 3.3*   CHLORIDE mmol/L 98 98  --   --  99   CO2 mmol/L 31.3* 28.8  --   --  25.9   BUN mg/dL 18 18  --   --  18   CREATININE mg/dL 1.10* 1.12*  --   --  1.00   CALCIUM mg/dL 9.4 9.1  --   --  9.3   BILIRUBIN mg/dL 0.5 0.8  --   --  2.1*   ALK PHOS U/L 97 94  --   --  90   ALT (SGPT) U/L 30 39*  --   --  49*   AST (SGOT) U/L 32 33*  --   --  64*   GLUCOSE mg/dL 129* 197*  --   --  116*    < > = values in this interval not displayed.     Anticoagulation history: followed by [facility] clinic. Last seen on 11/3  Dose was 5 mg daily except Wednesdays when takes 2.5 mg instead    Hospital Anticoagulation:  Consulting provider: Rosaline Means APRN  Start date: 11/9  Indication: afib  Target INR: 2-3  Expected duration: n/a   Bridge Therapy: No                  Date 11/8 11/9 11/10 11/11         INR 2.16 2.68 3.31 3.11 2.67         Warfarin dose 5 mg hold hold 2.5 mg           Potential drug interactions:   APAP - may result in an increased risk of bleeding.  Aspirin - may result in increased risk of bleeding.     Relevant nutrition status: regular cardiac diet    Other: antibiotics dc'd 11/11    Education complete?/ Date:     Assessment/Plan:  INR at goal today - will dose 2.5 mg once today to sustain therapeutic INR.  Monitor INR and follow up daily    Pharmacy will continue to follow until discharge or discontinuation of warfarin.   Zander Estrada, III,  PharmD  11/11/2021

## 2021-11-11 NOTE — OUTREACH NOTE
Prep Survey      Responses   [facility] facility patient discharged from? [place]   Is LACE score < 7 ? No   Emergency Room discharge w/ pulse ox? No   Eligibility [facility]   Date of Admission 11/08/21   Date of Discharge 11/11/21   Discharge Disposition Home or Self Care   Discharge diagnosis Acute on chronic diastolic heart failure    Does the patient have one of the following disease processes/diagnoses(primary or secondary)? CHF   Does the patient have Home health ordered? Yes   What is the Home health agency?  [facility]   Is there a DME ordered? No   Prep survey completed? Yes          Kassi Nelson RN

## 2021-11-11 NOTE — THERAPY DISCHARGE NOTE
Acute Care - Occupational Therapy Discharge  [facility]       Pt discharged from the hospital on     Patient Name: Lana Yañez  : 1945    MRN: [id number]                              Today's Date: 2021       Admit Date: 2021    Visit Dx:     ICD-10-CM ICD-9-CM   1. Acute respiratory failure with hypoxia ([facility])  J96.01 518.81   2. Atrial fibrillation with RVR ([facility])  I48.91 427.31   3. Congestive heart failure, unspecified HF chronicity, unspecified heart failure type ([facility])  I50.9 428.0     Patient Active Problem List   Diagnosis   • Hyperlipidemia   • Vitamin deficiency   • Essential hypertension   • Rheumatoid arthritis involving both hands ([facility])   • Fatigue   • ANTOINE (dyspnea on exertion)   • Dysuria   • Urge incontinence of urine   • Hypovitaminosis D   • Sleep apnea   • Encounter for screening colonoscopy   • Bronchitis   • Cough   • Generalized osteoarthritis of multiple sites   • Cellulitis of right elbow due to MRSA   • Medicare annual wellness visit, initial   • Hospital discharge follow-up   • Displacement of lumbar intervertebral disc without myelopathy   • Lumbar disc herniation   • Chronic atrial fibrillation ([facility])   • History of MRSA infection   • Left-sided weakness   • Atrial fibrillation ([facility])   • Left shoulder pain   • Relative lymphocytosis   • Nausea   • Weakness   • Controlled substance agreement signed   • Coronary artery disease involving native coronary artery of native heart without angina pectoris   • SOB (shortness of breath)   • Lower extremity edema   • Acute on chronic diastolic heart failure ([facility])   • Adhesive capsulitis of left shoulder   • CVA tenderness   • Costochondritis, acute   • Allergic reaction   • 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)   • Other chronic pain   • Vaginal candidiasis   • Pulmonary hypertension ([facility])   • Acute respiratory failure with hypoxia ([facility])   • Hypokalemia   • Hypomagnesemia   • Type 2 diabetes mellitus with hyperglycemia, without long-term current use of insulin ([facility])   • Diarrhea   • Sepsis without acute organ dysfunction ([facility])   • Morbid obesity with BMI of 40.0-44.9, adult ([facility])   • Biliary acute pancreatitis without necrosis or infection     Past Medical History:   Diagnosis Date   • Acute on chronic diastolic heart failure ([facility])    • Arthritis    • Asthma    • Atrial fibrillation ([facility])     Persistent; on warfarin   • Atypical chest pain    • Biliary acute pancreatitis without necrosis or infection 11/10/2021   • Bronchitis    • Cellulitis of right elbow     due to MRSA   • CHF (congestive heart failure) ([facility])    • COPD (chronic obstructive pulmonary disease) ([facility])    • Coronary artery disease     Cardiac catheterization completed; 90% PDA stenosis with medical management recommended   • Coronary artery disease involving native coronary artery of native heart with angina pectoris with documented spasm ([facility])    • Diabetes 1.5, managed as type 2 ([facility])    • Disease of thyroid gland    • Displacement of lumbar intervertebral disc without myelopathy    • Dizziness    • ANTOINE (dyspnea on exertion)    • Essential hypertension    • Fatigue    • Generalized osteoarthritis of multiple sites    • History of rheumatic fever    • History of transfusion    • Hx of bone density study     10/23/2014   • Hyperglycemia    • Hyperlipidemia    • Hypovitaminosis D    • Left arm pain    • Left-sided weakness    • Leg swelling    • Low back pain    • Lower extremity edema    • Malaise and fatigue    • Mitral valve disease     Moderate mitral valve prolapse and moderate mitral regurgitation   • Mitral valve insufficiency    • Morbid  obesity with BMI of 40.0-44.9, adult ([facility])    • MRSA infection    • NSTEMI (non-ST elevated myocardial infarction) ([facility])    • PAF (paroxysmal atrial fibrillation) ([facility])    • RA (rheumatoid arthritis) ([facility])     involving both hands   • Sleep apnea    • SOB (shortness of breath)    • Stroke ([facility])     left side weakness   • Urge incontinence of urine    • UTI (urinary tract infection) 05/2020   • Vitamin D deficiency      Past Surgical History:   Procedure Laterality Date   • BREAST BIOPSY     • BREAST SURGERY      right side lumpectomy with biopsy   • CARDIAC CATHETERIZATION Left 10/20/2017    Procedure: Cardiac Catheterization/Vascular Study;  Surgeon: Alphonso Olmedo MD;  Location:  INVASIVE LOCATION;  Service:    • CARDIAC CATHETERIZATION N/A 10/20/2017    +2 mitral regurgitation, left main 10% stenosis, mid to distal LAD 10% diffuse stenosis, circumflex 10% diffuse stenosis, RCA 10% proximal stenosis, and distal PDA consistent with coronary embolus with a lesion of 90% too small to consider coronary intervention; medical management recommended   • EYE SURGERY      laser surgery for glaucoma and left eye cataracts removed   • HYSTERECTOMY      10+ years ago   • JOINT REPLACEMENT  2005; 2006    bilateral knees and left rotater   • KNEE SURGERY     • MAMMO BILATERAL  2016    [facility]       General Information     Row Name 11/11/21 1531          OT Time and Intention    Document Type evaluation  -RB     Mode of Treatment individual therapy; occupational therapy  -RB     Row Name 11/11/21 1531          General Information    Patient Profile Reviewed yes  -RB     Prior Level of Function independent:  Cane/rollator  -RB     Existing Precautions/Restrictions fall  -RB     Barriers to Rehab none identified  -RB     Row Name 11/11/21 1531          Living Environment    Lives With spouse  -RB     Row Name 11/11/21 1531          Home Main Entrance    Number of Stairs, Main Entrance six  -RB     Row Name  11/11/21 1531          Stairs Within Home, Primary    Stairs, Within Home, Primary bi-level house  -RB     Number of Stairs, Within Home, Primary eight  -RB     Row Name 11/11/21 1531          Cognition    Orientation Status (Cognition) oriented x 4  -RB     Row Name 11/11/21 1531          Safety Issues, Functional Mobility    Safety Issues Affecting Function (Mobility) awareness of need for assistance; safety precaution awareness; insight into deficits/self-awareness; safety precautions follow-through/compliance  -RB     Impairments Affecting Function (Mobility) endurance/activity tolerance; balance; strength  -RB           User Key  (r) = Recorded By, (t) = Taken By, (c) = Cosigned By    Initials Name Provider Type    RB Belem Sauceda OT Occupational Therapist               Mobility/ADL's     Row Name 11/11/21 1533          Bed Mobility    Comment (Bed Mobility) UIC  -RB     Row Name 11/11/21 1533          Transfers    Transfers sit-stand transfer; toilet transfer; bed-chair transfer  -RB     Bed-Chair [place] (Transfers) supervision  -RB     Assistive Device (Bed-Chair Transfers) --  [facility]  -RB     Sit-Stand [place] (Transfers) supervision  -RB     [place] Level (Toilet Transfer) supervision  -RB     Assistive Device (Toilet Transfer) --  A  -RB     Row Name 11/11/21 1533          Sit-Stand Transfer    Assistive Device (Sit-Stand Transfers) --  [facility]  -RB     Row Name 11/11/21 1533          Toilet Transfer    Type (Toilet Transfer) stand-sit; sit-stand  -RB     Row Name 11/11/21 1533          Activities of Daily Living    BADL Assessment/Intervention upper body dressing; lower body dressing; toileting  -RB     Row Name 11/11/21 1533          Upper Body Dressing Assessment/Training    [place] Level (Upper Body Dressing) upper body dressing skills; set up  -RB     Position (Upper Body Dressing) supported sitting  -RB     Row Name 11/11/21 1533          Lower Body Dressing Assessment/Training     [place] Level (Lower Body Dressing) set up  -RB     Position (Lower Body Dressing) supported sitting; supported standing  -RB     Row Name 11/11/21 1533          Toileting Assessment/Training    [place] Level (Toileting) toileting skills; moderate assist (50% patient effort)  -RB     Position (Toileting) supported sitting; supported standing  -RB     Comment (Toileting) Donning of a clean brief  -RB           User Key  (r) = Recorded By, (t) = Taken By, (c) = Cosigned By    Initials Name Provider Type    Belem Figueredo OT Occupational Therapist               Obj/Interventions     Row Name 11/11/21 1534          Sensory Assessment (Somatosensory)    Sensory Assessment (Somatosensory) sensation intact  -RB     Row Name 11/11/21 1534          Vision Assessment/Intervention    Visual Impairment/Limitations WFL  -RB     Row Name 11/11/21 1534          Range of Motion Comprehensive    General Range of Motion no range of motion deficits identified  -RB     Row Name 11/11/21 1534          Strength Comprehensive (MMT)    Comment, General Manual Muscle Testing (MMT) Assessment BUE 3+/5  -RB     Row Name 11/11/21 1534          Balance    Static Sitting Balance WFL; supported; unsupported; sitting in chair  -RB     Dynamic Sitting Balance WFL; supported; unsupported; sitting in chair  -RB     Static Standing Balance WFL; supported; standing  -RB     Dynamic Standing Balance mild impairment; supported; standing  -RB     Balance Interventions occupation based/functional task  -RB     Comment, Balance Pt reports feeling generally weak from being in the hospital. While up she reaches for furniture and household items to steady herself. She reports she has a rollator and cane at baseli needed  -RB           User Key  (r) = Recorded By, (t) = Taken By, (c) = Cosigned By    Initials Name Provider Type    Belem Figueredo OT Occupational Therapist               Goals/Plan     Row Name 11/11/21 1537          Bed  Mobility Goal 1 (OT)    Activity/Assistive Device (Bed Mobility Goal 1, OT) bed mobility activities, all  -RB     [place] Level/Cues Needed (Bed Mobility Goal 1, OT) supervision required  -RB     Time Frame (Bed Mobility Goal 1, OT) short term goal (STG); 2 weeks  -RB     Progress/Outcomes (Bed Mobility Goal 1, OT) continuing progress toward goal  -RB     Row Name 11/11/21 1537          Transfer Goal 1 (OT)    Activity/Assistive Device (Transfer Goal 1, OT) transfers, all  -RB     [place] Level/Cues Needed (Transfer Goal 1, OT) supervision required  -RB     Time Frame (Transfer Goal 1, OT) short term goal (STG); 2 weeks  -RB     Progress/Outcome (Transfer Goal 1, OT) continuing progress toward goal  -RB     Row Name 11/11/21 1537          Dressing Goal 1 (OT)    Activity/Device (Dressing Goal 1, OT) dressing skills, all  -RB     [place]/Cues Needed (Dressing Goal 1, OT) supervision required  -RB     Time Frame (Dressing Goal 1, OT) short term goal (STG); 2 weeks  -RB     Progress/Outcome (Dressing Goal 1, OT) continuing progress toward goal  -RB     Row Name 11/11/21 1537          Toileting Goal 1 (OT)    Activity/Device (Toileting Goal 1, OT) toileting skills, all  -RB     [place] Level/Cues Needed (Toileting Goal 1, OT) supervision required  -RB     Time Frame (Toileting Goal 1, OT) short term goal (STG); 2 weeks  -RB     Progress/Outcome (Toileting Goal 1, OT) continuing progress toward goal  -RB     Row Name 11/11/21 1537          Grooming Goal 1 (OT)    Activity/Device (Grooming Goal 1, OT) grooming skills, all  -RB     [place] (Grooming Goal 1, OT) supervision required  -RB     Time Frame (Grooming Goal 1, OT) short term goal (STG); 2 weeks  -RB     Progress/Outcome (Grooming Goal 1, OT) continuing progress toward goal  -RB     Row Name 11/11/21 1537          Therapy Assessment/Plan (OT)    Planned Therapy Interventions (OT) transfer/mobility retraining; strengthening exercise;  ROM/therapeutic exercise; activity tolerance training; BADL retraining; functional balance retraining; occupation/activity based interventions; patient/caregiver education/training  -RB           User Key  (r) = Recorded By, (t) = Taken By, (c) = Cosigned By    Initials Name Provider Type    Belem Figueredo OT Occupational Therapist               Clinical Impression     Row Name 11/11/21 1536          Pain Scale: Numbers Pre/Post-Treatment    Pretreatment Pain Rating 0/10 - no pain  -RB     Posttreatment Pain Rating 0/10 - no pain  -RB     Row Name 11/11/21 1536          Plan of Care Review    Plan of Care Reviewed With patient  -RB     Progress improving  -RB     Row Name 11/11/21 1536          Therapy Assessment/Plan (OT)    Rehab Potential (OT) good, to achieve stated therapy goals  -RB     Criteria for Skilled Therapeutic Interventions Met (OT) yes  -RB     Therapy Frequency (OT) 5 times/wk  -RB     Row Name 11/11/21 1536          Therapy Plan Review/Discharge Plan (OT)    Anticipated Discharge Disposition (OT) home with assist; home with home health  -RB     Row Name 11/11/21 1536          Vital Signs    O2 Delivery Pre Treatment room air  -RB     O2 Delivery Intra Treatment room air  -RB     O2 Delivery Post Treatment room air  -RB     Pre Patient Position Sitting  -RB     Intra Patient Position Standing  -RB     Post Patient Position Sitting  -RB     Row Name 11/11/21 1536          Positioning and Restraints    Pre-Treatment Position sitting in chair/recliner  -RB     Post Treatment Position chair  -RB     In Chair notified nsg; reclined; sitting; with family/caregiver; legs elevated  -RB           User Key  (r) = Recorded By, (t) = Taken By, (c) = Cosigned By    Initials Name Provider Type    Belem Figueredo OT Occupational Therapist               Outcome Measures     Row Name 11/11/21 1537          How much help from another is currently needed...    Putting on and taking off regular lower body  clothing? 3  -RB     Bathing (including washing, rinsing, and drying) 3  -RB     Toileting (which includes using toilet bed pan or urinal) 3  -RB     Putting on and taking off regular upper body clothing 3  -RB     Taking care of personal grooming (such as brushing teeth) 3  -RB     Eating meals 4  -RB     AM-PAC 6 Clicks Score (OT) 19  -RB     Row Name 11/11/21 1121          How much help from another person do you currently need...    Turning from your back to your side while in flat bed without using bedrails? 4  -KH     Moving from lying on back to sitting on the side of a flat bed without bedrails? 3  -KH     Moving to and from a bed to a chair (including a wheelchair)? 3  -KH     Standing up from a chair using your arms (e.g., wheelchair, bedside chair)? 3  -KH     Climbing 3-5 steps with a railing? 3  -KH     To walk in hospital room? 3  -KH     AM-PAC 6 Clicks Score (PT) 19  -KH     Row Name 11/11/21 1537          Modified Patricia Scale    Modified Patricia Scale 4 - Moderately severe disability.  Unable to walk without assistance, and unable to attend to own bodily needs without assistance.  -RB     Row Name 11/11/21 1537 11/11/21 1121       Functional Assessment    Outcome Measure Options AM-PAC 6 Clicks Daily Activity (OT); Modified Patricia  -RB AM-PAC 6 Clicks Basic Mobility (PT)  -          User Key  (r) = Recorded By, (t) = Taken By, (c) = Cosigned By    Initials Name Provider Type    Belem Figueredo, OT Occupational Therapist    Adore Guzman, PT Physical Therapist              Occupational Therapy Education                 Title: PT OT SLP Therapies (Done)     Topic: Occupational Therapy (Done)     Point: ADL training (Done)     Description:   Instruct learner(s) on proper safety adaptation and remediation techniques during self care or transfers.   Instruct in proper use of assistive devices.              Learning Progress Summary           Patient Acceptance, E,TB,D, DU,VU by RB at  11/11/2021 1539    Comment: Safety at home, use of cane/walker for balance assistance during functional mobility, transfers, ADL/IADL participation, HH therapy                   Point: Home exercise program (Done)     Description:   Instruct learner(s) on appropriate technique for monitoring, assisting and/or progressing therapeutic exercises/activities.              Learning Progress Summary           Patient Acceptance, E,TB,D, DU,VU by RB at 11/11/2021 1539    Comment: Safety at home, use of cane/walker for balance assistance during functional mobility, transfers, ADL/IADL participation, HH therapy                   Point: Precautions (Done)     Description:   Instruct learner(s) on prescribed precautions during self-care and functional transfers.              Learning Progress Summary           Patient Acceptance, E,TB,D, DU,VU by RB at 11/11/2021 1539    Comment: Safety at home, use of cane/walker for balance assistance during functional mobility, transfers, ADL/IADL participation, HH therapy                   Point: Body mechanics (Done)     Description:   Instruct learner(s) on proper positioning and spine alignment during self-care, functional mobility activities and/or exercises.              Learning Progress Summary           Patient Acceptance, E,TB,D, DU,VU by RB at 11/11/2021 1539    Comment: Safety at home, use of cane/walker for balance assistance during functional mobility, transfers, ADL/IADL participation, HH therapy                               User Key     Initials Effective Dates Name Provider Type Discipline    RADHA 06/16/21 -  Belem Sauceda OT Occupational Therapist OT              OT Recommendation and Plan  Retired Outcome Summary/Treatment Plan (OT)  Anticipated Discharge Disposition (OT): home with assist, home with home health  Planned Therapy Interventions (OT): transfer/mobility retraining, strengthening exercise, ROM/therapeutic exercise, activity tolerance training, BADL  retraining, functional balance retraining, occupation/activity based interventions, patient/caregiver education/training  Therapy Frequency (OT): 5 times/wk  Plan of Care Review  Plan of Care Reviewed With: patient  Progress: improving  Plan of Care Reviewed With: patient     Time Calculation:    Time Calculation- OT     Row Name 11/11/21 1530 11/11/21 1122          Time Calculation- OT    OT Start Time 1038  -RB --     OT Stop Time 1108  -RB --     OT Time Calculation (min) 30 min  -RB --     Total Timed Code Minutes- OT 23 minute(s)  -RB --     OT Received On 11/11/21  -RB --     OT - Next Appointment 11/12/21  -RB --     OT Goal Re-Cert Due Date 11/25/21  -RB --            Timed Charges    59607 - Gait Training Minutes  -- --  -     82925 - OT Self Care/Mgmt Minutes 23  -RB --            Untimed Charges    OT Eval/Re-eval Minutes 7  -RB --            Total Minutes    Timed Charges Total Minutes 23  -RB --  -JAI     Untimed Charges Total Minutes 7  -RB --      Total Minutes 30  -RB --  -JAI           User Key  (r) = Recorded By, (t) = Taken By, (c) = Cosigned By    Initials Name Provider Type    RB Belem Sauceda OT Occupational Therapist    Adore Guzman, PT Physical Therapist              Therapy Charges for Today     Code Description Service Date Service Provider Modifiers Qty    05997146678 HC OT EVAL LOW COMPLEXITY 2 11/11/2021 Belem Sauceda OT GO 1    05328495722 HC OT SELF CARE/MGMT/TRAIN EA 15 MIN 11/11/2021 Belem Sauceda OT GO 2               Belem Sauceda OT  11/11/2021

## 2021-11-12 ENCOUNTER — HOME CARE VISIT (OUTPATIENT)
Dept: HOME HEALTH SERVICES | Facility: HOME HEALTHCARE | Age: 76
End: 2021-11-12

## 2021-11-12 ENCOUNTER — TRANSITIONAL CARE MANAGEMENT TELEPHONE ENCOUNTER (OUTPATIENT)
Dept: CALL CENTER | Facility: HOSPITAL | Age: 76
End: 2021-11-12

## 2021-11-12 VITALS
WEIGHT: 238 LBS | HEART RATE: 88 BPM | OXYGEN SATURATION: 93 % | BODY MASS INDEX: 38.25 KG/M2 | TEMPERATURE: 97.5 F | RESPIRATION RATE: 20 BRPM | HEIGHT: 66 IN | SYSTOLIC BLOOD PRESSURE: 114 MMHG | DIASTOLIC BLOOD PRESSURE: 72 MMHG

## 2021-11-12 PROCEDURE — G0299 HHS/HOSPICE OF RN EA 15 MIN: HCPCS

## 2021-11-12 NOTE — CASE MANAGEMENT/SOCIAL WORK
Case Management Discharge Note      Final Note: Pt discharged home with [facility]    Provided Post Acute Provider List?: Refused  Refused Provider List Comment: Requested [facility]  Provided Post Acute Provider Quality & Resource List?: Refused    Selected Continued Care - Discharged on 11/11/2021 Admission date: 11/8/2021 - Discharge disposition: Home or Self Care    Destination    No services have been selected for the patient.              Durable Medical Equipment    No services have been selected for the patient.              Dialysis/Infusion    No services have been selected for the patient.              Home Medical Care Coordination complete.    Service Provider Selected Services Address Phone Fax Patient Preferred     [facility]  Home Health Services [address] [address] [phone] --          Therapy    No services have been selected for the patient.              Community Resources    No services have been selected for the patient.              Community & DME    No services have been selected for the patient.                  Transportation Services  Private: Car    Final Discharge Disposition Code: 06 - home with home health care

## 2021-11-12 NOTE — OUTREACH NOTE
Call Center TCM Note      Responses   [facility] [place] patient discharged from? [place]   Does the patient have one of the following disease processes/diagnoses(primary or secondary)? CHF   TCM attempt successful? Yes   Call start time 1633   Call end time 1635   Discharge diagnosis Acute on chronic diastolic heart failure    Meds reviewed with patient/caregiver? Yes   Is the patient having any side effects they believe may be caused by any medication additions or changes? No   Does the patient have all medications ordered at discharge? Yes   Is the patient taking all medications as directed (includes completed medication regime)? Yes   Does the patient have a primary care provider?  Yes   Does the patient have an appointment with their PCP within 7 days of discharge? No   Comments regarding PCP No availability seen with Dr Madden. Message sent to office to contact patient with appt.    What is preventing the patient from scheduling follow up appointments within 7 days of discharge? Haven't had time   Nursing Interventions Educated patient on importance of making appointment,  Advised patient to make appointment   Has the patient kept scheduled appointments due by today? N/A   What is the Home health agency?  [facility]   Has home health visited the patient within 72 hours of discharge? Yes   Psychosocial issues? No   Did the patient receive a copy of their discharge instructions? Yes   Nursing interventions Reviewed instructions with patient   What is the patient's perception of their health status since discharge? Improving   Nursing interventions Nurse provided patient education   Is the patient/caregiver able to teach back the hierarchy of who to call/visit for symptoms/problems? PCP, Specialist, Home health nurse, Urgent Care, ED, 911 Yes   TCM call completed? Yes   Wrap up additional comments Quick call. Patient will need further education.  nurse with her at time of call.            Conrado Delacruz,  RN    11/12/2021, 16:35 EST

## 2021-11-13 LAB
BACTERIA SPEC AEROBE CULT: NORMAL
BACTERIA SPEC AEROBE CULT: NORMAL

## 2021-11-14 NOTE — HOME HEALTH
PT SEEN BY HH POST HOSPITAL STAY FOR  CHF AND PANCREATITIS.   HX OF  DM, AFIB AND ANTICOAGUALNT THERAPY.   PT LIVES WITH SPOUSE AND GRANDDA WORKS FROM HOME.  SPOUSE IN HOSPITAL AT TIME OF SOC FOR  SURGERY.  SPOUSE USUAL [facility].  I CALLED ANTICOAGUALNT CLINIC TO OBTAIN ORDERS FOR PT/INR  SHE HAS APPT SCHEDULED FOR 12/1 AS NOTED SPOUSE IN [facility] AND HE IS THE  USUAL .  AND IN HOSPITAL HIMSELF FOR SURGERY.       CALL FIRST PT WITH DOG SHE HAS TO PUT UP

## 2021-11-15 ENCOUNTER — TELEPHONE (OUTPATIENT)
Dept: PHARMACY | Facility: HOSPITAL | Age: 76
End: 2021-11-15

## 2021-11-15 NOTE — TELEPHONE ENCOUNTER
Spoke with Azul at [facility] who is agreeable to checking Ms. Yañez's INR during tomorrow's home health visit.

## 2021-11-16 ENCOUNTER — ANTICOAGULATION VISIT (OUTPATIENT)
Dept: PHARMACY | Facility: HOSPITAL | Age: 76
End: 2021-11-16

## 2021-11-16 ENCOUNTER — HOME CARE VISIT (OUTPATIENT)
Dept: HOME HEALTH SERVICES | Facility: HOME HEALTHCARE | Age: 76
End: 2021-11-16

## 2021-11-16 VITALS
SYSTOLIC BLOOD PRESSURE: 136 MMHG | DIASTOLIC BLOOD PRESSURE: 94 MMHG | HEART RATE: 95 BPM | OXYGEN SATURATION: 97 % | TEMPERATURE: 96.9 F

## 2021-11-16 LAB — INR PPP: 2.4

## 2021-11-16 PROCEDURE — G0299 HHS/HOSPICE OF RN EA 15 MIN: HCPCS

## 2021-11-16 PROCEDURE — G0151 HHCP-SERV OF PT,EA 15 MIN: HCPCS

## 2021-11-16 RX ORDER — SITAGLIPTIN 100 MG/1
TABLET, FILM COATED ORAL
Qty: 90 TABLET | Refills: 0 | Status: SHIPPED | OUTPATIENT
Start: 2021-11-16 | End: 2022-02-16

## 2021-11-16 RX ORDER — ERGOCALCIFEROL 1.25 MG/1
CAPSULE ORAL
Qty: 12 CAPSULE | Refills: 0 | Status: SHIPPED | OUTPATIENT
Start: 2021-11-16 | End: 2021-11-22

## 2021-11-16 NOTE — PROGRESS NOTES
Anticoagulation Clinic Progress Note    Anticoagulation Summary  As of 2021    INR goal:  2.0-3.0   TTR:  63.9 % (3.1 y)   INR used for dosin.40 (2021)   Warfarin maintenance plan:  2.5 mg every Wed; 5 mg all other days   Weekly warfarin total:  32.5 mg   No change documented:  Love Allison RPH   Plan last modified:  Shaw Hand, PharmD (2021)   Next INR check:  2021   Priority:  Maintenance   Target end date:  Indefinite    Indications    Atrial fibrillation (HCC) [I48.91]             Anticoagulation Episode Summary     INR check location:      Preferred lab:      Send INR reminders to:  [facility] [place]    Comments:        Anticoagulation Care Providers     Provider Role Specialty Phone number    Pia Dueñas MD Referring Cardiology [phone]          INR History:  Anticoagulation Monitoring 10/15/2021 2021 2021   INR 3.1 2.1 2.40   INR Date 10/15/2021 2021 2021   INR Goal 2.0-3.0 2.0-3.0 2.0-3.0   Trend Same Same Same   Last Week Total 32.5 mg 32.5 mg 22.5 mg   Next Week Total 32.5 mg 32.5 mg 32.5 mg   Sun 5 mg 5 mg -   Mon 5 mg 5 mg -   Tue 5 mg 5 mg 5 mg   Wed 2.5 mg 2.5 mg 2.5 mg   Thu 5 mg 5 mg -   Fri 5 mg 5 mg -   Sat 5 mg 5 mg -   Visit Report - - -   Some recent data might be hidden       Plan:  1. INR is Therapeutic today- see above in Anticoagulation Summary.   Provided instructions to Paola with [facility] to continue their warfarin regimen- see above in Anticoagulation Summary.  2. Follow up in 2 days      Love Allison RPH

## 2021-11-16 NOTE — HOME HEALTH
REASON FOR REFERRAL:  decreased ability to ambulate in and out of the home following recent hospital stay secondary to CHF    MEDICAL HISTORY: Patient states she had to go the the hospital last week due shortness of air. Was diagnosed with CHF exacerbation and was discharged home on Thursday.    SKILLED PHYSICAL THERAPY IS MEDICALLY NECESSARY FOR: N/A- no skill found for physical therapy at this time.

## 2021-11-16 NOTE — PROGRESS NOTES
Left very detailed message for patient scheduled for TCM 11/22/2021 at 2 pm. Patient is to contact our office back to confirm if she can come to that time or not.

## 2021-11-18 ENCOUNTER — HOME CARE VISIT (OUTPATIENT)
Dept: HOME HEALTH SERVICES | Facility: HOME HEALTHCARE | Age: 76
End: 2021-11-18

## 2021-11-18 VITALS
RESPIRATION RATE: 18 BRPM | WEIGHT: 237 LBS | BODY MASS INDEX: 38.25 KG/M2 | DIASTOLIC BLOOD PRESSURE: 68 MMHG | OXYGEN SATURATION: 97 % | SYSTOLIC BLOOD PRESSURE: 114 MMHG | TEMPERATURE: 97.8 F | HEART RATE: 74 BPM

## 2021-11-18 LAB — INR PPP: 2.5

## 2021-11-18 PROCEDURE — G0299 HHS/HOSPICE OF RN EA 15 MIN: HCPCS

## 2021-11-18 PROCEDURE — G0180 MD CERTIFICATION HHA PATIENT: HCPCS | Performed by: FAMILY MEDICINE

## 2021-11-18 NOTE — CASE COMMUNICATION
Colleen- my coworker Layla mentioned that your patient was to have an INR today and that she had not gotten any results from you. I see that your chart for this patient is still open and so possibly you obtained the INR & called to [facility].   Layla is off tomorrow so I will be by myself. Thanks

## 2021-11-19 ENCOUNTER — HOME CARE VISIT (OUTPATIENT)
Dept: HOME HEALTH SERVICES | Facility: HOME HEALTHCARE | Age: 76
End: 2021-11-19

## 2021-11-19 ENCOUNTER — ANTICOAGULATION VISIT (OUTPATIENT)
Dept: PHARMACY | Facility: HOSPITAL | Age: 76
End: 2021-11-19

## 2021-11-19 VITALS
RESPIRATION RATE: 18 BRPM | HEART RATE: 74 BPM | DIASTOLIC BLOOD PRESSURE: 78 MMHG | TEMPERATURE: 97.9 F | BODY MASS INDEX: 38.25 KG/M2 | WEIGHT: 237 LBS | OXYGEN SATURATION: 98 % | SYSTOLIC BLOOD PRESSURE: 132 MMHG

## 2021-11-19 NOTE — PROGRESS NOTES
Anticoagulation Clinic Progress Note    Anticoagulation Summary  As of 2021    INR goal:  2.0-3.0   TTR:  63.9 % (3.1 y)   INR used for dosin.50 (2021)   Warfarin maintenance plan:  2.5 mg every Wed; 5 mg all other days   Weekly warfarin total:  32.5 mg   No change documented:  Love Allison RPH   Plan last modified:  Shaw Hand, PharmD (2021)   Next INR check:  2021   Priority:  Maintenance   Target end date:  Indefinite    Indications    Atrial fibrillation (HCC) [I48.91]             Anticoagulation Episode Summary     INR check location:      Preferred lab:      Send INR reminders to:  [facility] [place]    Comments:        Anticoagulation Care Providers     Provider Role Specialty Phone number    Pia Dueñas MD Referring Cardiology [phone]          INR History:  Anticoagulation Monitoring 2021   INR 2.1 2.40 2.50   INR Date 2021   INR Goal 2.0-3.0 2.0-3.0 2.0-3.0   Trend Same Same Same   Last Week Total 32.5 mg 22.5 mg 32.5 mg   Next Week Total 32.5 mg 32.5 mg 32.5 mg   Sun 5 mg - 5 mg   Mon 5 mg - 5 mg   Tue 5 mg 5 mg -   Wed 2.5 mg 2.5 mg -   Thu 5 mg - -   Fri 5 mg - 5 mg   Sat 5 mg - 5 mg   Visit Report - - -   Some recent data might be hidden       Plan:  1. INR is Therapeutic today- see above in Anticoagulation Summary.   Provided instructions to Sandra with [facility] to Continue their warfarin regimen- see above in Anticoagulation Summary.  2. Follow up in 1 week      Love Allison RPH

## 2021-11-20 NOTE — CASE COMMUNICATION
"Colleen & Melinda- I obtained & entered the INR orders and called patient. Next INR is due 11/23- I believe that Melinda is scheduled to see patient on that day. Thanks         \"  VERBAL ORDERS RECEIVED BY PHONE FROM MICHAEL AT [facility] MEDICATION MANAGEMENT PER DR. MARIANA SALGUERO.     PT: 30.3 INR: 2.5 DRAWN ON 11/18/21     COUMADIN DOSAGE: CONTINUE CURRENT DOSE OF 5 MG BY MOUTH DAILY EXCEPT 2.5 MG ON WEDNESDAY .    SKILLED NURSE TO DRAW NEXT PT/INR  VIA FINGERSTICK OR VENIPUNCTURE ON: 11/23/21     WITH RESULTS TO: [facility] MEDICATION MANAGEMENT     PATIENT WAS CONTACTED BY PHONE AND INSTRUCTED ON THE COUMADIN DOSAGE AS LISTED ABOVE AND SHE STATED UNDERSTANDING .\"  "

## 2021-11-22 ENCOUNTER — READMISSION MANAGEMENT (OUTPATIENT)
Dept: CALL CENTER | Facility: HOSPITAL | Age: 76
End: 2021-11-22

## 2021-11-22 ENCOUNTER — OFFICE VISIT (OUTPATIENT)
Dept: INTERNAL MEDICINE | Facility: CLINIC | Age: 76
End: 2021-11-22

## 2021-11-22 VITALS
DIASTOLIC BLOOD PRESSURE: 72 MMHG | TEMPERATURE: 96.6 F | HEART RATE: 95 BPM | HEIGHT: 66 IN | WEIGHT: 241 LBS | OXYGEN SATURATION: 98 % | SYSTOLIC BLOOD PRESSURE: 130 MMHG | BODY MASS INDEX: 38.73 KG/M2

## 2021-11-22 DIAGNOSIS — I50.32 CHRONIC DIASTOLIC HEART FAILURE (HCC): ICD-10-CM

## 2021-11-22 DIAGNOSIS — T46.4X5A ACE-INHIBITOR COUGH: ICD-10-CM

## 2021-11-22 DIAGNOSIS — I27.20 PULMONARY HYPERTENSION (HCC): ICD-10-CM

## 2021-11-22 DIAGNOSIS — R05.8 ACE-INHIBITOR COUGH: ICD-10-CM

## 2021-11-22 DIAGNOSIS — R06.2 WHEEZING: ICD-10-CM

## 2021-11-22 DIAGNOSIS — I10 ESSENTIAL HYPERTENSION: ICD-10-CM

## 2021-11-22 DIAGNOSIS — Z09 HOSPITAL DISCHARGE FOLLOW-UP: Primary | ICD-10-CM

## 2021-11-22 PROCEDURE — 99495 TRANSJ CARE MGMT MOD F2F 14D: CPT | Performed by: FAMILY MEDICINE

## 2021-11-22 PROCEDURE — 1111F DSCHRG MED/CURRENT MED MERGE: CPT | Performed by: FAMILY MEDICINE

## 2021-11-22 RX ORDER — LOSARTAN POTASSIUM 25 MG/1
25 TABLET ORAL DAILY
Qty: 90 TABLET | Refills: 3 | Status: SHIPPED | OUTPATIENT
Start: 2021-11-22 | End: 2022-02-14 | Stop reason: SDUPTHER

## 2021-11-22 RX ORDER — ALBUTEROL SULFATE 2.5 MG/3ML
2.5 SOLUTION RESPIRATORY (INHALATION) EVERY 4 HOURS PRN
Qty: 40 EACH | Refills: 11 | Status: SHIPPED | OUTPATIENT
Start: 2021-11-22 | End: 2022-05-27 | Stop reason: HOSPADM

## 2021-11-22 RX ORDER — ERGOCALCIFEROL 1.25 MG/1
CAPSULE ORAL
Qty: 12 CAPSULE | Refills: 0 | Status: SHIPPED | OUTPATIENT
Start: 2021-11-22 | End: 2022-02-14 | Stop reason: SDUPTHER

## 2021-11-22 NOTE — OUTREACH NOTE
CHF Week 2 Survey      Responses   [facility] patient discharged from? [place]   Does the patient have one of the following disease processes/diagnoses(primary or secondary)? CHF   Week 2 attempt successful? No   Unsuccessful attempts Attempt 1          Maggi Tony LPN

## 2021-11-22 NOTE — PROGRESS NOTES
Transitional Care Follow Up Visit  Subjective     Lana Yañez is a 76 y.o. female who presents for a transitional care management visit.    Within 48 business hours after discharge our office contacted her via telephone to coordinate her care and needs.      I reviewed and discussed the details of that call along with the discharge summary, hospital problems, inpatient lab results, inpatient diagnostic studies, and consultation reports with Lana.     Current outpatient and discharge medications have been reconciled for the patient.  Reviewed by: Will Madden MD      Date of TCM Phone Call 11/11/2021   [facility]   Date of Admission 11/8/2021   Date of Discharge 11/11/2021   Discharge Disposition Home or Self Care     Risk for Readmission (LACE) Score: 12 (11/11/2021  6:00 AM)      History of Present Illness   Course During Hospital Stay:      This patient was admitted for shortness of breath and chest pain.  She was found to have sepsis and acute respiratory failure secondary to acute diastolic congestive heart failure.  Cardiology followed for management of the diuretics.  For the acute respiratory failure she was placed on BiPAP and weaned.  She had an elevated pro calcitonin and there was some concern for C. difficile colitis and was started on vancomycin.  However her stool studies came back normal.  Infectious disease was consulted and CT imaging showed some gallstone pancreatitis and was therefore placed on ceftriaxone and general surgery was consulted.  After they reviewed the imaging did not agree that she had pancreatitis.  She was discharged in good condition and was to follow-up with me and Dr. Hill at the sleep clinic.    Interval history: She has been having a dry intermittent cough over 1 month and wondering if the lisinopril could causing.  She lost a few lbs and the sleep has been better and no wheezing.  She rested a few times on the way from the parking lot but that is  typical for her heart failure and pulmonary HTN.  Eloisa is on a 1500mL water restriction currently.     The following portions of the patient's history were reviewed and updated as appropriate: allergies, current medications, past family history, past medical history, past social history, past surgical history and problem list.         Vitals:    21 1415   BP: 130/72   Pulse: 95   Temp: 96.6 °F (35.9 °C)   SpO2: 98%         Objective   Physical Exam  Vitals and nursing note reviewed.   Constitutional:       General: She is not in acute distress.     Appearance: Normal appearance.   Cardiovascular:      Rate and Rhythm: Normal rate and regular rhythm.      Heart sounds: Normal heart sounds. No murmur heard.      Pulmonary:      Effort: Pulmonary effort is normal.      Breath sounds: Normal breath sounds.   Musculoskeletal:      Right lower le+ Edema present.      Left lower le+ Edema present.   Neurological:      Mental Status: She is alert.         Assessment/Plan   Diagnoses and all orders for this visit:    1. Hospital discharge follow-up (Primary)    2. ACE-inhibitor cough    3. Essential hypertension  -     losartan (Cozaar) 25 MG tablet; Take 1 tablet by mouth Daily.  Dispense: 90 tablet; Refill: 3  -     Basic Metabolic Panel    4. Pulmonary hypertension (HCC)  -     Basic Metabolic Panel    5. Chronic diastolic heart failure (HCC)  -     Basic Metabolic Panel    6. Wheezing  -     albuterol (PROVENTIL) (2.5 MG/3ML) 0.083% nebulizer solution; Take 2.5 mg by nebulization Every 4 (Four) Hours As Needed for Shortness of Air.  Dispense: 40 each; Refill: 11    Sounds like her cough is very compelling for ACE inhibitor cough.  I will stop lisinopril and start her on losartan instead.  I will get a BMP to check her kidney function on her diuretics and with the water restriction.  She also needed some vials of her albuterol for the wheezing that she gets occasionally from the chronic diastolic heart  failure.  Overall patient is currently stable.

## 2021-11-23 ENCOUNTER — ANTICOAGULATION VISIT (OUTPATIENT)
Dept: PHARMACY | Facility: HOSPITAL | Age: 76
End: 2021-11-23

## 2021-11-23 ENCOUNTER — HOME CARE VISIT (OUTPATIENT)
Dept: HOME HEALTH SERVICES | Facility: HOME HEALTHCARE | Age: 76
End: 2021-11-23

## 2021-11-23 LAB
BUN SERPL-MCNC: 33 MG/DL (ref 8–27)
BUN/CREAT SERPL: 23 (ref 12–28)
CALCIUM SERPL-MCNC: 10 MG/DL (ref 8.7–10.3)
CHLORIDE SERPL-SCNC: 100 MMOL/L (ref 96–106)
CO2 SERPL-SCNC: 21 MMOL/L (ref 20–29)
CREAT SERPL-MCNC: 1.43 MG/DL (ref 0.57–1)
GLUCOSE SERPL-MCNC: 264 MG/DL (ref 65–99)
HH POC INTERNATIONAL NORMALIZATION RATIO: NORMAL
HH POC PROTIME: NORMAL
INR PPP: 3
POTASSIUM SERPL-SCNC: 4.6 MMOL/L (ref 3.5–5.2)
SODIUM SERPL-SCNC: 139 MMOL/L (ref 134–144)

## 2021-11-23 PROCEDURE — G0300 HHS/HOSPICE OF LPN EA 15 MIN: HCPCS

## 2021-11-23 NOTE — PROGRESS NOTES
Anticoagulation Clinic Progress Note    Anticoagulation Summary  As of 11/23/2021    INR goal:  2.0-3.0   TTR:  64.1 % (3.1 y)   INR used for dosing:  3.00 (11/23/2021)   Warfarin maintenance plan:  2.5 mg every Wed; 5 mg all other days   Weekly warfarin total:  32.5 mg   Plan last modified:  Shaw Hand, PharmD (8/12/2021)   Next INR check:  11/29/2021   Priority:  Maintenance   Target end date:  Indefinite    Indications    Atrial fibrillation (HCC) [I48.91]             Anticoagulation Episode Summary     INR check location:      Preferred lab:      Send [facility]ers [facility]   [facility]    Comments:        Anticoagulation Care Providers     Provider Role Specialty Phone number    Pia Dueñas MD Referring Cardiology [phone]          INR History:  Anticoagulation Monitoring 11/16/2021 11/19/2021 11/23/2021   INR 2.40 2.50 3.00   INR Date 11/16/2021 11/18/2021 11/23/2021   INR Goal 2.0-3.0 2.0-3.0 2.0-3.0   Trend Same Same Same   Last Week Total 22.5 mg 32.5 mg 32.5 mg   Next Week Total 32.5 mg 32.5 mg 32.5 mg   Sun - 5 mg 5 mg   Mon - 5 mg -   Tue 5 mg - 5 mg   Wed 2.5 mg - 2.5 mg   Thu - - 5 mg   Fri - 5 mg 5 mg   Sat - 5 mg 5 mg   Visit Report - - -   Some recent data might be hidden       Plan:  1. INR is Therapeutic today- see above in Anticoagulation Summary.   Provided instructions to Paola with [facility] to continue their current warfarin regimen- see above in Anticoagulation Summary.  2. Follow up in 6 days      Kassi Le, RPH3

## 2021-11-26 ENCOUNTER — HOME CARE VISIT (OUTPATIENT)
Dept: HOME HEALTH SERVICES | Facility: HOME HEALTHCARE | Age: 76
End: 2021-11-26

## 2021-11-29 ENCOUNTER — TELEPHONE (OUTPATIENT)
Dept: CARDIOLOGY | Facility: CLINIC | Age: 76
End: 2021-11-29

## 2021-11-29 ENCOUNTER — ANTICOAGULATION VISIT (OUTPATIENT)
Dept: PHARMACY | Facility: HOSPITAL | Age: 76
End: 2021-11-29

## 2021-11-29 ENCOUNTER — HOME CARE VISIT (OUTPATIENT)
Dept: HOME HEALTH SERVICES | Facility: HOME HEALTHCARE | Age: 76
End: 2021-11-29

## 2021-11-29 VITALS
BODY MASS INDEX: 85.44 KG/M2 | RESPIRATION RATE: 18 BRPM | HEART RATE: 74 BPM | WEIGHT: 293 LBS | TEMPERATURE: 97.1 F | DIASTOLIC BLOOD PRESSURE: 72 MMHG | SYSTOLIC BLOOD PRESSURE: 124 MMHG

## 2021-11-29 DIAGNOSIS — I50.32 CHRONIC DIASTOLIC HEART FAILURE (HCC): Primary | ICD-10-CM

## 2021-11-29 LAB — INR PPP: 3.4

## 2021-11-29 PROCEDURE — G0300 HHS/HOSPICE OF LPN EA 15 MIN: HCPCS

## 2021-11-29 NOTE — TELEPHONE ENCOUNTER
Received a message from Dr. Madden the patient's creatinine was higher than baseline.  She has been restricting fluids to 1.5 L a day and remains on her normal dose of 40 mg of Lasix daily.  She does not feel she is getting dehydrated, denies any dizziness or lightheadedness or low blood pressure.  She feels euvolemic on current regimen.  We will recheck BMP and proBNP this week.  If BNP not improved then may need to consider liberalizing fluid intake slightly with careful continued monitoring.    She did cancel her appointment that was scheduled for last week and this week due to her car having issues but she is able to get a ride here for labs this week.  We will see if we can get her appointment next week but she will call sooner for issues or concerns.

## 2021-11-29 NOTE — CASE COMMUNICATION
"Melinda Huerta I received and entered INR orders. Please obtain next INR w/ scheduled visit Wed 12/8. Thanks         \"  VERBAL ORDERS RECEIVED BY PHONE FROM MARIA R KING/PHARMACIST AT [facility] MEDICATION MANAGEMENT PER DR. MARIANA SALGUERO.     PT/INR: 41.2/3.4 DRAWN: 11/29/21.    COUMADIN DOSAGE: PATIENT TO TAKE 2.5MG BY MOUTH TODAY 11/29/21; THEN RESUME 2.5MG ON WEDNESDAYS; AND 5MG ALL OTHER DAYS.    SKILLED NURSE TO DRAW NEXT PT/INR VIA FINGERS TICK OR VENIPUNCTURE WITH VISIT SCHEDULED ON 12/8//21 WITH RESULTS TO: [facility] MEDICATION MANAGEMENT.     PATIENT WAS NOTIFIED BY PHONE AND STATED  UNDERSTANDING. \"    "

## 2021-11-29 NOTE — PROGRESS NOTES
Anticoagulation Clinic Progress Note    Anticoagulation Summary  As of 11/29/2021    INR goal:  2.0-3.0   TTR:  63.7 % (3.1 y)   INR used for dosing:  3.40 (11/29/2021)   Warfarin maintenance plan:  2.5 mg every Wed; 5 mg all other days   Weekly warfarin total:  32.5 mg   Plan last modified:  Shaw Hand, PharmD (8/12/2021)   Next INR check:  12/8/2021   Priority:  Maintenance   Target end date:  Indefinite    Indications    Atrial fibrillation (HCC) [I48.91]             Anticoagulation Episode Summary     INR check location:      Preferred lab:      Send INR reminders to:   [facility] [place]    Comments:        Anticoagulation Care Providers     Provider Role Specialty Phone number    Pia Dueñas MD Referring Cardiology [phone]          INR History:  Anticoagulation Monitoring 11/19/2021 11/23/2021 11/29/2021   INR 2.50 3.00 3.40   INR Date 11/18/2021 11/23/2021 11/29/2021   INR Goal 2.0-3.0 2.0-3.0 2.0-3.0   Trend Same Same Same   Last Week Total 32.5 mg 32.5 mg 32.5 mg   Next Week Total 32.5 mg 32.5 mg 30 mg   Sun 5 mg 5 mg 5 mg   Mon 5 mg - 2.5 mg (11/29); Otherwise 5 mg   Tue - 5 mg 5 mg   Wed - 2.5 mg 2.5 mg   Thu - 5 mg 5 mg   Fri 5 mg 5 mg 5 mg   Sat 5 mg 5 mg 5 mg   Visit Report - - -   Some recent data might be hidden       Plan:  1. INR is Supratherapeutic today- see above in Anticoagulation Summary.   Provided instructions to Sandra with [facility] to Change their warfarin regimen- see above in Anticoagulation Summary.  2. Follow up in 1 week      Shaw Hand, [place]

## 2021-11-30 VITALS — TEMPERATURE: 97.1 F | HEART RATE: 72 BPM | RESPIRATION RATE: 18 BRPM | OXYGEN SATURATION: 94 %

## 2021-12-01 ENCOUNTER — APPOINTMENT (OUTPATIENT)
Dept: PHARMACY | Facility: HOSPITAL | Age: 76
End: 2021-12-01

## 2021-12-06 RX ORDER — CARVEDILOL 25 MG/1
TABLET ORAL
Qty: 270 TABLET | Refills: 1 | Status: SHIPPED | OUTPATIENT
Start: 2021-12-06 | End: 2022-05-20 | Stop reason: SDUPTHER

## 2021-12-06 NOTE — TELEPHONE ENCOUNTER
LOV: 8/30/21 KH   11/8/21 JHL at [facility]  Next appt: 2/10/22 MKPRESLEY  Labs 11/22/21: BMP           11/11/21: LYNNE

## 2021-12-06 NOTE — CASE COMMUNICATION
Patient missed a SN visit from [facility] on 11.26.21.     Reason:Declined due to Holiday.       For your records only.   As per home health protocol, MD must be notified of missed/cancelled visits; therefore the prescribed frequency was not met.

## 2021-12-08 ENCOUNTER — ANTICOAGULATION VISIT (OUTPATIENT)
Dept: PHARMACY | Facility: HOSPITAL | Age: 76
End: 2021-12-08

## 2021-12-08 ENCOUNTER — HOME CARE VISIT (OUTPATIENT)
Dept: HOME HEALTH SERVICES | Facility: HOME HEALTHCARE | Age: 76
End: 2021-12-08

## 2021-12-08 LAB — INR PPP: 3.2

## 2021-12-08 PROCEDURE — G0299 HHS/HOSPICE OF RN EA 15 MIN: HCPCS

## 2021-12-08 NOTE — PROGRESS NOTES
Anticoagulation Clinic Progress Note    Anticoagulation Summary  As of 12/8/2021    INR goal:  2.0-3.0   TTR:  63.2 % (3.1 y)   INR used for dosing:  3.20 (12/8/2021)   Warfarin maintenance plan:  2.5 mg every Wed, Sat; 5 mg all other days   Weekly warfarin total:  30 mg   Plan last modified:  Shaw Hand, PharmD (12/8/2021)   Next INR check:  12/15/2021   Priority:  Maintenance   Target end date:  Indefinite    Indications    Atrial fibrillation (HCC) [I48.91]             Anticoagulation Episode Summary     INR check location:      Preferred lab:      Send INR reminders to:   [facility] [place]    Comments:        Anticoagulation Care Providers     Provider Role Specialty Phone number    Pia Dueñas MD Referring Cardiology [phone]          INR History:  Anticoagulation Monitoring 11/23/2021 11/29/2021 12/8/2021   INR 3.00 3.40 3.20   INR Date 11/23/2021 11/29/2021 12/8/2021   INR Goal 2.0-3.0 2.0-3.0 2.0-3.0   Trend Same Same Down   Last Week Total 32.5 mg 32.5 mg 32.5 mg   Next Week Total 32.5 mg 30 mg 30 mg   Sun 5 mg 5 mg 5 mg   Mon - 2.5 mg (11/29); Otherwise 5 mg 5 mg   Tue 5 mg 5 mg 5 mg   Wed 2.5 mg 2.5 mg 2.5 mg   Thu 5 mg 5 mg 5 mg   Fri 5 mg 5 mg 5 mg   Sat 5 mg 5 mg 2.5 mg   Visit Report - - -   Some recent data might be hidden       Plan:  1. INR is Supratherapeutic today- see above in Anticoagulation Summary.   Provided instructions to Paola with [facility] to Change their warfarin regimen- see above in Anticoagulation Summary.  2. Follow up in 1 week      Shaw Hand, PharmD

## 2021-12-09 VITALS
BODY MASS INDEX: 38.92 KG/M2 | SYSTOLIC BLOOD PRESSURE: 128 MMHG | WEIGHT: 241 LBS | OXYGEN SATURATION: 97 % | RESPIRATION RATE: 18 BRPM | TEMPERATURE: 98.8 F | DIASTOLIC BLOOD PRESSURE: 78 MMHG | HEART RATE: 80 BPM

## 2021-12-10 NOTE — HOME HEALTH
PT/INR 38.0/3.2  Plan: Cp assess, med compliance and PT/INR as ordered DL - Patient advised to follow up with PMD in two days at her appointment regarding her elevated BP, as well to follow up with mental health clinic for anxiety. Pt verbalized understanding. Strict return precautions discussed.

## 2021-12-14 ENCOUNTER — ANTICOAGULATION VISIT (OUTPATIENT)
Dept: PHARMACY | Facility: HOSPITAL | Age: 76
End: 2021-12-14

## 2021-12-14 ENCOUNTER — LAB (OUTPATIENT)
Dept: LAB | Facility: HOSPITAL | Age: 76
End: 2021-12-14

## 2021-12-14 LAB
INR PPP: 2.9 (ref 0.91–1.09)
PROTHROMBIN TIME: 34.9 SECONDS (ref 10–13.8)

## 2021-12-14 PROCEDURE — 85610 PROTHROMBIN TIME: CPT

## 2021-12-14 PROCEDURE — 36416 COLLJ CAPILLARY BLOOD SPEC: CPT

## 2021-12-14 NOTE — PROGRESS NOTES
Anticoagulation Clinic Progress Note    Anticoagulation Summary  As of 2021    INR goal:  2.0-3.0   TTR:  63.1 % (3.1 y)   INR used for dosin.9 (2021)   Warfarin maintenance plan:  2.5 mg every Wed, Sat; 5 mg all other days   Weekly warfarin total:  30 mg   No change documented:  Love Allison, LEONILA   Plan last modified:  Shaw Hand, PharmD (2021)   Next INR check:  2021   Priority:  Maintenance   Target end date:  Indefinite    Indications    Atrial fibrillation (HCC) [I48.91]             Anticoagulation Episode Summary     INR check location:      Preferred lab:      Send INR reminders to:   [facility]    Comments:        Anticoagulation Care Providers     Provider Role Specialty Phone number    Pia Dueñas MD Referring Cardiology [phone]          Clinic Interview:  Patient Findings     Negatives:  Signs/symptoms of thrombosis, Signs/symptoms of bleeding,   Laboratory test error suspected, Change in health, Change in alcohol use,   Change in activity, Upcoming invasive procedure, Emergency department   visit, Upcoming dental procedure, Missed doses, Extra doses, Change in   medications, Change in diet/appetite, Hospital admission, Bruising, Other   complaints      Clinical Outcomes     Negatives:  Major bleeding event, Thromboembolic event,   Anticoagulation-related hospital admission, Anticoagulation-related ED   visit, Anticoagulation-related fatality        INR History:  Anticoagulation Monitoring 2021   INR 3.40 3.20 2.9   INR Date 2021   INR Goal 2.0-3.0 2.0-3.0 2.0-3.0   Trend Same Down Same   Last Week Total 32.5 mg 32.5 mg 30 mg   Next Week Total 30 mg 30 mg 30 mg   Sun 5 mg 5 mg 5 mg   Mon 2.5 mg (); Otherwise 5 mg 5 mg 5 mg   Tue 5 mg 5 mg 5 mg   Wed 2.5 mg 2.5 mg 2.5 mg   Thu 5 mg 5 mg 5 mg   Fri 5 mg 5 mg 5 mg   Sat 5 mg 2.5 mg 2.5 mg   Visit Report - - -   Some recent data might be hidden        Plan:  1. INR is Therapeutic today- see above in Anticoagulation Summary.  Will instruct Lana Yañez to Continue their warfarin regimen- see above in Anticoagulation Summary.  2. Follow up in 1 week with Home health on the 22nd. She typically follows with them but was at hospital so decided to stop in. Will return to clinic once discharged from home health. Spoke with Paola at [facility] home health as well.   3. Patient declines warfarin refills.  4. Verbal and written information provided. Patient expresses understanding and has no further questions at this time.    Love Allison, [facility]

## 2021-12-15 ENCOUNTER — HOME CARE VISIT (OUTPATIENT)
Dept: HOME HEALTH SERVICES | Facility: HOME HEALTHCARE | Age: 76
End: 2021-12-15

## 2021-12-16 ENCOUNTER — OFFICE VISIT (OUTPATIENT)
Dept: CARDIOLOGY | Facility: CLINIC | Age: 76
End: 2021-12-16

## 2021-12-16 ENCOUNTER — LAB (OUTPATIENT)
Dept: LAB | Facility: HOSPITAL | Age: 76
End: 2021-12-16

## 2021-12-16 VITALS
BODY MASS INDEX: 39.53 KG/M2 | HEIGHT: 66 IN | HEART RATE: 80 BPM | SYSTOLIC BLOOD PRESSURE: 130 MMHG | WEIGHT: 246 LBS | DIASTOLIC BLOOD PRESSURE: 72 MMHG

## 2021-12-16 DIAGNOSIS — I25.119 CORONARY ARTERY DISEASE INVOLVING NATIVE CORONARY ARTERY OF NATIVE HEART WITH ANGINA PECTORIS (HCC): ICD-10-CM

## 2021-12-16 DIAGNOSIS — I48.19 ATRIAL FIBRILLATION, PERSISTENT (HCC): ICD-10-CM

## 2021-12-16 DIAGNOSIS — R07.2 PRECORDIAL PAIN: Primary | ICD-10-CM

## 2021-12-16 DIAGNOSIS — E78.2 HYPERLIPEMIA, MIXED: ICD-10-CM

## 2021-12-16 DIAGNOSIS — I50.32 CHRONIC DIASTOLIC HEART FAILURE (HCC): ICD-10-CM

## 2021-12-16 DIAGNOSIS — I10 ESSENTIAL HYPERTENSION: ICD-10-CM

## 2021-12-16 LAB
ANION GAP SERPL CALCULATED.3IONS-SCNC: 9.1 MMOL/L (ref 5–15)
BUN SERPL-MCNC: 15 MG/DL (ref 8–23)
BUN/CREAT SERPL: 13.3 (ref 7–25)
CALCIUM SPEC-SCNC: 9.9 MG/DL (ref 8.6–10.5)
CHLORIDE SERPL-SCNC: 105 MMOL/L (ref 98–107)
CO2 SERPL-SCNC: 28.9 MMOL/L (ref 22–29)
CREAT SERPL-MCNC: 1.13 MG/DL (ref 0.57–1)
GFR SERPL CREATININE-BSD FRML MDRD: 57 ML/MIN/1.73
GLUCOSE SERPL-MCNC: 138 MG/DL (ref 65–99)
NT-PROBNP SERPL-MCNC: 1511 PG/ML (ref 0–900)
POTASSIUM SERPL-SCNC: 3.9 MMOL/L (ref 3.5–5.2)
SODIUM SERPL-SCNC: 143 MMOL/L (ref 136–145)

## 2021-12-16 PROCEDURE — 80048 BASIC METABOLIC PNL TOTAL CA: CPT | Performed by: NURSE PRACTITIONER

## 2021-12-16 PROCEDURE — 83880 ASSAY OF NATRIURETIC PEPTIDE: CPT | Performed by: NURSE PRACTITIONER

## 2021-12-16 PROCEDURE — 93000 ELECTROCARDIOGRAM COMPLETE: CPT | Performed by: NURSE PRACTITIONER

## 2021-12-16 PROCEDURE — 36415 COLL VENOUS BLD VENIPUNCTURE: CPT | Performed by: NURSE PRACTITIONER

## 2021-12-16 PROCEDURE — 99214 OFFICE O/P EST MOD 30 MIN: CPT | Performed by: NURSE PRACTITIONER

## 2021-12-16 NOTE — PROGRESS NOTES
[facility] Cardiology   [address], [address]  [place], [place], [address]    [phone]  [email or web address]           OUTPATIENT CLINIC FOLLOW UP NOTE    Patient Care Team:  Patient Care Team:  Will Madden MD as PCP - General (Family Medicine)  Pia Dueñas MD as Consulting Physician (Cardiology)  Iain Hill MD as Consulting Physician (Pulmonary Disease)  Carmen Kirk MD as Consulting Physician (Pain Medicine)  Nano Cool RPH as Pharmacist  Shaw Hand PharmD as Pharmacist (Pharmacy)    Subjective:      Chief Complaint   Patient presents with   • Congestive Heart Failure   • Atrial Fibrillation   • pulmonary htn       HPI:    Lana Yañez is a 76 y.o. female.  Problem list:  1. Chronic diastolic heart failure  a. TTE 2018: EF 51%, moderate to severe left atrial enlargement, aortic valve calcification with mild aortic regurgitation, mild MR, severe TR with RVSP 40 mmHg  b. 8/2021: Mild to moderate MR, moderate TR, moderate pulmonary hypertension, normal EF  2. CAD  a. [facility] 2017 with 90% PDA stenosis that was not amenable to PCI and recommended medical management  3. Paroxysmal atrial fibrillation  a. Initially diagnosed 10/2017 and placed on Pradaxa.  Patient later had an embolic stroke while taking Pradaxa and noted to have a left atrial appendage thrombus and was transitioned to warfarin.  b. Holter monitor 9/2018: Predominantly atrial fibrillation, PVCs occurred frequently, average heart rate 86  4. Mitral valve prolapse with mild to moderate mitral regurgitation  5. Mildly dilated thoracic ascending aorta  6. Hypertension  7. Hyperlipidemia  8. Obstructive sleep apnea  9. Asthma  10. COPD  11. 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 Rheumatoid arthritis    She presents today for follow-up.  The patient's primary cardiologist is Dr. Dueñas whom she last saw on 2/2021.    Since being admitted to [facility] for acute diastolic heart failure  the patient reports that she has been overall doing well from a cardiac standpoint.  She notes one episode of chest pressure that woke her from sleep.  She reports that it lasted for approximately 15 minutes before resolving spontaneously.  Has lower extremity edema today, but reports she has been sitting with her feet and legs in a dependent position with her  at the cancer center.  Typically at home she is not experiencing significant lower extremity edema.  She denies shortness of breath, palpitations, lightheadedness, or syncope.  Has been feeling fatigued, however she has not been using her CPAP due to it being recalled.  Patient has not completed requested lab work at this time due to forgetting.    Review of Systems:  Positive for chest pressure, fatigue  Negative for dyspnea with exertion, lower extremity edema, palpitations, syncope.     PFSH:  Patient Active Problem List   Diagnosis   • Hyperlipidemia   • Vitamin deficiency   • Essential hypertension   • Rheumatoid arthritis involving both hands (HCC)   • Fatigue   • ANTOINE (dyspnea on exertion)   • Dysuria   • Urge incontinence of urine   • Hypovitaminosis D   • Sleep apnea   • Encounter for screening colonoscopy   • Bronchitis   • Cough   • Generalized osteoarthritis of multiple sites   • Cellulitis of right elbow due to MRSA   • Medicare annual wellness visit, initial   • Hospital discharge follow-up   • Displacement of lumbar intervertebral disc without myelopathy   • Lumbar disc herniation   • Chronic atrial fibrillation (HCC)   • History of MRSA infection   • Left-sided weakness   • Atrial fibrillation (HCC)   • Left shoulder pain   • Relative lymphocytosis   • Nausea   • Weakness   • Controlled substance agreement signed   • Coronary artery disease involving native coronary artery of native heart without angina pectoris   • SOB (shortness of breath)   • Lower extremity edema   • Acute on chronic diastolic heart failure (HCC)   • Adhesive  capsulitis of left shoulder   • CVA tenderness   • Costochondritis, acute   • Allergic reaction   • 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HCC)   • Visit for screening mammogram   • Low back pain   • Urgency of urination   • Hyperglycemia   • Carpal tunnel syndrome of left wrist/ wakes her up   • Localized edema   • Acute cystitis without hematuria   • Nitrofurantoin adverse reaction   • Bruising   • History of stroke   • Diabetes 1.5, managed as type 2 ([facility])   • Other chronic pain   • Vaginal candidiasis   • Pulmonary hypertension ([facility])   • Acute respiratory failure with hypoxia ([facility])   • Hypokalemia   • Hypomagnesemia   • Type 2 diabetes mellitus with hyperglycemia, without long-term current use of insulin ([facility])   • Diarrhea   • Sepsis without acute organ dysfunction ([facility])   • Morbid obesity with BMI of 40.0-44.9, adult ([facility])   • Biliary acute pancreatitis without necrosis or infection         Current Outpatient Medications:   •  albuterol (PROVENTIL) (2.5 MG/3ML) 0.083% nebulizer solution, Take 2.5 mg by nebulization Every 4 (Four) Hours As Needed for Shortness of Air., Disp: 40 each, Rfl: 11  •  albuterol sulfate  (90 Base) MCG/ACT inhaler, Inhale 2 puffs Every 4 (Four) Hours As Needed. PRN SOA/WHEEZING, Disp: , Rfl:   •  aspirin 81 MG EC tablet, Take 1 tablet by mouth Daily., Disp: , Rfl:   •  atorvastatin (LIPITOR) 10 MG tablet, Take 1 tablet by mouth Daily., Disp: 90 tablet, Rfl: 1  •  Blood Glucose Monitoring Suppl (ACCU-CHEK GUIDE) w/Device kit, See Admin Instructions., Disp: , Rfl:   •  carvedilol (COREG) 25 MG tablet, TAKE 1 & 1/2 (ONE & ONE-HALF) TABLETS BY MOUTH TWICE DAILY WITH FOOD, Disp: 270 tablet, Rfl: 1  •  digoxin (LANOXIN) 125 MCG tablet, Take 1 tablet by mouth Every Other Day., Disp: 45 tablet, Rfl: 1  •  furosemide (LASIX) 40 MG tablet, Take 1 tablet in the morning, Disp: 90 tablet, Rfl: 0  •  glucose blood test strip, Use as instructed, Disp: 300 each, Rfl: 12  •  HYDROcodone-acetaminophen (NORCO)  "5-325 MG per tablet, Take 1 tablet by mouth 2 (Two) Times a Day As Needed for Severe Pain ., Disp: 40 tablet, Rfl: 0  •  Januvia 100 MG tablet, Take 1 tablet by mouth once daily, Disp: 90 tablet, Rfl: 0  •  Lancets (ACCU-CHEK MULTICLIX) lancets, Use 1 lancet per finger stick, Disp: 204 each, Rfl: 12  •  losartan (Cozaar) 25 MG tablet, Take 1 tablet by mouth Daily., Disp: 90 tablet, Rfl: 3  •  magnesium oxide (MAG-OX) 400 MG tablet, Take 400 mg by mouth As Needed., Disp: , Rfl:   •  potassium chloride 10 MEQ CR tablet, Take 2 tablets by mouth Daily. (Patient taking differently: Take 10 mEq by mouth Daily. No longer BID), Disp: 180 tablet, Rfl: 3  •  TRAVATAN Z 0.004 % solution ophthalmic solution, Administer 1 drop to both eyes Every Morning. Not night, Disp: , Rfl:   •  vitamin D (ERGOCALCIFEROL) 1.25 MG (05755 UT) capsule capsule, Take 1 capsule by mouth once a week, Disp: 12 capsule, Rfl: 0  •  warfarin (COUMADIN) 5 MG tablet, TAKE 1 TABLET BY MOUTH ONCE DAILY OR  AS  DIRECTED  BY  MEDICATION  MANAGEMENT  CLINIC (Patient taking differently: Take 5 mg by mouth. TAKE 1 TABLET BY MOUTH ONCE DAILY and 1/2 tablet Wednesday), Disp: 90 tablet, Rfl: 1    Allergies   Allergen Reactions   • Contrast Dye Hives and Itching        reports that she quit smoking about 53 years ago. Her smoking use included cigarettes. She started smoking about 54 years ago. She smoked 3.00 packs per day. She has never used smokeless tobacco.      Objective:   Physical exam:  /72   Pulse 80   Ht 167.6 cm (66\")   Wt 112 kg (246 lb)   LMP  (LMP Unknown)   BMI 39.71 kg/m²   CONSTITUTIONAL: No acute distress  RESPIRATORY: Normal effort. Clear to auscultation bilaterally without wheezing or rales  CARDIOVASCULAR: Carotids with normal upstrokes without bruits.  Irregularly irregular rate and rhythm with normal S1 and S2. Without murmur, gallop or rub. Normal radial pulse. There is lower extremity edema bilaterally.    Labs:    BUN   Date " Value Ref Range Status   11/22/2021 33 (H) 8 - 27 mg/dL Final   11/11/2021 18 8 - 23 mg/dL Final     Creatinine   Date Value Ref Range Status   11/22/2021 1.43 (H) 0.57 - 1.00 mg/dL Final   11/11/2021 1.10 (H) 0.57 - 1.00 mg/dL Final   05/03/2018 0.80 0.60 - 1.30 mg/dL Final     Comment:     Serial Number: [id number]:  [id number]     Potassium   Date Value Ref Range Status   11/22/2021 4.6 3.5 - 5.2 mmol/L Final   11/11/2021 3.6 3.5 - 5.2 mmol/L Final     ALT (SGPT)   Date Value Ref Range Status   11/11/2021 30 1 - 33 U/L Final     AST (SGOT)   Date Value Ref Range Status   11/11/2021 32 1 - 32 U/L Final       Lab Results   Component Value Date    CHOL 132 02/04/2021     Lab Results   Component Value Date    TRIG 95 05/27/2021     Lab Results   Component Value Date    HDL 56 05/27/2021     Lab Results   Component Value Date    LDL 39 05/27/2021     No components found for: LDLDIRECTC    Diagnostic Data:      ECG 12 Lead    Date/Time: 12/16/2021 2:49 PM  Performed by: Nicolle Yang APRN  Authorized by: Nicolle Yang APRN   Comparison: compared with previous ECG from 11/8/2021  Comparison to previous ECG: No longer in RVR  Rhythm: atrial fibrillation  Rate: normal  BPM: 70  Conduction: left anterior fascicular block  Comments: QRS 84 ms,  ms            Results for orders placed during the hospital encounter of 08/20/21    Adult Transthoracic Echo Complete w/ Color, Spectral and Contrast if Necessary Per Protocol    Interpretation Summary  · Calculated left ventricular EF = 65.1% Estimated left ventricular EF = 65% Estimated left ventricular EF was in agreement with the calculated left ventricular EF. Left ventricular systolic function is normal. Normal left ventricular cavity size and wall thickness noted. All left ventricular wall segments contract normally. Left ventricular diastolic function was indeterminate.  · Left atrial volume is severely increased.  · The right atrial cavity is severely  dilated.  · There is mild calcification of the aortic valve. The aortic valve appears trileaflet. Mild aortic valve regurgitation is present. No aortic valve stenosis is present.  · There is severe, bileaflet mitral valve thickening present. Mild to moderate mitral valve regurgitation is present. No significant mitral valve stenosis is present.  · Moderate tricuspid valve regurgitation is present. Estimated right ventricular systolic pressure from tricuspid regurgitation is moderately elevated (45-55 mmHg). Calculated right ventricular systolic pressure from tricuspid regurgitation is 49 mmHg.  · Borderline dilation of the aortic arch is present.      Assessment and Plan:   Diagnoses and all orders for this visit:    Precordial pain (Primary)  Coronary artery disease involving native coronary artery of native heart with angina pectoris (HCC)  Hyperlipemia, mixed  -Patient with 1 significant episode of chest pressure.  Has not had a recent ischemic evaluation.  -PET stress testing to evaluate for evidence of ischemia.  -Continue aspirin, statin, beta-blocker    Atrial fibrillation, persistent (HCC)  -In rate controlled atrial fibrillation today.  -Continue beta-blocker, warfarin.  Warfarin is managed by the [facility] clinic.  -Patient is considering tooth extraction in the near future.  Would be high risk to hold anticoagulation.    Chronic diastolic heart failure  -Significant lower extremity edema noted today.  Patient reports that she has not been able to elevate her legs.  Reports it is significantly better typically otherwise patient appears euvolemic.  -Continue beta-blocker, Lasix, and potassium  -Patient renal function elevated on last check.  Patient to go complete lab work after visit as previously requested.    Essential hypertension  -Stable, continue current related medications.      - Return for Next scheduled follow up as previously scheduled with Dr. Dueñas.    Electronically signed by Nicolle Yang  ANGELA, 12/16/21, 2:53 PM EST.

## 2021-12-17 ENCOUNTER — TELEPHONE (OUTPATIENT)
Dept: CARDIOLOGY | Facility: CLINIC | Age: 76
End: 2021-12-17

## 2021-12-17 NOTE — TELEPHONE ENCOUNTER
Spoke with patient regarding BMP/ProBNP.  Renal function is improved. ProBNP elevated.  Patient had significant edema yesterday, but hadn't taken lasix in a few days. Patient instructed to resume regular dose of lasix and take an additional dose of lasix today. Patient verbalized understanding and all questions were answered.

## 2021-12-21 ENCOUNTER — HOME CARE VISIT (OUTPATIENT)
Dept: HOME HEALTH SERVICES | Facility: HOME HEALTHCARE | Age: 76
End: 2021-12-21

## 2021-12-21 PROCEDURE — G0299 HHS/HOSPICE OF RN EA 15 MIN: HCPCS

## 2021-12-22 ENCOUNTER — ANTICOAGULATION VISIT (OUTPATIENT)
Dept: PHARMACY | Facility: HOSPITAL | Age: 76
End: 2021-12-22

## 2021-12-22 ENCOUNTER — HOME CARE VISIT (OUTPATIENT)
Dept: HOME HEALTH SERVICES | Facility: HOME HEALTHCARE | Age: 76
End: 2021-12-22

## 2021-12-22 LAB — INR PPP: 3.2

## 2021-12-22 NOTE — CASE COMMUNICATION
INR FAXED TO [facility] AND INFORMED PATIENT WAS D/C'D TODAY.  MARIA R WILL NOTIFY PATIENT WITH INSTRUCTIONS AND ARRANGE FUTURE INR'S.

## 2021-12-22 NOTE — PROGRESS NOTES
Anticoagulation Clinic Progress Note    Anticoagulation Summary  As of 12/22/2021    INR goal:  2.0-3.0   TTR:  62.9 % (3.2 y)   INR used for dosing:  3.20 (12/22/2021)   Warfarin maintenance plan:  2.5 mg every Mon, Wed, Fri; 5 mg all other days   Weekly warfarin total:  27.5 mg   Plan last modified:  Shaw Hand, PharmD (12/22/2021)   Next INR check:  1/6/2022   Priority:  Maintenance   Target end date:  Indefinite    Indications    Atrial fibrillation (HCC) [I48.91]             Anticoagulation Episode Summary     INR check location:      Preferred lab:      Send INR reminders to:   [facility]    Comments:        Anticoagulation Care Providers     Provider Role Specialty Phone number    Pia Dueñas MD Referring Cardiology [phone]          Clinic Interview:  Patient Findings     Negatives:  Signs/symptoms of thrombosis, Signs/symptoms of bleeding,   Laboratory test error suspected, Change in health, Change in alcohol use,   Change in activity, Upcoming invasive procedure, Emergency department   visit, Upcoming dental procedure, Missed doses, Extra doses, Change in   medications, Change in diet/appetite, Hospital admission, Bruising, Other   complaints    Comments:  Discharged from [facility].       Clinical Outcomes     Negatives:  Major bleeding event, Thromboembolic event,   Anticoagulation-related hospital admission, Anticoagulation-related ED   visit, Anticoagulation-related fatality    Comments:  Discharged from [facility].         INR History:  Anticoagulation Monitoring 12/8/2021 12/14/2021 12/22/2021   INR 3.20 2.9 3.20   INR Date 12/8/2021 12/14/2021 12/22/2021   INR Goal 2.0-3.0 2.0-3.0 2.0-3.0   Trend Down Same Down   Last Week Total 32.5 mg 30 mg 30 mg   Next Week Total 30 mg 30 mg 27.5 mg   Sun 5 mg 5 mg 5 mg   Mon 5 mg 5 mg 2.5 mg   Tue 5 mg 5 mg 5 mg   Wed 2.5 mg 2.5 mg 2.5 mg   Thu 5 mg 5 mg 5 mg   Fri 5 mg 5 mg 2.5 mg   Sat 2.5 mg 2.5 mg 5 mg   Visit  Report - - -   Some recent data might be hidden       Plan:  1. INR is Supratherapeutic today- see above in Anticoagulation Summary.   Will instruct Lana Augerocomb to Change their warfarin regimen- see above in Anticoagulation Summary.  2. Follow up in 2 weeks in clinic  3. They have been instructed to call if any changes in medications, doses, concerns, etc. Patient expresses understanding and has no further questions at this time.    Shaw Hand, PharmD

## 2021-12-23 VITALS
OXYGEN SATURATION: 98 % | HEART RATE: 74 BPM | TEMPERATURE: 98.8 F | DIASTOLIC BLOOD PRESSURE: 72 MMHG | RESPIRATION RATE: 18 BRPM | SYSTOLIC BLOOD PRESSURE: 126 MMHG

## 2021-12-31 ENCOUNTER — PATIENT MESSAGE (OUTPATIENT)
Dept: INTERNAL MEDICINE | Facility: CLINIC | Age: 76
End: 2021-12-31

## 2022-01-02 NOTE — TELEPHONE ENCOUNTER
From: Lana Yañez  To: Will Madden MD  Sent: 12/31/2021 6:25 AM EST  Subject: Chaffing    Good morning, I have some very painful chaffing down there and need some relief. I have used Vaseline, but it's still very painful. I wear depends and take water pills I'm in a lot of pain when I urinate. My bith date 1945 my name is Lana Yañez, I'm a patients of Dr Madden, ph# [phone] or [facility] [phone]. Thank you

## 2022-01-10 ENCOUNTER — ANTICOAGULATION VISIT (OUTPATIENT)
Dept: PHARMACY | Facility: HOSPITAL | Age: 77
End: 2022-01-10

## 2022-01-10 LAB
INR PPP: 1.8 (ref 0.91–1.09)
PROTHROMBIN TIME: 21.5 SECONDS (ref 10–13.8)

## 2022-01-10 PROCEDURE — 85610 PROTHROMBIN TIME: CPT

## 2022-01-10 PROCEDURE — G0463 HOSPITAL OUTPT CLINIC VISIT: HCPCS

## 2022-01-10 PROCEDURE — 36416 COLLJ CAPILLARY BLOOD SPEC: CPT

## 2022-01-10 NOTE — PROGRESS NOTES
Anticoagulation Clinic Progress Note    Anticoagulation Summary  As of 1/10/2022    INR goal:  2.0-3.0   TTR:  63.0 % (3.2 y)   INR used for dosin.8 (1/10/2022)   Warfarin maintenance plan:  2.5 mg every Mon, Wed, Fri; 5 mg all other days   Weekly warfarin total:  27.5 mg   Plan last modified:  Shaw Hand, PharmD (2021)   Next INR check:  2022   Priority:  Maintenance   Target end date:  Indefinite    Indications    Atrial fibrillation (HCC) [I48.91]             Anticoagulation Episode Summary     INR check location:      Preferred lab:      Send INR reminders to:   [facility]    Comments:        Anticoagulation Care Providers     Provider Role Specialty Phone number    Pia Dueñas MD Referring Cardiology [phone]          Clinic Interview:  Patient Findings     Negatives:  Signs/symptoms of thrombosis, Signs/symptoms of bleeding,   Laboratory test error suspected, Change in health, Change in alcohol use,   Change in activity, Upcoming invasive procedure, Emergency department   visit, Upcoming dental procedure, Missed doses, Extra doses, Change in   medications, Change in diet/appetite, Hospital admission, Bruising, Other   complaints      Clinical Outcomes     Negatives:  Major bleeding event, Thromboembolic event,   Anticoagulation-related hospital admission, Anticoagulation-related ED   visit, Anticoagulation-related fatality        INR History:  Anticoagulation Monitoring 2021 2021 1/10/2022   INR 2.9 3.20 1.8   INR Date 2021 2021 1/10/2022   INR Goal 2.0-3.0 2.0-3.0 2.0-3.0   Trend Same Down Same   Last Week Total 30 mg 30 mg 27.5 mg   Next Week Total 30 mg 27.5 mg 30 mg   Sun 5 mg 5 mg 5 mg   Mon 5 mg 2.5 mg 5 mg (1/10); Otherwise 2.5 mg   Tue 5 mg 5 mg 5 mg   Wed 2.5 mg 2.5 mg 2.5 mg   Thu 5 mg 5 mg 5 mg   Fri 5 mg 2.5 mg 2.5 mg   Sat 2.5 mg 5 mg 5 mg   Visit Report - - -   Some recent data might be hidden       Plan:  1. INR is Subtherapeutic  today- see above in Anticoagulation Summary.  Will instruct Lana Otilio to Increase their warfarin regimen- see above in Anticoagulation Summary.  2. Follow up in 2 weeks  3. Patient declines warfarin refills.  4. Verbal and written information provided. Patient expresses understanding and has no further questions at this time.    Love Allison, [facility]

## 2022-01-27 ENCOUNTER — ANTICOAGULATION VISIT (OUTPATIENT)
Dept: PHARMACY | Facility: HOSPITAL | Age: 77
End: 2022-01-27

## 2022-01-27 DIAGNOSIS — I48.21 PERMANENT ATRIAL FIBRILLATION: ICD-10-CM

## 2022-01-27 DIAGNOSIS — I48.91 ATRIAL FIBRILLATION WITH RVR: Primary | ICD-10-CM

## 2022-01-27 LAB
INR PPP: 1.7 (ref 0.91–1.09)
PROTHROMBIN TIME: 20.7 SECONDS (ref 10–13.8)

## 2022-01-27 PROCEDURE — G0463 HOSPITAL OUTPT CLINIC VISIT: HCPCS

## 2022-01-27 PROCEDURE — 36416 COLLJ CAPILLARY BLOOD SPEC: CPT

## 2022-01-27 PROCEDURE — 85610 PROTHROMBIN TIME: CPT

## 2022-01-27 RX ORDER — WARFARIN SODIUM 5 MG/1
TABLET ORAL
Qty: 90 TABLET | Refills: 1 | Status: SHIPPED | OUTPATIENT
Start: 2022-01-27 | End: 2022-09-08 | Stop reason: SDUPTHER

## 2022-01-27 NOTE — PROGRESS NOTES
Anticoagulation Clinic Progress Note    Anticoagulation Summary  As of 2022    INR goal:  2.0-3.0   TTR:  62.1 % (3.3 y)   INR used for dosin.7 (2022)   Warfarin maintenance plan:  2.5 mg every Mon, Fri; 5 mg all other days   Weekly warfarin total:  30 mg   Plan last modified:  Natalie Mcgarry, PharmD (2022)   Next INR check:  2022   Priority:  Maintenance   Target end date:  Indefinite    Indications    Atrial fibrillation (HCC) [I48.91]             Anticoagulation Episode Summary     INR check location:      Preferred lab:      Send INR reminders to:  [facility]    Comments:        Anticoagulation Care Providers     Provider Role Specialty Phone number    Pia Dueñas MD Referring Cardiology [phone]          Clinic Interview:  Patient Findings     Positives:  Change in diet/appetite    Negatives:  Signs/symptoms of thrombosis, Signs/symptoms of bleeding,   Laboratory test error suspected, Change in health, Change in alcohol use,   Change in activity, Upcoming invasive procedure, Emergency department   visit, Upcoming dental procedure, Missed doses, Extra doses, Change in   medications, Hospital admission, Bruising, Other complaints    Comments:  Patient endorses a decrease in appetite and eating less than   normal.       Clinical Outcomes     Negatives:  Major bleeding event, Thromboembolic event,   Anticoagulation-related hospital admission, Anticoagulation-related ED   visit, Anticoagulation-related fatality    Comments:     INR History:  Anticoagulation Monitoring 2021 1/10/2022 2022   INR 3.20 1.8 1.7   INR Date 2021 1/10/2022 2022   INR Goal 2.0-3.0 2.0-3.0 2.0-3.0   Trend Down Same Up   Last Week Total 30 mg 27.5 mg 27.5 mg   Next Week Total 27.5 mg 30 mg 35 mg   Sun 5 mg 5 mg 5 mg   Mon 2.5 mg 5 mg (1/10); Otherwise 2.5 mg -   Tue 5 mg 5 mg -   Wed 2.5 mg 2.5 mg -   Thu 5 mg 5 mg 7.5 mg ()   Fri 2.5 mg 2.5 mg 5 mg ()   Sat 5 mg  5 mg 5 mg   Visit Report - - -   Some recent data might be hidden       Plan:  1. INR is Subtherapeutic today- see above in Anticoagulation Summary.  Will instruct Lana Yañez to Boost x 2 days with 7.5 mg today then 5 mg tomorrow. Will also increase her warfarin regimen- see above in Anticoagulation Summary.  2. Follow up in 2 weeks.   3. Patient desires warfarin refills and a new prescription was sent to her preferred pharmacy.   4. Verbal and written information provided. Patient expresses understanding and has no further questions at this time.    ----ADDENDUM-----    01/27/22 @ 16:42 - Spoke with patient via telephone and discussed new follow up plan. During clinic visit, originally planned for patient to get an INR lab draw on Monday 1/31 to align with her other lab draw appointment, however with boost and increase in warfarin regimen will plan to have patient follow up in clinic in 2 weeks to assess stabilization of new dosing regimen.     Patient has requested new written information with dosing calendar and appointment be mailed to patient's home. Address verified and will send per patient's request.     Natalie Mcgarry, PharmD

## 2022-02-02 ENCOUNTER — HOSPITAL ENCOUNTER (OUTPATIENT)
Dept: CARDIOLOGY | Facility: HOSPITAL | Age: 77
End: 2022-02-02

## 2022-02-08 ENCOUNTER — APPOINTMENT (OUTPATIENT)
Dept: CARDIOLOGY | Facility: HOSPITAL | Age: 77
End: 2022-02-08

## 2022-02-09 ENCOUNTER — HOSPITAL ENCOUNTER (OUTPATIENT)
Dept: CARDIOLOGY | Facility: HOSPITAL | Age: 77
Discharge: HOME | End: 2022-02-09
Admitting: NURSE PRACTITIONER

## 2022-02-09 VITALS — BODY MASS INDEX: 42.52 KG/M2 | WEIGHT: 240 LBS | HEIGHT: 63 IN

## 2022-02-09 DIAGNOSIS — N18.2 TYPE 2 DIABETES MELLITUS WITH STAGE 2 CHRONIC KIDNEY DISEASE, WITHOUT LONG-TERM CURRENT USE OF INSULIN: Primary | ICD-10-CM

## 2022-02-09 DIAGNOSIS — E11.22 TYPE 2 DIABETES MELLITUS WITH STAGE 2 CHRONIC KIDNEY DISEASE, WITHOUT LONG-TERM CURRENT USE OF INSULIN: Primary | ICD-10-CM

## 2022-02-09 DIAGNOSIS — I25.119 CORONARY ARTERY DISEASE INVOLVING NATIVE CORONARY ARTERY OF NATIVE HEART WITH ANGINA PECTORIS: ICD-10-CM

## 2022-02-09 DIAGNOSIS — R07.2 PRECORDIAL PAIN: ICD-10-CM

## 2022-02-09 LAB
BH CV NUCLEAR PRIOR STUDY: 1
BH CV REST NUCLEAR ISOTOPE DOSE: 60 MCI
BH CV STRESS BP STAGE 1: NORMAL
BH CV STRESS COMMENTS STAGE 1: NORMAL
BH CV STRESS DOSE REGADENOSON STAGE 1: 0.4
BH CV STRESS DURATION MIN STAGE 1: 0
BH CV STRESS DURATION SEC STAGE 1: 10
BH CV STRESS HR STAGE 1: 94
BH CV STRESS NUCLEAR ISOTOPE DOSE: 60 MCI
BH CV STRESS PROTOCOL 1: NORMAL
BH CV STRESS RECOVERY BP: NORMAL MMHG
BH CV STRESS RECOVERY HR: 93 BPM
BH CV STRESS STAGE 1: 1
LV EF NUC BP: 51 %
MAXIMAL PREDICTED HEART RATE: 146 BPM
PERCENT MAX PREDICTED HR: 64.38 %
STRESS BASELINE BP: NORMAL MMHG
STRESS BASELINE HR: 84 BPM
STRESS PERCENT HR: 76 %
STRESS POST EXERCISE DUR SEC: 10 SEC
STRESS POST PEAK BP: NORMAL MMHG
STRESS POST PEAK HR: 94 BPM
STRESS TARGET HR: 124 BPM

## 2022-02-09 PROCEDURE — 93018 CV STRESS TEST I&R ONLY: CPT | Performed by: INTERNAL MEDICINE

## 2022-02-09 PROCEDURE — 78492 MYOCRD IMG PET MLT RST&STRS: CPT

## 2022-02-09 PROCEDURE — 93017 CV STRESS TEST TRACING ONLY: CPT

## 2022-02-09 PROCEDURE — 25010000002 REGADENOSON 0.4 MG/5ML SOLUTION: Performed by: NURSE PRACTITIONER

## 2022-02-09 PROCEDURE — 0 RUBIDIUM CHLORIDE: Performed by: NURSE PRACTITIONER

## 2022-02-09 PROCEDURE — 78492 MYOCRD IMG PET MLT RST&STRS: CPT | Performed by: INTERNAL MEDICINE

## 2022-02-09 PROCEDURE — A9555 RB82 RUBIDIUM: HCPCS | Performed by: NURSE PRACTITIONER

## 2022-02-09 PROCEDURE — 93016 CV STRESS TEST SUPVJ ONLY: CPT | Performed by: INTERNAL MEDICINE

## 2022-02-09 RX ADMIN — REGADENOSON 0.4 MG: 0.08 INJECTION, SOLUTION INTRAVENOUS at 14:00

## 2022-02-10 ENCOUNTER — ANTICOAGULATION VISIT (OUTPATIENT)
Dept: PHARMACY | Facility: HOSPITAL | Age: 77
End: 2022-02-10

## 2022-02-10 ENCOUNTER — TELEPHONE (OUTPATIENT)
Dept: CARDIOLOGY | Facility: CLINIC | Age: 77
End: 2022-02-10

## 2022-02-10 ENCOUNTER — OFFICE VISIT (OUTPATIENT)
Dept: CARDIOLOGY | Facility: CLINIC | Age: 77
End: 2022-02-10

## 2022-02-10 VITALS
HEIGHT: 66 IN | DIASTOLIC BLOOD PRESSURE: 80 MMHG | HEART RATE: 92 BPM | SYSTOLIC BLOOD PRESSURE: 120 MMHG | WEIGHT: 249 LBS | BODY MASS INDEX: 40.02 KG/M2

## 2022-02-10 DIAGNOSIS — I25.119 CORONARY ARTERY DISEASE INVOLVING NATIVE CORONARY ARTERY OF NATIVE HEART WITH ANGINA PECTORIS: Primary | ICD-10-CM

## 2022-02-10 DIAGNOSIS — I48.0 PAROXYSMAL ATRIAL FIBRILLATION: Chronic | ICD-10-CM

## 2022-02-10 DIAGNOSIS — I10 ESSENTIAL HYPERTENSION: ICD-10-CM

## 2022-02-10 DIAGNOSIS — E78.2 MIXED HYPERLIPIDEMIA: Chronic | ICD-10-CM

## 2022-02-10 DIAGNOSIS — I50.33 ACUTE ON CHRONIC DIASTOLIC HEART FAILURE: ICD-10-CM

## 2022-02-10 DIAGNOSIS — I25.10 CORONARY ARTERY DISEASE INVOLVING NATIVE CORONARY ARTERY OF NATIVE HEART WITHOUT ANGINA PECTORIS: Chronic | ICD-10-CM

## 2022-02-10 DIAGNOSIS — R06.09 DOE (DYSPNEA ON EXERTION): ICD-10-CM

## 2022-02-10 DIAGNOSIS — E11.65 TYPE 2 DIABETES MELLITUS WITH HYPERGLYCEMIA, WITHOUT LONG-TERM CURRENT USE OF INSULIN: ICD-10-CM

## 2022-02-10 LAB
ALBUMIN/CREAT UR: 22 MG/G CREAT (ref 0–29)
CHOLEST SERPL-MCNC: 93 MG/DL (ref 100–199)
CREAT SERPL-MCNC: 1.2 MG/DL (ref 0.57–1)
CREAT UR-MCNC: 133.2 MG/DL
FOLATE SERPL-MCNC: 8.6 NG/ML
HBA1C MFR BLD: 7.5 % (ref 4.8–5.6)
HDLC SERPL-MCNC: 49 MG/DL
IMP & REVIEW OF LAB RESULTS: NORMAL
INR PPP: 2.3 (ref 0.91–1.09)
LDLC SERPL CALC-MCNC: 28 MG/DL (ref 0–99)
MICROALBUMIN UR-MCNC: 28.8 UG/ML
PROTHROMBIN TIME: 28.1 SECONDS (ref 10–13.8)
REPORT: NORMAL
T4 FREE SERPL-MCNC: 1.38 NG/DL (ref 0.82–1.77)
TRIGL SERPL-MCNC: 73 MG/DL (ref 0–149)
TSH SERPL DL<=0.005 MIU/L-ACNC: 1.25 UIU/ML (ref 0.45–4.5)
VIT B12 SERPL-MCNC: 418 PG/ML (ref 232–1245)
VLDLC SERPL CALC-MCNC: 16 MG/DL (ref 5–40)

## 2022-02-10 PROCEDURE — 99214 OFFICE O/P EST MOD 30 MIN: CPT | Performed by: INTERNAL MEDICINE

## 2022-02-10 PROCEDURE — 85610 PROTHROMBIN TIME: CPT

## 2022-02-10 PROCEDURE — 93000 ELECTROCARDIOGRAM COMPLETE: CPT | Performed by: INTERNAL MEDICINE

## 2022-02-10 PROCEDURE — 36416 COLLJ CAPILLARY BLOOD SPEC: CPT

## 2022-02-10 RX ORDER — DIGOXIN 125 MCG
125 TABLET ORAL EVERY OTHER DAY
Qty: 45 TABLET | Refills: 1 | Status: SHIPPED | OUTPATIENT
Start: 2022-02-10 | End: 2022-05-27 | Stop reason: HOSPADM

## 2022-02-10 NOTE — TELEPHONE ENCOUNTER
Can you let the patient know that her stress test did not show any evidence of significant blockages.

## 2022-02-10 NOTE — PROGRESS NOTES
Date of Office Visit: 02/10/2022  Encounter Provider: Pia Dueñas MD  Place of Service: [facility] CARDIOLOGY  Patient Name: Lana Yañez  :1945    Chief complaint  Atrial fibrillation coronary artery disease, pulmonary hypertension    History of Present Illness  The patient is a 76 yo female with with history of retention, hyperlipidemia, rheumatoid arthritis, obstructive sleep apnea who in early October was found to be in atrial fibrillation with rapid ventricular rates and mild diastolic heart failure.  She was placed on IV heparin and Pradaxa.  Echocardiogram revealed normal systolic function mild left ventricular hypertrophy, moderate atrial enlargement with aortic valve sclerosis and moderate pulmonary hypertension and moderate tricuspid regurgitation.  The RV systolic pressure is 54 mmHg.  She had a stress perfusion study that was negative for ischemia and a CT and her grandmother revealed a mildly dilated aorta without pulmonary emboli.  She then presented several days later with an acute stroke.  CLARIBEL was not pursued as it was felt to be embolic in nature.  However on  and she was diaphoretic and was found to have a non-ST elevation myocardial infarction.  This was on full dose Pradaxa which was not held.  CLARIBEL was pursued that revealed normal systolic function with moderate mitral valve prolapse without significant regurgitation.  There was right-sided spontaneous echo contrast without thrombus formation.  Cardiac catheterization revealed normal systolic function with 2+ mitral regurgitation, mild coronary disease except for a 90% stenosis of the distal PDA which was felt to be too small to be amenable PCI.  She was treated medically and switched to Coumadin. She has struggled with intermittent heart failure dietary indiscretions with salt since then.  She also had a 24-hour Holter that revealed persistent atrial fibrillation with reasonable rate control  and frequent PVCs. No other arrhythmia was present.  Last echocardiogram in August 2021 showed an ejection fraction of 62% with moderate left ventricular hypertrophy with indeterminate diastolic function, mild aortic regurgitation, mitral valve thickening with moderate regurgitation without stenosis, severe tricuspid regurgitation with an RV systolic pressure 48 mmHg is present.  CT angiogram of the chest 9/2021 showed stable ascending aorta measuring 4.1 cm.  Lexiscan cardiac stress test yesterday negative for ischemia.      Since last visit she has had no chest pain palpitations are minimal and rare.  She has had chronic dependent edema.  She states she has not been exercising due to Covid concerns.  She has had dependent edema that is unchanged she has had occasional palpitations.  She is somewhat limited in activities due to weakness.  She is not using her CPAP as it is a been on recall.  Blood pressures been as it is today.    Past Medical History:   Diagnosis Date   • Acute on chronic diastolic heart failure (HCC)    • Arthritis    • Asthma    • Atrial fibrillation ([facility])     Persistent; on warfarin   • Atypical chest pain    • Biliary acute pancreatitis without necrosis or infection 11/10/2021   • Bronchitis    • Cellulitis of right elbow     due to MRSA   • CHF (congestive heart failure) ([facility])    • COPD (chronic obstructive pulmonary disease) ([facility])    • Coronary artery disease     Cardiac catheterization completed; 90% PDA stenosis with medical management recommended   • Coronary artery disease involving native coronary artery of native heart with angina pectoris with documented spasm ([facility])    • Diabetes 1.5, managed as type 2 ([facility])    • Disease of thyroid gland    • Displacement of lumbar intervertebral disc without myelopathy    • Dizziness    • ANTOINE (dyspnea on exertion)    • Essential hypertension    • Fatigue    • Generalized osteoarthritis of multiple sites    • History of rheumatic fever    • History of  transfusion    • Hx of bone density study     10/23/2014   • Hyperglycemia    • Hyperlipidemia    • Hypovitaminosis D    • Left arm pain    • Left-sided weakness    • Leg swelling    • Low back pain    • Lower extremity edema    • Malaise and fatigue    • Mitral valve disease     Moderate mitral valve prolapse and moderate mitral regurgitation   • Mitral valve insufficiency    • Morbid obesity with BMI of 40.0-44.9, adult ([facility]ly [facility])    • MRSA infection    • NSTEMI (non-ST elevated myocardial infarction) ([facility])    • PAF (paroxysmal atrial fibrillation) ([facility])    • RA (rheumatoid arthritis) ([facility])     involving both hands   • Sleep apnea    • SOB (shortness of breath)    • Stroke ([facility])     left side weakness   • Urge incontinence of urine    • UTI (urinary tract infection) 05/2020   • Vitamin D deficiency      Past Surgical History:   Procedure Laterality Date   • BREAST BIOPSY     • BREAST SURGERY      right side lumpectomy with biopsy   • CARDIAC CATHETERIZATION Left 10/20/2017    Procedure: Cardiac Catheterization/Vascular Study;  Surgeon: Alphonso Olmedo MD;  Location: [facility] INVASIVE LOCATION;  Service:    • CARDIAC CATHETERIZATION N/A 10/20/2017    +2 mitral regurgitation, left main 10% stenosis, mid to distal LAD 10% diffuse stenosis, circumflex 10% diffuse stenosis, RCA 10% proximal stenosis, and distal PDA consistent with coronary embolus with a lesion of 90% too small to consider coronary intervention; medical management recommended   • EYE SURGERY      laser surgery for glaucoma and left eye cataracts removed   • HYSTERECTOMY      10+ years ago   • JOINT REPLACEMENT  2005; 2006    bilateral knees and left rotater   • KNEE SURGERY     • MAMMO BILATERAL  2016    [facility]      Outpatient Medications Prior to Visit   Medication Sig Dispense Refill   • albuterol (PROVENTIL) (2.5 MG/3ML) 0.083% nebulizer solution Take 2.5 mg by nebulization Every 4 (Four) Hours As Needed for Shortness of Air. 40 each 11   •  albuterol sulfate  (90 Base) MCG/ACT inhaler Inhale 2 puffs Every 4 (Four) Hours As Needed. PRN SOA/WHEEZING     • aspirin 81 MG EC tablet Take 1 tablet by mouth Daily.     • atorvastatin (LIPITOR) 10 MG tablet Take 1 tablet by mouth Daily. 90 tablet 1   • Blood Glucose Monitoring Suppl (ACCU-CHEK GUIDE) w/Device kit See Admin Instructions.     • carvedilol (COREG) 25 MG tablet TAKE 1 & 1/2 (ONE & ONE-HALF) TABLETS BY MOUTH TWICE DAILY WITH FOOD 270 tablet 1   • furosemide (LASIX) 40 MG tablet Take 1 tablet in the morning 90 tablet 0   • glucose blood test strip Use as instructed 300 each 12   • HYDROcodone-acetaminophen (NORCO) 5-325 MG per tablet Take 1 tablet by mouth 2 (Two) Times a Day As Needed for Severe Pain . 40 tablet 0   • Januvia 100 MG tablet Take 1 tablet by mouth once daily 90 tablet 0   • Lancets (ACCU-CHEK MULTICLIX) lancets Use 1 lancet per finger stick 204 each 12   • losartan (Cozaar) 25 MG tablet Take 1 tablet by mouth Daily. 90 tablet 3   • potassium chloride 10 MEQ CR tablet Take 2 tablets by mouth Daily. (Patient taking differently: Take 10 mEq by mouth Daily. No longer BID) 180 tablet 3   • TRAVATAN Z 0.004 % solution ophthalmic solution Administer 1 drop to both eyes Every Morning. Not night     • vitamin D (ERGOCALCIFEROL) 1.25 MG (56002 UT) capsule capsule Take 1 capsule by mouth once a week 12 capsule 0   • warfarin (COUMADIN) 5 MG tablet TAKE ONE-HALF TABLET (2.5 MG) ON Monday AND Friday AND 1 TABLET (5 MG) BY MOUTH ONCE DAILY OR  AS  DIRECTED  BY  MEDICATION  MANAGEMENT  CLINIC 90 tablet 1   • digoxin (LANOXIN) 125 MCG tablet Take 1 tablet by mouth Every Other Day. 45 tablet 1   • magnesium oxide (MAG-OX) 400 MG tablet Take 400 mg by mouth As Needed.       No facility-administered medications prior to visit.       Allergies as of 02/10/2022 - Reviewed 02/10/2022   Allergen Reaction Noted   • Contrast dye Hives and Itching 01/04/2018     Social History     Socioeconomic History  "  • Marital status:      Spouse name: Suresh   • Number of children: 5   • Years of education: 13   Tobacco Use   • Smoking status: Former Smoker     Packs/day: 3.00     Types: Cigarettes     Start date: 1967     Quit date: 10/19/1968     Years since quittin.3   • Smokeless tobacco: Never Used   • Tobacco comment: caffeine use - decaf coffee   Substance and Sexual Activity   • Alcohol use: No     Comment: No caffeine use   • Drug use: No   • Sexual activity: Defer     Family History   Problem Relation Age of Onset   • Colon cancer Mother    • Glaucoma Mother    • Stroke Mother    • Arthritis Mother    • Hypertension Mother    • Glaucoma Sister    • Diabetes Sister    • Heart disease Sister    • Arthritis Sister    • Asthma Sister    • Hypertension Sister    • Miscarriages / Stillbirths Sister    • Lung disease Sister    • Heart disease Brother    • Diabetes Brother    • Arthritis Brother    • Drug abuse Brother    • Hypertension Brother    • Arthritis Father    • COPD Father    • Lung disease Father    • Arthritis Daughter    • Depression Daughter    • Alcohol abuse Maternal Uncle    • Heart disease Sister    • Heart disease Sister    • Heart disease Brother      Review of Systems   Constitutional: Negative for chills, fever, weight gain and weight loss.   Cardiovascular: Positive for leg swelling.   Respiratory: Positive for wheezing. Negative for cough and snoring.    Hematologic/Lymphatic: Negative for bleeding problem. Does not bruise/bleed easily.   Skin: Negative for color change.   Musculoskeletal: Negative for falls, joint pain and myalgias.   Gastrointestinal: Negative for melena.   Genitourinary: Negative for hematuria.   Neurological: Negative for excessive daytime sleepiness.   Psychiatric/Behavioral: Negative for depression. The patient is not nervous/anxious.         Objective:     Vitals:    02/10/22 1247   BP: 120/80   Pulse: 92   Weight: 113 kg (249 lb)   Height: 167.6 cm (66\") "     Body mass index is 40.19 kg/m².    Vitals reviewed.   Constitutional:       Appearance: Well-developed. Morbidly obese.   Eyes:      General: No scleral icterus.        Right eye: No discharge.      Conjunctiva/sclera: Conjunctivae normal.      Pupils: Pupils are equal, round, and reactive to light.   HENT:      Head: Normocephalic.      Nose: Nose normal.   Neck:      Thyroid: No thyromegaly.      Vascular: No JVD.   Pulmonary:      Effort: Pulmonary effort is normal. No respiratory distress.      Breath sounds: Normal breath sounds. No wheezing. No rales.   Cardiovascular:      Normal rate. Regular rhythm. Normal S1. Normal S2.      Murmurs: There is no murmur.      No gallop.   Abdominal:      General: Bowel sounds are normal. There is no distension.      Palpations: Abdomen is soft.      Tenderness: There is no abdominal tenderness. There is no rebound.   Musculoskeletal: Normal range of motion.         General: No tenderness.      Cervical back: Normal range of motion and neck supple. Skin:     General: Skin is warm and dry.      Findings: No erythema or rash.   Neurological:      Mental Status: Alert and oriented to person, place, and time.   Psychiatric:         Behavior: Behavior normal.         Thought Content: Thought content normal.         Judgment: Judgment normal.       Lab Review:     ECG 12 Lead    Date/Time: 2/10/2022 12:48 PM  Performed by: Pia Dueñas MD  Authorized by: Pia Dueñas MD   Comparison: compared with previous ECG   Similar to previous ECG  Rhythm: atrial fibrillation  Other findings: non-specific ST-T wave changes  Other findings comments: Consider prior inferior and anterior wall infarction.    Clinical impression: abnormal EKG          Assessment:       Diagnosis Plan   1. Coronary artery disease involving native coronary artery of native heart with angina pectoris (HCC)  ECG 12 Lead   2. ANTOINE (dyspnea on exertion)  proBNP   3. Paroxysmal atrial fibrillation (HCC)     4.  Coronary artery disease involving native coronary artery of native heart without angina pectoris     5. Mixed hyperlipidemia     6. Acute on chronic diastolic heart failure (HCC)     7. Essential hypertension     8. Type 2 diabetes mellitus with hyperglycemia, without long-term current use of insulin (HCC)       Plan:       1.  Dyspnea on exertion.  Not sure if this is due to deconditioning heart failure or worsening pulmonary disease.  Certainly untreated sleep apnea playing a role.  We will check a proBNP and if it is significantly higher than the last one will consider increasing diuretics but will have to watch renal function.  Stress test yesterday was negative.  Echo 8/2021 with no significant change though now off CPAP.  Pulmonary pressures may be higher.  If proBNP is unchanged or better we will ask Dr. Garrido to review first part additional further invasive cardiac testing  2.  Pulmonary hypertension.  Remains elevated.  Again untreated sleep apnea playing a role.  We will have her make an appointment with Dr. Garrido.  2.  Mitral regurgitation, mild to moderate on echo 8/2021.  Would need further invasive testing to assess further.  3.  Severe small vessel coronary artery disease.  No anginal symptoms and recent negative stress test (yesterday  4.  Persistent atrial fibrillation rates are controlled and she is on anticoagulation  5.  Edema.  Slightly more pronounced with stockings are not in place today.  6.  Hypertension, controlled.  7.  History of embolic non ST-elevation myocardial infarction. Now on warfarin  8.  History of embolic stroke.   9.   FRANCY, as above.  10. Lung nodule followed by Dr. Garrido and felt to be benign per patient.  No further follow-up felt to be needed per patient  11. Chronic anticoagulation, no falls or bleeding.  Continue with anticoagulation  12.  Thoracic aortic aneurysm, stable by CT on 9/2020    Time Spent: I spent 35 minutes caring for Lana on this date of service.  This time includes time spent by me in the following activities: preparing for the visit, reviewing tests, obtaining and/or reviewing a separately obtained history, performing a medically appropriate examination and/or evaluation, counseling and educating the patient/family/caregiver, ordering medications, tests, or procedures, documenting information in the medical record and independently interpreting results and communicating that information with the patient/family/caregiver.   I spent 1 minutes on the separately reported service of ECG. This time is not included in the time used to support the E/M service also reported today.        Your medication list          Accurate as of February 10, 2022 11:59 PM. If you have any questions, ask your nurse or doctor.            CHANGE how you take these medications      Instructions Last Dose Given Next Dose Due   potassium chloride 10 MEQ CR tablet  What changed:   · how much to take  · additional instructions      Take 2 tablets by mouth Daily.          CONTINUE taking these medications      Instructions Last Dose Given Next Dose Due   Accu-Chek Guide w/Device kit      See Admin Instructions.       accu-chek multiclix lancets      Use 1 lancet per finger stick       albuterol sulfate  (90 Base) MCG/ACT inhaler  Commonly known as: PROVENTIL HFA;VENTOLIN HFA;PROAIR HFA      Inhale 2 puffs Every 4 (Four) Hours As Needed. PRN SOA/WHEEZING       albuterol (2.5 MG/3ML) 0.083% nebulizer solution  Commonly known as: PROVENTIL      Take 2.5 mg by nebulization Every 4 (Four) Hours As Needed for Shortness of Air.       aspirin 81 MG EC tablet      Take 1 tablet by mouth Daily.       atorvastatin 10 MG tablet  Commonly known as: LIPITOR      Take 1 tablet by mouth Daily.       carvedilol 25 MG tablet  Commonly known as: COREG      TAKE 1 & 1/2 (ONE & ONE-HALF) TABLETS BY MOUTH TWICE DAILY WITH FOOD       digoxin 125 MCG tablet  Commonly known as: LANOXIN      Take 1 tablet  by mouth Every Other Day.       furosemide 40 MG tablet  Commonly known as: LASIX      Take 1 tablet in the morning       glucose blood test strip      Use as instructed       HYDROcodone-acetaminophen 5-325 MG per tablet  Commonly known as: NORCO      Take 1 tablet by mouth 2 (Two) Times a Day As Needed for Severe Pain .       Januvia 100 MG tablet  Generic drug: SITagliptin      Take 1 tablet by mouth once daily       losartan 25 MG tablet  Commonly known as: Cozaar      Take 1 tablet by mouth Daily.       Travatan Z 0.004 % solution ophthalmic solution  Generic drug: travoprost (BAK free)      Administer 1 drop to both eyes Every Morning. Not night       vitamin D 1.25 MG (67383 UT) capsule capsule  Commonly known as: ERGOCALCIFEROL      Take 1 capsule by mouth once a week       warfarin 5 MG tablet  Commonly known as: COUMADIN      TAKE ONE-HALF TABLET (2.5 MG) ON Monday AND Friday AND 1 TABLET (5 MG) BY MOUTH ONCE DAILY OR  AS  DIRECTED  BY  MEDICATION  MANAGEMENT  CLINIC          STOP taking these medications    magnesium oxide 400 MG tablet  Commonly known as: MAG-OX  Stopped by: Pia Dueñas MD              Where to Get Your Medications      These medications were sent to [facility] [address] [place] [address] [facility] - [phone]  - [phone]   [address] [facility] [address]    Phone: [phone]   · digoxin 125 MCG tablet         Patient is no longer taking -.  I corrected the med list to reflect this.  I did not stop these medications.      Dictated utilizing Dragon dictation

## 2022-02-10 NOTE — PROGRESS NOTES
Anticoagulation Clinic Progress Note    Anticoagulation Summary  As of 2/10/2022    INR goal:  2.0-3.0   TTR:  62.0 % (3.3 y)   INR used for dosin.3 (2/10/2022)   Warfarin maintenance plan:  2.5 mg every Mon, Fri; 5 mg all other days   Weekly warfarin total:  30 mg   Plan last modified:  Love Allison RPH (2/10/2022)   Next INR check:  2022   Priority:  Maintenance   Target end date:  Indefinite    Indications    Atrial fibrillation (HCC) [I48.91]             Anticoagulation Episode Summary     INR check location:      Preferred lab:      Send INR reminders to:  [facility]    Comments:        Anticoagulation Care Providers     Provider Role Specialty Phone number    Pia Dueñas MD Referring Cardiology [phone]          Clinic Interview:  Patient Findings     Negatives:  Signs/symptoms of thrombosis, Signs/symptoms of bleeding,   Laboratory test error suspected, Change in health, Change in alcohol use,   Change in activity, Upcoming invasive procedure, Emergency department   visit, Upcoming dental procedure, Missed doses, Extra doses, Change in   medications, Change in diet/appetite, Hospital admission, Bruising, Other   complaints    Comments:  Patient reports she may have taken 2.5 mg yesterday instead of   5 mg. Stress/fatigue/ meals are very similar to last appointment.      Clinical Outcomes     Negatives:  Major bleeding event, Thromboembolic event,   Anticoagulation-related hospital admission, Anticoagulation-related ED   visit, Anticoagulation-related fatality    Comments:  Patient reports she may have taken 2.5 mg yesterday instead of   5 mg. Stress/fatigue/ meals are very similar to last appointment.        INR History:  Anticoagulation Monitoring 1/10/2022 2022 2/10/2022   INR 1.8 1.7 2.3   INR Date 1/10/2022 2022 2/10/2022   INR Goal 2.0-3.0 2.0-3.0 2.0-3.0   Trend Same Up Same   Last Week Total 27.5 mg 27.5 mg 27.5 mg   Next Week Total 30 mg 35 mg 30 mg    Sun 5 mg 5 mg 5 mg   Mon 5 mg (1/10); Otherwise 2.5 mg 2.5 mg 2.5 mg   Tue 5 mg 5 mg 5 mg   Wed 2.5 mg 5 mg 5 mg   Thu 5 mg 7.5 mg (1/27); Otherwise 5 mg 5 mg   Fri 2.5 mg 5 mg (1/28); Otherwise 2.5 mg 2.5 mg   Sat 5 mg 5 mg 5 mg   Visit Report - - -   Some recent data might be hidden       Plan:  1. INR is Therapeutic today- see above in Anticoagulation Summary.  Will instruct Lana Nielsonb to Continue their warfarin regimen- see above in Anticoagulation Summary.  2. Follow up in 2 weeks  3. Patient declines warfarin refills.  4. Verbal and written information provided. Patient expresses understanding and has no further questions at this time.    Love Allison [facility]

## 2022-02-11 LAB
NT-PROBNP SERPL-MCNC: 789 PG/ML (ref 0–301)
WRITTEN AUTHORIZATION: NORMAL

## 2022-02-14 ENCOUNTER — OFFICE VISIT (OUTPATIENT)
Dept: INTERNAL MEDICINE | Facility: CLINIC | Age: 77
End: 2022-02-14

## 2022-02-14 ENCOUNTER — OFFICE VISIT (OUTPATIENT)
Dept: ENDOCRINOLOGY | Age: 77
End: 2022-02-14

## 2022-02-14 VITALS
DIASTOLIC BLOOD PRESSURE: 81 MMHG | WEIGHT: 248.6 LBS | HEIGHT: 66 IN | SYSTOLIC BLOOD PRESSURE: 133 MMHG | BODY MASS INDEX: 39.95 KG/M2 | HEART RATE: 67 BPM | OXYGEN SATURATION: 97 %

## 2022-02-14 VITALS
HEART RATE: 83 BPM | WEIGHT: 251 LBS | BODY MASS INDEX: 40.34 KG/M2 | SYSTOLIC BLOOD PRESSURE: 138 MMHG | DIASTOLIC BLOOD PRESSURE: 80 MMHG | TEMPERATURE: 97.8 F | OXYGEN SATURATION: 97 % | HEIGHT: 66 IN

## 2022-02-14 DIAGNOSIS — E78.2 MIXED HYPERLIPIDEMIA: ICD-10-CM

## 2022-02-14 DIAGNOSIS — E66.01 MORBID OBESITY: ICD-10-CM

## 2022-02-14 DIAGNOSIS — E78.5 HYPERLIPIDEMIA ASSOCIATED WITH TYPE 2 DIABETES MELLITUS: ICD-10-CM

## 2022-02-14 DIAGNOSIS — E55.9 VITAMIN D DEFICIENCY: ICD-10-CM

## 2022-02-14 DIAGNOSIS — E11.65 TYPE 2 DIABETES MELLITUS WITH HYPERGLYCEMIA, WITHOUT LONG-TERM CURRENT USE OF INSULIN: Primary | ICD-10-CM

## 2022-02-14 DIAGNOSIS — E55.9 HYPOVITAMINOSIS D: ICD-10-CM

## 2022-02-14 DIAGNOSIS — I10 ESSENTIAL HYPERTENSION: Primary | ICD-10-CM

## 2022-02-14 DIAGNOSIS — E11.69 HYPERLIPIDEMIA ASSOCIATED WITH TYPE 2 DIABETES MELLITUS: ICD-10-CM

## 2022-02-14 DIAGNOSIS — M06.9 RHEUMATOID ARTHRITIS INVOLVING MULTIPLE SITES, UNSPECIFIED WHETHER RHEUMATOID FACTOR PRESENT: ICD-10-CM

## 2022-02-14 PROCEDURE — 99214 OFFICE O/P EST MOD 30 MIN: CPT | Performed by: FAMILY MEDICINE

## 2022-02-14 PROCEDURE — 99214 OFFICE O/P EST MOD 30 MIN: CPT | Performed by: INTERNAL MEDICINE

## 2022-02-14 RX ORDER — PREDNISONE 20 MG/1
TABLET ORAL
Qty: 14 TABLET | Refills: 0 | Status: SHIPPED | OUTPATIENT
Start: 2022-02-14 | End: 2022-02-24

## 2022-02-14 RX ORDER — ATORVASTATIN CALCIUM 10 MG/1
10 TABLET, FILM COATED ORAL DAILY
Qty: 90 TABLET | Refills: 1 | Status: SHIPPED | OUTPATIENT
Start: 2022-02-14 | End: 2022-08-22

## 2022-02-14 RX ORDER — LOSARTAN POTASSIUM 25 MG/1
25 TABLET ORAL DAILY
Qty: 90 TABLET | Refills: 3 | Status: SHIPPED | OUTPATIENT
Start: 2022-02-14 | End: 2022-05-27 | Stop reason: HOSPADM

## 2022-02-14 RX ORDER — ERGOCALCIFEROL 1.25 MG/1
50000 CAPSULE ORAL WEEKLY
Qty: 12 CAPSULE | Refills: 3 | Status: SHIPPED | OUTPATIENT
Start: 2022-02-14 | End: 2023-01-23

## 2022-02-14 NOTE — PROGRESS NOTES
"Chief Complaint  Hypertension and Hyperlipidemia    Karl Yañez presents to [facility]  History of Present Illness     Hypertension - stable.  Patient taking medication as prescribed.  Denies chest pain, shortness of breath, headache, lower extremity edema.  Patient is taking losartan 25 mg daily.    HLD-stable, recently got labs last week and were unremarkable..  Patient taking atorvastatin 10 mg as prescribed.  Trying to adhere to a balanced diet.    Her RA has been flared up recently with pain in both hands and worsening fatigue.  Also having some pain in the low back and elbows.        Objective   Vital Signs:   /80 (BP Location: Right arm, Patient Position: Sitting, Cuff Size: Adult)   Pulse 83   Temp 97.8 °F (36.6 °C) (Temporal)   Ht 167.6 cm (66\")   Wt 114 kg (251 lb)   SpO2 97%   BMI 40.51 kg/m²     Physical Exam  Vitals and nursing note reviewed.   Constitutional:       General: She is not in acute distress.     Appearance: Normal appearance.   Cardiovascular:      Rate and Rhythm: Normal rate and regular rhythm.      Heart sounds: Normal heart sounds. No murmur heard.      Pulmonary:      Effort: Pulmonary effort is normal.      Breath sounds: Normal breath sounds.   Neurological:      Mental Status: She is alert.        Result Review :   The following data was reviewed by: Will Madden MD on 02/14/2022:  Common labs    Common Labsle 11/22/21 12/16/21 2/9/22 2/9/22 2/9/22 2/9/22      1524 1524 1524 1524   Glucose 264 (A) 138 (A)       BUN 33 (A) 15       Creatinine 1.43 (A) 1.13 (A) 1.20 (A)      eGFR Non  Am 36 (A)  45 (A)      eGFR  Am 42 (A) 57 (A) 51 (A)      Sodium 139 143       Potassium 4.6 3.9       Chloride 100 105       Calcium 10.0 9.9       Total Cholesterol      93 (A)   Triglycerides      73   HDL Cholesterol      49   LDL Cholesterol       28   Hemoglobin A1C    7.5 (A)     Microalbumin, Urine     28.8  "   (A) Abnormal value       Comments are available for some flowsheets but are not being displayed.                     Assessment and Plan    Diagnoses and all orders for this visit:    1. Essential hypertension (Primary)  -     losartan (Cozaar) 25 MG tablet; Take 1 tablet by mouth Daily.  Dispense: 90 tablet; Refill: 3    2. Mixed hyperlipidemia  -     atorvastatin (LIPITOR) 10 MG tablet; Take 1 tablet by mouth Daily.  Dispense: 90 tablet; Refill: 1    3. Rheumatoid arthritis involving multiple sites, unspecified whether rheumatoid factor present (HCC)  -     predniSONE (DELTASONE) 20 MG tablet; Take 2 tablets by mouth Daily for 5 days, THEN 1 tablet Daily for 3 days, THEN 0.5 tablets Daily for 2 days.  Dispense: 14 tablet; Refill: 0    4. Hypovitaminosis D  -     vitamin D (ERGOCALCIFEROL) 1.25 MG (22551 UT) capsule capsule; Take 1 capsule by mouth 1 (One) Time Per Week.  Dispense: 12 capsule; Refill: 3      Blood pressure and hyperlipidemia.  Stable.  Continue medications as above.  Refilled vitamin D although this was not specifically assessed at this visit.    I think she is having a flareup of her RA so I sent in a prednisone taper.  If her pain comes back after being treated with a taper, could consider low-dose prednisone for a few more weeks after that.        Follow Up   Return in about 26 weeks (around 8/15/2022) for Medicare Wellness.  Patient was given instructions and counseling regarding her condition or for health maintenance advice. Please see specific information pulled into the AVS if appropriate.

## 2022-02-14 NOTE — PATIENT INSTRUCTIONS
Use the sliding scale novolog   BG less than 150 - zero  151 - 200 - 2 unit  201 - 250 - 4 units  251 - 300 - 6 units  301 - 350 - 8 units  Above 350 mg/dl - 10 units.

## 2022-02-14 NOTE — PROGRESS NOTES
"Chief Complaint  Chief Complaint   Patient presents with   • Diabetes Mellitus     follow up       Subjective          History of Present Illness    Lana Yañez 76 y.o. presents with Type 2 dm as a F/u    Type 2 dm - Diagnosed about 2 - 3 years ago.   Today in clinic pt reports being on januvia 100 mg po daily.  Checks her blood sugars occasionally, average blood sugars are around 160s.  No history of diabetic retinopathy, due for the eye examination.  Does have history of diabetic nephropathy, positive microalbuminuria.  Some tingling and numbness, no open wound.  Does have pedal edema.  Does have history of heart failure.  No history of stroke.  No history of significant low blood sugars.    #Patient has been started on steroids, which she is planning to start either today or tomorrow because of the arthritis for about a week.    Reviewed primary care physician's/consulting physician documentation and lab results         I have reviewed the patient's allergies, medicines, past medical hx, family hx and social hx in detail.    Objective   Vital Signs:   /81   Pulse 67   Ht 167.6 cm (66\")   Wt 113 kg (248 lb 9.6 oz)   LMP  (LMP Unknown)   SpO2 97%   BMI 40.13 kg/m²   Physical Exam   General appearance - no distress  Eyes- anicteric sclera  Ear nose and throat-external ears and nose normal.    Respiratory-normal chest on inspection.  No respiratory distress noted.  Skin-no rashes.  Neuro-alert and oriented x3          Result Review :   The following data was reviewed by: Ed Ho MD on 02/14/2022:  Anticoagulation Visit on 02/10/2022   Component Date Value Ref Range Status   • Protime 02/10/2022 28.1* 10.0 - 13.8 seconds Final    Meter: [id number] : [id number] Love Allison   • INR 02/10/2022 2.3* 0.91 - 1.09 Final     Data reviewed: PCP notes       Results Review:    I reviewed the patient's new clinical results.     Assessment and Plan    Problem List Items Addressed This Visit        " Other    Type 2 diabetes mellitus with hyperglycemia, without long-term current use of insulin (HCC) - Primary    Relevant Orders    TSH    T4, Free    T3, Free    Basic Metabolic Panel    Hemoglobin A1c    Lipid Panel    Vitamin B12 & Folate    Vitamin D 25 Hydroxy      Other Visit Diagnoses     Hyperlipidemia associated with type 2 diabetes mellitus (HCC)        Relevant Orders    TSH    T4, Free    T3, Free    Basic Metabolic Panel    Hemoglobin A1c    Lipid Panel    Vitamin B12 & Folate    Vitamin D 25 Hydroxy    Morbid obesity (HCC)        Relevant Orders    TSH    T4, Free    T3, Free    Basic Metabolic Panel    Hemoglobin A1c    Lipid Panel    Vitamin B12 & Folate    Vitamin D 25 Hydroxy    Vitamin D deficiency         Relevant Orders    Vitamin D 25 Hydroxy        Type 2 diabetes mellitus-uncontrolled with hyperglycemia  Continue Januvia 100 mg oral daily.  Next para explained to the patient on the steroids her blood sugars could go up.  Advised the patient to consider checking her blood sugars 2-3 times at the time that she is on the steroids.  And to consider NovoLog sliding scale-moderate dose before each meal while she is on the steroids.    Patient did not want to do it, she reports that she is extremely busy taking care of her sick .  She is planning to discuss with Dr. Madden before she makes a decision to see if she could consider physical therapy rather than the steroids.    If the patient is on steroids she will touch base with our office.  At that point we would consider the NovoLog sliding scale before each meal  And place a CGM on her to help with the blood sugar monitoring    Hyperlipidemia  Continue Lipitor 20 mg oral daily.  Interpreted the blood work-up/imaging results performed by the primary care/consulting physician -    Refills sent to pharmacy    Follow Up     Patient was given instructions and counseling regarding her condition or for health maintenance advice. Please see  "specific information pulled into the AVS if appropriate.       Thank you for asking me to see your patient, Lana Yañez in consultation.         Ed Ho MD  02/14/22      EMR Dragon / transcription disclaimer:     \"Dictated utilizing Dragon dictation\".         "

## 2022-02-16 RX ORDER — SITAGLIPTIN 100 MG/1
TABLET, FILM COATED ORAL
Qty: 90 TABLET | Refills: 0 | Status: SHIPPED | OUTPATIENT
Start: 2022-02-16 | End: 2022-05-31

## 2022-02-21 ENCOUNTER — TELEPHONE (OUTPATIENT)
Dept: CARDIOLOGY | Facility: CLINIC | Age: 77
End: 2022-02-21

## 2022-02-21 NOTE — TELEPHONE ENCOUNTER
----- Message from Monie Lopez MA sent at 2/11/2022  4:39 PM EST -----    ----- Message -----  From: Ed Ho MD  Sent: 2/11/2022  10:21 AM EST  To: [facility] Clinical Pool    Please BNP results to cardiology or PCP.

## 2022-02-21 NOTE — TELEPHONE ENCOUNTER
proBNP elevated but lower than it was previously.  Please find out how is your shortness of breath, edema and did she make an appointment with Dr. Garrido his we had discussed.  Can add low-dose diuretic if there is significant edema or shortness of breath.  I will need her blood pressure and updated med list.

## 2022-02-24 ENCOUNTER — ANTICOAGULATION VISIT (OUTPATIENT)
Dept: PHARMACY | Facility: HOSPITAL | Age: 77
End: 2022-02-24

## 2022-02-24 LAB
INR PPP: 2.4 (ref 0.91–1.09)
PROTHROMBIN TIME: 29 SECONDS (ref 10–13.8)

## 2022-02-24 PROCEDURE — 36416 COLLJ CAPILLARY BLOOD SPEC: CPT

## 2022-02-24 PROCEDURE — 85610 PROTHROMBIN TIME: CPT

## 2022-02-24 NOTE — PROGRESS NOTES
Anticoagulation Clinic Progress Note    Anticoagulation Summary  As of 2022    INR goal:  2.0-3.0   TTR:  62.4 % (3.3 y)   INR used for dosin.4 (2022)   Warfarin maintenance plan:  2.5 mg every Mon, Fri; 5 mg all other days   Weekly warfarin total:  30 mg   No change documented:  Love Allison RPH   Plan last modified:  Love Allison RPH (2/10/2022)   Next INR check:  3/24/2022   Priority:  Maintenance   Target end date:  Indefinite    Indications    Atrial fibrillation (HCC) [I48.91]             Anticoagulation Episode Summary     INR check location:      Preferred lab:      Send INR reminders to:   [facility]    Comments:        Anticoagulation Care Providers     Provider Role Specialty Phone number    Pia Dueñas MD Referring Cardiology [phone]          Clinic Interview:  Patient Findings     Negatives:  Signs/symptoms of thrombosis, Signs/symptoms of bleeding,   Laboratory test error suspected, Change in health, Change in alcohol use,   Change in activity, Upcoming invasive procedure, Emergency department   visit, Upcoming dental procedure, Missed doses, Extra doses, Change in   medications, Change in diet/appetite, Hospital admission, Bruising, Other   complaints      Clinical Outcomes     Negatives:  Major bleeding event, Thromboembolic event,   Anticoagulation-related hospital admission, Anticoagulation-related ED   visit, Anticoagulation-related fatality        INR History:  Anticoagulation Monitoring 2022 2/10/2022 2022   INR 1.7 2.3 2.4   INR Date 2022 2/10/2022 2022   INR Goal 2.0-3.0 2.0-3.0 2.0-3.0   Trend Up Same Same   Last Week Total 27.5 mg 27.5 mg 30 mg   Next Week Total 35 mg 30 mg 30 mg   Sun 5 mg 5 mg 5 mg   Mon 2.5 mg 2.5 mg 2.5 mg   Tue 5 mg 5 mg 5 mg   Wed 5 mg 5 mg 5 mg   Thu 7.5 mg (); Otherwise 5 mg 5 mg 5 mg   Fri 5 mg (); Otherwise 2.5 mg 2.5 mg 2.5 mg   Sat 5 mg 5 mg 5 mg   Visit Report - - -   Some recent data  might be hidden       Plan:  1. INR is Therapeutic today- see above in Anticoagulation Summary.  Will instruct Lana Yañez to Continue their warfarin regimen- see above in Anticoagulation Summary.  2. Follow up in 4 weeks  3. Patient declines warfarin refills.  4. Verbal and written information provided. Patient expresses understanding and has no further questions at this time.    Love Allison, [facility]

## 2022-02-25 ENCOUNTER — TELEPHONE (OUTPATIENT)
Dept: PAIN MEDICINE | Facility: CLINIC | Age: 77
End: 2022-02-25

## 2022-02-25 NOTE — TELEPHONE ENCOUNTER
Spoke with pt.  Verbalized understanding.  Pt wrote down these instructions and will call Monday or Tuesday with an update.  (done)

## 2022-02-25 NOTE — TELEPHONE ENCOUNTER
Can you have he increase her lasix to 60 mg daily for the next three days and then resume 40 mg daily to see if it helps with her dyspnea. She needs to still see pulmonology as you have previously discussed with her. Have her call back next week to let us know how she feels.

## 2022-02-25 NOTE — TELEPHONE ENCOUNTER
SOA-a little worse.  Edema is better.  Fatigue is her number one problem at this time.    She has not called her Pulmonary and I did stress her to call today.    Average BP is 130s/80s pulse varies because of her afib (70s-90s).      Current Meds:   Lasix 40mg in the AM  Coreg 25mg 1/12 BID  Digoxin 125 mcg QOD  Losartan 25mg in the AM

## 2022-02-25 NOTE — TELEPHONE ENCOUNTER
Caller: TERA GARNER    Relationship: SELF    Best call back number:     What is the best time to reach you: ANY     Who are you requesting to speak with (clinical staff, provider,  specific staff member): CLINICAL    What was the call regarding: THE PATIENT NEEDS TO RESCHEDULE THEIR APPT FOR 2/28/22.  SHE IS WONDERING IF SHE CAN DO A TELEHEALTH APPT INSTEAD.  I LOOKED AT DATES FOR A RESCHEDULE AND FIRST AVAILABLE SHOWED 3/9/22.  PATIENT WANTED TO KNOW IF SHE DID RESCHEDULE TIL 3/9 COULD SHE GET HER MEDICATION REFILLED IF SHE RAN OUT BEFORE APPT    Do you require a callback: YES

## 2022-02-28 RX ORDER — HYDROCODONE BITARTRATE AND ACETAMINOPHEN 5; 325 MG/1; MG/1
1 TABLET ORAL 2 TIMES DAILY PRN
Qty: 20 TABLET | Refills: 0 | Status: SHIPPED | OUTPATIENT
Start: 2022-02-28 | End: 2022-10-20

## 2022-02-28 NOTE — TELEPHONE ENCOUNTER
I don't believe you have anything available on 3/9/22 any longer. Your next available is 3/21/22 at this time.

## 2022-02-28 NOTE — TELEPHONE ENCOUNTER
She needs in office appt. It has been greater than 3 months since her last evaluation. No plans on refill until office visit. Thanks. BRENDAN

## 2022-02-28 NOTE — TELEPHONE ENCOUNTER
She will try and make it today. She states that her  was diagnosed with cancer and she is having scheduling conflicts with his care. She states he is not feeling well.

## 2022-03-21 ENCOUNTER — TELEPHONE (OUTPATIENT)
Dept: PAIN MEDICINE | Facility: CLINIC | Age: 77
End: 2022-03-21

## 2022-03-21 NOTE — TELEPHONE ENCOUNTER
Caller: TERA GARNER    Relationship to patient: SELF    Best call back number:     Patient is needing: UNABLE TO WARM TRANSFER  -  PATIENT NEEDS CALL BACK TO DISCUSS RESCHEDULING - PER PATIENT HAD TO CANCEL 22 APPTMT DUE TO SON IN LAW PASSED AWAY AND IS OUT OF TOWN FOR HIS .  PER PATIENT WAS TOLD LAST TIME HAD TO MAKE THIS APPTMT - BUT SHE DIDN'T;T KNOW THIS WAS GOING TO HAPPEN.  PATIENT WAS ADVISED SOMEONE FROM PRACTICE WOULD CALL HER ABCK.

## 2022-03-22 ENCOUNTER — TELEPHONE (OUTPATIENT)
Dept: PAIN MEDICINE | Facility: CLINIC | Age: 77
End: 2022-03-22

## 2022-03-24 ENCOUNTER — APPOINTMENT (OUTPATIENT)
Dept: PHARMACY | Facility: HOSPITAL | Age: 77
End: 2022-03-24

## 2022-04-07 ENCOUNTER — ANTICOAGULATION VISIT (OUTPATIENT)
Dept: PHARMACY | Facility: HOSPITAL | Age: 77
End: 2022-04-07

## 2022-04-07 LAB
INR PPP: 2.3 (ref 0.91–1.09)
PROTHROMBIN TIME: 27.9 SECONDS (ref 10–13.8)

## 2022-04-07 PROCEDURE — 36416 COLLJ CAPILLARY BLOOD SPEC: CPT

## 2022-04-07 PROCEDURE — 85610 PROTHROMBIN TIME: CPT

## 2022-04-07 NOTE — PROGRESS NOTES
Anticoagulation Clinic Progress Note    Anticoagulation Summary  As of 2022    INR goal:  2.0-3.0   TTR:  63.6 % (3.5 y)   INR used for dosin.3 (2022)   Warfarin maintenance plan:  2.5 mg every Mon, Fri; 5 mg all other days   Weekly warfarin total:  30 mg   No change documented:  Love Allison RPH   Plan last modified:  Love Allison RPH (2/10/2022)   Next INR check:  2022   Priority:  Maintenance   Target end date:  Indefinite    Indications    Atrial fibrillation (HCC) [I48.91]             Anticoagulation Episode Summary     INR check location:      Preferred lab:      Send INR reminders to:   [facility]    Comments:        Anticoagulation Care Providers     Provider Role Specialty Phone number    Pia Dueñas MD Referring Cardiology [phone]          Clinic Interview:  Patient Findings     Negatives:  Signs/symptoms of thrombosis, Signs/symptoms of bleeding,   Laboratory test error suspected, Change in health, Change in alcohol use,   Change in activity, Upcoming invasive procedure, Emergency department   visit, Upcoming dental procedure, Missed doses, Extra doses, Change in   medications, Change in diet/appetite, Hospital admission, Bruising, Other   complaints      Clinical Outcomes     Negatives:  Major bleeding event, Thromboembolic event,   Anticoagulation-related hospital admission, Anticoagulation-related ED   visit, Anticoagulation-related fatality        INR History:  Anticoagulation Monitoring 2/10/2022 2022 2022   INR 2.3 2.4 2.3   INR Date 2/10/2022 2022 2022   INR Goal 2.0-3.0 2.0-3.0 2.0-3.0   Trend Same Same Same   Last Week Total 27.5 mg 30 mg 30 mg   Next Week Total 30 mg 30 mg 30 mg   Sun 5 mg 5 mg 5 mg   Mon 2.5 mg 2.5 mg 2.5 mg   Tue 5 mg 5 mg 5 mg   Wed 5 mg 5 mg 5 mg   Thu 5 mg 5 mg 5 mg   Fri 2.5 mg 2.5 mg 2.5 mg   Sat 5 mg 5 mg 5 mg   Visit Report - - -   Some recent data might be hidden       Plan:  1. INR is Therapeutic  today- see above in Anticoagulation Summary.  Will instruct Lanaantonio Aguerocomb to Continue their warfarin regimen- see above in Anticoagulation Summary.  2. Follow up in 1 month  3. Patient declines warfarin refills.  4. Verbal and written information provided. Patient expresses understanding and has no further questions at this time.    Love Allison, [facility]

## 2022-04-11 ENCOUNTER — OFFICE VISIT (OUTPATIENT)
Dept: ORTHOPEDIC SURGERY | Facility: CLINIC | Age: 77
End: 2022-04-11

## 2022-04-11 VITALS — WEIGHT: 252.6 LBS | TEMPERATURE: 97 F | HEIGHT: 66 IN | BODY MASS INDEX: 40.6 KG/M2

## 2022-04-11 DIAGNOSIS — R52 PAIN: Primary | ICD-10-CM

## 2022-04-11 DIAGNOSIS — M19.019 GLENOHUMERAL ARTHRITIS: ICD-10-CM

## 2022-04-11 PROCEDURE — 20610 DRAIN/INJ JOINT/BURSA W/O US: CPT | Performed by: ORTHOPAEDIC SURGERY

## 2022-04-11 PROCEDURE — 73030 X-RAY EXAM OF SHOULDER: CPT | Performed by: ORTHOPAEDIC SURGERY

## 2022-04-11 PROCEDURE — 99214 OFFICE O/P EST MOD 30 MIN: CPT | Performed by: ORTHOPAEDIC SURGERY

## 2022-04-11 RX ADMIN — METHYLPREDNISOLONE ACETATE 80 MG: 80 INJECTION, SUSPENSION INTRA-ARTICULAR; INTRALESIONAL; INTRAMUSCULAR; SOFT TISSUE at 15:05

## 2022-04-11 NOTE — PROGRESS NOTES
Patient: Lana Yañez  YOB: 1945  Date of Service: 4/11/2022    Chief Complaints: No chief complaint on file.      Subjective:    History of Present Illness: Pt is seen in the office today with complaints of No chief complaint on file.  .          Allergies:   Allergies   Allergen Reactions   • Contrast Dye Hives and Itching       Medications:   Home Medications:  Current Outpatient Medications on File Prior to Visit   Medication Sig   • albuterol (PROVENTIL) (2.5 MG/3ML) 0.083% nebulizer solution Take 2.5 mg by nebulization Every 4 (Four) Hours As Needed for Shortness of Air.   • albuterol sulfate  (90 Base) MCG/ACT inhaler Inhale 2 puffs Every 4 (Four) Hours As Needed. PRN SOA/WHEEZING   • aspirin 81 MG EC tablet Take 1 tablet by mouth Daily.   • atorvastatin (LIPITOR) 10 MG tablet Take 1 tablet by mouth Daily.   • Blood Glucose Monitoring Suppl (ACCU-CHEK GUIDE) w/Device kit See Admin Instructions.   • carvedilol (COREG) 25 MG tablet TAKE 1 & 1/2 (ONE & ONE-HALF) TABLETS BY MOUTH TWICE DAILY WITH FOOD   • digoxin (LANOXIN) 125 MCG tablet Take 1 tablet by mouth Every Other Day.   • furosemide (LASIX) 40 MG tablet Take 1 tablet in the morning   • glucose blood test strip Use as instructed   • HYDROcodone-acetaminophen (NORCO) 5-325 MG per tablet Take 1 tablet by mouth 2 (Two) Times a Day As Needed for Severe Pain .   • Januvia 100 MG tablet Take 1 tablet by mouth once daily   • Lancets (ACCU-CHEK MULTICLIX) lancets Use 1 lancet per finger stick   • losartan (Cozaar) 25 MG tablet Take 1 tablet by mouth Daily.   • potassium chloride 10 MEQ CR tablet Take 2 tablets by mouth Daily. (Patient taking differently: Take 10 mEq by mouth Daily. No longer BID)   • TRAVATAN Z 0.004 % solution ophthalmic solution Administer 1 drop to both eyes Every Morning. Not night   • vitamin D (ERGOCALCIFEROL) 1.25 MG (54155 UT) capsule capsule Take 1 capsule by mouth 1 (One) Time Per Week.   • warfarin  (COUMADIN) 5 MG tablet TAKE ONE-HALF TABLET (2.5 MG) ON Monday AND Friday AND 1 TABLET (5 MG) BY MOUTH ONCE DAILY OR  AS  DIRECTED  BY  MEDICATION  MANAGEMENT  CLINIC     No current facility-administered medications on file prior to visit.     Current Medications:  Scheduled Meds:  Continuous Infusions:No current facility-administered medications for this visit.    PRN Meds:.    I have reviewed the patient's medical history in detail and updated the computerized patient record.  Review and summarization of old records include:    Past Medical History:   Diagnosis Date   • Acute on chronic diastolic heart failure ([facility])    • Arthritis    • Asthma    • Atrial fibrillation ([facility])     Persistent; on warfarin   • Atypical chest pain    • Biliary acute pancreatitis without necrosis or infection 11/10/2021   • Bronchitis    • Cellulitis of right elbow     due to MRSA   • CHF (congestive heart failure) ([facility])    • COPD (chronic obstructive pulmonary disease) ([facility])    • Coronary artery disease     Cardiac catheterization completed; 90% PDA stenosis with medical management recommended   • Coronary artery disease involving native coronary artery of native heart with angina pectoris with documented spasm ([facility])    • Diabetes 1.5, managed as type 2 ([facility])    • Disease of thyroid gland    • Displacement of lumbar intervertebral disc without myelopathy    • Dizziness    • ANTOINE (dyspnea on exertion)    • Essential hypertension    • Fatigue    • Generalized osteoarthritis of multiple sites    • History of rheumatic fever    • History of transfusion    • Hx of bone density study     10/23/2014   • Hyperglycemia    • Hyperlipidemia    • Hypovitaminosis D    • Left arm pain    • Left-sided weakness    • Leg swelling    • Low back pain    • Lower extremity edema    • Malaise and fatigue    • Mitral valve disease     Moderate mitral valve prolapse and moderate mitral regurgitation   • Mitral valve insufficiency    • Morbid obesity with BMI of  40.0-44.9, adult ([facility])    • MRSA infection    • NSTEMI (non-ST elevated myocardial infarction) ([facility])    • PAF (paroxysmal atrial fibrillation) ([facility])    • RA (rheumatoid arthritis) ([facility])     involving both hands   • Sleep apnea    • SOB (shortness of breath)    • Stroke ([facility])     left side weakness   • Urge incontinence of urine    • UTI (urinary tract infection) 2020   • Vitamin D deficiency         Past Surgical History:   Procedure Laterality Date   • BREAST BIOPSY     • BREAST SURGERY      right side lumpectomy with biopsy   • CARDIAC CATHETERIZATION Left 10/20/2017    Procedure: Cardiac Catheterization/Vascular Study;  Surgeon: Alphonso Olmedo MD;  Location: [facility] CATH INVASIVE LOCATION;  Service:    • CARDIAC CATHETERIZATION N/A 10/20/2017    +2 mitral regurgitation, left main 10% stenosis, mid to distal LAD 10% diffuse stenosis, circumflex 10% diffuse stenosis, RCA 10% proximal stenosis, and distal PDA consistent with coronary embolus with a lesion of 90% too small to consider coronary intervention; medical management recommended   • EYE SURGERY      laser surgery for glaucoma and left eye cataracts removed   • HYSTERECTOMY      10+ years ago   • JOINT REPLACEMENT  ;     bilateral knees and left rotater   • KNEE SURGERY     • MAMMO BILATERAL      [facility]         Social History     Occupational History   • 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     Employer: RETIRED   Tobacco Use   • Smoking status: Former Smoker     Packs/day: 3.00     Types: Cigarettes     Start date: 1967     Quit date: 10/19/1968     Years since quittin.5   • Smokeless tobacco: Never Used   • Tobacco comment: caffeine use - decaf coffee   Substance and Sexual Activity   • Alcohol use: No     Comment: No caffeine use   • Drug use: No   • Sexual activity: Defer      Social History     Social History Narrative   • Not on file        Family History   Problem Relation Age of Onset   • Colon cancer  Mother    • Glaucoma Mother    • Stroke Mother    • Arthritis Mother    • Hypertension Mother    • Glaucoma Sister    • Diabetes Sister    • Heart disease Sister    • Arthritis Sister    • Asthma Sister    • Hypertension Sister    • Miscarriages / Stillbirths Sister    • Lung disease Sister    • Heart disease Brother    • Diabetes Brother    • Arthritis Brother    • Drug abuse Brother    • Hypertension Brother    • Arthritis Father    • COPD Father    • Lung disease Father    • Arthritis Daughter    • Depression Daughter    • Alcohol abuse Maternal Uncle    • Heart disease Sister    • Heart disease Sister    • Heart disease Brother        ROS: 14 point review of systems was performed and was negative except for documented findings in HPI and today's encounter.     Allergies:   Allergies   Allergen Reactions   • Contrast Dye Hives and Itching     Constitutional:  Denies fever, shaking or chills   Eyes:  Denies change in visual acuity   HENT:  Denies nasal congestion or sore throat   Respiratory:  Denies cough or shortness of breath   Cardiovascular:  Denies chest pain or severe LE edema   GI:  Denies abdominal pain, nausea, vomiting, bloody stools or diarrhea   Musculoskeletal:  Numbness, tingling, or loss of motor function only as noted above in history of present illness.  : Denies painful urination or hematuria  Integument:  Denies rash, lesion or ulceration   Neurologic:  Denies headache or focal weakness  Endocrine:  Denies lymphadenopathy  Psych:  Denies confusion or change in mental status   Hem:  Denies active bleeding      Physical Exam: 76 y.o. female  Wt Readings from Last 3 Encounters:   02/14/22 113 kg (248 lb 9.6 oz)   02/14/22 114 kg (251 lb)   02/10/22 113 kg (249 lb)     *** HELP TEXT ***    This SmartLink requires parameters. Parameters are variables that are added to the SmartLink name to request specific information. The parameter for .lastht is the number of readings to display.    For example:  .last[4    In this example, the SmartLink displays the last four encounter readings.    There is no height or weight on file to calculate BMI.  No height and weight on file for this encounter.  There were no vitals filed for this visit.  Vital signs reviewed.   General Appearance:    Alert, cooperative, in no acute distress                    Ortho exam             .time    Assessment:     Plan:   Follow up as indicated.  Ice, elevate, and rest as needed.  Discussed conservative measures of pain control including ice, bracing.  Also talked about the importance of strengthening and maintaining ideal body weight    Asya Rodriges M.D.

## 2022-04-11 NOTE — PROGRESS NOTES
New Left Shoulder      Patient: Lana Yañez        YOB: 1945    Medical Record Number: [id number]        Chief Complaints: Left shoulder pain      History of Present Illness: This is a 76-year-old female who I have not seen in over a year who presents complaining of left shoulder pain she states that really started bothering couple months ago no history injury change in activity she does have limitation of her range of motion pain with overhead activity and some pain at rest.  Symptoms are moderate intermittent aching worse with activity somewhat better with rest past medical history smart for cardiac issues A. fib she is on chronic Coumadin she also has history of pancreatitis COPD coronary artery disease thyroid disease diabetes      Allergies:   Allergies   Allergen Reactions   • Contrast Dye Hives and Itching       Medications:   Home Medications:  Current Outpatient Medications on File Prior to Visit   Medication Sig   • albuterol (PROVENTIL) (2.5 MG/3ML) 0.083% nebulizer solution Take 2.5 mg by nebulization Every 4 (Four) Hours As Needed for Shortness of Air.   • albuterol sulfate  (90 Base) MCG/ACT inhaler Inhale 2 puffs Every 4 (Four) Hours As Needed. PRN SOA/WHEEZING   • aspirin 81 MG EC tablet Take 1 tablet by mouth Daily.   • atorvastatin (LIPITOR) 10 MG tablet Take 1 tablet by mouth Daily.   • carvedilol (COREG) 25 MG tablet TAKE 1 & 1/2 (ONE & ONE-HALF) TABLETS BY MOUTH TWICE DAILY WITH FOOD   • digoxin (LANOXIN) 125 MCG tablet Take 1 tablet by mouth Every Other Day.   • furosemide (LASIX) 40 MG tablet Take 1 tablet in the morning   • Januvia 100 MG tablet Take 1 tablet by mouth once daily   • losartan (Cozaar) 25 MG tablet Take 1 tablet by mouth Daily.   • potassium chloride 10 MEQ CR tablet Take 2 tablets by mouth Daily. (Patient taking differently: Take 10 mEq by mouth Daily. No longer BID)   • TRAVATAN Z 0.004 % solution ophthalmic solution Administer 1 drop to both  eyes Every Morning. Not night   • vitamin D (ERGOCALCIFEROL) 1.25 MG (57408 UT) capsule capsule Take 1 capsule by mouth 1 (One) Time Per Week.   • warfarin (COUMADIN) 5 MG tablet TAKE ONE-HALF TABLET (2.5 MG) ON Monday AND Friday AND 1 TABLET (5 MG) BY MOUTH ONCE DAILY OR  AS  DIRECTED  BY  MEDICATION  MANAGEMENT  CLINIC   • Blood Glucose Monitoring Suppl (ACCU-CHEK GUIDE) w/Device kit See Admin Instructions.   • glucose blood test strip Use as instructed   • HYDROcodone-acetaminophen (NORCO) 5-325 MG per tablet Take 1 tablet by mouth 2 (Two) Times a Day As Needed for Severe Pain .   • Lancets (ACCU-CHEK MULTICLIX) lancets Use 1 lancet per finger stick     No current facility-administered medications on file prior to visit.     Current Medications:  Scheduled Meds:  Continuous Infusions:No current facility-administered medications for this visit.    PRN Meds:.    Past Medical History:   Diagnosis Date   • Acute on chronic diastolic heart failure ([facility])    • Arthritis    • Asthma    • Atrial fibrillation ([facility])     Persistent; on warfarin   • Atypical chest pain    • Biliary acute pancreatitis without necrosis or infection 11/10/2021   • Bronchitis    • Cellulitis of right elbow     due to MRSA   • CHF (congestive heart failure) ([facility])    • COPD (chronic obstructive pulmonary disease) ([facility])    • Coronary artery disease     Cardiac catheterization completed; 90% PDA stenosis with medical management recommended   • Coronary artery disease involving native coronary artery of native heart with angina pectoris with documented spasm ([facility])    • Diabetes 1.5, managed as type 2 ([facility])    • Disease of thyroid gland    • Displacement of lumbar intervertebral disc without myelopathy    • Dizziness    • ANTOINE (dyspnea on exertion)    • Essential hypertension    • Fatigue    • Generalized osteoarthritis of multiple sites    • History of rheumatic fever    • History of transfusion    • Hx of bone density study     10/23/2014   •  Hyperglycemia    • Hyperlipidemia    • Hypovitaminosis D    • Left arm pain    • Left-sided weakness    • Leg swelling    • Low back pain    • Lower extremity edema    • Malaise and fatigue    • Mitral valve disease     Moderate mitral valve prolapse and moderate mitral regurgitation   • Mitral valve insufficiency    • Morbid obesity with BMI of 40.0-44.9, adult ([facility])    • MRSA infection    • NSTEMI (non-ST elevated myocardial infarction) ([facility])    • PAF (paroxysmal atrial fibrillation) ([facility])    • RA (rheumatoid arthritis) ([facility])     involving both hands   • Sleep apnea    • SOB (shortness of breath)    • Stroke ([facility])     left side weakness   • Urge incontinence of urine    • UTI (urinary tract infection) 2020   • Vitamin D deficiency         Past Surgical History:   Procedure Laterality Date   • BREAST BIOPSY     • BREAST SURGERY      right side lumpectomy with biopsy   • CARDIAC CATHETERIZATION Left 10/20/2017    Procedure: Cardiac Catheterization/Vascular Study;  Surgeon: Alphonso Olmedo MD;  Location: [facility] CATH INVASIVE LOCATION;  Service:    • CARDIAC CATHETERIZATION N/A 10/20/2017    +2 mitral regurgitation, left main 10% stenosis, mid to distal LAD 10% diffuse stenosis, circumflex 10% diffuse stenosis, RCA 10% proximal stenosis, and distal PDA consistent with coronary embolus with a lesion of 90% too small to consider coronary intervention; medical management recommended   • EYE SURGERY      laser surgery for glaucoma and left eye cataracts removed   • HYSTERECTOMY      10+ years ago   • JOINT REPLACEMENT  ;     bilateral knees and left rotater   • KNEE SURGERY     • MAMMO BILATERAL  2016    [facility]         Social History     Occupational History   • 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     Employer: RETIRED   Tobacco Use   • Smoking status: Former Smoker     Packs/day: 3.00     Types: Cigarettes     Start date: 1967     Quit date: 10/19/1968     Years since quittin.5  "  • Smokeless tobacco: Never Used   • Tobacco comment: caffeine use - decaf coffee   Substance and Sexual Activity   • Alcohol use: No     Comment: No caffeine use   • Drug use: No   • Sexual activity: Defer      Social History     Social History Narrative   • Not on file        Family History   Problem Relation Age of Onset   • Colon cancer Mother    • Glaucoma Mother    • Stroke Mother    • Arthritis Mother    • Hypertension Mother    • Glaucoma Sister    • Diabetes Sister    • Heart disease Sister    • Arthritis Sister    • Asthma Sister    • Hypertension Sister    • Miscarriages / Stillbirths Sister    • Lung disease Sister    • Heart disease Brother    • Diabetes Brother    • Arthritis Brother    • Drug abuse Brother    • Hypertension Brother    • Arthritis Father    • COPD Father    • Lung disease Father    • Arthritis Daughter    • Depression Daughter    • Alcohol abuse Maternal Uncle    • Heart disease Sister    • Heart disease Sister    • Heart disease Brother              Review of Systems: 14 point review of systems more for the left shoulder pain only the remainder negative per the patient    Review of Systems      Physical Exam: 76 y.o. female  General Appearance:    Alert, cooperative, in no acute distress                   Vitals:    04/11/22 1431   Temp: 97 °F (36.1 °C)   Weight: 115 kg (252 lb 9.6 oz)   Height: 167.6 cm (66\")   PainSc:   7      Patient is alert and read ×3 no acute distress appears her above-listed at height weight and age.  Affect is normal respiratory rate is normal unlabored. Heart rate regular rate rhythm, sclera, dentition and hearing are normal for the purpose of this exam.    Ortho Exam    Large Joint Arthrocentesis: L glenohumeral  Date/Time: 4/11/2022 3:05 PM  Consent given by: patient  Site marked: site marked  Timeout: Immediately prior to procedure a time out was called to verify the correct patient, procedure, equipment, support staff and site/side marked as required "   Supporting Documentation  Indications: pain   Procedure Details  Location: shoulder - L glenohumeral  Preparation: Patient was prepped and draped in the usual sterile fashion  Needle gauge: 21G.  Approach: posterior  Medications administered: 80 mg methylPREDNISolone acetate 80 MG/ML; 4 mL lidocaine (cardiac)  Patient tolerance: patient tolerated the procedure well with no immediate complications                Radiology:   AP, Scapular Y and Axillary Lateral of the left shoulder were ordered/reviewed to evauate shoulder pain.  I have compared to previous films she does have significant glenohumeral arthritis with loss of joint space multiple loose bodies no significant change from last x-rays over a year ago  Imaging Results (Most Recent)     Procedure Component Value Units Date/Time    XR Shoulder 2+ View Left [[id number]] Resulted: 04/11/22 1457     Updated: 04/11/22 1457    Impression:      Ordering physician's impression is located in the Encounter Note dated 04/11/22. X-ray performed in the DR room.          Assessment/Plan: Left shoulder pain this is arthritis right long discussion regarding options ending steroid injections quite reasonable she was agreeable to do that she understands her risk are higher with her Coumadin diabetes.  Also the other option of shoulder replacement.  She understands she can get these periodically if they fail to improve her symptoms we will have her see Dr. Joe Maine Injection. See procedure note.  Cortisone Injection for DIAGNOSTIC and THERAPUTIC purposes.

## 2022-04-13 RX ORDER — METHYLPREDNISOLONE ACETATE 80 MG/ML
80 INJECTION, SUSPENSION INTRA-ARTICULAR; INTRALESIONAL; INTRAMUSCULAR; SOFT TISSUE
Status: COMPLETED | OUTPATIENT
Start: 2022-04-11 | End: 2022-04-11

## 2022-05-09 ENCOUNTER — ANTICOAGULATION VISIT (OUTPATIENT)
Dept: PHARMACY | Facility: HOSPITAL | Age: 77
End: 2022-05-09

## 2022-05-09 ENCOUNTER — TELEPHONE (OUTPATIENT)
Dept: PHARMACY | Facility: HOSPITAL | Age: 77
End: 2022-05-09

## 2022-05-09 LAB
INR PPP: 1.7 (ref 0.91–1.09)
PROTHROMBIN TIME: 20.7 SECONDS (ref 10–13.8)

## 2022-05-09 PROCEDURE — 85610 PROTHROMBIN TIME: CPT

## 2022-05-09 PROCEDURE — G0463 HOSPITAL OUTPT CLINIC VISIT: HCPCS

## 2022-05-09 PROCEDURE — 36416 COLLJ CAPILLARY BLOOD SPEC: CPT

## 2022-05-09 NOTE — PROGRESS NOTES
Anticoagulation Clinic Progress Note    Anticoagulation Summary  As of 2022    INR goal:  2.0-3.0   TTR:  63.3 % (3.6 y)   INR used for dosin.7 (2022)   Warfarin maintenance plan:  2.5 mg every Mon, Fri; 5 mg all other days   Weekly warfarin total:  30 mg   Plan last modified:  Love Allison RPH (2/10/2022)   Next INR check:  2022   Priority:  Maintenance   Target end date:  Indefinite    Indications    Atrial fibrillation (HCC) [I48.91]             Anticoagulation Episode Summary     INR check location:      Preferred lab:      Send INR reminders to:  [facility]    Comments:        Anticoagulation Care Providers     Provider Role Specialty Phone number    Pia Dueñas MD Referring Cardiology [phone]          Clinic Interview:  Patient Findings     Positives:  Missed doses    Negatives:  Signs/symptoms of thrombosis, Signs/symptoms of bleeding,   Laboratory test error suspected, Change in health, Change in alcohol use,   Change in activity, Upcoming invasive procedure, Emergency department   visit, Upcoming dental procedure, Extra doses, Change in medications,   Change in diet/appetite, Hospital admission, Bruising, Other complaints      Clinical Outcomes     Negatives:  Major bleeding event, Thromboembolic event,   Anticoagulation-related hospital admission, Anticoagulation-related ED   visit, Anticoagulation-related fatality        INR History:  Anticoagulation Monitoring 2022   INR 2.4 2.3 1.7   INR Date 2022   INR Goal 2.0-3.0 2.0-3.0 2.0-3.0   Trend Same Same Same   Last Week Total 30 mg 30 mg 30 mg   Next Week Total 30 mg 30 mg 32.5 mg   Sun 5 mg 5 mg 5 mg   Mon 2.5 mg 2.5 mg 5 mg (); Otherwise 2.5 mg   Tue 5 mg 5 mg 5 mg   Wed 5 mg 5 mg 5 mg   Thu 5 mg 5 mg 5 mg   Fri 2.5 mg 2.5 mg 2.5 mg   Sat 5 mg 5 mg 5 mg   Visit Report - - -   Some recent data might be hidden       Plan:  1. INR is Subtherapeutic today- see above  in Anticoagulation Summary.  Will instruct Lana Yañez to Change their warfarin regimen- see above in Anticoagulation Summary.  2. Follow up in 2.5 weeks (coordinate with  chemo appointments)  3. Patient declines warfarin refills.  4. Verbal and written information provided. Patient expresses understanding and has no further questions at this time.    Cindy Jones RP

## 2022-05-20 ENCOUNTER — OFFICE VISIT (OUTPATIENT)
Dept: INTERNAL MEDICINE | Facility: CLINIC | Age: 77
End: 2022-05-20

## 2022-05-20 VITALS
HEART RATE: 98 BPM | OXYGEN SATURATION: 97 % | TEMPERATURE: 97.1 F | HEIGHT: 66 IN | DIASTOLIC BLOOD PRESSURE: 64 MMHG | SYSTOLIC BLOOD PRESSURE: 126 MMHG | BODY MASS INDEX: 40.11 KG/M2 | WEIGHT: 249.6 LBS

## 2022-05-20 DIAGNOSIS — E78.2 MIXED HYPERLIPIDEMIA: ICD-10-CM

## 2022-05-20 DIAGNOSIS — N18.31 STAGE 3A CHRONIC KIDNEY DISEASE: ICD-10-CM

## 2022-05-20 DIAGNOSIS — Z00.00 MEDICARE ANNUAL WELLNESS VISIT, SUBSEQUENT: Primary | ICD-10-CM

## 2022-05-20 DIAGNOSIS — M54.50 ACUTE RIGHT-SIDED LOW BACK PAIN WITHOUT SCIATICA: ICD-10-CM

## 2022-05-20 DIAGNOSIS — I10 ESSENTIAL HYPERTENSION: ICD-10-CM

## 2022-05-20 DIAGNOSIS — R30.0 DYSURIA: ICD-10-CM

## 2022-05-20 PROBLEM — E11.65 TYPE 2 DIABETES MELLITUS WITH HYPERGLYCEMIA, WITHOUT LONG-TERM CURRENT USE OF INSULIN: Chronic | Status: ACTIVE | Noted: 2021-11-08

## 2022-05-20 LAB
BACTERIA UR QL AUTO: ABNORMAL /HPF
BILIRUB UR QL STRIP: NEGATIVE
CLARITY UR: ABNORMAL
COLOR UR: YELLOW
GLUCOSE UR STRIP-MCNC: NEGATIVE MG/DL
HGB UR QL STRIP.AUTO: ABNORMAL
HYALINE CASTS UR QL AUTO: ABNORMAL /LPF
KETONES UR QL STRIP: NEGATIVE
LEUKOCYTE ESTERASE UR QL STRIP.AUTO: ABNORMAL
MUCOUS THREADS URNS QL MICRO: ABNORMAL /HPF
NITRITE UR QL STRIP: POSITIVE
PH UR STRIP.AUTO: 5.5 [PH] (ref 5–8)
PROT UR QL STRIP: NEGATIVE
RBC # UR STRIP: ABNORMAL /HPF
REF LAB TEST METHOD: ABNORMAL
SP GR UR STRIP: 1.02 (ref 1–1.03)
SQUAMOUS #/AREA URNS HPF: ABNORMAL /HPF
UROBILINOGEN UR QL STRIP: ABNORMAL
WBC # UR STRIP: ABNORMAL /HPF

## 2022-05-20 PROCEDURE — G0439 PPPS, SUBSEQ VISIT: HCPCS | Performed by: FAMILY MEDICINE

## 2022-05-20 PROCEDURE — 81001 URINALYSIS AUTO W/SCOPE: CPT | Performed by: FAMILY MEDICINE

## 2022-05-20 PROCEDURE — 1170F FXNL STATUS ASSESSED: CPT | Performed by: FAMILY MEDICINE

## 2022-05-20 PROCEDURE — 1159F MED LIST DOCD IN RCRD: CPT | Performed by: FAMILY MEDICINE

## 2022-05-20 PROCEDURE — 1125F AMNT PAIN NOTED PAIN PRSNT: CPT | Performed by: FAMILY MEDICINE

## 2022-05-20 PROCEDURE — 96160 PT-FOCUSED HLTH RISK ASSMT: CPT | Performed by: FAMILY MEDICINE

## 2022-05-20 PROCEDURE — 99214 OFFICE O/P EST MOD 30 MIN: CPT | Performed by: FAMILY MEDICINE

## 2022-05-20 RX ORDER — CARVEDILOL 25 MG/1
37.5 TABLET ORAL 2 TIMES DAILY WITH MEALS
Qty: 270 TABLET | Refills: 4 | Status: SHIPPED | OUTPATIENT
Start: 2022-05-20 | End: 2022-05-27 | Stop reason: HOSPADM

## 2022-05-20 NOTE — PROGRESS NOTES
The ABCs of the Annual Wellness Visit  Subsequent Medicare Wellness Visit    Chief Complaint   Patient presents with   • Medicare Wellness-subsequent      Subjective    History of Present Illness:  Lana Yañez is a 76 y.o. female who presents for a Subsequent Medicare Wellness Visit.    The following portions of the patient's history were reviewed and   updated as appropriate: allergies, current medications, past family history, past medical history, past social history, past surgical history and problem list    Compared to one year ago, the patient feels her physical   health is worse.    Compared to one year ago, the patient feels her mental   health is the same.    Chronic care management code:    Hypertension - stable.  Patient taking medication as prescribed.  Denies chest pain, shortness of breath, headache, lower extremity edema.  Patient is taking losartan 25 mg daily.     HLD-stable, recently got labs and were unremarkable..  Patient taking atorvastatin 10 mg as prescribed.  Trying to adhere to a balanced diet.     CKD stage 3a - stable as of recent labs from February 2022.  Working on staying hydrated and avoiding nephrotoxic agents.    She has a pain that is radiating from mid back around the right to the RLQ.  No rash has been seen.  She says she was not able to follow-up with PM and not on the amount of pain meds.      Recent Hospitalizations:  This patient has had a [facility] admission record on file within the last 365 days.    Current Medical Providers:  Patient Care Team:  Will Madden MD as PCP - General (Family Medicine)  Pia Dueñas MD as Consulting Physician (Cardiology)  Iain Hill MD as Consulting Physician (Pulmonary Disease)  Carmen Kirk MD as Consulting Physician (Pain Medicine)  Nano Cool RPH as Pharmacist  Shaw Hand, PharmD as Pharmacist (Pharmacy)    Outpatient Medications Prior to Visit   Medication Sig Dispense Refill   • albuterol (PROVENTIL)  (2.5 MG/3ML) 0.083% nebulizer solution Take 2.5 mg by nebulization Every 4 (Four) Hours As Needed for Shortness of Air. 40 each 11   • albuterol sulfate  (90 Base) MCG/ACT inhaler Inhale 2 puffs Every 4 (Four) Hours As Needed. PRN SOA/WHEEZING     • aspirin 81 MG EC tablet Take 1 tablet by mouth Daily.     • atorvastatin (LIPITOR) 10 MG tablet Take 1 tablet by mouth Daily. 90 tablet 1   • Blood Glucose Monitoring Suppl (ACCU-CHEK GUIDE) w/Device kit See Admin Instructions.     • digoxin (LANOXIN) 125 MCG tablet Take 1 tablet by mouth Every Other Day. 45 tablet 1   • furosemide (LASIX) 40 MG tablet Take 1 tablet in the morning 90 tablet 0   • glucose blood test strip Use as instructed 300 each 12   • HYDROcodone-acetaminophen (NORCO) 5-325 MG per tablet Take 1 tablet by mouth 2 (Two) Times a Day As Needed for Severe Pain . 20 tablet 0   • Januvia 100 MG tablet Take 1 tablet by mouth once daily 90 tablet 0   • Lancets (ACCU-CHEK MULTICLIX) lancets Use 1 lancet per finger stick 204 each 12   • losartan (Cozaar) 25 MG tablet Take 1 tablet by mouth Daily. 90 tablet 3   • potassium chloride 10 MEQ CR tablet Take 2 tablets by mouth Daily. (Patient taking differently: Take 10 mEq by mouth Daily. No longer BID) 180 tablet 3   • TRAVATAN Z 0.004 % solution ophthalmic solution Administer 1 drop to both eyes Every Morning. Not night     • vitamin D (ERGOCALCIFEROL) 1.25 MG (16284 UT) capsule capsule Take 1 capsule by mouth 1 (One) Time Per Week. 12 capsule 3   • warfarin (COUMADIN) 5 MG tablet TAKE ONE-HALF TABLET (2.5 MG) ON Monday AND Friday AND 1 TABLET (5 MG) BY MOUTH ONCE DAILY OR  AS  DIRECTED  BY  MEDICATION  MANAGEMENT  CLINIC 90 tablet 1   • carvedilol (COREG) 25 MG tablet TAKE 1 & 1/2 (ONE & ONE-HALF) TABLETS BY MOUTH TWICE DAILY WITH FOOD 270 tablet 1     No facility-administered medications prior to visit.       Opioid medication/s are on active medication list.  and I have evaluated her active treatment plan  and pain score trends (see table).  Vitals:    05/20/22 1415   PainSc:   8   PainLoc: Back     I have reviewed the chart for potential of high risk medication and harmful drug interactions in the elderly.            Aspirin is on active medication list. Aspirin use is indicated based on review of current medical condition/s. Pros and cons of this therapy have been discussed today. Benefits of this medication outweigh potential harm.  Patient has been encouraged to continue taking this medication.  .      Patient Active Problem List   Diagnosis   • Mixed hyperlipidemia   • Vitamin deficiency   • Essential hypertension   • Rheumatoid arthritis involving both hands ([facility])   • Fatigue   • ANTOINE (dyspnea on exertion)   • Dysuria   • Urge incontinence of urine   • Hypovitaminosis D   • Sleep apnea   • Encounter for screening colonoscopy   • Bronchitis   • Cough   • Generalized osteoarthritis of multiple sites   • Cellulitis of right elbow due to MRSA   • Medicare annual wellness visit, initial   • Hospital discharge follow-up   • Displacement of lumbar intervertebral disc without myelopathy   • Lumbar disc herniation   • Chronic atrial fibrillation ([facility])   • History of MRSA infection   • Left-sided weakness   • Atrial fibrillation ([facility])   • Left shoulder pain   • Relative lymphocytosis   • Nausea   • Weakness   • Controlled substance agreement signed   • Coronary artery disease involving native coronary artery of native heart without angina pectoris   • SOB (shortness of breath)   • Lower extremity edema   • Acute on chronic diastolic heart failure ([facility])   • Adhesive capsulitis of left shoulder   • CVA tenderness   • Costochondritis, acute   • Allergic reaction   • 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[facility])   • Visit for screening mammogram   • Low back pain   • Urgency of urination   • Hyperglycemia   • Carpal tunnel syndrome of left wrist/ wakes her up   • Localized edema   • Acute cystitis without hematuria   • Nitrofurantoin adverse  "reaction   • Bruising   • History of stroke   • Diabetes 1.5, managed as type 2 (HCC)   • Other chronic pain   • Vaginal candidiasis   • Pulmonary hypertension (HCC)   • Acute respiratory failure with hypoxia (HCC)   • Hypokalemia   • Hypomagnesemia   • Type 2 diabetes mellitus with hyperglycemia, without long-term current use of insulin (HCC)   • Diarrhea   • Sepsis without acute organ dysfunction (HCC)   • Morbid obesity with BMI of 40.0-44.9, adult (HCC)   • Biliary acute pancreatitis without necrosis or infection   • Stage 3a chronic kidney disease ([facility])     Advance Care Planning  Advance Directive is not on file.  ACP discussion was held with the patient during this visit. Patient has an advance directive (not in EMR), copy requested.          Objective    Vitals:    22 1415   BP: 126/64   BP Location: Right arm   Patient Position: Sitting   Cuff Size: Adult   Pulse: 98   Temp: 97.1 °F (36.2 °C)   TempSrc: Temporal   SpO2: 97%   Weight: 113 kg (249 lb 9.6 oz)   Height: 167.6 cm (66\")   PainSc:   8   PainLoc: Back     Does the patient have evidence of cognitive impairment? No    Physical Exam  Vitals and nursing note reviewed.   Constitutional:       General: She is not in acute distress.     Appearance: Normal appearance.   Cardiovascular:      Rate and Rhythm: Normal rate and regular rhythm.      Heart sounds: Normal heart sounds. No murmur heard.  Pulmonary:      Effort: Pulmonary effort is normal.      Breath sounds: Normal breath sounds.   Neurological:      Mental Status: She is alert.                 HEALTH RISK ASSESSMENT    Smoking Status:  Social History     Tobacco Use   Smoking Status Former Smoker   • Packs/day: 3.00   • Types: Cigarettes   • Start date: 1967   • Quit date: 10/19/1968   • Years since quittin.6   Smokeless Tobacco Never Used   Tobacco Comment    caffeine use - decaf coffee     Alcohol Consumption:  Social History     Substance and Sexual Activity   Alcohol Use No    " Comment: No caffeine use     Fall Risk Screen:    KELLI Fall Risk Assessment was completed, and patient is at LOW risk for falls.Assessment completed on:5/20/2022    Depression Screening:  PHQ-2/PHQ-9 Depression Screening 5/20/2022   Retired Total Score -   Little Interest or Pleasure in Doing Things 0-->not at all   Feeling Down, Depressed or Hopeless 0-->not at all   PHQ-9: Brief Depression Severity Measure Score 0       Health Habits and Functional and Cognitive Screening:  Functional & Cognitive Status 5/20/2022   Do you have difficulty preparing food and eating? No   Do you have difficulty bathing yourself, getting dressed or grooming yourself? No   Do you have difficulty using the toilet? No   Do you have difficulty moving around from place to place? No   Do you have trouble with steps or getting out of a bed or a chair? Yes   Current Diet Well Balanced Diet   Dental Exam Not up to date   Eye Exam Up to date   Exercise (times per week) 0 times per week   Current Exercises Include No Regular Exercise   Current Exercise Activities Include -   Do you need help using the phone?  No   Are you deaf or do you have serious difficulty hearing?  No   Do you need help with transportation? No   Do you need help shopping? No   Do you need help preparing meals?  Yes   Do you need help with housework?  Yes   Do you need help with laundry? Yes   Do you need help taking your medications? No   Do you need help managing money? No   Do you ever drive or ride in a car without wearing a seat belt? No   Have you felt unusual stress, anger or loneliness in the last month? No   Who do you live with? Spouse   If you need help, do you have trouble finding someone available to you? No   Have you been bothered in the last four weeks by sexual problems? No   Do you have difficulty concentrating, remembering or making decisions? No       Age-appropriate Screening Schedule:  Refer to the list below for future screening recommendations based  on patient's age, sex and/or medical conditions. Orders for these recommended tests are listed in the plan section. The patient has been provided with a written plan.    Health Maintenance   Topic Date Due   • ZOSTER VACCINE (2 of 2) 01/30/2018   • DIABETIC EYE EXAM  09/21/2021   • DIABETIC FOOT EXAM  02/04/2022   • INFLUENZA VACCINE  08/01/2022   • HEMOGLOBIN A1C  08/09/2022   • MAMMOGRAM  10/21/2022   • LIPID PANEL  02/09/2023   • URINE MICROALBUMIN  02/09/2023   • DXA SCAN  08/12/2023   • TDAP/TD VACCINES (3 - Td or Tdap) 06/09/2027              Common labs    Common Labsle 11/22/21 12/16/21 2/9/22 2/9/22 2/9/22 2/9/22      1524 1524 1524 1524   Glucose 264 (A) 138 (A)       BUN 33 (A) 15       Creatinine 1.43 (A) 1.13 (A) 1.20 (A)      eGFR Non  Am 36 (A)  45 (A)      eGFR  Am 42 (A) 57 (A) 51 (A)      Sodium 139 143       Potassium 4.6 3.9       Chloride 100 105       Calcium 10.0 9.9       Total Cholesterol      93 (A)   Triglycerides      73   HDL Cholesterol      49   LDL Cholesterol       28   Hemoglobin A1C    7.5 (A)     Microalbumin, Urine     28.8    (A) Abnormal value       Comments are available for some flowsheets but are not being displayed.               Assessment & Plan   CMS Preventative Services Quick Reference  Risk Factors Identified During Encounter  Chronic Pain   The above risks/problems have been discussed with the patient.  Follow up actions/plans if indicated are seen below in the Assessment/Plan Section.  Pertinent information has been shared with the patient in the After Visit Summary.    Diagnoses and all orders for this visit:    1. Medicare annual wellness visit, subsequent (Primary)    2. Essential hypertension  -     carvedilol (COREG) 25 MG tablet; Take 1.5 tablets by mouth 2 (Two) Times a Day With Meals.  Dispense: 270 tablet; Refill: 4    3. Mixed hyperlipidemia    4. Stage 3a chronic kidney disease (HCC)    5. Acute right-sided low back pain without sciatica  -      Urine Culture - Urine, Urine, Clean Catch  -     Urinalysis With Microscopic - Urine, Clean Catch    6. Dysuria  -     Urine Culture - Urine, Urine, Clean Catch  -     Urinalysis With Microscopic - Urine, Clean Catch       Stable chronic conditions as above.  Continue all medications as above.  Will get a urine to investigate the back pain but I have a high suspicion it is likely from not having her pain medication.  I encouraged her to make an appointment with her pain doctor for a follow-up.  She has been helping her  with his medical issues and has been unable to get in there recently.      Follow Up:   Return in about 6 months (around 11/21/2022) for Next scheduled follow up.     An After Visit Summary and PPPS were made available to the patient.

## 2022-05-21 ENCOUNTER — APPOINTMENT (OUTPATIENT)
Dept: GENERAL RADIOLOGY | Facility: HOSPITAL | Age: 77
End: 2022-05-21

## 2022-05-21 ENCOUNTER — HOSPITAL ENCOUNTER (INPATIENT)
Facility: HOSPITAL | Age: 77
LOS: 5 days | Discharge: HOME HEALTH | End: 2022-05-27
Attending: EMERGENCY MEDICINE | Admitting: INTERNAL MEDICINE

## 2022-05-21 DIAGNOSIS — E83.42 HYPOMAGNESEMIA: ICD-10-CM

## 2022-05-21 DIAGNOSIS — J81.0 FLASH PULMONARY EDEMA: ICD-10-CM

## 2022-05-21 DIAGNOSIS — R73.9 HYPERGLYCEMIA: ICD-10-CM

## 2022-05-21 DIAGNOSIS — I48.91 ATRIAL FIBRILLATION WITH RVR: ICD-10-CM

## 2022-05-21 DIAGNOSIS — R06.03 RESPIRATORY DISTRESS: Primary | ICD-10-CM

## 2022-05-21 DIAGNOSIS — N18.9 CHRONIC KIDNEY DISEASE, UNSPECIFIED CKD STAGE: ICD-10-CM

## 2022-05-21 DIAGNOSIS — N30.01 ACUTE CYSTITIS WITH HEMATURIA: Primary | ICD-10-CM

## 2022-05-21 LAB
ARTERIAL PATENCY WRIST A: POSITIVE
ATMOSPHERIC PRESS: 752.2 MMHG
BASE EXCESS BLDA CALC-SCNC: 0.9 MMOL/L (ref 0–2)
BDY SITE: ABNORMAL
HCO3 BLDA-SCNC: 27.7 MMOL/L (ref 22–28)
INHALED O2 CONCENTRATION: 100 %
MODALITY: ABNORMAL
NT-PROBNP SERPL-MCNC: 808 PG/ML (ref 0–900)
O2 A-A PPRESDIFF RESPIRATORY: 0.2 MMHG
PCO2 BLDA: 51.9 MM HG (ref 35–45)
PH BLDA: 7.34 PH UNITS (ref 7.35–7.45)
PO2 BLDA: 114.6 MM HG (ref 80–100)
SAO2 % BLDCOA: 98.1 % (ref 92–99)
SARS-COV-2 RNA PNL SPEC NAA+PROBE: NOT DETECTED
SET MECH RESP RATE: 20
TOTAL RATE: 33 BREATHS/MINUTE
TROPONIN T SERPL-MCNC: <0.01 NG/ML (ref 0–0.03)
VT ON VENT VENT: 474 ML

## 2022-05-21 PROCEDURE — 99285 EMERGENCY DEPT VISIT HI MDM: CPT

## 2022-05-21 PROCEDURE — 93010 ELECTROCARDIOGRAM REPORT: CPT | Performed by: INTERNAL MEDICINE

## 2022-05-21 PROCEDURE — 80053 COMPREHEN METABOLIC PANEL: CPT | Performed by: EMERGENCY MEDICINE

## 2022-05-21 PROCEDURE — 82803 BLOOD GASES ANY COMBINATION: CPT

## 2022-05-21 PROCEDURE — 36600 WITHDRAWAL OF ARTERIAL BLOOD: CPT

## 2022-05-21 PROCEDURE — 94799 UNLISTED PULMONARY SVC/PX: CPT

## 2022-05-21 PROCEDURE — 71045 X-RAY EXAM CHEST 1 VIEW: CPT

## 2022-05-21 PROCEDURE — 80162 ASSAY OF DIGOXIN TOTAL: CPT | Performed by: EMERGENCY MEDICINE

## 2022-05-21 PROCEDURE — 84145 PROCALCITONIN (PCT): CPT | Performed by: EMERGENCY MEDICINE

## 2022-05-21 PROCEDURE — 83880 ASSAY OF NATRIURETIC PEPTIDE: CPT | Performed by: EMERGENCY MEDICINE

## 2022-05-21 PROCEDURE — 84484 ASSAY OF TROPONIN QUANT: CPT | Performed by: EMERGENCY MEDICINE

## 2022-05-21 PROCEDURE — 94660 CPAP INITIATION&MGMT: CPT

## 2022-05-21 PROCEDURE — 85007 BL SMEAR W/DIFF WBC COUNT: CPT | Performed by: EMERGENCY MEDICINE

## 2022-05-21 PROCEDURE — 87635 SARS-COV-2 COVID-19 AMP PRB: CPT | Performed by: EMERGENCY MEDICINE

## 2022-05-21 PROCEDURE — 85025 COMPLETE CBC W/AUTO DIFF WBC: CPT | Performed by: EMERGENCY MEDICINE

## 2022-05-21 PROCEDURE — 93005 ELECTROCARDIOGRAM TRACING: CPT | Performed by: EMERGENCY MEDICINE

## 2022-05-21 PROCEDURE — 83735 ASSAY OF MAGNESIUM: CPT | Performed by: EMERGENCY MEDICINE

## 2022-05-21 RX ORDER — NITROGLYCERIN 20 MG/100ML
50-400 INJECTION INTRAVENOUS
Status: DISCONTINUED | OUTPATIENT
Start: 2022-05-21 | End: 2022-05-22

## 2022-05-21 RX ORDER — NITROFURANTOIN 25; 75 MG/1; MG/1
100 CAPSULE ORAL 2 TIMES DAILY
Qty: 10 CAPSULE | Refills: 0 | Status: SHIPPED | OUTPATIENT
Start: 2022-05-21 | End: 2022-05-27 | Stop reason: HOSPADM

## 2022-05-21 RX ORDER — MAGNESIUM SULFATE HEPTAHYDRATE 40 MG/ML
2 INJECTION, SOLUTION INTRAVENOUS ONCE
Status: COMPLETED | OUTPATIENT
Start: 2022-05-21 | End: 2022-05-22

## 2022-05-21 RX ADMIN — NITROGLYCERIN 100 MCG/MIN: 20 INJECTION INTRAVENOUS at 23:37

## 2022-05-22 PROBLEM — I69.354 HEMIPARESIS OF LEFT NONDOMINANT SIDE AS LATE EFFECT OF CEREBRAL INFARCTION: Chronic | Status: ACTIVE | Noted: 2022-05-22

## 2022-05-22 PROBLEM — R06.03 RESPIRATORY DISTRESS: Status: RESOLVED | Noted: 2022-05-22 | Resolved: 2022-05-22

## 2022-05-22 PROBLEM — N39.0 ACUTE UTI (URINARY TRACT INFECTION): Status: ACTIVE | Noted: 2022-05-22

## 2022-05-22 PROBLEM — R06.03 RESPIRATORY DISTRESS: Status: ACTIVE | Noted: 2022-05-22

## 2022-05-22 LAB
ALBUMIN SERPL-MCNC: 4.3 G/DL (ref 3.5–5.2)
ALBUMIN/GLOB SERPL: 0.9 G/DL
ALP SERPL-CCNC: 70 U/L (ref 39–117)
ALT SERPL W P-5'-P-CCNC: 14 U/L (ref 1–33)
ANION GAP SERPL CALCULATED.3IONS-SCNC: 10 MMOL/L (ref 5–15)
ANION GAP SERPL CALCULATED.3IONS-SCNC: 12.1 MMOL/L (ref 5–15)
ANION GAP SERPL CALCULATED.3IONS-SCNC: 20.2 MMOL/L (ref 5–15)
APTT PPP: 26.2 SECONDS (ref 22.7–35.4)
AST SERPL-CCNC: 39 U/L (ref 1–32)
BASOPHILS # BLD AUTO: 0.01 10*3/MM3 (ref 0–0.2)
BASOPHILS NFR BLD AUTO: 0.1 % (ref 0–1.5)
BILIRUB SERPL-MCNC: 0.8 MG/DL (ref 0–1.2)
BUN SERPL-MCNC: 15 MG/DL (ref 8–23)
BUN SERPL-MCNC: 16 MG/DL (ref 8–23)
BUN SERPL-MCNC: 21 MG/DL (ref 8–23)
BUN/CREAT SERPL: 11.6 (ref 7–25)
BUN/CREAT SERPL: 14 (ref 7–25)
BUN/CREAT SERPL: 15.7 (ref 7–25)
CALCIUM SPEC-SCNC: 10.2 MG/DL (ref 8.6–10.5)
CALCIUM SPEC-SCNC: 9.7 MG/DL (ref 8.6–10.5)
CALCIUM SPEC-SCNC: 9.7 MG/DL (ref 8.6–10.5)
CHLORIDE SERPL-SCNC: 102 MMOL/L (ref 98–107)
CHLORIDE SERPL-SCNC: 98 MMOL/L (ref 98–107)
CHLORIDE SERPL-SCNC: 99 MMOL/L (ref 98–107)
CO2 SERPL-SCNC: 16.8 MMOL/L (ref 22–29)
CO2 SERPL-SCNC: 26.9 MMOL/L (ref 22–29)
CO2 SERPL-SCNC: 27 MMOL/L (ref 22–29)
CREAT SERPL-MCNC: 1.14 MG/DL (ref 0.57–1)
CREAT SERPL-MCNC: 1.29 MG/DL (ref 0.57–1)
CREAT SERPL-MCNC: 1.34 MG/DL (ref 0.57–1)
DEPRECATED RDW RBC AUTO: 47.2 FL (ref 37–54)
DEPRECATED RDW RBC AUTO: 49 FL (ref 37–54)
DIGOXIN SERPL-MCNC: <0.3 NG/ML (ref 0.6–1.2)
EGFRCR SERPLBLD CKD-EPI 2021: 41.7 ML/MIN/1.73
EGFRCR SERPLBLD CKD-EPI 2021: 43.6 ML/MIN/1.73
EGFRCR SERPLBLD CKD-EPI 2021: 50.6 ML/MIN/1.73
EOSINOPHIL # BLD AUTO: 0.02 10*3/MM3 (ref 0–0.4)
EOSINOPHIL NFR BLD AUTO: 0.3 % (ref 0.3–6.2)
ERYTHROCYTE [DISTWIDTH] IN BLOOD BY AUTOMATED COUNT: 14 % (ref 12.3–15.4)
ERYTHROCYTE [DISTWIDTH] IN BLOOD BY AUTOMATED COUNT: 14.2 % (ref 12.3–15.4)
GLOBULIN UR ELPH-MCNC: 4.7 GM/DL
GLUCOSE BLDC GLUCOMTR-MCNC: 109 MG/DL (ref 70–130)
GLUCOSE BLDC GLUCOMTR-MCNC: 136 MG/DL (ref 70–130)
GLUCOSE BLDC GLUCOMTR-MCNC: 194 MG/DL (ref 70–130)
GLUCOSE BLDC GLUCOMTR-MCNC: 195 MG/DL (ref 70–130)
GLUCOSE SERPL-MCNC: 171 MG/DL (ref 65–99)
GLUCOSE SERPL-MCNC: 181 MG/DL (ref 65–99)
GLUCOSE SERPL-MCNC: 195 MG/DL (ref 65–99)
HBA1C MFR BLD: 7.7 % (ref 4.8–5.6)
HCT VFR BLD AUTO: 40.8 % (ref 34–46.6)
HCT VFR BLD AUTO: 44 % (ref 34–46.6)
HGB BLD-MCNC: 12.8 G/DL (ref 12–15.9)
HGB BLD-MCNC: 13.7 G/DL (ref 12–15.9)
INR PPP: 1.96 (ref 0.9–1.1)
INR PPP: 2.09 (ref 0.9–1.1)
LYMPHOCYTES # BLD AUTO: 1.47 10*3/MM3 (ref 0.7–3.1)
LYMPHOCYTES NFR BLD AUTO: 21.1 % (ref 19.6–45.3)
MAGNESIUM SERPL-MCNC: 1.5 MG/DL (ref 1.6–2.4)
MAGNESIUM SERPL-MCNC: 1.8 MG/DL (ref 1.6–2.4)
MCH RBC QN AUTO: 29.3 PG (ref 26.6–33)
MCH RBC QN AUTO: 29.4 PG (ref 26.6–33)
MCHC RBC AUTO-ENTMCNC: 31.1 G/DL (ref 31.5–35.7)
MCHC RBC AUTO-ENTMCNC: 31.4 G/DL (ref 31.5–35.7)
MCV RBC AUTO: 93.8 FL (ref 79–97)
MCV RBC AUTO: 94.2 FL (ref 79–97)
MONOCYTES # BLD AUTO: 0.2 10*3/MM3 (ref 0.1–0.9)
MONOCYTES NFR BLD AUTO: 2.9 % (ref 5–12)
NEUTROPHILS NFR BLD AUTO: 5.25 10*3/MM3 (ref 1.7–7)
NEUTROPHILS NFR BLD AUTO: 75.2 % (ref 42.7–76)
PLAT MORPH BLD: NORMAL
PLATELET # BLD AUTO: 130 10*3/MM3 (ref 140–450)
PLATELET # BLD AUTO: 139 10*3/MM3 (ref 140–450)
PMV BLD AUTO: 11.2 FL (ref 6–12)
PMV BLD AUTO: 11.6 FL (ref 6–12)
POTASSIUM SERPL-SCNC: 3.8 MMOL/L (ref 3.5–5.2)
POTASSIUM SERPL-SCNC: 4 MMOL/L (ref 3.5–5.2)
POTASSIUM SERPL-SCNC: 5.3 MMOL/L (ref 3.5–5.2)
PROCALCITONIN SERPL-MCNC: 0.08 NG/ML (ref 0–0.25)
PROT SERPL-MCNC: 9 G/DL (ref 6–8.5)
PROTHROMBIN TIME: 21.9 SECONDS (ref 11.7–14.2)
PROTHROMBIN TIME: 23 SECONDS (ref 11.7–14.2)
QT INTERVAL: 295 MS
RBC # BLD AUTO: 4.35 10*6/MM3 (ref 3.77–5.28)
RBC # BLD AUTO: 4.67 10*6/MM3 (ref 3.77–5.28)
RBC MORPH BLD: NORMAL
SODIUM SERPL-SCNC: 135 MMOL/L (ref 136–145)
SODIUM SERPL-SCNC: 138 MMOL/L (ref 136–145)
SODIUM SERPL-SCNC: 139 MMOL/L (ref 136–145)
WBC MORPH BLD: NORMAL
WBC NRBC COR # BLD: 6.98 10*3/MM3 (ref 3.4–10.8)
WBC NRBC COR # BLD: 6.98 10*3/MM3 (ref 3.4–10.8)

## 2022-05-22 PROCEDURE — 80048 BASIC METABOLIC PNL TOTAL CA: CPT | Performed by: NURSE PRACTITIONER

## 2022-05-22 PROCEDURE — 36415 COLL VENOUS BLD VENIPUNCTURE: CPT | Performed by: NURSE PRACTITIONER

## 2022-05-22 PROCEDURE — 99999 PR OFFICE/OUTPT VISIT,PROCEDURE ONLY: CPT | Performed by: INTERNAL MEDICINE

## 2022-05-22 PROCEDURE — 97161 PT EVAL LOW COMPLEX 20 MIN: CPT

## 2022-05-22 PROCEDURE — 25010000002 CEFTRIAXONE PER 250 MG: Performed by: NURSE PRACTITIONER

## 2022-05-22 PROCEDURE — 83735 ASSAY OF MAGNESIUM: CPT | Performed by: NURSE PRACTITIONER

## 2022-05-22 PROCEDURE — 63710000001 INSULIN LISPRO (HUMAN) PER 5 UNITS: Performed by: NURSE PRACTITIONER

## 2022-05-22 PROCEDURE — 94799 UNLISTED PULMONARY SVC/PX: CPT

## 2022-05-22 PROCEDURE — 83036 HEMOGLOBIN GLYCOSYLATED A1C: CPT | Performed by: NURSE PRACTITIONER

## 2022-05-22 PROCEDURE — 99222 1ST HOSP IP/OBS MODERATE 55: CPT | Performed by: INTERNAL MEDICINE

## 2022-05-22 PROCEDURE — 25010000002 MAGNESIUM SULFATE 2 GM/50ML SOLUTION: Performed by: EMERGENCY MEDICINE

## 2022-05-22 PROCEDURE — 25010000002 FUROSEMIDE PER 20 MG: Performed by: EMERGENCY MEDICINE

## 2022-05-22 PROCEDURE — 85027 COMPLETE CBC AUTOMATED: CPT | Performed by: NURSE PRACTITIONER

## 2022-05-22 PROCEDURE — 85610 PROTHROMBIN TIME: CPT | Performed by: EMERGENCY MEDICINE

## 2022-05-22 PROCEDURE — 85610 PROTHROMBIN TIME: CPT | Performed by: NURSE PRACTITIONER

## 2022-05-22 PROCEDURE — 82962 GLUCOSE BLOOD TEST: CPT

## 2022-05-22 PROCEDURE — 25010000002 FUROSEMIDE PER 20 MG: Performed by: NURSE PRACTITIONER

## 2022-05-22 PROCEDURE — 85730 THROMBOPLASTIN TIME PARTIAL: CPT | Performed by: EMERGENCY MEDICINE

## 2022-05-22 RX ORDER — WARFARIN SODIUM 5 MG/1
5 TABLET ORAL
Status: DISCONTINUED | OUTPATIENT
Start: 2022-05-22 | End: 2022-05-22 | Stop reason: DRUGHIGH

## 2022-05-22 RX ORDER — ATORVASTATIN CALCIUM 20 MG/1
10 TABLET, FILM COATED ORAL NIGHTLY
Status: DISCONTINUED | OUTPATIENT
Start: 2022-05-22 | End: 2022-05-27 | Stop reason: HOSPADM

## 2022-05-22 RX ORDER — FUROSEMIDE 10 MG/ML
80 INJECTION INTRAMUSCULAR; INTRAVENOUS ONCE
Status: COMPLETED | OUTPATIENT
Start: 2022-05-22 | End: 2022-05-22

## 2022-05-22 RX ORDER — ASPIRIN 81 MG/1
81 TABLET ORAL DAILY
Status: DISCONTINUED | OUTPATIENT
Start: 2022-05-22 | End: 2022-05-27 | Stop reason: HOSPADM

## 2022-05-22 RX ORDER — NITROGLYCERIN 0.4 MG/1
0.4 TABLET SUBLINGUAL
Status: DISCONTINUED | OUTPATIENT
Start: 2022-05-22 | End: 2022-05-27 | Stop reason: HOSPADM

## 2022-05-22 RX ORDER — SODIUM CHLORIDE 0.9 % (FLUSH) 0.9 %
10 SYRINGE (ML) INJECTION AS NEEDED
Status: DISCONTINUED | OUTPATIENT
Start: 2022-05-22 | End: 2022-05-27 | Stop reason: HOSPADM

## 2022-05-22 RX ORDER — ONDANSETRON 2 MG/ML
4 INJECTION INTRAMUSCULAR; INTRAVENOUS EVERY 6 HOURS PRN
Status: DISCONTINUED | OUTPATIENT
Start: 2022-05-22 | End: 2022-05-27 | Stop reason: HOSPADM

## 2022-05-22 RX ORDER — INSULIN LISPRO 100 [IU]/ML
0-9 INJECTION, SOLUTION INTRAVENOUS; SUBCUTANEOUS
Status: DISCONTINUED | OUTPATIENT
Start: 2022-05-22 | End: 2022-05-27 | Stop reason: HOSPADM

## 2022-05-22 RX ORDER — SODIUM CHLORIDE 0.9 % (FLUSH) 0.9 %
10 SYRINGE (ML) INJECTION EVERY 12 HOURS SCHEDULED
Status: DISCONTINUED | OUTPATIENT
Start: 2022-05-22 | End: 2022-05-27 | Stop reason: HOSPADM

## 2022-05-22 RX ORDER — LATANOPROST 50 UG/ML
1 SOLUTION/ DROPS OPHTHALMIC NIGHTLY
Status: DISCONTINUED | OUTPATIENT
Start: 2022-05-22 | End: 2022-05-27 | Stop reason: HOSPADM

## 2022-05-22 RX ORDER — CALCIUM CARBONATE 200(500)MG
2 TABLET,CHEWABLE ORAL 2 TIMES DAILY PRN
Status: DISCONTINUED | OUTPATIENT
Start: 2022-05-22 | End: 2022-05-27 | Stop reason: HOSPADM

## 2022-05-22 RX ORDER — NICOTINE POLACRILEX 4 MG
15 LOZENGE BUCCAL
Status: DISCONTINUED | OUTPATIENT
Start: 2022-05-22 | End: 2022-05-27 | Stop reason: HOSPADM

## 2022-05-22 RX ORDER — HYDROCODONE BITARTRATE AND ACETAMINOPHEN 5; 325 MG/1; MG/1
1 TABLET ORAL 2 TIMES DAILY PRN
Status: DISCONTINUED | OUTPATIENT
Start: 2022-05-22 | End: 2022-05-27 | Stop reason: HOSPADM

## 2022-05-22 RX ORDER — ALBUTEROL SULFATE 2.5 MG/3ML
2.5 SOLUTION RESPIRATORY (INHALATION) EVERY 4 HOURS PRN
Status: DISCONTINUED | OUTPATIENT
Start: 2022-05-22 | End: 2022-05-27 | Stop reason: HOSPADM

## 2022-05-22 RX ORDER — DEXTROSE MONOHYDRATE 25 G/50ML
25 INJECTION, SOLUTION INTRAVENOUS
Status: DISCONTINUED | OUTPATIENT
Start: 2022-05-22 | End: 2022-05-27 | Stop reason: HOSPADM

## 2022-05-22 RX ORDER — ONDANSETRON 4 MG/1
4 TABLET, FILM COATED ORAL EVERY 6 HOURS PRN
Status: DISCONTINUED | OUTPATIENT
Start: 2022-05-22 | End: 2022-05-27 | Stop reason: HOSPADM

## 2022-05-22 RX ORDER — ACETAMINOPHEN 650 MG/1
650 SUPPOSITORY RECTAL EVERY 4 HOURS PRN
Status: DISCONTINUED | OUTPATIENT
Start: 2022-05-22 | End: 2022-05-27 | Stop reason: HOSPADM

## 2022-05-22 RX ORDER — WARFARIN SODIUM 2.5 MG/1
2.5 TABLET ORAL 2 TIMES WEEKLY
Status: DISCONTINUED | OUTPATIENT
Start: 2022-05-23 | End: 2022-05-22

## 2022-05-22 RX ORDER — FUROSEMIDE 10 MG/ML
40 INJECTION INTRAMUSCULAR; INTRAVENOUS EVERY 12 HOURS
Status: DISCONTINUED | OUTPATIENT
Start: 2022-05-22 | End: 2022-05-23

## 2022-05-22 RX ORDER — WARFARIN SODIUM 5 MG/1
5 TABLET ORAL
Status: DISCONTINUED | OUTPATIENT
Start: 2022-05-22 | End: 2022-05-22

## 2022-05-22 RX ORDER — ACETAMINOPHEN 160 MG/5ML
650 SOLUTION ORAL EVERY 4 HOURS PRN
Status: DISCONTINUED | OUTPATIENT
Start: 2022-05-22 | End: 2022-05-27 | Stop reason: HOSPADM

## 2022-05-22 RX ORDER — DIGOXIN 125 MCG
125 TABLET ORAL EVERY OTHER DAY
Status: DISCONTINUED | OUTPATIENT
Start: 2022-05-22 | End: 2022-05-24

## 2022-05-22 RX ORDER — ACETAMINOPHEN 325 MG/1
650 TABLET ORAL EVERY 4 HOURS PRN
Status: DISCONTINUED | OUTPATIENT
Start: 2022-05-22 | End: 2022-05-27 | Stop reason: HOSPADM

## 2022-05-22 RX ADMIN — CEFTRIAXONE 1 G: 1 INJECTION, POWDER, FOR SOLUTION INTRAMUSCULAR; INTRAVENOUS at 10:17

## 2022-05-22 RX ADMIN — FUROSEMIDE 40 MG: 10 INJECTION, SOLUTION INTRAMUSCULAR; INTRAVENOUS at 08:05

## 2022-05-22 RX ADMIN — Medication 10 ML: at 21:27

## 2022-05-22 RX ADMIN — MAGNESIUM SULFATE 2 G: 2 INJECTION INTRAVENOUS at 00:07

## 2022-05-22 RX ADMIN — ACETAMINOPHEN 650 MG: 325 TABLET ORAL at 08:05

## 2022-05-22 RX ADMIN — DIGOXIN 125 MCG: 125 TABLET ORAL at 10:17

## 2022-05-22 RX ADMIN — ATORVASTATIN CALCIUM 10 MG: 20 TABLET, FILM COATED ORAL at 21:27

## 2022-05-22 RX ADMIN — Medication 10 ML: at 08:06

## 2022-05-22 RX ADMIN — FUROSEMIDE 80 MG: 10 INJECTION, SOLUTION INTRAMUSCULAR; INTRAVENOUS at 01:30

## 2022-05-22 RX ADMIN — FUROSEMIDE 40 MG: 10 INJECTION, SOLUTION INTRAMUSCULAR; INTRAVENOUS at 21:28

## 2022-05-22 RX ADMIN — METOPROLOL TARTRATE 25 MG: 25 TABLET, FILM COATED ORAL at 04:20

## 2022-05-22 RX ADMIN — Medication 10 ML: at 04:20

## 2022-05-22 RX ADMIN — METOPROLOL TARTRATE 5 MG: 1 INJECTION, SOLUTION INTRAVENOUS at 01:44

## 2022-05-22 RX ADMIN — HYDROCODONE BITARTRATE AND ACETAMINOPHEN 1 TABLET: 5; 325 TABLET ORAL at 11:27

## 2022-05-22 RX ADMIN — INSULIN LISPRO 2 UNITS: 100 INJECTION, SOLUTION INTRAVENOUS; SUBCUTANEOUS at 13:17

## 2022-05-22 RX ADMIN — METOPROLOL TARTRATE 5 MG: 1 INJECTION, SOLUTION INTRAVENOUS at 00:06

## 2022-05-22 RX ADMIN — INSULIN LISPRO 2 UNITS: 100 INJECTION, SOLUTION INTRAVENOUS; SUBCUTANEOUS at 08:06

## 2022-05-22 RX ADMIN — ASPIRIN 81 MG: 81 TABLET, COATED ORAL at 10:17

## 2022-05-23 ENCOUNTER — APPOINTMENT (OUTPATIENT)
Dept: GENERAL RADIOLOGY | Facility: HOSPITAL | Age: 77
End: 2022-05-23

## 2022-05-23 ENCOUNTER — TELEPHONE (OUTPATIENT)
Dept: INTERNAL MEDICINE | Facility: CLINIC | Age: 77
End: 2022-05-23

## 2022-05-23 ENCOUNTER — APPOINTMENT (OUTPATIENT)
Dept: CARDIOLOGY | Facility: HOSPITAL | Age: 77
End: 2022-05-23

## 2022-05-23 LAB
ANION GAP SERPL CALCULATED.3IONS-SCNC: 11 MMOL/L (ref 5–15)
AORTIC DIMENSIONLESS INDEX: 0.6 (DI)
BH CV ECHO MEAS - ACS: 1.39 CM
BH CV ECHO MEAS - AO MAX PG: 12.4 MMHG
BH CV ECHO MEAS - AO MEAN PG: 6.5 MMHG
BH CV ECHO MEAS - AO ROOT DIAM: 2.09 CM
BH CV ECHO MEAS - AO V2 MAX: 176.2 CM/SEC
BH CV ECHO MEAS - AO V2 VTI: 29 CM
BH CV ECHO MEAS - AVA(I,D): 1.82 CM2
BH CV ECHO MEAS - EDV(CUBED): 88.8 ML
BH CV ECHO MEAS - EDV(MOD-SP2): 103 ML
BH CV ECHO MEAS - EDV(MOD-SP4): 96 ML
BH CV ECHO MEAS - EF(MOD-BP): 60.5 %
BH CV ECHO MEAS - EF(MOD-SP2): 52.4 %
BH CV ECHO MEAS - EF(MOD-SP4): 67.7 %
BH CV ECHO MEAS - ESV(CUBED): 27.4 ML
BH CV ECHO MEAS - ESV(MOD-SP2): 49 ML
BH CV ECHO MEAS - ESV(MOD-SP4): 31 ML
BH CV ECHO MEAS - FS: 32.5 %
BH CV ECHO MEAS - IVS/LVPW: 0.99 CM
BH CV ECHO MEAS - IVSD: 1.04 CM
BH CV ECHO MEAS - LA DIMENSION: 4.3 CM
BH CV ECHO MEAS - LAT PEAK E' VEL: 9.6 CM/SEC
BH CV ECHO MEAS - LV MASS(C)D: 161.8 GRAMS
BH CV ECHO MEAS - LV MAX PG: 4.7 MMHG
BH CV ECHO MEAS - LV MEAN PG: 2.7 MMHG
BH CV ECHO MEAS - LV V1 MAX: 108.7 CM/SEC
BH CV ECHO MEAS - LV V1 VTI: 17.3 CM
BH CV ECHO MEAS - LVIDD: 4.5 CM
BH CV ECHO MEAS - LVIDS: 3 CM
BH CV ECHO MEAS - LVOT AREA: 3.1 CM2
BH CV ECHO MEAS - LVOT DIAM: 1.97 CM
BH CV ECHO MEAS - LVPWD: 1.06 CM
BH CV ECHO MEAS - MED PEAK E' VEL: 6.9 CM/SEC
BH CV ECHO MEAS - MR MAX PG: 55.2 MMHG
BH CV ECHO MEAS - MR MAX VEL: 371.4 CM/SEC
BH CV ECHO MEAS - MV DEC SLOPE: 521.4 CM/SEC2
BH CV ECHO MEAS - MV DEC TIME: 0.21 MSEC
BH CV ECHO MEAS - MV E MAX VEL: 126 CM/SEC
BH CV ECHO MEAS - MV MAX PG: 6.7 MMHG
BH CV ECHO MEAS - MV MEAN PG: 2.8 MMHG
BH CV ECHO MEAS - MV P1/2T: 76.2 MSEC
BH CV ECHO MEAS - MV V2 VTI: 27 CM
BH CV ECHO MEAS - MVA(P1/2T): 2.9 CM2
BH CV ECHO MEAS - MVA(VTI): 1.96 CM2
BH CV ECHO MEAS - PA ACC TIME: 0.07 SEC
BH CV ECHO MEAS - PA PR(ACCEL): 48.1 MMHG
BH CV ECHO MEAS - PA V2 MAX: 111.3 CM/SEC
BH CV ECHO MEAS - QP/QS: 0.67
BH CV ECHO MEAS - RAP SYSTOLE: 3 MMHG
BH CV ECHO MEAS - RV MAX PG: 3.3 MMHG
BH CV ECHO MEAS - RV V1 MAX: 91.5 CM/SEC
BH CV ECHO MEAS - RV V1 VTI: 16.1 CM
BH CV ECHO MEAS - RVOT DIAM: 1.68 CM
BH CV ECHO MEAS - RVSP: 42 MMHG
BH CV ECHO MEAS - SV(LVOT): 52.9 ML
BH CV ECHO MEAS - SV(MOD-SP2): 54 ML
BH CV ECHO MEAS - SV(MOD-SP4): 65 ML
BH CV ECHO MEAS - SV(RVOT): 35.5 ML
BH CV ECHO MEAS - TAPSE (>1.6): 1.7 CM
BH CV ECHO MEAS - TR MAX PG: 38.6 MMHG
BH CV ECHO MEAS - TR MAX VEL: 310.7 CM/SEC
BH CV ECHO MEASUREMENTS AVERAGE E/E' RATIO: 15.27
BH CV XLRA - RV BASE: 3.3 CM
BH CV XLRA - RV LENGTH: 5.8 CM
BH CV XLRA - RV MID: 2.7 CM
BH CV XLRA - TDI S': 10.3 CM/SEC
BUN SERPL-MCNC: 19 MG/DL (ref 8–23)
BUN/CREAT SERPL: 19 (ref 7–25)
CALCIUM SPEC-SCNC: 9.2 MG/DL (ref 8.6–10.5)
CHLORIDE SERPL-SCNC: 98 MMOL/L (ref 98–107)
CO2 SERPL-SCNC: 28 MMOL/L (ref 22–29)
CREAT SERPL-MCNC: 1 MG/DL (ref 0.57–1)
DEPRECATED RDW RBC AUTO: 45 FL (ref 37–54)
EGFRCR SERPLBLD CKD-EPI 2021: 59.2 ML/MIN/1.73
ERYTHROCYTE [DISTWIDTH] IN BLOOD BY AUTOMATED COUNT: 14.1 % (ref 12.3–15.4)
GLUCOSE BLDC GLUCOMTR-MCNC: 112 MG/DL (ref 70–130)
GLUCOSE BLDC GLUCOMTR-MCNC: 115 MG/DL (ref 70–130)
GLUCOSE BLDC GLUCOMTR-MCNC: 142 MG/DL (ref 70–130)
GLUCOSE BLDC GLUCOMTR-MCNC: 152 MG/DL (ref 70–130)
GLUCOSE SERPL-MCNC: 107 MG/DL (ref 65–99)
HCT VFR BLD AUTO: 33.6 % (ref 34–46.6)
HGB BLD-MCNC: 11.6 G/DL (ref 12–15.9)
INR PPP: 2.07 (ref 0.9–1.1)
LEFT ATRIUM VOLUME INDEX: 33 ML/M2
MAGNESIUM SERPL-MCNC: 1.9 MG/DL (ref 1.6–2.4)
MAXIMAL PREDICTED HEART RATE: 146 BPM
MCH RBC QN AUTO: 30.2 PG (ref 26.6–33)
MCHC RBC AUTO-ENTMCNC: 34.5 G/DL (ref 31.5–35.7)
MCV RBC AUTO: 87.5 FL (ref 79–97)
PLATELET # BLD AUTO: 129 10*3/MM3 (ref 140–450)
PMV BLD AUTO: 11.4 FL (ref 6–12)
POTASSIUM SERPL-SCNC: 3.2 MMOL/L (ref 3.5–5.2)
PROTHROMBIN TIME: 22.8 SECONDS (ref 11.7–14.2)
RBC # BLD AUTO: 3.84 10*6/MM3 (ref 3.77–5.28)
SINUS: 3.1 CM
SODIUM SERPL-SCNC: 137 MMOL/L (ref 136–145)
STJ: 2.7 CM
STRESS TARGET HR: 124 BPM
WBC NRBC COR # BLD: 12.06 10*3/MM3 (ref 3.4–10.8)

## 2022-05-23 PROCEDURE — 25010000002 PERFLUTREN (DEFINITY) 8.476 MG IN SODIUM CHLORIDE (PF) 0.9 % 10 ML INJECTION: Performed by: INTERNAL MEDICINE

## 2022-05-23 PROCEDURE — 71045 X-RAY EXAM CHEST 1 VIEW: CPT

## 2022-05-23 PROCEDURE — 97530 THERAPEUTIC ACTIVITIES: CPT

## 2022-05-23 PROCEDURE — 82962 GLUCOSE BLOOD TEST: CPT

## 2022-05-23 PROCEDURE — 99232 SBSQ HOSP IP/OBS MODERATE 35: CPT | Performed by: INTERNAL MEDICINE

## 2022-05-23 PROCEDURE — 25010000002 CEFTRIAXONE PER 250 MG: Performed by: NURSE PRACTITIONER

## 2022-05-23 PROCEDURE — 83735 ASSAY OF MAGNESIUM: CPT | Performed by: HOSPITALIST

## 2022-05-23 PROCEDURE — 93306 TTE W/DOPPLER COMPLETE: CPT

## 2022-05-23 PROCEDURE — 85610 PROTHROMBIN TIME: CPT | Performed by: NURSE PRACTITIONER

## 2022-05-23 PROCEDURE — 85027 COMPLETE CBC AUTOMATED: CPT | Performed by: NURSE PRACTITIONER

## 2022-05-23 PROCEDURE — 97165 OT EVAL LOW COMPLEX 30 MIN: CPT

## 2022-05-23 PROCEDURE — 93306 TTE W/DOPPLER COMPLETE: CPT | Performed by: INTERNAL MEDICINE

## 2022-05-23 PROCEDURE — 80048 BASIC METABOLIC PNL TOTAL CA: CPT | Performed by: NURSE PRACTITIONER

## 2022-05-23 RX ORDER — WARFARIN SODIUM 5 MG/1
5 TABLET ORAL
Status: DISCONTINUED | OUTPATIENT
Start: 2022-05-24 | End: 2022-05-26

## 2022-05-23 RX ORDER — FUROSEMIDE 40 MG/1
40 TABLET ORAL
Status: DISCONTINUED | OUTPATIENT
Start: 2022-05-23 | End: 2022-05-25

## 2022-05-23 RX ORDER — WARFARIN SODIUM 2.5 MG/1
2.5 TABLET ORAL 2 TIMES WEEKLY
Status: DISCONTINUED | OUTPATIENT
Start: 2022-05-23 | End: 2022-05-26

## 2022-05-23 RX ADMIN — ACETAMINOPHEN 650 MG: 325 TABLET ORAL at 23:50

## 2022-05-23 RX ADMIN — Medication 10 ML: at 20:08

## 2022-05-23 RX ADMIN — WARFARIN 2.5 MG: 2.5 TABLET ORAL at 18:32

## 2022-05-23 RX ADMIN — FUROSEMIDE 40 MG: 40 TABLET ORAL at 12:33

## 2022-05-23 RX ADMIN — METOPROLOL TARTRATE 25 MG: 25 TABLET, FILM COATED ORAL at 12:34

## 2022-05-23 RX ADMIN — ASPIRIN 81 MG: 81 TABLET, COATED ORAL at 12:33

## 2022-05-23 RX ADMIN — CEFTRIAXONE 1 G: 1 INJECTION, POWDER, FOR SOLUTION INTRAMUSCULAR; INTRAVENOUS at 12:34

## 2022-05-23 RX ADMIN — METOPROLOL TARTRATE 25 MG: 25 TABLET, FILM COATED ORAL at 20:09

## 2022-05-23 RX ADMIN — ATORVASTATIN CALCIUM 10 MG: 20 TABLET, FILM COATED ORAL at 20:09

## 2022-05-23 RX ADMIN — PERFLUTREN 2 ML: 6.52 INJECTION, SUSPENSION INTRAVENOUS at 11:14

## 2022-05-23 RX ADMIN — LATANOPROST 1 DROP: 50 SOLUTION OPHTHALMIC at 20:09

## 2022-05-23 RX ADMIN — FUROSEMIDE 40 MG: 40 TABLET ORAL at 18:32

## 2022-05-23 NOTE — TELEPHONE ENCOUNTER
Caller: MICHELLE    Relationship: [facility]     Best call back number:  [phone]     What orders are you requesting (i.e. lab or imaging): SKILLED  NURSING AND PHYSICAL THERAPY     In what timeframe would the patient need to come in: ASAP     Where will you receive your lab/imaging services: [facility]     Additional notes: REQUESTING VERBAL ORDER

## 2022-05-24 LAB
ALBUMIN SERPL-MCNC: 3.5 G/DL (ref 3.5–5.2)
ALBUMIN/GLOB SERPL: 0.9 G/DL
ALP SERPL-CCNC: 62 U/L (ref 39–117)
ALT SERPL W P-5'-P-CCNC: 8 U/L (ref 1–33)
ANION GAP SERPL CALCULATED.3IONS-SCNC: 14 MMOL/L (ref 5–15)
AST SERPL-CCNC: 13 U/L (ref 1–32)
BILIRUB SERPL-MCNC: 1.5 MG/DL (ref 0–1.2)
BUN SERPL-MCNC: 20 MG/DL (ref 8–23)
BUN/CREAT SERPL: 17.5 (ref 7–25)
CALCIUM SPEC-SCNC: 9.9 MG/DL (ref 8.6–10.5)
CHLORIDE SERPL-SCNC: 96 MMOL/L (ref 98–107)
CO2 SERPL-SCNC: 31 MMOL/L (ref 22–29)
CREAT SERPL-MCNC: 1.14 MG/DL (ref 0.57–1)
DEPRECATED RDW RBC AUTO: 47.8 FL (ref 37–54)
EGFRCR SERPLBLD CKD-EPI 2021: 50.6 ML/MIN/1.73
EOSINOPHIL # BLD MANUAL: 0.21 10*3/MM3 (ref 0–0.4)
EOSINOPHIL NFR BLD MANUAL: 2 % (ref 0.3–6.2)
ERYTHROCYTE [DISTWIDTH] IN BLOOD BY AUTOMATED COUNT: 14 % (ref 12.3–15.4)
GLOBULIN UR ELPH-MCNC: 3.9 GM/DL
GLUCOSE BLDC GLUCOMTR-MCNC: 110 MG/DL (ref 70–130)
GLUCOSE BLDC GLUCOMTR-MCNC: 130 MG/DL (ref 70–130)
GLUCOSE BLDC GLUCOMTR-MCNC: 175 MG/DL (ref 70–130)
GLUCOSE BLDC GLUCOMTR-MCNC: 189 MG/DL (ref 70–130)
GLUCOSE SERPL-MCNC: 124 MG/DL (ref 65–99)
HCT VFR BLD AUTO: 38.4 % (ref 34–46.6)
HGB BLD-MCNC: 12.2 G/DL (ref 12–15.9)
INR PPP: 1.84 (ref 0.9–1.1)
LYMPHOCYTES # BLD MANUAL: 2.89 10*3/MM3 (ref 0.7–3.1)
LYMPHOCYTES NFR BLD MANUAL: 1 % (ref 5–12)
MCH RBC QN AUTO: 29.3 PG (ref 26.6–33)
MCHC RBC AUTO-ENTMCNC: 31.8 G/DL (ref 31.5–35.7)
MCV RBC AUTO: 92.3 FL (ref 79–97)
METAMYELOCYTES NFR BLD MANUAL: 1 % (ref 0–0)
MONOCYTES # BLD: 0.11 10*3/MM3 (ref 0.1–0.9)
NEUTROPHILS # BLD AUTO: 7.39 10*3/MM3 (ref 1.7–7)
NEUTROPHILS NFR BLD MANUAL: 69 % (ref 42.7–76)
PLAT MORPH BLD: NORMAL
PLATELET # BLD AUTO: 141 10*3/MM3 (ref 140–450)
PMV BLD AUTO: 11.5 FL (ref 6–12)
POLYCHROMASIA BLD QL SMEAR: ABNORMAL
POTASSIUM SERPL-SCNC: 3.2 MMOL/L (ref 3.5–5.2)
PROT SERPL-MCNC: 7.4 G/DL (ref 6–8.5)
PROTHROMBIN TIME: 20.9 SECONDS (ref 11.7–14.2)
RBC # BLD AUTO: 4.16 10*6/MM3 (ref 3.77–5.28)
SODIUM SERPL-SCNC: 141 MMOL/L (ref 136–145)
VARIANT LYMPHS NFR BLD MANUAL: 27 % (ref 19.6–45.3)
WBC MORPH BLD: NORMAL
WBC NRBC COR # BLD: 10.71 10*3/MM3 (ref 3.4–10.8)

## 2022-05-24 PROCEDURE — 63710000001 INSULIN LISPRO (HUMAN) PER 5 UNITS: Performed by: NURSE PRACTITIONER

## 2022-05-24 PROCEDURE — 97110 THERAPEUTIC EXERCISES: CPT

## 2022-05-24 PROCEDURE — 99232 SBSQ HOSP IP/OBS MODERATE 35: CPT | Performed by: INTERNAL MEDICINE

## 2022-05-24 PROCEDURE — 25010000002 CEFTRIAXONE PER 250 MG: Performed by: NURSE PRACTITIONER

## 2022-05-24 PROCEDURE — 85007 BL SMEAR W/DIFF WBC COUNT: CPT | Performed by: HOSPITALIST

## 2022-05-24 PROCEDURE — 85610 PROTHROMBIN TIME: CPT | Performed by: NURSE PRACTITIONER

## 2022-05-24 PROCEDURE — 25010000002 MAGNESIUM SULFATE 2 GM/50ML SOLUTION: Performed by: INTERNAL MEDICINE

## 2022-05-24 PROCEDURE — 80053 COMPREHEN METABOLIC PANEL: CPT | Performed by: HOSPITALIST

## 2022-05-24 PROCEDURE — 85025 COMPLETE CBC W/AUTO DIFF WBC: CPT | Performed by: HOSPITALIST

## 2022-05-24 PROCEDURE — 82962 GLUCOSE BLOOD TEST: CPT

## 2022-05-24 RX ORDER — POTASSIUM CHLORIDE 1.5 G/1.77G
40 POWDER, FOR SOLUTION ORAL AS NEEDED
Status: DISCONTINUED | OUTPATIENT
Start: 2022-05-24 | End: 2022-05-27 | Stop reason: HOSPADM

## 2022-05-24 RX ORDER — DIGOXIN 125 MCG
125 TABLET ORAL DAILY
Status: DISCONTINUED | OUTPATIENT
Start: 2022-05-24 | End: 2022-05-27 | Stop reason: HOSPADM

## 2022-05-24 RX ORDER — POLYETHYLENE GLYCOL 3350 17 G/17G
17 POWDER, FOR SOLUTION ORAL DAILY
Status: DISCONTINUED | OUTPATIENT
Start: 2022-05-24 | End: 2022-05-27 | Stop reason: HOSPADM

## 2022-05-24 RX ORDER — POTASSIUM CHLORIDE 750 MG/1
40 TABLET, FILM COATED, EXTENDED RELEASE ORAL AS NEEDED
Status: DISCONTINUED | OUTPATIENT
Start: 2022-05-24 | End: 2022-05-27 | Stop reason: HOSPADM

## 2022-05-24 RX ORDER — MAGNESIUM SULFATE HEPTAHYDRATE 40 MG/ML
2 INJECTION, SOLUTION INTRAVENOUS ONCE
Status: COMPLETED | OUTPATIENT
Start: 2022-05-24 | End: 2022-05-24

## 2022-05-24 RX ADMIN — Medication 10 ML: at 20:56

## 2022-05-24 RX ADMIN — FUROSEMIDE 40 MG: 40 TABLET ORAL at 17:10

## 2022-05-24 RX ADMIN — HYDROCODONE BITARTRATE AND ACETAMINOPHEN 1 TABLET: 5; 325 TABLET ORAL at 11:00

## 2022-05-24 RX ADMIN — INSULIN LISPRO 2 UNITS: 100 INJECTION, SOLUTION INTRAVENOUS; SUBCUTANEOUS at 12:03

## 2022-05-24 RX ADMIN — METOPROLOL TARTRATE 37.5 MG: 25 TABLET, FILM COATED ORAL at 09:03

## 2022-05-24 RX ADMIN — FUROSEMIDE 40 MG: 40 TABLET ORAL at 09:02

## 2022-05-24 RX ADMIN — POTASSIUM CHLORIDE 40 MEQ: 1.5 POWDER, FOR SOLUTION ORAL at 18:46

## 2022-05-24 RX ADMIN — Medication 10 ML: at 09:03

## 2022-05-24 RX ADMIN — INSULIN LISPRO 2 UNITS: 100 INJECTION, SOLUTION INTRAVENOUS; SUBCUTANEOUS at 20:56

## 2022-05-24 RX ADMIN — DIGOXIN 125 MCG: 125 TABLET ORAL at 09:03

## 2022-05-24 RX ADMIN — ASPIRIN 81 MG: 81 TABLET, COATED ORAL at 09:02

## 2022-05-24 RX ADMIN — CEFTRIAXONE 1 G: 1 INJECTION, POWDER, FOR SOLUTION INTRAMUSCULAR; INTRAVENOUS at 12:03

## 2022-05-24 RX ADMIN — MAGNESIUM SULFATE HEPTAHYDRATE 2 G: 2 INJECTION, SOLUTION INTRAVENOUS at 09:03

## 2022-05-24 RX ADMIN — ATORVASTATIN CALCIUM 10 MG: 20 TABLET, FILM COATED ORAL at 20:56

## 2022-05-24 RX ADMIN — POLYETHYLENE GLYCOL 3350 17 G: 17 POWDER, FOR SOLUTION ORAL at 18:46

## 2022-05-24 RX ADMIN — WARFARIN SODIUM 5 MG: 5 TABLET ORAL at 17:07

## 2022-05-25 ENCOUNTER — APPOINTMENT (OUTPATIENT)
Dept: GENERAL RADIOLOGY | Facility: HOSPITAL | Age: 77
End: 2022-05-25

## 2022-05-25 LAB
ANION GAP SERPL CALCULATED.3IONS-SCNC: 13.9 MMOL/L (ref 5–15)
BUN SERPL-MCNC: 18 MG/DL (ref 8–23)
BUN/CREAT SERPL: 17.6 (ref 7–25)
CALCIUM SPEC-SCNC: 9.2 MG/DL (ref 8.6–10.5)
CHLORIDE SERPL-SCNC: 93 MMOL/L (ref 98–107)
CO2 SERPL-SCNC: 27.1 MMOL/L (ref 22–29)
CREAT SERPL-MCNC: 1.02 MG/DL (ref 0.57–1)
DEPRECATED RDW RBC AUTO: 45.9 FL (ref 37–54)
EGFRCR SERPLBLD CKD-EPI 2021: 57.8 ML/MIN/1.73
ERYTHROCYTE [DISTWIDTH] IN BLOOD BY AUTOMATED COUNT: 13.8 % (ref 12.3–15.4)
GLUCOSE BLDC GLUCOMTR-MCNC: 132 MG/DL (ref 70–130)
GLUCOSE BLDC GLUCOMTR-MCNC: 167 MG/DL (ref 70–130)
GLUCOSE BLDC GLUCOMTR-MCNC: 168 MG/DL (ref 70–130)
GLUCOSE BLDC GLUCOMTR-MCNC: 233 MG/DL (ref 70–130)
GLUCOSE SERPL-MCNC: 198 MG/DL (ref 65–99)
HCT VFR BLD AUTO: 38.3 % (ref 34–46.6)
HGB BLD-MCNC: 12.3 G/DL (ref 12–15.9)
INR PPP: 1.73 (ref 0.9–1.1)
MCH RBC QN AUTO: 29.4 PG (ref 26.6–33)
MCHC RBC AUTO-ENTMCNC: 32.1 G/DL (ref 31.5–35.7)
MCV RBC AUTO: 91.6 FL (ref 79–97)
PLATELET # BLD AUTO: 171 10*3/MM3 (ref 140–450)
PMV BLD AUTO: 11.6 FL (ref 6–12)
POTASSIUM SERPL-SCNC: 3.7 MMOL/L (ref 3.5–5.2)
POTASSIUM SERPL-SCNC: 4 MMOL/L (ref 3.5–5.2)
PROTHROMBIN TIME: 19.9 SECONDS (ref 11.7–14.2)
RBC # BLD AUTO: 4.18 10*6/MM3 (ref 3.77–5.28)
SODIUM SERPL-SCNC: 134 MMOL/L (ref 136–145)
WBC NRBC COR # BLD: 8.71 10*3/MM3 (ref 3.4–10.8)

## 2022-05-25 PROCEDURE — 97110 THERAPEUTIC EXERCISES: CPT

## 2022-05-25 PROCEDURE — 82962 GLUCOSE BLOOD TEST: CPT

## 2022-05-25 PROCEDURE — 99232 SBSQ HOSP IP/OBS MODERATE 35: CPT | Performed by: INTERNAL MEDICINE

## 2022-05-25 PROCEDURE — 25010000002 CEFTRIAXONE PER 250 MG: Performed by: NURSE PRACTITIONER

## 2022-05-25 PROCEDURE — 85027 COMPLETE CBC AUTOMATED: CPT | Performed by: STUDENT IN AN ORGANIZED HEALTH CARE EDUCATION/TRAINING PROGRAM

## 2022-05-25 PROCEDURE — 63710000001 INSULIN LISPRO (HUMAN) PER 5 UNITS: Performed by: NURSE PRACTITIONER

## 2022-05-25 PROCEDURE — 73560 X-RAY EXAM OF KNEE 1 OR 2: CPT

## 2022-05-25 PROCEDURE — 80048 BASIC METABOLIC PNL TOTAL CA: CPT | Performed by: STUDENT IN AN ORGANIZED HEALTH CARE EDUCATION/TRAINING PROGRAM

## 2022-05-25 PROCEDURE — 85610 PROTHROMBIN TIME: CPT | Performed by: NURSE PRACTITIONER

## 2022-05-25 PROCEDURE — 25010000002 FUROSEMIDE PER 20 MG: Performed by: INTERNAL MEDICINE

## 2022-05-25 PROCEDURE — 84132 ASSAY OF SERUM POTASSIUM: CPT | Performed by: INTERNAL MEDICINE

## 2022-05-25 RX ORDER — FUROSEMIDE 10 MG/ML
60 INJECTION INTRAMUSCULAR; INTRAVENOUS EVERY 6 HOURS
Status: COMPLETED | OUTPATIENT
Start: 2022-05-25 | End: 2022-05-25

## 2022-05-25 RX ORDER — FUROSEMIDE 10 MG/ML
60 INJECTION INTRAMUSCULAR; INTRAVENOUS EVERY 12 HOURS
Status: DISCONTINUED | OUTPATIENT
Start: 2022-05-25 | End: 2022-05-25

## 2022-05-25 RX ORDER — SPIRONOLACTONE 25 MG/1
25 TABLET ORAL DAILY
Status: DISCONTINUED | OUTPATIENT
Start: 2022-05-25 | End: 2022-05-27 | Stop reason: HOSPADM

## 2022-05-25 RX ORDER — POTASSIUM CHLORIDE 750 MG/1
80 TABLET, EXTENDED RELEASE ORAL DAILY
Status: DISCONTINUED | OUTPATIENT
Start: 2022-05-25 | End: 2022-05-25

## 2022-05-25 RX ADMIN — DIGOXIN 125 MCG: 125 TABLET ORAL at 12:41

## 2022-05-25 RX ADMIN — POTASSIUM CHLORIDE 40 MEQ: 1.5 POWDER, FOR SOLUTION ORAL at 11:56

## 2022-05-25 RX ADMIN — INSULIN LISPRO 4 UNITS: 100 INJECTION, SOLUTION INTRAVENOUS; SUBCUTANEOUS at 17:34

## 2022-05-25 RX ADMIN — HYDROCODONE BITARTRATE AND ACETAMINOPHEN 1 TABLET: 5; 325 TABLET ORAL at 01:31

## 2022-05-25 RX ADMIN — FUROSEMIDE 60 MG: 10 INJECTION, SOLUTION INTRAMUSCULAR; INTRAVENOUS at 12:41

## 2022-05-25 RX ADMIN — METOPROLOL TARTRATE 37.5 MG: 25 TABLET, FILM COATED ORAL at 20:01

## 2022-05-25 RX ADMIN — HYDROCODONE BITARTRATE AND ACETAMINOPHEN 1 TABLET: 5; 325 TABLET ORAL at 09:25

## 2022-05-25 RX ADMIN — CEFTRIAXONE 1 G: 1 INJECTION, POWDER, FOR SOLUTION INTRAMUSCULAR; INTRAVENOUS at 12:08

## 2022-05-25 RX ADMIN — INSULIN LISPRO 2 UNITS: 100 INJECTION, SOLUTION INTRAVENOUS; SUBCUTANEOUS at 22:14

## 2022-05-25 RX ADMIN — WARFARIN SODIUM 5 MG: 5 TABLET ORAL at 17:33

## 2022-05-25 RX ADMIN — Medication 10 ML: at 09:26

## 2022-05-25 RX ADMIN — ASPIRIN 81 MG: 81 TABLET, COATED ORAL at 09:26

## 2022-05-25 RX ADMIN — FUROSEMIDE 60 MG: 10 INJECTION, SOLUTION INTRAMUSCULAR; INTRAVENOUS at 17:34

## 2022-05-25 RX ADMIN — SPIRONOLACTONE 25 MG: 25 TABLET ORAL at 15:16

## 2022-05-25 RX ADMIN — HYDROCODONE BITARTRATE AND ACETAMINOPHEN 1 TABLET: 5; 325 TABLET ORAL at 22:14

## 2022-05-25 RX ADMIN — METOPROLOL TARTRATE 37.5 MG: 25 TABLET, FILM COATED ORAL at 09:25

## 2022-05-25 RX ADMIN — POTASSIUM CHLORIDE 40 MEQ: 1.5 POWDER, FOR SOLUTION ORAL at 15:16

## 2022-05-25 RX ADMIN — Medication 10 ML: at 20:02

## 2022-05-25 RX ADMIN — ATORVASTATIN CALCIUM 10 MG: 20 TABLET, FILM COATED ORAL at 20:01

## 2022-05-26 LAB
GLUCOSE BLDC GLUCOMTR-MCNC: 151 MG/DL (ref 70–130)
GLUCOSE BLDC GLUCOMTR-MCNC: 154 MG/DL (ref 70–130)
GLUCOSE BLDC GLUCOMTR-MCNC: 163 MG/DL (ref 70–130)
GLUCOSE BLDC GLUCOMTR-MCNC: 212 MG/DL (ref 70–130)
HBV SURFACE AG SERPL QL IA: NORMAL
HCV AB SER DONR QL: NORMAL
HIV1+2 AB SER QL: NORMAL
INR PPP: 1.66 (ref 0.9–1.1)
PROTHROMBIN TIME: 19.3 SECONDS (ref 11.7–14.2)

## 2022-05-26 PROCEDURE — 85610 PROTHROMBIN TIME: CPT | Performed by: NURSE PRACTITIONER

## 2022-05-26 PROCEDURE — 99232 SBSQ HOSP IP/OBS MODERATE 35: CPT | Performed by: INTERNAL MEDICINE

## 2022-05-26 PROCEDURE — 63710000001 INSULIN LISPRO (HUMAN) PER 5 UNITS: Performed by: NURSE PRACTITIONER

## 2022-05-26 PROCEDURE — 97530 THERAPEUTIC ACTIVITIES: CPT

## 2022-05-26 PROCEDURE — 82962 GLUCOSE BLOOD TEST: CPT

## 2022-05-26 PROCEDURE — 86803 HEPATITIS C AB TEST: CPT | Performed by: STUDENT IN AN ORGANIZED HEALTH CARE EDUCATION/TRAINING PROGRAM

## 2022-05-26 PROCEDURE — G0432 EIA HIV-1/HIV-2 SCREEN: HCPCS | Performed by: STUDENT IN AN ORGANIZED HEALTH CARE EDUCATION/TRAINING PROGRAM

## 2022-05-26 PROCEDURE — 97116 GAIT TRAINING THERAPY: CPT

## 2022-05-26 PROCEDURE — 25010000002 CEFTRIAXONE PER 250 MG: Performed by: NURSE PRACTITIONER

## 2022-05-26 PROCEDURE — 87340 HEPATITIS B SURFACE AG IA: CPT | Performed by: STUDENT IN AN ORGANIZED HEALTH CARE EDUCATION/TRAINING PROGRAM

## 2022-05-26 RX ORDER — TORSEMIDE 20 MG/1
40 TABLET ORAL
Status: DISCONTINUED | OUTPATIENT
Start: 2022-05-26 | End: 2022-05-27 | Stop reason: HOSPADM

## 2022-05-26 RX ORDER — WARFARIN SODIUM 7.5 MG/1
7.5 TABLET ORAL
Status: COMPLETED | OUTPATIENT
Start: 2022-05-26 | End: 2022-05-26

## 2022-05-26 RX ORDER — METOPROLOL TARTRATE 50 MG/1
50 TABLET, FILM COATED ORAL EVERY 12 HOURS SCHEDULED
Status: DISCONTINUED | OUTPATIENT
Start: 2022-05-26 | End: 2022-05-27

## 2022-05-26 RX ORDER — ACETAMINOPHEN 325 MG/1
650 TABLET ORAL ONCE
Status: DISCONTINUED | OUTPATIENT
Start: 2022-05-26 | End: 2022-05-27 | Stop reason: HOSPADM

## 2022-05-26 RX ADMIN — ACETAMINOPHEN 650 MG: 325 TABLET ORAL at 10:40

## 2022-05-26 RX ADMIN — SPIRONOLACTONE 25 MG: 25 TABLET ORAL at 09:01

## 2022-05-26 RX ADMIN — INSULIN LISPRO 4 UNITS: 100 INJECTION, SOLUTION INTRAVENOUS; SUBCUTANEOUS at 11:59

## 2022-05-26 RX ADMIN — Medication 10 ML: at 09:02

## 2022-05-26 RX ADMIN — METOPROLOL TARTRATE 50 MG: 50 TABLET, FILM COATED ORAL at 20:22

## 2022-05-26 RX ADMIN — POLYETHYLENE GLYCOL 3350 17 G: 17 POWDER, FOR SOLUTION ORAL at 09:01

## 2022-05-26 RX ADMIN — INSULIN LISPRO 2 UNITS: 100 INJECTION, SOLUTION INTRAVENOUS; SUBCUTANEOUS at 16:39

## 2022-05-26 RX ADMIN — TORSEMIDE 40 MG: 20 TABLET ORAL at 10:36

## 2022-05-26 RX ADMIN — ASPIRIN 81 MG: 81 TABLET, COATED ORAL at 09:01

## 2022-05-26 RX ADMIN — METOPROLOL TARTRATE 37.5 MG: 25 TABLET, FILM COATED ORAL at 09:01

## 2022-05-26 RX ADMIN — DIGOXIN 125 MCG: 125 TABLET ORAL at 09:01

## 2022-05-26 RX ADMIN — WARFARIN 7.5 MG: 7.5 TABLET ORAL at 16:39

## 2022-05-26 RX ADMIN — TORSEMIDE 40 MG: 20 TABLET ORAL at 16:39

## 2022-05-26 RX ADMIN — ATORVASTATIN CALCIUM 10 MG: 20 TABLET, FILM COATED ORAL at 20:22

## 2022-05-26 RX ADMIN — CEFTRIAXONE 1 G: 1 INJECTION, POWDER, FOR SOLUTION INTRAMUSCULAR; INTRAVENOUS at 11:52

## 2022-05-26 RX ADMIN — INSULIN LISPRO 2 UNITS: 100 INJECTION, SOLUTION INTRAVENOUS; SUBCUTANEOUS at 23:20

## 2022-05-26 RX ADMIN — Medication 10 ML: at 20:23

## 2022-05-27 ENCOUNTER — READMISSION MANAGEMENT (OUTPATIENT)
Dept: CALL CENTER | Facility: HOSPITAL | Age: 77
End: 2022-05-27

## 2022-05-27 ENCOUNTER — HOME HEALTH ADMISSION (OUTPATIENT)
Dept: HOME HEALTH SERVICES | Facility: HOME HEALTHCARE | Age: 77
End: 2022-05-27

## 2022-05-27 ENCOUNTER — TRANSCRIBE ORDERS (OUTPATIENT)
Dept: HOME HEALTH SERVICES | Facility: HOME HEALTHCARE | Age: 77
End: 2022-05-27

## 2022-05-27 VITALS
OXYGEN SATURATION: 96 % | HEART RATE: 86 BPM | SYSTOLIC BLOOD PRESSURE: 105 MMHG | BODY MASS INDEX: 36.56 KG/M2 | TEMPERATURE: 97.7 F | WEIGHT: 227.51 LBS | HEIGHT: 66 IN | RESPIRATION RATE: 18 BRPM | DIASTOLIC BLOOD PRESSURE: 86 MMHG

## 2022-05-27 DIAGNOSIS — Z79.01 CHRONIC ANTICOAGULATION: ICD-10-CM

## 2022-05-27 DIAGNOSIS — N39.0 URINARY TRACT INFECTION WITHOUT HEMATURIA, SITE UNSPECIFIED: ICD-10-CM

## 2022-05-27 DIAGNOSIS — I50.9 ACUTE ON CHRONIC CONGESTIVE HEART FAILURE, UNSPECIFIED HEART FAILURE TYPE: ICD-10-CM

## 2022-05-27 DIAGNOSIS — J81.0 ACUTE PULMONARY EDEMA: Primary | ICD-10-CM

## 2022-05-27 LAB
ANION GAP SERPL CALCULATED.3IONS-SCNC: 15 MMOL/L (ref 5–15)
BUN SERPL-MCNC: 34 MG/DL (ref 8–23)
BUN/CREAT SERPL: 23.9 (ref 7–25)
CALCIUM SPEC-SCNC: 9.6 MG/DL (ref 8.6–10.5)
CHLORIDE SERPL-SCNC: 92 MMOL/L (ref 98–107)
CO2 SERPL-SCNC: 28 MMOL/L (ref 22–29)
CREAT SERPL-MCNC: 1.42 MG/DL (ref 0.57–1)
DIGOXIN SERPL-MCNC: 0.6 NG/ML (ref 0.6–1.2)
EGFRCR SERPLBLD CKD-EPI 2021: 38.9 ML/MIN/1.73
GLUCOSE BLDC GLUCOMTR-MCNC: 150 MG/DL (ref 70–130)
GLUCOSE BLDC GLUCOMTR-MCNC: 262 MG/DL (ref 70–130)
GLUCOSE SERPL-MCNC: 254 MG/DL (ref 65–99)
INR PPP: 1.8 (ref 0.9–1.1)
MAGNESIUM SERPL-MCNC: 2.2 MG/DL (ref 1.6–2.4)
POTASSIUM SERPL-SCNC: 3.7 MMOL/L (ref 3.5–5.2)
PROTHROMBIN TIME: 20.5 SECONDS (ref 11.7–14.2)
SODIUM SERPL-SCNC: 135 MMOL/L (ref 136–145)

## 2022-05-27 PROCEDURE — 80162 ASSAY OF DIGOXIN TOTAL: CPT | Performed by: INTERNAL MEDICINE

## 2022-05-27 PROCEDURE — 82962 GLUCOSE BLOOD TEST: CPT

## 2022-05-27 PROCEDURE — 85610 PROTHROMBIN TIME: CPT | Performed by: NURSE PRACTITIONER

## 2022-05-27 PROCEDURE — 83735 ASSAY OF MAGNESIUM: CPT | Performed by: INTERNAL MEDICINE

## 2022-05-27 PROCEDURE — 80048 BASIC METABOLIC PNL TOTAL CA: CPT | Performed by: INTERNAL MEDICINE

## 2022-05-27 PROCEDURE — 63710000001 INSULIN LISPRO (HUMAN) PER 5 UNITS: Performed by: NURSE PRACTITIONER

## 2022-05-27 PROCEDURE — 99232 SBSQ HOSP IP/OBS MODERATE 35: CPT | Performed by: NURSE PRACTITIONER

## 2022-05-27 RX ORDER — DIGOXIN 125 MCG
125 TABLET ORAL DAILY
Qty: 30 TABLET | Refills: 0 | Status: SHIPPED | OUTPATIENT
Start: 2022-05-28 | End: 2022-06-03 | Stop reason: SDUPTHER

## 2022-05-27 RX ORDER — SPIRONOLACTONE 25 MG/1
25 TABLET ORAL DAILY
Qty: 30 TABLET | Refills: 0 | Status: SHIPPED | OUTPATIENT
Start: 2022-05-28 | End: 2022-06-03 | Stop reason: SDUPTHER

## 2022-05-27 RX ORDER — TORSEMIDE 20 MG/1
40 TABLET ORAL 2 TIMES DAILY
Qty: 120 TABLET | Refills: 0 | Status: SHIPPED | OUTPATIENT
Start: 2022-05-27 | End: 2022-06-03 | Stop reason: SDUPTHER

## 2022-05-27 RX ORDER — WARFARIN SODIUM 7.5 MG/1
7.5 TABLET ORAL ONCE
Status: COMPLETED | OUTPATIENT
Start: 2022-05-27 | End: 2022-05-27

## 2022-05-27 RX ORDER — METOPROLOL TARTRATE 50 MG/1
100 TABLET, FILM COATED ORAL EVERY 12 HOURS SCHEDULED
Status: DISCONTINUED | OUTPATIENT
Start: 2022-05-27 | End: 2022-05-27 | Stop reason: HOSPADM

## 2022-05-27 RX ORDER — METOPROLOL TARTRATE 50 MG/1
50 TABLET, FILM COATED ORAL ONCE
Status: COMPLETED | OUTPATIENT
Start: 2022-05-27 | End: 2022-05-27

## 2022-05-27 RX ORDER — WARFARIN SODIUM 7.5 MG/1
7.5 TABLET ORAL
Status: DISCONTINUED | OUTPATIENT
Start: 2022-05-27 | End: 2022-05-27

## 2022-05-27 RX ORDER — METOPROLOL TARTRATE 100 MG/1
100 TABLET ORAL EVERY 12 HOURS SCHEDULED
Qty: 60 TABLET | Refills: 0 | Status: SHIPPED | OUTPATIENT
Start: 2022-05-27 | End: 2022-06-03 | Stop reason: SDUPTHER

## 2022-05-27 RX ADMIN — INSULIN LISPRO 6 UNITS: 100 INJECTION, SOLUTION INTRAVENOUS; SUBCUTANEOUS at 12:10

## 2022-05-27 RX ADMIN — TORSEMIDE 40 MG: 20 TABLET ORAL at 09:58

## 2022-05-27 RX ADMIN — ASPIRIN 81 MG: 81 TABLET, COATED ORAL at 09:58

## 2022-05-27 RX ADMIN — SPIRONOLACTONE 25 MG: 25 TABLET ORAL at 09:58

## 2022-05-27 RX ADMIN — POLYETHYLENE GLYCOL 3350 17 G: 17 POWDER, FOR SOLUTION ORAL at 09:58

## 2022-05-27 RX ADMIN — METOPROLOL TARTRATE 50 MG: 50 TABLET, FILM COATED ORAL at 09:57

## 2022-05-27 RX ADMIN — HYDROCODONE BITARTRATE AND ACETAMINOPHEN 1 TABLET: 5; 325 TABLET ORAL at 00:00

## 2022-05-27 RX ADMIN — WARFARIN 7.5 MG: 7.5 TABLET ORAL at 15:40

## 2022-05-27 RX ADMIN — Medication 10 ML: at 09:58

## 2022-05-27 RX ADMIN — DIGOXIN 125 MCG: 125 TABLET ORAL at 09:57

## 2022-05-27 RX ADMIN — INSULIN LISPRO 2 UNITS: 100 INJECTION, SOLUTION INTRAVENOUS; SUBCUTANEOUS at 09:58

## 2022-05-27 RX ADMIN — ACETAMINOPHEN 650 MG: 325 TABLET ORAL at 10:00

## 2022-05-27 NOTE — OUTREACH NOTE
Prep Survey    Flowsheet Row Responses   [facility] facility patient discharged from? [facility]   Is LACE score < 7 ? No   Emergency Room discharge w/ pulse ox? No   Eligibility [facility]   Date of Admission 05/21/22   Date of Discharge 05/27/22   Discharge Disposition Home or Self Care   Discharge diagnosis Acute hypoxic resp failrue, Acute UTI, CKD, T2DM, RA   Does the patient have one of the following disease processes/diagnoses(primary or secondary)? Other   Does the patient have Home health ordered? Yes   What is the Home health agency?  [facility]   Is there a DME ordered? Yes   What DME was ordered? BSC from [place]   Prep survey completed? Yes          JIMI BOWERS - Registered Nurse

## 2022-05-28 ENCOUNTER — HOME CARE VISIT (OUTPATIENT)
Dept: HOME HEALTH SERVICES | Facility: HOME HEALTHCARE | Age: 77
End: 2022-05-28

## 2022-05-28 LAB
BACTERIA UR CULT: ABNORMAL
GLUCOSE UR QL STRIP: NORMAL
KETONES UR QL STRIP: NORMAL
OTHER ANTIBIOTIC SUSC ISLT: ABNORMAL
PH UR STRIP: NORMAL [PH]
PROT UR QL STRIP: NORMAL
REQUEST PROBLEM: NORMAL
SP GR UR STRIP: NORMAL

## 2022-05-28 PROCEDURE — G0299 HHS/HOSPICE OF RN EA 15 MIN: HCPCS

## 2022-05-30 NOTE — HOME HEALTH
Patient referred to [facility] following hospitalization from 5/21-5/27 for ARF with hypoxia; acute UTI; stage 3 CKD; type 2 DM; A/C diastolic CHF; CAD; chronic atrial fibrillation; sleep apnea; essential hypertension; RA of both hands. She is up with walker and supervision in home for transfers, ambulation and ADL's. Her  Suresh is her PCG although she manages most of her health needs with supervision. She will need a digoxin and INR level on next visit Tuesday. She is to start daily weights in am and keep a log as well as keeping a log of her BG levels. She is on a CCD, cardiac diet with activity as tolerated, and she is to f/u with sleep doctor for a new CPAP. SN FOC: disease management (ARF with hypoxia) and new medications teaching and monitoring.

## 2022-05-31 ENCOUNTER — HOME CARE VISIT (OUTPATIENT)
Dept: HOME HEALTH SERVICES | Facility: HOME HEALTHCARE | Age: 77
End: 2022-05-31

## 2022-05-31 ENCOUNTER — TRANSITIONAL CARE MANAGEMENT TELEPHONE ENCOUNTER (OUTPATIENT)
Dept: CALL CENTER | Facility: HOSPITAL | Age: 77
End: 2022-05-31

## 2022-05-31 VITALS
OXYGEN SATURATION: 96 % | SYSTOLIC BLOOD PRESSURE: 104 MMHG | RESPIRATION RATE: 18 BRPM | HEART RATE: 88 BPM | TEMPERATURE: 97.5 F | DIASTOLIC BLOOD PRESSURE: 64 MMHG

## 2022-05-31 VITALS
HEIGHT: 66 IN | SYSTOLIC BLOOD PRESSURE: 122 MMHG | RESPIRATION RATE: 18 BRPM | TEMPERATURE: 97.3 F | HEART RATE: 106 BPM | OXYGEN SATURATION: 95 % | BODY MASS INDEX: 37.61 KG/M2 | WEIGHT: 234 LBS | DIASTOLIC BLOOD PRESSURE: 74 MMHG

## 2022-05-31 LAB — INR PPP: 2.3

## 2022-05-31 PROCEDURE — G0151 HHCP-SERV OF PT,EA 15 MIN: HCPCS

## 2022-05-31 PROCEDURE — G0300 HHS/HOSPICE OF LPN EA 15 MIN: HCPCS

## 2022-05-31 NOTE — OUTREACH NOTE
Call Center TCM Note    Flowsheet Row Responses   [facility] patient discharged from? [place]   Does the patient have one of the following disease processes/diagnoses(primary or secondary)? Other   TCM attempt successful? Yes   Call start time 1418   Call end time 1434   Discharge diagnosis Acute hypoxic resp failure/flash pulmonary edema, Acute UTI, CKD, T2DM, RA   Person spoke with today (if not patient) and relationship patient   Medication alerts for this patient Paietn on home warfarin. Patient scheduled for INR check at clinic today, but patient states that HH needs to draw. Message routed to PCP office with lab order need.    Meds reviewed with patient/caregiver? Yes   Does the patient have all medications ordered at discharge? Yes   Is the patient taking all medications as directed (includes completed medication regime)? Yes   Comments regarding appointments Hospital Follow Up with [facility] Monday Jun 6, 2022 10:00 AM   Does the patient have a primary care provider?  Yes   Does the patient have an appointment with their PCP within 7 days of discharge? Yes   Comments regarding PCP PCP Dr Will Madden. Hospital follow up already in place for Friday Lang 3, 2022 2:00 PMwith ANGELA Pop   Has the patient kept scheduled appointments due by today? N/A   What is the Home health agency?  [facility]   Has home health visited the patient within 72 hours of discharge? Yes   What DME was ordered? BSC from [place]   Psychosocial issues? No   Did the patient receive a copy of their discharge instructions? Yes   Nursing interventions Reviewed instructions with patient   What is the patient's perception of their health status since discharge? Improving   Is the patient/caregiver able to teach back signs and symptoms related to disease process for when to call PCP? Yes   Is the patient/caregiver able to teach back signs and symptoms related to disease process for when to call 911? Yes   Is the  patient/caregiver able to teach back the hierarchy of who to call/visit for symptoms/problems? PCP, Specialist, Home health nurse, Urgent Care, ED, 911 Yes   If the patient is a current smoker, are they able to teach back resources for cessation? Not a smoker   TCM call completed? Yes   Wrap up additional comments Patient reports unable to get out to have lab work done today for INR check and needs for HH to come and do. High priority message routed to PCP office.           Kassi Varela RN    5/31/2022, 14:34 EDT  5/31/22  242pm. Contacted PCP office and informed of patient needing lab work, INR and dig level to be checked by HH.

## 2022-05-31 NOTE — HOME HEALTH
Patient 77yo female recently discharged 5/27/22 from [facility]. Patient currently sitting at dining room table upon arrival. Patient complaining of left shoulder pain from arthritis but reports she is too old for a shoulder replacement. Patient denies need for physical therapy initially, but agreeable to have physical therapy come for a few weeks and requests afternoon appointments. Patient exhibits left side weakness from CVA in 2017. Patient using rollator, has stair lift and large rug in living room rolls up posing huge fall risk. Talked at length about removing throw rug, but patient stated she would be careful. Skilled Physical Therapy is medically necessary to establish home exercise program after recent functional decline from hospitalization. Patient education necessary for appropriate progression of home exercise program, and gait training to reduce reliance on assist device. Without skilled physical therapy, patient at risk for falls, increased burden of care.    PLAN FOR NEXT VISIT:  --Continue progressing therapeutic exercises to improve muscle weakness  --Continue transfer training  --Continue gait training to improve ambulation tolerance

## 2022-06-01 ENCOUNTER — HOME CARE VISIT (OUTPATIENT)
Dept: HOME HEALTH SERVICES | Facility: HOME HEALTHCARE | Age: 77
End: 2022-06-01

## 2022-06-01 ENCOUNTER — TELEPHONE (OUTPATIENT)
Dept: INTERNAL MEDICINE | Facility: CLINIC | Age: 77
End: 2022-06-01

## 2022-06-01 ENCOUNTER — ANTICOAGULATION VISIT (OUTPATIENT)
Dept: PHARMACY | Facility: HOSPITAL | Age: 77
End: 2022-06-01

## 2022-06-01 LAB
HH POC INTERNATIONAL NORMALIZATION RATIO: NORMAL
HH POC PROTIME: NORMAL

## 2022-06-01 PROCEDURE — G0299 HHS/HOSPICE OF RN EA 15 MIN: HCPCS

## 2022-06-01 NOTE — PROGRESS NOTES
Anticoagulation Clinic Progress Note    Anticoagulation Summary  As of 2022    INR goal:  2.0-3.0   TTR:  62.7 % (3.6 y)   INR used for dosin.30 (2022)   Warfarin maintenance plan:  2.5 mg every Mon, Fri; 5 mg all other days   Weekly warfarin total:  30 mg   Plan last modified:  Love Allison RPH (2/10/2022)   Next INR check:  2022   Priority:  Maintenance   Target end date:  Indefinite    Indications    Atrial fibrillation (HCC) [I48.91]             Anticoagulation Episode Summary     INR check location:      Preferred lab:      Send INR reminders to:  [facility] CLINICAL POOL    Comments:        Anticoagulation Care Providers     Provider Role Specialty Phone number    Pia Dueñas MD Referring Cardiology [phone]          INR History:  Anticoagulation Monitoring 2022   INR 2.3 - 2.30   INR Date 2022 - 2022   INR Goal 2.0-3.0 2.0-3.0 2.0-3.0   Trend Same Same Same   Last Week Total 30 mg 30 mg 37.5 mg   Next Week Total 30 mg 32.5 mg 30 mg   Sun 5 mg 5 mg 5 mg   Mon 2.5 mg 5 mg () -   Tue 5 mg 5 mg -   Wed 5 mg 5 mg 5 mg   Thu 5 mg 5 mg 5 mg   Fri 2.5 mg 2.5 mg 2.5 mg   Sat 5 mg 5 mg 5 mg   Visit Report - - -   Some recent data might be hidden       Plan:  1. INR is Therapeutic today- see above in Anticoagulation Summary.   [facility] to Continue their warfarin regimen- see above in Anticoagulation Summary.  Admitted -  for pulmonary edema. On discharge coreg, lasix, losartan, Macrobid and potasium was discontinued. Metoprolol, spironolactone, torsemide was started. Patient resumed regular dose on discharge.  2. Follow up on Monday       Love Allison TONY

## 2022-06-01 NOTE — TELEPHONE ENCOUNTER
Caller: Lana Yañez    Relationship: Self    Best call back number:[phone]      What is the best time to reach you: ANY    Who are you requesting to speak with (clinical staff, provider,  specific staff member):  OR MA    What was the call regarding: THE PATIENT STATES THAT THE MEDICATION DIGOXIN WAS CHANGED WHEN SHE WAS IN THE HOSPITAL SHE NOW TAKES THAT EVERY DAY AND NOW WHEN THEY TRY TO CHECK HER LEVELS THEY CAN'T GET ANY BLOOD. THE BLOOD COMES OUT OF HER FINGER FOR INR BUT CAN'T GET IT FROM HER ARM.     Do you require a callback: YES

## 2022-06-02 ENCOUNTER — APPOINTMENT (OUTPATIENT)
Dept: PHARMACY | Facility: HOSPITAL | Age: 77
End: 2022-06-02

## 2022-06-02 ENCOUNTER — HOME CARE VISIT (OUTPATIENT)
Dept: HOME HEALTH SERVICES | Facility: HOME HEALTHCARE | Age: 77
End: 2022-06-02

## 2022-06-02 VITALS
DIASTOLIC BLOOD PRESSURE: 68 MMHG | TEMPERATURE: 96.8 F | OXYGEN SATURATION: 96 % | HEART RATE: 82 BPM | SYSTOLIC BLOOD PRESSURE: 116 MMHG

## 2022-06-02 PROCEDURE — G0157 HHC PT ASSISTANT EA 15: HCPCS

## 2022-06-02 NOTE — HOME HEALTH
Subjective:  I told the nurse not to come today, They can't seem to be able to draw some blood    Assessment: Patient has stair lift on steps to exit the home for spouse and she can use if needed. Patient demonstrated manuevering in home with no AD and had no LOB and uses rollator as needed. she states she is doing better the last two days. She was agreeable to demonstrate transfers and review HEP.    Plan for next visit/Communication  gait training  transfers  review HEP  balance

## 2022-06-02 NOTE — HOME HEALTH
PT/INR 29/9/2.3 CALLED TO BMM, DIG LEVEL DUE TODAY, UNABLE TO COLLECT BLOOD NOTIFIED CM AND ,      THE PATIENT DID NOT FEEL WELL THIS MORNING, HER DAUGHTER GAVE HER SOME CHIPS AND WATER, AND HAD THE PATIENT TO RELAX ON THE COUCH, THE PATEINT SAID AFTER EATING THE CHIPS I FELT BETTER.     PLUS 1 NON PITTING EDEMA TO ANKLE

## 2022-06-03 ENCOUNTER — OFFICE VISIT (OUTPATIENT)
Dept: INTERNAL MEDICINE | Facility: CLINIC | Age: 77
End: 2022-06-03

## 2022-06-03 VITALS
SYSTOLIC BLOOD PRESSURE: 102 MMHG | TEMPERATURE: 97.3 F | BODY MASS INDEX: 36.74 KG/M2 | OXYGEN SATURATION: 99 % | HEART RATE: 77 BPM | WEIGHT: 228.6 LBS | DIASTOLIC BLOOD PRESSURE: 62 MMHG | HEIGHT: 66 IN

## 2022-06-03 DIAGNOSIS — I50.32 CHRONIC DIASTOLIC HEART FAILURE: Chronic | ICD-10-CM

## 2022-06-03 DIAGNOSIS — I48.0 PAROXYSMAL ATRIAL FIBRILLATION: Chronic | ICD-10-CM

## 2022-06-03 DIAGNOSIS — N18.31 STAGE 3A CHRONIC KIDNEY DISEASE: Chronic | ICD-10-CM

## 2022-06-03 DIAGNOSIS — E78.2 MIXED HYPERLIPIDEMIA: Primary | Chronic | ICD-10-CM

## 2022-06-03 DIAGNOSIS — I10 ESSENTIAL HYPERTENSION: Chronic | ICD-10-CM

## 2022-06-03 DIAGNOSIS — G47.33 OBSTRUCTIVE SLEEP APNEA SYNDROME: Chronic | ICD-10-CM

## 2022-06-03 DIAGNOSIS — I27.20 PULMONARY HYPERTENSION: Chronic | ICD-10-CM

## 2022-06-03 DIAGNOSIS — I25.10 CORONARY ARTERY DISEASE INVOLVING NATIVE CORONARY ARTERY OF NATIVE HEART WITHOUT ANGINA PECTORIS: Chronic | ICD-10-CM

## 2022-06-03 DIAGNOSIS — E55.9 HYPOVITAMINOSIS D: Chronic | ICD-10-CM

## 2022-06-03 DIAGNOSIS — E11.65 TYPE 2 DIABETES MELLITUS WITH HYPERGLYCEMIA, WITHOUT LONG-TERM CURRENT USE OF INSULIN: Chronic | ICD-10-CM

## 2022-06-03 PROCEDURE — 99215 OFFICE O/P EST HI 40 MIN: CPT | Performed by: NURSE PRACTITIONER

## 2022-06-03 RX ORDER — METOPROLOL TARTRATE 50 MG/1
50 TABLET, FILM COATED ORAL 2 TIMES DAILY
Qty: 60 TABLET | Refills: 0 | Status: SHIPPED | OUTPATIENT
Start: 2022-06-03 | End: 2023-02-14 | Stop reason: SDUPTHER

## 2022-06-03 RX ORDER — DIGOXIN 125 MCG
125 TABLET ORAL DAILY
Qty: 30 TABLET | Refills: 0 | Status: SHIPPED | OUTPATIENT
Start: 2022-06-03 | End: 2022-09-28

## 2022-06-03 RX ORDER — METOPROLOL TARTRATE 100 MG/1
100 TABLET ORAL EVERY 12 HOURS SCHEDULED
Qty: 180 TABLET | Refills: 0 | Status: SHIPPED | OUTPATIENT
Start: 2022-06-03 | End: 2022-06-03

## 2022-06-03 RX ORDER — ALBUTEROL SULFATE 90 UG/1
2 AEROSOL, METERED RESPIRATORY (INHALATION) EVERY 4 HOURS PRN
Qty: 18 G | Refills: 11 | Status: SHIPPED | OUTPATIENT
Start: 2022-06-03

## 2022-06-03 RX ORDER — SPIRONOLACTONE 25 MG/1
25 TABLET ORAL DAILY
Qty: 90 TABLET | Refills: 0 | Status: SHIPPED | OUTPATIENT
Start: 2022-06-03 | End: 2022-09-21

## 2022-06-03 RX ORDER — TORSEMIDE 20 MG/1
40 TABLET ORAL 2 TIMES DAILY
Qty: 360 TABLET | Refills: 0 | Status: SHIPPED | OUTPATIENT
Start: 2022-06-03 | End: 2022-06-06 | Stop reason: ALTCHOICE

## 2022-06-03 NOTE — ASSESSMENT & PLAN NOTE
Diabetes is improving with treatment.   Continue current treatment regimen.  Reminded to bring in blood sugar diary at next visit.  Dietary recommendations for ADA diet.  Regular aerobic exercise.  Discussed ways to avoid symptomatic hypoglycemia.  Discussed sick day management.  Discussed foot care.  Reminded to get yearly retinal exam.  Diabetes will be reassessed at next appt. .

## 2022-06-03 NOTE — PROGRESS NOTES
"Chief Complaint  Hospital Follow Up Visit    Karl Yañez presents to [facility] PRIMARY [facility]ARE  History of Present Illness  This is a 77 y/o female presenting to office for f/u hospital discharge on 5/27/22. Patient was admitted to  on 5/22/22. Patient had presented with complaints of sudden on set of shortness of breath and cough productive of clear sputum. Patient was found to be in acute hypoxic and hypercapnic respiratory failure due to flash pulmonary edema. Pulmonary and cardiology consulted. Patient was diuresed and had significant improvement then transitioned to oral diuretics. Patient was also increased on digoxin with daily dosing. Pulmonary recommended f/u outpatient to get new CPAP. Patient also recommended to continue following up with anticoagulation clinic for management of warfarin. Patient also received 5 day course of rocephin for UTI. Patient has been having HH follow post hospitalization.     Patient reports HH is following. Patient reports she is feeling better.     Patient reports breathing has improved. Patient is scheduled to go into Dr. Bui's pulmonary's office in 2 weeks. Patient to f/u with cardiology next week.     Patient reports checking BP at home-- averaging 's/60's. Patient reports experiencing some light headedness and headaches. We will decrease the metoprolol to 50mg BID and have her f/u with cardiology.     Objective   Vital Signs:  /62 (BP Location: Right arm, Patient Position: Sitting, Cuff Size: Adult)   Pulse 77   Temp 97.3 °F (36.3 °C) (Temporal)   Ht 167.6 cm (65.98\")   Wt 104 kg (228 lb 9.6 oz)   SpO2 99%   BMI 36.91 kg/m²     BMI has not been calculated during today's encounter.       Physical Exam  Constitutional:       Appearance: Normal appearance.   HENT:      Head: Normocephalic and atraumatic.   Eyes:      Pupils: Pupils are equal, round, and reactive to light.   Cardiovascular:      Rate and Rhythm: " Normal rate. Rhythm irregular.      Pulses: Normal pulses.      Heart sounds: Normal heart sounds. No murmur heard.    No friction rub. No gallop.   Pulmonary:      Effort: Pulmonary effort is normal. No respiratory distress.      Breath sounds: Normal breath sounds. No stridor. No wheezing, rhonchi or rales.   Musculoskeletal:         General: No swelling or deformity. Normal range of motion.      Cervical back: Normal range of motion and neck supple.   Skin:     General: Skin is warm.      Coloration: Skin is not jaundiced.      Findings: No bruising.   Neurological:      General: No focal deficit present.      Mental Status: She is alert and oriented to person, place, and time. Mental status is at baseline.      Motor: Weakness present.   Psychiatric:         Mood and Affect: Mood normal.         Behavior: Behavior normal.         Thought Content: Thought content normal.         Judgment: Judgment normal.        Result Review :  The following data was reviewed by: ANGELA Pop on 06/03/2022:  Common labs    Common Labsle 5/24/22 5/24/22 5/25/22 5/25/22 5/25/22 5/27/22    0739 0739 1011 1011 2114    Glucose 124 (A)   198 (A)  254 (A)   BUN 20   18  34 (A)   Creatinine 1.14 (A)   1.02 (A)  1.42 (A)   Sodium 141   134 (A)  135 (A)   Potassium 3.2 (A)   3.7 4.0 3.7   Chloride 96 (A)   93 (A)  92 (A)   Calcium 9.9   9.2  9.6   Albumin 3.50        Total Bilirubin 1.5 (A)        Alkaline Phosphatase 62        AST (SGOT) 13        ALT (SGPT) 8        WBC  10.71 8.71      Hemoglobin  12.2 12.3      Hematocrit  38.4 38.3      Platelets  141 171      (A) Abnormal value       Comments are available for some flowsheets but are not being displayed.                  Assessment and Plan   Diagnoses and all orders for this visit:    1. Mixed hyperlipidemia (Primary)  Assessment & Plan:  Continues on statin.       2. Essential hypertension  Assessment & Plan:  Hypertension is improving with treatment.  Continue current  treatment regimen.  Dietary sodium restriction.  Weight loss.  Regular aerobic exercise.  Continue current medications.  Blood pressure will be reassessed at the next regular appointment.    Orders:  -     metoprolol tartrate (Lopressor) 50 MG tablet; Take 1 tablet by mouth 2 (Two) Times a Day for 30 days.  Dispense: 60 tablet; Refill: 0    3. Coronary artery disease involving native coronary artery of native heart without angina pectoris  Assessment & Plan:  Continues on aspirin.   Following with cardiology.       4. Paroxysmal atrial fibrillation (HCC)  Assessment & Plan:  Continues on aspirin, digoxin, along with warfarin.   Following with anticoagulant clinic for warfarin management.   Following with cardiology.     Orders:  -     digoxin (LANOXIN) 125 MCG tablet; Take 1 tablet by mouth Daily.  Dispense: 30 tablet; Refill: 0    5. Chronic diastolic heart failure (HCC)  Assessment & Plan:  Continues on metoprolol, spironolactone, torsemide.   Following with cardiology.     Orders:  -     spironolactone (ALDACTONE) 25 MG tablet; Take 1 tablet by mouth Daily for 90 days.  Dispense: 90 tablet; Refill: 0  -     torsemide (DEMADEX) 20 MG tablet; Take 2 tablets by mouth 2 (Two) Times a Day for 90 days.  Dispense: 360 tablet; Refill: 0  -     Discontinue: metoprolol tartrate (LOPRESSOR) 100 MG tablet; Take 1 tablet by mouth Every 12 (Twelve) Hours for 90 days.  Dispense: 180 tablet; Refill: 0    6. Type 2 diabetes mellitus with hyperglycemia, without long-term current use of insulin ([facility])  Assessment & Plan:  Diabetes is improving with treatment.   Continue current treatment regimen.  Reminded to bring in blood sugar diary at next visit.  Dietary recommendations for ADA diet.  Regular aerobic exercise.  Discussed ways to avoid symptomatic hypoglycemia.  Discussed sick day management.  Discussed foot care.  Reminded to get yearly retinal exam.  Diabetes will be reassessed at next appt. .      7. Hypovitaminosis  D  Assessment & Plan:  Continues on vit D supplementation.       8. Pulmonary hypertension (HCC)  Assessment & Plan:  Continues on diuretics.   Following with pulmonary/cardiology.     Orders:  -     albuterol sulfate  (90 Base) MCG/ACT inhaler; Inhale 2 puffs Every 4 (Four) Hours As Needed for Shortness of Air. PRN SOA/WHEEZING  Dispense: 18 g; Refill: 11    9. Obstructive sleep apnea syndrome  Assessment & Plan:  Follow up with sleep medicine for CPAP replacement.       10. Stage 3a chronic kidney disease (HCC)  Assessment & Plan:  BMP today.   Following with nephrology-- recommended f/u next month.     Orders:  -     Basic metabolic panel         I spent 40 minutes caring for Lana on this date of service. This time includes time spent by me in the following activities:preparing for the visit, reviewing tests, obtaining and/or reviewing a separately obtained history, performing a medically appropriate examination and/or evaluation , counseling and educating the patient/family/caregiver, ordering medications, tests, or procedures, documenting information in the medical record and care coordination  Follow Up {Instructions Charge Capture  Follow-up Communications :23}  Return for Next scheduled follow up in november as scheduled.  Patient was given instructions and counseling regarding her condition or for health maintenance advice. Please see specific information pulled into the AVS if appropriate.

## 2022-06-03 NOTE — ASSESSMENT & PLAN NOTE
Continues on aspirin, digoxin, along with warfarin.   Following with anticoagulant clinic for warfarin management.   Following with cardiology.

## 2022-06-04 LAB
BUN SERPL-MCNC: 55 MG/DL (ref 8–27)
BUN/CREAT SERPL: 32 (ref 12–28)
CALCIUM SERPL-MCNC: 10.2 MG/DL (ref 8.7–10.3)
CHLORIDE SERPL-SCNC: 93 MMOL/L (ref 96–106)
CO2 SERPL-SCNC: 22 MMOL/L (ref 20–29)
CREAT SERPL-MCNC: 1.73 MG/DL (ref 0.57–1)
EGFRCR SERPLBLD CKD-EPI 2021: 31 ML/MIN/1.73
GLUCOSE SERPL-MCNC: 197 MG/DL (ref 65–99)
POTASSIUM SERPL-SCNC: 4.4 MMOL/L (ref 3.5–5.2)
SODIUM SERPL-SCNC: 138 MMOL/L (ref 134–144)

## 2022-06-05 VITALS
OXYGEN SATURATION: 98 % | WEIGHT: 225 LBS | SYSTOLIC BLOOD PRESSURE: 112 MMHG | BODY MASS INDEX: 36.32 KG/M2 | TEMPERATURE: 97.7 F | HEART RATE: 84 BPM | DIASTOLIC BLOOD PRESSURE: 68 MMHG | RESPIRATION RATE: 18 BRPM

## 2022-06-06 ENCOUNTER — HOSPITAL ENCOUNTER (OUTPATIENT)
Dept: CARDIOLOGY | Facility: HOSPITAL | Age: 77
Discharge: HOME | End: 2022-06-06
Admitting: NURSE PRACTITIONER

## 2022-06-06 ENCOUNTER — TELEPHONE (OUTPATIENT)
Dept: CARDIOLOGY | Facility: CLINIC | Age: 77
End: 2022-06-06

## 2022-06-06 ENCOUNTER — OFFICE VISIT (OUTPATIENT)
Dept: CARDIOLOGY | Facility: CLINIC | Age: 77
End: 2022-06-06

## 2022-06-06 ENCOUNTER — HOME CARE VISIT (OUTPATIENT)
Dept: HOME HEALTH SERVICES | Facility: HOME HEALTHCARE | Age: 77
End: 2022-06-06

## 2022-06-06 VITALS
SYSTOLIC BLOOD PRESSURE: 138 MMHG | DIASTOLIC BLOOD PRESSURE: 80 MMHG | OXYGEN SATURATION: 98 % | BODY MASS INDEX: 52.54 KG/M2 | WEIGHT: 227 LBS | HEART RATE: 96 BPM | HEIGHT: 55 IN

## 2022-06-06 DIAGNOSIS — I50.32 CHRONIC DIASTOLIC HEART FAILURE: Chronic | ICD-10-CM

## 2022-06-06 DIAGNOSIS — I48.11 LONGSTANDING PERSISTENT ATRIAL FIBRILLATION: ICD-10-CM

## 2022-06-06 DIAGNOSIS — I48.0 PAROXYSMAL ATRIAL FIBRILLATION: ICD-10-CM

## 2022-06-06 DIAGNOSIS — E78.2 MIXED HYPERLIPIDEMIA: ICD-10-CM

## 2022-06-06 DIAGNOSIS — I25.10 CORONARY ARTERY DISEASE INVOLVING NATIVE CORONARY ARTERY OF NATIVE HEART WITHOUT ANGINA PECTORIS: ICD-10-CM

## 2022-06-06 DIAGNOSIS — I10 ESSENTIAL HYPERTENSION: Chronic | ICD-10-CM

## 2022-06-06 DIAGNOSIS — I48.11 LONGSTANDING PERSISTENT ATRIAL FIBRILLATION: Primary | ICD-10-CM

## 2022-06-06 DIAGNOSIS — I48.0 PAROXYSMAL ATRIAL FIBRILLATION: Primary | ICD-10-CM

## 2022-06-06 LAB
ANION GAP SERPL CALCULATED.3IONS-SCNC: 14.7 MMOL/L (ref 5–15)
BUN SERPL-MCNC: 45 MG/DL (ref 8–23)
BUN/CREAT SERPL: 29.4 (ref 7–25)
CALCIUM SPEC-SCNC: 10.5 MG/DL (ref 8.6–10.5)
CHLORIDE SERPL-SCNC: 98 MMOL/L (ref 98–107)
CO2 SERPL-SCNC: 28.3 MMOL/L (ref 22–29)
CREAT SERPL-MCNC: 1.53 MG/DL (ref 0.57–1)
DIGOXIN SERPL-MCNC: 2.3 NG/ML (ref 0.6–1.2)
EGFRCR SERPLBLD CKD-EPI 2021: 35.6 ML/MIN/1.73
GLUCOSE SERPL-MCNC: 183 MG/DL (ref 65–99)
POTASSIUM SERPL-SCNC: 3.6 MMOL/L (ref 3.5–5.2)
SODIUM SERPL-SCNC: 141 MMOL/L (ref 136–145)

## 2022-06-06 PROCEDURE — 80162 ASSAY OF DIGOXIN TOTAL: CPT | Performed by: NURSE PRACTITIONER

## 2022-06-06 PROCEDURE — 99214 OFFICE O/P EST MOD 30 MIN: CPT | Performed by: NURSE PRACTITIONER

## 2022-06-06 PROCEDURE — 93000 ELECTROCARDIOGRAM COMPLETE: CPT | Performed by: NURSE PRACTITIONER

## 2022-06-06 PROCEDURE — 36415 COLL VENOUS BLD VENIPUNCTURE: CPT

## 2022-06-06 PROCEDURE — 80048 BASIC METABOLIC PNL TOTAL CA: CPT | Performed by: NURSE PRACTITIONER

## 2022-06-06 RX ORDER — TORSEMIDE 20 MG/1
40 TABLET ORAL 2 TIMES DAILY
Refills: 0
Start: 2022-06-06 | End: 2022-07-06 | Stop reason: SDUPTHER

## 2022-06-06 NOTE — HOME HEALTH
Unable to obtain Dig level after 2 sticks, MD notified. Instructed on fall prevention-clear pathways, remove any home hazards, wear appropriate footwear, adequate lightening, change position slowly, etc. Patient voices back understanding. No safety issues noted.

## 2022-06-06 NOTE — TELEPHONE ENCOUNTER
Reviewed results and recommendations with Lana Yañez.  Patient verbalized understanding of results and recommendations, with repeat back.    Patient stated she received a call from Dr. Madden office and was told she is to take torsemide 40mg once daily and is now confused how she should take this medication.  Patient stated she could not remember who called her today.  Called Dr. Madden' office for clarification, but staff did not see any note regarding this medication change either and is not sure who contacted patient.    Please let me know how you would like to proceed.    Thank you,  Layla Gerardo, RN  Triage Nurse [facility] [place]

## 2022-06-06 NOTE — PROGRESS NOTES
[facility] Cardiology   [address], [address]  [place], [place], [address]    [phone]  [email or web address]           OUTPATIENT CLINIC FOLLOW UP NOTE    Patient Care Team:  Patient Care Team:  Will Madden MD as PCP - General (Family Medicine)  Pia Dueñas MD as Consulting Physician (Cardiology)  Iain Hill MD as Consulting Physician (Pulmonary Disease)  Carmen Kirk MD as Consulting Physician (Pain Medicine)  Nano Cool RPH as Pharmacist  Shaw Hand PharmD as Pharmacist (Pharmacy)    Subjective:      Chief Complaint   Patient presents with   • Follow-up   • Coronary Artery Disease   • Hyperlipidemia   • Congestive Heart Failure       HPI:    Lana Yañez is a 76 y.o. female.  Problem list:  1. Chronic diastolic heart failure  a. TTE 2018: EF 51%, moderate to severe left atrial enlargement, aortic valve calcification with mild aortic regurgitation, mild MR, severe TR with RVSP 40 mmHg  b. 8/2021: Mild to moderate MR, moderate TR, moderate pulmonary hypertension, normal EF  c. TTE 5/2022: EF 60%, grade 2 diastolic dysfunction, mild aortic stenosis, moderate TR, RVSP 42 mmHg  2. CAD  a. LHC 2017 with 90% PDA stenosis that was not amenable to PCI and recommended medical management  3. Paroxysmal atrial fibrillation  a. Initially diagnosed 10/2017 and placed on Pradaxa.  Patient later had an embolic stroke while taking Pradaxa and noted to have a left atrial appendage thrombus and was transitioned to warfarin.  b. Holter monitor 9/2018: Predominantly atrial fibrillation, PVCs occurred frequently, average heart rate 86  4. Mitral valve prolapse with mild to moderate mitral regurgitation  5. Mildly dilated thoracic ascending aorta  a. CTA of the chest 9/2021 with stable at 4.1 cm.  6. Hypertension  7. Hyperlipidemia  8. Obstructive sleep apnea  9. Asthma  10. COPD  11. 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 Rheumatoid  arthritis  13. Lung nodule followed by Dr. Garrido.    She presents today for follow-up.  The patient's primary cardiologist is Dr. Dueñas.    Since the patient was last seen in the cardiology clinic she was admitted to [facility] in 5/2022 with acute respiratory failure and flash pulmonary edema.  She underwent diuresis with improvement in her symptoms.  Her beta-blocker and digoxin were also adjusted due to elevated heart rate.  Since being discharged from the hospital she notes that her shortness of breath and lower extremity edema are significantly improved.  Her heart rate has been better controlled and her palpitations have improved as well.  She does have complaints of dizziness.  Her PCP decreased her metoprolol to 50 mg p.o. twice daily with some improvement.  Her blood pressure has been better on the lower dose of metoprolol.  He continues to take torsemide.  She has not had her digoxin level rechecked since being discharged from the hospital.  Has complaints of a headache which has been ongoing daily for approximately the last month.  She believes this is may have been worsened since being discharged from the hospital.    Review of Systems:  Positive for headache, dizziness  Negative for chest pain, dyspnea with exertion, lower extremity edema, palpitations, syncope.     PFSH:  Patient Active Problem List   Diagnosis   • Mixed hyperlipidemia   • Vitamin deficiency   • Essential hypertension   • Rheumatoid arthritis involving both hands (HCC)   • Fatigue   • ANTOINE (dyspnea on exertion)   • Dysuria   • Urge incontinence of urine   • Hypovitaminosis D   • Sleep apnea   • Encounter for screening colonoscopy   • Bronchitis   • Cough   • Generalized osteoarthritis of multiple sites   • Cellulitis of right elbow due to MRSA   • Medicare annual wellness visit, initial   • Hospital discharge follow-up   • Displacement of lumbar intervertebral disc without myelopathy   • Lumbar disc herniation   • Chronic  atrial fibrillation ([facility])   • History of MRSA infection   • Left-sided weakness   • Atrial fibrillation ([facility])   • Left shoulder pain   • Cerebrovascular accident (CVA) due to occlusion of right middle cerebral artery ([facility])   • Relative lymphocytosis   • Nausea   • Weakness   • Controlled substance agreement signed   • Coronary artery disease involving native coronary artery of native heart without angina pectoris   • SOB (shortness of breath)   • Lower extremity edema   • Chronic diastolic heart failure ([facility])   • Adhesive capsulitis of left shoulder   • CVA tenderness   • Costochondritis, acute   • Allergic reaction   • 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[facility])   • Visit for screening mammogram   • Low back pain   • Urgency of urination   • Hyperglycemia   • Carpal tunnel syndrome of left wrist/ wakes her up   • Localized edema   • Acute cystitis without hematuria   • Nitrofurantoin adverse reaction   • Bruising   • History of stroke   • Diabetes 1.5, managed as type 2 ([facility])   • Other chronic pain   • Vaginal candidiasis   • Pulmonary hypertension ([facility])   • Acute respiratory failure with hypoxia ([facility])   • Hypokalemia   • Hypomagnesemia   • Type 2 diabetes mellitus with hyperglycemia, without long-term current use of insulin ([facility])   • Diarrhea   • Sepsis without acute organ dysfunction ([facility])   • Morbid obesity with BMI of 40.0-44.9, adult ([facility])   • Biliary acute pancreatitis without necrosis or infection   • Stage 3a chronic kidney disease ([facility])   • Acute UTI (urinary tract infection)   • Hemiparesis of left nondominant side as late effect of cerebral infarction ([facility])         Current Outpatient Medications:   •  acetaminophen (TYLENOL) 325 MG tablet, Take 650 mg by mouth Every 6 (Six) Hours As Needed for Moderate Pain ., Disp: , Rfl:   •  albuterol sulfate  (90 Base) MCG/ACT inhaler, Inhale 2 puffs Every 4 (Four) Hours As Needed for Shortness of Air. PRN SOA/WHEEZING, Disp: 18 g, Rfl: 11  •  aspirin 81 MG EC tablet, Take  "1 tablet by mouth Daily., Disp: , Rfl:   •  atorvastatin (LIPITOR) 10 MG tablet, Take 1 tablet by mouth Daily., Disp: 90 tablet, Rfl: 1  •  Blood Glucose Monitoring Suppl (ACCU-CHEK GUIDE) w/Device kit, See Admin Instructions., Disp: , Rfl:   •  digoxin (LANOXIN) 125 MCG tablet, Take 1 tablet by mouth Daily., Disp: 30 tablet, Rfl: 0  •  glucose blood test strip, Use as instructed, Disp: 300 each, Rfl: 12  •  HYDROcodone-acetaminophen (NORCO) 5-325 MG per tablet, Take 1 tablet by mouth 2 (Two) Times a Day As Needed for Severe Pain ., Disp: 20 tablet, Rfl: 0  •  Lancets (ACCU-CHEK MULTICLIX) lancets, Use 1 lancet per finger stick, Disp: 204 each, Rfl: 12  •  metoprolol tartrate (Lopressor) 50 MG tablet, Take 1 tablet by mouth 2 (Two) Times a Day for 30 days., Disp: 60 tablet, Rfl: 0  •  polyethylene glycol (MIRALAX) 17 g packet, Take 17 g by mouth Daily As Needed (constipation)., Disp: , Rfl:   •  SITagliptin (Januvia) 50 MG tablet, Take 1 tablet by mouth Daily., Disp: 90 tablet, Rfl: 0  •  spironolactone (ALDACTONE) 25 MG tablet, Take 1 tablet by mouth Daily for 90 days., Disp: 90 tablet, Rfl: 0  •  TRAVATAN Z 0.004 % solution ophthalmic solution, Administer 1 drop to both eyes Every Morning. Not night, Disp: , Rfl:   •  vitamin D (ERGOCALCIFEROL) 1.25 MG (13464 UT) capsule capsule, Take 1 capsule by mouth 1 (One) Time Per Week., Disp: 12 capsule, Rfl: 3  •  warfarin (COUMADIN) 5 MG tablet, TAKE ONE-HALF TABLET (2.5 MG) ON Monday AND Friday AND 1 TABLET (5 MG) BY MOUTH ONCE DAILY OR  AS  DIRECTED  BY  MEDICATION  MANAGEMENT  CLINIC, Disp: 90 tablet, Rfl: 1    Allergies   Allergen Reactions   • Contrast Dye Hives and Itching        reports that she quit smoking about 53 years ago. Her smoking use included cigarettes. She started smoking about 55 years ago. She smoked 3.00 packs per day. She has never used smokeless tobacco.      Objective:   Physical exam:  /80   Pulse 96   Ht 66 cm (25.98\")   Wt 103 kg (227 " lb)   LMP  (LMP Unknown)   SpO2 98%   .38 kg/m²   CONSTITUTIONAL: No acute distress  RESPIRATORY: Normal effort. Clear to auscultation bilaterally without wheezing or rales  CARDIOVASCULAR:  Irregularly irregular rate and rhythm with normal S1 and S2. Without murmur, gallop or rub. Normal radial pulse. There is no lower extremity edema bilaterally.    Labs:    BUN   Date Value Ref Range Status   06/03/2022 55 (H) 8 - 27 mg/dL Final   05/27/2022 34 (H) 8 - 23 mg/dL Final     Creatinine   Date Value Ref Range Status   06/03/2022 1.73 (H) 0.57 - 1.00 mg/dL Final   05/27/2022 1.42 (H) 0.57 - 1.00 mg/dL Final   05/03/2018 0.80 0.60 - 1.30 mg/dL Final     Comment:     Serial Number: [id number]:  [id number]     Potassium   Date Value Ref Range Status   06/03/2022 4.4 3.5 - 5.2 mmol/L Final   05/27/2022 3.7 3.5 - 5.2 mmol/L Final     Comment:     Slight hemolysis detected by analyzer. Results may be affected.     ALT (SGPT)   Date Value Ref Range Status   05/24/2022 8 1 - 33 U/L Final     AST (SGOT)   Date Value Ref Range Status   05/24/2022 13 1 - 32 U/L Final       Lab Results   Component Value Date    CHOL 132 02/04/2021     Lab Results   Component Value Date    TRIG 73 02/09/2022     Lab Results   Component Value Date    HDL 49 02/09/2022     Lab Results   Component Value Date    LDL 28 02/09/2022     No components found for: LDLDIRECTC    Diagnostic Data:      ECG 12 Lead    Date/Time: 6/6/2022 10:58 AM  Performed by: Nicolle Yang APRN  Authorized by: Nicolle Yang APRN   Comparison: compared with previous ECG from 5/21/2022  Similar to previous ECG  Rhythm: atrial fibrillation  Rate: normal  BPM: 86  Conduction: left anterior fascicular block  Comments: QRS 94 ms,  ms            Results for orders placed during the hospital encounter of 05/21/22    Adult Transthoracic Echo Complete w/ Color, Spectral and Contrast if Necessary Per Protocol    Interpretation Summary  · Calculated left ventricular  EF = 60.5% Estimated left ventricular EF was in agreement with the calculated left ventricular EF. Left ventricular systolic function is normal.  · Left ventricular diastolic function is consistent with (grade II w/high LAP) pseudonormalization.  · There is moderate biatrial enlargement.  · Calcified and thickened aortic valve with mild aortic valve stenosis.  · Moderate tricuspid valve regurgitation is present.Calculated right ventricular systolic pressure from tricuspid regurgitation is 42 mmHg.      Assessment and Plan:   Diagnoses and all orders for this visit:      Coronary artery disease involving native coronary artery of native heart with angina pectoris (HCC)  Hyperlipemia, mixed  -Currently without angina.  -Continue aspirin, statin, beta-blocker.  -Routine lipid panel per PCP.    Atrial fibrillation, persistent (HCC)  -In rate controlled atrial fibrillation today.  -Continue beta-blocker, digoxin, and warfarin.  Followed in the anticoag clinic.  -Repeat digoxin level today.    Chronic diastolic heart failure  -Lower extremity edema has resolved.  -Continue beta-blocker, spironolactone  -We will recheck BMP today.  If creatinine remains elevated will consider decreasing torsemide.  Continue torsemide at current dosing for now.    Essential hypertension  -Was hypotensive at time of follow-up with her PCP.  Beta-blocker was decreased with improvement in her blood pressure.  -Continue metoprolol    Pulmonary hypertension  -Remains elevated.  Untreated sleep apnea playing a role.  -Patient to follow-up with Dr. Morrissey.    Headache  -Headache preceded medication changes made in the hospital.  Patient to discuss further with Dr. Madden.    - Return in about 3 months (around 9/6/2022) for Next scheduled follow up with Dr. Dueñas.    Electronically signed by ANGELA Traylor, 06/06/22, 11:17 AM EDT.

## 2022-06-06 NOTE — TELEPHONE ENCOUNTER
Discussed with Nicolle: ok for patient to take torsemide 40mg, once daily in the morning    Reviewed recommendations with Lana Yañez and she verbalized understanding with repeat back of instructions.  Patient stated she will come in for labs on Thursday to check her digoxin level.     Thank you,  Layla Gerardo RN  Triage Nurse [facility]

## 2022-06-06 NOTE — PROGRESS NOTES
Please let patient know she needs to f/u with nephology ASAP due to her kidney function. This is most likely due to her increased diuretics. I would recommend discussing with cardiology as well. We may need to back off of diuretics.

## 2022-06-06 NOTE — TELEPHONE ENCOUNTER
Attempted to contact patient to review lab results, no answer, left  requesting return call.     Triage:  If patient calls back can you let her know that her digoxin level was elevated today.  I would like her to hold her digoxin for the next two days and have a repeat digoxin level on Thursday.  Her kidney function was better than it was last week, but still elevated. Would have her decrease her torsemide to 40 mg in am and 20 mg in the pm for now.

## 2022-06-07 ENCOUNTER — HOME CARE VISIT (OUTPATIENT)
Dept: HOME HEALTH SERVICES | Facility: HOME HEALTHCARE | Age: 77
End: 2022-06-07

## 2022-06-07 ENCOUNTER — PATIENT MESSAGE (OUTPATIENT)
Dept: INTERNAL MEDICINE | Facility: CLINIC | Age: 77
End: 2022-06-07

## 2022-06-07 VITALS
OXYGEN SATURATION: 96 % | HEART RATE: 122 BPM | TEMPERATURE: 96.9 F | SYSTOLIC BLOOD PRESSURE: 124 MMHG | DIASTOLIC BLOOD PRESSURE: 70 MMHG

## 2022-06-07 PROCEDURE — G0157 HHC PT ASSISTANT EA 15: HCPCS

## 2022-06-07 NOTE — HOME HEALTH
Subjective:I have some questions on my medicine. I wonder why they changed my SITaglipitin, they sent me 50 mg and I take 100mg ( she sent a [employer] message to Mary MILLER during session asking about proper dosage)    Falls- none  Medication changes- hold Digoxin till thursday with phone call from doctor with instructions,Take Toresmide 40 mg in am only. Metopropol is reduced to 50 mg in am and evening instead of 100 mg. see above for SITaglipitin questions..Nursing is coming today and will get them straightened out to confirm what we figured out.    Assessment: Patient is concerned with her medication and we reviewed her instructions and she left a message to confirm her SITagliptin correct dosage and will take was is supposed to be. She was able to demonstrate coming up and down steps to leave the home and manuevered around the home.    Plan for next visit/Communication  follow up with medication concerns if needed  gait training  review HEP  balance

## 2022-06-08 ENCOUNTER — HOME CARE VISIT (OUTPATIENT)
Dept: HOME HEALTH SERVICES | Facility: HOME HEALTHCARE | Age: 77
End: 2022-06-08

## 2022-06-08 ENCOUNTER — ANTICOAGULATION VISIT (OUTPATIENT)
Dept: PHARMACY | Facility: HOSPITAL | Age: 77
End: 2022-06-08

## 2022-06-08 LAB — INR PPP: 1.3

## 2022-06-08 PROCEDURE — G0299 HHS/HOSPICE OF RN EA 15 MIN: HCPCS

## 2022-06-08 NOTE — PROGRESS NOTES
Anticoagulation Clinic Progress Note    Anticoagulation Summary  As of 2022    INR goal:  2.0-3.0   TTR:  62.5 % (3.6 y)   INR used for dosin.30 (2022)   Warfarin maintenance plan:  2.5 mg every Mon, Fri; 5 mg all other days   Weekly warfarin total:  30 mg   Plan last modified:  Love Allison RPH (2/10/2022)   Next INR check:  2022   Priority:  Maintenance   Target end date:  Indefinite    Indications    Atrial fibrillation (HCC) [I48.91]             Anticoagulation Episode Summary     INR check location:      Preferred lab:      Send INR reminders to:   [facility] CLINICAL [place]    Comments:        Anticoagulation Care Providers     Provider Role Specialty Phone number    Pia Dueñsa MD Referring Cardiology [phone]          INR History:  Anticoagulation Monitoring 2022   INR - 2.30 1.30   INR Date - 2022   INR Goal 2.0-3.0 2.0-3.0 2.0-3.0   Trend Same Same Same   Last Week Total 30 mg 37.5 mg 30 mg   Next Week Total 32.5 mg 30 mg 35 mg   Sun 5 mg 5 mg 5 mg   Mon 5 mg () - 2.5 mg   Tue 5 mg - -   Wed 5 mg 5 mg 5 mg   Thu 5 mg 5 mg 7.5 mg ()   Fri 2.5 mg 2.5 mg 5 mg (6/10)   Sat 5 mg 5 mg 5 mg   Visit Report - - -   Some recent data might be hidden       Plan:  1. INR is Subtherapeutic today- see above in Anticoagulation Summary.   Provided instructions to [facility] with [facility] to Change their warfarin regimen- see above in Anticoagulation Summary.  Take 7.5mg tomorrow,  and take 5mg Friday 6/10, then resume previous dosing until next INR.   2. Follow up in 6 days      Cindy Jones, DavinaD

## 2022-06-09 ENCOUNTER — LAB (OUTPATIENT)
Dept: LAB | Facility: HOSPITAL | Age: 77
End: 2022-06-09

## 2022-06-09 DIAGNOSIS — E55.9 VITAMIN D DEFICIENCY: ICD-10-CM

## 2022-06-09 DIAGNOSIS — E66.01 MORBID OBESITY: ICD-10-CM

## 2022-06-09 DIAGNOSIS — E11.65 TYPE 2 DIABETES MELLITUS WITH HYPERGLYCEMIA, WITHOUT LONG-TERM CURRENT USE OF INSULIN: ICD-10-CM

## 2022-06-09 DIAGNOSIS — E78.5 HYPERLIPIDEMIA ASSOCIATED WITH TYPE 2 DIABETES MELLITUS: ICD-10-CM

## 2022-06-09 DIAGNOSIS — E11.69 HYPERLIPIDEMIA ASSOCIATED WITH TYPE 2 DIABETES MELLITUS: ICD-10-CM

## 2022-06-09 DIAGNOSIS — I48.11 LONGSTANDING PERSISTENT ATRIAL FIBRILLATION: ICD-10-CM

## 2022-06-09 LAB
25(OH)D3 SERPL-MCNC: 48.5 NG/ML (ref 30–100)
ANION GAP SERPL CALCULATED.3IONS-SCNC: 14 MMOL/L (ref 5–15)
BUN SERPL-MCNC: 35 MG/DL (ref 8–23)
BUN/CREAT SERPL: 23.5 (ref 7–25)
CALCIUM SPEC-SCNC: 10 MG/DL (ref 8.6–10.5)
CHLORIDE SERPL-SCNC: 96 MMOL/L (ref 98–107)
CHOLEST SERPL-MCNC: 129 MG/DL (ref 0–200)
CO2 SERPL-SCNC: 26 MMOL/L (ref 22–29)
CREAT SERPL-MCNC: 1.49 MG/DL (ref 0.57–1)
DIGOXIN SERPL-MCNC: 0.9 NG/ML (ref 0.6–1.2)
EGFRCR SERPLBLD CKD-EPI 2021: 36.7 ML/MIN/1.73
FOLATE SERPL-MCNC: 19.9 NG/ML (ref 4.78–24.2)
GLUCOSE SERPL-MCNC: 181 MG/DL (ref 65–99)
HBA1C MFR BLD: 8.5 % (ref 4.8–5.6)
HDLC SERPL-MCNC: 54 MG/DL (ref 40–60)
LDLC SERPL CALC-MCNC: 51 MG/DL (ref 0–100)
LDLC/HDLC SERPL: 0.86 {RATIO}
POTASSIUM SERPL-SCNC: 3.7 MMOL/L (ref 3.5–5.2)
SODIUM SERPL-SCNC: 136 MMOL/L (ref 136–145)
T3FREE SERPL-MCNC: 3.07 PG/ML (ref 2–4.4)
T4 FREE SERPL-MCNC: 1.65 NG/DL (ref 0.93–1.7)
TRIGL SERPL-MCNC: 142 MG/DL (ref 0–150)
TSH SERPL DL<=0.05 MIU/L-ACNC: 1.79 UIU/ML (ref 0.27–4.2)
VIT B12 BLD-MCNC: 632 PG/ML (ref 211–946)
VLDLC SERPL-MCNC: 24 MG/DL (ref 5–40)

## 2022-06-09 PROCEDURE — 83036 HEMOGLOBIN GLYCOSYLATED A1C: CPT

## 2022-06-09 PROCEDURE — 36415 COLL VENOUS BLD VENIPUNCTURE: CPT

## 2022-06-09 PROCEDURE — 82746 ASSAY OF FOLIC ACID SERUM: CPT

## 2022-06-09 PROCEDURE — 84481 FREE ASSAY (FT-3): CPT

## 2022-06-09 PROCEDURE — 80048 BASIC METABOLIC PNL TOTAL CA: CPT

## 2022-06-09 PROCEDURE — 80061 LIPID PANEL: CPT

## 2022-06-09 PROCEDURE — 84439 ASSAY OF FREE THYROXINE: CPT

## 2022-06-09 PROCEDURE — 82306 VITAMIN D 25 HYDROXY: CPT

## 2022-06-09 PROCEDURE — 80162 ASSAY OF DIGOXIN TOTAL: CPT

## 2022-06-09 PROCEDURE — 82607 VITAMIN B-12: CPT

## 2022-06-09 PROCEDURE — 84443 ASSAY THYROID STIM HORMONE: CPT

## 2022-06-09 NOTE — PROGRESS NOTES
Reviewed results and recommendations with Lana Yañez.  Patient verbalized understanding of results and recommendations, with repeat back of medication changes.    Patient stated she will call office at the end of next week with BP/HR readings.    Thank you,  Layla Gerardo RN  Triage Nurse YONI

## 2022-06-09 NOTE — TELEPHONE ENCOUNTER
From: Lana Yañez  To: ANGELA Pop  Sent: 6/7/2022 1:40 PM EDT  Subject: Sitagliptin ?    I received my prescription with is 50mg I have been taking 100mg which dosage is correct? Thank you. Lana Yañez

## 2022-06-10 ENCOUNTER — HOME CARE VISIT (OUTPATIENT)
Dept: HOME HEALTH SERVICES | Facility: HOME HEALTHCARE | Age: 77
End: 2022-06-10

## 2022-06-10 VITALS
DIASTOLIC BLOOD PRESSURE: 84 MMHG | HEART RATE: 83 BPM | SYSTOLIC BLOOD PRESSURE: 130 MMHG | OXYGEN SATURATION: 96 % | TEMPERATURE: 97.5 F

## 2022-06-10 PROCEDURE — G0151 HHCP-SERV OF PT,EA 15 MIN: HCPCS

## 2022-06-10 NOTE — HOME HEALTH
"Subjective: \"I'm on the road to recovery.\"    Falls- none    Assessment: Patient demonstrates independence with ambulation in home, independence with transfers and good understanding of home program.    Communication: Case communication to Dr. Will Madden; case communication to Lina Salazar RN clinical manager, Colleen Stroud RN case manager, and Breonna Garcia,"

## 2022-06-14 ENCOUNTER — ANTICOAGULATION VISIT (OUTPATIENT)
Dept: PHARMACY | Facility: HOSPITAL | Age: 77
End: 2022-06-14

## 2022-06-14 ENCOUNTER — TELEPHONE (OUTPATIENT)
Dept: INTERNAL MEDICINE | Facility: CLINIC | Age: 77
End: 2022-06-14

## 2022-06-14 ENCOUNTER — DOCUMENTATION (OUTPATIENT)
Dept: INTERNAL MEDICINE | Facility: CLINIC | Age: 77
End: 2022-06-14

## 2022-06-14 ENCOUNTER — HOME CARE VISIT (OUTPATIENT)
Dept: HOME HEALTH SERVICES | Facility: HOME HEALTHCARE | Age: 77
End: 2022-06-14

## 2022-06-14 LAB — INR PPP: 1.5

## 2022-06-14 PROCEDURE — G0299 HHS/HOSPICE OF RN EA 15 MIN: HCPCS

## 2022-06-14 NOTE — PROGRESS NOTES
Anticoagulation Clinic Progress Note    Anticoagulation Summary  As of 2022    INR goal:  2.0-3.0   TTR:  62.2 % (3.6 y)   INR used for dosin.50 (2022)   Warfarin maintenance plan:  2.5 mg every Mon, Fri; 5 mg all other days   Weekly warfarin total:  30 mg   Plan last modified:  Love Allison RPH (2/10/2022)   Next INR check:  2022   Priority:  Maintenance   Target end date:  Indefinite    Indications    Atrial fibrillation (HCC) [I48.91]             Anticoagulation Episode Summary     INR check location:      Preferred lab:      Send INR reminders to:  [facility]    Comments:        Anticoagulation Care Providers     Provider Role Specialty Phone number    Pia Dueñsa MD Referring Cardiology [phone]          Clinic Interview:  Patient Findings     Positives:  Change in medications, Change in diet/appetite, Other   complaints    Negatives:  Signs/symptoms of thrombosis, Signs/symptoms of bleeding,   Laboratory test error suspected, Change in health, Change in alcohol use,   Change in activity, Upcoming invasive procedure, Emergency department   visit, Upcoming dental procedure, Missed doses, Extra doses, Hospital   admission, Bruising    Comments:  Patient reports having some chest congestion and cough since   . She took some tussin (no DDI), she states she also had some greens   on .      Clinical Outcomes     Negatives:  Major bleeding event, Thromboembolic event,   Anticoagulation-related hospital admission, Anticoagulation-related ED   visit, Anticoagulation-related fatality    Comments:  Patient reports having some chest congestion and cough since   . She took some tussin (no DDI), she states she also had some greens   on .        INR History:  Anticoagulation Monitoring 2022   INR 2.30 1.30 1.50   INR Date 2022   INR Goal 2.0-3.0 2.0-3.0 2.0-3.0   Trend Same Same Same   Last Week Total 37.5 mg  30 mg 35 mg   Next Week Total 30 mg 35 mg 32.5 mg   Sun 5 mg 5 mg -   Mon - 2.5 mg -   Tue - - 7.5 mg (6/14)   Wed 5 mg 5 mg 5 mg   Thu 5 mg 7.5 mg (6/9) 5 mg   Fri 2.5 mg 5 mg (6/10) -   Sat 5 mg 5 mg -   Visit Report - - -   Some recent data might be hidden       Plan:  1. INR is Subtherapeutic today- see above in Anticoagulation Summary.   Will instruct Lana Yañez to Change their warfarin regimen to include a boost dose today of 7.5 mg, then resume normal- see above in Anticoagulation Summary.  2. Follow up in 3 days  3.They have been instructed to call if any changes in medications, doses, concerns, etc. Patient expresses understanding and has no further questions at this time.    Valerie Marie, PharmD

## 2022-06-14 NOTE — TELEPHONE ENCOUNTER
Caller: [facility]    Relationship to patient: HOME HEALTH RN     Best call back number: [phone]    Patient is needing: NURSE REPORTING PATIENT HAS SINUS INFECTION WITH YELLOW DISCHARGE =AND TEMPERATURE 100.3.  BEEN SICK SINCE Sunday.

## 2022-06-14 NOTE — PROGRESS NOTES
ON CALL NOTE: Spoke with patient regarding increased congestion and drainage since Sunday, temperature 100.3 when checked by Home Health today. COVID-19 test today was positive. I have advised her to take Mucinex 600mg bid for increased congestion and Tylenol as needed for fever and/or body aches. Please advise patient if additional tx is needed, I discussed possible side effects and interactions of Paxlovid and will leave this up to PCP Wednesday morning. She is advised to report to ER with worsening symptoms (denies dyspnea).

## 2022-06-15 ENCOUNTER — TELEPHONE (OUTPATIENT)
Dept: INTERNAL MEDICINE | Facility: CLINIC | Age: 77
End: 2022-06-15

## 2022-06-15 NOTE — TELEPHONE ENCOUNTER
Please call patient and see how she is doing with her symptoms.  I would recommend against Paxlovid unless we had to given the multiple medications she is on including multiple heart medicines.  If she desires an infusion, she would be Tier 3 and would qualify.  However, space is limited and not sure if it would occur in time.  Covid 19 was positive 6/14/2022.  Symptoms started 6/12/2022.  Also, these infusions are NOT going to make her all better.  What they can do is help prevent complications, especially if she is feeling worse.  If she is stable or improving, may be better to wait for now.      Here is the on-call note from Sophia  ON CALL NOTE: Spoke with patient regarding increased congestion and drainage since Sunday, temperature 100.3 when checked by Home Health today. COVID-19 test today was positive. I have advised her to take Mucinex 600mg bid for increased congestion and Tylenol as needed for fever and/or body aches. Please advise patient if additional tx is needed, I discussed possible side effects and interactions of Paxlovid and will leave this up to PCP Wednesday morning. She is advised to report to ER with worsening symptoms (denies dyspnea).

## 2022-06-15 NOTE — TELEPHONE ENCOUNTER
Caller: Lana Yañez    Relationship: Self    Best call back number: [phone]    Do you know the name of the person who called: YARELI    What was the call regarding: PATIENT WAS RETURNING YARELI'S PHONE CALL, ATTEMPTED WARM TRANSFER, NO ANSWER.     Do you require a callback: YES

## 2022-06-16 VITALS
OXYGEN SATURATION: 98 % | SYSTOLIC BLOOD PRESSURE: 128 MMHG | RESPIRATION RATE: 18 BRPM | TEMPERATURE: 98.4 F | HEART RATE: 72 BPM | DIASTOLIC BLOOD PRESSURE: 68 MMHG

## 2022-06-17 ENCOUNTER — HOME CARE VISIT (OUTPATIENT)
Dept: HOME HEALTH SERVICES | Facility: HOME HEALTHCARE | Age: 77
End: 2022-06-17

## 2022-06-17 ENCOUNTER — ANTICOAGULATION VISIT (OUTPATIENT)
Dept: PHARMACY | Facility: HOSPITAL | Age: 77
End: 2022-06-17

## 2022-06-17 LAB — INR PPP: 2.1

## 2022-06-17 PROCEDURE — G0180 MD CERTIFICATION HHA PATIENT: HCPCS | Performed by: FAMILY MEDICINE

## 2022-06-17 PROCEDURE — G0299 HHS/HOSPICE OF RN EA 15 MIN: HCPCS

## 2022-06-17 NOTE — PROGRESS NOTES
Anticoagulation Clinic Progress Note    Anticoagulation Summary  As of 2022    INR goal:  2.0-3.0   TTR:  62.3 % (3.6 y)   INR used for dosin.10 (2022)   Warfarin maintenance plan:  2.5 mg every Mon, Fri; 5 mg all other days   Weekly warfarin total:  30 mg   Plan last modified:  [facility] (2/10/2022)   Next INR check:  2022   Priority:  Maintenance   Target end date:  Indefinite    Indications    Atrial fibrillation (HCC) [I48.91]             Anticoagulation Episode Summary     INR check location:      Preferred lab:      Send INR reminders to:   [facility] CLINICAL [place]    Comments:        Anticoagulation Care Providers     Provider Role Specialty Phone number    Pia Dueñas MD Referring Cardiology [phone]          INR History:  Anticoagulation Monitoring 2022   INR 1.30 1.50 2.10   INR Date 2022   INR Goal 2.0-3.0 2.0-3.0 2.0-3.0   Trend Same Same Same   Last Week Total 30 mg 35 mg 35 mg   Next Week Total 35 mg 32.5 mg 35 mg   Sun 5 mg - 5 mg   Mon 2.5 mg - 5 mg ()   Tue - 7.5 mg () 5 mg   Wed 5 mg 5 mg 5 mg   Thu 7.5 mg () 5 mg -   Fri 5 mg (6/10) - 5 mg ()   Sat 5 mg - 5 mg   Visit Report - - -   Some recent data might be hidden       Plan:  1. INR is Therapeutic today- see above in Anticoagulation Summary.   Provided instructions to Maria G with [employer] [facility] to have Lana [place] take 5mg daily and recheck INR on  - see above in Anticoagulation Summary.  2. Follow up in 6days    [facility]

## 2022-06-17 NOTE — HOME HEALTH
Instructed on fall prevention-clear pathways, remove any home hazards, wear appropriate footwear, adequate lightening, change position slowly, etc. Patient voices back understanding. No safety issues noted. PT/INR 18.5/1.5

## 2022-06-20 VITALS
RESPIRATION RATE: 18 BRPM | SYSTOLIC BLOOD PRESSURE: 114 MMHG | TEMPERATURE: 98 F | HEART RATE: 80 BPM | DIASTOLIC BLOOD PRESSURE: 72 MMHG | OXYGEN SATURATION: 98 %

## 2022-06-20 NOTE — HOME HEALTH
Instructed on fall prevention-clear pathways, remove any home hazards, wear appropriate footwear, adequate lightening, change position slowly, etc. Patient voices back understanding. No safety issues noted. PT/INR 25.3/2.1

## 2022-06-23 ENCOUNTER — ANTICOAGULATION VISIT (OUTPATIENT)
Dept: PHARMACY | Facility: HOSPITAL | Age: 77
End: 2022-06-23

## 2022-06-23 ENCOUNTER — HOME CARE VISIT (OUTPATIENT)
Dept: HOME HEALTH SERVICES | Facility: HOME HEALTHCARE | Age: 77
End: 2022-06-23

## 2022-06-23 LAB — INR PPP: 3.3

## 2022-06-23 PROCEDURE — G0299 HHS/HOSPICE OF RN EA 15 MIN: HCPCS

## 2022-06-23 NOTE — PROGRESS NOTES
Anticoagulation Clinic Progress Note    Anticoagulation Summary  As of 6/23/2022    INR goal:  2.0-3.0   TTR:  62.3 % (3.6 y)   INR used for dosing:  3.30 (6/23/2022)   Warfarin maintenance plan:  2.5 mg every Fri; 5 mg all other days   Weekly warfarin total:  32.5 mg   Plan last modified:  Shaw Hand, PharmD (6/23/2022)   Next INR check:  6/30/2022   Priority:  Maintenance   Target end date:  Indefinite    Indications    Atrial fibrillation (HCC) [I48.91]             Anticoagulation Episode Summary     INR check location:      Preferred lab:      Send INR reminders to:  [facility]    Comments:        Anticoagulation Care Providers     Provider Role Specialty Phone number    Pia Dueñas MD Referring Cardiology [phone]          Clinic Interview:  Patient Findings     Positives:  Other complaints    Negatives:  Signs/symptoms of thrombosis, Signs/symptoms of bleeding,   Laboratory test error suspected, Change in health, Change in alcohol use,   Change in activity, Upcoming invasive procedure, Emergency department   visit, Upcoming dental procedure, Missed doses, Extra doses, Change in   medications, Change in diet/appetite, Hospital admission, Bruising    Comments:  Reports she has already taken today's dose of warfarin.   Discharged from [facility] today.       Clinical Outcomes     Negatives:  Major bleeding event, Thromboembolic event,   Anticoagulation-related hospital admission, Anticoagulation-related ED   visit, Anticoagulation-related fatality    Comments:  Reports she has already taken today's dose of warfarin.   Discharged from [facility] today.         INR History:  Anticoagulation Monitoring 6/14/2022 6/17/2022 6/23/2022   INR 1.50 2.10 3.30   INR Date 6/14/2022 6/17/2022 6/23/2022   INR Goal 2.0-3.0 2.0-3.0 2.0-3.0   Trend Same Same Up   Last Week Total 35 mg 35 mg 35 mg   Next Week Total 32.5 mg 35 mg 32.5 mg   Sun - 5 mg 5 mg   Mon - 5 mg (6/20) 5 mg   Tue  7.5 mg (6/14) 5 mg 5 mg   Wed 5 mg 5 mg 5 mg   Thu 5 mg - 5 mg   Fri - 5 mg (6/17) 2.5 mg   Sat - 5 mg 5 mg   Visit Report - - -   Some recent data might be hidden       Plan:  1. INR is Supratherapeutic today- see above in Anticoagulation Summary.   Will instruct Lana Otilio to Change their warfarin regimen - see above in Anticoagulation Summary.  2. Follow up in 1 week  3. They have been instructed to call if any changes in medications, doses, concerns, etc. Patient expresses understanding and has no further questions at this time.    Shaw Hand, PharmD

## 2022-06-26 VITALS
SYSTOLIC BLOOD PRESSURE: 112 MMHG | RESPIRATION RATE: 18 BRPM | OXYGEN SATURATION: 8 % | WEIGHT: 229 LBS | HEART RATE: 74 BPM | BODY MASS INDEX: 238.47 KG/M2 | TEMPERATURE: 97.5 F | DIASTOLIC BLOOD PRESSURE: 78 MMHG

## 2022-06-30 ENCOUNTER — OFFICE VISIT (OUTPATIENT)
Dept: ENDOCRINOLOGY | Age: 77
End: 2022-06-30

## 2022-06-30 ENCOUNTER — ANTICOAGULATION VISIT (OUTPATIENT)
Dept: PHARMACY | Facility: HOSPITAL | Age: 77
End: 2022-06-30

## 2022-06-30 VITALS
TEMPERATURE: 97.3 F | BODY MASS INDEX: 53.69 KG/M2 | SYSTOLIC BLOOD PRESSURE: 124 MMHG | WEIGHT: 232 LBS | DIASTOLIC BLOOD PRESSURE: 74 MMHG | HEART RATE: 80 BPM | HEIGHT: 55 IN | OXYGEN SATURATION: 96 %

## 2022-06-30 DIAGNOSIS — E78.5 HYPERLIPIDEMIA ASSOCIATED WITH TYPE 2 DIABETES MELLITUS: ICD-10-CM

## 2022-06-30 DIAGNOSIS — E11.69 HYPERLIPIDEMIA ASSOCIATED WITH TYPE 2 DIABETES MELLITUS: ICD-10-CM

## 2022-06-30 DIAGNOSIS — E11.65 TYPE 2 DIABETES MELLITUS WITH HYPERGLYCEMIA, WITHOUT LONG-TERM CURRENT USE OF INSULIN: Primary | ICD-10-CM

## 2022-06-30 LAB
INR PPP: 3.3 (ref 0.91–1.09)
PROTHROMBIN TIME: 40.2 SECONDS (ref 10–13.8)

## 2022-06-30 PROCEDURE — 85610 PROTHROMBIN TIME: CPT

## 2022-06-30 PROCEDURE — G0463 HOSPITAL OUTPT CLINIC VISIT: HCPCS

## 2022-06-30 PROCEDURE — 36416 COLLJ CAPILLARY BLOOD SPEC: CPT

## 2022-06-30 PROCEDURE — 99214 OFFICE O/P EST MOD 30 MIN: CPT | Performed by: NURSE PRACTITIONER

## 2022-06-30 NOTE — PROGRESS NOTES
"Chief Complaint  Diabetes (Type2: Patient doesn't have meter with her today states that she doesn't check regularly )    Karl Yañez presents to [facility] ENDOCRINOLOGY  History of Present Illness     Lab Results   Component Value Date    HGBA1C 8.50 (H) 06/09/2022     Had covid recently, about a month ago    Lab Results   Component Value Date    GLUCOSE 181 (H) 06/09/2022    CALCIUM 10.0 06/09/2022     06/09/2022    K 3.7 06/09/2022    CO2 26.0 06/09/2022    CL 96 (L) 06/09/2022    BUN 35 (H) 06/09/2022    CREATININE 1.49 (H) 06/09/2022    EGFRIFAFRI 51 (L) 02/09/2022    EGFRIFNONA 45 (L) 02/09/2022    BCR 23.5 06/09/2022    ANIONGAP 14.0 06/09/2022         Type 2 dm    Diagnosed about 2 - 3 years ago.   Today in clinic pt reports being on januvia 50 mg po daily (GFR >45)  Not checking her blood sugars   No history of diabetic retinopathy, UTD 2022 with exam  Does have history of diabetic nephropathy, not currently following with renal  + neuropathy   Does have CHF  (-) CVA  On statin      Lab Results   Component Value Date    CHOL 129 06/09/2022    CHLPL 93 (L) 02/09/2022    TRIG 142 06/09/2022    HDL 54 06/09/2022    LDL 51 06/09/2022          Objective   Vital Signs:  /74   Pulse 80   Temp 97.3 °F (36.3 °C) (Temporal)   Ht 63.5 cm (25\")   Wt 105 kg (232 lb)   SpO2 96%   .98 kg/m²   Estimated body mass index is 260.98 kg/m² as calculated from the following:    Height as of this encounter: 63.5 cm (25\").    Weight as of this encounter: 105 kg (232 lb).          Physical Exam  Vitals reviewed.   Constitutional:       General: She is not in acute distress.  HENT:      Head: Normocephalic and atraumatic.   Cardiovascular:      Rate and Rhythm: Normal rate and regular rhythm.   Pulmonary:      Effort: Pulmonary effort is normal. No respiratory distress.   Musculoskeletal:         General: No signs of injury. Normal range of motion.      Cervical back: " Normal range of motion and neck supple.   Skin:     General: Skin is warm and dry.   Neurological:      Mental Status: She is alert and oriented to person, place, and time. Mental status is at baseline.   Psychiatric:         Mood and Affect: Mood normal.         Behavior: Behavior normal.         Thought Content: Thought content normal.         Judgment: Judgment normal.           Result Review :  The following data was reviewed by: ANGELA Martinez on 06/30/2022:  Common labs    Common Labsle 6/3/22 6/6/22 6/9/22 6/9/22 6/9/22      1228 1228 1228   Glucose 197 (A) 183 (A)   181 (A)   BUN 55 (A) 45 (A)   35 (A)   Creatinine 1.73 (A) 1.53 (A)   1.49 (A)   Sodium 138 141   136   Potassium 4.4 3.6   3.7   Chloride 93 (A) 98   96 (A)   Calcium 10.2 10.5   10.0   Total Cholesterol    129    Triglycerides    142    HDL Cholesterol    54    LDL Cholesterol     51    Hemoglobin A1C   8.50 (A)     (A) Abnormal value                      Assessment and Plan   Diagnoses and all orders for this visit:    1. Type 2 diabetes mellitus with hyperglycemia, without long-term current use of insulin (HCC) (Primary)  -     Fructosamine  -     Basic Metabolic Panel    2. Hyperlipidemia associated with type 2 diabetes mellitus (HCC)  -     Fructosamine  -     Basic Metabolic Panel             Follow Up   No follow-ups on file.     Fructosamine today  Resume BGM  Consider resuming januvia 100mg vs adding low dose glimepiride or once daily basal insulin  Not agreeable to injections at this time- would recommend avoiding steroids since she can not do rapid insulin per her request   Continue statin, LDL at goal    Patient was given instructions and counseling regarding her condition or for health maintenance advice. Please see specific information pulled into the AVS if appropriate.     ANGELA Martinez

## 2022-06-30 NOTE — PROGRESS NOTES
Anticoagulation Clinic Progress Note    Anticoagulation Summary  As of 6/30/2022    INR goal:  2.0-3.0   TTR:  62.0 % (3.7 y)   INR used for dosing:  3.3 (6/30/2022)   Warfarin maintenance plan:  2.5 mg every Fri; 5 mg all other days   Weekly warfarin total:  32.5 mg   Plan last modified:  Shaw Hand, PharmD (6/23/2022)   Next INR check:  7/11/2022   Priority:  Maintenance   Target end date:  Indefinite    Indications    Atrial fibrillation (HCC) [I48.91]             Anticoagulation Episode Summary     INR check location:      Preferred lab:      Send INR reminders to:  [facility]    Comments:        Anticoagulation Care Providers     Provider Role Specialty Phone number    Pia Dueñas MD Referring Cardiology [phone]          Clinic Interview:  Patient Findings     Positives:  Change in diet/appetite    Comments:  Gradually getting better post hospital stay.  Still not as   much appetite as before.       Clinical Outcomes     Comments:  Gradually getting better post hospital stay.  Still not as   much appetite as before.         INR History:  Anticoagulation Monitoring 6/17/2022 6/23/2022 6/30/2022   INR 2.10 3.30 3.3   INR Date 6/17/2022 6/23/2022 6/30/2022   INR Goal 2.0-3.0 2.0-3.0 2.0-3.0   Trend Same Up Same   Last Week Total 35 mg 35 mg 32.5 mg   Next Week Total 35 mg 32.5 mg 27.5 mg   Sun 5 mg 5 mg 5 mg   Mon 5 mg (6/20) 5 mg 2.5 mg (7/4)   Tue 5 mg 5 mg 5 mg   Wed 5 mg 5 mg 5 mg   Thu - 5 mg 5 mg   Fri 5 mg (6/17) 2.5 mg Hold (7/1); Otherwise 2.5 mg   Sat 5 mg 5 mg 5 mg   Visit Report - - -   Some recent data might be hidden       Plan:  1. INR is Supratherapeutic today- see above in Anticoagulation Summary.  Will instruct Lana Yañez to Change their warfarin regimen- see above in Anticoagulation Summary.  Patient already took todays dose of 5mg. HOLD dose tomorrow, then decrease weekly dose to 2.5mg Mondays and Fridays and 5mg on all other days.   2. Follow up in 2 week  3.  Patient declines warfarin refills.  4. Verbal and written information provided. Patient expresses understanding and has no further questions at this time.    Cindy Jones, PharmD

## 2022-07-01 LAB
BUN SERPL-MCNC: 21 MG/DL (ref 8–27)
BUN/CREAT SERPL: 18 (ref 12–28)
CALCIUM SERPL-MCNC: 8.6 MG/DL (ref 8.7–10.3)
CHLORIDE SERPL-SCNC: 98 MMOL/L (ref 96–106)
CO2 SERPL-SCNC: 28 MMOL/L (ref 20–29)
CREAT SERPL-MCNC: 1.2 MG/DL (ref 0.57–1)
EGFRCR SERPLBLD CKD-EPI 2021: 47 ML/MIN/1.73
FRUCTOSAMINE SERPL-SCNC: 333 UMOL/L (ref 0–285)
GLUCOSE SERPL-MCNC: 156 MG/DL (ref 65–99)
POTASSIUM SERPL-SCNC: 3.6 MMOL/L (ref 3.5–5.2)
REPORT: NORMAL
SODIUM SERPL-SCNC: 142 MMOL/L (ref 134–144)

## 2022-07-06 ENCOUNTER — PATIENT MESSAGE (OUTPATIENT)
Dept: CARDIOLOGY | Facility: CLINIC | Age: 77
End: 2022-07-06

## 2022-07-06 DIAGNOSIS — I50.32 CHRONIC DIASTOLIC HEART FAILURE: Chronic | ICD-10-CM

## 2022-07-06 NOTE — TELEPHONE ENCOUNTER
----- Message from Marco Yañez sent at 7/6/2022  2:47 PM EDT -----  Regarding: Refill for torsemide  Kelin Valverde, I forgot to ask you about the refill for torsemide  20mg.  it was filled at hospital.  Can you refill for me. Thanks marco

## 2022-07-11 ENCOUNTER — APPOINTMENT (OUTPATIENT)
Dept: PHARMACY | Facility: HOSPITAL | Age: 77
End: 2022-07-11

## 2022-07-15 ENCOUNTER — ANTICOAGULATION VISIT (OUTPATIENT)
Dept: PHARMACY | Facility: HOSPITAL | Age: 77
End: 2022-07-15

## 2022-07-15 LAB
INR PPP: 2.5 (ref 0.91–1.09)
PROTHROMBIN TIME: 29.6 SECONDS (ref 10–13.8)

## 2022-07-15 PROCEDURE — 36416 COLLJ CAPILLARY BLOOD SPEC: CPT

## 2022-07-15 PROCEDURE — 85610 PROTHROMBIN TIME: CPT

## 2022-07-15 NOTE — PROGRESS NOTES
Anticoagulation Clinic Progress Note    Anticoagulation Summary  As of 7/15/2022    INR goal:  2.0-3.0   TTR:  62.0 % (3.7 y)   INR used for dosin.5 (7/15/2022)   Warfarin maintenance plan:  2.5 mg every Mon, Fri; 5 mg all other days   Weekly warfarin total:  30 mg   Plan last modified:  Vikash Billings, Pharmacy Intern (7/15/2022)   Next INR check:  2022   Priority:  Maintenance   Target end date:  Indefinite    Indications    Atrial fibrillation (HCC) [I48.91]             Anticoagulation Episode Summary     INR check location:      Preferred lab:      Send INR reminders to:  [facility]    Comments:        Anticoagulation Care Providers     Provider Role Specialty Phone number    Pia Dueñas MD Referring Cardiology [phone]          Clinic Interview:  Patient Findings     Negatives:  Signs/symptoms of thrombosis, Signs/symptoms of bleeding,   Laboratory test error suspected, Change in health, Change in alcohol use,   Change in activity, Upcoming invasive procedure, Emergency department   visit, Upcoming dental procedure, Missed doses, Extra doses, Change in   medications, Change in diet/appetite, Hospital admission, Bruising, Other   complaints    Comments:  Patient denies any other changes to their health/diet/meds.       Clinical Outcomes     Negatives:  Major bleeding event, Thromboembolic event,   Anticoagulation-related hospital admission, Anticoagulation-related ED   visit, Anticoagulation-related fatality    Comments:  Patient denies any other changes to their health/diet/meds.         INR History:  Anticoagulation Monitoring 2022 2022 7/15/2022   INR 3.30 3.3 2.5   INR Date 2022 2022 7/15/2022   INR Goal 2.0-3.0 2.0-3.0 2.0-3.0   Trend Up Same Down   Last Week Total 35 mg 32.5 mg 30 mg   Next Week Total 32.5 mg 27.5 mg 30 mg   Sun 5 mg 5 mg 5 mg   Mon 5 mg 2.5 mg () 2.5 mg   Tue 5 mg 5 mg 5 mg   Wed 5 mg 5 mg 5 mg   Thu 5 mg 5 mg 5 mg   Fri 2.5  mg Hold (7/1); Otherwise 2.5 mg 2.5 mg   Sat 5 mg 5 mg 5 mg   Visit Report - - -   Some recent data might be hidden       Plan:  1. INR is Therapeutic today- see above in Anticoagulation Summary.  Will instruct Lana Yañez to Continue their warfarin regimen- see above in Anticoagulation Summary.  2. Follow up in 2 weeks  3. Patient declines warfarin refills.  4. Verbal and written information provided. Patient expresses understanding and has no further questions at this time.    Vikash Billings, Pharmacy Intern

## 2022-07-15 NOTE — PROGRESS NOTES
I have supervised and reviewed the notes, assessments, and/or procedures performed by our Pharmacy Intern. The documented assessment and plan were developed cooperatively, and  I concur with the documentation of this patient encounter.    Cindy Jones, PharmD

## 2022-08-02 ENCOUNTER — TELEPHONE (OUTPATIENT)
Dept: PHARMACY | Facility: HOSPITAL | Age: 77
End: 2022-08-02

## 2022-08-02 NOTE — TELEPHONE ENCOUNTER
Patient missed appointment with the medication management clinic. Left a voicemail to call back to reschedule

## 2022-08-04 ENCOUNTER — APPOINTMENT (OUTPATIENT)
Dept: PHARMACY | Facility: HOSPITAL | Age: 77
End: 2022-08-04

## 2022-08-09 ENCOUNTER — TELEPHONE (OUTPATIENT)
Dept: PHARMACY | Facility: HOSPITAL | Age: 77
End: 2022-08-09

## 2022-08-11 ENCOUNTER — ANTICOAGULATION VISIT (OUTPATIENT)
Dept: PHARMACY | Facility: HOSPITAL | Age: 77
End: 2022-08-11

## 2022-08-11 LAB
INR PPP: 2.2 (ref 0.91–1.09)
PROTHROMBIN TIME: 26.3 SECONDS (ref 10–13.8)

## 2022-08-11 PROCEDURE — 36416 COLLJ CAPILLARY BLOOD SPEC: CPT

## 2022-08-11 PROCEDURE — 85610 PROTHROMBIN TIME: CPT

## 2022-08-11 NOTE — PROGRESS NOTES
Anticoagulation Clinic Progress Note    Anticoagulation Summary  As of 2022    INR goal:  2.0-3.0   TTR:  62.8 % (3.8 y)   INR used for dosin.2 (2022)   Warfarin maintenance plan:  2.5 mg every Mon, Fri; 5 mg all other days   Weekly warfarin total:  30 mg   No change documented:  Love Allison RPH   Plan last modified:  Vikash Billings, Pharmacy Intern (7/15/2022)   Next INR check:  2022   Priority:  Maintenance   Target end date:  Indefinite    Indications    Atrial fibrillation (HCC) [I48.91]             Anticoagulation Episode Summary     INR check location:      Preferred lab:      Send INR reminders to:   [facility]    Comments:        Anticoagulation Care Providers     Provider Role Specialty Phone number    Pia Dueñas MD Referring Cardiology [phone]          Clinic Interview:  Patient Findings     Negatives:  Signs/symptoms of thrombosis, Signs/symptoms of bleeding,   Laboratory test error suspected, Change in health, Change in alcohol use,   Change in activity, Upcoming invasive procedure, Emergency department   visit, Upcoming dental procedure, Missed doses, Extra doses, Change in   medications, Change in diet/appetite, Hospital admission, Bruising, Other   complaints      Clinical Outcomes     Negatives:  Major bleeding event, Thromboembolic event,   Anticoagulation-related hospital admission, Anticoagulation-related ED   visit, Anticoagulation-related fatality        INR History:  Anticoagulation Monitoring 2022 7/15/2022 2022   INR 3.3 2.5 2.2   INR Date 2022 7/15/2022 2022   INR Goal 2.0-3.0 2.0-3.0 2.0-3.0   Trend Same Down Same   Last Week Total 32.5 mg 30 mg 30 mg   Next Week Total 27.5 mg 30 mg 30 mg   Sun 5 mg 5 mg 5 mg   Mon 2.5 mg () 2.5 mg 2.5 mg   Tue 5 mg 5 mg 5 mg   Wed 5 mg 5 mg 5 mg   Thu 5 mg 5 mg 5 mg   Fri Hold (); Otherwise 2.5 mg 2.5 mg 2.5 mg   Sat 5 mg 5 mg 5 mg   Visit Report - - -   Some recent data might  be hidden       Plan:  1. INR is Therapeutic today- see above in Anticoagulation Summary.  Will instruct Lana Nielsonb to Continue their warfarin regimen- see above in Anticoagulation Summary. (Interested in home testing, will look into insurance coverage)   2. Follow up in 1 month  3. Patient declines warfarin refills.  4. Verbal and written information provided. Patient expresses understanding and has no further questions at this time.    Love Allison, [facility]

## 2022-08-21 DIAGNOSIS — E78.2 MIXED HYPERLIPIDEMIA: ICD-10-CM

## 2022-08-22 RX ORDER — ATORVASTATIN CALCIUM 10 MG/1
TABLET, FILM COATED ORAL
Qty: 90 TABLET | Refills: 1 | Status: SHIPPED | OUTPATIENT
Start: 2022-08-22 | End: 2023-02-10

## 2022-08-30 DIAGNOSIS — E11.65 TYPE 2 DIABETES MELLITUS WITH HYPERGLYCEMIA, WITHOUT LONG-TERM CURRENT USE OF INSULIN: Primary | ICD-10-CM

## 2022-09-08 ENCOUNTER — ANTICOAGULATION VISIT (OUTPATIENT)
Dept: PHARMACY | Facility: HOSPITAL | Age: 77
End: 2022-09-08

## 2022-09-08 LAB
INR PPP: 2.6 (ref 0.91–1.09)
PROTHROMBIN TIME: 30.7 SECONDS (ref 10–13.8)

## 2022-09-08 PROCEDURE — 36416 COLLJ CAPILLARY BLOOD SPEC: CPT

## 2022-09-08 PROCEDURE — 85610 PROTHROMBIN TIME: CPT

## 2022-09-08 RX ORDER — WARFARIN SODIUM 5 MG/1
TABLET ORAL
Qty: 90 TABLET | Refills: 1 | Status: SHIPPED | OUTPATIENT
Start: 2022-09-08 | End: 2022-12-12

## 2022-09-08 NOTE — PROGRESS NOTES
Anticoagulation Clinic Progress Note    Anticoagulation Summary  As of 2022    INR goal:  2.0-3.0   TTR:  63.5 % (3.9 y)   INR used for dosin.6 (2022)   Warfarin maintenance plan:  2.5 mg every Mon, Fri; 5 mg all other days   Weekly warfarin total:  30 mg   No change documented:  Cindy Jones, PharmD   Plan last modified:  Vikash Billings, Pharmacy Intern (7/15/2022)   Next INR check:  10/6/2022   Priority:  Maintenance   Target end date:  Indefinite    Indications    Atrial fibrillation (HCC) [I48.91]             Anticoagulation Episode Summary     INR check location:      Preferred lab:      Send INR reminders to:  [facility]    Comments:        Anticoagulation Care Providers     Provider Role Specialty Phone number    Pia Dueñas MD Referring Cardiology [phone]          Clinic Interview:  Patient Findings     Negatives:  Signs/symptoms of thrombosis, Signs/symptoms of bleeding,   Laboratory test error suspected, Change in health, Change in alcohol use,   Change in activity, Upcoming invasive procedure, Emergency department   visit, Upcoming dental procedure, Missed doses, Extra doses, Change in   medications, Change in diet/appetite, Hospital admission, Bruising, Other   complaints      Clinical Outcomes     Negatives:  Major bleeding event, Thromboembolic event,   Anticoagulation-related hospital admission, Anticoagulation-related ED   visit, Anticoagulation-related fatality        INR History:  Anticoagulation Monitoring 7/15/2022 2022 2022   INR 2.5 2.2 2.6   INR Date 7/15/2022 2022 2022   INR Goal 2.0-3.0 2.0-3.0 2.0-3.0   Trend Down Same Same   Last Week Total 30 mg 30 mg 30 mg   Next Week Total 30 mg 30 mg 30 mg   Sun 5 mg 5 mg 5 mg   Mon 2.5 mg 2.5 mg 2.5 mg   Tue 5 mg 5 mg 5 mg   Wed 5 mg 5 mg 5 mg   Thu 5 mg 5 mg 5 mg   Fri 2.5 mg 2.5 mg 2.5 mg   Sat 5 mg 5 mg 5 mg   Visit Report - - -   Some recent data might be hidden       Plan:  1. INR  is Therapeutic today- see above in Anticoagulation Summary.  Will instruct Lanaantonio Aguerocomb to Continue their warfarin regimen- see above in Anticoagulation Summary.  2. Follow up in 4 weeks  3. Patient desires warfarin refills. Prescription sent to [facility], [place] per patient request.   4. Verbal and written information provided. Patient expresses understanding and has no further questions at this time.    Cindy Jones, PharmD

## 2022-09-16 ENCOUNTER — HOSPITAL ENCOUNTER (OUTPATIENT)
Dept: CARDIOLOGY | Facility: HOSPITAL | Age: 77
Discharge: HOME | End: 2022-09-16
Admitting: INTERNAL MEDICINE

## 2022-09-16 ENCOUNTER — OFFICE VISIT (OUTPATIENT)
Dept: CARDIOLOGY | Facility: CLINIC | Age: 77
End: 2022-09-16

## 2022-09-16 VITALS
SYSTOLIC BLOOD PRESSURE: 112 MMHG | WEIGHT: 240 LBS | HEIGHT: 65 IN | BODY MASS INDEX: 39.99 KG/M2 | DIASTOLIC BLOOD PRESSURE: 70 MMHG | HEART RATE: 68 BPM

## 2022-09-16 DIAGNOSIS — M79.10 MYALGIA: ICD-10-CM

## 2022-09-16 DIAGNOSIS — N28.9 RENAL INSUFFICIENCY: ICD-10-CM

## 2022-09-16 DIAGNOSIS — I48.11 LONGSTANDING PERSISTENT ATRIAL FIBRILLATION: ICD-10-CM

## 2022-09-16 DIAGNOSIS — R06.09 DOE (DYSPNEA ON EXERTION): ICD-10-CM

## 2022-09-16 DIAGNOSIS — I48.0 PAROXYSMAL ATRIAL FIBRILLATION: Primary | ICD-10-CM

## 2022-09-16 LAB
ANION GAP SERPL CALCULATED.3IONS-SCNC: 10 MMOL/L (ref 5–15)
BUN SERPL-MCNC: 21 MG/DL (ref 8–23)
BUN/CREAT SERPL: 19.1 (ref 7–25)
CALCIUM SPEC-SCNC: 9.7 MG/DL (ref 8.6–10.5)
CHLORIDE SERPL-SCNC: 97 MMOL/L (ref 98–107)
CO2 SERPL-SCNC: 29 MMOL/L (ref 22–29)
CREAT SERPL-MCNC: 1.1 MG/DL (ref 0.57–1)
EGFRCR SERPLBLD CKD-EPI 2021: 52.5 ML/MIN/1.73
GLUCOSE SERPL-MCNC: 187 MG/DL (ref 65–99)
NT-PROBNP SERPL-MCNC: 777 PG/ML (ref 0–1800)
POTASSIUM SERPL-SCNC: 5.2 MMOL/L (ref 3.5–5.2)
SODIUM SERPL-SCNC: 136 MMOL/L (ref 136–145)

## 2022-09-16 PROCEDURE — 99213 OFFICE O/P EST LOW 20 MIN: CPT | Performed by: INTERNAL MEDICINE

## 2022-09-16 PROCEDURE — 83880 ASSAY OF NATRIURETIC PEPTIDE: CPT | Performed by: INTERNAL MEDICINE

## 2022-09-16 PROCEDURE — 93000 ELECTROCARDIOGRAM COMPLETE: CPT | Performed by: INTERNAL MEDICINE

## 2022-09-16 PROCEDURE — 36415 COLL VENOUS BLD VENIPUNCTURE: CPT

## 2022-09-16 PROCEDURE — 80048 BASIC METABOLIC PNL TOTAL CA: CPT | Performed by: INTERNAL MEDICINE

## 2022-09-16 NOTE — PROGRESS NOTES
Date of Office Visit: 2022  Encounter Provider: Pia Dueñas MD  Place of Service: [facility] CARDIOLOGY  Patient Name: Lana Yañez  :1945    Chief complaint  Coronary artery disease, pulmonary hypertension, atrial fibrillation, mitral valve prolapse with moderate mitral regurgitation, dilated ascending aorta    History of Present Illness  The patient is a 76 yo female with with history of retention, hyperlipidemia, rheumatoid arthritis, obstructive sleep apnea who in early October was found to be in atrial fibrillation with rapid ventricular rates and mild diastolic heart failure.  She was placed on IV heparin and Pradaxa.  Echocardiogram revealed normal systolic function mild left ventricular hypertrophy, moderate atrial enlargement with aortic valve sclerosis and moderate pulmonary hypertension and moderate tricuspid regurgitation.  The RV systolic pressure is 54 mmHg.  She had a stress perfusion study that was negative for ischemia and a CT and her grandmother revealed a mildly dilated aorta without pulmonary emboli.  She then presented several days later with an acute stroke.  CLARIBEL was not pursued as it was felt to be embolic in nature.  However on  and she was diaphoretic and was found to have a non-ST elevation myocardial infarction.  This was on full dose Pradaxa which was not held.  CLARIBEL was pursued that revealed normal systolic function with moderate mitral valve prolapse without significant regurgitation.  There was right-sided spontaneous echo contrast without thrombus formation.  Cardiac catheterization revealed normal systolic function with 2+ mitral regurgitation, mild coronary disease except for a 90% stenosis of the distal PDA which was felt to be too small to be amenable PCI.  She was treated medically and switched to Coumadin. She has struggled with intermittent heart failure dietary indiscretions with salt since then.  She also had a  24-hour Holter that revealed persistent atrial fibrillation with reasonable rate control and frequent PVCs. No other arrhythmia was present.  Last echocardiogram in August 2021 showed an ejection fraction of 62% with moderate left ventricular hypertrophy with indeterminate diastolic function, mild aortic regurgitation, mitral valve thickening with moderate regurgitation without stenosis, severe tricuspid regurgitation with an RV systolic pressure 48 mmHg is present.  CT angiogram of the chest 9/2021 showed stable ascending aorta measuring 4.1 cm.  Lexiscan cardiac stress test 2/2022 was negative for ischemia.    Since last visit she had no chest pain, palpitations syncope near syncope.  She has dental extractions to be done in the next several weeks.  She complains of leg cramps and wonders if potassium is low.  Otherwise she feels well..      Blood work 6/2022 showed a creatinine of 1.2 GFR 47, INR has been stable    Past Medical History:   Diagnosis Date   • Acute on chronic diastolic heart failure (HCC)    • Acute UTI (urinary tract infection) 5/22/2022   • Arthritis    • Asthma    • Atrial fibrillation ([facility])     Persistent; on warfarin   • Atypical chest pain    • Biliary acute pancreatitis without necrosis or infection 11/10/2021   • Bronchitis    • Cellulitis of right elbow     due to MRSA   • CHF (congestive heart failure) ([facility])    • COPD (chronic obstructive pulmonary disease) ([facility])    • Coronary artery disease     Cardiac catheterization completed; 90% PDA stenosis with medical management recommended   • Coronary artery disease involving native coronary artery of native heart with angina pectoris with documented spasm ([facility])    • Diabetes 1.5, managed as type 2 ([facility])    • Disease of thyroid gland    • Displacement of lumbar intervertebral disc without myelopathy    • Dizziness    • ANTOINE (dyspnea on exertion)    • Essential hypertension    • Fatigue    • Generalized osteoarthritis of multiple sites    •  History of rheumatic fever    • History of transfusion    • Hx of bone density study     10/23/2014   • Hyperglycemia    • Hyperlipidemia    • Hypovitaminosis D    • Left arm pain    • Left-sided weakness    • Leg swelling    • Low back pain    • Lower extremity edema    • Malaise and fatigue    • Mitral valve disease     Moderate mitral valve prolapse and moderate mitral regurgitation   • Mitral valve insufficiency    • Morbid obesity with BMI of 40.0-44.9, adult ([facility])    • MRSA infection    • NSTEMI (non-ST elevated myocardial infarction) ([facility])    • PAF (paroxysmal atrial fibrillation) ([facility])    • RA (rheumatoid arthritis) ([facility])     involving both hands   • Sleep apnea    • SOB (shortness of breath)    • Stroke ([facility])     left side weakness   • Urge incontinence of urine    • UTI (urinary tract infection) 05/2020   • Vitamin D deficiency      Past Surgical History:   Procedure Laterality Date   • BREAST BIOPSY     • BREAST SURGERY      right side lumpectomy with biopsy   • CARDIAC CATHETERIZATION Left 10/20/2017    Procedure: Cardiac Catheterization/Vascular Study;  Surgeon: Alphonso Olmedo MD;  Location: [facility] CATH INVASIVE LOCATION;  Service:    • CARDIAC CATHETERIZATION N/A 10/20/2017    +2 mitral regurgitation, left main 10% stenosis, mid to distal LAD 10% diffuse stenosis, circumflex 10% diffuse stenosis, RCA 10% proximal stenosis, and distal PDA consistent with coronary embolus with a lesion of 90% too small to consider coronary intervention; medical management recommended   • EYE SURGERY      laser surgery for glaucoma and left eye cataracts removed   • HYSTERECTOMY      10+ years ago   • JOINT REPLACEMENT  2005; 2006    bilateral knees and left rotater   • KNEE SURGERY     • MAMMO BILATERAL  2016    [facility]      Outpatient Medications Prior to Visit   Medication Sig Dispense Refill   • acetaminophen (TYLENOL) 325 MG tablet Take 650 mg by mouth Every 6 (Six) Hours As Needed for Moderate Pain .     •  albuterol sulfate  (90 Base) MCG/ACT inhaler Inhale 2 puffs Every 4 (Four) Hours As Needed for Shortness of Air. PRN SOA/WHEEZING 18 g 11   • aspirin 81 MG EC tablet Take 1 tablet by mouth Daily.     • atorvastatin (LIPITOR) 10 MG tablet Take 1 tablet by mouth once daily 90 tablet 1   • Blood Glucose Monitoring Suppl (ACCU-CHEK GUIDE) w/Device kit See Admin Instructions.     • digoxin (LANOXIN) 125 MCG tablet Take 1 tablet by mouth Daily. (Patient taking differently: Take 125 mcg by mouth Every Other Day. Patient instructed to take every other day by ELISEO Yang on 6/9/2022) 30 tablet 0   • glucose blood test strip Use as instructed 300 each 12   • HYDROcodone-acetaminophen (NORCO) 5-325 MG per tablet Take 1 tablet by mouth 2 (Two) Times a Day As Needed for Severe Pain . 20 tablet 0   • Lancets (ACCU-CHEK MULTICLIX) lancets Use 1 lancet per finger stick 204 each 12   • metoprolol tartrate (Lopressor) 50 MG tablet Take 1 tablet by mouth 2 (Two) Times a Day for 30 days. 60 tablet 0   • polyethylene glycol (MIRALAX) 17 g packet Take 17 g by mouth Daily As Needed (constipation).     • SITagliptin (Januvia) 50 MG tablet Take 1 tablet by mouth Daily for 90 days. 90 tablet 0   • spironolactone (ALDACTONE) 25 MG tablet Take 1 tablet by mouth Daily for 90 days. 90 tablet 0   • torsemide 40 MG tablet Take 40 mg by mouth Daily for 90 days. 90 tablet 0   • TRAVATAN Z 0.004 % solution ophthalmic solution Administer 1 drop to both eyes Every Morning. Not night     • vitamin D (ERGOCALCIFEROL) 1.25 MG (34443 UT) capsule capsule Take 1 capsule by mouth 1 (One) Time Per Week. 12 capsule 3   • warfarin (COUMADIN) 5 MG tablet TAKE ONE-HALF TABLET (2.5 MG) ON Monday AND Friday AND 1 TABLET (5 MG) BY MOUTH ONCE DAILY OR  AS  DIRECTED  BY  MEDICATION  MANAGEMENT  CLINIC 90 tablet 1     No facility-administered medications prior to visit.       Allergies as of 09/16/2022 - Reviewed 09/16/2022   Allergen Reaction Noted   • Contrast dye  Hives and Itching 2018     Social History     Socioeconomic History   • Marital status:      Spouse name: Suresh   • Number of children: 5   • Years of education: 13   Tobacco Use   • Smoking status: Former Smoker     Packs/day: 3.00     Types: Cigarettes     Start date: 1967     Quit date: 10/19/1968     Years since quittin.9   • Smokeless tobacco: Never Used   • Tobacco comment: caffeine use - decaf coffee   Substance and Sexual Activity   • Alcohol use: No     Comment: No caffeine use   • Drug use: No   • Sexual activity: Defer     Family History   Problem Relation Age of Onset   • Colon cancer Mother    • Glaucoma Mother    • Stroke Mother    • Arthritis Mother    • Hypertension Mother    • Glaucoma Sister    • Diabetes Sister    • Heart disease Sister    • Arthritis Sister    • Asthma Sister    • Hypertension Sister    • Miscarriages / Stillbirths Sister    • Lung disease Sister    • Heart disease Brother    • Diabetes Brother    • Arthritis Brother    • Drug abuse Brother    • Hypertension Brother    • Arthritis Father    • COPD Father    • Lung disease Father    • Arthritis Daughter    • Depression Daughter    • Alcohol abuse Maternal Uncle    • Heart disease Sister    • Heart disease Sister    • Heart disease Brother      Review of Systems   Constitutional: Negative for chills, fever, weight gain and weight loss.   Cardiovascular: Negative for leg swelling.   Respiratory: Negative for cough, snoring and wheezing.    Hematologic/Lymphatic: Negative for bleeding problem. Does not bruise/bleed easily.   Skin: Negative for color change.   Musculoskeletal: Positive for myalgias. Negative for falls and joint pain.   Gastrointestinal: Negative for melena.   Genitourinary: Negative for hematuria.   Neurological: Negative for excessive daytime sleepiness.   Psychiatric/Behavioral: Negative for depression. The patient is not nervous/anxious.         Objective:     Vitals:    22 1233   BP:  "112/70   Pulse: 68   Weight: 109 kg (240 lb)   Height: 165.1 cm (65\")     Body mass index is 39.94 kg/m².    Vitals reviewed.   Constitutional:       Appearance: Well-developed.   Eyes:      General: No scleral icterus.        Right eye: No discharge.      Conjunctiva/sclera: Conjunctivae normal.      Pupils: Pupils are equal, round, and reactive to light.   HENT:      Head: Normocephalic.      Nose: Nose normal.   Neck:      Thyroid: No thyromegaly.      Vascular: No JVD.   Pulmonary:      Effort: Pulmonary effort is normal. No respiratory distress.      Breath sounds: Normal breath sounds. No wheezing. No rales.   Cardiovascular:      Normal rate. Irregularly irregular rhythm. Normal S1. Normal S2.      Murmurs: There is a grade 2/6 high frequency blowing holosystolic murmur at the apex.      No gallop.   Pulses:     Intact distal pulses.   Edema:     Peripheral edema absent.   Abdominal:      General: Bowel sounds are normal. There is no distension.      Palpations: Abdomen is soft.      Tenderness: There is no abdominal tenderness. There is no rebound.   Musculoskeletal: Normal range of motion.         General: No tenderness.      Cervical back: Normal range of motion and neck supple. Skin:     General: Skin is warm and dry.      Findings: No erythema or rash.   Neurological:      Mental Status: Alert and oriented to person, place, and time.   Psychiatric:         Behavior: Behavior normal.         Thought Content: Thought content normal.         Judgment: Judgment normal.       Lab Review:     ECG 12 Lead    Date/Time: 9/16/2022 12:49 PM  Performed by: Pia Dueñas MD  Authorized by: Pia Dueñas MD   Comparison: compared with previous ECG   Similar to previous ECG  Rhythm: atrial fibrillation  Other findings: non-specific ST-T wave changes  Other findings comments: Consider prior inferior and anteroseptal wall infarction    Clinical impression: abnormal EKG          Assessment:       Diagnosis Plan   1. " Paroxysmal atrial fibrillation (HCC)     2. Longstanding persistent atrial fibrillation (HCC)  ECG 12 Lead   3. Myalgia  Basic Metabolic Panel   4. Renal insufficiency  Basic Metabolic Panel     Plan:       1.  Dyspnea on exertion.  Negative stress perfusion study 2/2022.  Echo 5/2022 with normal systolic function grade 2 diastolic dysfunction, no significant valve regurgitation with an RVSP 42 mmHg.  This has improved significantly and is minimal now.  Only when she does strenuous activity.  2.  Pulmonary hypertension.  Remains elevated.  But stable  2.  Mitral regurgitation with prolapse noted by CLARIBEL, mild on echo dated 5/2022   3.  Severe small vessel coronary artery disease.  Negative stress test 2/2022 and no residual anginal symptoms  4.  Persistent atrial fibrillation rates are controlled and she is on anticoagulation which she is tolerating well  5.  Edema.  Resolved  6.  Hypertension,controlled  7.  History of embolic non ST-elevation myocardial infarction. Now on warfarin  8.  History of embolic stroke.   9.   FRANCY, not on CPAP for over a year  10. Lung nodule followed by Dr. Garrido and felt to be benign per patient.  No further follow-up felt to be needed per patient  11. Chronic anticoagulation, no falls or bleeding.  Continue with anticoagulation.  She is to determine with her dentist how long she will need to hold anticoagulation for upcoming dental procedure.  She is aware that she does need to use SBE prophylaxis.  12.  Thoracic aortic aneurysm, stable by CT on 9/2021      Time Spent: I spent 25 minutes caring for Lana on this date of service. This time includes time spent by me in the following activities: preparing for the visit, reviewing tests, obtaining and/or reviewing a separately obtained history, performing a medically appropriate examination and/or evaluation, counseling and educating the patient/family/caregiver, documenting information in the medical record and independently  interpreting results and communicating that information with the patient/family/caregiver.   I spent 1 minutes on the separately reported service of ECG. This time is not included in the time used to support the E/M service also reported today.  M       Your medication list          Accurate as of September 16, 2022 11:59 PM. If you have any questions, ask your nurse or doctor.            CHANGE how you take these medications      Instructions Last Dose Given Next Dose Due   digoxin 125 MCG tablet  Commonly known as: LANOXIN  What changed:   · when to take this  · additional instructions      Take 1 tablet by mouth Daily.          CONTINUE taking these medications      Instructions Last Dose Given Next Dose Due   Accu-Chek Guide w/Device kit      See Admin Instructions.       accu-chek multiclix lancets      Use 1 lancet per finger stick       acetaminophen 325 MG tablet  Commonly known as: TYLENOL      Take 650 mg by mouth Every 6 (Six) Hours As Needed for Moderate Pain .       albuterol sulfate  (90 Base) MCG/ACT inhaler  Commonly known as: PROVENTIL HFA;VENTOLIN HFA;PROAIR HFA      Inhale 2 puffs Every 4 (Four) Hours As Needed for Shortness of Air. PRN SOA/WHEEZING       aspirin 81 MG EC tablet      Take 1 tablet by mouth Daily.       atorvastatin 10 MG tablet  Commonly known as: LIPITOR      Take 1 tablet by mouth once daily       glucose blood test strip      Use as instructed       HYDROcodone-acetaminophen 5-325 MG per tablet  Commonly known as: NORCO      Take 1 tablet by mouth 2 (Two) Times a Day As Needed for Severe Pain .       metoprolol tartrate 50 MG tablet  Commonly known as: Lopressor      Take 1 tablet by mouth 2 (Two) Times a Day for 30 days.       polyethylene glycol 17 g packet  Commonly known as: MIRALAX      Take 17 g by mouth Daily As Needed (constipation).       SITagliptin 50 MG tablet  Commonly known as: Januvia      Take 1 tablet by mouth Daily for 90 days.       spironolactone 25  MG tablet  Commonly known as: ALDACTONE      Take 1 tablet by mouth Daily for 90 days.       Torsemide 40 MG tablet      Take 40 mg by mouth Daily for 90 days.       Travatan Z 0.004 % solution ophthalmic solution  Generic drug: travoprost (ANNIE free)      Administer 1 drop to both eyes Every Morning. Not night       vitamin D 1.25 MG (46356 UT) capsule capsule  Commonly known as: ERGOCALCIFEROL      Take 1 capsule by mouth 1 (One) Time Per Week.       warfarin 5 MG tablet  Commonly known as: COUMADIN      TAKE ONE-HALF TABLET (2.5 MG) ON Monday AND Friday AND 1 TABLET (5 MG) BY MOUTH ONCE DAILY OR  AS  DIRECTED  BY  MEDICATION  MANAGEMENT  CLINIC              Patient is no longer taking -.  I corrected the med list to reflect this.  I did not stop these medications.      Dictated utilizing Dragon dictation

## 2022-09-20 ENCOUNTER — TELEPHONE (OUTPATIENT)
Dept: CARDIOLOGY | Facility: CLINIC | Age: 77
End: 2022-09-20

## 2022-09-20 DIAGNOSIS — I50.32 CHRONIC DIASTOLIC HEART FAILURE: Chronic | ICD-10-CM

## 2022-09-20 DIAGNOSIS — I50.32 CHRONIC DIASTOLIC HEART FAILURE: Primary | ICD-10-CM

## 2022-09-20 NOTE — TELEPHONE ENCOUNTER
Please let her know the test for heart failure is normal.  Also let her know renal labs still slightly abnormal but not worse.  Potassium however is a little too high and have her decrease dietary intake of potassium.  We will see when she is to have labs next with PCP.  This will need to be followed intermittently

## 2022-09-20 NOTE — TELEPHONE ENCOUNTER
Left voicemail for Lana Otilio requesting callback.    Thank you,  Layla Gerardo RN  Triage Nurse [facility] [place]

## 2022-09-21 RX ORDER — SPIRONOLACTONE 25 MG/1
TABLET ORAL
Qty: 90 TABLET | Refills: 0 | Status: SHIPPED | OUTPATIENT
Start: 2022-09-21 | End: 2022-10-20

## 2022-09-21 NOTE — TELEPHONE ENCOUNTER
Left voicemail for Lana Otilio requesting callback.    Triage: patient needs a 6 month f/u with ANGELA and 1 year with Dr. Spenser leon    Thank you,  Layla Gerardo RN  Triage Nurse [facility] [place]

## 2022-09-21 NOTE — TELEPHONE ENCOUNTER
Notified patient of results/recommendations. Patient verbalized understanding. She stated that she does not eat potassium rich foods such as (spinach, bananas, avocados). She is also not scheduled to see her PCP until November. Does she needs to get repeat labs before then?    Lillian Smith RN  Triage [facility] [place]

## 2022-09-23 NOTE — TELEPHONE ENCOUNTER
Its possible that the blood may have been sitting up for a while and the potassium was falsely elevated.  Please have her recheck a BMP in 1 week

## 2022-09-23 NOTE — TELEPHONE ENCOUNTER
Notified patient of recommendations. Patient verbalized understanding. Lab orders placed.  Lillian Smith RN  Triage [facility] [place]

## 2022-09-27 DIAGNOSIS — I48.0 PAROXYSMAL ATRIAL FIBRILLATION: Chronic | ICD-10-CM

## 2022-09-28 RX ORDER — DIGOXIN 125 MCG
TABLET ORAL
Qty: 45 TABLET | Refills: 2 | Status: SHIPPED | OUTPATIENT
Start: 2022-09-28 | End: 2023-02-14 | Stop reason: SDUPTHER

## 2022-10-06 ENCOUNTER — ANTICOAGULATION VISIT (OUTPATIENT)
Dept: PHARMACY | Facility: HOSPITAL | Age: 77
End: 2022-10-06

## 2022-10-06 LAB
INR PPP: 2.3 (ref 0.91–1.09)
PROTHROMBIN TIME: 27.1 SECONDS (ref 10–13.8)

## 2022-10-06 PROCEDURE — 85610 PROTHROMBIN TIME: CPT

## 2022-10-06 PROCEDURE — 36416 COLLJ CAPILLARY BLOOD SPEC: CPT

## 2022-10-06 NOTE — PROGRESS NOTES
I have supervised and reviewed the notes, assessments, and/or procedures performed by our Pharmacy Intern. The documented assessment and plan were developed cooperatively, I concur with the documentation of this patient encounter.    Cindy Jones, PharmD

## 2022-10-06 NOTE — PROGRESS NOTES
Anticoagulation Clinic Progress Note    Anticoagulation Summary  As of 10/6/2022    INR goal:  2.0-3.0   TTR:  64.2 % (3.9 y)   INR used for dosin.3 (10/6/2022)   Warfarin maintenance plan:  2.5 mg every Mon, Fri; 5 mg all other days   Weekly warfarin total:  30 mg   Plan last modified:  Vikash Billings, Pharmacy Intern (7/15/2022)   Next INR check:  10/20/2022   Priority:  Maintenance   Target end date:  Indefinite    Indications    Atrial fibrillation (HCC) [I48.91]             Anticoagulation Episode Summary     INR check location:      Preferred lab:      Send INR reminders to:  [facility] POOL    Comments:        Anticoagulation Care Providers     Provider Role Specialty Phone number    Pia Dueñas MD Referring Cardiology [phone]          Clinic Interview:  Patient Findings     Positives:  Upcoming dental procedure, Other complaints    Negatives:  Signs/symptoms of thrombosis, Signs/symptoms of bleeding,   Laboratory test error suspected, Change in health, Change in alcohol use,   Change in activity, Upcoming invasive procedure, Emergency department   visit, Missed doses, Extra doses, Change in medications, Change in   diet/appetite, Hospital admission, Bruising    Comments:  Steroid shot on the 10/14. in shoulder. Pt getting extraction   on tooth this month, not yet scheduled. Instructed patient to call us with   more information once scheduled. Has already taken dose this AM.        Clinical Outcomes     Negatives:  Major bleeding event, Thromboembolic event,   Anticoagulation-related hospital admission, Anticoagulation-related ED   visit, Anticoagulation-related fatality    Comments:  Steroid shot on the 10/14. in shoulder. Pt getting extraction   on tooth this month, not yet scheduled. Instructed patient to call us with   more information once scheduled. Has already taken dose this AM.          INR History:  Anticoagulation Monitoring 2022 2022 10/6/2022   INR 2.2 2.6  2.3   INR Date 8/11/2022 9/8/2022 10/6/2022   INR Goal 2.0-3.0 2.0-3.0 2.0-3.0   Trend Same Same Same   Last Week Total 30 mg 30 mg 30 mg   Next Week Total 30 mg 30 mg 30 mg   Sun 5 mg 5 mg 5 mg   Mon 2.5 mg 2.5 mg 2.5 mg   Tue 5 mg 5 mg 5 mg   Wed 5 mg 5 mg 5 mg   Thu 5 mg 5 mg 5 mg   Fri 2.5 mg 2.5 mg 2.5 mg   Sat 5 mg 5 mg 5 mg   Visit Report - - -   Some recent data might be hidden       Plan:  1. INR is Therapeutic today- see above in Anticoagulation Summary.  Will instruct Lana [place] to Continue their warfarin regimen- see above in Anticoagulation Summary.  2. Follow up in 2 weeks  3. Patient declines warfarin refills.  4. Verbal and written information provided. Patient expresses understanding and has no further questions at this time.    Nir Echavarria, Pharmacy Intern

## 2022-10-14 ENCOUNTER — LAB (OUTPATIENT)
Dept: LAB | Facility: HOSPITAL | Age: 77
End: 2022-10-14

## 2022-10-14 ENCOUNTER — OFFICE VISIT (OUTPATIENT)
Dept: ORTHOPEDIC SURGERY | Facility: CLINIC | Age: 77
End: 2022-10-14

## 2022-10-14 VITALS — RESPIRATION RATE: 16 BRPM | HEIGHT: 65 IN | TEMPERATURE: 97.1 F | WEIGHT: 241 LBS | BODY MASS INDEX: 40.15 KG/M2

## 2022-10-14 DIAGNOSIS — I50.32 CHRONIC DIASTOLIC HEART FAILURE: ICD-10-CM

## 2022-10-14 DIAGNOSIS — M19.012 PRIMARY LOCALIZED OSTEOARTHROSIS OF LEFT SHOULDER REGION: Primary | ICD-10-CM

## 2022-10-14 LAB
ANION GAP SERPL CALCULATED.3IONS-SCNC: 10 MMOL/L (ref 5–15)
BUN SERPL-MCNC: 20 MG/DL (ref 8–23)
BUN/CREAT SERPL: 15.9 (ref 7–25)
CALCIUM SPEC-SCNC: 9.6 MG/DL (ref 8.6–10.5)
CHLORIDE SERPL-SCNC: 100 MMOL/L (ref 98–107)
CO2 SERPL-SCNC: 31 MMOL/L (ref 22–29)
CREAT SERPL-MCNC: 1.26 MG/DL (ref 0.57–1)
EGFRCR SERPLBLD CKD-EPI 2021: 44.6 ML/MIN/1.73
GLUCOSE SERPL-MCNC: 189 MG/DL (ref 65–99)
POTASSIUM SERPL-SCNC: 3.9 MMOL/L (ref 3.5–5.2)
SODIUM SERPL-SCNC: 141 MMOL/L (ref 136–145)

## 2022-10-14 PROCEDURE — 36415 COLL VENOUS BLD VENIPUNCTURE: CPT

## 2022-10-14 PROCEDURE — 20610 DRAIN/INJ JOINT/BURSA W/O US: CPT | Performed by: ORTHOPAEDIC SURGERY

## 2022-10-14 PROCEDURE — 80048 BASIC METABOLIC PNL TOTAL CA: CPT

## 2022-10-14 RX ORDER — METHYLPREDNISOLONE ACETATE 80 MG/ML
80 INJECTION, SUSPENSION INTRA-ARTICULAR; INTRALESIONAL; INTRAMUSCULAR; SOFT TISSUE
Status: COMPLETED | OUTPATIENT
Start: 2022-10-14 | End: 2022-10-14

## 2022-10-14 RX ADMIN — METHYLPREDNISOLONE ACETATE 80 MG: 80 INJECTION, SUSPENSION INTRA-ARTICULAR; INTRALESIONAL; INTRAMUSCULAR; SOFT TISSUE at 13:23

## 2022-10-14 NOTE — PROGRESS NOTES
"Shoulder Injection Glenohumeral      Patient: Lana Yañez        YOB: 1945            Chief Complaints: Shoulder pain      History of Present Illness: Pt gets intermittent shoulder injections with good relief. Is here for repeat injection.      Physical Exam: 77 y.o. female  General Appearance:    Alert, cooperative, in no acute distress                   Vitals:    10/14/22 1255   Resp: 16   Temp: 97.1 °F (36.2 °C)   Weight: 109 kg (241 lb)   Height: 165.1 cm (65\")      Patient is alert and read ×3 no acute distress appears her above-listed at height weight and age.  Affect is normal respiratory rate is normal unlabored. Heart rate regular rate rhythm, sclera, dentition and hearing are normal for the purpose of this exam.  Exam and complaints are unchanged.      Procedure:  Large Joint Arthrocentesis: L glenohumeral  Date/Time: 10/14/2022 1:23 PM  Consent given by: patient  Site marked: site marked  Timeout: Immediately prior to procedure a time out was called to verify the correct patient, procedure, equipment, support staff and site/side marked as required   Supporting Documentation  Indications: pain   Procedure Details  Location: shoulder - L glenohumeral  Preparation: Patient was prepped and draped in the usual sterile fashion  Needle gauge: 21G.  Approach: posterior  Medications administered: 80 mg methylPREDNISolone acetate 80 MG/ML; 2 mL lidocaine (cardiac)  Patient tolerance: patient tolerated the procedure well with no immediate complications                Assessment. Persistent shoulder pain with glenohumeral arthritis          Plan: Is to proceed with injection    The glenohumeral was injected under strict sterile technique is form on a Marcaine and Depo-Medrol this was done sterilely and tolerated tolerated  well          "

## 2022-10-18 ENCOUNTER — TELEPHONE (OUTPATIENT)
Dept: CARDIOLOGY | Facility: CLINIC | Age: 77
End: 2022-10-18

## 2022-10-18 ENCOUNTER — TELEPHONE (OUTPATIENT)
Dept: PHARMACY | Facility: HOSPITAL | Age: 77
End: 2022-10-18

## 2022-10-18 NOTE — TELEPHONE ENCOUNTER
Reviewed results with Lana Yañez and patient verbalized understanding of results.    Patient stated she is having a tooth extracted (#4 on the top right) on 10/27.  Patient is on warfarin and is asking for recommendations prior to procedure.    Thank you,  Layla Gerardo RN  Triage Nurse [facility]

## 2022-10-18 NOTE — TELEPHONE ENCOUNTER
----- Message from Pia Dueñas MD sent at 10/17/2022  6:58 PM EDT -----  Regarding: FW:  Please let the pt know potassium is ok  ----- Message -----  From: Lab, Background User  Sent: 10/14/2022  12:16 PM EDT  To: Pia Dueñas MD

## 2022-10-20 ENCOUNTER — ANTICOAGULATION VISIT (OUTPATIENT)
Dept: PHARMACY | Facility: HOSPITAL | Age: 77
End: 2022-10-20

## 2022-10-20 ENCOUNTER — OFFICE VISIT (OUTPATIENT)
Dept: ENDOCRINOLOGY | Age: 77
End: 2022-10-20

## 2022-10-20 VITALS
HEART RATE: 81 BPM | BODY MASS INDEX: 39.31 KG/M2 | DIASTOLIC BLOOD PRESSURE: 72 MMHG | OXYGEN SATURATION: 98 % | WEIGHT: 244.6 LBS | SYSTOLIC BLOOD PRESSURE: 128 MMHG | HEIGHT: 66 IN | TEMPERATURE: 97.1 F

## 2022-10-20 DIAGNOSIS — E11.65 TYPE 2 DIABETES MELLITUS WITH HYPERGLYCEMIA, WITHOUT LONG-TERM CURRENT USE OF INSULIN: Primary | ICD-10-CM

## 2022-10-20 DIAGNOSIS — E11.69 HYPERLIPIDEMIA ASSOCIATED WITH TYPE 2 DIABETES MELLITUS: ICD-10-CM

## 2022-10-20 DIAGNOSIS — E11.22 CKD STAGE 3 DUE TO TYPE 2 DIABETES MELLITUS: ICD-10-CM

## 2022-10-20 DIAGNOSIS — E55.9 VITAMIN D DEFICIENCY: ICD-10-CM

## 2022-10-20 DIAGNOSIS — N18.30 CKD STAGE 3 DUE TO TYPE 2 DIABETES MELLITUS: ICD-10-CM

## 2022-10-20 DIAGNOSIS — E78.5 HYPERLIPIDEMIA ASSOCIATED WITH TYPE 2 DIABETES MELLITUS: ICD-10-CM

## 2022-10-20 LAB
INR PPP: 2.2 (ref 0.91–1.09)
PROTHROMBIN TIME: 26.7 SECONDS (ref 10–13.8)

## 2022-10-20 PROCEDURE — 85610 PROTHROMBIN TIME: CPT

## 2022-10-20 PROCEDURE — 99214 OFFICE O/P EST MOD 30 MIN: CPT | Performed by: INTERNAL MEDICINE

## 2022-10-20 PROCEDURE — 36416 COLLJ CAPILLARY BLOOD SPEC: CPT

## 2022-10-20 NOTE — TELEPHONE ENCOUNTER
Left voicemail for Lana Ouaquaga requesting callback.    Thank you,  Layla Gerardo RN  Triage Nurse [facility]

## 2022-10-20 NOTE — PROGRESS NOTES
I have supervised and reviewed the notes, assessments, and/or procedures performed by our Pharmacy Intern. The documented assessment and plan were developed cooperatively. I concur with the documentation of this patient encounter.    Shaw Hand, PharmD

## 2022-10-20 NOTE — PROGRESS NOTES
Anticoagulation Clinic Progress Note    Anticoagulation Summary  As of 10/20/2022    INR goal:  2.0-3.0   TTR:  64.4 % (4 y)   INR used for dosin.2 (10/20/2022)   Warfarin maintenance plan:  2.5 mg every Mon, Fri; 5 mg all other days   Weekly warfarin total:  30 mg   No change documented:  Nir Echavarria, Pharmacy Intern   Plan last modified:  Vikash Billings, Pharmacy Intern (7/15/2022)   Next INR check:  10/27/2022   Priority:  Maintenance   Target end date:  Indefinite    Indications    Atrial fibrillation (HCC) [I48.91]             Anticoagulation Episode Summary     INR check location:      Preferred lab:      Send INR reminders to:   MORGAN [facility]ORV[facility]    Comments:        Anticoagulation Care Providers     Provider Role Specialty Phone number    Pia Dueñas MD Referring Cardiology [phone]          Clinic Interview:  Patient Findings     Positives:  Upcoming dental procedure    Negatives:  Signs/symptoms of thrombosis, Signs/symptoms of bleeding,   Laboratory test error suspected, Change in health, Change in alcohol use,   Change in activity, Upcoming invasive procedure, Emergency department   visit, Missed doses, Extra doses, Change in medications, Change in   diet/appetite, Hospital admission, Bruising, Other complaints    Comments:  Visit with oral surgeon 10/27 about possibly pulling tooth.   Checking INR here that morning. Steroid shot in shoulder on 10/14/22, pt   pain has been relieved      Clinical Outcomes     Negatives:  Major bleeding event, Thromboembolic event,   Anticoagulation-related hospital admission, Anticoagulation-related ED   visit, Anticoagulation-related fatality    Comments:  Visit with oral surgeon 10/27 about possibly pulling tooth.   Checking INR here that morning. Steroid shot in shoulder on 10/14/22, pt   pain has been relieved        INR History:  Anticoagulation Monitoring 2022 10/6/2022 10/20/2022   INR 2.6 2.3 2.2   INR Date 2022 10/6/2022  10/20/2022   INR Goal 2.0-3.0 2.0-3.0 2.0-3.0   Trend Same Same Same   Last Week Total 30 mg 30 mg 30 mg   Next Week Total 30 mg 30 mg 30 mg   Sun 5 mg 5 mg 5 mg   Mon 2.5 mg 2.5 mg 2.5 mg   Tue 5 mg 5 mg 5 mg   Wed 5 mg 5 mg 5 mg   Thu 5 mg 5 mg 5 mg   Fri 2.5 mg 2.5 mg 2.5 mg   Sat 5 mg 5 mg 5 mg   Visit Report - - -   Some recent data might be hidden       Plan:  1. INR is Therapeutic today- see above in Anticoagulation Summary.  Will instruct Lana Otilio to Continue their warfarin regimen- see above in Anticoagulation Summary.  2. Follow up in 1 week before oral surgery appt.  3. Patient declines warfarin refills.  4. Verbal and written information provided. Patient expresses understanding and has no further questions at this time.    Nir Echavarria, Pharmacy Intern

## 2022-10-20 NOTE — TELEPHONE ENCOUNTER
Reviewed recommendations with Lana Yañez and the patient verbalized understanding of the recommendations.  Offered to call dentist office, however, patient stated the appointment on the 27th is a consultation and she does not believe they plan to pull any teeth at this visit.  Patient stated they asked her to have INR checked prior to appointment though.  Patient stated she will ask dentist if she needs to hold warfarin or if they're ok with her staying on warfarin with an INR check prior to procedure.    Thank you,  Layla Gerardo RN  Triage Nurse [facility]

## 2022-10-20 NOTE — PROGRESS NOTES
"Chief Complaint  Chief Complaint   Patient presents with   • Diabetes     Diabetic  2 yrs   Check bs 1 x daily   Highest   130-134  Lowest   120  Tinging in hands   Eye exam  2022         Subjective          History of Present Illness    Lana Aguerocomb 77 y.o. presents with Type 2 dm as a F/u patient.     # Pt was admitted in may 2022 with pulmonary edema    Type 2 dm - Diagnosed about 2 - 3 years ago.   Today in clinic pt reports being on Januvia 50 mg po daily.   Avg bg - 130 - 140  Checks BG - 1 x time a day  Dm retinopathy - ,Last eye exam - 2022  Dm nephropathy - yes, CKD stage 3  Dm neuropathy - tingling in her hands, thinks it is from arthritis ,Dm neuropathy meds - n/a  CAD - with CHF  CVA -x   Episodes of hypoglycemia - x  Pt is physically active. weight has been stable.   Pt tries to follow DM diet for most part.       Reviewed primary care physician's/consulting physician documentation and lab results         I have reviewed the patient's allergies, medicines, past medical hx, family hx and social hx in detail.    Objective   Vital Signs:   /72   Pulse 81   Temp 97.1 °F (36.2 °C)   Ht 166.4 cm (65.5\")   Wt 111 kg (244 lb 9.6 oz)   LMP  (LMP Unknown)   SpO2 98%   BMI 40.08 kg/m²   Physical Exam  General appearance - no distress  Eyes- anicteric sclera  Ear nose and throat-external ears and nose normal.    Respiratory-normal chest on inspection.  No respiratory distress noted.  Skin-no rashes.  Neuro-alert and oriented x3             Result Review :   The following data was reviewed by: Ed Ho MD on 10/20/2022:  Anticoagulation Visit on 10/20/2022   Component Date Value Ref Range Status   • Protime 10/20/2022 26.7 (H)  10.0 - 13.8 seconds Final    Meter: [id number] : rex Echavarria   • INR 10/20/2022 2.2 (H)  0.91 - 1.09 Final   • Protime 10/20/2022 38.6 (H)  10.0 - 13.8 seconds Final    Meter: [id number] : rex Echavarria   • INR 10/20/2022 3.2 (H)  " "0.91 - 1.09 Final     Data reviewed: PCP and endocrine notes       Results Review:    I reviewed the patient's new clinical results.     Assessment and Plan    Problem List Items Addressed This Visit        Other    Type 2 diabetes mellitus with hyperglycemia, without long-term current use of insulin (HCC) - Primary (Chronic)    Relevant Orders    TSH    T4, Free    Hemoglobin A1c    Vitamin B12 & Folate    Vitamin D,25-Hydroxy   Other Visit Diagnoses     Hyperlipidemia associated with type 2 diabetes mellitus (HCC)        Relevant Orders    TSH    T4, Free    Hemoglobin A1c    Vitamin B12 & Folate    Vitamin D,25-Hydroxy    Vitamin D deficiency         Relevant Orders    TSH    T4, Free    Hemoglobin A1c    Vitamin B12 & Folate    Vitamin D,25-Hydroxy    CKD stage 3 due to type 2 diabetes mellitus (HCC)        Relevant Orders    TSH    T4, Free    Hemoglobin A1c    Vitamin B12 & Folate    Vitamin D,25-Hydroxy        Type 2 diabetes mellitus-uncontrolled with hyperglycemia  Check HbA1c  Continue Januvia 50 mg oral daily  Would consider adding Farxiga based on today's blood work-up.    Hyperlipidemia  Continue Lipitor.    Vitamin D deficiency  Continue vitamin D replacement 50,000 units q. Weekly.    Interpreted the blood work-up/imaging results performed by the primary care/consulting physician -    Refills sent to pharmacy    Follow Up {Instructions Charge Capture  Follow-up Communications :23}    Patient was given instructions and counseling regarding her condition or for health maintenance advice. Please see specific information pulled into the AVS if appropriate.       Thank you for asking me to see your patient, Lana Yañez in consultation.         Ed Ho MD  10/20/22      EMR Dragon / transcription disclaimer:     \"Dictated utilizing Dragon dictation\".         "

## 2022-10-21 LAB
25(OH)D3+25(OH)D2 SERPL-MCNC: 60.4 NG/ML (ref 30–100)
FOLATE SERPL-MCNC: 11.8 NG/ML (ref 4.78–24.2)
HBA1C MFR BLD: 9 % (ref 4.8–5.6)
T4 FREE SERPL-MCNC: 1.39 NG/DL (ref 0.93–1.7)
TSH SERPL DL<=0.005 MIU/L-ACNC: 1.23 UIU/ML (ref 0.27–4.2)
VIT B12 SERPL-MCNC: 372 PG/ML (ref 211–946)

## 2022-10-24 ENCOUNTER — TELEPHONE (OUTPATIENT)
Dept: ENDOCRINOLOGY | Age: 77
End: 2022-10-24

## 2022-10-24 RX ORDER — DAPAGLIFLOZIN 5 MG/1
TABLET, FILM COATED ORAL
Qty: 90 TABLET | Refills: 2 | Status: SHIPPED | OUTPATIENT
Start: 2022-10-24 | End: 2022-10-25

## 2022-10-24 NOTE — TELEPHONE ENCOUNTER
PT CALLED IN RETURNING A CALL, SHE ALSO STATED SHE REC A CALL FROM THE PHARMACY AND IT WILL COST HER $110 FOR HER FARXIGA WHICH SHE CAN NOT AFFORD. PT WANTS TO KNOW WHAT ELSE SHE CAN USE OR WHAT ELSE SHE CAN DO.    TERA GARNER  [phone]

## 2022-10-25 RX ORDER — GLIMEPIRIDE 2 MG/1
TABLET ORAL
Qty: 180 TABLET | Refills: 2 | Status: SHIPPED | OUTPATIENT
Start: 2022-10-25

## 2022-10-25 NOTE — TELEPHONE ENCOUNTER
Pt wants to know what her blood sugars should look like for her and what is considered too low or too high for her.

## 2022-10-25 NOTE — TELEPHONE ENCOUNTER
Patient called again and said she is actually scheduled to get the extraction done on 10/27 she was wondering what needs to be done in regards to her medications. She said the dentist said they would check her INR the morning of and depending on what it is then they would do the extraction.     I have left a message for [facility] requesting that someone call us back in regards to the patient.     Triage if oral surgeons office calls back please relay Dr. Dueñas's message to them and see if this is feasible. We also need to call the patient back in regards to what the surgeon's office says.    Lillian Smith RN  Triage [facility] [place] [facility]

## 2022-10-25 NOTE — TELEPHONE ENCOUNTER
Hub to share with pt, please inform pt  that  stated : Okay, we will consider Amaryl 2 mg twice daily with breakfast and dinner.  Cautioned the patient that she could be at risk for low blood sugars on these medication, and hence she definitely has to check her blood sugars at least 2-3 times a day.

## 2022-10-25 NOTE — TELEPHONE ENCOUNTER
I spoke to the assistant at the oral surgeon's office. She said that the plan is to do the extraction even while the patient's INR is between 2-3. The patient is going to get this checked the morning of the procedure. They said if the INR is not between 2-3 then the procedure will be cancelled until INR is at a therapeutic level. I relayed all of this information to the patient and she verbalized understanding.    Lillian Smith RN  Triage [facility]

## 2022-10-27 ENCOUNTER — TELEPHONE (OUTPATIENT)
Dept: CARDIOLOGY | Facility: CLINIC | Age: 77
End: 2022-10-27

## 2022-10-27 ENCOUNTER — APPOINTMENT (OUTPATIENT)
Dept: PHARMACY | Facility: HOSPITAL | Age: 77
End: 2022-10-27

## 2022-10-27 NOTE — TELEPHONE ENCOUNTER
Received a fax from [facility] (Dr Frazier) @ [phone] re: Extraction of 1 tooth.    Scheduled: November 1st    Pt is on Warfarin.    They would like her to hold Warfarin 3 days prior but will be ok if we recommend her to remain on this.

## 2022-10-28 NOTE — TELEPHONE ENCOUNTER
Called info to Pamella with [facility] @ [phone].  No other questions.  Faxed note.  (Done)    Info called to pt and no questions.  She verbalized understanding to remain on Warfarin.  If she ever needs to come off then she will call.  (Done)

## 2022-10-28 NOTE — TELEPHONE ENCOUNTER
Given history of embolic stroke and atrial fibrillation if it can be done on warfarin it would be best.  Otherwise she will need a Lovenox bridge.  She does need SBE prophylaxis also.  She is aware of this.  Clearance for no

## 2022-11-08 ENCOUNTER — TELEPHONE (OUTPATIENT)
Dept: ENDOCRINOLOGY | Age: 77
End: 2022-11-08

## 2022-11-08 NOTE — TELEPHONE ENCOUNTER
This patient has an appointment with you coming up on 11.16.22 / would you want her to get blood work done prior to your appointment? If so, patient would need orders in.

## 2022-11-15 ENCOUNTER — TELEPHONE (OUTPATIENT)
Dept: PHARMACY | Facility: HOSPITAL | Age: 77
End: 2022-11-15

## 2022-11-16 ENCOUNTER — OFFICE VISIT (OUTPATIENT)
Dept: ENDOCRINOLOGY | Age: 77
End: 2022-11-16

## 2022-11-16 ENCOUNTER — ANTICOAGULATION VISIT (OUTPATIENT)
Dept: PHARMACY | Facility: HOSPITAL | Age: 77
End: 2022-11-16

## 2022-11-16 VITALS
SYSTOLIC BLOOD PRESSURE: 122 MMHG | OXYGEN SATURATION: 95 % | HEART RATE: 86 BPM | WEIGHT: 241 LBS | BODY MASS INDEX: 40.15 KG/M2 | TEMPERATURE: 96.9 F | HEIGHT: 65 IN | DIASTOLIC BLOOD PRESSURE: 72 MMHG

## 2022-11-16 DIAGNOSIS — E11.65 TYPE 2 DIABETES MELLITUS WITH HYPERGLYCEMIA, WITHOUT LONG-TERM CURRENT USE OF INSULIN: Primary | ICD-10-CM

## 2022-11-16 LAB
INR PPP: 1.8 (ref 0.91–1.09)
PROTHROMBIN TIME: 21.5 SECONDS (ref 10–13.8)

## 2022-11-16 PROCEDURE — 99214 OFFICE O/P EST MOD 30 MIN: CPT | Performed by: NURSE PRACTITIONER

## 2022-11-16 PROCEDURE — 36416 COLLJ CAPILLARY BLOOD SPEC: CPT

## 2022-11-16 PROCEDURE — 85610 PROTHROMBIN TIME: CPT

## 2022-11-16 PROCEDURE — G0463 HOSPITAL OUTPT CLINIC VISIT: HCPCS

## 2022-11-16 RX ORDER — LANCETS
EACH MISCELLANEOUS
Qty: 204 EACH | Refills: 12 | Status: SHIPPED | OUTPATIENT
Start: 2022-11-16 | End: 2022-11-18 | Stop reason: SDUPTHER

## 2022-11-16 NOTE — PROGRESS NOTES
"Chief Complaint  Diabetes (Type2: Patient doesn't have meter with her today but she does check once daily (am), her sugars average about 108-130. The patient has no hx of retinopathy or neuropathy )    Karl Yañez presents to [facility]  History of Present Illness     10/24/2022  Please let the pt know that we are holding off on tresiba for now   Continue januvia 50 mg po daily and will add fraxiga 5 mg po daily.     farxiga was too expensive so she was started on glimepiride 2mg BID     \"i'm not taking insulin because my son in law  from kidney disease and he was on insulin\"      Type 2 dm    Diagnosed about 2 - 3 years ago.   Today in clinic pt reports being on Januvia 50 mg po daily and glimepiride 2mg BID  Checks BG - only once a day  Last eye exam -   Dm nephropathy - yes, CKD stage 3, saw renal \"they told me they didn't know why I was there\"  Dm neuropathy - denies   CAD - with CHF  CVA -x   Episodes of hypoglycemia - denies   Verbalizes s/s of low blood sugar: dizziness   Verbalizes treatment: eat something     Objective   Vital Signs:  /72   Pulse 86   Temp 96.9 °F (36.1 °C) (Temporal)   Ht 165.1 cm (65\")   Wt 109 kg (241 lb)   SpO2 95%   BMI 40.10 kg/m²   Estimated body mass index is 40.1 kg/m² as calculated from the following:    Height as of this encounter: 165.1 cm (65\").    Weight as of this encounter: 109 kg (241 lb).          Physical Exam  Vitals reviewed.   Constitutional:       General: She is not in acute distress.  HENT:      Head: Normocephalic and atraumatic.   Cardiovascular:      Rate and Rhythm: Normal rate and regular rhythm.   Pulmonary:      Effort: Pulmonary effort is normal. No respiratory distress.   Musculoskeletal:         General: No signs of injury. Normal range of motion.      Cervical back: Normal range of motion and neck supple.   Skin:     General: Skin is warm and dry.   Neurological:      Mental " Status: She is alert and oriented to person, place, and time. Mental status is at baseline.   Psychiatric:         Mood and Affect: Mood normal.         Behavior: Behavior normal.         Thought Content: Thought content normal.         Judgment: Judgment normal.          Result Review :  The following data was reviewed by: ANGELA Martinez on 11/16/2022:  Common labs    Common Labs 9/16/22 10/14/22 10/20/22   Glucose 187 (A) 189 (A)    BUN 21 20    Creatinine 1.10 (A) 1.26 (A)    Sodium 136 141    Potassium 5.2 3.9    Chloride 97 (A) 100    Calcium 9.7 9.6    Hemoglobin A1C   9.00 (A)   (A) Abnormal value       Comments are available for some flowsheets but are not being displayed.                     Assessment and Plan   Diagnoses and all orders for this visit:    1. Type 2 diabetes mellitus with hyperglycemia, without long-term current use of insulin (HCC) (Primary)  -     Hemoglobin A1c; Future  -     Basic Metabolic Panel; Future  -     Fructosamine; Future             Follow Up   No follow-ups on file.     Educated on the need to test her BS 2-3x/day being on glimepiride- if she does not check her BS 2-3x/day we can not continue to prescribe that medication     Take glimepiride before eating and not after     Educated on importance of knowing fasting BS as well as after meals     Educated on diabetes and prevention of diabetic related complications which starts with well controlled blood sugars in response to not wanting to be on insulin bc of her son in law- kidney disease is caused by high blood sugars not by insulin     Hypoglycemia education provided and written     Higher risk of complications     Patient was given instructions and counseling regarding her condition or for health maintenance advice. Please see specific information pulled into the AVS if appropriate.     ANGELA Martinez

## 2022-11-16 NOTE — PROGRESS NOTES
Anticoagulation Clinic Progress Note    Anticoagulation Summary  As of 2022    INR goal:  2.0-3.0   TTR:  64.1 % (4 y)   INR used for dosin.8 (2022)   Warfarin maintenance plan:  2.5 mg every Mon, Fri; 5 mg all other days; Starting 2022   Weekly warfarin total:  30 mg   Plan last modified:  Vikash Billings, Pharmacy Intern (7/15/2022)   Next INR check:  2022   Priority:  Maintenance   Target end date:  Indefinite    Indications    Atrial fibrillation (HCC) [I48.91]             Anticoagulation Episode Summary     INR check location:      Preferred lab:      Send INR reminders to:  [facility]    Comments:        Anticoagulation Care Providers     Provider Role Specialty Phone number    Pia Dueñas MD Referring Cardiology [phone]          Clinic Interview:  Patient Findings     Negatives:  Signs/symptoms of thrombosis, Signs/symptoms of bleeding,   Laboratory test error suspected, Change in health, Change in alcohol use,   Change in activity, Upcoming invasive procedure, Emergency department   visit, Upcoming dental procedure, Missed doses, Extra doses, Change in   medications, Change in diet/appetite, Hospital admission, Bruising, Other   complaints      Clinical Outcomes     Negatives:  Major bleeding event, Thromboembolic event,   Anticoagulation-related hospital admission, Anticoagulation-related ED   visit, Anticoagulation-related fatality        INR History:  Anticoagulation Monitoring 10/6/2022 10/20/2022 2022   INR 2.3 2.2 1.8   INR Date 10/6/2022 10/20/2022 2022   INR Goal 2.0-3.0 2.0-3.0 2.0-3.0   Trend Same Same Same   Last Week Total 30 mg 30 mg 30 mg   Next Week Total 30 mg 30 mg 32.5 mg   Sun 5 mg 5 mg 5 mg   Mon 2.5 mg 2.5 mg 2.5 mg   Tue 5 mg 5 mg 5 mg   Wed 5 mg 5 mg 7.5 mg (); Otherwise 5 mg   Thu 5 mg 5 mg 5 mg   Fri 2.5 mg 2.5 mg 2.5 mg   Sat 5 mg 5 mg 5 mg   Visit Report - - -   Some recent data might be hidden        Plan:  1. INR is Subtherapeutic today- see above in Anticoagulation Summary.  Will instruct Lana Yañez to Change their warfarin regimen- see above in Anticoagulation Summary.  2. Follow up in 2 weeks  3. Patient declines warfarin refills.  4. Verbal and written information provided. Patient expresses understanding and has no further questions at this time.    Cindy Jones, PharmD

## 2022-11-17 ENCOUNTER — OFFICE VISIT (OUTPATIENT)
Dept: INTERNAL MEDICINE | Facility: CLINIC | Age: 77
End: 2022-11-17

## 2022-11-17 VITALS
DIASTOLIC BLOOD PRESSURE: 81 MMHG | BODY MASS INDEX: 40.15 KG/M2 | HEIGHT: 65 IN | SYSTOLIC BLOOD PRESSURE: 122 MMHG | TEMPERATURE: 97.8 F | HEART RATE: 91 BPM | RESPIRATION RATE: 19 BRPM | OXYGEN SATURATION: 97 % | WEIGHT: 241 LBS

## 2022-11-17 DIAGNOSIS — E78.2 MIXED HYPERLIPIDEMIA: Chronic | ICD-10-CM

## 2022-11-17 DIAGNOSIS — Z12.31 ENCOUNTER FOR SCREENING MAMMOGRAM FOR MALIGNANT NEOPLASM OF BREAST: ICD-10-CM

## 2022-11-17 DIAGNOSIS — J06.9 VIRAL URI: ICD-10-CM

## 2022-11-17 DIAGNOSIS — I10 ESSENTIAL HYPERTENSION: Primary | ICD-10-CM

## 2022-11-17 DIAGNOSIS — E11.65 TYPE 2 DIABETES MELLITUS WITH HYPERGLYCEMIA, WITHOUT LONG-TERM CURRENT USE OF INSULIN: Chronic | ICD-10-CM

## 2022-11-17 PROCEDURE — 99214 OFFICE O/P EST MOD 30 MIN: CPT | Performed by: FAMILY MEDICINE

## 2022-11-17 NOTE — PROGRESS NOTES
"Chief Complaint  Follow-up (Pt states she had a really bad cold for two weeks) and Hypertension    Subjective        Lana Yañez presents to [facility]  History of Present Illness     Hypertension - stable.  Patient taking medication as prescribed.  Denies chest pain, shortness of breath, headache, lower extremity edema.  Patient is taking losartan 25 mg daily.     HLD-stable with a LDL within goal for the coronary artery disease history.  Patient taking atorvastatin 10 mg as prescribed.  Trying to adhere to a balanced diet.    She also follows with endocrinology as well for type 2 diabetes.  She is on glimepiride, Januvia.  Last A1c was uncontrolled at 9.0.  This has been increasing through the year last time it was 8.50 in the time before that 7.70.  I reviewed the note from endocrine from yesterday.  She has had her sugars were ranging in the low 100s to 130.  She had been adamant that she was not going to take insulin.      She had been doing better over the last couple of days but sick over the last couple of weeks.  She says she had a really bad cold with congestion, runny nose, cough.  The last few days as mentioned above, she has been doing better.    Objective   Vital Signs:  /81 (BP Location: Left arm, Patient Position: Sitting, Cuff Size: Small Adult)   Pulse 91   Temp 97.8 °F (36.6 °C)   Resp 19   Ht 165.1 cm (65\")   Wt 109 kg (241 lb)   SpO2 97%   BMI 40.10 kg/m²   Estimated body mass index is 40.1 kg/m² as calculated from the following:    Height as of this encounter: 165.1 cm (65\").    Weight as of this encounter: 109 kg (241 lb).          Physical Exam  Vitals and nursing note reviewed.   Constitutional:       General: She is not in acute distress.     Appearance: Normal appearance.   Cardiovascular:      Rate and Rhythm: Normal rate and regular rhythm.      Heart sounds: Normal heart sounds. No murmur heard.  Pulmonary:      Effort: Pulmonary effort " is normal.      Breath sounds: Normal breath sounds.   Neurological:      Mental Status: She is alert.        Result Review :  The following data was reviewed by: Will Madedn MD on 11/17/2022:  Common labs    Common Labs 9/16/22 10/14/22 10/20/22   Glucose 187 (A) 189 (A)    BUN 21 20    Creatinine 1.10 (A) 1.26 (A)    Sodium 136 141    Potassium 5.2 3.9    Chloride 97 (A) 100    Calcium 9.7 9.6    Hemoglobin A1C   9.00 (A)   (A) Abnormal value       Comments are available for some flowsheets but are not being displayed.                     Assessment and Plan   Diagnoses and all orders for this visit:    1. Essential hypertension (Primary)    2. Mixed hyperlipidemia    3. Type 2 diabetes mellitus with hyperglycemia, without long-term current use of insulin (HCC)    4. Viral URI    5. Encounter for screening mammogram for malignant neoplasm of breast  -     Mammo screening digital tomosynthesis bilateral w CAD; Future      Hypertension and hyperlipidemia are stable.  With her sugars running somewhere between 101-130, her A1c should continue to decline.  She inquired about the dosing of her Januvia and I agree that 50 mg should be the dose given that the glimepiride has brought her sugars down to where they are.  I stressed that she needs to eat with her glimepiride to avoid drops in blood sugar.  I think she is recovering from her viral infection so nothing further to do from that standpoint.  Continue all medications as above.             Follow Up   Return in about 4 months (around 3/17/2023) for Next scheduled follow up.  Patient was given instructions and counseling regarding her condition or for health maintenance advice. Please see specific information pulled into the AVS if appropriate.

## 2022-11-18 DIAGNOSIS — E11.65 TYPE 2 DIABETES MELLITUS WITH HYPERGLYCEMIA, WITHOUT LONG-TERM CURRENT USE OF INSULIN: Primary | ICD-10-CM

## 2022-11-18 RX ORDER — LANCETS
1 EACH MISCELLANEOUS 2 TIMES DAILY WITH MEALS
Qty: 204 EACH | Refills: 12 | Status: SHIPPED | OUTPATIENT
Start: 2022-11-18 | End: 2023-03-28 | Stop reason: ALTCHOICE

## 2022-11-22 RX ORDER — LANCETS
EACH MISCELLANEOUS
Qty: 306 EACH | Refills: 1 | Status: SHIPPED | OUTPATIENT
Start: 2022-11-22 | End: 2022-12-08 | Stop reason: SDUPTHER

## 2022-11-27 NOTE — TELEPHONE ENCOUNTER
4/4/19  Patient called to report this to Dr. Dueñas/Christine Coe as Dr. Ramirez instructed.  Patient's ph [phone]/rachana    Please note - Patient reports the medication that made her feel bad was the medicine for her UTI - Nitrofurantoin -NOT NITROGLYCERIN.      She reports that it caused her to go into afib and her chest hurt and she still feels weak.  She has an appt with Dr. Ramirez tomorrow at 11>  I advised her to bring this to his attention that it was not nitroglycerin - as was reported to him.    I will still forward this to Dr. Dueñas, who is out of the [facility] as well as her nurse practioner, Christine Coe so she can address the afib symptoms./rachana  
4/5/19  Dr. Dueñas actually spoke with patient last night as mentioned in this note from her./rachana  
Have her see cardiology about the nitroglycerin  
I am seeing this at 11:00 at night on vacation.  I contacted the patient who states that her shortness of breath has improved after she see Dr. Ramirez tomorrow and will discuss alternative for bladder infection at the time.  She had shortness of breath but thinks this is improved off the nitrofurantoin and feels antibiotic was causing some of her symptoms.  I told her if she had more symptoms tonight of shortness of breath or chest pain to go immediately to the emergency room.  Otherwise she will keep her appointment tomorrow but if she has ongoing concerns of atrial fibrillation, chest tightness, shortness of breath, have her go to the chest pain unit. maura  
I called pt and she does feel better.  Her only complaint was weakness.    
JUST MACIE:  I called pt to see how she was doing.  She said that Dr Ramirez gave her Amlodipine 5mg QD.  Advised pt if she did NOT feel better PLEASE go to the ER for evaluation.  Pt stated that she will.  Pt said THANK YOU so much for always checking in on her even on your off day!  
LM for pt to contact her cardiologist on home phone  
Ok. Nothing to add. maura  
Patrica-  Please call patient with Dr. Dueñas's instructions.  
Pt called and stated that Nitro has made her very sick, dizzy, and made her chest hurt. Pt also stated she has not got all of her strength back, and would like to know what to do? Please advise?  
neck/complains of pain/discomfort

## 2022-11-28 ENCOUNTER — TELEPHONE (OUTPATIENT)
Dept: ENDOCRINOLOGY | Age: 77
End: 2022-11-28

## 2022-11-28 NOTE — TELEPHONE ENCOUNTER
PT CALLED IN AND SAID THAT SHE BELIEVES THAT THE GLIMIPIRIDE WAS CAUSING A BREAK OUT OF BUMPS ON HER LEGS. SHE QUIT TAKING THE MEDICATION FOR 2 DAYS AND THE CONDITION IMPROVED. SHE HAS STARTED TAKING THE MEDICATION AGAIN TO SEE IF THAT IS WHAT IS CAUSING THIS ISSUE. SHE JUST WANTED DR WHITE TO KNOW AND IS ASKING FOR DR WHITE TO CALL HER BACK AT  [phone]

## 2022-11-29 ENCOUNTER — HOSPITAL ENCOUNTER (OUTPATIENT)
Dept: MAMMOGRAPHY | Facility: HOSPITAL | Age: 77
Discharge: HOME | End: 2022-11-29

## 2022-11-29 ENCOUNTER — ANTICOAGULATION VISIT (OUTPATIENT)
Dept: PHARMACY | Facility: HOSPITAL | Age: 77
End: 2022-11-29

## 2022-11-29 DIAGNOSIS — Z12.31 ENCOUNTER FOR SCREENING MAMMOGRAM FOR MALIGNANT NEOPLASM OF BREAST: ICD-10-CM

## 2022-11-29 LAB
INR PPP: 1.8 (ref 0.91–1.09)
PROTHROMBIN TIME: 22 SECONDS (ref 10–13.8)

## 2022-11-29 PROCEDURE — 36416 COLLJ CAPILLARY BLOOD SPEC: CPT

## 2022-11-29 PROCEDURE — G0463 HOSPITAL OUTPT CLINIC VISIT: HCPCS

## 2022-11-29 PROCEDURE — 77067 SCR MAMMO BI INCL CAD: CPT

## 2022-11-29 PROCEDURE — 85610 PROTHROMBIN TIME: CPT

## 2022-11-29 PROCEDURE — 77063 BREAST TOMOSYNTHESIS BI: CPT

## 2022-11-29 NOTE — TELEPHONE ENCOUNTER
I would recommend the patient to continue the glimepiride for now as she has restarted.  If she develops the bumps again would recommend to discontinue the medication and to let us know so we could recommend the alternatives for the patient.

## 2022-11-29 NOTE — PROGRESS NOTES
Anticoagulation Clinic Progress Note    Anticoagulation Summary  As of 2022    INR goal:  2.0-3.0   TTR:  63.5 % (4.1 y)   INR used for dosin.8 (2022)   Warfarin maintenance plan:  2.5 mg every Fri; 5 mg all other days; Starting 2022   Weekly warfarin total:  32.5 mg   Plan last modified:  Maria Alejandra Mac RPH (2022)   Next INR check:  2022   Priority:  Maintenance   Target end date:  Indefinite    Indications    Atrial fibrillation (HCC) [I48.91]             Anticoagulation Episode Summary     INR check location:      Preferred lab:      Send INR reminders to:   [facility]    Comments:        Anticoagulation Care Providers     Provider Role Specialty Phone number    Pia Dueñas MD Referring Cardiology [phone]          Clinic Interview:  Patient Findings     Positives:  Change in medications, Change in diet/appetite    Negatives:  Signs/symptoms of thrombosis, Signs/symptoms of bleeding,   Laboratory test error suspected, Change in health, Change in alcohol use,   Change in activity, Upcoming invasive procedure, Emergency department   visit, Upcoming dental procedure, Missed doses, Extra doses, Hospital   admission, Bruising, Other complaints    Comments:  pt reports getting a flu shot, had some cranberries over   thanksgiving, no other changes, victoria in 2 weeks        Clinical Outcomes     Negatives:  Major bleeding event, Thromboembolic event,   Anticoagulation-related hospital admission, Anticoagulation-related ED   visit, Anticoagulation-related fatality    Comments:  pt reports getting a flu shot, had some cranberries over   thanksgiving, no other changes, victoria in 2 weeks          INR History:  Anticoagulation Monitoring 10/20/2022 2022 2022   INR 2.2 1.8 1.8   INR Date 10/20/2022 2022 2022   INR Goal 2.0-3.0 2.0-3.0 2.0-3.0   Trend Same Same Up   Last Week Total 30 mg 30 mg 30 mg   Next Week Total 30 mg 32.5 mg 35 mg   Sun 5 mg 5  mg 5 mg   Mon 2.5 mg 2.5 mg 5 mg   Tue 5 mg 5 mg 7.5 mg (11/29); Otherwise 5 mg   Wed 5 mg 7.5 mg (11/16); Otherwise 5 mg 5 mg   Thu 5 mg 5 mg 5 mg   Fri 2.5 mg 2.5 mg 2.5 mg   Sat 5 mg 5 mg 5 mg   Visit Report - - -   Some recent data might be hidden       Plan:  1. INR is Subtherapeutic today- see above in Anticoagulation Summary.  Will instruct Lana Yañez to Change their warfarin regimen- see above in Anticoagulation Summary.  2. Follow up in 2 weeks  3. Patient declines warfarin refills.  4. Verbal and written information provided. Patient expresses understanding and has no further questions at this time.    Maria Alejandra Mac [facility]

## 2022-11-30 ENCOUNTER — APPOINTMENT (OUTPATIENT)
Dept: PHARMACY | Facility: HOSPITAL | Age: 77
End: 2022-11-30

## 2022-12-09 DIAGNOSIS — E11.65 TYPE 2 DIABETES MELLITUS WITH HYPERGLYCEMIA, WITHOUT LONG-TERM CURRENT USE OF INSULIN: ICD-10-CM

## 2022-12-09 RX ORDER — LANCETS
EACH MISCELLANEOUS
Qty: 306 EACH | Refills: 1 | Status: SHIPPED | OUTPATIENT
Start: 2022-12-09

## 2022-12-12 RX ORDER — WARFARIN SODIUM 5 MG/1
TABLET ORAL
Qty: 85 TABLET | Refills: 1 | Status: SHIPPED | OUTPATIENT
Start: 2022-12-12 | End: 2023-02-09 | Stop reason: SDUPTHER

## 2022-12-14 ENCOUNTER — ANTICOAGULATION VISIT (OUTPATIENT)
Dept: PHARMACY | Facility: HOSPITAL | Age: 77
End: 2022-12-14

## 2022-12-14 ENCOUNTER — LAB (OUTPATIENT)
Dept: LAB | Facility: HOSPITAL | Age: 77
End: 2022-12-14

## 2022-12-14 DIAGNOSIS — I48.11 LONGSTANDING PERSISTENT ATRIAL FIBRILLATION: Primary | Chronic | ICD-10-CM

## 2022-12-14 DIAGNOSIS — E11.65 TYPE 2 DIABETES MELLITUS WITH HYPERGLYCEMIA, WITHOUT LONG-TERM CURRENT USE OF INSULIN: ICD-10-CM

## 2022-12-14 LAB
ANION GAP SERPL CALCULATED.3IONS-SCNC: 9 MMOL/L (ref 5–15)
BUN SERPL-MCNC: 17 MG/DL (ref 8–23)
BUN/CREAT SERPL: 13.1 (ref 7–25)
CALCIUM SPEC-SCNC: 9.5 MG/DL (ref 8.6–10.5)
CHLORIDE SERPL-SCNC: 101 MMOL/L (ref 98–107)
CO2 SERPL-SCNC: 30 MMOL/L (ref 22–29)
CREAT SERPL-MCNC: 1.3 MG/DL (ref 0.57–1)
EGFRCR SERPLBLD CKD-EPI 2021: 43 ML/MIN/1.73
GLUCOSE SERPL-MCNC: 132 MG/DL (ref 65–99)
HBA1C MFR BLD: 8 % (ref 4.8–5.6)
INR PPP: 2.1 (ref 0.91–1.09)
POTASSIUM SERPL-SCNC: 3.6 MMOL/L (ref 3.5–5.2)
PROTHROMBIN TIME: 25.3 SECONDS (ref 10–13.8)
SODIUM SERPL-SCNC: 140 MMOL/L (ref 136–145)

## 2022-12-14 PROCEDURE — 80048 BASIC METABOLIC PNL TOTAL CA: CPT

## 2022-12-14 PROCEDURE — 82985 ASSAY OF GLYCATED PROTEIN: CPT

## 2022-12-14 PROCEDURE — 83036 HEMOGLOBIN GLYCOSYLATED A1C: CPT

## 2022-12-14 PROCEDURE — 85610 PROTHROMBIN TIME: CPT

## 2022-12-14 PROCEDURE — 36416 COLLJ CAPILLARY BLOOD SPEC: CPT

## 2022-12-14 PROCEDURE — 36415 COLL VENOUS BLD VENIPUNCTURE: CPT

## 2022-12-14 NOTE — PROGRESS NOTES
Anticoagulation Clinic Progress Note    Anticoagulation Summary  As of 2022    INR goal:  2.0-3.0   TTR:  63.3 % (4.1 y)   INR used for dosin.1 (2022)   Warfarin maintenance plan:  2.5 mg every Fri; 5 mg all other days; Starting 2022   Weekly warfarin total:  32.5 mg   No change documented:  Anisa Castellon   Plan last modified:  Maria Alejandra Mac RPH (2022)   Next INR check:  2022   Priority:  Maintenance   Target end date:  Indefinite    Indications    Atrial fibrillation (HCC) [I48.91]             Anticoagulation Episode Summary     INR check location:      Preferred lab:      Send INR reminders to:   [facility] POOL    Comments:        Anticoagulation Care Providers     Provider Role Specialty Phone number    Pia Dueñas MD Referring Cardiology [phone]          Clinic Interview:  Patient Findings     Negatives:  Signs/symptoms of thrombosis, Signs/symptoms of bleeding,   Laboratory test error suspected, Change in health, Change in alcohol use,   Change in activity, Upcoming invasive procedure, Emergency department   visit, Upcoming dental procedure, Missed doses, Extra doses, Change in   medications, Change in diet/appetite, Hospital admission, Bruising, Other   complaints      Clinical Outcomes     Negatives:  Major bleeding event, Thromboembolic event,   Anticoagulation-related hospital admission, Anticoagulation-related ED   visit, Anticoagulation-related fatality        INR History:  Anticoagulation Monitoring 2022   INR 1.8 1.8 2.1   INR Date 2022   INR Goal 2.0-3.0 2.0-3.0 2.0-3.0   Trend Same Up Same   Last Week Total 30 mg 30 mg 32.5 mg   Next Week Total 32.5 mg 35 mg 32.5 mg   Sun 5 mg 5 mg 5 mg   Mon 2.5 mg 5 mg 5 mg   Tue 5 mg 7.5 mg (); Otherwise 5 mg 5 mg   Wed 7.5 mg (); Otherwise 5 mg 5 mg 5 mg   Thu 5 mg 5 mg 5 mg   Fri 2.5 mg 2.5 mg 2.5 mg   Sat 5 mg 5 mg 5 mg   Visit  Report - - -   Some recent data might be hidden       Plan:  1. INR is therapeutic today- see above in Anticoagulation Summary.   Will instruct Lana Aguerocomb to continue their warfarin regimen- see above in Anticoagulation Summary.  2. Follow up in 2 weeks.  3. Patient declines warfarin refills.  4. Verbal and written information provided. Patient expresses understanding and has no further questions at this time.    Anisa Castellon

## 2022-12-15 LAB — FRUCTOSAMINE SERPL-SCNC: 300 UMOL/L (ref 0–285)

## 2022-12-27 ENCOUNTER — TELEPHONE (OUTPATIENT)
Dept: ENDOCRINOLOGY | Age: 77
End: 2022-12-27

## 2022-12-28 ENCOUNTER — APPOINTMENT (OUTPATIENT)
Dept: MAMMOGRAPHY | Facility: HOSPITAL | Age: 77
End: 2022-12-28

## 2022-12-29 ENCOUNTER — ANTICOAGULATION VISIT (OUTPATIENT)
Dept: PHARMACY | Facility: HOSPITAL | Age: 77
End: 2022-12-29

## 2022-12-29 DIAGNOSIS — I48.11 LONGSTANDING PERSISTENT ATRIAL FIBRILLATION: Primary | Chronic | ICD-10-CM

## 2022-12-29 LAB
INR PPP: 2.6 (ref 0.91–1.09)
PROTHROMBIN TIME: 31.3 SECONDS (ref 10–13.8)

## 2022-12-29 PROCEDURE — 85610 PROTHROMBIN TIME: CPT

## 2022-12-29 PROCEDURE — 36416 COLLJ CAPILLARY BLOOD SPEC: CPT

## 2022-12-29 NOTE — PROGRESS NOTES
I have supervised and reviewed the notes, assessments, and/or procedures performed by our Pharmacy Intern. The documented assessment and plan were developed cooperatively, and the plan was implemented in my presence. I concur with the documentation of this patient encounter.    Love Allison [facility]

## 2022-12-29 NOTE — PROGRESS NOTES
Anticoagulation Clinic Progress Note    Anticoagulation Summary  As of 2022    INR goal:  2.0-3.0   TTR:  63.6 % (4.2 y)   INR used for dosin.6 (2022)   Warfarin maintenance plan:  2.5 mg every Fri; 5 mg all other days; Starting 2022   Weekly warfarin total:  32.5 mg   No change documented:  Nusrat Sanderson, Pharmacy Intern   Plan last modified:  Maria Alejandra Mac RPH (2022)   Next INR check:  2023   Priority:  Maintenance   Target end date:  Indefinite    Indications    Atrial fibrillation (HCC) [I48.91]             Anticoagulation Episode Summary     INR check location:      Preferred lab:      Send INR reminders to:  [facility] POOL    Comments:        Anticoagulation Care Providers     Provider Role Specialty Phone number    Pia Dueñas MD Referring Cardiology [phone]          Clinic Interview:  Patient Findings     Negatives:  Signs/symptoms of thrombosis, Signs/symptoms of bleeding,   Laboratory test error suspected, Change in health, Change in alcohol use,   Change in activity, Upcoming invasive procedure, Emergency department   visit, Upcoming dental procedure, Missed doses, Extra doses, Change in   medications, Change in diet/appetite, Hospital admission, Bruising, Other   complaints      Clinical Outcomes     Negatives:  Major bleeding event, Thromboembolic event,   Anticoagulation-related hospital admission, Anticoagulation-related ED   visit, Anticoagulation-related fatality        INR History:  Anticoagulation Monitoring 2022   INR 1.8 2.1 2.6   INR Date 2022   INR Goal 2.0-3.0 2.0-3.0 2.0-3.0   Trend Up Same Same   Last Week Total 30 mg 32.5 mg 32.5 mg   Next Week Total 35 mg 32.5 mg 32.5 mg   Sun 5 mg 5 mg 5 mg   Mon 5 mg 5 mg 5 mg   Tue 7.5 mg (); Otherwise 5 mg 5 mg 5 mg   Wed 5 mg 5 mg 5 mg   Thu 5 mg 5 mg 5 mg   Fri 2.5 mg 2.5 mg 2.5 mg   Sat 5 mg 5 mg 5 mg   Visit Report - - -   Some  recent data might be hidden       Plan:  1. INR is Therapeutic today- see above in Anticoagulation Summary.  Will instruct Lana Yañez to Continue their warfarin regimen- see above in Anticoagulation Summary.  2. Follow up in 4 weeks  3. Patient declines warfarin refills.  4. Verbal and written information provided. Patient expresses understanding and has no further questions at this time.    Nusrat Sanderson, Pharmacy Intern

## 2023-01-13 NOTE — PROGRESS NOTES
Patient: Lana Yañez  YOB: 1945  Date of Service: 1/13/2023    Chief Complaints: Left shoulder pain    Subjective:    History of Present Illness: Pt is seen in the office today with complaints of left shoulder pain she has known degenerative changes she gets intermittent injections and does well with those she is not interested in surgical.          Allergies:   Allergies   Allergen Reactions   • Contrast Dye (Echo Or Unknown Ct/Mr) Hives and Itching       Medications:   Home Medications:  Current Outpatient Medications on File Prior to Visit   Medication Sig   • Accu-Chek FastClix Lancets misc Use to check glucose as directed   • acetaminophen (TYLENOL) 325 MG tablet Take 650 mg by mouth Every 6 (Six) Hours As Needed for Moderate Pain .   • albuterol sulfate  (90 Base) MCG/ACT inhaler Inhale 2 puffs Every 4 (Four) Hours As Needed for Shortness of Air. PRN SOA/WHEEZING   • aspirin 81 MG EC tablet Take 1 tablet by mouth Daily.   • atorvastatin (LIPITOR) 10 MG tablet Take 1 tablet by mouth once daily   • Blood Glucose Monitoring Suppl (ACCU-CHEK GUIDE) w/Device kit See Admin Instructions.   • digoxin (LANOXIN) 125 MCG tablet TAKE 1 TABLET BY MOUTH EVERY OTHER DAY   • glimepiride (Amaryl) 2 MG tablet By mouth take one tab twice daily with AM and PM meals.   • glucose blood test strip Test twice daily prior to breakfast and dinner as instructed   • Lancets (accu-chek multiclix) lancets 1 each by Other route 2 (Two) Times a Day With Meals. Use 1 lancet per finger stick   • metoprolol tartrate (Lopressor) 50 MG tablet Take 1 tablet by mouth 2 (Two) Times a Day for 30 days.   • polyethylene glycol (MIRALAX) 17 g packet Take 17 g by mouth Daily As Needed (constipation).   • SITagliptin (Januvia) 50 MG tablet Take 1 tablet by mouth Daily for 90 days.   • torsemide 40 MG tablet Take 40 mg by mouth Daily for 90 days.   • TRAVATAN Z 0.004 % solution ophthalmic solution Administer 1 drop to both  eyes Every Morning. Not night   • vitamin D (ERGOCALCIFEROL) 1.25 MG (01558 UT) capsule capsule Take 1 capsule by mouth 1 (One) Time Per Week.   • warfarin (COUMADIN) 5 MG tablet TAKE ONE-HALF TABLET (2.5MG) ON  FRIDAY AND 1 TABLET (5MG) ONCE DAILY OR AS DIRECTED BY MEDICATION MANAGEMENT CLINIC     No current facility-administered medications on file prior to visit.     Current Medications:  Scheduled Meds:  Continuous Infusions:No current facility-administered medications for this visit.    PRN Meds:.    I have reviewed the patient's medical history in detail and updated the computerized patient record.  Review and summarization of old records include:    Past Medical History:   Diagnosis Date   • Acute on chronic diastolic heart failure ([facility])    • Acute UTI (urinary tract infection) 05/22/2022   • Arthritis    • Arthritis of back    • Asthma    • Atrial fibrillation ([facility])     Persistent; on warfarin   • Atypical chest pain    • Biliary acute pancreatitis without necrosis or infection 11/10/2021   • Bronchitis    • Cellulitis of right elbow     due to MRSA   • Cervical disc disorder    • CHF (congestive heart failure) ([facility])    • COPD (chronic obstructive pulmonary disease) ([facility])    • Coronary artery disease     Cardiac catheterization completed; 90% PDA stenosis with medical management recommended   • Coronary artery disease involving native coronary artery of native heart with angina pectoris with documented spasm ([facility])    • CTS (carpal tunnel syndrome)    • Diabetes 1.5, managed as type 2 ([facility])    • Disease of thyroid gland    • Displacement of lumbar intervertebral disc without myelopathy    • Dizziness    • ANTOINE (dyspnea on exertion)    • Essential hypertension    • Fatigue    • Fracture, foot    • Generalized osteoarthritis of multiple sites    • History of rheumatic fever    • History of transfusion    • Hx of bone density study     10/23/2014   • Hyperglycemia    • Hyperlipidemia    • Hypovitaminosis D    •  Left arm pain    • Left-sided weakness    • Leg swelling    • Low back pain    • Lower extremity edema    • Lumbosacral disc disease    • Malaise and fatigue    • Mitral valve disease     Moderate mitral valve prolapse and moderate mitral regurgitation   • Mitral valve insufficiency    • Morbid obesity with BMI of 40.0-44.9, adult ([facility])    • MRSA infection    • NSTEMI (non-ST elevated myocardial infarction) ([facility])    • PAF (paroxysmal atrial fibrillation) ([facility])    • Periarthritis of shoulder    • RA (rheumatoid arthritis) ([facility])     involving both hands   • Sleep apnea    • SOB (shortness of breath)    • Stroke ([facility])     left side weakness   • Urge incontinence of urine    • UTI (urinary tract infection) 05/2020   • Vitamin D deficiency         Past Surgical History:   Procedure Laterality Date   • BREAST BIOPSY     • BREAST SURGERY      right side lumpectomy with biopsy   • CARDIAC CATHETERIZATION Left 10/20/2017    Procedure: Cardiac Catheterization/Vascular Study;  Surgeon: Alphonso Olmedo MD;  Location: [facility] CATH INVASIVE LOCATION;  Service:    • CARDIAC CATHETERIZATION N/A 10/20/2017    +2 mitral regurgitation, left main 10% stenosis, mid to distal LAD 10% diffuse stenosis, circumflex 10% diffuse stenosis, RCA 10% proximal stenosis, and distal PDA consistent with coronary embolus with a lesion of 90% too small to consider coronary intervention; medical management recommended   • EYE SURGERY      laser surgery for glaucoma and left eye cataracts removed   • HYSTERECTOMY      10+ years ago   • JOINT REPLACEMENT  2005; 2006    bilateral knees and left rotater   • KNEE SURGERY     • MAMMO BILATERAL  2016    [facility]    • SHOULDER SURGERY          Social History     Occupational History   • 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     Employer: RETIRED   Tobacco Use   • Smoking status: Former     Packs/day: 3.00     Years: 1.00     Pack years: 3.00     Types: Cigarettes     Start date: 5/19/1967      Quit date: 10/19/1968     Years since quittin.2     Passive exposure: Never   • Smokeless tobacco: Never   • Tobacco comments:     caffeine use - decaf coffee   Vaping Use   • Vaping Use: Never used   Substance and Sexual Activity   • Alcohol use: No     Comment: No caffeine use   • Drug use: No   • Sexual activity: Defer      Social History     Social History Narrative   • Not on file        Family History   Problem Relation Age of Onset   • Colon cancer Mother    • Glaucoma Mother    • Stroke Mother    • Arthritis Mother    • Hypertension Mother    • Glaucoma Sister    • Diabetes Sister    • Heart disease Sister    • Arthritis Sister    • Asthma Sister    • Hypertension Sister    • Miscarriages / Stillbirths Sister    • Lung disease Sister    • Heart disease Brother    • Diabetes Brother    • Arthritis Brother    • Drug abuse Brother    • Hypertension Brother    • Arthritis Father    • COPD Father    • Lung disease Father    • Arthritis Daughter    • Depression Daughter    • Alcohol abuse Maternal Uncle    • Heart disease Sister    • Heart disease Sister    • Heart disease Brother    • Rheumatologic disease Sister        ROS: 14 point review of systems was performed and was negative except for documented findings in HPI and today's encounter.     Allergies:   Allergies   Allergen Reactions   • Contrast Dye (Echo Or Unknown Ct/Mr) Hives and Itching     Constitutional:  Denies fever, shaking or chills   Eyes:  Denies change in visual acuity   HENT:  Denies nasal congestion or sore throat   Respiratory:  Denies cough or shortness of breath   Cardiovascular:  Denies chest pain or severe LE edema   GI:  Denies abdominal pain, nausea, vomiting, bloody stools or diarrhea   Musculoskeletal:  Numbness, tingling, or loss of motor function only as noted above in history of present illness.  : Denies painful urination or hematuria  Integument:  Denies rash, lesion or ulceration   Neurologic:  Denies headache or focal  weakness  Endocrine:  Denies lymphadenopathy  Psych:  Denies confusion or change in mental status   Hem:  Denies active bleeding      Physical Exam: 77 y.o. female  Wt Readings from Last 3 Encounters:   11/17/22 109 kg (241 lb)   11/16/22 109 kg (241 lb)   10/20/22 111 kg (244 lb 9.6 oz)       There is no height or weight on file to calculate BMI.  No height and weight on file for this encounter.  There were no vitals filed for this visit.  Vital signs reviewed.   General Appearance:    Alert, cooperative, in no acute distress                    Ortho exam      Exam is unchanged       .time    Assessment: Left shoulder arthritis    Plan: Injection she understands her options and is not interested in surgical  Follow up as indicated.  Ice, elevate, and rest as needed.  Discussed conservative measures of pain control including ice, bracing.   Asya Rodriges M.D.  Large Joint Arthrocentesis: L glenohumeral  Date/Time: 1/16/2023 1:58 PM  Consent given by: patient  Site marked: site marked  Timeout: Immediately prior to procedure a time out was called to verify the correct patient, procedure, equipment, support staff and site/side marked as required   Supporting Documentation  Indications: pain   Procedure Details  Location: shoulder - L glenohumeral  Preparation: Patient was prepped and draped in the usual sterile fashion  Needle gauge: 21G.  Approach: posterior  Medications administered: 80 mg methylPREDNISolone acetate 80 MG/ML; 2 mL lidocaine PF 1% 1 %  Patient tolerance: patient tolerated the procedure well with no immediate complications

## 2023-01-16 ENCOUNTER — CLINICAL SUPPORT (OUTPATIENT)
Dept: ORTHOPEDIC SURGERY | Facility: CLINIC | Age: 78
End: 2023-01-16
Payer: MEDICARE

## 2023-01-16 VITALS — BODY MASS INDEX: 41 KG/M2 | TEMPERATURE: 96.4 F | HEIGHT: 65 IN | WEIGHT: 246.1 LBS

## 2023-01-16 DIAGNOSIS — M19.019 GLENOHUMERAL ARTHRITIS: Primary | ICD-10-CM

## 2023-01-16 PROCEDURE — 20610 DRAIN/INJ JOINT/BURSA W/O US: CPT | Performed by: ORTHOPAEDIC SURGERY

## 2023-01-16 RX ORDER — METHYLPREDNISOLONE ACETATE 80 MG/ML
80 INJECTION, SUSPENSION INTRA-ARTICULAR; INTRALESIONAL; INTRAMUSCULAR; SOFT TISSUE
Status: COMPLETED | OUTPATIENT
Start: 2023-01-16 | End: 2023-01-16

## 2023-01-16 RX ORDER — LIDOCAINE HYDROCHLORIDE 10 MG/ML
2 INJECTION, SOLUTION EPIDURAL; INFILTRATION; INTRACAUDAL; PERINEURAL
Status: COMPLETED | OUTPATIENT
Start: 2023-01-16 | End: 2023-01-16

## 2023-01-16 RX ADMIN — LIDOCAINE HYDROCHLORIDE 2 ML: 10 INJECTION, SOLUTION EPIDURAL; INFILTRATION; INTRACAUDAL; PERINEURAL at 13:58

## 2023-01-16 RX ADMIN — METHYLPREDNISOLONE ACETATE 80 MG: 80 INJECTION, SUSPENSION INTRA-ARTICULAR; INTRALESIONAL; INTRAMUSCULAR; SOFT TISSUE at 13:58

## 2023-01-23 DIAGNOSIS — E55.9 HYPOVITAMINOSIS D: ICD-10-CM

## 2023-01-23 RX ORDER — ERGOCALCIFEROL 1.25 MG/1
CAPSULE ORAL
Qty: 12 CAPSULE | Refills: 0 | Status: SHIPPED | OUTPATIENT
Start: 2023-01-23 | End: 2023-02-14 | Stop reason: SDUPTHER

## 2023-01-27 ENCOUNTER — ANTICOAGULATION VISIT (OUTPATIENT)
Dept: PHARMACY | Facility: HOSPITAL | Age: 78
End: 2023-01-27
Payer: MEDICARE

## 2023-01-27 LAB
INR PPP: 1.8 (ref 0.91–1.09)
PROTHROMBIN TIME: 21.9 SECONDS (ref 10–13.8)

## 2023-01-27 PROCEDURE — G0463 HOSPITAL OUTPT CLINIC VISIT: HCPCS

## 2023-01-27 PROCEDURE — 36416 COLLJ CAPILLARY BLOOD SPEC: CPT

## 2023-01-27 PROCEDURE — 85610 PROTHROMBIN TIME: CPT

## 2023-01-27 NOTE — PROGRESS NOTES
Anticoagulation Clinic Progress Note    Anticoagulation Summary  As of 2023    INR goal:  2.0-3.0   TTR:  63.8 % (4.2 y)   INR used for dosin.8 (2023)   Warfarin maintenance plan:  2.5 mg every Fri; 5 mg all other days; Starting 2023   Weekly warfarin total:  32.5 mg   Plan last modified:  Maria Aljeandra Mac RPH (2022)   Next INR check:  2/10/2023   Priority:  Maintenance   Target end date:  Indefinite    Indications    Atrial fibrillation (HCC) [I48.91]             Anticoagulation Episode Summary     INR check location:      Preferred lab:      Send INR reminders to:   [facility] [place]    Comments:        Anticoagulation Care Providers     Provider Role Specialty Phone number    Pia Dueñas MD Referring Cardiology [phone]          Clinic Interview:  Patient Findings     Positives:  Change in medications, Other complaints    Negatives:  Signs/symptoms of thrombosis, Signs/symptoms of bleeding,   Laboratory test error suspected, Change in health, Change in alcohol use,   Change in activity, Upcoming invasive procedure, Emergency department   visit, Upcoming dental procedure, Missed doses, Extra doses, Change in   diet/appetite, Hospital admission, Bruising    Comments:  Reports cortisone injection in should 3-4 weeks ago. Reports   increased stress/worry this week over 's health/memory. She recalls   one day recently when she wasn't sure if she had already taken her dose or   not.      Clinical Outcomes     Negatives:  Major bleeding event, Thromboembolic event,   Anticoagulation-related hospital admission, Anticoagulation-related ED   visit, Anticoagulation-related fatality    Comments:  Reports cortisone injection in should 3-4 weeks ago. Reports   increased stress/worry this week over 's health/memory. She recalls   one day recently when she wasn't sure if she had already taken her dose or   not.        INR History:  Anticoagulation Monitoring 2022  12/29/2022 1/27/2023   INR 2.1 2.6 1.8   INR Date 12/14/2022 12/29/2022 1/27/2023   INR Goal 2.0-3.0 2.0-3.0 2.0-3.0   Trend Same Same Same   Last Week Total 32.5 mg 32.5 mg 32.5 mg   Next Week Total 32.5 mg 32.5 mg 35 mg   Sun 5 mg 5 mg 5 mg   Mon 5 mg 5 mg 5 mg   Tue 5 mg 5 mg 5 mg   Wed 5 mg 5 mg 5 mg   Thu 5 mg 5 mg 5 mg   Fri 2.5 mg 2.5 mg 5 mg (1/27); Otherwise 2.5 mg   Sat 5 mg 5 mg 5 mg   Visit Report - - -   Some recent data might be hidden       Plan:  1. INR is Subtherapeutic today- see above in Anticoagulation Summary.  Will instruct Lanaantonio Aguerocomb to Change their warfarin regimen (5 mg today, then resume usual 2.5 mg Fri, 5 mg all other days) - see above in Anticoagulation Summary.  2. Follow up in 2 weeks  3. Patient declines warfarin refills.  4. Verbal and written information provided. Patient expresses understanding and has no further questions at this time.    Shaw Hand, PharmD

## 2023-02-09 ENCOUNTER — ANTICOAGULATION VISIT (OUTPATIENT)
Dept: PHARMACY | Facility: HOSPITAL | Age: 78
End: 2023-02-09
Payer: MEDICARE

## 2023-02-09 LAB
INR PPP: 2.2 (ref 0.91–1.09)
PROTHROMBIN TIME: 26.3 SECONDS (ref 10–13.8)

## 2023-02-09 PROCEDURE — 85610 PROTHROMBIN TIME: CPT

## 2023-02-09 PROCEDURE — 36416 COLLJ CAPILLARY BLOOD SPEC: CPT

## 2023-02-09 RX ORDER — WARFARIN SODIUM 5 MG/1
TABLET ORAL
Qty: 85 TABLET | Refills: 1 | Status: SHIPPED | OUTPATIENT
Start: 2023-02-09

## 2023-02-09 NOTE — PROGRESS NOTES
Anticoagulation Clinic Progress Note    Anticoagulation Summary  As of 2023    INR goal:  2.0-3.0   TTR:  63.7 % (4.3 y)   INR used for dosin.2 (2023)   Warfarin maintenance plan:  2.5 mg every Fri; 5 mg all other days; Starting 2023   Weekly warfarin total:  32.5 mg   No change documented:  Shwa Hand, PharmD   Plan last modified:  Maria Alejandra Mac RPH (2022)   Next INR check:  3/9/2023   Priority:  Maintenance   Target end date:  Indefinite    Indications    Atrial fibrillation (HCC) [I48.91]             Anticoagulation Episode Summary     INR check location:      Preferred lab:      Send INR reminders to:   MORGAN HORVATH [facility]    Comments:        Anticoagulation Care Providers     Provider Role Specialty Phone number    Pia Dueñas MD Referring Cardiology [phone]          Clinic Interview:  Patient Findings     Negatives:  Signs/symptoms of thrombosis, Signs/symptoms of bleeding,   Laboratory test error suspected, Change in health, Change in alcohol use,   Change in activity, Upcoming invasive procedure, Emergency department   visit, Upcoming dental procedure, Missed doses, Extra doses, Change in   medications, Change in diet/appetite, Hospital admission, Bruising, Other   complaints      Clinical Outcomes     Negatives:  Major bleeding event, Thromboembolic event,   Anticoagulation-related hospital admission, Anticoagulation-related ED   visit, Anticoagulation-related fatality        INR History:  Anticoagulation Monitoring 2022   INR 2.6 1.8 2.2   INR Date 2022   INR Goal 2.0-3.0 2.0-3.0 2.0-3.0   Trend Same Same Same   Last Week Total 32.5 mg 32.5 mg 32.5 mg   Next Week Total 32.5 mg 35 mg 32.5 mg   Sun 5 mg 5 mg 5 mg   Mon 5 mg 5 mg 5 mg   Tue 5 mg 5 mg 5 mg   Wed 5 mg 5 mg 5 mg   Thu 5 mg 5 mg 5 mg   Fri 2.5 mg 5 mg (); Otherwise 2.5 mg 2.5 mg   Sat 5 mg 5 mg 5 mg   Visit Report - - -   Some recent data might be  hidden       Plan:  1. INR is Therapeutic today- see above in Anticoagulation Summary.  Will instruct Lana Nielsonb to Continue their warfarin regimen- see above in Anticoagulation Summary.  2. Follow up in 4 weeks  3. Patient declines warfarin refills.  4. Verbal and written information provided. Patient expresses understanding and has no further questions at this time.    Shaw Hand, PharmD

## 2023-02-10 DIAGNOSIS — E78.2 MIXED HYPERLIPIDEMIA: ICD-10-CM

## 2023-02-10 RX ORDER — ATORVASTATIN CALCIUM 10 MG/1
TABLET, FILM COATED ORAL
Qty: 90 TABLET | Refills: 0 | Status: SHIPPED | OUTPATIENT
Start: 2023-02-10 | End: 2023-02-14 | Stop reason: SDUPTHER

## 2023-02-10 NOTE — TELEPHONE ENCOUNTER
Rx Refill Note  Requested Prescriptions     Pending Prescriptions Disp Refills   • atorvastatin (LIPITOR) 10 MG tablet [Pharmacy Med Name: Atorvastatin Calcium 10 MG Oral Tablet] 90 tablet 0     Sig: Take 1 tablet by mouth once daily      Last office visit with prescribing clinician: 11/17/2022   Last telemedicine visit with prescribing clinician: Visit date not found   Next office visit with prescribing clinician: Visit date not found

## 2023-02-14 ENCOUNTER — OFFICE VISIT (OUTPATIENT)
Dept: FAMILY MEDICINE CLINIC | Facility: CLINIC | Age: 78
End: 2023-02-14
Payer: MEDICARE

## 2023-02-14 VITALS
HEART RATE: 99 BPM | DIASTOLIC BLOOD PRESSURE: 80 MMHG | SYSTOLIC BLOOD PRESSURE: 142 MMHG | TEMPERATURE: 98 F | HEIGHT: 65 IN | OXYGEN SATURATION: 95 % | WEIGHT: 247.2 LBS | BODY MASS INDEX: 41.19 KG/M2

## 2023-02-14 DIAGNOSIS — E55.9 HYPOVITAMINOSIS D: ICD-10-CM

## 2023-02-14 DIAGNOSIS — I48.0 PAROXYSMAL ATRIAL FIBRILLATION: Chronic | ICD-10-CM

## 2023-02-14 DIAGNOSIS — E78.2 MIXED HYPERLIPIDEMIA: ICD-10-CM

## 2023-02-14 DIAGNOSIS — I50.32 CHRONIC DIASTOLIC HEART FAILURE: ICD-10-CM

## 2023-02-14 DIAGNOSIS — I10 ESSENTIAL HYPERTENSION: Primary | Chronic | ICD-10-CM

## 2023-02-14 DIAGNOSIS — E11.65 TYPE 2 DIABETES MELLITUS WITH HYPERGLYCEMIA, WITHOUT LONG-TERM CURRENT USE OF INSULIN: ICD-10-CM

## 2023-02-14 PROCEDURE — 99214 OFFICE O/P EST MOD 30 MIN: CPT | Performed by: INTERNAL MEDICINE

## 2023-02-14 RX ORDER — METOPROLOL TARTRATE 50 MG/1
50 TABLET, FILM COATED ORAL 2 TIMES DAILY
Qty: 180 TABLET | Refills: 1 | Status: SHIPPED | OUTPATIENT
Start: 2023-02-14 | End: 2023-03-27

## 2023-02-14 RX ORDER — DIGOXIN 125 MCG
125 TABLET ORAL EVERY OTHER DAY
Qty: 45 TABLET | Refills: 2 | Status: SHIPPED | OUTPATIENT
Start: 2023-02-14

## 2023-02-14 RX ORDER — ATORVASTATIN CALCIUM 10 MG/1
10 TABLET, FILM COATED ORAL DAILY
Qty: 90 TABLET | Refills: 1 | Status: SHIPPED | OUTPATIENT
Start: 2023-02-14

## 2023-02-14 RX ORDER — LANCETS
EACH MISCELLANEOUS
COMMUNITY
Start: 2022-11-22 | End: 2023-03-28 | Stop reason: ALTCHOICE

## 2023-02-14 RX ORDER — ERGOCALCIFEROL 1.25 MG/1
50000 CAPSULE ORAL WEEKLY
Qty: 12 CAPSULE | Refills: 1 | Status: SHIPPED | OUTPATIENT
Start: 2023-02-14

## 2023-02-14 NOTE — PROGRESS NOTES
"Chief Complaint  new pcp, Diabetes, Hyperlipidemia, Atrial Fibrillation, and refills    Subjective        Lanaantonio Yañez presents to [facility]  History of Present Illness patient was seen for hypertension.  Blood pressures been running 130s over 80s.  Patient will continue with Toprol tartrate milligrams twice daily.  Patient's blood sugars been running in the 150s.  Patient is taking Amaryl 2 mg twice a day and Januvia 50 mg daily.  Patient was placed on a diabetic diet next exercise 30 minutes 3-5 times a week.  Patient will monitor blood pressure blood sugar follow-up in 6 weeks.  Patient has a history of atrial fibrillation has been placed on warfarin 5 mg 1/2 tablet on Fridays and 5 mg every other day.  Patient does have a history of congestive heart failure is followed by cardiology.  Patient's not had PND orthopnea or chest pain over the past several months.  Patient will continue present treatment.    Dictated utilizing Dragon dictation. If there are questions or for further clarification, please contact me.    Objective   Vital Signs:  Blood Pressure 142/80   Pulse 99   Temperature 98 °F (36.7 °C)   Height 165.1 cm (65\")   Weight 112 kg (247 lb 3.2 oz)   Oxygen Saturation 95%   Body Mass Index 41.14 kg/m²   Estimated body mass index is 41.14 kg/m² as calculated from the following:    Height as of this encounter: 165.1 cm (65\").    Weight as of this encounter: 112 kg (247 lb 3.2 oz).             Physical Exam  Vitals and nursing note reviewed.   Constitutional:       Appearance: Normal appearance. She is well-developed.   HENT:      Head: Normocephalic and atraumatic.      Nose: Nose normal.      Mouth/Throat:      Mouth: Mucous membranes are moist.      Pharynx: Oropharynx is clear.   Eyes:      Extraocular Movements: Extraocular movements intact.      Conjunctiva/sclera: Conjunctivae normal.      Pupils: Pupils are equal, round, and reactive to light. "   Cardiovascular:      Rate and Rhythm: Normal rate and regular rhythm.      Heart sounds: Normal heart sounds. No murmur heard.    No friction rub. No gallop.   Pulmonary:      Effort: Pulmonary effort is normal. No respiratory distress.      Breath sounds: Normal breath sounds. No stridor. No wheezing, rhonchi or rales.   Chest:      Chest wall: No tenderness.   Abdominal:      General: Bowel sounds are normal.      Palpations: Abdomen is soft.   Musculoskeletal:         General: Normal range of motion.      Cervical back: Normal range of motion and neck supple.   Skin:     General: Skin is warm and dry.   Neurological:      General: No focal deficit present.      Mental Status: She is alert and oriented to person, place, and time. Mental status is at baseline.   Psychiatric:         Mood and Affect: Mood normal.         Behavior: Behavior normal.         Thought Content: Thought content normal.         Judgment: Judgment normal.        Result Review :                   Assessment and Plan  1 diabetic diet low impact exercise 30 minutes 3-5 times a week #2 monitor blood pressure blood sugar #3 follow-up in 6 weeks  Diagnoses and all orders for this visit:    1. Essential hypertension (Primary)  -     metoprolol tartrate (Lopressor) 50 MG tablet; Take 1 tablet by mouth 2 (Two) Times a Day for 30 days.  Dispense: 180 tablet; Refill: 1  -     CBC (No Diff); Future  -     Comprehensive Metabolic Panel; Future  -     Cancel: Hemoglobin A1c; Future  -     Lipid Panel; Future  -     Vitamin D,25-Hydroxy; Future    2. Mixed hyperlipidemia  -     atorvastatin (LIPITOR) 10 MG tablet; Take 1 tablet by mouth Daily.  Dispense: 90 tablet; Refill: 1  -     CBC (No Diff); Future  -     Comprehensive Metabolic Panel; Future  -     Cancel: Hemoglobin A1c; Future  -     Lipid Panel; Future  -     Vitamin D,25-Hydroxy; Future    3. Paroxysmal atrial fibrillation (HCC)  -     digoxin (LANOXIN) 125 MCG tablet; Take 1 tablet by mouth  Every Other Day.  Dispense: 45 tablet; Refill: 2  -     CBC (No Diff); Future  -     Comprehensive Metabolic Panel; Future  -     Cancel: Hemoglobin A1c; Future  -     Lipid Panel; Future  -     Vitamin D,25-Hydroxy; Future    4. Type 2 diabetes mellitus with hyperglycemia, without long-term current use of insulin (HCC)  -     SITagliptin (Januvia) 50 MG tablet; Take 1 tablet by mouth Daily for 90 days.  Dispense: 90 tablet; Refill: 1  -     CBC (No Diff); Future  -     Comprehensive Metabolic Panel; Future  -     Cancel: Hemoglobin A1c; Future  -     Lipid Panel; Future  -     Vitamin D,25-Hydroxy; Future    5. Hypovitaminosis D  -     vitamin D (ERGOCALCIFEROL) 1.25 MG (36411 UT) capsule capsule; Take 1 capsule by mouth 1 (One) Time Per Week.  Dispense: 12 capsule; Refill: 1  -     CBC (No Diff); Future  -     Comprehensive Metabolic Panel; Future  -     Cancel: Hemoglobin A1c; Future  -     Lipid Panel; Future  -     Vitamin D,25-Hydroxy; Future    6. Chronic diastolic heart failure (HCC)  -     Digoxin Level; Future             Follow Up   Return in about 6 weeks (around 3/28/2023), or if symptoms worsen or fail to improve, for Recheck.  Patient was given instructions and counseling regarding her condition or for health maintenance advice. Please see specific information pulled into the AVS if appropriate.

## 2023-02-15 DIAGNOSIS — E55.9 HYPOVITAMINOSIS D: ICD-10-CM

## 2023-02-15 DIAGNOSIS — E11.65 TYPE 2 DIABETES MELLITUS WITH HYPERGLYCEMIA, WITHOUT LONG-TERM CURRENT USE OF INSULIN: ICD-10-CM

## 2023-02-15 DIAGNOSIS — I10 ESSENTIAL HYPERTENSION: Chronic | ICD-10-CM

## 2023-02-15 DIAGNOSIS — E78.2 MIXED HYPERLIPIDEMIA: ICD-10-CM

## 2023-02-15 DIAGNOSIS — I48.0 PAROXYSMAL ATRIAL FIBRILLATION: Chronic | ICD-10-CM

## 2023-02-15 DIAGNOSIS — I50.32 CHRONIC DIASTOLIC HEART FAILURE: ICD-10-CM

## 2023-02-16 DIAGNOSIS — E87.6 HYPOKALEMIA: Primary | ICD-10-CM

## 2023-02-16 LAB
25(OH)D3+25(OH)D2 SERPL-MCNC: 82.5 NG/ML (ref 30–100)
ALBUMIN SERPL-MCNC: 4.2 G/DL (ref 3.7–4.7)
ALBUMIN/GLOB SERPL: 1.3 {RATIO} (ref 1.2–2.2)
ALP SERPL-CCNC: 66 IU/L (ref 44–121)
ALT SERPL-CCNC: 9 IU/L (ref 0–32)
AST SERPL-CCNC: 16 IU/L (ref 0–40)
BILIRUB SERPL-MCNC: 0.7 MG/DL (ref 0–1.2)
BUN SERPL-MCNC: 16 MG/DL (ref 8–27)
BUN/CREAT SERPL: 16 (ref 12–28)
CALCIUM SERPL-MCNC: 9.5 MG/DL (ref 8.7–10.3)
CHLORIDE SERPL-SCNC: 101 MMOL/L (ref 96–106)
CHOLEST SERPL-MCNC: 131 MG/DL (ref 100–199)
CO2 SERPL-SCNC: 27 MMOL/L (ref 20–29)
CREAT SERPL-MCNC: 0.98 MG/DL (ref 0.57–1)
DIGOXIN SERPL-MCNC: 0.5 NG/ML (ref 0.5–0.9)
EGFRCR SERPLBLD CKD-EPI 2021: 60 ML/MIN/1.73
ERYTHROCYTE [DISTWIDTH] IN BLOOD BY AUTOMATED COUNT: 13 % (ref 11.7–15.4)
GLOBULIN SER CALC-MCNC: 3.2 G/DL (ref 1.5–4.5)
GLUCOSE SERPL-MCNC: 138 MG/DL (ref 70–99)
HCT VFR BLD AUTO: 39 % (ref 34–46.6)
HDLC SERPL-MCNC: 54 MG/DL
HGB BLD-MCNC: 12.9 G/DL (ref 11.1–15.9)
LDLC SERPL CALC-MCNC: 57 MG/DL (ref 0–99)
MCH RBC QN AUTO: 30.3 PG (ref 26.6–33)
MCHC RBC AUTO-ENTMCNC: 33.1 G/DL (ref 31.5–35.7)
MCV RBC AUTO: 92 FL (ref 79–97)
PLATELET # BLD AUTO: 152 X10E3/UL (ref 150–450)
POTASSIUM SERPL-SCNC: 3.1 MMOL/L (ref 3.5–5.2)
PROT SERPL-MCNC: 7.4 G/DL (ref 6–8.5)
RBC # BLD AUTO: 4.26 X10E6/UL (ref 3.77–5.28)
SODIUM SERPL-SCNC: 143 MMOL/L (ref 134–144)
TRIGL SERPL-MCNC: 110 MG/DL (ref 0–149)
VLDLC SERPL CALC-MCNC: 20 MG/DL (ref 5–40)
WBC # BLD AUTO: 5.9 X10E3/UL (ref 3.4–10.8)

## 2023-02-16 RX ORDER — POTASSIUM CHLORIDE 750 MG/1
10 TABLET, FILM COATED, EXTENDED RELEASE ORAL 2 TIMES DAILY
Qty: 60 TABLET | Refills: 3 | Status: SHIPPED | OUTPATIENT
Start: 2023-02-16 | End: 2023-03-21

## 2023-03-16 ENCOUNTER — TELEPHONE (OUTPATIENT)
Dept: FAMILY MEDICINE CLINIC | Facility: CLINIC | Age: 78
End: 2023-03-16

## 2023-03-16 ENCOUNTER — TELEPHONE (OUTPATIENT)
Dept: INTERNAL MEDICINE | Facility: CLINIC | Age: 78
End: 2023-03-16
Payer: MEDICARE

## 2023-03-16 NOTE — TELEPHONE ENCOUNTER
Caller: Lana Yañez    Relationship to patient: Self    Best call back number: [phone]    Chief complaint: UTI, SEVERE PAIN ON LOWER RIGHT SIDE     Type of visit: OFFICE VISIT- FOLLOW UP URGENT CARE     Requested date: 3/17/23     If rescheduling, when is the original appointment: N/A     Additional notes: PATIENT STATES SHE WENT TO URGENT CARE ON Monday (3/13/23) FOR SEVERE PAIN ON HER LOWER RIGHT SIDE AND GOT A SHOT AS WELL AS SOME AMOXICILLIN PILLS.   PATIENT STATES HER CULTURE CAME BACK TODAY (3/16/23) AND SHE HAS A UTI.   PATIENT STATES SHE WOULD LIKE TO SEE DR. MUSTAFA.

## 2023-03-17 ENCOUNTER — ANTICOAGULATION VISIT (OUTPATIENT)
Dept: PHARMACY | Facility: HOSPITAL | Age: 78
End: 2023-03-17
Payer: MEDICARE

## 2023-03-17 LAB
INR PPP: 1.4 (ref 0.91–1.09)
PROTHROMBIN TIME: 17.2 SECONDS (ref 10–13.8)

## 2023-03-17 PROCEDURE — G0463 HOSPITAL OUTPT CLINIC VISIT: HCPCS

## 2023-03-17 PROCEDURE — 85610 PROTHROMBIN TIME: CPT

## 2023-03-17 PROCEDURE — 36416 COLLJ CAPILLARY BLOOD SPEC: CPT

## 2023-03-17 NOTE — TELEPHONE ENCOUNTER
Patient informed, she would like to see Dr Green on Tuesday, advised if sx worsen go to UC or ER before then

## 2023-03-17 NOTE — PROGRESS NOTES
Anticoagulation Clinic Progress Note    Anticoagulation Summary  As of 3/17/2023    INR goal:  2.0-3.0   TTR:  62.8 % (4.4 y)   INR used for dosin.4 (3/17/2023)   Warfarin maintenance plan:  2.5 mg every Fri; 5 mg all other days; Starting 3/17/2023   Weekly warfarin total:  32.5 mg   Plan last modified:  Maria Alejandra Mac RPH (2022)   Next INR check:  3/24/2023   Priority:  Maintenance   Target end date:  Indefinite    Indications    Atrial fibrillation (HCC) [I48.91]             Anticoagulation Episode Summary     INR check location:      Preferred lab:      Send INR reminders to:   [facility]ORVATH [facility]    Comments:        Anticoagulation Care Providers     Provider Role Specialty Phone number    Pia Dueñas MD Referring Cardiology [phone]          Clinic Interview:  Patient Findings     Positives:  Change in medications, Change in diet/appetite    Negatives:  Signs/symptoms of thrombosis, Signs/symptoms of bleeding,   Laboratory test error suspected, Change in health, Change in alcohol use,   Change in activity, Upcoming invasive procedure, Emergency department   visit, Upcoming dental procedure, Missed doses, Extra doses, Hospital   admission, Bruising, Other complaints    Comments:  3/17: Patient consumed prisca greens this week. Patient also   started on Augmentin for UTI and given ceftriaxone injection while in   clinic.      Clinical Outcomes     Negatives:  Major bleeding event, Thromboembolic event,   Anticoagulation-related hospital admission, Anticoagulation-related ED   visit, Anticoagulation-related fatality    Comments:  3/17: Patient consumed prisca greens this week. Patient also   started on Augmentin for UTI and given ceftriaxone injection while in   clinic.        INR History:  Anticoagulation Monitoring 2023 2023 3/17/2023   INR 1.8 2.2 1.4   INR Date 2023 2023 3/17/2023   INR Goal 2.0-3.0 2.0-3.0 2.0-3.0   Trend Same Same Same   Last Week Total 32.5 mg  32.5 mg 32.5 mg   Next Week Total 35 mg 32.5 mg 35 mg   Sun 5 mg 5 mg 5 mg   Mon 5 mg 5 mg 5 mg   Tue 5 mg 5 mg 5 mg   Wed 5 mg 5 mg 5 mg   Thu 5 mg 5 mg 5 mg   Fri 5 mg (1/27); Otherwise 2.5 mg 2.5 mg 5 mg (3/17)   Sat 5 mg 5 mg 5 mg   Visit Report - - -   Some recent data might be hidden       Plan:  1. INR is Subtherapeutic today- see above in Anticoagulation Summary.  Will instruct Lana Yañez to boost with 5mg today, then continue their warfarin regimen- see above in Anticoagulation Summary.  2. Follow up in 1 week (patient desires to follow up sooner and has appointment scheduled for 3/21)  3. Patient declines warfarin refills.  4. Verbal and written information provided. Patient expresses understanding and has no further questions at this time.    Lance Lion, [facility]

## 2023-03-21 ENCOUNTER — OFFICE VISIT (OUTPATIENT)
Dept: FAMILY MEDICINE CLINIC | Facility: CLINIC | Age: 78
End: 2023-03-21
Payer: MEDICARE

## 2023-03-21 ENCOUNTER — ANTICOAGULATION VISIT (OUTPATIENT)
Dept: PHARMACY | Facility: HOSPITAL | Age: 78
End: 2023-03-21
Payer: MEDICARE

## 2023-03-21 ENCOUNTER — LAB (OUTPATIENT)
Dept: LAB | Facility: HOSPITAL | Age: 78
End: 2023-03-21
Payer: MEDICARE

## 2023-03-21 VITALS
WEIGHT: 243.4 LBS | OXYGEN SATURATION: 97 % | SYSTOLIC BLOOD PRESSURE: 124 MMHG | HEIGHT: 65 IN | DIASTOLIC BLOOD PRESSURE: 87 MMHG | TEMPERATURE: 98 F | HEART RATE: 98 BPM | BODY MASS INDEX: 40.55 KG/M2

## 2023-03-21 DIAGNOSIS — E11.65 TYPE 2 DIABETES MELLITUS WITH HYPERGLYCEMIA, WITHOUT LONG-TERM CURRENT USE OF INSULIN: ICD-10-CM

## 2023-03-21 DIAGNOSIS — E78.2 MIXED HYPERLIPIDEMIA: ICD-10-CM

## 2023-03-21 DIAGNOSIS — I50.32 CHRONIC DIASTOLIC HEART FAILURE: ICD-10-CM

## 2023-03-21 DIAGNOSIS — I10 ESSENTIAL HYPERTENSION: ICD-10-CM

## 2023-03-21 DIAGNOSIS — E87.6 HYPOKALEMIA: Primary | ICD-10-CM

## 2023-03-21 DIAGNOSIS — R30.0 DYSURIA: Primary | ICD-10-CM

## 2023-03-21 PROBLEM — T37.8X5A NITROFURANTOIN ADVERSE REACTION: Status: RESOLVED | Noted: 2019-04-05 | Resolved: 2023-03-21

## 2023-03-21 LAB
25(OH)D3 SERPL-MCNC: 58.5 NG/ML (ref 30–100)
ALBUMIN SERPL-MCNC: 4.5 G/DL (ref 3.5–5.2)
ALBUMIN/GLOB SERPL: 1.2 G/DL
ALP SERPL-CCNC: 75 U/L (ref 39–117)
ALT SERPL W P-5'-P-CCNC: 13 U/L (ref 1–33)
ANION GAP SERPL CALCULATED.3IONS-SCNC: 13.8 MMOL/L (ref 5–15)
AST SERPL-CCNC: 23 U/L (ref 1–32)
BACTERIA UR QL AUTO: ABNORMAL /HPF
BILIRUB SERPL-MCNC: 0.4 MG/DL (ref 0–1.2)
BILIRUB UR QL STRIP: NEGATIVE
BUN SERPL-MCNC: 19 MG/DL (ref 8–23)
BUN/CREAT SERPL: 15.3 (ref 7–25)
CALCIUM SPEC-SCNC: 9.9 MG/DL (ref 8.6–10.5)
CHLORIDE SERPL-SCNC: 102 MMOL/L (ref 98–107)
CHOLEST SERPL-MCNC: 129 MG/DL (ref 0–200)
CLARITY UR: ABNORMAL
CO2 SERPL-SCNC: 28.2 MMOL/L (ref 22–29)
COLOR UR: YELLOW
CREAT SERPL-MCNC: 1.24 MG/DL (ref 0.57–1)
DEPRECATED RDW RBC AUTO: 43.8 FL (ref 37–54)
DIGOXIN SERPL-MCNC: 0.5 NG/ML (ref 0.6–1.2)
EGFRCR SERPLBLD CKD-EPI 2021: 45.5 ML/MIN/1.73
ERYTHROCYTE [DISTWIDTH] IN BLOOD BY AUTOMATED COUNT: 12.7 % (ref 12.3–15.4)
FOLATE SERPL-MCNC: 10.8 NG/ML (ref 4.78–24.2)
GLOBULIN UR ELPH-MCNC: 3.7 GM/DL
GLUCOSE SERPL-MCNC: 133 MG/DL (ref 65–99)
GLUCOSE UR STRIP-MCNC: NEGATIVE MG/DL
HBA1C MFR BLD: 7.3 % (ref 4.8–5.6)
HCT VFR BLD AUTO: 42.2 % (ref 34–46.6)
HDLC SERPL-MCNC: 58 MG/DL (ref 40–60)
HGB BLD-MCNC: 13.9 G/DL (ref 12–15.9)
HGB UR QL STRIP.AUTO: NEGATIVE
HYALINE CASTS UR QL AUTO: ABNORMAL /LPF
INR PPP: 1.6 (ref 0.91–1.09)
KETONES UR QL STRIP: NEGATIVE
LDLC SERPL CALC-MCNC: 55 MG/DL (ref 0–100)
LDLC/HDLC SERPL: 0.93 {RATIO}
LEUKOCYTE ESTERASE UR QL STRIP.AUTO: NEGATIVE
MCH RBC QN AUTO: 30.8 PG (ref 26.6–33)
MCHC RBC AUTO-ENTMCNC: 32.9 G/DL (ref 31.5–35.7)
MCV RBC AUTO: 93.6 FL (ref 79–97)
NITRITE UR QL STRIP: NEGATIVE
PH UR STRIP.AUTO: <=5 [PH] (ref 5–8)
PLATELET # BLD AUTO: 174 10*3/MM3 (ref 140–450)
PMV BLD AUTO: 11 FL (ref 6–12)
POTASSIUM SERPL-SCNC: 3.4 MMOL/L (ref 3.5–5.2)
PROT SERPL-MCNC: 8.2 G/DL (ref 6–8.5)
PROT UR QL STRIP: NEGATIVE
PROTHROMBIN TIME: 19.5 SECONDS (ref 10–13.8)
RBC # BLD AUTO: 4.51 10*6/MM3 (ref 3.77–5.28)
RBC # UR STRIP: ABNORMAL /HPF
REF LAB TEST METHOD: ABNORMAL
SODIUM SERPL-SCNC: 144 MMOL/L (ref 136–145)
SP GR UR STRIP: 1.01 (ref 1–1.03)
SQUAMOUS #/AREA URNS HPF: ABNORMAL /HPF
TRIGL SERPL-MCNC: 85 MG/DL (ref 0–150)
UROBILINOGEN UR QL STRIP: ABNORMAL
VIT B12 BLD-MCNC: 442 PG/ML (ref 211–946)
VLDLC SERPL-MCNC: 16 MG/DL (ref 5–40)
WBC # UR STRIP: ABNORMAL /HPF
WBC NRBC COR # BLD: 6.9 10*3/MM3 (ref 3.4–10.8)

## 2023-03-21 PROCEDURE — 87186 SC STD MICRODIL/AGAR DIL: CPT | Performed by: INTERNAL MEDICINE

## 2023-03-21 PROCEDURE — 36416 COLLJ CAPILLARY BLOOD SPEC: CPT

## 2023-03-21 PROCEDURE — 87077 CULTURE AEROBIC IDENTIFY: CPT | Performed by: INTERNAL MEDICINE

## 2023-03-21 PROCEDURE — 82607 VITAMIN B-12: CPT | Performed by: INTERNAL MEDICINE

## 2023-03-21 PROCEDURE — 80053 COMPREHEN METABOLIC PANEL: CPT | Performed by: INTERNAL MEDICINE

## 2023-03-21 PROCEDURE — 82746 ASSAY OF FOLIC ACID SERUM: CPT | Performed by: INTERNAL MEDICINE

## 2023-03-21 PROCEDURE — 87086 URINE CULTURE/COLONY COUNT: CPT | Performed by: INTERNAL MEDICINE

## 2023-03-21 PROCEDURE — 3079F DIAST BP 80-89 MM HG: CPT | Performed by: INTERNAL MEDICINE

## 2023-03-21 PROCEDURE — 36415 COLL VENOUS BLD VENIPUNCTURE: CPT | Performed by: INTERNAL MEDICINE

## 2023-03-21 PROCEDURE — 85027 COMPLETE CBC AUTOMATED: CPT | Performed by: INTERNAL MEDICINE

## 2023-03-21 PROCEDURE — 99214 OFFICE O/P EST MOD 30 MIN: CPT | Performed by: INTERNAL MEDICINE

## 2023-03-21 PROCEDURE — 1159F MED LIST DOCD IN RCRD: CPT | Performed by: INTERNAL MEDICINE

## 2023-03-21 PROCEDURE — 80061 LIPID PANEL: CPT | Performed by: INTERNAL MEDICINE

## 2023-03-21 PROCEDURE — 3074F SYST BP LT 130 MM HG: CPT | Performed by: INTERNAL MEDICINE

## 2023-03-21 PROCEDURE — 85610 PROTHROMBIN TIME: CPT

## 2023-03-21 PROCEDURE — 81001 URINALYSIS AUTO W/SCOPE: CPT | Performed by: INTERNAL MEDICINE

## 2023-03-21 PROCEDURE — 83036 HEMOGLOBIN GLYCOSYLATED A1C: CPT | Performed by: INTERNAL MEDICINE

## 2023-03-21 PROCEDURE — G0463 HOSPITAL OUTPT CLINIC VISIT: HCPCS

## 2023-03-21 PROCEDURE — 82306 VITAMIN D 25 HYDROXY: CPT | Performed by: INTERNAL MEDICINE

## 2023-03-21 PROCEDURE — 80162 ASSAY OF DIGOXIN TOTAL: CPT | Performed by: INTERNAL MEDICINE

## 2023-03-21 PROCEDURE — 1160F RVW MEDS BY RX/DR IN RCRD: CPT | Performed by: INTERNAL MEDICINE

## 2023-03-21 RX ORDER — FUROSEMIDE 40 MG/1
40 TABLET ORAL
COMMUNITY
End: 2023-03-21 | Stop reason: CLARIF

## 2023-03-21 RX ORDER — POTASSIUM CHLORIDE 750 MG/1
TABLET, FILM COATED, EXTENDED RELEASE ORAL
Qty: 90 TABLET | Refills: 1 | Status: SHIPPED | OUTPATIENT
Start: 2023-03-21 | End: 2023-03-28 | Stop reason: SDUPTHER

## 2023-03-21 RX ORDER — POTASSIUM CHLORIDE 750 MG/1
10 TABLET, FILM COATED, EXTENDED RELEASE ORAL 2 TIMES DAILY
Qty: 60 TABLET | Refills: 3 | Status: SHIPPED | OUTPATIENT
Start: 2023-03-21 | End: 2023-03-21

## 2023-03-21 RX ORDER — PHENAZOPYRIDINE HYDROCHLORIDE 100 MG/1
100 TABLET, FILM COATED ORAL
COMMUNITY
Start: 2023-03-16 | End: 2023-03-27

## 2023-03-21 RX ORDER — TORSEMIDE 20 MG/1
TABLET ORAL
Qty: 180 TABLET | Refills: 1 | Status: SHIPPED | OUTPATIENT
Start: 2023-03-21

## 2023-03-21 RX ORDER — AMOXICILLIN AND CLAVULANATE POTASSIUM 875; 125 MG/1; MG/1
TABLET, FILM COATED ORAL
COMMUNITY
Start: 2023-03-13 | End: 2023-03-21 | Stop reason: ALTCHOICE

## 2023-03-21 RX ORDER — CIPROFLOXACIN 500 MG/1
500 TABLET, FILM COATED ORAL 2 TIMES DAILY
Qty: 28 TABLET | Refills: 0 | Status: SHIPPED | OUTPATIENT
Start: 2023-03-21

## 2023-03-21 NOTE — PROGRESS NOTES
"Chief Complaint  f/u ICC pain rt side UTI and kidney problem?    Karl Yañez presents to [facility]  History of Present Illness patient was seen for dysuria.  Patient went to the [facility] and got a Rocephin injection and Augmentin 875/125 p.o. twice daily.  Patient states she was 50% improved but still had right flank pain.  Patient was tender in the right flank and was put on Cipro and UA C&S was obtained today.  Patient's blood sugars been running in the 130s.  Patient will continue monitoring at home.  Patient will continue present treatment.  Patient does have congestive heart failure and is using digoxin 0.125 mg daily for rate control.  Patient is also on torsemide 20 mg 2 tablets daily.  Patient's blood pressures been running 120s over 80s.  Patient will continue with Toprol tartrate 50 mg twice daily.  Patient is on warfarin and was placed on Cipro for bladder.  Patient is getting INR today and will follow-up with INR next week.  Patient will monitor blood pressure and blood sugar and call if systolic or blood sugars get above 140.  Patient will follow-up if not better after antibiotics.    Dictated utilizing Dragon dictation. If there are questions or for further clarification, please contact me.    Objective   Vital Signs:  Blood Pressure 124/87   Pulse 98   Temperature 98 °F (36.7 °C)   Height 165.1 cm (65\")   Weight 110 kg (243 lb 6.4 oz)   Oxygen Saturation 97%   Body Mass Index 40.50 kg/m²   Estimated body mass index is 40.5 kg/m² as calculated from the following:    Height as of this encounter: 165.1 cm (65\").    Weight as of this encounter: 110 kg (243 lb 6.4 oz).             Physical Exam  Vitals and nursing note reviewed.   Constitutional:       Appearance: Normal appearance. She is well-developed.   HENT:      Head: Normocephalic and atraumatic.      Nose: Nose normal.      Mouth/Throat:      Mouth: Mucous membranes are " moist.      Pharynx: Oropharynx is clear.   Eyes:      Extraocular Movements: Extraocular movements intact.      Conjunctiva/sclera: Conjunctivae normal.      Pupils: Pupils are equal, round, and reactive to light.   Cardiovascular:      Rate and Rhythm: Normal rate and regular rhythm.      Heart sounds: Normal heart sounds. No murmur heard.    No friction rub. No gallop.   Pulmonary:      Effort: Pulmonary effort is normal. No respiratory distress.      Breath sounds: Normal breath sounds. No stridor. No wheezing, rhonchi or rales.   Chest:      Chest wall: No tenderness.   Abdominal:      General: Bowel sounds are normal. There is no distension.      Palpations: Abdomen is soft. There is no mass.      Tenderness: There is no abdominal tenderness. There is right CVA tenderness. There is no left CVA tenderness, guarding or rebound.      Hernia: No hernia is present.   Musculoskeletal:         General: Normal range of motion.      Cervical back: Normal range of motion and neck supple.   Skin:     General: Skin is warm and dry.   Neurological:      General: No focal deficit present.      Mental Status: She is alert and oriented to person, place, and time. Mental status is at baseline.   Psychiatric:         Mood and Affect: Mood normal.         Behavior: Behavior normal.         Thought Content: Thought content normal.         Judgment: Judgment normal.        Result Review :                   Assessment and Plan  #1 continue monitoring blood pressure blood sugar #2 UA C&S #3 lab #4 INR today in 1 week #5 Cipro 500 mg twice daily  Diagnoses and all orders for this visit:    1. Dysuria (Primary)    2. Mixed hyperlipidemia  -     Vitamin B12 & Folate  -     Vitamin D,25-Hydroxy  -     Urinalysis With Microscopic - Urine, Clean Catch  -     Urine Culture - Urine, Urine, Clean Catch  -     CBC (No Diff)  -     Comprehensive Metabolic Panel  -     Lipid Panel  -     Hemoglobin A1c  -     Digoxin Level    3. Type 2 diabetes  mellitus with hyperglycemia, without long-term current use of insulin (HCC)  -     Vitamin B12 & Folate  -     Vitamin D,25-Hydroxy  -     Urinalysis With Microscopic - Urine, Clean Catch  -     Urine Culture - Urine, Urine, Clean Catch  -     CBC (No Diff)  -     Comprehensive Metabolic Panel  -     Lipid Panel  -     Hemoglobin A1c  -     Digoxin Level    4. Chronic diastolic heart failure (HCC)  -     Vitamin B12 & Folate  -     Vitamin D,25-Hydroxy  -     Urinalysis With Microscopic - Urine, Clean Catch  -     Urine Culture - Urine, Urine, Clean Catch  -     CBC (No Diff)  -     Comprehensive Metabolic Panel  -     Lipid Panel  -     Hemoglobin A1c  -     Digoxin Level    5. Essential hypertension    Other orders  -     ciprofloxacin (Cipro) 500 MG tablet; Take 1 tablet by mouth 2 (Two) Times a Day.  Dispense: 28 tablet; Refill: 0  -     Discontinue: potassium chloride 10 MEQ CR tablet; Take 1 tablet by mouth 2 (Two) Times a Day.  Dispense: 60 tablet; Refill: 3  -     torsemide (DEMADEX) 20 MG tablet; 2 po qday  Dispense: 180 tablet; Refill: 1  -     potassium chloride 10 MEQ CR tablet; 1 po qday  Dispense: 90 tablet; Refill: 1             Follow Up   Return in about 3 months (around 6/21/2023), or if symptoms worsen or fail to improve, for Recheck.  Patient was given instructions and counseling regarding her condition or for health maintenance advice. Please see specific information pulled into the AVS if appropriate.

## 2023-03-21 NOTE — PROGRESS NOTES
Anticoagulation Clinic Progress Note    Anticoagulation Summary  As of 3/21/2023    INR goal:  2.0-3.0   TTR:  62.7 % (4.4 y)   INR used for dosin.6 (3/21/2023)   Warfarin maintenance plan:  2.5 mg every Fri; 5 mg all other days; Starting 3/21/2023   Weekly warfarin total:  32.5 mg   Plan last modified:  Maria Alejandra Mac RPH (2022)   Next INR check:  3/27/2023   Priority:  Maintenance   Target end date:  Indefinite    Indications    Atrial fibrillation (HCC) [I48.91]             Anticoagulation Episode Summary     INR check location:      Preferred lab:      Send INR reminders to:   [facility]    Comments:        Anticoagulation Care Providers     Provider Role Specialty Phone number    Pia Dueñas MD Referring Cardiology [phone]          Clinic Interview:  Patient Findings     Positives:  Change in medications    Negatives:  Signs/symptoms of thrombosis, Signs/symptoms of bleeding,   Laboratory test error suspected, Change in health, Change in alcohol use,   Change in activity, Upcoming invasive procedure, Emergency department   visit, Upcoming dental procedure, Missed doses, Extra doses, Change in   diet/appetite, Hospital admission, Bruising, Other complaints    Comments:  Starting ciprofloxacin for UTI today      Clinical Outcomes     Negatives:  Major bleeding event, Thromboembolic event,   Anticoagulation-related hospital admission, Anticoagulation-related ED   visit, Anticoagulation-related fatality    Comments:  Starting ciprofloxacin for UTI today        INR History:  Anticoagulation Monitoring 2023 3/17/2023 3/21/2023   INR 2.2 1.4 1.6   INR Date 2023 3/17/2023 3/21/2023   INR Goal 2.0-3.0 2.0-3.0 2.0-3.0   Trend Same Same Same   Last Week Total 32.5 mg 32.5 mg 35 mg   Next Week Total 32.5 mg 35 mg 35 mg   Sun 5 mg 5 mg 5 mg   Mon 5 mg 5 mg -   Tue 5 mg 5 mg 7.5 mg (3/21)   Wed 5 mg 5 mg 5 mg   Thu 5 mg 5 mg 5 mg   Fri 2.5 mg 5 mg (3/17) 2.5 mg   Sat 5 mg 5 mg 5 mg    Visit Report - - -   Some recent data might be hidden       Plan:  1. INR is Subtherapeutic today- see above in Anticoagulation Summary.  Will instruct Lana Yañez to take a booster dose today of 7.5mg, then resume her current warfarin regimen- see above in Anticoagulation Summary.  2. Follow up in 6 days  3. Patient declines warfarin refills.  4. Verbal and written information provided. Patient expresses understanding and has no further questions at this time.    Kassi Le, [facility]

## 2023-03-23 LAB — BACTERIA SPEC AEROBE CULT: ABNORMAL

## 2023-03-24 NOTE — PROGRESS NOTES
Date of Office Visit: 2023  Encounter Provider: ANGELA Mena  Place of Service: [facility] CARDIOLOGY  Patient Name: Lana Yañez  :1945    Chief complaint  Follow-up hyperlipidemia, atrial fibrillation, status post NSTEMI, history of CVA, pulmonary hypertension    History of Present Illness  Patient is a 77-year-old female patient of Dr. Dueñas.  Past medical history includes hyperlipidemia, rheumatoid arthritis, obstructive sleep apnea.  In 2017 she was found to be in atrial fibrillation with rapid ventricular rates and mild diastolic heart failure.  She was placed on IV heparin and Pradaxa.  Echocardiogram revealed normal systolic function mild left ventricular hypertrophy, moderate atrial enlargement with aortic valve sclerosis and moderate pulmonary hypertension and moderate tricuspid regurgitation.  The RV systolic pressure is 54 mmHg.  She had a stress perfusion study that was negative for ischemia and a CT angiogram that revealed a mildly dilated aorta without pulmonary emboli.  She then presented several days later with an acute stroke.  CLARIBEL was not pursued as it was felt to be embolic in nature.  However on  she was diaphoretic and was found to have a non-ST elevation myocardial infarction.  This was on full dose Pradaxa which was not held.  CLARIBEL was pursued that revealed normal systolic function with moderate mitral valve prolapse without significant regurgitation.  There was right-sided spontaneous echo contrast without thrombus formation.  Cardiac catheterization revealed normal systolic function with 2+ mitral regurgitation, mild coronary disease except for a 90% stenosis of the distal PDA which was felt to be too small to be amenable PCI.  She was treated medically and switched to Coumadin. She has struggled with intermittent heart failure due to dietary indiscretions with salt since then.  She also had a 24-hour Holter that  revealed persistent atrial fibrillation with reasonable rate control and frequent PVCs. No other arrhythmia was present.  Last echocardiogram in August 2021 showed an ejection fraction of 62% with moderate left ventricular hypertrophy with indeterminate diastolic function, mild aortic regurgitation, mitral valve thickening with moderate regurgitation without stenosis, severe tricuspid regurgitation with an RV systolic pressure 48 mmHg is present.  CT angiogram of the chest 9/2021 showed stable ascending aorta measuring 4.1 cm.  Lexiscan cardiac stress test 2/2022 was negative for ischemia. Echocardiogram 5/23/2022 showed LVEF 60.5%, grade 2 diastolic dysfunction, moderate biatrial enlargement, calcified and thickened aortic valve with mild aortic valve stenosis, mild mitral annular calcification with trace mitral valve regurgitation and no mitral valve stenosis, and trace tricuspid regurgitation with RVSP 42 mmHg.    Interval history  Patient presents today for routine follow-up.  I will visit with her for the first time today and have reviewed her medical record.  Since last visit patient has been doing well.  She reports palpitations are better than before however dizziness is a bit worse, however she notes that she did take Benadryl today due to recent allergy symptoms.  She denies shortness of breath, edema, chest pain or chest pressure, fatigue, syncope or presyncope.  Blood pressure today is at goal.  She had a recent UTI and was treated with Rocephin, Augmentin, and ciprofloxacin.  She is not using her CPAP at home.  She also states that Dr. Green mentioned at recent visit that he would like her to stop digoxin and that he would speak with Dr. Dueñas.  She remains on warfarin for anticoagulation and is tolerating this well with no recent falls or bleeding issues.  She follows with the anticoagulation clinic.    Labs 3/21/2023 show creatinine 1.24, potassium 3.4, sodium 144, calcium 9.9.  Hemoglobin 13.9. lipids  total cholesterol 129, triglycerides 85, HDL 58, LDL 55.  Hemoglobin A1c 7.3.  Digoxin level 0.5  Past Medical History:   Diagnosis Date   • Acute on chronic diastolic heart failure ([facility])    • Acute UTI (urinary tract infection) 05/22/2022   • Arthritis    • Arthritis of back    • Asthma    • Atrial fibrillation ([facility])     Persistent; on warfarin   • Atypical chest pain    • Biliary acute pancreatitis without necrosis or infection 11/10/2021   • Bronchitis    • Cellulitis of right elbow     due to MRSA   • Cervical disc disorder    • CHF (congestive heart failure) ([facility])    • COPD (chronic obstructive pulmonary disease) ([facility])    • Coronary artery disease     Cardiac catheterization completed; 90% PDA stenosis with medical management recommended   • Coronary artery disease involving native coronary artery of native heart with angina pectoris with documented spasm ([facility])    • CTS (carpal tunnel syndrome)    • Diabetes 1.5, managed as type 2 ([facility])    • Disease of thyroid gland    • Displacement of lumbar intervertebral disc without myelopathy    • Dizziness    • ANTOINE (dyspnea on exertion)    • Essential hypertension    • Fatigue    • Fracture, foot    • Generalized osteoarthritis of multiple sites    • History of rheumatic fever    • History of transfusion    • Hx of bone density study     10/23/2014   • Hyperglycemia    • Hyperlipidemia    • Hypovitaminosis D    • Left arm pain    • Left-sided weakness    • Leg swelling    • Low back pain    • Lower extremity edema    • Lumbosacral disc disease    • Malaise and fatigue    • Mitral valve disease     Moderate mitral valve prolapse and moderate mitral regurgitation   • Mitral valve insufficiency    • Morbid obesity with BMI of 40.0-44.9, adult ([facility])    • MRSA infection    • NSTEMI (non-ST elevated myocardial infarction) ([facility])    • PAF (paroxysmal atrial fibrillation) ([facility])    • Periarthritis of shoulder    • RA (rheumatoid arthritis) ([facility])     involving both hands   • Sleep  apnea    • SOB (shortness of breath)    • Stroke (HCC)     left side weakness   • Urge incontinence of urine    • UTI (urinary tract infection) 05/2020   • Vitamin D deficiency      Past Surgical History:   Procedure Laterality Date   • BREAST BIOPSY     • BREAST SURGERY      right side lumpectomy with biopsy   • CARDIAC CATHETERIZATION Left 10/20/2017    Procedure: Cardiac Catheterization/Vascular Study;  Surgeon: Alphonso Olmedo MD;  Location:  [facility];  Service:    • CARDIAC CATHETERIZATION N/A 10/20/2017    +2 mitral regurgitation, left main 10% stenosis, mid to distal LAD 10% diffuse stenosis, circumflex 10% diffuse stenosis, RCA 10% proximal stenosis, and distal PDA consistent with coronary embolus with a lesion of 90% too small to consider coronary intervention; medical management recommended   • EYE SURGERY      laser surgery for glaucoma and left eye cataracts removed   • HYSTERECTOMY      10+ years ago   • JOINT REPLACEMENT  2005; 2006    bilateral knees and left rotater   • KNEE SURGERY     • MAMMO BILATERAL  2016    [facility]    • SHOULDER SURGERY       Outpatient Medications Prior to Visit   Medication Sig Dispense Refill   • Accu-Chek FastClix Lancets misc Use to check glucose as directed 306 each 1   • Accu-Chek FastClix Lancets misc      • acetaminophen (TYLENOL) 325 MG tablet Take 2 tablets by mouth Every 6 (Six) Hours As Needed for Moderate Pain.     • albuterol sulfate  (90 Base) MCG/ACT inhaler Inhale 2 puffs Every 4 (Four) Hours As Needed for Shortness of Air. PRN SOA/WHEEZING 18 g 11   • aspirin 81 MG EC tablet Take 1 tablet by mouth Daily.     • atorvastatin (LIPITOR) 10 MG tablet Take 1 tablet by mouth Daily. 90 tablet 1   • Blood Glucose Monitoring Suppl (ACCU-CHEK GUIDE) w/Device kit See Admin Instructions.     • ciprofloxacin (Cipro) 500 MG tablet Take 1 tablet by mouth 2 (Two) Times a Day. 28 tablet 0   • digoxin (LANOXIN) 125 MCG tablet Take 1 tablet by  mouth Every Other Day. 45 tablet 2   • glimepiride (Amaryl) 2 MG tablet By mouth take one tab twice daily with AM and PM meals. 180 tablet 2   • glucose blood test strip Test twice daily prior to breakfast and dinner as instructed (Patient taking differently: Test daily prior to breakfast) 200 each 12   • Lancets (accu-chek multiclix) lancets 1 each by Other route 2 (Two) Times a Day With Meals. Use 1 lancet per finger stick 204 each 12   • metoprolol tartrate (Lopressor) 50 MG tablet Take 1 tablet by mouth 2 (Two) Times a Day for 30 days. 180 tablet 1   • polyethylene glycol (MIRALAX) 17 g packet Take 17 g by mouth Daily As Needed (constipation).     • potassium chloride 10 MEQ CR tablet 1 po qday 90 tablet 1   • SITagliptin (Januvia) 50 MG tablet Take 1 tablet by mouth Daily for 90 days. 90 tablet 1   • torsemide (DEMADEX) 20 MG tablet 2 po qday 180 tablet 1   • TRAVATAN Z 0.004 % solution ophthalmic solution Administer 1 drop to both eyes Every Morning. Not night     • vitamin D (ERGOCALCIFEROL) 1.25 MG (38131 UT) capsule capsule Take 1 capsule by mouth 1 (One) Time Per Week. 12 capsule 1   • warfarin (COUMADIN) 5 MG tablet Take one-half tablet (2.5 mg) by mouth on Fridays, and take one tablet (5 mg) by mouth all other days or as directed. 85 tablet 1   • Moderna COVID-19 Bival Booster 50 MCG/0.5ML suspension      • phenazopyridine (PYRIDIUM) 100 MG tablet Take 1 tablet by mouth. (Patient not taking: Reported on 3/21/2023)       No facility-administered medications prior to visit.       Allergies as of 03/27/2023 - Reviewed 03/27/2023   Allergen Reaction Noted   • Contrast dye (echo or unknown ct/mr) Hives and Itching 01/04/2018   • Macrobid [nitrofurantoin] Hives 03/21/2023     Social History     Socioeconomic History   • Marital status:      Spouse name: Suresh   • Number of children: 5   • Years of education: 13   Tobacco Use   • Smoking status: Former     Packs/day: 3.00     Years: 1.00     Pack years:  "3.00     Types: Cigarettes     Start date: 1967     Quit date: 10/19/1968     Years since quittin.4     Passive exposure: Never   • Smokeless tobacco: Never   • Tobacco comments:     caffeine use - decaf coffee   Vaping Use   • Vaping Use: Never used   Substance and Sexual Activity   • Alcohol use: No     Comment: No caffeine use   • Drug use: No   • Sexual activity: Defer     Family History   Problem Relation Age of Onset   • Colon cancer Mother    • Glaucoma Mother    • Stroke Mother    • Arthritis Mother    • Hypertension Mother    • Glaucoma Sister    • Diabetes Sister    • Heart disease Sister    • Arthritis Sister    • Asthma Sister    • Hypertension Sister    • Miscarriages / Stillbirths Sister    • Lung disease Sister    • Heart disease Brother    • Diabetes Brother    • Arthritis Brother    • Drug abuse Brother    • Hypertension Brother    • Arthritis Father    • COPD Father    • Lung disease Father    • Arthritis Daughter    • Depression Daughter    • Alcohol abuse Maternal Uncle    • Heart disease Sister    • Heart disease Sister    • Heart disease Brother    • Rheumatologic disease Sister      Review of Systems   Cardiovascular: Positive for dyspnea on exertion. Negative for chest pain, claudication, leg swelling, near-syncope, orthopnea, palpitations, paroxysmal nocturnal dyspnea and syncope.   Respiratory: Negative for shortness of breath.    Neurological: Positive for dizziness. Negative for brief paralysis, headaches and light-headedness.   All other systems reviewed and are negative.       Objective:     Vitals:    23 1334   BP: 118/74   Pulse: 85   Weight: 110 kg (241 lb 12.8 oz)   Height: 165.1 cm (65\")     Body mass index is 40.24 kg/m².    Vitals reviewed.   Constitutional:       General: Not in acute distress.     Appearance: Well-developed and not in distress. Chronically ill-appearing. Not diaphoretic.   HENT:      Head: Normocephalic.   Pulmonary:      Effort: Pulmonary " effort is normal. No respiratory distress.      Breath sounds: Normal breath sounds. No wheezing. No rhonchi. No rales.   Cardiovascular:      Normal rate. Irregularly irregular rhythm.      Murmurs: There is no murmur.   Pulses:     Intact distal pulses.   Edema:     Peripheral edema present.     Feet: bilateral 1+ edema of the feet.  Skin:     General: Skin is warm and dry. There is no cyanosis.      Findings: No rash.   Neurological:      Mental Status: Alert and oriented to person, place, and time.   Psychiatric:         Behavior: Behavior normal. Behavior is cooperative.         Thought Content: Thought content normal.         Judgment: Judgment normal.       Lab Review:     ECG 12 Lead    Date/Time: 3/27/2023 2:32 PM  Performed by: Hannah Jean-Baptiste APRN  Authorized by: Hannah Jean-Baptiste APRN   Comparison: compared with previous ECG   Similar to previous ECG  Rhythm: atrial fibrillation  Rate: normal  BPM: 85  QRS axis: normal  Comments: Similar to prior ECG.  No new ischemic changes          Assessment:       Diagnosis Plan   1. ANTOINE (dyspnea on exertion)        2. Pulmonary hypertension (HCC)        3. Longstanding persistent atrial fibrillation (HCC)        4. FRANCY (obstructive sleep apnea)        5. Coronary artery disease involving native coronary artery of native heart without angina pectoris        6. Essential hypertension          Plan:       1.  Dyspnea on exertion.  Negative stress perfusion study 2/2022.  Echo 5/2022 with normal systolic function grade 2 diastolic dysfunction, no significant valve regurgitation with an RVSP 42 mmHg.    Would recommend treating sleep apnea to help with this.  2.  Pulmonary hypertension.  Remains elevated.  But stable overall  2.  Mitral regurgitation with prolapse noted by CLARIBEL, mild on echo dated 5/2022.  She will continue to use SBE prophylaxis as needed.  3.  Severe small vessel coronary artery disease.  Negative stress test 2/2022 and no residual anginal  symptoms  4.  Persistent atrial fibrillation rates are controlled and she is on anticoagulation which she is tolerating well.  Would continue current digoxin and metoprolol for now.  She will call if any new issues with medication.  5.  Edema.  Resolved on current dose of torsemide, however she does not take this when she has appointments outside the house due to urinary frequency and incontinence.  Discussed that a referral to uro-GYN might be beneficial for her to help with the symptoms.  6.  Hypertension,controlled on current regimen  7.  History of embolic non ST-elevation myocardial infarction. Now on warfarin  8.  History of embolic stroke.   9.   FRANCY, not on CPAP for over a year.  Discussed consequences of untreated sleep apnea, including cardiac pulmonary and renal complications.  10. Lung nodule followed by Dr. Garrido and felt to be benign per patient.  No further follow-up felt to be needed per patient  11. Chronic anticoagulation, no falls or bleeding.  Continue with anticoagulation per anticoagulation clinic.  She has appointment today.  12.  Thoracic aortic aneurysm, stable by CT on 9/2021      Time Spent: I spent 30 minutes caring for Lana on this date of service. This time includes time spent by me in the following activities: preparing for the visit, reviewing tests, performing a medically appropriate examination and/or evaluation, counseling and educating the patient/family/caregiver, ordering medications, tests, or procedures and documenting information in the medical record.   I spent 1 minutes on the separately reported service of ECG. This time is not included in the time used to support the E/M service also reported today.        Your medication list          Accurate as of March 27, 2023  2:32 PM. If you have any questions, ask your nurse or doctor.            CHANGE how you take these medications      Instructions Last Dose Given Next Dose Due   glucose blood test strip  What changed:  additional instructions      Test twice daily prior to breakfast and dinner as instructed          CONTINUE taking these medications      Instructions Last Dose Given Next Dose Due   Accu-Chek Guide w/Device kit      See Admin Instructions.       accu-chek multiclix lancets      1 each by Other route 2 (Two) Times a Day With Meals. Use 1 lancet per finger stick       Accu-Chek FastClix Lancets misc           Accu-Chek FastClix Lancets misc      Use to check glucose as directed       acetaminophen 325 MG tablet  Commonly known as: TYLENOL      Take 2 tablets by mouth Every 6 (Six) Hours As Needed for Moderate Pain.       albuterol sulfate  (90 Base) MCG/ACT inhaler  Commonly known as: PROVENTIL HFA;VENTOLIN HFA;PROAIR HFA      Inhale 2 puffs Every 4 (Four) Hours As Needed for Shortness of Air. PRN SOA/WHEEZING       aspirin 81 MG EC tablet      Take 1 tablet by mouth Daily.       atorvastatin 10 MG tablet  Commonly known as: LIPITOR      Take 1 tablet by mouth Daily.       ciprofloxacin 500 MG tablet  Commonly known as: Cipro      Take 1 tablet by mouth 2 (Two) Times a Day.       digoxin 125 MCG tablet  Commonly known as: LANOXIN      Take 1 tablet by mouth Every Other Day.       glimepiride 2 MG tablet  Commonly known as: Amaryl      By mouth take one tab twice daily with AM and PM meals.       metoprolol tartrate 50 MG tablet  Commonly known as: Lopressor      Take 1 tablet by mouth 2 (Two) Times a Day for 30 days.       polyethylene glycol 17 g packet  Commonly known as: MIRALAX      Take 17 g by mouth Daily As Needed (constipation).       potassium chloride 10 MEQ CR tablet      1 po qday       SITagliptin 50 MG tablet  Commonly known as: Januvia      Take 1 tablet by mouth Daily for 90 days.       torsemide 20 MG tablet  Commonly known as: DEMADEX      2 po qday       Travatan Z 0.004 % solution ophthalmic solution  Generic drug: travoprost (BAK free)      Administer 1 drop to both eyes Every Morning. Not  night       vitamin D 1.25 MG (32347 UT) capsule capsule  Commonly known as: ERGOCALCIFEROL      Take 1 capsule by mouth 1 (One) Time Per Week.       warfarin 5 MG tablet  Commonly known as: COUMADIN      Take one-half tablet (2.5 mg) by mouth on Fridays, and take one tablet (5 mg) by mouth all other days or as directed.          STOP taking these medications    Moderna COVID-19 Bival Booster 50 MCG/0.5ML suspension  Generic drug: COVID-19mRNA Bival Vac Moderna  Stopped by: ANGELA Mena        phenazopyridine 100 MG tablet  Commonly known as: PYRIDIUM  Stopped by: ANGELA Mena               Patient is no longer taking -.  I corrected the med list to reflect this.  I did not stop these medications.      Dictated utilizing Dragon dictation

## 2023-03-27 ENCOUNTER — OFFICE VISIT (OUTPATIENT)
Dept: CARDIOLOGY | Facility: CLINIC | Age: 78
End: 2023-03-27
Payer: MEDICARE

## 2023-03-27 ENCOUNTER — ANTICOAGULATION VISIT (OUTPATIENT)
Dept: PHARMACY | Facility: HOSPITAL | Age: 78
End: 2023-03-27
Payer: MEDICARE

## 2023-03-27 ENCOUNTER — LAB (OUTPATIENT)
Dept: LAB | Facility: HOSPITAL | Age: 78
End: 2023-03-27
Payer: MEDICARE

## 2023-03-27 ENCOUNTER — APPOINTMENT (OUTPATIENT)
Dept: PHARMACY | Facility: HOSPITAL | Age: 78
End: 2023-03-27
Payer: MEDICARE

## 2023-03-27 VITALS
HEART RATE: 85 BPM | DIASTOLIC BLOOD PRESSURE: 74 MMHG | WEIGHT: 241.8 LBS | BODY MASS INDEX: 40.29 KG/M2 | SYSTOLIC BLOOD PRESSURE: 118 MMHG | HEIGHT: 65 IN

## 2023-03-27 DIAGNOSIS — I27.20 PULMONARY HYPERTENSION: ICD-10-CM

## 2023-03-27 DIAGNOSIS — I48.21 PERMANENT ATRIAL FIBRILLATION: ICD-10-CM

## 2023-03-27 DIAGNOSIS — I48.91 ATRIAL FIBRILLATION WITH RVR: ICD-10-CM

## 2023-03-27 DIAGNOSIS — I48.91 ATRIAL FIBRILLATION WITH RVR: Primary | ICD-10-CM

## 2023-03-27 DIAGNOSIS — I25.10 CORONARY ARTERY DISEASE INVOLVING NATIVE CORONARY ARTERY OF NATIVE HEART WITHOUT ANGINA PECTORIS: ICD-10-CM

## 2023-03-27 DIAGNOSIS — I48.11 LONGSTANDING PERSISTENT ATRIAL FIBRILLATION: ICD-10-CM

## 2023-03-27 DIAGNOSIS — R06.09 DOE (DYSPNEA ON EXERTION): Primary | ICD-10-CM

## 2023-03-27 DIAGNOSIS — G47.33 OSA (OBSTRUCTIVE SLEEP APNEA): ICD-10-CM

## 2023-03-27 DIAGNOSIS — I10 ESSENTIAL HYPERTENSION: ICD-10-CM

## 2023-03-27 LAB
INR PPP: 1.85 (ref 0.9–1.1)
PROTHROMBIN TIME: 21.6 SECONDS (ref 11.7–14.2)

## 2023-03-27 PROCEDURE — 93000 ELECTROCARDIOGRAM COMPLETE: CPT | Performed by: NURSE PRACTITIONER

## 2023-03-27 PROCEDURE — 36415 COLL VENOUS BLD VENIPUNCTURE: CPT

## 2023-03-27 PROCEDURE — 85610 PROTHROMBIN TIME: CPT

## 2023-03-27 PROCEDURE — 3078F DIAST BP <80 MM HG: CPT | Performed by: NURSE PRACTITIONER

## 2023-03-27 PROCEDURE — 99214 OFFICE O/P EST MOD 30 MIN: CPT | Performed by: NURSE PRACTITIONER

## 2023-03-27 PROCEDURE — 83735 ASSAY OF MAGNESIUM: CPT | Performed by: INTERNAL MEDICINE

## 2023-03-27 PROCEDURE — 3074F SYST BP LT 130 MM HG: CPT | Performed by: NURSE PRACTITIONER

## 2023-03-27 PROCEDURE — 80048 BASIC METABOLIC PNL TOTAL CA: CPT | Performed by: INTERNAL MEDICINE

## 2023-03-27 NOTE — PROGRESS NOTES
Anticoagulation Clinic Progress Note    Anticoagulation Summary  As of 3/27/2023    INR goal:  2.0-3.0   TTR:  62.4 % (4.4 y)   INR used for dosin.85 (3/27/2023)   Warfarin maintenance plan:  5 mg every day; Starting 3/27/2023   Weekly warfarin total:  35 mg   Plan last modified:  Love Allison RPH (3/27/2023)   Next INR check:  4/10/2023   Priority:  Maintenance   Target end date:  Indefinite    Indications    Atrial fibrillation (HCC) [I48.91]             Anticoagulation Episode Summary     INR check location:      Preferred lab:      Send INR reminders to:  [facility] [place]    Comments:        Anticoagulation Care Providers     Provider Role Specialty Phone number    Pia Dueñas MD Referring Cardiology [phone]          Clinic Interview:  Patient Findings     Positives:  Change in medications    Negatives:  Signs/symptoms of thrombosis, Signs/symptoms of bleeding,   Laboratory test error suspected, Change in health, Change in alcohol use,   Change in activity, Upcoming invasive procedure, Emergency department   visit, Upcoming dental procedure, Missed doses, Extra doses, Change in   diet/appetite, Hospital admission, Bruising, Other complaints    Comments:  Cipro started on 3/21 for 14 days      Clinical Outcomes     Negatives:  Major bleeding event, Thromboembolic event,   Anticoagulation-related hospital admission, Anticoagulation-related ED   visit, Anticoagulation-related fatality    Comments:  Cipro started on 3/21 for 14 days        INR History:  Anticoagulation Monitoring 3/17/2023 3/21/2023 3/27/2023   INR 1.4 1.6 1.85   INR Date 3/17/2023 3/21/2023 3/27/2023   INR Goal 2.0-3.0 2.0-3.0 2.0-3.0   Trend Same Same Up   Last Week Total 32.5 mg 35 mg 35 mg   Next Week Total 35 mg 35 mg 37.5 mg   Sun 5 mg 5 mg 5 mg   Mon 5 mg - 7.5 mg (3/27); Otherwise 5 mg   Tue 5 mg 7.5 mg (3/21) 5 mg   Wed 5 mg 5 mg 5 mg   Thu 5 mg 5 mg 5 mg   Fri 5 mg (3/17) 2.5 mg 5 mg   Sat 5 mg 5 mg 5 mg   Visit  Report - - -   Some recent data might be hidden       Plan:  1. INR is Subtherapeutic today- see above in Anticoagulation Summary.   Will instruct Lanaantonio Aguerocomb to Increase their warfarin regimen- see above in Anticoagulation Summary.  Boost to 7.5 mg today then trial on 5 mg daily. Recheck in 2 weeks due to Anna   2. Follow up in 2 weeks  3. They have been instructed to call if any changes in medications, doses, concerns, etc. Patient expresses understanding and has no further questions at this time.    Love Allison, [facility]

## 2023-03-28 ENCOUNTER — OFFICE VISIT (OUTPATIENT)
Dept: FAMILY MEDICINE CLINIC | Facility: CLINIC | Age: 78
End: 2023-03-28
Payer: MEDICARE

## 2023-03-28 VITALS
OXYGEN SATURATION: 98 % | HEIGHT: 65 IN | DIASTOLIC BLOOD PRESSURE: 78 MMHG | SYSTOLIC BLOOD PRESSURE: 144 MMHG | BODY MASS INDEX: 40.89 KG/M2 | HEART RATE: 77 BPM | TEMPERATURE: 98.9 F | WEIGHT: 245.4 LBS

## 2023-03-28 DIAGNOSIS — E11.65 TYPE 2 DIABETES MELLITUS WITH HYPERGLYCEMIA, WITHOUT LONG-TERM CURRENT USE OF INSULIN: Primary | Chronic | ICD-10-CM

## 2023-03-28 DIAGNOSIS — I10 ESSENTIAL HYPERTENSION: ICD-10-CM

## 2023-03-28 DIAGNOSIS — E78.2 MIXED HYPERLIPIDEMIA: Chronic | ICD-10-CM

## 2023-03-28 PROCEDURE — 1159F MED LIST DOCD IN RCRD: CPT | Performed by: INTERNAL MEDICINE

## 2023-03-28 PROCEDURE — 3077F SYST BP >= 140 MM HG: CPT | Performed by: INTERNAL MEDICINE

## 2023-03-28 PROCEDURE — 99214 OFFICE O/P EST MOD 30 MIN: CPT | Performed by: INTERNAL MEDICINE

## 2023-03-28 PROCEDURE — 3078F DIAST BP <80 MM HG: CPT | Performed by: INTERNAL MEDICINE

## 2023-03-28 PROCEDURE — 1160F RVW MEDS BY RX/DR IN RCRD: CPT | Performed by: INTERNAL MEDICINE

## 2023-03-28 PROCEDURE — 3051F HG A1C>EQUAL 7.0%<8.0%: CPT | Performed by: INTERNAL MEDICINE

## 2023-03-28 RX ORDER — ORAL SEMAGLUTIDE 7 MG/1
7 TABLET ORAL DAILY
Qty: 90 TABLET | Refills: 1 | Status: SHIPPED | OUTPATIENT
Start: 2023-03-28

## 2023-03-28 RX ORDER — ORAL SEMAGLUTIDE 3 MG/1
3 TABLET ORAL DAILY
Qty: 30 TABLET | Refills: 0 | Status: SHIPPED | OUTPATIENT
Start: 2023-03-28

## 2023-03-28 RX ORDER — POTASSIUM CHLORIDE 750 MG/1
TABLET, FILM COATED, EXTENDED RELEASE ORAL
Qty: 180 TABLET | Refills: 1 | Status: SHIPPED | OUTPATIENT
Start: 2023-03-28

## 2023-03-28 NOTE — PROGRESS NOTES
"Chief Complaint  6 week f/u, Hypertension, results sent to hospital, and Diabetes    Subjective        Lana Yañez presents to [facility]  History of Present Illness patient was seen for diabetes.  Patient's hemoglobin A1c was 7.3%.  Patient was given a diabetic diet and will start water therapy 3 days a week.  Patient will monitor blood sugar and follow-up in 3 months.  Patient is getting off Januvia and switching to Rybelsus 3 mg daily for a month and changed to 7 mg daily.  Patient will monitor blood sugar and follow-up.  Patient will continue glimepiride 2 mg twice daily.  Patient's lipids treated with atorvastatin 10 mg daily.  Triglycerides 85, HDL 58, LDL 55.  Patient will continue present treatment.  Patient blood pressures been running 110s over 80s.  Patient will continue with Toprol tartrate 50 mg twice daily.    Dictated utilizing Dragon dictation. If there are questions or for further clarification, please contact me.    Objective   Vital Signs:  Blood Pressure 144/78   Pulse 77   Temperature 98.9 °F (37.2 °C)   Height 165.1 cm (65\")   Weight 111 kg (245 lb 6.4 oz)   Oxygen Saturation 98%   Body Mass Index 40.84 kg/m²   Estimated body mass index is 40.84 kg/m² as calculated from the following:    Height as of this encounter: 165.1 cm (65\").    Weight as of this encounter: 111 kg (245 lb 6.4 oz).             Physical Exam  Vitals and nursing note reviewed.   Constitutional:       Appearance: Normal appearance. She is well-developed.   HENT:      Head: Normocephalic and atraumatic.      Nose: Nose normal.      Mouth/Throat:      Mouth: Mucous membranes are moist.      Pharynx: Oropharynx is clear.   Eyes:      Extraocular Movements: Extraocular movements intact.      Conjunctiva/sclera: Conjunctivae normal.      Pupils: Pupils are equal, round, and reactive to light.   Cardiovascular:      Rate and Rhythm: Normal rate and regular rhythm.      Heart sounds: Normal " heart sounds. No murmur heard.    No friction rub. No gallop.   Pulmonary:      Effort: Pulmonary effort is normal. No respiratory distress.      Breath sounds: Normal breath sounds. No stridor. No wheezing, rhonchi or rales.   Chest:      Chest wall: No tenderness.   Abdominal:      General: Bowel sounds are normal.      Palpations: Abdomen is soft.   Musculoskeletal:         General: Normal range of motion.      Cervical back: Normal range of motion and neck supple.   Skin:     General: Skin is warm and dry.   Neurological:      General: No focal deficit present.      Mental Status: She is alert and oriented to person, place, and time. Mental status is at baseline.   Psychiatric:         Mood and Affect: Mood normal.         Behavior: Behavior normal.         Thought Content: Thought content normal.         Judgment: Judgment normal.        Result Review :                   Assessment and Plan  1 continue monitoring blood pressure blood sugar.  REI Januvia No. 3 Rybelsus 3 mg 1 month, increase to 7 mg daily #4 follow-up in 3 months  Diagnoses and all orders for this visit:    1. Type 2 diabetes mellitus with hyperglycemia, without long-term current use of insulin (HCC) (Primary)    2. Mixed hyperlipidemia    3. Essential hypertension    Other orders  -     potassium chloride 10 MEQ CR tablet; 1 po bid  Dispense: 180 tablet; Refill: 1  -     Semaglutide (Rybelsus) 3 MG tablet; Take 1 tablet by mouth Daily.  Dispense: 30 tablet; Refill: 0  -     Semaglutide (Rybelsus) 7 MG tablet; Take 7 mg by mouth Daily.  Dispense: 90 tablet; Refill: 1             Follow Up   Return in about 3 months (around 6/28/2023), or if symptoms worsen or fail to improve, for Recheck.  Patient was given instructions and counseling regarding her condition or for health maintenance advice. Please see specific information pulled into the AVS if appropriate.

## 2023-04-04 ENCOUNTER — TELEPHONE (OUTPATIENT)
Dept: FAMILY MEDICINE CLINIC | Facility: CLINIC | Age: 78
End: 2023-04-04
Payer: MEDICARE

## 2023-04-04 ENCOUNTER — NURSE TRIAGE (OUTPATIENT)
Dept: CALL CENTER | Facility: HOSPITAL | Age: 78
End: 2023-04-04
Payer: MEDICARE

## 2023-04-04 NOTE — TELEPHONE ENCOUNTER
Spoke to patient first told to call heart doctor not to take meds without instruction and didn't believe Rybleusis caused increased heart rate but I would ask Dr. Green.

## 2023-04-04 NOTE — TELEPHONE ENCOUNTER
Patient called that her heart rate was 107-127.  I spoke with Dr. Green and he wants to patient to go to the ER.  I told the patient to go to the ER patient was relucant to go [facility].  I had the patient speak with Jean Claude for Dr. Green.

## 2023-04-04 NOTE — TELEPHONE ENCOUNTER
Patient c/o elevated heart rate since beginning Rybelsus. Was trying to reach Dr. Green's office. Reviewed protocol with patient. During phone call, Dr. Green's office calling patient. Patient speaking with Dr. Green's office.    Reason for Disposition  • Palpitations are a chronic symptom (recurrent or ongoing AND present > 4 weeks)    Additional Information  • Negative: Passed out (i.e., lost consciousness, collapsed and was not responding)  • Negative: Shock suspected (e.g., cold/pale/clammy skin, too weak to stand, low BP, rapid pulse)  • Negative: Difficult to awaken or acting confused (e.g., disoriented, slurred speech)  • Negative: Visible sweat on face or sweat dripping down face  • Negative: Unable to walk, or can only walk with assistance (e.g., requires support)  • Negative: [1] Received SHOCK from implantable cardiac defibrillator AND [2] persisting symptoms (i.e., palpitations, lightheadedness)  • Negative: [1] Dizziness, lightheadedness, or weakness AND [2] heart beating very rapidly (e.g., > 140 / minute)  • Negative: [1] Dizziness, lightheadedness, or weakness AND [2] heart beating very slowly  (e.g., < 50 / minute)  • Negative: Sounds like a life-threatening emergency to the triager  • Negative: Chest pain  • Negative: Implantable Cardiac Defibrillator (ICD) or a pacemaker symptoms or questions  • Negative: Difficulty breathing  • Negative: Dizziness, lightheadedness, or weakness  • Negative: [1] Heart beating very rapidly (e.g., > 140 / minute) AND [2] present now  (Exception: during exercise)  • Negative: Heart beating very slowly (e.g., < 50 / minute)  (Exception: athlete and heart rate normal for caller)  • Negative: New or worsened shortness of breath with activity (dyspnea on exertion)  • Negative: Patient sounds very sick or weak to the triager  • Negative: [1] Heart beating very rapidly (e.g., > 140 / minute) AND [2] not present now  (Exception: during exercise)  • Negative: [1] Skipped  "or extra beat(s) AND [2] increases with exercise or exertion  • Negative: [1] Skipped or extra beat(s) AND [2] occurs 4 or more times per minute  • Negative: New or worsened ankle swelling  • Negative: History of heart disease  (i.e., heart attack, bypass surgery, angina, angioplasty, CHF) (Exception: brief heartbeat symptoms that went away and now feels well)  • Negative: Age > 60 years (Exception: brief heartbeat symptoms that went away and now feels well)  • Negative: Taking water pill (i.e., diuretic) or heart medication (e.g., digoxin)  • Negative: Wearing a \"Holter monitor\" or \"cardiac event monitor\"  • Negative: [1] Received SHOCK from implantable cardiac defibrillator AND [2] now feels well  • Negative: Heart rhythm alert (e.g., \"you have irregular heartbeat\") from personal wearable device (e.g., Apple Watch)  • Negative: History of hyperthyroidism or taking thyroid medication  • Negative: Substance use (drug use) or misuse, known or suspected  • Negative: [1] ADHD AND [2] taking stimulant medication  • Negative: [1] Palpitations AND [2] no improvement after using CARE ADVICE  • Negative: Problems with anxiety or stress    Answer Assessment - Initial Assessment Questions  1. DESCRIPTION: \"Please describe your heart rate or heartbeat that you are having\" (e.g., fast/slow, regular/irregular, skipped or extra beats, \"palpitations\")      Fast  2. ONSET: \"When did it start?\" (Minutes, hours or days)       Since beginning new medication  3. DURATION: \"How long does it last\" (e.g., seconds, minutes, hours)      Unable to assess  4. PATTERN \"Does it come and go, or has it been constant since it started?\"  \"Does it get worse with exertion?\"   \"Are you feeling it now?\"      Comes and goes  5. TAP: \"Using your hand, can you tap out what you are feeling on a chair or table in front of you, so that I can hear?\" (Note: not all patients can do this)        NA  6. HEART RATE: \"Can you tell me your heart rate?\" \"How many " "beats in 15 seconds?\"  (Note: not all patients can do this)        110s-120s  7. RECURRENT SYMPTOM: \"Have you ever had this before?\" If Yes, ask: \"When was the last time?\" and \"What happened that time?\"       Yes, history of atrial fibrillation  8. CAUSE: \"What do you think is causing the palpitations?\"      New medication  9. CARDIAC HISTORY: \"Do you have any history of heart disease?\" (e.g., heart attack, angina, bypass surgery, angioplasty, arrhythmia)       Atrial fibrillation  10. OTHER SYMPTOMS: \"Do you have any other symptoms?\" (e.g., dizziness, chest pain, sweating, difficulty breathing)        Unable to assess  11. PREGNANCY: \"Is there any chance you are pregnant?\" \"When was your last menstrual period?\"        NA    Protocols used: HEART RATE AND HEARTBEAT QUESTIONS-ADULT-AH    "

## 2023-04-12 ENCOUNTER — ANTICOAGULATION VISIT (OUTPATIENT)
Dept: PHARMACY | Facility: HOSPITAL | Age: 78
End: 2023-04-12
Payer: MEDICARE

## 2023-04-12 DIAGNOSIS — I48.11 LONGSTANDING PERSISTENT ATRIAL FIBRILLATION: Primary | Chronic | ICD-10-CM

## 2023-04-12 LAB
INR PPP: 2.6 (ref 0.91–1.09)
PROTHROMBIN TIME: 31.2 SECONDS (ref 10–13.8)

## 2023-04-12 PROCEDURE — 36416 COLLJ CAPILLARY BLOOD SPEC: CPT

## 2023-04-12 PROCEDURE — 85610 PROTHROMBIN TIME: CPT

## 2023-04-12 NOTE — PROGRESS NOTES
I have supervised and reviewed the notes, assessments, and/or procedures performed by our Pharmacy Intern. The documented assessment and plan were developed cooperatively, and the plan was implemented in my presence. I concur with the documentation of this patient encounter.    Love Allison [facility]

## 2023-04-28 ENCOUNTER — APPOINTMENT (OUTPATIENT)
Dept: PHARMACY | Facility: HOSPITAL | Age: 78
End: 2023-04-28
Payer: MEDICARE

## 2023-05-03 ENCOUNTER — ANTICOAGULATION VISIT (OUTPATIENT)
Dept: PHARMACY | Facility: HOSPITAL | Age: 78
End: 2023-05-03
Payer: MEDICARE

## 2023-05-03 LAB
INR PPP: 2.4 (ref 0.91–1.09)
PROTHROMBIN TIME: 28.5 SECONDS (ref 10–13.8)

## 2023-05-03 PROCEDURE — 85610 PROTHROMBIN TIME: CPT

## 2023-05-03 PROCEDURE — 36416 COLLJ CAPILLARY BLOOD SPEC: CPT

## 2023-05-03 NOTE — PROGRESS NOTES
Anticoagulation Clinic Progress Note    Anticoagulation Summary  As of 5/3/2023    INR goal:  2.0-3.0   TTR:  63.1 % (4.5 y)   INR used for dosin.4 (5/3/2023)   Warfarin maintenance plan:  5 mg every day; Starting 5/3/2023   Weekly warfarin total:  35 mg   No change documented:  Love Allison RPH   Plan last modified:  Love Allison RPH (3/27/2023)   Next INR check:  2023   Priority:  Maintenance   Target end date:  Indefinite    Indications    Atrial fibrillation (HCC) [I48.91]             Anticoagulation Episode Summary     INR check location:      Preferred lab:      Send INR reminders to:   [facility]    Comments:        Anticoagulation Care Providers     Provider Role Specialty Phone number    Pia Dueñas MD Referring Cardiology [phone]          Clinic Interview:  Patient Findings     Negatives:  Signs/symptoms of thrombosis, Signs/symptoms of bleeding,   Laboratory test error suspected, Change in health, Change in alcohol use,   Change in activity, Upcoming invasive procedure, Emergency department   visit, Upcoming dental procedure, Missed doses, Extra doses, Change in   medications, Change in diet/appetite, Hospital admission, Bruising, Other   complaints      Clinical Outcomes     Negatives:  Major bleeding event, Thromboembolic event,   Anticoagulation-related hospital admission, Anticoagulation-related ED   visit, Anticoagulation-related fatality        INR History:      Latest Ref Rng & Units 2023     1:30 PM 3/17/2023     1:00 PM 3/21/2023    10:30 AM 3/27/2023     2:58 PM 3/27/2023     3:36 PM 2023     1:30 PM 5/3/2023     1:30 PM   Anticoagulation Monitoring   INR  2.2 1.4 1.6  1.85 2.6 2.4   INR Date  2023 3/17/2023 3/21/2023  3/27/2023 2023 5/3/2023   INR Goal  2.0-3.0 2.0-3.0 2.0-3.0  2.0-3.0 2.0-3.0 2.0-3.0   Trend  Same Same Same  Up Same Same   Last Week Total  32.5 mg 32.5 mg 35 mg  35 mg 35 mg 35 mg   Next Week Total  32.5 mg 35 mg 35 mg   37.5 mg 35 mg 35 mg   Sun  5 mg 5 mg 5 mg  5 mg 5 mg 5 mg   Mon  5 mg 5 mg -  7.5 mg (3/27); Otherwise 5 mg 5 mg 5 mg   Tue  5 mg 5 mg 7.5 mg (3/21)  5 mg 5 mg 5 mg   Wed  5 mg 5 mg 5 mg  5 mg 5 mg 5 mg   Thu  5 mg 5 mg 5 mg  5 mg 5 mg 5 mg   Fri  2.5 mg 5 mg (3/17) 2.5 mg  5 mg 5 mg 5 mg   Sat  5 mg 5 mg 5 mg  5 mg 5 mg 5 mg   Historical INR 0.90 - 1.10    1.85        Visit Report    Report Report Report         Plan:  1. INR is Therapeutic today- see above in Anticoagulation Summary.  Will instruct Lanaantonio Aguerocomb to Continue their warfarin regimen- see above in Anticoagulation Summary.  2. Follow up in 4 weeks (scheduled 5 due to vacation)  3. Patient declines warfarin refills.  4. Verbal and written information provided. Patient expresses understanding and has no further questions at this time.    Love Allison [facility]

## 2023-05-16 ENCOUNTER — CLINICAL SUPPORT (OUTPATIENT)
Dept: ORTHOPEDIC SURGERY | Facility: CLINIC | Age: 78
End: 2023-05-16
Payer: MEDICARE

## 2023-05-16 VITALS — TEMPERATURE: 97.3 F | WEIGHT: 245.9 LBS | HEIGHT: 65 IN | BODY MASS INDEX: 40.97 KG/M2

## 2023-05-16 DIAGNOSIS — M19.019 GLENOHUMERAL ARTHRITIS: ICD-10-CM

## 2023-05-16 DIAGNOSIS — R52 PAIN: Primary | ICD-10-CM

## 2023-05-16 RX ADMIN — METHYLPREDNISOLONE ACETATE 80 MG: 80 INJECTION, SUSPENSION INTRA-ARTICULAR; INTRALESIONAL; INTRAMUSCULAR; SOFT TISSUE at 15:31

## 2023-05-16 RX ADMIN — LIDOCAINE HYDROCHLORIDE 2 ML: 10 INJECTION, SOLUTION EPIDURAL; INFILTRATION; INTRACAUDAL; PERINEURAL at 15:31

## 2023-05-16 NOTE — PROGRESS NOTES
Patient: Lana Yañez  YOB: 1945  Date of Service: 5/16/2023    Chief Complaints: Left shoulder pain    Subjective:    History of Present Illness: Pt is seen in the office today with complaints of left shoulder pain she has known degenerative changes she gets intermittent injections she does well with those she is not interested in think surgical    Allergies:   Allergies   Allergen Reactions   • Contrast Dye (Echo Or Unknown Ct/Mr) Hives and Itching   • Macrobid [Nitrofurantoin] Hives       Medications:   Home Medications:  Current Outpatient Medications on File Prior to Visit   Medication Sig   • Accu-Chek FastClix Lancets misc Use to check glucose as directed   • acetaminophen (TYLENOL) 325 MG tablet Take 2 tablets by mouth Every 6 (Six) Hours As Needed for Moderate Pain.   • albuterol sulfate  (90 Base) MCG/ACT inhaler Inhale 2 puffs Every 4 (Four) Hours As Needed for Shortness of Air. PRN SOA/WHEEZING   • aspirin 81 MG EC tablet Take 1 tablet by mouth Daily.   • atorvastatin (LIPITOR) 10 MG tablet Take 1 tablet by mouth Daily.   • Blood Glucose Monitoring Suppl (ACCU-CHEK GUIDE) w/Device kit See Admin Instructions.   • ciprofloxacin (Cipro) 500 MG tablet Take 1 tablet by mouth 2 (Two) Times a Day.   • digoxin (LANOXIN) 125 MCG tablet Take 1 tablet by mouth Every Other Day.   • glimepiride (Amaryl) 2 MG tablet By mouth take one tab twice daily with AM and PM meals.   • glucose blood test strip Test twice daily prior to breakfast and dinner as instructed (Patient taking differently: Test daily prior to breakfast)   • metoprolol tartrate (Lopressor) 50 MG tablet Take 1 tablet by mouth 2 (Two) Times a Day for 30 days.   • polyethylene glycol (MIRALAX) 17 g packet Take 17 g by mouth Daily As Needed (constipation).   • potassium chloride 10 MEQ CR tablet 1 po bid   • torsemide (DEMADEX) 20 MG tablet 2 po qday   • TRAVATAN Z 0.004 % solution ophthalmic solution Administer 1 drop to both  eyes Every Morning. Not night   • vitamin D (ERGOCALCIFEROL) 1.25 MG (43029 UT) capsule capsule Take 1 capsule by mouth 1 (One) Time Per Week.   • warfarin (COUMADIN) 5 MG tablet Take one-half tablet (2.5 mg) by mouth on Fridays, and take one tablet (5 mg) by mouth all other days or as directed.     No current facility-administered medications on file prior to visit.     Current Medications:  Scheduled Meds:  Continuous Infusions:No current facility-administered medications for this visit.    PRN Meds:.    I have reviewed the patient's medical history in detail and updated the computerized patient record.  Review and summarization of old records include:    Past Medical History:   Diagnosis Date   • Acute on chronic diastolic heart failure    • Acute UTI (urinary tract infection) 05/22/2022   • Arthritis    • Arthritis of back    • Asthma    • Atrial fibrillation     Persistent; on warfarin   • Atypical chest pain    • Biliary acute pancreatitis without necrosis or infection 11/10/2021   • Bronchitis    • Cellulitis of right elbow     due to MRSA   • Cervical disc disorder    • CHF (congestive heart failure)    • COPD (chronic obstructive pulmonary disease)    • Coronary artery disease     Cardiac catheterization completed; 90% PDA stenosis with medical management recommended   • Coronary artery disease involving native coronary artery of native heart with angina pectoris with documented spasm    • CTS (carpal tunnel syndrome)    • Diabetes 1.5, managed as type 2    • Disease of thyroid gland    • Displacement of lumbar intervertebral disc without myelopathy    • Dizziness    • ANTOINE (dyspnea on exertion)    • Essential hypertension    • Fatigue    • Fracture, foot    • Generalized osteoarthritis of multiple sites    • History of rheumatic fever    • History of transfusion    • Hx of bone density study     10/23/2014   • Hyperglycemia    • Hyperlipidemia    • Hypovitaminosis D    • Left arm pain    • Left-sided  weakness    • Leg swelling    • Low back pain    • Lower extremity edema    • Lumbosacral disc disease    • Malaise and fatigue    • Mitral valve disease     Moderate mitral valve prolapse and moderate mitral regurgitation   • Mitral valve insufficiency    • Morbid obesity with BMI of 40.0-44.9, adult    • MRSA infection    • NSTEMI (non-ST elevated myocardial infarction)    • PAF (paroxysmal atrial fibrillation)    • Periarthritis of shoulder    • RA (rheumatoid arthritis)     involving both hands   • Sleep apnea    • SOB (shortness of breath)    • Stroke     left side weakness   • Urge incontinence of urine    • UTI (urinary tract infection) 2020   • Vitamin D deficiency         Past Surgical History:   Procedure Laterality Date   • BREAST BIOPSY     • BREAST SURGERY      right side lumpectomy with biopsy   • CARDIAC CATHETERIZATION Left 10/20/2017    Procedure: Cardiac Catheterization/Vascular Study;  Surgeon: Alphonso Olmedo MD;  [facility] IN[facility];  Service:    • CARDIAC CATHETERIZATION N/A 10/20/2017    +2 mitral regurgitation, left main 10% stenosis, mid to distal LAD 10% diffuse stenosis, circumflex 10% diffuse stenosis, RCA 10% proximal stenosis, and distal PDA consistent with coronary embolus with a lesion of 90% too small to consider coronary intervention; medical management recommended   • EYE SURGERY      laser surgery for glaucoma and left eye cataracts removed   • HYSTERECTOMY      10+ years ago   • JOINT REPLACEMENT  ;     bilateral knees and left rotater   • KNEE SURGERY     • MAMMO BILATERAL      [facility]    • SHOULDER SURGERY          Social History     Occupational History   • 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     Employer: RETIRED   Tobacco Use   • Smoking status: Former     Packs/day: 3.00     Years: 1.00     Pack years: 3.00     Types: Cigarettes     Start date: 1967     Quit date: 10/19/1968     Years since quittin.6      Passive exposure: Never   • Smokeless tobacco: Never   • Tobacco comments:     caffeine use - decaf coffee   Vaping Use   • Vaping Use: Never used   Substance and Sexual Activity   • Alcohol use: No     Comment: No caffeine use   • Drug use: No   • Sexual activity: Defer      Social History     Social History Narrative   • Not on file        Family History   Problem Relation Age of Onset   • Colon cancer Mother    • Glaucoma Mother    • Stroke Mother    • Arthritis Mother    • Hypertension Mother    • Glaucoma Sister    • Diabetes Sister    • Heart disease Sister    • Arthritis Sister    • Asthma Sister    • Hypertension Sister    • Miscarriages / Stillbirths Sister    • Lung disease Sister    • Heart disease Brother    • Diabetes Brother    • Arthritis Brother    • Drug abuse Brother    • Hypertension Brother    • Arthritis Father    • COPD Father    • Lung disease Father    • Arthritis Daughter    • Depression Daughter    • Alcohol abuse Maternal Uncle    • Heart disease Sister    • Heart disease Sister    • Heart disease Brother    • Rheumatologic disease Sister        ROS: 14 point review of systems was performed and was negative except for documented findings in HPI and today's encounter.     Allergies:   Allergies   Allergen Reactions   • Contrast Dye (Echo Or Unknown Ct/Mr) Hives and Itching   • Macrobid [Nitrofurantoin] Hives     Constitutional:  Denies fever, shaking or chills   Eyes:  Denies change in visual acuity   HENT:  Denies nasal congestion or sore throat   Respiratory:  Denies cough or shortness of breath   Cardiovascular:  Denies chest pain or severe LE edema   GI:  Denies abdominal pain, nausea, vomiting, bloody stools or diarrhea   Musculoskeletal:  Numbness, tingling, or loss of motor function only as noted above in history of present illness.  : Denies painful urination or hematuria  Integument:  Denies rash, lesion or ulceration   Neurologic:  Denies headache or focal weakness  Endocrine:   Denies lymphadenopathy  Psych:  Denies confusion or change in mental status   Hem:  Denies active bleeding      Physical Exam: 77 y.o. female  Wt Readings from Last 3 Encounters:   03/28/23 111 kg (245 lb 6.4 oz)   03/27/23 110 kg (241 lb 12.8 oz)   03/21/23 110 kg (243 lb 6.4 oz)       There is no height or weight on file to calculate BMI.    There were no vitals filed for this visit.  Vital signs reviewed.   General Appearance:    Alert, cooperative, in no acute distress                    Ortho exam    Physical exam the left shoulder reveals no overlying skin changes no lymphedema lymphadenopathy the patient can actively flex to about 150 passively I get them to 160 abduction is similar external rotation is 40 internal rotation to there buttock.  Rotator cuff strength is 4+ over 5 with isometric strength testing no overlying skin changes.  Patient has reasonable cervical range of motion for their age no radicular symptoms and a normal elbow exam.  There are good distal pulses.       X-rays AP scapular Y and x-ray lateral left shoulder were taken to evaluate her symptoms and compared x-rays done 1 year ago she has severe glenohumeral arthritis that is essentially unchanged    Assessment: Left shoulder OA    Plan: Injection she is not interested in think surgical follow up as indicated.  Ice, elevate, and rest as needed.  Discussed conservative measures of pain control including ice, bracing.    Asya Rodriges M.D.    Large Joint Arthrocentesis: L glenohumeral  Date/Time: 5/16/2023 3:31 PM  Consent given by: patient  Site marked: site marked  Timeout: Immediately prior to procedure a time out was called to verify the correct patient, procedure, equipment, support staff and site/side marked as required   Supporting Documentation  Indications: pain   Procedure Details  Location: shoulder - L glenohumeral  Preparation: Patient was prepped and draped in the usual sterile fashion  Needle gauge: 21G.  Approach:  posterior  Medications administered: 80 mg methylPREDNISolone acetate 80 MG/ML; 2 mL lidocaine PF 1% 1 %  Patient tolerance: patient tolerated the procedure well with no immediate complications

## 2023-05-17 RX ORDER — LIDOCAINE HYDROCHLORIDE 10 MG/ML
2 INJECTION, SOLUTION EPIDURAL; INFILTRATION; INTRACAUDAL; PERINEURAL
Status: COMPLETED | OUTPATIENT
Start: 2023-05-16 | End: 2023-05-16

## 2023-05-17 RX ORDER — METHYLPREDNISOLONE ACETATE 80 MG/ML
80 INJECTION, SUSPENSION INTRA-ARTICULAR; INTRALESIONAL; INTRAMUSCULAR; SOFT TISSUE
Status: COMPLETED | OUTPATIENT
Start: 2023-05-16 | End: 2023-05-16

## 2023-06-16 ENCOUNTER — TELEPHONE (OUTPATIENT)
Dept: PHARMACY | Facility: HOSPITAL | Age: 78
End: 2023-06-16
Payer: MEDICARE

## 2023-08-04 ENCOUNTER — TRANSCRIBE ORDERS (OUTPATIENT)
Dept: LAB | Facility: HOSPITAL | Age: 78
End: 2023-08-04
Payer: MEDICARE

## 2023-08-04 ENCOUNTER — LAB (OUTPATIENT)
Dept: LAB | Facility: HOSPITAL | Age: 78
End: 2023-08-04
Payer: MEDICARE

## 2023-08-04 DIAGNOSIS — I48.21 PERMANENT ATRIAL FIBRILLATION: ICD-10-CM

## 2023-08-04 DIAGNOSIS — E78.2 MIXED HYPERLIPIDEMIA: ICD-10-CM

## 2023-08-04 DIAGNOSIS — E11.9 DIABETES MELLITUS WITHOUT COMPLICATION: ICD-10-CM

## 2023-08-04 DIAGNOSIS — I63.511 ARTERIAL ISCHEMIC STROKE, MCA (MIDDLE CEREBRAL ARTERY), RIGHT, ACUTE: ICD-10-CM

## 2023-08-04 DIAGNOSIS — I48.91 ATRIAL FIBRILLATION WITH RVR: ICD-10-CM

## 2023-08-04 DIAGNOSIS — I10 ESSENTIAL HYPERTENSION, MALIGNANT: Primary | ICD-10-CM

## 2023-08-04 DIAGNOSIS — I10 ESSENTIAL HYPERTENSION, MALIGNANT: ICD-10-CM

## 2023-08-04 LAB
ALBUMIN SERPL-MCNC: 4.5 G/DL (ref 3.5–5.2)
ALBUMIN/GLOB SERPL: 1.5 G/DL
ALP SERPL-CCNC: 68 U/L (ref 39–117)
ALT SERPL W P-5'-P-CCNC: 9 U/L (ref 1–33)
ANION GAP SERPL CALCULATED.3IONS-SCNC: 11 MMOL/L (ref 5–15)
AST SERPL-CCNC: 17 U/L (ref 1–32)
BILIRUB SERPL-MCNC: 0.6 MG/DL (ref 0–1.2)
BUN SERPL-MCNC: 19 MG/DL (ref 8–23)
BUN/CREAT SERPL: 16.5 (ref 7–25)
CALCIUM SPEC-SCNC: 9.8 MG/DL (ref 8.6–10.5)
CHLORIDE SERPL-SCNC: 104 MMOL/L (ref 98–107)
CO2 SERPL-SCNC: 28 MMOL/L (ref 22–29)
CREAT SERPL-MCNC: 1.15 MG/DL (ref 0.57–1)
DEPRECATED RDW RBC AUTO: 45 FL (ref 37–54)
EGFRCR SERPLBLD CKD-EPI 2021: 49.5 ML/MIN/1.73
ERYTHROCYTE [DISTWIDTH] IN BLOOD BY AUTOMATED COUNT: 13.6 % (ref 12.3–15.4)
GLOBULIN UR ELPH-MCNC: 3.1 GM/DL
GLUCOSE SERPL-MCNC: 135 MG/DL (ref 65–99)
HCT VFR BLD AUTO: 39.1 % (ref 34–46.6)
HGB BLD-MCNC: 13.2 G/DL (ref 12–15.9)
INR PPP: 2.22 (ref 0.9–1.1)
MCH RBC QN AUTO: 31.2 PG (ref 26.6–33)
MCHC RBC AUTO-ENTMCNC: 33.8 G/DL (ref 31.5–35.7)
MCV RBC AUTO: 92.4 FL (ref 79–97)
PLATELET # BLD AUTO: 159 10*3/MM3 (ref 140–450)
PMV BLD AUTO: 10.7 FL (ref 6–12)
POTASSIUM SERPL-SCNC: 3.6 MMOL/L (ref 3.5–5.2)
PROT SERPL-MCNC: 7.6 G/DL (ref 6–8.5)
PROTHROMBIN TIME: 25.1 SECONDS (ref 11.7–14.2)
RBC # BLD AUTO: 4.23 10*6/MM3 (ref 3.77–5.28)
SODIUM SERPL-SCNC: 143 MMOL/L (ref 136–145)
WBC NRBC COR # BLD: 7.02 10*3/MM3 (ref 3.4–10.8)

## 2023-08-04 PROCEDURE — 85027 COMPLETE CBC AUTOMATED: CPT

## 2023-08-04 PROCEDURE — 36415 COLL VENOUS BLD VENIPUNCTURE: CPT

## 2023-08-04 PROCEDURE — 83036 HEMOGLOBIN GLYCOSYLATED A1C: CPT

## 2023-08-04 PROCEDURE — 85610 PROTHROMBIN TIME: CPT

## 2023-08-04 PROCEDURE — 80299 QUANTITATIVE ASSAY DRUG: CPT

## 2023-08-04 PROCEDURE — 80053 COMPREHEN METABOLIC PANEL: CPT

## 2023-08-04 PROCEDURE — 80061 LIPID PANEL: CPT

## 2023-08-06 LAB
CHOLEST SERPL-MCNC: 139 MG/DL (ref 0–200)
HBA1C MFR BLD: 7.2 % (ref 4.8–5.6)
HDLC SERPL-MCNC: 59 MG/DL (ref 40–60)
LDLC SERPL CALC-MCNC: 62 MG/DL (ref 0–100)
LDLC/HDLC SERPL: 1.03 {RATIO}
TRIGL SERPL-MCNC: 97 MG/DL (ref 0–150)
VLDLC SERPL-MCNC: 18 MG/DL (ref 5–40)

## 2023-08-07 ENCOUNTER — ANTICOAGULATION VISIT (OUTPATIENT)
Dept: PHARMACY | Facility: HOSPITAL | Age: 78
End: 2023-08-07
Payer: MEDICARE

## 2023-08-07 NOTE — PROGRESS NOTES
Anticoagulation Clinic Progress Note    Anticoagulation Summary  As of 2023      INR goal:  2.0-3.0   TTR:  65.6 % (4.7 y)   INR used for dosin.22 (2023)   Warfarin maintenance plan:  5 mg every day   Weekly warfarin total:  35 mg   No change documented:  Love Allison RPH   Plan last modified:  Love Allison RPH (3/27/2023)   Next INR check:  2023   Priority:  Maintenance   Target end date:  Indefinite    Indications    Atrial fibrillation [I48.91]                 Anticoagulation Episode Summary       INR check location:      Preferred lab:      Send INR reminders to:  [facility] [place]    Comments:            Anticoagulation Care Providers       Provider Role Specialty Phone number    Pia Dueñas MD Referring Cardiology [phone]            Clinic Interview:  Patient Findings     Negatives:  Signs/symptoms of thrombosis, Signs/symptoms of bleeding,   Laboratory test error suspected, Change in health, Change in alcohol use,   Change in activity, Upcoming invasive procedure, Emergency department   visit, Upcoming dental procedure, Missed doses, Extra doses, Change in   medications, Change in diet/appetite, Hospital admission, Bruising, Other   complaints      Clinical Outcomes     Negatives:  Major bleeding event, Thromboembolic event,   Anticoagulation-related hospital admission, Anticoagulation-related ED   visit, Anticoagulation-related fatality        INR History:      Latest Ref Rng & Units 3/27/2023     2:58 PM 3/27/2023     3:36 PM 2023     1:30 PM 5/3/2023     1:30 PM 2023     9:45 AM 2023     4:52 PM 2023     8:53 AM   Anticoagulation Monitoring   INR   1.85 2.6 2.4 2.1  2.22   INR Date   3/27/2023 2023 5/3/2023 2023  2023   INR Goal   2.0-3.0 2.0-3.0 2.0-3.0 2.0-3.0  2.0-3.0   Trend   Up Same Same Same  Same   Last Week Total   35 mg 35 mg 35 mg 30 mg  35 mg   Next Week Total   37.5 mg 35 mg 35 mg 37.5 mg  35 mg   Sun   5 mg 5 mg 5 mg 5  mg  5 mg   Mon   7.5 mg (3/27); Otherwise 5 mg 5 mg 5 mg 5 mg  5 mg   Tue   5 mg 5 mg 5 mg 5 mg  5 mg   Wed   5 mg 5 mg 5 mg 7.5 mg (6/28); Otherwise 5 mg  5 mg   Thu   5 mg 5 mg 5 mg 5 mg  5 mg   Fri   5 mg 5 mg 5 mg 5 mg  5 mg   Sat   5 mg 5 mg 5 mg 5 mg  5 mg   Historical INR 0.90 - 1.10 1.85      2.22     Visit Report  Report Report            Plan:  1. INR is Therapeutic today- see above in Anticoagulation Summary.   Will instruct Lanaantonio Aguerocomb to Continue their warfarin regimen- see above in Anticoagulation Summary.  2. Follow up in 4 weeks  3. They have been instructed to call if any changes in medications, doses, concerns, etc. Patient expresses understanding and has no further questions at this time.    Love Allison [facility]

## 2023-08-08 LAB — DIGITOXIN SERPL-MCNC: <5 NG/ML (ref 10–25)

## 2023-09-11 ENCOUNTER — TELEPHONE (OUTPATIENT)
Dept: ORTHOPEDIC SURGERY | Facility: CLINIC | Age: 78
End: 2023-09-11

## 2023-09-11 NOTE — TELEPHONE ENCOUNTER
Hub staff attempted to follow warm transfer process and was unsuccessful     Caller: Lana Yañez    Relationship to patient: Self    Best call back number: [phone]    Patient is needing: PATIENT CALLING TO RESCHEDULE HER INJECTION 09/12 WITH DR. BRUCE AND HER  MRN [id number] 10/10 WITH DR. BRUCE.

## 2023-09-13 NOTE — DISCHARGE SUMMARY
[facility]    Date of Discharge:  10/14/2017    PCP: Raoul Ramirez MD    Discharge Diagnosis:   Active Hospital Problems (** Indicates Principal Problem)    Diagnosis Date Noted   • **Atrial fibrillation with RVR [I48.91] 10/11/2017   • Lumbar disc herniation [M51.26] 10/11/2017   • History of MRSA infection [Z86.14] 10/11/2017   • Morbid obesity with BMI of 40.0-44.9, adult [E66.01, Z68.41] 12/15/2016   • Sleep apnea [G47.30] 08/02/2016   • Fatigue [R53.83] 04/06/2016   • ANTOINE (dyspnea on exertion) [R06.09] 04/06/2016   • Essential hypertension [I10] 03/21/2016   • Rheumatoid arthritis involving both hands [M06.9] 03/21/2016   • Hyperlipidemia [E78.5] 03/21/2016      Resolved Hospital Problems    Diagnosis Date Noted Date Resolved   No resolved problems to display.     Procedures Performed       Consulting Physician(s)             None          Hospital Course  Please see history and physical for details. Patient is a 72 y.o. female initially admitted by one of my colleagues  after being sent from spine surgery clinic for palpitations and shortness of breath.  Patient presented with new onset atrial fibrillation.  Her rate is now controlled with increased beta blocker.  Patient was transitioned from heparin drip to Pradaxa per cardiology recommendations due to her need for anticoagulation.  Likely insulting agent that could've precipitated the A. fib is probably untreated sleep apnea and the patient has been counseled in regards to the needs to set up and out patient sleep study.  Other comorbidities have remained stable while here regards to her hypertension and rheumatoid arthritis.  There have been some changes in regards to her medications in which her atenolol has been increased 100 mg daily, hydrochlorothiazide has been discontinued, low-dose Lasix has been initiated in addition to changes to her losartan.  Current BP control has been  excellent his most recent check is 107/62.  Daughter at bedside claims to be a dialysis nurse and I answered numerous questions to both patient and daughter this a.m.  Patient is medically stable for disposition not an anticoagulation has been finalized per cardiology.      Condition on Discharge: Improved    Temp:  [96.7 °F (35.9 °C)-97.5 °F (36.4 °C)] 96.7 °F (35.9 °C)  Heart Rate:  [] 79  Resp:  [16-18] 18  BP: (107-130)/(62-89) 107/62  Body mass index is 41.97 kg/(m^2).    Physical Exam   Constitutional: She is oriented to person, place, and time. She appears well-developed.   Morbidly obese   Neck: Neck supple. No JVD present.   Very large neck diameter   Cardiovascular: Normal rate and regular rhythm.    Pulmonary/Chest: Breath sounds normal. No respiratory distress.   Abdominal: Soft. Bowel sounds are normal. She exhibits no distension. There is no tenderness.   Musculoskeletal: She exhibits no edema.   Neurological: She is alert and oriented to person, place, and time.   Psychiatric: She has a normal mood and affect. Her behavior is normal. Judgment and thought content normal.     Discharge Medications   Lana Yañez   Home Medication Instructions DG:[id number]    Printed on:10/14/17 1415   Medication Information                      albuterol (PROVENTIL HFA;VENTOLIN HFA) 108 (90 BASE) MCG/ACT inhaler  Inhale 2 puffs Every 4 (Four) Hours As Needed for wheezing.             atenolol (TENORMIN) 100 MG tablet  Take 1 tablet by mouth Daily.             cetirizine (ZyrTEC) 10 MG tablet  Take 10 mg by mouth daily.             COSOPT PF 22.3-6.8 MG/ML solution  Administer 1 drop to both eyes 2 (Two) Times a Day.             dabigatran etexilate (PRADAXA) 150 MG capsu  Take 1 capsule by mouth Every 12 (Twelve) Hours.             folic acid (FOLVITE) 1 MG tablet  Take 1 mg by mouth Daily. Patient stopped taking 2 weeks ago             furosemide (LASIX) 20 MG tablet  Take 1 tablet by mouth Daily.              HYDROcodone-acetaminophen (NORCO) 5-325 MG per tablet  Take 1 tablet by mouth Every 6 (Six) Hours As Needed for Moderate Pain .             losartan (COZAAR) 50 MG tablet  Take 1 tablet by mouth Daily.             methotrexate 2.5 MG tablet  Take  by mouth. Takes 8 tablets once a week. Patient stopped taking 2 weeks ago.             MYRBETRIQ 50 MG tablet sustained-release 24 hour  Take 50 mg by mouth Every Morning.             potassium chloride (MICRO-K) 10 MEQ CR capsule  Take 1 capsule by mouth Daily.             travoprost, BAK free, (TRAVATAN Z) 0.004 % solution ophthalmic solution  Administer 1 drop to both eyes Every Evening. in affected eye(s)                  Additional Instructions for the Follow-ups that You Need to Schedule     Discharge Follow-up with PCP    As directed    Follow Up Details:  pcp 2 weeks - cards per their recs - tanvi for patient to schedule             Follow-up Information     Follow up with Pai Dueñas MD Follow up in 1 month(s).    Specialty:  Cardiology    Why:  Our office will call with appointment    Contact information:    [address]  [facility] [address] [phone]          Follow up with Raoul Ramirez MD .    Specialty:  Internal Medicine    Why:  pcp 2 weeks - cards per their recs - tanvi for patient to schedule    Contact information:    [address]  [place] [id number] [phone]          Test Results Pending at Discharge     Georgi Garcia MD  10/14/17  2:15 PM    Discharge time spent greater than 30 minutes.   Rifampin Counseling: I discussed with the patient the risks of rifampin including but not limited to liver damage, kidney damage, red-orange body fluids, nausea/vomiting and severe allergy.

## 2023-09-21 ENCOUNTER — ANTICOAGULATION VISIT (OUTPATIENT)
Dept: PHARMACY | Facility: HOSPITAL | Age: 78
End: 2023-09-21
Payer: MEDICARE

## 2023-09-21 LAB
INR PPP: 3 (ref 0.91–1.09)
PROTHROMBIN TIME: 35.4 SECONDS (ref 10–13.8)

## 2023-09-21 PROCEDURE — 85610 PROTHROMBIN TIME: CPT

## 2023-09-21 PROCEDURE — 36416 COLLJ CAPILLARY BLOOD SPEC: CPT

## 2023-09-21 NOTE — PROGRESS NOTES
Anticoagulation Clinic Progress Note    Anticoagulation Summary  As of 9/21/2023      INR goal:  2.0-3.0   TTR:  66.5 % (4.9 y)   INR used for dosing:  3.0 (9/21/2023)   Warfarin maintenance plan:  5 mg every day   Weekly warfarin total:  35 mg   No change documented:  Pamella Monte, Pharmacy Technician   Plan last modified:  Love Allison RPH (3/27/2023)   Next INR check:  10/19/2023   Priority:  Maintenance   Target end date:  Indefinite    Indications    Atrial fibrillation [I48.91]                 Anticoagulation Episode Summary       INR check location:      Preferred lab:      Send INR reminders to:  [facility]    Comments:            Anticoagulation Care Providers       Provider Role Specialty Phone number    Pia Dueñas MD Referring Cardiology [phone]            Clinic Interview:  Patient Findings     Negatives:  Signs/symptoms of thrombosis, Signs/symptoms of bleeding,   Laboratory test error suspected, Change in health, Change in alcohol use,   Change in activity, Upcoming invasive procedure, Emergency department   visit, Upcoming dental procedure, Missed doses, Extra doses, Change in   medications, Change in diet/appetite, Hospital admission, Bruising, Other   complaints      Clinical Outcomes     Negatives:  Major bleeding event, Thromboembolic event,   Anticoagulation-related hospital admission, Anticoagulation-related ED   visit, Anticoagulation-related fatality        INR History:      Latest Ref Rng & Units 3/27/2023     3:36 PM 4/12/2023     1:30 PM 5/3/2023     1:30 PM 6/28/2023     9:45 AM 8/4/2023     4:52 PM 8/7/2023     8:53 AM 9/21/2023     3:15 PM   Anticoagulation Monitoring   INR  1.85 2.6 2.4 2.1  2.22 3.0   INR Date  3/27/2023 4/12/2023 5/3/2023 6/28/2023  8/4/2023 9/21/2023   INR Goal  2.0-3.0 2.0-3.0 2.0-3.0 2.0-3.0  2.0-3.0 2.0-3.0   Trend  Up Same Same Same  Same Same   Last Week Total  35 mg 35 mg 35 mg 30 mg  35 mg 35 mg   Next Week Total  37.5 mg 35 mg  35 mg 37.5 mg  35 mg 35 mg   Sun  5 mg 5 mg 5 mg 5 mg  5 mg 5 mg   Mon  7.5 mg (3/27); Otherwise 5 mg 5 mg 5 mg 5 mg  5 mg 5 mg   Tue  5 mg 5 mg 5 mg 5 mg  5 mg 5 mg   Wed  5 mg 5 mg 5 mg 7.5 mg (6/28); Otherwise 5 mg  5 mg 5 mg   Thu  5 mg 5 mg 5 mg 5 mg  5 mg 5 mg   Fri  5 mg 5 mg 5 mg 5 mg  5 mg 5 mg   Sat  5 mg 5 mg 5 mg 5 mg  5 mg 5 mg   Historical INR 0.90 - 1.10     2.22      Visit Report  Report             Plan:  1. INR is therapeutic today- see above in Anticoagulation Summary.   Will instruct Lanaantonio Aguerocomb to continue their warfarin regimen- see above in Anticoagulation Summary.  2. Follow up in 4 weeks.  3. Patient declines warfarin refills.  4. Verbal and written information provided. Patient expresses understanding and has no further questions at this time.    Pamella Monte, Pharmacy Technician

## 2023-09-28 ENCOUNTER — OFFICE VISIT (OUTPATIENT)
Dept: CARDIOLOGY | Facility: CLINIC | Age: 78
End: 2023-09-28
Payer: MEDICARE

## 2023-09-28 ENCOUNTER — HOSPITAL ENCOUNTER (OUTPATIENT)
Dept: CARDIOLOGY | Facility: HOSPITAL | Age: 78
Discharge: HOME | End: 2023-09-28
Payer: MEDICARE

## 2023-09-28 ENCOUNTER — LAB (OUTPATIENT)
Dept: LAB | Facility: HOSPITAL | Age: 78
End: 2023-09-28
Payer: MEDICARE

## 2023-09-28 VITALS
HEIGHT: 65 IN | DIASTOLIC BLOOD PRESSURE: 84 MMHG | BODY MASS INDEX: 41.48 KG/M2 | SYSTOLIC BLOOD PRESSURE: 122 MMHG | HEART RATE: 81 BPM | WEIGHT: 249 LBS

## 2023-09-28 DIAGNOSIS — R06.02 SOB (SHORTNESS OF BREATH): Primary | ICD-10-CM

## 2023-09-28 DIAGNOSIS — I25.10 CORONARY ARTERY DISEASE INVOLVING NATIVE CORONARY ARTERY OF NATIVE HEART WITHOUT ANGINA PECTORIS: Chronic | ICD-10-CM

## 2023-09-28 DIAGNOSIS — I27.20 PULMONARY HYPERTENSION: ICD-10-CM

## 2023-09-28 DIAGNOSIS — E78.2 MIXED HYPERLIPIDEMIA: Chronic | ICD-10-CM

## 2023-09-28 DIAGNOSIS — E11.65 TYPE 2 DIABETES MELLITUS WITH HYPERGLYCEMIA, WITHOUT LONG-TERM CURRENT USE OF INSULIN: Chronic | ICD-10-CM

## 2023-09-28 DIAGNOSIS — I25.10 CORONARY ARTERY DISEASE INVOLVING NATIVE CORONARY ARTERY OF NATIVE HEART WITHOUT ANGINA PECTORIS: Primary | Chronic | ICD-10-CM

## 2023-09-28 DIAGNOSIS — R06.02 SOB (SHORTNESS OF BREATH): ICD-10-CM

## 2023-09-28 DIAGNOSIS — R06.09 DYSPNEA ON EXERTION: ICD-10-CM

## 2023-09-28 DIAGNOSIS — I10 ESSENTIAL HYPERTENSION: ICD-10-CM

## 2023-09-28 DIAGNOSIS — I48.20 CHRONIC ATRIAL FIBRILLATION: ICD-10-CM

## 2023-09-28 DIAGNOSIS — G47.33 OBSTRUCTIVE SLEEP APNEA SYNDROME: ICD-10-CM

## 2023-09-28 LAB
ANION GAP SERPL CALCULATED.3IONS-SCNC: 11 MMOL/L (ref 5–15)
BUN SERPL-MCNC: 19 MG/DL (ref 8–23)
BUN/CREAT SERPL: 16 (ref 7–25)
CALCIUM SPEC-SCNC: 9.3 MG/DL (ref 8.6–10.5)
CHLORIDE SERPL-SCNC: 103 MMOL/L (ref 98–107)
CO2 SERPL-SCNC: 27 MMOL/L (ref 22–29)
CREAT SERPL-MCNC: 1.19 MG/DL (ref 0.57–1)
D DIMER PPP FEU-MCNC: 0.32 MCGFEU/ML (ref 0–0.76)
EGFRCR SERPLBLD CKD-EPI 2021: 47.5 ML/MIN/1.73
GLUCOSE SERPL-MCNC: 175 MG/DL (ref 65–99)
NT-PROBNP SERPL-MCNC: 919 PG/ML (ref 0–1800)
POTASSIUM SERPL-SCNC: 4 MMOL/L (ref 3.5–5.2)
SODIUM SERPL-SCNC: 141 MMOL/L (ref 136–145)
T-UPTAKE NFR SERPL: 1.02 TBI (ref 0.8–1.3)
T4 SERPL-MCNC: 7.42 MCG/DL (ref 4.5–11.7)
TSH SERPL DL<=0.05 MIU/L-ACNC: 1.24 UIU/ML (ref 0.27–4.2)

## 2023-09-28 PROCEDURE — 85379 FIBRIN DEGRADATION QUANT: CPT

## 2023-09-28 PROCEDURE — 84436 ASSAY OF TOTAL THYROXINE: CPT

## 2023-09-28 PROCEDURE — 84479 ASSAY OF THYROID (T3 OR T4): CPT

## 2023-09-28 PROCEDURE — 36415 COLL VENOUS BLD VENIPUNCTURE: CPT

## 2023-09-28 PROCEDURE — 80048 BASIC METABOLIC PNL TOTAL CA: CPT

## 2023-09-28 PROCEDURE — 84443 ASSAY THYROID STIM HORMONE: CPT

## 2023-09-28 PROCEDURE — 83880 ASSAY OF NATRIURETIC PEPTIDE: CPT

## 2023-09-28 RX ORDER — BIMATOPROST 0.1 MG/ML
1 SOLUTION/ DROPS OPHTHALMIC NIGHTLY
COMMUNITY
Start: 2023-09-27

## 2023-09-28 NOTE — PROGRESS NOTES
Date of Office Visit: 2023  Encounter Provider: Minda Montoya MA  Place of Service: [facility] CARDIOLOGY  Patient Name: Lana Yañez  :1945    Chief complaint      History of Present Illness      Past Medical History:   Diagnosis Date   • Acute on chronic diastolic heart failure    • Acute UTI (urinary tract infection) 2022   • Arthritis    • Arthritis of back    • Asthma    • Atrial fibrillation     Persistent; on warfarin   • Atypical chest pain    • Biliary acute pancreatitis without necrosis or infection 11/10/2021   • Bronchitis    • Cellulitis of right elbow     due to MRSA   • Cervical disc disorder    • CHF (congestive heart failure)    • COPD (chronic obstructive pulmonary disease)    • Coronary artery disease     Cardiac catheterization completed; 90% PDA stenosis with medical management recommended   • Coronary artery disease involving native coronary artery of native heart with angina pectoris with documented spasm    • CTS (carpal tunnel syndrome)    • Diabetes 1.5, managed as type 2    • Disease of thyroid gland    • Displacement of lumbar intervertebral disc without myelopathy    • Dizziness    • ANTOINE (dyspnea on exertion)    • Essential hypertension    • Fatigue    • Fracture, foot    • Generalized osteoarthritis of multiple sites    • History of rheumatic fever    • History of transfusion    • Hx of bone density study     10/23/2014   • Hyperglycemia    • Hyperlipidemia    • Hypovitaminosis D    • Left arm pain    • Left-sided weakness    • Leg swelling    • Low back pain    • Lower extremity edema    • Lumbosacral disc disease    • Malaise and fatigue    • Mitral valve disease     Moderate mitral valve prolapse and moderate mitral regurgitation   • Mitral valve insufficiency    • Morbid obesity with BMI of 40.0-44.9, adult    • MRSA infection    • NSTEMI (non-ST elevated myocardial infarction)    • PAF (paroxysmal atrial fibrillation)    •  Periarthritis of shoulder    • RA (rheumatoid arthritis)     involving both hands   • Sleep apnea    • SOB (shortness of breath)    • Stroke     left side weakness   • Urge incontinence of urine    • UTI (urinary tract infection) 05/2020   • Vitamin D deficiency      Past Surgical History:   Procedure Laterality Date   • BREAST BIOPSY     • BREAST SURGERY      right side lumpectomy with biopsy   • CARDIAC CATHETERIZATION Left 10/20/2017    Procedure: Cardiac Catheterization/Vascular Study;  Surgeon: Alphonso Olmedo MD;  Location: [facility] CATH INVASIVE LOCATION;  Service:    • CARDIAC CATHETERIZATION N/A 10/20/2017    +2 mitral regurgitation, left main 10% stenosis, mid to distal LAD 10% diffuse stenosis, circumflex 10% diffuse stenosis, RCA 10% proximal stenosis, and distal PDA consistent with coronary embolus with a lesion of 90% too small to consider coronary intervention; medical management recommended   • EYE SURGERY      laser surgery for glaucoma and left eye cataracts removed   • HYSTERECTOMY      10+ years ago   • JOINT REPLACEMENT  2005; 2006    bilateral knees and left rotater   • KNEE SURGERY     • MAMMO BILATERAL  2016    [facility]    • SHOULDER SURGERY       Outpatient Medications Prior to Visit   Medication Sig Dispense Refill   • Accu-Chek FastClix Lancets misc Use to check glucose as directed 306 each 1   • acetaminophen (TYLENOL) 325 MG tablet Take 2 tablets by mouth Every 6 (Six) Hours As Needed for Moderate Pain.     • albuterol sulfate  (90 Base) MCG/ACT inhaler Inhale 2 puffs Every 4 (Four) Hours As Needed for Shortness of Air. PRN SOA/WHEEZING 18 g 11   • aspirin 81 MG EC tablet Take 1 tablet by mouth Daily.     • atorvastatin (LIPITOR) 10 MG tablet Take 1 tablet by mouth Daily. 90 tablet 1   • Blood Glucose Monitoring Suppl (ACCU-CHEK GUIDE) w/Device kit See Admin Instructions.     • digoxin (LANOXIN) 125 MCG tablet Take 1 tablet by mouth Every Other Day. 45 tablet 2   • glimepiride  (Amaryl) 2 MG tablet By mouth take one tab twice daily with AM and PM meals. 180 tablet 2   • glucose blood test strip Test twice daily prior to breakfast and dinner as instructed (Patient taking differently: Test daily prior to breakfast) 200 each 12   • Lumigan 0.01 % ophthalmic drops Administer 1 drop to both eyes Every Night.     • polyethylene glycol (MIRALAX) 17 g packet Take 17 g by mouth Daily As Needed (constipation).     • potassium chloride 10 MEQ CR tablet 1 po bid 180 tablet 1   • torsemide (DEMADEX) 20 MG tablet 2 po qday 180 tablet 1   • vitamin D (ERGOCALCIFEROL) 1.25 MG (48809 UT) capsule capsule Take 1 capsule by mouth 1 (One) Time Per Week. 12 capsule 1   • warfarin (COUMADIN) 5 MG tablet Take one-half tablet (2.5 mg) by mouth on , and take one tablet (5 mg) by mouth all other days or as directed. 85 tablet 1   • metoprolol tartrate (Lopressor) 50 MG tablet Take 1 tablet by mouth 2 (Two) Times a Day for 30 days. 180 tablet 1   • ciprofloxacin (Cipro) 500 MG tablet Take 1 tablet by mouth 2 (Two) Times a Day. (Patient not taking: Reported on 2023) 28 tablet 0   • TRAVATAN Z 0.004 % solution ophthalmic solution Administer 1 drop to both eyes Every Morning. Not night       No facility-administered medications prior to visit.       Allergies as of 2023 - Reviewed 2023   Allergen Reaction Noted   • Contrast dye (echo or unknown ct/mr) Hives and Itching 2018   • Macrobid [nitrofurantoin] Hives 2023     Social History     Socioeconomic History   • Marital status:      Spouse name: Suresh   • Number of children: 5   • Years of education: 13   Tobacco Use   • Smoking status: Former     Packs/day: 3.00     Years: 1.00     Pack years: 3.00     Types: Cigarettes     Start date: 1967     Quit date: 10/19/1968     Years since quittin.9     Passive exposure: Never   • Smokeless tobacco: Never   • Tobacco comments:     caffeine use - decaf coffee   Vaping Use  "  • Vaping Use: Never used   Substance and Sexual Activity   • Alcohol use: No     Comment: No caffeine use   • Drug use: No   • Sexual activity: Defer     Family History   Problem Relation Age of Onset   • Colon cancer Mother    • Glaucoma Mother    • Stroke Mother    • Arthritis Mother    • Hypertension Mother    • Glaucoma Sister    • Diabetes Sister    • Heart disease Sister    • Arthritis Sister    • Asthma Sister    • Hypertension Sister    • Miscarriages / Stillbirths Sister    • Lung disease Sister    • Heart disease Brother    • Diabetes Brother    • Arthritis Brother    • Drug abuse Brother    • Hypertension Brother    • Arthritis Father    • COPD Father    • Lung disease Father    • Arthritis Daughter    • Depression Daughter    • Alcohol abuse Maternal Uncle    • Heart disease Sister    • Heart disease Sister    • Heart disease Brother    • Rheumatologic disease Sister      ROS     Objective:     Vitals:    09/28/23 1330   BP: 122/84   Pulse: 81   Weight: 113 kg (249 lb)   Height: 165.1 cm (65\")     Body mass index is 41.44 kg/m².    Physical Exam  Lab Review:   Lab Results - Last 18 Months   Lab Units 08/04/23  1652 03/21/23  1128 05/25/22  1011 05/24/22  0739 05/22/22  0240 05/21/22  2319   WBC 10*3/mm3 7.02 6.90   < > 10.71   < > 6.98   RBC 10*6/mm3 4.23 4.51   < > 4.16   < > 4.67   HEMOGLOBIN g/dL 13.2 13.9   < > 12.2   < > 13.7   HEMATOCRIT % 39.1 42.2   < > 38.4   < > 44.0   MCV fL 92.4 93.6   < > 92.3   < > 94.2   MCH pg 31.2 30.8   < > 29.3   < > 29.3   MCHC g/dL 33.8 32.9   < > 31.8   < > 31.1*   RDW % 13.6 12.7   < > 14.0   < > 14.2   PLATELETS 10*3/mm3 159 174   < > 141   < > 139*   NEUTROPHIL % %  --   --   --   --   --  75.2   LYMPHOCYTE % %  --   --   --   --   --  21.1   MONOCYTES % %  --   --   --   --   --  2.9*   EOSINOPHIL % %  --   --   --   --   --  0.3   BASOPHIL % %  --   --   --   --   --  0.1   NEUTROS ABS 10*3/mm3  --   --   --  7.39*  --  5.25   LYMPHS ABS 10*3/mm3  --   --   " --   --   --  1.47   MONOS ABS 10*3/mm3  --   --   --   --   --  0.20   EOS ABS 10*3/mm3  --   --   --  0.21  --  0.02   BASOS ABS 10*3/mm3  --   --   --   --   --  0.01   RDW-SD fl 45.0 43.8   < > 47.8   < > 49.0   MPV fL 10.7 11.0   < > 11.5   < > 11.6    < > = values in this interval not displayed.     Lab Results - Last 18 Months   Lab Units 08/04/23 1652 03/27/23  1458   GLUCOSE mg/dL 135* 137*   BUN mg/dL 19 17   CREATININE mg/dL 1.15* 1.31*   SODIUM mmol/L 143 143   POTASSIUM mmol/L 3.6 3.8   CHLORIDE mmol/L 104 101   CO2 mmol/L 28.0 29.1*   CALCIUM mg/dL 9.8 9.6   BUN / CREAT RATIO  16.5 13.0   ANION GAP mmol/L 11.0 12.9   EGFR mL/min/1.73 49.5* 42.6*     Lab Results - Last 18 Months   Lab Units 08/04/23 1652 03/27/23  1458 03/21/23  1128   GLUCOSE mg/dL 135* 137* 133*   BUN mg/dL 19 17 19   CREATININE mg/dL 1.15* 1.31* 1.24*   SODIUM mmol/L 143 143 144   POTASSIUM mmol/L 3.6 3.8 3.4*   CHLORIDE mmol/L 104 101 102   CO2 mmol/L 28.0 29.1* 28.2   CALCIUM mg/dL 9.8 9.6 9.9   TOTAL PROTEIN g/dL 7.6  --  8.2   ALBUMIN g/dL 4.5  --  4.5   ALT (SGPT) U/L 9  --  13   AST (SGOT) U/L 17  --  23   ALK PHOS U/L 68  --  75   BILIRUBIN mg/dL 0.6  --  0.4   GLOBULIN gm/dL 3.1  --  3.7   A/G RATIO g/dL 1.5  --  1.2   BUN / CREAT RATIO  16.5 13.0 15.3   ANION GAP mmol/L 11.0 12.9 13.8   EGFR mL/min/1.73 49.5* 42.6* 45.5*     Lab Results - Last 18 Months   Lab Units 08/04/23  1652 03/21/23  1128   CHOLESTEROL mg/dL 139 129   TRIGLYCERIDES mg/dL 97 85   HDL CHOL mg/dL 59 58   LDL CHOL mg/dL 62 55   VLDL CHOL mg/dL 18 16   LDL/HDL RATIO  1.03 0.93     Lab Results - Last 18 Months   Lab Units 09/16/22  1343 05/21/22  2319   PROBNP pg/mL 777.0 808.0     Lab Results - Last 18 Months   Lab Units 05/21/22  2319   TROPONIN T ng/mL <0.010     Lab Results - Last 18 Months   Lab Units 10/20/22  1417 06/09/22  1228   TSH uIU/mL 1.230 1.790     Lab Results - Last 18 Months   Lab Units 09/21/23  1523 08/04/23  1652 05/22/22  1105  05/22/22  0240   PROTIME seconds 35.4* 25.1*   < > 21.9*   APTT seconds  --   --   --  26.2    < > = values in this interval not displayed.           ECG 12 Lead    Date/Time: 9/28/2023 1:41 PM  Performed by: Pia Dueñas MD  Authorized by: Pia Dueñas MD     Assessment:    No diagnosis found.  Plan:                       Your medication list            Accurate as of September 28, 2023  1:41 PM. If you have any questions, ask your nurse or doctor.                CHANGE how you take these medications        Instructions Last Dose Given Next Dose Due   glucose blood test strip  What changed: additional instructions      Test twice daily prior to breakfast and dinner as instructed              CONTINUE taking these medications        Instructions Last Dose Given Next Dose Due   Accu-Chek FastClix Lancets misc      Use to check glucose as directed       Accu-Chek Guide w/Device kit      See Admin Instructions.       acetaminophen 325 MG tablet  Commonly known as: TYLENOL      Take 2 tablets by mouth Every 6 (Six) Hours As Needed for Moderate Pain.       albuterol sulfate  (90 Base) MCG/ACT inhaler  Commonly known as: PROVENTIL HFA;VENTOLIN HFA;PROAIR HFA      Inhale 2 puffs Every 4 (Four) Hours As Needed for Shortness of Air. PRN SOA/WHEEZING       aspirin 81 MG EC tablet      Take 1 tablet by mouth Daily.       atorvastatin 10 MG tablet  Commonly known as: LIPITOR      Take 1 tablet by mouth Daily.       digoxin 125 MCG tablet  Commonly known as: LANOXIN      Take 1 tablet by mouth Every Other Day.       glimepiride 2 MG tablet  Commonly known as: Amaryl      By mouth take one tab twice daily with AM and PM meals.       Lumigan 0.01 % ophthalmic drops  Generic drug: bimatoprost      Administer 1 drop to both eyes Every Night.       metoprolol tartrate 50 MG tablet  Commonly known as: Lopressor      Take 1 tablet by mouth 2 (Two) Times a Day for 30 days.       polyethylene glycol 17 g packet  Commonly known as:  MIRALAX      Take 17 g by mouth Daily As Needed (constipation).       potassium chloride 10 MEQ CR tablet      1 po bid       torsemide 20 MG tablet  Commonly known as: DEMADEX      2 po qday       vitamin D 1.25 MG (60020 UT) capsule capsule  Commonly known as: ERGOCALCIFEROL      Take 1 capsule by mouth 1 (One) Time Per Week.       warfarin 5 MG tablet  Commonly known as: COUMADIN      Take one-half tablet (2.5 mg) by mouth on Fridays, and take one tablet (5 mg) by mouth all other days or as directed.                Patient is no longer taking -.  I corrected the med list to reflect this.  I did not stop these medications.      Dictated utilizing Dragon dictation

## 2023-09-28 NOTE — PROGRESS NOTES
Date of Office Visit: 2023  Encounter Provider: Pia Dueñas MD  Place of Service: [facility] CARDIOLOGY  Patient Name: Lana Yañez  :1945    Chief complaint  Coronary artery disease, pulmonary hypertension, atrial fibrillation, mitral valve prolapse with moderate mitral regurgitation, dilated ascending aorta    History of Present Illness  The patient is a 77 yo female with with history of retention, hyperlipidemia, rheumatoid arthritis, obstructive sleep apnea who in early October was found to be in atrial fibrillation with rapid ventricular rates and mild diastolic heart failure.  She was placed on IV heparin and Pradaxa.  Echocardiogram revealed normal systolic function mild left ventricular hypertrophy, moderate atrial enlargement with aortic valve sclerosis and moderate pulmonary hypertension and moderate tricuspid regurgitation.  The RV systolic pressure is 54 mmHg.  She had a stress perfusion study that was negative for ischemia and a CT and her grandmother revealed a mildly dilated aorta without pulmonary emboli.  She then presented several days later with an acute stroke.  CLARIBEL was not pursued as it was felt to be embolic in nature.  However on  and she was diaphoretic and was found to have a non-ST elevation myocardial infarction.  This was on full dose Pradaxa which was not held.  CLARIBEL was pursued that revealed normal systolic function with moderate mitral valve prolapse without significant regurgitation.  There was right-sided spontaneous echo contrast without thrombus formation.  Cardiac catheterization revealed normal systolic function with 2+ mitral regurgitation, mild coronary disease except for a 90% stenosis of the distal PDA which was felt to be too small to be amenable PCI.  She was treated medically and switched to Coumadin. She has struggled with intermittent heart failure dietary indiscretions with salt since then.  She also had a  24-hour Holter that revealed persistent atrial fibrillation with reasonable rate control and frequent PVCs. No other arrhythmia was present.  Last echocardiogram in August 2021 showed an ejection fraction of 62% with moderate left ventricular hypertrophy with indeterminate diastolic function, mild aortic regurgitation, mitral valve thickening with moderate regurgitation without stenosis, severe tricuspid regurgitation with an RV systolic pressure 48 mmHg is present.  CT angiogram of the chest 9/2021 showed stable ascending aorta measuring 4.1 cm.  Lexiscan cardiac stress test 2/2022 was negative for ischemia.  Echo on 5/2020 showed ejection fraction of 60% with grade 2 diastolic dysfunction, mild aortic valve stenosis, RVSP 42 mmHg.  Mitral valve calcification present but not felt to have mitral stenosis.  Mild mitral valve thickening    Since last visit palpitations have improved though a month ago they were worse.  She has not had any in the past week.  She complains of worsening shortness of breath that is noticeable when she crosses the room.  She has chronic dependent edema that is unchanged.  She continues to have dizziness when she stands up suddenly.  She had a pinching type chest discomfort last week and also occasionally has heartburn which occurs while sitting on after meals.  She is somewhat vague about this.    Past Medical History:   Diagnosis Date    Acute on chronic diastolic heart failure     Acute UTI (urinary tract infection) 05/22/2022    Arthritis     Arthritis of back     Asthma     Atrial fibrillation     Persistent; on warfarin    Atypical chest pain     Biliary acute pancreatitis without necrosis or infection 11/10/2021    Bronchitis     Cellulitis of right elbow     due to MRSA    Cervical disc disorder     CHF (congestive heart failure)     COPD (chronic obstructive pulmonary disease)     Coronary artery disease     Cardiac catheterization completed; 90% PDA stenosis with medical  management recommended    Coronary artery disease involving native coronary artery of native heart with angina pectoris with documented spasm     CTS (carpal tunnel syndrome)     Diabetes 1.5, managed as type 2     Disease of thyroid gland     Displacement of lumbar intervertebral disc without myelopathy     Dizziness     ANTOINE (dyspnea on exertion)     Essential hypertension     Fatigue     Fracture, foot     Generalized osteoarthritis of multiple sites     History of rheumatic fever     History of transfusion     Hx of bone density study     10/23/2014    Hyperglycemia     Hyperlipidemia     Hypovitaminosis D     Left arm pain     Left-sided weakness     Leg swelling     Low back pain     Lower extremity edema     Lumbosacral disc disease     Malaise and fatigue     Mitral valve disease     Moderate mitral valve prolapse and moderate mitral regurgitation    Mitral valve insufficiency     Morbid obesity with BMI of 40.0-44.9, adult     MRSA infection     NSTEMI (non-ST elevated myocardial infarction)     PAF (paroxysmal atrial fibrillation)     Periarthritis of shoulder     RA (rheumatoid arthritis)     involving both hands    Sleep apnea     SOB (shortness of breath)     Stroke     left side weakness    Urge incontinence of urine     UTI (urinary tract infection) 05/2020    Vitamin D deficiency      Past Surgical History:   Procedure Laterality Date    BREAST BIOPSY      BREAST SURGERY      right side lumpectomy with biopsy    CARDIAC CATHETERIZATION Left 10/20/2017    Procedure: Cardiac Catheterization/Vascular Study;  Surgeon: Alphonso Olmedo MD;  Location: [facility] CATH INVASIVE LOCATION;  Service:     CARDIAC CATHETERIZATION N/A 10/20/2017    +2 mitral regurgitation, left main 10% stenosis, mid to distal LAD 10% diffuse stenosis, circumflex 10% diffuse stenosis, RCA 10% proximal stenosis, and distal PDA consistent with coronary embolus with a lesion of 90% too small to consider coronary intervention; medical management  recommended    EYE SURGERY      laser surgery for glaucoma and left eye cataracts removed    HYSTERECTOMY      10+ years ago    JOINT REPLACEMENT  2005; 2006    bilateral knees and left rotater    KNEE SURGERY      MAMMO BILATERAL  2016    [facility]     SHOULDER SURGERY       Outpatient Medications Prior to Visit   Medication Sig Dispense Refill    Accu-Chek FastClix Lancets misc Use to check glucose as directed 306 each 1    acetaminophen (TYLENOL) 325 MG tablet Take 2 tablets by mouth Every 6 (Six) Hours As Needed for Moderate Pain.      albuterol sulfate  (90 Base) MCG/ACT inhaler Inhale 2 puffs Every 4 (Four) Hours As Needed for Shortness of Air. PRN SOA/WHEEZING 18 g 11    aspirin 81 MG EC tablet Take 1 tablet by mouth Daily.      atorvastatin (LIPITOR) 10 MG tablet Take 1 tablet by mouth Daily. 90 tablet 1    Blood Glucose Monitoring Suppl (ACCU-CHEK GUIDE) w/Device kit See Admin Instructions.      digoxin (LANOXIN) 125 MCG tablet Take 1 tablet by mouth Every Other Day. 45 tablet 2    glimepiride (Amaryl) 2 MG tablet By mouth take one tab twice daily with AM and PM meals. 180 tablet 2    glucose blood test strip Test twice daily prior to breakfast and dinner as instructed (Patient taking differently: Test daily prior to breakfast) 200 each 12    Lumigan 0.01 % ophthalmic drops Administer 1 drop to both eyes Every Night.      polyethylene glycol (MIRALAX) 17 g packet Take 17 g by mouth Daily As Needed (constipation).      potassium chloride 10 MEQ CR tablet 1 po bid 180 tablet 1    torsemide (DEMADEX) 20 MG tablet 2 po qday 180 tablet 1    vitamin D (ERGOCALCIFEROL) 1.25 MG (62860 UT) capsule capsule Take 1 capsule by mouth 1 (One) Time Per Week. 12 capsule 1    warfarin (COUMADIN) 5 MG tablet Take one-half tablet (2.5 mg) by mouth on Fridays, and take one tablet (5 mg) by mouth all other days or as directed. 85 tablet 1    metoprolol tartrate (Lopressor) 50 MG tablet Take 1 tablet by mouth 2  (Two) Times a Day for 30 days. 180 tablet 1    ciprofloxacin (Cipro) 500 MG tablet Take 1 tablet by mouth 2 (Two) Times a Day. (Patient not taking: Reported on 2023) 28 tablet 0    TRAVATAN Z 0.004 % solution ophthalmic solution Administer 1 drop to both eyes Every Morning. Not night       No facility-administered medications prior to visit.       Allergies as of 2023 - Reviewed 2023   Allergen Reaction Noted    Contrast dye (echo or unknown ct/mr) Hives and Itching 2018    Macrobid [nitrofurantoin] Hives 2023     Social History     Socioeconomic History    Marital status:      Spouse name: Suresh    Number of children: 5    Years of education: 13   Tobacco Use    Smoking status: Former     Packs/day: 3.00     Years: 1.00     Pack years: 3.00     Types: Cigarettes     Start date: 1967     Quit date: 10/19/1968     Years since quittin.9     Passive exposure: Never    Smokeless tobacco: Never    Tobacco comments:     caffeine use - decaf coffee   Vaping Use    Vaping Use: Never used   Substance and Sexual Activity    Alcohol use: No     Comment: No caffeine use    Drug use: No    Sexual activity: Defer     Family History   Problem Relation Age of Onset    Colon cancer Mother     Glaucoma Mother     Stroke Mother     Arthritis Mother     Hypertension Mother     Glaucoma Sister     Diabetes Sister     Heart disease Sister     Arthritis Sister     Asthma Sister     Hypertension Sister     Miscarriages / Stillbirths Sister     Lung disease Sister     Heart disease Brother     Diabetes Brother     Arthritis Brother     Drug abuse Brother     Hypertension Brother     Arthritis Father     COPD Father     Lung disease Father     Arthritis Daughter     Depression Daughter     Alcohol abuse Maternal Uncle     Heart disease Sister     Heart disease Sister     Heart disease Brother     Rheumatologic disease Sister      Review of Systems   Constitutional: Negative for chills, fever,  "weight gain and weight loss.   Cardiovascular:  Positive for chest pain, dyspnea on exertion, leg swelling and palpitations.   Respiratory:  Negative for cough, snoring and wheezing.    Hematologic/Lymphatic: Negative for bleeding problem. Does not bruise/bleed easily.   Skin:  Negative for color change.   Musculoskeletal:  Negative for falls, joint pain and myalgias.   Gastrointestinal:  Negative for melena.   Genitourinary:  Negative for hematuria.   Neurological:  Negative for excessive daytime sleepiness.   Psychiatric/Behavioral:  Negative for depression. The patient is not nervous/anxious.       Objective:     Vitals:    09/28/23 1330   BP: 122/84   Pulse: 81   Weight: 113 kg (249 lb)   Height: 165.1 cm (65\")     Body mass index is 41.44 kg/m².    Vitals reviewed.   Constitutional:       Appearance: Well-developed. Morbidly obese.   Eyes:      General: No scleral icterus.        Right eye: No discharge.      Conjunctiva/sclera: Conjunctivae normal.      Pupils: Pupils are equal, round, and reactive to light.   HENT:      Head: Normocephalic.      Nose: Nose normal.   Neck:      Thyroid: No thyromegaly.      Vascular: No JVD.   Pulmonary:      Effort: Pulmonary effort is normal. No respiratory distress.      Breath sounds: Normal breath sounds. No wheezing. No rales.   Cardiovascular:      Normal rate. Irregularly irregular rhythm. Normal S1. Normal S2.       Murmurs: There is no murmur.      No gallop.    Pulses:     Intact distal pulses.      Carotid: 2+ bilaterally.     Radial: 2+ bilaterally.     Femoral: 2+ bilaterally.     Popliteal: 2+ bilaterally.     Dorsalis pedis: 2+ bilaterally.     Posterior tibial: 2+ bilaterally.  Edema:     Peripheral edema present.     Ankle: bilateral trace edema of the ankle.  Abdominal:      General: Bowel sounds are normal. There is no distension.      Palpations: Abdomen is soft.      Tenderness: There is no abdominal tenderness. There is no rebound.   Musculoskeletal: " Normal range of motion.         General: No tenderness.      Cervical back: Normal range of motion and neck supple. Skin:     General: Skin is warm and dry.      Findings: No erythema or rash.   Neurological:      Mental Status: Alert and oriented to person, place, and time.   Psychiatric:         Behavior: Behavior normal.         Thought Content: Thought content normal.         Judgment: Judgment normal.     Lab Review:   Lab Results - Last 18 Months   Lab Units 08/04/23  1652 03/21/23  1128 05/25/22  1011 05/24/22  0739 05/22/22  0240 05/21/22  2319   WBC 10*3/mm3 7.02 6.90   < > 10.71   < > 6.98   RBC 10*6/mm3 4.23 4.51   < > 4.16   < > 4.67   HEMOGLOBIN g/dL 13.2 13.9   < > 12.2   < > 13.7   HEMATOCRIT % 39.1 42.2   < > 38.4   < > 44.0   MCV fL 92.4 93.6   < > 92.3   < > 94.2   MCH pg 31.2 30.8   < > 29.3   < > 29.3   MCHC g/dL 33.8 32.9   < > 31.8   < > 31.1*   RDW % 13.6 12.7   < > 14.0   < > 14.2   PLATELETS 10*3/mm3 159 174   < > 141   < > 139*   NEUTROPHIL % %  --   --   --   --   --  75.2   LYMPHOCYTE % %  --   --   --   --   --  21.1   MONOCYTES % %  --   --   --   --   --  2.9*   EOSINOPHIL % %  --   --   --   --   --  0.3   BASOPHIL % %  --   --   --   --   --  0.1   NEUTROS ABS 10*3/mm3  --   --   --  7.39*  --  5.25   LYMPHS ABS 10*3/mm3  --   --   --   --   --  1.47   MONOS ABS 10*3/mm3  --   --   --   --   --  0.20   EOS ABS 10*3/mm3  --   --   --  0.21  --  0.02   BASOS ABS 10*3/mm3  --   --   --   --   --  0.01   RDW-SD fl 45.0 43.8   < > 47.8   < > 49.0   MPV fL 10.7 11.0   < > 11.5   < > 11.6    < > = values in this interval not displayed.       Lab Results - Last 18 Months   Lab Units 09/28/23  1459 08/04/23  1652 03/27/23  1458 03/21/23  1128   GLUCOSE mg/dL 175* 135*   < > 133*   BUN mg/dL 19 19   < > 19   CREATININE mg/dL 1.19* 1.15*   < > 1.24*   SODIUM mmol/L 141 143   < > 144   POTASSIUM mmol/L 4.0 3.6   < > 3.4*   CHLORIDE mmol/L 103 104   < > 102   CO2 mmol/L 27.0 28.0   < > 28.2    CALCIUM mg/dL 9.3 9.8   < > 9.9   TOTAL PROTEIN g/dL  --  7.6  --  8.2   ALBUMIN g/dL  --  4.5  --  4.5   ALT (SGPT) U/L  --  9  --  13   AST (SGOT) U/L  --  17  --  23   ALK PHOS U/L  --  68  --  75   BILIRUBIN mg/dL  --  0.6  --  0.4   GLOBULIN gm/dL  --  3.1  --  3.7   A/G RATIO g/dL  --  1.5  --  1.2   BUN / CREAT RATIO  16.0 16.5   < > 15.3   ANION GAP mmol/L 11.0 11.0   < > 13.8   EGFR mL/min/1.73 47.5* 49.5*   < > 45.5*    < > = values in this interval not displayed.     Lab Results - Last 18 Months   Lab Units 08/04/23  1652 03/21/23  1128   CHOLESTEROL mg/dL 139 129   TRIGLYCERIDES mg/dL 97 85   HDL CHOL mg/dL 59 58   LDL CHOL mg/dL 62 55   VLDL CHOL mg/dL 18 16   LDL/HDL RATIO  1.03 0.93     Lab Results - Last 18 Months   Lab Units 09/28/23  1459 09/16/22  1343   PROBNP pg/mL 919.0 777.0     Lab Results - Last 18 Months   Lab Units 05/21/22  2319   TROPONIN T ng/mL <0.010     Lab Results - Last 18 Months   Lab Units 09/28/23  1459 10/20/22  1417   TSH uIU/mL 1.240 1.230     Lab Results - Last 18 Months   Lab Units 09/21/23  1523 08/04/23  1652 05/22/22  1105 05/22/22  0240   PROTIME seconds 35.4* 25.1*   < > 21.9*   APTT seconds  --   --   --  26.2    < > = values in this interval not displayed.           ECG 12 Lead    Date/Time: 9/28/2023 8:59 PM  Performed by: Pia Dueñas MD  Authorized by: Pia Dueñas MD   Comparison: compared with previous ECG   Similar to previous ECG  Rhythm: atrial fibrillation  Ectopy: unifocal PVCs  Conduction: left anterior fascicular block  Other findings: non-specific ST-T wave changes  Other findings comments: Consider prior anterior wall infarction    Clinical impression: abnormal EKG      Assessment:       Diagnosis Plan   1. SOB (shortness of breath)  proBNP    Basic Metabolic Panel    TSH    Adult Transthoracic Echo Complete W/ Cont if Necessary Per Protocol    D-dimer, Quantitative      2. Dyspnea on exertion  TSH      3. Chronic atrial fibrillation        4.  Coronary artery disease involving native coronary artery of native heart without angina pectoris        5. Essential hypertension        6. Mixed hyperlipidemia        7. Type 2 diabetes mellitus with hyperglycemia, without long-term current use of insulin        8. Pulmonary hypertension        9. Obstructive sleep apnea syndrome          Plan:       1.  Dyspnea on exertion.  Negative stress perfusion study 2/2022.  Echo 5/2022 with normal systolic function grade 2 diastolic dysfunction, no significant valve regurgitation with an RVSP 42 mmHg.  This seems to have worsened.  We will check a proBNP, recheck echo, TSH consider possible further coronary evaluation.  2.  Pulmonary hypertension.  As above.  3.  Mild aortic valve stenosis.  Recheck by echo  4.  Mitral valve prolapse with mild mitral regurgitation noted by CLARIBEL 2017.  Reassess by echocardiography given worsening shortness of breath.  5.  Severe small vessel coronary artery disease.  Negative stress test 2/2022 and no residual anginal symptoms.  Current chest pain mostly atypical.  She will try to correlate further circumstances.  6..  Persistent atrial fibrillation rates are controlled and she is on anticoagulation which she is tolerating well  7.  Chronic and unchanged.  Edema.  Minimal on exam today.  8.  Hypertension, controlled  9.  History of embolic non ST-elevation myocardial infarction and stroke. Now on warfarin  10   FRANCY, untreated  11. Lung nodule followed by Dr. Garrido and felt to be benign per patient.  No further follow-up felt to be needed per patient  12. Chronic anticoagulation, no falls or bleeding.  Continue with anticoagulation.   13.  Thoracic aortic aneurysm, stable by CT on 9/2021      Time Spent: I spent 35 minutes caring for Lana on this date of service. This time includes time spent by me in the following activities: preparing for the visit, reviewing tests, obtaining and/or reviewing a separately obtained history, performing a  medically appropriate examination and/or evaluation, counseling and educating the patient/family/caregiver, ordering medications, tests, or procedures, documenting information in the medical record, and independently interpreting results and communicating that information with the patient/family/caregiver.   I spent 1 minutes on the separately reported service of ECG. This time is not included in the time used to support the E/M service also reported today.        Your medication list            Accurate as of September 28, 2023 11:59 PM. If you have any questions, ask your nurse or doctor.                CHANGE how you take these medications        Instructions Last Dose Given Next Dose Due   glucose blood test strip  What changed: additional instructions      Test twice daily prior to breakfast and dinner as instructed              CONTINUE taking these medications        Instructions Last Dose Given Next Dose Due   Accu-Chek FastClix Lancets misc      Use to check glucose as directed       Accu-Chek Guide w/Device kit      See Admin Instructions.       acetaminophen 325 MG tablet  Commonly known as: TYLENOL      Take 2 tablets by mouth Every 6 (Six) Hours As Needed for Moderate Pain.       albuterol sulfate  (90 Base) MCG/ACT inhaler  Commonly known as: PROVENTIL HFA;VENTOLIN HFA;PROAIR HFA      Inhale 2 puffs Every 4 (Four) Hours As Needed for Shortness of Air. PRN SOA/WHEEZING       aspirin 81 MG EC tablet      Take 1 tablet by mouth Daily.       atorvastatin 10 MG tablet  Commonly known as: LIPITOR      Take 1 tablet by mouth Daily.       digoxin 125 MCG tablet  Commonly known as: LANOXIN      Take 1 tablet by mouth Every Other Day.       glimepiride 2 MG tablet  Commonly known as: Amaryl      By mouth take one tab twice daily with AM and PM meals.       Lumigan 0.01 % ophthalmic drops  Generic drug: bimatoprost      Administer 1 drop to both eyes Every Night.       metoprolol tartrate 50 MG  tablet  Commonly known as: Lopressor      Take 1 tablet by mouth 2 (Two) Times a Day for 30 days.       polyethylene glycol 17 g packet  Commonly known as: MIRALAX      Take 17 g by mouth Daily As Needed (constipation).       potassium chloride 10 MEQ CR tablet      1 po bid       torsemide 20 MG tablet  Commonly known as: DEMADEX      2 po qday       vitamin D 1.25 MG (09840 UT) capsule capsule  Commonly known as: ERGOCALCIFEROL      Take 1 capsule by mouth 1 (One) Time Per Week.       warfarin 5 MG tablet  Commonly known as: COUMADIN      Take one-half tablet (2.5 mg) by mouth on Fridays, and take one tablet (5 mg) by mouth all other days or as directed.                Patient is no longer taking -.  I corrected the med list to reflect this.  I did not stop these medications.      Dictated utilizing Dragon dictation

## 2023-10-01 ENCOUNTER — TELEPHONE (OUTPATIENT)
Dept: CARDIOLOGY | Facility: CLINIC | Age: 78
End: 2023-10-01
Payer: MEDICARE

## 2023-10-01 DIAGNOSIS — R06.09 DOE (DYSPNEA ON EXERTION): Primary | ICD-10-CM

## 2023-10-02 NOTE — TELEPHONE ENCOUNTER
I spoke with Lana Yañez and updated pt on results/recommendations from provider.  Pt verbalized understanding and has no further questions at this time.    I gave pt the number to have PFT scheduled.  I transferred pt to front office scheduling to have echo scheduled.    Thank you,    Tisha Montejo, RN  Triage [facility]  10/02/23 09:45 EDT

## 2023-10-02 NOTE — TELEPHONE ENCOUNTER
Please let patient know that blood test for heart failure was normal.  Thyroid studies normal.  Kidney tests slightly worse than before.  Continue the same medicines, continue plans for echo.  Please have her try to when she gets chest pain.  Also best to proceed with chest x-ray and pulmonary function test also to further assess shortness of breath.  I placed an order for both.

## 2023-10-02 NOTE — TELEPHONE ENCOUNTER
PFT and echo have been scheduled.    Thank you,    Tisha JENKINS RN  Triage [facility]  10/02/23 11:14 EDT

## 2023-10-04 NOTE — TELEPHONE ENCOUNTER
Pt did NOT complete Rehab treadmill.  Is this ok or does she need to do again?  Pt went to Dr Hill and he stated that she had a mass in her lung and his plan to do another CT scan.     Holding RN to OR RN

## 2023-10-09 ENCOUNTER — HOSPITAL ENCOUNTER (OUTPATIENT)
Dept: RESPIRATORY THERAPY | Facility: HOSPITAL | Age: 78
Discharge: HOME | End: 2023-10-09
Payer: MEDICARE

## 2023-10-09 ENCOUNTER — CLINICAL SUPPORT (OUTPATIENT)
Dept: ORTHOPEDIC SURGERY | Facility: CLINIC | Age: 78
End: 2023-10-09
Payer: MEDICARE

## 2023-10-09 ENCOUNTER — HOSPITAL ENCOUNTER (OUTPATIENT)
Dept: GENERAL RADIOLOGY | Facility: HOSPITAL | Age: 78
Discharge: HOME | End: 2023-10-09
Payer: MEDICARE

## 2023-10-09 VITALS — BODY MASS INDEX: 41.42 KG/M2 | HEIGHT: 66 IN | TEMPERATURE: 98.2 F | WEIGHT: 257.7 LBS

## 2023-10-09 DIAGNOSIS — M19.019 GLENOHUMERAL ARTHRITIS: Primary | ICD-10-CM

## 2023-10-09 DIAGNOSIS — R06.09 DOE (DYSPNEA ON EXERTION): ICD-10-CM

## 2023-10-09 LAB
BDY SITE: NORMAL
CALCULATED HEMOGLOBIN, EPOC: NORMAL
HGB BLDA-MCNC: 12.3 G/DL (ref 12–18)
Lab: NORMAL

## 2023-10-09 PROCEDURE — 94729 DIFFUSING CAPACITY: CPT

## 2023-10-09 PROCEDURE — 20610 DRAIN/INJ JOINT/BURSA W/O US: CPT | Performed by: ORTHOPAEDIC SURGERY

## 2023-10-09 PROCEDURE — 71046 X-RAY EXAM CHEST 2 VIEWS: CPT

## 2023-10-09 PROCEDURE — 82820 HEMOGLOBIN-OXYGEN AFFINITY: CPT | Performed by: INTERNAL MEDICINE

## 2023-10-09 PROCEDURE — 94060 EVALUATION OF WHEEZING: CPT

## 2023-10-09 RX ORDER — ALBUTEROL SULFATE 2.5 MG/3ML
2.5 SOLUTION RESPIRATORY (INHALATION) ONCE AS NEEDED
Status: COMPLETED | OUTPATIENT
Start: 2023-10-09 | End: 2023-10-09

## 2023-10-09 RX ORDER — LIDOCAINE HYDROCHLORIDE 10 MG/ML
2 INJECTION, SOLUTION EPIDURAL; INFILTRATION; INTRACAUDAL; PERINEURAL
Status: COMPLETED | OUTPATIENT
Start: 2023-10-09 | End: 2023-10-09

## 2023-10-09 RX ORDER — SITAGLIPTIN 50 MG/1
TABLET, FILM COATED ORAL
COMMUNITY
Start: 2023-09-19

## 2023-10-09 RX ORDER — METHYLPREDNISOLONE ACETATE 80 MG/ML
1 INJECTION, SUSPENSION INTRA-ARTICULAR; INTRALESIONAL; INTRAMUSCULAR; SOFT TISSUE
Status: COMPLETED | OUTPATIENT
Start: 2023-10-09 | End: 2023-10-09

## 2023-10-09 RX ADMIN — LIDOCAINE HYDROCHLORIDE 2 ML: 10 INJECTION, SOLUTION EPIDURAL; INFILTRATION; INTRACAUDAL; PERINEURAL at 14:31

## 2023-10-09 RX ADMIN — ALBUTEROL SULFATE 2.5 MG: 2.5 SOLUTION RESPIRATORY (INHALATION) at 11:45

## 2023-10-09 RX ADMIN — METHYLPREDNISOLONE ACETATE 1 ML: 80 INJECTION, SUSPENSION INTRA-ARTICULAR; INTRALESIONAL; INTRAMUSCULAR; SOFT TISSUE at 14:31

## 2023-10-09 NOTE — PROGRESS NOTES
Patient: Lana Yañez  YOB: 1945  Date of Service: 10/9/2023    Chief Complaints: Left shoulder pain    Subjective:    History of Present Illness: Pt is seen in the office today with complaints of left shoulder pain I last saw her she has known degenerative changes does get intermittent injections and does well with those        Allergies:   Allergies   Allergen Reactions    Contrast Dye (Echo Or Unknown Ct/Mr) Hives and Itching    Macrobid [Nitrofurantoin] Hives       Medications:   Home Medications:  Current Outpatient Medications on File Prior to Visit   Medication Sig    Accu-Chek FastClix Lancets misc Use to check glucose as directed    acetaminophen (TYLENOL) 325 MG tablet Take 2 tablets by mouth Every 6 (Six) Hours As Needed for Moderate Pain.    albuterol sulfate  (90 Base) MCG/ACT inhaler Inhale 2 puffs Every 4 (Four) Hours As Needed for Shortness of Air. PRN SOA/WHEEZING    aspirin 81 MG EC tablet Take 1 tablet by mouth Daily.    atorvastatin (LIPITOR) 10 MG tablet Take 1 tablet by mouth Daily.    Blood Glucose Monitoring Suppl (ACCU-CHEK GUIDE) w/Device kit See Admin Instructions.    digoxin (LANOXIN) 125 MCG tablet Take 1 tablet by mouth Every Other Day.    glimepiride (Amaryl) 2 MG tablet By mouth take one tab twice daily with AM and PM meals.    glucose blood test strip Test twice daily prior to breakfast and dinner as instructed (Patient taking differently: Test daily prior to breakfast)    Lumigan 0.01 % ophthalmic drops Administer 1 drop to both eyes Every Night.    metoprolol tartrate (Lopressor) 50 MG tablet Take 1 tablet by mouth 2 (Two) Times a Day for 30 days.    polyethylene glycol (MIRALAX) 17 g packet Take 17 g by mouth Daily As Needed (constipation).    potassium chloride 10 MEQ CR tablet 1 po bid    torsemide (DEMADEX) 20 MG tablet 2 po qday    vitamin D (ERGOCALCIFEROL) 1.25 MG (74851 UT) capsule capsule Take 1 capsule by mouth 1 (One) Time Per Week.     warfarin (COUMADIN) 5 MG tablet Take one-half tablet (2.5 mg) by mouth on Fridays, and take one tablet (5 mg) by mouth all other days or as directed.     No current facility-administered medications on file prior to visit.     Current Medications:  Scheduled Meds:  Continuous Infusions:No current facility-administered medications for this visit.    PRN Meds:.    I have reviewed the patient's medical history in detail and updated the computerized patient record.  Review and summarization of old records include:    Past Medical History:   Diagnosis Date    Acute on chronic diastolic heart failure     Acute UTI (urinary tract infection) 05/22/2022    Arthritis     Arthritis of back     Asthma     Atrial fibrillation     Persistent; on warfarin    Atypical chest pain     Biliary acute pancreatitis without necrosis or infection 11/10/2021    Bronchitis     Cellulitis of right elbow     due to MRSA    Cervical disc disorder     CHF (congestive heart failure)     COPD (chronic obstructive pulmonary disease)     Coronary artery disease     Cardiac catheterization completed; 90% PDA stenosis with medical management recommended    Coronary artery disease involving native coronary artery of native heart with angina pectoris with documented spasm     CTS (carpal tunnel syndrome)     Diabetes 1.5, managed as type 2     Disease of thyroid gland     Displacement of lumbar intervertebral disc without myelopathy     Dizziness     ANTOINE (dyspnea on exertion)     Essential hypertension     Fatigue     Fracture, foot     Generalized osteoarthritis of multiple sites     History of rheumatic fever     History of transfusion     Hx of bone density study     10/23/2014    Hyperglycemia     Hyperlipidemia     Hypovitaminosis D     Left arm pain     Left-sided weakness     Leg swelling     Low back pain     Lower extremity edema     Lumbosacral disc disease     Malaise and fatigue     Mitral valve disease     Moderate mitral valve prolapse and  moderate mitral regurgitation    Mitral valve insufficiency     Morbid obesity with BMI of 40.0-44.9, adult     MRSA infection     NSTEMI (non-ST elevated myocardial infarction)     PAF (paroxysmal atrial fibrillation)     Periarthritis of shoulder     RA (rheumatoid arthritis)     involving both hands    Sleep apnea     SOB (shortness of breath)     Stroke     left side weakness    Urge incontinence of urine     UTI (urinary tract infection) 2020    Vitamin D deficiency         Past Surgical History:   Procedure Laterality Date    BREAST BIOPSY      BREAST SURGERY      right side lumpectomy with biopsy    CARDIAC CATHETERIZATION Left 10/20/2017    Procedure: Cardiac Catheterization/Vascular Study;  Surgeon: Alphonso Olmedo MD;  Location: [facility] CATH INVASIVE LOCATION;  Service:     CARDIAC CATHETERIZATION N/A 10/20/2017    +2 mitral regurgitation, left main 10% stenosis, mid to distal LAD 10% diffuse stenosis, circumflex 10% diffuse stenosis, RCA 10% proximal stenosis, and distal PDA consistent with coronary embolus with a lesion of 90% too small to consider coronary intervention; medical management recommended    EYE SURGERY      laser surgery for glaucoma and left eye cataracts removed    HYSTERECTOMY      10+ years ago    JOINT REPLACEMENT  ; 2006    bilateral knees and left rotater    KNEE SURGERY      MAMMO BILATERAL  2016    [facility]     SHOULDER SURGERY          Social History     Occupational History    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     Employer: RETIRED   Tobacco Use    Smoking status: Former     Packs/day: 3.00     Years: 1.00     Additional pack years: 0.00     Total pack years: 3.00     Types: Cigarettes     Start date: 1967     Quit date: 10/19/1968     Years since quittin.0     Passive exposure: Never    Smokeless tobacco: Never    Tobacco comments:     caffeine use - decaf coffee   Vaping Use    Vaping Use: Never used   Substance and Sexual Activity     Alcohol use: No     Comment: No caffeine use    Drug use: No    Sexual activity: Defer      Social History     Social History Narrative    Not on file        Family History   Problem Relation Age of Onset    Colon cancer Mother     Glaucoma Mother     Stroke Mother     Arthritis Mother     Hypertension Mother     Glaucoma Sister     Diabetes Sister     Heart disease Sister     Arthritis Sister     Asthma Sister     Hypertension Sister     Miscarriages / Stillbirths Sister     Lung disease Sister     Heart disease Brother     Diabetes Brother     Arthritis Brother     Drug abuse Brother     Hypertension Brother     Arthritis Father     COPD Father     Lung disease Father     Arthritis Daughter     Depression Daughter     Alcohol abuse Maternal Uncle     Heart disease Sister     Heart disease Sister     Heart disease Brother     Rheumatologic disease Sister        ROS: 14 point review of systems was performed and was negative except for documented findings in HPI and today's encounter.     Allergies:   Allergies   Allergen Reactions    Contrast Dye (Echo Or Unknown Ct/Mr) Hives and Itching    Macrobid [Nitrofurantoin] Hives     Constitutional:  Denies fever, shaking or chills   Eyes:  Denies change in visual acuity   HENT:  Denies nasal congestion or sore throat   Respiratory:  Denies cough or shortness of breath   Cardiovascular:  Denies chest pain or severe LE edema   GI:  Denies abdominal pain, nausea, vomiting, bloody stools or diarrhea   Musculoskeletal:  Numbness, tingling, or loss of motor function only as noted above in history of present illness.  : Denies painful urination or hematuria  Integument:  Denies rash, lesion or ulceration   Neurologic:  Denies headache or focal weakness  Endocrine:  Denies lymphadenopathy  Psych:  Denies confusion or change in mental status   Hem:  Denies active bleeding      Physical Exam: 78 y.o. female  Wt Readings from Last 3 Encounters:   09/28/23 113 kg (249 lb)   05/16/23  112 kg (245 lb 14.4 oz)   03/28/23 111 kg (245 lb 6.4 oz)       There is no height or weight on file to calculate BMI.    There were no vitals filed for this visit.  Vital signs reviewed.   General Appearance:    Alert, cooperative, in no acute distress                    Ortho exam  Exam is unchanged               Assessment: Left shoulder OA    Plan: Injection  Follow up as indicated.  Ice, elevate, and rest as needed.  Discussed conservative measures of pain control including ice, bracing.  Asya Rodriges M.D.      Large Joint Arthrocentesis: L glenohumeral  Date/Time: 10/9/2023 2:31 PM  Consent given by: patient  Site marked: site marked  Timeout: Immediately prior to procedure a time out was called to verify the correct patient, procedure, equipment, support staff and site/side marked as required   Supporting Documentation  Indications: pain   Procedure Details  Location: shoulder - L glenohumeral  Preparation: Patient was prepped and draped in the usual sterile fashion  Needle gauge: 21G.  Approach: posterior  Medications administered: 1 mL methylPREDNISolone acetate 80 MG/ML; 2 mL lidocaine PF 1% 1 %  Patient tolerance: patient tolerated the procedure well with no immediate complications

## 2023-10-10 ENCOUNTER — TELEPHONE (OUTPATIENT)
Dept: CARDIOLOGY | Facility: CLINIC | Age: 78
End: 2023-10-10
Payer: MEDICARE

## 2023-10-10 NOTE — TELEPHONE ENCOUNTER
Please let her know that spirometry showed some abnormalities.  Would recommend follow-up with pulmonology.  If she does not currently have pulmonologist please let me know and I will place a referral.

## 2023-10-10 NOTE — TELEPHONE ENCOUNTER
Chest x-ray shows mild vascular congestion.  Would make sure she is still taking torsemide that was prescribed by Dr. Geren back in March.  If she has good urine output on this can continue current dose.  If not, would slightly increase dose to 60 mg (3 20 mg tablets) daily.  If dose needs to be increased she will need repeat labs in 2 weeks to assess renal function electrolytes.  Please let me know and I can order.

## 2023-10-10 NOTE — TELEPHONE ENCOUNTER
Pt states that she does have good urine output with the torsemide (which she takes 20mg BID), but if she misses a dose, she has swelling in her hands and feet.  Denies any CP/SOA or other symptoms at this time.  She also called to get in to see her pulmonologist and the soonest they can get her in is 12/12/23.  She would like to know if this is ok.    Any further recommendations?    Thanks so much,  Mary Tavera, OLEGARIO  Triage RN  10/10/23 12:34 EDT

## 2023-10-10 NOTE — TELEPHONE ENCOUNTER
Called and left VM, will continue to try to reach pt.    Mary Tavera, RN  Triage RN  10/10/23 12:41 EDT

## 2023-10-10 NOTE — TELEPHONE ENCOUNTER
That is fine, as long as she is not having significant SOA or chest pain and has good urine output on current dose of torsemide.

## 2023-10-10 NOTE — TELEPHONE ENCOUNTER
Results and recommendations called to pt.  Instructed to call with any further questions or concerns.  Verbalized understanding.  Pt is an established pt at [facility], and will call to make an appointment.    Mary Tavera RN  Triage Nurse, YONI  10/10/23 09:55 EDT

## 2023-10-11 NOTE — TELEPHONE ENCOUNTER
Reviewed recommendations with patient, verbalized understanding, will call with any further questions or complaints.    Mary Tavera RN  Triage Nurse  10/11/23 11:03 EDT

## 2023-10-12 ENCOUNTER — HOSPITAL ENCOUNTER (OUTPATIENT)
Dept: CARDIOLOGY | Facility: HOSPITAL | Age: 78
Discharge: HOME | End: 2023-10-12
Admitting: INTERNAL MEDICINE
Payer: MEDICARE

## 2023-10-12 VITALS
DIASTOLIC BLOOD PRESSURE: 94 MMHG | HEIGHT: 65 IN | BODY MASS INDEX: 42.98 KG/M2 | HEART RATE: 75 BPM | WEIGHT: 257.94 LBS | SYSTOLIC BLOOD PRESSURE: 140 MMHG

## 2023-10-12 DIAGNOSIS — R06.02 SOB (SHORTNESS OF BREATH): ICD-10-CM

## 2023-10-12 LAB
AORTIC ARCH: 2.1 CM
ASCENDING AORTA: 3.7 CM
BH CV ECHO MEAS - ACS: 2.12 CM
BH CV ECHO MEAS - AI P1/2T: 394.3 MSEC
BH CV ECHO MEAS - AO MAX PG: 12.4 MMHG
BH CV ECHO MEAS - AO MEAN PG: 6.2 MMHG
BH CV ECHO MEAS - AO ROOT DIAM: 3.3 CM
BH CV ECHO MEAS - AO V2 MAX: 175.9 CM/SEC
BH CV ECHO MEAS - AO V2 VTI: 30 CM
BH CV ECHO MEAS - AVA PLANIMETRY TRACED: 0.6 CM2
BH CV ECHO MEAS - AVA(I,D): 1.98 CM2
BH CV ECHO MEAS - EDV(CUBED): 75.7 ML
BH CV ECHO MEAS - EDV(MOD-SP2): 68 ML
BH CV ECHO MEAS - EDV(MOD-SP4): 82 ML
BH CV ECHO MEAS - EF(MOD-BP): 62.5 %
BH CV ECHO MEAS - EF(MOD-SP2): 63.2 %
BH CV ECHO MEAS - EF(MOD-SP4): 62.2 %
BH CV ECHO MEAS - ESV(CUBED): 29.6 ML
BH CV ECHO MEAS - ESV(MOD-SP2): 25 ML
BH CV ECHO MEAS - ESV(MOD-SP4): 31 ML
BH CV ECHO MEAS - FS: 26.9 %
BH CV ECHO MEAS - IVS/LVPW: 1.09 CM
BH CV ECHO MEAS - IVSD: 1.06 CM
BH CV ECHO MEAS - LAT PEAK E' VEL: 9.5 CM/SEC
BH CV ECHO MEAS - LV DIASTOLIC VOL/BSA (35-75): 37.1 CM2
BH CV ECHO MEAS - LV MASS(C)D: 141.3 GRAMS
BH CV ECHO MEAS - LV MAX PG: 4 MMHG
BH CV ECHO MEAS - LV MEAN PG: 2.15 MMHG
BH CV ECHO MEAS - LV SYSTOLIC VOL/BSA (12-30): 14 CM2
BH CV ECHO MEAS - LV V1 MAX: 99.8 CM/SEC
BH CV ECHO MEAS - LV V1 VTI: 19.4 CM
BH CV ECHO MEAS - LVIDD: 4.2 CM
BH CV ECHO MEAS - LVIDS: 3.1 CM
BH CV ECHO MEAS - LVOT AREA: 3 CM2
BH CV ECHO MEAS - LVOT DIAM: 1.97 CM
BH CV ECHO MEAS - LVPWD: 0.97 CM
BH CV ECHO MEAS - MED PEAK E' VEL: 5.2 CM/SEC
BH CV ECHO MEAS - MV DEC SLOPE: 432.1 CM/SEC2
BH CV ECHO MEAS - MV DEC TIME: 0.22 SEC
BH CV ECHO MEAS - MV E MAX VEL: 118 CM/SEC
BH CV ECHO MEAS - MV MAX PG: 7.7 MMHG
BH CV ECHO MEAS - MV MEAN PG: 3.4 MMHG
BH CV ECHO MEAS - MV P1/2T: 94.8 MSEC
BH CV ECHO MEAS - MV V2 VTI: 43.3 CM
BH CV ECHO MEAS - MVA(P1/2T): 2.32 CM2
BH CV ECHO MEAS - MVA(VTI): 1.37 CM2
BH CV ECHO MEAS - PA ACC TIME: 0.08 SEC
BH CV ECHO MEAS - PA V2 MAX: 86.3 CM/SEC
BH CV ECHO MEAS - QP/QS: 0.64
BH CV ECHO MEAS - RAP SYSTOLE: 15 MMHG
BH CV ECHO MEAS - RV MAX PG: 1.3 MMHG
BH CV ECHO MEAS - RV V1 MAX: 57 CM/SEC
BH CV ECHO MEAS - RV V1 VTI: 10.6 CM
BH CV ECHO MEAS - RVOT DIAM: 2.14 CM
BH CV ECHO MEAS - RVSP: 44.5 MMHG
BH CV ECHO MEAS - SI(MOD-SP2): 19.4 ML/M2
BH CV ECHO MEAS - SI(MOD-SP4): 23 ML/M2
BH CV ECHO MEAS - SV(LVOT): 59.2 ML
BH CV ECHO MEAS - SV(MOD-SP2): 43 ML
BH CV ECHO MEAS - SV(MOD-SP4): 51 ML
BH CV ECHO MEAS - SV(RVOT): 38.1 ML
BH CV ECHO MEAS - TAPSE (>1.6): 1.22 CM
BH CV ECHO MEAS - TR MAX PG: 29.5 MMHG
BH CV ECHO MEAS - TR MAX VEL: 271.5 CM/SEC
BH CV ECHO MEASUREMENTS AVERAGE E/E' RATIO: 16.05
BH CV XLRA - RV BASE: 3.2 CM
BH CV XLRA - RV MID: 3.5 CM
BH CV XLRA - TDI S': 10 CM/SEC
LEFT ATRIUM VOLUME INDEX: 64.3 ML/M2
SINUS: 3.3 CM
STJ: 2.49 CM

## 2023-10-12 PROCEDURE — 93306 TTE W/DOPPLER COMPLETE: CPT

## 2023-10-18 ENCOUNTER — TELEPHONE (OUTPATIENT)
Dept: CARDIOLOGY | Facility: CLINIC | Age: 78
End: 2023-10-18
Payer: MEDICARE

## 2023-10-18 NOTE — TELEPHONE ENCOUNTER
Pt called the office requesting results of her echocardiogram.  Was there a message I could provide her?    She also wanted to note that she's had the referral placed for [facility], however, she's seen Dr. Morrissey and has difficulty understanding physician.  She feels she needs a pulmonologist who is also a sleep doctor.  She's requesting a referral for this.    Do you have any recommendations for this patient?    Thank you,    Tisha JENKINS RN  [facility] Triage  10/18/23  14:11 EDT

## 2023-10-18 NOTE — TELEPHONE ENCOUNTER
Dr. Morrissey is also a sleep doctor.  Since she is already an established patient with [facility] she could call the office and asked to be transferred to either Dr. Simmons or Dr. Kishan Shetty.  They are both pulmonologist who are also sleep doctors and would be easier for her.    As far as her echocardiogram, her heart is strong and functioning well.  She does have elevated pressures in her lungs which is why we wanted her to see the pulmonologist.  There is mild leakage from her aortic valve that is similar to her prior study.

## 2023-10-18 NOTE — TELEPHONE ENCOUNTER
I spoke with Lana Yañez and updated pt on results/recommendations from provider.  Pt verbalized understanding and has no further questions at this time.    Thank you,    Tisha JENKINS, RN  Triage [facility] [place] [facility]  10/18/23 14:49 EDT

## 2023-10-26 ENCOUNTER — ANTICOAGULATION VISIT (OUTPATIENT)
Dept: PHARMACY | Facility: HOSPITAL | Age: 78
End: 2023-10-26
Payer: MEDICARE

## 2023-10-26 LAB
INR PPP: 2.3 (ref 0.91–1.09)
PROTHROMBIN TIME: 27.8 SECONDS (ref 10–13.8)

## 2023-10-26 PROCEDURE — 85610 PROTHROMBIN TIME: CPT

## 2023-10-26 PROCEDURE — 36416 COLLJ CAPILLARY BLOOD SPEC: CPT

## 2023-10-26 NOTE — PROGRESS NOTES
Anticoagulation Clinic Progress Note    Anticoagulation Summary  As of 10/26/2023      INR goal:  2.0-3.0   TTR:  67.1% (5 y)   INR used for dosin.3 (10/26/2023)   Warfarin maintenance plan:  5 mg every day   Weekly warfarin total:  35 mg   No change documented:  Pamella Monte, Pharmacy Technician   Plan last modified:  Love Allison RPH (3/27/2023)   Next INR check:  2023   Priority:  Maintenance   Target end date:  Indefinite    Indications    Atrial fibrillation [I48.91]                 Anticoagulation Episode Summary       INR check location:      Preferred lab:      Send INR reminders to:  [facility] [place]    Comments:            Anticoagulation Care Providers       Provider Role Specialty Phone number    Pia Dueñas MD Referring Cardiology [phone]            Clinic Interview:  Patient Findings     Negatives:  Signs/symptoms of thrombosis, Signs/symptoms of bleeding,   Laboratory test error suspected, Change in health, Change in alcohol use,   Change in activity, Upcoming invasive procedure, Emergency department   visit, Upcoming dental procedure, Missed doses, Extra doses, Change in   medications, Change in diet/appetite, Hospital admission, Bruising, Other   complaints      Clinical Outcomes     Negatives:  Major bleeding event, Thromboembolic event,   Anticoagulation-related hospital admission, Anticoagulation-related ED   visit, Anticoagulation-related fatality        INR History:      Latest Ref Rng & Units 2023     1:30 PM 5/3/2023     1:30 PM 2023     9:45 AM 2023     4:52 PM 2023     8:53 AM 2023     3:15 PM 10/26/2023     1:45 PM   Anticoagulation Monitoring   INR  2.6 2.4 2.1  2.22 3.0 2.3   INR Date  2023 5/3/2023 2023  2023 2023 10/26/2023   INR Goal  2.0-3.0 2.0-3.0 2.0-3.0  2.0-3.0 2.0-3.0 2.0-3.0   Trend  Same Same Same  Same Same Same   Last Week Total  35 mg 35 mg 30 mg  35 mg 35 mg 35 mg   Next Week Total  35 mg 35 mg  37.5 mg  35 mg 35 mg 35 mg   Sun  5 mg 5 mg 5 mg  5 mg 5 mg 5 mg   Mon  5 mg 5 mg 5 mg  5 mg 5 mg 5 mg   Tue  5 mg 5 mg 5 mg  5 mg 5 mg 5 mg   Wed  5 mg 5 mg 7.5 mg (6/28); Otherwise 5 mg  5 mg 5 mg 5 mg   Thu  5 mg 5 mg 5 mg  5 mg 5 mg 5 mg   Fri  5 mg 5 mg 5 mg  5 mg 5 mg 5 mg   Sat  5 mg 5 mg 5 mg  5 mg 5 mg 5 mg   Historical INR 0.90 - 1.10    2.22           Plan:  1. INR is therapeutic today- see above in Anticoagulation Summary.   Will instruct Lanaantonio Aguerocomb to continue their warfarin regimen- see above in Anticoagulation Summary.  2. Follow up in 4 weeks.  3. Patient declines warfarin refills.  4. Verbal and written information provided. Patient expresses understanding and has no further questions at this time.    Pamella Monte, Pharmacy Technician

## 2023-11-15 ENCOUNTER — TELEPHONE (OUTPATIENT)
Dept: CARDIOLOGY | Facility: CLINIC | Age: 78
End: 2023-11-15
Payer: MEDICARE

## 2023-11-15 NOTE — TELEPHONE ENCOUNTER
She does not need to hold warfarin for dental cleanings.  I do not see anything in our notes about her needing to premedicate with antibiotics.  She would not need any prior to cleaning but may need antibiotics prior to the larger procedure when it is scheduled.

## 2023-11-15 NOTE — TELEPHONE ENCOUNTER
Lana Yañez called regarding upcoming dental work.    Patient stated she will needs a cleaning, which will be done by Dr. Mendoza at [facility].  She will also eventually need a crown and partial plate.    Patient has the following questions:  She was told by technician/provider that the cleaning may result in bleeding.  Patient is asking if she needs to hold her warfarin for the dental cleaning?  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  Patient is asking if she needs prophylactic antibiotics for dental procedures?      Please let me know how you would like to proceed.    Thank you,  Layla BRIONES RN  Triage Nurse [facility]   15:16 EST

## 2023-11-16 NOTE — TELEPHONE ENCOUNTER
Reviewed recommendations with Lana Yañez and the patient verbalized understanding of the recommendations.    Thank you,  Layla BRIONES RN  Triage Nurse [facility] [place]   08:23 EST

## 2023-12-12 ENCOUNTER — TRANSCRIBE ORDERS (OUTPATIENT)
Dept: LAB | Facility: HOSPITAL | Age: 78
End: 2023-12-12
Payer: MEDICARE

## 2023-12-12 ENCOUNTER — ANTICOAGULATION VISIT (OUTPATIENT)
Dept: PHARMACY | Facility: HOSPITAL | Age: 78
End: 2023-12-12
Payer: MEDICARE

## 2023-12-12 ENCOUNTER — LAB (OUTPATIENT)
Dept: LAB | Facility: HOSPITAL | Age: 78
End: 2023-12-12
Payer: MEDICARE

## 2023-12-12 DIAGNOSIS — I48.91 ATRIAL FIBRILLATION WITH RVR: ICD-10-CM

## 2023-12-12 DIAGNOSIS — R73.02 IMPAIRED GLUCOSE TOLERANCE: ICD-10-CM

## 2023-12-12 DIAGNOSIS — E83.42 HYPOMAGNESEMIA: Primary | ICD-10-CM

## 2023-12-12 DIAGNOSIS — E78.5 HYPERLIPIDEMIA, UNSPECIFIED HYPERLIPIDEMIA TYPE: ICD-10-CM

## 2023-12-12 DIAGNOSIS — E55.9 VITAMIN D DEFICIENCY, UNSPECIFIED: ICD-10-CM

## 2023-12-12 DIAGNOSIS — I48.21 PERMANENT ATRIAL FIBRILLATION: ICD-10-CM

## 2023-12-12 DIAGNOSIS — I48.91 ATRIAL FIBRILLATION, UNSPECIFIED TYPE: ICD-10-CM

## 2023-12-12 DIAGNOSIS — E83.42 HYPOMAGNESEMIA: ICD-10-CM

## 2023-12-12 LAB
25(OH)D3 SERPL-MCNC: 50.2 NG/ML (ref 30–100)
ALBUMIN SERPL-MCNC: 4.2 G/DL (ref 3.5–5.2)
ALBUMIN/GLOB SERPL: 1.2 G/DL
ALP SERPL-CCNC: 72 U/L (ref 39–117)
ALT SERPL W P-5'-P-CCNC: 9 U/L (ref 1–33)
ANION GAP SERPL CALCULATED.3IONS-SCNC: 11.4 MMOL/L (ref 5–15)
AST SERPL-CCNC: 20 U/L (ref 1–32)
BILIRUB SERPL-MCNC: 0.6 MG/DL (ref 0–1.2)
BUN SERPL-MCNC: 16 MG/DL (ref 8–23)
BUN/CREAT SERPL: 13.4 (ref 7–25)
CALCIUM SPEC-SCNC: 9.8 MG/DL (ref 8.6–10.5)
CHLORIDE SERPL-SCNC: 103 MMOL/L (ref 98–107)
CHOLEST SERPL-MCNC: 120 MG/DL (ref 0–200)
CO2 SERPL-SCNC: 25.6 MMOL/L (ref 22–29)
CREAT SERPL-MCNC: 1.19 MG/DL (ref 0.57–1)
DEPRECATED RDW RBC AUTO: 46.7 FL (ref 37–54)
EGFRCR SERPLBLD CKD-EPI 2021: 47.5 ML/MIN/1.73
ERYTHROCYTE [DISTWIDTH] IN BLOOD BY AUTOMATED COUNT: 13.7 % (ref 12.3–15.4)
GLOBULIN UR ELPH-MCNC: 3.4 GM/DL
GLUCOSE SERPL-MCNC: 167 MG/DL (ref 65–99)
HBA1C MFR BLD: 7 % (ref 4.8–5.6)
HCT VFR BLD AUTO: 39.7 % (ref 34–46.6)
HDLC SERPL-MCNC: 64 MG/DL (ref 40–60)
HGB BLD-MCNC: 12.9 G/DL (ref 12–15.9)
INR PPP: 2.19 (ref 0.9–1.1)
LDLC SERPL CALC-MCNC: 38 MG/DL (ref 0–100)
LDLC/HDLC SERPL: 0.58 {RATIO}
MAGNESIUM SERPL-MCNC: 1.7 MG/DL (ref 1.6–2.4)
MCH RBC QN AUTO: 30.4 PG (ref 26.6–33)
MCHC RBC AUTO-ENTMCNC: 32.5 G/DL (ref 31.5–35.7)
MCV RBC AUTO: 93.6 FL (ref 79–97)
PLATELET # BLD AUTO: 164 10*3/MM3 (ref 140–450)
PMV BLD AUTO: 10.9 FL (ref 6–12)
POTASSIUM SERPL-SCNC: 3.9 MMOL/L (ref 3.5–5.2)
PROT SERPL-MCNC: 7.6 G/DL (ref 6–8.5)
PROTHROMBIN TIME: 24.8 SECONDS (ref 11.7–14.2)
RBC # BLD AUTO: 4.24 10*6/MM3 (ref 3.77–5.28)
SODIUM SERPL-SCNC: 140 MMOL/L (ref 136–145)
TRIGL SERPL-MCNC: 96 MG/DL (ref 0–150)
VLDLC SERPL-MCNC: 18 MG/DL (ref 5–40)
WBC NRBC COR # BLD AUTO: 6.54 10*3/MM3 (ref 3.4–10.8)

## 2023-12-12 PROCEDURE — 80053 COMPREHEN METABOLIC PANEL: CPT

## 2023-12-12 PROCEDURE — 85610 PROTHROMBIN TIME: CPT

## 2023-12-12 PROCEDURE — 80061 LIPID PANEL: CPT

## 2023-12-12 PROCEDURE — 36415 COLL VENOUS BLD VENIPUNCTURE: CPT

## 2023-12-12 PROCEDURE — 83735 ASSAY OF MAGNESIUM: CPT

## 2023-12-12 PROCEDURE — 85027 COMPLETE CBC AUTOMATED: CPT

## 2023-12-12 PROCEDURE — 83036 HEMOGLOBIN GLYCOSYLATED A1C: CPT

## 2023-12-12 PROCEDURE — 82306 VITAMIN D 25 HYDROXY: CPT

## 2023-12-12 NOTE — PROGRESS NOTES
Anticoagulation Clinic Progress Note    Anticoagulation Summary  As of 2023      INR goal:  2.0-3.0   TTR:  68.0% (5.1 y)   INR used for dosin.19 (2023)   Warfarin maintenance plan:  5 mg every day   Weekly warfarin total:  35 mg   No change documented:  Love Allison RPH   Plan last modified:  Love Allison RPH (3/27/2023)   Next INR check:  2024   Priority:  Maintenance   Target end date:  Indefinite    Indications    Atrial fibrillation [I48.91]                 Anticoagulation Episode Summary       INR check location:      Preferred lab:      Send INR reminders to:  [facility]    Comments:            Anticoagulation Care Providers       Provider Role Specialty Phone number    Pia Dueñas MD Referring Cardiology [phone]            Clinic Interview:  Patient Findings     Negatives:  Signs/symptoms of thrombosis, Signs/symptoms of bleeding,   Laboratory test error suspected, Change in health, Change in alcohol use,   Change in activity, Upcoming invasive procedure, Emergency department   visit, Upcoming dental procedure, Missed doses, Extra doses, Change in   medications, Change in diet/appetite, Hospital admission, Bruising, Other   complaints      Clinical Outcomes     Negatives:  Major bleeding event, Thromboembolic event,   Anticoagulation-related hospital admission, Anticoagulation-related ED   visit, Anticoagulation-related fatality        INR History:      Latest Ref Rng & Units 2023     9:45 AM 2023     4:52 PM 2023     8:53 AM 2023     3:15 PM 10/26/2023     1:45 PM 2023    12:14 PM 2023     1:09 PM   Anticoagulation Monitoring   INR  2.1  2.22 3.0 2.3  2.19   INR Date  2023  2023 2023 10/26/2023  2023   INR Goal  2.0-3.0  2.0-3.0 2.0-3.0 2.0-3.0  2.0-3.0   Trend  Same  Same Same Same  Same   Last Week Total  30 mg  35 mg 35 mg 35 mg  35 mg   Next Week Total  37.5 mg  35 mg 35 mg 35 mg  35 mg   Sun  5 mg  5  mg 5 mg 5 mg  5 mg   Mon  5 mg  5 mg 5 mg 5 mg  5 mg   Tue  5 mg  5 mg 5 mg 5 mg  5 mg   Wed  7.5 mg (6/28); Otherwise 5 mg  5 mg 5 mg 5 mg  5 mg   Thu  5 mg  5 mg 5 mg 5 mg  5 mg   Fri  5 mg  5 mg 5 mg 5 mg  5 mg   Sat  5 mg  5 mg 5 mg 5 mg  5 mg   Historical INR 0.90 - 1.10  2.22     2.19         Plan:  1. INR is Therapeutic today- see above in Anticoagulation Summary.   Will instruct Lanaantonio Aguerocomb to Continue their warfarin regimen- see above in Anticoagulation Summary.  2. Follow up in 4 weeks  3. They have been instructed to call if any changes in medications, doses, concerns, etc. Patient expresses understanding and has no further questions at this time.    Love Allison [facility]

## 2024-01-08 RX ORDER — LANCETS
EACH MISCELLANEOUS
Qty: 102 EACH | OUTPATIENT
Start: 2024-01-08

## 2024-01-12 NOTE — PROGRESS NOTES
Patient: Lana Yañez  YOB: 1945  Date of Service: 1/12/2024    Chief Complaints: Left shoulder pain    Subjective:    History of Present Illness: Pt is seen in the office today with complaints of   Left shoulder pain she has known severe degenerative changes she gets intermittent injections the last almost a full 3 months she is not interested in think surgical      Allergies:   Allergies   Allergen Reactions    Contrast Dye (Echo Or Unknown Ct/Mr) Hives and Itching    Macrobid [Nitrofurantoin] Hives       Medications:   Home Medications:  Current Outpatient Medications on File Prior to Visit   Medication Sig    Accu-Chek FastClix Lancets misc Use to check glucose as directed    acetaminophen (TYLENOL) 325 MG tablet Take 2 tablets by mouth Every 6 (Six) Hours As Needed for Moderate Pain. (Patient not taking: Reported on 10/9/2023)    albuterol sulfate  (90 Base) MCG/ACT inhaler Inhale 2 puffs Every 4 (Four) Hours As Needed for Shortness of Air. PRN SOA/WHEEZING    aspirin 81 MG EC tablet Take 1 tablet by mouth Daily.    atorvastatin (LIPITOR) 10 MG tablet Take 1 tablet by mouth Daily.    Blood Glucose Monitoring Suppl (ACCU-CHEK GUIDE) w/Device kit See Admin Instructions.    digoxin (LANOXIN) 125 MCG tablet Take 1 tablet by mouth Every Other Day.    glimepiride (Amaryl) 2 MG tablet By mouth take one tab twice daily with AM and PM meals.    glucose blood test strip Test twice daily prior to breakfast and dinner as instructed (Patient taking differently: Test daily prior to breakfast)    Januvia 50 MG tablet     Lumigan 0.01 % ophthalmic drops Administer 1 drop to both eyes Every Night.    metoprolol tartrate (Lopressor) 50 MG tablet Take 1 tablet by mouth 2 (Two) Times a Day for 30 days.    polyethylene glycol (MIRALAX) 17 g packet Take 17 g by mouth Daily As Needed (constipation). (Patient not taking: Reported on 10/9/2023)    potassium chloride 10 MEQ CR tablet 1 po bid    torsemide  (DEMADEX) 20 MG tablet 2 po qday    vitamin D (ERGOCALCIFEROL) 1.25 MG (22229 UT) capsule capsule Take 1 capsule by mouth 1 (One) Time Per Week.    warfarin (COUMADIN) 5 MG tablet Take one-half tablet (2.5 mg) by mouth on Fridays, and take one tablet (5 mg) by mouth all other days or as directed.     No current facility-administered medications on file prior to visit.     Current Medications:  Scheduled Meds:  Continuous Infusions:No current facility-administered medications for this visit.    PRN Meds:.    I have reviewed the patient's medical history in detail and updated the computerized patient record.  Review and summarization of old records include:    Past Medical History:   Diagnosis Date    Acute on chronic diastolic heart failure     Acute UTI (urinary tract infection) 05/22/2022    Arthritis     Arthritis of back     Asthma     Atrial fibrillation     Persistent; on warfarin    Atypical chest pain     Biliary acute pancreatitis without necrosis or infection 11/10/2021    Bronchitis     Cellulitis of right elbow     due to MRSA    Cervical disc disorder     CHF (congestive heart failure)     COPD (chronic obstructive pulmonary disease)     Coronary artery disease     Cardiac catheterization completed; 90% PDA stenosis with medical management recommended    Coronary artery disease involving native coronary artery of native heart with angina pectoris with documented spasm     CTS (carpal tunnel syndrome)     Diabetes 1.5, managed as type 2     Disease of thyroid gland     Displacement of lumbar intervertebral disc without myelopathy     Dizziness     ANTOINE (dyspnea on exertion)     Essential hypertension     Fatigue     Fracture, foot     Generalized osteoarthritis of multiple sites     History of rheumatic fever     History of transfusion     Hx of bone density study     10/23/2014    Hyperglycemia     Hyperlipidemia     Hypovitaminosis D     Left arm pain     Left-sided weakness     Leg swelling     Low back  pain     Lower extremity edema     Lumbosacral disc disease     Malaise and fatigue     Mitral valve disease     Moderate mitral valve prolapse and moderate mitral regurgitation    Mitral valve insufficiency     Morbid obesity with BMI of 40.0-44.9, adult     MRSA infection     NSTEMI (non-ST elevated myocardial infarction)     PAF (paroxysmal atrial fibrillation)     Periarthritis of shoulder     RA (rheumatoid arthritis)     involving both hands    Sleep apnea     SOB (shortness of breath)     Stroke     left side weakness    Urge incontinence of urine     UTI (urinary tract infection) 2020    Vitamin D deficiency         Past Surgical History:   Procedure Laterality Date    BREAST BIOPSY      BREAST SURGERY      right side lumpectomy with biopsy    CARDIAC CATHETERIZATION Left 10/20/2017    Procedure: Cardiac Catheterization/Vascular Study;  Surgeon: Alphonso Olmedo MD;  Location: [facility] CATH INVASIVE LOCATION;  Service:     CARDIAC CATHETERIZATION N/A 10/20/2017    +2 mitral regurgitation, left main 10% stenosis, mid to distal LAD 10% diffuse stenosis, circumflex 10% diffuse stenosis, RCA 10% proximal stenosis, and distal PDA consistent with coronary embolus with a lesion of 90% too small to consider coronary intervention; medical management recommended    EYE SURGERY      laser surgery for glaucoma and left eye cataracts removed    HYSTERECTOMY      10+ years ago    JOINT REPLACEMENT  ; 2006    bilateral knees and left rotater    KNEE SURGERY      MAMMO BILATERAL  2016    [facility]     SHOULDER SURGERY          Social History     Occupational History    Occupation:  for [employer]     Employer: RETIRED   Tobacco Use    Smoking status: Former     Packs/day: 3.00     Years: 1.00     Additional pack years: 0.00     Total pack years: 3.00     Types: Cigarettes     Start date: 1967     Quit date: 10/19/1968     Years since quittin.2     Passive exposure: Never    Smokeless  tobacco: Never    Tobacco comments:     caffeine use - decaf coffee   Vaping Use    Vaping Use: Never used   Substance and Sexual Activity    Alcohol use: No     Comment: No caffeine use    Drug use: No    Sexual activity: Defer      Social History     Social History Narrative    Not on file        Family History   Problem Relation Age of Onset    Colon cancer Mother     Glaucoma Mother     Stroke Mother     Arthritis Mother     Hypertension Mother     Glaucoma Sister     Diabetes Sister     Heart disease Sister     Arthritis Sister     Asthma Sister     Hypertension Sister     Miscarriages / Stillbirths Sister     Lung disease Sister     Heart disease Brother     Diabetes Brother     Arthritis Brother     Drug abuse Brother     Hypertension Brother     Arthritis Father     COPD Father     Lung disease Father     Arthritis Daughter     Depression Daughter     Alcohol abuse Maternal Uncle     Heart disease Sister     Heart disease Sister     Heart disease Brother     Rheumatologic disease Sister        ROS: 14 point review of systems was performed and was negative except for documented findings in HPI and today's encounter.     Allergies:   Allergies   Allergen Reactions    Contrast Dye (Echo Or Unknown Ct/Mr) Hives and Itching    Macrobid [Nitrofurantoin] Hives     Constitutional:  Denies fever, shaking or chills   Eyes:  Denies change in visual acuity   HENT:  Denies nasal congestion or sore throat   Respiratory:  Denies cough or shortness of breath   Cardiovascular:  Denies chest pain or severe LE edema   GI:  Denies abdominal pain, nausea, vomiting, bloody stools or diarrhea   Musculoskeletal:  Numbness, tingling, or loss of motor function only as noted above in history of present illness.  : Denies painful urination or hematuria  Integument:  Denies rash, lesion or ulceration   Neurologic:  Denies headache or focal weakness  Endocrine:  Denies lymphadenopathy  Psych:  Denies confusion or change in mental  status   Hem:  Denies active bleeding      Physical Exam: 78 y.o. female  Wt Readings from Last 3 Encounters:   10/12/23 117 kg (257 lb 15 oz)   10/09/23 117 kg (257 lb 11.2 oz)   09/28/23 113 kg (249 lb)       There is no height or weight on file to calculate BMI.    There were no vitals filed for this visit.  Vital signs reviewed.   General Appearance:    Alert, cooperative, in no acute distress                    Ortho exam        Exam is unchanged         Assessment: Left shoulder OA    Plan: Injection she understands but is not interested in think surgical  Follow up as indicated.  Ice, elevate, and rest as needed.  Discussed conservative measures of pain control including ice, bracing.  Asya Rodriges M.D.    Large Joint Arthrocentesis: L glenohumeral  Date/Time: 1/15/2024 1:27 PM  Consent given by: patient  Site marked: site marked  Timeout: Immediately prior to procedure a time out was called to verify the correct patient, procedure, equipment, support staff and site/side marked as required   Supporting Documentation  Indications: pain   Procedure Details  Location: shoulder - L glenohumeral  Preparation: Patient was prepped and draped in the usual sterile fashion  Needle gauge: 21G.  Approach: posterior  Medications administered: 1 mL methylPREDNISolone acetate 80 MG/ML; 2 mL lidocaine PF 1% 1 %  Patient tolerance: patient tolerated the procedure well with no immediate complications

## 2024-01-15 ENCOUNTER — CLINICAL SUPPORT (OUTPATIENT)
Dept: ORTHOPEDIC SURGERY | Facility: CLINIC | Age: 79
End: 2024-01-15
Payer: MEDICARE

## 2024-01-15 ENCOUNTER — ANTICOAGULATION VISIT (OUTPATIENT)
Dept: PHARMACY | Facility: HOSPITAL | Age: 79
End: 2024-01-15
Payer: MEDICARE

## 2024-01-15 VITALS — WEIGHT: 254.4 LBS | TEMPERATURE: 98.2 F | BODY MASS INDEX: 40.88 KG/M2 | HEIGHT: 66 IN

## 2024-01-15 DIAGNOSIS — M19.019 GLENOHUMERAL ARTHRITIS: Primary | ICD-10-CM

## 2024-01-15 LAB
INR PPP: 2.1 (ref 0.91–1.09)
PROTHROMBIN TIME: 25.6 SECONDS (ref 10–13.8)

## 2024-01-15 PROCEDURE — G0463 HOSPITAL OUTPT CLINIC VISIT: HCPCS

## 2024-01-15 PROCEDURE — 36416 COLLJ CAPILLARY BLOOD SPEC: CPT

## 2024-01-15 PROCEDURE — 20610 DRAIN/INJ JOINT/BURSA W/O US: CPT | Performed by: ORTHOPAEDIC SURGERY

## 2024-01-15 PROCEDURE — 85610 PROTHROMBIN TIME: CPT

## 2024-01-15 RX ORDER — LIDOCAINE HYDROCHLORIDE 10 MG/ML
2 INJECTION, SOLUTION EPIDURAL; INFILTRATION; INTRACAUDAL; PERINEURAL
Status: COMPLETED | OUTPATIENT
Start: 2024-01-15 | End: 2024-01-15

## 2024-01-15 RX ORDER — DOXAZOSIN 2 MG/1
TABLET ORAL
COMMUNITY

## 2024-01-15 RX ORDER — METHYLPREDNISOLONE ACETATE 80 MG/ML
1 INJECTION, SUSPENSION INTRA-ARTICULAR; INTRALESIONAL; INTRAMUSCULAR; SOFT TISSUE
Status: COMPLETED | OUTPATIENT
Start: 2024-01-15 | End: 2024-01-15

## 2024-01-15 RX ORDER — HYDROCODONE BITARTRATE AND ACETAMINOPHEN 7.5; 325 MG/1; MG/1
TABLET ORAL
COMMUNITY
Start: 2023-12-21

## 2024-01-15 RX ADMIN — LIDOCAINE HYDROCHLORIDE 2 ML: 10 INJECTION, SOLUTION EPIDURAL; INFILTRATION; INTRACAUDAL; PERINEURAL at 13:27

## 2024-01-15 RX ADMIN — METHYLPREDNISOLONE ACETATE 1 ML: 80 INJECTION, SUSPENSION INTRA-ARTICULAR; INTRALESIONAL; INTRAMUSCULAR; SOFT TISSUE at 13:27

## 2024-01-15 NOTE — PROGRESS NOTES
Anticoagulation Clinic Progress Note    Anticoagulation Summary  As of 1/15/2024      INR goal:  2.0-3.0   TTR:  68.6% (5.2 y)   INR used for dosin.1 (1/15/2024)   Warfarin maintenance plan:  5 mg every day   Weekly warfarin total:  35 mg   No change documented:  Bell Braden, Pharmacy Intern   Plan last modified:  Love Allison RPH (3/27/2023)   Next INR check:  2024   Priority:  Maintenance   Target end date:  Indefinite    Indications    Atrial fibrillation [I48.91]                 Anticoagulation Episode Summary       INR check location:      Preferred lab:      Send INR reminders to:   [facility] [place]    Comments:            Anticoagulation Care Providers       Provider Role Specialty Phone number    Pia Dueñas MD Referring Cardiology [phone]            Clinic Interview:  Patient Findings     Negatives:  Signs/symptoms of thrombosis, Signs/symptoms of bleeding,   Laboratory test error suspected, Change in health, Change in alcohol use,   Change in activity, Upcoming invasive procedure, Emergency department   visit, Upcoming dental procedure, Missed doses, Extra doses, Change in   medications, Change in diet/appetite, Hospital admission, Bruising, Other   complaints      Clinical Outcomes     Negatives:  Major bleeding event, Thromboembolic event,   Anticoagulation-related hospital admission, Anticoagulation-related ED   visit, Anticoagulation-related fatality        INR History:      Latest Ref Rng & Units 2023     4:52 PM 2023     8:53 AM 2023     3:15 PM 10/26/2023     1:45 PM 2023    12:14 PM 2023     1:09 PM 1/15/2024     2:00 PM   Anticoagulation Monitoring   INR   2.22 3.0 2.3  2.19 2.1   INR Date   2023 2023 10/26/2023  2023 1/15/2024   INR Goal   2.0-3.0 2.0-3.0 2.0-3.0  2.0-3.0 2.0-3.0   Trend   Same Same Same  Same Same   Last Week Total   35 mg 35 mg 35 mg  35 mg 35 mg   Next Week Total   35 mg 35 mg 35 mg  35 mg 35 mg   Sun    5 mg 5 mg 5 mg  5 mg 5 mg   Mon   5 mg 5 mg 5 mg  5 mg 5 mg   Tue   5 mg 5 mg 5 mg  5 mg 5 mg   Wed   5 mg 5 mg 5 mg  5 mg 5 mg   Thu   5 mg 5 mg 5 mg  5 mg 5 mg   Fri   5 mg 5 mg 5 mg  5 mg 5 mg   Sat   5 mg 5 mg 5 mg  5 mg 5 mg   Historical INR 0.90 - 1.10 2.22     2.19          Plan:  1. INR is Therapeutic today- see above in Anticoagulation Summary.  Will instruct Lana Yañez to Continue their warfarin regimen of 5mg daily- see above in Anticoagulation Summary.  2. Follow up in 4 weeks  3. Patient declines warfarin refills.  4. Verbal and written information provided. Patient expresses understanding and has no further questions at this time.    Bell Braden, Pharmacy Intern

## 2024-01-31 RX ORDER — LANCETS
EACH MISCELLANEOUS
Qty: 102 EACH | OUTPATIENT
Start: 2024-01-31

## 2024-01-31 NOTE — TELEPHONE ENCOUNTER
Rx Refill Note  Requested Prescriptions     Pending Prescriptions Disp Refills    Accu-Chek FastClix Lancets misc [Pharmacy Med Name: ACCU-CHEK FASTCLIX LANCET DRUM] 102 each      Sig: USE TO CHECK GLUCOSE AS DIRECTED      Last office visit with prescribing clinician: 11/17/2022   Last telemedicine visit with prescribing clinician: Visit date not found   Next office visit with prescribing clinician: Visit date not found

## 2024-02-06 ENCOUNTER — TELEPHONE (OUTPATIENT)
Dept: PHARMACY | Facility: HOSPITAL | Age: 79
End: 2024-02-06
Payer: MEDICARE

## 2024-02-06 NOTE — TELEPHONE ENCOUNTER
----- Message from Pamella Monte, Pharmacy Technician sent at 2/6/2024  7:56 AM EST -----  Regarding: Injection  Patient left a message last night stating she had a steroid injection in her back yesterday. Her follow up with us is 2/12 but do we want to see her sooner?      Scheduled patient for 2/8/24

## 2024-02-08 ENCOUNTER — ANTICOAGULATION VISIT (OUTPATIENT)
Dept: PHARMACY | Facility: HOSPITAL | Age: 79
End: 2024-02-08
Payer: MEDICARE

## 2024-02-08 LAB
INR PPP: 2.9 (ref 0.91–1.09)
PROTHROMBIN TIME: 35.1 SECONDS (ref 10–13.8)

## 2024-02-08 PROCEDURE — 36416 COLLJ CAPILLARY BLOOD SPEC: CPT

## 2024-02-08 PROCEDURE — 85610 PROTHROMBIN TIME: CPT

## 2024-02-08 PROCEDURE — G0463 HOSPITAL OUTPT CLINIC VISIT: HCPCS

## 2024-02-08 NOTE — PROGRESS NOTES
Anticoagulation Clinic Progress Note    Anticoagulation Summary  As of 2024      INR goal:  2.0-3.0   TTR:  68.9% (5.2 y)   INR used for dosin.9 (2024)   Warfarin maintenance plan:  5 mg every day   Weekly warfarin total:  35 mg   No change documented:  Shaw Hand, PharmD   Plan last modified:  Love Allison RPH (3/27/2023)   Next INR check:  3/7/2024   Priority:  Maintenance   Target end date:  Indefinite    Indications    Atrial fibrillation [I48.91]                 Anticoagulation Episode Summary       INR check location:      Preferred lab:      Send INR reminders to:  [facility] [place]    Comments:            Anticoagulation Care Providers       Provider Role Specialty Phone number    Pia Dueñas MD Referring Cardiology [phone]            Clinic Interview:  Patient Findings     Positives:  Change in medications    Negatives:  Signs/symptoms of thrombosis, Signs/symptoms of bleeding,   Laboratory test error suspected, Change in health, Change in alcohol use,   Change in activity, Upcoming invasive procedure, Emergency department   visit, Upcoming dental procedure, Missed doses, Extra doses, Change in   diet/appetite, Hospital admission, Bruising, Other complaints    Comments:  Received steroid injection 3 days ago. No interruption of   warfarin was required.       Clinical Outcomes     Negatives:  Major bleeding event, Thromboembolic event,   Anticoagulation-related hospital admission, Anticoagulation-related ED   visit, Anticoagulation-related fatality    Comments:  Received steroid injection 3 days ago. No interruption of   warfarin was required.         INR History:      Latest Ref Rng & Units 2023     8:53 AM 2023     3:15 PM 10/26/2023     1:45 PM 2023    12:14 PM 2023     1:09 PM 1/15/2024     2:00 PM 2024     2:00 PM   Anticoagulation Monitoring   INR  2.22 3.0 2.3  2.19 2.1 2.9   INR Date  2023 2023 10/26/2023  2023 1/15/2024  2/8/2024   INR Goal  2.0-3.0 2.0-3.0 2.0-3.0  2.0-3.0 2.0-3.0 2.0-3.0   Trend  Same Same Same  Same Same Same   Last Week Total  35 mg 35 mg 35 mg  35 mg 35 mg 35 mg   Next Week Total  35 mg 35 mg 35 mg  35 mg 35 mg 35 mg   Sun  5 mg 5 mg 5 mg  5 mg 5 mg 5 mg   Mon  5 mg 5 mg 5 mg  5 mg 5 mg 5 mg   Tue  5 mg 5 mg 5 mg  5 mg 5 mg 5 mg   Wed  5 mg 5 mg 5 mg  5 mg 5 mg 5 mg   Thu  5 mg 5 mg 5 mg  5 mg 5 mg 5 mg   Fri  5 mg 5 mg 5 mg  5 mg 5 mg 5 mg   Sat  5 mg 5 mg 5 mg  5 mg 5 mg 5 mg   Historical INR 0.90 - 1.10    2.19           Plan:  1. INR is Therapeutic today- see above in Anticoagulation Summary.  Will instruct Lana Kendall to Continue their warfarin regimen- see above in Anticoagulation Summary.  2. Follow up in 4 weeks.  3. Patient declines warfarin refills.  4. Verbal and written information provided. Patient expresses understanding and has no further questions at this time.    Shaw Hand, PharmD

## 2024-02-26 RX ORDER — LANCETS
EACH MISCELLANEOUS
Qty: 102 EACH | OUTPATIENT
Start: 2024-02-26

## 2024-03-05 ENCOUNTER — TELEPHONE (OUTPATIENT)
Dept: CARDIOLOGY | Facility: CLINIC | Age: 79
End: 2024-03-05
Payer: MEDICARE

## 2024-03-05 NOTE — TELEPHONE ENCOUNTER
Patient called in today and STEFAN MAYA. She was reaching out because she goes to [facility], to get injections in her back, due to her spine pain and arthritis in back. They are suggesting an  nerve ablation in her back and she would like to know if MD is ok with this and will clear her. She will need to know how long she has to be off Warfarin, if she does have to be off it and how soon she will need to start back on it after procedure. She will call back and schedule appointment.     ARTHUR 9/28/23   No upcoming appointment.

## 2024-03-05 NOTE — TELEPHONE ENCOUNTER
Notified patient of recommendations. Patient verbalized understanding.    Lillian Smith RN  Triage [facility]

## 2024-03-05 NOTE — TELEPHONE ENCOUNTER
Attempted to reach out to patient. Her Vm was full and could not leave a VM. Will attempt to reach out to her later.

## 2024-03-05 NOTE — TELEPHONE ENCOUNTER
Would discuss with pain management the possibility of ablation.  She will need appointment with us prior to clearance for ablation.  It will be up to pain management how long warfarin is to be held, but typically it is 3 to 5 days prior to procedure.  With her pulmonary hypertension as well and valvular issues, she is at increased risk of stroke anytime she is off warfarin.

## 2024-03-18 ENCOUNTER — ANTICOAGULATION VISIT (OUTPATIENT)
Dept: PHARMACY | Facility: HOSPITAL | Age: 79
End: 2024-03-18
Payer: MEDICARE

## 2024-03-18 LAB
INR PPP: 2.1 (ref 0.91–1.09)
PROTHROMBIN TIME: 25.7 SECONDS (ref 10–13.8)

## 2024-03-18 PROCEDURE — 36416 COLLJ CAPILLARY BLOOD SPEC: CPT

## 2024-03-18 PROCEDURE — 85610 PROTHROMBIN TIME: CPT

## 2024-03-18 PROCEDURE — G0463 HOSPITAL OUTPT CLINIC VISIT: HCPCS

## 2024-03-18 NOTE — PROGRESS NOTES
I have supervised and reviewed the notes, assessments, and/or procedures performed. The documented assessment and plan were developed cooperatively, and the plan was implemented in my presence. I concur with the documentation of this patient encounter.    Nusrat Sanderson [facility]

## 2024-03-18 NOTE — PROGRESS NOTES
Anticoagulation Clinic Progress Note    Anticoagulation Summary  As of 3/18/2024      INR goal:  2.0-3.0   TTR:  69.6% (5.4 y)   INR used for dosin.1 (3/18/2024)   Warfarin maintenance plan:  5 mg every day   Weekly warfarin total:  35 mg   No change documented:  Jamie Cabrera, Pharmacy Intern   Plan last modified:  Love Allison RPH (3/27/2023)   Next INR check:  4/15/2024   Priority:  Maintenance   Target end date:  Indefinite    Indications    Atrial fibrillation [I48.91]                 Anticoagulation Episode Summary       INR check location:      Preferred lab:      Send INR reminders to:   [facility] [place]    Comments:            Anticoagulation Care Providers       Provider Role Specialty Phone number    Pia Dueñas MD Referring Cardiology [phone]            Clinic Interview:  Patient Findings     Negatives:  Signs/symptoms of thrombosis, Signs/symptoms of bleeding,   Laboratory test error suspected, Change in health, Change in alcohol use,   Change in activity, Upcoming invasive procedure, Emergency department   visit, Upcoming dental procedure, Missed doses, Extra doses, Change in   medications, Change in diet/appetite, Hospital admission, Bruising, Other   complaints      Clinical Outcomes     Negatives:  Major bleeding event, Thromboembolic event,   Anticoagulation-related hospital admission, Anticoagulation-related ED   visit, Anticoagulation-related fatality        INR History:      Latest Ref Rng & Units 2023     3:15 PM 10/26/2023     1:45 PM 2023    12:14 PM 2023     1:09 PM 1/15/2024     2:00 PM 2024     2:00 PM 3/18/2024     2:30 PM   Anticoagulation Monitoring   INR  3.0 2.3  2.19 2.1 2.9 2.1   INR Date  2023 10/26/2023  2023 1/15/2024 2024 3/18/2024   INR Goal  2.0-3.0 2.0-3.0  2.0-3.0 2.0-3.0 2.0-3.0 2.0-3.0   Trend  Same Same  Same Same Same Same   Last Week Total  35 mg 35 mg  35 mg 35 mg 35 mg 35 mg   Next Week Total  35 mg 35  mg  35 mg 35 mg 35 mg 35 mg   Sun  5 mg 5 mg  5 mg 5 mg 5 mg 5 mg   Mon  5 mg 5 mg  5 mg 5 mg 5 mg 5 mg   Tue  5 mg 5 mg  5 mg 5 mg 5 mg 5 mg   Wed  5 mg 5 mg  5 mg 5 mg 5 mg 5 mg   Thu  5 mg 5 mg  5 mg 5 mg 5 mg 5 mg   Fri  5 mg 5 mg  5 mg 5 mg 5 mg 5 mg   Sat  5 mg 5 mg  5 mg 5 mg 5 mg 5 mg   Historical INR 0.90 - 1.10   2.19            Plan:  1. INR is therapeutic today- see above in Anticoagulation Summary.   Will instruct Lana Nielsonb to continue their warfarin regimen- see above in Anticoagulation Summary.  2. Follow up in 4 weeks.  3. Patient declines warfarin refills.  4. Verbal and written information provided. Patient expresses understanding and has no further questions at this time.    Jamie Cabrera, Pharmacy Intern

## 2024-03-25 ENCOUNTER — TELEPHONE (OUTPATIENT)
Dept: CARDIOLOGY | Facility: CLINIC | Age: 79
End: 2024-03-25
Payer: MEDICARE

## 2024-03-25 NOTE — TELEPHONE ENCOUNTER
Caller: Lana Yañez    Relationship to patient: Self    Best call back number: [phone]    Chief complaint: HAS AN ABLATION SCHEDULED FOR 4-8-24 .NEEDS TO SEE DR. SALGUERO A WEEK BEFORE THAT    Type of visit: FOLLOW UP    Requested date: ASAP     If rescheduling, when is the original appointment:      Additional notes:

## 2024-03-27 ENCOUNTER — LAB (OUTPATIENT)
Dept: LAB | Facility: HOSPITAL | Age: 79
End: 2024-03-27
Payer: MEDICARE

## 2024-03-27 ENCOUNTER — OFFICE VISIT (OUTPATIENT)
Dept: CARDIOLOGY | Facility: CLINIC | Age: 79
End: 2024-03-27
Payer: MEDICARE

## 2024-03-27 VITALS
HEART RATE: 90 BPM | DIASTOLIC BLOOD PRESSURE: 74 MMHG | SYSTOLIC BLOOD PRESSURE: 124 MMHG | HEIGHT: 66 IN | BODY MASS INDEX: 40.31 KG/M2 | WEIGHT: 250.8 LBS

## 2024-03-27 DIAGNOSIS — R06.09 DOE (DYSPNEA ON EXERTION): ICD-10-CM

## 2024-03-27 DIAGNOSIS — I10 ESSENTIAL HYPERTENSION: ICD-10-CM

## 2024-03-27 DIAGNOSIS — I50.32 CHRONIC DIASTOLIC HEART FAILURE: ICD-10-CM

## 2024-03-27 DIAGNOSIS — G47.33 OSA (OBSTRUCTIVE SLEEP APNEA): ICD-10-CM

## 2024-03-27 DIAGNOSIS — I27.20 PULMONARY HYPERTENSION: ICD-10-CM

## 2024-03-27 DIAGNOSIS — N28.9 RENAL INSUFFICIENCY: ICD-10-CM

## 2024-03-27 DIAGNOSIS — I48.0 PAROXYSMAL ATRIAL FIBRILLATION: ICD-10-CM

## 2024-03-27 DIAGNOSIS — I25.10 CORONARY ARTERY DISEASE INVOLVING NATIVE CORONARY ARTERY OF NATIVE HEART WITHOUT ANGINA PECTORIS: Primary | ICD-10-CM

## 2024-03-27 DIAGNOSIS — I25.10 CORONARY ARTERY DISEASE INVOLVING NATIVE CORONARY ARTERY OF NATIVE HEART WITHOUT ANGINA PECTORIS: ICD-10-CM

## 2024-03-27 LAB
ALBUMIN SERPL-MCNC: 4.2 G/DL (ref 3.5–5.2)
ALBUMIN/GLOB SERPL: 1.3 G/DL
ALP SERPL-CCNC: 57 U/L (ref 39–117)
ALT SERPL W P-5'-P-CCNC: 11 U/L (ref 1–33)
ANION GAP SERPL CALCULATED.3IONS-SCNC: 11 MMOL/L (ref 5–15)
AST SERPL-CCNC: 19 U/L (ref 1–32)
BILIRUB SERPL-MCNC: 0.6 MG/DL (ref 0–1.2)
BUN SERPL-MCNC: 10 MG/DL (ref 8–23)
BUN/CREAT SERPL: 8.1 (ref 7–25)
CALCIUM SPEC-SCNC: 9.4 MG/DL (ref 8.6–10.5)
CHLORIDE SERPL-SCNC: 103 MMOL/L (ref 98–107)
CO2 SERPL-SCNC: 29 MMOL/L (ref 22–29)
CREAT SERPL-MCNC: 1.24 MG/DL (ref 0.57–1)
DEPRECATED RDW RBC AUTO: 45.7 FL (ref 37–54)
DIGOXIN SERPL-MCNC: 0.4 NG/ML (ref 0.6–1.2)
EGFRCR SERPLBLD CKD-EPI 2021: 45.2 ML/MIN/1.73
ERYTHROCYTE [DISTWIDTH] IN BLOOD BY AUTOMATED COUNT: 13.6 % (ref 12.3–15.4)
GLOBULIN UR ELPH-MCNC: 3.3 GM/DL
GLUCOSE SERPL-MCNC: 145 MG/DL (ref 65–99)
HCT VFR BLD AUTO: 39.1 % (ref 34–46.6)
HGB BLD-MCNC: 12.8 G/DL (ref 12–15.9)
MCH RBC QN AUTO: 30.3 PG (ref 26.6–33)
MCHC RBC AUTO-ENTMCNC: 32.7 G/DL (ref 31.5–35.7)
MCV RBC AUTO: 92.7 FL (ref 79–97)
NT-PROBNP SERPL-MCNC: 896 PG/ML (ref 0–1800)
PLATELET # BLD AUTO: 180 10*3/MM3 (ref 140–450)
PMV BLD AUTO: 10.5 FL (ref 6–12)
POTASSIUM SERPL-SCNC: 3.8 MMOL/L (ref 3.5–5.2)
PROT SERPL-MCNC: 7.5 G/DL (ref 6–8.5)
RBC # BLD AUTO: 4.22 10*6/MM3 (ref 3.77–5.28)
SODIUM SERPL-SCNC: 143 MMOL/L (ref 136–145)
WBC NRBC COR # BLD AUTO: 6.79 10*3/MM3 (ref 3.4–10.8)

## 2024-03-27 PROCEDURE — 85027 COMPLETE CBC AUTOMATED: CPT

## 2024-03-27 PROCEDURE — 80053 COMPREHEN METABOLIC PANEL: CPT

## 2024-03-27 PROCEDURE — 83880 ASSAY OF NATRIURETIC PEPTIDE: CPT

## 2024-03-27 PROCEDURE — 80162 ASSAY OF DIGOXIN TOTAL: CPT

## 2024-03-27 PROCEDURE — 36415 COLL VENOUS BLD VENIPUNCTURE: CPT

## 2024-03-27 RX ORDER — ISOPROPYL ALCOHOL 70 ML/100ML
SWAB TOPICAL
COMMUNITY
Start: 2024-02-22

## 2024-03-27 NOTE — PROGRESS NOTES
Date of Office Visit: 2024  Encounter Provider: ANGELA Salgado  Place of Service: [facility]  Patient Name: Lana Yañez  :1945    Chief complaint  Coronary artery disease, pulmonary hypertension, atrial fibrillation, mitral valve prolapse with moderate mitral regurgitation, dilated ascending aorta     History of Present Illness  Patient is a 78 y.o. year old female  patient of Dr. Dueñas. Past medical history includes hyperlipidemia, rheumatoid arthritis, obstructive sleep apnea who in early October was found to be in atrial fibrillation with rapid ventricular rates and mild diastolic heart failure.  She was placed on IV heparin and Pradaxa.  Echocardiogram revealed normal systolic function mild left ventricular hypertrophy, moderate atrial enlargement with aortic valve sclerosis and moderate pulmonary hypertension and moderate tricuspid regurgitation.  The RV systolic pressure is 54 mmHg.  She had a stress perfusion study that was negative for ischemia and a CT and her grandmother revealed a mildly dilated aorta without pulmonary emboli.  She then presented several days later with an acute stroke.  CLARIBEL was not pursued as it was felt to be embolic in nature.  However on  and she was diaphoretic and was found to have a non-ST elevation myocardial infarction.  This was on full dose Pradaxa which was not held.  CLARIBEL was pursued that revealed normal systolic function with moderate mitral valve prolapse without significant regurgitation.  There was right-sided spontaneous echo contrast without thrombus formation.  Cardiac catheterization revealed normal systolic function with 2+ mitral regurgitation, mild coronary disease except for a 90% stenosis of the distal PDA which was felt to be too small to be amenable PCI.  She was treated medically and switched to Coumadin. She has struggled with intermittent heart failure dietary indiscretions with salt  since then.  She also had a 24-hour Holter that revealed persistent atrial fibrillation with reasonable rate control and frequent PVCs. No other arrhythmia was present.  Last echocardiogram in August 2021 showed an ejection fraction of 62% with moderate left ventricular hypertrophy with indeterminate diastolic function, mild aortic regurgitation, mitral valve thickening with moderate regurgitation without stenosis, severe tricuspid regurgitation with an RV systolic pressure 48 mmHg is present.  CT angiogram of the chest 9/2021 showed stable ascending aorta measuring 4.1 cm.  Lexiscan cardiac stress test 2/2022 was negative for ischemia.  Echo on 5/2020 showed ejection fraction of 60% with grade 2 diastolic dysfunction, mild aortic valve stenosis, RVSP 42 mmHg.  Mitral valve calcification present but not felt to have mitral stenosis.  Mild mitral valve thickening.     With complaints of worsening dyspnea, echocardiogram 10/12/2023 showed LVEF 62.5%, mild concentric LVH, biatrial dilation, severe calcification of the aortic valve with mild to moderate aortic regurgitation and no stenosis, severe mitral annular calcification with no regurgitation or stenosis, RVSP 44.5 mmHg.  There was borderline dilation of the ascending aorta at 3.7 cm.  proBNP and D-dimer at that time were negative.  Renal function stable and TSH normal.  CXR with mild vascular congestion.  Torsemide was continued.  PFTs abnormal and she was asked to follow-up with pulmonology.    Interval history  Patient presents today for routine follow up. I will visit with her today and have reviewed her medical record.  Since last visit she continues to leave sleep apnea untreated.  She continues to report palpitations that are described as a fluttering that occur occasionally.  She also continues to report shortness of breath that she feels is improving.  Edema has worsened, however she eats out daily and only takes diuretic about 2 to 3 days/week.  She had 1  episode of chest pain that occurred at rest and lasted less than a minute.  Blood pressure today is at goal though she does not check blood pressure at home. She is tolerating warfarin well with no falls or abnormal bleeding. She has plans for nerve ablation next week, but will not need general anesthesia or to hold any medications.    Past Medical History:   Diagnosis Date    Acute on chronic diastolic heart failure     Acute UTI (urinary tract infection) 05/22/2022    Allergic reaction 02/09/2018    Arthritis     Arthritis of back     Asthma     Atrial fibrillation     Persistent; on warfarin    Atypical chest pain     Biliary acute pancreatitis without necrosis or infection 11/10/2021    Bronchitis     Cellulitis of right elbow     due to MRSA    Cervical disc disorder     CHF (congestive heart failure)     COPD (chronic obstructive pulmonary disease)     Coronary artery disease     Cardiac catheterization completed; 90% PDA stenosis with medical management recommended    Coronary artery disease involving native coronary artery of native heart with angina pectoris with documented spasm     CTS (carpal tunnel syndrome)     Diabetes 1.5, managed as type 2     Disease of thyroid gland     Displacement of lumbar intervertebral disc without myelopathy     Dizziness     ANTOINE (dyspnea on exertion)     Essential hypertension     Fatigue     Fracture, foot     Generalized osteoarthritis of multiple sites     History of rheumatic fever     History of transfusion     Hx of bone density study     10/23/2014    Hyperglycemia     Hyperlipidemia     Hypovitaminosis D     Left arm pain     Left-sided weakness     Leg swelling     Low back pain     Lower extremity edema     Lumbosacral disc disease     Malaise and fatigue     Mitral valve disease     Moderate mitral valve prolapse and moderate mitral regurgitation    Mitral valve insufficiency     Morbid obesity with BMI of 40.0-44.9, adult     MRSA infection     NSTEMI (non-ST  elevated myocardial infarction)     PAF (paroxysmal atrial fibrillation)     Periarthritis of shoulder     RA (rheumatoid arthritis)     involving both hands    Sleep apnea     SOB (shortness of breath)     Stroke     left side weakness    Urge incontinence of urine     UTI (urinary tract infection) 05/2020    Visit for screening mammogram 04/02/2018    Vitamin D deficiency      Past Surgical History:   Procedure Laterality Date    BREAST BIOPSY      BREAST SURGERY      right side lumpectomy with biopsy    CARDIAC CATHETERIZATION Left 10/20/2017    Procedure: Cardiac Catheterization/Vascular Study;  Surgeon: Alphonso Olmedo MD;  Location: [facility] CATH INVASIVE LOC[facility];  Service:     CARDIAC CATHETERIZATION N/A 10/20/2017    +2 mitral regurgitation, left main 10% stenosis, mid to distal LAD 10% diffuse stenosis, circumflex 10% diffuse stenosis, RCA 10% proximal stenosis, and distal PDA consistent with coronary embolus with a lesion of 90% too small to consider coronary intervention; medical management recommended    EYE SURGERY      laser surgery for glaucoma and left eye cataracts removed    HYSTERECTOMY      10+ years ago    JOINT REPLACEMENT  2005; 2006    bilateral knees and left rotater    KNEE SURGERY      MAMMO BILATERAL  2016    [facility]     SHOULDER SURGERY       Outpatient Medications Prior to Visit   Medication Sig Dispense Refill    Accu-Chek FastClix Lancets misc Use to check glucose as directed 306 each 1    acetaminophen (TYLENOL) 325 MG tablet Take 2 tablets by mouth Every 6 (Six) Hours As Needed for Moderate Pain.      albuterol sulfate  (90 Base) MCG/ACT inhaler Inhale 2 puffs Every 4 (Four) Hours As Needed for Shortness of Air. PRN SOA/WHEEZING 18 g 11    Alcohol Swabs (DropSafe Alcohol Prep) 70 % pads       aspirin 81 MG EC tablet Take 1 tablet by mouth Daily.      atorvastatin (LIPITOR) 10 MG tablet Take 1 tablet by mouth Daily. 90 tablet 1    Blood Glucose Monitoring Suppl  (ACCU-CHEK GUIDE) w/Device kit See Admin Instructions.      Cholecalciferol 50 MCG (2000 UT) capsule Take 50 mcg by mouth Daily.      digoxin (LANOXIN) 125 MCG tablet Take 1 tablet by mouth Every Other Day. 45 tablet 2    doxazosin (CARDURA) 2 MG tablet       glimepiride (Amaryl) 2 MG tablet By mouth take one tab twice daily with AM and PM meals. 180 tablet 2    glucose blood test strip Test twice daily prior to breakfast and dinner as instructed (Patient taking differently: Test daily prior to breakfast) 200 each 12    HYDROcodone-acetaminophen (NORCO) 7.5-325 MG per tablet Take 1 tablet twice a day by oral route as needed for 30 days.      Januvia 50 MG tablet       Lumigan 0.01 % ophthalmic drops Administer 1 drop to both eyes Every Night.      metoprolol tartrate (Lopressor) 50 MG tablet Take 1 tablet by mouth 2 (Two) Times a Day for 30 days. 180 tablet 1    polyethylene glycol (MIRALAX) 17 g packet Take 17 g by mouth Daily As Needed (constipation).      potassium chloride 10 MEQ CR tablet 1 po bid 180 tablet 1    torsemide (DEMADEX) 20 MG tablet 2 po qday 180 tablet 1    vitamin D (ERGOCALCIFEROL) 1.25 MG (44212 UT) capsule capsule Take 1 capsule by mouth 1 (One) Time Per Week. 12 capsule 1    warfarin (COUMADIN) 5 MG tablet Take one-half tablet (2.5 mg) by mouth on Fridays, and take one tablet (5 mg) by mouth all other days or as directed. 85 tablet 1     No facility-administered medications prior to visit.       Allergies as of 03/27/2024 - Reviewed 03/27/2024   Allergen Reaction Noted    Contrast dye (echo or unknown ct/mr) Hives and Itching 01/04/2018    Macrobid [nitrofurantoin] Hives 03/21/2023    Iodinated contrast media Hives 02/20/2018     Social History     Socioeconomic History    Marital status:      Spouse name: Suresh    Number of children: 5    Years of education: 13   Tobacco Use    Smoking status: Former     Current packs/day: 0.00     Average packs/day: 3.0 packs/day for 1.4 years (4.3  "ttl pk-yrs)     Types: Cigarettes     Start date: 1967     Quit date: 10/19/1968     Years since quittin.4     Passive exposure: Never    Smokeless tobacco: Never    Tobacco comments:     caffeine use - decaf coffee   Vaping Use    Vaping status: Never Used   Substance and Sexual Activity    Alcohol use: No     Comment: No caffeine use    Drug use: No    Sexual activity: Defer     Family History   Problem Relation Age of Onset    Colon cancer Mother     Glaucoma Mother     Stroke Mother     Arthritis Mother     Hypertension Mother     Glaucoma Sister     Diabetes Sister     Heart disease Sister     Arthritis Sister     Asthma Sister     Hypertension Sister     Miscarriages / Stillbirths Sister     Lung disease Sister     Heart disease Brother     Diabetes Brother     Arthritis Brother     Drug abuse Brother     Hypertension Brother     Arthritis Father     COPD Father     Lung disease Father     Arthritis Daughter     Depression Daughter     Alcohol abuse Maternal Uncle     Heart disease Sister     Heart disease Sister     Heart disease Brother     Rheumatologic disease Sister      Review of Systems   Constitutional: Positive for malaise/fatigue.   Cardiovascular:  Positive for chest pain, dyspnea on exertion, leg swelling and palpitations. Negative for claudication, near-syncope, orthopnea, paroxysmal nocturnal dyspnea and syncope.   Respiratory:  Negative for shortness of breath.    Neurological:  Negative for brief paralysis, dizziness, headaches and light-headedness.   All other systems reviewed and are negative.       Objective:     Vitals:    24 1409   BP: 124/74   BP Location: Right arm   Patient Position: Sitting   Cuff Size: Small Adult   Pulse: 90   Weight: 114 kg (250 lb 12.8 oz)   Height: 166.4 cm (65.5\")     Body mass index is 41.1 kg/m².    Vitals reviewed.   Constitutional:       General: Not in acute distress.     Appearance: Well-developed and not in distress. Frail. Chronically " ill-appearing. Not diaphoretic.   HENT:      Head: Normocephalic.   Pulmonary:      Effort: Pulmonary effort is normal. No respiratory distress.      Breath sounds: Normal breath sounds. No wheezing. No rhonchi. No rales.   Cardiovascular:      Normal rate. Irregularly irregular rhythm.      Murmurs: There is no murmur.   Pulses:     Radial: 2+ bilaterally.  Edema:     Peripheral edema present.     Pretibial: trace edema of the left pretibial area and 1+ edema of the right pretibial area.     Ankle: trace edema of the left ankle and 1+ edema of the right ankle.     Feet: trace edema of the left foot and 1+ edema of the right foot.  Skin:     General: Skin is warm and dry. There is no cyanosis.      Findings: No rash.   Neurological:      Mental Status: Alert and oriented to person, place, and time.   Psychiatric:         Behavior: Behavior normal. Behavior is cooperative.         Thought Content: Thought content normal.         Judgment: Judgment normal.       Lab Review:     Lab Results   Component Value Date     03/27/2024     12/12/2023    K 3.8 03/27/2024    K 3.9 12/12/2023     03/27/2024     12/12/2023    CO2 29.0 03/27/2024    CO2 25.6 12/12/2023    BUN 10 03/27/2024    BUN 16 12/12/2023    CREATININE 1.24 (H) 03/27/2024    CREATININE 1.19 (H) 12/12/2023    EGFRIFNONA 45 (L) 02/09/2022    EGFRIFNONA 36 (L) 11/22/2021    EGFRIFAFRI 51 (L) 02/09/2022    EGFRIFAFRI 57 (L) 12/16/2021    GLUCOSE 145 (H) 03/27/2024    GLUCOSE 167 (H) 12/12/2023    CALCIUM 9.4 03/27/2024    CALCIUM 9.8 12/12/2023    PROTENTOTREF 7.4 02/15/2023    PROTENTOTREF 7.2 09/17/2020    ALBUMIN 4.2 03/27/2024    ALBUMIN 4.2 12/12/2023    BILITOT 0.6 03/27/2024    BILITOT 0.6 12/12/2023    AST 19 03/27/2024    AST 20 12/12/2023    ALT 11 03/27/2024    ALT 9 12/12/2023     Lab Results   Component Value Date    WBC 6.79 03/27/2024    WBC 6.54 12/12/2023    HGB 12.8 03/27/2024    HGB 12.9 12/12/2023    HCT 39.1 03/27/2024     HCT 39.7 12/12/2023    MCV 92.7 03/27/2024    MCV 93.6 12/12/2023     03/27/2024     12/12/2023     Lab Results   Component Value Date    PROBNP 896.0 03/27/2024    PROBNP 919.0 09/28/2023     Lab Results   Component Value Date    TROPONINT <0.010 05/21/2022     Lab Results   Component Value Date    TSH 1.240 09/28/2023    TSH 1.230 10/20/2022      Lipid Panel          8/4/2023    16:52 12/12/2023    12:14   Lipid Panel   Total Cholesterol 139  120    Triglycerides 97  96    HDL Cholesterol 59  64    VLDL Cholesterol 18  18    LDL Cholesterol  62  38    LDL/HDL Ratio 1.03  0.58           ECG 12 Lead    Date/Time: 3/28/2024 9:03 AM  Performed by: Hannah Stroud APRN    Authorized by: Hannah Stroud APRN  Comparison: compared with previous ECG   Similar to previous ECG  Rhythm: atrial fibrillation  Ectopy: multifocal PVCs  Rate: normal  BPM: 90  Conduction: left anterior fascicular block  QRS axis: normal  Comments: Similar to prior        Assessment:       Diagnosis Plan   1. Coronary artery disease involving native coronary artery of native heart without angina pectoris  Digoxin Level    Comprehensive Metabolic Panel    CBC (No Diff)    proBNP      2. Pulmonary hypertension  Digoxin Level    Comprehensive Metabolic Panel    CBC (No Diff)    proBNP      3. Paroxysmal atrial fibrillation  Digoxin Level    Comprehensive Metabolic Panel    CBC (No Diff)    proBNP      4. FRANCY (obstructive sleep apnea)  Digoxin Level    Comprehensive Metabolic Panel    CBC (No Diff)    proBNP      5. Chronic diastolic heart failure  Digoxin Level    Comprehensive Metabolic Panel    CBC (No Diff)    proBNP      6. Renal insufficiency  Digoxin Level    Comprehensive Metabolic Panel    CBC (No Diff)    proBNP      7. Essential hypertension  Digoxin Level    Comprehensive Metabolic Panel    CBC (No Diff)    proBNP      8. ANTOINE (dyspnea on exertion)  Digoxin Level    Comprehensive Metabolic Panel    CBC (No Diff)     proBNP        Plan:       1.  Dyspnea on exertion.  Negative stress perfusion study 2/2022.  Echo 10/2023 LVEF 62.5%, mild concentric LVH, biatrial dilation, no mitral regurgitation or stenosis, mild to moderate aortic regurgitation with no stenosis, moderate tricuspid regurgitation with RVSP 44.5 mmHg.  Will get CBC, CMP, digoxin level, and proBNP today.  She does not have follow-up currently scheduled with PCP.  2.  Pulmonary hypertension.  As above.  RVSP stable.  3.  Mild aortic valve stenosis.  Mild to moderate regurgitation with no stenosis on most recent echo  4.  Mitral valve prolapse with mild mitral regurgitation noted by CLARIBEL 2017.  Not seen on most recent TTE  5.  Severe small vessel coronary artery disease.  Negative stress test 2/2022 and no residual anginal symptoms.  Current chest pain mostly atypical and very brief. She will check BP and monitor symptoms more closely.  6..  Persistent atrial fibrillation rates are controlled and she is on anticoagulation which she is tolerating well.  Discussed with her today that due to her valvular disease and pulmonary hypertension she is at greatly increased risk of stroke anytime she is off anticoagulation.  7.  Chronic edema.  Significant on exam today.  I encouraged her to take diuretic at prescribed dose (40 mg daily) for 2 to 3 days in a row and then return to her previous dosing of 20 mg daily.  I also encouraged a low-salt diet and compression stockings as well as leg elevation to help control edema.  8.  Hypertension, controlled on current regimen. She is not checking blood pressure at home.  I encouraged her to check this more regularly and call if consistently greater than 130/80  9.  History of embolic non ST-elevation myocardial infarction and stroke. Now on warfarin  10   FRANCY, untreated. Strongly encouraged her to reconsider treating sleep apnea.   11. Lung nodule followed by Dr. Garrido and felt to be benign per patient.  No further follow-up felt to  be needed per patient. Prior PFTs abnormal and she was asked to follow up with pulmonology. I do not see where this has occurred. I encouraged her again to follow up.  12. Chronic anticoagulation, no falls or bleeding.  Continue with anticoagulation.   13.  Thoracic aortic aneurysm, stable by CT on 9/2021      Time Spent: I spent 40 minutes caring for Lana on this date of service. This time includes time spent by me in the following activities: preparing for the visit, reviewing tests, performing a medically appropriate examination and/or evaluations, counseling and educating the patient/family/caregiver, ordering medications, tests, or procedures, documenting information in the medical record, and independently interpreting results and communicating that information with the patient/family/caregiver.   I spent 1 minutes on the separately reported service of ECG. This time is not included in the time used to support the E/M service also reported today.        Your medication list            Accurate as of March 27, 2024 11:59 PM. If you have any questions, ask your nurse or doctor.                CHANGE how you take these medications        Instructions Last Dose Given Next Dose Due   glucose blood test strip  What changed: additional instructions      Test twice daily prior to breakfast and dinner as instructed              CONTINUE taking these medications        Instructions Last Dose Given Next Dose Due   Accu-Chek FastClix Lancets misc      Use to check glucose as directed       Accu-Chek Guide w/Device kit      See Admin Instructions.       acetaminophen 325 MG tablet  Commonly known as: TYLENOL      Take 2 tablets by mouth Every 6 (Six) Hours As Needed for Moderate Pain.       albuterol sulfate  (90 Base) MCG/ACT inhaler  Commonly known as: PROVENTIL HFA;VENTOLIN HFA;PROAIR HFA      Inhale 2 puffs Every 4 (Four) Hours As Needed for Shortness of Air. PRN SOA/WHEEZING       aspirin 81 MG EC tablet       Take 1 tablet by mouth Daily.       atorvastatin 10 MG tablet  Commonly known as: LIPITOR      Take 1 tablet by mouth Daily.       Cholecalciferol 50 MCG (2000 UT) capsule      Take 50 mcg by mouth Daily.       digoxin 125 MCG tablet  Commonly known as: LANOXIN      Take 1 tablet by mouth Every Other Day.       doxazosin 2 MG tablet  Commonly known as: CARDURA           DropSafe Alcohol Prep 70 % pads           glimepiride 2 MG tablet  Commonly known as: Amaryl      By mouth take one tab twice daily with AM and PM meals.       HYDROcodone-acetaminophen 7.5-325 MG per tablet  Commonly known as: NORCO      Take 1 tablet twice a day by oral route as needed for 30 days.       Januvia 50 MG tablet  Generic drug: SITagliptin           Lumigan 0.01 % ophthalmic drops  Generic drug: bimatoprost      Administer 1 drop to both eyes Every Night.       metoprolol tartrate 50 MG tablet  Commonly known as: Lopressor      Take 1 tablet by mouth 2 (Two) Times a Day for 30 days.       polyethylene glycol 17 g packet  Commonly known as: MIRALAX      Take 17 g by mouth Daily As Needed (constipation).       potassium chloride 10 MEQ CR tablet      1 po bid       torsemide 20 MG tablet  Commonly known as: DEMADEX      2 po qday       vitamin D 1.25 MG (56726 UT) capsule capsule  Commonly known as: ERGOCALCIFEROL      Take 1 capsule by mouth 1 (One) Time Per Week.       warfarin 5 MG tablet  Commonly known as: COUMADIN      Take one-half tablet (2.5 mg) by mouth on Fridays, and take one tablet (5 mg) by mouth all other days or as directed.                Patient is no longer taking -.  I corrected the med list to reflect this.  I did not stop these medications.    Return in about 6 months (around 9/27/2024) for with Dr. Dueñas.      Dictated utilizing Dragon dictation

## 2024-03-28 ENCOUNTER — TELEPHONE (OUTPATIENT)
Dept: CARDIOLOGY | Facility: CLINIC | Age: 79
End: 2024-03-28
Payer: MEDICARE

## 2024-03-28 NOTE — TELEPHONE ENCOUNTER
Please let her know that labs done yesterday looked good.  Kidney function is stable, electrolytes normal, digoxin level normal, normal hemoglobin and platelet count, and normal proBNP (lab for heart failure).

## 2024-03-28 NOTE — TELEPHONE ENCOUNTER
Results called to pt.  Instructed to call with any further questions or concerns.  Verbalized understanding.    Mary Tavera RN  Triage Nurse, [facility]  03/28/24 08:27 EDT

## 2024-04-15 ENCOUNTER — OFFICE VISIT (OUTPATIENT)
Dept: ORTHOPEDIC SURGERY | Facility: CLINIC | Age: 79
End: 2024-04-15
Payer: MEDICARE

## 2024-04-15 ENCOUNTER — ANTICOAGULATION VISIT (OUTPATIENT)
Dept: PHARMACY | Facility: HOSPITAL | Age: 79
End: 2024-04-15
Payer: MEDICARE

## 2024-04-15 VITALS — BODY MASS INDEX: 41.38 KG/M2 | HEIGHT: 65 IN | TEMPERATURE: 98.4 F | WEIGHT: 248.4 LBS

## 2024-04-15 DIAGNOSIS — M19.019 GLENOHUMERAL ARTHRITIS: Primary | ICD-10-CM

## 2024-04-15 LAB
INR PPP: 3.4 (ref 0.91–1.09)
PROTHROMBIN TIME: 41 SECONDS (ref 10–13.8)

## 2024-04-15 PROCEDURE — G0463 HOSPITAL OUTPT CLINIC VISIT: HCPCS

## 2024-04-15 PROCEDURE — 36416 COLLJ CAPILLARY BLOOD SPEC: CPT

## 2024-04-15 PROCEDURE — 85610 PROTHROMBIN TIME: CPT

## 2024-04-15 RX ORDER — CEPHALEXIN 500 MG/1
500 CAPSULE ORAL 3 TIMES DAILY
COMMUNITY
Start: 2024-04-12 | End: 2024-04-22

## 2024-04-15 RX ORDER — LIDOCAINE HYDROCHLORIDE 10 MG/ML
2 INJECTION, SOLUTION EPIDURAL; INFILTRATION; INTRACAUDAL; PERINEURAL
Status: COMPLETED | OUTPATIENT
Start: 2024-04-15 | End: 2024-04-15

## 2024-04-15 RX ORDER — METHYLPREDNISOLONE ACETATE 80 MG/ML
1 INJECTION, SUSPENSION INTRA-ARTICULAR; INTRALESIONAL; INTRAMUSCULAR; SOFT TISSUE
Status: COMPLETED | OUTPATIENT
Start: 2024-04-15 | End: 2024-04-15

## 2024-04-15 RX ADMIN — LIDOCAINE HYDROCHLORIDE 2 ML: 10 INJECTION, SOLUTION EPIDURAL; INFILTRATION; INTRACAUDAL; PERINEURAL at 13:10

## 2024-04-15 RX ADMIN — METHYLPREDNISOLONE ACETATE 1 ML: 80 INJECTION, SUSPENSION INTRA-ARTICULAR; INTRALESIONAL; INTRAMUSCULAR; SOFT TISSUE at 13:10

## 2024-04-15 NOTE — PROGRESS NOTES
Patient: Lana Yañez  YOB: 1945  Date of Service: 4/15/2024    Chief Complaints:  left shoulder     Subjective:    History of Present Illness: Pt is seen in the office today with complaints of   left shoulder pain she has no degenerative changes she gets intermittent injections and does well with those she is not interested in surgical she has got multiple medical problems not a great surgical candidate        Allergies:   Allergies   Allergen Reactions    Contrast Dye (Echo Or Unknown Ct/Mr) Hives and Itching    Macrobid [Nitrofurantoin] Hives    Iodinated Contrast Media Hives       Medications:   Home Medications:  Current Outpatient Medications on File Prior to Visit   Medication Sig    Accu-Chek FastClix Lancets misc Use to check glucose as directed    acetaminophen (TYLENOL) 325 MG tablet Take 2 tablets by mouth Every 6 (Six) Hours As Needed for Moderate Pain.    albuterol sulfate  (90 Base) MCG/ACT inhaler Inhale 2 puffs Every 4 (Four) Hours As Needed for Shortness of Air. PRN SOA/WHEEZING    Alcohol Swabs (DropSafe Alcohol Prep) 70 % pads     aspirin 81 MG EC tablet Take 1 tablet by mouth Daily.    atorvastatin (LIPITOR) 10 MG tablet Take 1 tablet by mouth Daily.    Blood Glucose Monitoring Suppl (ACCU-CHEK GUIDE) w/Device kit See Admin Instructions.    Cholecalciferol 50 MCG (2000 UT) capsule Take 50 mcg by mouth Daily.    digoxin (LANOXIN) 125 MCG tablet Take 1 tablet by mouth Every Other Day.    doxazosin (CARDURA) 2 MG tablet     glimepiride (Amaryl) 2 MG tablet By mouth take one tab twice daily with AM and PM meals.    glucose blood test strip Test twice daily prior to breakfast and dinner as instructed (Patient taking differently: Test daily prior to breakfast)    HYDROcodone-acetaminophen (NORCO) 7.5-325 MG per tablet Take 1 tablet twice a day by oral route as needed for 30 days.    Januvia 50 MG tablet     Lumigan 0.01 % ophthalmic drops Administer 1 drop to both eyes  Every Night.    metoprolol tartrate (Lopressor) 50 MG tablet Take 1 tablet by mouth 2 (Two) Times a Day for 30 days.    polyethylene glycol (MIRALAX) 17 g packet Take 17 g by mouth Daily As Needed (constipation).    potassium chloride 10 MEQ CR tablet 1 po bid    torsemide (DEMADEX) 20 MG tablet 2 po qday    vitamin D (ERGOCALCIFEROL) 1.25 MG (98662 UT) capsule capsule Take 1 capsule by mouth 1 (One) Time Per Week.    warfarin (COUMADIN) 5 MG tablet Take one-half tablet (2.5 mg) by mouth on Fridays, and take one tablet (5 mg) by mouth all other days or as directed.     No current facility-administered medications on file prior to visit.     Current Medications:  Scheduled Meds:  Continuous Infusions:No current facility-administered medications for this visit.    PRN Meds:.    I have reviewed the patient's medical history in detail and updated the computerized patient record.  Review and summarization of old records include:    Past Medical History:   Diagnosis Date    Acute on chronic diastolic heart failure     Acute UTI (urinary tract infection) 05/22/2022    Allergic reaction 02/09/2018    Arthritis     Arthritis of back     Asthma     Atrial fibrillation     Persistent; on warfarin    Atypical chest pain     Biliary acute pancreatitis without necrosis or infection 11/10/2021    Bronchitis     Cellulitis of right elbow     due to MRSA    Cervical disc disorder     CHF (congestive heart failure)     COPD (chronic obstructive pulmonary disease)     Coronary artery disease     Cardiac catheterization completed; 90% PDA stenosis with medical management recommended    Coronary artery disease involving native coronary artery of native heart with angina pectoris with documented spasm     CTS (carpal tunnel syndrome)     Diabetes 1.5, managed as type 2     Disease of thyroid gland     Displacement of lumbar intervertebral disc without myelopathy     Dizziness     ANTOINE (dyspnea on exertion)     Essential hypertension      Fatigue     Fracture, foot     Generalized osteoarthritis of multiple sites     History of rheumatic fever     History of transfusion     Hx of bone density study     10/23/2014    Hyperglycemia     Hyperlipidemia     Hypovitaminosis D     Left arm pain     Left-sided weakness     Leg swelling     Low back pain     Lower extremity edema     Lumbosacral disc disease     Malaise and fatigue     Mitral valve disease     Moderate mitral valve prolapse and moderate mitral regurgitation    Mitral valve insufficiency     Morbid obesity with BMI of 40.0-44.9, adult     MRSA infection     NSTEMI (non-ST elevated myocardial infarction)     PAF (paroxysmal atrial fibrillation)     Periarthritis of shoulder     RA (rheumatoid arthritis)     involving both hands    Sleep apnea     SOB (shortness of breath)     Stroke     left side weakness    Urge incontinence of urine     UTI (urinary tract infection) 05/2020    Visit for screening mammogram 04/02/2018    Vitamin D deficiency         Past Surgical History:   Procedure Laterality Date    BREAST BIOPSY      BREAST SURGERY      right side lumpectomy with biopsy    CARDIAC CATHETERIZATION Left 10/20/2017    Procedure: Cardiac Catheterization/Vascular Study;  Surgeon: Alphonso Olmedo MD;  Location: [facility] INVASIVE LOCATION;  Service:     CARDIAC CATHETERIZATION N/A 10/20/2017    +2 mitral regurgitation, left main 10% stenosis, mid to distal LAD 10% diffuse stenosis, circumflex 10% diffuse stenosis, RCA 10% proximal stenosis, and distal PDA consistent with coronary embolus with a lesion of 90% too small to consider coronary intervention; medical management recommended    EYE SURGERY      laser surgery for glaucoma and left eye cataracts removed    HYSTERECTOMY      10+ years ago    JOINT REPLACEMENT  2005; 2006    bilateral knees and left rotater    KNEE SURGERY      MAMMO BILATERAL  2016    [facility]     SHOULDER SURGERY          Social History     Occupational History     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     Employer: RETIRED   Tobacco Use    Smoking status: Former     Current packs/day: 0.00     Average packs/day: 3.0 packs/day for 1.4 years (4.3 ttl pk-yrs)     Types: Cigarettes     Start date: 1967     Quit date: 10/19/1968     Years since quittin.5     Passive exposure: Never    Smokeless tobacco: Never    Tobacco comments:     caffeine use - decaf coffee   Vaping Use    Vaping status: Never Used   Substance and Sexual Activity    Alcohol use: No     Comment: No caffeine use    Drug use: No    Sexual activity: Defer      Social History     Social History Narrative    Not on file        Family History   Problem Relation Age of Onset    Colon cancer Mother     Glaucoma Mother     Stroke Mother     Arthritis Mother     Hypertension Mother     Glaucoma Sister     Diabetes Sister     Heart disease Sister     Arthritis Sister     Asthma Sister     Hypertension Sister     Miscarriages / Stillbirths Sister     Lung disease Sister     Heart disease Brother     Diabetes Brother     Arthritis Brother     Drug abuse Brother     Hypertension Brother     Arthritis Father     COPD Father     Lung disease Father     Arthritis Daughter     Depression Daughter     Alcohol abuse Maternal Uncle     Heart disease Sister     Heart disease Sister     Heart disease Brother     Rheumatologic disease Sister        ROS: 14 point review of systems was performed and was negative except for documented findings in HPI and today's encounter.     Allergies:   Allergies   Allergen Reactions    Contrast Dye (Echo Or Unknown Ct/Mr) Hives and Itching    Macrobid [Nitrofurantoin] Hives    Iodinated Contrast Media Hives     Constitutional:  Denies fever, shaking or chills   Eyes:  Denies change in visual acuity   HENT:  Denies nasal congestion or sore throat   Respiratory:  Denies cough or shortness of breath   Cardiovascular:  Denies chest pain or severe LE edema   GI:  Denies abdominal  pain, nausea, vomiting, bloody stools or diarrhea   Musculoskeletal:  Numbness, tingling, or loss of motor function only as noted above in history of present illness.  : Denies painful urination or hematuria  Integument:  Denies rash, lesion or ulceration   Neurologic:  Denies headache or focal weakness  Endocrine:  Denies lymphadenopathy  Psych:  Denies confusion or change in mental status   Hem:  Denies active bleeding      Physical Exam: 78 y.o. female  Wt Readings from Last 3 Encounters:   03/27/24 114 kg (250 lb 12.8 oz)   01/15/24 115 kg (254 lb 6.4 oz)   10/12/23 117 kg (257 lb 15 oz)     Exam is unchanged  There is no height or weight on file to calculate BMI.    There were no vitals filed for this visit.  Vital signs reviewed.   General Appearance:    Alert, cooperative, in no acute distress                    Ortho exam                 Assessment: Left shoulder OA exam is unchanged    Plan: Injections she understands she can do those every 3 months  Follow up as indicated.  Ice, elevate, and rest as needed.  Discussed conservative measures of pain control including ice, bracing.Asya Rodriges M.D.    Large Joint Arthrocentesis: L glenohumeral  Date/Time: 4/15/2024 1:10 PM  Consent given by: patient  Site marked: site marked  Timeout: Immediately prior to procedure a time out was called to verify the correct patient, procedure, equipment, support staff and site/side marked as required   Supporting Documentation  Indications: pain   Procedure Details  Location: shoulder - L glenohumeral  Preparation: Patient was prepped and draped in the usual sterile fashion  Needle gauge: 21G.  Approach: posterior  Medications administered: 1 mL methylPREDNISolone acetate 80 MG/ML; 2 mL lidocaine PF 1% 1 %  Patient tolerance: patient tolerated the procedure well with no immediate complications

## 2024-04-15 NOTE — PROGRESS NOTES
I have supervised and reviewed the notes, assessments, and/or procedures performed. The documented assessment and plan were developed cooperatively. I concur with the documentation of this patient encounter.    Shaw Hand, PharmD

## 2024-04-15 NOTE — PROGRESS NOTES
Anticoagulation Clinic Progress Note    Anticoagulation Summary  As of 4/15/2024      INR goal:  2.0-3.0   TTR:  69.6% (5.4 y)   INR used for dosing:  3.4 (4/15/2024)   Warfarin maintenance plan:  5 mg every day   Weekly warfarin total:  35 mg   Plan last modified:  Love Allison RPH (3/27/2023)   Next INR check:  4/23/2024   Priority:  Maintenance   Target end date:  Indefinite    Indications    Atrial fibrillation [I48.91]                 Anticoagulation Episode Summary       INR check location:      Preferred lab:      Send INR reminders to:  [facility] [place]    Comments:            Anticoagulation Care Providers       Provider Role Specialty Phone number    Pia Dueñas MD Referring Cardiology [phone]            Clinic Interview:  Patient Findings     Positives:  Change in medications    Negatives:  Signs/symptoms of thrombosis, Signs/symptoms of bleeding,   Laboratory test error suspected, Change in health, Change in alcohol use,   Change in activity, Upcoming invasive procedure, Emergency department   visit, Upcoming dental procedure, Missed doses, Extra doses, Change in   diet/appetite, Hospital admission, Bruising, Other complaints    Comments:  Patient started clindamycin on 4/12 (10 day course) for   cellulitis. Had Cortisone injection in shoulder today. No other changes      Clinical Outcomes     Negatives:  Major bleeding event, Thromboembolic event,   Anticoagulation-related hospital admission, Anticoagulation-related ED   visit, Anticoagulation-related fatality    Comments:  Patient started clindamycin on 4/12 (10 day course) for   cellulitis. Had Cortisone injection in shoulder today. No other changes        INR History:      Latest Ref Rng & Units 10/26/2023     1:45 PM 12/12/2023    12:14 PM 12/12/2023     1:09 PM 1/15/2024     2:00 PM 2/8/2024     2:00 PM 3/18/2024     2:30 PM 4/15/2024     2:30 PM   Anticoagulation Monitoring   INR  2.3  2.19 2.1 2.9 2.1 3.4   INR Date   10/26/2023  12/12/2023 1/15/2024 2/8/2024 3/18/2024 4/15/2024   INR Goal  2.0-3.0  2.0-3.0 2.0-3.0 2.0-3.0 2.0-3.0 2.0-3.0   Trend  Same  Same Same Same Same Same   Last Week Total  35 mg  35 mg 35 mg 35 mg 35 mg 35 mg   Next Week Total  35 mg  35 mg 35 mg 35 mg 35 mg 30 mg   Sun  5 mg  5 mg 5 mg 5 mg 5 mg 5 mg   Mon  5 mg  5 mg 5 mg 5 mg 5 mg 5 mg   Tue  5 mg  5 mg 5 mg 5 mg 5 mg Hold (4/16)   Wed  5 mg  5 mg 5 mg 5 mg 5 mg 5 mg   Thu  5 mg  5 mg 5 mg 5 mg 5 mg 5 mg   Fri  5 mg  5 mg 5 mg 5 mg 5 mg 5 mg   Sat  5 mg  5 mg 5 mg 5 mg 5 mg 5 mg   Historical INR 0.90 - 1.10  2.19         Visit Report        Report       Plan:  1. INR is Supratherapeutic today- see above in Anticoagulation Summary.  Will instruct Lana Aguerocomb to hold their warfarin dose tomorrow (4/16), then continue their usual warfarin regimen- see above in Anticoagulation Summary.  2. Follow up in 1 week  3. Patient declines warfarin refills.  4. Verbal and written information provided. Patient expresses understanding and has no further questions at this time.    Leonela Ruiz, PharmD

## 2024-04-24 ENCOUNTER — APPOINTMENT (OUTPATIENT)
Dept: PHARMACY | Facility: HOSPITAL | Age: 79
End: 2024-04-24
Payer: MEDICARE

## 2024-04-29 ENCOUNTER — TRANSCRIBE ORDERS (OUTPATIENT)
Dept: ADMINISTRATIVE | Facility: HOSPITAL | Age: 79
End: 2024-04-29
Payer: MEDICARE

## 2024-04-29 ENCOUNTER — ANTICOAGULATION VISIT (OUTPATIENT)
Dept: PHARMACY | Facility: HOSPITAL | Age: 79
End: 2024-04-29
Payer: MEDICARE

## 2024-04-29 ENCOUNTER — LAB (OUTPATIENT)
Dept: LAB | Facility: HOSPITAL | Age: 79
End: 2024-04-29
Payer: MEDICARE

## 2024-04-29 DIAGNOSIS — R94.31 ABNORMAL ELECTROCARDIOGRAM (ECG) (EKG): ICD-10-CM

## 2024-04-29 DIAGNOSIS — I48.20 CHRONIC ATRIAL FIBRILLATION: Primary | ICD-10-CM

## 2024-04-29 DIAGNOSIS — E11.9 DIABETES MELLITUS WITHOUT COMPLICATION: ICD-10-CM

## 2024-04-29 DIAGNOSIS — E11.9 DIABETES MELLITUS WITHOUT COMPLICATION: Primary | ICD-10-CM

## 2024-04-29 DIAGNOSIS — I48.20 CHRONIC ATRIAL FIBRILLATION: ICD-10-CM

## 2024-04-29 LAB
ALBUMIN SERPL-MCNC: 4.2 G/DL (ref 3.5–5.2)
ALBUMIN/GLOB SERPL: 1.4 G/DL
ALP SERPL-CCNC: 77 U/L (ref 39–117)
ALT SERPL W P-5'-P-CCNC: 13 U/L (ref 1–33)
ANION GAP SERPL CALCULATED.3IONS-SCNC: 8.9 MMOL/L (ref 5–15)
AST SERPL-CCNC: 18 U/L (ref 1–32)
BASOPHILS # BLD AUTO: 0.03 10*3/MM3 (ref 0–0.2)
BASOPHILS NFR BLD AUTO: 0.5 % (ref 0–1.5)
BILIRUB SERPL-MCNC: 0.8 MG/DL (ref 0–1.2)
BUN SERPL-MCNC: 17 MG/DL (ref 8–23)
BUN/CREAT SERPL: 16.2 (ref 7–25)
CALCIUM SPEC-SCNC: 9.4 MG/DL (ref 8.6–10.5)
CHLORIDE SERPL-SCNC: 105 MMOL/L (ref 98–107)
CHOLEST SERPL-MCNC: 117 MG/DL (ref 0–200)
CO2 SERPL-SCNC: 26.1 MMOL/L (ref 22–29)
CREAT SERPL-MCNC: 1.05 MG/DL (ref 0.57–1)
DEPRECATED RDW RBC AUTO: 44.6 FL (ref 37–54)
DIGOXIN SERPL-MCNC: <0.3 NG/ML (ref 0.6–1.2)
EGFRCR SERPLBLD CKD-EPI 2021: 55.2 ML/MIN/1.73
EOSINOPHIL # BLD AUTO: 0.06 10*3/MM3 (ref 0–0.4)
EOSINOPHIL NFR BLD AUTO: 0.9 % (ref 0.3–6.2)
ERYTHROCYTE [DISTWIDTH] IN BLOOD BY AUTOMATED COUNT: 13.5 % (ref 12.3–15.4)
GLOBULIN UR ELPH-MCNC: 3 GM/DL
GLUCOSE SERPL-MCNC: 151 MG/DL (ref 65–99)
HBA1C MFR BLD: 7.6 % (ref 4.8–5.6)
HCT VFR BLD AUTO: 38.8 % (ref 34–46.6)
HDLC SERPL-MCNC: 61 MG/DL (ref 40–60)
HGB BLD-MCNC: 12.4 G/DL (ref 12–15.9)
IMM GRANULOCYTES # BLD AUTO: 0.04 10*3/MM3 (ref 0–0.05)
IMM GRANULOCYTES NFR BLD AUTO: 0.6 % (ref 0–0.5)
INR PPP: 2.07 (ref 0.9–1.1)
LDLC SERPL CALC-MCNC: 40 MG/DL (ref 0–100)
LDLC/HDLC SERPL: 0.66 {RATIO}
LYMPHOCYTES # BLD AUTO: 2.22 10*3/MM3 (ref 0.7–3.1)
LYMPHOCYTES NFR BLD AUTO: 33.6 % (ref 19.6–45.3)
MCH RBC QN AUTO: 28.9 PG (ref 26.6–33)
MCHC RBC AUTO-ENTMCNC: 32 G/DL (ref 31.5–35.7)
MCV RBC AUTO: 90.4 FL (ref 79–97)
MONOCYTES # BLD AUTO: 0.64 10*3/MM3 (ref 0.1–0.9)
MONOCYTES NFR BLD AUTO: 9.7 % (ref 5–12)
NEUTROPHILS NFR BLD AUTO: 3.62 10*3/MM3 (ref 1.7–7)
NEUTROPHILS NFR BLD AUTO: 54.7 % (ref 42.7–76)
NRBC BLD AUTO-RTO: 0 /100 WBC (ref 0–0.2)
PLATELET # BLD AUTO: 172 10*3/MM3 (ref 140–450)
PMV BLD AUTO: 10.6 FL (ref 6–12)
POTASSIUM SERPL-SCNC: 4 MMOL/L (ref 3.5–5.2)
PROT SERPL-MCNC: 7.2 G/DL (ref 6–8.5)
PROTHROMBIN TIME: 23.6 SECONDS (ref 11.7–14.2)
RBC # BLD AUTO: 4.29 10*6/MM3 (ref 3.77–5.28)
SODIUM SERPL-SCNC: 140 MMOL/L (ref 136–145)
TRIGL SERPL-MCNC: 78 MG/DL (ref 0–150)
VLDLC SERPL-MCNC: 16 MG/DL (ref 5–40)
WBC NRBC COR # BLD AUTO: 6.61 10*3/MM3 (ref 3.4–10.8)

## 2024-04-29 PROCEDURE — 80053 COMPREHEN METABOLIC PANEL: CPT

## 2024-04-29 PROCEDURE — 85025 COMPLETE CBC W/AUTO DIFF WBC: CPT

## 2024-04-29 PROCEDURE — 80061 LIPID PANEL: CPT

## 2024-04-29 PROCEDURE — 85610 PROTHROMBIN TIME: CPT

## 2024-04-29 PROCEDURE — 83036 HEMOGLOBIN GLYCOSYLATED A1C: CPT

## 2024-04-29 PROCEDURE — 80162 ASSAY OF DIGOXIN TOTAL: CPT

## 2024-04-29 PROCEDURE — 36415 COLL VENOUS BLD VENIPUNCTURE: CPT

## 2024-04-29 NOTE — PROGRESS NOTES
Anticoagulation Clinic Progress Note    Anticoagulation Summary  As of 2024      INR goal:  2.0-3.0   TTR:  69.6% (5.5 y)   INR used for dosin.07 (2024)   Warfarin maintenance plan:  5 mg every day   Weekly warfarin total:  35 mg   No change documented:  Love Allison RPH   Plan last modified:  Love Allison RPH (3/27/2023)   Next INR check:  2024   Priority:  Maintenance   Target end date:  Indefinite    Indications    Atrial fibrillation [I48.91]                 Anticoagulation Episode Summary       INR check location:      Preferred lab:      Send INR reminders to:   [facility]ORV[facility]    Comments:            Anticoagulation Care Providers       Provider Role Specialty Phone number    Pia Dueñas MD Referring Cardiology [phone]            Clinic Interview:  No pertinent clinical findings have been reported.    INR History:      Latest Ref Rng & Units 2023     1:09 PM 1/15/2024     2:00 PM 2024     2:00 PM 3/18/2024     2:30 PM 4/15/2024     2:30 PM 2024     1:50 PM 2024     2:28 PM   Anticoagulation Monitoring   INR  2.19 2.1 2.9 2.1 3.4  2.07   INR Date  2023 1/15/2024 2024 3/18/2024 4/15/2024  2024   INR Goal  2.0-3.0 2.0-3.0 2.0-3.0 2.0-3.0 2.0-3.0  2.0-3.0   Trend  Same Same Same Same Same  Same   Last Week Total  35 mg 35 mg 35 mg 35 mg 35 mg  35 mg   Next Week Total  35 mg 35 mg 35 mg 35 mg 30 mg  35 mg   Sun  5 mg 5 mg 5 mg 5 mg 5 mg  5 mg   Mon  5 mg 5 mg 5 mg 5 mg 5 mg  5 mg   Tue  5 mg 5 mg 5 mg 5 mg Hold ()  5 mg   Wed  5 mg 5 mg 5 mg 5 mg 5 mg  5 mg   Thu  5 mg 5 mg 5 mg 5 mg 5 mg  5 mg   Fri  5 mg 5 mg 5 mg 5 mg 5 mg  5 mg   Sat  5 mg 5 mg 5 mg 5 mg 5 mg  5 mg   Historical INR 0.90 - 1.10      2.07     Visit Report      Report         Plan:  1. INR is therapeutic today- see above in Anticoagulation Summary.    Lana Otilio to continue their warfarin regimen- see above in Anticoagulation Summary.  2. Follow up in 2  weeks (will schedule an appt)   3. They have been instructed to call if any changes in medications, doses, concerns, etc. Patient expresses understanding and has no further questions at this time.    Love Allison RP

## 2024-05-14 ENCOUNTER — LAB (OUTPATIENT)
Dept: LAB | Facility: HOSPITAL | Age: 79
End: 2024-05-14
Payer: MEDICARE

## 2024-05-14 ENCOUNTER — TRANSCRIBE ORDERS (OUTPATIENT)
Dept: ADMINISTRATIVE | Facility: HOSPITAL | Age: 79
End: 2024-05-14
Payer: MEDICARE

## 2024-05-14 DIAGNOSIS — E83.42 HYPOMAGNESEMIA: ICD-10-CM

## 2024-05-14 DIAGNOSIS — E83.42 HYPOMAGNESEMIA: Primary | ICD-10-CM

## 2024-05-14 LAB — MAGNESIUM SERPL-MCNC: 1.7 MG/DL (ref 1.6–2.4)

## 2024-05-14 PROCEDURE — 36415 COLL VENOUS BLD VENIPUNCTURE: CPT

## 2024-05-14 PROCEDURE — 83735 ASSAY OF MAGNESIUM: CPT

## 2024-05-16 ENCOUNTER — TELEPHONE (OUTPATIENT)
Dept: CARDIOLOGY | Facility: CLINIC | Age: 79
End: 2024-05-16
Payer: MEDICARE

## 2024-05-16 NOTE — TELEPHONE ENCOUNTER
Pt called re: Dr Green has started her on Fargixa and she wanted to verify if this was ok with her being on Diuretics.  Dr Green told pt is would help take off water.

## 2024-06-20 ENCOUNTER — TELEPHONE (OUTPATIENT)
Dept: CARDIOLOGY | Facility: CLINIC | Age: 79
End: 2024-06-20
Payer: MEDICARE

## 2024-06-20 NOTE — TELEPHONE ENCOUNTER
Pt called re: She saw Dr Green and he said to let us know the 4/29/2024 Digoxin level was low (labs in EPIC).  Pt is unsure if she was taking the Digoxin or it was an off day as she takes it QOD.  She said she does remember to hold the day of a level.     She has been having no issues, just wanted to let us know.

## 2024-07-15 ENCOUNTER — OFFICE VISIT (OUTPATIENT)
Dept: ORTHOPEDIC SURGERY | Facility: CLINIC | Age: 79
End: 2024-07-15
Payer: MEDICARE

## 2024-07-15 ENCOUNTER — ANTICOAGULATION VISIT (OUTPATIENT)
Dept: PHARMACY | Facility: HOSPITAL | Age: 79
End: 2024-07-15
Payer: MEDICARE

## 2024-07-15 VITALS — WEIGHT: 246 LBS | TEMPERATURE: 98 F | HEIGHT: 66 IN | BODY MASS INDEX: 39.53 KG/M2

## 2024-07-15 DIAGNOSIS — R52 PAIN: Primary | ICD-10-CM

## 2024-07-15 DIAGNOSIS — M19.019 GLENOHUMERAL ARTHRITIS: ICD-10-CM

## 2024-07-15 LAB
INR PPP: 3.1 (ref 0.91–1.09)
PROTHROMBIN TIME: 37.7 SECONDS (ref 10–13.8)

## 2024-07-15 PROCEDURE — 73030 X-RAY EXAM OF SHOULDER: CPT | Performed by: ORTHOPAEDIC SURGERY

## 2024-07-15 PROCEDURE — G0463 HOSPITAL OUTPT CLINIC VISIT: HCPCS

## 2024-07-15 PROCEDURE — 36416 COLLJ CAPILLARY BLOOD SPEC: CPT

## 2024-07-15 PROCEDURE — 20610 DRAIN/INJ JOINT/BURSA W/O US: CPT | Performed by: ORTHOPAEDIC SURGERY

## 2024-07-15 PROCEDURE — 85610 PROTHROMBIN TIME: CPT

## 2024-07-15 RX ORDER — METHYLPREDNISOLONE ACETATE 80 MG/ML
1 INJECTION, SUSPENSION INTRA-ARTICULAR; INTRALESIONAL; INTRAMUSCULAR; SOFT TISSUE
Status: COMPLETED | OUTPATIENT
Start: 2024-07-15 | End: 2024-07-15

## 2024-07-15 RX ORDER — LIDOCAINE HYDROCHLORIDE 10 MG/ML
2 INJECTION, SOLUTION EPIDURAL; INFILTRATION; INTRACAUDAL; PERINEURAL
Status: COMPLETED | OUTPATIENT
Start: 2024-07-15 | End: 2024-07-15

## 2024-07-15 RX ADMIN — LIDOCAINE HYDROCHLORIDE 2 ML: 10 INJECTION, SOLUTION EPIDURAL; INFILTRATION; INTRACAUDAL; PERINEURAL at 13:13

## 2024-07-15 RX ADMIN — METHYLPREDNISOLONE ACETATE 1 ML: 80 INJECTION, SUSPENSION INTRA-ARTICULAR; INTRALESIONAL; INTRAMUSCULAR; SOFT TISSUE at 13:13

## 2024-07-15 NOTE — PROGRESS NOTES
I have supervised and reviewed the notes, assessments, and/or procedures performed. The documented assessment and plan were developed cooperatively, and the plan was implemented in my presence. I concur with the documentation of this patient encounter.    Love Allison, [facility]

## 2024-07-15 NOTE — PROGRESS NOTES
Patient: Lana Yañez  YOB: 1945  Date of Service: 7/15/2024    Chief Complaints: Left shoulder pain    Subjective:    History of Present Illness: Pt is seen in the office today with complaints of left shoulder pain she has known degenerative changes she gets intermittent injections and does well with those        Allergies:   Allergies   Allergen Reactions    Contrast Dye (Echo Or Unknown Ct/Mr) Hives and Itching    Macrobid [Nitrofurantoin] Hives    Iodinated Contrast Media Hives       Medications:   Home Medications:  Current Outpatient Medications on File Prior to Visit   Medication Sig    Accu-Chek FastClix Lancets misc Use to check glucose as directed    acetaminophen (TYLENOL) 325 MG tablet Take 2 tablets by mouth Every 6 (Six) Hours As Needed for Moderate Pain.    albuterol sulfate  (90 Base) MCG/ACT inhaler Inhale 2 puffs Every 4 (Four) Hours As Needed for Shortness of Air. PRN SOA/WHEEZING    Alcohol Swabs (DropSafe Alcohol Prep) 70 % pads     aspirin 81 MG EC tablet Take 1 tablet by mouth Daily.    atorvastatin (LIPITOR) 10 MG tablet Take 1 tablet by mouth Daily.    Blood Glucose Monitoring Suppl (ACCU-CHEK GUIDE) w/Device kit See Admin Instructions.    Cholecalciferol 50 MCG (2000 UT) capsule Take 50 mcg by mouth Daily.    digoxin (LANOXIN) 125 MCG tablet Take 1 tablet by mouth Every Other Day.    doxazosin (CARDURA) 2 MG tablet     glimepiride (Amaryl) 2 MG tablet By mouth take one tab twice daily with AM and PM meals.    glucose blood test strip Test twice daily prior to breakfast and dinner as instructed (Patient taking differently: Test daily prior to breakfast)    HYDROcodone-acetaminophen (NORCO) 7.5-325 MG per tablet Take 1 tablet twice a day by oral route as needed for 30 days.    Januvia 50 MG tablet     Lumigan 0.01 % ophthalmic drops Administer 1 drop to both eyes Every Night.    metoprolol tartrate (Lopressor) 50 MG tablet Take 1 tablet by mouth 2 (Two) Times a  Day for 30 days.    polyethylene glycol (MIRALAX) 17 g packet Take 17 g by mouth Daily As Needed (constipation).    potassium chloride 10 MEQ CR tablet 1 po bid    torsemide (DEMADEX) 20 MG tablet 2 po qday    vitamin D (ERGOCALCIFEROL) 1.25 MG (19770 UT) capsule capsule Take 1 capsule by mouth 1 (One) Time Per Week.    warfarin (COUMADIN) 5 MG tablet Take one-half tablet (2.5 mg) by mouth on Fridays, and take one tablet (5 mg) by mouth all other days or as directed.     No current facility-administered medications on file prior to visit.     Current Medications:  Scheduled Meds:  Continuous Infusions:No current facility-administered medications for this visit.    PRN Meds:.    I have reviewed the patient's medical history in detail and updated the computerized patient record.  Review and summarization of old records include:    Past Medical History:   Diagnosis Date    Acute on chronic diastolic heart failure     Acute UTI (urinary tract infection) 05/22/2022    Allergic reaction 02/09/2018    Arthritis     Arthritis of back     Asthma     Atrial fibrillation     Persistent; on warfarin    Atypical chest pain     Biliary acute pancreatitis without necrosis or infection 11/10/2021    Bronchitis     Cellulitis of right elbow     due to MRSA    Cervical disc disorder     CHF (congestive heart failure)     COPD (chronic obstructive pulmonary disease)     Coronary artery disease     Cardiac catheterization completed; 90% PDA stenosis with medical management recommended    Coronary artery disease involving native coronary artery of native heart with angina pectoris with documented spasm     CTS (carpal tunnel syndrome)     Diabetes 1.5, managed as type 2     Disease of thyroid gland     Displacement of lumbar intervertebral disc without myelopathy     Dizziness     ANTOINE (dyspnea on exertion)     Essential hypertension     Fatigue     Fracture, foot     Generalized osteoarthritis of multiple sites     History of rheumatic  fever     History of transfusion     Hx of bone density study     10/23/2014    Hyperglycemia     Hyperlipidemia     Hypovitaminosis D     Left arm pain     Left-sided weakness     Leg swelling     Low back pain     Lower extremity edema     Lumbosacral disc disease     Malaise and fatigue     Mitral valve disease     Moderate mitral valve prolapse and moderate mitral regurgitation    Mitral valve insufficiency     Morbid obesity with BMI of 40.0-44.9, adult     MRSA infection     NSTEMI (non-ST elevated myocardial infarction)     PAF (paroxysmal atrial fibrillation)     Periarthritis of shoulder     RA (rheumatoid arthritis)     involving both hands    Sleep apnea     SOB (shortness of breath)     Stroke     left side weakness    Urge incontinence of urine     UTI (urinary tract infection) 05/2020    Visit for screening mammogram 04/02/2018    Vitamin D deficiency         Past Surgical History:   Procedure Laterality Date    BREAST BIOPSY      BREAST SURGERY      right side lumpectomy with biopsy    CARDIAC CATHETERIZATION Left 10/20/2017    Procedure: Cardiac Catheterization/Vascular Study;  Surgeon: Alphonso Olmedo MD;  Location: [facility] CATH INVASIVE LOCATION;  Service:     CARDIAC CATHETERIZATION N/A 10/20/2017    +2 mitral regurgitation, left main 10% stenosis, mid to distal LAD 10% diffuse stenosis, circumflex 10% diffuse stenosis, RCA 10% proximal stenosis, and distal PDA consistent with coronary embolus with a lesion of 90% too small to consider coronary intervention; medical management recommended    EYE SURGERY      laser surgery for glaucoma and left eye cataracts removed    HYSTERECTOMY      10+ years ago    JOINT REPLACEMENT  2005; 2006    bilateral knees and left rotater    KNEE SURGERY      MAMMO BILATERAL  2016    [facility]     SHOULDER SURGERY          Social History     Occupational History    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     Employer: RETIRED   Tobacco Use    Smoking  status: Former     Current packs/day: 0.00     Average packs/day: 3.0 packs/day for 1.4 years (4.3 ttl pk-yrs)     Types: Cigarettes     Start date: 1967     Quit date: 10/19/1968     Years since quittin.7     Passive exposure: Never    Smokeless tobacco: Never    Tobacco comments:     caffeine use - decaf coffee   Vaping Use    Vaping status: Never Used   Substance and Sexual Activity    Alcohol use: No     Comment: No caffeine use    Drug use: No    Sexual activity: Defer      Social History     Social History Narrative    Not on file        Family History   Problem Relation Age of Onset    Colon cancer Mother     Glaucoma Mother     Stroke Mother     Arthritis Mother     Hypertension Mother     Glaucoma Sister     Diabetes Sister     Heart disease Sister     Arthritis Sister     Asthma Sister     Hypertension Sister     Miscarriages / Stillbirths Sister     Lung disease Sister     Heart disease Brother     Diabetes Brother     Arthritis Brother     Drug abuse Brother     Hypertension Brother     Arthritis Father     COPD Father     Lung disease Father     Arthritis Daughter     Depression Daughter     Alcohol abuse Maternal Uncle     Heart disease Sister     Heart disease Sister     Heart disease Brother     Rheumatologic disease Sister        ROS: 14 point review of systems was performed and was negative except for documented findings in HPI and today's encounter.     Allergies:   Allergies   Allergen Reactions    Contrast Dye (Echo Or Unknown Ct/Mr) Hives and Itching    Macrobid [Nitrofurantoin] Hives    Iodinated Contrast Media Hives     Constitutional:  Denies fever, shaking or chills   Eyes:  Denies change in visual acuity   HENT:  Denies nasal congestion or sore throat   Respiratory:  Denies cough or shortness of breath   Cardiovascular:  Denies chest pain or severe LE edema   GI:  Denies abdominal pain, nausea, vomiting, bloody stools or diarrhea   Musculoskeletal:  Numbness, tingling, or loss of  motor function only as noted above in history of present illness.  : Denies painful urination or hematuria  Integument:  Denies rash, lesion or ulceration   Neurologic:  Denies headache or focal weakness  Endocrine:  Denies lymphadenopathy  Psych:  Denies confusion or change in mental status   Hem:  Denies active bleeding      Physical Exam: 78 y.o. female  Wt Readings from Last 3 Encounters:   04/15/24 113 kg (248 lb 6.4 oz)   03/27/24 114 kg (250 lb 12.8 oz)   01/15/24 115 kg (254 lb 6.4 oz)       There is no height or weight on file to calculate BMI.    There were no vitals filed for this visit.  Vital signs reviewed.   General Appearance:    Alert, cooperative, in no acute distress                    Ortho exam    Exam is unchanged      X-rays AP scapular Y and x-ray lateral left shoulder were taken to evaluate her symptoms and compared x-rays done 1 year ago she has severe glenohumeral arthritis with complete loss of joint space and loose body seen no real change      Assessment: Left shoulder OA    Plan: Injections  Follow up as indicated.  Ice, elevate, and rest as needed.  Discussed conservative measures of pain control including ice, bracing.  I will see her in 3 months do inject both she and her  if is just injections that they want in the future I will probably hand them to hold Asya Rodriges M.D.    Large Joint Arthrocentesis: L glenohumeral  Date/Time: 7/15/2024 1:13 PM  Consent given by: patient  Site marked: site marked  Timeout: Immediately prior to procedure a time out was called to verify the correct patient, procedure, equipment, support staff and site/side marked as required   Supporting Documentation  Indications: pain   Procedure Details  Location: shoulder - L glenohumeral  Preparation: Patient was prepped and draped in the usual sterile fashion  Needle gauge: 21G.  Approach: posterior  Medications administered: 1 mL methylPREDNISolone acetate 80 MG/ML; 2 mL lidocaine PF 1% 1  %  Patient tolerance: patient tolerated the procedure well with no immediate complications

## 2024-07-15 NOTE — PROGRESS NOTES
Anticoagulation Clinic Progress Note    Anticoagulation Summary  As of 7/15/2024      INR goal:  2.0-3.0   TTR:  70.3% (5.7 y)   INR used for dosing:  3.1 (7/15/2024)   Warfarin maintenance plan:  5 mg every day   Weekly warfarin total:  35 mg   Plan last modified:  Love Allison RPH (3/27/2023)   Next INR check:  7/30/2024   Priority:  Maintenance   Target end date:  Indefinite    Indications    Atrial fibrillation [I48.91]                 Anticoagulation Episode Summary       INR check location:      Preferred lab:      Send INR reminders to:  [facility]    Comments:            Anticoagulation Care Providers       Provider Role Specialty Phone number    Pia Dueñas MD Referring Cardiology [phone]            Clinic Interview:  Patient Findings     Positives:  Change in medications    Negatives:  Signs/symptoms of thrombosis, Signs/symptoms of bleeding,   Laboratory test error suspected, Change in health, Change in alcohol use,   Change in activity, Upcoming invasive procedure, Emergency department   visit, Upcoming dental procedure, Missed doses, Extra doses, Change in   diet/appetite, Hospital admission, Bruising, Other complaints    Comments:  Patient received steroid shot in left shoulder before coming   in for INR check today.      Clinical Outcomes     Negatives:  Major bleeding event, Thromboembolic event,   Anticoagulation-related hospital admission, Anticoagulation-related ED   visit, Anticoagulation-related fatality    Comments:  Patient received steroid shot in left shoulder before coming   in for INR check today.        INR History:      Latest Ref Rng & Units 1/15/2024     2:00 PM 2/8/2024     2:00 PM 3/18/2024     2:30 PM 4/15/2024     2:30 PM 4/29/2024     1:50 PM 4/29/2024     2:28 PM 7/15/2024     2:45 PM   Anticoagulation Monitoring   INR  2.1 2.9 2.1 3.4  2.07 3.1   INR Date  1/15/2024 2/8/2024 3/18/2024 4/15/2024  4/29/2024 7/15/2024   INR Goal  2.0-3.0 2.0-3.0 2.0-3.0  2.0-3.0  2.0-3.0 2.0-3.0   Trend  Same Same Same Same  Same Same   Last Week Total  35 mg 35 mg 35 mg 35 mg  35 mg 35 mg   Next Week Total  35 mg 35 mg 35 mg 30 mg  35 mg 32.5 mg   Sun  5 mg 5 mg 5 mg 5 mg  5 mg 5 mg   Mon  5 mg 5 mg 5 mg 5 mg  5 mg 5 mg   Tue  5 mg 5 mg 5 mg Hold (4/16)  5 mg 2.5 mg (7/16); Otherwise 5 mg   Wed  5 mg 5 mg 5 mg 5 mg  5 mg 5 mg   Thu  5 mg 5 mg 5 mg 5 mg  5 mg 5 mg   Fri  5 mg 5 mg 5 mg 5 mg  5 mg 5 mg   Sat  5 mg 5 mg 5 mg 5 mg  5 mg 5 mg   Historical INR 0.90 - 1.10     2.07      Visit Report     Report   Report       Plan:  1. INR is Supratherapeutic today- see above in Anticoagulation Summary.  Will instruct Lana Nielsonb to Continue their warfarin regimen (take 2.5 mg tomorrow, then resume 5 mg every day) - see above in Anticoagulation Summary.  2. Follow up in 2 weeks  3. Patient declines warfarin refills.  4. Verbal and written information provided. Patient expresses understanding and has no further questions at this time.    Roxana Smith, Pharmacy Intern

## 2024-07-24 ENCOUNTER — LAB (OUTPATIENT)
Dept: LAB | Facility: HOSPITAL | Age: 79
End: 2024-07-24
Payer: MEDICARE

## 2024-07-24 ENCOUNTER — TRANSCRIBE ORDERS (OUTPATIENT)
Dept: LAB | Facility: HOSPITAL | Age: 79
End: 2024-07-24
Payer: MEDICARE

## 2024-07-24 DIAGNOSIS — I48.20 CHRONIC ATRIAL FIBRILLATION: ICD-10-CM

## 2024-07-24 DIAGNOSIS — R74.8 ABNORMAL LEVELS OF OTHER SERUM ENZYMES: ICD-10-CM

## 2024-07-24 DIAGNOSIS — R73.09 OTHER ABNORMAL GLUCOSE: ICD-10-CM

## 2024-07-24 DIAGNOSIS — C78.2 SECONDARY MALIGNANT NEOPLASM OF PLEURA: ICD-10-CM

## 2024-07-24 DIAGNOSIS — L11.9: ICD-10-CM

## 2024-07-24 DIAGNOSIS — L11.9: Primary | ICD-10-CM

## 2024-07-24 LAB
ALBUMIN SERPL-MCNC: 4.1 G/DL (ref 3.5–5.2)
ALBUMIN/GLOB SERPL: 1.2 G/DL
ALP SERPL-CCNC: 70 U/L (ref 39–117)
ALT SERPL W P-5'-P-CCNC: 15 U/L (ref 1–33)
ANION GAP SERPL CALCULATED.3IONS-SCNC: 10.2 MMOL/L (ref 5–15)
AST SERPL-CCNC: 17 U/L (ref 1–32)
BILIRUB SERPL-MCNC: 0.7 MG/DL (ref 0–1.2)
BUN SERPL-MCNC: 17 MG/DL (ref 8–23)
BUN/CREAT SERPL: 16 (ref 7–25)
CALCIUM SPEC-SCNC: 9.5 MG/DL (ref 8.6–10.5)
CHLORIDE SERPL-SCNC: 107 MMOL/L (ref 98–107)
CHOLEST SERPL-MCNC: 112 MG/DL (ref 0–200)
CO2 SERPL-SCNC: 24.8 MMOL/L (ref 22–29)
CREAT SERPL-MCNC: 1.06 MG/DL (ref 0.57–1)
DEPRECATED RDW RBC AUTO: 44.4 FL (ref 37–54)
DIGOXIN SERPL-MCNC: 0.5 NG/ML (ref 0.6–1.2)
EGFRCR SERPLBLD CKD-EPI 2021: 54.6 ML/MIN/1.73
ERYTHROCYTE [DISTWIDTH] IN BLOOD BY AUTOMATED COUNT: 13.1 % (ref 12.3–15.4)
GLOBULIN UR ELPH-MCNC: 3.3 GM/DL
GLUCOSE SERPL-MCNC: 111 MG/DL (ref 65–99)
HBA1C MFR BLD: 8.3 % (ref 4.8–5.6)
HCT VFR BLD AUTO: 39.8 % (ref 34–46.6)
HDLC SERPL-MCNC: 71 MG/DL (ref 40–60)
HGB BLD-MCNC: 13 G/DL (ref 12–15.9)
LDLC SERPL CALC-MCNC: 27 MG/DL (ref 0–100)
LDLC/HDLC SERPL: 0.4 {RATIO}
MCH RBC QN AUTO: 30.4 PG (ref 26.6–33)
MCHC RBC AUTO-ENTMCNC: 32.7 G/DL (ref 31.5–35.7)
MCV RBC AUTO: 93.2 FL (ref 79–97)
PLATELET # BLD AUTO: 143 10*3/MM3 (ref 140–450)
PMV BLD AUTO: 10.2 FL (ref 6–12)
POTASSIUM SERPL-SCNC: 4 MMOL/L (ref 3.5–5.2)
PROT SERPL-MCNC: 7.4 G/DL (ref 6–8.5)
RBC # BLD AUTO: 4.27 10*6/MM3 (ref 3.77–5.28)
SODIUM SERPL-SCNC: 142 MMOL/L (ref 136–145)
TRIGL SERPL-MCNC: 63 MG/DL (ref 0–150)
VLDLC SERPL-MCNC: 14 MG/DL (ref 5–40)
WBC NRBC COR # BLD AUTO: 6.74 10*3/MM3 (ref 3.4–10.8)

## 2024-07-24 PROCEDURE — 83036 HEMOGLOBIN GLYCOSYLATED A1C: CPT

## 2024-07-24 PROCEDURE — 36415 COLL VENOUS BLD VENIPUNCTURE: CPT

## 2024-07-24 PROCEDURE — 80061 LIPID PANEL: CPT

## 2024-07-24 PROCEDURE — 80162 ASSAY OF DIGOXIN TOTAL: CPT

## 2024-07-24 PROCEDURE — 85027 COMPLETE CBC AUTOMATED: CPT

## 2024-07-24 PROCEDURE — 80053 COMPREHEN METABOLIC PANEL: CPT

## 2024-07-30 ENCOUNTER — APPOINTMENT (OUTPATIENT)
Dept: PHARMACY | Facility: HOSPITAL | Age: 79
End: 2024-07-30
Payer: MEDICARE

## 2024-10-03 ENCOUNTER — TELEPHONE (OUTPATIENT)
Dept: CARDIOLOGY | Facility: CLINIC | Age: 79
End: 2024-10-03
Payer: MEDICARE

## 2024-10-03 NOTE — TELEPHONE ENCOUNTER
Dr. Dueñas,    FLOYD:    Pt's daughter called this afternoon. She said pt has been back in a-fib, rate in the 120s. Pt is very lethargic and unable to do her normal activities of daily living. She wanted to get pt an appt to see Dr. Dueñas.    I scheduled pt to see Dr. Dueñas tomorrow, but at the end of the conversation pt's daughter decided she is going to call 911 and have pt come to ER.     I have not cancelled the appt because I am unsure if they will admit her. Will see status of ER visit and/or admission tomorrow morning.    Thank you,    Celina Garcia, RN  [place] [facility] [place]  10/03/24 15:05 EDT

## 2024-10-04 ENCOUNTER — HOSPITAL ENCOUNTER (OUTPATIENT)
Dept: CARDIOLOGY | Facility: HOSPITAL | Age: 79
Discharge: HOME | End: 2024-10-04
Payer: MEDICARE

## 2024-10-04 ENCOUNTER — OFFICE VISIT (OUTPATIENT)
Dept: CARDIOLOGY | Facility: CLINIC | Age: 79
End: 2024-10-04
Payer: MEDICARE

## 2024-10-04 ENCOUNTER — TELEPHONE (OUTPATIENT)
Dept: CARDIOLOGY | Facility: CLINIC | Age: 79
End: 2024-10-04
Payer: MEDICARE

## 2024-10-04 VITALS
WEIGHT: 243 LBS | BODY MASS INDEX: 39.05 KG/M2 | HEIGHT: 66 IN | SYSTOLIC BLOOD PRESSURE: 118 MMHG | DIASTOLIC BLOOD PRESSURE: 68 MMHG | HEART RATE: 81 BPM

## 2024-10-04 DIAGNOSIS — R00.0 TACHYCARDIA: ICD-10-CM

## 2024-10-04 DIAGNOSIS — R06.02 SHORTNESS OF BREATH: ICD-10-CM

## 2024-10-04 DIAGNOSIS — E11.65 TYPE 2 DIABETES MELLITUS WITH HYPERGLYCEMIA, WITHOUT LONG-TERM CURRENT USE OF INSULIN: ICD-10-CM

## 2024-10-04 DIAGNOSIS — R07.89 CHEST PRESSURE: Primary | ICD-10-CM

## 2024-10-04 DIAGNOSIS — R07.89 CHEST PRESSURE: ICD-10-CM

## 2024-10-04 DIAGNOSIS — I27.20 PULMONARY HYPERTENSION: ICD-10-CM

## 2024-10-04 DIAGNOSIS — I71.21 ANEURYSM OF ASCENDING AORTA WITHOUT RUPTURE: ICD-10-CM

## 2024-10-04 DIAGNOSIS — I48.20 ATRIAL FIBRILLATION, CHRONIC: ICD-10-CM

## 2024-10-04 LAB
ALBUMIN SERPL-MCNC: 4.6 G/DL (ref 3.5–5.2)
ALBUMIN/GLOB SERPL: 1.4 G/DL
ALP SERPL-CCNC: 76 U/L (ref 39–117)
ALT SERPL W P-5'-P-CCNC: 11 U/L (ref 1–33)
ANION GAP SERPL CALCULATED.3IONS-SCNC: 8 MMOL/L (ref 5–15)
AST SERPL-CCNC: 16 U/L (ref 1–32)
BASOPHILS # BLD AUTO: 0.02 10*3/MM3 (ref 0–0.2)
BASOPHILS NFR BLD AUTO: 0.3 % (ref 0–1.5)
BILIRUB SERPL-MCNC: 0.6 MG/DL (ref 0–1.2)
BUN SERPL-MCNC: 20 MG/DL (ref 8–23)
BUN/CREAT SERPL: 17.2 (ref 7–25)
CALCIUM SPEC-SCNC: 10.2 MG/DL (ref 8.6–10.5)
CHLORIDE SERPL-SCNC: 103 MMOL/L (ref 98–107)
CO2 SERPL-SCNC: 29 MMOL/L (ref 22–29)
CREAT SERPL-MCNC: 1.16 MG/DL (ref 0.57–1)
DEPRECATED RDW RBC AUTO: 45.9 FL (ref 37–54)
EGFRCR SERPLBLD CKD-EPI 2021: 48.7 ML/MIN/1.73
EOSINOPHIL # BLD AUTO: 0.11 10*3/MM3 (ref 0–0.4)
EOSINOPHIL NFR BLD AUTO: 1.7 % (ref 0.3–6.2)
ERYTHROCYTE [DISTWIDTH] IN BLOOD BY AUTOMATED COUNT: 13.5 % (ref 12.3–15.4)
GLOBULIN UR ELPH-MCNC: 3.2 GM/DL
GLUCOSE SERPL-MCNC: 133 MG/DL (ref 65–99)
HBA1C MFR BLD: 7.3 % (ref 4.8–5.6)
HCT VFR BLD AUTO: 41 % (ref 34–46.6)
HGB BLD-MCNC: 13.6 G/DL (ref 12–15.9)
IMM GRANULOCYTES # BLD AUTO: 0.01 10*3/MM3 (ref 0–0.05)
IMM GRANULOCYTES NFR BLD AUTO: 0.2 % (ref 0–0.5)
LYMPHOCYTES # BLD AUTO: 2.72 10*3/MM3 (ref 0.7–3.1)
LYMPHOCYTES NFR BLD AUTO: 41 % (ref 19.6–45.3)
MCH RBC QN AUTO: 31.2 PG (ref 26.6–33)
MCHC RBC AUTO-ENTMCNC: 33.2 G/DL (ref 31.5–35.7)
MCV RBC AUTO: 94 FL (ref 79–97)
MONOCYTES # BLD AUTO: 0.84 10*3/MM3 (ref 0.1–0.9)
MONOCYTES NFR BLD AUTO: 12.7 % (ref 5–12)
NEUTROPHILS NFR BLD AUTO: 2.93 10*3/MM3 (ref 1.7–7)
NEUTROPHILS NFR BLD AUTO: 44.1 % (ref 42.7–76)
NRBC BLD AUTO-RTO: 0 /100 WBC (ref 0–0.2)
NT-PROBNP SERPL-MCNC: 1178 PG/ML (ref 0–1800)
PLATELET # BLD AUTO: 162 10*3/MM3 (ref 140–450)
PMV BLD AUTO: 10.7 FL (ref 6–12)
POTASSIUM SERPL-SCNC: 4 MMOL/L (ref 3.5–5.2)
PROT SERPL-MCNC: 7.8 G/DL (ref 6–8.5)
RBC # BLD AUTO: 4.36 10*6/MM3 (ref 3.77–5.28)
SODIUM SERPL-SCNC: 140 MMOL/L (ref 136–145)
TROPONIN T SERPL HS-MCNC: 15 NG/L
TSH SERPL DL<=0.05 MIU/L-ACNC: 1.55 UIU/ML (ref 0.27–4.2)
WBC NRBC COR # BLD AUTO: 6.63 10*3/MM3 (ref 3.4–10.8)

## 2024-10-04 PROCEDURE — 84484 ASSAY OF TROPONIN QUANT: CPT | Performed by: INTERNAL MEDICINE

## 2024-10-04 PROCEDURE — 80053 COMPREHEN METABOLIC PANEL: CPT | Performed by: INTERNAL MEDICINE

## 2024-10-04 PROCEDURE — 84443 ASSAY THYROID STIM HORMONE: CPT | Performed by: INTERNAL MEDICINE

## 2024-10-04 PROCEDURE — 85025 COMPLETE CBC W/AUTO DIFF WBC: CPT | Performed by: INTERNAL MEDICINE

## 2024-10-04 PROCEDURE — 83036 HEMOGLOBIN GLYCOSYLATED A1C: CPT | Performed by: INTERNAL MEDICINE

## 2024-10-04 PROCEDURE — 3074F SYST BP LT 130 MM HG: CPT | Performed by: INTERNAL MEDICINE

## 2024-10-04 PROCEDURE — 83880 ASSAY OF NATRIURETIC PEPTIDE: CPT | Performed by: INTERNAL MEDICINE

## 2024-10-04 PROCEDURE — 3078F DIAST BP <80 MM HG: CPT | Performed by: INTERNAL MEDICINE

## 2024-10-04 PROCEDURE — 99214 OFFICE O/P EST MOD 30 MIN: CPT | Performed by: INTERNAL MEDICINE

## 2024-10-04 PROCEDURE — 93000 ELECTROCARDIOGRAM COMPLETE: CPT | Performed by: INTERNAL MEDICINE

## 2024-10-04 PROCEDURE — 36415 COLL VENOUS BLD VENIPUNCTURE: CPT

## 2024-10-04 NOTE — TELEPHONE ENCOUNTER
Patient to have labs today and increase digoxin to 0.125 mg every day.  She will return next week for a dig level and call with an update regarding her symptoms.  Depending on her symptoms may need further cardiac evaluation her labs today are normal

## 2024-10-04 NOTE — TELEPHONE ENCOUNTER
Spoke with pt.  She does feel safe in her home.  She will discuss further with 's PCP.  Advised her that I would call her further next week to followup.

## 2024-10-04 NOTE — PROGRESS NOTES
Date of Office Visit: 10/04/2024  Encounter Provider: Pia Dueñas MD  Place of Service: [facility] CARDIOLOGY  Patient Name: Lana Yañez  :1945    Chief complaint  Coronary artery disease, pulmonary hypertension, atrial fibrillation, mitral valve prolapse with moderate mitral regurgitation, dilated ascending aor    History of Present Illness  The patient is a 80 yo female with with history of retention, hyperlipidemia, rheumatoid arthritis, obstructive sleep apnea who in early October was found to be in atrial fibrillation with rapid ventricular rates and mild diastolic heart failure.  She was placed on IV heparin and Pradaxa.  Echocardiogram revealed normal systolic function mild left ventricular hypertrophy, moderate atrial enlargement with aortic valve sclerosis and moderate pulmonary hypertension and moderate tricuspid regurgitation.  The RV systolic pressure is 54 mmHg.  She had a stress perfusion study that was negative for ischemia and a CT and her grandmother revealed a mildly dilated aorta without pulmonary emboli.  She then presented several days later with an acute stroke.  CLARIBEL was not pursued as it was felt to be embolic in nature.  However on  and she was diaphoretic and was found to have a non-ST elevation myocardial infarction.  This was on full dose Pradaxa which was not held.  CLARIBEL was pursued that revealed normal systolic function with moderate mitral valve prolapse without significant regurgitation.  There was right-sided spontaneous echo contrast without thrombus formation.  Cardiac catheterization revealed normal systolic function with 2+ mitral regurgitation, mild coronary disease except for a 90% stenosis of the distal PDA which was felt to be too small to be amenable PCI.  She was treated medically and switched to Coumadin. She has struggled with intermittent heart failure dietary indiscretions with salt since then.  She also had a 24-hour  Holter that revealed persistent atrial fibrillation with reasonable rate control and frequent PVCs. No other arrhythmia was present.  Last echocardiogram in August 2021 showed an ejection fraction of 62% with moderate left ventricular hypertrophy with indeterminate diastolic function, mild aortic regurgitation, mitral valve thickening with moderate regurgitation without stenosis, severe tricuspid regurgitation with an RV systolic pressure 48 mmHg is present.  CT angiogram of the chest 9/2021 showed stable ascending aorta measuring 4.1 cm.  Lexiscan cardiac stress test 2/2022 was negative for ischemia.  Echo on 5/2022 showed ejection fraction of 60% with grade 2 diastolic dysfunction, mild aortic valve stenosis, RVSP 42 mmHg.  Mitral valve calcification present but not felt to have mitral stenosis.  Mild mitral valve thickening.  Follow-up echo 10/2023 showed an ejection fraction 63%, mild to moderate valve regurgitation without stenosis, nontender calcification with normal functioning mitral valve, moderate tricuspid regurgitation with RVSP 44.5 mmHg, the ascending aorta measured 3.7 cm.    Patient has had significant stresses in the past several months with illness and her  and subsequent social stresses.  Patient states that he is verbally very angry and often it is directed at her.  There is no physical threat she does not feel threatened in her home or fear her life.  However he gets quite angry and yells at her.  In this situation she has had worsening palpitation shortness of breath when this occurs.  She has had chest pressure last night while sitting in her chair when her  was upset.      Past Medical History:   Diagnosis Date    Acute on chronic diastolic heart failure     Acute UTI (urinary tract infection) 05/22/2022    Allergic reaction 02/09/2018    Arthritis     Arthritis of back     Arthritis of neck     Asthma     Atrial fibrillation     Persistent; on warfarin    Atypical chest pain      Biliary acute pancreatitis without necrosis or infection 11/10/2021    Bronchitis     Cellulitis of right elbow     due to MRSA    Cervical disc disorder     CHF (congestive heart failure)     COPD (chronic obstructive pulmonary disease)     Coronary artery disease     Cardiac catheterization completed; 90% PDA stenosis with medical management recommended    Coronary artery disease involving native coronary artery of native heart with angina pectoris with documented spasm     CTS (carpal tunnel syndrome)     Diabetes 1.5, managed as type 2     Disease of thyroid gland     Displacement of lumbar intervertebral disc without myelopathy     Dizziness     ANTOINE (dyspnea on exertion)     Essential hypertension     Fatigue     Fracture, foot     Generalized osteoarthritis of multiple sites     History of rheumatic fever     History of transfusion     Hx of bone density study     10/23/2014    Hyperglycemia     Hyperlipidemia     Hypovitaminosis D     Knee swelling     Left arm pain     Left-sided weakness     Leg swelling     Low back pain     Lower extremity edema     Lumbosacral disc disease     Malaise and fatigue     Mitral valve disease     Moderate mitral valve prolapse and moderate mitral regurgitation    Mitral valve insufficiency     Morbid obesity with BMI of 40.0-44.9, adult     MRSA infection     NSTEMI (non-ST elevated myocardial infarction)     PAF (paroxysmal atrial fibrillation)     Periarthritis of shoulder     RA (rheumatoid arthritis)     involving both hands    Rotator cuff syndrome     Sleep apnea     SOB (shortness of breath)     Stroke     left side weakness    Urge incontinence of urine     UTI (urinary tract infection) 05/2020    Visit for screening mammogram 04/02/2018    Vitamin D deficiency      Past Surgical History:   Procedure Laterality Date    BREAST BIOPSY      BREAST SURGERY      right side lumpectomy with biopsy    CARDIAC CATHETERIZATION Left 10/20/2017    Procedure: Cardiac  Catheterization/Vascular Study;  Surgeon: Alphonso Olmedo MD;  Location: [facility] CATH INVASIVE LOCATION;  Service:     CARDIAC CATHETERIZATION N/A 10/20/2017    +2 mitral regurgitation, left main 10% stenosis, mid to distal LAD 10% diffuse stenosis, circumflex 10% diffuse stenosis, RCA 10% proximal stenosis, and distal PDA consistent with coronary embolus with a lesion of 90% too small to consider coronary intervention; medical management recommended    EYE SURGERY      laser surgery for glaucoma and left eye cataracts removed    HYSTERECTOMY      10+ years ago    JOINT REPLACEMENT  2005; 2006    bilateral knees and left rotater    KNEE SURGERY      MAMMO BILATERAL  2016    [facility]     SHOULDER SURGERY       Outpatient Medications Prior to Visit   Medication Sig Dispense Refill    Accu-Chek FastClix Lancets misc Use to check glucose as directed 306 each 1    acetaminophen (TYLENOL) 325 MG tablet Take 2 tablets by mouth Every 6 (Six) Hours As Needed for Moderate Pain.      albuterol sulfate  (90 Base) MCG/ACT inhaler Inhale 2 puffs Every 4 (Four) Hours As Needed for Shortness of Air. PRN SOA/WHEEZING 18 g 11    Alcohol Swabs (DropSafe Alcohol Prep) 70 % pads       aspirin 81 MG EC tablet Take 1 tablet by mouth Daily.      atorvastatin (LIPITOR) 10 MG tablet Take 1 tablet by mouth Daily. 90 tablet 1    Blood Glucose Monitoring Suppl (ACCU-CHEK GUIDE) w/Device kit See Admin Instructions.      Cholecalciferol 50 MCG (2000 UT) capsule Take 50 mcg by mouth Daily.      digoxin (LANOXIN) 125 MCG tablet Take 1 tablet by mouth Every Other Day. 45 tablet 2    glimepiride (Amaryl) 2 MG tablet By mouth take one tab twice daily with AM and PM meals. 180 tablet 2    glucose blood test strip Test twice daily prior to breakfast and dinner as instructed (Patient taking differently: Test daily prior to breakfast) 200 each 12    HYDROcodone-acetaminophen (NORCO) 7.5-325 MG per tablet Take 1 tablet twice a day by oral route  as needed for 30 days.      Januvia 50 MG tablet       Lumigan 0.01 % ophthalmic drops Administer 1 drop to both eyes Every Night.      metoprolol tartrate (Lopressor) 50 MG tablet Take 1 tablet by mouth 2 (Two) Times a Day for 30 days. 180 tablet 1    polyethylene glycol (MIRALAX) 17 g packet Take 17 g by mouth Daily As Needed (constipation).      potassium chloride 10 MEQ CR tablet 1 po bid 180 tablet 1    torsemide (DEMADEX) 20 MG tablet 2 po qday 180 tablet 1    vitamin D (ERGOCALCIFEROL) 1.25 MG (41817 UT) capsule capsule Take 1 capsule by mouth 1 (One) Time Per Week. 12 capsule 1    warfarin (COUMADIN) 5 MG tablet Take one-half tablet (2.5 mg) by mouth on , and take one tablet (5 mg) by mouth all other days or as directed. 85 tablet 1    doxazosin (CARDURA) 2 MG tablet  (Patient not taking: Reported on 10/4/2024)       No facility-administered medications prior to visit.       Allergies as of 10/04/2024 - Reviewed 10/04/2024   Allergen Reaction Noted    Metformin Diarrhea 2024    Contrast dye (echo or unknown ct/mr) Hives and Itching 2018    Dapagliflozin Headache and Swelling 2024    Macrobid [nitrofurantoin] Hives 2023    Iodinated contrast media Hives 2018     Social History     Socioeconomic History    Marital status:      Spouse name: Suresh    Number of children: 5    Years of education: 13   Tobacco Use    Smoking status: Former     Current packs/day: 0.00     Average packs/day: 3.0 packs/day for 1.4 years (4.3 ttl pk-yrs)     Types: Cigarettes     Start date: 1967     Quit date: 10/19/1968     Years since quittin.0     Passive exposure: Never    Smokeless tobacco: Never    Tobacco comments:     caffeine use - decaf coffee   Vaping Use    Vaping status: Never Used   Substance and Sexual Activity    Alcohol use: No     Comment: No caffeine use    Drug use: No    Sexual activity: Defer     Family History   Problem Relation Age of Onset    Colon cancer  "Mother     Glaucoma Mother     Stroke Mother     Arthritis Mother     Hypertension Mother     Glaucoma Sister     Diabetes Sister     Heart disease Sister     Arthritis Sister     Asthma Sister     Hypertension Sister     Miscarriages / Stillbirths Sister     Lung disease Sister     Heart disease Brother     Diabetes Brother     Arthritis Brother     Drug abuse Brother     Hypertension Brother     Arthritis Father     COPD Father     Lung disease Father     Arthritis Daughter     Depression Daughter     Alcohol abuse Maternal Uncle     Heart disease Sister     Heart disease Sister     Heart disease Brother     Rheumatologic disease Sister      Review of Systems   Constitutional: Negative for chills, fever, weight gain and weight loss.   Cardiovascular:  Positive for chest pain, dyspnea on exertion and palpitations. Negative for leg swelling.   Respiratory:  Negative for cough, snoring and wheezing.    Hematologic/Lymphatic: Negative for bleeding problem. Does not bruise/bleed easily.   Skin:  Negative for color change.   Musculoskeletal:  Negative for falls, joint pain and myalgias.   Gastrointestinal:  Negative for melena.   Genitourinary:  Negative for hematuria.   Neurological:  Positive for dizziness. Negative for excessive daytime sleepiness.   Psychiatric/Behavioral:  Negative for depression. The patient is not nervous/anxious.         Objective:     Vitals:    10/04/24 1139   BP: 118/68   Pulse: 81   Weight: 110 kg (243 lb)   Height: 167.6 cm (66\")     Body mass index is 39.22 kg/m².    Vitals reviewed.   Constitutional:       Appearance: Well-developed. Obese.   Eyes:      General: No scleral icterus.        Right eye: No discharge.      Conjunctiva/sclera: Conjunctivae normal.      Pupils: Pupils are equal, round, and reactive to light.   HENT:      Head: Normocephalic.      Nose: Nose normal.   Neck:      Thyroid: No thyromegaly.      Vascular: No JVD.   Pulmonary:      Effort: Pulmonary effort is normal. " No respiratory distress.      Breath sounds: Normal breath sounds. No wheezing. No rales.   Cardiovascular:      Normal rate. Regular rhythm. Normal S1. Normal S2.       Murmurs: There is no murmur.      No gallop.    Pulses:     Intact distal pulses.      Carotid: 2+ bilaterally.     Radial: 2+ bilaterally.     Femoral: 2+ bilaterally.     Popliteal: 2+ bilaterally.     Dorsalis pedis: 2+ bilaterally.     Posterior tibial: 2+ bilaterally.  Edema:     Peripheral edema absent.   Abdominal:      General: Bowel sounds are normal. There is no distension.      Palpations: Abdomen is soft.      Tenderness: There is no abdominal tenderness. There is no rebound.   Musculoskeletal: Normal range of motion.         General: No tenderness.      Cervical back: Normal range of motion and neck supple. Skin:     General: Skin is warm and dry.      Findings: No erythema or rash.   Neurological:      Mental Status: Alert and oriented to person, place, and time.   Psychiatric:         Behavior: Behavior normal.         Thought Content: Thought content normal.         Judgment: Judgment normal.       Lab Review:   Lab Results - Last 18 Months   Lab Units 10/04/24  1251 07/24/24  1301 04/29/24  1350   WBC 10*3/mm3 6.63 6.74 6.61   RBC 10*6/mm3 4.36 4.27 4.29   HEMOGLOBIN g/dL 13.6 13.0 12.4   HEMATOCRIT % 41.0 39.8 38.8   MCV fL 94.0 93.2 90.4   MCH pg 31.2 30.4 28.9   MCHC g/dL 33.2 32.7 32.0   RDW % 13.5 13.1 13.5   PLATELETS 10*3/mm3 162 143 172   NEUTROPHIL % % 44.1  --  54.7   LYMPHOCYTE % % 41.0  --  33.6   MONOCYTES % % 12.7*  --  9.7   EOSINOPHIL % % 1.7  --  0.9   BASOPHIL % % 0.3  --  0.5   NEUTROS ABS 10*3/mm3 2.93  --  3.62   LYMPHS ABS 10*3/mm3 2.72  --  2.22   MONOS ABS 10*3/mm3 0.84  --  0.64   EOS ABS 10*3/mm3 0.11  --  0.06   BASOS ABS 10*3/mm3 0.02  --  0.03   RDW-SD fl 45.9 44.4 44.6   MPV fL 10.7 10.2 10.6       Lab Results - Last 18 Months   Lab Units 10/04/24  1251 07/24/24  1301   GLUCOSE mg/dL 133* 111*   BUN  mg/dL 20 17   CREATININE mg/dL 1.16* 1.06*   SODIUM mmol/L 140 142   POTASSIUM mmol/L 4.0 4.0   CHLORIDE mmol/L 103 107   CO2 mmol/L 29.0 24.8   CALCIUM mg/dL 10.2 9.5   TOTAL PROTEIN g/dL 7.8 7.4   ALBUMIN g/dL 4.6 4.1   ALT (SGPT) U/L 11 15   AST (SGOT) U/L 16 17   ALK PHOS U/L 76 70   BILIRUBIN mg/dL 0.6 0.7   GLOBULIN gm/dL 3.2 3.3   A/G RATIO g/dL 1.4 1.2   BUN / CREAT RATIO  17.2 16.0   ANION GAP mmol/L 8.0 10.2   EGFR mL/min/1.73 48.7* 54.6*     Lab Results - Last 18 Months   Lab Units 07/24/24  1301 04/29/24  1350   CHOLESTEROL mg/dL 112 117   TRIGLYCERIDES mg/dL 63 78   HDL CHOL mg/dL 71* 61*   LDL CHOL mg/dL 27 40   VLDL CHOL mg/dL 14 16   LDL/HDL RATIO  0.40 0.66     Lab Results - Last 18 Months   Lab Units 10/04/24  1251 03/27/24  1517   PROBNP pg/mL 1,178.0 896.0     Lab Results - Last 18 Months   Lab Units 10/04/24  1251   HSTROP T ng/L 15*     Lab Results - Last 18 Months   Lab Units 10/04/24  1251 09/28/23  1459   TSH uIU/mL 1.550 1.240     Lab Results - Last 18 Months   Lab Units 10/08/24  1306 07/15/24  1357   PROTIME seconds 44.6* 37.7*         ECG 12 Lead    Date/Time: 10/4/2024 12:01 PM  Performed by: Pia Dueñas MD    Authorized by: Pia Dueñas MD  Comparison: compared with previous ECG   Similar to previous ECG  Rhythm: atrial fibrillation  Other findings: non-specific ST-T wave changes  Other findings comments: Consider prior inferior and anteroseptal wall infarction.            Diagnosis Plan   1. Chest pressure  ECG 12 Lead    proBNP    High Sensitivity Troponin T    TSH    Comprehensive Metabolic Panel    CBC & Differential    Adult Transthoracic Echo Complete W/ Cont if Necessary Per Protocol      2. Tachycardia  TSH      3. Shortness of breath  proBNP      4. Type 2 diabetes mellitus with hyperglycemia, without long-term current use of insulin  Hemoglobin A1c      5. Pulmonary hypertension  Adult Transthoracic Echo Complete W/ Cont if Necessary Per Protocol      6. Aneurysm of  ascending aorta without rupture  CT Chest Without Contrast      7. Atrial fibrillation, chronic  Digoxin Level        Plan:       1.  Dyspnea on exertion.  Negative stress perfusion study 2/2022.  Echo 5/2022 with normal systolic function grade 2 diastolic dysfunction, no significant valve regurgitation with an RVSP 42 mmHg.  Stress is certainly playing a role.  Will check routine blood work including proBNP and echo.  Depending on these results consider coronary evaluation if symptoms persist despite increased dose of digoxin  2.  Pulmonary hypertension.  Clinically stable.  Checking echo as below   3.  Chest pain.  Underlying coronary disease with recent stress likely playing a role.  Check above blood work including troponin, await echo and consider further coronary evaluation if symptoms persist despite further rate control with increasing digoxin.  4.  Mild aortic valve stenosis.  check a follow-up echocardiogram especially with recent symptoms  5.  Mitral valve prolapse with mild mitral regurgitation noted by CLARIBEL 2017.  Reassess by echocardiography given worsening shortness of breath.  6.  Severe small vessel coronary artery disease.  Negative stress test 2/2022 and no residual anginal symptoms.  Some anginal symptoms is also associated with significant stresses at home.  Recommendations as above  7..  Persistent atrial fibrillation.  She has had recent exacerbation of palpitations in the setting of stress.  Will increase digoxin to 0.125 mg daily.  Will check a dig level next week   8.  Chronic coagulation.  INR has been controlled and within acceptable limits  9.  Hypertension, controlled  10.  History of embolic non ST-elevation myocardial infarction and stroke. Now on warfarin  11   FRANCY, untreated  12. Lung nodule followed by Dr. Garrido and felt to be benign per patient.  No further follow-up felt to be needed per patient  13.  Thoracic aortic aneurysm, stable by CT on 9/2021 measuring 4.1 cm.  Recheck CT  chest without contrast  14.  Stresses at home.  Patient denies he feels unsafe at home.  I contacted Dr. Green's office and they will reach out to patient.  In addition the patient will contact her 's provider to alert change in mental status especially recent diagnosis of carcinoma    Time Spent: I spent 35 minutes caring for Lana on this date of service. This time includes time spent by me in the following activities: preparing for the visit, reviewing tests, obtaining and/or reviewing a separately obtained history, performing a medically appropriate examination and/or evaluation, counseling and educating the patient/family/caregiver, ordering medications, tests, or procedures, documenting information in the medical record, and independently interpreting results and communicating that information with the patient/family/caregiver.   I spent 1 minutes on the separately reported service of ECG. This time is not included in the time used to support the E/M service also reported today.        Your medication list            Accurate as of October 4, 2024 11:59 PM. If you have any questions, ask your nurse or doctor.                CHANGE how you take these medications        Instructions Last Dose Given Next Dose Due   glucose blood test strip  What changed: additional instructions      Test twice daily prior to breakfast and dinner as instructed              CONTINUE taking these medications        Instructions Last Dose Given Next Dose Due   Accu-Chek FastClix Lancets misc      Use to check glucose as directed       Accu-Chek Guide w/Device kit      See Admin Instructions.       acetaminophen 325 MG tablet  Commonly known as: TYLENOL      Take 2 tablets by mouth Every 6 (Six) Hours As Needed for Moderate Pain.       albuterol sulfate  (90 Base) MCG/ACT inhaler  Commonly known as: PROVENTIL HFA;VENTOLIN HFA;PROAIR HFA      Inhale 2 puffs Every 4 (Four) Hours As Needed for Shortness of Air. PRN  SOA/WHEEZING       aspirin 81 MG EC tablet      Take 1 tablet by mouth Daily.       atorvastatin 10 MG tablet  Commonly known as: LIPITOR      Take 1 tablet by mouth Daily.       Cholecalciferol 50 MCG (2000 UT) capsule      Take 50 mcg by mouth Daily.       digoxin 125 MCG tablet  Commonly known as: LANOXIN      Take 1 tablet by mouth Every Other Day.       DropSafe Alcohol Prep 70 % pads           glimepiride 2 MG tablet  Commonly known as: Amaryl      By mouth take one tab twice daily with AM and PM meals.       HYDROcodone-acetaminophen 7.5-325 MG per tablet  Commonly known as: NORCO      Take 1 tablet twice a day by oral route as needed for 30 days.       Januvia 50 MG tablet  Generic drug: SITagliptin           Lumigan 0.01 % ophthalmic drops  Generic drug: bimatoprost      Administer 1 drop to both eyes Every Night.       metoprolol tartrate 50 MG tablet  Commonly known as: Lopressor      Take 1 tablet by mouth 2 (Two) Times a Day for 30 days.       polyethylene glycol 17 g packet  Commonly known as: MIRALAX      Take 17 g by mouth Daily As Needed (constipation).       potassium chloride 10 MEQ CR tablet      1 po bid       torsemide 20 MG tablet  Commonly known as: DEMADEX      2 po qday       vitamin D 1.25 MG (29599 UT) capsule capsule  Commonly known as: ERGOCALCIFEROL      Take 1 capsule by mouth 1 (One) Time Per Week.       warfarin 5 MG tablet  Commonly known as: COUMADIN      Take one-half tablet (2.5 mg) by mouth on Fridays, and take one tablet (5 mg) by mouth all other days or as directed.                Patient is no longer taking -.  I corrected the med list to reflect this.  I did not stop these medications.      Dictated utilizing Dragon dictation

## 2024-10-04 NOTE — PROGRESS NOTES
Patient: Lana Yañez  YOB: 1945  Date of Service: 10/4/2024    Chief Complaints: Left shoulder pain    Subjective:    History of Present Illness: Pt is seen in the office today with complaints of left shoulder pain she has known degenerative changes she gets intermittent injections she does well with those she is not interested in think surgical        Allergies:   Allergies   Allergen Reactions    Contrast Dye (Echo Or Unknown Ct/Mr) Hives and Itching    Macrobid [Nitrofurantoin] Hives    Iodinated Contrast Media Hives       Medications:   Home Medications:  Current Outpatient Medications on File Prior to Visit   Medication Sig    Accu-Chek FastClix Lancets misc Use to check glucose as directed    acetaminophen (TYLENOL) 325 MG tablet Take 2 tablets by mouth Every 6 (Six) Hours As Needed for Moderate Pain.    albuterol sulfate  (90 Base) MCG/ACT inhaler Inhale 2 puffs Every 4 (Four) Hours As Needed for Shortness of Air. PRN SOA/WHEEZING    Alcohol Swabs (DropSafe Alcohol Prep) 70 % pads     aspirin 81 MG EC tablet Take 1 tablet by mouth Daily.    atorvastatin (LIPITOR) 10 MG tablet Take 1 tablet by mouth Daily. (Patient not taking: Reported on 7/15/2024)    Blood Glucose Monitoring Suppl (ACCU-CHEK GUIDE) w/Device kit See Admin Instructions.    Cholecalciferol 50 MCG (2000 UT) capsule Take 50 mcg by mouth Daily.    digoxin (LANOXIN) 125 MCG tablet Take 1 tablet by mouth Every Other Day.    doxazosin (CARDURA) 2 MG tablet     glimepiride (Amaryl) 2 MG tablet By mouth take one tab twice daily with AM and PM meals.    glucose blood test strip Test twice daily prior to breakfast and dinner as instructed (Patient taking differently: Test daily prior to breakfast)    HYDROcodone-acetaminophen (NORCO) 7.5-325 MG per tablet Take 1 tablet twice a day by oral route as needed for 30 days.    Januvia 50 MG tablet  (Patient not taking: Reported on 7/15/2024)    Lumigan 0.01 % ophthalmic drops  Administer 1 drop to both eyes Every Night.    metoprolol tartrate (Lopressor) 50 MG tablet Take 1 tablet by mouth 2 (Two) Times a Day for 30 days.    polyethylene glycol (MIRALAX) 17 g packet Take 17 g by mouth Daily As Needed (constipation).    potassium chloride 10 MEQ CR tablet 1 po bid    torsemide (DEMADEX) 20 MG tablet 2 po qday    vitamin D (ERGOCALCIFEROL) 1.25 MG (99590 UT) capsule capsule Take 1 capsule by mouth 1 (One) Time Per Week.    warfarin (COUMADIN) 5 MG tablet Take one-half tablet (2.5 mg) by mouth on Fridays, and take one tablet (5 mg) by mouth all other days or as directed.     No current facility-administered medications on file prior to visit.     Current Medications:  Scheduled Meds:  Continuous Infusions:No current facility-administered medications for this visit.    PRN Meds:.    I have reviewed the patient's medical history in detail and updated the computerized patient record.  Review and summarization of old records include:    Past Medical History:   Diagnosis Date    Acute on chronic diastolic heart failure     Acute UTI (urinary tract infection) 05/22/2022    Allergic reaction 02/09/2018    Arthritis     Arthritis of back     Asthma     Atrial fibrillation     Persistent; on warfarin    Atypical chest pain     Biliary acute pancreatitis without necrosis or infection 11/10/2021    Bronchitis     Cellulitis of right elbow     due to MRSA    Cervical disc disorder     CHF (congestive heart failure)     COPD (chronic obstructive pulmonary disease)     Coronary artery disease     Cardiac catheterization completed; 90% PDA stenosis with medical management recommended    Coronary artery disease involving native coronary artery of native heart with angina pectoris with documented spasm     CTS (carpal tunnel syndrome)     Diabetes 1.5, managed as type 2     Disease of thyroid gland     Displacement of lumbar intervertebral disc without myelopathy     Dizziness     ANTOINE (dyspnea on exertion)      Essential hypertension     Fatigue     Fracture, foot     Generalized osteoarthritis of multiple sites     History of rheumatic fever     History of transfusion     Hx of bone density study     10/23/2014    Hyperglycemia     Hyperlipidemia     Hypovitaminosis D     Left arm pain     Left-sided weakness     Leg swelling     Low back pain     Lower extremity edema     Lumbosacral disc disease     Malaise and fatigue     Mitral valve disease     Moderate mitral valve prolapse and moderate mitral regurgitation    Mitral valve insufficiency     Morbid obesity with BMI of 40.0-44.9, adult     MRSA infection     NSTEMI (non-ST elevated myocardial infarction)     PAF (paroxysmal atrial fibrillation)     Periarthritis of shoulder     RA (rheumatoid arthritis)     involving both hands    Sleep apnea     SOB (shortness of breath)     Stroke     left side weakness    Urge incontinence of urine     UTI (urinary tract infection) 05/2020    Visit for screening mammogram 04/02/2018    Vitamin D deficiency         Past Surgical History:   Procedure Laterality Date    BREAST BIOPSY      BREAST SURGERY      right side lumpectomy with biopsy    CARDIAC CATHETERIZATION Left 10/20/2017    Procedure: Cardiac Catheterization/Vascular Study;  Surgeon: Alphonso Olmedo MD;  Location: [facility] INVASIVE LOCATION;  Service:     CARDIAC CATHETERIZATION N/A 10/20/2017    +2 mitral regurgitation, left main 10% stenosis, mid to distal LAD 10% diffuse stenosis, circumflex 10% diffuse stenosis, RCA 10% proximal stenosis, and distal PDA consistent with coronary embolus with a lesion of 90% too small to consider coronary intervention; medical management recommended    EYE SURGERY      laser surgery for glaucoma and left eye cataracts removed    HYSTERECTOMY      10+ years ago    JOINT REPLACEMENT  2005; 2006    bilateral knees and left rotater    KNEE SURGERY      MAMMO BILATERAL  2016    [facility]     SHOULDER SURGERY          Social  History     Occupational History    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     Employer: RETIRED   Tobacco Use    Smoking status: Former     Current packs/day: 0.00     Average packs/day: 3.0 packs/day for 1.4 years (4.3 ttl pk-yrs)     Types: Cigarettes     Start date: 1967     Quit date: 10/19/1968     Years since quittin.9     Passive exposure: Never    Smokeless tobacco: Never    Tobacco comments:     caffeine use - decaf coffee   Vaping Use    Vaping status: Never Used   Substance and Sexual Activity    Alcohol use: No     Comment: No caffeine use    Drug use: No    Sexual activity: Defer      Social History     Social History Narrative    Not on file        Family History   Problem Relation Age of Onset    Colon cancer Mother     Glaucoma Mother     Stroke Mother     Arthritis Mother     Hypertension Mother     Glaucoma Sister     Diabetes Sister     Heart disease Sister     Arthritis Sister     Asthma Sister     Hypertension Sister     Miscarriages / Stillbirths Sister     Lung disease Sister     Heart disease Brother     Diabetes Brother     Arthritis Brother     Drug abuse Brother     Hypertension Brother     Arthritis Father     COPD Father     Lung disease Father     Arthritis Daughter     Depression Daughter     Alcohol abuse Maternal Uncle     Heart disease Sister     Heart disease Sister     Heart disease Brother     Rheumatologic disease Sister        ROS: 14 point review of systems was performed and was negative except for documented findings in HPI and today's encounter.     Allergies:   Allergies   Allergen Reactions    Contrast Dye (Echo Or Unknown Ct/Mr) Hives and Itching    Macrobid [Nitrofurantoin] Hives    Iodinated Contrast Media Hives     Constitutional:  Denies fever, shaking or chills   Eyes:  Denies change in visual acuity   HENT:  Denies nasal congestion or sore throat   Respiratory:  Denies cough or shortness of breath   Cardiovascular:  Denies chest pain or severe  LE edema   GI:  Denies abdominal pain, nausea, vomiting, bloody stools or diarrhea   Musculoskeletal:  Numbness, tingling, or loss of motor function only as noted above in history of present illness.  : Denies painful urination or hematuria  Integument:  Denies rash, lesion or ulceration   Neurologic:  Denies headache or focal weakness  Endocrine:  Denies lymphadenopathy  Psych:  Denies confusion or change in mental status   Hem:  Denies active bleeding      Physical Exam: 79 y.o. female  Wt Readings from Last 3 Encounters:   07/15/24 112 kg (246 lb)   04/15/24 113 kg (248 lb 6.4 oz)   03/27/24 114 kg (250 lb 12.8 oz)       There is no height or weight on file to calculate BMI.    There were no vitals filed for this visit.  Vital signs reviewed.   General Appearance:    Alert, cooperative, in no acute distress                    Ortho exam      Physical exam the left shoulder reveals no overlying skin changes no lymphedema lymphadenopathy the patient can actively flex to about 150 passively I get them to 160 abduction is similar external rotation is 40 internal rotation to there buttock.  Rotator cuff strength is 4+ over 5 with isometric strength testing no overlying skin changes.  Patient has reasonable cervical range of motion for their age no radicular symptoms and a normal elbow exam.  There are good distal pulses.            Assessment: Left shoulder OA    Plan: Injection she is a diabetic she knows to watch her blood sugars she understands her surgical options but is not interested  Follow up as indicated.  Ice, elevate, and rest as needed.  Discussed conservative measures of pain control including ice, bracing.  Also talked about the importance of strengthening and maintaining ideal body weight    Asya Rodriges M.D.      Large Joint Arthrocentesis: L glenohumeral  Date/Time: 10/8/2024 1:54 PM  Consent given by: patient  Site marked: site marked  Timeout: Immediately prior to procedure a time out was called  to verify the correct patient, procedure, equipment, support staff and site/side marked as required   Supporting Documentation  Indications: pain   Procedure Details  Location: shoulder - L glenohumeral  Preparation: Patient was prepped and draped in the usual sterile fashion  Needle gauge: 21G.  Approach: posterior  Medications administered: 1 mL methylPREDNISolone acetate 80 MG/ML; 2 mL lidocaine PF 1% 1 %  Patient tolerance: patient tolerated the procedure well with no immediate complications

## 2024-10-06 ENCOUNTER — TELEPHONE (OUTPATIENT)
Dept: CARDIOLOGY | Facility: CLINIC | Age: 79
End: 2024-10-06
Payer: MEDICARE

## 2024-10-06 NOTE — TELEPHONE ENCOUNTER
Please let the patient know blood work overall stable with kidney test slightly abnormal.  Find out how is her palpitations chest pain shortness of breath after she increased digoxin.  She will need to do a digoxin level this week.  She should also get an echocardiogram to follow-up on valve disease and chest pain and shortness of breath.  In addition need a noncontrast CT scan of the chest for follow-up of aortic aneurysm.  This has been several years since it was checked.  Depending on these results may need further testing for blockages if chest pain persists

## 2024-10-07 NOTE — TELEPHONE ENCOUNTER
I spoke with Lana Yañez and updated pt on results/recommendations from provider.  Pt verbalized understanding and has no further questions at this time.  Pt agreeable to echo & CT.    Scheduling,  Please call pt to have echo & CT scheduled.  She has an upcoming appt with JODIE so prior to then if possible.    Thank you,    Tisha JENKINS, RN  Triage [facility]  10/07/24 10:08 EDT

## 2024-10-08 ENCOUNTER — OFFICE VISIT (OUTPATIENT)
Dept: ORTHOPEDIC SURGERY | Facility: CLINIC | Age: 79
End: 2024-10-08
Payer: MEDICARE

## 2024-10-08 ENCOUNTER — LAB (OUTPATIENT)
Dept: LAB | Facility: HOSPITAL | Age: 79
End: 2024-10-08
Payer: MEDICARE

## 2024-10-08 ENCOUNTER — ANTICOAGULATION VISIT (OUTPATIENT)
Dept: PHARMACY | Facility: HOSPITAL | Age: 79
End: 2024-10-08
Payer: MEDICARE

## 2024-10-08 VITALS — WEIGHT: 250 LBS | BODY MASS INDEX: 40.18 KG/M2 | TEMPERATURE: 98.6 F | HEIGHT: 66 IN

## 2024-10-08 DIAGNOSIS — M19.019 GLENOHUMERAL ARTHRITIS: Primary | ICD-10-CM

## 2024-10-08 DIAGNOSIS — I48.20 ATRIAL FIBRILLATION, CHRONIC: ICD-10-CM

## 2024-10-08 LAB
DIGOXIN SERPL-MCNC: 0.6 NG/ML (ref 0.6–1.2)
INR PPP: 3.7 (ref 0.91–1.09)
PROTHROMBIN TIME: 44.6 SECONDS (ref 10–13.8)

## 2024-10-08 PROCEDURE — 85610 PROTHROMBIN TIME: CPT

## 2024-10-08 PROCEDURE — 80162 ASSAY OF DIGOXIN TOTAL: CPT

## 2024-10-08 PROCEDURE — 36416 COLLJ CAPILLARY BLOOD SPEC: CPT

## 2024-10-08 PROCEDURE — 36415 COLL VENOUS BLD VENIPUNCTURE: CPT

## 2024-10-08 PROCEDURE — G0463 HOSPITAL OUTPT CLINIC VISIT: HCPCS

## 2024-10-08 RX ORDER — LIDOCAINE HYDROCHLORIDE 10 MG/ML
2 INJECTION, SOLUTION EPIDURAL; INFILTRATION; INTRACAUDAL; PERINEURAL
Status: COMPLETED | OUTPATIENT
Start: 2024-10-08 | End: 2024-10-08

## 2024-10-08 RX ORDER — METHYLPREDNISOLONE ACETATE 80 MG/ML
1 INJECTION, SUSPENSION INTRA-ARTICULAR; INTRALESIONAL; INTRAMUSCULAR; SOFT TISSUE
Status: COMPLETED | OUTPATIENT
Start: 2024-10-08 | End: 2024-10-08

## 2024-10-08 RX ADMIN — LIDOCAINE HYDROCHLORIDE 2 ML: 10 INJECTION, SOLUTION EPIDURAL; INFILTRATION; INTRACAUDAL; PERINEURAL at 13:54

## 2024-10-08 RX ADMIN — METHYLPREDNISOLONE ACETATE 1 ML: 80 INJECTION, SUSPENSION INTRA-ARTICULAR; INTRALESIONAL; INTRAMUSCULAR; SOFT TISSUE at 13:54

## 2024-10-08 NOTE — PROGRESS NOTES
Anticoagulation Clinic Progress Note    Anticoagulation Summary  As of 10/8/2024      INR goal:  2.0-3.0   TTR:  67.6% (5.9 y)   INR used for dosing:  3.7 (10/8/2024)   Warfarin maintenance plan:  5 mg every day   Weekly warfarin total:  35 mg   Plan last modified:  Love Allison RPH (3/27/2023)   Next INR check:  10/23/2024   Priority:  Maintenance   Target end date:  Indefinite    Indications    Atrial fibrillation [I48.91]                 Anticoagulation Episode Summary       INR check location:      Preferred lab:      [facility]    Comments:            Anticoagulation Care Providers       Provider Role Specialty Phone number    Pia Dueñas MD Referring Cardiology [phone]            Clinic Interview:  Patient Findings     Positives:  Change in medications    Negatives:  Signs/symptoms of thrombosis, Signs/symptoms of bleeding,   Laboratory test error suspected, Change in health, Change in alcohol use,   Change in activity, Upcoming invasive procedure, Emergency department   visit, Upcoming dental procedure, Missed doses, Extra doses, Change in   diet/appetite, Hospital admission, Bruising, Other complaints    Comments:  Patient reports she is having a steroid injection today. She   also already took her warfarin this morning.      Clinical Outcomes     Negatives:  Major bleeding event, Thromboembolic event,   Anticoagulation-related hospital admission, Anticoagulation-related ED   visit, Anticoagulation-related fatality    Comments:  Patient reports she is having a steroid injection today. She   also already took her warfarin this morning.        INR History:      Latest Ref Rng & Units 2/8/2024     2:00 PM 3/18/2024     2:30 PM 4/15/2024     2:30 PM 4/29/2024     1:50 PM 4/29/2024     2:28 PM 7/15/2024     2:45 PM 10/8/2024     1:15 PM   Anticoagulation Monitoring   INR  2.9 2.1 3.4  2.07 3.1 3.7   INR Date  2/8/2024 3/18/2024 4/15/2024  4/29/2024 7/15/2024  10/8/2024   INR Goal  2.0-3.0 2.0-3.0 2.0-3.0  2.0-3.0 2.0-3.0 2.0-3.0   Trend  Same Same Same  Same Same Same   Last Week Total  35 mg 35 mg 35 mg  35 mg 35 mg 35 mg   Next Week Total  35 mg 35 mg 30 mg  35 mg 32.5 mg 30 mg   Sun  5 mg 5 mg 5 mg  5 mg 5 mg 5 mg   Mon  5 mg 5 mg 5 mg  5 mg 5 mg 5 mg   Tue  5 mg 5 mg Hold (4/16)  5 mg 2.5 mg (7/16); Otherwise 5 mg 5 mg   Wed  5 mg 5 mg 5 mg  5 mg 5 mg Hold (10/9); Otherwise 5 mg   Thu  5 mg 5 mg 5 mg  5 mg 5 mg 5 mg   Fri  5 mg 5 mg 5 mg  5 mg 5 mg 5 mg   Sat  5 mg 5 mg 5 mg  5 mg 5 mg 5 mg   Historical INR 0.90 - 1.10    2.07       Visit Report    Report   Report        Plan:  1. INR is Supratherapeutic today- see above in Anticoagulation Summary.  Will instruct Lana Nielsonb to Change their warfarin regimen- see above in Anticoagulation Summary.  2. Follow up in 2 weeks  3. Patient declines warfarin refills.  4. Verbal and written information provided. Patient expresses understanding and has no further questions at this time.    Cindy Jones, PharmD

## 2024-10-08 NOTE — TELEPHONE ENCOUNTER
Spoke with pt.  Verbalized understanding.  No other questions.  Pt has continue to be safe at home.  (Done)

## 2024-10-10 ENCOUNTER — DOCUMENTATION (OUTPATIENT)
Dept: CARDIOLOGY | Facility: CLINIC | Age: 79
End: 2024-10-10
Payer: MEDICARE

## 2024-10-10 ENCOUNTER — TELEPHONE (OUTPATIENT)
Dept: CARDIOLOGY | Facility: CLINIC | Age: 79
End: 2024-10-10
Payer: MEDICARE

## 2024-10-10 NOTE — TELEPHONE ENCOUNTER
Caller: Lana Yañez    Relationship to patient: Self    Best call back number: [phone]    Patient is needing: PT IS WONDERING IF SHE NEEDS APPOINTMENT ON THE 10.22.24 BECAUSE PT SAW DR SALGUERO 10.4.24, PT WAS WONDERING IF SHE NEEDS TO JUST FOLLOW UP AFTER TEST ARE COMPLETE?

## 2024-10-11 NOTE — TELEPHONE ENCOUNTER
Pt is scheduled for 1/17/25 @ 6059.  She is ok with this date, she requested this appt time or later. I offered [place] since there are later times at that location but pt refused. I did explain to pt that Dr Dueñas starts at 7am here at [facility] and does not have late afternoon appts.

## 2024-10-11 NOTE — TELEPHONE ENCOUNTER
Called Lana Yañez to move appointment.  Only availability is 11/1 at 9:40 am, however patient is requesting an appointment later in the day, preferably in the afternoon as she lives across town.  Can you look at Dr. Dueñas' schedule and see where patient can be fit in?    Thank you,  Layla BRIONES RN  Triage Nurse [facility]  10/11/24 11:35 EDT

## 2024-10-23 ENCOUNTER — ANTICOAGULATION VISIT (OUTPATIENT)
Dept: PHARMACY | Facility: HOSPITAL | Age: 79
End: 2024-10-23
Payer: MEDICARE

## 2024-10-23 ENCOUNTER — HOSPITAL ENCOUNTER (OUTPATIENT)
Dept: CARDIOLOGY | Facility: HOSPITAL | Age: 79
Discharge: HOME | End: 2024-10-23
Admitting: INTERNAL MEDICINE
Payer: MEDICARE

## 2024-10-23 VITALS
DIASTOLIC BLOOD PRESSURE: 82 MMHG | HEIGHT: 66 IN | WEIGHT: 250 LBS | SYSTOLIC BLOOD PRESSURE: 142 MMHG | BODY MASS INDEX: 40.18 KG/M2

## 2024-10-23 DIAGNOSIS — I27.20 PULMONARY HYPERTENSION: ICD-10-CM

## 2024-10-23 DIAGNOSIS — R07.89 CHEST PRESSURE: ICD-10-CM

## 2024-10-23 LAB
AORTIC ARCH: 3 CM
AORTIC DIMENSIONLESS INDEX: 0.5 (DI)
ASCENDING AORTA: 3.9 CM
BH CV ECHO MEAS - ACS: 1.68 CM
BH CV ECHO MEAS - AI P1/2T: 465.4 MSEC
BH CV ECHO MEAS - AO MAX PG: 8.3 MMHG
BH CV ECHO MEAS - AO MEAN PG: 5 MMHG
BH CV ECHO MEAS - AO ROOT DIAM: 3.1 CM
BH CV ECHO MEAS - AO V2 MAX: 144 CM/SEC
BH CV ECHO MEAS - AO V2 VTI: 31.1 CM
BH CV ECHO MEAS - AVA(I,D): 1.82 CM2
BH CV ECHO MEAS - EDV(CUBED): 74.1 ML
BH CV ECHO MEAS - EDV(MOD-SP2): 79 ML
BH CV ECHO MEAS - EDV(MOD-SP4): 78 ML
BH CV ECHO MEAS - EF(MOD-BP): 58.7 %
BH CV ECHO MEAS - EF(MOD-SP2): 59.5 %
BH CV ECHO MEAS - EF(MOD-SP4): 57.7 %
BH CV ECHO MEAS - ESV(CUBED): 21.5 ML
BH CV ECHO MEAS - ESV(MOD-SP2): 32 ML
BH CV ECHO MEAS - ESV(MOD-SP4): 33 ML
BH CV ECHO MEAS - FS: 33.8 %
BH CV ECHO MEAS - IVS/LVPW: 0.8 CM
BH CV ECHO MEAS - IVSD: 1.2 CM
BH CV ECHO MEAS - LAT PEAK E' VEL: 9 CM/SEC
BH CV ECHO MEAS - LV DIASTOLIC VOL/BSA (35-75): 36.1 CM2
BH CV ECHO MEAS - LV MASS(C)D: 212.3 GRAMS
BH CV ECHO MEAS - LV MAX PG: 3.7 MMHG
BH CV ECHO MEAS - LV MEAN PG: 2 MMHG
BH CV ECHO MEAS - LV SYSTOLIC VOL/BSA (12-30): 15.3 CM2
BH CV ECHO MEAS - LV V1 MAX: 96.7 CM/SEC
BH CV ECHO MEAS - LV V1 VTI: 16.7 CM
BH CV ECHO MEAS - LVIDD: 4.2 CM
BH CV ECHO MEAS - LVIDS: 2.8 CM
BH CV ECHO MEAS - LVOT AREA: 3.4 CM2
BH CV ECHO MEAS - LVOT DIAM: 2.08 CM
BH CV ECHO MEAS - LVPWD: 1.5 CM
BH CV ECHO MEAS - MED PEAK E' VEL: 5.3 CM/SEC
BH CV ECHO MEAS - MR MAX PG: 92 MMHG
BH CV ECHO MEAS - MR MAX VEL: 479.5 CM/SEC
BH CV ECHO MEAS - MV DEC SLOPE: 460.1 CM/SEC2
BH CV ECHO MEAS - MV DEC TIME: 0.26 SEC
BH CV ECHO MEAS - MV E MAX VEL: 121 CM/SEC
BH CV ECHO MEAS - MV MAX PG: 8.9 MMHG
BH CV ECHO MEAS - MV MEAN PG: 3.4 MMHG
BH CV ECHO MEAS - MV P1/2T: 96.1 MSEC
BH CV ECHO MEAS - MV V2 VTI: 32.2 CM
BH CV ECHO MEAS - MVA(P1/2T): 2.29 CM2
BH CV ECHO MEAS - MVA(VTI): 1.76 CM2
BH CV ECHO MEAS - PA ACC TIME: 0.12 SEC
BH CV ECHO MEAS - PA V2 MAX: 89 CM/SEC
BH CV ECHO MEAS - PI END-D VEL: 145.5 CM/SEC
BH CV ECHO MEAS - QP/QS: 1.25
BH CV ECHO MEAS - RAP SYSTOLE: 15 MMHG
BH CV ECHO MEAS - RV MAX PG: 1.36 MMHG
BH CV ECHO MEAS - RV V1 MAX: 58.3 CM/SEC
BH CV ECHO MEAS - RV V1 VTI: 12.6 CM
BH CV ECHO MEAS - RVOT DIAM: 2.7 CM
BH CV ECHO MEAS - RVSP: 45 MMHG
BH CV ECHO MEAS - SUP REN AO DIAM: 2.5 CM
BH CV ECHO MEAS - SV(LVOT): 56.7 ML
BH CV ECHO MEAS - SV(MOD-SP2): 47 ML
BH CV ECHO MEAS - SV(MOD-SP4): 45 ML
BH CV ECHO MEAS - SV(RVOT): 70.8 ML
BH CV ECHO MEAS - SVI(LVOT): 26.2 ML/M2
BH CV ECHO MEAS - SVI(MOD-SP2): 21.7 ML/M2
BH CV ECHO MEAS - SVI(MOD-SP4): 20.8 ML/M2
BH CV ECHO MEAS - TAPSE (>1.6): 1.94 CM
BH CV ECHO MEAS - TR MAX PG: 29.7 MMHG
BH CV ECHO MEAS - TR MAX VEL: 272.3 CM/SEC
BH CV ECHO MEASUREMENTS AVERAGE E/E' RATIO: 16.92
BH CV XLRA - RV BASE: 4 CM
BH CV XLRA - RV LENGTH: 5.9 CM
BH CV XLRA - RV MID: 2.6 CM
BH CV XLRA - TDI S': 8.6 CM/SEC
INR PPP: 2.8 (ref 0.91–1.09)
LEFT ATRIUM VOLUME INDEX: 51.4 ML/M2
PROTHROMBIN TIME: 33.8 SECONDS (ref 10–13.8)
SINUS: 2.9 CM
STJ: 2.8 CM

## 2024-10-23 PROCEDURE — 93306 TTE W/DOPPLER COMPLETE: CPT | Performed by: INTERNAL MEDICINE

## 2024-10-23 PROCEDURE — 36416 COLLJ CAPILLARY BLOOD SPEC: CPT

## 2024-10-23 PROCEDURE — 85610 PROTHROMBIN TIME: CPT

## 2024-10-23 PROCEDURE — 93306 TTE W/DOPPLER COMPLETE: CPT

## 2024-10-23 PROCEDURE — G0463 HOSPITAL OUTPT CLINIC VISIT: HCPCS

## 2024-10-23 NOTE — PROGRESS NOTES
Anticoagulation Clinic Progress Note    Anticoagulation Summary  As of 10/23/2024      INR goal:  2.0-3.0   TTR:  67.3% (6 y)   INR used for dosin.8 (10/23/2024)   Warfarin maintenance plan:  5 mg every day   Weekly warfarin total:  35 mg   No change documented:  Shaw Hand, PharmD   Plan last modified:  Love Allison RPH (3/27/2023)   Next INR check:  2024   Priority:  Maintenance   Target end date:  Indefinite    Indications    Atrial fibrillation [I48.91]                 Anticoagulation Episode Summary       INR check location:  --    Preferred lab:  --    Send INR reminders to:   [facility] [place]    Comments:  --          Anticoagulation Care Providers       Provider Role Specialty Phone number    Pia Dueñas MD Referring Cardiology [phone]            Clinic Interview:  Patient Findings     Positives:  Change in medications    Negatives:  Signs/symptoms of thrombosis, Signs/symptoms of bleeding,   Laboratory test error suspected, Change in health, Change in alcohol use,   Change in activity, Upcoming invasive procedure, Emergency department   visit, Upcoming dental procedure, Missed doses, Extra doses, Change in   diet/appetite, Hospital admission, Bruising, Other complaints    Comments:  Steroid injection in shoulder 10/8/24.       Clinical Outcomes     Negatives:  Major bleeding event, Thromboembolic event,   Anticoagulation-related hospital admission, Anticoagulation-related ED   visit, Anticoagulation-related fatality    Comments:  Steroid injection in shoulder 10/8/24.         INR History:      Latest Ref Rng & Units 3/18/2024     2:30 PM 4/15/2024     2:30 PM 2024     1:50 PM 2024     2:28 PM 7/15/2024     2:45 PM 10/8/2024     1:15 PM 10/23/2024     1:30 PM   Anticoagulation Monitoring   INR  2.1 3.4  2.07 3.1 3.7 2.8   INR Date  3/18/2024 4/15/2024  2024 7/15/2024 10/8/2024 10/23/2024   INR Goal  2.0-3.0 2.0-3.0  2.0-3.0 2.0-3.0 2.0-3.0 2.0-3.0   Trend   Same Same  Same Same Same Same   Last Week Total  35 mg 35 mg  35 mg 35 mg 35 mg 35 mg   Next Week Total  35 mg 30 mg  35 mg 32.5 mg 30 mg 35 mg   Sun  5 mg 5 mg  5 mg 5 mg 5 mg 5 mg   Mon  5 mg 5 mg  5 mg 5 mg 5 mg 5 mg   Tue  5 mg Hold (4/16)  5 mg 2.5 mg (7/16); Otherwise 5 mg 5 mg 5 mg   Wed  5 mg 5 mg  5 mg 5 mg Hold (10/9); Otherwise 5 mg 5 mg   Thu  5 mg 5 mg  5 mg 5 mg 5 mg 5 mg   Fri  5 mg 5 mg  5 mg 5 mg 5 mg 5 mg   Sat  5 mg 5 mg  5 mg 5 mg 5 mg 5 mg   Historical INR 0.90 - 1.10   2.07        Visit Report   Report   Report Report        Plan:  1. INR is Therapeutic today- see above in Anticoagulation Summary.  Will instruct Lanaantonio Aguerocomb to Continue their warfarin regimen - see above in Anticoagulation Summary.  2. Follow up in 4 weeks.  3. Patient declines warfarin refills.  4. Verbal and written information provided. Patient expresses understanding and has no further questions at this time.    Shaw Hand, PharmD

## 2024-10-24 ENCOUNTER — TELEPHONE (OUTPATIENT)
Dept: CARDIOLOGY | Facility: CLINIC | Age: 79
End: 2024-10-24
Payer: MEDICARE

## 2024-10-24 NOTE — TELEPHONE ENCOUNTER
Results and recommendations called to pt.  Instructed to call with any further questions or concerns.  Verbalized understanding.    Mary Tavera RN  Triage Nurse, [facility]  10/24/24 14:42 EDT

## 2024-10-24 NOTE — TELEPHONE ENCOUNTER
Please let her know that echo looks good.  Her heart is strong and overall functioning well.  The pressures in the lungs are about the same and aortic valve is about the same as it was last year.  Would continue current medications and keep currently scheduled follow-up with Dr. Dueñas in January 2025.

## 2024-11-22 ENCOUNTER — HOSPITAL ENCOUNTER (OUTPATIENT)
Dept: CT IMAGING | Facility: HOSPITAL | Age: 79
Discharge: HOME | End: 2024-11-22
Payer: MEDICARE

## 2024-11-22 ENCOUNTER — ANTICOAGULATION VISIT (OUTPATIENT)
Dept: PHARMACY | Facility: HOSPITAL | Age: 79
End: 2024-11-22
Payer: MEDICARE

## 2024-11-22 DIAGNOSIS — I71.21 ANEURYSM OF ASCENDING AORTA WITHOUT RUPTURE: ICD-10-CM

## 2024-11-22 LAB
INR PPP: 4 (ref 0.91–1.09)
PROTHROMBIN TIME: 48.3 SECONDS (ref 10–13.8)

## 2024-11-22 PROCEDURE — 85610 PROTHROMBIN TIME: CPT

## 2024-11-22 PROCEDURE — 71250 CT THORAX DX C-: CPT

## 2024-11-22 PROCEDURE — G0463 HOSPITAL OUTPT CLINIC VISIT: HCPCS

## 2024-11-22 PROCEDURE — 36416 COLLJ CAPILLARY BLOOD SPEC: CPT

## 2024-11-22 NOTE — PROGRESS NOTES
Anticoagulation Clinic Progress Note    Anticoagulation Summary  As of 2024      INR goal:  2.0-3.0   TTR:  66.6% (6 y)   INR used for dosin.0 (2024)   Warfarin maintenance plan:  5 mg every day   Weekly warfarin total:  35 mg   Plan last modified:  Love Allison RPH (3/27/2023)   Next INR check:  2024   Priority:  Maintenance   Target end date:  Indefinite    Indications    Atrial fibrillation [I48.91]                 Anticoagulation Episode Summary       INR check location:  --    Preferred lab:  --    [facility] [place]    Comments:  --          Anticoagulation Care Providers       Provider Role Specialty Phone number    Pia Dueñas MD Referring Cardiology [phone]            Clinic Interview:  Patient Findings     Positives:  Extra doses, Change in diet/appetite, Other complaints    Negatives:  Signs/symptoms of thrombosis, Signs/symptoms of bleeding,   Laboratory test error suspected, Change in health, Change in alcohol use,   Change in activity, Upcoming invasive procedure, Emergency department   visit, Upcoming dental procedure, Missed doses, Change in medications,   Hospital admission, Bruising    Comments:  Reports she may have accidentally taken an extra dose of   warfarin ~1 week ago when she thought she had forgotten her dose earlier   in the day. Reports she has had some cranberries in her oatmeal throughout   this week. Reports she has already taken today's dose of warfarin.       Clinical Outcomes     Negatives:  Major bleeding event, Thromboembolic event,   Anticoagulation-related hospital admission, Anticoagulation-related ED   visit, Anticoagulation-related fatality    Comments:  Reports she may have accidentally taken an extra dose of   warfarin ~1 week ago when she thought she had forgotten her dose earlier   in the day. Reports she has had some cranberries in her oatmeal throughout   this week. Reports she has already taken today's  dose of warfarin.         INR History:      Latest Ref Rng & Units 4/15/2024     2:30 PM 4/29/2024     1:50 PM 4/29/2024     2:28 PM 7/15/2024     2:45 PM 10/8/2024     1:15 PM 10/23/2024     1:30 PM 11/22/2024     1:30 PM   Anticoagulation Monitoring   INR  3.4  2.07 3.1 3.7 2.8 4.0   INR Date  4/15/2024  4/29/2024 7/15/2024 10/8/2024 10/23/2024 11/22/2024   INR Goal  2.0-3.0  2.0-3.0 2.0-3.0 2.0-3.0 2.0-3.0 2.0-3.0   Trend  Same  Same Same Same Same Same   Last Week Total  35 mg  35 mg 35 mg 35 mg 35 mg 35 mg   Next Week Total  30 mg  35 mg 32.5 mg 30 mg 35 mg 30 mg   Sun  5 mg  5 mg 5 mg 5 mg 5 mg 5 mg   Mon  5 mg  5 mg 5 mg 5 mg 5 mg 5 mg   Tue  Hold (4/16)  5 mg 2.5 mg (7/16); Otherwise 5 mg 5 mg 5 mg 5 mg   Wed  5 mg  5 mg 5 mg Hold (10/9); Otherwise 5 mg 5 mg 5 mg   Thu  5 mg  5 mg 5 mg 5 mg 5 mg 5 mg   Fri  5 mg  5 mg 5 mg 5 mg 5 mg 5 mg   Sat  5 mg  5 mg 5 mg 5 mg 5 mg Hold (11/23); Otherwise 5 mg   Historical INR 0.90 - 1.10  2.07         Visit Report  Report   Report Report         Plan:  1. INR is Supratherapeutic today- see above in Anticoagulation Summary.  Will instruct Lanaantonio Aguerocomb to Change their warfarin regimen (HOLD warfarin x 1 day, then resume 5 mg daily) - see above in Anticoagulation Summary.  2. Follow up in 2 weeks due to holiday/availability.  3. Patient declines warfarin refills.  4. Verbal and written information provided. Patient expresses understanding and has no further questions at this time.    Shaw Hand, PharmD

## 2024-11-27 ENCOUNTER — TELEPHONE (OUTPATIENT)
Dept: CARDIOLOGY | Facility: CLINIC | Age: 79
End: 2024-11-27
Payer: MEDICARE

## 2024-11-27 NOTE — TELEPHONE ENCOUNTER
Results and recommendations called to pt.  Instructed to call with any further questions or concerns.  Verbalized understanding.    Mary Tavera RN  Triage Nurse, [facility] [place]  11/27/24 09:27 EST

## 2024-11-27 NOTE — TELEPHONE ENCOUNTER
Please let her know that CT scan showed stable aorta at 4 cm.  Heart size is also stable.  There is scarring at the bases of both lungs that is mild and also stable.  She also has chronic arthritic changes in her left shoulder.  There is also a 6.5 cm lipoma seen in the left lower chest wall that is benign.  Would not make any changes based on this.  Would continue current medications and keep January follow-up

## 2024-12-06 ENCOUNTER — APPOINTMENT (OUTPATIENT)
Dept: PHARMACY | Facility: HOSPITAL | Age: 79
End: 2024-12-06
Payer: MEDICARE

## 2024-12-17 ENCOUNTER — ANTICOAGULATION VISIT (OUTPATIENT)
Dept: PHARMACY | Facility: HOSPITAL | Age: 79
End: 2024-12-17
Payer: MEDICARE

## 2024-12-17 LAB
INR PPP: 2.8 (ref 0.91–1.09)
PROTHROMBIN TIME: 33.2 SECONDS (ref 10–13.8)

## 2024-12-17 PROCEDURE — G0463 HOSPITAL OUTPT CLINIC VISIT: HCPCS | Performed by: PHARMACIST

## 2024-12-17 PROCEDURE — 85610 PROTHROMBIN TIME: CPT

## 2024-12-17 PROCEDURE — 36416 COLLJ CAPILLARY BLOOD SPEC: CPT

## 2024-12-17 NOTE — PROGRESS NOTES
Anticoagulation Clinic Progress Note    Anticoagulation Summary  As of 2024      INR goal:  2.0-3.0   TTR:  66.0% (6.1 y)   INR used for dosin.8 (2024)   Warfarin maintenance plan:  5 mg every day   Weekly warfarin total:  35 mg   No change documented:  Lance Lion, PharmD   Plan last modified:  Love Allison RPH (3/27/2023)   Next INR check:  2025   Priority:  Maintenance   Target end date:  Indefinite    Indications    Atrial fibrillation [I48.91]                 Anticoagulation Episode Summary       INR check location:  --    Preferred lab:  --    Send INR reminders to:   [facility] [place]    Comments:  --          Anticoagulation Care Providers       Provider Role Specialty Phone number    Pia Dueñas MD Referring Cardiology [phone]            Clinic Interview:  Patient Findings     Negatives:  Signs/symptoms of thrombosis, Signs/symptoms of bleeding,   Laboratory test error suspected, Change in health, Change in alcohol use,   Change in activity, Upcoming invasive procedure, Emergency department   visit, Upcoming dental procedure, Missed doses, Extra doses, Change in   medications, Change in diet/appetite, Hospital admission, Bruising, Other   complaints      Clinical Outcomes     Negatives:  Major bleeding event, Thromboembolic event,   Anticoagulation-related hospital admission, Anticoagulation-related ED   visit, Anticoagulation-related fatality        INR History:      Latest Ref Rng & Units 2024     1:50 PM 2024     2:28 PM 7/15/2024     2:45 PM 10/8/2024     1:15 PM 10/23/2024     1:30 PM 2024     1:30 PM 2024     1:45 PM   Anticoagulation Monitoring   INR   2.07 3.1 3.7 2.8 4.0 2.8   INR Date   2024 7/15/2024 10/8/2024 10/23/2024 2024 2024   INR Goal   2.0-3.0 2.0-3.0 2.0-3.0 2.0-3.0 2.0-3.0 2.0-3.0   Trend   Same Same Same Same Same Same   Last Week Total   35 mg 35 mg 35 mg 35 mg 35 mg 35 mg   Next Week Total   35 mg  32.5 mg 30 mg 35 mg 30 mg 35 mg   Sun   5 mg 5 mg 5 mg 5 mg 5 mg 5 mg   Mon   5 mg 5 mg 5 mg 5 mg 5 mg 5 mg   Tue   5 mg 2.5 mg (7/16); Otherwise 5 mg 5 mg 5 mg 5 mg 5 mg   Wed   5 mg 5 mg Hold (10/9); Otherwise 5 mg 5 mg 5 mg 5 mg   Thu   5 mg 5 mg 5 mg 5 mg 5 mg 5 mg   Fri   5 mg 5 mg 5 mg 5 mg 5 mg 5 mg   Sat   5 mg 5 mg 5 mg 5 mg Hold (11/23); Otherwise 5 mg 5 mg   Historical INR 0.90 - 1.10 2.07          Visit Report    Report Report          Plan:  1. INR is Therapeutic today- see above in Anticoagulation Summary.  Will instruct Lanaantonio Aguerocomb to Continue their warfarin regimen- see above in Anticoagulation Summary.  2. Follow up in 4 weeks  3. Patient declines warfarin refills.  4. Verbal and written information provided. Patient expresses understanding and has no further questions at this time.    Lance Lion, PharmD

## 2024-12-31 ENCOUNTER — HOSPITAL ENCOUNTER (INPATIENT)
Facility: HOSPITAL | Age: 79
LOS: 11 days | Discharge: NURSING, REHAB OR LONG-TERM CARE | DRG: 291 | End: 2025-01-13
Attending: EMERGENCY MEDICINE | Admitting: INTERNAL MEDICINE
Payer: MEDICARE

## 2024-12-31 ENCOUNTER — APPOINTMENT (OUTPATIENT)
Dept: GENERAL RADIOLOGY | Facility: HOSPITAL | Age: 79
DRG: 291 | End: 2024-12-31
Payer: MEDICARE

## 2024-12-31 DIAGNOSIS — R05.1 ACUTE COUGH: ICD-10-CM

## 2024-12-31 DIAGNOSIS — J96.01 ACUTE HYPOXIC RESPIRATORY FAILURE: Primary | ICD-10-CM

## 2024-12-31 DIAGNOSIS — I50.9 ACUTE ON CHRONIC CONGESTIVE HEART FAILURE, UNSPECIFIED HEART FAILURE TYPE: ICD-10-CM

## 2024-12-31 DIAGNOSIS — N39.0 ACUTE UTI (URINARY TRACT INFECTION): ICD-10-CM

## 2024-12-31 DIAGNOSIS — J18.9 PNEUMONIA OF BOTH LOWER LOBES DUE TO INFECTIOUS ORGANISM: ICD-10-CM

## 2024-12-31 DIAGNOSIS — I50.33 ACUTE ON CHRONIC DIASTOLIC CHF (CONGESTIVE HEART FAILURE): ICD-10-CM

## 2024-12-31 DIAGNOSIS — J18.9 PNEUMONIA DUE TO INFECTIOUS ORGANISM, UNSPECIFIED LATERALITY, UNSPECIFIED PART OF LUNG: ICD-10-CM

## 2024-12-31 DIAGNOSIS — A41.9 SEPSIS WITHOUT ACUTE ORGAN DYSFUNCTION, DUE TO UNSPECIFIED ORGANISM: ICD-10-CM

## 2024-12-31 DIAGNOSIS — I69.354 HEMIPARESIS OF LEFT NONDOMINANT SIDE AS LATE EFFECT OF CEREBRAL INFARCTION: Chronic | ICD-10-CM

## 2024-12-31 DIAGNOSIS — J96.21 ACUTE ON CHRONIC HYPOXIC RESPIRATORY FAILURE: ICD-10-CM

## 2024-12-31 LAB
ALBUMIN SERPL-MCNC: 4.2 G/DL (ref 3.5–5.2)
ALBUMIN/GLOB SERPL: 1.1 G/DL
ALP SERPL-CCNC: 70 U/L (ref 39–117)
ALT SERPL W P-5'-P-CCNC: 11 U/L (ref 1–33)
ANION GAP SERPL CALCULATED.3IONS-SCNC: 8 MMOL/L (ref 5–15)
AST SERPL-CCNC: 19 U/L (ref 1–32)
B PARAPERT DNA SPEC QL NAA+PROBE: NOT DETECTED
B PERT DNA SPEC QL NAA+PROBE: NOT DETECTED
BASOPHILS # BLD AUTO: 0.02 10*3/MM3 (ref 0–0.2)
BASOPHILS NFR BLD AUTO: 0.2 % (ref 0–1.5)
BILIRUB SERPL-MCNC: 0.8 MG/DL (ref 0–1.2)
BUN SERPL-MCNC: 13 MG/DL (ref 8–23)
BUN/CREAT SERPL: 12.9 (ref 7–25)
C PNEUM DNA NPH QL NAA+NON-PROBE: NOT DETECTED
CALCIUM SPEC-SCNC: 9.1 MG/DL (ref 8.6–10.5)
CHLORIDE SERPL-SCNC: 101 MMOL/L (ref 98–107)
CO2 SERPL-SCNC: 24 MMOL/L (ref 22–29)
CREAT SERPL-MCNC: 1.01 MG/DL (ref 0.57–1)
D-LACTATE SERPL-SCNC: 2.1 MMOL/L (ref 0.5–2)
D-LACTATE SERPL-SCNC: 2.2 MMOL/L (ref 0.5–2)
DEPRECATED RDW RBC AUTO: 44.3 FL (ref 37–54)
DIGOXIN SERPL-MCNC: 0.6 NG/ML (ref 0.6–1.2)
EGFRCR SERPLBLD CKD-EPI 2021: 57.5 ML/MIN/1.73
EOSINOPHIL # BLD AUTO: 0.02 10*3/MM3 (ref 0–0.4)
EOSINOPHIL NFR BLD AUTO: 0.2 % (ref 0.3–6.2)
ERYTHROCYTE [DISTWIDTH] IN BLOOD BY AUTOMATED COUNT: 13 % (ref 12.3–15.4)
FLUAV SUBTYP SPEC NAA+PROBE: NOT DETECTED
FLUBV RNA ISLT QL NAA+PROBE: NOT DETECTED
GEN 5 1HR TROPONIN T REFLEX: 14 NG/L
GLOBULIN UR ELPH-MCNC: 3.7 GM/DL
GLUCOSE SERPL-MCNC: 156 MG/DL (ref 65–99)
HADV DNA SPEC NAA+PROBE: NOT DETECTED
HCOV 229E RNA SPEC QL NAA+PROBE: NOT DETECTED
HCOV HKU1 RNA SPEC QL NAA+PROBE: NOT DETECTED
HCOV NL63 RNA SPEC QL NAA+PROBE: NOT DETECTED
HCOV OC43 RNA SPEC QL NAA+PROBE: NOT DETECTED
HCT VFR BLD AUTO: 41.2 % (ref 34–46.6)
HGB BLD-MCNC: 13.4 G/DL (ref 12–15.9)
HMPV RNA NPH QL NAA+NON-PROBE: NOT DETECTED
HOLD SPECIMEN: NORMAL
HOLD SPECIMEN: NORMAL
HPIV1 RNA ISLT QL NAA+PROBE: NOT DETECTED
HPIV2 RNA SPEC QL NAA+PROBE: NOT DETECTED
HPIV3 RNA NPH QL NAA+PROBE: NOT DETECTED
HPIV4 P GENE NPH QL NAA+PROBE: NOT DETECTED
IMM GRANULOCYTES # BLD AUTO: 0.04 10*3/MM3 (ref 0–0.05)
IMM GRANULOCYTES NFR BLD AUTO: 0.4 % (ref 0–0.5)
INR PPP: 1.75 (ref 0.9–1.1)
LYMPHOCYTES # BLD AUTO: 1.19 10*3/MM3 (ref 0.7–3.1)
LYMPHOCYTES NFR BLD AUTO: 12.2 % (ref 19.6–45.3)
M PNEUMO IGG SER IA-ACNC: NOT DETECTED
MCH RBC QN AUTO: 30.5 PG (ref 26.6–33)
MCHC RBC AUTO-ENTMCNC: 32.5 G/DL (ref 31.5–35.7)
MCV RBC AUTO: 93.6 FL (ref 79–97)
MONOCYTES # BLD AUTO: 0.75 10*3/MM3 (ref 0.1–0.9)
MONOCYTES NFR BLD AUTO: 7.7 % (ref 5–12)
NEUTROPHILS NFR BLD AUTO: 7.76 10*3/MM3 (ref 1.7–7)
NEUTROPHILS NFR BLD AUTO: 79.3 % (ref 42.7–76)
NT-PROBNP SERPL-MCNC: 807 PG/ML (ref 0–1800)
PLATELET # BLD AUTO: 142 10*3/MM3 (ref 140–450)
PMV BLD AUTO: 10.5 FL (ref 6–12)
POTASSIUM SERPL-SCNC: 4.2 MMOL/L (ref 3.5–5.2)
PROCALCITONIN SERPL-MCNC: 0.07 NG/ML (ref 0–0.25)
PROT SERPL-MCNC: 7.9 G/DL (ref 6–8.5)
PROTHROMBIN TIME: 20.7 SECONDS (ref 11.7–14.2)
RBC # BLD AUTO: 4.4 10*6/MM3 (ref 3.77–5.28)
RHINOVIRUS RNA SPEC NAA+PROBE: NOT DETECTED
RSV RNA NPH QL NAA+NON-PROBE: NOT DETECTED
SARS-COV-2 RNA NPH QL NAA+NON-PROBE: NOT DETECTED
SODIUM SERPL-SCNC: 133 MMOL/L (ref 136–145)
TROPONIN T NUMERIC DELTA: 1 NG/L
TROPONIN T SERPL HS-MCNC: 13 NG/L
WBC NRBC COR # BLD AUTO: 9.78 10*3/MM3 (ref 3.4–10.8)
WHOLE BLOOD HOLD COAG: NORMAL
WHOLE BLOOD HOLD SPECIMEN: NORMAL

## 2024-12-31 PROCEDURE — 87040 BLOOD CULTURE FOR BACTERIA: CPT | Performed by: EMERGENCY MEDICINE

## 2024-12-31 PROCEDURE — 94664 DEMO&/EVAL PT USE INHALER: CPT

## 2024-12-31 PROCEDURE — 25010000002 CEFTRIAXONE PER 250 MG: Performed by: EMERGENCY MEDICINE

## 2024-12-31 PROCEDURE — 85025 COMPLETE CBC W/AUTO DIFF WBC: CPT

## 2024-12-31 PROCEDURE — 93005 ELECTROCARDIOGRAM TRACING: CPT

## 2024-12-31 PROCEDURE — 94799 UNLISTED PULMONARY SVC/PX: CPT

## 2024-12-31 PROCEDURE — 25010000002 AZITHROMYCIN PER 500 MG: Performed by: EMERGENCY MEDICINE

## 2024-12-31 PROCEDURE — 84484 ASSAY OF TROPONIN QUANT: CPT | Performed by: INTERNAL MEDICINE

## 2024-12-31 PROCEDURE — 71045 X-RAY EXAM CHEST 1 VIEW: CPT

## 2024-12-31 PROCEDURE — 80053 COMPREHEN METABOLIC PANEL: CPT

## 2024-12-31 PROCEDURE — 83605 ASSAY OF LACTIC ACID: CPT | Performed by: EMERGENCY MEDICINE

## 2024-12-31 PROCEDURE — 84484 ASSAY OF TROPONIN QUANT: CPT | Performed by: EMERGENCY MEDICINE

## 2024-12-31 PROCEDURE — G0378 HOSPITAL OBSERVATION PER HR: HCPCS

## 2024-12-31 PROCEDURE — 25010000002 FUROSEMIDE PER 20 MG: Performed by: EMERGENCY MEDICINE

## 2024-12-31 PROCEDURE — 85610 PROTHROMBIN TIME: CPT

## 2024-12-31 PROCEDURE — 84145 PROCALCITONIN (PCT): CPT | Performed by: EMERGENCY MEDICINE

## 2024-12-31 PROCEDURE — 25010000002 KETOROLAC TROMETHAMINE PER 15 MG: Performed by: EMERGENCY MEDICINE

## 2024-12-31 PROCEDURE — 36415 COLL VENOUS BLD VENIPUNCTURE: CPT

## 2024-12-31 PROCEDURE — 25010000002 FUROSEMIDE PER 20 MG: Performed by: INTERNAL MEDICINE

## 2024-12-31 PROCEDURE — 25010000002 ONDANSETRON PER 1 MG: Performed by: EMERGENCY MEDICINE

## 2024-12-31 PROCEDURE — 94761 N-INVAS EAR/PLS OXIMETRY MLT: CPT

## 2024-12-31 PROCEDURE — 85610 PROTHROMBIN TIME: CPT | Performed by: INTERNAL MEDICINE

## 2024-12-31 PROCEDURE — 83880 ASSAY OF NATRIURETIC PEPTIDE: CPT | Performed by: EMERGENCY MEDICINE

## 2024-12-31 PROCEDURE — 94760 N-INVAS EAR/PLS OXIMETRY 1: CPT

## 2024-12-31 PROCEDURE — 25810000003 SODIUM CHLORIDE 0.9 % SOLUTION 250 ML FLEX CONT: Performed by: EMERGENCY MEDICINE

## 2024-12-31 PROCEDURE — 99291 CRITICAL CARE FIRST HOUR: CPT

## 2024-12-31 PROCEDURE — 93010 ELECTROCARDIOGRAM REPORT: CPT | Performed by: INTERNAL MEDICINE

## 2024-12-31 PROCEDURE — 0202U NFCT DS 22 TRGT SARS-COV-2: CPT | Performed by: EMERGENCY MEDICINE

## 2024-12-31 PROCEDURE — 93005 ELECTROCARDIOGRAM TRACING: CPT | Performed by: EMERGENCY MEDICINE

## 2024-12-31 PROCEDURE — 80162 ASSAY OF DIGOXIN TOTAL: CPT

## 2024-12-31 PROCEDURE — 84484 ASSAY OF TROPONIN QUANT: CPT

## 2024-12-31 RX ORDER — LIDOCAINE 4 G/G
1 PATCH TOPICAL
Status: DISCONTINUED | OUTPATIENT
Start: 2025-01-01 | End: 2025-01-13 | Stop reason: HOSPADM

## 2024-12-31 RX ORDER — ATORVASTATIN CALCIUM 10 MG/1
10 TABLET, FILM COATED ORAL DAILY
Status: DISCONTINUED | OUTPATIENT
Start: 2025-01-01 | End: 2025-01-13 | Stop reason: HOSPADM

## 2024-12-31 RX ORDER — LATANOPROST 50 UG/ML
1 SOLUTION/ DROPS OPHTHALMIC NIGHTLY
Status: DISCONTINUED | OUTPATIENT
Start: 2024-12-31 | End: 2025-01-13 | Stop reason: HOSPADM

## 2024-12-31 RX ORDER — ONDANSETRON 2 MG/ML
4 INJECTION INTRAMUSCULAR; INTRAVENOUS EVERY 6 HOURS PRN
Status: DISCONTINUED | OUTPATIENT
Start: 2024-12-31 | End: 2025-01-13 | Stop reason: HOSPADM

## 2024-12-31 RX ORDER — ONDANSETRON 2 MG/ML
4 INJECTION INTRAMUSCULAR; INTRAVENOUS ONCE
Status: COMPLETED | OUTPATIENT
Start: 2024-12-31 | End: 2024-12-31

## 2024-12-31 RX ORDER — IPRATROPIUM BROMIDE AND ALBUTEROL SULFATE 2.5; .5 MG/3ML; MG/3ML
3 SOLUTION RESPIRATORY (INHALATION) ONCE
Status: COMPLETED | OUTPATIENT
Start: 2024-12-31 | End: 2024-12-31

## 2024-12-31 RX ORDER — ASPIRIN 81 MG/1
81 TABLET ORAL DAILY
Status: DISCONTINUED | OUTPATIENT
Start: 2025-01-01 | End: 2025-01-13 | Stop reason: HOSPADM

## 2024-12-31 RX ORDER — KETOROLAC TROMETHAMINE 15 MG/ML
15 INJECTION, SOLUTION INTRAMUSCULAR; INTRAVENOUS ONCE
Status: COMPLETED | OUTPATIENT
Start: 2024-12-31 | End: 2024-12-31

## 2024-12-31 RX ORDER — ACETAMINOPHEN 325 MG/1
650 TABLET ORAL EVERY 4 HOURS PRN
Status: DISCONTINUED | OUTPATIENT
Start: 2024-12-31 | End: 2025-01-13 | Stop reason: HOSPADM

## 2024-12-31 RX ORDER — ACETAMINOPHEN 160 MG/5ML
650 SOLUTION ORAL EVERY 4 HOURS PRN
Status: DISCONTINUED | OUTPATIENT
Start: 2024-12-31 | End: 2025-01-13 | Stop reason: HOSPADM

## 2024-12-31 RX ORDER — ALBUTEROL SULFATE 90 UG/1
2 INHALANT RESPIRATORY (INHALATION) EVERY 4 HOURS PRN
Status: DISCONTINUED | OUTPATIENT
Start: 2024-12-31 | End: 2025-01-13 | Stop reason: HOSPADM

## 2024-12-31 RX ORDER — FUROSEMIDE 10 MG/ML
40 INJECTION INTRAMUSCULAR; INTRAVENOUS ONCE
Status: COMPLETED | OUTPATIENT
Start: 2024-12-31 | End: 2024-12-31

## 2024-12-31 RX ORDER — AMOXICILLIN 250 MG
2 CAPSULE ORAL 2 TIMES DAILY PRN
Status: DISCONTINUED | OUTPATIENT
Start: 2024-12-31 | End: 2025-01-13 | Stop reason: HOSPADM

## 2024-12-31 RX ORDER — BISACODYL 10 MG
10 SUPPOSITORY, RECTAL RECTAL DAILY PRN
Status: DISCONTINUED | OUTPATIENT
Start: 2024-12-31 | End: 2025-01-13 | Stop reason: HOSPADM

## 2024-12-31 RX ORDER — DIGOXIN 125 MCG
125 TABLET ORAL EVERY OTHER DAY
Status: DISCONTINUED | OUTPATIENT
Start: 2024-12-31 | End: 2025-01-13 | Stop reason: HOSPADM

## 2024-12-31 RX ORDER — POLYETHYLENE GLYCOL 3350 17 G/17G
17 POWDER, FOR SOLUTION ORAL DAILY PRN
Status: DISCONTINUED | OUTPATIENT
Start: 2024-12-31 | End: 2025-01-13 | Stop reason: HOSPADM

## 2024-12-31 RX ORDER — METOPROLOL TARTRATE 50 MG
50 TABLET ORAL 2 TIMES DAILY
Status: DISCONTINUED | OUTPATIENT
Start: 2024-12-31 | End: 2025-01-03

## 2024-12-31 RX ORDER — SODIUM CHLORIDE 0.9 % (FLUSH) 0.9 %
10 SYRINGE (ML) INJECTION AS NEEDED
Status: DISCONTINUED | OUTPATIENT
Start: 2024-12-31 | End: 2025-01-13 | Stop reason: HOSPADM

## 2024-12-31 RX ORDER — ONDANSETRON 4 MG/1
4 TABLET, ORALLY DISINTEGRATING ORAL EVERY 6 HOURS PRN
Status: DISCONTINUED | OUTPATIENT
Start: 2024-12-31 | End: 2025-01-13 | Stop reason: HOSPADM

## 2024-12-31 RX ORDER — ASPIRIN 325 MG
325 TABLET ORAL ONCE
Status: DISCONTINUED | OUTPATIENT
Start: 2024-12-31 | End: 2024-12-31

## 2024-12-31 RX ORDER — POTASSIUM CHLORIDE 750 MG/1
10 TABLET, EXTENDED RELEASE ORAL 2 TIMES DAILY WITH MEALS
Status: DISCONTINUED | OUTPATIENT
Start: 2024-12-31 | End: 2025-01-04

## 2024-12-31 RX ORDER — FUROSEMIDE 10 MG/ML
40 INJECTION INTRAMUSCULAR; INTRAVENOUS EVERY 12 HOURS
Status: DISCONTINUED | OUTPATIENT
Start: 2025-01-01 | End: 2025-01-03

## 2024-12-31 RX ORDER — HYDROCODONE BITARTRATE AND ACETAMINOPHEN 7.5; 325 MG/1; MG/1
1 TABLET ORAL EVERY 6 HOURS PRN
Status: DISCONTINUED | OUTPATIENT
Start: 2024-12-31 | End: 2025-01-13 | Stop reason: HOSPADM

## 2024-12-31 RX ORDER — ACETAMINOPHEN 650 MG/1
650 SUPPOSITORY RECTAL EVERY 4 HOURS PRN
Status: DISCONTINUED | OUTPATIENT
Start: 2024-12-31 | End: 2025-01-13 | Stop reason: HOSPADM

## 2024-12-31 RX ORDER — AZITHROMYCIN 250 MG/1
500 TABLET, FILM COATED ORAL
Status: COMPLETED | OUTPATIENT
Start: 2025-01-01 | End: 2025-01-05

## 2024-12-31 RX ORDER — BISACODYL 5 MG/1
5 TABLET, DELAYED RELEASE ORAL DAILY PRN
Status: DISCONTINUED | OUTPATIENT
Start: 2024-12-31 | End: 2025-01-13 | Stop reason: HOSPADM

## 2024-12-31 RX ORDER — WARFARIN SODIUM 5 MG/1
5 TABLET ORAL
Status: DISCONTINUED | OUTPATIENT
Start: 2025-01-01 | End: 2025-01-02

## 2024-12-31 RX ADMIN — METOPROLOL TARTRATE 50 MG: 50 TABLET, FILM COATED ORAL at 23:50

## 2024-12-31 RX ADMIN — SODIUM CHLORIDE 500 MG: 9 INJECTION, SOLUTION INTRAVENOUS at 18:13

## 2024-12-31 RX ADMIN — FUROSEMIDE 40 MG: 10 INJECTION, SOLUTION INTRAMUSCULAR; INTRAVENOUS at 23:50

## 2024-12-31 RX ADMIN — POTASSIUM CHLORIDE 10 MEQ: 750 TABLET, EXTENDED RELEASE ORAL at 23:50

## 2024-12-31 RX ADMIN — ACETAMINOPHEN 650 MG: 325 TABLET ORAL at 20:51

## 2024-12-31 RX ADMIN — IPRATROPIUM BROMIDE AND ALBUTEROL SULFATE 3 ML: 2.5; .5 SOLUTION RESPIRATORY (INHALATION) at 16:16

## 2024-12-31 RX ADMIN — CEFTRIAXONE 2000 MG: 2 INJECTION, POWDER, FOR SOLUTION INTRAMUSCULAR; INTRAVENOUS at 17:13

## 2024-12-31 RX ADMIN — ONDANSETRON 4 MG: 2 INJECTION, SOLUTION INTRAMUSCULAR; INTRAVENOUS at 16:40

## 2024-12-31 RX ADMIN — DIGOXIN 125 MCG: 125 TABLET ORAL at 23:50

## 2024-12-31 RX ADMIN — FUROSEMIDE 40 MG: 10 INJECTION, SOLUTION INTRAMUSCULAR; INTRAVENOUS at 16:40

## 2024-12-31 RX ADMIN — KETOROLAC TROMETHAMINE 15 MG: 15 INJECTION, SOLUTION INTRAMUSCULAR; INTRAVENOUS at 16:40

## 2024-12-31 NOTE — ED PROVIDER NOTES
EMERGENCY DEPARTMENT ENCOUNTER    Room Number:  26/26  PCP: Toni Green MD  Independent Historians: Patient    HPI:  Chief Complaint: had concerns including Shortness of Breath and Chest Pain.      A complete HPI/ROS/PMH/PSH/SH/FH are unobtainable due to: None    Chronic or social conditions impacting patient care (Social Determinants of Health): None      Context: Lana Yañez is a 79 y.o. female with a medical history of hypertension, RA, CHF, hyperlipidemia, CVA, A-fib (on dig and coumadin followed by Dr. Dueñas) who presents to the ED c/o acute shortness of breath.  Patient says she has been sick since Saturday with productive cough, ear pain, headache, and congestion.  Patient's symptoms of gotten worse since that time where she is having difficulty walking even a few steps without being short of breath and notes that her ankles are swollen.  Patient has been compliant with all of her medications including her diuretic.  Patient went to urgent care where her oxygen saturations were right around 90% and she was dyspneic.  Patient did have a COVID and flu test that were negative.  Patient sent to the ER for further evaluation given her hypoxia.  On review of systems patient does report some loose stools but denies any vomiting, fevers, chest pain.        Review of prior external notes (non-ED) -and- Review of prior external test results outside of this encounter: Last seen in office by Dr. Dueñas with cardiology on October 4.  Patient followed for her atrial fibrillation, mitral valve prolapse with moderate mitral regurg, dilated ascending aorta, coronary artery disease and pulmonary hypertension    Prescription drug monitoring program review:     N/A    PAST MEDICAL HISTORY  Active Ambulatory Problems     Diagnosis Date Noted   • Mixed hyperlipidemia 03/21/2016   • Vitamin deficiency 03/21/2016   • Essential hypertension 03/21/2016   • Rheumatoid arthritis involving both hands 03/21/2016   • Fatigue  04/06/2016   • ANTOINE (dyspnea on exertion) 04/06/2016   • Dysuria 08/02/2016   • Urge incontinence of urine 08/02/2016   • Hypovitaminosis D 08/02/2016   • Sleep apnea 08/02/2016   • Encounter for screening colonoscopy 08/02/2016   • Bronchitis 08/12/2016   • Cough 08/12/2016   • Generalized osteoarthritis of multiple sites 12/15/2016   • Cellulitis of right elbow due to MRSA 06/12/2017   • Medicare annual wellness visit, initial 06/27/2017   • Hospital discharge follow-up 06/27/2017   • Displacement of lumbar intervertebral disc without myelopathy 10/11/2017   • Lumbar disc herniation 10/11/2017   • Chronic atrial fibrillation 10/11/2017   • History of MRSA infection 10/11/2017   • Left-sided weakness 10/15/2017   • Atrial fibrillation 10/15/2017   • Left shoulder pain 10/20/2017   • Cerebrovascular accident (CVA) due to occlusion of right middle cerebral artery 10/30/2017   • Relative lymphocytosis 11/13/2017   • Nausea 11/28/2017   • Weakness 11/28/2017   • Controlled substance agreement signed 12/05/2017   • Coronary artery disease involving native coronary artery of native heart without angina pectoris 12/14/2017   • SOB (shortness of breath) 12/14/2017   • Lower extremity edema 12/14/2017   • Chronic diastolic heart failure 12/14/2017   • Adhesive capsulitis of left shoulder 12/28/2017   • CVA tenderness 12/28/2017   • Costochondritis, acute 01/04/2018   • Allergic reaction 02/09/2018   • 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   • Visit for screening mammogram 04/02/2018   • Low back pain 04/02/2018   • Urgency of urination 10/04/2018   • Hyperglycemia 10/04/2018   • Carpal tunnel syndrome of left wrist/ wakes her up 11/28/2018   • Localized edema 03/10/2019   • Acute cystitis without hematuria 04/05/2019   • Bruising 05/21/2019   • History of stroke 09/26/2019   • Diabetes 1.5, 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   • Other chronic pain 12/19/2019   • Vaginal candidiasis 12/17/2020   • Pulmonary hypertension 02/07/2021    • Acute respiratory failure with hypoxia 11/08/2021   • Hypokalemia 11/08/2021   • Hypomagnesemia 11/08/2021   • Type 2 diabetes mellitus with hyperglycemia, without long-term current use of insulin 11/08/2021   • Diarrhea 11/08/2021   • Sepsis without acute organ dysfunction 11/08/2021   • Morbid obesity with BMI of 40.0-44.9, adult 11/08/2021   • Biliary acute pancreatitis without necrosis or infection 11/10/2021   • Stage 3a chronic kidney disease 05/20/2022   • Acute UTI (urinary tract infection) 05/22/2022   • Hemiparesis of left nondominant side as late effect of cerebral infarction 05/22/2022     Resolved Ambulatory Problems     Diagnosis Date Noted   • Morbid obesity with BMI of 40.0-44.9, adult 12/15/2016   • Acute CVA (cerebrovascular accident) 10/15/2017   • NSTEMI (non-ST elevated myocardial infarction) 10/19/2017   • Nitrofurantoin adverse reaction 04/05/2019   • Respiratory distress 05/22/2022     Past Medical History:   Diagnosis Date   • Acute on chronic diastolic heart failure    • Arthritis    • Arthritis of back    • Arthritis of neck    • Asthma    • Atypical chest pain    • Cervical disc disorder    • CHF (congestive heart failure)    • COPD (chronic obstructive pulmonary disease)    • Coronary artery disease    • Coronary artery disease involving native coronary artery of native heart with angina pectoris with documented spasm    • CTS (carpal tunnel syndrome)    • Disease of thyroid gland    • Dizziness    • Fracture, foot    • History of rheumatic fever    • History of transfusion    • Hx of bone density study    • Hyperlipidemia    • Knee swelling    • Left arm pain    • Leg swelling    • Lumbosacral disc disease    • Malaise and fatigue    • Mitral valve disease    • Mitral valve insufficiency    • MRSA infection    • PAF (paroxysmal atrial fibrillation)    • Periarthritis of shoulder    • RA (rheumatoid arthritis)    • Rotator cuff syndrome    • Stroke    • UTI (urinary tract infection)  05/2020   • Vitamin D deficiency          PAST SURGICAL HISTORY  Past Surgical History:   Procedure Laterality Date   • BREAST BIOPSY     • BREAST SURGERY      right side lumpectomy with biopsy   • CARDIAC CATHETERIZATION Left 10/20/2017    Procedure: Cardiac Catheterization/Vascular Study;  Surgeon: Alphonso Olmedo MD;  Location: [facility] INVASIVE LOCATION;  Service:    • CARDIAC CATHETERIZATION N/A 10/20/2017    +2 mitral regurgitation, left main 10% stenosis, mid to distal LAD 10% diffuse stenosis, circumflex 10% diffuse stenosis, RCA 10% proximal stenosis, and distal PDA consistent with coronary embolus with a lesion of 90% too small to consider coronary intervention; medical management recommended   • EYE SURGERY      laser surgery for glaucoma and left eye cataracts removed   • HYSTERECTOMY      10+ years ago   • JOINT REPLACEMENT  2005; 2006    bilateral knees and left rotater   • KNEE SURGERY     • MAMMO BILATERAL  2016    [facility]    • SHOULDER SURGERY           FAMILY HISTORY  Family History   Problem Relation Age of Onset   • Colon cancer Mother    • Glaucoma Mother    • Stroke Mother    • Arthritis Mother    • Hypertension Mother    • Glaucoma Sister    • Diabetes Sister    • Heart disease Sister    • Arthritis Sister    • Asthma Sister    • Hypertension Sister    • Miscarriages / Stillbirths Sister    • Lung disease Sister    • Heart disease Brother    • Diabetes Brother    • Arthritis Brother    • Drug abuse Brother    • Hypertension Brother    • Arthritis Father    • COPD Father    • Lung disease Father    • Arthritis Daughter    • Depression Daughter    • Alcohol abuse Maternal Uncle    • Heart disease Sister    • Heart disease Sister    • Heart disease Brother    • Rheumatologic disease Sister          SOCIAL HISTORY  Social History     Socioeconomic History   • Marital status:      Spouse name: Suresh   • Number of children: 5   • Years of education: 13   Tobacco Use   • Smoking  status: Former     Current packs/day: 0.00     Average packs/day: 3.0 packs/day for 1.4 years (4.3 ttl pk-yrs)     Types: Cigarettes     Start date: 1967     Quit date: 10/19/1968     Years since quittin.2     Passive exposure: Never   • Smokeless tobacco: Never   • Tobacco comments:     caffeine use - decaf coffee   Vaping Use   • Vaping status: Never Used   Substance and Sexual Activity   • Alcohol use: No     Comment: No caffeine use   • Drug use: No   • Sexual activity: Defer         ALLERGIES  Metformin, Contrast dye (echo or unknown ct/mr), Dapagliflozin, Macrobid [nitrofurantoin], and Iodinated contrast media        REVIEW OF SYSTEMS  Review of Systems  Included in HPI  All systems reviewed and negative except for those discussed in HPI.      PHYSICAL EXAM    I have reviewed the triage vital signs and nursing notes.    ED Triage Vitals [24 1327]   Temp Heart Rate Resp BP SpO2   97.4 °F (36.3 °C) 109 18 (!) 180/110 93 %      Temp src Heart Rate Source Patient Position BP Location FiO2 (%)   Oral Monitor Lying Right arm --       Physical Exam  GENERAL: Cooperative and conversant although ill-appearing obese female, alert, no acute distress  SKIN: Warm, dry  HENT: Normocephalic, atraumatic  EYES: no scleral icterus  CV: regular rhythm, regular rate  RESPIRATORY: normal effort, diminished at the bases with rhonchi bilaterally  ABDOMEN: soft, nontender, nondistended, obese  MUSCULOSKELETAL: no deformity, bilateral lower extremity pitting edema especially at the ankles, scarring and discoloration consistent with venous stasis in both lower extremities  NEURO: alert, moves all extremities, follows commands                                                                   LAB RESULTS  Recent Results (from the past 24 hours)   POCT GLUCOSE FINGERSTICK    Collection Time: 24 12:29 PM    Specimen type and source: Blood specimen from patient (specimen)   Result Value Ref Range    Glucose 170 (A) 70  - 99 mg/dL    QC Yes     Lot Number [id number]     Expiration Date [id number]     Comment     Comprehensive Metabolic Panel    Collection Time: 12/31/24  2:19 PM    Specimen: Arm, Right; Blood   Result Value Ref Range    Glucose 156 (H) 65 - 99 mg/dL    BUN 13 8 - 23 mg/dL    Creatinine 1.01 (H) 0.57 - 1.00 mg/dL    Sodium 133 (L) 136 - 145 mmol/L    Potassium 4.2 3.5 - 5.2 mmol/L    Chloride 101 98 - 107 mmol/L    CO2 24.0 22.0 - 29.0 mmol/L    Calcium 9.1 8.6 - 10.5 mg/dL    Total Protein 7.9 6.0 - 8.5 g/dL    Albumin 4.2 3.5 - 5.2 g/dL    ALT (SGPT) 11 1 - 33 U/L    AST (SGOT) 19 1 - 32 U/L    Alkaline Phosphatase 70 39 - 117 U/L    Total Bilirubin 0.8 0.0 - 1.2 mg/dL    Globulin 3.7 gm/dL    A/G Ratio 1.1 g/dL    BUN/Creatinine Ratio 12.9 7.0 - 25.0    Anion Gap 8.0 5.0 - 15.0 mmol/L    eGFR 57.5 (L) >60.0 mL/min/1.73   High Sensitivity Troponin T    Collection Time: 12/31/24  2:19 PM    Specimen: Arm, Right; Blood   Result Value Ref Range    HS Troponin T 13 <14 ng/L   Protime-INR    Collection Time: 12/31/24  2:19 PM    Specimen: Arm, Right; Blood   Result Value Ref Range    Protime 20.7 (H) 11.7 - 14.2 Seconds    INR 1.75 (H) 0.90 - 1.10   Digoxin Level    Collection Time: 12/31/24  2:19 PM    Specimen: Arm, Right; Blood   Result Value Ref Range    Digoxin 0.60 0.60 - 1.20 ng/mL   Green Top (Gel)    Collection Time: 12/31/24  2:19 PM   Result Value Ref Range    Extra Tube Hold for add-ons.    Lavender Top    Collection Time: 12/31/24  2:19 PM   Result Value Ref Range    Extra Tube hold for add-on    Gold Top - SST    Collection Time: 12/31/24  2:19 PM   Result Value Ref Range    Extra Tube Hold for add-ons.    Light Blue Top    Collection Time: 12/31/24  2:19 PM   Result Value Ref Range    Extra Tube Hold for add-ons.    CBC Auto Differential    Collection Time: 12/31/24  2:19 PM    Specimen: Arm, Right; Blood   Result Value Ref Range    WBC 9.78 3.40 - 10.80 10*3/mm3    RBC 4.40 3.77 - 5.28 10*6/mm3     Hemoglobin 13.4 12.0 - 15.9 g/dL    Hematocrit 41.2 34.0 - 46.6 %    MCV 93.6 79.0 - 97.0 fL    MCH 30.5 26.6 - 33.0 pg    MCHC 32.5 31.5 - 35.7 g/dL    RDW 13.0 12.3 - 15.4 %    RDW-SD 44.3 37.0 - 54.0 fl    MPV 10.5 6.0 - 12.0 fL    Platelets 142 140 - 450 10*3/mm3    Neutrophil % 79.3 (H) 42.7 - 76.0 %    Lymphocyte % 12.2 (L) 19.6 - 45.3 %    Monocyte % 7.7 5.0 - 12.0 %    Eosinophil % 0.2 (L) 0.3 - 6.2 %    Basophil % 0.2 0.0 - 1.5 %    Immature Grans % 0.4 0.0 - 0.5 %    Neutrophils, Absolute 7.76 (H) 1.70 - 7.00 10*3/mm3    Lymphocytes, Absolute 1.19 0.70 - 3.10 10*3/mm3    Monocytes, Absolute 0.75 0.10 - 0.90 10*3/mm3    Eosinophils, Absolute 0.02 0.00 - 0.40 10*3/mm3    Basophils, Absolute 0.02 0.00 - 0.20 10*3/mm3    Immature Grans, Absolute 0.04 0.00 - 0.05 10*3/mm3   BNP    Collection Time: 12/31/24  2:19 PM    Specimen: Arm, Right; Blood   Result Value Ref Range    proBNP 807.0 0.0 - 1,800.0 pg/mL   ECG 12 Lead ED Triage Standing Order; Chest Pain    Collection Time: 12/31/24  3:18 PM   Result Value Ref Range    QT Interval 386 ms    QTC Interval 539 ms   High Sensitivity Troponin T 1Hr    Collection Time: 12/31/24  3:42 PM    Specimen: Blood   Result Value Ref Range    HS Troponin T 14 (H) <14 ng/L    Troponin T Numeric Delta 1 Abnormal if >/=3 ng/L         RADIOLOGY  XR Chest 1 View    Result Date: 12/31/2024  XR CHEST 1 VW-  HISTORY: Female who is 79 years-old, chest pain  TECHNIQUE: Frontal view of the chest  COMPARISON: 10/9/2023  FINDINGS: The heart is enlarged. Pulmonary vasculature is congested. Aorta is calcified. Bilateral alveolar interstitial opacities suggest edema and/or pneumonia, follow-up recommended. No large pleural effusion or pneumothorax. No acute osseous process.      As described.  This report was finalized on 12/31/2024 2:09 PM by Dr. Antelmo Corea M.D on Workstation: [id number]         MEDICATIONS GIVEN IN ER  Medications   sodium chloride 0.9 % flush 10 mL (has no  administration in time range)   ondansetron (ZOFRAN) injection 4 mg (has no administration in time range)   ketorolac (TORADOL) injection 15 mg (has no administration in time range)   azithromycin (ZITHROMAX) 500 mg in sodium chloride 0.9 % 250 mL IVPB-VTB (has no administration in time range)   cefTRIAXone (ROCEPHIN) 2,000 mg in sodium chloride 0.9 % 100 mL MBP (has no administration in time range)   furosemide (LASIX) injection 40 mg (has no administration in time range)   ipratropium-albuterol (DUO-NEB) nebulizer solution 3 mL (3 mL Nebulization Given 12/31/24 1616)         ORDERS PLACED DURING THIS VISIT:  Orders Placed This Encounter   Procedures   • Respiratory Panel PCR w/COVID-19(SARS-CoV-2) [facility]GAN/[facility]ERALD/EVELIO/PAD/COR/RAJEEV In-House, NP Swab in UTM/VTM, 2 HR TAT - Swab, Nasopharynx   • Blood Culture - Blood,   • Blood Culture - Blood,   • XR Chest 1 View   • [facility] Draw   • Comprehensive Metabolic Panel   • High Sensitivity Troponin T   • Protime-INR   • Digoxin Level   • CBC Auto Differential   • High Sensitivity Troponin T 1Hr   • BNP   • Lactic Acid, Plasma   • Procalcitonin   • NPO Diet NPO Type: Strict NPO   • Undress & Gown   • Continuous Pulse Oximetry   • LHA (on-call MD unless specified) Details   • Oxygen Therapy- Nasal Cannula; Titrate 1-6 LPM Per SpO2; 90 - 95%   • POC Protime / INR   • ECG 12 Lead ED Triage Standing Order; Chest Pain   • ECG 12 Lead ED Triage Standing Order; Chest Pain   • Insert Peripheral IV   • CBC & Differential   • Green Top (Gel)   • Lavender Top   • Gold Top - SST   • Light Blue Top         OUTPATIENT MEDICATION MANAGEMENT:  Current Facility-Administered Medications Ordered in Epic   Medication Dose Route Frequency Provider Last Rate Last Admin   • azithromycin (ZITHROMAX) 500 mg in sodium chloride 0.9 % 250 mL IVPB-VTB  500 mg Intravenous Once Crystal Monreal MD       • cefTRIAXone (ROCEPHIN) 2,000 mg in sodium chloride 0.9 % 100 mL MBP  2,000 mg Intravenous Once Jocelin  Crystal Conti MD       • furosemide (LASIX) injection 40 mg  40 mg Intravenous Once Crystal Monreal MD       • ketorolac (TORADOL) injection 15 mg  15 mg Intravenous Once Crystal Monreal MD       • ondansetron (ZOFRAN) injection 4 mg  4 mg Intravenous Once Crystal Monreal MD       • sodium chloride 0.9 % flush 10 mL  10 mL Intravenous PRN Rangel Coronado MD         Current Outpatient Medications Ordered in Epic   Medication Sig Dispense Refill   • Accu-Chek FastClix Lancets misc Use to check glucose as directed 306 each 1   • acetaminophen (TYLENOL) 325 MG tablet Take 2 tablets by mouth Every 6 (Six) Hours As Needed for Moderate Pain.     • albuterol sulfate  (90 Base) MCG/ACT inhaler Inhale 2 puffs Every 4 (Four) Hours As Needed for Shortness of Air. PRN SOA/WHEEZING 18 g 11   • Alcohol Swabs (DropSafe Alcohol Prep) 70 % pads      • aspirin 81 MG EC tablet Take 1 tablet by mouth Daily.     • atorvastatin (LIPITOR) 10 MG tablet Take 1 tablet by mouth Daily. 90 tablet 1   • Blood Glucose Monitoring Suppl (ACCU-CHEK GUIDE) w/Device kit See Admin Instructions.     • Cholecalciferol 50 MCG (2000 UT) capsule Take 50 mcg by mouth Daily.     • digoxin (LANOXIN) 125 MCG tablet Take 1 tablet by mouth Every Other Day. 45 tablet 2   • glimepiride (Amaryl) 2 MG tablet By mouth take one tab twice daily with AM and PM meals. 180 tablet 2   • glucose blood test strip Test twice daily prior to breakfast and dinner as instructed (Patient taking differently: Test daily prior to breakfast) 200 each 12   • HYDROcodone-acetaminophen (NORCO) 7.5-325 MG per tablet Take 1 tablet twice a day by oral route as needed for 30 days.     • Januvia 50 MG tablet      • Lumigan 0.01 % ophthalmic drops Administer 1 drop to both eyes Every Night.     • metoprolol tartrate (Lopressor) 50 MG tablet Take 1 tablet by mouth 2 (Two) Times a Day for 30 days. 180 tablet 1   • polyethylene glycol (MIRALAX) 17 g packet Take 17 g by mouth  Daily As Needed (constipation).     • potassium chloride 10 MEQ CR tablet 1 po bid 180 tablet 1   • torsemide (DEMADEX) 20 MG tablet 2 po qday 180 tablet 1   • vitamin D (ERGOCALCIFEROL) 1.25 MG (49356 UT) capsule capsule Take 1 capsule by mouth 1 (One) Time Per Week. 12 capsule 1   • warfarin (COUMADIN) 5 MG tablet Take one-half tablet (2.5 mg) by mouth on Fridays, and take one tablet (5 mg) by mouth all other days or as directed. 85 tablet 1         PROCEDURES  Procedures      Critical care provider statement:    Critical care time (minutes): 36.   Critical care time was exclusive of:  Separately billable procedures and treating other patients   Critical care was necessary to treat or prevent imminent or life-threatening deterioration of the following conditions:  Respiratory Failure   Critical care was time spent personally by me on the following activities:  Development of treatment plan with patient or surrogate, discussions with consultants, evaluation of patient's response to treatment, examination of patient, obtaining history from patient or surrogate, ordering and performing treatments and interventions, ordering and review of laboratory studies, ordering and review of radiographic studies, pulse oximetry, re-evaluation of patient's condition and review of old charts. Critical Care indicators: Hypoxia / hypoxemia       PROGRESS, DATA ANALYSIS, CONSULTS, AND MEDICAL DECISION MAKING  All labs have been independently interpreted by me.  All radiology studies have been reviewed by me. All EKG's have been independently viewed and interpreted by me.  Discussion below represents my analysis of pertinent findings related to patient's condition, differential diagnosis, treatment plan and final disposition.    This patient presents with dyspnea, most likely secondary to pneumonia, viral URI, or bronchitis.  The differential diagnosis list includes but is not limited to:   - acute cardiac etiologies like ACS, CHF,  pericardial effusion or even tamponade  - acute respiratory etiologies like acute PE, pneumothorax , asthma, COPD exacerbation, allergic etiologies, or infectious etiologies such as PNA   - non-cardiopulmonary causes like toxidromes, metabolic etiologies such as acidemia or electrolyte derangements or even DKA, sepsis, neurologic causes including GBS and myasthenia gravis  Plan EKG, CXR, Labs incl bnp and trop and +/- viral swab    Plan for addition of sepsis screening including lactic and Pro-Ned.      ED Course as of 12/31/24 1721   Tue Dec 31, 2024   1643 EKG ER MD interpretation   Time: 15: 18  Rhythm and rate: Atrial fibrillation at a rate of 117  Axis: Normal  QRS complexes: Normal  ST segments: no elevation nor depressions   [AR]   1649 I discussed patient's case with Dr. Howell who is amenable to admitting the patient for further care. [AR]      ED Course User Index  [AR] Crystal Monreal MD             AS OF 16:25 EST VITALS:    BP - 139/87  HR - 103  TEMP - 97.4 °F (36.3 °C) (Oral)  O2 SATS - 95%      COMPLEXITY OF CARE  The patient requires admission.    DIAGNOSIS  Final diagnoses:   Acute hypoxic respiratory failure   Acute cough   Pneumonia of both lower lobes due to infectious organism   Acute on chronic congestive heart failure, unspecified heart failure type         DISPOSITION  ED Disposition       ED Disposition   Intended Admit    Condition   --    Comment   --                Please note that portions of this document were completed with a voice recognition program.    Note Disclaimer: At [facility], we believe that sharing information builds trust and better relationships. You are receiving this note because you recently visited [facility]. It is possible you will see health information before a provider has talked with you about it. This kind of information can be easy to misunderstand. To help you fully understand what it means for your health, we urge you to discuss this note with  your provider.         Crystal Monreal MD  12/31/24 3857

## 2024-12-31 NOTE — ED NOTES
Patient to ed via ems from [facility] . Patient went to [facility] for SOA and [facility] called for patient to come to ED. 90% on room air when ems arrived to [facility]      Patient had x2 albuterol treatment, aspirin, and nitro by EMS for chest tightness

## 2024-12-31 NOTE — ED NOTES
Nursing report ED to floor  Lana Yañez  79 y.o.  female    HPI :   Chief Complaint   Patient presents with    Shortness of Breath    Chest Pain       Admitting doctor:   Lynda Howell MD    Admitting diagnosis:   The primary encounter diagnosis was Acute hypoxic respiratory failure. Diagnoses of Acute cough, Pneumonia of both lower lobes due to infectious organism, and Acute on chronic congestive heart failure, unspecified heart failure type were also pertinent to this visit.    Code status:   Current Code Status       Date Active Code Status Order ID Comments User Context       12/31/2024 1738 CPR (Attempt to Resuscitate) [id number]  Lynda Howell MD ED        Question Answer    Code Status (Patient has no pulse and is not breathing) CPR (Attempt to Resuscitate)    Medical Interventions (Patient has pulse or is breathing) Full                    Allergies:   Metformin, Contrast dye (echo or unknown ct/mr), Dapagliflozin, Macrobid [nitrofurantoin], and Iodinated contrast media    Isolation:   No active isolations    Intake and Output  No intake or output data in the 24 hours ending 12/31/24 1748    Weight:       12/31/24  1541   Weight: 109 kg (240 lb)       Most recent vitals:   Vitals:    12/31/24 1616 12/31/24 1620 12/31/24 1640 12/31/24 1738   BP:   (!) 166/111 133/95   BP Location:       Patient Position:       Pulse: 111 103 110 97   Resp: 16      Temp:       TempSrc:       SpO2: 95% 95%  96%   Weight:       Height:           Active LDAs/IV Access:   Lines, Drains & Airways       Active LDAs       Name Placement date Placement time Site Days    Peripheral IV 12/31/24 1540 Anterior;Proximal;Right Forearm 12/31/24  1540  Forearm  less than 1                    Labs (abnormal labs have a star):   Labs Reviewed   COMPREHENSIVE METABOLIC PANEL - Abnormal; Notable for the following components:       Result Value    Glucose 156 (*)     Creatinine 1.01 (*)     Sodium 133 (*)     eGFR 57.5 (*)      All other components within normal limits    Narrative:     GFR Categories in Chronic Kidney Disease (CKD)      GFR Category          GFR (mL/min/1.73)    Interpretation  G1                     90 or greater         Normal or high (1)  G2                      60-89                Mild decrease (1)  G3a                   45-59                Mild to moderate decrease  G3b                   30-44                Moderate to severe decrease  G4                    15-29                Severe decrease  G5                    14 or less           Kidney failure          (1)In the absence of evidence of kidney disease, neither GFR category G1 or G2 fulfill the criteria for CKD.    eGFR calculation 2021 CKD-EPI creatinine equation, which does not include race as a factor   PROTIME-INR - Abnormal; Notable for the following components:    Protime 20.7 (*)     INR 1.75 (*)     All other components within normal limits   CBC WITH AUTO DIFFERENTIAL - Abnormal; Notable for the following components:    Neutrophil % 79.3 (*)     Lymphocyte % 12.2 (*)     Eosinophil % 0.2 (*)     Neutrophils, Absolute 7.76 (*)     All other components within normal limits   HIGH SENSITIVITIY TROPONIN T 1HR - Abnormal; Notable for the following components:    HS Troponin T 14 (*)     All other components within normal limits    Narrative:     High Sensitive Troponin T Reference Range:  <14.0 ng/L- Negative Female for AMI  <22.0 ng/L- Negative Male for AMI  >=14 - Abnormal Female indicating possible myocardial injury.  >=22 - Abnormal Male indicating possible myocardial injury.   Clinicians would have to utilize clinical acumen, EKG, Troponin, and serial changes to determine if it is an Acute Myocardial Infarction or myocardial injury due to an underlying chronic condition.        LACTIC ACID, PLASMA - Abnormal; Notable for the following components:    Lactate 2.2 (*)     All other components within normal limits   TROPONIN - Normal    Narrative:      High Sensitive Troponin T Reference Range:  <14.0 ng/L- Negative Female for AMI  <22.0 ng/L- Negative Male for AMI  >=14 - Abnormal Female indicating possible myocardial injury.  >=22 - Abnormal Male indicating possible myocardial injury.   Clinicians would have to utilize clinical acumen, EKG, Troponin, and serial changes to determine if it is an Acute Myocardial Infarction or myocardial injury due to an underlying chronic condition.        DIGOXIN LEVEL - Normal   BNP (IN-HOUSE) - Normal    Narrative:     This assay is used as an aid in the diagnosis of individuals suspected of having heart failure. It can be used as an aid in the diagnosis of acute decompensated heart failure (ADHF) in patients presenting with signs and symptoms of ADHF to the emergency department (ED). In addition, NT-proBNP of <300 pg/mL indicates ADHF is not likely.    Age Range Result Interpretation  NT-proBNP Concentration (pg/mL:      <50             Positive            >450                   Gray                 300-450                    Negative             <300    50-75           Positive            >900                  Gray                300-900                  Negative            <300      >75             Positive            >1800                  Gray                300-1800                  Negative            <300   PROCALCITONIN - Normal    Narrative:     As a Marker for Sepsis (Non-Neonates):    1. <0.5 ng/mL represents a low risk of severe sepsis and/or septic shock.  2. >2 ng/mL represents a high risk of severe sepsis and/or septic shock.    As a Marker for Lower Respiratory Tract Infections that require antibiotic therapy:    PCT on Admission    Antibiotic Therapy       6-12 Hrs later    >0.5                Strongly Recommended  >0.25 - <0.5        Recommended   0.1 - 0.25          Discouraged              Remeasure/reassess PCT  <0.1                Strongly Discouraged     Remeasure/reassess PCT    As 28 day mortality risk  "marker: \"Change in Procalcitonin Result\" (>80% or <=80%) if Day 0 (or Day 1) and Day 4 values are available. Refer to http://www.[facility]-pct-calculator.com    Change in PCT <=80%  A decrease of PCT levels below or equal to 80% defines a positive change in PCT test result representing a higher risk for 28-day all-cause mortality of patients diagnosed with severe sepsis for septic shock.    Change in PCT >80%  A decrease of PCT levels of more than 80% defines a negative change in PCT result representing a lower risk for 28-day all-cause mortality of patients diagnosed with severe sepsis or septic shock.      RESPIRATORY PANEL PCR W/ COVID-19 (SARS-COV-2), NP SWAB IN UTM/VTP, 2 HR TAT   BLOOD CULTURE   BLOOD CULTURE   RAINBOW DRAW    Narrative:     The following orders were created for panel order [facility].  Procedure                               Abnormality         Status                     ---------                               -----------         ------                     Green Top (Gel)[[id number]]                                  Final result               Lavender Top[[id number]]                                     Final result               Gold Top - SST[[id number]]                                   Final result               Light Blue Top[[id number]]                                   Final result                 Please view results for these tests on the individual orders.   LACTIC ACID, REFLEX   POCT PROTIME - INR   CBC AND DIFFERENTIAL    Narrative:     The following orders were created for panel order CBC & Differential.  Procedure                               Abnormality         Status                     ---------                               -----------         ------                     CBC Auto Differential[[id number]]        Abnormal            Final result                 Please view results for these tests on the individual orders.   GREEN TOP   LAVENDER TOP   GOLD TOP - SST   LIGHT BLUE TOP "       EKG:   ECG 12 Lead ED Triage Standing Order; Chest Pain   Preliminary Result   HEART JCEH=566  bpm   RR Xhlmeeqv=662  ms   MI Interval=  ms   P Horizontal Axis=  deg   P Front Axis=  deg   QRSD Interval=92  ms   QT Tepmciyq=727  ms   WBzU=483  ms   QRS Axis=267  deg   T Wave Axis=32  deg   - ABNORMAL ECG -   Atrial fibrillation   Ventricular premature complex   Inferior infarct, old   Anterior infarct, old   Prolonged QT interval   Baseline wander in lead(s) II,III,aVR,aVL,aVF   Date and Time of Study:2024-12-31 15:18:17          Meds given in ED:   Medications   sodium chloride 0.9 % flush 10 mL (has no administration in time range)   azithromycin (ZITHROMAX) 500 mg in sodium chloride 0.9 % 250 mL IVPB-VTB (has no administration in time range)   acetaminophen (TYLENOL) tablet 650 mg (has no administration in time range)     Or   acetaminophen (TYLENOL) 160 MG/5ML oral solution 650 mg (has no administration in time range)     Or   acetaminophen (TYLENOL) suppository 650 mg (has no administration in time range)   ondansetron ODT (ZOFRAN-ODT) disintegrating tablet 4 mg (has no administration in time range)     Or   ondansetron (ZOFRAN) injection 4 mg (has no administration in time range)   melatonin tablet 2.5 mg (has no administration in time range)   sennosides-docusate (PERICOLACE) 8.6-50 MG per tablet 2 tablet (has no administration in time range)     And   polyethylene glycol (MIRALAX) packet 17 g (has no administration in time range)     And   bisacodyl (DULCOLAX) EC tablet 5 mg (has no administration in time range)     And   bisacodyl (DULCOLAX) suppository 10 mg (has no administration in time range)   ipratropium-albuterol (DUO-NEB) nebulizer solution 3 mL (3 mL Nebulization Given 12/31/24 1616)   ondansetron (ZOFRAN) injection 4 mg (4 mg Intravenous Given 12/31/24 1640)   ketorolac (TORADOL) injection 15 mg (15 mg Intravenous Given 12/31/24 1640)   cefTRIAXone (ROCEPHIN) 2,000 mg in sodium chloride 0.9  % 100 mL MBP (2,000 mg Intravenous New Bag 24 1713)   furosemide (LASIX) injection 40 mg (40 mg Intravenous Given 24 1640)       Imaging results:  XR Chest 1 View    Result Date: 2024  As described.  This report was finalized on 2024 2:09 PM by Dr. Antelmo Corea M.D on Workstation: [facility]       Ambulatory status:   - bedrest    Social issues:   Social History     Socioeconomic History    Marital status:      Spouse name: Suresh    Number of children: 5    Years of education: 13   Tobacco Use    Smoking status: Former     Current packs/day: 0.00     Average packs/day: 3.0 packs/day for 1.4 years (4.3 ttl pk-yrs)     Types: Cigarettes     Start date: 1967     Quit date: 10/19/1968     Years since quittin.2     Passive exposure: Never    Smokeless tobacco: Never    Tobacco comments:     caffeine use - decaf coffee   Vaping Use    Vaping status: Never Used   Substance and Sexual Activity    Alcohol use: No     Comment: No caffeine use    Drug use: No    Sexual activity: Defer       NIH Stroke Scale:       Will Barriga RN  24 17:48 EST

## 2025-01-01 PROBLEM — J18.9 PNEUMONIA: Status: ACTIVE | Noted: 2025-01-01

## 2025-01-01 LAB
ANION GAP SERPL CALCULATED.3IONS-SCNC: 8.5 MMOL/L (ref 5–15)
BUN SERPL-MCNC: 15 MG/DL (ref 8–23)
BUN/CREAT SERPL: 13.3 (ref 7–25)
CALCIUM SPEC-SCNC: 8.9 MG/DL (ref 8.6–10.5)
CHLORIDE SERPL-SCNC: 103 MMOL/L (ref 98–107)
CO2 SERPL-SCNC: 23.5 MMOL/L (ref 22–29)
CREAT SERPL-MCNC: 1.13 MG/DL (ref 0.57–1)
D-LACTATE SERPL-SCNC: 1.5 MMOL/L (ref 0.5–2)
D-LACTATE SERPL-SCNC: 2.2 MMOL/L (ref 0.5–2)
DEPRECATED RDW RBC AUTO: 45.2 FL (ref 37–54)
EGFRCR SERPLBLD CKD-EPI 2021: 50.2 ML/MIN/1.73
ERYTHROCYTE [DISTWIDTH] IN BLOOD BY AUTOMATED COUNT: 13.1 % (ref 12.3–15.4)
GLUCOSE BLDC GLUCOMTR-MCNC: 176 MG/DL (ref 70–130)
GLUCOSE BLDC GLUCOMTR-MCNC: 179 MG/DL (ref 70–130)
GLUCOSE SERPL-MCNC: 161 MG/DL (ref 65–99)
HCT VFR BLD AUTO: 37 % (ref 34–46.6)
HGB BLD-MCNC: 12.3 G/DL (ref 12–15.9)
INR PPP: 1.85 (ref 0.9–1.1)
INR PPP: 1.99 (ref 0.9–1.1)
MCH RBC QN AUTO: 31.2 PG (ref 26.6–33)
MCHC RBC AUTO-ENTMCNC: 33.2 G/DL (ref 31.5–35.7)
MCV RBC AUTO: 93.9 FL (ref 79–97)
PLATELET # BLD AUTO: 131 10*3/MM3 (ref 140–450)
PMV BLD AUTO: 10.7 FL (ref 6–12)
POTASSIUM SERPL-SCNC: 3.6 MMOL/L (ref 3.5–5.2)
PROTHROMBIN TIME: 21.6 SECONDS (ref 11.7–14.2)
PROTHROMBIN TIME: 22.8 SECONDS (ref 11.7–14.2)
QT INTERVAL: 386 MS
QTC INTERVAL: 539 MS
RBC # BLD AUTO: 3.94 10*6/MM3 (ref 3.77–5.28)
SODIUM SERPL-SCNC: 135 MMOL/L (ref 136–145)
TROPONIN T SERPL HS-MCNC: 16 NG/L
WBC NRBC COR # BLD AUTO: 13.7 10*3/MM3 (ref 3.4–10.8)

## 2025-01-01 PROCEDURE — 83605 ASSAY OF LACTIC ACID: CPT | Performed by: EMERGENCY MEDICINE

## 2025-01-01 PROCEDURE — 80048 BASIC METABOLIC PNL TOTAL CA: CPT | Performed by: INTERNAL MEDICINE

## 2025-01-01 PROCEDURE — 85027 COMPLETE CBC AUTOMATED: CPT | Performed by: INTERNAL MEDICINE

## 2025-01-01 PROCEDURE — 85610 PROTHROMBIN TIME: CPT | Performed by: INTERNAL MEDICINE

## 2025-01-01 PROCEDURE — 25010000002 FUROSEMIDE PER 20 MG: Performed by: INTERNAL MEDICINE

## 2025-01-01 PROCEDURE — 82948 REAGENT STRIP/BLOOD GLUCOSE: CPT

## 2025-01-01 PROCEDURE — 25010000002 CEFTRIAXONE PER 250 MG: Performed by: INTERNAL MEDICINE

## 2025-01-01 PROCEDURE — G0378 HOSPITAL OBSERVATION PER HR: HCPCS

## 2025-01-01 RX ADMIN — METOPROLOL TARTRATE 50 MG: 50 TABLET, FILM COATED ORAL at 22:20

## 2025-01-01 RX ADMIN — POTASSIUM CHLORIDE 10 MEQ: 750 TABLET, EXTENDED RELEASE ORAL at 18:24

## 2025-01-01 RX ADMIN — CEFTRIAXONE SODIUM 1000 MG: 1 INJECTION, POWDER, FOR SOLUTION INTRAMUSCULAR; INTRAVENOUS at 15:42

## 2025-01-01 RX ADMIN — ATORVASTATIN CALCIUM 10 MG: 10 TABLET, FILM COATED ORAL at 08:58

## 2025-01-01 RX ADMIN — POLYETHYLENE GLYCOL 3350 17 G: 17 POWDER, FOR SOLUTION ORAL at 09:04

## 2025-01-01 RX ADMIN — FUROSEMIDE 40 MG: 10 INJECTION, SOLUTION INTRAMUSCULAR; INTRAVENOUS at 23:30

## 2025-01-01 RX ADMIN — LINAGLIPTIN 5 MG: 5 TABLET, FILM COATED ORAL at 08:59

## 2025-01-01 RX ADMIN — HYDROCODONE BITARTRATE AND ACETAMINOPHEN 1 TABLET: 7.5; 325 TABLET ORAL at 22:20

## 2025-01-01 RX ADMIN — ASPIRIN 81 MG: 81 TABLET, COATED ORAL at 08:58

## 2025-01-01 RX ADMIN — HYDROCODONE BITARTRATE AND ACETAMINOPHEN 1 TABLET: 7.5; 325 TABLET ORAL at 08:58

## 2025-01-01 RX ADMIN — POTASSIUM CHLORIDE 10 MEQ: 750 TABLET, EXTENDED RELEASE ORAL at 08:58

## 2025-01-01 RX ADMIN — LIDOCAINE 1 PATCH: 4 PATCH TOPICAL at 08:59

## 2025-01-01 RX ADMIN — ACETAMINOPHEN 650 MG: 325 TABLET ORAL at 02:45

## 2025-01-01 RX ADMIN — FUROSEMIDE 40 MG: 10 INJECTION, SOLUTION INTRAMUSCULAR; INTRAVENOUS at 12:47

## 2025-01-01 RX ADMIN — MUPIROCIN 1 APPLICATION: 20 OINTMENT TOPICAL at 22:20

## 2025-01-01 RX ADMIN — METOPROLOL TARTRATE 50 MG: 50 TABLET, FILM COATED ORAL at 08:58

## 2025-01-01 RX ADMIN — WARFARIN SODIUM 5 MG: 5 TABLET ORAL at 18:24

## 2025-01-01 RX ADMIN — AZITHROMYCIN 500 MG: 250 TABLET, FILM COATED ORAL at 18:24

## 2025-01-01 NOTE — H&P
HISTORY AND PHYSICAL   [facility]        Date of Admission: 2024  Patient Identification:  Name: Lana Yañez  Age: 79 y.o.  Sex: female  :  1945  MRN: [id number]                     Primary Care Physician: Toni Green MD    Chief Complaint:  79 year old female presented to the emergency room with shortness of breath, cough, congestion for the last three days; she has also had a headache and congestion; she went to urgent care and was sent to the ED; she denies nausea or vomiting;     History of Present Illness:   As above    Past Medical History:  Past Medical History:   Diagnosis Date    Acute on chronic diastolic heart failure     Acute UTI (urinary tract infection) 2022    Allergic reaction 2018    Arthritis     Arthritis of back     Arthritis of neck     Asthma     Atrial fibrillation     Persistent; on warfarin    Atypical chest pain     Biliary acute pancreatitis without necrosis or infection 11/10/2021    Bronchitis     Cellulitis of right elbow     due to MRSA    Cervical disc disorder     CHF (congestive heart failure)     COPD (chronic obstructive pulmonary disease)     Coronary artery disease     Cardiac catheterization completed; 90% PDA stenosis with medical management recommended    Coronary artery disease involving native coronary artery of native heart with angina pectoris with documented spasm     CTS (carpal tunnel syndrome)     Diabetes 1.5, managed as type 2     Disease of thyroid gland     Displacement of lumbar intervertebral disc without myelopathy     Dizziness     ANTOINE (dyspnea on exertion)     Essential hypertension     Fatigue     Fracture, foot     Generalized osteoarthritis of multiple sites     History of rheumatic fever     History of transfusion     Hx of bone density study     10/23/2014    Hyperglycemia     Hyperlipidemia     Hypovitaminosis D     Knee swelling     Left arm pain     Left-sided weakness     Leg swelling     Low back  pain     Lower extremity edema     Lumbosacral disc disease     Malaise and fatigue     Mitral valve disease     Moderate mitral valve prolapse and moderate mitral regurgitation    Mitral valve insufficiency     Morbid obesity with BMI of 40.0-44.9, adult     MRSA infection     NSTEMI (non-ST elevated myocardial infarction)     PAF (paroxysmal atrial fibrillation)     Periarthritis of shoulder     RA (rheumatoid arthritis)     involving both hands    Rotator cuff syndrome     Sleep apnea     SOB (shortness of breath)     Stroke     left side weakness    Urge incontinence of urine     UTI (urinary tract infection) 05/2020    Visit for screening mammogram 04/02/2018    Vitamin D deficiency      Past Surgical History:  Past Surgical History:   Procedure Laterality Date    BREAST BIOPSY      BREAST SURGERY      right side lumpectomy with biopsy    CARDIAC CATHETERIZATION Left 10/20/2017    Procedure: Cardiac Catheterization/Vascular Study;  Surgeon: Alphonso Olmedo MD;  Location: [facility] INVASIVE LOCATION;  Service:     CARDIAC CATHETERIZATION N/A 10/20/2017    +2 mitral regurgitation, left main 10% stenosis, mid to distal LAD 10% diffuse stenosis, circumflex 10% diffuse stenosis, RCA 10% proximal stenosis, and distal PDA consistent with coronary embolus with a lesion of 90% too small to consider coronary intervention; medical management recommended    EYE SURGERY      laser surgery for glaucoma and left eye cataracts removed    HYSTERECTOMY      10+ years ago    JOINT REPLACEMENT  2005; 2006    bilateral knees and left rotater    KNEE SURGERY      M[facility]MO BILATERAL  2016    [facility]     SHOULDER SURGERY        Home Meds:  Medications Prior to Admission   Medication Sig Dispense Refill Last Dose/Taking    Accu-Chek FastClix Lancets misc Use to check glucose as directed 306 each 1     acetaminophen (TYLENOL) 325 MG tablet Take 2 tablets by mouth Every 6 (Six) Hours As Needed for Moderate Pain.       albuterol  sulfate  (90 Base) MCG/ACT inhaler Inhale 2 puffs Every 4 (Four) Hours As Needed for Shortness of Air. PRN SOA/WHEEZING 18 g 11     Alcohol Swabs (DropSafe Alcohol Prep) 70 % pads        aspirin 81 MG EC tablet Take 1 tablet by mouth Daily.       atorvastatin (LIPITOR) 10 MG tablet Take 1 tablet by mouth Daily. 90 tablet 1     Blood Glucose Monitoring Suppl (ACCU-CHEK GUIDE) w/Device kit See Admin Instructions.       Cholecalciferol 50 MCG (2000 UT) capsule Take 50 mcg by mouth Daily.       digoxin (LANOXIN) 125 MCG tablet Take 1 tablet by mouth Every Other Day. 45 tablet 2     glimepiride (Amaryl) 2 MG tablet By mouth take one tab twice daily with AM and PM meals. 180 tablet 2     glucose blood test strip Test twice daily prior to breakfast and dinner as instructed (Patient taking differently: Test daily prior to breakfast) 200 each 12     HYDROcodone-acetaminophen (NORCO) 7.5-325 MG per tablet Take 1 tablet twice a day by oral route as needed for 30 days.       Januvia 50 MG tablet        Lumigan 0.01 % ophthalmic drops Administer 1 drop to both eyes Every Night.       metoprolol tartrate (Lopressor) 50 MG tablet Take 1 tablet by mouth 2 (Two) Times a Day for 30 days. 180 tablet 1     polyethylene glycol (MIRALAX) 17 g packet Take 17 g by mouth Daily As Needed (constipation).       potassium chloride 10 MEQ CR tablet 1 po bid 180 tablet 1     torsemide (DEMADEX) 20 MG tablet 2 po qday 180 tablet 1     vitamin D (ERGOCALCIFEROL) 1.25 MG (80077 UT) capsule capsule Take 1 capsule by mouth 1 (One) Time Per Week. 12 capsule 1     warfarin (COUMADIN) 5 MG tablet Take one-half tablet (2.5 mg) by mouth on Fridays, and take one tablet (5 mg) by mouth all other days or as directed. 85 tablet 1        Allergies:  Allergies   Allergen Reactions    Metformin Diarrhea    Contrast Dye (Echo Or Unknown Ct/Mr) Hives and Itching    Dapagliflozin Headache and Swelling    Macrobid [Nitrofurantoin] Hives    Iodinated Contrast  Media Hives     Immunizations:  Immunization History   Administered Date(s) Administered    COVID-19 (MODERNA) 12YRS+ (SPIKEVAX) 2023    COVID-19 (MODERNA) 1st,2nd,3rd Dose Monovalent 2021, 03/15/2021, 2021, 2022    COVID-19 (PFIZER) 12YRS+ (COMIRNATY) 2024    FLUAD TRI 65YR+ 2019    Fluad Quad 65+ 2019, 2020    Fluzone  >6mos 10/17/2017    Fluzone High-Dose 65+YRS 10/04/2018    Fluzone High-Dose 65+yrs 10/06/2021    Pneumococcal Polysaccharide (PPSV23) 10/17/2017    Pneumococcal, Unspecified 2012    Td (TDVAX) 2012    Tdap 2017     Social History:   Social History     Social History Narrative    Not on file     Social History     Socioeconomic History    Marital status:      Spouse name: Suresh    Number of children: 5    Years of education: 13   Tobacco Use    Smoking status: Former     Current packs/day: 0.00     Average packs/day: 3.0 packs/day for 1.4 years (4.3 ttl pk-yrs)     Types: Cigarettes     Start date: 1967     Quit date: 10/19/1968     Years since quittin.2     Passive exposure: Never    Smokeless tobacco: Never    Tobacco comments:     caffeine use - decaf coffee   Vaping Use    Vaping status: Never Used   Substance and Sexual Activity    Alcohol use: No     Comment: No caffeine use    Drug use: No    Sexual activity: Defer       Family History:  Family History   Problem Relation Age of Onset    Colon cancer Mother     Glaucoma Mother     Stroke Mother     Arthritis Mother     Hypertension Mother     Glaucoma Sister     Diabetes Sister     Heart disease Sister     Arthritis Sister     Asthma Sister     Hypertension Sister     Miscarriages / Stillbirths Sister     Lung disease Sister     Heart disease Brother     Diabetes Brother     Arthritis Brother     Drug abuse Brother     Hypertension Brother     Arthritis Father     COPD Father     Lung disease Father     Arthritis Daughter     Depression Daughter     Alcohol  "abuse Maternal Uncle     Heart disease Sister     Heart disease Sister     Heart disease Brother     Rheumatologic disease Sister         Review of Systems  See history of present illness and past medical history.  Patient denies headache, dizziness, syncope, falls, trauma, change in vision, change in hearing, change in taste, changes in weight, changes in appetite, focal weakness, numbness, or paresthesia.  Patient denies chest pain, palpitations,  sinus pressure, rhinorrhea, epistaxis, hemoptysis, nausea, vomiting,hematemesis, diarrhea, constipation or hematochezia.  Denies cold or heat intolerance, polydipsia, polyuria, polyphagia. Denies hematuria, pyuria, dysuria, hesitancy, frequency or urgency. Denies consumption of raw and under cooked meats foods or change in water source.      Objective:  T Max 24 hrs: Temp (24hrs), Av.5 °F (36.4 °C), Min:97.4 °F (36.3 °C), Max:97.6 °F (36.4 °C)    Vitals Ranges:   Temp:  [97.4 °F (36.3 °C)-97.6 °F (36.4 °C)] 97.6 °F (36.4 °C)  Heart Rate:  [] 111  Resp:  [16-20] 20  BP: (122-180)/() 147/97      Exam:  /97 (BP Location: Left arm, Patient Position: Lying)   Pulse 111   Temp 97.6 °F (36.4 °C) (Oral)   Resp 20   Ht 167.6 cm (66\")   Wt 110 kg (242 lb 15.2 oz)   LMP  (LMP Unknown)   SpO2 97%   BMI 39.21 kg/m²     General Appearance:    Alert, cooperative, no distress, appears stated age   Head:    Normocephalic, without obvious abnormality, atraumatic   Eyes:    PERRL, conjunctivae/corneas clear, EOM's intact, both eyes   Ears:    Normal external ear canals, both ears   Nose:   Nares normal, septum midline, mucosa normal, no drainage    or sinus tenderness   Throat:   Lips, mucosa, and tongue normal   Neck:   Supple, symmetrical, trachea midline, no adenopathy;     thyroid:  no enlargement/tenderness/nodules; no carotid    bruit or JVD   Back:     Symmetric, no curvature, ROM normal, no CVA tenderness   Lungs:     Decreased breath sounds " bilaterally, respirations unlabored   Chest Wall:    No tenderness or deformity    Heart:    Regular rate and rhythm, S1 and S2 normal, no murmur, rub   or gallop   Abdomen:     Soft, nontender, bowel sounds active all four quadrants,     no masses, no hepatomegaly, no splenomegaly   Extremities:   Extremities normal, atraumatic, no cyanosis or edema                       .    Data Review:  Labs in chart were reviewed.  WBC   Date Value Ref Range Status   12/31/2024 9.78 3.40 - 10.80 10*3/mm3 Final     Hemoglobin   Date Value Ref Range Status   12/31/2024 13.4 12.0 - 15.9 g/dL Final     Hematocrit   Date Value Ref Range Status   12/31/2024 41.2 34.0 - 46.6 % Final     Platelets   Date Value Ref Range Status   12/31/2024 142 140 - 450 10*3/mm3 Final     Sodium   Date Value Ref Range Status   12/31/2024 133 (L) 136 - 145 mmol/L Final     Potassium   Date Value Ref Range Status   12/31/2024 4.2 3.5 - 5.2 mmol/L Final     Chloride   Date Value Ref Range Status   12/31/2024 101 98 - 107 mmol/L Final     CO2   Date Value Ref Range Status   12/31/2024 24.0 22.0 - 29.0 mmol/L Final     BUN   Date Value Ref Range Status   12/31/2024 13 8 - 23 mg/dL Final     Creatinine   Date Value Ref Range Status   12/31/2024 1.01 (H) 0.57 - 1.00 mg/dL Final     Glucose   Date Value Ref Range Status   12/31/2024 156 (H) 65 - 99 mg/dL Final     Calcium   Date Value Ref Range Status   12/31/2024 9.1 8.6 - 10.5 mg/dL Final     AST (SGOT)   Date Value Ref Range Status   12/31/2024 19 1 - 32 U/L Final     ALT (SGPT)   Date Value Ref Range Status   12/31/2024 11 1 - 33 U/L Final     Alkaline Phosphatase   Date Value Ref Range Status   12/31/2024 70 39 - 117 U/L Final                Imaging Results (All)       Procedure Component Value Units Date/Time    XR Chest 1 View [[id number]] Collected: 12/31/24 1406     Updated: 12/31/24 1412    Narrative:      XR CHEST 1 VW-     HISTORY: Female who is 79 years-old, chest pain     TECHNIQUE: Frontal view  of the chest     COMPARISON: 10/9/2023     FINDINGS: The heart is enlarged. Pulmonary vasculature is congested.  Aorta is calcified. Bilateral alveolar interstitial opacities suggest  edema and/or pneumonia, follow-up recommended. No large pleural effusion  or pneumothorax. No acute osseous process.       Impression:      As described.     This report was finalized on 12/31/2024 2:09 PM by Dr. Antelmo Corea M.D on Workstation: [id number]                 Assessment:  Active Hospital Problems    Diagnosis  POA    **Acute hypoxic respiratory failure [J96.01]  Yes      Resolved Hospital Problems   No resolved problems to display.   Acute on chronic diastolic chf  Pneumonia  Rheumatoid arthritis  Hypertension  Ckd3  Obesity  Copd  Cad  A fib  Diabetes      Plan:  Continue antibiotics  Diurese  Monitor on telemetry  Wean oxygen as tolerated  Dw patient and ed provider    Lynda Howell MD  12/31/2024  22:17 EST

## 2025-01-01 NOTE — PROGRESS NOTES
Name: Lana Yañez ADMIT: 2024   : 1945  PCP: Toni Green MD    MRN: [id number] LOS: 0 days   AGE/SEX: 79 y.o. female  ROOM: [facility]     Subjective   Subjective   Patient is seen at bedside today, she is on room air, no acute distress noted.         Objective   Objective   Vital Signs  Temp:  [97.6 °F (36.4 °C)-97.8 °F (36.6 °C)] 97.8 °F (36.6 °C)  Heart Rate:  [] 77  Resp:  [16-20] 16  BP: (116-166)/() 124/60  SpO2:  [95 %-100 %] 100 %  on  Flow (L/min) (Oxygen Therapy):  [2] 2;   Device (Oxygen Therapy): room air  Body mass index is 39.39 kg/m².  Physical Exam  General, awake and alert.  Head and ENT, normocephalic and atraumatic.  Lungs, symmetric expansion, equal air entry bilaterally.  Heart, regular rate and rhythm.  Abdomen, soft and nontender.  Extremities, no clubbing or cyanosis.  Neuro, no focal deficits.  Skin: Warm and no rash.  Psych, normal mood and affect.  Musculoskeletal, joint examination is grossly normal.    Results Review     I reviewed the patient's new clinical results.  Results from last 7 days   Lab Units 25  0228 24  1419   WBC 10*3/mm3 13.70* 9.78   HEMOGLOBIN g/dL 12.3 13.4   PLATELETS 10*3/mm3 131* 142     Results from last 7 days   Lab Units 25  0228 24  1419   SODIUM mmol/L 135* 133*   POTASSIUM mmol/L 3.6 4.2   CHLORIDE mmol/L 103 101   CO2 mmol/L 23.5 24.0   BUN mg/dL 15 13   CREATININE mg/dL 1.13* 1.01*   GLUCOSE mg/dL 161* 156*   EGFR mL/min/1.73 50.2* 57.5*     Results from last 7 days   Lab Units 24  1419   ALBUMIN g/dL 4.2   BILIRUBIN mg/dL 0.8   ALK PHOS U/L 70   AST (SGOT) U/L 19   ALT (SGPT) U/L 11     Results from last 7 days   Lab Units 25  1419   CALCIUM mg/dL 8.9 9.1   ALBUMIN g/dL  --  4.2     Results from last 7 days   Lab Units 25  2324 24  2051 24  1702 24  1542   PROCALCITONIN ng/mL  --   --   --   --  0.07   LACTATE mmol/L 1.5 2.2* 2.1*  2.2*  --      Glucose   Date/Time Value Ref Range Status   01/01/2025 1207 176 (H) 70 - 130 mg/dL Final   12/31/2024 1229 170 (A) 70 - 99 mg/dL Final       XR Chest 1 View    Result Date: 12/31/2024  As described.  This report was finalized on 12/31/2024 2:09 PM by Dr. Antelmo Corea M.D on Workstation: [id number]       I have personally reviewed all medications:  Scheduled Medications  aspirin, 81 mg, Oral, Daily  atorvastatin, 10 mg, Oral, Daily  azithromycin, 500 mg, Oral, Q24H  cefTRIAXone, 1,000 mg, Intravenous, Q24H  digoxin, 125 mcg, Oral, Every Other Day  furosemide, 40 mg, Intravenous, Q12H  latanoprost, 1 drop, Both Eyes, Nightly  Lidocaine, 1 patch, Transdermal, Q24H  linagliptin, 5 mg, Oral, Daily  metoprolol tartrate, 50 mg, Oral, BID  mupirocin, 1 Application, Each Nare, BID  potassium chloride, 10 mEq, Oral, BID With Meals  warfarin, 5 mg, Oral, Daily    Infusions  Pharmacy to dose warfarin,     Diet  Diet: Cardiac; Healthy Heart (2-3 Na+); Fluid Consistency: Thin (IDDSI 0)    I have personally reviewed:  [x]  Laboratory   [x]  Microbiology   [x]  Radiology   [x]  EKG/Telemetry  [x]  Cardiology/Vascular   []  Pathology    []  Records       Assessment/Plan     Active Hospital Problems    Diagnosis  POA    **Acute hypoxic respiratory failure [J96.01]  Yes    Pneumonia [J18.9]  Yes    Stage 3a chronic kidney disease [N18.31]  Yes    Type 2 diabetes mellitus with hyperglycemia, without long-term current use of insulin [E11.65]  Yes    Acute on chronic diastolic CHF (congestive heart failure) [I50.33]  Yes    Chronic atrial fibrillation [I48.20]  Yes    Mixed hyperlipidemia [E78.2]  Yes      Resolved Hospital Problems   No resolved problems to display.       79 y.o. female admitted with Acute hypoxic respiratory failure.    Assessment and plan  1.  Acute respiratory failure with hypoxia, patient has underlying acute on chronic diastolic congestive heart failure, pulmonary vascular congestion noted on the  chest x-ray, underlying pneumonia could not be excluded, consult cardiology.  Continue Lasix, monitor input and outputs and daily weights.  Continue antibiotics.    2.  Chronic kidney disease stage III, avoid nephrotoxic medications, renal function appears to be close to baseline.    3.  Chronic atrial fibrillation, patient currently appears to be rate controlled, continue Coumadin.  Monitor INR.    4.  Hyperlipidemia, continue statins.    5.  Coronary artery disease, continue aspirin and statins.    6.  Type 2 diabetes mellitus, continue Accu-Cheks along with current oral regimen.    7.  CODE STATUS is full code.  Further plans based on hospital course.      Jesus Carrillo MD  [facility]  01/01/25  16:06 EST

## 2025-01-01 NOTE — PLAN OF CARE
Goal Outcome Evaluation:  Plan of Care Reviewed With: patient        Progress: no change  Outcome Evaluation: new admission from ED; A&Ox4; VSS; ext cath remains in place; no s/s of shortness of breath; complaints of pain to left shoulder treated x2 with PRN medications with relief noted each time; updates provided to family via telephone; will continue to monitor;      Problem: Adult Inpatient Plan of Care  Goal: Absence of Hospital-Acquired Illness or Injury  Outcome: Progressing  Intervention: Identify and Manage Fall Risk  Recent Flowsheet Documentation  Taken 1/1/2025 0400 by Saskia Vega RN  Safety Promotion/Fall Prevention: safety round/check completed  Taken 1/1/2025 0245 by Saskia Vega RN  Safety Promotion/Fall Prevention: safety round/check completed  Taken 1/1/2025 0000 by Saskia Vega RN  Safety Promotion/Fall Prevention: safety round/check completed  Taken 12/31/2024 2200 by Saskia Vega RN  Safety Promotion/Fall Prevention: safety round/check completed  Taken 12/31/2024 2051 by Saskia Vega RN  Safety Promotion/Fall Prevention: safety round/check completed  Intervention: Prevent Skin Injury  Recent Flowsheet Documentation  Taken 1/1/2025 0400 by Saskia Vega RN  Skin Protection: incontinence pads utilized  Taken 12/31/2024 2051 by Saskia Vega RN  Body Position:   neutral body alignment   neutral head position  Skin Protection: incontinence pads utilized  Intervention: Prevent and Manage VTE (Venous Thromboembolism) Risk  Recent Flowsheet Documentation  Taken 12/31/2024 2051 by Saskia Vega RN  VTE Prevention/Management: (See MAR) other (see comments)  Intervention: Prevent Infection  Recent Flowsheet Documentation  Taken 1/1/2025 0400 by Saskia Vega RN  Infection Prevention: single patient room provided  Taken 1/1/2025 0245 by Saskia Vega RN  Infection Prevention: single patient room provided  Taken 1/1/2025 0000 by Saskia Vega RN  Infection Prevention: single  patient room provided  Taken 12/31/2024 2200 by Saskia Vega, RN  Infection Prevention: single patient room provided  Taken 12/31/2024 2051 by Saskia Vega, RN  Infection Prevention: single patient room provided

## 2025-01-01 NOTE — PROGRESS NOTES
[facility] Clinical Pharmacy Services: Warfarin Dosing/Monitoring Consult    Lana Yañez is a 79 y.o. female, estimated creatinine clearance is 58.6 mL/min (A) (by C-G formula based on SCr of 1.01 mg/dL (H)). weighing 110 kg (242 lb 15.2 oz).    Results from last 7 days   Lab Units 12/31/24  1419   INR  1.75*   HEMOGLOBIN g/dL 13.4   HEMATOCRIT % 41.2   PLATELETS 10*3/mm3 142     Prior to admission anticoagulation: warfarin 5mg    Hospital Anticoagulation:  Consulting provider: Dante  Start date: 12/31/24  Indication: A Fib - requiring full anticoagulation  Target INR: 2 - 3  Expected duration: continuous   Bridge Therapy: No      Potential food or drug interactions: tylenol, zithromax, asa    Education complete?/Date: N/A; home medication    Assessment/Plan:  Dose: warfarin 5mg daily, restarting home dose for 1/1/25, spoke with nurse pt had taken dose 12/31. Might need adjustment based on tmw labs  Monitor for any signs or symptoms of bleeding  Follow up daily INRs and dose adjustments    Pharmacy will continue to follow until discharge or discontinuation of warfarin.     [facility]  Clinical Pharmacist

## 2025-01-01 NOTE — PROGRESS NOTES
[facility] Clinical Pharmacy Services: Warfarin Dosing/Monitoring Consult    Lana Yañez is a 79 y.o. female, estimated creatinine clearance is 52.7 mL/min (A) (by C-G formula based on SCr of 1.13 mg/dL (H)). weighing 111 kg (244 lb 0.8 oz).    Results from last 7 days   Lab Units 01/01/25  0228 12/31/24  2324 12/31/24  1419   INR  1.99* 1.85* 1.75*   HEMOGLOBIN g/dL 12.3  --  13.4   HEMATOCRIT % 37.0  --  41.2   PLATELETS 10*3/mm3 131*  --  142     Prior to admission anticoagulation: Per [facility] anticoagulation clinic note on 12/17/24, the patient's most recent warfarin regimen is 5 mg daily (35 mg/week). INR was therapeutic at that visit at 2.8.    Hospital Anticoagulation:  Consulting provider: Dr. Howell  Start date: 12/31/24  Indication: Atrial fibrillation  Target INR: 2 - 3  Expected duration: lifelong   Bridge Therapy: No     Potential new disease state or drug interactions:     Ceftriaxone-concurrent use may enhance the anticoagulant effect of warfarin by platelet inhibition which can lead to irreversible platelet dysfunction.    Azithromycin-concurrent use may enhance the anticoagulant effects of warfarin due to a reduction in Vitamin-K producing bacteria in the gut.  Acute infection/inflammation may cause an increased sensitivity to warfarin  Of note, patient was taken off home torsemide and started on furosemide. Torsemide has a major interaction with warfarin that is not present in furosemide.    Education complete?/Date: Yes;  Clinic    INR Assessment:  Date INR Dose   12/31 1.85 5 mg PTA   1/1 1.99      Assessment/Plan:  INR is currently slightly subtherapeutic at 1.99. Will start home warfarin regimen this evening. INR will be evaluated tomorrow morning to determine if plan is still appropriate.  Monitor for any signs or symptoms of bleeding.  Follow up daily INRs and dose adjustments.    Pharmacy will continue to follow until discharge or discontinuation of warfarin.     Varsha Merlos,  Pharm.PRESLEY., BCPS   Clinical Pharmacist

## 2025-01-02 ENCOUNTER — APPOINTMENT (OUTPATIENT)
Dept: GENERAL RADIOLOGY | Facility: HOSPITAL | Age: 80
DRG: 291 | End: 2025-01-02
Payer: MEDICARE

## 2025-01-02 PROBLEM — J96.21 ACUTE ON CHRONIC HYPOXIC RESPIRATORY FAILURE: Status: ACTIVE | Noted: 2025-01-02

## 2025-01-02 LAB
ALBUMIN SERPL-MCNC: 3.7 G/DL (ref 3.5–5.2)
ALBUMIN/GLOB SERPL: 1.1 G/DL
ALP SERPL-CCNC: 63 U/L (ref 39–117)
ALT SERPL W P-5'-P-CCNC: 10 U/L (ref 1–33)
ANION GAP SERPL CALCULATED.3IONS-SCNC: 10 MMOL/L (ref 5–15)
AST SERPL-CCNC: 15 U/L (ref 1–32)
BASOPHILS # BLD AUTO: 0.02 10*3/MM3 (ref 0–0.2)
BASOPHILS NFR BLD AUTO: 0.2 % (ref 0–1.5)
BILIRUB SERPL-MCNC: 0.7 MG/DL (ref 0–1.2)
BUN SERPL-MCNC: 17 MG/DL (ref 8–23)
BUN/CREAT SERPL: 13.8 (ref 7–25)
CALCIUM SPEC-SCNC: 8.9 MG/DL (ref 8.6–10.5)
CHLORIDE SERPL-SCNC: 99 MMOL/L (ref 98–107)
CO2 SERPL-SCNC: 28 MMOL/L (ref 22–29)
CREAT SERPL-MCNC: 1.23 MG/DL (ref 0.57–1)
DEPRECATED RDW RBC AUTO: 45.5 FL (ref 37–54)
EGFRCR SERPLBLD CKD-EPI 2021: 45.4 ML/MIN/1.73
EOSINOPHIL # BLD AUTO: 0.15 10*3/MM3 (ref 0–0.4)
EOSINOPHIL NFR BLD AUTO: 1.5 % (ref 0.3–6.2)
ERYTHROCYTE [DISTWIDTH] IN BLOOD BY AUTOMATED COUNT: 13 % (ref 12.3–15.4)
GLOBULIN UR ELPH-MCNC: 3.3 GM/DL
GLUCOSE SERPL-MCNC: 205 MG/DL (ref 65–99)
HCT VFR BLD AUTO: 38.3 % (ref 34–46.6)
HGB BLD-MCNC: 11.8 G/DL (ref 12–15.9)
IMM GRANULOCYTES # BLD AUTO: 0.03 10*3/MM3 (ref 0–0.05)
IMM GRANULOCYTES NFR BLD AUTO: 0.3 % (ref 0–0.5)
INR PPP: 2.35 (ref 0.9–1.1)
LYMPHOCYTES # BLD AUTO: 2.15 10*3/MM3 (ref 0.7–3.1)
LYMPHOCYTES NFR BLD AUTO: 21.2 % (ref 19.6–45.3)
MCH RBC QN AUTO: 29.3 PG (ref 26.6–33)
MCHC RBC AUTO-ENTMCNC: 30.8 G/DL (ref 31.5–35.7)
MCV RBC AUTO: 95 FL (ref 79–97)
MONOCYTES # BLD AUTO: 1.13 10*3/MM3 (ref 0.1–0.9)
MONOCYTES NFR BLD AUTO: 11.1 % (ref 5–12)
NEUTROPHILS NFR BLD AUTO: 6.68 10*3/MM3 (ref 1.7–7)
NEUTROPHILS NFR BLD AUTO: 65.7 % (ref 42.7–76)
PLATELET # BLD AUTO: 139 10*3/MM3 (ref 140–450)
PMV BLD AUTO: 11.6 FL (ref 6–12)
POTASSIUM SERPL-SCNC: 3.4 MMOL/L (ref 3.5–5.2)
PROT SERPL-MCNC: 7 G/DL (ref 6–8.5)
PROTHROMBIN TIME: 26 SECONDS (ref 11.7–14.2)
RBC # BLD AUTO: 4.03 10*6/MM3 (ref 3.77–5.28)
SODIUM SERPL-SCNC: 137 MMOL/L (ref 136–145)
WBC NRBC COR # BLD AUTO: 10.16 10*3/MM3 (ref 3.4–10.8)

## 2025-01-02 PROCEDURE — 94761 N-INVAS EAR/PLS OXIMETRY MLT: CPT

## 2025-01-02 PROCEDURE — 73610 X-RAY EXAM OF ANKLE: CPT

## 2025-01-02 PROCEDURE — 85025 COMPLETE CBC W/AUTO DIFF WBC: CPT | Performed by: INTERNAL MEDICINE

## 2025-01-02 PROCEDURE — 80053 COMPREHEN METABOLIC PANEL: CPT | Performed by: INTERNAL MEDICINE

## 2025-01-02 PROCEDURE — 85610 PROTHROMBIN TIME: CPT | Performed by: INTERNAL MEDICINE

## 2025-01-02 PROCEDURE — 71046 X-RAY EXAM CHEST 2 VIEWS: CPT

## 2025-01-02 PROCEDURE — 25010000002 FUROSEMIDE PER 20 MG: Performed by: INTERNAL MEDICINE

## 2025-01-02 PROCEDURE — 25010000002 CEFTRIAXONE PER 250 MG: Performed by: INTERNAL MEDICINE

## 2025-01-02 PROCEDURE — 94799 UNLISTED PULMONARY SVC/PX: CPT

## 2025-01-02 PROCEDURE — 99222 1ST HOSP IP/OBS MODERATE 55: CPT

## 2025-01-02 RX ORDER — POTASSIUM CHLORIDE 1.5 G/1.58G
40 POWDER, FOR SOLUTION ORAL ONCE
Status: DISCONTINUED | OUTPATIENT
Start: 2025-01-02 | End: 2025-01-02 | Stop reason: CLARIF

## 2025-01-02 RX ORDER — WARFARIN SODIUM 3 MG/1
3 TABLET ORAL
Status: COMPLETED | OUTPATIENT
Start: 2025-01-02 | End: 2025-01-02

## 2025-01-02 RX ORDER — POTASSIUM CHLORIDE 750 MG/1
40 TABLET, EXTENDED RELEASE ORAL ONCE
Status: COMPLETED | OUTPATIENT
Start: 2025-01-02 | End: 2025-01-02

## 2025-01-02 RX ADMIN — HYDROCODONE BITARTRATE AND ACETAMINOPHEN 1 TABLET: 7.5; 325 TABLET ORAL at 09:44

## 2025-01-02 RX ADMIN — WARFARIN SODIUM 3 MG: 3 TABLET ORAL at 18:36

## 2025-01-02 RX ADMIN — CEFTRIAXONE 2000 MG: 2 INJECTION, POWDER, FOR SOLUTION INTRAMUSCULAR; INTRAVENOUS at 17:11

## 2025-01-02 RX ADMIN — DIGOXIN 125 MCG: 125 TABLET ORAL at 09:44

## 2025-01-02 RX ADMIN — FUROSEMIDE 40 MG: 10 INJECTION, SOLUTION INTRAMUSCULAR; INTRAVENOUS at 12:53

## 2025-01-02 RX ADMIN — AZITHROMYCIN 500 MG: 250 TABLET, FILM COATED ORAL at 17:12

## 2025-01-02 RX ADMIN — METOPROLOL TARTRATE 50 MG: 50 TABLET, FILM COATED ORAL at 09:45

## 2025-01-02 RX ADMIN — METOPROLOL TARTRATE 50 MG: 50 TABLET, FILM COATED ORAL at 22:09

## 2025-01-02 RX ADMIN — LINAGLIPTIN 5 MG: 5 TABLET, FILM COATED ORAL at 09:44

## 2025-01-02 RX ADMIN — ATORVASTATIN CALCIUM 10 MG: 10 TABLET, FILM COATED ORAL at 09:44

## 2025-01-02 RX ADMIN — HYDROCODONE BITARTRATE AND ACETAMINOPHEN 1 TABLET: 7.5; 325 TABLET ORAL at 17:27

## 2025-01-02 RX ADMIN — POTASSIUM CHLORIDE 10 MEQ: 750 TABLET, EXTENDED RELEASE ORAL at 18:36

## 2025-01-02 RX ADMIN — MUPIROCIN 1 APPLICATION: 20 OINTMENT TOPICAL at 09:45

## 2025-01-02 RX ADMIN — LIDOCAINE 1 PATCH: 4 PATCH TOPICAL at 09:44

## 2025-01-02 RX ADMIN — POTASSIUM CHLORIDE 40 MEQ: 750 TABLET, EXTENDED RELEASE ORAL at 17:11

## 2025-01-02 RX ADMIN — ACETAMINOPHEN 650 MG: 325 TABLET ORAL at 01:32

## 2025-01-02 RX ADMIN — MUPIROCIN 1 APPLICATION: 20 OINTMENT TOPICAL at 22:09

## 2025-01-02 RX ADMIN — ASPIRIN 81 MG: 81 TABLET, COATED ORAL at 09:45

## 2025-01-02 RX ADMIN — POTASSIUM CHLORIDE 10 MEQ: 750 TABLET, EXTENDED RELEASE ORAL at 09:45

## 2025-01-02 NOTE — CONSULTS
Date of Consultation: 25    Referral Provider: Lynda Howell, *     Reason for Consultation: CHF    Encounter Provider: ANGELA Quinones    Group of Service: [facility]     Patient Name: Lana Yañez    :1945    Chief complaint:  Shortness of breath, cough, congestion    History of Present Illness:  Lana Yañez is a 79 year old who follows with Dr. Dueñas and has a past medical history that is significant for hyperlipidemia, rheumatoid arthritis, obstructive sleep apnea, diastolic heart failure, atrial fibrillation, NSTEMI, stroke, pulmonary hypertension, diabetes mellitus, and coronary artery disease.     She presented to the ED on 24 with complaints of acute onset of shortness of breath. She reports she has been feeling unwell for several days with a productive cough, ear pain, headache, and congestion. She reported that her ankles were swollen and she felt she was short of breath even with a few steps. She reports compliance with her home medication regimen. COVID and flu negative. While at urgent care was found to have oxygen saturations right at 90% on room air.     Workup in the ED included: HS troponin 13, 14, then 16. proBNP 807. Chest xray with pulmonary vasculature congestion. EKG showed atrial fibrillation with a rate of 117. Respiratory viral panel negative.     She is being diuresed with IV furosemide and has responded well thus far.     On exam today she is resting in bed. She reports that her breathing is feeling better but she is still fatigued and weak. She denies chest pain or palpitations.     Echocardiogram 10/23/24    Left ventricular systolic function is normal. Calculated left ventricular EF = 58.7%    Left ventricular wall thickness is consistent with mild concentric hypertrophy.    Left ventricular diastolic function was indeterminate.    The left atrial cavity is severely dilated.    The right atrial cavity is severely  dilated.     Calculated right ventricular systolic pressure from tricuspid regurgitation is 45 mmHg.    Mild to moderate aortic valve regurgitation is present    Stress Test 2/9/22  Compared to the prior study from 10/13/2017 the current study reveals no changes. LVEF with stress is 63%.  Left ventricular ejection fraction is normal. (Calculated EF = 51%).  Myocardial perfusion imaging indicates a normal myocardial perfusion study with no evidence of ischemia.  Impressions are consistent with a low risk study.    Past Medical History:   Diagnosis Date    Acute on chronic diastolic heart failure     Acute UTI (urinary tract infection) 05/22/2022    Allergic reaction 02/09/2018    Arthritis     Arthritis of back     Arthritis of neck     Asthma     Atrial fibrillation     Persistent; on warfarin    Atypical chest pain     Biliary acute pancreatitis without necrosis or infection 11/10/2021    Bronchitis     Cellulitis of right elbow     due to MRSA    Cervical disc disorder     CHF (congestive heart failure)     COPD (chronic obstructive pulmonary disease)     Coronary artery disease     Cardiac catheterization completed; 90% PDA stenosis with medical management recommended    Coronary artery disease involving native coronary artery of native heart with angina pectoris with documented spasm     CTS (carpal tunnel syndrome)     Diabetes 1.5, managed as type 2     Disease of thyroid gland     Displacement of lumbar intervertebral disc without myelopathy     Dizziness     ANTOINE (dyspnea on exertion)     Essential hypertension     Fatigue     Fracture, foot     Generalized osteoarthritis of multiple sites     History of rheumatic fever     History of transfusion     Hx of bone density study     10/23/2014    Hyperglycemia     Hyperlipidemia     Hypovitaminosis D     Knee swelling     Left arm pain     Left-sided weakness     Leg swelling     Low back pain     Lower extremity edema     Lumbosacral disc disease     Malaise and fatigue      Mitral valve disease     Moderate mitral valve prolapse and moderate mitral regurgitation    Mitral valve insufficiency     Morbid obesity with BMI of 40.0-44.9, adult     MRSA infection     NSTEMI (non-ST elevated myocardial infarction)     PAF (paroxysmal atrial fibrillation)     Periarthritis of shoulder     Pneumonia 1/1/2025    RA (rheumatoid arthritis)     involving both hands    Rotator cuff syndrome     Sleep apnea     SOB (shortness of breath)     Stroke     left side weakness    Urge incontinence of urine     UTI (urinary tract infection) 05/2020    Visit for screening mammogram 04/02/2018    Vitamin D deficiency          Past Surgical History:   Procedure Laterality Date    BREAST BIOPSY      BREAST SURGERY      right side lumpectomy with biopsy    CARDIAC CATHETERIZATION Left 10/20/2017    Procedure: Cardiac Catheterization/Vascular Study;  Surgeon: Alphonso Olmedo MD;  Location: [facility] CATH INVASIVE LOCATION;  Service:     CARDIAC CATHETERIZATION N/A 10/20/2017    +2 mitral regurgitation, left main 10% stenosis, mid to distal LAD 10% diffuse stenosis, circumflex 10% diffuse stenosis, RCA 10% proximal stenosis, and distal PDA consistent with coronary embolus with a lesion of 90% too small to consider coronary intervention; medical management recommended    EYE SURGERY      laser surgery for glaucoma and left eye cataracts removed    HYSTERECTOMY      10+ years ago    JOINT REPLACEMENT  2005; 2006    bilateral knees and left rotater    KNEE SURGERY      MAMMO BILATERAL  2016    [facility]     SHOULDER SURGERY           Allergies   Allergen Reactions    Metformin Diarrhea    Contrast Dye (Echo Or Unknown Ct/Mr) Hives and Itching    Dapagliflozin Headache and Swelling    Macrobid [Nitrofurantoin] Hives    Iodinated Contrast Media Hives         No current facility-administered medications on file prior to encounter.     Current Outpatient Medications on File Prior to Encounter   Medication Sig Dispense  Refill    Accu-Chek FastClix Lancets misc Use to check glucose as directed 306 each 1    acetaminophen (TYLENOL) 325 MG tablet Take 2 tablets by mouth Every 6 (Six) Hours As Needed for Moderate Pain.      albuterol sulfate  (90 Base) MCG/ACT inhaler Inhale 2 puffs Every 4 (Four) Hours As Needed for Shortness of Air. PRN SOA/WHEEZING 18 g 11    Alcohol Swabs (DropSafe Alcohol Prep) 70 % pads       aspirin 81 MG EC tablet Take 1 tablet by mouth Daily.      atorvastatin (LIPITOR) 10 MG tablet Take 1 tablet by mouth Daily. 90 tablet 1    Blood Glucose Monitoring Suppl (ACCU-CHEK GUIDE) w/Device kit See Admin Instructions.      Cholecalciferol 50 MCG (2000 UT) capsule Take 50 mcg by mouth Daily.      digoxin (LANOXIN) 125 MCG tablet Take 1 tablet by mouth Every Other Day. 45 tablet 2    glimepiride (Amaryl) 2 MG tablet By mouth take one tab twice daily with AM and PM meals. 180 tablet 2    glucose blood test strip Test twice daily prior to breakfast and dinner as instructed (Patient taking differently: Test daily prior to breakfast) 200 each 12    HYDROcodone-acetaminophen (NORCO) 7.5-325 MG per tablet Take 1 tablet twice a day by oral route as needed for 30 days.      Januvia 50 MG tablet       Lumigan 0.01 % ophthalmic drops Administer 1 drop to both eyes Every Night.      metoprolol tartrate (Lopressor) 50 MG tablet Take 1 tablet by mouth 2 (Two) Times a Day for 30 days. 180 tablet 1    polyethylene glycol (MIRALAX) 17 g packet Take 17 g by mouth Daily As Needed (constipation).      potassium chloride 10 MEQ CR tablet 1 po bid 180 tablet 1    torsemide (DEMADEX) 20 MG tablet 2 po qday 180 tablet 1    vitamin D (ERGOCALCIFEROL) 1.25 MG (71415 UT) capsule capsule Take 1 capsule by mouth 1 (One) Time Per Week. 12 capsule 1    warfarin (COUMADIN) 5 MG tablet Take one-half tablet (2.5 mg) by mouth on Fridays, and take one tablet (5 mg) by mouth all other days or as directed. 85 tablet 1         Social History  "    Socioeconomic History    Marital status:      Spouse name: Suresh    Number of children: 5    Years of education: 13   Tobacco Use    Smoking status: Former     Current packs/day: 0.00     Average packs/day: 3.0 packs/day for 1.4 years (4.3 ttl pk-yrs)     Types: Cigarettes     Start date: 1967     Quit date: 10/19/1968     Years since quittin.2     Passive exposure: Never    Smokeless tobacco: Never    Tobacco comments:     caffeine use - decaf coffee   Vaping Use    Vaping status: Never Used   Substance and Sexual Activity    Alcohol use: No     Comment: No caffeine use    Drug use: No    Sexual activity: Defer         Family History   Problem Relation Age of Onset    Colon cancer Mother     Glaucoma Mother     Stroke Mother     Arthritis Mother     Hypertension Mother     Glaucoma Sister     Diabetes Sister     Heart disease Sister     Arthritis Sister     Asthma Sister     Hypertension Sister     Miscarriages / Stillbirths Sister     Lung disease Sister     Heart disease Brother     Diabetes Brother     Arthritis Brother     Drug abuse Brother     Hypertension Brother     Arthritis Father     COPD Father     Lung disease Father     Arthritis Daughter     Depression Daughter     Alcohol abuse Maternal Uncle     Heart disease Sister     Heart disease Sister     Heart disease Brother     Rheumatologic disease Sister        REVIEW OF SYSTEMS:   12 point ROS was performed and is negative except as outlined in HPI       Objective:     Vitals:    25 0810 25 0853 25 1140 25 1525   BP:       BP Location:       Patient Position:       Pulse:       Resp:       Temp: 97.6 °F (36.4 °C)  98.4 °F (36.9 °C) 97.9 °F (36.6 °C)   TempSrc: Oral  Oral Oral   SpO2:  97%     Weight:       Height:         Body mass index is 38.82 kg/m².  Flowsheet Rows      Flowsheet Row First Filed Value   Admission Height 167.6 cm (66\") Documented at 2024 1506   Admission Weight 109 kg (240 lb) " Documented at 12/31/2024 1541              Physical Exam  Vitals reviewed.   Constitutional:       General: She is not in acute distress.  HENT:      Head: Normocephalic.   Eyes:      Extraocular Movements: Extraocular movements intact.      Pupils: Pupils are equal, round, and reactive to light.   Cardiovascular:      Rate and Rhythm: Rhythm irregularly irregular.      Pulses: Normal pulses.      Heart sounds: Normal heart sounds, S1 normal and S2 normal. Heart sounds not distant. No murmur heard.     No friction rub. No gallop. No S3 or S4 sounds.   Pulmonary:      Effort: Pulmonary effort is normal.      Breath sounds: Normal breath sounds.   Abdominal:      General: Abdomen is flat. Bowel sounds are normal.      Palpations: Abdomen is soft.      Tenderness: There is no abdominal tenderness.   Skin:     General: Skin is warm and dry.   Neurological:      General: No focal deficit present.      Mental Status: She is alert and oriented to person, place, and time.   Psychiatric:         Mood and Affect: Mood normal.         Behavior: Behavior normal.         Lab Review:                Results from last 7 days   Lab Units 01/02/25  0905   SODIUM mmol/L 137   POTASSIUM mmol/L 3.4*   CHLORIDE mmol/L 99   CO2 mmol/L 28.0   BUN mg/dL 17   CREATININE mg/dL 1.23*   GLUCOSE mg/dL 205*   CALCIUM mg/dL 8.9     Results from last 7 days   Lab Units 12/31/24  2324 12/31/24  1542 12/31/24  1419   HSTROP T ng/L 16* 14* 13     Results from last 7 days   Lab Units 01/02/25  0905   WBC 10*3/mm3 10.16   HEMOGLOBIN g/dL 11.8*   HEMATOCRIT % 38.3   PLATELETS 10*3/mm3 139*     Results from last 7 days   Lab Units 01/02/25  0905 01/01/25  0228 12/31/24  2324   INR  2.35* 1.99* 1.85*                   EKG:            Assessment/Plan:   Acute respiratory failure with hypoxia  Likely secondary to #2  Requiring 2L NC on my exam this morning.   Acute on chronic diastolic congestive heart failure   Pulmonary vascular congestion noted on chest  xray  Unclear trigger  She has responded well to IV furosemide, continue today and re-evaluate volume status tomorrow.   Echocardiogram in October 2024 showed normal left ventricular systolic function with an EF of 59%, indeterminate left ventricular diastolic function, no hemodynamically significant valvular abnormalities.   Chronic atrial fibrillation  Rate controlled. Continue digoxin and metoprolol tartrate.   Continue warfarin.   Chronic kidney disease stage III  Nephrology has been consulted.   Hyperlipidemia   Continue atorvastatin  Coronary artery disease  Cardiac catheterization in 2017 showed 90% stenosis of the distal PDA which was felt to be too small to be amendable to PCI. Medical therapy was pursued.   Continue aspirin, atorvastatin, metoprolol tartrate   Type II diabetes mellitus     Continue IV diuresis, cardiology will follow. Thank you for this consult.     Adrienne Waters, APRN   01/02/25

## 2025-01-02 NOTE — PROGRESS NOTES
Name: Lana Yañez ADMIT: 2024   : 1945  PCP: Toni Green MD    MRN: [id number] LOS: 0 days   AGE/SEX: 79 y.o. female  ROOM: [facility]     Subjective   Subjective   Patient is seen at bedside, she reports having a lot of cough, also has left ankle pain, we will order imaging studies.       Objective   Objective   Vital Signs  Temp:  [97.6 °F (36.4 °C)-98.4 °F (36.9 °C)] 97.9 °F (36.6 °C)  Heart Rate:  [] 73  Resp:  [18-22] 18  BP: (114-130)/(60-89) 124/76  SpO2:  [96 %-99 %] 97 %  on  Flow (L/min) (Oxygen Therapy):  [2] 2;   Device (Oxygen Therapy): nasal cannula  Body mass index is 38.82 kg/m².  Physical Exam  General, awake and alert.  Head and ENT, normocephalic and atraumatic.  Lungs, symmetric expansion, equal air entry bilaterally.  Heart, regular rate and rhythm.  Abdomen, soft and nontender.  Extremities, no clubbing or cyanosis.  Neuro, no focal deficits.  Skin: Warm and no rash.  Psych, normal mood and affect.  Musculoskeletal, joint examination is grossly normal.        Results Review     I reviewed the patient's new clinical results.  Results from last 7 days   Lab Units 25  0925  1419   WBC 10*3/mm3 10.16 13.70* 9.78   HEMOGLOBIN g/dL 11.8* 12.3 13.4   PLATELETS 10*3/mm3 139* 131* 142     Results from last 7 days   Lab Units 25  0905 25  1419   SODIUM mmol/L 137 135* 133*   POTASSIUM mmol/L 3.4* 3.6 4.2   CHLORIDE mmol/L 99 103 101   CO2 mmol/L 28.0 23.5 24.0   BUN mg/dL 17 15 13   CREATININE mg/dL 1.23* 1.13* 1.01*   GLUCOSE mg/dL 205* 161* 156*   EGFR mL/min/1.73 45.4* 50.2* 57.5*     Results from last 7 days   Lab Units 25  0905 24  1419   ALBUMIN g/dL 3.7 4.2   BILIRUBIN mg/dL 0.7 0.8   ALK PHOS U/L 63 70   AST (SGOT) U/L 15 19   ALT (SGPT) U/L 10 11     Results from last 7 days   Lab Units 25  0905 25  0228 24  1419   CALCIUM mg/dL 8.9 8.9 9.1   ALBUMIN g/dL 3.7  --  4.2      Results from last 7 days   Lab Units 01/01/25  0228 12/31/24  2324 12/31/24  2051 12/31/24  1702 12/31/24  1542   PROCALCITONIN ng/mL  --   --   --   --  0.07   LACTATE mmol/L 1.5 2.2* 2.1* 2.2*  --      Glucose   Date/Time Value Ref Range Status   01/01/2025 1823 179 (H) 70 - 130 mg/dL Final   01/01/2025 1207 176 (H) 70 - 130 mg/dL Final   12/31/2024 1229 170 (A) 70 - 99 mg/dL Final       No radiology results for the last day    I have personally reviewed all medications:  Scheduled Medications  aspirin, 81 mg, Oral, Daily  atorvastatin, 10 mg, Oral, Daily  azithromycin, 500 mg, Oral, Q24H  cefTRIAXone, 2,000 mg, Intravenous, Q24H  digoxin, 125 mcg, Oral, Every Other Day  furosemide, 40 mg, Intravenous, Q12H  latanoprost, 1 drop, Both Eyes, Nightly  Lidocaine, 1 patch, Transdermal, Q24H  linagliptin, 5 mg, Oral, Daily  metoprolol tartrate, 50 mg, Oral, BID  mupirocin, 1 Application, Each Nare, BID  potassium chloride, 10 mEq, Oral, BID With Meals  warfarin, 3 mg, Oral, Once    Infusions  Pharmacy to dose warfarin,     Diet  Diet: Cardiac; Healthy Heart (2-3 Na+); Fluid Consistency: Thin (IDDSI 0)    I have personally reviewed:  [x]  Laboratory   [x]  Microbiology   [x]  Radiology   [x]  EKG/Telemetry  [x]  Cardiology/Vascular   []  Pathology    []  Records       Assessment/Plan     Active Hospital Problems    Diagnosis  POA    **Acute hypoxic respiratory failure [J96.01]  Yes    Acute on chronic hypoxic respiratory failure [J96.21]  Yes    Pneumonia [J18.9]  Yes    Stage 3a chronic kidney disease [N18.31]  Yes    Type 2 diabetes mellitus with hyperglycemia, without long-term current use of insulin [E11.65]  Yes    Acute on chronic diastolic CHF (congestive heart failure) [I50.33]  Yes    Chronic atrial fibrillation [I48.20]  Yes    Mixed hyperlipidemia [E78.2]  Yes      Resolved Hospital Problems   No resolved problems to display.       79 y.o. female admitted with Acute hypoxic respiratory  failure.    Assessment and plan  1.  Acute respiratory failure with hypoxia, patient has underlying acute on chronic diastolic congestive heart failure, pulmonary vascular congestion noted on the chest x-ray, underlying pneumonia could not be excluded, consult cardiology.  Continue Lasix, monitor input and outputs and daily weights.  Continue antibiotics.  Repeat chest x-ray today.     2.  Chronic kidney disease stage III, avoid nephrotoxic medications, renal function appears slightly worse, will consult nephrology.     3.  Chronic atrial fibrillation, patient currently appears to be rate controlled, continue Coumadin.  Monitor INR.     4.  Hyperlipidemia, continue statins.     5.  Coronary artery disease, continue aspirin and statins.     6.  Type 2 diabetes mellitus, continue Accu-Cheks along with current oral regimen.    7.  Left ankle pain, order imaging.     8.  CODE STATUS is full code.  Further plans based on hospital course.    Jesus Carrillo MD  [facility]  01/02/25  17:31 EST

## 2025-01-02 NOTE — CASE MANAGEMENT/SOCIAL WORK
Discharge Planning Assessment  [facility]     Patient Name: Lana Yañez  MRN: [id number]  Today's Date: 1/2/2025    Admit Date: 12/31/2024    Plan: Pending progress with PT/OT ordered on 1/2/2025   Discharge Needs Assessment       Row Name 01/02/25 1525       Living Environment    People in Home spouse    Current Living Arrangements home    Potentially Unsafe Housing Conditions none    In the past 12 months has the electric, gas, oil, or water company threatened to shut off services in your home? No    Primary Care Provided by self    Provides Primary Care For no one    Family Caregiver if Needed none    Quality of Family Relationships helpful    Able to Return to Prior Arrangements yes       Resource/Environmental Concerns    Resource/Environmental Concerns none    Transportation Concerns none       Transportation Needs    In the past 12 months, has lack of transportation kept you from medical appointments or from getting medications? no    In the past 12 months, has lack of transportation kept you from meetings, work, or from getting things needed for daily living? No       Food Insecurity    Within the past 12 months, you worried that your food would run out before you got the money to buy more. Never true    Within the past 12 months, the food you bought just didn't last and you didn't have money to get more. Never true       Transition Planning    Patient/Family Anticipates Transition to home with family    Patient/Family Anticipated Services at Transition home health care    Transportation Anticipated family or friend will provide       Discharge Needs Assessment    Readmission Within the Last 30 Days no previous admission in last 30 days    Equipment Currently Used at Home rollator;shower chair    Concerns to be Addressed discharge planning    Anticipated Changes Related to Illness inability to care for self    Equipment Needed After Discharge other (see comments)  TBD    Discharge Facility/Level of  Care Needs other (see comments)  TBD    Provided Post Acute Provider List? Yes    Post Acute Provider List Nursing Home;Home Health;DME Supplier    Provided Post Acute Provider Quality & Resource List? Yes    Post Acute Provider Quality and Resource List Home Health;Nursing Home    Delivered To Patient    Method of Delivery In person    Offered/Gave Vendor List yes                   Discharge Plan       Row Name 01/02/25 1529       Plan    Plan Pending progress with PT/OT ordered on 1/2/2025    Roadmap to Recovery Yes    Patient/Family in Agreement with Plan yes    Plan Comments Spoke with patient at bedside. Facesheet, PCP and pharmacy verified. Patient states she is IADL with her rollator and lives with her spouse. She drives and uses a shower chair. PT/OT pending recommendations.                  Continued Care and Services - Admitted Since 12/31/2024    No active coordination exists for this encounter.       Expected Discharge Date and Time       Expected Discharge Date Expected Discharge Time    Edgard 3, 2025            Demographic Summary       Row Name 01/02/25 1524       General Information    Admission Type inpatient    Arrived From emergency department    Required Notices Provided Important Message from Medicare    Referral Source admission list    Reason for Consult discharge planning    Preferred Language English                   Functional Status       Row Name 01/02/25 1525       Functional Status    Usual Activity Tolerance moderate    Current Activity Tolerance fair       Functional Status, IADL    Medications independent    Meal Preparation assistive person    Housekeeping assistive person    Laundry assistive person    Shopping assistive person    IADL Comments spouse helps as needed.       Mental Status    General Appearance WDL WDL       Mental Status Summary    Recent Changes in Mental Status/Cognitive Functioning no changes       Employment/    Employment Status retired                                Sophia Santiago RN

## 2025-01-02 NOTE — PROGRESS NOTES
[facility] Clinical Pharmacy Services: Warfarin Dosing/Monitoring Consult    Lana Yañez is a 79 y.o. female, estimated creatinine clearance is 52.1 mL/min (A) (by C-G formula based on SCr of 1.13 mg/dL (H)). weighing 109 kg (240 lb 8.4 oz).    Results from last 7 days   Lab Units 01/01/25  0228 12/31/24  2324 12/31/24  1419   INR  1.99* 1.85* 1.75*   HEMOGLOBIN g/dL 12.3  --  13.4   HEMATOCRIT % 37.0  --  41.2   PLATELETS 10*3/mm3 131*  --  142     Prior to admission anticoagulation: Per [facility] anticoagulation clinic note on 12/17/24, the patient's most recent warfarin regimen is 5 mg daily (35 mg/week). INR was therapeutic at that visit at 2.8.     Hospital Anticoagulation:  Consulting provider: Dr. Howell  Start date: 12/31/24  Indication: Atrial fibrillation  Target INR: 2 - 3  Expected duration: lifelong   Bridge Therapy: No     Potential food or drug interactions:   Ceftriaxone-concurrent use may enhance the anticoagulant effect of warfarin   Azithromycin-concurrent use may enhance the anticoagulant effects of warfarin   Acute infection/inflammation may cause an increased sensitivity to warfarin  Of note, patient was taken off home torsemide and started on furosemide. Torsemide has a major interaction with warfarin that is not present in furosemide.  Patient has had no Po intake documented on flowsheets, which will decrease weekly warfarin requirement to keep INR therapeutic.     Education complete?/Date: N/A; home medication    Assessment/Plan:  Dose: INR therapeutic, however given DDI, PO intake will proactively decrease today's dose to 3mg,   Monitor for any signs or symptoms of bleeding  Follow up daily INRs and dose adjustments    Pharmacy will continue to follow until discharge or discontinuation of warfarin.     Brenda Plaza, PharmD  Clinical Pharmacist

## 2025-01-03 LAB
BILIRUB UR QL STRIP: NEGATIVE
CLARITY UR: CLEAR
COLOR UR: YELLOW
CREAT UR-MCNC: 33.5 MG/DL
GLUCOSE UR STRIP-MCNC: NEGATIVE MG/DL
HGB UR QL STRIP.AUTO: NEGATIVE
INR PPP: 2.39 (ref 0.9–1.1)
KETONES UR QL STRIP: NEGATIVE
LEUKOCYTE ESTERASE UR QL STRIP.AUTO: NEGATIVE
NITRITE UR QL STRIP: NEGATIVE
PH UR STRIP.AUTO: 6.5 [PH] (ref 5–8)
PROT ?TM UR-MCNC: <4 MG/DL
PROT UR QL STRIP: NEGATIVE
PROT/CREAT UR: NORMAL MG/G{CREAT}
PROTHROMBIN TIME: 26.3 SECONDS (ref 11.7–14.2)
SP GR UR STRIP: 1.01 (ref 1–1.03)
UROBILINOGEN UR QL STRIP: NORMAL

## 2025-01-03 PROCEDURE — 25010000002 CEFTRIAXONE PER 250 MG: Performed by: INTERNAL MEDICINE

## 2025-01-03 PROCEDURE — 25010000002 FUROSEMIDE PER 20 MG: Performed by: INTERNAL MEDICINE

## 2025-01-03 PROCEDURE — 81003 URINALYSIS AUTO W/O SCOPE: CPT

## 2025-01-03 PROCEDURE — 97110 THERAPEUTIC EXERCISES: CPT

## 2025-01-03 PROCEDURE — 85610 PROTHROMBIN TIME: CPT | Performed by: INTERNAL MEDICINE

## 2025-01-03 PROCEDURE — 97530 THERAPEUTIC ACTIVITIES: CPT

## 2025-01-03 PROCEDURE — 97162 PT EVAL MOD COMPLEX 30 MIN: CPT

## 2025-01-03 PROCEDURE — 97166 OT EVAL MOD COMPLEX 45 MIN: CPT

## 2025-01-03 PROCEDURE — 82570 ASSAY OF URINE CREATININE: CPT

## 2025-01-03 PROCEDURE — 84156 ASSAY OF PROTEIN URINE: CPT

## 2025-01-03 PROCEDURE — 99232 SBSQ HOSP IP/OBS MODERATE 35: CPT | Performed by: INTERNAL MEDICINE

## 2025-01-03 RX ORDER — ATENOLOL 25 MG/1
25 TABLET ORAL 2 TIMES DAILY
Status: DISCONTINUED | OUTPATIENT
Start: 2025-01-03 | End: 2025-01-04

## 2025-01-03 RX ORDER — POTASSIUM CHLORIDE 750 MG/1
40 TABLET, EXTENDED RELEASE ORAL ONCE
Status: COMPLETED | OUTPATIENT
Start: 2025-01-03 | End: 2025-01-03

## 2025-01-03 RX ORDER — FUROSEMIDE 10 MG/ML
40 INJECTION INTRAMUSCULAR; INTRAVENOUS EVERY 12 HOURS
Status: DISCONTINUED | OUTPATIENT
Start: 2025-01-04 | End: 2025-01-03

## 2025-01-03 RX ORDER — SPIRONOLACTONE 25 MG/1
25 TABLET ORAL DAILY
Status: DISCONTINUED | OUTPATIENT
Start: 2025-01-03 | End: 2025-01-13 | Stop reason: HOSPADM

## 2025-01-03 RX ORDER — TORSEMIDE 20 MG/1
40 TABLET ORAL DAILY
Status: DISCONTINUED | OUTPATIENT
Start: 2025-01-04 | End: 2025-01-07

## 2025-01-03 RX ORDER — WARFARIN SODIUM 4 MG/1
4 TABLET ORAL
Status: COMPLETED | OUTPATIENT
Start: 2025-01-03 | End: 2025-01-03

## 2025-01-03 RX ADMIN — SPIRONOLACTONE 25 MG: 25 TABLET ORAL at 17:08

## 2025-01-03 RX ADMIN — ATORVASTATIN CALCIUM 10 MG: 10 TABLET, FILM COATED ORAL at 08:16

## 2025-01-03 RX ADMIN — WARFARIN SODIUM 4 MG: 4 TABLET ORAL at 17:08

## 2025-01-03 RX ADMIN — FUROSEMIDE 40 MG: 10 INJECTION, SOLUTION INTRAMUSCULAR; INTRAVENOUS at 11:21

## 2025-01-03 RX ADMIN — METOPROLOL TARTRATE 50 MG: 50 TABLET, FILM COATED ORAL at 08:16

## 2025-01-03 RX ADMIN — ASPIRIN 81 MG: 81 TABLET, COATED ORAL at 08:16

## 2025-01-03 RX ADMIN — ATENOLOL 25 MG: 25 TABLET ORAL at 17:08

## 2025-01-03 RX ADMIN — POTASSIUM CHLORIDE 40 MEQ: 750 TABLET, EXTENDED RELEASE ORAL at 17:08

## 2025-01-03 RX ADMIN — MUPIROCIN 1 APPLICATION: 20 OINTMENT TOPICAL at 08:16

## 2025-01-03 RX ADMIN — AZITHROMYCIN 500 MG: 250 TABLET, FILM COATED ORAL at 17:08

## 2025-01-03 RX ADMIN — LINAGLIPTIN 5 MG: 5 TABLET, FILM COATED ORAL at 08:16

## 2025-01-03 RX ADMIN — POTASSIUM CHLORIDE 10 MEQ: 750 TABLET, EXTENDED RELEASE ORAL at 08:16

## 2025-01-03 RX ADMIN — FUROSEMIDE 40 MG: 10 INJECTION, SOLUTION INTRAMUSCULAR; INTRAVENOUS at 00:06

## 2025-01-03 RX ADMIN — LIDOCAINE 1 PATCH: 4 PATCH TOPICAL at 08:17

## 2025-01-03 RX ADMIN — MUPIROCIN 1 APPLICATION: 20 OINTMENT TOPICAL at 20:24

## 2025-01-03 RX ADMIN — CEFTRIAXONE 2000 MG: 2 INJECTION, POWDER, FOR SOLUTION INTRAMUSCULAR; INTRAVENOUS at 17:08

## 2025-01-03 NOTE — PLAN OF CARE
Pt aox4, ast x1 to chair, lidocaine patch on L shoulder, pt refused bath today. Pt still on IV lasix but swelling is improving per pt.

## 2025-01-03 NOTE — CASE MANAGEMENT/SOCIAL WORK
Continued Stay Note  [facility]     Patient Name: Lana Yañez  MRN: [id number]  Today's Date: 1/3/2025    Admit Date: 12/31/2024    Plan: Pending referral to [facility] rehab and [facility]   Discharge Plan       Row Name 01/03/25 1439       Plan    Plan Pending referral to [facility] rehab and [facility]    Plan Comments [facility] met with patient at bedside. Discussed therapy recommendations and reviewd acute rehab options. Patient would like referral to [facility] as first choice and [facility] as second choice. Referrals placed in [facility]. Vilma KHAN                   Discharge Codes    No documentation.                 Expected Discharge Date and Time       Expected Discharge Date Expected Discharge Time    Edgard 3, 2025               ERIN Cortes

## 2025-01-03 NOTE — DISCHARGE PLACEMENT REQUEST
"Tera Yañez (79 y.o. Female)       Date of Birth   1945    Social Security Number       Address   [address] [address]    Home Phone   [phone]    MRN   [id number]       Religious   [facility]    Marital Status                               Admission Date   12/31/24    Admission Type   Emergency    Admitting Provider   Lynda Howell MD    Attending Provider   Jesus Carrillo MD    Department, Room/Bed   [facility], N337/1       Discharge Date       Discharge Disposition       Discharge Destination                                 Attending Provider: Jesus Carrillo MD    Allergies: Metformin, Contrast Dye (Echo Or Unknown Ct/mr), Dapagliflozin, Macrobid [Nitrofurantoin], Iodinated Contrast Media    Isolation: Contact   Infection: MRSA (01/02/25)   Code Status: CPR    Ht: 167.6 cm (66\")   Wt: 109 kg (240 lb 8.4 oz)    Admission Cmt: None   Principal Problem: Acute hypoxic respiratory failure [J96.01]                   Active Insurance as of 12/31/2024       Primary Coverage       Payor Plan Insurance Group Employer/Plan Group    HUMANA MEDICARE REPLACEMENT HUMANA MED ADV GROUP [id number]       Payor Plan Address Payor Plan Phone Number Payor Plan Fax Number Effective Dates    [address] [address] [phone]  1/1/2018 - None Entered    [facility] [id number]         Subscriber Name Subscriber Birth Date Member ID       TERA YAÑEZ 1945 [id number]                     Emergency Contacts        (Rel.) Home Phone Work Phone Mobile Phone    Suresh Yañez (Spouse) [phone] -- [phone]    YOLANDA MONTES (Daughter) -- -- [phone]            "

## 2025-01-03 NOTE — PLAN OF CARE
Goal Outcome Evaluation:  Plan of Care Reviewed With: patient      Pt admit from home due to onset of respiratory insuffiency , acute hypoxia due to PNA. PMH: prior Right MCA CVA with left hemiparesis, RA jose manuel hands, general OA, jose manuel TKA, afib, DM, CHF, bulging disc, LBP. Pt lives with spouse who is ill she would prefer to d/c  home but acknowledged decline in all mobility. Hx of left shoulder pain and unable to elevate or reach with Left arm. Left bicep/tricep and  > 3/5. Jose Manuel hip flex to 60 degrees in supine, jose manuel ankle DF < neutral left worse than right. Mild edema noted with painful to touch posterior left malleolus. Pt reported lob while amb at [facility] this week with self recovery. No fall but pain left ankle noted. Per xray left ankle yesterday: soft tissue swelling only, no fx noted. Pt moved to eob with mod/min asst unable to use LUE. STS with rwx and minx2. She amb 3' to recliner with min/cga of 2. Shuffle pattern, flexed posture noted. Pt demonstrated decline in all mobility with general weakness superimposed on left CVA residual. She will likely benefit from cont skilled PT to address deficits. Pt is motivated, alert and oriented and provides care to her . She would likely tolerate IRF at d/c if needed. She lives in bilevel home with one flight of [place]. Amb indep with rollator.           Anticipated Discharge Disposition (PT): inpatient rehabilitation facility

## 2025-01-03 NOTE — PROGRESS NOTES
"CC: Congestive heart failure    Interval History: No new acute events overnight      Vital Signs  Temp:  [97.5 °F (36.4 °C)-98.1 °F (36.7 °C)] 97.5 °F (36.4 °C)  Heart Rate:  [75-99] 75  Resp:  [15-21] 21  BP: (104-141)/() 104/87    Intake/Output Summary (Last 24 hours) at 1/3/2025 1322  Last data filed at 1/3/2025 0500  Gross per 24 hour   Intake 100 ml   Output 2900 ml   Net -2800 ml     Flowsheet Rows      Flowsheet Row First Filed Value   Admission Height 167.6 cm (66\") Documented at 12/31/2024 1506   Admission Weight 109 kg (240 lb) Documented at 12/31/2024 1541            PHYSICAL EXAM:  General: No acute distress  Resp:NL Rate, symmetric chest expansion,unlabored, clear  CV: Irregularly irregular NL PMI, NL S1 and S2, no Murmur, no gallop, no rub, No JVD.   ABD:Nl sounds, no masses or tenderness, nondistended, no guarding or rebound  Neuro: alert,cooperative and oriented  Extr:Normal pedal pulses, No edema or cyanosis, moves all extremities      Results Review:    Results from last 7 days   Lab Units 01/02/25  0905   SODIUM mmol/L 137   POTASSIUM mmol/L 3.4*   CHLORIDE mmol/L 99   CO2 mmol/L 28.0   BUN mg/dL 17   CREATININE mg/dL 1.23*   GLUCOSE mg/dL 205*   CALCIUM mg/dL 8.9     Results from last 7 days   Lab Units 12/31/24  2324 12/31/24  1542 12/31/24  1419   HSTROP T ng/L 16* 14* 13     Results from last 7 days   Lab Units 01/02/25  0905   WBC 10*3/mm3 10.16   HEMOGLOBIN g/dL 11.8*   HEMATOCRIT % 38.3   PLATELETS 10*3/mm3 139*     Results from last 7 days   Lab Units 01/03/25  0615 01/02/25  0905 01/01/25  0228   INR  2.39* 2.35* 1.99*                 I reviewed the patient's new clinical results.  I personally viewed and interpreted the patient's EKG/Telemetry data        Medication Review:   Meds reviewed    Pharmacy to dose warfarin,         Assessment/Plan  Acute on chronic HFpEF with hypoxemic respiratory failure.  Precipitated by dietary noncompliance  Persistent atrial fibrillation with rapid " ventricular response on Coumadin.  Therapeutic INR.  On metoprolol and digoxin with therapeutic levels  Chronic kidney disease  Coronary artery disease of small PD being treated medically  Type 2 diabetes mellitus  Hyperlipidemia    Fairly responding to IV diuretics and almost back to baseline  HR fairly controlled   Monitor and replete electrolytes  Switch to oral diuretic in am     I have discussed patient with the nurse        Arun Henning MD  01/03/25  13:22 EST

## 2025-01-03 NOTE — PROGRESS NOTES
[facility] Clinical Pharmacy Services: Warfarin Dosing/Monitoring Consult    Lana Yañez is a 79 y.o. female, estimated creatinine clearance is 47.9 mL/min (A) (by C-G formula based on SCr of 1.23 mg/dL (H)). weighing 109 kg (240 lb 8.4 oz).    Results from last 7 days   Lab Units 01/03/25  0615 01/02/25  0905 01/01/25  0228 12/31/24  2324 12/31/24  1419   INR  2.39* 2.35* 1.99* 1.85* 1.75*   HEMOGLOBIN g/dL  --  11.8* 12.3  --  13.4   HEMATOCRIT %  --  38.3 37.0  --  41.2   PLATELETS 10*3/mm3  --  139* 131*  --  142     Prior to admission anticoagulation: Per [facility] anticoagulation clinic note on 12/17/24, the patient's most recent warfarin regimen is 5 mg daily (35 mg/week). INR was therapeutic at that visit at 2.8.     Hospital Anticoagulation:  Consulting provider: Dr. Howell  Start date: 12/31/24  Indication: Atrial fibrillation  Target INR: 2 - 3  Expected duration: lifelong   Bridge Therapy: No     Potential food or drug interactions:   Ceftriaxone-concurrent use may enhance the anticoagulant effect of warfarin   Azithromycin-concurrent use may enhance the anticoagulant effects of warfarin   Acute infection/inflammation may cause an increased sensitivity to warfarin  Of note, patient was taken off home torsemide and started on furosemide. Torsemide has a major interaction with warfarin that is not present in furosemide.  Patient has had no PO intake documented on flowsheets, which will decrease weekly warfarin requirement to keep INR therapeutic.     Education complete?/Date: N/A; home medication    Assessment/Plan:  INR remains therapeutic. Will dose 4 mg once today  Monitor for any signs or symptoms of bleeding  Follow up daily INRs and dose adjustments    Pharmacy will continue to follow until discharge or discontinuation of warfarin.     Zander Estrada III, PharmD  Clinical Pharmacist

## 2025-01-03 NOTE — PLAN OF CARE
The pt was admitted to [facility] 2/2 to SOA, cough, congestion x3 days. Dx includes acute hypoxic respiratory failure, pulmonary congestion. Pmhx significant for RA, HTN, CKD3, obesity, diabetes, COPD, Afib, stroke and CAD. The pt reports that she lives with her  in a bi-level home where she uses a rollator. She reports increased weakness the last several weeks limiting her activity tolerance/independence with ADL/IADLs and mobility. Min-Mod A x1 provided for bed mobility, difficulty with scooting 2/2 to significantly limited L arm ROM/strength. The pt engaged in AAROM of her LUE as tolerated. Total A LBD. CGA/Min A + walker to stand and take steps over to her bedside chair. Mod-Max A required at this time for dressing/bathing tasks. She is motivated to improve and interested in a IRF stay at the time of D/C.

## 2025-01-03 NOTE — THERAPY EVALUATION
Patient Name: Lana Yañez  : 1945    MRN: [id number]                              Today's Date: 1/3/2025       Admit Date: 2024    Visit Dx:     ICD-10-CM ICD-9-CM   1. Acute hypoxic respiratory failure  J96.01 518.81   2. Acute cough  R05.1 786.2   3. Pneumonia of both lower lobes due to infectious organism  J18.9 486   4. Acute on chronic congestive heart failure, unspecified heart failure type  I50.9 428.0     Patient Active Problem List   Diagnosis    Mixed hyperlipidemia    Vitamin deficiency    Essential hypertension    Rheumatoid arthritis involving both hands    Fatigue    ANTOINE (dyspnea on exertion)    Dysuria    Urge incontinence of urine    Hypovitaminosis D    Sleep apnea    Encounter for screening colonoscopy    Bronchitis    Cough    Generalized osteoarthritis of multiple sites    Cellulitis of right elbow due to MRSA    Medicare annual wellness visit, initial    Hospital discharge follow-up    Displacement of lumbar intervertebral disc without myelopathy    Lumbar disc herniation    Chronic atrial fibrillation    History of MRSA infection    Left-sided weakness    Atrial fibrillation    Left shoulder pain    Cerebrovascular accident (CVA) due to occlusion of right middle cerebral artery    Relative lymphocytosis    Nausea    Weakness    Controlled substance agreement signed    Coronary artery disease involving native coronary artery of native heart without angina pectoris    SOB (shortness of breath)    Lower extremity edema    Acute on chronic diastolic CHF (congestive heart failure)    Adhesive capsulitis of left shoulder    CVA tenderness    Costochondritis, acute    Allergic reaction    Coronary artery embolism    Visit for screening mammogram    Low back pain    Urgency of urination    Hyperglycemia    Carpal tunnel syndrome of left wrist/ wakes her up    Localized edema    Acute cystitis without hematuria    Bruising    History of stroke    Diabetes 1.5, managed as type 2     Other chronic pain    Vaginal candidiasis    Pulmonary hypertension    Acute respiratory failure with hypoxia    Hypokalemia    Hypomagnesemia    Type 2 diabetes mellitus with hyperglycemia, without long-term current use of insulin    Diarrhea    Sepsis without acute organ dysfunction    Morbid obesity with BMI of 40.0-44.9, adult    Biliary acute pancreatitis without necrosis or infection    Stage 3a chronic kidney disease    Acute UTI (urinary tract infection)    Hemiparesis of left nondominant side as late effect of cerebral infarction    Acute hypoxic respiratory failure    Pneumonia    Acute on chronic hypoxic respiratory failure     Past Medical History:   Diagnosis Date    Acute on chronic diastolic heart failure     Acute UTI (urinary tract infection) 05/22/2022    Allergic reaction 02/09/2018    Arthritis     Arthritis of back     Arthritis of neck     Asthma     Atrial fibrillation     Persistent; on warfarin    Atypical chest pain     Biliary acute pancreatitis without necrosis or infection 11/10/2021    Bronchitis     Cellulitis of right elbow     due to MRSA    Cervical disc disorder     CHF (congestive heart failure)     COPD (chronic obstructive pulmonary disease)     Coronary artery disease     Cardiac catheterization completed; 90% PDA stenosis with medical management recommended    Coronary artery disease involving native coronary artery of native heart with angina pectoris with documented spasm     CTS (carpal tunnel syndrome)     Diabetes 1.5, managed as type 2     Disease of thyroid gland     Displacement of lumbar intervertebral disc without myelopathy     Dizziness     ANTOINE (dyspnea on exertion)     Essential hypertension     Fatigue     Fracture, foot     Generalized osteoarthritis of multiple sites     History of rheumatic fever     History of transfusion     Hx of bone density study     10/23/2014    Hyperglycemia     Hyperlipidemia     Hypovitaminosis D     Knee swelling     Left arm pain      Left-sided weakness     Leg swelling     Low back pain     Lower extremity edema     Lumbosacral disc disease     Malaise and fatigue     Mitral valve disease     Moderate mitral valve prolapse and moderate mitral regurgitation    Mitral valve insufficiency     Morbid obesity with BMI of 40.0-44.9, adult     MRSA infection     NSTEMI (non-ST elevated myocardial infarction)     PAF (paroxysmal atrial fibrillation)     Periarthritis of shoulder     Pneumonia 1/1/2025    RA (rheumatoid arthritis)     involving both hands    Rotator cuff syndrome     Sleep apnea     SOB (shortness of breath)     Stroke     left side weakness    Urge incontinence of urine     UTI (urinary tract infection) 05/2020    Visit for screening mammogram 04/02/2018    Vitamin D deficiency      Past Surgical History:   Procedure Laterality Date    BREAST BIOPSY      BREAST SURGERY      right side lumpectomy with biopsy    CARDIAC CATHETERIZATION Left 10/20/2017    Procedure: Cardiac Catheterization/Vascular Study;  Surgeon: Alphonso Olmedo MD;  Location: [facility] CATH INVASIVE LOCATION;  Service:     CARDIAC CATHETERIZATION N/A 10/20/2017    +2 mitral regurgitation, left main 10% stenosis, mid to distal LAD 10% diffuse stenosis, circumflex 10% diffuse stenosis, RCA 10% proximal stenosis, and distal PDA consistent with coronary embolus with a lesion of 90% too small to consider coronary intervention; medical management recommended    EYE SURGERY      laser surgery for glaucoma and left eye cataracts removed    HYSTERECTOMY      10+ years ago    JOINT REPLACEMENT  2005; 2006    bilateral knees and left rotater    KNEE SURGERY      MAMMO BILATERAL  2016    [facility]     SHOULDER SURGERY        General Information       Row Name 01/03/25 8467          OT Time and Intention    Subjective Information no complaints  -RB     Document Type evaluation  -RB     Mode of Treatment individual therapy;occupational therapy  -RB     Patient Effort good  -RB        Row Name 01/03/25 1047          General Information    Patient Profile Reviewed yes  -RB     Prior Level of Function independent:;ADL's;transfer  -RB     Existing Precautions/Restrictions fall  -RB     Barriers to Rehab medically complex;previous functional deficit  -RB       Row Name 01/03/25 1047          Living Environment    People in Home spouse  -RB       Row Name 01/03/25 1047          Home Main Entrance    Number of Stairs, Main Entrance ten  -RB       Row Name 01/03/25 1047          Cognition    Orientation Status (Cognition) oriented x 4  -RB       Row Name 01/03/25 1047          Safety Issues/Impairments Affecting Functional Mobility    Safety Issues Affecting Function (Mobility) safety precaution awareness;safety precautions follow-through/compliance  -RB     Impairments Affecting Function (Mobility) balance;endurance/activity tolerance;range of motion (ROM);strength;shortness of breath;pain  -RB               User Key  (r) = Recorded By, (t) = Taken By, (c) = Cosigned By      Initials Name Provider Type    RB Belem Sauceda OT Occupational Therapist                     Mobility/ADL's       Row Name 01/03/25 1048          Bed Mobility    Bed Mobility supine-sit  -RB     Supine-Sit [place] (Bed Mobility) minimum assist (75% patient effort);moderate assist (50% patient effort);2 person assist;verbal cues  -RB     Bed Mobility, Safety Issues decreased use of arms for pushing/pulling;decreased use of legs for bridging/pushing  -RB     Assistive Device (Bed Mobility) bed rails  -RB     Comment, (Bed Mobility) difficulty using LUE to assist  -RB       Row Name 01/03/25 1048          Transfers    Transfers sit-stand transfer;stand-sit transfer;bed-chair transfer  -RB       Row Name 01/03/25 1048          Bed-Chair Transfer    Bed-Chair [place] (Transfers) minimum assist (75% patient effort);contact guard;verbal cues  -RB     Assistive Device (Bed-Chair Transfers) walker, front-wheeled  -RB        Row Name 01/03/25 1048          Sit-Stand Transfer    Sit-Stand [place] (Transfers) minimum assist (75% patient effort);contact guard;verbal cues  -RB     Assistive Device (Sit-Stand Transfers) walker, front-wheeled  -RB       Row Name 01/03/25 1048          Stand-Sit Transfer    Stand-Sit [place] (Transfers) minimum assist (75% patient effort);contact guard;verbal cues  -RB     Assistive Device (Stand-Sit Transfers) walker, front-wheeled  -RB       Row Name 01/03/25 1048          Functional Mobility    Functional Mobility- Ind. Level not tested;unable to perform  -RB       Row Name 01/03/25 1048          Activities of Daily Living    BADL Assessment/Intervention lower body dressing  Mod-Max A for bathing/dressing due to impaired LUEand generalized weakness  -RB       Row Name 01/03/25 1048          Lower Body Dressing Assessment/Training    [place] Level (Lower Body Dressing) lower body dressing skills;dependent (less than 25% patient effort)  -RB     Position (Lower Body Dressing) edge of bed sitting  -RB               User Key  (r) = Recorded By, (t) = Taken By, (c) = Cosigned By      Initials Name Provider Type    RB Belem Sauceda OT Occupational Therapist                   Obj/Interventions       Row Name 01/03/25 1045          Sensory Assessment (Somatosensory)    Sensory Assessment BLE neuropathy  -RB       Row Name 01/03/25 1045          Vision Assessment/Intervention    Visual Impairment/Limitations WFL  -RB       Row Name 01/03/25 1045          Range of Motion Comprehensive    Comment, General Range of Motion LUE limited ~75% at the shoulder. very painful with both AAROM/PROM, poor tolerance for ROM/stretching. elbow->hand WFL  -RB       Row Name 01/03/25 1045          Strength Comprehensive (MMT)    General Manual Muscle Testing (MMT) Assessment upper extremity strength deficits identified;lower extremity strength deficits identified  -RB     Comment, General Manual Muscle Testing  (MMT) Assessment BLE/LUE weakness  -RB       Row Name 01/03/25 1045          Shoulder (Therapeutic Exercise)    Shoulder (Therapeutic Exercise) AROM (active range of motion);PROM (passive range of motion)  -RB     Shoulder AROM (Therapeutic Exercise) flexion;extension;external rotation;internal rotation;10 repetitions;right;sitting  -RB     Shoulder PROM (Therapeutic Exercise) left;flexion;extension;external rotation;internal rotation;sitting;10 repetitions  -RB       Row Name 01/03/25 1045          Elbow/Forearm (Therapeutic Exercise)    Elbow/Forearm (Therapeutic Exercise) AROM (active range of motion);PROM (passive range of motion)  -RB     Elbow/Forearm AROM (Therapeutic Exercise) right;flexion;extension;sitting;10 repetitions  -RB     Elbow/Forearm PROM (Therapeutic Exercise) left;extension;sitting;10 repetitions  -RB       Row Name 01/03/25 1045          Motor Skills    Therapeutic Exercise shoulder;elbow/forearm  -RB       Row Name 01/03/25 1045          Balance    Comment, Balance SBA/CGA sitting balance. CGA/Min A standing balance with BUE support  -RB               User Key  (r) = Recorded By, (t) = Taken By, (c) = Cosigned By      Initials Name Provider Type    RB Belem Sauceda OT Occupational Therapist                   Goals/Plan       Row Name 01/03/25 1043          Bed Mobility Goal 1 (OT)    Activity/Assistive Device (Bed Mobility Goal 1, OT) bed mobility activities, all  -RB     [place] Level/Cues Needed (Bed Mobility Goal 1, OT) standby assist  -RB     Time Frame (Bed Mobility Goal 1, OT) short term goal (STG);2 weeks  -RB     Progress/Outcomes (Bed Mobility Goal 1, OT) new goal  -RB       Row Name 01/03/25 1043          Transfer Goal 1 (OT)    Activity/Assistive Device (Transfer Goal 1, OT) transfers, all  -RB     [place] Level/Cues Needed (Transfer Goal 1, OT) standby assist  -RB     Time Frame (Transfer Goal 1, OT) short term goal (STG);2 weeks  -RB     Progress/Outcome (Transfer  Goal 1, OT) new goal  -RB       Row Name 01/03/25 1043          Bathing Goal 1 (OT)    Activity/Device (Bathing Goal 1, OT) bathing skills, all  -RB     [place] Level/Cues Needed (Bathing Goal 1, OT) minimum assist (75% or more patient effort)  -RB     Time Frame (Bathing Goal 1, OT) short term goal (STG);2 weeks  -RB     Progress/Outcomes (Bathing Goal 1, OT) new goal  -RB       Row Name 01/03/25 1043          Dressing Goal 1 (OT)    Activity/Device (Dressing Goal 1, OT) dressing skills, all  -RB     [place]/Cues Needed (Dressing Goal 1, OT) minimum assist (75% or more patient effort)  -RB     Time Frame (Dressing Goal 1, OT) short term goal (STG);2 weeks  -RB     Progress/Outcome (Dressing Goal 1, OT) new goal  -RB       Row Name 01/03/25 1043          Toileting Goal 1 (OT)    Activity/Device (Toileting Goal 1, OT) toileting skills, all  -RB     [place] Level/Cues Needed (Toileting Goal 1, OT) minimum assist (75% or more patient effort)  -RB     Time Frame (Toileting Goal 1, OT) short term goal (STG);2 weeks  -RB     Progress/Outcome (Toileting Goal 1, OT) new goal  -RB       Row Name 01/03/25 1043          Grooming Goal 1 (OT)    Activity/Device (Grooming Goal 1, OT) grooming skills, all  -RB     [place] (Grooming Goal 1, OT) standby assist  -RB     Time Frame (Grooming Goal 1, OT) short term goal (STG);2 weeks  -RB     Progress/Outcome (Grooming Goal 1, OT) new goal  -RB       Row Name 01/03/25 1043          Self-Feeding Goal 1 (OT)    Activity/Device (Self-Feeding Goal 1, OT) self-feeding skills, all  -RB     [place] Level/Cues Needed (Self-Feeding Goal 1, OT) standby assist  -RB     Time Frame (Self-Feeding Goal 1, OT) short term goal (STG);2 weeks  -RB     Progress/Outcomes (Self-Feeding Goal 1, OT) new goal  -RB       Row Name 01/03/25 1043          Therapy Assessment/Plan (OT)    Planned Therapy Interventions (OT) transfer/mobility retraining;strengthening exercise;ROM/therapeutic  exercise;activity tolerance training;adaptive equipment training;BADL retraining;functional balance retraining;occupation/activity based interventions;patient/caregiver education/training;neuromuscular control/coordination retraining;edema control/reduction;passive ROM/stretching  -RB               User Key  (r) = Recorded By, (t) = Taken By, (c) = Cosigned By      Initials Name Provider Type    RB Belem Sauceda OT Occupational Therapist                   Clinical Impression       Row Name 01/03/25 1044          Pain Assessment    Pre/Posttreatment Pain Comment generalized L shoulder pain (chronic)  -RB       Row Name 01/03/25 1044          Plan of Care Review    Plan of Care Reviewed With patient  -RB     Progress no change  -RB     Outcome Evaluation The pt was admitted to [facility] 2/2 to SOA, cough, congestion x3 days. Dx includes acute hypoxic respiratory failure, pulmonary congestion. Pmhx significant for RA, HTN, CKD3, obesity, diabetes, COPD, Afib, stroke and CAD. The pt reports that she lives with her  in a bi-level home where she uses a rollator. She reports increased weakness the last several weeks limiting her activity tolerance/independence with ADL/IADLs and mobility. Min-Mod A x1 provided for bed mobility, difficulty with scooting 2/2 to significantly limited L arm ROM/strength. The pt engaged in AAROM of her LUE as tolerated. Total A LBD. CGA/Min A + walker to stand and take steps over to her bedside chair. Mod-Max A required at this time for dressing/bathing tasks. She is motivated to improve and interested in a IRF stay at the time of D/C.  -RB       Row Name 01/03/25 1044          Therapy Assessment/Plan (OT)    Rehab Potential (OT) good  -RB     Criteria for Skilled Therapeutic Interventions Met (OT) skilled treatment is necessary;yes  -RB     Therapy Frequency (OT) 5 times/wk  -RB       Row Name 01/03/25 1044          Therapy Plan Review/Discharge Plan (OT)    Anticipated Discharge  Disposition (OT) inpatient rehabilitation facility  -RB       Row Name 01/03/25 1044          Vital Signs    Intra Patient Position Standing  -RB     Post Patient Position Sitting  -RB       Row Name 01/03/25 1044          Positioning and Restraints    Pre-Treatment Position in bed  -RB     Post Treatment Position chair  -RB     In Chair notified nsg;reclined;sitting;encouraged to call for assist;call light within reach;exit alarm on;legs elevated  -RB               User Key  (r) = Recorded By, (t) = Taken By, (c) = Cosigned By      Initials Name Provider Type    RB Belem Sauceda OT Occupational Therapist                   Outcome Measures       Row Name 01/03/25 1043          How much help from another is currently needed...    Putting on and taking off regular lower body clothing? 1  -RB     Bathing (including washing, rinsing, and drying) 1  -RB     Toileting (which includes using toilet bed pan or urinal) 1  -RB     Putting on and taking off regular upper body clothing 2  -RB     Taking care of personal grooming (such as brushing teeth) 2  -RB     Eating meals 3  -RB     AM-PAC 6 Clicks Score (OT) 10  -RB       Row Name 01/03/25 1002 01/03/25 0800       How much help from another person do you currently need...    Turning from your back to your side while in flat bed without using bedrails? 3  -SV 3  -BC    Moving from lying on back to sitting on the side of a flat bed without bedrails? 3  -SV 3  -BC    Moving to and from a bed to a chair (including a wheelchair)? 3  -SV 2  -BC    Standing up from a chair using your arms (e.g., wheelchair, bedside chair)? 3  -SV 2  -BC    Climbing 3-5 steps with a railing? 1  -SV 2  -BC    To walk in hospital room? 2  -SV 2  -BC    AM-PAC 6 Clicks Score (PT) 15  -SV 14  -BC      Row Name 01/03/25 1043          Modified [place] Scale    Modified Patricia Scale 4 - Moderately severe disability.  Unable to walk without assistance, and unable to attend to own bodily needs without  assistance.  -RB       Row Name 01/03/25 1043          Functional Assessment    Outcome Measure Options AM-PAC 6 Clicks Daily Activity (OT);Modified [place]  -RB               User Key  (r) = Recorded By, (t) = Taken By, (c) = Cosigned By      Initials Name Provider Type    [place] Cope, PT Physical Therapist    Belem Figueredo, OT Occupational Therapist    Lucia Bhardwaj, RN Registered Nurse                    Occupational Therapy Education       Title: PT OT SLP Therapies (In Progress)       Topic: Occupational Therapy (Not Started)       Point: ADL training (Not Started)       Description:   Instruct learner(s) on proper safety adaptation and remediation techniques during self care or transfers.   Instruct in proper use of assistive devices.                  Learner Progress:  Not documented in this visit.              Point: Home exercise program (Not Started)       Description:   Instruct learner(s) on appropriate technique for monitoring, assisting and/or progressing therapeutic exercises/activities.                  Learner Progress:  Not documented in this visit.              Point: Precautions (Not Started)       Description:   Instruct learner(s) on prescribed precautions during self-care and functional transfers.                  Learner Progress:  Not documented in this visit.              Point: Body mechanics (Not Started)       Description:   Instruct learner(s) on proper positioning and spine alignment during self-care, functional mobility activities and/or exercises.                  Learner Progress:  Not documented in this visit.                                  OT Recommendation and Plan  Planned Therapy Interventions (OT): transfer/mobility retraining, strengthening exercise, ROM/therapeutic exercise, activity tolerance training, adaptive equipment training, BADL retraining, functional balance retraining, occupation/activity based interventions, patient/caregiver  education/training, neuromuscular control/coordination retraining, edema control/reduction, passive ROM/stretching  Therapy Frequency (OT): 5 times/wk  Plan of Care Review  Plan of Care Reviewed With: patient  Progress: no change  Outcome Evaluation: The pt was admitted to [facility] 2/2 to SOA, cough, congestion x3 days. Dx includes acute hypoxic respiratory failure, pulmonary congestion. Pmhx significant for RA, HTN, CKD3, obesity, diabetes, COPD, Afib, stroke and CAD. The pt reports that she lives with her  in a bi-level home where she uses a rollator. She reports increased weakness the last several weeks limiting her activity tolerance/independence with ADL/IADLs and mobility. Min-Mod A x1 provided for bed mobility, difficulty with scooting 2/2 to significantly limited L arm ROM/strength. The pt engaged in AAROM of her LUE as tolerated. Total A LBD. CGA/Min A + walker to stand and take steps over to her bedside chair. Mod-Max A required at this time for dressing/bathing tasks. She is motivated to improve and interested in a IRF stay at the time of D/C.     Time Calculation:         Time Calculation- OT       Row Name 01/03/25 1043             Time Calculation- OT    OT Start Time 0858  -RB      OT Stop Time 0925  -RB      OT Time Calculation (min) 27 min  -RB      Total Timed Code Minutes- OT 10 minute(s)  -RB      OT Received On 01/03/25  -RB      OT - Next Appointment 01/06/25  -RB      OT Goal Re-Cert Due Date 01/17/25  -RB         Timed Charges    66677 - OT Therapeutic Exercise Minutes 10  -RB         Untimed Charges    OT Eval/Re-eval Minutes 17  -RB         Total Minutes    Timed Charges Total Minutes 10  -RB      Untimed Charges Total Minutes 17  -RB       Total Minutes 27  -RB                User Key  (r) = Recorded By, (t) = Taken By, (c) = Cosigned By      Initials Name Provider Type    Belem Figueredo OT Occupational Therapist                  Therapy Charges for Today       Code Description  Service Date Service Provider Modifiers Qty    [id number]  OT THER PROC EA 15 MIN 1/3/2025 Belem Sauceda, REGINE GO 1    [id number]  OT EVAL MOD COMPLEXITY 3 1/3/2025 Belem Sauceda OT GO 1                 eBlem Sauceda OT  1/3/2025

## 2025-01-03 NOTE — CONSULTS
[facility] Consult Note      Patient Name: Lana Yañez  : 1945  MRN: [id number]  Primary Care Physician:  Toni Green MD  Referring Physician: Lynda Howell MD  Date of admission: 2024    Subjective     Reason for Consult: FELIX on CKD stage IIIa    HPI:   Lana Yañez is a 79 y.o. female with CKD stage IIIa (baseline SCr 1.0-1.2), CHF, COPD, CAD, type II DM, HTN, prior stroke, and FRANCY, presented to the hospital on 2024 for headaches, SOA, and cough x 3 days.  CXR showed pulm vasculature congestion and possible pulm edema and/or PNA.  Patient has been treated with IV antibiotics and IV Lasix 40 mg BID.  SCr 1.0 on arrival, and 1.2 today.    Has been taking torsemide 40 mg QOD (though prescribed daily) in the preceding 2 weeks due to frequent urination; that's when SOA, cough, and BLE edema started  Reports weight has been increasing lately; she estimates a 5-pound weight gain  Sips on water all day; eats mostly restaurant food  + Dizziness, ANTOINE, and acute on chronic orthopnea  Appetite mediocre, but no N/V/D, though has loose stools  Denies urinary symptoms except incontinence    Review of Systems:   14 point review of systems is otherwise negative except for mentioned above on HPI    Personal History     Past Medical History:   Diagnosis Date   • Acute on chronic diastolic heart failure    • Acute UTI (urinary tract infection) 2022   • Allergic reaction 2018   • Arthritis    • Arthritis of back    • Arthritis of neck    • Asthma    • Atrial fibrillation     Persistent; on warfarin   • Atypical chest pain    • Biliary acute pancreatitis without necrosis or infection 11/10/2021   • Bronchitis    • Cellulitis of right elbow     due to MRSA   • Cervical disc disorder    • CHF (congestive heart failure)    • COPD (chronic obstructive pulmonary disease)    • Coronary artery disease     Cardiac catheterization completed; 90% PDA stenosis  with medical management recommended   • Coronary artery disease involving native coronary artery of native heart with angina pectoris with documented spasm    • CTS (carpal tunnel syndrome)    • Diabetes 1.5, managed as type 2    • Disease of thyroid gland    • Displacement of lumbar intervertebral disc without myelopathy    • Dizziness    • ANTOINE (dyspnea on exertion)    • Essential hypertension    • Fatigue    • Fracture, foot    • Generalized osteoarthritis of multiple sites    • History of rheumatic fever    • History of transfusion    • Hx of bone density study     10/23/2014   • Hyperglycemia    • Hyperlipidemia    • Hypovitaminosis D    • Knee swelling    • Left arm pain    • Left-sided weakness    • Leg swelling    • Low back pain    • Lower extremity edema    • Lumbosacral disc disease    • Malaise and fatigue    • Mitral valve disease     Moderate mitral valve prolapse and moderate mitral regurgitation   • Mitral valve insufficiency    • Morbid obesity with BMI of 40.0-44.9, adult    • MRSA infection    • NSTEMI (non-ST elevated myocardial infarction)    • PAF (paroxysmal atrial fibrillation)    • Periarthritis of shoulder    • Pneumonia 1/1/2025   • RA (rheumatoid arthritis)     involving both hands   • Rotator cuff syndrome    • Sleep apnea    • SOB (shortness of breath)    • Stroke     left side weakness   • Urge incontinence of urine    • UTI (urinary tract infection) 05/2020   • Visit for screening mammogram 04/02/2018   • Vitamin D deficiency        Past Surgical History:   Procedure Laterality Date   • BREAST BIOPSY     • BREAST SURGERY      right side lumpectomy with biopsy   • CARDIAC CATHETERIZATION Left 10/20/2017    Procedure: Cardiac Catheterization/Vascular Study;  Surgeon: Alphonso Olmedo MD;  Location: [facility] INVASIVE LOCATION;  Service:    • CARDIAC CATHETERIZATION N/A 10/20/2017    +2 mitral regurgitation, left main 10% stenosis, mid to distal LAD 10% diffuse stenosis, circumflex 10%  diffuse stenosis, RCA 10% proximal stenosis, and distal PDA consistent with coronary embolus with a lesion of 90% too small to consider coronary intervention; medical management recommended   • EYE SURGERY      laser surgery for glaucoma and left eye cataracts removed   • HYSTERECTOMY      10+ years ago   • JOINT REPLACEMENT  2005; 2006    bilateral knees and left rotater   • KNEE SURGERY     • MAMMO BILATERAL  2016    [facility]    • SHOULDER SURGERY         Family History: family history includes Alcohol abuse in her maternal uncle; Arthritis in her brother, daughter, father, mother, and sister; Asthma in her sister; COPD in her father; Colon cancer in her mother; Depression in her daughter; Diabetes in her brother and sister; Drug abuse in her brother; Glaucoma in her mother and sister; Heart disease in her brother, brother, sister, sister, and sister; Hypertension in her brother, mother, and sister; Lung disease in her father and sister; Miscarriages / Stillbirths in her sister; Rheumatologic disease in her sister; Stroke in her mother.    Social History:  reports that she quit smoking about 56 years ago. Her smoking use included cigarettes. She started smoking about 57 years ago. She has a 4.3 pack-year smoking history. She has never been exposed to tobacco smoke. She has never used smokeless tobacco. She reports that she does not drink alcohol and does not use drugs.    Home Medications:  Prior to Admission medications    Medication Sig Start Date End Date Taking? Authorizing Provider   Accu-Chek FastClix Lancets misc Use to check glucose as directed 12/9/22   Will Madden MD   acetaminophen (TYLENOL) 325 MG tablet Take 2 tablets by mouth Every 6 (Six) Hours As Needed for Moderate Pain. 5/28/22   ProviderIvett MD   albuterol sulfate  (90 Base) MCG/ACT inhaler Inhale 2 puffs Every 4 (Four) Hours As Needed for Shortness of Air. PRN SOA/WHEEZING 6/3/22   Mary Keene APRN   Alcohol  Swabs (DropSafe Alcohol Prep) 70 % pads  2/22/24   Ivett Castro MD   aspirin 81 MG EC tablet Take 1 tablet by mouth Daily. 10/28/17   Jhony Hernandez MD   atorvastatin (LIPITOR) 10 MG tablet Take 1 tablet by mouth Daily. 2/14/23   Toni Green MD   Blood Glucose Monitoring Suppl (ACCU-CHEK GUIDE) w/Device kit See Admin Instructions. 6/4/20   Ivett Castro MD   Cholecalciferol 50 MCG (2000 UT) capsule Take 50 mcg by mouth Daily. 2/28/24   Ivett Castro MD   digoxin (LANOXIN) 125 MCG tablet Take 1 tablet by mouth Every Other Day. 2/14/23   Toni Green MD   glimepiride (Amaryl) 2 MG tablet By mouth take one tab twice daily with AM and PM meals. 10/25/22   Ed Ho MD   glucose blood test strip Test twice daily prior to breakfast and dinner as instructed  Patient taking differently: Test daily prior to breakfast 11/18/22   Will Madden MD   HYDROcodone-acetaminophen (NORCO) 7.5-325 MG per tablet Take 1 tablet twice a day by oral route as needed for 30 days. 12/21/23   Ivett Castro MD   Januvia 50 MG tablet  9/19/23   Ivett Castro MD   Lumigan 0.01 % ophthalmic drops Administer 1 drop to both eyes Every Night. 9/27/23   Ivett Castro MD   metoprolol tartrate (Lopressor) 50 MG tablet Take 1 tablet by mouth 2 (Two) Times a Day for 30 days. 2/14/23 10/4/99  Toni Green MD   polyethylene glycol (MIRALAX) 17 g packet Take 17 g by mouth Daily As Needed (constipation). 5/28/22   Ivett Castro MD   potassium chloride 10 MEQ CR tablet 1 po bid 3/28/23   Toni Green MD   torsemide (DEMADEX) 20 MG tablet 2 po qday 3/21/23   Toni Green MD   vitamin D (ERGOCALCIFEROL) 1.25 MG (96761 UT) capsule capsule Take 1 capsule by mouth 1 (One) Time Per Week. 2/14/23   Toni Green MD   warfarin (COUMADIN) 5 MG tablet Take one-half tablet (2.5 mg) by mouth on Fridays, and take one tablet (5 mg) by mouth all other days or as directed. 2/9/23    Pia Dueñas MD       Allergies:  Allergies   Allergen Reactions   • Metformin Diarrhea   • Contrast Dye (Echo Or Unknown Ct/Mr) Hives and Itching   • Dapagliflozin Headache and Swelling   • Macrobid [Nitrofurantoin] Hives   • Iodinated Contrast Media Hives       Objective     Vitals:   Temp:  [97.5 °F (36.4 °C)-98.1 °F (36.7 °C)] 97.5 °F (36.4 °C)  Heart Rate:  [75-99] 84  Resp:  [15-21] 20  BP: (104-149)/() 127/78  Flow (L/min) (Oxygen Therapy):  [2] 2 (actually on room air)    Intake/Output Summary (Last 24 hours) at 1/3/2025 1552  Last data filed at 1/3/2025 0500  Gross per 24 hour   Intake 100 ml   Output 2900 ml   Net -2800 ml       Physical Exam:   Constitutional: Awake, chronically ill, NAD, slight ptosis on the left  HEENT: Sclera anicteric, no conjunctival injection  Neck: Supple, no JVD  Respiratory: A few crackles in bases bilaterally, nonlabored on RA  Cardiovascular: Irregularly irregular, not tachycardic, no rub.    Gastrointestinal: BS +, soft, nontender and nondistended  : No palpable bladder  Musculoskeletal: +1 edema both lower legs, no clubbing or cyanosis  Psychiatric: Appropriate affect, cooperative, oriented  Neurologic: No asterixis, moving all extremities, normal speech  Skin: Warm and dry; venous stasis changes bilaterally       Scheduled Meds:     aspirin, 81 mg, Oral, Daily  atenolol, 25 mg, Oral, BID  atorvastatin, 10 mg, Oral, Daily  azithromycin, 500 mg, Oral, Q24H  cefTRIAXone, 2,000 mg, Intravenous, Q24H  digoxin, 125 mcg, Oral, Every Other Day  [START ON 1/4/2025] furosemide, 40 mg, Intravenous, Q12H  latanoprost, 1 drop, Both Eyes, Nightly  Lidocaine, 1 patch, Transdermal, Q24H  linagliptin, 5 mg, Oral, Daily  mupirocin, 1 Application, Each Nare, BID  potassium chloride, 10 mEq, Oral, BID With Meals  potassium chloride, 40 mEq, Oral, Once  spironolactone, 25 mg, Oral, Daily  [START ON 1/4/2025] torsemide, 40 mg, Oral, Daily  warfarin, 4 mg, Oral, Once      IV Meds:    Pharmacy to dose warfarin,         Results Reviewed:   I have personally reviewed the results from the time of this admission to 1/3/2025 15:52 EST     Lab Results   Component Value Date    GLUCOSE 205 (H) 01/02/2025    CALCIUM 8.9 01/02/2025     01/02/2025    K 3.4 (L) 01/02/2025    CO2 28.0 01/02/2025    CL 99 01/02/2025    BUN 17 01/02/2025    CREATININE 1.23 (H) 01/02/2025    EGFRIFAFRI 51 (L) 02/09/2022    EGFRIFNONA 45 (L) 02/09/2022    BCR 13.8 01/02/2025    ANIONGAP 10.0 01/02/2025      Lab Results   Component Value Date    MG 1.7 05/14/2024    ALBUMIN 3.7 01/02/2025           Assessment / Plan       Acute hypoxic respiratory failure    Mixed hyperlipidemia    Chronic atrial fibrillation    Acute on chronic diastolic CHF (congestive heart failure)    Type 2 diabetes mellitus with hyperglycemia, without long-term current use of insulin    Stage 3a chronic kidney disease    Pneumonia    Acute on chronic hypoxic respiratory failure      ASSESSMENT:  CKD stage IIIa, baseline SCr 1.0-1.2, stable.  Underlying disease likely longstanding hypertensive/diabetic nephrosclerosis and chronic CHF.   Improving volume excess, with negative fluid balance on diuretic; electrolytes within acceptable range except low potassium.  No urine studies yet  Acute hypoxic respiratory failure, secondary to CHF exacerbation with pulm congestion/edema/PNA, currently breathing comfortably on RA, followed by primary team  PNA, on IV/PO antibiotics, improving  Acute on chronic diastolic congestive heart failure with volume excess on imaging/exam, secondary to diuretic noncompliance and salt indiscretions at home.  Echo in October '24 showed EF 59%, has been receiving IV Lasix 40 mg BID since admission with significant improvement, cardiology added spironolactone 25 today  Chronic A-fib, rate controlled, on digoxin metoprolol and warfarin  CAD  Type II DM with CKD, managed by primary team  HTN with CKD, BP acceptable  Hyperlipidemia,  on statin      PLAN:  Transition to oral torsemide 40 mg daily  Agree with spironolactone  PO KCl 40mEq x1  Check urine studies for completeness'  Discussed with patient about diuretic compliance.  We talked about importance of salt restriction and fluid restriction (50 ounces per day)  Home anytime from renal standpoint.  Will arrange follow-up in the office in 2 weeks.      Thank you for involving us in the care of Lana Yañez.  Please feel free to call with any questions.    Kayden Coe MD  01/03/25  15:52 EST    [facility]  [phone]      Please note that portions of this note were completed with a voice recognition program.

## 2025-01-03 NOTE — PROGRESS NOTES
Name: Lana Yañez ADMIT: 2024   : 1945  PCP: Toni Green MD    MRN: [id number] LOS: 1 days   AGE/SEX: 79 y.o. female  ROOM: [facility]     Subjective   Subjective   Patient is seen at bedside, patient is lying in bed, reports improvement in her symptoms.       Objective   Objective   Vital Signs  Temp:  [97.5 °F (36.4 °C)-98.1 °F (36.7 °C)] 97.5 °F (36.4 °C)  Heart Rate:  [75-99] 84  Resp:  [15-21] 20  BP: (104-149)/() 127/78  SpO2:  [96 %-100 %] 96 %  on  Flow (L/min) (Oxygen Therapy):  [2] 2;   Device (Oxygen Therapy): room air  Body mass index is 38.82 kg/m².  Physical Exam  General, awake and alert.  Head and ENT, normocephalic and atraumatic.  Lungs, symmetric expansion, equal air entry bilaterally.  Heart, regular rate and rhythm.  Abdomen, soft and nontender.  Extremities, no clubbing or cyanosis.  Neuro, no focal deficits.  Skin: Warm and no rash.  Psych, normal mood and affect.  Musculoskeletal, joint examination is grossly normal.      Copied text material from yesterday's note has been reviewed for appropriate changes and remains accurate as of 1/3/25.        Results Review     I reviewed the patient's new clinical results.  Results from last 7 days   Lab Units 25  0905 25  1419   WBC 10*3/mm3 10.16 13.70* 9.78   HEMOGLOBIN g/dL 11.8* 12.3 13.4   PLATELETS 10*3/mm3 139* 131* 142     Results from last 7 days   Lab Units 25  0905 25  1419   SODIUM mmol/L 137 135* 133*   POTASSIUM mmol/L 3.4* 3.6 4.2   CHLORIDE mmol/L 99 103 101   CO2 mmol/L 28.0 23.5 24.0   BUN mg/dL 17 15 13   CREATININE mg/dL 1.23* 1.13* 1.01*   GLUCOSE mg/dL 205* 161* 156*   EGFR mL/min/1.73 45.4* 50.2* 57.5*     Results from last 7 days   Lab Units 25  0905 24  1419   ALBUMIN g/dL 3.7 4.2   BILIRUBIN mg/dL 0.7 0.8   ALK PHOS U/L 63 70   AST (SGOT) U/L 15 19   ALT (SGPT) U/L 10 11     Results from last 7 days   Lab Units 25  0905  01/01/25 0228 12/31/24  1419   CALCIUM mg/dL 8.9 8.9 9.1   ALBUMIN g/dL 3.7  --  4.2     Results from last 7 days   Lab Units 01/01/25 0228 12/31/24  2324 12/31/24  2051 12/31/24  1702 12/31/24  1542   PROCALCITONIN ng/mL  --   --   --   --  0.07   LACTATE mmol/L 1.5 2.2* 2.1* 2.2*  --      Glucose   Date/Time Value Ref Range Status   01/01/2025 1823 179 (H) 70 - 130 mg/dL Final   01/01/2025 1207 176 (H) 70 - 130 mg/dL Final       No radiology results for the last day    I have personally reviewed all medications:  Scheduled Medications  aspirin, 81 mg, Oral, Daily  atenolol, 25 mg, Oral, BID  atorvastatin, 10 mg, Oral, Daily  azithromycin, 500 mg, Oral, Q24H  cefTRIAXone, 2,000 mg, Intravenous, Q24H  digoxin, 125 mcg, Oral, Every Other Day  latanoprost, 1 drop, Both Eyes, Nightly  Lidocaine, 1 patch, Transdermal, Q24H  linagliptin, 5 mg, Oral, Daily  mupirocin, 1 Application, Each Nare, BID  potassium chloride, 10 mEq, Oral, BID With Meals  spironolactone, 25 mg, Oral, Daily  [START ON 1/4/2025] torsemide, 40 mg, Oral, Daily    Infusions  Pharmacy to dose warfarin,     Diet  Diet: Cardiac, Fluid Restriction (240 mL/tray); Healthy Heart (2-3 Na+); 1500 mL/day; Fluid Consistency: Thin (IDDSI 0)    I have personally reviewed:  [x]  Laboratory   [x]  Microbiology   [x]  Radiology   [x]  EKG/Telemetry  [x]  Cardiology/Vascular   []  Pathology    []  Records       Assessment/Plan     Active Hospital Problems    Diagnosis  POA    **Acute hypoxic respiratory failure [J96.01]  Yes    Acute on chronic hypoxic respiratory failure [J96.21]  Yes    Pneumonia [J18.9]  Yes    Stage 3a chronic kidney disease [N18.31]  Yes    Type 2 diabetes mellitus with hyperglycemia, without long-term current use of insulin [E11.65]  Yes    Acute on chronic diastolic CHF (congestive heart failure) [I50.33]  Yes    Chronic atrial fibrillation [I48.20]  Yes    Mixed hyperlipidemia [E78.2]  Yes      Resolved Hospital Problems   No resolved  problems to display.       79 y.o. female admitted with Acute hypoxic respiratory failure.    Assessment and plan  1.  Acute respiratory failure with hypoxia, patient has underlying acute on chronic diastolic congestive heart failure, patient was followed by cardiology and nephrology service, patient has been switched from Lasix to torsemide today.  Clinically she has been improving.     2.  Chronic kidney disease stage III, avoid nephrotoxic medications, renal function appears to be close to baseline.     3.  Chronic atrial fibrillation, patient currently appears to be rate controlled, continue Coumadin.  Monitor INR.  INR is in the therapeutic range.     4.  Hyperlipidemia, continue statins.     5.  Coronary artery disease, continue aspirin and statins.     6.  Type 2 diabetes mellitus, continue Accu-Cheks along with current oral regimen.     7.  Left ankle pain, order imaging.  No evidence of acute fractures.     8.  CODE STATUS is full code.  Further plans based on hospital course.    Jesus Carrillo MD  [facility]  01/03/25  18:11 EST

## 2025-01-03 NOTE — THERAPY EVALUATION
Patient Name: Lana Yañez  : 1945    MRN: [id number]                              Today's Date: 1/3/2025       Admit Date: 2024    Visit Dx:     ICD-10-CM ICD-9-CM   1. Acute hypoxic respiratory failure  J96.01 518.81   2. Acute cough  R05.1 786.2   3. Pneumonia of both lower lobes due to infectious organism  J18.9 486   4. Acute on chronic congestive heart failure, unspecified heart failure type  I50.9 428.0     Patient Active Problem List   Diagnosis    Mixed hyperlipidemia    Vitamin deficiency    Essential hypertension    Rheumatoid arthritis involving both hands    Fatigue    ANTOINE (dyspnea on exertion)    Dysuria    Urge incontinence of urine    Hypovitaminosis D    Sleep apnea    Encounter for screening colonoscopy    Bronchitis    Cough    Generalized osteoarthritis of multiple sites    Cellulitis of right elbow due to MRSA    Medicare annual wellness visit, initial    Hospital discharge follow-up    Displacement of lumbar intervertebral disc without myelopathy    Lumbar disc herniation    Chronic atrial fibrillation    History of MRSA infection    Left-sided weakness    Atrial fibrillation    Left shoulder pain    Cerebrovascular accident (CVA) due to occlusion of right middle cerebral artery    Relative lymphocytosis    Nausea    Weakness    Controlled substance agreement signed    Coronary artery disease involving native coronary artery of native heart without angina pectoris    SOB (shortness of breath)    Lower extremity edema    Acute on chronic diastolic CHF (congestive heart failure)    Adhesive capsulitis of left shoulder    CVA tenderness    Costochondritis, acute    Allergic reaction    Coronary artery embolism    Visit for screening mammogram    Low back pain    Urgency of urination    Hyperglycemia    Carpal tunnel syndrome of left wrist/ wakes her up    Localized edema    Acute cystitis without hematuria    Bruising    History of stroke    Diabetes 1.5, managed as type 2     Other chronic pain    Vaginal candidiasis    Pulmonary hypertension    Acute respiratory failure with hypoxia    Hypokalemia    Hypomagnesemia    Type 2 diabetes mellitus with hyperglycemia, without long-term current use of insulin    Diarrhea    Sepsis without acute organ dysfunction    Morbid obesity with BMI of 40.0-44.9, adult    Biliary acute pancreatitis without necrosis or infection    Stage 3a chronic kidney disease    Acute UTI (urinary tract infection)    Hemiparesis of left nondominant side as late effect of cerebral infarction    Acute hypoxic respiratory failure    Pneumonia    Acute on chronic hypoxic respiratory failure     Past Medical History:   Diagnosis Date    Acute on chronic diastolic heart failure     Acute UTI (urinary tract infection) 05/22/2022    Allergic reaction 02/09/2018    Arthritis     Arthritis of back     Arthritis of neck     Asthma     Atrial fibrillation     Persistent; on warfarin    Atypical chest pain     Biliary acute pancreatitis without necrosis or infection 11/10/2021    Bronchitis     Cellulitis of right elbow     due to MRSA    Cervical disc disorder     CHF (congestive heart failure)     COPD (chronic obstructive pulmonary disease)     Coronary artery disease     Cardiac catheterization completed; 90% PDA stenosis with medical management recommended    Coronary artery disease involving native coronary artery of native heart with angina pectoris with documented spasm     CTS (carpal tunnel syndrome)     Diabetes 1.5, managed as type 2     Disease of thyroid gland     Displacement of lumbar intervertebral disc without myelopathy     Dizziness     ANTOINE (dyspnea on exertion)     Essential hypertension     Fatigue     Fracture, foot     Generalized osteoarthritis of multiple sites     History of rheumatic fever     History of transfusion     Hx of bone density study     10/23/2014    Hyperglycemia     Hyperlipidemia     Hypovitaminosis D     Knee swelling     Left arm pain      Left-sided weakness     Leg swelling     Low back pain     Lower extremity edema     Lumbosacral disc disease     Malaise and fatigue     Mitral valve disease     Moderate mitral valve prolapse and moderate mitral regurgitation    Mitral valve insufficiency     Morbid obesity with BMI of 40.0-44.9, adult     MRSA infection     NSTEMI (non-ST elevated myocardial infarction)     PAF (paroxysmal atrial fibrillation)     Periarthritis of shoulder     Pneumonia 1/1/2025    RA (rheumatoid arthritis)     involving both hands    Rotator cuff syndrome     Sleep apnea     SOB (shortness of breath)     Stroke     left side weakness    Urge incontinence of urine     UTI (urinary tract infection) 05/2020    Visit for screening mammogram 04/02/2018    Vitamin D deficiency      Past Surgical History:   Procedure Laterality Date    BREAST BIOPSY      BREAST SURGERY      right side lumpectomy with biopsy    CARDIAC CATHETERIZATION Left 10/20/2017    Procedure: Cardiac Catheterization/Vascular Study;  Surgeon: Alphonso Olmedo MD;  Location: [facility] CATH INVASIVE LOCATION;  Service:     CARDIAC CATHETERIZATION N/A 10/20/2017    +2 mitral regurgitation, left main 10% stenosis, mid to distal LAD 10% diffuse stenosis, circumflex 10% diffuse stenosis, RCA 10% proximal stenosis, and distal PDA consistent with coronary embolus with a lesion of 90% too small to consider coronary intervention; medical management recommended    EYE SURGERY      laser surgery for glaucoma and left eye cataracts removed    HYSTERECTOMY      10+ years ago    JOINT REPLACEMENT  2005; 2006    bilateral knees and left rotater    KNEE SURGERY      MAMMO BILATERAL  2016    [facility]     SHOULDER SURGERY        General Information       Row Name 01/03/25 0915          Physical Therapy Time and Intention    Document Type evaluation  -SV     Mode of Treatment individual therapy;physical therapy  -SV       Row Name 01/03/25 0915          General Information     Patient Profile Reviewed yes  -SV     Prior Level of Function independent:;all household mobility  with rollator  -SV     Existing Precautions/Restrictions fall;oxygen therapy device and L/min  -SV     Barriers to Rehab medically complex;previous functional deficit  -SV       Row Name 01/03/25 0915          Living Environment    People in Home spouse  -SV       Row Name 01/03/25 0915          Home Main Entrance    Number of Stairs, Main Entrance ten  -SV       Row Name 01/03/25 0915          Stairs Within Home, Primary    Stairs, Within Home, Primary bilevel with one flight to main floor  -SV       Row Name 01/03/25 0915          Cognition    Orientation Status (Cognition) oriented x 4  -SV       Row Name 01/03/25 0915          Safety Issues/Impairments Affecting Functional Mobility    Impairments Affecting Function (Mobility) balance;endurance/activity tolerance;strength;range of motion (ROM);pain  -SV               User Key  (r) = Recorded By, (t) = Taken By, (c) = Cosigned By      Initials Name Provider Type    SV Belgica De La Fuente, PT Physical Therapist                   Mobility       Row Name 01/03/25 0953          Bed Mobility    Bed Mobility supine-sit  -SV     Supine-Sit [place] (Bed Mobility) minimum assist (75% patient effort);moderate assist (50% patient effort)  -SV     Assistive Device (Bed Mobility) head of bed elevated  -SV     Comment, (Bed Mobility) HOB elevated, reported adjustable bed at home, unable to use LUE to asst  -SV       Row Name 01/03/25 0953          Sit-Stand Transfer    Sit-Stand [place] (Transfers) minimum assist (75% patient effort);2 person assist  -SV     Assistive Device (Sit-Stand Transfers) walker, front-wheeled  -SV     Comment, (Sit-Stand Transfer) from elevated bed  -SV       Row Name 01/03/25 0953          Gait/Stairs (Locomotion)    Manati Level (Gait) contact guard;minimum assist (75% patient effort);2 person assist  -SV     Assistive Device (Gait) walker,  "front-wheeled  -SV     Distance in Feet (Gait) 3  -SV     Deviations/Abnormal Patterns (Gait) gait speed decreased;festinating/shuffling  -SV     Bilateral Gait Deviations forward flexed posture;heel strike decreased  -SV               User Key  (r) = Recorded By, (t) = Taken By, (c) = Cosigned By      Initials Name Provider Type    SV Belgica De La Fuente, PT Physical Therapist                   Obj/Interventions       Row Name 01/03/25 0968          Range of Motion Comprehensive    General Range of Motion upper extremity range of motion deficits identified  -SV     Comment, General Range of Motion unable to elevated LUE, elbow wfl,  wfl, RUE wfl arom , boston hip flex appears limited past 60 degrees boston in supine, ankle pf wfl, boston ankle DF< neutral left worse than right , left ankle edema noted( pt reported lob this week during amb at [facility] with self recovery, no fall but felt pain in ankle) per left ankle xray: soft tissue swelling only , no ankle fx on left  -       Row Name 01/03/25 8229          Strength Comprehensive (MMT)    Comment, General Manual Muscle Testing (MMT) Assessment left shoulder 0-1/5 due to marked pain, left elbow 3/5 , 4-/5, Right shoulder no MMT but > 3/5 throughtout, able to slr 5\" on right 1\" on left, ankle DF 2-/5 boston , PF 4/5 boston  -       Row Name 01/03/25 0960          Motor Skills    Therapeutic Exercise --  pt encouraged to perform pf stretching to achieve > neutral df arom  -SV       Row Name 01/03/25 0916          Balance    Balance Assessment sitting static balance;standing static balance  -SV     Static Sitting Balance supervision  -SV     Static Standing Balance contact guard  -SV     Position/Device Used, Standing Balance walker, rolling  -       Row Name 01/03/25 0909          Sensory Assessment (Somatosensory)    Sensory Assessment (Somatosensory) not tested  impaired skin integrity BOSTON lower legs noted  -SV               User Key  (r) = Recorded By, (t) = Taken By, " (c) = Cosigned By      Initials Name Provider Type    Belgica Cope, PT Physical Therapist                   Goals/Plan       Row Name 01/03/25 1001          Bed Mobility Goal 1 (PT)    Activity/Assistive Device (Bed Mobility Goal 1, PT) sit to supine/supine to sit  -SV     Barbour Level/Cues Needed (Bed Mobility Goal 1, PT) independent  -SV     Time Frame (Bed Mobility Goal 1, PT) 2 weeks  -SV       Row Name 01/03/25 1001          Transfer Goal 1 (PT)    Activity/Assistive Device (Transfer Goal 1, PT) sit-to-stand/stand-to-sit;walker, rolling  -SV     Time Frame (Transfer Goal 1, PT) 2 weeks  -SV       Row Name 01/03/25 1001          Gait Training Goal 1 (PT)    Activity/Assistive Device (Gait Training Goal 1, PT) gait (walking locomotion);walker, rolling  -SV     Barbour Level (Gait Training Goal 1, PT) independent  -SV     Distance (Gait Training Goal 1, PT) 150-200'  -SV     Time Frame (Gait Training Goal 1, PT) 2 weeks  -SV       Row Name 01/03/25 1001          ROM Goal 1 (PT)    ROM Goal 1 (PT) esperanza ankle DF > neutral arom  -SV     Time Frame (ROM Goal 1, PT) 2 weeks  -SV       Row Name 01/03/25 1001          Stairs Goal 1 (PT)    Activity/Assistive Device (Stairs Goal 1, PT) ascending stairs;descending stairs  -SV     Barbour Level/Cues Needed (Stairs Goal 1, PT) independent  -SV     Number of Stairs (Stairs Goal 1, PT) 10  -SV       Row Name 01/03/25 1001          Therapy Assessment/Plan (PT)    Planned Therapy Interventions (PT) balance training;patient/family education;strengthening;stretching;stair training;ROM (range of motion);transfer training;home exercise program  -SV               User Key  (r) = Recorded By, (t) = Taken By, (c) = Cosigned By      Initials Name Provider Type    Belgica Cope, PT Physical Therapist                   Clinical Impression       Row Name 01/03/25 0959          Pain    Pretreatment Pain Rating 0/10 - no pain  -SV     Posttreatment Pain Rating  0/10 - no pain  -SV     Pre/Posttreatment Pain Comment reported left ankle pain with rom  -SV       Row Name 01/03/25 0959          Plan of Care Review    Plan of Care Reviewed With patient  -SV       Row Name 01/03/25 0959          Therapy Assessment/Plan (PT)    Patient/Family Therapy Goals Statement (PT) return to indep amb with rollator  -SV     Criteria for Skilled Interventions Met (PT) yes  -SV     Therapy Frequency (PT) 6 times/wk  -SV     Predicted Duration of Therapy Intervention (PT) 2-3 weeks  -SV       Row Name 01/03/25 0959          Vital Signs    Pre Patient Position Supine  -SV     Intra Patient Position Standing  -SV     Post Patient Position Sitting  -SV       Row Name 01/03/25 0959          Positioning and Restraints    Pre-Treatment Position in bed  -SV     Post Treatment Position chair  -SV     In Chair reclined;call light within reach;encouraged to call for assist  -SV               User Key  (r) = Recorded By, (t) = Taken By, (c) = Cosigned By      Initials Name Provider Type    Belgica Cope, PT Physical Therapist                   Outcome Measures       Row Name 01/03/25 1002 01/03/25 0800       How much help from another person do you currently need...    Turning from your back to your side while in flat bed without using bedrails? 3  -SV 3  -BC    Moving from lying on back to sitting on the side of a flat bed without bedrails? 3  -SV 3  -BC    Moving to and from a bed to a chair (including a wheelchair)? 3  -SV 2  -BC    Standing up from a chair using your arms (e.g., wheelchair, bedside chair)? 3  -SV 2  -BC    Climbing 3-5 steps with a railing? 1  -SV 2  -BC    To walk in hospital room? 2  -SV 2  -BC    AM-PAC 6 Clicks Score (PT) 15  -SV 14  -BC              User Key  (r) = Recorded By, (t) = Taken By, (c) = Cosigned By      Initials Name Provider Type    Belgica Cope, PT Physical Therapist    Lucia Bhardwaj RN Registered Nurse                                 Physical  Therapy Education       Title: PT OT SLP Therapies (Done)       Topic: Physical Therapy (Done)       Point: Mobility training (Done)       Learning Progress Summary            Patient Acceptance, E, VU,NR by  at 1/3/2025 1003                      Point: Home exercise program (Done)       Learning Progress Summary            Patient Acceptance, E, VU,NR by  at 1/3/2025 1003                                      User Key       Initials Effective Dates Name Provider Type Discipline     07/11/23 -  Belgica De La Fuente, PT Physical Therapist PT                  PT Recommendation and Plan  Planned Therapy Interventions (PT): balance training, patient/family education, strengthening, stretching, stair training, ROM (range of motion), transfer training, home exercise program        Time Calculation:         PT Charges       Row Name 01/03/25 1014             Time Calculation    Start Time 0915  -      Stop Time 0940  -      Time Calculation (min) 25 min  -      PT Received On 01/03/25  -      PT - Next Appointment 01/04/25  -      PT Goal Re-Cert Due Date 01/17/25  -                User Key  (r) = Recorded By, (t) = Taken By, (c) = Cosigned By      Initials Name Provider Type     Belgica De La Fuente, PT Physical Therapist                  Therapy Charges for Today       Code Description Service Date Service Provider Modifiers Qty    63383103978 HC PT EVAL MOD COMPLEXITY 2 1/3/2025 Belgica De La Fuente, PT GP 1    77170505939 HC PT THERAPEUTIC ACT EA 15 MIN 1/3/2025 Belgica De La Fuente, PT GP 1    63935588392 HC PT THER SUPP EA 15 MIN 1/3/2025 Belgica De La Fuente, PT GP 1            PT G-Codes  AM-PAC 6 Clicks Score (PT): 15  PT Discharge Summary  Anticipated Discharge Disposition (PT): inpatient rehabilitation facility    Belgica De La Fuente PT  1/3/2025

## 2025-01-04 LAB
ALBUMIN SERPL-MCNC: 3.6 G/DL (ref 3.5–5.2)
ANION GAP SERPL CALCULATED.3IONS-SCNC: 11 MMOL/L (ref 5–15)
BASOPHILS # BLD AUTO: 0.03 10*3/MM3 (ref 0–0.2)
BASOPHILS NFR BLD AUTO: 0.3 % (ref 0–1.5)
BUN SERPL-MCNC: 21 MG/DL (ref 8–23)
BUN/CREAT SERPL: 19.8 (ref 7–25)
CALCIUM SPEC-SCNC: 9.1 MG/DL (ref 8.6–10.5)
CHLORIDE SERPL-SCNC: 98 MMOL/L (ref 98–107)
CO2 SERPL-SCNC: 30 MMOL/L (ref 22–29)
CREAT SERPL-MCNC: 1.06 MG/DL (ref 0.57–1)
DEPRECATED RDW RBC AUTO: 42.8 FL (ref 37–54)
EGFRCR SERPLBLD CKD-EPI 2021: 54.2 ML/MIN/1.73
EOSINOPHIL # BLD AUTO: 0.14 10*3/MM3 (ref 0–0.4)
EOSINOPHIL NFR BLD AUTO: 1.5 % (ref 0.3–6.2)
ERYTHROCYTE [DISTWIDTH] IN BLOOD BY AUTOMATED COUNT: 12.8 % (ref 12.3–15.4)
GLUCOSE BLDC GLUCOMTR-MCNC: 145 MG/DL (ref 70–130)
GLUCOSE BLDC GLUCOMTR-MCNC: 171 MG/DL (ref 70–130)
GLUCOSE BLDC GLUCOMTR-MCNC: 279 MG/DL (ref 70–130)
GLUCOSE SERPL-MCNC: 120 MG/DL (ref 65–99)
HCT VFR BLD AUTO: 37.6 % (ref 34–46.6)
HGB BLD-MCNC: 12.7 G/DL (ref 12–15.9)
IMM GRANULOCYTES # BLD AUTO: 0.04 10*3/MM3 (ref 0–0.05)
IMM GRANULOCYTES NFR BLD AUTO: 0.4 % (ref 0–0.5)
INR PPP: 2.56 (ref 0.9–1.1)
LYMPHOCYTES # BLD AUTO: 2.94 10*3/MM3 (ref 0.7–3.1)
LYMPHOCYTES NFR BLD AUTO: 32.1 % (ref 19.6–45.3)
MAGNESIUM SERPL-MCNC: 1.9 MG/DL (ref 1.6–2.4)
MCH RBC QN AUTO: 31.2 PG (ref 26.6–33)
MCHC RBC AUTO-ENTMCNC: 33.8 G/DL (ref 31.5–35.7)
MCV RBC AUTO: 92.4 FL (ref 79–97)
MONOCYTES # BLD AUTO: 1.24 10*3/MM3 (ref 0.1–0.9)
MONOCYTES NFR BLD AUTO: 13.6 % (ref 5–12)
NEUTROPHILS NFR BLD AUTO: 4.76 10*3/MM3 (ref 1.7–7)
NEUTROPHILS NFR BLD AUTO: 52.1 % (ref 42.7–76)
NRBC BLD AUTO-RTO: 0 /100 WBC (ref 0–0.2)
PHOSPHATE SERPL-MCNC: 3.1 MG/DL (ref 2.5–4.5)
PLATELET # BLD AUTO: 184 10*3/MM3 (ref 140–450)
PMV BLD AUTO: 10.7 FL (ref 6–12)
POTASSIUM SERPL-SCNC: 3.9 MMOL/L (ref 3.5–5.2)
PROTHROMBIN TIME: 27.8 SECONDS (ref 11.7–14.2)
RBC # BLD AUTO: 4.07 10*6/MM3 (ref 3.77–5.28)
SODIUM SERPL-SCNC: 139 MMOL/L (ref 136–145)
URATE SERPL-MCNC: 8.3 MG/DL (ref 2.4–5.7)
WBC NRBC COR # BLD AUTO: 9.15 10*3/MM3 (ref 3.4–10.8)

## 2025-01-04 PROCEDURE — 99232 SBSQ HOSP IP/OBS MODERATE 35: CPT | Performed by: INTERNAL MEDICINE

## 2025-01-04 PROCEDURE — 85610 PROTHROMBIN TIME: CPT | Performed by: INTERNAL MEDICINE

## 2025-01-04 PROCEDURE — 85025 COMPLETE CBC W/AUTO DIFF WBC: CPT

## 2025-01-04 PROCEDURE — 83735 ASSAY OF MAGNESIUM: CPT

## 2025-01-04 PROCEDURE — 80069 RENAL FUNCTION PANEL: CPT | Performed by: INTERNAL MEDICINE

## 2025-01-04 PROCEDURE — 84550 ASSAY OF BLOOD/URIC ACID: CPT

## 2025-01-04 PROCEDURE — 97110 THERAPEUTIC EXERCISES: CPT

## 2025-01-04 PROCEDURE — 82948 REAGENT STRIP/BLOOD GLUCOSE: CPT

## 2025-01-04 PROCEDURE — 25010000002 CEFTRIAXONE PER 250 MG: Performed by: INTERNAL MEDICINE

## 2025-01-04 PROCEDURE — 63710000001 INSULIN LISPRO (HUMAN) PER 5 UNITS: Performed by: INTERNAL MEDICINE

## 2025-01-04 RX ORDER — INSULIN LISPRO 100 [IU]/ML
2-9 INJECTION, SOLUTION INTRAVENOUS; SUBCUTANEOUS
Status: DISCONTINUED | OUTPATIENT
Start: 2025-01-04 | End: 2025-01-13 | Stop reason: HOSPADM

## 2025-01-04 RX ORDER — IBUPROFEN 600 MG/1
1 TABLET ORAL
Status: DISCONTINUED | OUTPATIENT
Start: 2025-01-04 | End: 2025-01-13 | Stop reason: HOSPADM

## 2025-01-04 RX ORDER — DEXTROSE MONOHYDRATE 25 G/50ML
25 INJECTION, SOLUTION INTRAVENOUS
Status: DISCONTINUED | OUTPATIENT
Start: 2025-01-04 | End: 2025-01-13 | Stop reason: HOSPADM

## 2025-01-04 RX ORDER — WARFARIN SODIUM 4 MG/1
4 TABLET ORAL
Status: COMPLETED | OUTPATIENT
Start: 2025-01-04 | End: 2025-01-04

## 2025-01-04 RX ORDER — NICOTINE POLACRILEX 4 MG
15 LOZENGE BUCCAL
Status: DISCONTINUED | OUTPATIENT
Start: 2025-01-04 | End: 2025-01-13 | Stop reason: HOSPADM

## 2025-01-04 RX ORDER — ATENOLOL 25 MG/1
12.5 TABLET ORAL 2 TIMES DAILY
Status: DISCONTINUED | OUTPATIENT
Start: 2025-01-04 | End: 2025-01-05

## 2025-01-04 RX ADMIN — LIDOCAINE 1 PATCH: 4 PATCH TOPICAL at 09:02

## 2025-01-04 RX ADMIN — TORSEMIDE 40 MG: 20 TABLET ORAL at 08:59

## 2025-01-04 RX ADMIN — AZITHROMYCIN 500 MG: 250 TABLET, FILM COATED ORAL at 17:09

## 2025-01-04 RX ADMIN — CEFTRIAXONE 2000 MG: 2 INJECTION, POWDER, FOR SOLUTION INTRAMUSCULAR; INTRAVENOUS at 17:09

## 2025-01-04 RX ADMIN — ATENOLOL 25 MG: 25 TABLET ORAL at 08:59

## 2025-01-04 RX ADMIN — HYDROCODONE BITARTRATE AND ACETAMINOPHEN 1 TABLET: 7.5; 325 TABLET ORAL at 05:05

## 2025-01-04 RX ADMIN — SPIRONOLACTONE 25 MG: 25 TABLET ORAL at 08:58

## 2025-01-04 RX ADMIN — ASPIRIN 81 MG: 81 TABLET, COATED ORAL at 08:59

## 2025-01-04 RX ADMIN — LINAGLIPTIN 5 MG: 5 TABLET, FILM COATED ORAL at 08:58

## 2025-01-04 RX ADMIN — ATORVASTATIN CALCIUM 10 MG: 10 TABLET, FILM COATED ORAL at 08:59

## 2025-01-04 RX ADMIN — MUPIROCIN 1 APPLICATION: 20 OINTMENT TOPICAL at 22:13

## 2025-01-04 RX ADMIN — WARFARIN SODIUM 4 MG: 4 TABLET ORAL at 18:19

## 2025-01-04 RX ADMIN — HYDROCODONE BITARTRATE AND ACETAMINOPHEN 1 TABLET: 7.5; 325 TABLET ORAL at 22:14

## 2025-01-04 RX ADMIN — DIGOXIN 125 MCG: 125 TABLET ORAL at 08:58

## 2025-01-04 RX ADMIN — POTASSIUM CHLORIDE 10 MEQ: 750 TABLET, EXTENDED RELEASE ORAL at 08:59

## 2025-01-04 RX ADMIN — INSULIN LISPRO 2 UNITS: 100 INJECTION, SOLUTION INTRAVENOUS; SUBCUTANEOUS at 22:14

## 2025-01-04 NOTE — PROGRESS NOTES
[facility] Progress Note      Patient Name: Lana Yañez  : 1945  MRN: [id number]  Primary Care Physician:  Toni Green MD  Date of admission: 2024    Subjective     Interval History:   Follow-up acute kidney injury on stage IIIa chronic kidney disease.    Review of Systems:   No acute events overnight.  Feels well.  Denies chest pain or shortness of breath.    Objective     Vitals:   Temp:  [97.5 °F (36.4 °C)-99 °F (37.2 °C)] 97.7 °F (36.5 °C)  Heart Rate:  [84-93] 93  Resp:  [18-21] 19  BP: (102-149)/(62-82) 102/62    Intake/Output Summary (Last 24 hours) at 2025 1106  Last data filed at 1/3/2025 1800  Gross per 24 hour   Intake 360 ml   Output 1000 ml   Net -640 ml       Physical Exam:    General Appearance: alert, oriented x 3, no acute distress   Skin: warm and dry  HEENT: oral mucosa normal, nonicteric sclera  Neck: supple, no JVD  Lungs: CTA  Heart: Regular normal S1 and S2  Abdomen: soft, nontender, nondistended  : no palpable bladder  Extremities: Trace LE swelling.  Neuro: normal speech and mental status     Scheduled Meds:     aspirin, 81 mg, Oral, Daily  atenolol, 12.5 mg, Oral, BID  atorvastatin, 10 mg, Oral, Daily  azithromycin, 500 mg, Oral, Q24H  cefTRIAXone, 2,000 mg, Intravenous, Q24H  digoxin, 125 mcg, Oral, Every Other Day  latanoprost, 1 drop, Both Eyes, Nightly  Lidocaine, 1 patch, Transdermal, Q24H  linagliptin, 5 mg, Oral, Daily  mupirocin, 1 Application, Each Nare, BID  spironolactone, 25 mg, Oral, Daily  torsemide, 40 mg, Oral, Daily  warfarin, 4 mg, Oral, Once      IV Meds:   Pharmacy to dose warfarin,         Results Reviewed:   I have personally reviewed the results from the time of this admission to 2025 11:06 EST     Results from last 7 days   Lab Units 25  0511 25  0905 25  0228 24  1419   SODIUM mmol/L 139 137 135* 133*   POTASSIUM mmol/L 3.9 3.4* 3.6 4.2   CHLORIDE mmol/L 98 99 103 101   CO2  mmol/L 30.0* 28.0 23.5 24.0   BUN mg/dL 21 17 15 13   CREATININE mg/dL 1.06* 1.23* 1.13* 1.01*   CALCIUM mg/dL 9.1 8.9 8.9 9.1   BILIRUBIN mg/dL  --  0.7  --  0.8   ALK PHOS U/L  --  63  --  70   ALT (SGPT) U/L  --  10  --  11   AST (SGOT) U/L  --  15  --  19   GLUCOSE mg/dL 120* 205* 161* 156*       Estimated Creatinine Clearance: 55.9 mL/min (A) (by C-G formula based on SCr of 1.06 mg/dL (H)).    Results from last 7 days   Lab Units 01/04/25  0511   MAGNESIUM mg/dL 1.9   PHOSPHORUS mg/dL 3.1       Results from last 7 days   Lab Units 01/04/25  0511   URIC ACID mg/dL 8.3*       Results from last 7 days   Lab Units 01/04/25  0511 01/02/25  0905 01/01/25 0228 12/31/24  1419   WBC 10*3/mm3 9.15 10.16 13.70* 9.78   HEMOGLOBIN g/dL 12.7 11.8* 12.3 13.4   PLATELETS 10*3/mm3 184 139* 131* 142       Results from last 7 days   Lab Units 01/04/25  0511 01/03/25  0615 01/02/25  0905 01/01/25 0228 12/31/24  2324   INR  2.56* 2.39* 2.35* 1.99* 1.85*       Assessment / Plan     ASSESSMENT:  -Acute kidney injury, resolved.  Urinalysis-bland.  Spot urine protein creatinine ratio-unremarkable.  -Underlying chronic kidney disease stage IIIa.  Baseline serum creatinine 1.0-1.2 mg/dL.  -CHF observation, improving  -PNA    PLAN:  Continue torsemide 40 mg daily spironolactone 25 mg daily.  Monitor for diuretic toxicity.  Home anytime from nephrology standpoint.    Thank you for involving us in the care of Lana Yañez.  Please feel free to call with any questions.    Ottoniel Arellano MD  01/04/25  11:06 [facility]    [facility]  [phone]    Please note that portions of this note were completed with a voice recognition program.

## 2025-01-04 NOTE — PROGRESS NOTES
"CC: Congestive heart failure   Interval History: Blood pressure was borderline low this morning      Vital Signs  Temp:  [97.5 °F (36.4 °C)-99 °F (37.2 °C)] 97.7 °F (36.5 °C)  Heart Rate:  [84-93] 93  Resp:  [18-21] 19  BP: (102-149)/(62-82) 102/62    Intake/Output Summary (Last 24 hours) at 1/4/2025 1034  Last data filed at 1/3/2025 1800  Gross per 24 hour   Intake 360 ml   Output 1000 ml   Net -640 ml     Flowsheet Rows      Flowsheet Row First Filed Value   Admission Height 167.6 cm (66\") Documented at 12/31/2024 1506   Admission Weight 109 kg (240 lb) Documented at 12/31/2024 1541            PHYSICAL EXAM:  General: No acute distress  Resp:NL Rate, symmetric chest expansion,unlabored, clear  CV: Irregularly irregular, NL PMI, NL S1 and S2, no Murmur, no gallop, no rub, No JVD.   ABD:Nl sounds, no masses or tenderness, nondistended, no guarding or rebound  Neuro: alert,cooperative and oriented  Extr:Normal pedal pulses, No edema or cyanosis, moves all extremities      Results Review:    Results from last 7 days   Lab Units 01/04/25  0511   SODIUM mmol/L 139   POTASSIUM mmol/L 3.9   CHLORIDE mmol/L 98   CO2 mmol/L 30.0*   BUN mg/dL 21   CREATININE mg/dL 1.06*   GLUCOSE mg/dL 120*   CALCIUM mg/dL 9.1     Results from last 7 days   Lab Units 12/31/24  2324 12/31/24  1542 12/31/24  1419   HSTROP T ng/L 16* 14* 13     Results from last 7 days   Lab Units 01/04/25  0511   WBC 10*3/mm3 9.15   HEMOGLOBIN g/dL 12.7   HEMATOCRIT % 37.6   PLATELETS 10*3/mm3 184     Results from last 7 days   Lab Units 01/04/25  0511 01/03/25  0615 01/02/25  0905   INR  2.56* 2.39* 2.35*         Results from last 7 days   Lab Units 01/04/25  0511   MAGNESIUM mg/dL 1.9         I reviewed the patient's new clinical results.  I personally viewed and interpreted the patient's EKG/Telemetry data        Medication Review:   Meds reviewed    Pharmacy to dose warfarin,         Assessment/Plan  Acute on chronic HFpEF with hypoxemic respiratory " failure.  Precipitated by dietary noncompliance  Persistent atrial fibrillation with rapid ventricular response on Coumadin.  Therapeutic INR.  On metoprolol and digoxin with therapeutic levels  Chronic kidney disease  Coronary artery disease of small PD being treated medically  Type 2 diabetes mellitus  Hyperlipidemia    She is complaining of left shoulder pain from degenerative joint disease.  Being treated symptomatically.  Blood pressure was borderline low this morning.  I have decreased atenolol to 12.5 mg p.o. twice daily.  Otherwise she has good heart rate control and almost euvolemic.  Switch to oral diuretic this morning.  Strict I's and O's  Consider transfer to rehab tomorrow.  Discussed patient with her nurse.          Arun Henning MD  01/04/25  10:34 EST

## 2025-01-04 NOTE — CASE MANAGEMENT/SOCIAL WORK
Continued Stay Note  [facility]     Patient Name: Lana Yañez  MRN: [id number]  Today's Date: 1/4/2025    Admit Date: 12/31/2024    Plan: Pending referral to [facility]   Discharge Plan       Row Name 01/04/25 1749       Plan    Plan Comments Inbound call from [facility] and they will continue to follow pt for medical need for acute rehab post DC.......JW                   Discharge Codes    No documentation.                 Expected Discharge Date and Time       Expected Discharge Date Expected Discharge Time    Jan 7, 2025               Rowena Perez RN

## 2025-01-04 NOTE — PROGRESS NOTES
[facility] Clinical Pharmacy Services: Warfarin Dosing/Monitoring Consult    Lana Yañez is a 79 y.o. female, estimated creatinine clearance is 55.9 mL/min (A) (by C-G formula based on SCr of 1.06 mg/dL (H)). weighing 110 kg (241 lb 10 oz).    Results from last 7 days   Lab Units 01/04/25  0511 01/03/25  0615 01/02/25  0905 01/01/25  0228 12/31/24  2324 12/31/24  1419   INR  2.56* 2.39* 2.35* 1.99* 1.85* 1.75*   HEMOGLOBIN g/dL 12.7  --  11.8* 12.3  --  13.4   HEMATOCRIT % 37.6  --  38.3 37.0  --  41.2   PLATELETS 10*3/mm3 184  --  139* 131*  --  142     Prior to admission anticoagulation: Per [facility] anticoagulation clinic note on 12/17/24, the patient's most recent warfarin regimen is 5 mg daily (35 mg/week). INR was therapeutic at that visit at 2.8.     Hospital Anticoagulation:  Consulting provider: Dr. Howell  Start date: 12/31/24  Indication: Atrial fibrillation  Target INR: 2 - 3  Expected duration: lifelong   Bridge Therapy: No     Potential food or drug interactions: (new)  Ceftriaxone-concurrent use may enhance the anticoagulant effect of warfarin   Azithromycin-concurrent use may enhance the anticoagulant effects of warfarin   Acute infection/inflammation may cause an increased sensitivity to warfarin    Education complete?/Date: N/A; home medication    Assessment/Plan:  INR remains therapeutic. Will dose 4 mg again today  RN to monitor for any signs or symptoms of bleeding  Pharmacy to follow up daily INRs and dose adjustments    Pharmacy will continue to follow until discharge or discontinuation of warfarin.     Isa Keller, PharmD  Clinical Pharmacist

## 2025-01-04 NOTE — PROGRESS NOTES
Name: Lana Yañez ADMIT: 2024   : 1945  PCP: Toni Green MD    MRN: [id number] LOS: 2 days   AGE/SEX: 79 y.o. female  ROOM: [facility]     Subjective   Subjective   Patient is seen at bedside, patient is lying comfortably in bed.  No new complaints today.       Objective   Objective   Vital Signs  Temp:  [97.6 °F (36.4 °C)-99 °F (37.2 °C)] 98 °F (36.7 °C)  Heart Rate:  [82-93] 84  Resp:  [18-20] 20  BP: (102-138)/(62-89) 125/89  SpO2:  [95 %-97 %] 95 %  on   ;   Device (Oxygen Therapy): nasal cannula  Body mass index is 39 kg/m².  Physical Exam  General, awake and alert.  Head and ENT, normocephalic and atraumatic.  Lungs, symmetric expansion, equal air entry bilaterally.  Heart, regular rate and rhythm.  Abdomen, soft and nontender.  Extremities, no clubbing or cyanosis.  Neuro, no focal deficits.  Skin: Warm and no rash.  Psych, normal mood and affect.  Musculoskeletal, joint examination is grossly normal.        Copied text material from yesterday's note has been reviewed for appropriate changes and remains accurate as of 25.      Results Review     I reviewed the patient's new clinical results.  Results from last 7 days   Lab Units 25  0511 25  0905 25  1419   WBC 10*3/mm3 9.15 10.16 13.70* 9.78   HEMOGLOBIN g/dL 12.7 11.8* 12.3 13.4   PLATELETS 10*3/mm3 184 139* 131* 142     Results from last 7 days   Lab Units 25  0511 25  0905 25  1419   SODIUM mmol/L 139 137 135* 133*   POTASSIUM mmol/L 3.9 3.4* 3.6 4.2   CHLORIDE mmol/L 98 99 103 101   CO2 mmol/L 30.0* 28.0 23.5 24.0   BUN mg/dL 21 17 15 13   CREATININE mg/dL 1.06* 1.23* 1.13* 1.01*   GLUCOSE mg/dL 120* 205* 161* 156*   EGFR mL/min/1.73 54.2* 45.4* 50.2* 57.5*     Results from last 7 days   Lab Units 25  0511 25  0905 24  1419   ALBUMIN g/dL 3.6 3.7 4.2   BILIRUBIN mg/dL  --  0.7 0.8   ALK PHOS U/L  --  63 70   AST (SGOT) U/L  --  15 19   ALT  (SGPT) U/L  --  10 11     Results from last 7 days   Lab Units 01/04/25  0511 01/02/25  0905 01/01/25 0228 12/31/24  1419   CALCIUM mg/dL 9.1 8.9 8.9 9.1   ALBUMIN g/dL 3.6 3.7  --  4.2   MAGNESIUM mg/dL 1.9  --   --   --    PHOSPHORUS mg/dL 3.1  --   --   --      Results from last 7 days   Lab Units 01/01/25 0228 12/31/24  2324 12/31/24  2051 12/31/24  1702 12/31/24  1542   PROCALCITONIN ng/mL  --   --   --   --  0.07   LACTATE mmol/L 1.5 2.2* 2.1* 2.2*  --      Glucose   Date/Time Value Ref Range Status   01/04/2025 1158 279 (H) 70 - 130 mg/dL Final   01/01/2025 1823 179 (H) 70 - 130 mg/dL Final       No radiology results for the last day    I have personally reviewed all medications:  Scheduled Medications  aspirin, 81 mg, Oral, Daily  atenolol, 12.5 mg, Oral, BID  atorvastatin, 10 mg, Oral, Daily  azithromycin, 500 mg, Oral, Q24H  cefTRIAXone, 2,000 mg, Intravenous, Q24H  digoxin, 125 mcg, Oral, Every Other Day  insulin lispro, 2-9 Units, Subcutaneous, 4x Daily AC & at Bedtime  latanoprost, 1 drop, Both Eyes, Nightly  Lidocaine, 1 patch, Transdermal, Q24H  linagliptin, 5 mg, Oral, Daily  mupirocin, 1 Application, Each Nare, BID  spironolactone, 25 mg, Oral, Daily  torsemide, 40 mg, Oral, Daily  warfarin, 4 mg, Oral, Once    Infusions  Pharmacy to dose warfarin,     Diet  Diet: Cardiac, Fluid Restriction (240 mL/tray); Healthy Heart (2-3 Na+); 1500 mL/day; Fluid Consistency: Thin (IDDSI 0)    I have personally reviewed:  [x]  Laboratory   [x]  Microbiology   [x]  Radiology   [x]  EKG/Telemetry  [x]  Cardiology/Vascular   []  Pathology    []  Records       Assessment/Plan     Active Hospital Problems    Diagnosis  POA    **Acute hypoxic respiratory failure [J96.01]  Yes    Acute on chronic hypoxic respiratory failure [J96.21]  Yes    Pneumonia [J18.9]  Yes    Stage 3a chronic kidney disease [N18.31]  Yes    Type 2 diabetes mellitus with hyperglycemia, without long-term current use of insulin [E11.65]  Yes     Acute on chronic diastolic CHF (congestive heart failure) [I50.33]  Yes    Chronic atrial fibrillation [I48.20]  Yes    Mixed hyperlipidemia [E78.2]  Yes      Resolved Hospital Problems   No resolved problems to display.       79 y.o. female admitted with Acute hypoxic respiratory failure.    Assessment and plan  1.  Acute respiratory failure with hypoxia, patient has underlying acute on chronic diastolic congestive heart failure, patient was followed by cardiology and nephrology service, patient has been switched from Lasix to torsemide.  Continue Aldactone.  Clinically she has been improving.     2.  Chronic kidney disease stage III, avoid nephrotoxic medications, renal function appears to be close to baseline.     3.  Chronic atrial fibrillation, patient currently appears to be rate controlled, continue Coumadin.  Monitor INR.  INR is in the therapeutic range.     4.  Hyperlipidemia, continue statins.     5.  Coronary artery disease, continue aspirin and statins.     6.  Type 2 diabetes mellitus, continue Accu-Cheks along with current oral regimen.     7.  Left ankle pain, order imaging.  No evidence of acute fractures.     8.  CODE STATUS is full code.  Further plans based on hospital course.    Jesus Carrillo MD  [facility]  01/04/25  13:16 EST

## 2025-01-04 NOTE — THERAPY TREATMENT NOTE
Patient Name: Lana Yañez  : 1945    MRN: [id number]                              Today's Date: 2025       Admit Date: 2024    Visit Dx:     ICD-10-CM ICD-9-CM   1. Acute hypoxic respiratory failure  J96.01 518.81   2. Acute cough  R05.1 786.2   3. Pneumonia of both lower lobes due to infectious organism  J18.9 486   4. Acute on chronic congestive heart failure, unspecified heart failure type  I50.9 428.0     Patient Active Problem List   Diagnosis    Mixed hyperlipidemia    Vitamin deficiency    Essential hypertension    Rheumatoid arthritis involving both hands    Fatigue    ANTOINE (dyspnea on exertion)    Dysuria    Urge incontinence of urine    Hypovitaminosis D    Sleep apnea    Encounter for screening colonoscopy    Bronchitis    Cough    Generalized osteoarthritis of multiple sites    Cellulitis of right elbow due to MRSA    Medicare annual wellness visit, initial    Hospital discharge follow-up    Displacement of lumbar intervertebral disc without myelopathy    Lumbar disc herniation    Chronic atrial fibrillation    History of MRSA infection    Left-sided weakness    Atrial fibrillation    Left shoulder pain    Cerebrovascular accident (CVA) due to occlusion of right middle cerebral artery    Relative lymphocytosis    Nausea    Weakness    Controlled substance agreement signed    Coronary artery disease involving native coronary artery of native heart without angina pectoris    SOB (shortness of breath)    Lower extremity edema    Acute on chronic diastolic CHF (congestive heart failure)    Adhesive capsulitis of left shoulder    CVA tenderness    Costochondritis, acute    Allergic reaction    Coronary artery embolism    Visit for screening mammogram    Low back pain    Urgency of urination    Hyperglycemia    Carpal tunnel syndrome of left wrist/ wakes her up    Localized edema    Acute cystitis without hematuria    Bruising    History of stroke    Diabetes 1.5, managed as type 2     Other chronic pain    Vaginal candidiasis    Pulmonary hypertension    Acute respiratory failure with hypoxia    Hypokalemia    Hypomagnesemia    Type 2 diabetes mellitus with hyperglycemia, without long-term current use of insulin    Diarrhea    Sepsis without acute organ dysfunction    Morbid obesity with BMI of 40.0-44.9, adult    Biliary acute pancreatitis without necrosis or infection    Stage 3a chronic kidney disease    Acute UTI (urinary tract infection)    Hemiparesis of left nondominant side as late effect of cerebral infarction    Acute hypoxic respiratory failure    Pneumonia    Acute on chronic hypoxic respiratory failure     Past Medical History:   Diagnosis Date    Acute on chronic diastolic heart failure     Acute UTI (urinary tract infection) 05/22/2022    Allergic reaction 02/09/2018    Arthritis     Arthritis of back     Arthritis of neck     Asthma     Atrial fibrillation     Persistent; on warfarin    Atypical chest pain     Biliary acute pancreatitis without necrosis or infection 11/10/2021    Bronchitis     Cellulitis of right elbow     due to MRSA    Cervical disc disorder     CHF (congestive heart failure)     COPD (chronic obstructive pulmonary disease)     Coronary artery disease     Cardiac catheterization completed; 90% PDA stenosis with medical management recommended    Coronary artery disease involving native coronary artery of native heart with angina pectoris with documented spasm     CTS (carpal tunnel syndrome)     Diabetes 1.5, managed as type 2     Disease of thyroid gland     Displacement of lumbar intervertebral disc without myelopathy     Dizziness     ANTOINE (dyspnea on exertion)     Essential hypertension     Fatigue     Fracture, foot     Generalized osteoarthritis of multiple sites     History of rheumatic fever     History of transfusion     Hx of bone density study     10/23/2014    Hyperglycemia     Hyperlipidemia     Hypovitaminosis D     Knee swelling     Left arm pain      Left-sided weakness     Leg swelling     Low back pain     Lower extremity edema     Lumbosacral disc disease     Malaise and fatigue     Mitral valve disease     Moderate mitral valve prolapse and moderate mitral regurgitation    Mitral valve insufficiency     Morbid obesity with BMI of 40.0-44.9, adult     MRSA infection     NSTEMI (non-ST elevated myocardial infarction)     PAF (paroxysmal atrial fibrillation)     Periarthritis of shoulder     Pneumonia 1/1/2025    RA (rheumatoid arthritis)     involving both hands    Rotator cuff syndrome     Sleep apnea     SOB (shortness of breath)     Stroke     left side weakness    Urge incontinence of urine     UTI (urinary tract infection) 05/2020    Visit for screening mammogram 04/02/2018    Vitamin D deficiency      Past Surgical History:   Procedure Laterality Date    BREAST BIOPSY      BREAST SURGERY      right side lumpectomy with biopsy    CARDIAC CATHETERIZATION Left 10/20/2017    Procedure: Cardiac Catheterization/Vascular Study;  Surgeon: Alphonso Olmedo MD;  Location: [facility] CATH INVASIVE LOC[facility];  Service:     CARDIAC CATHETERIZATION N/A 10/20/2017    +2 mitral regurgitation, left main 10% stenosis, mid to distal LAD 10% diffuse stenosis, circumflex 10% diffuse stenosis, RCA 10% proximal stenosis, and distal PDA consistent with coronary embolus with a lesion of 90% too small to consider coronary intervention; medical management recommended    EYE SURGERY      laser surgery for glaucoma and left eye cataracts removed    HYSTERECTOMY      10+ years ago    JOINT REPLACEMENT  2005; 2006    bilateral knees and left rotater    KNEE SURGERY      MAMMO BILATERAL  2016    [facility]     SHOULDER SURGERY        General Information       Row Name 01/04/25 7918          Physical Therapy Time and Intention    Document Type therapy note (daily note)  -CS     Mode of Treatment physical therapy  -CS       Row Name 01/04/25 1773          General Information    Patient  Profile Reviewed yes  -CS     Existing Precautions/Restrictions fall  -CS       Row Name 01/04/25 0950          Cognition    Orientation Status (Cognition) oriented x 4  -CS       Row Name 01/04/25 0950          Safety Issues/Impairments Affecting Functional Mobility    Impairments Affecting Function (Mobility) balance;endurance/activity tolerance;range of motion (ROM);strength;shortness of breath;pain  -CS               User Key  (r) = Recorded By, (t) = Taken By, (c) = Cosigned By      Initials Name Provider Type    Gurpreet Taylor, PT Physical Therapist                   Mobility       Row Name 01/04/25 0950          Bed Mobility    Bed Mobility supine-sit  -CS     Supine-Sit Jewett (Bed Mobility) verbal cues;moderate assist (50% patient effort)  -CS     Assistive Device (Bed Mobility) bed rails  -CS       Row Name 01/04/25 0950          Sit-Stand Transfer    Sit-Stand Jewett (Transfers) verbal cues;minimum assist (75% patient effort)  -CS       Row Name 01/04/25 0950          Gait/Stairs (Locomotion)    Jewett Level (Gait) minimum assist (75% patient effort)  -CS     Patient was able to Ambulate yes  -CS     Distance in Feet (Gait) 3  -CS     Deviations/Abnormal Patterns (Gait) gait speed decreased;festinating/shuffling  -CS     Bilateral Gait Deviations forward flexed posture;heel strike decreased  -CS               User Key  (r) = Recorded By, (t) = Taken By, (c) = Cosigned By      Initials Name Provider Type    Gurpreet Taylor, PT Physical Therapist                   Obj/Interventions    No documentation.                  Goals/Plan    No documentation.                  Clinical Impression       Row Name 01/04/25 0951          Pain    Pretreatment Pain Rating 0/10 - no pain  -CS     Posttreatment Pain Rating 0/10 - no pain  -CS       Row Name 01/04/25 0951          Therapy Assessment/Plan (PT)    Therapy Frequency (PT) 6 times/wk  -CS       Row Name 01/04/25 0951          Vital Signs     O2 Delivery Pre Treatment room air  -CS       Row Name 01/04/25 0951          Positioning and Restraints    Pre-Treatment Position in bed  -CS     Post Treatment Position chair  -CS     In Chair reclined;call light within reach;encouraged to call for assist;exit alarm on;with nsg  -CS               User Key  (r) = Recorded By, (t) = Taken By, (c) = Cosigned By      Initials Name Provider Type    Gurpreet Taylor, PT Physical Therapist                   Outcome Measures       Row Name 01/04/25 0951          How much help from another person do you currently need...    Turning from your back to your side while in flat bed without using bedrails? 3  -CS     Moving from lying on back to sitting on the side of a flat bed without bedrails? 3  -CS     Moving to and from a bed to a chair (including a wheelchair)? 3  -CS     Standing up from a chair using your arms (e.g., wheelchair, bedside chair)? 3  -CS     Climbing 3-5 steps with a railing? 1  -CS     To walk in hospital room? 2  -CS     AM-PAC 6 Clicks Score (PT) 15  -CS     Highest Level of Mobility Goal 4 --> Transfer to chair/commode  -CS       Row Name 01/04/25 0951          Functional Assessment    Outcome Measure Options AM-PAC 6 Clicks Basic Mobility (PT)  -CS               User Key  (r) = Recorded By, (t) = Taken By, (c) = Cosigned By      Initials Name Provider Type    Gurpreet Taylor, PT Physical Therapist                                 Physical Therapy Education       Title: PT OT SLP Therapies (In Progress)       Topic: Physical Therapy (Done)       Point: Mobility training (Done)       Learning Progress Summary            Patient Acceptance, E,TB, VU,NR by QUINN at 1/4/2025 0951    Acceptance, E, VU,NR by SV at 1/3/2025 1003                      Point: Home exercise program (Done)       Learning Progress Summary            Patient Acceptance, E,TB, VU,NR by QUINN at 1/4/2025 0951    Acceptance, E, VU,NR by SV at 1/3/2025 1003                                       User Key       Initials Effective Dates Name Provider Type Discipline    SV 07/11/23 -  Belgica De La Fuente, PT Physical Therapist PT    CS 07/11/23 -  Gurpreet Bianchi PT Physical Therapist PT                  PT Recommendation and Plan           Time Calculation:         PT Charges       Row Name 01/04/25 0952             Time Calculation    Start Time 0932  -CS      Stop Time 0950  -CS      Time Calculation (min) 18 min  -CS      PT Received On 01/04/25  -CS      PT - Next Appointment 01/06/25  -                User Key  (r) = Recorded By, (t) = Taken By, (c) = Cosigned By      Initials Name Provider Type    CS Arias, Gurpreet WILLIAM, PT Physical Therapist                  Therapy Charges for Today       Code Description Service Date Service Provider Modifiers Qty    96705205296 HC PT THER PROC EA 15 MIN 1/4/2025 Gurpreet Bianchi, PT GP 1            PT G-Codes  Outcome Measure Options: AM-PAC 6 Clicks Basic Mobility (PT)  AM-PAC 6 Clicks Score (PT): 15  AM-PAC 6 Clicks Score (OT): 10  Modified Patricia Scale: 4 - Moderately severe disability.  Unable to walk without assistance, and unable to attend to own bodily needs without assistance.  PT Discharge Summary  Anticipated Discharge Disposition (PT): inpatient rehabilitation facility    Gurpreet Bianchi PT  1/4/2025

## 2025-01-04 NOTE — PLAN OF CARE
Goal Outcome Evaluation:         Pt needing Domenic with bed mobility, sit<>stand, walked 3' and performed bed to chair transfer. Tolerated well. Advancing as able.           Anticipated Discharge Disposition (PT): inpatient rehabilitation facility

## 2025-01-05 LAB
ALBUMIN SERPL-MCNC: 3.8 G/DL (ref 3.5–5.2)
ALBUMIN/GLOB SERPL: 1 G/DL
ALP SERPL-CCNC: 66 U/L (ref 39–117)
ALT SERPL W P-5'-P-CCNC: 12 U/L (ref 1–33)
ANION GAP SERPL CALCULATED.3IONS-SCNC: 12 MMOL/L (ref 5–15)
AST SERPL-CCNC: 20 U/L (ref 1–32)
BACTERIA SPEC AEROBE CULT: NORMAL
BACTERIA SPEC AEROBE CULT: NORMAL
BASOPHILS # BLD AUTO: 0.03 10*3/MM3 (ref 0–0.2)
BASOPHILS NFR BLD AUTO: 0.3 % (ref 0–1.5)
BILIRUB SERPL-MCNC: 0.5 MG/DL (ref 0–1.2)
BUN SERPL-MCNC: 26 MG/DL (ref 8–23)
BUN/CREAT SERPL: 20.5 (ref 7–25)
CALCIUM SPEC-SCNC: 9.5 MG/DL (ref 8.6–10.5)
CHLORIDE SERPL-SCNC: 96 MMOL/L (ref 98–107)
CO2 SERPL-SCNC: 31 MMOL/L (ref 22–29)
CREAT SERPL-MCNC: 1.27 MG/DL (ref 0.57–1)
DEPRECATED RDW RBC AUTO: 44 FL (ref 37–54)
EGFRCR SERPLBLD CKD-EPI 2021: 43.6 ML/MIN/1.73
EOSINOPHIL # BLD AUTO: 0.18 10*3/MM3 (ref 0–0.4)
EOSINOPHIL NFR BLD AUTO: 1.9 % (ref 0.3–6.2)
ERYTHROCYTE [DISTWIDTH] IN BLOOD BY AUTOMATED COUNT: 13 % (ref 12.3–15.4)
GLOBULIN UR ELPH-MCNC: 3.9 GM/DL
GLUCOSE BLDC GLUCOMTR-MCNC: 150 MG/DL (ref 70–130)
GLUCOSE BLDC GLUCOMTR-MCNC: 168 MG/DL (ref 70–130)
GLUCOSE BLDC GLUCOMTR-MCNC: 171 MG/DL (ref 70–130)
GLUCOSE BLDC GLUCOMTR-MCNC: 288 MG/DL (ref 70–130)
GLUCOSE SERPL-MCNC: 125 MG/DL (ref 65–99)
HCT VFR BLD AUTO: 40.3 % (ref 34–46.6)
HGB BLD-MCNC: 13.4 G/DL (ref 12–15.9)
IMM GRANULOCYTES # BLD AUTO: 0.04 10*3/MM3 (ref 0–0.05)
IMM GRANULOCYTES NFR BLD AUTO: 0.4 % (ref 0–0.5)
INR PPP: 2.57 (ref 0.9–1.1)
LYMPHOCYTES # BLD AUTO: 3 10*3/MM3 (ref 0.7–3.1)
LYMPHOCYTES NFR BLD AUTO: 32 % (ref 19.6–45.3)
MCH RBC QN AUTO: 30.9 PG (ref 26.6–33)
MCHC RBC AUTO-ENTMCNC: 33.3 G/DL (ref 31.5–35.7)
MCV RBC AUTO: 93.1 FL (ref 79–97)
MONOCYTES # BLD AUTO: 1.43 10*3/MM3 (ref 0.1–0.9)
MONOCYTES NFR BLD AUTO: 15.2 % (ref 5–12)
NEUTROPHILS NFR BLD AUTO: 4.7 10*3/MM3 (ref 1.7–7)
NEUTROPHILS NFR BLD AUTO: 50.2 % (ref 42.7–76)
NRBC BLD AUTO-RTO: 0 /100 WBC (ref 0–0.2)
PLATELET # BLD AUTO: 199 10*3/MM3 (ref 140–450)
PMV BLD AUTO: 10.6 FL (ref 6–12)
POTASSIUM SERPL-SCNC: 3.8 MMOL/L (ref 3.5–5.2)
PROT SERPL-MCNC: 7.7 G/DL (ref 6–8.5)
PROTHROMBIN TIME: 27.9 SECONDS (ref 11.7–14.2)
RBC # BLD AUTO: 4.33 10*6/MM3 (ref 3.77–5.28)
SODIUM SERPL-SCNC: 139 MMOL/L (ref 136–145)
WBC NRBC COR # BLD AUTO: 9.38 10*3/MM3 (ref 3.4–10.8)

## 2025-01-05 PROCEDURE — 25010000002 CEFTRIAXONE PER 250 MG: Performed by: INTERNAL MEDICINE

## 2025-01-05 PROCEDURE — 85610 PROTHROMBIN TIME: CPT | Performed by: INTERNAL MEDICINE

## 2025-01-05 PROCEDURE — 25810000003 SODIUM CHLORIDE 0.9 % SOLUTION 250 ML FLEX CONT: Performed by: INTERNAL MEDICINE

## 2025-01-05 PROCEDURE — 63710000001 INSULIN LISPRO (HUMAN) PER 5 UNITS: Performed by: INTERNAL MEDICINE

## 2025-01-05 PROCEDURE — 82948 REAGENT STRIP/BLOOD GLUCOSE: CPT

## 2025-01-05 PROCEDURE — 99232 SBSQ HOSP IP/OBS MODERATE 35: CPT | Performed by: INTERNAL MEDICINE

## 2025-01-05 PROCEDURE — 85025 COMPLETE CBC W/AUTO DIFF WBC: CPT | Performed by: INTERNAL MEDICINE

## 2025-01-05 PROCEDURE — 80053 COMPREHEN METABOLIC PANEL: CPT | Performed by: INTERNAL MEDICINE

## 2025-01-05 RX ORDER — ATENOLOL 25 MG/1
25 TABLET ORAL 2 TIMES DAILY
Status: DISCONTINUED | OUTPATIENT
Start: 2025-01-05 | End: 2025-01-06

## 2025-01-05 RX ORDER — WARFARIN SODIUM 4 MG/1
4 TABLET ORAL
Status: COMPLETED | OUTPATIENT
Start: 2025-01-05 | End: 2025-01-05

## 2025-01-05 RX ORDER — GUAIFENESIN/DEXTROMETHORPHAN 100-10MG/5
5 SYRUP ORAL EVERY 4 HOURS PRN
Status: DISCONTINUED | OUTPATIENT
Start: 2025-01-05 | End: 2025-01-13 | Stop reason: HOSPADM

## 2025-01-05 RX ADMIN — POLYETHYLENE GLYCOL 3350 17 G: 17 POWDER, FOR SOLUTION ORAL at 21:30

## 2025-01-05 RX ADMIN — LIDOCAINE 1 PATCH: 4 PATCH TOPICAL at 08:17

## 2025-01-05 RX ADMIN — INSULIN LISPRO 2 UNITS: 100 INJECTION, SOLUTION INTRAVENOUS; SUBCUTANEOUS at 11:46

## 2025-01-05 RX ADMIN — INSULIN LISPRO 2 UNITS: 100 INJECTION, SOLUTION INTRAVENOUS; SUBCUTANEOUS at 21:30

## 2025-01-05 RX ADMIN — HYDROCODONE BITARTRATE AND ACETAMINOPHEN 1 TABLET: 7.5; 325 TABLET ORAL at 08:17

## 2025-01-05 RX ADMIN — ATENOLOL 25 MG: 25 TABLET ORAL at 21:25

## 2025-01-05 RX ADMIN — AZITHROMYCIN 500 MG: 250 TABLET, FILM COATED ORAL at 16:46

## 2025-01-05 RX ADMIN — INSULIN LISPRO 6 UNITS: 100 INJECTION, SOLUTION INTRAVENOUS; SUBCUTANEOUS at 17:52

## 2025-01-05 RX ADMIN — WARFARIN SODIUM 4 MG: 4 TABLET ORAL at 17:00

## 2025-01-05 RX ADMIN — CEFTRIAXONE 2000 MG: 2 INJECTION, POWDER, FOR SOLUTION INTRAMUSCULAR; INTRAVENOUS at 16:46

## 2025-01-05 RX ADMIN — LINAGLIPTIN 5 MG: 5 TABLET, FILM COATED ORAL at 08:17

## 2025-01-05 RX ADMIN — SPIRONOLACTONE 25 MG: 25 TABLET ORAL at 08:17

## 2025-01-05 RX ADMIN — ASPIRIN 81 MG: 81 TABLET, COATED ORAL at 08:17

## 2025-01-05 RX ADMIN — GUAIFENESIN AND DEXTROMETHORPHAN 5 ML: 20; 200 SYRUP ORAL at 17:52

## 2025-01-05 RX ADMIN — ATORVASTATIN CALCIUM 10 MG: 10 TABLET, FILM COATED ORAL at 08:19

## 2025-01-05 RX ADMIN — TORSEMIDE 40 MG: 20 TABLET ORAL at 08:18

## 2025-01-05 RX ADMIN — ATENOLOL 25 MG: 25 TABLET ORAL at 08:18

## 2025-01-05 RX ADMIN — MUPIROCIN 1 APPLICATION: 20 OINTMENT TOPICAL at 08:18

## 2025-01-05 NOTE — PLAN OF CARE
Problem: Adult Inpatient Plan of Care  Goal: Plan of Care Review  Outcome: Progressing  Goal: Patient-Specific Goal (Individualized)  Outcome: Progressing  Goal: Absence of Hospital-Acquired Illness or Injury  Outcome: Progressing  Intervention: Identify and Manage Fall Risk  Recent Flowsheet Documentation  Taken 1/5/2025 0600 by Jessica Arizmendi RN  Safety Promotion/Fall Prevention:   room organization consistent   safety round/check completed  Taken 1/5/2025 0400 by Jessica Arizmendi RN  Safety Promotion/Fall Prevention:   room organization consistent   safety round/check completed  Taken 1/5/2025 0200 by Jessica Arizmendi RN  Safety Promotion/Fall Prevention:   room organization consistent   safety round/check completed  Taken 1/5/2025 0000 by Jessica Arizmendi RN  Safety Promotion/Fall Prevention:   room organization consistent   safety round/check completed  Taken 1/4/2025 2200 by Jessica Arizmendi RN  Safety Promotion/Fall Prevention:   room organization consistent   safety round/check completed  Taken 1/4/2025 1900 by Jessica Arizmendi RN  Safety Promotion/Fall Prevention:   room organization consistent   safety round/check completed  Intervention: Prevent Skin Injury  Recent Flowsheet Documentation  Taken 1/4/2025 2000 by Jessica Arizmendi RN  Body Position: position changed independently  Intervention: Prevent and Manage VTE (Venous Thromboembolism) Risk  Recent Flowsheet Documentation  Taken 1/5/2025 0200 by Jessica Arizmendi RN  VTE Prevention/Management:   bilateral   SCDs (sequential compression devices) off  Taken 1/4/2025 2000 by Jessica Arizmendi RN  VTE Prevention/Management:   bilateral   SCDs (sequential compression devices) off  Intervention: Prevent Infection  Recent Flowsheet Documentation  Taken 1/5/2025 0600 by Jessica Arizmendi RN  Infection Prevention:   hand hygiene promoted   personal protective equipment utilized   rest/sleep promoted  Taken 1/5/2025 0400 by Jessica Arizmendi RN  Infection Prevention:   hand hygiene promoted    personal protective equipment utilized   rest/sleep promoted   single patient room provided  Taken 1/5/2025 0200 by Jessica Arizmendi RN  Infection Prevention:   hand hygiene promoted   personal protective equipment utilized   rest/sleep promoted   single patient room provided  Taken 1/5/2025 0000 by Jessica Arizmendi RN  Infection Prevention:   hand hygiene promoted   personal protective equipment utilized   rest/sleep promoted   single patient room provided  Taken 1/4/2025 2200 by Jessica Arizmendi RN  Infection Prevention:   hand hygiene promoted   personal protective equipment utilized   single patient room provided   rest/sleep promoted  Taken 1/4/2025 1900 by Jessica Arizmendi RN  Infection Prevention:   hand hygiene promoted   rest/sleep promoted   personal protective equipment utilized  Goal: Optimal Comfort and Wellbeing  Outcome: Progressing  Intervention: Monitor Pain and Promote Comfort  Recent Flowsheet Documentation  Taken 1/4/2025 2000 by Jessica Arizmendi RN  Pain Management Interventions: pillow support provided  Intervention: Provide Person-Centered Care  Recent Flowsheet Documentation  Taken 1/5/2025 0200 by Jessica Arizmendi RN  Trust Relationship/Rapport:   care explained   thoughts/feelings acknowledged  Taken 1/4/2025 2000 by Jessica Arizmendi RN  Trust Relationship/Rapport:   care explained   thoughts/feelings acknowledged  Goal: Readiness for Transition of Care  Outcome: Progressing   Goal Outcome Evaluation:  Pt remains in CCU on telemetry. VSS. Will continue to monitor.

## 2025-01-05 NOTE — PROGRESS NOTES
"CC: Congestive heart failure/atrial fibrillation    Interval History: No new acute events overnight      Vital Signs  Temp:  [98 °F (36.7 °C)-98.6 °F (37 °C)] 98.6 °F (37 °C)  Heart Rate:  [] 110  Resp:  [18-24] 21  BP: (111-125)/(60-89) 117/69    Intake/Output Summary (Last 24 hours) at 1/5/2025 0743  Last data filed at 1/4/2025 1800  Gross per 24 hour   Intake --   Output 2100 ml   Net -2100 ml     Flowsheet Rows      Flowsheet Row First Filed Value   Admission Height 167.6 cm (66\") Documented at 12/31/2024 1506   Admission Weight 109 kg (240 lb) Documented at 12/31/2024 1541            PHYSICAL EXAM:  General: No acute distress  Resp:NL Rate, symmetric chest expansion,unlabored, clear  CV: Irregularly irregular, NL PMI, NL S1 and S2, no Murmur, no gallop, no rub, No JVD.   ABD:Nl sounds, no masses or tenderness, nondistended, no guarding or rebound  Neuro: alert,cooperative and oriented  Extr:Normal pedal pulses, No edema or cyanosis, moves all extremities      Results Review:    Results from last 7 days   Lab Units 01/05/25  0449   SODIUM mmol/L 139   POTASSIUM mmol/L 3.8   CHLORIDE mmol/L 96*   CO2 mmol/L 31.0*   BUN mg/dL 26*   CREATININE mg/dL 1.27*   GLUCOSE mg/dL 125*   CALCIUM mg/dL 9.5     Results from last 7 days   Lab Units 12/31/24  2324 12/31/24  1542 12/31/24  1419   HSTROP T ng/L 16* 14* 13     Results from last 7 days   Lab Units 01/05/25  0449   WBC 10*3/mm3 9.38   HEMOGLOBIN g/dL 13.4   HEMATOCRIT % 40.3   PLATELETS 10*3/mm3 199     Results from last 7 days   Lab Units 01/05/25  0449 01/04/25  0511 01/03/25  0615   INR  2.57* 2.56* 2.39*         Results from last 7 days   Lab Units 01/04/25  0511   MAGNESIUM mg/dL 1.9         I reviewed the patient's new clinical results.  I personally viewed and interpreted the patient's EKG/Telemetry data        Medication Review:   Meds reviewed    Pharmacy to dose warfarin,       Assessment/Plan  Acute on chronic HFpEF with hypoxemic respiratory " failure.  Precipitated by dietary noncompliance  Persistent atrial fibrillation with rapid ventricular response on Coumadin.  Therapeutic INR.  On metoprolol and digoxin with therapeutic levels  Chronic kidney disease  Coronary artery disease of small PD being treated medically  Type 2 diabetes mellitus  Hyperlipidemia    Good urine output with current colon regimen.  Mild bump in creatinine but acceptable  Fairly controlled heart rate.  Blood pressure tolerating medication change.  Continue physical therapy  Okay to transfer patient out to inpatient rehab from cardiology standpoint    Arun Henning MD  01/05/25  07:43 EST

## 2025-01-05 NOTE — PROGRESS NOTES
Name: Lana Yañez ADMIT: 2024   : 1945  PCP: Toni Green MD    MRN: [id number] LOS: 3 days   AGE/SEX: 79 y.o. female  ROOM: [facility]     Subjective   Subjective   Patient is seen at bedside today, she reports having cough.  No acute distress noted at the time of my evaluation.       Objective   Objective   Vital Signs  Temp:  [98.4 °F (36.9 °C)-98.7 °F (37.1 °C)] 98.7 °F (37.1 °C)  Heart Rate:  [] 116  Resp:  [18-24] 18  BP: (111-120)/(60-89) 111/64  SpO2:  [79 %-95 %] 95 %  on   ;   Device (Oxygen Therapy): room air  Body mass index is 34.34 kg/m².  Physical Exam  General, awake and alert.  Head and ENT, normocephalic and atraumatic.  Lungs, symmetric expansion, equal air entry bilaterally.  Heart, regular rate and rhythm.  Abdomen, soft and nontender.  Extremities, no clubbing or cyanosis.  Neuro, no focal deficits.  Skin: Warm and no rash.  Psych, normal mood and affect.  Musculoskeletal, joint examination is grossly normal.        Copied text material from yesterday's note has been reviewed for appropriate changes and remains accurate as of 25.         Results Review     I reviewed the patient's new clinical results.  Results from last 7 days   Lab Units 25   WBC 10*3/mm3 9.38 9.15 10.16 13.70*   HEMOGLOBIN g/dL 13.4 12.7 11.8* 12.3   PLATELETS 10*3/mm3 199 184 139* 131*     Results from last 7 days   Lab Units 25  0925   SODIUM mmol/L 139 139 137 135*   POTASSIUM mmol/L 3.8 3.9 3.4* 3.6   CHLORIDE mmol/L 96* 98 99 103   CO2 mmol/L 31.0* 30.0* 28.0 23.5   BUN mg/dL 26* 21 17 15   CREATININE mg/dL 1.27* 1.06* 1.23* 1.13*   GLUCOSE mg/dL 125* 120* 205* 161*   EGFR mL/min/1.73 43.6* 54.2* 45.4* 50.2*     Results from last 7 days   Lab Units 25  0449 25  0511 25  0905 24  1419   ALBUMIN g/dL 3.8 3.6 3.7 4.2   BILIRUBIN mg/dL 0.5  --  0.7 0.8   ALK  PHOS U/L 66  --  63 70   AST (SGOT) U/L 20  --  15 19   ALT (SGPT) U/L 12  --  10 11     Results from last 7 days   Lab Units 01/05/25  0449 01/04/25  0511 01/02/25  0905 01/01/25  0228 12/31/24  1419   CALCIUM mg/dL 9.5 9.1 8.9 8.9 9.1   ALBUMIN g/dL 3.8 3.6 3.7  --  4.2   MAGNESIUM mg/dL  --  1.9  --   --   --    PHOSPHORUS mg/dL  --  3.1  --   --   --      Results from last 7 days   Lab Units 01/01/25  0228 12/31/24  2324 12/31/24  2051 12/31/24  1702 12/31/24  1542   PROCALCITONIN ng/mL  --   --   --   --  0.07   LACTATE mmol/L 1.5 2.2* 2.1* 2.2*  --      Glucose   Date/Time Value Ref Range Status   01/05/2025 1128 168 (H) 70 - 130 mg/dL Final   01/05/2025 0804 150 (H) 70 - 130 mg/dL Final   01/04/2025 2042 171 (H) 70 - 130 mg/dL Final   01/04/2025 1658 145 (H) 70 - 130 mg/dL Final   01/04/2025 1158 279 (H) 70 - 130 mg/dL Final       No radiology results for the last day    I have personally reviewed all medications:  Scheduled Medications  aspirin, 81 mg, Oral, Daily  atenolol, 25 mg, Oral, BID  atorvastatin, 10 mg, Oral, Daily  azithromycin, 500 mg, Oral, Q24H  cefTRIAXone, 2,000 mg, Intravenous, Q24H  digoxin, 125 mcg, Oral, Every Other Day  insulin lispro, 2-9 Units, Subcutaneous, 4x Daily AC & at Bedtime  latanoprost, 1 drop, Both Eyes, Nightly  Lidocaine, 1 patch, Transdermal, Q24H  linagliptin, 5 mg, Oral, Daily  mupirocin, 1 Application, Each Nare, BID  spironolactone, 25 mg, Oral, Daily  torsemide, 40 mg, Oral, Daily  warfarin, 4 mg, Oral, Once    Infusions  Pharmacy to dose warfarin,     Diet  Diet: Cardiac, Fluid Restriction (240 mL/tray); Healthy Heart (2-3 Na+); 1500 mL/day; Fluid Consistency: Thin (IDDSI 0)    I have personally reviewed:  [x]  Laboratory   [x]  Microbiology   [x]  Radiology   [x]  EKG/Telemetry  [x]  Cardiology/Vascular   []  Pathology    []  Records       Assessment/Plan     Active Hospital Problems    Diagnosis  POA    **Acute hypoxic respiratory failure [J96.01]  Yes    Acute on  chronic hypoxic respiratory failure [J96.21]  Yes    Pneumonia [J18.9]  Yes    Stage 3a chronic kidney disease [N18.31]  Yes    Type 2 diabetes mellitus with hyperglycemia, without long-term current use of insulin [E11.65]  Yes    Acute on chronic diastolic CHF (congestive heart failure) [I50.33]  Yes    Chronic atrial fibrillation [I48.20]  Yes    Mixed hyperlipidemia [E78.2]  Yes      Resolved Hospital Problems   No resolved problems to display.       79 y.o. female admitted with Acute hypoxic respiratory failure.    Assessment and plan  1.  Acute respiratory failure with hypoxia, patient has underlying acute on chronic diastolic congestive heart failure, patient was followed by cardiology and nephrology service, patient has been switched from Lasix to torsemide.  Continue Aldactone.  Clinically she has been improving.  She reports having cough today, will add as needed Robitussin DM.     2.  Chronic kidney disease stage III, avoid nephrotoxic medications, renal function appears to be close to baseline.     3.  Chronic atrial fibrillation, patient currently appears to be rate controlled, continue Coumadin.  Monitor INR.  INR is in the therapeutic range.     4.  Hyperlipidemia, continue statins.     5.  Coronary artery disease, continue aspirin and statins.     6.  Type 2 diabetes mellitus, continue Accu-Cheks along with current oral regimen.     7.  Left ankle pain, order imaging.  No evidence of acute fractures.     8.  CODE STATUS is full code.  Further plans based on hospital course.    Expected Discharge Date: 1/7/2025; Expected Discharge Time:  9:00 AM      Jesus Carrillo MD  [facility]  01/05/25  14:49 EST

## 2025-01-05 NOTE — CASE MANAGEMENT/SOCIAL WORK
Continued Stay Note  [facility]     Patient Name: Lana Yañez  MRN: [id number]  Today's Date: 1/5/2025    Admit Date: 12/31/2024    Plan: Pending referral to [facility] and [facility]   Discharge Plan       Row Name 01/05/25 1540       Plan    Plan Comments Spoke with [facility] and if pt is appropriate for acute rehab, she will need insurance precert. [facility] will continue to follow........JW                   Discharge Codes    No documentation.                 Expected Discharge Date and Time       Expected Discharge Date Expected Discharge Time    Jan 7, 2025               Rowena Perez RN

## 2025-01-05 NOTE — PROGRESS NOTES
[facility] Clinical Pharmacy Services: Warfarin Dosing/Monitoring Consult    Lana Yañez is a 79 y.o. female, estimated creatinine clearance is 43.5 mL/min (A) (by C-G formula based on SCr of 1.27 mg/dL (H)). weighing 96.5 kg (212 lb 11.9 oz).    Results from last 7 days   Lab Units 01/05/25  0449 01/04/25  0511 01/03/25  0615 01/02/25  0905 01/01/25  0228 12/31/24  2324 12/31/24  1419   INR  2.57* 2.56* 2.39* 2.35* 1.99*   < > 1.75*   HEMOGLOBIN g/dL 13.4 12.7  --  11.8* 12.3  --  13.4   HEMATOCRIT % 40.3 37.6  --  38.3 37.0  --  41.2   PLATELETS 10*3/mm3 199 184  --  139* 131*  --  142    < > = values in this interval not displayed.     Prior to admission anticoagulation: Per [facility] anticoagulation clinic note on 12/17/24, the patient's most recent warfarin regimen is 5 mg daily (35 mg/week). INR was therapeutic at that visit at 2.8.     Hospital Anticoagulation:  Consulting provider: Dr. Howell  Start date: 12/31/24  Indication: Atrial fibrillation  Target INR: 2 - 3  Expected duration: lifelong   Bridge Therapy: No     Potential food or drug interactions: (new)  Ceftriaxone-concurrent use may enhance the anticoagulant effect of warfarin   Azithromycin-concurrent use may enhance the anticoagulant effects of warfarin   Acute infection/inflammation may cause an increased sensitivity to warfarin  Spironolactone may decrease INR (started 1/3)    Education complete?/Date: N/A; home medication    Assessment/Plan:  INR remains therapeutic. Will dose 4 mg again today  RN to monitor for any signs or symptoms of bleeding  Pharmacy to follow up daily INRs and dose adjustments    Pharmacy will continue to follow until discharge or discontinuation of warfarin.     Isa Keller, PharmD  Clinical Pharmacist

## 2025-01-05 NOTE — CASE MANAGEMENT/SOCIAL WORK
Continued Stay Note  [facility]     Patient Name: Lana Yañez  MRN: [id number]  Today's Date: 1/5/2025    Admit Date: 12/31/2024    Plan: P[facility]   Discharge Plan       Row Name 01/05/25 0915       Plan    Plan Comments Inbound call from [facility] stating they would like an MMT be done by PT/OT for consideration of pt's admission to acute rehab. Message to the most recent PT/OT that have worked with pt. CCP to follow........JW                   Discharge Codes    No documentation.                 Expected Discharge Date and Time       Expected Discharge Date Expected Discharge Time    Jan 7, 2025               Rowena Perez RN

## 2025-01-06 ENCOUNTER — APPOINTMENT (OUTPATIENT)
Dept: GENERAL RADIOLOGY | Facility: HOSPITAL | Age: 80
DRG: 291 | End: 2025-01-06
Payer: MEDICARE

## 2025-01-06 LAB
ANION GAP SERPL CALCULATED.3IONS-SCNC: 13 MMOL/L (ref 5–15)
BASOPHILS # BLD AUTO: 0.04 10*3/MM3 (ref 0–0.2)
BASOPHILS NFR BLD AUTO: 0.5 % (ref 0–1.5)
BUN SERPL-MCNC: 32 MG/DL (ref 8–23)
BUN/CREAT SERPL: 24.4 (ref 7–25)
CALCIUM SPEC-SCNC: 9.7 MG/DL (ref 8.6–10.5)
CHLORIDE SERPL-SCNC: 95 MMOL/L (ref 98–107)
CO2 SERPL-SCNC: 31 MMOL/L (ref 22–29)
CREAT SERPL-MCNC: 1.31 MG/DL (ref 0.57–1)
DEPRECATED RDW RBC AUTO: 44.8 FL (ref 37–54)
DIGOXIN SERPL-MCNC: 0.4 NG/ML (ref 0.6–1.2)
EGFRCR SERPLBLD CKD-EPI 2021: 42.1 ML/MIN/1.73
EOSINOPHIL # BLD AUTO: 0.21 10*3/MM3 (ref 0–0.4)
EOSINOPHIL NFR BLD AUTO: 2.5 % (ref 0.3–6.2)
ERYTHROCYTE [DISTWIDTH] IN BLOOD BY AUTOMATED COUNT: 13.1 % (ref 12.3–15.4)
GLUCOSE BLDC GLUCOMTR-MCNC: 120 MG/DL (ref 70–130)
GLUCOSE BLDC GLUCOMTR-MCNC: 135 MG/DL (ref 70–130)
GLUCOSE BLDC GLUCOMTR-MCNC: 199 MG/DL (ref 70–130)
GLUCOSE BLDC GLUCOMTR-MCNC: 201 MG/DL (ref 70–130)
GLUCOSE SERPL-MCNC: 137 MG/DL (ref 65–99)
HCT VFR BLD AUTO: 43.6 % (ref 34–46.6)
HGB BLD-MCNC: 13.7 G/DL (ref 12–15.9)
IMM GRANULOCYTES # BLD AUTO: 0.03 10*3/MM3 (ref 0–0.05)
IMM GRANULOCYTES NFR BLD AUTO: 0.4 % (ref 0–0.5)
INR PPP: 2.47 (ref 0.9–1.1)
LYMPHOCYTES # BLD AUTO: 2.72 10*3/MM3 (ref 0.7–3.1)
LYMPHOCYTES NFR BLD AUTO: 32.2 % (ref 19.6–45.3)
MCH RBC QN AUTO: 29.5 PG (ref 26.6–33)
MCHC RBC AUTO-ENTMCNC: 31.4 G/DL (ref 31.5–35.7)
MCV RBC AUTO: 94 FL (ref 79–97)
MONOCYTES # BLD AUTO: 1.31 10*3/MM3 (ref 0.1–0.9)
MONOCYTES NFR BLD AUTO: 15.5 % (ref 5–12)
NEUTROPHILS NFR BLD AUTO: 4.14 10*3/MM3 (ref 1.7–7)
NEUTROPHILS NFR BLD AUTO: 48.9 % (ref 42.7–76)
NRBC BLD AUTO-RTO: 0 /100 WBC (ref 0–0.2)
PLATELET # BLD AUTO: 219 10*3/MM3 (ref 140–450)
PMV BLD AUTO: 10.8 FL (ref 6–12)
POTASSIUM SERPL-SCNC: 3.8 MMOL/L (ref 3.5–5.2)
PROTHROMBIN TIME: 27 SECONDS (ref 11.7–14.2)
RBC # BLD AUTO: 4.64 10*6/MM3 (ref 3.77–5.28)
SODIUM SERPL-SCNC: 139 MMOL/L (ref 136–145)
WBC NRBC COR # BLD AUTO: 8.45 10*3/MM3 (ref 3.4–10.8)

## 2025-01-06 PROCEDURE — 97535 SELF CARE MNGMENT TRAINING: CPT

## 2025-01-06 PROCEDURE — 85610 PROTHROMBIN TIME: CPT | Performed by: INTERNAL MEDICINE

## 2025-01-06 PROCEDURE — 72040 X-RAY EXAM NECK SPINE 2-3 VW: CPT

## 2025-01-06 PROCEDURE — 85025 COMPLETE CBC W/AUTO DIFF WBC: CPT | Performed by: INTERNAL MEDICINE

## 2025-01-06 PROCEDURE — 80162 ASSAY OF DIGOXIN TOTAL: CPT | Performed by: STUDENT IN AN ORGANIZED HEALTH CARE EDUCATION/TRAINING PROGRAM

## 2025-01-06 PROCEDURE — 97530 THERAPEUTIC ACTIVITIES: CPT

## 2025-01-06 PROCEDURE — 63710000001 INSULIN LISPRO (HUMAN) PER 5 UNITS: Performed by: INTERNAL MEDICINE

## 2025-01-06 PROCEDURE — 99232 SBSQ HOSP IP/OBS MODERATE 35: CPT | Performed by: STUDENT IN AN ORGANIZED HEALTH CARE EDUCATION/TRAINING PROGRAM

## 2025-01-06 PROCEDURE — 82948 REAGENT STRIP/BLOOD GLUCOSE: CPT

## 2025-01-06 PROCEDURE — 80048 BASIC METABOLIC PNL TOTAL CA: CPT | Performed by: INTERNAL MEDICINE

## 2025-01-06 RX ORDER — WARFARIN SODIUM 5 MG/1
5 TABLET ORAL
Status: DISCONTINUED | OUTPATIENT
Start: 2025-01-06 | End: 2025-01-07

## 2025-01-06 RX ORDER — ATENOLOL 25 MG/1
37.5 TABLET ORAL 2 TIMES DAILY
Status: DISCONTINUED | OUTPATIENT
Start: 2025-01-06 | End: 2025-01-13 | Stop reason: HOSPADM

## 2025-01-06 RX ADMIN — ATORVASTATIN CALCIUM 10 MG: 10 TABLET, FILM COATED ORAL at 08:41

## 2025-01-06 RX ADMIN — ATENOLOL 37.5 MG: 25 TABLET ORAL at 21:30

## 2025-01-06 RX ADMIN — SPIRONOLACTONE 25 MG: 25 TABLET ORAL at 08:36

## 2025-01-06 RX ADMIN — INSULIN LISPRO 2 UNITS: 100 INJECTION, SOLUTION INTRAVENOUS; SUBCUTANEOUS at 21:30

## 2025-01-06 RX ADMIN — GUAIFENESIN AND DEXTROMETHORPHAN 5 ML: 20; 200 SYRUP ORAL at 11:59

## 2025-01-06 RX ADMIN — LIDOCAINE 1 PATCH: 4 PATCH TOPICAL at 08:45

## 2025-01-06 RX ADMIN — LINAGLIPTIN 5 MG: 5 TABLET, FILM COATED ORAL at 11:59

## 2025-01-06 RX ADMIN — TORSEMIDE 40 MG: 20 TABLET ORAL at 08:42

## 2025-01-06 RX ADMIN — WARFARIN SODIUM 5 MG: 5 TABLET ORAL at 18:19

## 2025-01-06 RX ADMIN — DIGOXIN 125 MCG: 125 TABLET ORAL at 08:41

## 2025-01-06 RX ADMIN — ATENOLOL 37.5 MG: 25 TABLET ORAL at 08:42

## 2025-01-06 RX ADMIN — ASPIRIN 81 MG: 81 TABLET, COATED ORAL at 08:41

## 2025-01-06 RX ADMIN — INSULIN LISPRO 4 UNITS: 100 INJECTION, SOLUTION INTRAVENOUS; SUBCUTANEOUS at 13:15

## 2025-01-06 NOTE — PROGRESS NOTES
LOS: 4 days   Patient Care Team:  Toni Green MD as PCP - General (Internal Medicine)  Pia Dueñas MD as Consulting Physician (Cardiology)  Iain Hill MD as Consulting Physician (Pulmonary Disease)  Carmen Kirk MD as Consulting Physician (Pain Medicine)  Nano Cool [facility] as Pharmacist  Shaw Hand PharmD as Pharmacist (Pharmacy)    Chief Complaint:  Following for CHF and A-fib    Interval History:   Currently feels well.  No complaints.  No chest pain..    No intake or output data in the 24 hours ending 01/06/25 1616     Objective   Vital Signs  Temp:  [97.7 °F (36.5 °C)-98.3 °F (36.8 °C)] 97.9 °F (36.6 °C)  Heart Rate:  [] 82  Resp:  [18-22] 18  BP: ()/(54-93) 106/93  No intake or output data in the 24 hours ending 01/06/25 1616    Comfortable NAD  PERRL, conjunctivae clear  Neck supple, no JVD or thyromegaly appreciated  S1/S2 irregularly irregular, controlled rate, no m/r/g  Lungs CTA B, normal effort  Abdomen S/NT/ND (+) BS, no HSM appreciated  Extremities warm, no clubbing, cyanosis, no significant edema   No visible or palpable skin lesions  A/O x 3, mood and affect appropriate    Results Review:      Results from last 7 days   Lab Units 01/06/25  0735 01/05/25  0449 01/04/25  0511   SODIUM mmol/L 139 139 139   POTASSIUM mmol/L 3.8 3.8 3.9   CHLORIDE mmol/L 95* 96* 98   CO2 mmol/L 31.0* 31.0* 30.0*   BUN mg/dL 32* 26* 21   CREATININE mg/dL 1.31* 1.27* 1.06*   GLUCOSE mg/dL 137* 125* 120*   CALCIUM mg/dL 9.7 9.5 9.1     Results from last 7 days   Lab Units 12/31/24  2324 12/31/24  1542 12/31/24  1419   HSTROP T ng/L 16* 14* 13     Results from last 7 days   Lab Units 01/06/25  0735 01/05/25  0449 01/04/25  0511   WBC 10*3/mm3 8.45 9.38 9.15   HEMOGLOBIN g/dL 13.7 13.4 12.7   HEMATOCRIT % 43.6 40.3 37.6   PLATELETS 10*3/mm3 219 199 184     Results from last 7 days   Lab Units 01/06/25  0735 01/05/25  0449 01/04/25  0511   INR  2.47* 2.57* 2.56*         Results from last 7 days    Lab Units 01/04/25  0511   MAGNESIUM mg/dL 1.9           I reviewed the patient's new clinical results.  I personally viewed and interpreted the patient's EKG/Telemetry data        Medication Review:   aspirin, 81 mg, Oral, Daily  atenolol, 37.5 mg, Oral, BID  atorvastatin, 10 mg, Oral, Daily  digoxin, 125 mcg, Oral, Every Other Day  insulin lispro, 2-9 Units, Subcutaneous, 4x Daily AC & at Bedtime  latanoprost, 1 drop, Both Eyes, Nightly  Lidocaine, 1 patch, Transdermal, Q24H  linagliptin, 5 mg, Oral, Daily  spironolactone, 25 mg, Oral, Daily  torsemide, 40 mg, Oral, Daily  warfarin, 5 mg, Oral, Daily        Pharmacy to dose warfarin,         Assessment & Plan       Acute hypoxic respiratory failure    Mixed hyperlipidemia    Chronic atrial fibrillation    Acute on chronic diastolic CHF (congestive heart failure)    Type 2 diabetes mellitus with hyperglycemia, without long-term current use of insulin    Stage 3a chronic kidney disease    Pneumonia    Acute on chronic hypoxic respiratory failure    Acute on chronic heart failure preserved ejection fraction with subsequent hypoxic respiratory failure.  Resolving.  Precipitated by dietary noncompliance.  Stressed with patient.  Continue spironolactone 25, torsemide 40.  Euvolemic today..  Creatinine remained stable.  Atrial fibrillation, persistent.  Rates currently controlled on atenolol and digoxin.  Levels have previously been therapeutic.    Digoxin levels reassessed, stable  Increase atenolol to 37.5 twice daily, rates remain around 100 bpm.  She is tolerating this well.  CKD 3A.  Nephrology is following  Nonobstructive CAD.  Also small peripheral vessel disease being treated medically.  Type 2 diabetes.  Would strongly consider replacing linagliptin with SGLT2 inhibitor.  Defer to outpatient team.  Hyperlipidemia    No barriers to discharge from the cardiac standpoint, will see as needed while she is here.  Thank you for the consult.    Fuentes Camacho,  MD  01/06/25  16:16 EST      Part of this note may be an electronic transcription/translation of spoken language to printed text using the Dragon Dictation System.

## 2025-01-06 NOTE — PROGRESS NOTES
[facility] Clinical Pharmacy Services: Warfarin Dosing/Monitoring Consult    Lana Yañez is a 79 y.o. female, estimated creatinine clearance is 42.1 mL/min (A) (by C-G formula based on SCr of 1.31 mg/dL (H)). weighing 96.5 kg (212 lb 11.9 oz).    Results from last 7 days   Lab Units 01/06/25  0735 01/05/25  0449 01/04/25  0511 01/03/25  0615 01/02/25  0905 01/01/25  0228   INR  2.47* 2.57* 2.56* 2.39* 2.35* 1.99*   HEMOGLOBIN g/dL 13.7 13.4 12.7  --  11.8* 12.3   HEMATOCRIT % 43.6 40.3 37.6  --  38.3 37.0   PLATELETS 10*3/mm3 219 199 184  --  139* 131*     Prior to admission anticoagulation: Per [facility] anticoagulation clinic note on 12/17/24, the patient's most recent warfarin regimen is 5 mg daily (35 mg/week). INR was therapeutic at that visit at 2.8.     Hospital Anticoagulation:  Consulting provider: Dr. Howell  Start date: 12/31/24  Indication: Atrial fibrillation  Target INR: 2 - 3  Expected duration: lifelong   Bridge Therapy: No     Potential food or drug interactions: (new)  Spironolactone may decrease INR (started 1/3)    Education complete?/Date: N/A; home medication    Assessment/Plan:  INR remains therapeutic. Last doses of antibiotics today, will resume home 5mg daily dose and monitor closely. Consider going back to 4mg if INR starts to trend up.   RN to monitor for any signs or symptoms of bleeding  Pharmacy to follow up daily INRs and dose adjustments    Pharmacy will continue to follow until discharge or discontinuation of warfarin.     Isa Keller, PharmD  Clinical Pharmacist

## 2025-01-06 NOTE — PROGRESS NOTES
Name: Lana Yañez ADMIT: 2024   : 1945  PCP: Toni Green MD    MRN: [id number] LOS: 4 days   AGE/SEX: 79 y.o. female  ROOM: [facility]     Subjective   Subjective   Denies any complaint. Some neck soreness. She has a history of chronic arthritis states she needs left shoulder surgery     Objective   Objective   Vital Signs  Temp:  [97.3 °F (36.3 °C)-98.3 °F (36.8 °C)] 97.9 °F (36.6 °C)  Heart Rate:  [] 100  Resp:  [18-23] 18  BP: ()/(54-81) 127/66  SpO2:  [91 %-98 %] 91 %  on   ;   Device (Oxygen Therapy): room air  Body mass index is 34.34 kg/m².    Physical Exam  Constitutional:       General: She is not in acute distress.     Appearance: She is not toxic-appearing.   HENT:      Head: Normocephalic and atraumatic.   Cardiovascular:      Rate and Rhythm: Normal rate. Rhythm irregular.   Pulmonary:      Effort: Pulmonary effort is normal. No respiratory distress.      Breath sounds: Normal breath sounds.   Abdominal:      General: Bowel sounds are normal.      Palpations: Abdomen is soft.      Tenderness: There is no abdominal tenderness.   Musculoskeletal:         General: No swelling.      Cervical back: No rigidity.   Skin:     General: Skin is warm and dry.   Neurological:      General: No focal deficit present.      Mental Status: She is alert and oriented to person, place, and time.   Psychiatric:         Mood and Affect: Mood normal.         Behavior: Behavior normal.     Results Review  I reviewed the patient's new clinical results.  Results from last 7 days   Lab Units 25  0735 25  0449 25  0511 25  0905   WBC 10*3/mm3 8.45 9.38 9.15 10.16   HEMOGLOBIN g/dL 13.7 13.4 12.7 11.8*   PLATELETS 10*3/mm3 219 199 184 139*     Results from last 7 days   Lab Units 25  0735 25  0449 25  0511 25  0905   SODIUM mmol/L 139 139 139 137   POTASSIUM mmol/L 3.8 3.8 3.9 3.4*   CHLORIDE mmol/L 95* 96* 98 99   CO2 mmol/L 31.0* 31.0* 30.0* 28.0    BUN mg/dL 32* 26* 21 17   CREATININE mg/dL 1.31* 1.27* 1.06* 1.23*   GLUCOSE mg/dL 137* 125* 120* 205*     Lab Results   Component Value Date    ANIONGAP 13.0 01/06/2025     Estimated Creatinine Clearance: 42.1 mL/min (A) (by C-G formula based on SCr of 1.31 mg/dL (H)).   Lab Results   Component Value Date    EGFR 42.1 (L) 01/06/2025     Results from last 7 days   Lab Units 01/05/25 0449 01/04/25 0511 01/02/25 0905 12/31/24  1419   ALBUMIN g/dL 3.8 3.6 3.7 4.2   BILIRUBIN mg/dL 0.5  --  0.7 0.8   ALK PHOS U/L 66  --  63 70   AST (SGOT) U/L 20  --  15 19   ALT (SGPT) U/L 12  --  10 11     Results from last 7 days   Lab Units 01/06/25  0735 01/05/25 0449 01/04/25 0511 01/02/25 0905 01/01/25 0228 12/31/24  1419   CALCIUM mg/dL 9.7 9.5 9.1 8.9   < > 9.1   ALBUMIN g/dL  --  3.8 3.6 3.7  --  4.2   MAGNESIUM mg/dL  --   --  1.9  --   --   --    PHOSPHORUS mg/dL  --   --  3.1  --   --   --     < > = values in this interval not displayed.     Results from last 7 days   Lab Units 01/01/25 0228 12/31/24  2324 12/31/24 2051 12/31/24  1702 12/31/24  1542   PROCALCITONIN ng/mL  --   --   --   --  0.07   LACTATE mmol/L 1.5 2.2* 2.1* 2.2*  --      Glucose   Date/Time Value Ref Range Status   01/06/2025 0611 135 (H) 70 - 130 mg/dL Final   01/05/2025 2017 171 (H) 70 - 130 mg/dL Final   01/05/2025 1648 288 (H) 70 - 130 mg/dL Final   01/05/2025 1128 168 (H) 70 - 130 mg/dL Final   01/05/2025 0804 150 (H) 70 - 130 mg/dL Final   01/04/2025 2042 171 (H) 70 - 130 mg/dL Final   01/04/2025 1658 145 (H) 70 - 130 mg/dL Final       No radiology results for the last day    Scheduled Meds  aspirin, 81 mg, Oral, Daily  atenolol, 37.5 mg, Oral, BID  atorvastatin, 10 mg, Oral, Daily  digoxin, 125 mcg, Oral, Every Other Day  insulin lispro, 2-9 Units, Subcutaneous, 4x Daily AC & at Bedtime  latanoprost, 1 drop, Both Eyes, Nightly  Lidocaine, 1 patch, Transdermal, Q24H  linagliptin, 5 mg, Oral, Daily  spironolactone, 25 mg, Oral,  Daily  torsemide, 40 mg, Oral, Daily  warfarin, 5 mg, Oral, Daily    Continuous Infusions  Pharmacy to dose warfarin,     PRN Meds    acetaminophen **OR** acetaminophen **OR** acetaminophen    albuterol sulfate HFA    senna-docusate sodium **AND** polyethylene glycol **AND** bisacodyl **AND** bisacodyl    dextrose    dextrose    glucagon (human recombinant)    guaiFENesin-dextromethorphan    HYDROcodone-acetaminophen    melatonin    ondansetron ODT **OR** ondansetron    Pharmacy to dose warfarin    polyethylene glycol    Potassium Replacement - Follow Nurse / BPA Driven Protocol    sodium chloride    Pharmacy to dose warfarin,     Diet  Diet: Cardiac, Fluid Restriction (240 mL/tray); Healthy Heart (2-3 Na+); 1500 mL/day; Fluid Consistency: Thin (IDDSI 0)    Results from last 7 days   Lab Units 01/06/25  0735 01/05/25  0449 01/04/25  0511   INR  2.47* 2.57* 2.56*         Assessment/Plan     Active Hospital Problems    Diagnosis  POA    **Acute hypoxic respiratory failure [J96.01]  Yes    Acute on chronic hypoxic respiratory failure [J96.21]  Yes    Pneumonia [J18.9]  Yes    Stage 3a chronic kidney disease [N18.31]  Yes    Type 2 diabetes mellitus with hyperglycemia, without long-term current use of insulin [E11.65]  Yes    Acute on chronic diastolic CHF (congestive heart failure) [I50.33]  Yes    Chronic atrial fibrillation [I48.20]  Yes    Mixed hyperlipidemia [E78.2]  Yes      Resolved Hospital Problems   No resolved problems to display.     Patient is a 79 y.o. female     01/06/25 complaining some neck soreness check C-spine x-ray    Acute respiratory failure with hypoxia  Acute on chronic diastolic CHF  Cardiology and nephrology following  Now on PO torsemide and spironolactone    CKD 3  Creatinine stable  Monitoring renal function on diuretics    Chronic atrial fibrillation  Warfarin, rate controlled    Hyperlipidemia atorvastatin  CAD  DM2 control acceptable    DVT prophylaxis  Warfarin (home  med)    Discharge  Acute rehab  Expected Discharge Date: 1/7/2025; Expected Discharge Time:     Discussed with patient and nursing staff    Peter De La Rosa MD  [facility]  01/06/25 11:38 EST

## 2025-01-06 NOTE — CASE MANAGEMENT/SOCIAL WORK
Continued Stay Note  [facility]     Patient Name: Lana aYñez  MRN: [id number]  Today's Date: 1/6/2025    Admit Date: 12/31/2024    Plan: Referrals to [facility] facility. Will need precert.   Discharge Plan       Row Name 01/06/25 1421       Plan    Plan Referrals to [facility] pending accepting facility. Will need precert.    Plan Comments Referrals to [facility] pending accepting facility. Will need precert.                    Expected Discharge Date and Time       Expected Discharge Date Expected Discharge Time    Jan 7, 2025               Sophia Santiago RN

## 2025-01-06 NOTE — PLAN OF CARE
"Goal Outcome Evaluation:  Plan of Care Reviewed With: patient        Progress: improving  Outcome Evaluation: Pt demos slow but steady progress with endurance as evidenced by tolerance of increased activity today. Pt continues to require increased assist with bed mobility and transfers, and reports she feels \"weak\" today. Pt required mod A with bed mobility and min A x2 for STS transfers. Pt ambulated 15' in room with min A and rwx. Pt continues to fatigue quickly and remains at increased risk of falls. Pt will continue to benefit from PT to address strength and balance in order to increase independence and improve safety. Rec DC to IRF.    Anticipated Discharge Disposition (PT): inpatient rehabilitation facility                        "

## 2025-01-06 NOTE — PLAN OF CARE
Goal Outcome Evaluation:           Progress: improving        Pt resting in bed with VSS and no s/s of pain noted.  No cough or dyspnea noted.  Answered questions patient had related to diabetic medications.  No concerns noted this shift, pt is waiting for rehab placement.  Will continue to monitor.

## 2025-01-06 NOTE — THERAPY TREATMENT NOTE
Patient Name: Lana Yañez  : 1945    MRN: [id number]                              Today's Date: 2025       Admit Date: 2024    Visit Dx:     ICD-10-CM ICD-9-CM   1. Acute hypoxic respiratory failure  J96.01 518.81   2. Acute cough  R05.1 786.2   3. Pneumonia of both lower lobes due to infectious organism  J18.9 486   4. Acute on chronic congestive heart failure, unspecified heart failure type  I50.9 428.0     Patient Active Problem List   Diagnosis    Mixed hyperlipidemia    Vitamin deficiency    Essential hypertension    Rheumatoid arthritis involving both hands    Fatigue    ANTOINE (dyspnea on exertion)    Dysuria    Urge incontinence of urine    Hypovitaminosis D    Sleep apnea    Encounter for screening colonoscopy    Bronchitis    Cough    Generalized osteoarthritis of multiple sites    Cellulitis of right elbow due to MRSA    Medicare annual wellness visit, initial    Hospital discharge follow-up    Displacement of lumbar intervertebral disc without myelopathy    Lumbar disc herniation    Chronic atrial fibrillation    History of MRSA infection    Left-sided weakness    Atrial fibrillation    Left shoulder pain    Cerebrovascular accident (CVA) due to occlusion of right middle cerebral artery    Relative lymphocytosis    Nausea    Weakness    Controlled substance agreement signed    Coronary artery disease involving native coronary artery of native heart without angina pectoris    SOB (shortness of breath)    Lower extremity edema    Acute on chronic diastolic CHF (congestive heart failure)    Adhesive capsulitis of left shoulder    CVA tenderness    Costochondritis, acute    Allergic reaction    Coronary artery embolism    Visit for screening mammogram    Low back pain    Urgency of urination    Hyperglycemia    Carpal tunnel syndrome of left wrist/ wakes her up    Localized edema    Acute cystitis without hematuria    Bruising    History of stroke    Diabetes 1.5, managed as type 2     Other chronic pain    Vaginal candidiasis    Pulmonary hypertension    Acute respiratory failure with hypoxia    Hypokalemia    Hypomagnesemia    Type 2 diabetes mellitus with hyperglycemia, without long-term current use of insulin    Diarrhea    Sepsis without acute organ dysfunction    Morbid obesity with BMI of 40.0-44.9, adult    Biliary acute pancreatitis without necrosis or infection    Stage 3a chronic kidney disease    Acute UTI (urinary tract infection)    Hemiparesis of left nondominant side as late effect of cerebral infarction    Acute hypoxic respiratory failure    Pneumonia    Acute on chronic hypoxic respiratory failure     Past Medical History:   Diagnosis Date    Acute on chronic diastolic heart failure     Acute UTI (urinary tract infection) 05/22/2022    Allergic reaction 02/09/2018    Arthritis     Arthritis of back     Arthritis of neck     Asthma     Atrial fibrillation     Persistent; on warfarin    Atypical chest pain     Biliary acute pancreatitis without necrosis or infection 11/10/2021    Bronchitis     Cellulitis of right elbow     due to MRSA    Cervical disc disorder     CHF (congestive heart failure)     COPD (chronic obstructive pulmonary disease)     Coronary artery disease     Cardiac catheterization completed; 90% PDA stenosis with medical management recommended    Coronary artery disease involving native coronary artery of native heart with angina pectoris with documented spasm     CTS (carpal tunnel syndrome)     Diabetes 1.5, managed as type 2     Disease of thyroid gland     Displacement of lumbar intervertebral disc without myelopathy     Dizziness     ANTOINE (dyspnea on exertion)     Essential hypertension     Fatigue     Fracture, foot     Generalized osteoarthritis of multiple sites     History of rheumatic fever     History of transfusion     Hx of bone density study     10/23/2014    Hyperglycemia     Hyperlipidemia     Hypovitaminosis D     Knee swelling     Left arm pain      Left-sided weakness     Leg swelling     Low back pain     Lower extremity edema     Lumbosacral disc disease     Malaise and fatigue     Mitral valve disease     Moderate mitral valve prolapse and moderate mitral regurgitation    Mitral valve insufficiency     Morbid obesity with BMI of 40.0-44.9, adult     MRSA infection     NSTEMI (non-ST elevated myocardial infarction)     PAF (paroxysmal atrial fibrillation)     Periarthritis of shoulder     Pneumonia 1/1/2025    RA (rheumatoid arthritis)     involving both hands    Rotator cuff syndrome     Sleep apnea     SOB (shortness of breath)     Stroke     left side weakness    Urge incontinence of urine     UTI (urinary tract infection) 05/2020    Visit for screening mammogram 04/02/2018    Vitamin D deficiency      Past Surgical History:   Procedure Laterality Date    BREAST BIOPSY      BREAST SURGERY      right side lumpectomy with biopsy    CARDIAC CATHETERIZATION Left 10/20/2017    Procedure: Cardiac Catheterization/Vascular Study;  Surgeon: Alphonso Olmedo MD;  Location: [facility] CATH INVASIVE LOCATION;  Service:     CARDIAC CATHETERIZATION N/A 10/20/2017    +2 mitral regurgitation, left main 10% stenosis, mid to distal LAD 10% diffuse stenosis, circumflex 10% diffuse stenosis, RCA 10% proximal stenosis, and distal PDA consistent with coronary embolus with a lesion of 90% too small to consider coronary intervention; medical management recommended    EYE SURGERY      laser surgery for glaucoma and left eye cataracts removed    HYSTERECTOMY      10+ years ago    JOINT REPLACEMENT  2005; 2006    bilateral knees and left rotater    KNEE SURGERY      MAMMO BILATERAL  2016    [facility]     SHOULDER SURGERY        General Information       Row Name 01/06/25 1227          Physical Therapy Time and Intention    Document Type therapy note (daily note)  -EF     Mode of Treatment co-treatment;occupational therapy;physical therapy;other (see comments)  -EF       Row Name  01/06/25 1227          General Information    Existing Precautions/Restrictions fall  -EF     Barriers to Rehab medically complex;previous functional deficit  -EF       Row Name 01/06/25 1227          Cognition    Orientation Status (Cognition) oriented x 4  -EF       Row Name 01/06/25 1227          Safety Issues/Impairments Affecting Functional Mobility    Impairments Affecting Function (Mobility) balance;endurance/activity tolerance;range of motion (ROM);strength;shortness of breath;pain  -EF     Comment, Safety Issues/Impairments (Mobility) Co treatment medically appropriate and necessary due to patient acuity level, activity tolerance and safety of patient and staff. Treatment is focusing on progression of care and goals established in the POC.  -EF               User Key  (r) = Recorded By, (t) = Taken By, (c) = Cosigned By      Initials Name Provider Type    EF Stefany Phipps, RENE Physical Therapist                   Mobility       Row Name 01/06/25 1227          Bed Mobility    Supine-Sit [place] (Bed Mobility) moderate assist (50% patient effort);verbal cues  -EF     Assistive Device (Bed Mobility) head of bed elevated  -EF       Row Name 01/06/25 1227          Sit-Stand Transfer    Sit-Stand [place] (Transfers) minimum assist (75% patient effort);2 person assist;verbal cues  -EF     Assistive Device (Sit-Stand Transfers) walker, front-wheeled  -EF     Comment, (Sit-Stand Transfer) 2 attempts required; vc's for hand placement  -EF       Row Name 01/06/25 1227          Gait/Stairs (Locomotion)    [place] Level (Gait) minimum assist (75% patient effort);verbal cues  -EF     Assistive Device (Gait) walker, front-wheeled  -EF     Distance in Feet (Gait) 15  -EF     Deviations/Abnormal Patterns (Gait) héctor decreased;stride length decreased  -EF     Bilateral Gait Deviations forward flexed posture;heel strike decreased  -EF     Comment, (Gait/Stairs) cues for upright posture  -EF                "User Key  (r) = Recorded By, (t) = Taken By, (c) = Cosigned By      Initials Name Provider Type    EF Stefany Phipps, RENE Physical Therapist                   Obj/Interventions       Row Name 01/06/25 1228          Strength Comprehensive (MMT)    Comment, General Manual Muscle Testing (MMT) Assessment R LE grossly 4-/5. L hip 3-/5, L knee and ankle 3+/5.  -EF       Row Name 01/06/25 1228          Motor Skills    Therapeutic Exercise --  seated B LAQ, ankle pumps x5 reps  -EF       Row Name 01/06/25 1228          Balance    Balance Assessment standing dynamic balance  -EF     Static Sitting Balance standby assist  -EF     Position, Sitting Balance unsupported;sitting edge of bed  -EF     Static Standing Balance contact guard  -EF     Dynamic Standing Balance minimal assist  -EF     Position/Device Used, Standing Balance supported;walker, front-wheeled  -EF               User Key  (r) = Recorded By, (t) = Taken By, (c) = Cosigned By      Initials Name Provider Type    Stefany Garcia, RENE Physical Therapist                   Goals/Plan    No documentation.                  Clinical Impression       Row Name 01/06/25 1229          Pain    Pretreatment Pain Rating 0/10 - no pain  -EF     Posttreatment Pain Rating 0/10 - no pain  -EF       Row Name 01/06/25 1229          Plan of Care Review    Plan of Care Reviewed With patient  -EF     Progress improving  -EF     Outcome Evaluation Pt demos slow but steady progress with endurance as evidenced by tolerance of increased activity today. Pt continues to require increased assist with bed mobility and transfers, and reports she feels \"weak\" today. Pt required mod A with bed mobility and min A x2 for STS transfers. Pt ambulated 15' in room with min A and rwx. Pt continues to fatigue quickly and remains at increased risk of falls. Pt will continue to benefit from PT to address strength and balance in order to increase independence and improve safety. Rec DC to IRF.  -EF "       Row Name 01/06/25 1229          Therapy Assessment/Plan (PT)    Therapy Frequency (PT) 5 times/wk  -EF       Row Name 01/06/25 1229          Positioning and Restraints    Pre-Treatment Position in bed  -EF     Post Treatment Position chair  -EF     In Chair sitting;call light within reach;encouraged to call for assist;exit alarm on;with OT  -EF               User Key  (r) = Recorded By, (t) = Taken By, (c) = Cosigned By      Initials Name Provider Type    Stefany Garcia PT Physical Therapist                   Outcome Measures       Row Name 01/06/25 1230          How much help from another person do you currently need...    Turning from your back to your side while in flat bed without using bedrails? 3  -EF     Moving from lying on back to sitting on the side of a flat bed without bedrails? 2  -EF     Moving to and from a bed to a chair (including a wheelchair)? 3  -EF     Standing up from a chair using your arms (e.g., wheelchair, bedside chair)? 2  -EF     Climbing 3-5 steps with a railing? 1  -EF     To walk in hospital room? 3  -EF     AM-PAC 6 Clicks Score (PT) 14  -EF     Highest Level of Mobility Goal 4 --> Transfer to chair/commode  -EF               User Key  (r) = Recorded By, (t) = Taken By, (c) = Cosigned By      Initials Name Provider Type    Stefany Garcia, RENE Physical Therapist                                 Physical Therapy Education       Title: PT OT SLP Therapies (In Progress)       Topic: Physical Therapy (Done)       Point: Mobility training (Done)       Learning Progress Summary            Patient Acceptance, E, VU,NR by ARAVIND at 1/6/2025 1231    Acceptance, E,TB, VU,NR by CS at 1/4/2025 0951    Acceptance, E, VU,NR by SV at 1/3/2025 1003                      Point: Home exercise program (Done)       Learning Progress Summary            Patient Acceptance, E, VU,NR by ARAVIND at 1/6/2025 1231    Acceptance, E,TB, VU,NR by CS at 1/4/2025 0951    Acceptance, E, VU,NR by SV at  "1/3/2025 1003                                      User Key       Initials Effective Dates Name Provider Type Discipline    EF 05/31/24 -  Stefany Phipps, PT Physical Therapist PT    SV 07/11/23 -  [place] De La Fuente, PT Physical Therapist PT    CS 07/11/23 -  Gurpreet Bianchi, PT Physical Therapist PT                  PT Recommendation and Plan     Progress: improving  Outcome Evaluation: Pt demos slow but steady progress with endurance as evidenced by tolerance of increased activity today. Pt continues to require increased assist with bed mobility and transfers, and reports she feels \"weak\" today. Pt required mod A with bed mobility and min A x2 for STS transfers. Pt ambulated 15' in room with min A and rwx. Pt continues to fatigue quickly and remains at increased risk of falls. Pt will continue to benefit from PT to address strength and balance in order to increase independence and improve safety. Rec DC to IRF.     Time Calculation:         PT Charges       Row Name 01/06/25 1231             Time Calculation    Start Time 1130  -EF      Stop Time 1145  -EF      Time Calculation (min) 15 min  -EF      PT Received On 01/06/25  -EF      PT - Next Appointment 01/07/25  -EF         Time Calculation- PT    Total Timed Code Minutes- PT 15 minute(s)  -EF         Timed Charges    29245 - PT Therapeutic Activity Minutes 15  -EF         Total Minutes    Timed Charges Total Minutes 15  -EF       Total Minutes 15  -EF                User Key  (r) = Recorded By, (t) = Taken By, (c) = Cosigned By      Initials Name Provider Type    EF Stefany Phipps, PT Physical Therapist                  Therapy Charges for Today       Code Description Service Date Service Provider Modifiers Qty    [id number]  PT THERAPEUTIC ACT EA 15 MIN 1/6/2025 Stefany Phipps, PT GP 1            PT G-Codes  Outcome Measure Options: AM-PAC 6 Clicks Basic Mobility (PT)  AM-PAC 6 Clicks Score (PT): 14  AM-PAC 6 Clicks Score (OT): 10  Modified " [place] Scale: 4 - Moderately severe disability.  Unable to walk without assistance, and unable to attend to own bodily needs without assistance.  PT Discharge Summary  Anticipated Discharge Disposition (PT): inpatient rehabilitation facility    Stefany Phipps, PT  1/6/2025

## 2025-01-06 NOTE — PLAN OF CARE
Goal Outcome Evaluation:  Plan of Care Reviewed With: patient        Progress: improving     D/c planning. No events for shift.no oxygen used during the day.

## 2025-01-06 NOTE — THERAPY TREATMENT NOTE
Patient Name: Lana Yañez  : 1945    MRN: [id number]                              Today's Date: 2025       Admit Date: 2024    Visit Dx:     ICD-10-CM ICD-9-CM   1. Acute hypoxic respiratory failure  J96.01 518.81   2. Acute cough  R05.1 786.2   3. Pneumonia of both lower lobes due to infectious organism  J18.9 486   4. Acute on chronic congestive heart failure, unspecified heart failure type  I50.9 428.0     Patient Active Problem List   Diagnosis    Mixed hyperlipidemia    Vitamin deficiency    Essential hypertension    Rheumatoid arthritis involving both hands    Fatigue    ANTOINE (dyspnea on exertion)    Dysuria    Urge incontinence of urine    Hypovitaminosis D    Sleep apnea    Encounter for screening colonoscopy    Bronchitis    Cough    Generalized osteoarthritis of multiple sites    Cellulitis of right elbow due to MRSA    Medicare annual wellness visit, initial    Hospital discharge follow-up    Displacement of lumbar intervertebral disc without myelopathy    Lumbar disc herniation    Chronic atrial fibrillation    History of MRSA infection    Left-sided weakness    Atrial fibrillation    Left shoulder pain    Cerebrovascular accident (CVA) due to occlusion of right middle cerebral artery    Relative lymphocytosis    Nausea    Weakness    Controlled substance agreement signed    Coronary artery disease involving native coronary artery of native heart without angina pectoris    SOB (shortness of breath)    Lower extremity edema    Acute on chronic diastolic CHF (congestive heart failure)    Adhesive capsulitis of left shoulder    CVA tenderness    Costochondritis, acute    Allergic reaction    Coronary artery embolism    Visit for screening mammogram    Low back pain    Urgency of urination    Hyperglycemia    Carpal tunnel syndrome of left wrist/ wakes her up    Localized edema    Acute cystitis without hematuria    Bruising    History of stroke    Diabetes 1.5, managed as type 2     Other chronic pain    Vaginal candidiasis    Pulmonary hypertension    Acute respiratory failure with hypoxia    Hypokalemia    Hypomagnesemia    Type 2 diabetes mellitus with hyperglycemia, without long-term current use of insulin    Diarrhea    Sepsis without acute organ dysfunction    Morbid obesity with BMI of 40.0-44.9, adult    Biliary acute pancreatitis without necrosis or infection    Stage 3a chronic kidney disease    Acute UTI (urinary tract infection)    Hemiparesis of left nondominant side as late effect of cerebral infarction    Acute hypoxic respiratory failure    Pneumonia    Acute on chronic hypoxic respiratory failure     Past Medical History:   Diagnosis Date    Acute on chronic diastolic heart failure     Acute UTI (urinary tract infection) 05/22/2022    Allergic reaction 02/09/2018    Arthritis     Arthritis of back     Arthritis of neck     Asthma     Atrial fibrillation     Persistent; on warfarin    Atypical chest pain     Biliary acute pancreatitis without necrosis or infection 11/10/2021    Bronchitis     Cellulitis of right elbow     due to MRSA    Cervical disc disorder     CHF (congestive heart failure)     COPD (chronic obstructive pulmonary disease)     Coronary artery disease     Cardiac catheterization completed; 90% PDA stenosis with medical management recommended    Coronary artery disease involving native coronary artery of native heart with angina pectoris with documented spasm     CTS (carpal tunnel syndrome)     Diabetes 1.5, managed as type 2     Disease of thyroid gland     Displacement of lumbar intervertebral disc without myelopathy     Dizziness     ANTOINE (dyspnea on exertion)     Essential hypertension     Fatigue     Fracture, foot     Generalized osteoarthritis of multiple sites     History of rheumatic fever     History of transfusion     Hx of bone density study     10/23/2014    Hyperglycemia     Hyperlipidemia     Hypovitaminosis D     Knee swelling     Left arm pain      Left-sided weakness     Leg swelling     Low back pain     Lower extremity edema     Lumbosacral disc disease     Malaise and fatigue     Mitral valve disease     Moderate mitral valve prolapse and moderate mitral regurgitation    Mitral valve insufficiency     Morbid obesity with BMI of 40.0-44.9, adult     MRSA infection     NSTEMI (non-ST elevated myocardial infarction)     PAF (paroxysmal atrial fibrillation)     Periarthritis of shoulder     Pneumonia 1/1/2025    RA (rheumatoid arthritis)     involving both hands    Rotator cuff syndrome     Sleep apnea     SOB (shortness of breath)     Stroke     left side weakness    Urge incontinence of urine     UTI (urinary tract infection) 05/2020    Visit for screening mammogram 04/02/2018    Vitamin D deficiency      Past Surgical History:   Procedure Laterality Date    BREAST BIOPSY      BREAST SURGERY      right side lumpectomy with biopsy    CARDIAC CATHETERIZATION Left 10/20/2017    Procedure: Cardiac Catheterization/Vascular Study;  Surgeon: Alphonso Olmedo MD;  Location: [facility] CATH INVASIVE LOC[facility];  Service:     CARDIAC CATHETERIZATION N/A 10/20/2017    +2 mitral regurgitation, left main 10% stenosis, mid to distal LAD 10% diffuse stenosis, circumflex 10% diffuse stenosis, RCA 10% proximal stenosis, and distal PDA consistent with coronary embolus with a lesion of 90% too small to consider coronary intervention; medical management recommended    EYE SURGERY      laser surgery for glaucoma and left eye cataracts removed    HYSTERECTOMY      10+ years ago    JOINT REPLACEMENT  2005; 2006    bilateral knees and left rotater    KNEE SURGERY      MAMMO BILATERAL  2016    [facility]     SHOULDER SURGERY        General Information       Row Name 01/06/25 8490          OT Time and Intention    Subjective Information no complaints  -RB     Document Type therapy note (daily note)  -RB     Mode of Treatment co-treatment;occupational therapy;physical therapy  -RB      Patient Effort good  -       Row Name 01/06/25 1305          Cognition    Orientation Status (Cognition) oriented x 4  -RB               User Key  (r) = Recorded By, (t) = Taken By, (c) = Cosigned By      Initials Name Provider Type    RB Belem Sauceda OT Occupational Therapist                     Mobility/ADL's       Row Name 01/06/25 1307          Bed Mobility    Bed Mobility supine-sit  -RB     Supine-Sit Maury (Bed Mobility) moderate assist (50% patient effort);verbal cues;2 person assist  -RB     Bed Mobility, Safety Issues decreased use of arms for pushing/pulling;decreased use of legs for bridging/pushing;impaired trunk control for bed mobility  -RB     Assistive Device (Bed Mobility) bed rails  -RB       Row Name 01/06/25 1307          Transfers    Transfers sit-stand transfer;stand-sit transfer;bed-chair transfer  -       Row Name 01/06/25 1307          Bed-Chair Transfer    Bed-Chair Maury (Transfers) minimum assist (75% patient effort);1 person assist;2 person assist;verbal cues  -     Assistive Device (Bed-Chair Transfers) walker, front-wheeled  -       Row Name 01/06/25 1307          Sit-Stand Transfer    Sit-Stand Maury (Transfers) minimum assist (75% patient effort);1 person assist;2 person assist;verbal cues  -     Assistive Device (Sit-Stand Transfers) walker, front-wheeled  -       Row Name 01/06/25 1307          Stand-Sit Transfer    Stand-Sit Maury (Transfers) minimum assist (75% patient effort);1 person assist;verbal cues;2 person assist  -     Assistive Device (Stand-Sit Transfers) walker, front-wheeled  -       Row Name 01/06/25 1307          Functional Mobility    Functional Mobility- Ind. Level contact guard assist;minimum assist (75% patient effort);1 person + 1 person to manage equipment;verbal cues required  -     Functional Mobility- Safety Issues balance decreased during turns;sequencing ability decreased;step length decreased  -        Row Name 01/06/25 1307          Activities of Daily Living    BADL Assessment/Intervention lower body dressing;grooming  -RB       Row Name 01/06/25 1307          Lower Body Dressing Assessment/Training    [place] Level (Lower Body Dressing) lower body dressing skills;dependent (less than 25% patient effort)  -RB     Position (Lower Body Dressing) edge of bed sitting  -RB       Row Name 01/06/25 1307          Grooming Assessment/Training    [place] Level (Grooming) grooming skills;set up  -RB     Position (Grooming) supported sitting  -RB               User Key  (r) = Recorded By, (t) = Taken By, (c) = Cosigned By      Initials Name Provider Type    Belem Figueredo OT Occupational Therapist                   Obj/Interventions       Row Name 01/06/25 1305          Range of Motion Comprehensive    Comment, General Range of Motion LUE limited ~75% at the shoulder. very painful with both AAROM/PROM, poor tolerance for ROM/stretching. elbow->hand WFL  -RB       Row Name 01/06/25 1305          Strength Comprehensive (MMT)    Comment, General Manual Muscle Testing (MMT) Assessment 3-/5 L shoulder, 4/5 L elbow, 4/5 L hand. The pt reports baseline LUE strength deficits from a prior stroke + needing a shoulder replacement  -RB       Row Name 01/06/25 1305          Balance    Comment, Balance SBA/CGA sitting balance, CGA-Min A x1-2 for standing balance using a walker  -RB               User Key  (r) = Recorded By, (t) = Taken By, (c) = Cosigned By      Initials Name Provider Type    Belem Figueredo, REGINE Occupational Therapist                   Goals/Plan    No documentation.                  Clinical Impression       Row Name 01/06/25 1305          Pain Assessment    Pretreatment Pain Rating 0/10 - no pain  -RB     Posttreatment Pain Rating 0/10 - no pain  -RB       Row Name 01/06/25 1308 01/06/25 1305       Plan of Care Review    Plan of Care Reviewed With -- patient  -RB    Progress -- improving  -RB     Outcome Evaluation The pt participated in OT this morning. Mod A provided for bed mobility. SBA/CGA sitting balance at the EOB. Total A LBD. The pt reports chronic LUE shoulder ROM deficits from her prior stroke + needing a shoulder replacement. She stood with Min A x2 + walker and mobilized to her doorway and back to her recliner chair. S/S provided for a seated grooming task. She endorsed weakness today and is motivated to d/c to a IRF.      Anticipated Discharge Disposition (OT): inpatient rehabilitation facility  -RB --      Row Name 01/06/25 7454          Therapy Assessment/Plan (OT)    Rehab Potential (OT) good  -RB     Criteria for Skilled Therapeutic Interventions Met (OT) yes;skilled treatment is necessary  -RB     Therapy Frequency (OT) 5 times/wk  -RB       Row Name 01/06/25 4795          Therapy Plan Review/Discharge Plan (OT)    Anticipated Discharge Disposition (OT) inpatient rehabilitation facility  -       Row Name 01/06/25 0487          Positioning and Restraints    Pre-Treatment Position in bed  -RB     Post Treatment Position chair  -RB     In Chair notified nsg;reclined;sitting;call light within reach;encouraged to call for assist;exit alarm on;legs elevated  -RB               User Key  (r) = Recorded By, (t) = Taken By, (c) = Cosigned By      Initials Name Provider Type    Belem Figueredo, OT Occupational Therapist                   Outcome Measures       Row Name 01/06/25 1975          How much help from another person do you currently need...    Turning from your back to your side while in flat bed without using bedrails? 3  -EF     Moving from lying on back to sitting on the side of a flat bed without bedrails? 2  -EF     Moving to and from a bed to a chair (including a wheelchair)? 3  -EF     Standing up from a chair using your arms (e.g., wheelchair, bedside chair)? 2  -EF     Climbing 3-5 steps with a railing? 1  -EF     To walk in hospital room? 3  -EF     AM-PAC 6 Clicks Score  (PT) 14  -EF     Highest Level of Mobility Goal 4 --> Transfer to chair/commode  -EF               User Key  (r) = Recorded By, (t) = Taken By, (c) = Cosigned By      Initials Name Provider Type    Stefany Garcia PT Physical Therapist                    Occupational Therapy Education       Title: PT OT SLP Therapies (In Progress)       Topic: Occupational Therapy (Not Started)       Point: ADL training (Not Started)       Description:   Instruct learner(s) on proper safety adaptation and remediation techniques during self care or transfers.   Instruct in proper use of assistive devices.                  Learner Progress:  Not documented in this visit.              Point: Home exercise program (Not Started)       Description:   Instruct learner(s) on appropriate technique for monitoring, assisting and/or progressing therapeutic exercises/activities.                  Learner Progress:  Not documented in this visit.              Point: Precautions (Not Started)       Description:   Instruct learner(s) on prescribed precautions during self-care and functional transfers.                  Learner Progress:  Not documented in this visit.              Point: Body mechanics (Not Started)       Description:   Instruct learner(s) on proper positioning and spine alignment during self-care, functional mobility activities and/or exercises.                  Learner Progress:  Not documented in this visit.                                  OT Recommendation and Plan  Planned Therapy Interventions (OT): transfer/mobility retraining, strengthening exercise, ROM/therapeutic exercise, activity tolerance training, adaptive equipment training, BADL retraining, functional balance retraining, occupation/activity based interventions, patient/caregiver education/training, neuromuscular control/coordination retraining, edema control/reduction, passive ROM/stretching  Therapy Frequency (OT): 5 times/wk  Plan of Care Review  Plan of Care  Reviewed With: patient  Progress: improving  Outcome Evaluation: The pt participated in OT this morning. Mod A provided for bed mobility. SBA/CGA sitting balance at the EOB. Total A LBD. The pt reports chronic LUE shoulder ROM deficits from her prior stroke + needing a shoulder replacement. She stood with Min A x2 + walker and mobilized to her doorway and back to her recliner chair. S/S provided for a seated grooming task. She endorsed weakness today and is motivated to d/c to a IRF.      Anticipated Discharge Disposition (OT): inpatient rehabilitation facility     Time Calculation:         Time Calculation- OT       Row Name 01/06/25 1308             Time Calculation- OT    OT Start Time 1118  -RB      OT Stop Time 1144  -RB      OT Time Calculation (min) 26 min  -RB      OT Received On 01/06/25  -RB      OT - Next Appointment 01/07/25  -RB         Timed Charges    37742 - OT Therapeutic Activity Minutes 10  -RB      50565 - OT Self Care/Mgmt Minutes 16  -RB         Total Minutes    Timed Charges Total Minutes 26  -RB       Total Minutes 26  -RB                User Key  (r) = Recorded By, (t) = Taken By, (c) = Cosigned By      Initials Name Provider Type    RB Belem Sauceda OT Occupational Therapist                  Therapy Charges for Today       Code Description Service Date Service Provider Modifiers Qty    54981749594 HC OT THERAPEUTIC ACT EA 15 MIN 1/6/2025 Belem Sauceda OT GO 1    95729821326 HC OT SELF CARE/MGMT/TRAIN EA 15 MIN 1/6/2025 Belem Sauceda OT GO 1                 Belem Sauceda OT  1/6/2025

## 2025-01-06 NOTE — PROGRESS NOTES
[facility] Progress Note      Patient Name: Lana Yañez  : 1945  MRN: [id number]  Primary Care Physician:  Toni Green MD  Date of admission: 2024    Subjective     Interval History:   Follow-up acute kidney injury on stage IIIa chronic kidney disease.    Review of Systems:     Patient lying in bed comfortable watching TV  Denies any chest pain, shortness of breath, or palpitations  Tolerating oral intake  Urinating spontaneously  Constipated was given bowel regimen    Objective     Vitals:   Temp:  [97.3 °F (36.3 °C)-98.3 °F (36.8 °C)] 97.9 °F (36.6 °C)  Heart Rate:  [] 90  Resp:  [18-23] 18  BP: ()/(54-93) 125/93  No intake or output data in the 24 hours ending 25 1410      Physical Exam:    General Appearance: alert, oriented x 3, no acute distress   Skin: warm and dry  HEENT: oral mucosa normal, nonicteric sclera  Neck: supple, no JVD  Lungs: CTA  Heart: Regular normal S1 and S2  Abdomen: soft, nontender, nondistended  : no palpable bladder  Extremities: Trace LE swelling.  Neuro: normal speech and mental status     Scheduled Meds:     aspirin, 81 mg, Oral, Daily  atenolol, 37.5 mg, Oral, BID  atorvastatin, 10 mg, Oral, Daily  digoxin, 125 mcg, Oral, Every Other Day  insulin lispro, 2-9 Units, Subcutaneous, 4x Daily AC & at Bedtime  latanoprost, 1 drop, Both Eyes, Nightly  Lidocaine, 1 patch, Transdermal, Q24H  linagliptin, 5 mg, Oral, Daily  spironolactone, 25 mg, Oral, Daily  torsemide, 40 mg, Oral, Daily  warfarin, 5 mg, Oral, Daily      IV Meds:   Pharmacy to dose warfarin,         Results Reviewed:   I have personally reviewed the results from the time of this admission to 2025 14:10 EST     Results from last 7 days   Lab Units 25  0735 25  0449 25  0511 25  0905 25  0228 24  1419   SODIUM mmol/L 139 139 139 137   < > 133*   POTASSIUM mmol/L 3.8 3.8 3.9 3.4*   < > 4.2   CHLORIDE mmol/L 95* 96*  98 99   < > 101   CO2 mmol/L 31.0* 31.0* 30.0* 28.0   < > 24.0   BUN mg/dL 32* 26* 21 17   < > 13   CREATININE mg/dL 1.31* 1.27* 1.06* 1.23*   < > 1.01*   CALCIUM mg/dL 9.7 9.5 9.1 8.9   < > 9.1   BILIRUBIN mg/dL  --  0.5  --  0.7  --  0.8   ALK PHOS U/L  --  66  --  63  --  70   ALT (SGPT) U/L  --  12  --  10  --  11   AST (SGOT) U/L  --  20  --  15  --  19   GLUCOSE mg/dL 137* 125* 120* 205*   < > 156*    < > = values in this interval not displayed.       Estimated Creatinine Clearance: 42.1 mL/min (A) (by C-G formula based on SCr of 1.31 mg/dL (H)).    Results from last 7 days   Lab Units 01/04/25  0511   MAGNESIUM mg/dL 1.9   PHOSPHORUS mg/dL 3.1       Results from last 7 days   Lab Units 01/04/25  0511   URIC ACID mg/dL 8.3*       Results from last 7 days   Lab Units 01/06/25  0735 01/05/25  0449 01/04/25  0511 01/02/25  0905 01/01/25  0228   WBC 10*3/mm3 8.45 9.38 9.15 10.16 13.70*   HEMOGLOBIN g/dL 13.7 13.4 12.7 11.8* 12.3   PLATELETS 10*3/mm3 219 199 184 139* 131*       Results from last 7 days   Lab Units 01/06/25  0735 01/05/25  0449 01/04/25  0511 01/03/25  0615 01/02/25  0905   INR  2.47* 2.57* 2.56* 2.39* 2.35*       Assessment / Plan     ASSESSMENT:\    -Acute kidney injury, resolved.  Urinalysis-bland.  Spot urine protein creatinine ratio-unremarkable.  -Underlying chronic kidney disease stage IIIa.  Baseline serum creatinine 1.0-1.2 mg/dL.  -Acute on chronic diastolic congestive heart failure responded to medical management currently on diuretics volume status improving  -Chronic atrial fibrillation, rate control on atenolol and digoxin oral anticoagulation on warfarin  -Diabetes mellitus type 2 currently on insulin    PLAN:  Continue current dose of diuretics, combination of torsemide spironolactone.  Currently normovolemic electrolytes stable  Continue to monitor for diuretic toxicity    Thank you for involving us in the care of Lana Otilio.  Please feel free to call with any  questions.    Bandar Villarreal MD  01/06/25  14:10 EST    [facility]  [phone]    Please note that portions of this note were completed with a voice recognition program.

## 2025-01-06 NOTE — PLAN OF CARE
The pt participated in OT this morning. Mod A provided for bed mobility. SBA/CGA sitting balance at the EOB. Total A LBD. The pt reports chronic LUE shoulder ROM deficits from her prior stroke + needing a shoulder replacement. She stood with Min A x2 + walker and mobilized to her doorway and back to her recliner chair. S/S provided for a seated grooming task. She endorsed weakness today and is motivated to d/c to a IRF.      Anticipated Discharge Disposition (OT): inpatient rehabilitation facility

## 2025-01-07 LAB
ANION GAP SERPL CALCULATED.3IONS-SCNC: 13.6 MMOL/L (ref 5–15)
BUN SERPL-MCNC: 40 MG/DL (ref 8–23)
BUN/CREAT SERPL: 26.7 (ref 7–25)
CALCIUM SPEC-SCNC: 9.5 MG/DL (ref 8.6–10.5)
CHLORIDE SERPL-SCNC: 95 MMOL/L (ref 98–107)
CO2 SERPL-SCNC: 28.4 MMOL/L (ref 22–29)
CREAT SERPL-MCNC: 1.5 MG/DL (ref 0.57–1)
EGFRCR SERPLBLD CKD-EPI 2021: 35.7 ML/MIN/1.73
GLUCOSE BLDC GLUCOMTR-MCNC: 142 MG/DL (ref 70–130)
GLUCOSE BLDC GLUCOMTR-MCNC: 146 MG/DL (ref 70–130)
GLUCOSE BLDC GLUCOMTR-MCNC: 192 MG/DL (ref 70–130)
GLUCOSE BLDC GLUCOMTR-MCNC: 229 MG/DL (ref 70–130)
GLUCOSE SERPL-MCNC: 142 MG/DL (ref 65–99)
INR PPP: 2.6 (ref 0.9–1.1)
MAGNESIUM SERPL-MCNC: 2 MG/DL (ref 1.6–2.4)
PHOSPHATE SERPL-MCNC: 4 MG/DL (ref 2.5–4.5)
POTASSIUM SERPL-SCNC: 3.8 MMOL/L (ref 3.5–5.2)
PROTHROMBIN TIME: 28.1 SECONDS (ref 11.7–14.2)
SODIUM SERPL-SCNC: 137 MMOL/L (ref 136–145)

## 2025-01-07 PROCEDURE — 83735 ASSAY OF MAGNESIUM: CPT | Performed by: HOSPITALIST

## 2025-01-07 PROCEDURE — 97530 THERAPEUTIC ACTIVITIES: CPT

## 2025-01-07 PROCEDURE — 84100 ASSAY OF PHOSPHORUS: CPT | Performed by: HOSPITALIST

## 2025-01-07 PROCEDURE — 80048 BASIC METABOLIC PNL TOTAL CA: CPT | Performed by: HOSPITALIST

## 2025-01-07 PROCEDURE — 82948 REAGENT STRIP/BLOOD GLUCOSE: CPT

## 2025-01-07 PROCEDURE — 63710000001 INSULIN LISPRO (HUMAN) PER 5 UNITS: Performed by: INTERNAL MEDICINE

## 2025-01-07 PROCEDURE — 85610 PROTHROMBIN TIME: CPT | Performed by: INTERNAL MEDICINE

## 2025-01-07 RX ORDER — WARFARIN SODIUM 4 MG/1
4 TABLET ORAL
Status: DISCONTINUED | OUTPATIENT
Start: 2025-01-07 | End: 2025-01-13 | Stop reason: HOSPADM

## 2025-01-07 RX ORDER — POLYETHYLENE GLYCOL 3350 17 G/17G
17 POWDER, FOR SOLUTION ORAL DAILY
Status: DISCONTINUED | OUTPATIENT
Start: 2025-01-07 | End: 2025-01-13 | Stop reason: HOSPADM

## 2025-01-07 RX ADMIN — SPIRONOLACTONE 25 MG: 25 TABLET ORAL at 08:56

## 2025-01-07 RX ADMIN — INSULIN LISPRO 4 UNITS: 100 INJECTION, SOLUTION INTRAVENOUS; SUBCUTANEOUS at 11:38

## 2025-01-07 RX ADMIN — INSULIN LISPRO 2 UNITS: 100 INJECTION, SOLUTION INTRAVENOUS; SUBCUTANEOUS at 22:11

## 2025-01-07 RX ADMIN — WARFARIN SODIUM 4 MG: 4 TABLET ORAL at 17:15

## 2025-01-07 RX ADMIN — LINAGLIPTIN 5 MG: 5 TABLET, FILM COATED ORAL at 08:55

## 2025-01-07 RX ADMIN — Medication 2.5 MG: at 02:13

## 2025-01-07 RX ADMIN — TORSEMIDE 40 MG: 20 TABLET ORAL at 08:56

## 2025-01-07 RX ADMIN — Medication 10 ML: at 08:56

## 2025-01-07 RX ADMIN — LATANOPROST 1 DROP: 50 SOLUTION OPHTHALMIC at 22:11

## 2025-01-07 RX ADMIN — LIDOCAINE 1 PATCH: 4 PATCH TOPICAL at 08:57

## 2025-01-07 RX ADMIN — ATORVASTATIN CALCIUM 10 MG: 10 TABLET, FILM COATED ORAL at 08:56

## 2025-01-07 RX ADMIN — ASPIRIN 81 MG: 81 TABLET, COATED ORAL at 08:56

## 2025-01-07 RX ADMIN — POLYETHYLENE GLYCOL 3350 17 G: 17 POWDER, FOR SOLUTION ORAL at 11:38

## 2025-01-07 RX ADMIN — ATENOLOL 37.5 MG: 25 TABLET ORAL at 08:56

## 2025-01-07 RX ADMIN — HYDROCODONE BITARTRATE AND ACETAMINOPHEN 1 TABLET: 7.5; 325 TABLET ORAL at 22:11

## 2025-01-07 NOTE — CASE MANAGEMENT/SOCIAL WORK
Continued Stay Note  [facility]     Patient Name: Lana Yañez  MRN: [id number]  Today's Date: 1/7/2025    Admit Date: 12/31/2024    Plan: [facility] accepted and precert started 1/7 per facility   Discharge Plan       Row Name 01/07/25 1552       Plan    Plan [facility] accepted and precert started 1/7 per facility    Patient/Family in Agreement with Plan yes    Plan Comments receibd call from hilary at [facility] [place] operated by [facility] that patient is accepted and she will start precert today. patient informed at bedside. Verbalizes understanding.                          Sophia Santiago RN

## 2025-01-07 NOTE — PROGRESS NOTES
[facility] Clinical Pharmacy Services: Warfarin Dosing/Monitoring Consult    Lana Yañez is a 79 y.o. female, estimated creatinine clearance is 36.8 mL/min (A) (by C-G formula based on SCr of 1.5 mg/dL (H)). weighing 96.5 kg (212 lb 11.9 oz).    Results from last 7 days   Lab Units 01/07/25  0335 01/06/25  0735 01/05/25  0449 01/04/25  0511 01/03/25  0615 01/02/25  0905 01/01/25  0228   INR  2.60* 2.47* 2.57* 2.56* 2.39* 2.35* 1.99*   HEMOGLOBIN g/dL  --  13.7 13.4 12.7  --  11.8* 12.3   HEMATOCRIT %  --  43.6 40.3 37.6  --  38.3 37.0   PLATELETS 10*3/mm3  --  219 199 184  --  139* 131*     Prior to admission anticoagulation: Per [facility] anticoagulation clinic note on 12/17/24, the patient's most recent warfarin regimen is 5 mg daily (35 mg/week). INR was therapeutic at that visit at 2.8.     Hospital Anticoagulation:  Consulting provider: Dr. Howell  Start date: 12/31/24  Indication: Atrial fibrillation  Target INR: 2 - 3  Expected duration: lifelong   Bridge Therapy: No     Potential food or drug interactions: (new)  Spironolactone may decrease INR (started 1/3)    Education complete?/Date: N/A; home medication    Assessment/Plan:  Decrease warfarin back to 4 mg PO daily   RN to monitor for any signs or symptoms of bleeding  Pharmacy to follow up daily INRs and dose adjustments    Pharmacy will continue to follow until discharge or discontinuation of warfarin.     Will Carter, PharmD  Clinical Pharmacist

## 2025-01-07 NOTE — THERAPY TREATMENT NOTE
Patient Name: Lana Yañez  : 1945    MRN: [id number]                              Today's Date: 2025       Admit Date: 2024    Visit Dx:     ICD-10-CM ICD-9-CM   1. Acute hypoxic respiratory failure  J96.01 518.81   2. Acute cough  R05.1 786.2   3. Pneumonia of both lower lobes due to infectious organism  J18.9 486   4. Acute on chronic congestive heart failure, unspecified heart failure type  I50.9 428.0     Patient Active Problem List   Diagnosis    Mixed hyperlipidemia    Vitamin deficiency    Essential hypertension    Rheumatoid arthritis involving both hands    Fatigue    ANTOINE (dyspnea on exertion)    Dysuria    Urge incontinence of urine    Hypovitaminosis D    Sleep apnea    Encounter for screening colonoscopy    Bronchitis    Cough    Generalized osteoarthritis of multiple sites    Cellulitis of right elbow due to MRSA    Medicare annual wellness visit, initial    Hospital discharge follow-up    Displacement of lumbar intervertebral disc without myelopathy    Lumbar disc herniation    Chronic atrial fibrillation    History of MRSA infection    Left-sided weakness    Atrial fibrillation    Left shoulder pain    Cerebrovascular accident (CVA) due to occlusion of right middle cerebral artery    Relative lymphocytosis    Nausea    Weakness    Controlled substance agreement signed    Coronary artery disease involving native coronary artery of native heart without angina pectoris    SOB (shortness of breath)    Lower extremity edema    Acute on chronic diastolic CHF (congestive heart failure)    Adhesive capsulitis of left shoulder    CVA tenderness    Costochondritis, acute    Allergic reaction    Coronary artery embolism    Visit for screening mammogram    Low back pain    Urgency of urination    Hyperglycemia    Carpal tunnel syndrome of left wrist/ wakes her up    Localized edema    Acute cystitis without hematuria    Bruising    History of stroke    Diabetes 1.5, managed as type 2     Other chronic pain    Vaginal candidiasis    Pulmonary hypertension    Acute respiratory failure with hypoxia    Hypokalemia    Hypomagnesemia    Type 2 diabetes mellitus with hyperglycemia, without long-term current use of insulin    Diarrhea    Sepsis without acute organ dysfunction    Morbid obesity with BMI of 40.0-44.9, adult    Biliary acute pancreatitis without necrosis or infection    Stage 3a chronic kidney disease    Acute UTI (urinary tract infection)    Hemiparesis of left nondominant side as late effect of cerebral infarction    Acute hypoxic respiratory failure    Pneumonia    Acute on chronic hypoxic respiratory failure     Past Medical History:   Diagnosis Date    Acute on chronic diastolic heart failure     Acute UTI (urinary tract infection) 05/22/2022    Allergic reaction 02/09/2018    Arthritis     Arthritis of back     Arthritis of neck     Asthma     Atrial fibrillation     Persistent; on warfarin    Atypical chest pain     Biliary acute pancreatitis without necrosis or infection 11/10/2021    Bronchitis     Cellulitis of right elbow     due to MRSA    Cervical disc disorder     CHF (congestive heart failure)     COPD (chronic obstructive pulmonary disease)     Coronary artery disease     Cardiac catheterization completed; 90% PDA stenosis with medical management recommended    Coronary artery disease involving native coronary artery of native heart with angina pectoris with documented spasm     CTS (carpal tunnel syndrome)     Diabetes 1.5, managed as type 2     Disease of thyroid gland     Displacement of lumbar intervertebral disc without myelopathy     Dizziness     ANTOINE (dyspnea on exertion)     Essential hypertension     Fatigue     Fracture, foot     Generalized osteoarthritis of multiple sites     History of rheumatic fever     History of transfusion     Hx of bone density study     10/23/2014    Hyperglycemia     Hyperlipidemia     Hypovitaminosis D     Knee swelling     Left arm pain      Left-sided weakness     Leg swelling     Low back pain     Lower extremity edema     Lumbosacral disc disease     Malaise and fatigue     Mitral valve disease     Moderate mitral valve prolapse and moderate mitral regurgitation    Mitral valve insufficiency     Morbid obesity with BMI of 40.0-44.9, adult     MRSA infection     NSTEMI (non-ST elevated myocardial infarction)     PAF (paroxysmal atrial fibrillation)     Periarthritis of shoulder     Pneumonia 1/1/2025    RA (rheumatoid arthritis)     involving both hands    Rotator cuff syndrome     Sleep apnea     SOB (shortness of breath)     Stroke     left side weakness    Urge incontinence of urine     UTI (urinary tract infection) 05/2020    Visit for screening mammogram 04/02/2018    Vitamin D deficiency      Past Surgical History:   Procedure Laterality Date    BREAST BIOPSY      BREAST SURGERY      right side lumpectomy with biopsy    CARDIAC CATHETERIZATION Left 10/20/2017    Procedure: Cardiac Catheterization/Vascular Study;  Surgeon: Alphonso Olmedo MD;  Location: [facility] CATH INVASIVE LOCATION;  Service:     CARDIAC CATHETERIZATION N/A 10/20/2017    +2 mitral regurgitation, left main 10% stenosis, mid to distal LAD 10% diffuse stenosis, circumflex 10% diffuse stenosis, RCA 10% proximal stenosis, and distal PDA consistent with coronary embolus with a lesion of 90% too small to consider coronary intervention; medical management recommended    EYE SURGERY      laser surgery for glaucoma and left eye cataracts removed    HYSTERECTOMY      10+ years ago    JOINT REPLACEMENT  2005; 2006    bilateral knees and left rotater    KNEE SURGERY      MAMMO BILATERAL  2016    [facility]     SHOULDER SURGERY        General Information       Row Name 01/07/25 1129          Physical Therapy Time and Intention    Document Type therapy note (daily note)  -EF     Mode of Treatment individual therapy;physical therapy  -EF       Row Name 01/07/25 1129          General  Information    Existing Precautions/Restrictions fall  -EF     Barriers to Rehab previous functional deficit  -EF       Row Name 01/07/25 1129          Cognition    Orientation Status (Cognition) oriented x 4  -EF       Row Name 01/07/25 1129          Safety Issues/Impairments Affecting Functional Mobility    Impairments Affecting Function (Mobility) balance;endurance/activity tolerance;range of motion (ROM);strength;shortness of breath;pain  -EF               User Key  (r) = Recorded By, (t) = Taken By, (c) = Cosigned By      Initials Name Provider Type    EF Stefany Phipps, PT Physical Therapist                   Mobility       Row Name 01/07/25 1129          Bed Mobility    Supine-Sit [place]bert (Bed Mobility) minimum assist (75% patient effort);moderate assist (50% patient effort)  -EF     Assistive Device (Bed Mobility) head of bed elevated;bed rails  -EF       Row Name 01/07/25 1129          Sit-Stand Transfer    Sit-Stand Colbert (Transfers) minimum assist (75% patient effort)  -EF     Comment, (Sit-Stand Transfer) STS from EOB with HHA  -EF       Row Name 01/07/25 1129          Gait/Stairs (Locomotion)    Colbert Level (Gait) minimum assist (75% patient effort);verbal cues  -EF     Assistive Device (Gait) --  HHA  -EF     Distance in Feet (Gait) 3  -EF     Deviations/Abnormal Patterns (Gait) héctor decreased;stride length decreased  -EF     Comment, (Gait/Stairs) Pt able to take a few steps from bed to chair; declined further ambulation due to feeling slightly dizzy and fatigued  -EF               User Key  (r) = Recorded By, (t) = Taken By, (c) = Cosigned By      Initials Name Provider Type    EF Stefany Phipps, PT Physical Therapist                   Obj/Interventions    No documentation.                  Goals/Plan    No documentation.                  Clinical Impression       Row Name 01/07/25 1130          Pain    Pain Location neck;shoulder  -EF     Pain Side/Orientation left  -EF      Pain Management Interventions premedicated for activity;positioning techniques utilized  -EF     Response to Pain Interventions activity participation with tolerable pain  -EF     Pre/Posttreatment Pain Comment C/o L shoulder and neck pain, did not provide numerical rating. Pain patch applied prior to therapy session and pt repositioned in chair for comfort.  -EF       Row Name 01/07/25 1130          Plan of Care Review    Plan of Care Reviewed With patient  -EF     Progress no change  -EF     Outcome Evaluation Pt agreeable to PT and was able to maintain activity level during therapy session today. Pt continues to require mod A with bed mobility and min A for transfers due to weakness, impaired balance, and decreased endurance. Pt c/o mild lightheadedness when upright today which limited ambulation distance. However, pt was able to take a few steps from bed to chair with min A. Vitals all WNL during session. Pt continues to demo functional mobility below baseline and will continue to benefit from PT to address impairments and increase independence with mobility.  -EF       Row Name 01/07/25 1130          Vital Signs    Post Systolic BP Rehab 122  -EF     Post Treatment Diastolic BP 78  -EF     Post Patient Position Sitting  -EF       Row Name 01/07/25 1130          Positioning and Restraints    Pre-Treatment Position in bed  -EF     Post Treatment Position chair  -EF     In Chair reclined;call light within reach;encouraged to call for assist;exit alarm on;legs elevated  -EF               User Key  (r) = Recorded By, (t) = Taken By, (c) = Cosigned By      Initials Name Provider Type    EF Stefany Phipps, PT Physical Therapist                   Outcome Measures       Row Name 01/07/25 1133 01/07/25 0850       How much help from another person do you currently need...    Turning from your back to your side while in flat bed without using bedrails? 3  -EF 3  -DC    Moving from lying on back to sitting on the  side of a flat bed without bedrails? 2  -EF 3  -DC    Moving to and from a bed to a chair (including a wheelchair)? 3  -EF 3  -DC    Standing up from a chair using your arms (e.g., wheelchair, bedside chair)? 3  -EF 3  -DC    Climbing 3-5 steps with a railing? 2  -EF 1  -DC    To walk in hospital room? 3  -EF 2  -DC    AM-PAC 6 Clicks Score (PT) 16  -EF 15  -DC    Highest Level of Mobility Goal 5 --> Static standing  -EF 4 --> Transfer to chair/commode  -DC              User Key  (r) = Recorded By, (t) = Taken By, (c) = Cosigned By      Initials Name Provider Type    EF Stefany Phipps, PT Physical Therapist    Brittany Zavala, RN Registered Nurse                                 Physical Therapy Education       Title: PT OT SLP Therapies (In Progress)       Topic: Physical Therapy (Done)       Point: Mobility training (Done)       Learning Progress Summary            Patient Acceptance, E, VU,NR by  at 1/7/2025 1133    Acceptance, E, VU,NR by  at 1/6/2025 1231    Acceptance, E,TB, VU,NR by  at 1/4/2025 0951    Acceptance, E, VU,NR by  at 1/3/2025 1003                      Point: Home exercise program (Done)       Learning Progress Summary            Patient Acceptance, E, VU,NR by  at 1/6/2025 1231    Acceptance, E,TB, VU,NR by  at 1/4/2025 0951    Acceptance, E, VU,NR by  at 1/3/2025 1003                                      User Key       Initials Effective Dates Name Provider Type Discipline     05/31/24 -  Stefany Phipps, PT Physical Therapist PT    SV 07/11/23 -  Belgica De La Fuente, PT Physical Therapist PT    CS 07/11/23 -  Gurpreet Bianchi, PT Physical Therapist PT                  PT Recommendation and Plan     Progress: no change  Outcome Evaluation: Pt agreeable to PT and was able to maintain activity level during therapy session today. Pt continues to require mod A with bed mobility and min A for transfers due to weakness, impaired balance, and decreased endurance. Pt c/o mild  lightheadedness when upright today which limited ambulation distance. However, pt was able to take a few steps from bed to chair with min A. Vitals all WNL during session. Pt continues to demo functional mobility below baseline and will continue to benefit from PT to address impairments and increase independence with mobility.     Time Calculation:         PT Charges       Row Name 01/07/25 1133             Time Calculation    Start Time 1040  -EF      Stop Time 1055  -EF      Time Calculation (min) 15 min  -EF      PT Received On 01/07/25  -EF      PT - Next Appointment 01/08/25  -EF         Time Calculation- PT    Total Timed Code Minutes- PT 15 minute(s)  -EF         Timed Charges    18280 - PT Therapeutic Activity Minutes 15  -EF         Total Minutes    Timed Charges Total Minutes 15  -EF       Total Minutes 15  -EF                User Key  (r) = Recorded By, (t) = Taken By, (c) = Cosigned By      Initials Name Provider Type    EF Stefany Phipps, PT Physical Therapist                  Therapy Charges for Today       Code Description Service Date Service Provider Modifiers Qty    64484291470 HC PT THERAPEUTIC ACT EA 15 MIN 1/6/2025 Stefany Phipps, PT GP 1    65101400099 HC PT THERAPEUTIC ACT EA 15 MIN 1/7/2025 Stefany Phipps, PT GP 1            PT G-Codes  Outcome Measure Options: AM-PAC 6 Clicks Basic Mobility (PT)  AM-PAC 6 Clicks Score (PT): 16  AM-PAC 6 Clicks Score (OT): 10  Modified [place] Scale: 4 - Moderately severe disability.  Unable to walk without assistance, and unable to attend to own bodily needs without assistance.  PT Discharge Summary  Anticipated Discharge Disposition (PT): inpatient rehabilitation facility    Stefany Phipps PT  1/7/2025

## 2025-01-07 NOTE — PLAN OF CARE
Goal Outcome Evaluation:  Plan of Care Reviewed With: patient        Progress: no change  Outcome Evaluation: Pt agreeable to PT and was able to maintain activity level during therapy session today. Pt continues to require mod A with bed mobility and min A for transfers due to weakness, impaired balance, and decreased endurance. Pt c/o mild lightheadedness when upright today which limited ambulation distance. However, pt was able to take a few steps from bed to chair with min A. Vitals all WNL during session. Pt continues to demo functional mobility below baseline and will continue to benefit from PT to address impairments and increase independence with mobility.    Anticipated Discharge Disposition (PT): inpatient rehabilitation facility

## 2025-01-07 NOTE — PROGRESS NOTES
[facility] Progress Note      Patient Name: Lana Yañez  : 1945  MRN: [id number]  Primary Care Physician:  Toni Green MD  Date of admission: 2024    Subjective     Interval History:   Follow up acute kidney injury on CKD 3A.  Urine output not recorded.  Not weighed as ordered.  Felt dizzy getting up from the bed to the chair today.  No bowel movement.  800 cc of urine in the pure wick container currently.    Review of Systems:   As noted above    Objective     Vitals:   Temp:  [97.2 °F (36.2 °C)-98.7 °F (37.1 °C)] 97.6 °F (36.4 °C)  Heart Rate:  [78-92] 84  Resp:  [18-21] 21  BP: ()/() 123/74  No intake or output data in the 24 hours ending 25 1110    Physical Exam:    General Appearance: alert, oriented x 3, no acute distress .  Seen up in bed.  Skin: warm and dry  HEENT: oral mucosa normal, nonicteric sclera  Neck: supple, no JVD  Lungs: Clear to auscultation.  Unlabored on room air.  Heart: Irregularly irregular.  Abdomen: soft, nontender, nondistended. +bs  : Pure wick  Extremities: 1+ upper and lower extremity edema.  Lower extremities tender to touch.  Neuro: normal speech and mental status     Scheduled Meds:     aspirin, 81 mg, Oral, Daily  atenolol, 37.5 mg, Oral, BID  atorvastatin, 10 mg, Oral, Daily  digoxin, 125 mcg, Oral, Every Other Day  insulin lispro, 2-9 Units, Subcutaneous, 4x Daily AC & at Bedtime  latanoprost, 1 drop, Both Eyes, Nightly  Lidocaine, 1 patch, Transdermal, Q24H  linagliptin, 5 mg, Oral, Daily  spironolactone, 25 mg, Oral, Daily  torsemide, 40 mg, Oral, Daily  warfarin, 4 mg, Oral, Daily      IV Meds:   Pharmacy to dose warfarin,         Results Reviewed:   I have personally reviewed the results from the time of this admission to 2025 11:10 EST     Results from last 7 days   Lab Units 25  0335 25  0735 25  0449 25  0511 25  0905 25  0228 24  1419   SODIUM mmol/L  137 139 139   < > 137   < > 133*   POTASSIUM mmol/L 3.8 3.8 3.8   < > 3.4*   < > 4.2   CHLORIDE mmol/L 95* 95* 96*   < > 99   < > 101   CO2 mmol/L 28.4 31.0* 31.0*   < > 28.0   < > 24.0   BUN mg/dL 40* 32* 26*   < > 17   < > 13   CREATININE mg/dL 1.50* 1.31* 1.27*   < > 1.23*   < > 1.01*   CALCIUM mg/dL 9.5 9.7 9.5   < > 8.9   < > 9.1   BILIRUBIN mg/dL  --   --  0.5  --  0.7  --  0.8   ALK PHOS U/L  --   --  66  --  63  --  70   ALT (SGPT) U/L  --   --  12  --  10  --  11   AST (SGOT) U/L  --   --  20  --  15  --  19   GLUCOSE mg/dL 142* 137* 125*   < > 205*   < > 156*    < > = values in this interval not displayed.       Estimated Creatinine Clearance: 36.8 mL/min (A) (by C-G formula based on SCr of 1.5 mg/dL (H)).    Results from last 7 days   Lab Units 01/07/25 0335 01/04/25  0511   MAGNESIUM mg/dL 2.0 1.9   PHOSPHORUS mg/dL 4.0 3.1       Results from last 7 days   Lab Units 01/04/25  0511   URIC ACID mg/dL 8.3*       Results from last 7 days   Lab Units 01/06/25 0735 01/05/25 0449 01/04/25  0511 01/02/25  0905 01/01/25  0228   WBC 10*3/mm3 8.45 9.38 9.15 10.16 13.70*   HEMOGLOBIN g/dL 13.7 13.4 12.7 11.8* 12.3   PLATELETS 10*3/mm3 219 199 184 139* 131*       Results from last 7 days   Lab Units 01/07/25 0335 01/06/25 0735 01/05/25 0449 01/04/25  0511 01/03/25  0615   INR  2.60* 2.47* 2.57* 2.56* 2.39*       Assessment / Plan     ASSESSMENT:  Acute kidney injury on CKD 3A.  Baseline 1.0-1.2.  Waste products up some today.  Will need to accept some increase in her waste products in order to keep her euvolemic, however do not want her to drive.  Will check orthostatics today..  2.  Acute hypoxic respiratory failure.  3.  Acute on chronic heart failure preserved ejection fraction.  4.  Chronic atrial fibrillation on digoxin, metoprolol and Coumadin.  5.  Diabetes mellitus type II with CKD  6.  Hypertension of CKD.  PLAN:  Reiterated strict intake and output and daily weights with RN.  Standing scale weight  before she gets back in bed today.  Orthostatic blood pressure prior to getting back in bed.  4.  Hold torsemide and Aldactone until I see her tomorrow.  (Received 2 doses today).  Thank you for involving us in the care of Lana Yañez.  Please feel free to call with any questions.    Shanthi Guerra MD  01/07/25  11:10 [facility]    [facility]  [phone]    Please note that portions of this note were completed with a voice recognition program.

## 2025-01-07 NOTE — PROGRESS NOTES
Name: Lana Yañez ADMIT: 2024   : 1945  PCP: Toni Green MD    MRN: [id number] LOS: 5 days   AGE/SEX: 79 y.o. female  ROOM: [facility]     Subjective   Subjective   Denies any current complaint. No shortness of breath. Sitting in chair.     Objective   Objective   Vital Signs  Temp:  [97.2 °F (36.2 °C)-98 °F (36.7 °C)] 97.6 °F (36.4 °C)  Heart Rate:  [78-92] 84  Resp:  [18-21] 21  BP: ()/() 124/89  SpO2:  [93 %-96 %] 93 %  on   ;   Device (Oxygen Therapy): room air  Body mass index is 34.34 kg/m².    Physical Exam  Constitutional:       General: She is not in acute distress.     Appearance: She is not toxic-appearing.   HENT:      Head: Normocephalic and atraumatic.   Cardiovascular:      Rate and Rhythm: Normal rate. Rhythm irregular.   Pulmonary:      Effort: Pulmonary effort is normal. No respiratory distress.      Breath sounds: Normal breath sounds.   Abdominal:      General: Bowel sounds are normal.      Palpations: Abdomen is soft.      Tenderness: There is no abdominal tenderness.   Musculoskeletal:         General: No swelling.      Cervical back: No rigidity.   Skin:     General: Skin is warm and dry.   Neurological:      General: No focal deficit present.      Mental Status: She is alert and oriented to person, place, and time.   Psychiatric:         Mood and Affect: Mood normal.         Behavior: Behavior normal.     Results Review  I reviewed the patient's new clinical results.  Results from last 7 days   Lab Units 25  0735 25  04425  0511 25  0905   WBC 10*3/mm3 8.45 9.38 9.15 10.16   HEMOGLOBIN g/dL 13.7 13.4 12.7 11.8*   PLATELETS 10*3/mm3 219 199 184 139*     Results from last 7 days   Lab Units 25  0335 25  0735 25  0449 25  0511   SODIUM mmol/L 137 139 139 139   POTASSIUM mmol/L 3.8 3.8 3.8 3.9   CHLORIDE mmol/L 95* 95* 96* 98   CO2 mmol/L 28.4 31.0* 31.0* 30.0*   BUN mg/dL 40* 32* 26* 21   CREATININE mg/dL 1.50*  1.31* 1.27* 1.06*   GLUCOSE mg/dL 142* 137* 125* 120*     Lab Results   Component Value Date    ANIONGAP 13.6 01/07/2025     Estimated Creatinine Clearance: 36.8 mL/min (A) (by C-G formula based on SCr of 1.5 mg/dL (H)).   Lab Results   Component Value Date    EGFR 35.7 (L) 01/07/2025     Results from last 7 days   Lab Units 01/05/25  0449 01/04/25  0511 01/02/25  0905 12/31/24  1419   ALBUMIN g/dL 3.8 3.6 3.7 4.2   BILIRUBIN mg/dL 0.5  --  0.7 0.8   ALK PHOS U/L 66  --  63 70   AST (SGOT) U/L 20  --  15 19   ALT (SGPT) U/L 12  --  10 11     Results from last 7 days   Lab Units 01/07/25  0335 01/06/25  0735 01/05/25  0449 01/04/25  0511 01/02/25  0905 01/01/25 0228 12/31/24  1419   CALCIUM mg/dL 9.5 9.7 9.5 9.1 8.9   < > 9.1   ALBUMIN g/dL  --   --  3.8 3.6 3.7  --  4.2   MAGNESIUM mg/dL 2.0  --   --  1.9  --   --   --    PHOSPHORUS mg/dL 4.0  --   --  3.1  --   --   --     < > = values in this interval not displayed.     Results from last 7 days   Lab Units 01/01/25 0228 12/31/24  2324 12/31/24  2051 12/31/24  1702 12/31/24  1542   PROCALCITONIN ng/mL  --   --   --   --  0.07   LACTATE mmol/L 1.5 2.2* 2.1* 2.2*  --      Glucose   Date/Time Value Ref Range Status   01/07/2025 1107 229 (H) 70 - 130 mg/dL Final   01/07/2025 0603 142 (H) 70 - 130 mg/dL Final   01/06/2025 2101 199 (H) 70 - 130 mg/dL Final   01/06/2025 1641 120 70 - 130 mg/dL Final   01/06/2025 1302 201 (H) 70 - 130 mg/dL Final   01/06/2025 0611 135 (H) 70 - 130 mg/dL Final   01/05/2025 2017 171 (H) 70 - 130 mg/dL Final       XR Spine Cervical 2 or 3 View    Result Date: 1/6/2025   As described.    This report was finalized on 1/6/2025 5:58 PM by Dr. Antelmo Corea M.D on Workstation: [id number]       Scheduled Meds  aspirin, 81 mg, Oral, Daily  atenolol, 37.5 mg, Oral, BID  atorvastatin, 10 mg, Oral, Daily  digoxin, 125 mcg, Oral, Every Other Day  insulin lispro, 2-9 Units, Subcutaneous, 4x Daily AC & at Bedtime  latanoprost, 1 drop, Both Eyes,  Nightly  Lidocaine, 1 patch, Transdermal, Q24H  linagliptin, 5 mg, Oral, Daily  polyethylene glycol, 17 g, Oral, Daily  spironolactone, 25 mg, Oral, Daily  warfarin, 4 mg, Oral, Daily    Continuous Infusions  Pharmacy to dose warfarin,     PRN Meds    acetaminophen **OR** acetaminophen **OR** acetaminophen    albuterol sulfate HFA    senna-docusate sodium **AND** polyethylene glycol **AND** bisacodyl **AND** bisacodyl    dextrose    dextrose    glucagon (human recombinant)    guaiFENesin-dextromethorphan    HYDROcodone-acetaminophen    melatonin    ondansetron ODT **OR** ondansetron    Pharmacy to dose warfarin    polyethylene glycol    sodium chloride    Pharmacy to dose warfarin,     Diet  Diet: Cardiac, Fluid Restriction (240 mL/tray); Healthy Heart (2-3 Na+); 1500 mL/day; Fluid Consistency: Thin (IDDSI 0)    Results from last 7 days   Lab Units 01/07/25  0335 01/06/25  0735 01/05/25  0449   INR  2.60* 2.47* 2.57*         Assessment/Plan     Active Hospital Problems    Diagnosis  POA    **Acute hypoxic respiratory failure [J96.01]  Yes    Acute on chronic hypoxic respiratory failure [J96.21]  Yes    Pneumonia [J18.9]  Yes    Stage 3a chronic kidney disease [N18.31]  Yes    Type 2 diabetes mellitus with hyperglycemia, without long-term current use of insulin [E11.65]  Yes    Acute on chronic diastolic CHF (congestive heart failure) [I50.33]  Yes    Chronic atrial fibrillation [I48.20]  Yes    Mixed hyperlipidemia [E78.2]  Yes      Resolved Hospital Problems   No resolved problems to display.     Patient is a 79 y.o. female     01/06/25 complaining some neck soreness, C-spine x-rays unremarkable    Acute respiratory failure with hypoxia  Acute on chronic diastolic CHF  Cardiology and nephrology following  Torsemide and Aldactone held until tomorrow per nephrology    CKD 3  Creatinine a little higher today  Nephrology making sure she is not too dry    Chronic atrial fibrillation  Warfarin, rate  controlled    Hyperlipidemia atorvastatin  CAD  DM2 control acceptable    DVT prophylaxis  Warfarin (home med)    Discharge  Acute rehab  Expected Discharge Date: 1/7/2025; Expected Discharge Time:     Discussed with patient and nursing staff    Peter De La Rosa MD  [facility]  01/07/25 12:19 EST

## 2025-01-07 NOTE — PLAN OF CARE
Problem: Adult Inpatient Plan of Care  Goal: Plan of Care Review  Outcome: Progressing  Flowsheets (Taken 1/7/2025 1703)  Progress: improving  Plan of Care Reviewed With: patient   Goal Outcome Evaluation:  Plan of Care Reviewed With: patient        Progress: improving             Pt worked with PT today, she sat up in the chair for a few hours. Pt waiting on rehab for discharge. VSS.

## 2025-01-07 NOTE — PLAN OF CARE
Goal Outcome Evaluation:           Progress: improving     Pt  resting in bed with VSS and no s/s of pain noted.  At beginning of shift pt was assisted from chair to bed with assist of 1, no dyspnea or fatigue noted with ambulation. No cough or dyspnea noted this shift.  Blood glucose controlled with sliding scale insulin.  Pt continues to work with PT/OT pending approval for rehab facility.  Will continue to monitor.

## 2025-01-08 ENCOUNTER — TELEPHONE (OUTPATIENT)
Dept: ORTHOPEDIC SURGERY | Facility: CLINIC | Age: 80
End: 2025-01-08
Payer: MEDICARE

## 2025-01-08 ENCOUNTER — TELEPHONE (OUTPATIENT)
Dept: ORTHOPEDIC SURGERY | Facility: CLINIC | Age: 80
End: 2025-01-08

## 2025-01-08 LAB
ALBUMIN SERPL-MCNC: 3.5 G/DL (ref 3.5–5.2)
ANION GAP SERPL CALCULATED.3IONS-SCNC: 13.7 MMOL/L (ref 5–15)
BUN SERPL-MCNC: 45 MG/DL (ref 8–23)
BUN/CREAT SERPL: 31.7 (ref 7–25)
CALCIUM SPEC-SCNC: 9.6 MG/DL (ref 8.6–10.5)
CHLORIDE SERPL-SCNC: 94 MMOL/L (ref 98–107)
CO2 SERPL-SCNC: 29.3 MMOL/L (ref 22–29)
CREAT SERPL-MCNC: 1.42 MG/DL (ref 0.57–1)
EGFRCR SERPLBLD CKD-EPI 2021: 38.2 ML/MIN/1.73
GLUCOSE BLDC GLUCOMTR-MCNC: 131 MG/DL (ref 70–130)
GLUCOSE BLDC GLUCOMTR-MCNC: 160 MG/DL (ref 70–130)
GLUCOSE BLDC GLUCOMTR-MCNC: 179 MG/DL (ref 70–130)
GLUCOSE BLDC GLUCOMTR-MCNC: 179 MG/DL (ref 70–130)
GLUCOSE BLDC GLUCOMTR-MCNC: 233 MG/DL (ref 70–130)
GLUCOSE SERPL-MCNC: 143 MG/DL (ref 65–99)
INR PPP: 2.54 (ref 0.9–1.1)
MAGNESIUM SERPL-MCNC: 2.1 MG/DL (ref 1.6–2.4)
PHOSPHATE SERPL-MCNC: 4.3 MG/DL (ref 2.5–4.5)
POTASSIUM SERPL-SCNC: 3.6 MMOL/L (ref 3.5–5.2)
PROTHROMBIN TIME: 27.6 SECONDS (ref 11.7–14.2)
SODIUM SERPL-SCNC: 137 MMOL/L (ref 136–145)
WHOLE BLOOD HOLD SPECIMEN: NORMAL

## 2025-01-08 PROCEDURE — 80069 RENAL FUNCTION PANEL: CPT | Performed by: INTERNAL MEDICINE

## 2025-01-08 PROCEDURE — 63710000001 INSULIN LISPRO (HUMAN) PER 5 UNITS: Performed by: INTERNAL MEDICINE

## 2025-01-08 PROCEDURE — 85610 PROTHROMBIN TIME: CPT | Performed by: INTERNAL MEDICINE

## 2025-01-08 PROCEDURE — 97530 THERAPEUTIC ACTIVITIES: CPT

## 2025-01-08 PROCEDURE — 82948 REAGENT STRIP/BLOOD GLUCOSE: CPT

## 2025-01-08 PROCEDURE — 83735 ASSAY OF MAGNESIUM: CPT | Performed by: HOSPITALIST

## 2025-01-08 RX ADMIN — ATORVASTATIN CALCIUM 10 MG: 10 TABLET, FILM COATED ORAL at 08:59

## 2025-01-08 RX ADMIN — INSULIN LISPRO 2 UNITS: 100 INJECTION, SOLUTION INTRAVENOUS; SUBCUTANEOUS at 17:46

## 2025-01-08 RX ADMIN — ASPIRIN 81 MG: 81 TABLET, COATED ORAL at 09:01

## 2025-01-08 RX ADMIN — ATENOLOL 37.5 MG: 25 TABLET ORAL at 20:56

## 2025-01-08 RX ADMIN — DIGOXIN 125 MCG: 125 TABLET ORAL at 09:03

## 2025-01-08 RX ADMIN — POLYETHYLENE GLYCOL 3350 17 G: 17 POWDER, FOR SOLUTION ORAL at 09:03

## 2025-01-08 RX ADMIN — LINAGLIPTIN 5 MG: 5 TABLET, FILM COATED ORAL at 09:10

## 2025-01-08 RX ADMIN — INSULIN LISPRO 4 UNITS: 100 INJECTION, SOLUTION INTRAVENOUS; SUBCUTANEOUS at 20:56

## 2025-01-08 RX ADMIN — LIDOCAINE 1 PATCH: 4 PATCH TOPICAL at 09:02

## 2025-01-08 RX ADMIN — INSULIN LISPRO 2 UNITS: 100 INJECTION, SOLUTION INTRAVENOUS; SUBCUTANEOUS at 12:20

## 2025-01-08 RX ADMIN — WARFARIN SODIUM 4 MG: 4 TABLET ORAL at 17:47

## 2025-01-08 NOTE — TELEPHONE ENCOUNTER
Caller: Lana Yañez    Relationship to patient: Self    Best call back number: [phone]    Patient is needing: PATIENT IS IN THE HOSPITAL AND WANTS TO KNOW IF DR BRUCE CAN COME TO THE HOSPITAL TO GIVE HER, HER LEFT SHOULDER ZAID INJ.   PLEASE CALL HER DEJUAN AND ADVISE

## 2025-01-08 NOTE — PROGRESS NOTES
[facility] Progress Note      Patient Name: Lana Yañez  : 1945  MRN: [id number]  Primary Care Physician:  Toni Green MD  Date of admission: 2024    Subjective     Interval History:   Follow up acute kidney injury on CKD 3A.  Not sleep well last night because of noise on the unit.  Hungry this morning.  Excellent urine output.  Has not been up yet this morning but RN to check orthostatics.    Review of Systems:   As noted above    Objective     Vitals:   Temp:  [97.4 °F (36.3 °C)-97.7 °F (36.5 °C)] 97.7 °F (36.5 °C)  Heart Rate:  [67-91] 91  Resp:  [18] 18  BP: ()/() 129/75    Intake/Output Summary (Last 24 hours) at 2025 0901  Last data filed at 2025 0540  Gross per 24 hour   Intake 240 ml   Output 2050 ml   Net -1810 ml       Physical Exam:    General Appearance: alert, oriented x 3, no acute distress .  Sitting up in bed.  Skin: warm and dry  HEENT: oral mucosa normal, nonicteric sclera  Neck: supple, no JVD  Lungs: Clear to auscultation.  Unlabored on room air.  Heart: Irregularly irregular.  Abdomen: soft, nontender, nondistended. +bs  : Pure wick  Extremities: Trace upper and lower extremity edema.  Lower extremities much less tender to touch.  Neuro: normal speech and mental status     Scheduled Meds:     aspirin, 81 mg, Oral, Daily  atenolol, 37.5 mg, Oral, BID  atorvastatin, 10 mg, Oral, Daily  digoxin, 125 mcg, Oral, Every Other Day  insulin lispro, 2-9 Units, Subcutaneous, 4x Daily AC & at Bedtime  latanoprost, 1 drop, Both Eyes, Nightly  Lidocaine, 1 patch, Transdermal, Q24H  linagliptin, 5 mg, Oral, Daily  polyethylene glycol, 17 g, Oral, Daily  [Held by provider] spironolactone, 25 mg, Oral, Daily  warfarin, 4 mg, Oral, Daily      IV Meds:   Pharmacy to dose warfarin,         Results Reviewed:   I have personally reviewed the results from the time of this admission to 2025 09:01 EST     Results from last 7 days   Lab  Units 01/08/25 0633 01/07/25 0335 01/06/25 0735 01/05/25 0449 01/04/25  0511 01/02/25  0905   SODIUM mmol/L 137 137 139 139   < > 137   POTASSIUM mmol/L 3.6 3.8 3.8 3.8   < > 3.4*   CHLORIDE mmol/L 94* 95* 95* 96*   < > 99   CO2 mmol/L 29.3* 28.4 31.0* 31.0*   < > 28.0   BUN mg/dL 45* 40* 32* 26*   < > 17   CREATININE mg/dL 1.42* 1.50* 1.31* 1.27*   < > 1.23*   CALCIUM mg/dL 9.6 9.5 9.7 9.5   < > 8.9   BILIRUBIN mg/dL  --   --   --  0.5  --  0.7   ALK PHOS U/L  --   --   --  66  --  63   ALT (SGPT) U/L  --   --   --  12  --  10   AST (SGOT) U/L  --   --   --  20  --  15   GLUCOSE mg/dL 143* 142* 137* 125*   < > 205*    < > = values in this interval not displayed.       Estimated Creatinine Clearance: 38.1 mL/min (A) (by C-G formula based on SCr of 1.42 mg/dL (H)).    Results from last 7 days   Lab Units 01/08/25 0633 01/07/25 0335 01/04/25  0511   MAGNESIUM mg/dL 2.1 2.0 1.9   PHOSPHORUS mg/dL 4.3 4.0 3.1       Results from last 7 days   Lab Units 01/04/25  0511   URIC ACID mg/dL 8.3*       Results from last 7 days   Lab Units 01/06/25 0735 01/05/25 0449 01/04/25  0511 01/02/25  0905   WBC 10*3/mm3 8.45 9.38 9.15 10.16   HEMOGLOBIN g/dL 13.7 13.4 12.7 11.8*   PLATELETS 10*3/mm3 219 199 184 139*       Results from last 7 days   Lab Units 01/08/25 0633 01/07/25 0335 01/06/25 0735 01/05/25 0449 01/04/25  0511   INR  2.54* 2.60* 2.47* 2.57* 2.56*       Assessment / Plan     ASSESSMENT:  Acute kidney injury on CKD 3A.  Baseline 1.0-1.2.  Creatinine improved today.  Check orthostatics.  Looks euvolemic by exam.  Holding torsemide and Aldactone.  2.  Acute hypoxic respiratory failure.  Resolved.  3.  Acute on chronic heart failure preserved ejection fraction.  Compensated.  4.  Chronic atrial fibrillation on digoxin, metoprolol and Coumadin.  5.  Diabetes mellitus type II with CKD  6.  Hypertension of CKD.  PLAN:  Orthostatic blood pressure.  RN to record.  2. No Diuretic today.  Thank you for involving us in  the care of Lana Yañez.  Please feel free to call with any questions.    Shanthi Guerra MD  01/08/25  09:01 [facility]    [facility]  [phone]    Please note that portions of this note were completed with a voice recognition program.

## 2025-01-08 NOTE — CASE MANAGEMENT/SOCIAL WORK
Continued Stay Note  [facility]     Patient Name: Lana Yañez  MRN: [id number]  Today's Date: 1/8/2025    Admit Date: 12/31/2024    Plan: Precert for [facility] denied. Post acute authorizations emailed with peer to peer information.   Discharge Plan       Row Name 01/08/25 1325       Plan    Plan Precert for [facility] denied. Post acute authorizations emailed with peer to peer information.    Plan Comments Received call from Shanthi at [facility] who states patient precert was denied. peer to peer due by 1pm on 1/9/2025 at [phone] and fax [phone]                      Expected Discharge Date and Time       Expected Discharge Date Expected Discharge Time    Jan 9, 2025               Sophia Santiago RN

## 2025-01-08 NOTE — THERAPY TREATMENT NOTE
Patient Name: Lana Yañez  : 1945    MRN: [id number]                              Today's Date: 2025       Admit Date: 2024    Visit Dx:     ICD-10-CM ICD-9-CM   1. Acute hypoxic respiratory failure  J96.01 518.81   2. Acute cough  R05.1 786.2   3. Pneumonia of both lower lobes due to infectious organism  J18.9 486   4. Acute on chronic congestive heart failure, unspecified heart failure type  I50.9 428.0     Patient Active Problem List   Diagnosis    Mixed hyperlipidemia    Vitamin deficiency    Essential hypertension    Rheumatoid arthritis involving both hands    Fatigue    ANTOINE (dyspnea on exertion)    Dysuria    Urge incontinence of urine    Hypovitaminosis D    Sleep apnea    Encounter for screening colonoscopy    Bronchitis    Cough    Generalized osteoarthritis of multiple sites    Cellulitis of right elbow due to MRSA    Medicare annual wellness visit, initial    Hospital discharge follow-up    Displacement of lumbar intervertebral disc without myelopathy    Lumbar disc herniation    Chronic atrial fibrillation    History of MRSA infection    Left-sided weakness    Atrial fibrillation    Left shoulder pain    Cerebrovascular accident (CVA) due to occlusion of right middle cerebral artery    Relative lymphocytosis    Nausea    Weakness    Controlled substance agreement signed    Coronary artery disease involving native coronary artery of native heart without angina pectoris    SOB (shortness of breath)    Lower extremity edema    Acute on chronic diastolic CHF (congestive heart failure)    Adhesive capsulitis of left shoulder    CVA tenderness    Costochondritis, acute    Allergic reaction    Coronary artery embolism    Visit for screening mammogram    Low back pain    Urgency of urination    Hyperglycemia    Carpal tunnel syndrome of left wrist/ wakes her up    Localized edema    Acute cystitis without hematuria    Bruising    History of stroke    Diabetes 1.5, managed as type 2     Other chronic pain    Vaginal candidiasis    Pulmonary hypertension    Acute respiratory failure with hypoxia    Hypokalemia    Hypomagnesemia    Type 2 diabetes mellitus with hyperglycemia, without long-term current use of insulin    Diarrhea    Sepsis without acute organ dysfunction    Morbid obesity with BMI of 40.0-44.9, adult    Biliary acute pancreatitis without necrosis or infection    Stage 3a chronic kidney disease    Acute UTI (urinary tract infection)    Hemiparesis of left nondominant side as late effect of cerebral infarction    Acute hypoxic respiratory failure    Pneumonia    Acute on chronic hypoxic respiratory failure     Past Medical History:   Diagnosis Date    Acute on chronic diastolic heart failure     Acute UTI (urinary tract infection) 05/22/2022    Allergic reaction 02/09/2018    Arthritis     Arthritis of back     Arthritis of neck     Asthma     Atrial fibrillation     Persistent; on warfarin    Atypical chest pain     Biliary acute pancreatitis without necrosis or infection 11/10/2021    Bronchitis     Cellulitis of right elbow     due to MRSA    Cervical disc disorder     CHF (congestive heart failure)     COPD (chronic obstructive pulmonary disease)     Coronary artery disease     Cardiac catheterization completed; 90% PDA stenosis with medical management recommended    Coronary artery disease involving native coronary artery of native heart with angina pectoris with documented spasm     CTS (carpal tunnel syndrome)     Diabetes 1.5, managed as type 2     Disease of thyroid gland     Displacement of lumbar intervertebral disc without myelopathy     Dizziness     ANTOINE (dyspnea on exertion)     Essential hypertension     Fatigue     Fracture, foot     Generalized osteoarthritis of multiple sites     History of rheumatic fever     History of transfusion     Hx of bone density study     10/23/2014    Hyperglycemia     Hyperlipidemia     Hypovitaminosis D     Knee swelling     Left arm pain      Left-sided weakness     Leg swelling     Low back pain     Lower extremity edema     Lumbosacral disc disease     Malaise and fatigue     Mitral valve disease     Moderate mitral valve prolapse and moderate mitral regurgitation    Mitral valve insufficiency     Morbid obesity with BMI of 40.0-44.9, adult     MRSA infection     NSTEMI (non-ST elevated myocardial infarction)     PAF (paroxysmal atrial fibrillation)     Periarthritis of shoulder     Pneumonia 1/1/2025    RA (rheumatoid arthritis)     involving both hands    Rotator cuff syndrome     Sleep apnea     SOB (shortness of breath)     Stroke     left side weakness    Urge incontinence of urine     UTI (urinary tract infection) 05/2020    Visit for screening mammogram 04/02/2018    Vitamin D deficiency      Past Surgical History:   Procedure Laterality Date    BREAST BIOPSY      BREAST SURGERY      right side lumpectomy with biopsy    CARDIAC CATHETERIZATION Left 10/20/2017    Procedure: Cardiac Catheterization/Vascular Study;  Surgeon: Alphonso Olmedo MD;  Location: [facility] CATH INVASIVE LOCATION;  Service:     CARDIAC CATHETERIZATION N/A 10/20/2017    +2 mitral regurgitation, left main 10% stenosis, mid to distal LAD 10% diffuse stenosis, circumflex 10% diffuse stenosis, RCA 10% proximal stenosis, and distal PDA consistent with coronary embolus with a lesion of 90% too small to consider coronary intervention; medical management recommended    EYE SURGERY      laser surgery for glaucoma and left eye cataracts removed    HYSTERECTOMY      10+ years ago    JOINT REPLACEMENT  2005; 2006    bilateral knees and left rotater    KNEE SURGERY      MAMMO BILATERAL  2016    [facility]     SHOULDER SURGERY        General Information       Row Name 01/08/25 0955          Physical Therapy Time and Intention    Document Type therapy note (daily note)  -EF     Mode of Treatment individual therapy;physical therapy  -EF       Row Name 01/08/25 0955          General  Information    Existing Precautions/Restrictions fall  -EF     Barriers to Rehab previous functional deficit  -EF       Row Name 01/08/25 0955          Cognition    Orientation Status (Cognition) oriented x 4  -EF       Row Name 01/08/25 0955          Safety Issues/Impairments Affecting Functional Mobility    Impairments Affecting Function (Mobility) balance;endurance/activity tolerance;strength;range of motion (ROM)  -EF               User Key  (r) = Recorded By, (t) = Taken By, (c) = Cosigned By      Initials Name Provider Type    EF Stefany Phipps PT Physical Therapist                   Mobility       Row Name 01/08/25 0955          Bed Mobility    Bed Mobility sit-supine  -EF     Supine-Sit [place] (Bed Mobility) standby assist  -EF     Sit-Supine [place] (Bed Mobility) standby assist  -EF     Assistive Device (Bed Mobility) head of bed elevated;bed rails  -EF     Comment, (Bed Mobility) increased time required  -EF       Row Name 01/08/25 0955          Sit-Stand Transfer    Sit-Stand [place] (Transfers) minimum assist (75% patient effort);verbal cues  -EF     Assistive Device (Sit-Stand Transfers) walker, front-wheeled  -EF     Comment, (Sit-Stand Transfer) cues for hand placement  -EF       Row Name 01/08/25 0955          Gait/Stairs (Locomotion)    [place] Level (Gait) contact guard;verbal cues  -EF     Assistive Device (Gait) walker, front-wheeled  -EF     Distance in Feet (Gait) 30  -EF     Deviations/Abnormal Patterns (Gait) héctor decreased  -EF     Bilateral Gait Deviations forward flexed posture;heel strike decreased  -EF     Comment, (Gait/Stairs) cues for upright posture  -EF               User Key  (r) = Recorded By, (t) = Taken By, (c) = Cosigned By      Initials Name Provider Type    EF Stefany Phipps PT Physical Therapist                   Obj/Interventions       Row Name 01/08/25 0956          Balance    Static Sitting Balance supervision  -EF     Position, Sitting  Balance sitting edge of bed  -EF     Static Standing Balance standby assist  -EF     Dynamic Standing Balance contact guard;minimal assist  -EF     Position/Device Used, Standing Balance walker, front-wheeled;supported  -EF     Comment, Balance Static standing at EOB x4 min total while obtaining orthostatics  -EF               User Key  (r) = Recorded By, (t) = Taken By, (c) = Cosigned By      Initials Name Provider Type    EF Stefany Phipps, PT Physical Therapist                   Goals/Plan    No documentation.                  Clinical Impression       Row Name 01/08/25 0957          Pain    Pretreatment Pain Rating 0/10 - no pain  -EF     Posttreatment Pain Rating 0/10 - no pain  -EF       Row Name 01/08/25 0957          Plan of Care Review    Plan of Care Reviewed With patient  -EF     Progress improving  -EF     Outcome Evaluation Pt demos good progress with PT as evidenced by tolerance of increased activity today. Pt required less assistance with mobility, also. Pt performed bed mobility with SBA and transfers with min A and rwx. Pt was able to ambulate 30' with CGA and rwx and had no complaints of pain or dizziness during PT today. Checked orthostatics during session: supine 112/80, 1 min standing 149/103, 3 min standing 151/100. Pt will continue to benefit from PT to address impairments and increase independence with mobility. Rec DC to IRF.  -EF       Row Name 01/08/25 0957          Vital Signs    Pre Systolic BP Rehab 112  -EF     Pre Treatment Diastolic BP 80  -EF     Intra Systolic BP Rehab 149  -EF     Intra Treatment Diastolic   -EF     Post Systolic BP Rehab 151  -EF     Post Treatment Diastolic   -EF     Pre Patient Position Supine  -EF     Intra Patient Position Standing  -EF     Post Patient Position Standing  -EF       Row Name 01/08/25 0957          Positioning and Restraints    Pre-Treatment Position in bed  -EF     Post Treatment Position bed  -EF     In Bed fowlers;call light  within reach;encouraged to call for assist;exit alarm on;notified nsg  -EF               User Key  (r) = Recorded By, (t) = Taken By, (c) = Cosigned By      Initials Name Provider Type    Stefany Garcia PT Physical Therapist                   Outcome Measures       Row Name 01/08/25 0959          How much help from another person do you currently need...    Turning from your back to your side while in flat bed without using bedrails? 3  -EF     Moving from lying on back to sitting on the side of a flat bed without bedrails? 2  -EF     Moving to and from a bed to a chair (including a wheelchair)? 3  -EF     Standing up from a chair using your arms (e.g., wheelchair, bedside chair)? 3  -EF     Climbing 3-5 steps with a railing? 2  -EF     To walk in hospital room? 3  -EF     AM-PAC 6 Clicks Score (PT) 16  -EF     Highest Level of Mobility Goal 5 --> Static standing  -EF               User Key  (r) = Recorded By, (t) = Taken By, (c) = Cosigned By      Initials Name Provider Type    Stefany Garcia PT Physical Therapist                                 Physical Therapy Education       Title: PT OT SLP Therapies (In Progress)       Topic: Physical Therapy (Done)       Point: Mobility training (Done)       Learning Progress Summary            Patient Acceptance, E, VU,NR by  at 1/8/2025 0959    Acceptance, E, VU,NR by EF at 1/7/2025 1133    Acceptance, E, VU,NR by EF at 1/6/2025 1231    Acceptance, E,TB, VU,NR by CS at 1/4/2025 0951    Acceptance, E, VU,NR by SV at 1/3/2025 1003                      Point: Home exercise program (Done)       Learning Progress Summary            Patient Acceptance, E, VU,NR by  at 1/6/2025 1231    Acceptance, E,TB, VU,NR by CS at 1/4/2025 0951    Acceptance, E, VU,NR by SV at 1/3/2025 1003                                      User Key       Initials Effective Dates Name Provider Type Discipline     05/31/24 -  Stefany Phipps PT Physical Therapist PT    SV 07/11/23 -   [place] De La Fuente, PT Physical Therapist PT    CS 07/11/23 -  Gurpreet Bianchi, PT Physical Therapist PT                  PT Recommendation and Plan     Progress: improving  Outcome Evaluation: Pt demos good progress with PT as evidenced by tolerance of increased activity today. Pt required less assistance with mobility, also. Pt performed bed mobility with SBA and transfers with min A and rwx. Pt was able to ambulate 30' with CGA and rwx and had no complaints of pain or dizziness during PT today. Checked orthostatics during session: supine 112/80, 1 min standing 149/103, 3 min standing 151/100. Pt will continue to benefit from PT to address impairments and increase independence with mobility. Rec DC to IRF.     Time Calculation:         PT Charges       Row Name 01/08/25 0959             Time Calculation    Start Time 0913  -EF      Stop Time 0936  -EF      Time Calculation (min) 23 min  -EF      PT Received On 01/08/25  -EF      PT - Next Appointment 01/09/25  -EF         Time Calculation- PT    Total Timed Code Minutes- PT 23 minute(s)  -EF         Timed Charges    56840 - PT Therapeutic Activity Minutes 23  -EF         Total Minutes    Timed Charges Total Minutes 23  -EF       Total Minutes 23  -EF                User Key  (r) = Recorded By, (t) = Taken By, (c) = Cosigned By      Initials Name Provider Type    EF Stefany Phipps, PT Physical Therapist                  Therapy Charges for Today       Code Description Service Date Service Provider Modifiers Qty    18189126991  PT THERAPEUTIC ACT EA 15 MIN 1/7/2025 Stefany Phipps, PT GP 1    06265400881 HC PT THERAPEUTIC ACT EA 15 MIN 1/8/2025 Stefany Phipps, PT GP 2            PT G-Codes  Outcome Measure Options: AM-PAC 6 Clicks Basic Mobility (PT)  AM-PAC 6 Clicks Score (PT): 16  AM-PAC 6 Clicks Score (OT): 10  Modified Patricia Scale: 4 - Moderately severe disability.  Unable to walk without assistance, and unable to attend to own bodily needs without  assistance.  PT Discharge Summary  Anticipated Discharge Disposition (PT): inpatient rehabilitation facility    Stefany Phipps, PT  1/8/2025

## 2025-01-08 NOTE — CASE MANAGEMENT/SOCIAL WORK
Post-Acute Authorization Submission      Post Acute Pre-Cert Documentation  Request Submitted by Facility - Type:: Post Acute  Post-Acute Authorization Type Submitted:: IRF  Date Post Acute Pre-Cert Inititated per Facility: 01/07/25  Accepting Facility: [facility]  Hospital Discharge Date Requested: 01/07/25  All Clinicals Submitted?: Yes  Had Accepting Facility at Time of Submission: Yes  Response Received from Insurance?: P2P Requested  Response Communicated to::   Authorization Number:: PENDING [id number]  Post Acute Pre-Cert Initiated Comment: P2P SCHEDULED W/HUM MCR REP [facility] V. - INFO IN PA TAB POST ACUTE P2P SECTION. WILL CONTINUE TO FOLLOW              BRENDA Forrester

## 2025-01-08 NOTE — PROGRESS NOTES
Name: Lana Yañez ADMIT: 2024   : 1945  PCP: Toni Green MD    MRN: [id number] LOS: 6 days   AGE/SEX: 79 y.o. female  ROOM: [facility]     Subjective   Subjective   Lying in bed. No new complaints. Asking about insulin in the hospital. She does not want to be discharged on insulin. Reassured her plan to transition back to p.o. medications at discharge. Her A1c was 7.30% a few months ago.     Objective   Objective   Vital Signs  Temp:  [97.4 °F (36.3 °C)-97.7 °F (36.5 °C)] 97.7 °F (36.5 °C)  Heart Rate:  [67-97] 84  Resp:  [18] 18  BP: ()/() 151/100  SpO2:  [71 %-100 %] 97 %  on  Flow (L/min) (Oxygen Therapy):  [2] 2;   Device (Oxygen Therapy): room air  Body mass index is 33.13 kg/m².    Physical Exam  Constitutional:       General: She is not in acute distress.     Appearance: She is not toxic-appearing.   HENT:      Head: Normocephalic and atraumatic.   Cardiovascular:      Rate and Rhythm: Normal rate. Rhythm irregular.   Pulmonary:      Effort: Pulmonary effort is normal. No respiratory distress.      Breath sounds: Normal breath sounds.   Abdominal:      General: Bowel sounds are normal.      Palpations: Abdomen is soft.      Tenderness: There is no abdominal tenderness.   Musculoskeletal:         General: No swelling.      Cervical back: No rigidity.   Skin:     General: Skin is warm and dry.   Neurological:      General: No focal deficit present.      Mental Status: She is alert and oriented to person, place, and time.   Psychiatric:         Mood and Affect: Mood normal.         Behavior: Behavior normal.     Results Review  I reviewed the patient's new clinical results.  Results from last 7 days   Lab Units 25  0735 25  0449 25  0511 25  0905   WBC 10*3/mm3 8.45 9.38 9.15 10.16   HEMOGLOBIN g/dL 13.7 13.4 12.7 11.8*   PLATELETS 10*3/mm3 219 199 184 139*     Results from last 7 days   Lab Units 25  0633 25  0335 25  0735  01/05/25  0449   SODIUM mmol/L 137 137 139 139   POTASSIUM mmol/L 3.6 3.8 3.8 3.8   CHLORIDE mmol/L 94* 95* 95* 96*   CO2 mmol/L 29.3* 28.4 31.0* 31.0*   BUN mg/dL 45* 40* 32* 26*   CREATININE mg/dL 1.42* 1.50* 1.31* 1.27*   GLUCOSE mg/dL 143* 142* 137* 125*     Lab Results   Component Value Date    ANIONGAP 13.7 01/08/2025     Estimated Creatinine Clearance: 38.1 mL/min (A) (by C-G formula based on SCr of 1.42 mg/dL (H)).   Lab Results   Component Value Date    EGFR 38.2 (L) 01/08/2025     Results from last 7 days   Lab Units 01/08/25  0633 01/05/25  0449 01/04/25  0511 01/02/25  0905   ALBUMIN g/dL 3.5 3.8 3.6 3.7   BILIRUBIN mg/dL  --  0.5  --  0.7   ALK PHOS U/L  --  66  --  63   AST (SGOT) U/L  --  20  --  15   ALT (SGPT) U/L  --  12  --  10     Results from last 7 days   Lab Units 01/08/25  0633 01/07/25  0335 01/06/25  0735 01/05/25  0449 01/04/25  0511 01/02/25  0905   CALCIUM mg/dL 9.6 9.5 9.7 9.5 9.1 8.9   ALBUMIN g/dL 3.5  --   --  3.8 3.6 3.7   MAGNESIUM mg/dL 2.1 2.0  --   --  1.9  --    PHOSPHORUS mg/dL 4.3 4.0  --   --  3.1  --            Glucose   Date/Time Value Ref Range Status   01/08/2025 1157 160 (H) 70 - 130 mg/dL Final   01/08/2025 1118 179 (H) 70 - 130 mg/dL Final   01/08/2025 0658 131 (H) 70 - 130 mg/dL Final   01/07/2025 2139 192 (H) 70 - 130 mg/dL Final   01/07/2025 1635 146 (H) 70 - 130 mg/dL Final   01/07/2025 1107 229 (H) 70 - 130 mg/dL Final   01/07/2025 0603 142 (H) 70 - 130 mg/dL Final       XR Spine Cervical 2 or 3 View    Result Date: 1/6/2025   As described.    This report was finalized on 1/6/2025 5:58 PM by Dr. Antelmo Corea M.D on Workstation: [id number]       Scheduled Meds  aspirin, 81 mg, Oral, Daily  atenolol, 37.5 mg, Oral, BID  atorvastatin, 10 mg, Oral, Daily  digoxin, 125 mcg, Oral, Every Other Day  insulin lispro, 2-9 Units, Subcutaneous, 4x Daily AC & at Bedtime  latanoprost, 1 drop, Both Eyes, Nightly  Lidocaine, 1 patch, Transdermal, Q24H  linagliptin, 5 mg, Oral,  Daily  polyethylene glycol, 17 g, Oral, Daily  [Held by provider] spironolactone, 25 mg, Oral, Daily  warfarin, 4 mg, Oral, Daily    Continuous Infusions  Pharmacy to dose warfarin,     PRN Meds    acetaminophen **OR** acetaminophen **OR** acetaminophen    albuterol sulfate HFA    senna-docusate sodium **AND** polyethylene glycol **AND** bisacodyl **AND** bisacodyl    dextrose    dextrose    glucagon (human recombinant)    guaiFENesin-dextromethorphan    HYDROcodone-acetaminophen    melatonin    ondansetron ODT **OR** ondansetron    Pharmacy to dose warfarin    polyethylene glycol    sodium chloride    Pharmacy to dose warfarin,     Diet  Diet: Cardiac, Fluid Restriction (240 mL/tray); Healthy Heart (2-3 Na+); 1500 mL/day; Fluid Consistency: Thin (IDDSI 0)    Results from last 7 days   Lab Units 01/08/25  0633 01/07/25  0335 01/06/25  0735   INR  2.54* 2.60* 2.47*         Assessment/Plan     Active Hospital Problems    Diagnosis  POA    **Acute hypoxic respiratory failure [J96.01]  Yes    Acute on chronic hypoxic respiratory failure [J96.21]  Yes    Pneumonia [J18.9]  Yes    Stage 3a chronic kidney disease [N18.31]  Yes    Type 2 diabetes mellitus with hyperglycemia, without long-term current use of insulin [E11.65]  Yes    Acute on chronic diastolic CHF (congestive heart failure) [I50.33]  Yes    Chronic atrial fibrillation [I48.20]  Yes    Mixed hyperlipidemia [E78.2]  Yes      Resolved Hospital Problems   No resolved problems to display.     Patient is a 79 y.o. female     01/06/25 complaining some neck soreness, C-spine x-rays unremarkable. No further complaints    Acute respiratory failure with hypoxia  Acute on chronic diastolic CHF  Cardiology and nephrology following  Torsemide and Aldactone held per nephrology    CKD 3  Nephrology managing    Chronic atrial fibrillation  Warfarin, rate controlled    Hyperlipidemia atorvastatin  CAD  DM2 control acceptable. See above    DVT prophylaxis  Warfarin (home  med)    Discharge  Acute rehab  Expected Discharge Date: 1/9/2025; Expected Discharge Time:     Discussed with patient    Peter De La Rosa MD  [facility]  01/08/25 13:22 EST

## 2025-01-08 NOTE — PLAN OF CARE
Goal Outcome Evaluation:  Plan of Care Reviewed With: patient        Progress: improving  Outcome Evaluation: Pt demos good progress with PT as evidenced by tolerance of increased activity today. Pt required less assistance with mobility, also. Pt performed bed mobility with SBA and transfers with min A and rwx. Pt was able to ambulate 30' with CGA and rwx and had no complaints of pain or dizziness during PT today. Checked orthostatics during session: supine 112/80, 1 min standing 149/103, 3 min standing 151/100. Pt will continue to benefit from PT to address impairments and increase independence with mobility. Rec DC to IRF.    Anticipated Discharge Disposition (PT): inpatient rehabilitation facility

## 2025-01-09 LAB
ALBUMIN SERPL-MCNC: 3.3 G/DL (ref 3.5–5.2)
ANION GAP SERPL CALCULATED.3IONS-SCNC: 11.4 MMOL/L (ref 5–15)
BUN SERPL-MCNC: 33 MG/DL (ref 8–23)
BUN/CREAT SERPL: 26.8 (ref 7–25)
CALCIUM SPEC-SCNC: 9.7 MG/DL (ref 8.6–10.5)
CHLORIDE SERPL-SCNC: 98 MMOL/L (ref 98–107)
CO2 SERPL-SCNC: 28.6 MMOL/L (ref 22–29)
CREAT SERPL-MCNC: 1.23 MG/DL (ref 0.57–1)
EGFRCR SERPLBLD CKD-EPI 2021: 45.4 ML/MIN/1.73
GLUCOSE BLDC GLUCOMTR-MCNC: 133 MG/DL (ref 70–130)
GLUCOSE BLDC GLUCOMTR-MCNC: 135 MG/DL (ref 70–130)
GLUCOSE BLDC GLUCOMTR-MCNC: 187 MG/DL (ref 70–130)
GLUCOSE BLDC GLUCOMTR-MCNC: 204 MG/DL (ref 70–130)
GLUCOSE BLDC GLUCOMTR-MCNC: 212 MG/DL (ref 70–130)
GLUCOSE SERPL-MCNC: 145 MG/DL (ref 65–99)
INR PPP: 2.72 (ref 0.9–1.1)
MAGNESIUM SERPL-MCNC: 2.3 MG/DL (ref 1.6–2.4)
PHOSPHATE SERPL-MCNC: 3.6 MG/DL (ref 2.5–4.5)
POTASSIUM SERPL-SCNC: 3.7 MMOL/L (ref 3.5–5.2)
PROTHROMBIN TIME: 29.1 SECONDS (ref 11.7–14.2)
SODIUM SERPL-SCNC: 138 MMOL/L (ref 136–145)

## 2025-01-09 PROCEDURE — 80069 RENAL FUNCTION PANEL: CPT | Performed by: INTERNAL MEDICINE

## 2025-01-09 PROCEDURE — 83735 ASSAY OF MAGNESIUM: CPT | Performed by: HOSPITALIST

## 2025-01-09 PROCEDURE — 97535 SELF CARE MNGMENT TRAINING: CPT

## 2025-01-09 PROCEDURE — 85610 PROTHROMBIN TIME: CPT | Performed by: INTERNAL MEDICINE

## 2025-01-09 PROCEDURE — 82948 REAGENT STRIP/BLOOD GLUCOSE: CPT

## 2025-01-09 PROCEDURE — 63710000001 INSULIN LISPRO (HUMAN) PER 5 UNITS: Performed by: INTERNAL MEDICINE

## 2025-01-09 PROCEDURE — 97530 THERAPEUTIC ACTIVITIES: CPT

## 2025-01-09 RX ADMIN — LINAGLIPTIN 5 MG: 5 TABLET, FILM COATED ORAL at 08:20

## 2025-01-09 RX ADMIN — INSULIN LISPRO 2 UNITS: 100 INJECTION, SOLUTION INTRAVENOUS; SUBCUTANEOUS at 17:44

## 2025-01-09 RX ADMIN — HYDROCODONE BITARTRATE AND ACETAMINOPHEN 1 TABLET: 7.5; 325 TABLET ORAL at 20:58

## 2025-01-09 RX ADMIN — INSULIN LISPRO 4 UNITS: 100 INJECTION, SOLUTION INTRAVENOUS; SUBCUTANEOUS at 13:13

## 2025-01-09 RX ADMIN — WARFARIN SODIUM 4 MG: 4 TABLET ORAL at 20:58

## 2025-01-09 RX ADMIN — LIDOCAINE 1 PATCH: 4 PATCH TOPICAL at 08:19

## 2025-01-09 RX ADMIN — INSULIN LISPRO 4 UNITS: 100 INJECTION, SOLUTION INTRAVENOUS; SUBCUTANEOUS at 20:58

## 2025-01-09 RX ADMIN — ASPIRIN 81 MG: 81 TABLET, COATED ORAL at 08:20

## 2025-01-09 RX ADMIN — ATENOLOL 37.5 MG: 25 TABLET ORAL at 08:21

## 2025-01-09 RX ADMIN — ATORVASTATIN CALCIUM 10 MG: 10 TABLET, FILM COATED ORAL at 08:19

## 2025-01-09 RX ADMIN — POLYETHYLENE GLYCOL 3350 17 G: 17 POWDER, FOR SOLUTION ORAL at 08:18

## 2025-01-09 RX ADMIN — ATENOLOL 37.5 MG: 25 TABLET ORAL at 20:58

## 2025-01-09 NOTE — THERAPY TREATMENT NOTE
Patient Name: Lana Yañez  : 1945    MRN: [id number]                              Today's Date: 2025       Admit Date: 2024    Visit Dx:     ICD-10-CM ICD-9-CM   1. Acute hypoxic respiratory failure  J96.01 518.81   2. Acute cough  R05.1 786.2   3. Pneumonia of both lower lobes due to infectious organism  J18.9 486   4. Acute on chronic congestive heart failure, unspecified heart failure type  I50.9 428.0   5. Hemiparesis of left nondominant side as late effect of cerebral infarction  I69.354 438.22   6. Acute on chronic hypoxic respiratory failure  J96.21 518.84     799.02   7. Acute UTI (urinary tract infection)  N39.0 599.0   8. Pneumonia due to infectious organism, unspecified laterality, unspecified part of lung  J18.9 486   9. Sepsis without acute organ dysfunction, due to unspecified organism  A41.9 038.9     995.91     Patient Active Problem List   Diagnosis    Mixed hyperlipidemia    Vitamin deficiency    Essential hypertension    Rheumatoid arthritis involving both hands    Fatigue    ANTOINE (dyspnea on exertion)    Dysuria    Urge incontinence of urine    Hypovitaminosis D    Sleep apnea    Encounter for screening colonoscopy    Bronchitis    Cough    Generalized osteoarthritis of multiple sites    Cellulitis of right elbow due to MRSA    Medicare annual wellness visit, initial    Hospital discharge follow-up    Displacement of lumbar intervertebral disc without myelopathy    Lumbar disc herniation    Chronic atrial fibrillation    History of MRSA infection    Left-sided weakness    Atrial fibrillation    Left shoulder pain    Cerebrovascular accident (CVA) due to occlusion of right middle cerebral artery    Relative lymphocytosis    Nausea    Weakness    Controlled substance agreement signed    Coronary artery disease involving native coronary artery of native heart without angina pectoris    SOB (shortness of breath)    Lower extremity edema    Acute on chronic diastolic CHF  (congestive heart failure)    Adhesive capsulitis of left shoulder    CVA tenderness    Costochondritis, acute    Allergic reaction    Coronary artery embolism    Visit for screening mammogram    Low back pain    Urgency of urination    Hyperglycemia    Carpal tunnel syndrome of left wrist/ wakes her up    Localized edema    Acute cystitis without hematuria    Bruising    History of stroke    Diabetes 1.5, managed as type 2    Other chronic pain    Vaginal candidiasis    Pulmonary hypertension    Acute respiratory failure with hypoxia    Hypokalemia    Hypomagnesemia    Type 2 diabetes mellitus with hyperglycemia, without long-term current use of insulin    Diarrhea    Sepsis without acute organ dysfunction    Morbid obesity with BMI of 40.0-44.9, adult    Biliary acute pancreatitis without necrosis or infection    Stage 3a chronic kidney disease    Acute UTI (urinary tract infection)    Hemiparesis of left nondominant side as late effect of cerebral infarction    Acute hypoxic respiratory failure    Pneumonia    Acute on chronic hypoxic respiratory failure     Past Medical History:   Diagnosis Date    Acute on chronic diastolic heart failure     Acute UTI (urinary tract infection) 05/22/2022    Allergic reaction 02/09/2018    Arthritis     Arthritis of back     Arthritis of neck     Asthma     Atrial fibrillation     Persistent; on warfarin    Atypical chest pain     Biliary acute pancreatitis without necrosis or infection 11/10/2021    Bronchitis     Cellulitis of right elbow     due to MRSA    Cervical disc disorder     CHF (congestive heart failure)     COPD (chronic obstructive pulmonary disease)     Coronary artery disease     Cardiac catheterization completed; 90% PDA stenosis with medical management recommended    Coronary artery disease involving native coronary artery of native heart with angina pectoris with documented spasm     CTS (carpal tunnel syndrome)     Diabetes 1.5, managed as type 2     Disease  of thyroid gland     Displacement of lumbar intervertebral disc without myelopathy     Dizziness     ANTOINE (dyspnea on exertion)     Essential hypertension     Fatigue     Fracture, foot     Generalized osteoarthritis of multiple sites     History of rheumatic fever     History of transfusion     Hx of bone density study     10/23/2014    Hyperglycemia     Hyperlipidemia     Hypovitaminosis D     Knee swelling     Left arm pain     Left-sided weakness     Leg swelling     Low back pain     Lower extremity edema     Lumbosacral disc disease     Malaise and fatigue     Mitral valve disease     Moderate mitral valve prolapse and moderate mitral regurgitation    Mitral valve insufficiency     Morbid obesity with BMI of 40.0-44.9, adult     MRSA infection     NSTEMI (non-ST elevated myocardial infarction)     PAF (paroxysmal atrial fibrillation)     Periarthritis of shoulder     Pneumonia 1/1/2025    RA (rheumatoid arthritis)     involving both hands    Rotator cuff syndrome     Sleep apnea     SOB (shortness of breath)     Stroke     left side weakness    Urge incontinence of urine     UTI (urinary tract infection) 05/2020    Visit for screening mammogram 04/02/2018    Vitamin D deficiency      Past Surgical History:   Procedure Laterality Date    BREAST BIOPSY      BREAST SURGERY      right side lumpectomy with biopsy    CARDIAC CATHETERIZATION Left 10/20/2017    Procedure: Cardiac Catheterization/Vascular Study;  Surgeon: Alphonso Olmedo MD;  Location: [facility] CATH INVASIVE LOCATION;  Service:     CARDIAC CATHETERIZATION N/A 10/20/2017    +2 mitral regurgitation, left main 10% stenosis, mid to distal LAD 10% diffuse stenosis, circumflex 10% diffuse stenosis, RCA 10% proximal stenosis, and distal PDA consistent with coronary embolus with a lesion of 90% too small to consider coronary intervention; medical management recommended    EYE SURGERY      laser surgery for glaucoma and left eye cataracts removed    HYSTERECTOMY       10+ years ago    JOINT REPLACEMENT  2005; 2006    bilateral knees and left rotater    KNEE SURGERY      MAMMO [facility]ILATERAL  2016    [facility]     SHOULDER SURGERY        General Information       Row Name 01/09/25 1618          Physical Therapy Time and Intention    Document Type therapy note (daily note)  -EF     Mode of Treatment physical therapy  -EF       Row Name 01/09/25 1618          General Information    Existing Precautions/Restrictions fall  -EF     Barriers to Rehab previous functional deficit  -EF       Row Name 01/09/25 1618          Cognition    Orientation Status (Cognition) oriented x 4  -EF       Row Name 01/09/25 1618          Safety Issues/Impairments Affecting Functional Mobility    Impairments Affecting Function (Mobility) balance;endurance/activity tolerance;strength;range of motion (ROM)  -EF               User Key  (r) = Recorded By, (t) = Taken By, (c) = Cosigned By      Initials Name Provider Type    EF Stefany Phipps PT Physical Therapist                   Mobility       Row Name 01/09/25 1618          Bed Mobility    Bed Mobility supine-sit;sit-supine  -EF     Supine-Sit [place] (Bed Mobility) supervision  -EF     Sit-Supine [place] (Bed Mobility) supervision  -EF     Assistive Device (Bed Mobility) head of bed elevated;bed rails  -EF       Row Name 01/09/25 1618          Sit-Stand Transfer    Sit-Stand [place] (Transfers) contact guard;verbal cues  -EF     Assistive Device (Sit-Stand Transfers) walker, front-wheeled  -EF     Comment, (Sit-Stand Transfer) cues for hand placement, multiple attempts required to achieve full stand  -EF       Row Name 01/09/25 1618          Gait/Stairs (Locomotion)    [place] Level (Gait) contact guard;standby assist;verbal cues  -EF     Assistive Device (Gait) walker, front-wheeled  -EF     Distance in Feet (Gait) 50  -EF     Deviations/Abnormal Patterns (Gait) héctor decreased  -EF     Bilateral Gait Deviations forward  flexed posture;heel strike decreased  -EF     Comment, (Gait/Stairs) no LOB or difficulty negotiating turns in hallway  -EF               User Key  (r) = Recorded By, (t) = Taken By, (c) = Cosigned By      Initials Name Provider Type    Stefany Garcia, PT Physical Therapist                   Obj/Interventions    No documentation.                  Goals/Plan    No documentation.                  Clinical Impression       Row Name 01/09/25 1620          Pain    Pretreatment Pain Rating 0/10 - no pain  -EF     Posttreatment Pain Rating 0/10 - no pain  -EF       Row Name 01/09/25 1620          Plan of Care Review    Plan of Care Reviewed With patient  -EF     Progress improving  -EF     Outcome Evaluation Pt continues to demo steady progress with strength and endurance. Pt tolerated increased activity today and performed all mobility with SBA/CGA and rwx. Pt demos no LOB or safety concerns with ambulation and is now planning to DC home with family and HH PT. Noted plans for ordering rwx for home. No problems anticipated.  -EF       Row Name 01/09/25 1620          Positioning and Restraints    Pre-Treatment Position in bed  -EF     Post Treatment Position bed  -EF     In Bed fowlers;call light within reach;encouraged to call for assist;exit alarm on;notified nsg  -EF               User Key  (r) = Recorded By, (t) = Taken By, (c) = Cosigned By      Initials Name Provider Type    Stefany Garcia PT Physical Therapist                   Outcome Measures       Row Name 01/09/25 1622 01/09/25 1556       How much help from another person do you currently need...    Turning from your back to your side while in flat bed without using bedrails? 4  -EF 3  -LM    Moving from lying on back to sitting on the side of a flat bed without bedrails? 3  -EF 3  -LM    Moving to and from a bed to a chair (including a wheelchair)? 3  -EF 3  -LM    Standing up from a chair using your arms (e.g., wheelchair, bedside chair)? 3  -EF  3  -LM    Climbing 3-5 steps with a railing? 3  -EF 2  -LM    To walk in hospital room? 3  -EF 3  -LM    AM-PAC 6 Clicks Score (PT) 19  -EF 17  -LM    Highest Level of Mobility Goal 6 --> Walk 10 steps or more  -EF 5 --> Static standing  -LM              User Key  (r) = Recorded By, (t) = Taken By, (c) = Cosigned By      Initials Name Provider Type    EF Stefany Phipps, PT Physical Therapist    LM Mita Parish, RN Registered Nurse                                 Physical Therapy Education       Title: PT OT SLP Therapies (In Progress)       Topic: Physical Therapy (Done)       Point: Mobility training (Done)       Learning Progress Summary            Patient Acceptance, E, VU,NR by  at 1/9/2025 1622    Acceptance, E, VU,NR by  at 1/8/2025 0959    Acceptance, E, VU,NR by EF at 1/7/2025 1133    Acceptance, E, VU,NR by  at 1/6/2025 1231    Acceptance, E,TB, VU,NR by  at 1/4/2025 0951    Acceptance, E, VU,NR by  at 1/3/2025 1003                      Point: Home exercise program (Done)       Learning Progress Summary            Patient Acceptance, E, VU,NR by  at 1/6/2025 1231    Acceptance, E,TB, VU,NR by  at 1/4/2025 0951    Acceptance, E, VU,NR by SV at 1/3/2025 1003                                      User Key       Initials Effective Dates Name Provider Type Discipline     05/31/24 -  Stefany Phipps, PT Physical Therapist PT     07/11/23 -  [place] De La Fuente, PT Physical Therapist PT     07/11/23 -  Gurpreet Bianchi, PT Physical Therapist PT                  PT Recommendation and Plan     Progress: improving  Outcome Evaluation: Pt continues to demo steady progress with strength and endurance. Pt tolerated increased activity today and performed all mobility with SBA/CGA and rwx. Pt demos no LOB or safety concerns with ambulation and is now planning to DC home with family and HH PT. Noted plans for ordering rwx for home. No problems anticipated.     Time Calculation:         PT Charges        Row Name 01/09/25 1622             Time Calculation    Start Time 1557  -EF      Stop Time 1615  -EF      Time Calculation (min) 18 min  -EF      PT Received On 01/09/25  -EF      PT - Next Appointment 01/10/25  -EF         Time Calculation- PT    Total Timed Code Minutes- PT 18 minute(s)  -EF         Timed Charges    95620 - PT Therapeutic Activity Minutes 18  -EF         Total Minutes    Timed Charges Total Minutes 18  -EF       Total Minutes 18  -EF                User Key  (r) = Recorded By, (t) = Taken By, (c) = Cosigned By      Initials Name Provider Type    EF Stefany Phipps, PT Physical Therapist                  Therapy Charges for Today       Code Description Service Date Service Provider Modifiers Qty    03588225992 HC PT THERAPEUTIC ACT EA 15 MIN 1/8/2025 Stefany hPipps, PT GP 2    67622676419 HC PT THERAPEUTIC ACT EA 15 MIN 1/9/2025 Stefany Phipps, PT GP 1            PT G-Codes  Outcome Measure Options: AM-PAC 6 Clicks Basic Mobility (PT)  AM-PAC 6 Clicks Score (PT): 19  AM-PAC 6 Clicks Score (OT): 10  Modified Jackpot Scale: 4 - Moderately severe disability.  Unable to walk without assistance, and unable to attend to own bodily needs without assistance.  PT Discharge Summary  Anticipated Discharge Disposition (PT): home with home health, home with assist    Stefany Phipps, RENE  1/9/2025

## 2025-01-09 NOTE — TELEPHONE ENCOUNTER
I will be over there until Monday of next week if she is still in the hospital I am happy to come by and do it.  She just needs to let me know if she is still in the hospital on Monday

## 2025-01-09 NOTE — TELEPHONE ENCOUNTER
Spoke to patient and she stated she may be getting out of the hospital later this evening or tomorrow. She will let us know if she is still in the hospital on Monday but wants to know if she can come in office next week if she is out of the hospital

## 2025-01-09 NOTE — CASE MANAGEMENT/SOCIAL WORK
Continued Stay Note  [facility]     Patient Name: Lana Yañez  MRN: [id number]  Today's Date: 1/9/2025    Admit Date: 12/31/2024    Plan: Referral to [facility] home health pending acceptance. referral to [place] for BSC and rolling walker.   Discharge Plan       Row Name 01/09/25 1151       Plan    Plan Referral to [facility] home health pending acceptance. referral to [place] for BSC and rolling walker.    Plan Comments Informed patient at bedside that peer to peer was denied for IRF and offered expedited appeal. Verbalizes understanding .  Offered to make snf referral and cms compare and choice list given. Patient refuses snf referrrals and refuses expedited appeal. Patient asks for dme and home health referral and states her daughter will stay with her after dc from hospital. Referral made to [facility] as [facility]. MD and bedside nurse notfied via epic chat                        Sophia Santiago RN

## 2025-01-09 NOTE — PLAN OF CARE
Problem: Adult Inpatient Plan of Care  Goal: Absence of Hospital-Acquired Illness or Injury  Intervention: Identify and Manage Fall Risk  Recent Flowsheet Documentation  Taken 1/8/2025 1800 by Rachelle Agarwal RN  Safety Promotion/Fall Prevention:   activity supervised   fall prevention program maintained   nonskid shoes/slippers when out of bed   room organization consistent   safety round/check completed   clutter free environment maintained  Taken 1/8/2025 1600 by Rachelle Agarwal RN  Safety Promotion/Fall Prevention:   activity supervised   fall prevention program maintained   nonskid shoes/slippers when out of bed   room organization consistent   safety round/check completed   clutter free environment maintained  Taken 1/8/2025 1400 by Rachelle Agarwal RN  Safety Promotion/Fall Prevention:   activity supervised   fall prevention program maintained   nonskid shoes/slippers when out of bed   room organization consistent   safety round/check completed   clutter free environment maintained  Taken 1/8/2025 1200 by Rachelle Agarwal RN  Safety Promotion/Fall Prevention:   activity supervised   fall prevention program maintained   nonskid shoes/slippers when out of bed   room organization consistent   safety round/check completed   clutter free environment maintained  Taken 1/8/2025 1000 by Rachelle Agarwal RN  Safety Promotion/Fall Prevention:   activity supervised   fall prevention program maintained   nonskid shoes/slippers when out of bed   room organization consistent   safety round/check completed   clutter free environment maintained  Taken 1/8/2025 0800 by Rachelle Agarwal RN  Safety Promotion/Fall Prevention:   activity supervised   fall prevention program maintained   nonskid shoes/slippers when out of bed   room organization consistent   safety round/check completed   clutter free environment maintained  Intervention: Prevent Skin Injury  Recent Flowsheet Documentation  Taken 1/8/2025  1800 by Rachelle Agarwal RN  Body Position: position changed independently  Taken 1/8/2025 1600 by Rachelle Agarwal RN  Body Position: position changed independently  Taken 1/8/2025 1400 by Rachelle Agarwal RN  Body Position: position changed independently  Skin Protection: incontinence pads utilized  Taken 1/8/2025 1200 by Rachelle Agarwal RN  Body Position: position changed independently  Taken 1/8/2025 1000 by Rachelle Agarwal RN  Body Position: position changed independently  Taken 1/8/2025 0800 by Rachelle Agarwal RN  Body Position: position changed independently  Skin Protection: incontinence pads utilized  Intervention: Prevent and Manage VTE (Venous Thromboembolism) Risk  Recent Flowsheet Documentation  Taken 1/8/2025 0800 by Rachelle Agarwal RN  VTE Prevention/Management: (See MAR) other (see comments)  Intervention: Prevent Infection  Recent Flowsheet Documentation  Taken 1/8/2025 1800 by Rachelle Agarwal RN  Infection Prevention: single patient room provided  Taken 1/8/2025 1600 by Rachelle Agarwal RN  Infection Prevention: single patient room provided  Taken 1/8/2025 1400 by Rachelle Agarwal RN  Infection Prevention: single patient room provided  Taken 1/8/2025 1200 by Rachelle Agarwal RN  Infection Prevention: single patient room provided  Taken 1/8/2025 1000 by Rachelle Agarwal RN  Infection Prevention: single patient room provided  Taken 1/8/2025 0800 by Rachelle Agarwal RN  Infection Prevention: single patient room provided  Goal: Optimal Comfort and Wellbeing  Intervention: Monitor Pain and Promote Comfort  Recent Flowsheet Documentation  Taken 1/8/2025 1400 by Rachelle Agarwal RN  Pain Management Interventions: pain pump in use  Taken 1/8/2025 0800 by Rachelle Agarwal RN  Pain Management Interventions: (See MAR) other (see comments)  Intervention: Provide Person-Centered Care  Recent Flowsheet Documentation  Taken 1/8/2025 1400 by Rachelle Agarwal  RN  Trust Relationship/Rapport:   care explained   choices provided   emotional support provided   empathic listening provided   questions answered   questions encouraged   reassurance provided   thoughts/feelings acknowledged  Taken 1/8/2025 0800 by Rachelle Agarwal RN  Trust Relationship/Rapport:   care explained   choices provided   emotional support provided   empathic listening provided   questions answered   questions encouraged   reassurance provided   thoughts/feelings acknowledged     Problem: Comorbidity Management  Goal: Blood Glucose Level Within Target Range  Intervention: Monitor and Manage Glycemia  Recent Flowsheet Documentation  Taken 1/8/2025 1800 by Rachelle Agarwal RN  Medication Review/Management: medications reviewed  Taken 1/8/2025 1600 by Rachelle Agarwal RN  Medication Review/Management: medications reviewed  Taken 1/8/2025 1400 by Rachelle Agarwal RN  Medication Review/Management: medications reviewed  Taken 1/8/2025 1200 by Rachelle Agarwal RN  Medication Review/Management: medications reviewed  Taken 1/8/2025 1000 by Rachelle Agarwal RN  Medication Review/Management: medications reviewed  Taken 1/8/2025 0800 by Rachelle Agarwal RN  Medication Review/Management: medications reviewed  Goal: Maintenance of Heart Failure Symptom Control  Intervention: Maintain Heart Failure Management  Recent Flowsheet Documentation  Taken 1/8/2025 1800 by Rachelle Agarwal RN  Medication Review/Management: medications reviewed  Taken 1/8/2025 1600 by Rachelle Agarwal RN  Medication Review/Management: medications reviewed  Taken 1/8/2025 1400 by Rachelle Agarwal RN  Medication Review/Management: medications reviewed  Taken 1/8/2025 1200 by Rachelle Agarwal RN  Medication Review/Management: medications reviewed  Taken 1/8/2025 1000 by Rachelle Agarwal RN  Medication Review/Management: medications reviewed  Taken 1/8/2025 0800 by Rachelle Agarwal RN  Medication  Review/Management: medications reviewed  Goal: Blood Pressure in Desired Range  Intervention: Maintain Blood Pressure Management  Recent Flowsheet Documentation  Taken 1/8/2025 1800 by Rachelle Agarwal RN  Medication Review/Management: medications reviewed  Taken 1/8/2025 1600 by Rachelle Agarwal RN  Medication Review/Management: medications reviewed  Taken 1/8/2025 1400 by Rachelle Agarwal RN  Medication Review/Management: medications reviewed  Taken 1/8/2025 1200 by Rachelle Agarwal RN  Medication Review/Management: medications reviewed  Taken 1/8/2025 1000 by Rachelle Agarwal RN  Medication Review/Management: medications reviewed  Taken 1/8/2025 0800 by Rachelle Agarwal RN  Medication Review/Management: medications reviewed  Goal: Bariatric Home Regimen Maintained  Intervention: Maintain and Manage Postbariatric Surgery Care  Recent Flowsheet Documentation  Taken 1/8/2025 1800 by Rachelle Agarwal RN  Medication Review/Management: medications reviewed  Taken 1/8/2025 1600 by Rachelle Agarwal RN  Medication Review/Management: medications reviewed  Taken 1/8/2025 1400 by Rachelle Agarwal RN  Medication Review/Management: medications reviewed  Taken 1/8/2025 1200 by Rachelle Agarwal RN  Medication Review/Management: medications reviewed  Taken 1/8/2025 1000 by Rachelle Agarwal RN  Medication Review/Management: medications reviewed  Taken 1/8/2025 0800 by Rachelle Agarwal RN  Medication Review/Management: medications reviewed     Problem: Skin Injury Risk Increased  Goal: Skin Health and Integrity  Intervention: Optimize Skin Protection  Recent Flowsheet Documentation  Taken 1/8/2025 1800 by Rachelle Agarwal RN  Activity Management: activity encouraged  Head of Bed (HOB) Positioning: HOB elevated  Taken 1/8/2025 1600 by Rachelle Agarwal RN  Activity Management: activity encouraged  Head of Bed (HOB) Positioning: HOB elevated  Taken 1/8/2025 1400 by Rachelle Agarwal  RN  Activity Management: activity encouraged  Pressure Reduction Techniques: frequent weight shift encouraged  Head of Bed (HOB) Positioning:   HOB elevated   HOB at 60 degrees  Pressure Reduction Devices: specialty bed utilized  Skin Protection: incontinence pads utilized  Taken 1/8/2025 1200 by Rachelle Agarwal RN  Activity Management: activity encouraged  Head of Bed (HOB) Positioning:   HOB elevated   HOB at 30-45 degrees  Taken 1/8/2025 1000 by Rachelle Agarwal RN  Activity Management: activity encouraged  Head of Bed (HOB) Positioning: HOB elevated  Taken 1/8/2025 0800 by Rachelle Agarwal RN  Activity Management: up ad sekou  Pressure Reduction Techniques: frequent weight shift encouraged  Head of Bed (HOB) Positioning: HOB elevated  Pressure Reduction Devices: specialty bed utilized  Skin Protection: incontinence pads utilized     Problem: Sepsis/Septic Shock  Goal: Optimal Coping  Intervention: Support Patient and Family Response  Recent Flowsheet Documentation  Taken 1/8/2025 1400 by Rachelle Agarwal RN  Supportive Measures: active listening utilized  Family/Support System Care: support provided  Taken 1/8/2025 0800 by Rachelle Agarwal RN  Family/Support System Care:   support provided   self-care encouraged  Goal: Blood Glucose Level Within Target Range  Intervention: Optimize Glycemic Control  Recent Flowsheet Documentation  Taken 1/8/2025 1400 by Rachelle Agarwal RN  Hyperglycemia Management: blood glucose monitored  Hypoglycemia Management: blood glucose monitored  Taken 1/8/2025 0800 by Rachelle Agarwal RN  Hyperglycemia Management: blood glucose monitored  Hypoglycemia Management: blood glucose monitored  Goal: Absence of Infection Signs and Symptoms  Intervention: Initiate Sepsis Management  Recent Flowsheet Documentation  Taken 1/8/2025 1800 by Rachelle Agarwal RN  Infection Management: aseptic technique maintained  Infection Prevention: single patient room provided  Isolation  Precautions:   precautions maintained   contact  Taken 1/8/2025 1600 by Rachelle Agarwal RN  Infection Management: aseptic technique maintained  Infection Prevention: single patient room provided  Isolation Precautions:   precautions maintained   contact  Taken 1/8/2025 1400 by Rachelle Agarwal RN  Infection Management: aseptic technique maintained  Infection Prevention: single patient room provided  Isolation Precautions: precautions maintained  Taken 1/8/2025 1200 by Rachelle Agarwal RN  Infection Management: aseptic technique maintained  Infection Prevention: single patient room provided  Isolation Precautions:   precautions maintained   contact  Taken 1/8/2025 1000 by Rachelle Agarwal RN  Infection Management: aseptic technique maintained  Infection Prevention: single patient room provided  Isolation Precautions:   precautions maintained   contact  Taken 1/8/2025 0800 by Rachelle Agarwal RN  Infection Management: aseptic technique maintained  Infection Prevention: single patient room provided  Isolation Precautions:   precautions maintained   contact  Intervention: Promote Stabilization  Recent Flowsheet Documentation  Taken 1/8/2025 1400 by Rachelle Agarwal RN  Fluid/Electrolyte Management: (1500) fluids restricted  Taken 1/8/2025 0800 by Rachelle Agarwal RN  Fluid/Electrolyte Management: (1500) fluids restricted  Intervention: Promote Recovery  Recent Flowsheet Documentation  Taken 1/8/2025 1800 by Rachelle Agarwal RN  Activity Management: activity encouraged  Taken 1/8/2025 1600 by Rachelle Agarwal RN  Activity Management: activity encouraged  Taken 1/8/2025 1400 by Rachelle Agarwal RN  Activity Management: activity encouraged  Taken 1/8/2025 1200 by Rachelle Agarwal RN  Activity Management: activity encouraged  Taken 1/8/2025 1000 by Rachelle Agarwal RN  Activity Management: activity encouraged  Taken 1/8/2025 0800 by Rachelle Agarwal RN  Activity Management: up ad  sekou     Problem: Fall Injury Risk  Goal: Absence of Fall and Fall-Related Injury  Intervention: Identify and Manage Contributors  Recent Flowsheet Documentation  Taken 1/8/2025 1800 by Rachelle Agarwal RN  Medication Review/Management: medications reviewed  Self-Care Promotion: independence encouraged  Taken 1/8/2025 1600 by Rachelle Agarwal RN  Medication Review/Management: medications reviewed  Self-Care Promotion: independence encouraged  Taken 1/8/2025 1400 by Rachelle Agarwal RN  Medication Review/Management: medications reviewed  Self-Care Promotion: independence encouraged  Taken 1/8/2025 1200 by Rachelle Agarwal RN  Medication Review/Management: medications reviewed  Self-Care Promotion: independence encouraged  Taken 1/8/2025 1000 by Rachelle Agarwal RN  Medication Review/Management: medications reviewed  Self-Care Promotion: independence encouraged  Taken 1/8/2025 0800 by Rachelle Agarwal RN  Medication Review/Management: medications reviewed  Self-Care Promotion: independence encouraged  Intervention: Promote Injury-Free Environment  Recent Flowsheet Documentation  Taken 1/8/2025 1800 by Rachelle Agarwal RN  Safety Promotion/Fall Prevention:   activity supervised   fall prevention program maintained   nonskid shoes/slippers when out of bed   room organization consistent   safety round/check completed   clutter free environment maintained  Taken 1/8/2025 1600 by Rachelle Agarwal RN  Safety Promotion/Fall Prevention:   activity supervised   fall prevention program maintained   nonskid shoes/slippers when out of bed   room organization consistent   safety round/check completed   clutter free environment maintained  Taken 1/8/2025 1400 by Rachelle Agarwal RN  Safety Promotion/Fall Prevention:   activity supervised   fall prevention program maintained   nonskid shoes/slippers when out of bed   room organization consistent   safety round/check completed   clutter free environment  maintained  Taken 1/8/2025 1200 by Rachelle Agarwal RN  Safety Promotion/Fall Prevention:   activity supervised   fall prevention program maintained   nonskid shoes/slippers when out of bed   room organization consistent   safety round/check completed   clutter free environment maintained  Taken 1/8/2025 1000 by Rachelle Agarwal RN  Safety Promotion/Fall Prevention:   activity supervised   fall prevention program maintained   nonskid shoes/slippers when out of bed   room organization consistent   safety round/check completed   clutter free environment maintained  Taken 1/8/2025 0800 by Rachelle Agarwal RN  Safety Promotion/Fall Prevention:   activity supervised   fall prevention program maintained   nonskid shoes/slippers when out of bed   room organization consistent   safety round/check completed   clutter free environment maintained   Goal Outcome Evaluation:

## 2025-01-09 NOTE — PLAN OF CARE
Goal Outcome Evaluation:  Plan of Care Reviewed With: patient        Progress: improving  Outcome Evaluation: Pt continues to demo steady progress with strength and endurance. Pt tolerated increased activity today and performed all mobility with SBA/CGA and rwx. Pt demos no LOB or safety concerns with ambulation and is now planning to DC home with family and HH PT. Noted plans for ordering rwx for home. No problems anticipated.    Anticipated Discharge Disposition (PT): home with home health, home with assist

## 2025-01-09 NOTE — CASE MANAGEMENT/SOCIAL WORK
Continued Stay Note  [facility]     Patient Name: Lana Yañez  MRN: [id number]  Today's Date: 1/9/2025    Admit Date: 12/31/2024    Plan: [facility] accepted and walker and bsc from [facility].   Discharge Plan       Row Name 01/09/25 1505       Plan    Plan Comments Patient asked for dme to be delivered to her home. Nir at [facility] notfied of patient request and states she will have dme delivered on 1/10 to patient home. Patient informed . vebralizes understanding.      Row Name 01/09/25 1301       Plan    Plan [facility] accepted and walker and bsc from [facility].                         Expected Discharge Date and Time       Expected Discharge Date Expected Discharge Time    Edgard 10, 2025               Sophia Santiago RN

## 2025-01-09 NOTE — PROGRESS NOTES
Name: Lana Yañez ADMIT: 2024   : 1945  PCP: Toni Green MD    MRN: [id number] LOS: 7 days   AGE/SEX: 79 y.o. female  ROOM: [facility]     Subjective   Subjective   No new complaints. Disappointed that pre-CERT for acute rehab was denied. She plans on going home with home health. Does not want subacute     Objective   Objective   Vital Signs  Temp:  [97.5 °F (36.4 °C)-97.8 °F (36.6 °C)] 97.6 °F (36.4 °C)  Heart Rate:  [73-87] 87  Resp:  [12-22] 12  BP: (114-129)/(62-82) 116/62  SpO2:  [95 %-97 %] 95 %  on  Flow (L/min) (Oxygen Therapy):  [2] 2;   Device (Oxygen Therapy): room air  Body mass index is 34.12 kg/m².    Physical Exam  Constitutional:       General: She is not in acute distress.     Appearance: She is not toxic-appearing.   HENT:      Head: Normocephalic and atraumatic.   Cardiovascular:      Rate and Rhythm: Normal rate. Rhythm irregular.   Pulmonary:      Effort: Pulmonary effort is normal. No respiratory distress.      Breath sounds: Normal breath sounds.   Abdominal:      General: Bowel sounds are normal.      Palpations: Abdomen is soft.      Tenderness: There is no abdominal tenderness.   Musculoskeletal:         General: No swelling.      Cervical back: No rigidity.   Skin:     General: Skin is warm and dry.   Neurological:      General: No focal deficit present.      Mental Status: She is alert and oriented to person, place, and time.   Psychiatric:         Mood and Affect: Mood normal.         Behavior: Behavior normal.     Results Review  I reviewed the patient's new clinical results.  Results from last 7 days   Lab Units 25  0735 25  0449 25  0511   WBC 10*3/mm3 8.45 9.38 9.15   HEMOGLOBIN g/dL 13.7 13.4 12.7   PLATELETS 10*3/mm3 219 199 184     Results from last 7 days   Lab Units 25  0501 25  0633 25  0335 25  0735   SODIUM mmol/L 138 137 137 139   POTASSIUM mmol/L 3.7 3.6 3.8 3.8   CHLORIDE mmol/L 98 94* 95* 95*   CO2 mmol/L  28.6 29.3* 28.4 31.0*   BUN mg/dL 33* 45* 40* 32*   CREATININE mg/dL 1.23* 1.42* 1.50* 1.31*   GLUCOSE mg/dL 145* 143* 142* 137*     Lab Results   Component Value Date    ANIONGAP 11.4 01/09/2025     Estimated Creatinine Clearance: 44.7 mL/min (A) (by C-G formula based on SCr of 1.23 mg/dL (H)).   Lab Results   Component Value Date    EGFR 45.4 (L) 01/09/2025     Results from last 7 days   Lab Units 01/09/25  0501 01/08/25  0633 01/05/25  0449 01/04/25  0511   ALBUMIN g/dL 3.3* 3.5 3.8 3.6   BILIRUBIN mg/dL  --   --  0.5  --    ALK PHOS U/L  --   --  66  --    AST (SGOT) U/L  --   --  20  --    ALT (SGPT) U/L  --   --  12  --      Results from last 7 days   Lab Units 01/09/25  0501 01/08/25  0633 01/07/25  0335 01/06/25  0735 01/05/25  0449 01/04/25  0511   CALCIUM mg/dL 9.7 9.6 9.5 9.7 9.5 9.1   ALBUMIN g/dL 3.3* 3.5  --   --  3.8 3.6   MAGNESIUM mg/dL 2.3 2.1 2.0  --   --  1.9   PHOSPHORUS mg/dL 3.6 4.3 4.0  --   --  3.1           Glucose   Date/Time Value Ref Range Status   01/09/2025 1213 204 (H) 70 - 130 mg/dL Final   01/09/2025 0815 135 (H) 70 - 130 mg/dL Final   01/09/2025 0630 133 (H) 70 - 130 mg/dL Final   01/08/2025 2050 233 (H) 70 - 130 mg/dL Final   01/08/2025 1707 179 (H) 70 - 130 mg/dL Final   01/08/2025 1157 160 (H) 70 - 130 mg/dL Final   01/08/2025 1118 179 (H) 70 - 130 mg/dL Final       No radiology results for the last day    Scheduled Meds  aspirin, 81 mg, Oral, Daily  atenolol, 37.5 mg, Oral, BID  atorvastatin, 10 mg, Oral, Daily  digoxin, 125 mcg, Oral, Every Other Day  insulin lispro, 2-9 Units, Subcutaneous, 4x Daily AC & at Bedtime  latanoprost, 1 drop, Both Eyes, Nightly  Lidocaine, 1 patch, Transdermal, Q24H  linagliptin, 5 mg, Oral, Daily  polyethylene glycol, 17 g, Oral, Daily  [Held by provider] spironolactone, 25 mg, Oral, Daily  warfarin, 4 mg, Oral, Daily    Continuous Infusions  Pharmacy to dose warfarin,     PRN Meds  •  acetaminophen **OR** acetaminophen **OR** acetaminophen  •   albuterol sulfate HFA  •  senna-docusate sodium **AND** polyethylene glycol **AND** bisacodyl **AND** bisacodyl  •  dextrose  •  dextrose  •  glucagon (human recombinant)  •  guaiFENesin-dextromethorphan  •  HYDROcodone-acetaminophen  •  melatonin  •  ondansetron ODT **OR** ondansetron  •  Pharmacy to dose warfarin  •  polyethylene glycol  •  sodium chloride    Pharmacy to dose warfarin,     Diet  Diet: Cardiac, Fluid Restriction (240 mL/tray); Healthy Heart (2-3 Na+); 1500 mL/day; Fluid Consistency: Thin (IDDSI 0)    Results from last 7 days   Lab Units 01/09/25  0501 01/08/25  0633 01/07/25  0335   INR  2.72* 2.54* 2.60*         Assessment/Plan     Active Hospital Problems    Diagnosis  POA   • **Acute hypoxic respiratory failure [J96.01]  Yes   • Acute on chronic hypoxic respiratory failure [J96.21]  Yes   • Pneumonia [J18.9]  Yes   • Stage 3a chronic kidney disease [N18.31]  Yes   • Type 2 diabetes mellitus with hyperglycemia, without long-term current use of insulin [E11.65]  Yes   • Acute on chronic diastolic CHF (congestive heart failure) [I50.33]  Yes   • Chronic atrial fibrillation [I48.20]  Yes   • Mixed hyperlipidemia [E78.2]  Yes      Resolved Hospital Problems   No resolved problems to display.     Patient is a 79 y.o. female     Acute respiratory failure with hypoxia  Acute on chronic diastolic CHF  Nephrology recommending resuming torsemide 40 mg daily starting tomorrow    CKD 3  Nephrology following    Chronic atrial fibrillation  Warfarin, rate controlled    Hyperlipidemia atorvastatin  CAD  DM2 control acceptable. See above    DVT prophylaxis  Warfarin (home med)    Discharge  Plan is now home with home health. Tomorrow when daughter gets in town (from [place])  Expected Discharge Date: 1/10/2025; Expected Discharge Time:     Discussed with patient, nursing staff, and CCP    Peter De La Rosa MD  [facility]  01/09/25 13:00 EST

## 2025-01-09 NOTE — THERAPY TREATMENT NOTE
Patient Name: Lana Yañez  : 1945    MRN: [id number]                              Today's Date: 2025       Admit Date: 2024    Visit Dx:     ICD-10-CM ICD-9-CM   1. Acute hypoxic respiratory failure  J96.01 518.81   2. Acute cough  R05.1 786.2   3. Pneumonia of both lower lobes due to infectious organism  J18.9 486   4. Acute on chronic congestive heart failure, unspecified heart failure type  I50.9 428.0   5. Hemiparesis of left nondominant side as late effect of cerebral infarction  I69.354 438.22   6. Acute on chronic hypoxic respiratory failure  J96.21 518.84     799.02   7. Acute UTI (urinary tract infection)  N39.0 599.0   8. Pneumonia due to infectious organism, unspecified laterality, unspecified part of lung  J18.9 486   9. Sepsis without acute organ dysfunction, due to unspecified organism  A41.9 038.9     995.91     Patient Active Problem List   Diagnosis    Mixed hyperlipidemia    Vitamin deficiency    Essential hypertension    Rheumatoid arthritis involving both hands    Fatigue    ANTOINE (dyspnea on exertion)    Dysuria    Urge incontinence of urine    Hypovitaminosis D    Sleep apnea    Encounter for screening colonoscopy    Bronchitis    Cough    Generalized osteoarthritis of multiple sites    Cellulitis of right elbow due to MRSA    Medicare annual wellness visit, initial    Hospital discharge follow-up    Displacement of lumbar intervertebral disc without myelopathy    Lumbar disc herniation    Chronic atrial fibrillation    History of MRSA infection    Left-sided weakness    Atrial fibrillation    Left shoulder pain    Cerebrovascular accident (CVA) due to occlusion of right middle cerebral artery    Relative lymphocytosis    Nausea    Weakness    Controlled substance agreement signed    Coronary artery disease involving native coronary artery of native heart without angina pectoris    SOB (shortness of breath)    Lower extremity edema    Acute on chronic diastolic CHF  (congestive heart failure)    Adhesive capsulitis of left shoulder    CVA tenderness    Costochondritis, acute    Allergic reaction    Coronary artery embolism    Visit for screening mammogram    Low back pain    Urgency of urination    Hyperglycemia    Carpal tunnel syndrome of left wrist/ wakes her up    Localized edema    Acute cystitis without hematuria    Bruising    History of stroke    Diabetes 1.5, managed as type 2    Other chronic pain    Vaginal candidiasis    Pulmonary hypertension    Acute respiratory failure with hypoxia    Hypokalemia    Hypomagnesemia    Type 2 diabetes mellitus with hyperglycemia, without long-term current use of insulin    Diarrhea    Sepsis without acute organ dysfunction    Morbid obesity with BMI of 40.0-44.9, adult    Biliary acute pancreatitis without necrosis or infection    Stage 3a chronic kidney disease    Acute UTI (urinary tract infection)    Hemiparesis of left nondominant side as late effect of cerebral infarction    Acute hypoxic respiratory failure    Pneumonia    Acute on chronic hypoxic respiratory failure     Past Medical History:   Diagnosis Date    Acute on chronic diastolic heart failure     Acute UTI (urinary tract infection) 05/22/2022    Allergic reaction 02/09/2018    Arthritis     Arthritis of back     Arthritis of neck     Asthma     Atrial fibrillation     Persistent; on warfarin    Atypical chest pain     Biliary acute pancreatitis without necrosis or infection 11/10/2021    Bronchitis     Cellulitis of right elbow     due to MRSA    Cervical disc disorder     CHF (congestive heart failure)     COPD (chronic obstructive pulmonary disease)     Coronary artery disease     Cardiac catheterization completed; 90% PDA stenosis with medical management recommended    Coronary artery disease involving native coronary artery of native heart with angina pectoris with documented spasm     CTS (carpal tunnel syndrome)     Diabetes 1.5, managed as type 2     Disease  of thyroid gland     Displacement of lumbar intervertebral disc without myelopathy     Dizziness     ANTOINE (dyspnea on exertion)     Essential hypertension     Fatigue     Fracture, foot     Generalized osteoarthritis of multiple sites     History of rheumatic fever     History of transfusion     Hx of bone density study     10/23/2014    Hyperglycemia     Hyperlipidemia     Hypovitaminosis D     Knee swelling     Left arm pain     Left-sided weakness     Leg swelling     Low back pain     Lower extremity edema     Lumbosacral disc disease     Malaise and fatigue     Mitral valve disease     Moderate mitral valve prolapse and moderate mitral regurgitation    Mitral valve insufficiency     Morbid obesity with BMI of 40.0-44.9, adult     MRSA infection     NSTEMI (non-ST elevated myocardial infarction)     PAF (paroxysmal atrial fibrillation)     Periarthritis of shoulder     Pneumonia 1/1/2025    RA (rheumatoid arthritis)     involving both hands    Rotator cuff syndrome     Sleep apnea     SOB (shortness of breath)     Stroke     left side weakness    Urge incontinence of urine     UTI (urinary tract infection) 05/2020    Visit for screening mammogram 04/02/2018    Vitamin D deficiency      Past Surgical History:   Procedure Laterality Date    BREAST BIOPSY      BREAST SURGERY      right side lumpectomy with biopsy    CARDIAC CATHETERIZATION Left 10/20/2017    Procedure: Cardiac Catheterization/Vascular Study;  Surgeon: Alphonso Olmedo MD;  Location: [facility] CATH INVASIVE LOCATION;  Service:     CARDIAC CATHETERIZATION N/A 10/20/2017    +2 mitral regurgitation, left main 10% stenosis, mid to distal LAD 10% diffuse stenosis, circumflex 10% diffuse stenosis, RCA 10% proximal stenosis, and distal PDA consistent with coronary embolus with a lesion of 90% too small to consider coronary intervention; medical management recommended    EYE SURGERY      laser surgery for glaucoma and left eye cataracts removed    HYSTERECTOMY       10+ years ago    JOINT REPLACEMENT  2005; 2006    bilateral knees and left rotater    KNEE SURGERY      MAMMO BILATERAL  2016    [facility]     SHOULDER SURGERY        General Information       Row Name 01/09/25 1253          OT Time and Intention    Subjective Information no complaints  -KG     Document Type therapy note (daily note)  -KG     Mode of Treatment individual therapy;occupational therapy  -KG     Patient Effort good  -KG     Symptoms Noted During/After Treatment fatigue  -KG       Row Name 01/09/25 1253          General Information    Patient Profile Reviewed yes  -KG     Existing Precautions/Restrictions fall  -KG       Row Name 01/09/25 1253          Safety Issues/Impairments Affecting Functional Mobility    Impairments Affecting Function (Mobility) balance;endurance/activity tolerance;strength;range of motion (ROM)  -KG               User Key  (r) = Recorded By, (t) = Taken By, (c) = Cosigned By      Initials Name Provider Type    KG Frank Cheatham OT Occupational Therapist                     Mobility/ADL's       Row Name 01/09/25 1253          Bed Mobility    Bed Mobility supine-sit;sit-supine  -KG     Supine-Sit [place] (Bed Mobility) standby assist  -KG     Sit-Supine [place] (Bed Mobility) minimum assist (75% patient effort)  -KG       Row Name 01/09/25 1253          Transfers    Transfers sit-stand transfer;stand-sit transfer  -KG       Row Name 01/09/25 1253          Sit-Stand Transfer    Sit-Stand [place] (Transfers) minimum assist (75% patient effort)  -KG     Assistive Device (Sit-Stand Transfers) walker, front-wheeled  -KG       Row Name 01/09/25 1253          Stand-Sit Transfer    Stand-Sit [place] (Transfers) contact guard  -KG     Assistive Device (Stand-Sit Transfers) walker, front-wheeled  -KG       Row Name 01/09/25 1253          Functional Mobility    Functional Mobility- Ind. Level contact guard assist  from EOB to sink, to doorway, and back to EOB  (simulating bathroom distance)  -KG     Functional Mobility- Device walker, front-wheeled  -KG       Row Name 01/09/25 1253          Activities of Daily Living    BADL Assessment/Intervention grooming  -KG       Row Name 01/09/25 1253          Grooming Assessment/Training    [place] Level (Grooming) contact guard assist  -KG     Position (Grooming) supported standing;sink side  -KG               User Key  (r) = Recorded By, (t) = Taken By, (c) = Cosigned By      Initials Name Provider Type    Frank Muñoz OT Occupational Therapist                   Obj/Interventions       Row Name 01/09/25 1254          Balance    Balance Assessment sitting static balance;sitting dynamic balance;sit to stand dynamic balance;standing static balance;standing dynamic balance  -KG     Static Sitting Balance standby assist  -KG     Dynamic Sitting Balance standby assist  -KG     Position, Sitting Balance sitting edge of bed  -KG     Sit to Stand Dynamic Balance minimal assist  -KG     Static Standing Balance contact guard  -KG     Dynamic Standing Balance contact guard  -KG     Position/Device Used, Standing Balance walker, front-wheeled  -KG     Balance Interventions sitting;standing;sit to stand;supported;static;dynamic  -KG               User Key  (r) = Recorded By, (t) = Taken By, (c) = Cosigned By      Initials Name Provider Type    Frank Muñoz OT Occupational Therapist                   Goals/Plan    No documentation.                  Clinical Impression       Row Name 01/09/25 1255          Pain Assessment    Pretreatment Pain Rating 0/10 - no pain  -KG     Posttreatment Pain Rating 0/10 - no pain  -KG       Row Name 01/09/25 1255          Plan of Care Review    Plan of Care Reviewed With patient  -KG     Outcome Evaluation Pt seen this morning for OT treatment. Performed supine>sit w/ SBA. Needing Min A + RW for STS transfers. Able to perform functional mobility and sink side ADLs w/ CGA + RW. Returned to bed at  end of session, needing Min A to return to supine. Pt continues to present w/ decreased strength, balance, and activity tolerance to perform near baseline. OT will f/u to address deficits.  -KG       Row Name 01/09/25 1255          Therapy Plan Review/Discharge Plan (OT)    Anticipated Discharge Disposition (OT) skilled nursing facility;inpatient rehabilitation facility  -KG       Row Name 01/09/25 1255          Vital Signs    Pre Patient Position Supine  -KG     Intra Patient Position Standing  -KG     Post Patient Position Supine  -KG       Row Name 01/09/25 1255          Positioning and Restraints    Pre-Treatment Position in bed  -KG     Post Treatment Position bed  -KG     In Bed notified nsg;supine;call light within reach;encouraged to call for assist;exit alarm on  -KG               User Key  (r) = Recorded By, (t) = Taken By, (c) = Cosigned By      Initials Name Provider Type    Frank Muñoz OT Occupational Therapist                   Outcome Measures    No documentation.                   Occupational Therapy Education       Title: PT OT SLP Therapies (In Progress)       Topic: Occupational Therapy (Not Started)       Point: ADL training (Not Started)       Description:   Instruct learner(s) on proper safety adaptation and remediation techniques during self care or transfers.   Instruct in proper use of assistive devices.                  Learner Progress:  Not documented in this visit.              Point: Home exercise program (Not Started)       Description:   Instruct learner(s) on appropriate technique for monitoring, assisting and/or progressing therapeutic exercises/activities.                  Learner Progress:  Not documented in this visit.              Point: Precautions (Not Started)       Description:   Instruct learner(s) on prescribed precautions during self-care and functional transfers.                  Learner Progress:  Not documented in this visit.              Point: Body mechanics  (Not Started)       Description:   Instruct learner(s) on proper positioning and spine alignment during self-care, functional mobility activities and/or exercises.                  Learner Progress:  Not documented in this visit.                                  OT Recommendation and Plan     Plan of Care Review  Plan of Care Reviewed With: patient  Outcome Evaluation: Pt seen this morning for OT treatment. Performed supine>sit w/ SBA. Needing Min A + RW for STS transfers. Able to perform functional mobility and sink side ADLs w/ CGA + RW. Returned to bed at end of session, needing Min A to return to supine. Pt continues to present w/ decreased strength, balance, and activity tolerance to perform near baseline. OT will f/u to address deficits.     Time Calculation:         Time Calculation- OT       Row Name 01/09/25 1257             Time Calculation- OT    OT Start Time 1047  -KG      OT Stop Time 1107  -KG      OT Time Calculation (min) 20 min  -KG      Total Timed Code Minutes- OT 20 minute(s)  -KG      OT Received On 01/09/25  -KG      OT - Next Appointment 01/10/25  -KG         Timed Charges    40827 - OT Self Care/Mgmt Minutes 20  -KG         Total Minutes    Timed Charges Total Minutes 20  -KG       Total Minutes 20  -KG                User Key  (r) = Recorded By, (t) = Taken By, (c) = Cosigned By      Initials Name Provider Type    KG Frank Cheatham OT Occupational Therapist                  Therapy Charges for Today       Code Description Service Date Service Provider Modifiers Qty    [id number]  OT SELF CARE/MGMT/TRAIN EA 15 MIN 1/9/2025 Frank Cheatham OT GO 1                 Frank Cheatham OT  1/9/2025

## 2025-01-09 NOTE — PLAN OF CARE
Goal Outcome Evaluation:           Progress: improving     Pt with VSS and no complaints of pain.  Pt with concerns regarding high blood sugar readings.  Pt states that her blood sugars are typically well controlled on her home regimen of jardiance and glimeperide.  She  is concerned because she states that she has been waking up with headaches and having intermittent headaches and feeling sluggish throughout the day and thinks this is due to her elevated blood sugar readings.  No additional concerns noted.  Compliant with 1500ml fluid restriction.  Awaiting precertification for rehab.  Will continue to monitor.

## 2025-01-09 NOTE — PLAN OF CARE
Goal Outcome Evaluation:  Plan of Care Reviewed With: patient           Outcome Evaluation: Pt seen this morning for OT treatment. Performed supine>sit w/ SBA. Needing Min A + RW for STS transfers. Able to perform functional mobility and sink side ADLs w/ CGA + RW. Returned to bed at end of session, needing Min A to return to supine. Pt continues to present w/ decreased strength, balance, and activity tolerance to perform near baseline. OT will f/u to address deficits.    Anticipated Discharge Disposition (OT): skilled nursing facility, inpatient rehabilitation facility

## 2025-01-09 NOTE — CASE MANAGEMENT/SOCIAL WORK
Continued Stay Note  [facility]     Patient Name: Lana Yañez  MRN: [id number]  Today's Date: 1/9/2025    Admit Date: 12/31/2024    Plan: [facility] accepted and walker and bsc from [facility].   Discharge Plan       Row Name 01/09/25 1301       Plan    Plan [facility] accepted and walker and bsc from [facility].      Row Name 01/09/25 1151       Plan    Plan Referral to [facility] health pending acceptance. referral to [facility] for BSC and rolling walker.    Plan Comments Informed patient at bedside that peer to peer was denied for IRF and offered expedited appeal. Verbalizes understanding .  Offered to make snf referral and cms compare and choice list given. Patient refuses snf referrrals and refuses expedited appeal. Patient asks for dme and home health referral and states her daughter will stay with her after dc from hospital. Referral made to [facility] as above. MD and bedside nurse notfied via epic chat                   Discharge Codes    No documentation.                 Expected Discharge Date and Time       Expected Discharge Date Expected Discharge Time    Edgard 10, 2025               Sophia Santiago RN

## 2025-01-09 NOTE — CASE MANAGEMENT/SOCIAL WORK
Post-Acute Authorization Submission      Post Acute Pre-Cert Documentation  Request Submitted by Facility - Type:: Post Acute  Post-Acute Authorization Type Submitted:: IRF  Date Post Acute Pre-Cert Inititated per Facility: 01/07/25  Date Post Acute Pre-Cert Completed: 01/09/25  Accepting Facility: [facility] Discharge Date Requested: 01/07/25  All Clinicals Submitted?: Yes  Had Accepting Facility at Time of Submission: Yes  Response Received from Insurance?: Denial  Action Taken by Requesting Facility:: P2P Completed  Response Communicated to::   Authorization Number:: DENIED [id number]  Post Acute Pre-Cert Initiated Comment: INFORMED CCP PATIENT HAS THE RIGHT TO APPEAL. APPEAL INFORMATION NEEDS OBTAINED FROM IRF.              Dale Tirado, PCT

## 2025-01-09 NOTE — PLAN OF CARE
Goal Outcome Evaluation:  Plan of Care Reviewed With: patient        Progress: improving  Outcome Evaluation: VSS. Pt transfered to 4E from CCU. In controlled afib. Worked PT. Will continue to monitor.

## 2025-01-09 NOTE — TELEPHONE ENCOUNTER
Yes of course just fine her appointment at whichever location is better for her you can add her in if needed

## 2025-01-09 NOTE — PROGRESS NOTES
[facility] Progress Note      Patient Name: Lana Yañez  : 1945  MRN: [id number]  Primary Care Physician:  Toni Green MD  Date of admission: 2024    Subjective     Interval History:   Follow up acute kidney injury on CKD 3A.  Slept better last night.  Eating.  Excellent urine output.  Currently off diuretics.  Less dizzy with standing this morning.    Review of Systems:   As noted above    Objective     Vitals:   Temp:  [97.5 °F (36.4 °C)-97.8 °F (36.6 °C)] 97.6 °F (36.4 °C)  Heart Rate:  [73-87] 87  Resp:  [12-22] 12  BP: (114-129)/(62-82) 116/62  Flow (L/min) (Oxygen Therapy):  [2] 2    Intake/Output Summary (Last 24 hours) at 2025 1209  Last data filed at 2025 0900  Gross per 24 hour   Intake 600 ml   Output 1200 ml   Net -600 ml       Physical Exam:    General Appearance: alert, oriented x 3, no acute distress .  Sitting up in bed.  Skin: warm and dry  HEENT: oral mucosa normal, nonicteric sclera  Neck: supple, no JVD  Lungs: Clear to auscultation.  Unlabored on room air.  Heart: Irregularly irregular.  Abdomen: soft, nontender, nondistended. +bs  : Pure wick  Extremities: Trace upper and lower extremity edema.    Neuro: normal speech and mental status     Scheduled Meds:     aspirin, 81 mg, Oral, Daily  atenolol, 37.5 mg, Oral, BID  atorvastatin, 10 mg, Oral, Daily  digoxin, 125 mcg, Oral, Every Other Day  insulin lispro, 2-9 Units, Subcutaneous, 4x Daily AC & at Bedtime  latanoprost, 1 drop, Both Eyes, Nightly  Lidocaine, 1 patch, Transdermal, Q24H  linagliptin, 5 mg, Oral, Daily  polyethylene glycol, 17 g, Oral, Daily  [Held by provider] spironolactone, 25 mg, Oral, Daily  warfarin, 4 mg, Oral, Daily      IV Meds:   Pharmacy to dose warfarin,         Results Reviewed:   I have personally reviewed the results from the time of this admission to 2025 12:09 EST     Results from last 7 days   Lab Units 25  0501 25  0659  01/07/25  0335 01/06/25 0735 01/05/25 0449   SODIUM mmol/L 138 137 137   < > 139   POTASSIUM mmol/L 3.7 3.6 3.8   < > 3.8   CHLORIDE mmol/L 98 94* 95*   < > 96*   CO2 mmol/L 28.6 29.3* 28.4   < > 31.0*   BUN mg/dL 33* 45* 40*   < > 26*   CREATININE mg/dL 1.23* 1.42* 1.50*   < > 1.27*   CALCIUM mg/dL 9.7 9.6 9.5   < > 9.5   BILIRUBIN mg/dL  --   --   --   --  0.5   ALK PHOS U/L  --   --   --   --  66   ALT (SGPT) U/L  --   --   --   --  12   AST (SGOT) U/L  --   --   --   --  20   GLUCOSE mg/dL 145* 143* 142*   < > 125*    < > = values in this interval not displayed.       Estimated Creatinine Clearance: 44.7 mL/min (A) (by C-G formula based on SCr of 1.23 mg/dL (H)).    Results from last 7 days   Lab Units 01/09/25  0501 01/08/25 0633 01/07/25 0335   MAGNESIUM mg/dL 2.3 2.1 2.0   PHOSPHORUS mg/dL 3.6 4.3 4.0       Results from last 7 days   Lab Units 01/04/25  0511   URIC ACID mg/dL 8.3*       Results from last 7 days   Lab Units 01/06/25 0735 01/05/25 0449 01/04/25  0511   WBC 10*3/mm3 8.45 9.38 9.15   HEMOGLOBIN g/dL 13.7 13.4 12.7   PLATELETS 10*3/mm3 219 199 184       Results from last 7 days   Lab Units 01/09/25  0501 01/08/25  0633 01/07/25 0335 01/06/25 0735 01/05/25 0449   INR  2.72* 2.54* 2.60* 2.47* 2.57*       Assessment / Plan     ASSESSMENT:  Acute kidney injury on CKD 3A.  Baseline 1.0-1.2.  Creatinine continues to improve.   Looks euvolemic by exam.  Currently off torsemide and Aldactone.  2.  Acute hypoxic respiratory failure.  Resolved.  3.  Acute on chronic heart failure preserved ejection fraction.  Compensated.  4.  Chronic atrial fibrillation on digoxin, metoprolol and Coumadin.  5.  Diabetes mellitus type II with CKD  6.  Hypertension of CKD.  PLAN:  Continue to hold diuretic.  If she is discharged could resume oral torsemide 40 mg daily starting tomorrow 1/10/2025.  Thank you for involving us in the care of Lana Yañez.  Please feel free to call with any questions.    Shanthi  Lida Guerra MD  01/09/25  12:09 [facility]    [facility]  [phone]    Please note that portions of this note were completed with a voice recognition program.

## 2025-01-10 LAB
ALBUMIN SERPL-MCNC: 3.4 G/DL (ref 3.5–5.2)
ANION GAP SERPL CALCULATED.3IONS-SCNC: 8.9 MMOL/L (ref 5–15)
BUN SERPL-MCNC: 31 MG/DL (ref 8–23)
BUN/CREAT SERPL: 26.1 (ref 7–25)
CALCIUM SPEC-SCNC: 9.5 MG/DL (ref 8.6–10.5)
CHLORIDE SERPL-SCNC: 100 MMOL/L (ref 98–107)
CO2 SERPL-SCNC: 29.1 MMOL/L (ref 22–29)
CREAT SERPL-MCNC: 1.19 MG/DL (ref 0.57–1)
EGFRCR SERPLBLD CKD-EPI 2021: 47.2 ML/MIN/1.73
GLUCOSE BLDC GLUCOMTR-MCNC: 129 MG/DL (ref 70–130)
GLUCOSE BLDC GLUCOMTR-MCNC: 155 MG/DL (ref 70–130)
GLUCOSE BLDC GLUCOMTR-MCNC: 171 MG/DL (ref 70–130)
GLUCOSE BLDC GLUCOMTR-MCNC: 192 MG/DL (ref 70–130)
GLUCOSE SERPL-MCNC: 172 MG/DL (ref 65–99)
INR PPP: 2.69 (ref 0.9–1.1)
MAGNESIUM SERPL-MCNC: 2.2 MG/DL (ref 1.6–2.4)
PHOSPHATE SERPL-MCNC: 3.2 MG/DL (ref 2.5–4.5)
POTASSIUM SERPL-SCNC: 3.8 MMOL/L (ref 3.5–5.2)
PROTHROMBIN TIME: 28.9 SECONDS (ref 11.7–14.2)
SODIUM SERPL-SCNC: 138 MMOL/L (ref 136–145)

## 2025-01-10 PROCEDURE — 90791 PSYCH DIAGNOSTIC EVALUATION: CPT

## 2025-01-10 PROCEDURE — 82948 REAGENT STRIP/BLOOD GLUCOSE: CPT

## 2025-01-10 PROCEDURE — 83735 ASSAY OF MAGNESIUM: CPT | Performed by: HOSPITALIST

## 2025-01-10 PROCEDURE — 80069 RENAL FUNCTION PANEL: CPT | Performed by: INTERNAL MEDICINE

## 2025-01-10 PROCEDURE — 63710000001 INSULIN LISPRO (HUMAN) PER 5 UNITS: Performed by: INTERNAL MEDICINE

## 2025-01-10 PROCEDURE — 85610 PROTHROMBIN TIME: CPT | Performed by: INTERNAL MEDICINE

## 2025-01-10 RX ADMIN — INSULIN LISPRO 2 UNITS: 100 INJECTION, SOLUTION INTRAVENOUS; SUBCUTANEOUS at 20:42

## 2025-01-10 RX ADMIN — LIDOCAINE 1 PATCH: 4 PATCH TOPICAL at 08:27

## 2025-01-10 RX ADMIN — ACETAMINOPHEN 650 MG: 325 TABLET ORAL at 08:27

## 2025-01-10 RX ADMIN — DIGOXIN 125 MCG: 125 TABLET ORAL at 08:26

## 2025-01-10 RX ADMIN — INSULIN LISPRO 2 UNITS: 100 INJECTION, SOLUTION INTRAVENOUS; SUBCUTANEOUS at 08:27

## 2025-01-10 RX ADMIN — ASPIRIN 81 MG: 81 TABLET, COATED ORAL at 08:27

## 2025-01-10 RX ADMIN — ATORVASTATIN CALCIUM 10 MG: 10 TABLET, FILM COATED ORAL at 08:27

## 2025-01-10 RX ADMIN — ATENOLOL 37.5 MG: 25 TABLET ORAL at 23:56

## 2025-01-10 RX ADMIN — WARFARIN SODIUM 4 MG: 4 TABLET ORAL at 17:06

## 2025-01-10 RX ADMIN — LINAGLIPTIN 5 MG: 5 TABLET, FILM COATED ORAL at 12:14

## 2025-01-10 RX ADMIN — INSULIN LISPRO 2 UNITS: 100 INJECTION, SOLUTION INTRAVENOUS; SUBCUTANEOUS at 12:14

## 2025-01-10 RX ADMIN — ATENOLOL 37.5 MG: 25 TABLET ORAL at 08:26

## 2025-01-10 NOTE — CASE MANAGEMENT/SOCIAL WORK
Post-Acute Authorization Submission        Post Acute Pre-Cert #2 Documentation  Request Submitted by Facility - Type:: Hospital  Post-Acute Authorization Type Submitted:: SNF  Date Post Acute Pre-Cert Inititated per Facility: 01/10/25  Accepting Facility: [facility] Discharge Date Requested: 01/10/25  All Clinicals Submitted?: Yes  Had Accepting Facility at Time of Submission: Yes  Response Communicated to:: , Accepting Facility Liaison  Authorization Number:: PENDING [id number]           BRENDA Forrester

## 2025-01-10 NOTE — PROGRESS NOTES
Name: Lana Yañez ADMIT: 2024   : 1945  PCP: Toni Green MD    MRN: [id number] LOS: 8 days   AGE/SEX: 79 y.o. female  ROOM: [facility]/     Subjective   Subjective   No new complaints. She has reconsidered and now would like to go to skilled nursing facility rather than home with home health.     Objective   Objective   Vital Signs  Temp:  [97.2 °F (36.2 °C)-98.4 °F (36.9 °C)] 98.4 °F (36.9 °C)  Heart Rate:  [79-89] 79  Resp:  [16] 16  BP: (111-130)/(75-96) 117/77  SpO2:  [95 %-100 %] 100 %  on   ;   Device (Oxygen Therapy): room air  Body mass index is 34.51 kg/m².    Physical Exam  Constitutional:       General: She is not in acute distress.     Appearance: She is not toxic-appearing.   HENT:      Head: Normocephalic and atraumatic.   Cardiovascular:      Rate and Rhythm: Normal rate. Rhythm irregular.   Pulmonary:      Effort: Pulmonary effort is normal. No respiratory distress.      Breath sounds: Normal breath sounds.   Abdominal:      General: Bowel sounds are normal.      Palpations: Abdomen is soft.      Tenderness: There is no abdominal tenderness.   Musculoskeletal:         General: No swelling.      Cervical back: No rigidity.   Skin:     General: Skin is warm and dry.   Neurological:      General: No focal deficit present.      Mental Status: She is alert and oriented to person, place, and time.   Psychiatric:         Mood and Affect: Mood normal.         Behavior: Behavior normal.     Results Review  I reviewed the patient's new clinical results.  Results from last 7 days   Lab Units 25  0735 25  0449 25  0511   WBC 10*3/mm3 8.45 9.38 9.15   HEMOGLOBIN g/dL 13.7 13.4 12.7   PLATELETS 10*3/mm3 219 199 184     Results from last 7 days   Lab Units 01/10/25  0639 25  0501 25  0633 25  0335   SODIUM mmol/L 138 138 137 137   POTASSIUM mmol/L 3.8 3.7 3.6 3.8   CHLORIDE mmol/L 100 98 94* 95*   CO2 mmol/L 29.1* 28.6 29.3* 28.4   BUN mg/dL 31* 33* 45* 40*    CREATININE mg/dL 1.19* 1.23* 1.42* 1.50*   GLUCOSE mg/dL 172* 145* 143* 142*     Lab Results   Component Value Date    ANIONGAP 8.9 01/10/2025     Estimated Creatinine Clearance: 46.5 mL/min (A) (by C-G formula based on SCr of 1.19 mg/dL (H)).   Lab Results   Component Value Date    EGFR 47.2 (L) 01/10/2025     Results from last 7 days   Lab Units 01/10/25  0639 01/09/25  0501 01/08/25  0633 01/05/25  0449   ALBUMIN g/dL 3.4* 3.3* 3.5 3.8   BILIRUBIN mg/dL  --   --   --  0.5   ALK PHOS U/L  --   --   --  66   AST (SGOT) U/L  --   --   --  20   ALT (SGPT) U/L  --   --   --  12     Results from last 7 days   Lab Units 01/10/25  0639 01/09/25  0501 01/08/25  0633 01/07/25  0335 01/06/25  0735 01/05/25  0449   CALCIUM mg/dL 9.5 9.7 9.6 9.5   < > 9.5   ALBUMIN g/dL 3.4* 3.3* 3.5  --   --  3.8   MAGNESIUM mg/dL 2.2 2.3 2.1 2.0  --   --    PHOSPHORUS mg/dL 3.2 3.6 4.3 4.0  --   --     < > = values in this interval not displayed.           Glucose   Date/Time Value Ref Range Status   01/10/2025 1143 192 (H) 70 - 130 mg/dL Final   01/10/2025 0759 155 (H) 70 - 130 mg/dL Final   01/09/2025 2037 212 (H) 70 - 130 mg/dL Final   01/09/2025 1736 187 (H) 70 - 130 mg/dL Final   01/09/2025 1213 204 (H) 70 - 130 mg/dL Final   01/09/2025 0815 135 (H) 70 - 130 mg/dL Final   01/09/2025 0630 133 (H) 70 - 130 mg/dL Final       No radiology results for the last day    Scheduled Meds  aspirin, 81 mg, Oral, Daily  atenolol, 37.5 mg, Oral, BID  atorvastatin, 10 mg, Oral, Daily  digoxin, 125 mcg, Oral, Every Other Day  insulin lispro, 2-9 Units, Subcutaneous, 4x Daily AC & at Bedtime  latanoprost, 1 drop, Both Eyes, Nightly  Lidocaine, 1 patch, Transdermal, Q24H  linagliptin, 5 mg, Oral, Daily  polyethylene glycol, 17 g, Oral, Daily  [Held by provider] spironolactone, 25 mg, Oral, Daily  warfarin, 4 mg, Oral, Daily    Continuous Infusions  Pharmacy to dose warfarin,     PRN Meds    acetaminophen **OR** acetaminophen **OR** acetaminophen     albuterol sulfate HFA    senna-docusate sodium **AND** polyethylene glycol **AND** bisacodyl **AND** bisacodyl    dextrose    dextrose    glucagon (human recombinant)    guaiFENesin-dextromethorphan    HYDROcodone-acetaminophen    melatonin    ondansetron ODT **OR** ondansetron    Pharmacy to dose warfarin    polyethylene glycol    sodium chloride    Pharmacy to dose warfarin,     Diet  Diet: Cardiac, Fluid Restriction (240 mL/tray); Healthy Heart (2-3 Na+); 1500 mL/day; Fluid Consistency: Thin (IDDSI 0)    Results from last 7 days   Lab Units 01/10/25  0639 01/09/25  0501 01/08/25  0633   INR  2.69* 2.72* 2.54*         Assessment/Plan     Active Hospital Problems    Diagnosis  POA    **Acute hypoxic respiratory failure [J96.01]  Yes    Acute on chronic hypoxic respiratory failure [J96.21]  Yes    Pneumonia [J18.9]  Yes    Stage 3a chronic kidney disease [N18.31]  Yes    Type 2 diabetes mellitus with hyperglycemia, without long-term current use of insulin [E11.65]  Yes    Acute on chronic diastolic CHF (congestive heart failure) [I50.33]  Yes    Chronic atrial fibrillation [I48.20]  Yes    Mixed hyperlipidemia [E78.2]  Yes      Resolved Hospital Problems   No resolved problems to display.     Patient is a 79 y.o. female     Acute respiratory failure with hypoxia  Acute on chronic diastolic CHF  Nephrology managing volume status    CKD 3  Nephrology following    Chronic atrial fibrillation  Warfarin, rate controlled  INR therapeutic    Hyperlipidemia atorvastatin  CAD  DM2 control acceptable. See above    DVT prophylaxis  Warfarin (home med)    Discharge  Plan is now SNF  Expected Discharge Date: 1/13/2025; Expected Discharge Time:     Discussed with patient and nursing staff    Peter De La Rosa MD  [facility]  01/10/25 15:52 EST

## 2025-01-10 NOTE — PROGRESS NOTES
[facility] Progress Note      Patient Name: Lana Yañez  : 1945  MRN: [id number]  Primary Care Physician:  Toni Green MD  Date of admission: 2024    Subjective     Interval History:   Follow up acute kidney injury on CKD 3A.      Overnight having headache  Continue to have discomfort on left shoulder despite lidocaine  Denies any chest pain, or shortness of breath.  Tolerating oral intake without abdominal pain.  Urine output 1.8 L  No fevers or chills    Review of Systems:   As noted above    Objective     Vitals:   Temp:  [97.2 °F (36.2 °C)-97.8 °F (36.6 °C)] 97.5 °F (36.4 °C)  Heart Rate:  [79-89] 85  Resp:  [14-16] 16  BP: (111-130)/(62-96) 117/77    Intake/Output Summary (Last 24 hours) at 1/10/2025 0840  Last data filed at 1/10/2025 0758  Gross per 24 hour   Intake 960 ml   Output 1880 ml   Net -920 ml       Physical Exam:    General Appearance: alert, oriented x 3, no acute distress .  Sitting up in bed.  Skin: warm and dry  HEENT: oral mucosa normal, nonicteric sclera  Neck: supple, no JVD  Lungs: Clear to auscultation.  Unlabored on room air.  Heart: Irregularly irregular.  Abdomen: soft, nontender, nondistended. +bs  : Pure wick  Extremities: Trace upper and lower extremity edema.    Neuro: normal speech and mental status     Scheduled Meds:     aspirin, 81 mg, Oral, Daily  atenolol, 37.5 mg, Oral, BID  atorvastatin, 10 mg, Oral, Daily  digoxin, 125 mcg, Oral, Every Other Day  insulin lispro, 2-9 Units, Subcutaneous, 4x Daily AC & at Bedtime  latanoprost, 1 drop, Both Eyes, Nightly  Lidocaine, 1 patch, Transdermal, Q24H  linagliptin, 5 mg, Oral, Daily  polyethylene glycol, 17 g, Oral, Daily  [Held by provider] spironolactone, 25 mg, Oral, Daily  warfarin, 4 mg, Oral, Daily      IV Meds:   Pharmacy to dose warfarin,         Results Reviewed:   I have personally reviewed the results from the time of this admission to 1/10/2025 08:40 EST     Results  from last 7 days   Lab Units 01/10/25  0639 01/09/25  0501 01/08/25  0633 01/06/25  0735 01/05/25  0449   SODIUM mmol/L 138 138 137   < > 139   POTASSIUM mmol/L 3.8 3.7 3.6   < > 3.8   CHLORIDE mmol/L 100 98 94*   < > 96*   CO2 mmol/L 29.1* 28.6 29.3*   < > 31.0*   BUN mg/dL 31* 33* 45*   < > 26*   CREATININE mg/dL 1.19* 1.23* 1.42*   < > 1.27*   CALCIUM mg/dL 9.5 9.7 9.6   < > 9.5   BILIRUBIN mg/dL  --   --   --   --  0.5   ALK PHOS U/L  --   --   --   --  66   ALT (SGPT) U/L  --   --   --   --  12   AST (SGOT) U/L  --   --   --   --  20   GLUCOSE mg/dL 172* 145* 143*   < > 125*    < > = values in this interval not displayed.       Estimated Creatinine Clearance: 46.5 mL/min (A) (by C-G formula based on SCr of 1.19 mg/dL (H)).    Results from last 7 days   Lab Units 01/10/25  0639 01/09/25  0501 01/08/25  0633   MAGNESIUM mg/dL 2.2 2.3 2.1   PHOSPHORUS mg/dL 3.2 3.6 4.3       Results from last 7 days   Lab Units 01/04/25  0511   URIC ACID mg/dL 8.3*       Results from last 7 days   Lab Units 01/06/25  0735 01/05/25  0449 01/04/25  0511   WBC 10*3/mm3 8.45 9.38 9.15   HEMOGLOBIN g/dL 13.7 13.4 12.7   PLATELETS 10*3/mm3 219 199 184       Results from last 7 days   Lab Units 01/10/25  0639 01/09/25  0501 01/08/25  0633 01/07/25  0335 01/06/25  0735   INR  2.69* 2.72* 2.54* 2.60* 2.47*       Assessment / Plan     ASSESSMENT:    Acute kidney injury on CKD 3A.  Baseline 1.0-1.2.  Creatinine continues to improve.   Looks euvolemic by exam.  Currently off torsemide and Aldactone.  2.  Acute hypoxic respiratory failure.  Resolved.  3.  Acute on chronic heart failure preserved ejection fraction.  Responded to medical management  4.  Chronic atrial fibrillation on digoxin, metoprolol and Coumadin.  5.  Diabetes mellitus type II with CKD on insulin sliding scale provide clinical morning  6.  Hypertension of CKD.  Currently on atenolol    PLAN:    Continue current medical management volume status ,normovolemic , continue to  hold diuretics, renal function electrolytes remained stable  Continue surveillance labs    Thank you for involving us in the care of Lana Yañez.  Please feel free to call with any questions.    Bandar Villarreal MD  01/10/25  08:40 [facility]    [facility]  [phone]    Please note that portions of this note were completed with a voice recognition program.

## 2025-01-10 NOTE — CASE MANAGEMENT/SOCIAL WORK
Continued Stay [facility]     Patient Name: Lana Yañez  MRN: [id number]  Today's Date: 1/10/2025    Admit Date: 12/31/2024    Plan: [facility] SNF. Pre cert submitted and pending. Will need WC van transport.   Discharge Plan       Row Name 01/10/25 1128       Plan    Plan [facility] SNF. Pre cert submitted and pending. Will need WC van transport.    Patient/Family in Agreement with Plan yes    Plan Comments Received 1/9/25 3610 from CCU. Met with pt this AM at bedside. Pt requesting SNF. Discussed SNF choices and Medicare ratings. Requested referrals to [facility] but no beds available. Requested [facility] or [facility]. Referrals placed and notified Abida/Anna. Per Margie, there are  no beds at [facility] but [facility] has 1 bed and they can accept pt once pre cert received. Submitted for pre cert to [facility] SNF and currently pending. Discussed D/C plan with daughter. Ruslan Chand RN-BC                   Discharge Codes    No documentation.                 Expected Discharge Date and Time       Expected Discharge Date Expected Discharge Time    [place] 10, 2025               Ruslan Chand RN

## 2025-01-10 NOTE — CONSULTS
Patient seen by [facility] d/t anxiety. Patient interviewed alone room 456 (4E). Introduced self and role. Patient agreed to participate in evaluation. Patient is A/oX4. Patient was pleasant and calm during evaluation.     The patient is a 79 y.o.  female living with her . The patient voiced they have been  for 56 years and have lived in their home since 1977.   Adventism/Spiritual: The patient voiced her Jainism is very important to her. The patient denied any current Jain or spiritual concerns.   Children: 5 daughters  : The patient voiced her  was in [place] and suffers from PTSD.   Support System: Daughters, [employer].  Hx of Violence: Denies  Hx of abuse/Trauma: Denies  Feel Safe at Home: Yes    The patient presented to the ED on 12/31/24 with shortness of breath. The patient was admitted with acute hypoxic respiratory failure, pneumonia, and acute on chronic CHF.     The patient denied any mental health history or treatment. The patient is not on any mental health medications.     The patient rated anxiety 4/10 and voiced this is r/t to her current situation. The patient denied feeling like her anxiety is overwhelming and does not struggle with anxiety in her day to day life. The patient denied depression, SI, wish to be dead, HI or hallucinations.     The patient denied any substance use.     The patient voiced her  has multiple health issues and at this time she does not feel physically strong enough to care for herself and her . The patient voiced she feels like she needs to regain more strength before going home. The patient stated her daughter is coming from [place] next week to help her at home.    The patient denied need for any outpatient mental health resources or any [facility] follow-up visits. Patient encouraged to reach out to staff if she would like to see [facility] again. [facility] will sign off.

## 2025-01-10 NOTE — PROGRESS NOTES
"Nutrition Services    Patient Name:  Lana Yañez  YOB: 1945  MRN: [id number]  Admit Date:  12/31/2024    Assessment Date:  01/10/25    NUTRITION SCREENING      Reason for Encounter LOS   Diagnosis/Problem CKD IIIa, CHF, Acute hypoxic rspiratory failure, A fib, , CAD, T2DM, HTN, HLD       PO Diet Diet: Cardiac, Fluid Restriction (240 mL/tray); Healthy Heart (2-3 Na+); 1500 mL/day; Fluid Consistency: Thin (IDDSI 0)   Supplements    PO Intake % % all recorded meals while admitted per EMR.        Medications MAR reviewed by RD   Labs  Listed below, reviewed   Physical Findings No indication for NFPE   GI Function Last BM 1/7   Skin Status Intact       Height  Weight  BMI  Weight Trend     Height: 167.6 cm (66\")  Weight: 97 kg (213 lb 12.8 oz) (01/10/25 0500)  Body mass index is 34.51 kg/m².  Loss, Other: Pt diuresing on multiple dieretics for part of admission. Net fluid loss of 14.5 L while admitted. Consistent with recorded weight loss.         Nutrition Problem (PES) No nutrition diagnosis at this time.       Intervention/Plan RD to follow up per protocol.     Results from last 7 days   Lab Units 01/10/25  0639 01/09/25  0501 01/08/25  0633 01/06/25  0735 01/05/25  0449   SODIUM mmol/L 138 138 137   < > 139   POTASSIUM mmol/L 3.8 3.7 3.6   < > 3.8   CHLORIDE mmol/L 100 98 94*   < > 96*   CO2 mmol/L 29.1* 28.6 29.3*   < > 31.0*   BUN mg/dL 31* 33* 45*   < > 26*   CREATININE mg/dL 1.19* 1.23* 1.42*   < > 1.27*   CALCIUM mg/dL 9.5 9.7 9.6   < > 9.5   BILIRUBIN mg/dL  --   --   --   --  0.5   ALK PHOS U/L  --   --   --   --  66   ALT (SGPT) U/L  --   --   --   --  12   AST (SGOT) U/L  --   --   --   --  20   GLUCOSE mg/dL 172* 145* 143*   < > 125*    < > = values in this interval not displayed.     Results from last 7 days   Lab Units 01/10/25  0639 01/09/25  0501 01/08/25  0633 01/07/25  0335 01/06/25  0735   MAGNESIUM mg/dL 2.2 2.3 2.1   < >  --    PHOSPHORUS mg/dL 3.2 3.6 4.3   < >  --  "   HEMOGLOBIN g/dL  --   --   --   --  13.7   HEMATOCRIT %  --   --   --   --  43.6    < > = values in this interval not displayed.     Lab Results   Component Value Date    HGBA1C 7.30 (H) 10/04/2024     Electronically signed by:  Samuel Jerez RDN, LD  01/10/25 13:42 EST

## 2025-01-10 NOTE — PLAN OF CARE
Problem: Adult Inpatient Plan of Care  Goal: Plan of Care Review  Outcome: Progressing   Goal Outcome Evaluation:   Vss. Prn tylenol given as ordered for c/o h/a.Remains on 1500 ml fluid restriction (7a-7p).No c/o n/v.

## 2025-01-10 NOTE — PLAN OF CARE
Goal Outcome Evaluation:  Plan of Care Reviewed With: patient        Progress: no change  Outcome Evaluation: VSS. PRN norco for shoulder pain. Spoke with wife re: plan to D/C home with family - wife stating she would still like to go to Rehab at D/C - will notify CCP and juan manuel RN. PO warfarin for anticoag. Purewick in place.

## 2025-01-11 LAB
ALBUMIN SERPL-MCNC: 3.3 G/DL (ref 3.5–5.2)
ANION GAP SERPL CALCULATED.3IONS-SCNC: 10 MMOL/L (ref 5–15)
BUN SERPL-MCNC: 26 MG/DL (ref 8–23)
BUN/CREAT SERPL: 24.1 (ref 7–25)
CALCIUM SPEC-SCNC: 9 MG/DL (ref 8.6–10.5)
CHLORIDE SERPL-SCNC: 99 MMOL/L (ref 98–107)
CO2 SERPL-SCNC: 26 MMOL/L (ref 22–29)
CREAT SERPL-MCNC: 1.08 MG/DL (ref 0.57–1)
EGFRCR SERPLBLD CKD-EPI 2021: 53 ML/MIN/1.73
GLUCOSE BLDC GLUCOMTR-MCNC: 136 MG/DL (ref 70–130)
GLUCOSE BLDC GLUCOMTR-MCNC: 168 MG/DL (ref 70–130)
GLUCOSE BLDC GLUCOMTR-MCNC: 196 MG/DL (ref 70–130)
GLUCOSE BLDC GLUCOMTR-MCNC: 222 MG/DL (ref 70–130)
GLUCOSE SERPL-MCNC: 310 MG/DL (ref 65–99)
INR PPP: 2.66 (ref 0.9–1.1)
MAGNESIUM SERPL-MCNC: 2 MG/DL (ref 1.6–2.4)
PHOSPHATE SERPL-MCNC: 2.9 MG/DL (ref 2.5–4.5)
POTASSIUM SERPL-SCNC: 3.7 MMOL/L (ref 3.5–5.2)
PROTHROMBIN TIME: 28.6 SECONDS (ref 11.7–14.2)
SODIUM SERPL-SCNC: 135 MMOL/L (ref 136–145)

## 2025-01-11 PROCEDURE — 97530 THERAPEUTIC ACTIVITIES: CPT

## 2025-01-11 PROCEDURE — 82948 REAGENT STRIP/BLOOD GLUCOSE: CPT

## 2025-01-11 PROCEDURE — 83735 ASSAY OF MAGNESIUM: CPT | Performed by: HOSPITALIST

## 2025-01-11 PROCEDURE — 80069 RENAL FUNCTION PANEL: CPT | Performed by: INTERNAL MEDICINE

## 2025-01-11 PROCEDURE — 63710000001 INSULIN LISPRO (HUMAN) PER 5 UNITS: Performed by: INTERNAL MEDICINE

## 2025-01-11 PROCEDURE — 85610 PROTHROMBIN TIME: CPT | Performed by: INTERNAL MEDICINE

## 2025-01-11 RX ADMIN — WARFARIN SODIUM 4 MG: 4 TABLET ORAL at 17:36

## 2025-01-11 RX ADMIN — ACETAMINOPHEN 650 MG: 325 TABLET ORAL at 00:06

## 2025-01-11 RX ADMIN — ASPIRIN 81 MG: 81 TABLET, COATED ORAL at 09:29

## 2025-01-11 RX ADMIN — INSULIN LISPRO 2 UNITS: 100 INJECTION, SOLUTION INTRAVENOUS; SUBCUTANEOUS at 17:36

## 2025-01-11 RX ADMIN — LIDOCAINE 1 PATCH: 4 PATCH TOPICAL at 09:30

## 2025-01-11 RX ADMIN — INSULIN LISPRO 4 UNITS: 100 INJECTION, SOLUTION INTRAVENOUS; SUBCUTANEOUS at 20:36

## 2025-01-11 RX ADMIN — ATENOLOL 37.5 MG: 25 TABLET ORAL at 09:30

## 2025-01-11 RX ADMIN — POLYETHYLENE GLYCOL 3350 17 G: 17 POWDER, FOR SOLUTION ORAL at 09:30

## 2025-01-11 RX ADMIN — ATORVASTATIN CALCIUM 10 MG: 10 TABLET, FILM COATED ORAL at 09:30

## 2025-01-11 RX ADMIN — LINAGLIPTIN 5 MG: 5 TABLET, FILM COATED ORAL at 09:30

## 2025-01-11 RX ADMIN — INSULIN LISPRO 2 UNITS: 100 INJECTION, SOLUTION INTRAVENOUS; SUBCUTANEOUS at 12:10

## 2025-01-11 RX ADMIN — ATENOLOL 37.5 MG: 25 TABLET ORAL at 20:36

## 2025-01-11 RX ADMIN — HYDROCODONE BITARTRATE AND ACETAMINOPHEN 1 TABLET: 7.5; 325 TABLET ORAL at 02:30

## 2025-01-11 RX ADMIN — ACETAMINOPHEN 650 MG: 325 TABLET ORAL at 20:32

## 2025-01-11 RX ADMIN — HYDROCODONE BITARTRATE AND ACETAMINOPHEN 1 TABLET: 7.5; 325 TABLET ORAL at 23:28

## 2025-01-11 NOTE — PROGRESS NOTES
[facility] Progress Note      Patient Name: Lana Yañez  : 1945  MRN: [id number]  Primary Care Physician:  Toni Green MD  Date of admission: 2024    Subjective     Interval History:   Follow up acute kidney injury on CKD 3A.      Transferred out of icu  Sleepy, arousable without complaint  No fevers or chills    Review of Systems:   As noted above    Objective     Vitals:   Temp:  [97.3 °F (36.3 °C)-98.4 °F (36.9 °C)] 97.3 °F (36.3 °C)  Heart Rate:  [75-79] 78  Resp:  [16-20] 20  BP: (115-137)/(58-77) 118/62    Intake/Output Summary (Last 24 hours) at 2025 0804  Last data filed at 2025 0500  Gross per 24 hour   Intake 720 ml   Output 700 ml   Net 20 ml       Physical Exam:    General Appearance: alert, oriented x 3, no acute distress .  Sitting up in bed.  Skin: warm and dry  HEENT: oral mucosa normal, nonicteric sclera  Neck: supple, no JVD  Lungs: Clear to auscultation.  Unlabored on room air.  Heart: Irregularly irregular.  Abdomen: soft, nontender, nondistended. +bs  : Pure wick  Extremities: Trace edema.    Neuro: normal speech and mental status     Scheduled Meds:     aspirin, 81 mg, Oral, Daily  atenolol, 37.5 mg, Oral, BID  atorvastatin, 10 mg, Oral, Daily  digoxin, 125 mcg, Oral, Every Other Day  insulin lispro, 2-9 Units, Subcutaneous, 4x Daily AC & at Bedtime  latanoprost, 1 drop, Both Eyes, Nightly  Lidocaine, 1 patch, Transdermal, Q24H  linagliptin, 5 mg, Oral, Daily  polyethylene glycol, 17 g, Oral, Daily  [Held by provider] spironolactone, 25 mg, Oral, Daily  warfarin, 4 mg, Oral, Daily      IV Meds:   Pharmacy to dose warfarin,         Results Reviewed:   I have personally reviewed the results from the time of this admission to 2025 08:04 EST     Results from last 7 days   Lab Units 25  0338 01/10/25  0639 25  0501 25  0735 25  0449   SODIUM mmol/L 135* 138 138   < > 139   POTASSIUM mmol/L 3.7 3.8 3.7    < > 3.8   CHLORIDE mmol/L 99 100 98   < > 96*   CO2 mmol/L 26.0 29.1* 28.6   < > 31.0*   BUN mg/dL 26* 31* 33*   < > 26*   CREATININE mg/dL 1.08* 1.19* 1.23*   < > 1.27*   CALCIUM mg/dL 9.0 9.5 9.7   < > 9.5   BILIRUBIN mg/dL  --   --   --   --  0.5   ALK PHOS U/L  --   --   --   --  66   ALT (SGPT) U/L  --   --   --   --  12   AST (SGOT) U/L  --   --   --   --  20   GLUCOSE mg/dL 310* 172* 145*   < > 125*    < > = values in this interval not displayed.       Estimated Creatinine Clearance: 51.2 mL/min (A) (by C-G formula based on SCr of 1.08 mg/dL (H)).    Results from last 7 days   Lab Units 01/11/25  0338 01/10/25  0639 01/09/25  0501   MAGNESIUM mg/dL 2.0 2.2 2.3   PHOSPHORUS mg/dL 2.9 3.2 3.6               Results from last 7 days   Lab Units 01/06/25  0735 01/05/25  0449   WBC 10*3/mm3 8.45 9.38   HEMOGLOBIN g/dL 13.7 13.4   PLATELETS 10*3/mm3 219 199       Results from last 7 days   Lab Units 01/11/25  0337 01/10/25  0639 01/09/25  0501 01/08/25  0633 01/07/25  0335   INR  2.66* 2.69* 2.72* 2.54* 2.60*       Assessment / Plan     ASSESSMENT:    Acute kidney injury on CKD 3A.  Baseline 1.0-1.2.  Creatinine continues to improve.   Looks euvolemic by exam.  Currently off torsemide and Aldactone.  2.  Acute hypoxic respiratory failure.  Resolved.  3.  Acute on chronic heart failure preserved ejection fraction.  Responded to medical management  4.  Chronic atrial fibrillation on digoxin, metoprolol and Coumadin.  5.  Diabetes mellitus type II with CKD on insulin sliding scale provide clinical morning  6.  Hypertension of CKD.  Currently on atenolol    PLAN:    Continue current medical management volume status ,normovolemic , continue to hold diuretics, renal function electrolytes remained stable  Continue surveillance labs    Thank you for involving us in the care of Lana aYñez.  Please feel free to call with any questions.    Lamonte Downing MD  01/11/25  08:04 [facility]    [facility]  [phone]

## 2025-01-11 NOTE — PROGRESS NOTES
[facility] Clinical Pharmacy Services: Clinical Pharmacy Consult - Warfarin Dosing/Monitoring    Results from last 7 days   Lab Units 01/11/25  0337 01/10/25  0639 01/09/25  0501 01/08/25  0633 01/07/25  0335   INR  2.66* 2.69* 2.72* 2.54* 2.60*     Dose Ordered: INR remains therapeutic - continue warfarin 4 mg daily       Pharmacy will continue to follow until discharge or discontinuation of warfarin.   Kassi Lucia, [facility]    Clinical Pharmacist

## 2025-01-11 NOTE — THERAPY TREATMENT NOTE
Patient Name: Lana Yañez  : 1945    MRN: [id number]                              Today's Date: 2025       Admit Date: 2024    Visit Dx:     ICD-10-CM ICD-9-CM   1. Acute hypoxic respiratory failure  J96.01 518.81   2. Acute cough  R05.1 786.2   3. Pneumonia of both lower lobes due to infectious organism  J18.9 486   4. Acute on chronic congestive heart failure, unspecified heart failure type  I50.9 428.0   5. Hemiparesis of left nondominant side as late effect of cerebral infarction  I69.354 438.22   6. Acute on chronic hypoxic respiratory failure  J96.21 518.84     799.02   7. Acute UTI (urinary tract infection)  N39.0 599.0   8. Pneumonia due to infectious organism, unspecified laterality, unspecified part of lung  J18.9 486   9. Sepsis without acute organ dysfunction, due to unspecified organism  A41.9 038.9     995.91     Patient Active Problem List   Diagnosis    Mixed hyperlipidemia    Vitamin deficiency    Essential hypertension    Rheumatoid arthritis involving both hands    Fatigue    ANTOINE (dyspnea on exertion)    Dysuria    Urge incontinence of urine    Hypovitaminosis D    Sleep apnea    Encounter for screening colonoscopy    Bronchitis    Cough    Generalized osteoarthritis of multiple sites    Cellulitis of right elbow due to MRSA    Medicare annual wellness visit, initial    Hospital discharge follow-up    Displacement of lumbar intervertebral disc without myelopathy    Lumbar disc herniation    Chronic atrial fibrillation    History of MRSA infection    Left-sided weakness    Atrial fibrillation    Left shoulder pain    Cerebrovascular accident (CVA) due to occlusion of right middle cerebral artery    Relative lymphocytosis    Nausea    Weakness    Controlled substance agreement signed    Coronary artery disease involving native coronary artery of native heart without angina pectoris    SOB (shortness of breath)    Lower extremity edema    Acute on chronic diastolic CHF  (congestive heart failure)    Adhesive capsulitis of left shoulder    CVA tenderness    Costochondritis, acute    Allergic reaction    Coronary artery embolism    Visit for screening mammogram    Low back pain    Urgency of urination    Hyperglycemia    Carpal tunnel syndrome of left wrist/ wakes her up    Localized edema    Acute cystitis without hematuria    Bruising    History of stroke    Diabetes 1.5, managed as type 2    Other chronic pain    Vaginal candidiasis    Pulmonary hypertension    Acute respiratory failure with hypoxia    Hypokalemia    Hypomagnesemia    Type 2 diabetes mellitus with hyperglycemia, without long-term current use of insulin    Diarrhea    Sepsis without acute organ dysfunction    Morbid obesity with BMI of 40.0-44.9, adult    Biliary acute pancreatitis without necrosis or infection    Stage 3a chronic kidney disease    Acute UTI (urinary tract infection)    Hemiparesis of left nondominant side as late effect of cerebral infarction    Acute hypoxic respiratory failure    Pneumonia    Acute on chronic hypoxic respiratory failure     Past Medical History:   Diagnosis Date    Acute on chronic diastolic heart failure     Acute UTI (urinary tract infection) 05/22/2022    Allergic reaction 02/09/2018    Arthritis     Arthritis of back     Arthritis of neck     Asthma     Atrial fibrillation     Persistent; on warfarin    Atypical chest pain     Biliary acute pancreatitis without necrosis or infection 11/10/2021    Bronchitis     Cellulitis of right elbow     due to MRSA    Cervical disc disorder     CHF (congestive heart failure)     COPD (chronic obstructive pulmonary disease)     Coronary artery disease     Cardiac catheterization completed; 90% PDA stenosis with medical management recommended    Coronary artery disease involving native coronary artery of native heart with angina pectoris with documented spasm     CTS (carpal tunnel syndrome)     Diabetes 1.5, managed as type 2     Disease  of thyroid gland     Displacement of lumbar intervertebral disc without myelopathy     Dizziness     ANTOINE (dyspnea on exertion)     Essential hypertension     Fatigue     Fracture, foot     Generalized osteoarthritis of multiple sites     History of rheumatic fever     History of transfusion     Hx of bone density study     10/23/2014    Hyperglycemia     Hyperlipidemia     Hypovitaminosis D     Knee swelling     Left arm pain     Left-sided weakness     Leg swelling     Low back pain     Lower extremity edema     Lumbosacral disc disease     Malaise and fatigue     Mitral valve disease     Moderate mitral valve prolapse and moderate mitral regurgitation    Mitral valve insufficiency     Morbid obesity with BMI of 40.0-44.9, adult     MRSA infection     NSTEMI (non-ST elevated myocardial infarction)     PAF (paroxysmal atrial fibrillation)     Periarthritis of shoulder     Pneumonia 1/1/2025    RA (rheumatoid arthritis)     involving both hands    Rotator cuff syndrome     Sleep apnea     SOB (shortness of breath)     Stroke     left side weakness    Urge incontinence of urine     UTI (urinary tract infection) 05/2020    Visit for screening mammogram 04/02/2018    Vitamin D deficiency      Past Surgical History:   Procedure Laterality Date    BREAST BIOPSY      BREAST SURGERY      right side lumpectomy with biopsy    CARDIAC CATHETERIZATION Left 10/20/2017    Procedure: Cardiac Catheterization/Vascular Study;  Surgeon: Alphonso Olmedo MD;  Location: [facility] CATH INVASIVE LOCATION;  Service:     CARDIAC CATHETERIZATION N/A 10/20/2017    +2 mitral regurgitation, left main 10% stenosis, mid to distal LAD 10% diffuse stenosis, circumflex 10% diffuse stenosis, RCA 10% proximal stenosis, and distal PDA consistent with coronary embolus with a lesion of 90% too small to consider coronary intervention; medical management recommended    EYE SURGERY      laser surgery for glaucoma and left eye cataracts removed    HYSTERECTOMY       10+ years ago    JOINT REPLACEMENT  2005; 2006    bilateral knees and left rotater    KNEE SURGERY      MAMMO BILATERAL  2016    [facility]     SHOULDER SURGERY        General Information       Row Name 01/11/25 0944          Physical Therapy Time and Intention    Mode of Treatment physical therapy  -       Row Name 01/11/25 0944          General Information    Patient Profile Reviewed yes  -ST     Existing Precautions/Restrictions fall  -       Row Name 01/11/25 0944          Cognition    Orientation Status (Cognition) oriented x 4  -ST       Row Name 01/11/25 0944          Safety Issues/Impairments Affecting Functional Mobility    Impairments Affecting Function (Mobility) balance;endurance/activity tolerance;strength;range of motion (ROM)  -     Comment, Safety Issues/Impairments (Mobility) nonskid socks, gait belt donned  -ST               User Key  (r) = Recorded By, (t) = Taken By, (c) = Cosigned By      Initials Name Provider Type    Patricia Fisher PT Physical Therapist                   Mobility       Row Name 01/11/25 0944          Bed Mobility    Bed Mobility supine-sit;sit-supine  -ST     Supine-Sit Geary (Bed Mobility) standby assist  -     Assistive Device (Bed Mobility) head of bed elevated;bed rails  -ST     Comment, (Bed Mobility) increased time  -       Row Name 01/11/25 0944          Sit-Stand Transfer    Sit-Stand Geary (Transfers) contact guard;verbal cues  -ST     Assistive Device (Sit-Stand Transfers) walker, front-wheeled  -ST     Comment, (Sit-Stand Transfer) cues for hand placement  -       Row Name 01/11/25 0944          Gait/Stairs (Locomotion)    Geary Level (Gait) contact guard;standby assist;verbal cues  -ST     Assistive Device (Gait) walker, front-wheeled  -ST     Distance in Feet (Gait) 30  -ST     Deviations/Abnormal Patterns (Gait) héctor decreased  -ST     Bilateral Gait Deviations forward flexed posture;heel strike decreased  -ST      Comment, (Gait/Stairs) ambulation limited by arrival of breakfast  -ST               User Key  (r) = Recorded By, (t) = Taken By, (c) = Cosigned By      Initials Name Provider Type    Patricia Fisher PT Physical Therapist                   Obj/Interventions       Row Name 01/11/25 0945          Balance    Comment, Balance CGA/SBA for dynamic balance. no LOB today  -ST               User Key  (r) = Recorded By, (t) = Taken By, (c) = Cosigned By      Initials Name Provider Type    Patricia Fisher PT Physical Therapist                   Goals/Plan    No documentation.                  Clinical Impression       Row Name 01/11/25 0945          Pain    Posttreatment Pain Rating 0/10 - no pain  -ST       Row Name 01/11/25 0945          Plan of Care Review    Plan of Care Reviewed With patient  -ST     Progress improving  -ST     Outcome Evaluation Pt seen for PT this AM - SBA to reach EOB and CGA to stand and ambulate in room up to 30' with rwx - session limited by arrival of breakfast today. progressing toward SBA by end of session, appears a bit stiff today. Up in recliner at end of session. Pt plans SNU at d/c.  -ST       Row Name 01/11/25 0945          Therapy Assessment/Plan (PT)    Rehab Potential (PT) good  -ST     Criteria for Skilled Interventions Met (PT) yes  -ST     Therapy Frequency (PT) 5 times/wk  -ST       Row Name 01/11/25 0945          Positioning and Restraints    Pre-Treatment Position in bed  -ST     Post Treatment Position chair  -ST     In Chair reclined;call light within reach;encouraged to call for assist;exit alarm on  -ST     Restraints --  -ST               User Key  (r) = Recorded By, (t) = Taken By, (c) = Cosigned By      Initials Name Provider Type    Patricia Fisher PT Physical Therapist                   Outcome Measures       Row Name 01/11/25 0970          How much help from another person do you currently need...    Turning from your back to your side while in  flat bed without using bedrails? 3  -ST     Moving from lying on back to sitting on the side of a flat bed without bedrails? 3  -ST     Moving to and from a bed to a chair (including a wheelchair)? 3  -ST     Standing up from a chair using your arms (e.g., wheelchair, bedside chair)? 3  -ST     Climbing 3-5 steps with a railing? 3  -ST     To walk in hospital room? 3  -ST     AM-PAC 6 Clicks Score (PT) 18  -ST     Highest Level of Mobility Goal 6 --> Walk 10 steps or more  -ST               User Key  (r) = Recorded By, (t) = Taken By, (c) = Cosigned By      Initials Name Provider Type     Patricia Giraldo, PT Physical Therapist                                 Physical Therapy Education       Title: PT OT SLP Therapies (In Progress)       Topic: Physical Therapy (Done)       Point: Mobility training (Done)       Learning Progress Summary            Patient Acceptance, E,TB, VU,NR by ST at 1/11/2025 0947    Acceptance, E, VU,NR by  at 1/9/2025 1622    Acceptance, E, VU,NR by EF at 1/8/2025 0959    Acceptance, E, VU,NR by EF at 1/7/2025 1133    Acceptance, E, VU,NR by EF at 1/6/2025 1231    Acceptance, E,TB, VU,NR by CS at 1/4/2025 0951    Acceptance, E, VU,NR by SV at 1/3/2025 1003                      Point: Home exercise program (Done)       Learning Progress Summary            Patient Acceptance, E, VU,NR by EF at 1/6/2025 1231    Acceptance, E,TB, VU,NR by CS at 1/4/2025 0951    Acceptance, E, VU,NR by SV at 1/3/2025 1003                                      User Key       Initials Effective Dates Name Provider Type Discipline    EF 05/31/24 -  Stefany Phipps, PT Physical Therapist PT    SV 07/11/23 -  Belgica De La Fuente, PT Physical Therapist PT    CS 07/11/23 -  Gurpreet Bianchi, PT Physical Therapist PT    ST 09/22/22 -  Patricia Giraldo PT Physical Therapist PT                  PT Recommendation and Plan     Progress: improving  Outcome Evaluation: Pt seen for PT this AM - SBA to reach EOB and CGA  to stand and ambulate in room up to 30' with rwx - session limited by arrival of breakfast today. progressing toward SBA by end of session, appears a bit stiff today. Up in recliner at end of session. Pt plans SNU at d/c.     Time Calculation:         PT Charges       Row Name 01/11/25 0947             Time Calculation    Start Time 0901  -ST      Stop Time 0918  -ST      Time Calculation (min) 17 min  -ST      PT Received On 01/11/25  -ST      PT - Next Appointment 01/13/25  -ST         Time Calculation- PT    Total Timed Code Minutes- PT 17 minute(s)  -ST         Timed Charges    54111 - PT Therapeutic Activity Minutes 17  -ST         Total Minutes    Timed Charges Total Minutes 17  -ST       Total Minutes 17  -ST                User Key  (r) = Recorded By, (t) = Taken By, (c) = Cosigned By      Initials Name Provider Type    Patricia Fisher PT Physical Therapist                  Therapy Charges for Today       Code Description Service Date Service Provider Modifiers Qty    [id number] HC PT THERAPEUTIC ACT EA 15 MIN 1/11/2025 Patricia Giraldo PT GP 1            PT G-Codes  Outcome Measure Options: AM-PAC 6 Clicks Basic Mobility (PT)  AM-PAC 6 Clicks Score (PT): 18  AM-PAC 6 Clicks Score (OT): 10  Modified [place] Scale: 4 - Moderately severe disability.  Unable to walk without assistance, and unable to attend to own bodily needs without assistance.  PT Discharge Summary  Anticipated Discharge Disposition (PT): home with home health, home with assist, skilled nursing facility    Patricia Giraldo PT  1/11/2025

## 2025-01-11 NOTE — PLAN OF CARE
Goal Outcome Evaluation:  Plan of Care Reviewed With: patient        Progress: improving  Outcome Evaluation: Pt seen for PT this AM - SBA to reach EOB and CGA to stand and ambulate in room up to 30' with rwx - session limited by arrival of breakfast today. progressing toward SBA by end of session, appears a bit stiff today. Up in recliner at end of session. Pt plans SNU at d/c.    Anticipated Discharge Disposition (PT): home with home health, home with assist, skilled nursing facility

## 2025-01-11 NOTE — PLAN OF CARE
Goal Outcome Evaluation:  Plan of Care Reviewed With: patient        Progress: improving  Outcome Evaluation: Patient c/o left shoulder pain, only lidoderm patch applied, did not request norco today. Ortho consulted, to see on Monday for possible injection. Up in chair most of the day. Waiting on precert also for discharge.

## 2025-01-11 NOTE — PLAN OF CARE
Goal Outcome Evaluation:  Plan of Care Reviewed With: patient        Progress: improving  Outcome Evaluation: Vital signs stable. PRN pain medication x1 for left shoulder pain. Pt requesting rehab at discharge pending precert-See CCP notes. Afib on tele and controlled. On coumadin. Room air. No complaints or concerns. PT following. Safety maintained. Plan of care ongoing.

## 2025-01-11 NOTE — PROGRESS NOTES
Name: Lana Yañez ADMIT: 2024   : 1945  PCP: Toni Green MD    MRN: [id number] LOS: 9 days   AGE/SEX: 79 y.o. female  ROOM: [facility]     Subjective   Subjective   Denies any new complaints. Asking about left shoulder injection (due for one by ortho). She would like her ortho consulted for Monday.     Objective   Objective   Vital Signs  Temp:  [97.3 °F (36.3 °C)-98.4 °F (36.9 °C)] 97.3 °F (36.3 °C)  Heart Rate:  [73-79] 73  Resp:  [16-20] 20  BP: (108-137)/(58-77) 108/76  SpO2:  [98 %-100 %] 99 %  on   ;   Device (Oxygen Therapy): room air  Body mass index is 34.51 kg/m².    Physical Exam  Constitutional:       General: She is not in acute distress.     Appearance: She is not toxic-appearing.   HENT:      Head: Normocephalic and atraumatic.   Cardiovascular:      Rate and Rhythm: Normal rate. Rhythm irregular.   Pulmonary:      Effort: Pulmonary effort is normal. No respiratory distress.      Breath sounds: Normal breath sounds.   Abdominal:      General: Bowel sounds are normal.      Palpations: Abdomen is soft.      Tenderness: There is no abdominal tenderness.   Musculoskeletal:         General: No swelling.      Cervical back: No rigidity.   Skin:     General: Skin is warm and dry.   Neurological:      General: No focal deficit present.      Mental Status: She is alert and oriented to person, place, and time.   Psychiatric:         Mood and Affect: Mood normal.         Behavior: Behavior normal.     Results Review  I reviewed the patient's new clinical results.  Results from last 7 days   Lab Units 25  0735 25  0449   WBC 10*3/mm3 8.45 9.38   HEMOGLOBIN g/dL 13.7 13.4   PLATELETS 10*3/mm3 219 199     Results from last 7 days   Lab Units 25  0338 01/10/25  0639 25  0501 25  0633   SODIUM mmol/L 135* 138 138 137   POTASSIUM mmol/L 3.7 3.8 3.7 3.6   CHLORIDE mmol/L 99 100 98 94*   CO2 mmol/L 26.0 29.1* 28.6 29.3*   BUN mg/dL 26* 31* 33* 45*   CREATININE mg/dL  1.08* 1.19* 1.23* 1.42*   GLUCOSE mg/dL 310* 172* 145* 143*     Lab Results   Component Value Date    ANIONGAP 10.0 01/11/2025     Estimated Creatinine Clearance: 51.2 mL/min (A) (by C-G formula based on SCr of 1.08 mg/dL (H)).   Lab Results   Component Value Date    EGFR 53.0 (L) 01/11/2025     Results from last 7 days   Lab Units 01/11/25  0338 01/10/25  0639 01/09/25  0501 01/08/25  0633 01/05/25  0449   ALBUMIN g/dL 3.3* 3.4* 3.3* 3.5 3.8   BILIRUBIN mg/dL  --   --   --   --  0.5   ALK PHOS U/L  --   --   --   --  66   AST (SGOT) U/L  --   --   --   --  20   ALT (SGPT) U/L  --   --   --   --  12     Results from last 7 days   Lab Units 01/11/25  0338 01/10/25  0639 01/09/25  0501 01/08/25  0633   CALCIUM mg/dL 9.0 9.5 9.7 9.6   ALBUMIN g/dL 3.3* 3.4* 3.3* 3.5   MAGNESIUM mg/dL 2.0 2.2 2.3 2.1   PHOSPHORUS mg/dL 2.9 3.2 3.6 4.3           Glucose   Date/Time Value Ref Range Status   01/11/2025 0900 136 (H) 70 - 130 mg/dL Final   01/10/2025 2004 171 (H) 70 - 130 mg/dL Final   01/10/2025 1701 129 70 - 130 mg/dL Final   01/10/2025 1143 192 (H) 70 - 130 mg/dL Final   01/10/2025 0759 155 (H) 70 - 130 mg/dL Final   01/09/2025 2037 212 (H) 70 - 130 mg/dL Final   01/09/2025 1736 187 (H) 70 - 130 mg/dL Final       No radiology results for the last day    Scheduled Meds  aspirin, 81 mg, Oral, Daily  atenolol, 37.5 mg, Oral, BID  atorvastatin, 10 mg, Oral, Daily  digoxin, 125 mcg, Oral, Every Other Day  insulin lispro, 2-9 Units, Subcutaneous, 4x Daily AC & at Bedtime  latanoprost, 1 drop, Both Eyes, Nightly  Lidocaine, 1 patch, Transdermal, Q24H  linagliptin, 5 mg, Oral, Daily  polyethylene glycol, 17 g, Oral, Daily  [Held by provider] spironolactone, 25 mg, Oral, Daily  warfarin, 4 mg, Oral, Daily    Continuous Infusions  Pharmacy to dose warfarin,     PRN Meds    acetaminophen **OR** acetaminophen **OR** acetaminophen    albuterol sulfate HFA    senna-docusate sodium **AND** polyethylene glycol **AND** bisacodyl **AND**  bisacodyl    dextrose    dextrose    glucagon (human recombinant)    guaiFENesin-dextromethorphan    HYDROcodone-acetaminophen    melatonin    ondansetron ODT **OR** ondansetron    Pharmacy to dose warfarin    polyethylene glycol    sodium chloride    Pharmacy to dose warfarin,     Diet  Diet: Cardiac, Fluid Restriction (240 mL/tray); Healthy Heart (2-3 Na+); 1500 mL/day; Fluid Consistency: Thin (IDDSI 0)    Results from last 7 days   Lab Units 01/11/25  0337 01/10/25  0639 01/09/25  0501   INR  2.66* 2.69* 2.72*         Assessment/Plan     Active Hospital Problems    Diagnosis  POA    **Acute hypoxic respiratory failure [J96.01]  Yes    Acute on chronic hypoxic respiratory failure [J96.21]  Yes    Pneumonia [J18.9]  Yes    Stage 3a chronic kidney disease [N18.31]  Yes    Type 2 diabetes mellitus with hyperglycemia, without long-term current use of insulin [E11.65]  Yes    Acute on chronic diastolic CHF (congestive heart failure) [I50.33]  Yes    Chronic atrial fibrillation [I48.20]  Yes    Mixed hyperlipidemia [E78.2]  Yes      Resolved Hospital Problems   No resolved problems to display.     Patient is a 79 y.o. female     01/11/25 consult her orthopedic surgeon for L shoulder injection on Monday     Acute respiratory failure with hypoxia  Acute on chronic diastolic CHF  Nephrology managing volume status    CKD 3  Nephrology following    Chronic atrial fibrillation  Warfarin, rate controlled  INR therapeutic    Hyperlipidemia atorvastatin  CAD  DM2 control acceptable. See above    DVT prophylaxis  Warfarin (home med)    Discharge  SNF pending precert  Expected Discharge Date: 1/13/2025; Expected Discharge Time:     Discussed with patient and nursing staff    Peter De La Rosa MD  [facility]  01/11/25 10:06 EST

## 2025-01-12 LAB
ALBUMIN SERPL-MCNC: 3.4 G/DL (ref 3.5–5.2)
ANION GAP SERPL CALCULATED.3IONS-SCNC: 7.5 MMOL/L (ref 5–15)
BUN SERPL-MCNC: 24 MG/DL (ref 8–23)
BUN/CREAT SERPL: 19 (ref 7–25)
CALCIUM SPEC-SCNC: 9.8 MG/DL (ref 8.6–10.5)
CHLORIDE SERPL-SCNC: 104 MMOL/L (ref 98–107)
CO2 SERPL-SCNC: 28.5 MMOL/L (ref 22–29)
CREAT SERPL-MCNC: 1.26 MG/DL (ref 0.57–1)
EGFRCR SERPLBLD CKD-EPI 2021: 44.1 ML/MIN/1.73
GLUCOSE BLDC GLUCOMTR-MCNC: 124 MG/DL (ref 70–130)
GLUCOSE BLDC GLUCOMTR-MCNC: 141 MG/DL (ref 70–130)
GLUCOSE BLDC GLUCOMTR-MCNC: 155 MG/DL (ref 70–130)
GLUCOSE BLDC GLUCOMTR-MCNC: 186 MG/DL (ref 70–130)
GLUCOSE SERPL-MCNC: 136 MG/DL (ref 65–99)
INR PPP: 2.5 (ref 0.9–1.1)
MAGNESIUM SERPL-MCNC: 2.3 MG/DL (ref 1.6–2.4)
PHOSPHATE SERPL-MCNC: 3.6 MG/DL (ref 2.5–4.5)
POTASSIUM SERPL-SCNC: 5.2 MMOL/L (ref 3.5–5.2)
PROTHROMBIN TIME: 27.2 SECONDS (ref 11.7–14.2)
SODIUM SERPL-SCNC: 140 MMOL/L (ref 136–145)

## 2025-01-12 PROCEDURE — 80069 RENAL FUNCTION PANEL: CPT | Performed by: INTERNAL MEDICINE

## 2025-01-12 PROCEDURE — 63710000001 INSULIN LISPRO (HUMAN) PER 5 UNITS: Performed by: INTERNAL MEDICINE

## 2025-01-12 PROCEDURE — 82948 REAGENT STRIP/BLOOD GLUCOSE: CPT

## 2025-01-12 PROCEDURE — 85610 PROTHROMBIN TIME: CPT | Performed by: INTERNAL MEDICINE

## 2025-01-12 PROCEDURE — 83735 ASSAY OF MAGNESIUM: CPT | Performed by: HOSPITALIST

## 2025-01-12 RX ADMIN — ATENOLOL 37.5 MG: 25 TABLET ORAL at 21:05

## 2025-01-12 RX ADMIN — DIGOXIN 125 MCG: 125 TABLET ORAL at 08:58

## 2025-01-12 RX ADMIN — WARFARIN SODIUM 4 MG: 4 TABLET ORAL at 17:06

## 2025-01-12 RX ADMIN — INSULIN LISPRO 2 UNITS: 100 INJECTION, SOLUTION INTRAVENOUS; SUBCUTANEOUS at 21:06

## 2025-01-12 RX ADMIN — ATORVASTATIN CALCIUM 10 MG: 10 TABLET, FILM COATED ORAL at 08:58

## 2025-01-12 RX ADMIN — ASPIRIN 81 MG: 81 TABLET, COATED ORAL at 08:59

## 2025-01-12 RX ADMIN — INSULIN LISPRO 2 UNITS: 100 INJECTION, SOLUTION INTRAVENOUS; SUBCUTANEOUS at 12:17

## 2025-01-12 RX ADMIN — POLYETHYLENE GLYCOL 3350 17 G: 17 POWDER, FOR SOLUTION ORAL at 08:58

## 2025-01-12 RX ADMIN — LINAGLIPTIN 5 MG: 5 TABLET, FILM COATED ORAL at 08:58

## 2025-01-12 RX ADMIN — LIDOCAINE 1 PATCH: 4 PATCH TOPICAL at 08:57

## 2025-01-12 RX ADMIN — ATENOLOL 37.5 MG: 25 TABLET ORAL at 08:59

## 2025-01-12 NOTE — PLAN OF CARE
Goal Outcome Evaluation:  Plan of Care Reviewed With: patient        Progress: improving  Outcome Evaluation: Vital signs stable. PRN pain medication x1 for left shoulder pain. Ortho consulted and to see. Safety maintained. Awaiting precert for discharge. Pt rested and slept between care. No complaints or concerns. Needs met. Plan of care ongoing.

## 2025-01-12 NOTE — PROGRESS NOTES
[facility] Clinical Pharmacy Services: Clinical Pharmacy Consult - Warfarin Dosing/Monitoring    Results from last 7 days   Lab Units 01/12/25  0459 01/11/25  0337 01/10/25  0639 01/09/25  0501 01/08/25  0633   INR  2.50* 2.66* 2.69* 2.72* 2.54*     Dose Ordered: INR remains therapeutic - continue warfarin 4 mg daily       Pharmacy will continue to follow until discharge or discontinuation of warfarin.   Kassi Lucia, [facility]    Clinical Pharmacist

## 2025-01-12 NOTE — PROGRESS NOTES
[facility] Progress Note      Patient Name: Lana Yañez  : 1945  MRN: [id number]  Primary Care Physician:  Toni Green MD  Date of admission: 2024    Subjective     Interval History:   Follow up acute kidney injury on CKD 3A.      Sleepy, arousable without complaint  No fevers or chills    Review of Systems:   As noted above    Objective     Vitals:   Temp:  [97.2 °F (36.2 °C)-98.3 °F (36.8 °C)] 97.2 °F (36.2 °C)  Heart Rate:  [66-82] 82  Resp:  [16-20] 16  BP: (109-119)/(60-75) 114/60    Intake/Output Summary (Last 24 hours) at 2025 0917  Last data filed at 2025 0500  Gross per 24 hour   Intake 600 ml   Output 550 ml   Net 50 ml       Physical Exam:    General Appearance: alert, oriented x 3, no acute distress .  Sitting up in bed.  Skin: warm and dry  HEENT: oral mucosa normal, nonicteric sclera  Neck: supple, no JVD  Lungs: Clear to auscultation.  Unlabored on room air.  Heart: Irregularly irregular.  Abdomen: soft, nontender, nondistended. +bs  : Pure wick  Extremities: Trace edema.    Neuro: normal speech and mental status     Scheduled Meds:     aspirin, 81 mg, Oral, Daily  atenolol, 37.5 mg, Oral, BID  atorvastatin, 10 mg, Oral, Daily  digoxin, 125 mcg, Oral, Every Other Day  insulin lispro, 2-9 Units, Subcutaneous, 4x Daily AC & at Bedtime  latanoprost, 1 drop, Both Eyes, Nightly  Lidocaine, 1 patch, Transdermal, Q24H  linagliptin, 5 mg, Oral, Daily  polyethylene glycol, 17 g, Oral, Daily  [Held by provider] spironolactone, 25 mg, Oral, Daily  warfarin, 4 mg, Oral, Daily      IV Meds:   Pharmacy to dose warfarin,         Results Reviewed:   I have personally reviewed the results from the time of this admission to 2025 09:17 EST     Results from last 7 days   Lab Units 25  0459 25  0338 01/10/25  0639   SODIUM mmol/L 140 135* 138   POTASSIUM mmol/L 5.2 3.7 3.8   CHLORIDE mmol/L 104 99 100   CO2 mmol/L 28.5 26.0 29.1*   BUN  mg/dL 24* 26* 31*   CREATININE mg/dL 1.26* 1.08* 1.19*   CALCIUM mg/dL 9.8 9.0 9.5   GLUCOSE mg/dL 136* 310* 172*       Estimated Creatinine Clearance: 45.6 mL/min (A) (by C-G formula based on SCr of 1.26 mg/dL (H)).    Results from last 7 days   Lab Units 01/12/25  0459 01/11/25  0338 01/10/25  0639   MAGNESIUM mg/dL 2.3 2.0 2.2   PHOSPHORUS mg/dL 3.6 2.9 3.2               Results from last 7 days   Lab Units 01/06/25  0735   WBC 10*3/mm3 8.45   HEMOGLOBIN g/dL 13.7   PLATELETS 10*3/mm3 219       Results from last 7 days   Lab Units 01/12/25  0459 01/11/25  0337 01/10/25  0639 01/09/25  0501 01/08/25  0633   INR  2.50* 2.66* 2.69* 2.72* 2.54*       Assessment / Plan     ASSESSMENT:    Acute kidney injury on CKD 3A.  Baseline 1.0-1.2.  Creatinine continues to improve.   Looks euvolemic by exam.  Currently off torsemide and Aldactone.  2.  Acute hypoxic respiratory failure.  Resolved.  3.  Acute on chronic heart failure preserved ejection fraction.  Responded to medical management  4.  Chronic atrial fibrillation on digoxin, metoprolol and Coumadin.  5.  Diabetes mellitus type II with CKD on insulin sliding scale provide clinical morning  6.  Hypertension of CKD.  Currently on atenolol    PLAN:    Continue current medical management volume status ,normovolemic , continue to hold diuretics, renal function electrolytes remained stable  Continue surveillance labs    Thank you for involving us in the care of Lana Yañez.  Please feel free to call with any questions.    Lamonte Downing MD  01/12/25  09:17 [facility]    [facility]  [phone]

## 2025-01-12 NOTE — PLAN OF CARE
Goal Outcome Evaluation:  Plan of Care Reviewed With: patient        Progress: improving  Outcome Evaluation: Patient c/o left should pain, Lidoderm patch applied. No PRN medication requested today. Ortho in to see patient today, did not recommend injection at this time, but to follow up outpatient. Just informed by CCP that precert obtained for Bg Walls, CCP to follow up tomorrow in regards to this. Up in the chair this afternoon.

## 2025-01-12 NOTE — PROGRESS NOTES
Name: Lana Yañez ADMIT: 2024   : 1945  PCP: Toni Green MD    MRN: [id number] LOS: 10 days   AGE/SEX: 79 y.o. female  ROOM: [facility]     Subjective   Subjective   No new complaints. No chest pain or shortness of breath.     Objective   Objective   Vital Signs  Temp:  [97.2 °F (36.2 °C)-98.3 °F (36.8 °C)] 97.2 °F (36.2 °C)  Heart Rate:  [66-82] 82  Resp:  [16-20] 16  BP: (109-119)/(60-75) 114/60  SpO2:  [97 %-98 %] 97 %  on   ;   Device (Oxygen Therapy): room air  Body mass index is 37.08 kg/m².    Physical Exam  Constitutional:       General: She is not in acute distress.     Appearance: She is not toxic-appearing.   HENT:      Head: Normocephalic and atraumatic.   Cardiovascular:      Rate and Rhythm: Normal rate. Rhythm irregular.   Pulmonary:      Effort: Pulmonary effort is normal. No respiratory distress.      Breath sounds: Normal breath sounds.   Abdominal:      General: Bowel sounds are normal.      Palpations: Abdomen is soft.      Tenderness: There is no abdominal tenderness.   Musculoskeletal:      Cervical back: No rigidity.   Skin:     General: Skin is warm and dry.   Neurological:      General: No focal deficit present.      Mental Status: She is alert and oriented to person, place, and time.   Psychiatric:         Mood and Affect: Mood normal.         Behavior: Behavior normal.     Results Review  I reviewed the patient's new clinical results.  Results from last 7 days   Lab Units 25  0735   WBC 10*3/mm3 8.45   HEMOGLOBIN g/dL 13.7   PLATELETS 10*3/mm3 219     Results from last 7 days   Lab Units 25  0459 25  0338 01/10/25  0639 25  0501   SODIUM mmol/L 140 135* 138 138   POTASSIUM mmol/L 5.2 3.7 3.8 3.7   CHLORIDE mmol/L 104 99 100 98   CO2 mmol/L 28.5 26.0 29.1* 28.6   BUN mg/dL 24* 26* 31* 33*   CREATININE mg/dL 1.26* 1.08* 1.19* 1.23*   GLUCOSE mg/dL 136* 310* 172* 145*     Lab Results   Component Value Date    ANIONGAP 7.5 2025     Estimated  Creatinine Clearance: 45.6 mL/min (A) (by C-G formula based on SCr of 1.26 mg/dL (H)).   Lab Results   Component Value Date    EGFR 44.1 (L) 01/12/2025     Results from last 7 days   Lab Units 01/12/25 0459 01/11/25  0338 01/10/25  0639 01/09/25  0501   ALBUMIN g/dL 3.4* 3.3* 3.4* 3.3*     Results from last 7 days   Lab Units 01/12/25 0459 01/11/25 0338 01/10/25  0639 01/09/25  0501   CALCIUM mg/dL 9.8 9.0 9.5 9.7   ALBUMIN g/dL 3.4* 3.3* 3.4* 3.3*   MAGNESIUM mg/dL 2.3 2.0 2.2 2.3   PHOSPHORUS mg/dL 3.6 2.9 3.2 3.6           Glucose   Date/Time Value Ref Range Status   01/12/2025 0825 124 70 - 130 mg/dL Final   01/11/2025 2013 222 (H) 70 - 130 mg/dL Final   01/11/2025 1635 168 (H) 70 - 130 mg/dL Final   01/11/2025 1201 196 (H) 70 - 130 mg/dL Final   01/11/2025 0900 136 (H) 70 - 130 mg/dL Final   01/10/2025 2004 171 (H) 70 - 130 mg/dL Final   01/10/2025 1701 129 70 - 130 mg/dL Final       No radiology results for the last day    Scheduled Meds  aspirin, 81 mg, Oral, Daily  atenolol, 37.5 mg, Oral, BID  atorvastatin, 10 mg, Oral, Daily  digoxin, 125 mcg, Oral, Every Other Day  insulin lispro, 2-9 Units, Subcutaneous, 4x Daily AC & at Bedtime  latanoprost, 1 drop, Both Eyes, Nightly  Lidocaine, 1 patch, Transdermal, Q24H  linagliptin, 5 mg, Oral, Daily  polyethylene glycol, 17 g, Oral, Daily  [Held by provider] spironolactone, 25 mg, Oral, Daily  warfarin, 4 mg, Oral, Daily    Continuous Infusions  Pharmacy to dose warfarin,     PRN Meds    acetaminophen **OR** acetaminophen **OR** acetaminophen    albuterol sulfate HFA    senna-docusate sodium **AND** polyethylene glycol **AND** bisacodyl **AND** bisacodyl    dextrose    dextrose    glucagon (human recombinant)    guaiFENesin-dextromethorphan    HYDROcodone-acetaminophen    melatonin    ondansetron ODT **OR** ondansetron    Pharmacy to dose warfarin    polyethylene glycol    sodium chloride    Pharmacy to dose warfarin,     Diet  Diet: Cardiac, Fluid Restriction  (240 mL/tray); Healthy Heart (2-3 Na+); 1500 mL/day; Fluid Consistency: Thin (IDDSI 0)    Results from last 7 days   Lab Units 01/12/25  0459 01/11/25  0337 01/10/25  0639   INR  2.50* 2.66* 2.69*         Assessment/Plan     Active Hospital Problems    Diagnosis  POA    **Acute hypoxic respiratory failure [J96.01]  Yes    Acute on chronic hypoxic respiratory failure [J96.21]  Yes    Pneumonia [J18.9]  Yes    Stage 3a chronic kidney disease [N18.31]  Yes    Type 2 diabetes mellitus with hyperglycemia, without long-term current use of insulin [E11.65]  Yes    Acute on chronic diastolic CHF (congestive heart failure) [I50.33]  Yes    Chronic atrial fibrillation [I48.20]  Yes    Mixed hyperlipidemia [E78.2]  Yes      Resolved Hospital Problems   No resolved problems to display.     Patient is a 79 y.o. female     01/11/25 consulted her orthopedic surgeon for L shoulder injection on Monday     Acute respiratory failure with hypoxia  Acute on chronic diastolic CHF  Nephrology managing volume status-continue to hold diuretic    CKD 3  Nephrology following    Chronic atrial fibrillation  Warfarin, rate controlled  INR therapeutic    Hyperlipidemia atorvastatin  CAD  DM2 control acceptable. See above    DVT prophylaxis  Warfarin (home med)    Discharge  SNF pending precert  Expected Discharge Date: 1/13/2025; Expected Discharge Time:     Discussed with patient and nursing staff    Peter De La Rosa MD  [facility]  01/12/25 09:59 EST

## 2025-01-12 NOTE — CONSULTS
Orthopaedic Surgery  Consult Note  Dr. BORA Messina” Terrence II  [phone]    HPI:  Patient is a 79 y.o. Not  or  female who presents with chronic left shoulder pain.  Patient has history of left shoulder rotator cuff tendinitis.  She was recently admitted to the hospital with pneumonia and multiple other medical issues including acute hypoxic respiratory failure.  Due to her chronic shoulder pain for which she is overdue for cortisone injection, orthopedics was consulted.  She complains of sharp pains in the left shoulder and says she is a couple weeks overdue for an injection was unable to get to the office.  She sees Dr. Asya Rodriges.  She has responded quite well to injections in the past, but this is my first time seeing her.  She has tried other modalities in the past including rest, ice pack to modification, NSAIDs, and even some therapy exercises.  However, the cortisone shots gave her best relief.    MEDICAL HISTORY  Past Medical History:   Diagnosis Date    Acute on chronic diastolic heart failure     Acute UTI (urinary tract infection) 05/22/2022    Allergic reaction 02/09/2018    Arthritis     Arthritis of back     Arthritis of neck     Asthma     Atrial fibrillation     Persistent; on warfarin    Atypical chest pain     Biliary acute pancreatitis without necrosis or infection 11/10/2021    Bronchitis     Cellulitis of right elbow     due to MRSA    Cervical disc disorder     CHF (congestive heart failure)     COPD (chronic obstructive pulmonary disease)     Coronary artery disease     Cardiac catheterization completed; 90% PDA stenosis with medical management recommended    Coronary artery disease involving native coronary artery of native heart with angina pectoris with documented spasm     CTS (carpal tunnel syndrome)     Diabetes 1.5, managed as type 2     Disease of thyroid gland     Displacement of lumbar intervertebral disc without myelopathy     Dizziness     ANTOINE (dyspnea on  exertion)     Essential hypertension     Fatigue     Fracture, foot     Generalized osteoarthritis of multiple sites     History of rheumatic fever     History of transfusion     Hx of bone density study     10/23/2014    Hyperglycemia     Hyperlipidemia     Hypovitaminosis D     Knee swelling     Left arm pain     Left-sided weakness     Leg swelling     Low back pain     Lower extremity edema     Lumbosacral disc disease     Malaise and fatigue     Mitral valve disease     Moderate mitral valve prolapse and moderate mitral regurgitation    Mitral valve insufficiency     Morbid obesity with BMI of 40.0-44.9, adult     MRSA infection     NSTEMI (non-ST elevated myocardial infarction)     PAF (paroxysmal atrial fibrillation)     Periarthritis of shoulder     Pneumonia 1/1/2025    RA (rheumatoid arthritis)     involving both hands    Rotator cuff syndrome     Sleep apnea     SOB (shortness of breath)     Stroke     left side weakness    Urge incontinence of urine     UTI (urinary tract infection) 05/2020    Visit for screening mammogram 04/02/2018    Vitamin D deficiency      Past Surgical History:   Procedure Laterality Date    BREAST BIOPSY      BREAST SURGERY      right side lumpectomy with biopsy    CARDIAC CATHETERIZATION Left 10/20/2017    Procedure: Cardiac Catheterization/Vascular Study;  Surgeon: Alphonso Olmedo MD;  Location: [facility] CATH INVASIVE LOCATION;  Service:     CARDIAC CATHETERIZATION N/A 10/20/2017    +2 mitral regurgitation, left main 10% stenosis, mid to distal LAD 10% diffuse stenosis, circumflex 10% diffuse stenosis, RCA 10% proximal stenosis, and distal PDA consistent with coronary embolus with a lesion of 90% too small to consider coronary intervention; medical management recommended    EYE SURGERY      laser surgery for glaucoma and left eye cataracts removed    HYSTERECTOMY      10+ years ago    JOINT REPLACEMENT  2005; 2006    bilateral knees and left rotater    KNEE SURGERY      MAMMO  BILATERAL  2016    [facility]     SHOULDER SURGERY       Prior to Admission medications    Medication Sig Start Date End Date Taking? Authorizing Provider   Accu-Chek FastClix Lancets misc Use to check glucose as directed 12/9/22   Will Madden MD   acetaminophen (TYLENOL) 325 MG tablet Take 2 tablets by mouth Every 6 (Six) Hours As Needed for Moderate Pain. 5/28/22   Ivett Castro MD   albuterol sulfate  (90 Base) MCG/ACT inhaler Inhale 2 puffs Every 4 (Four) Hours As Needed for Shortness of Air. PRN SOA/WHEEZING 6/3/22   Mary Keene APRN   Alcohol Swabs (DropSafe Alcohol Prep) 70 % pads  2/22/24   Ivett Castro MD   aspirin 81 MG EC tablet Take 1 tablet by mouth Daily. 10/28/17   Jhony Hernandez MD   atorvastatin (LIPITOR) 10 MG tablet Take 1 tablet by mouth Daily. 2/14/23   Toni Green MD   Blood Glucose Monitoring Suppl (ACCU-CHEK GUIDE) w/Device kit See Admin Instructions. 6/4/20   Ivett Castro MD   Cholecalciferol 50 MCG (2000 UT) capsule Take 50 mcg by mouth Daily. 2/28/24   Ivett Castro MD   digoxin (LANOXIN) 125 MCG tablet Take 1 tablet by mouth Every Other Day. 2/14/23   Toni Green MD   glimepiride (Amaryl) 2 MG tablet By mouth take one tab twice daily with AM and PM meals. 10/25/22   Ed Ho MD   glucose blood test strip Test twice daily prior to breakfast and dinner as instructed  Patient taking differently: Test daily prior to breakfast 11/18/22   Will Madden MD   HYDROcodone-acetaminophen (NORCO) 7.5-325 MG per tablet Take 1 tablet twice a day by oral route as needed for 30 days. 12/21/23   Ivett Castro MD   Januvia 50 MG tablet  9/19/23   Ivett Castro MD   Lumigan 0.01 % ophthalmic drops Administer 1 drop to both eyes Every Night. 9/27/23   Ivett Castro MD   metoprolol tartrate (Lopressor) 50 MG tablet Take 1 tablet by mouth 2 (Two) Times a Day for 30 days. 2/14/23 10/4/99  Toni Green  MD WALESKA   polyethylene glycol (MIRALAX) 17 g packet Take 17 g by mouth Daily As Needed (constipation). 22   Provider, MD Ivett   potassium chloride 10 MEQ CR tablet 1 po bid 3/28/23   Toni Green MD   torsemide (DEMADEX) 20 MG tablet 2 po qday 3/21/23   Toni Green MD   vitamin D (ERGOCALCIFEROL) 1.25 MG (75289 UT) capsule capsule Take 1 capsule by mouth 1 (One) Time Per Week. 23   Toni Green MD   warfarin (COUMADIN) 5 MG tablet Take one-half tablet (2.5 mg) by mouth on , and take one tablet (5 mg) by mouth all other days or as directed. 23   Pia Dueñas MD     Allergies   Allergen Reactions    Metformin Diarrhea    Contrast Dye (Echo Or Unknown Ct/Mr) Hives and Itching    Dapagliflozin Headache and Swelling    Macrobid [Nitrofurantoin] Hives    Iodinated Contrast Media Hives     Most Recent Immunizations   Administered Date(s) Administered    COVID-19 (MODERNA) 12YRS+ (SPIKEVAX) 2023    COVID-19 (MODERNA) 1st,2nd,3rd Dose Monovalent 2022    COVID-19 (PFIZER) 12YRS+ (COMIRNATY) 2024    FLUAD TRI 65YR+ 2019    Fluad Quad 65+ 2020    Fluzone  >6mos 10/17/2017    Fluzone High-Dose 65+YRS 10/04/2018    Fluzone High-Dose 65+yrs 10/06/2021    Pneumococcal Polysaccharide (PPSV23) 10/17/2017    Pneumococcal, Unspecified 2012    Td (TDVAX) 2012    Tdap 2017     Social History     Tobacco Use    Smoking status: Former     Current packs/day: 0.00     Average packs/day: 3.0 packs/day for 1.4 years (4.3 ttl pk-yrs)     Types: Cigarettes     Start date: 1967     Quit date: 10/19/1968     Years since quittin.2     Passive exposure: Never    Smokeless tobacco: Never    Tobacco comments:     caffeine use - decaf coffee   Substance Use Topics    Alcohol use: No     Comment: No caffeine use      Social History     Substance and Sexual Activity   Drug Use No       VITALS: /68 (BP Location: Right arm, Patient Position: Lying)    "Pulse 91   Temp 97.7 °F (36.5 °C) (Oral)   Resp 16   Ht 167.6 cm (66\")   Wt 104 kg (229 lb 11.5 oz)   LMP  (LMP Unknown)   SpO2 96%   BMI 37.08 kg/m²  Body mass index is 37.08 kg/m².    PHYSICAL EXAM:   CONSTITUTIONAL: No acute distress  LUNGS: Equal chest rise, no shortness of air  CARDIOVASCULAR: palpable peripheral pulses  SKIN: no skin lesions in the area examined  LYMPH: no lymphadenopathy in the area examined  Musculoskeletal: Left upper extremity normal neurovascular exam with pain with impingement testing.    RADIOLOGY REVIEW:   XR Spine Cervical 2 or 3 View    Result Date: 1/6/2025   As described.    This report was finalized on 1/6/2025 5:58 PM by Dr. Antelmo Corea M.D on Workstation: [employer]       LABS:   Results for the past 24 hours:   Recent Results (from the past 24 hours)   POC Glucose Once    Collection Time: 01/11/25  4:35 PM    Specimen: Blood   Result Value Ref Range    Glucose 168 (H) 70 - 130 mg/dL   POC Glucose Once    Collection Time: 01/11/25  8:13 PM    Specimen: Blood   Result Value Ref Range    Glucose 222 (H) 70 - 130 mg/dL   Protime-INR    Collection Time: 01/12/25  4:59 AM    Specimen: Blood   Result Value Ref Range    Protime 27.2 (H) 11.7 - 14.2 Seconds    INR 2.50 (H) 0.90 - 1.10   Magnesium    Collection Time: 01/12/25  4:59 AM    Specimen: Blood   Result Value Ref Range    Magnesium 2.3 1.6 - 2.4 mg/dL   Renal Function Panel    Collection Time: 01/12/25  4:59 AM    Specimen: Blood   Result Value Ref Range    Glucose 136 (H) 65 - 99 mg/dL    BUN 24 (H) 8 - 23 mg/dL    Creatinine 1.26 (H) 0.57 - 1.00 mg/dL    Sodium 140 136 - 145 mmol/L    Potassium 5.2 3.5 - 5.2 mmol/L    Chloride 104 98 - 107 mmol/L    CO2 28.5 22.0 - 29.0 mmol/L    Calcium 9.8 8.6 - 10.5 mg/dL    Albumin 3.4 (L) 3.5 - 5.2 g/dL    Phosphorus 3.6 2.5 - 4.5 mg/dL    Anion Gap 7.5 5.0 - 15.0 mmol/L    BUN/Creatinine Ratio 19.0 7.0 - 25.0    eGFR 44.1 (L) >60.0 mL/min/1.73   POC Glucose Once    Collection " "Time: 01/12/25  8:25 AM    Specimen: Blood   Result Value Ref Range    Glucose 124 70 - 130 mg/dL   POC Glucose Once    Collection Time: 01/12/25 12:11 PM    Specimen: Blood   Result Value Ref Range    Glucose 155 (H) 70 - 130 mg/dL       IMPRESSION:  Patient is a 79 y.o. Not  or  female with left shoulder rotator cuff tendinitis    PLAN:   Admited to: Lynda Howell MD  I agree that a steroid shot would likely give her benefit.  However, I do not believe this to be a good idea at present.  She was admitted with pneumonia as well as respiratory failure.  With all of her acute medical conditions, I would not recommend a cortisone injection at this time.  I did explain this to the patient.  I told her I would be more than happy to see her in the office for injection but would not recommend getting 1 while she is in the hospital.  If she cannot get in with her surgeon Dr. Asya Rodriges I would be happy to work her into the office later this coming week when she is feeling better.    R \"Yves\" Terrence FRANCIS MD  Orthopaedic Surgery  [facility]  [phone] - [place] Office  [phone] - [place] Office            "

## 2025-01-12 NOTE — PROGRESS NOTES
Continued Stay Note  [facility]     Patient Name: Lana Yañez  MRN: [id number]  Today's Date: 1/12/2025    Admit Date: 12/31/2024    Plan: Anna Walls pre-cert approved, F/U with MD Monday...................Ruslan MELVIN   Discharge Plan       Row Name 01/12/25 1726       Plan    Plan Anna Wlals pre-cert approved, F/U with MD Monday...................Ruslan MELVIN    Patient/Family in Agreement with Plan yes    Plan Comments E-mail received from [place]Anna to notify CCP pre-cert obtained for Anna Walls. MD notes indicated possible shoulder injection on Monday, at that time we were awaiting ortho consult. Ortho notes reviewed from this afternoon, plan for patient to follow-up with Ortho office after DC for steroid injection. Update to MD and RN via secure chat. CCP to follow-up in AM...............Ruslan MELVIN                   Discharge Codes    No documentation.                 Expected Discharge Date and Time       Expected Discharge Date Expected Discharge Time    Jan 13, 2025               Layla Tate, OLEGARIO

## 2025-01-13 VITALS
HEIGHT: 66 IN | WEIGHT: 231.48 LBS | SYSTOLIC BLOOD PRESSURE: 115 MMHG | HEART RATE: 82 BPM | RESPIRATION RATE: 16 BRPM | OXYGEN SATURATION: 96 % | DIASTOLIC BLOOD PRESSURE: 59 MMHG | TEMPERATURE: 97.4 F | BODY MASS INDEX: 37.2 KG/M2

## 2025-01-13 PROBLEM — I50.33 ACUTE ON CHRONIC DIASTOLIC CHF (CONGESTIVE HEART FAILURE): Status: RESOLVED | Noted: 2017-12-14 | Resolved: 2025-01-13

## 2025-01-13 PROBLEM — J96.01 ACUTE HYPOXIC RESPIRATORY FAILURE: Status: RESOLVED | Noted: 2024-12-31 | Resolved: 2025-01-13

## 2025-01-13 PROBLEM — J96.21 ACUTE ON CHRONIC HYPOXIC RESPIRATORY FAILURE: Status: RESOLVED | Noted: 2025-01-02 | Resolved: 2025-01-13

## 2025-01-13 PROBLEM — J18.9 PNEUMONIA: Status: RESOLVED | Noted: 2025-01-01 | Resolved: 2025-01-13

## 2025-01-13 LAB
ALBUMIN SERPL-MCNC: 3.5 G/DL (ref 3.5–5.2)
ANION GAP SERPL CALCULATED.3IONS-SCNC: 9.8 MMOL/L (ref 5–15)
BUN SERPL-MCNC: 25 MG/DL (ref 8–23)
BUN/CREAT SERPL: 20.7 (ref 7–25)
CALCIUM SPEC-SCNC: 9.5 MG/DL (ref 8.6–10.5)
CHLORIDE SERPL-SCNC: 103 MMOL/L (ref 98–107)
CO2 SERPL-SCNC: 25.2 MMOL/L (ref 22–29)
CREAT SERPL-MCNC: 1.21 MG/DL (ref 0.57–1)
EGFRCR SERPLBLD CKD-EPI 2021: 46.3 ML/MIN/1.73
GLUCOSE BLDC GLUCOMTR-MCNC: 128 MG/DL (ref 70–130)
GLUCOSE BLDC GLUCOMTR-MCNC: 174 MG/DL (ref 70–130)
GLUCOSE SERPL-MCNC: 139 MG/DL (ref 65–99)
INR PPP: 2.34 (ref 0.9–1.1)
MAGNESIUM SERPL-MCNC: 2.1 MG/DL (ref 1.6–2.4)
PHOSPHATE SERPL-MCNC: 3.4 MG/DL (ref 2.5–4.5)
POTASSIUM SERPL-SCNC: 4.4 MMOL/L (ref 3.5–5.2)
PROTHROMBIN TIME: 25.9 SECONDS (ref 11.7–14.2)
SODIUM SERPL-SCNC: 138 MMOL/L (ref 136–145)
WHOLE BLOOD HOLD SPECIMEN: NORMAL

## 2025-01-13 PROCEDURE — 82948 REAGENT STRIP/BLOOD GLUCOSE: CPT

## 2025-01-13 PROCEDURE — 83735 ASSAY OF MAGNESIUM: CPT | Performed by: HOSPITALIST

## 2025-01-13 PROCEDURE — 63710000001 INSULIN LISPRO (HUMAN) PER 5 UNITS: Performed by: INTERNAL MEDICINE

## 2025-01-13 PROCEDURE — 85610 PROTHROMBIN TIME: CPT | Performed by: INTERNAL MEDICINE

## 2025-01-13 PROCEDURE — 80069 RENAL FUNCTION PANEL: CPT | Performed by: INTERNAL MEDICINE

## 2025-01-13 RX ORDER — ATENOLOL 25 MG/1
37.5 TABLET ORAL 2 TIMES DAILY
Start: 2025-01-13

## 2025-01-13 RX ORDER — HYDROCODONE BITARTRATE AND ACETAMINOPHEN 7.5; 325 MG/1; MG/1
1 TABLET ORAL EVERY 6 HOURS PRN
Qty: 12 TABLET | Refills: 0 | Status: SHIPPED | OUTPATIENT
Start: 2025-01-13

## 2025-01-13 RX ORDER — LIDOCAINE 4 G/G
1 PATCH TOPICAL
Start: 2025-01-14

## 2025-01-13 RX ADMIN — LINAGLIPTIN 5 MG: 5 TABLET, FILM COATED ORAL at 08:58

## 2025-01-13 RX ADMIN — ATORVASTATIN CALCIUM 10 MG: 10 TABLET, FILM COATED ORAL at 08:58

## 2025-01-13 RX ADMIN — ASPIRIN 81 MG: 81 TABLET, COATED ORAL at 08:58

## 2025-01-13 RX ADMIN — LIDOCAINE 1 PATCH: 4 PATCH TOPICAL at 08:58

## 2025-01-13 RX ADMIN — INSULIN LISPRO 2 UNITS: 100 INJECTION, SOLUTION INTRAVENOUS; SUBCUTANEOUS at 11:44

## 2025-01-13 RX ADMIN — ATENOLOL 37.5 MG: 25 TABLET ORAL at 08:58

## 2025-01-13 NOTE — PLAN OF CARE
Goal Outcome Evaluation:  Plan of Care Reviewed With: patient        Progress: improving  Outcome Evaluation: vss, no complaints of pain. pt remain on room air. purewick in place. pt rested well during shift. labs in am. poss d/c to SNF today.

## 2025-01-13 NOTE — CONSULTS
"Nutrition Services    Patient Name:  Lana Yañez  YOB: 1945  MRN: [id number]  Admit Date:  12/31/2024    Assessment Date:  01/13/25    CLINICAL NUTRITION - EDUCATION     ASSESSMENT  Encounter Information         Consult from RN    Education topic Sodium    Learner Patient    Learning readiness Motivated to learn    Pertinent nutrition history T2DM, CKD IIIa, A fib, CHF, HLD     Anthropometrics         Height Height: 167.6 cm (66\")    Weight Weight: 105 kg (231 lb 7.7 oz) (01/13/25 0531)    BMI  Body mass index is 37.36 kg/m².   Obese, Class II (35 - 39.9)    Comments      Medication/Biochemical          Pertinent medications Anticoagulant      Pertinent lab values Results from last 7 days   Lab Units 01/13/25  0419 01/12/25  0459 01/11/25  0338   SODIUM mmol/L 138 140 135*   POTASSIUM mmol/L 4.4 5.2 3.7   CHLORIDE mmol/L 103 104 99   CO2 mmol/L 25.2 28.5 26.0   BUN mg/dL 25* 24* 26*   CREATININE mg/dL 1.21* 1.26* 1.08*   CALCIUM mg/dL 9.5 9.8 9.0   GLUCOSE mg/dL 139* 136* 310*       Lab Results   Component Value Date    HGBA1C 7.30 (H) 10/04/2024        DIAGNOSIS  PES Statement         Problem  Food and nutrition knowledge deficit    Etiology Medical Diagnosis - T2DM, CKD IIIa, A fib, CHF, HLD    Signs/Symptoms Information deficit     INTERVENTION  Intervention/Evaluation         Goal   Disease management/therapy, Improved nutrition related labs, Meet nutritional needs for diagnosis/condition, Provide information , Reduce/improve symptoms    Educated regarding Appropriate portions, Beverage choices, Eating pattern, Food choices, Food preparation, Food shopping, Label reading, Seasoning foods, Snacks, Supplement use    Resources given Printed materials    Monitor/evaluation  Per protocol    Discharge planning No discharge needs identified at this time     RD to follow per protocol.     Electronically signed by:  Samuel Jerez RDN, LD  01/13/25 11:18 EST    "

## 2025-01-13 NOTE — CASE MANAGEMENT/SOCIAL WORK
Post-Acute Authorization Submission        Post Acute Pre-Cert #2 Documentation  Request Submitted by Facility - Type:: Hospital  Post-Acute Authorization Type Submitted:: SNF  Date Post Acute Pre-Cert Inititated per Facility: 01/10/25  Date Post Acute Pre-Cert Completed: 01/12/25  Accepting Facility: [facility] Discharge Date Requested: 01/10/25  All Clinicals Submitted?: Yes  Had Accepting Facility at Time of Submission: Yes  Response Received from Insurance?: Approval  Response Communicated to:: , Accepting Facility Liaison, Accepting Facility Auth Department  Authorization Number:: [id number]  Post Acute Pre-Cert Initiated Comment: VALID TO ADMIT UPTO 1/15/25.           Dale Tirado, PCT

## 2025-01-13 NOTE — DISCHARGE SUMMARY
Patient Name: Lana Yañez  : 1945  MRN: [id number]    Date of Admission: 2024  Date of Discharge:  2025  Primary Care Physician: Toni Green MD      Chief Complaint:   Shortness of Breath and Chest Pain      Discharge Diagnoses     Active Hospital Problems    Diagnosis  POA    Stage 3a chronic kidney disease [N18.31]  Yes    Type 2 diabetes mellitus with hyperglycemia, without long-term current use of insulin [E11.65]  Yes    Chronic atrial fibrillation [I48.20]  Yes    Mixed hyperlipidemia [E78.2]  Yes      Resolved Hospital Problems    Diagnosis Date Resolved POA    **Acute hypoxic respiratory failure [J96.01] 2025 Yes    Acute on chronic hypoxic respiratory failure [J96.21] 2025 Yes    Pneumonia [J18.9] 2025 Yes    Acute on chronic diastolic CHF (congestive heart failure) [I50.33] 2025 Yes        Hospital Course     Ms. Yañez is a 79 y.o. female with a history of chronic A-fib on warfarin, CKD 3A, nonobstructive coronary artery disease, chronic diastolic heart failure, type 2 diabetes, obesity who presented to [facility] initially complaining of shortness of breath, cough, congestion.  Please see the admitting history and physical for further details.  She was found to have an acute on chronic diastolic heart failure exacerbation as well as possible concomitant unspecified bacterial pneumonia causing acute respiratory failure with hypoxia and was admitted to the hospital for further evaluation and treatment.      She was seen in consultation by nephrology, cardiology, and orthopedic surgery.  She was started on IV antibiotics and diuretics.  Her procalcitonin was low, and she only had a single isolated leukocytosis, but she did complete a course of IV antibiotics here in the hospital.  Her symptoms gradually improved with IV diuresis, and she actually developed an FELIX due to overdiuresis.  Nephrology at that point held her diuretics and  they have been held for several days and she remains euvolemic.  I came out and the history that she had been frequently ordering delivery food for convenience as her  has been undergoing chemotherapy for cancer, and so she had a unusually high salt intake which was likely how her symptoms were provoked.  She has been counseled extensively on the necessity for a low-sodium diet, and she will be seen by nutrition prior to discharge.    Orthopedic surgery did see her here in the hospital for left shoulder rotator cuff tendinitis.  They recommended an outpatient corticosteroid injection.  She will either follow-up with her primary orthopedic surgeon or Dr. Ocampo to consider this as an outpatient after discharge.    She will be discharged to a skilled nursing facility today.    Day of Discharge     Subjective:  No events overnight.  She feels well.  She requests nutrition consult prior to discharge to discuss low-sodium diet    Physical Exam:  Temp:  [97.3 °F (36.3 °C)-98.1 °F (36.7 °C)] 97.4 °F (36.3 °C)  Heart Rate:  [74-91] 82  Resp:  [16] 16  BP: (115-123)/(59-68) 115/59  Body mass index is 37.36 kg/m².  Physical Exam  Constitutional:       General: She is not in acute distress.     Appearance: She is obese. She is not toxic-appearing.   Cardiovascular:      Rate and Rhythm: Normal rate and regular rhythm.      Heart sounds: Normal heart sounds.   Pulmonary:      Effort: Pulmonary effort is normal. No respiratory distress.      Breath sounds: Normal breath sounds. No wheezing, rhonchi or rales.   Abdominal:      General: Bowel sounds are normal.      Palpations: Abdomen is soft.   Musculoskeletal:         General: No tenderness.      Right lower leg: No edema.      Left lower leg: No edema.   Neurological:      Mental Status: She is alert.   Psychiatric:         Mood and Affect: Mood normal.         Behavior: Behavior normal.         Consultants     Consult Orders (all) (From admission, onward)       Start      Ordered    01/13/25 1007  Inpatient Nutrition Consult  Once        Provider:  (Not yet assigned)    01/13/25 1006    01/12/25 0702  Inpatient Orthopedic Surgery Consult  IN AM        Specialty:  Orthopedic Surgery  Provider:  Asya Rodriges MD    01/11/25 1008    01/10/25 0603  Inpatient Case Management  Consult  Once        Comments: Patient is very adamant on rehab at discharge and not to be discharged home. Daughter was supposed to help but is unable to now and  is not helpful to wife with care or emotionally.   Provider:  (Not yet assigned)    01/10/25 0603    01/10/25 0601  Inpatient Access Center Consult  Once        Comments:  has PTSD and stresses patient out. He is hard on her and she is having a hard time dealing with him at home re: heart failure and other ailments. Patient is supposed to discharge home with him but patient is not keen on the idea d/t on going health issues   Provider:  (Not yet assigned)    01/10/25 0602    01/02/25 1734  Inpatient Nephrology Consult  Once        Specialty:  Nephrology  Provider:  Cindy Perez MD    01/02/25 1733    01/01/25 1606  Inpatient Cardiology Consult  Once        Specialty:  Cardiology  Provider:  Pia Dueñas MD    01/01/25 1605    12/31/24 2032  Inpatient Case Management  Consult  Once        Provider:  (Not yet assigned)    12/31/24 2031 12/31/24 1626  [facility] (on-call MD unless specified) Details  Once        Specialty:  Hospitalist  Provider:  Lynda Howell MD    12/31/24 1625                  Procedures     Imaging Results (All)       Procedure Component Value Units Date/Time    XR Spine Cervical 2 or 3 View [[id number]] Collected: 01/06/25 1756     Updated: 01/06/25 1801    Narrative:      XR SPINE CERVICAL 2 OR 3 VW-     INDICATIONS: Sore neck.     TECHNIQUE: 4 views of the cervical spine     COMPARISON: 5/4/2021     FINDINGS:     Alignment is in range of normal. No acute fracture or  abnormal  prevertebral soft tissue swelling is noted. Multilevel endplate  spurring, disc space narrowing, and facet arthropathy are present.  If  further imaging evaluation is indicated, MRI could be considered. Soft  tissue calcification is seen at the expected location of the left  carotid artery. Thoracic aorta is calcified, and the partly included  pulmonary vasculature appears mildly congested.       Impression:         As described.           This report was finalized on 1/6/2025 5:58 PM by Dr. Antelmo Corea M.D on Workstation: [id number]       XR Ankle 3+ View Left [[id number]] Collected: 01/02/25 1832     Updated: 01/03/25 1340    Narrative:      XR ANKLE 3+ VW LEFT-1/2/2025     HISTORY: Left ankle pain.     There is soft tissue swelling of the ankle. No fractures or other acute  bony abnormalities are seen. Calcaneal spurs are seen.        This report was finalized on 1/3/2025 1:37 PM by Dr. Ayush Abad M.D on Workstation: [id number]       XR Chest PA & Lateral [[id number]] Collected: 01/02/25 1841     Updated: 01/02/25 1845    Narrative:      XR CHEST PA AND LATERAL-     Clinical: Dyspnea     COMPARISON examination 12/31/2024     FINDINGS: Cardiac enlargement similar to the previous examination.  Interstitial opacity has diminished within the interim. No new area of  airspace disease or pleural effusion has developed. The remainder is  unremarkable.     This report was finalized on 1/2/2025 6:42 PM by Dr. Chris Woo M.D  on Workstation: [id number]       XR Chest 1 View [[id number]] Collected: 12/31/24 1406     Updated: 12/31/24 1412    Narrative:      XR CHEST 1 VW-     HISTORY: Female who is 79 years-old, chest pain     TECHNIQUE: Frontal view of the chest     COMPARISON: 10/9/2023     FINDINGS: The heart is enlarged. Pulmonary vasculature is congested.  Aorta is calcified. Bilateral alveolar interstitial opacities suggest  edema and/or pneumonia, follow-up recommended. No large pleural  "effusion  or pneumothorax. No acute osseous process.       Impression:      As described.     This report was finalized on 12/31/2024 2:09 PM by Dr. Antelmo Corea M.D on Workstation: [id number]               Pertinent Labs         Results from last 7 days   Lab Units 01/13/25  0419 01/12/25  0459 01/11/25  0338 01/10/25  0639   SODIUM mmol/L 138 140 135* 138   POTASSIUM mmol/L 4.4 5.2 3.7 3.8   CHLORIDE mmol/L 103 104 99 100   CO2 mmol/L 25.2 28.5 26.0 29.1*   BUN mg/dL 25* 24* 26* 31*   CREATININE mg/dL 1.21* 1.26* 1.08* 1.19*   GLUCOSE mg/dL 139* 136* 310* 172*   Estimated Creatinine Clearance: 47.7 mL/min (A) (by C-G formula based on SCr of 1.21 mg/dL (H)).  Results from last 7 days   Lab Units 01/13/25  0419 01/12/25  0459 01/11/25  0338 01/10/25  0639   ALBUMIN g/dL 3.5 3.4* 3.3* 3.4*     Results from last 7 days   Lab Units 01/13/25  0419 01/12/25  0459 01/11/25  0338 01/10/25  0639   CALCIUM mg/dL 9.5 9.8 9.0 9.5   ALBUMIN g/dL 3.5 3.4* 3.3* 3.4*   MAGNESIUM mg/dL 2.1 2.3 2.0 2.2   PHOSPHORUS mg/dL 3.4 3.6 2.9 3.2               Invalid input(s): \"LDLCALC\"        Test Results Pending at Discharge       Discharge Details        Discharge Medications        New Medications        Instructions Start Date   atenolol 25 MG tablet  Commonly known as: TENORMIN   37.5 mg, Oral, 2 Times Daily      Lidocaine 4 %   1 patch, Transdermal, Every 24 Hours Scheduled, Remove & Discard patch within 12 hours or as directed by MD   Start Date: January 14, 2025            Changes to Medications        Instructions Start Date   glucose blood test strip  What changed: additional instructions   Test twice daily prior to breakfast and dinner as instructed      HYDROcodone-acetaminophen 7.5-325 MG per tablet  Commonly known as: NORCO  What changed: See the new instructions.   1 tablet, Oral, Every 6 Hours PRN             Continue These Medications        Instructions Start Date   Accu-Chek FastClix Lancets misc   Use to check " glucose as directed      Accu-Chek Guide w/Device kit   See Admin Instructions      acetaminophen 325 MG tablet  Commonly known as: TYLENOL   650 mg, Oral, Every 6 Hours PRN      albuterol sulfate  (90 Base) MCG/ACT inhaler  Commonly known as: PROVENTIL HFA;VENTOLIN HFA;PROAIR HFA   2 puffs, Inhalation, Every 4 Hours PRN, PRN SOA/WHEEZING      aspirin 81 MG EC tablet   81 mg, Oral, Daily      atorvastatin 10 MG tablet  Commonly known as: LIPITOR   10 mg, Oral, Daily      Cholecalciferol 50 MCG (2000 UT) capsule   50 mcg, Oral, Daily      digoxin 125 MCG tablet  Commonly known as: LANOXIN   125 mcg, Oral, Every Other Day      DropSafe Alcohol Prep 70 % pads       glimepiride 2 MG tablet  Commonly known as: Amaryl   By mouth take one tab twice daily with AM and PM meals.      Januvia 50 MG tablet  Generic drug: SITagliptin       Lumigan 0.01 % ophthalmic drops  Generic drug: bimatoprost   1 drop, Both Eyes, Nightly      polyethylene glycol 17 g packet  Commonly known as: MIRALAX   17 g, Oral, Daily PRN      vitamin D 1.25 MG (26263 UT) capsule capsule  Commonly known as: ERGOCALCIFEROL   50,000 Units, Oral, Weekly      warfarin 5 MG tablet  Commonly known as: COUMADIN   Take one-half tablet (2.5 mg) by mouth on Fridays, and take one tablet (5 mg) by mouth all other days or as directed.             Stop These Medications      metoprolol tartrate 50 MG tablet  Commonly known as: Lopressor     potassium chloride 10 MEQ CR tablet     torsemide 20 MG tablet  Commonly known as: DEMADEX              Allergies   Allergen Reactions    Metformin Diarrhea    Contrast Dye (Echo Or Unknown Ct/Mr) Hives and Itching    Dapagliflozin Headache and Swelling    Macrobid [Nitrofurantoin] Hives    Iodinated Contrast Media Hives         Discharge Disposition:  Skilled Nursing Facility (DC - External)    Discharge Diet:  Diet Order   Procedures    Diet: Cardiac, Fluid Restriction (240 mL/tray); Healthy Heart (2-3 Na+); 1500 mL/day;  Fluid Consistency: Thin (IDDSI 0)       Discharge Activity:   Activity Instructions       Activity as Tolerated              CODE STATUS:    Code Status and Medical Interventions: CPR (Attempt to Resuscitate); Full   Ordered at: 12/31/24 2247     Code Status (Patient has no pulse and is not breathing):    CPR (Attempt to Resuscitate)     Medical Interventions (Patient has pulse or is breathing):    Full       No future appointments.  Additional Instructions for the Follow-ups that You Need to Schedule       Ambulatory Referral to Home Health   As directed      Face to Face Visit Date: 1/9/2025   Follow-up provider for Plan of Care?: I will be treating the patient on an ongoing basis.  Please send me the Plan of Care for signature.   Follow-up provider: TONI MUSTAFA [[id number]]   Reason/Clinical Findings: s/p hospitization for sepsis   Describe mobility limitations that make leaving home difficult: tires easily   Nursing/Therapeutic Services Requested: Physical Therapy Skilled Nursing   Skilled nursing orders: Medication education Cardiopulmonary assessments   PT orders: Other (add comment) (treat and eval)   Frequency: 1 Week 1        Call MD With Problems / Concerns   As directed      Instructions: return to the hospital if you experience chest pain, shortness of breath, abdominal pain, nausea, vomiting, fevers, sweats, chills, or worsening of your symptoms    Order Comments: Instructions: return to the hospital if you experience chest pain, shortness of breath, abdominal pain, nausea, vomiting, fevers, sweats, chills, or worsening of your symptoms         Discharge Follow-up with PCP   As directed       Currently Documented PCP:    Toni Mustafa MD    PCP Phone Number:    [phone]     Follow Up Details: 1-2 weeks        Discharge Follow-up with Specialty: cardiology 1-2 weeks or as otherwise directed   As directed      Specialty: cardiology 1-2 weeks or as otherwise directed        Discharge Follow-up with  Specialty: nephrology 1-2 weeks or as otherwise directed   As directed      Specialty: nephrology 1-2 weeks or as otherwise directed               Contact information for follow-up providers       Jama Mustafa MD .    Specialty: Internal Medicine  Why: 1-2 weeks  Contact information:  [address], [address]  [facility] [address] [phone]                       Contact information for after-discharge care       Destination       [facility] .    Service: Skilled Nursing  Contact information:  [address] [facility] [address]  [phone]                     Home Medical Care       [facility] .    Service: Home Health Services  Contact information:  [address] [place] [address] [phone]                                   Additional Instructions for the Follow-ups that You Need to Schedule       Ambulatory Referral to Home Health   As directed      Face to Face Visit Date: 1/9/2025   Follow-up provider for Plan of Care?: I will be treating the patient on an ongoing basis.  Please send me the Plan of Care for signature.   Follow-up provider: JAMA MUSTAFA [[id number]]   Reason/Clinical Findings: s/p hospitization for sepsis   Describe mobility limitations that make leaving home difficult: tires easily   Nursing/Therapeutic Services Requested: Physical Therapy Skilled Nursing   Skilled nursing orders: Medication education Cardiopulmonary assessments   PT orders: Other (add comment) (treat and eval)   Frequency: 1 Week 1        Call MD With Problems / Concerns   As directed      Instructions: return to the hospital if you experience chest pain, shortness of breath, abdominal pain, nausea, vomiting, fevers, sweats, chills, or worsening of your symptoms    Order Comments: Instructions: return to the hospital if you experience chest pain, shortness of breath, abdominal pain, nausea, vomiting, fevers, sweats, chills, or worsening of your symptoms          Discharge Follow-up with PCP   As directed       Currently Documented PCP:    Toni Green MD    PCP Phone Number:    [phone]     Follow Up Details: 1-2 weeks        Discharge Follow-up with Specialty: cardiology 1-2 weeks or as otherwise directed   As directed      Specialty: cardiology 1-2 weeks or as otherwise directed        Discharge Follow-up with Specialty: nephrology 1-2 weeks or as otherwise directed   As directed      Specialty: nephrology 1-2 weeks or as otherwise directed            Time Spent on Discharge:  Greater than 30 minutes      Gallito Trimble MD  [facility]  01/13/25  10:13 EST

## 2025-01-14 NOTE — CASE MANAGEMENT/SOCIAL WORK
Case Management Discharge Note      Final Note: [facility].    Provided Post Acute Provider List?: Yes  Post Acute Provider List: Nursing Home, Home Health, DME Supplier  Provided Post Acute Provider Quality & Resource List?: Yes  Post Acute Provider Quality and Resource List: Home Health, Nursing Home  Delivered To: Patient  Method of Delivery: In person    Selected Continued Care - Discharged on 1/13/2025 Admission date: 12/31/2024 - Discharge disposition: Skilled Nursing Facility (DC - External)      Destination Coordination complete.      Service Provider Services Address Phone Fax Patient Preferred    [facility] [address] [facility] [address] [phone] --              Durable Medical Equipment    No services have been selected for the patient.                Dialysis/Infusion    No services have been selected for the patient.                Home Medical Care Coordination complete.      Service Provider Services Address Phone Fax Patient Preferred    [facility] AT [facility] [address] [facility] [address] [phone] --              Therapy    No services have been selected for the patient.                Community Resources    No services have been selected for the patient.                Community & DME    No services have been selected for the patient.                    Transportation Services  Private: Car    Final Discharge Disposition Code: 03 - skilled nursing facility (SNF)

## 2025-01-15 ENCOUNTER — TELEPHONE (OUTPATIENT)
Dept: CARDIOLOGY | Facility: CLINIC | Age: 80
End: 2025-01-15

## 2025-01-15 ENCOUNTER — TELEPHONE (OUTPATIENT)
Dept: PHARMACY | Facility: HOSPITAL | Age: 80
End: 2025-01-15
Payer: MEDICARE

## 2025-01-15 NOTE — TELEPHONE ENCOUNTER
----- Message from Anisa QUIROGA sent at 1/15/2025  9:32 AM EST -----  Regarding: [facility]  Patient is currently in SNF([place]), will be using [facility] when discharged

## 2025-01-15 NOTE — TELEPHONE ENCOUNTER
PLEASE DISREGARD PRIOR TE ABOUT A SOONER APPT. PT REALIZED SHE WILL BE IN REHAB AND CANNOT COME ANY SOONER AFTER ALL. SHE WILL KEEP APPT FOR 1/30.     THANKS    HUB.

## 2025-01-15 NOTE — TELEPHONE ENCOUNTER
Caller: Lana Yañez    Relationship to patient: Self    Best call back number: [phone]    Patient is needing: PT HAD AN APPT WITH DR. SALGUERO ON 01.17.2025. PT IS IN REHAB AND THOUGHT SHE WOULD NOT BE ABLE TO COME TO THAT APPT. THE APPT WAS RESCHEDULED FOR 01.30.2025 WITH ALEX PIERSON. PT WOULD LIKE TO SEE IF SHE CAN GET HER ORIGINAL APPT DATE WITH DR SALGUERO. PT WOULD PREFER TO SEE DR. SALGUERO. PLEASE CALL TO ADVISE.

## 2025-01-22 NOTE — TELEPHONE ENCOUNTER
Caller: Lana Yañez    Relationship to patient: Self    Best call back number: [phone]     Patient is needing: PT IS CALLING IN- SHE HAS AN APPT WITH DR SALGUERO THAT WAS ON 1.17.25 THAT WAS CANCELLED DUE TO HER BEING IN HOSPITAL. APPT IS A FOLLOW UP APPT BUT NO SPECIFIED FU TIMEFRAME WAS LISTED IN APPT NOTES. PT IS SCHEDULED FOR HER 1-2 WEEK HOSP FU WITH ALEX ON 1.30.25. WENT TO RESCHEDULE DR SALGUERO'S APPT, AND NO OPENINGS UNTIL END OF MARCH-BEGINNING OF APRIL. PLS CALL AND ADVISE ON SCHEDULING.

## 2025-01-29 NOTE — PROGRESS NOTES
Date of Office Visit: 2025  Encounter Provider: ANGELA Salgado  Place of Service: [facility] CARDIOLOGY  Patient Name: Lana Yañez  :1945    Chief complaint      History of Present Illness  Patient is a 79 y.o. year old female  patient of Dr. Dueñas. Past medical history includes hypertension, hyperlipidemia, rheumatoid arthritis, obstructive sleep apnea who in early October was found to be in atrial fibrillation with rapid ventricular rates and mild diastolic heart failure.  She was placed on IV heparin and Pradaxa.  Echocardiogram revealed normal systolic function mild left ventricular hypertrophy, moderate atrial enlargement with aortic valve sclerosis and moderate pulmonary hypertension and moderate tricuspid regurgitation.  The RV systolic pressure is 54 mmHg.  She had a stress perfusion study that was negative for ischemia and a CT and her grandmother revealed a mildly dilated aorta without pulmonary emboli.  She then presented several days later with an acute stroke.  CLARIBEL was not pursued as it was felt to be embolic in nature.  However on  and she was diaphoretic and was found to have a non-ST elevation myocardial infarction.  This was on full dose Pradaxa which was not held.  CLARIBEL was pursued that revealed normal systolic function with moderate mitral valve prolapse without significant regurgitation.  There was right-sided spontaneous echo contrast without thrombus formation.  Cardiac catheterization revealed normal systolic function with 2+ mitral regurgitation, mild coronary disease except for a 90% stenosis of the distal PDA which was felt to be too small to be amenable PCI.  She was treated medically and switched to Coumadin. She has struggled with intermittent heart failure dietary indiscretions with salt since then.  She also had a 24-hour Holter that revealed persistent atrial fibrillation with reasonable rate control and frequent PVCs.  No other arrhythmia was present.  Last echocardiogram in August 2021 showed an ejection fraction of 62% with moderate left ventricular hypertrophy with indeterminate diastolic function, mild aortic regurgitation, mitral valve thickening with moderate regurgitation without stenosis, severe tricuspid regurgitation with an RV systolic pressure 48 mmHg is present.  CT angiogram of the chest 9/2021 showed stable ascending aorta measuring 4.1 cm.  Lexiscan cardiac stress test 2/2022 was negative for ischemia.  Echo on 5/2022 showed ejection fraction of 60% with grade 2 diastolic dysfunction, mild aortic valve stenosis, RVSP 42 mmHg.  Mitral valve calcification present but not felt to have mitral stenosis.  Mild mitral valve thickening.  Follow-up echo 10/2023 showed an ejection fraction 63%, mild to moderate valve regurgitation without stenosis, nontender calcification with normal functioning mitral valve, moderate tricuspid regurgitation with RVSP 44.5 mmHg, the ascending aorta measured 3.7 cm.     She presented to the ED on 12/31/24 with complaints of acute onset of shortness of breath. She reported she has been feeling unwell for several days with a productive cough, ear pain, headache, and congestion. She reported that her ankles were swollen and she felt she was short of breath even with a few steps. She reported compliance with her home medication regimen. COVID and flu negative. While at urgent care was found to have oxygen saturations right at 90% on room air.      Workup in the ED included: HS troponin 13, 14, then 16. proBNP 807. Chest xray with pulmonary vasculature congestion. EKG showed atrial fibrillation with a rate of 117. Respiratory viral panel negative.     On further discussion it was found that prior to admission she had been taking torsemide 40 mg every other day (though it was prescribed daily) due to frequent urination.  This had gone on for approximately 2 weeks prior to admission.  She also  admitted to eating mostly restaurant food and not watching salt intake.    She was initially diuresed with IV furosemide and then placed on home dose of torsemide as well as spironolactone.  Nephrology was consulted due to FELIX on CKD.  She was ultimately discharged on atenolol 37.5 mg twice daily, digoxin 125 mcg every other day.  She was started on torsemide and Aldactone but these were stopped prior to discharge.    Interval history  Patient presents today for hospital follow-up.  I will visit with her today and have reviewed her medical record.  Her daughter is present today at appointment per her preference.  Since discharge there has been some confusion about her medication list.  We had her discharged on atenolol 37.5 mg twice daily however she states that she has been taking metoprolol at home.  Heart rate and blood pressure have been as they are today.  Weight has been stable between 232 and 235 pounds.  She has upcoming appointment with nephrology.  She denies palpitations, shortness of breath, edema, dizziness, chest pain or chest pressure, fatigue, syncope or presyncope.  She does not treat sleep apnea.    Past Medical History:   Diagnosis Date    Acute on chronic diastolic heart failure     Acute UTI (urinary tract infection) 05/22/2022    Allergic reaction 02/09/2018    Arthritis     Arthritis of back     Arthritis of neck     Asthma     Atrial fibrillation     Persistent; on warfarin    Atypical chest pain     Biliary acute pancreatitis without necrosis or infection 11/10/2021    Bronchitis     Cellulitis of right elbow     due to MRSA    Cervical disc disorder     CHF (congestive heart failure)     COPD (chronic obstructive pulmonary disease)     Coronary artery disease     Cardiac catheterization completed; 90% PDA stenosis with medical management recommended    Coronary artery disease involving native coronary artery of native heart with angina pectoris with documented spasm     CTS (carpal tunnel  syndrome)     Diabetes 1.5, managed as type 2     Disease of thyroid gland     Displacement of lumbar intervertebral disc without myelopathy     Dizziness     ANTOINE (dyspnea on exertion)     Essential hypertension     Fatigue     Fracture, foot     Generalized osteoarthritis of multiple sites     History of rheumatic fever     History of transfusion     Hx of bone density study     10/23/2014    Hyperglycemia     Hyperlipidemia     Hypovitaminosis D     Knee swelling     Left arm pain     Left-sided weakness     Leg swelling     Low back pain     Lower extremity edema     Lumbosacral disc disease     Malaise and fatigue     Mitral valve disease     Moderate mitral valve prolapse and moderate mitral regurgitation    Mitral valve insufficiency     Morbid obesity with BMI of 40.0-44.9, adult     MRSA infection     NSTEMI (non-ST elevated myocardial infarction)     PAF (paroxysmal atrial fibrillation)     Periarthritis of shoulder     Pneumonia 1/1/2025    RA (rheumatoid arthritis)     involving both hands    Rotator cuff syndrome     Sleep apnea     SOB (shortness of breath)     Stroke     left side weakness    Urge incontinence of urine     UTI (urinary tract infection) 05/2020    Visit for screening mammogram 04/02/2018    Vitamin D deficiency      Past Surgical History:   Procedure Laterality Date    BREAST BIOPSY      BREAST SURGERY      right side lumpectomy with biopsy    CARDIAC CATHETERIZATION Left 10/20/2017    Procedure: Cardiac Catheterization/Vascular Study;  Surgeon: Alphonso Olmedo MD;  Location: [facility] CATH INVASIVE LOC[facility];  Service:     CARDIAC CATHETERIZATION N/A 10/20/2017    +2 mitral regurgitation, left main 10% stenosis, mid to distal LAD 10% diffuse stenosis, circumflex 10% diffuse stenosis, RCA 10% proximal stenosis, and distal PDA consistent with coronary embolus with a lesion of 90% too small to consider coronary intervention; medical management recommended    EYE SURGERY      laser surgery for  glaucoma and left eye cataracts removed    HYSTERECTOMY      10+ years ago    JOINT REPLACEMENT  2005; 2006    bilateral knees and left rotater    KNEE SURGERY      MAMMO BILATERAL  2016    [facility]     SHOULDER SURGERY       Outpatient Medications Prior to Visit   Medication Sig Dispense Refill    Accu-Chek FastClix Lancets misc Use to check glucose as directed 306 each 1    acetaminophen (TYLENOL) 325 MG tablet Take 2 tablets by mouth Every 6 (Six) Hours As Needed for Moderate Pain.      albuterol sulfate  (90 Base) MCG/ACT inhaler Inhale 2 puffs Every 4 (Four) Hours As Needed for Shortness of Air. PRN SOA/WHEEZING 18 g 11    Alcohol Swabs (DropSafe Alcohol Prep) 70 % pads       aspirin 81 MG EC tablet Take 1 tablet by mouth Daily.      atorvastatin (LIPITOR) 10 MG tablet Take 1 tablet by mouth Daily. 90 tablet 1    Blood Glucose Monitoring Suppl (ACCU-CHEK GUIDE) w/Device kit See Admin Instructions.      Cholecalciferol 50 MCG (2000 UT) capsule Take 50 mcg by mouth Daily.      digoxin (LANOXIN) 125 MCG tablet Take 1 tablet by mouth Every Other Day. 45 tablet 2    glimepiride (Amaryl) 2 MG tablet By mouth take one tab twice daily with AM and PM meals. 180 tablet 2    glucose blood test strip Test twice daily prior to breakfast and dinner as instructed (Patient taking differently: Test daily prior to breakfast) 200 each 12    HYDROcodone-acetaminophen (NORCO) 7.5-325 MG per tablet Take 1 tablet by mouth Every 6 (Six) Hours As Needed for Moderate Pain. 12 tablet 0    Januvia 50 MG tablet       Lidocaine 4 % Place 1 patch on the skin as directed by provider Daily. Remove & Discard patch within 12 hours or as directed by MD Paredes 0.01 % ophthalmic drops Administer 1 drop to both eyes Every Night.      polyethylene glycol (MIRALAX) 17 g packet Take 17 g by mouth Daily As Needed (constipation).      vitamin D (ERGOCALCIFEROL) 1.25 MG (02382 UT) capsule capsule Take 1 capsule by mouth 1 (One)  Time Per Week. 12 capsule 1    warfarin (COUMADIN) 5 MG tablet Take one-half tablet (2.5 mg) by mouth on , and take one tablet (5 mg) by mouth all other days or as directed. 85 tablet 1    atenolol (TENORMIN) 25 MG tablet Take 1.5 tablets by mouth 2 (Two) Times a Day.       No facility-administered medications prior to visit.       Allergies as of 2025 - Reviewed 2025   Allergen Reaction Noted    Metformin Diarrhea 2024    Contrast dye (echo or unknown ct/mr) Hives and Itching 2018    Dapagliflozin Headache and Swelling 2024    Macrobid [nitrofurantoin] Hives 2023    Iodinated contrast media Hives 2018     Social History     Socioeconomic History    Marital status:      Spouse name: Suresh    Number of children: 5    Years of education: 13   Tobacco Use    Smoking status: Former     Current packs/day: 0.00     Average packs/day: 3.0 packs/day for 1.4 years (4.3 ttl pk-yrs)     Types: Cigarettes     Start date: 1967     Quit date: 10/19/1968     Years since quittin.3     Passive exposure: Never    Smokeless tobacco: Never    Tobacco comments:     caffeine use - decaf coffee   Vaping Use    Vaping status: Never Used   Substance and Sexual Activity    Alcohol use: No     Comment: No caffeine use    Drug use: No    Sexual activity: Defer     Family History   Problem Relation Age of Onset    Colon cancer Mother     Glaucoma Mother     Stroke Mother     Arthritis Mother     Hypertension Mother     Glaucoma Sister     Diabetes Sister     Heart disease Sister     Arthritis Sister     Asthma Sister     Hypertension Sister     Miscarriages / Stillbirths Sister     Lung disease Sister     Heart disease Brother     Diabetes Brother     Arthritis Brother     Drug abuse Brother     Hypertension Brother     Arthritis Father     COPD Father     Lung disease Father     Arthritis Daughter     Depression Daughter     Alcohol abuse Maternal Uncle     Heart disease Sister  "    Heart disease Sister     Heart disease Brother     Rheumatologic disease Sister      Review of Systems   Constitutional: Negative for malaise/fatigue.   Cardiovascular:  Negative for chest pain, claudication, dyspnea on exertion, leg swelling, near-syncope, orthopnea, palpitations, paroxysmal nocturnal dyspnea and syncope.   Respiratory:  Negative for shortness of breath.    Neurological:  Negative for brief paralysis, dizziness, headaches and light-headedness.   All other systems reviewed and are negative.       Objective:     Vitals:    01/30/25 0901   BP: 130/80   BP Location: Left arm   Patient Position: Sitting   Cuff Size: Adult   Pulse: 81   SpO2: 98%   Weight: 100 kg (220 lb 12.8 oz)   Height: 167.6 cm (66\")     Body mass index is 35.64 kg/m².    Vitals reviewed.   Constitutional:       General: Not in acute distress.     Appearance: Well-developed and not in distress. Not diaphoretic.   HENT:      Head: Normocephalic.   Pulmonary:      Effort: Pulmonary effort is normal. No respiratory distress.      Breath sounds: Normal breath sounds. No wheezing. No rhonchi. No rales.   Cardiovascular:      Normal rate. Irregularly irregular rhythm.      Murmurs: There is no murmur.   Pulses:     Radial: 2+ bilaterally.  Edema:     Peripheral edema absent.   Skin:     General: Skin is warm and dry. There is no cyanosis.      Findings: No rash.   Neurological:      Mental Status: Alert and oriented to person, place, and time.   Psychiatric:         Behavior: Behavior normal. Behavior is cooperative.         Thought Content: Thought content normal.         Judgment: Judgment normal.       Lab Review:     Lab Results   Component Value Date     01/13/2025     01/12/2025    K 4.4 01/13/2025    K 5.2 01/12/2025     01/13/2025     01/12/2025    CO2 25.2 01/13/2025    CO2 28.5 01/12/2025    BUN 25 (H) 01/13/2025    BUN 24 (H) 01/12/2025    CREATININE 1.21 (H) 01/13/2025    CREATININE 1.26 (H) " 01/12/2025    EGFRIFNONA 45 (L) 02/09/2022    EGFRIFNONA 36 (L) 11/22/2021    EGFRIFAFRI 51 (L) 02/09/2022    EGFRIFAFRI 57 (L) 12/16/2021    GLUCOSE 139 (H) 01/13/2025    GLUCOSE 136 (H) 01/12/2025    CALCIUM 9.5 01/13/2025    CALCIUM 9.8 01/12/2025    PROTENTOTREF 7.4 02/15/2023    PROTENTOTREF 7.2 09/17/2020    ALBUMIN 3.5 01/13/2025    ALBUMIN 3.4 (L) 01/12/2025    BILITOT 0.5 01/05/2025    BILITOT 0.7 01/02/2025    AST 20 01/05/2025    AST 15 01/02/2025    ALT 12 01/05/2025    ALT 10 01/02/2025     Lab Results   Component Value Date    WBC 8.45 01/06/2025    WBC 9.38 01/05/2025    HGB 13.7 01/06/2025    HGB 13.4 01/05/2025    HCT 43.6 01/06/2025    HCT 40.3 01/05/2025    MCV 94.0 01/06/2025    MCV 93.1 01/05/2025     01/06/2025     01/05/2025     Lab Results   Component Value Date    PROBNP 807.0 12/31/2024    PROBNP 1,178.0 10/04/2024     Lab Results   Component Value Date    TROPONINT 16 (H) 12/31/2024     Lab Results   Component Value Date    TSH 1.550 10/04/2024    TSH 1.240 09/28/2023      Lipid Panel          4/29/2024    13:50 7/24/2024    13:01   Lipid Panel   Total Cholesterol 117  112    Triglycerides 78  63    HDL Cholesterol 61  71    VLDL Cholesterol 16  14    LDL Cholesterol  40  27    LDL/HDL Ratio 0.66  0.40           ECG 12 Lead    Date/Time: 1/30/2025 1:01 PM  Performed by: Hannah Stroud APRN    Authorized by: Hannah Stroud APRN  Comparison: compared with previous ECG   Similar to previous ECG  Rhythm: atrial fibrillation  Rate: normal  BPM: 81  Conduction: left anterior fascicular block  QRS axis: normal  Other findings: low voltage and T wave abnormality  Comments: Similar to prior        Assessment:       Diagnosis Plan   1. Coronary artery disease involving native coronary artery of native heart without angina pectoris        2. Paroxysmal atrial fibrillation        3. Chronic diastolic heart failure        4. FRANCY (obstructive sleep apnea)        5. Atrial  fibrillation, chronic        6. Pulmonary hypertension        7. Type 2 diabetes mellitus with hyperglycemia, without long-term current use of insulin        8. Aneurysm of ascending aorta without rupture        9. Renal insufficiency          Plan:       1.  Dyspnea on exertion with recent hospitalization for acute on chronic diastolic CHF as well as atrial fibrillation with rapid rates.  Negative stress perfusion study 2/2022.  Echo 5/2022 with normal systolic function grade 2 diastolic dysfunction, no significant valve regurgitation with an RVSP 42 mmHg.  Repeat echocardiogram in October 2024 was stable.  She was diuresed and then developed FELIX and diuretics are now on hold pending nephrology appointment in February 2025.  She is feeling much better since discharge.  We will clarify her home medications.  2.  Pulmonary hypertension.  Clinically stable.  Repeat echocardiogram October 2024 showed stable pulmonary pressures.  3.  Chest pain.  This has resolved after diuresis.  4.  Mild aortic valve stenosis.  check a follow-up echocardiogram especially with recent symptoms  5.  Mitral valve prolapse with mild mitral regurgitation noted by CLARIBEL 2017.  Reassess by echocardiography given worsening shortness of breath.  6.  Severe small vessel coronary artery disease.  Negative stress test 2/2022 and no residual anginal symptoms.  Better with decreasing stress.    7.  Persistent atrial fibrillation.  Better controlled on current medications.  Will clarify her home meds as hospitalist had her on atenolol and she stated she is taking metoprolol.  Rate is controlled and she is tolerating anticoagulation.  Encouraged her to continue medications as scheduled.    8.  Chronic coagulation.  INR has been controlled overall.  I reminded her to make an appointment with anticoagulation clinic.  She struggles with needing to come here for anticoagulation visits.  I asked her to inquire about home INR's or possibly to see if the INR  clinic at [place] could accommodate her.  She will check into this.  9.  Hypertension, controlled on current medications  10.  History of embolic non ST-elevation myocardial infarction and stroke. Now on warfarin  11   FRANCY, untreated  12. Lung nodule followed by Dr. Garrido and felt to be benign per patient.  No further follow-up felt to be needed per patient  13.  Thoracic aortic aneurysm, stable by CT on 9/2021 measuring 4.1 cm.  CT scan in November 2024 showed stable aortic size      Time Spent: I spent 40 minutes caring for Lana on this date of service. This time includes time spent by me in the following activities: preparing for the visit, reviewing tests, performing a medically appropriate examination and/or evaluations, counseling and educating the patient/family/caregiver, ordering medications, tests, or procedures, documenting information in the medical record, independently interpreting results and communicating that information with the patient/family/caregiver, care coordination, and Obtaining an outside history.   I spent 1 minutes on the separately reported service of ECG. This time is not included in the time used to support the E/M service also reported today.        Your medication list            Accurate as of January 30, 2025  1:08 PM. If you have any questions, ask your nurse or doctor.                CHANGE how you take these medications        Instructions Last Dose Given Next Dose Due   glucose blood test strip  What changed: additional instructions      Test twice daily prior to breakfast and dinner as instructed              CONTINUE taking these medications        Instructions Last Dose Given Next Dose Due   Accu-Chek FastClix Lancets misc      Use to check glucose as directed       Accu-Chek Guide w/Device kit      See Admin Instructions.       acetaminophen 325 MG tablet  Commonly known as: TYLENOL      Take 2 tablets by mouth Every 6 (Six) Hours As Needed for Moderate Pain.        albuterol sulfate  (90 Base) MCG/ACT inhaler  Commonly known as: PROVENTIL HFA;VENTOLIN HFA;PROAIR HFA      Inhale 2 puffs Every 4 (Four) Hours As Needed for Shortness of Air. PRN SOA/WHEEZING       aspirin 81 MG EC tablet      Take 1 tablet by mouth Daily.       atorvastatin 10 MG tablet  Commonly known as: LIPITOR      Take 1 tablet by mouth Daily.       Cholecalciferol 50 MCG (2000 UT) capsule      Take 50 mcg by mouth Daily.       digoxin 125 MCG tablet  Commonly known as: LANOXIN      Take 1 tablet by mouth Every Other Day.       DropSafe Alcohol Prep 70 % pads           glimepiride 2 MG tablet  Commonly known as: Amaryl      By mouth take one tab twice daily with AM and PM meals.       HYDROcodone-acetaminophen 7.5-325 MG per tablet  Commonly known as: NORCO      Take 1 tablet by mouth Every 6 (Six) Hours As Needed for Moderate Pain.       Januvia 50 MG tablet  Generic drug: SITagliptin           Lidocaine 4 %      Place 1 patch on the skin as directed by provider Daily. Remove & Discard patch within 12 hours or as directed by MD Paredes 0.01 % ophthalmic drops  Generic drug: bimatoprost      Administer 1 drop to both eyes Every Night.       polyethylene glycol 17 g packet  Commonly known as: MIRALAX      Take 17 g by mouth Daily As Needed (constipation).       vitamin D 1.25 MG (11279 UT) capsule capsule  Commonly known as: ERGOCALCIFEROL      Take 1 capsule by mouth 1 (One) Time Per Week.       warfarin 5 MG tablet  Commonly known as: COUMADIN      Take one-half tablet (2.5 mg) by mouth on Fridays, and take one tablet (5 mg) by mouth all other days or as directed.              STOP taking these medications      atenolol 25 MG tablet  Commonly known as: TENORMIN  Stopped by: Hannah Stroud                 Patient is no longer taking -.  I corrected the med list to reflect this.  I did not stop these medications.    Return in about 3 months (around 4/30/2025) for with Dr. Dueñas.      Dictated  utilizing Dragon dictation

## 2025-01-30 ENCOUNTER — TELEPHONE (OUTPATIENT)
Dept: CARDIOLOGY | Facility: CLINIC | Age: 80
End: 2025-01-30

## 2025-01-30 ENCOUNTER — OFFICE VISIT (OUTPATIENT)
Dept: CARDIOLOGY | Facility: CLINIC | Age: 80
End: 2025-01-30
Payer: MEDICARE

## 2025-01-30 VITALS
HEART RATE: 81 BPM | DIASTOLIC BLOOD PRESSURE: 80 MMHG | WEIGHT: 220.8 LBS | SYSTOLIC BLOOD PRESSURE: 130 MMHG | OXYGEN SATURATION: 98 % | HEIGHT: 66 IN | BODY MASS INDEX: 35.48 KG/M2

## 2025-01-30 DIAGNOSIS — I48.0 PAROXYSMAL ATRIAL FIBRILLATION: ICD-10-CM

## 2025-01-30 DIAGNOSIS — I25.10 CORONARY ARTERY DISEASE INVOLVING NATIVE CORONARY ARTERY OF NATIVE HEART WITHOUT ANGINA PECTORIS: Primary | ICD-10-CM

## 2025-01-30 DIAGNOSIS — I71.21 ANEURYSM OF ASCENDING AORTA WITHOUT RUPTURE: ICD-10-CM

## 2025-01-30 DIAGNOSIS — I27.20 PULMONARY HYPERTENSION: ICD-10-CM

## 2025-01-30 DIAGNOSIS — G47.33 OSA (OBSTRUCTIVE SLEEP APNEA): ICD-10-CM

## 2025-01-30 DIAGNOSIS — N28.9 RENAL INSUFFICIENCY: ICD-10-CM

## 2025-01-30 DIAGNOSIS — E11.65 TYPE 2 DIABETES MELLITUS WITH HYPERGLYCEMIA, WITHOUT LONG-TERM CURRENT USE OF INSULIN: ICD-10-CM

## 2025-01-30 DIAGNOSIS — I50.32 CHRONIC DIASTOLIC HEART FAILURE: ICD-10-CM

## 2025-01-30 DIAGNOSIS — I48.20 ATRIAL FIBRILLATION, CHRONIC: ICD-10-CM

## 2025-01-30 RX ORDER — METOPROLOL TARTRATE 50 MG
50 TABLET ORAL 2 TIMES DAILY
COMMUNITY

## 2025-01-30 RX ORDER — FUROSEMIDE 20 MG/1
20 TABLET ORAL DAILY PRN
COMMUNITY

## 2025-01-30 RX ORDER — LANOLIN ALCOHOL/MO/W.PET/CERES
6000 CREAM (GRAM) TOPICAL DAILY
COMMUNITY

## 2025-01-30 RX ORDER — POTASSIUM CHLORIDE 750 MG/1
10 CAPSULE, EXTENDED RELEASE ORAL 2 TIMES DAILY PRN
COMMUNITY

## 2025-01-30 NOTE — TELEPHONE ENCOUNTER
I spoke with pt and reviewed recommendations.  Med list updated with pt and medication bottles.  I also recommended that in the future, pt always bring updated med list or medication bottles to appointments.    Hannah- med list accurate except for the following:    No longer taking Vit D 24476 units weekly (taking a daily version instead)    Added the following to list:  Vitamin B12 6000 mcg daily (OTC, per epic, this is 200% of recommended daily dose)  Furosemide 20 mg QD PRN >8lb wt gain in 24 hour period  KCl 10 meq BID PRN only if taking furosemide  Metoprolol 50 mg BID  Januvia dose corrected    Mary Tavera, RN  Triage RN  01/30/25 13:31 EST

## 2025-01-30 NOTE — TELEPHONE ENCOUNTER
Please call her and confirm her meds.  She was discharged on atenolol but stated at appointment with me today that she was taking metoprolol.  What ever she is taking needs to continue but our medication list needs to be updated.

## 2025-02-20 ENCOUNTER — TELEPHONE (OUTPATIENT)
Dept: ORTHOPEDIC SURGERY | Facility: CLINIC | Age: 80
End: 2025-02-20

## 2025-02-20 NOTE — TELEPHONE ENCOUNTER
Caller: TERA GARNER    Relationship to patient: PATIENT     Best call back number: [phone]    Chief complaint: LEFT SHOULDER     Type of visit: FOLLOW UP     Requested date: ASAP      If rescheduling, when is the original appointment: 01/07/2025     Additional notes:PATIENT HAD TO CX APPOINTMENT 01/07/2025  BECAUSE OF LONG STAY IN THE HOSPITAL AND THEN  [facility] .  PREV LEFT SHOULDER INJECTION WITH DR BRUCE 10/08/2024.  PATIENT STATES WHILE SHE WAS IN [facility] SHE RECEIVED A LEFT SHOULDER INJECTION 01/19/2025.  PATIENT STATES SHE DID NOT GET ANY RELIEF FROM THIS INJECTION AND SHE DOESN'T KNOW WHAT IT WAS .  PATIENT WANTS TO KNOW IF SHE COMES IN FOR A FOLLOW UP WITH DR BRUCE NOW - CAN SHE GET ANOTHER INJECTION IN HER LEFT SHOULDER OR DOES SHE HAVE TO WAIT 3 MOS FROM DATE OF LAST INJECTION AT [facility]?

## 2025-02-21 NOTE — TELEPHONE ENCOUNTER
I spoke with [facility] who administered the injection at [place] and she received a Kenolog injection, posterior left shoulder on 1/23.

## 2025-02-21 NOTE — TELEPHONE ENCOUNTER
We really need to know what they injected and where if they injected by the rotator cuff then we would have to wait the 3 months

## 2025-02-21 NOTE — TELEPHONE ENCOUNTER
Thank you, does not say was it in the subacromial space?  My guess is yes in that case she will have to wait 3 months

## 2025-02-27 ENCOUNTER — ANTICOAGULATION VISIT (OUTPATIENT)
Dept: PHARMACY | Facility: HOSPITAL | Age: 80
End: 2025-02-27
Payer: MEDICARE

## 2025-02-27 LAB
INR PPP: 1.9 (ref 0.91–1.09)
PROTHROMBIN TIME: 22.4 SECONDS (ref 10–13.8)

## 2025-02-27 PROCEDURE — G0463 HOSPITAL OUTPT CLINIC VISIT: HCPCS

## 2025-02-27 PROCEDURE — 85610 PROTHROMBIN TIME: CPT

## 2025-02-27 NOTE — PROGRESS NOTES
Anticoagulation Clinic Progress Note    Anticoagulation Summary  As of 2025      INR goal:  2.0-3.0   TTR:  66.1% (6.1 y)   INR used for dosin.9 (2025)   Warfarin maintenance plan:  5 mg every day   Weekly warfarin total:  35 mg   Plan last modified:  Love Allison RPH (3/27/2023)   Next INR check:  3/13/2025   Priority:  Maintenance   Target end date:  Indefinite    Indications    Atrial fibrillation [I48.91]                 Anticoagulation Episode Summary       INR check location:  --    Preferred lab:  --    Send INR reminders to:   [facility] [place]    Comments:  --          Anticoagulation Care Providers       Provider Role Specialty Phone number    Pia Dueñas MD Referring Cardiology [phone]            Clinic Interview:  Patient Findings     Positives:  Change in health, Hospital admission    Negatives:  Signs/symptoms of thrombosis, Signs/symptoms of bleeding,   Laboratory test error suspected, Change in alcohol use, Change in   activity, Upcoming invasive procedure, Emergency department visit,   Upcoming dental procedure, Missed doses, Extra doses, Change in   medications, Change in diet/appetite, Bruising, Other complaints    Comments:  Hospital admission 25 - 25 for CHF and pneumonia.   He was discharged to [facility]. She reports being discharged home   ~25. She confirmed she returned to taking warfarin at dose of 5 mg   daily since returning home.       Clinical Outcomes     Negatives:  Major bleeding event, Thromboembolic event,   Anticoagulation-related hospital admission, Anticoagulation-related ED   visit, Anticoagulation-related fatality    Comments:  Hospital admission 25 - 25 for CHF and pneumonia.   He was discharged to [facility]. She reports being discharged home   ~25. She confirmed she returned to taking warfarin at dose of 5 mg   daily since returning home.         INR History:      Latest Ref Rng & Units 2025      6:33 AM 1/9/2025     5:01 AM 1/10/2025     6:39 AM 1/11/2025     3:37 AM 1/12/2025     4:59 AM 1/13/2025     4:19 AM 2/27/2025    11:15 AM   Anticoagulation Monitoring   INR        1.9   INR Date        2/27/2025   INR Goal        2.0-3.0   Trend        Same   Last Week Total        35 mg   Next Week Total        37.5 mg   Sun        5 mg   Mon        5 mg   Tue        5 mg   Wed        5 mg   Thu        7.5 mg (2/27); Otherwise 5 mg   Fri        5 mg   Sat        5 mg   Historical INR 0.90 - 1.10 2.54  2.72  2.69  2.66  2.50  2.34         Plan:  1. INR is Subtherapeutic today- see above in Anticoagulation Summary.  Will instruct Lana Nielsonb to Change their warfarin regimen (7.5 mg today, then resume 5 mg daily) - see above in Anticoagulation Summary.  2. Follow up in 2 weeks  3. Patient declines warfarin refills.  4. Verbal and written information provided. Patient expresses understanding and has no further questions at this time.    Shaw Hand, PharmD

## 2025-03-07 NOTE — PROGRESS NOTES
Patient: Lana Yañez  YOB: 1945  Date of Service: 3/7/2025    Chief Complaints:   Left shoulder pain  Subjective:    History of Present Illness: Pt is seen in the office today with complaints of left shoulder pain she has known degenerative changes she actually wanted to speak in a room without her  she wants to consider surgery on her left shoulder he does not want her to do it.  She had a heart attack and recently got out of the hospital.  I told her they would not let her do any kind of elective procedure for at least 6 months        Allergies:   Allergies   Allergen Reactions   • Metformin Diarrhea   • Contrast Dye (Echo Or Unknown Ct/Mr) Hives and Itching   • Dapagliflozin Headache and Swelling   • Macrobid [Nitrofurantoin] Hives   • Iodinated Contrast Media Hives       Medications:   Home Medications:  Current Outpatient Medications on File Prior to Visit   Medication Sig   • Accu-Chek FastClix Lancets misc Use to check glucose as directed   • acetaminophen (TYLENOL) 325 MG tablet Take 2 tablets by mouth Every 6 (Six) Hours As Needed for Moderate Pain.   • albuterol sulfate  (90 Base) MCG/ACT inhaler Inhale 2 puffs Every 4 (Four) Hours As Needed for Shortness of Air. PRN SOA/WHEEZING   • Alcohol Swabs (DropSafe Alcohol Prep) 70 % pads    • aspirin 81 MG EC tablet Take 1 tablet by mouth Daily.   • atorvastatin (LIPITOR) 10 MG tablet Take 1 tablet by mouth Daily.   • Blood Glucose Monitoring Suppl (ACCU-CHEK GUIDE) w/Device kit See Admin Instructions.   • Cholecalciferol 50 MCG (2000 UT) capsule Take 50 mcg by mouth Daily.   • digoxin (LANOXIN) 125 MCG tablet Take 1 tablet by mouth Every Other Day.   • furosemide (LASIX) 20 MG tablet Take 1 tablet by mouth Daily As Needed (for >8lb wt gain in 24 hours).   • glimepiride (Amaryl) 2 MG tablet By mouth take one tab twice daily with AM and PM meals.   • glucose blood test strip Test twice daily prior to breakfast and dinner as  instructed (Patient taking differently: Test daily prior to breakfast)   • HYDROcodone-acetaminophen (NORCO) 7.5-325 MG per tablet Take 1 tablet by mouth Every 6 (Six) Hours As Needed for Moderate Pain.   • Januvia 50 MG tablet Take 2 tablets by mouth Daily.   • Lidocaine 4 % Place 1 patch on the skin as directed by provider Daily. Remove & Discard patch within 12 hours or as directed by MD   • Lumigan 0.01 % ophthalmic drops Administer 1 drop to both eyes At Night As Needed (pt states that she is supposed to use nightly, but only uses PRN).   • metoprolol tartrate (LOPRESSOR) 50 MG tablet Take 1 tablet by mouth 2 (Two) Times a Day.   • polyethylene glycol (MIRALAX) 17 g packet Take 17 g by mouth Daily As Needed (constipation).   • potassium chloride (MICRO-K) 10 MEQ CR capsule Take 1 capsule by mouth 2 (Two) Times a Day As Needed (only on days when taking PRN furosemide).   • vitamin B-12 (CYANOCOBALAMIN) 1000 MCG tablet Take 6 tablets by mouth Daily. Pt states that she has OTC 3000mcg tablets, and she takes 2 of these daily   • warfarin (COUMADIN) 5 MG tablet Take one-half tablet (2.5 mg) by mouth on Fridays, and take one tablet (5 mg) by mouth all other days or as directed.     No current facility-administered medications on file prior to visit.     Current Medications:  Scheduled Meds:  Continuous Infusions:No current facility-administered medications for this visit.    PRN Meds:.    I have reviewed the patient's medical history in detail and updated the computerized patient record.  Review and summarization of old records include:    Past Medical History:   Diagnosis Date   • Acute on chronic diastolic heart failure    • Acute UTI (urinary tract infection) 05/22/2022   • Allergic reaction 02/09/2018   • Arthritis    • Arthritis of back    • Arthritis of neck    • Asthma    • Atrial fibrillation     Persistent; on warfarin   • Atypical chest pain    • Biliary acute pancreatitis without necrosis or infection  11/10/2021   • Bronchitis    • Cellulitis of right elbow     due to MRSA   • Cervical disc disorder    • CHF (congestive heart failure)    • COPD (chronic obstructive pulmonary disease)    • Coronary artery disease     Cardiac catheterization completed; 90% PDA stenosis with medical management recommended   • Coronary artery disease involving native coronary artery of native heart with angina pectoris with documented spasm    • CTS (carpal tunnel syndrome)    • Diabetes 1.5, managed as type 2    • Disease of thyroid gland    • Displacement of lumbar intervertebral disc without myelopathy    • Dizziness    • ANTOINE (dyspnea on exertion)    • Essential hypertension    • Fatigue    • Fracture, foot    • Generalized osteoarthritis of multiple sites    • History of rheumatic fever    • History of transfusion    • Hx of bone density study     10/23/2014   • Hyperglycemia    • Hyperlipidemia    • Hypovitaminosis D    • Knee swelling    • Left arm pain    • Left-sided weakness    • Leg swelling    • Low back pain    • Lower extremity edema    • Lumbosacral disc disease    • Malaise and fatigue    • Mitral valve disease     Moderate mitral valve prolapse and moderate mitral regurgitation   • Mitral valve insufficiency    • Morbid obesity with BMI of 40.0-44.9, adult    • MRSA infection    • NSTEMI (non-ST elevated myocardial infarction)    • PAF (paroxysmal atrial fibrillation)    • Periarthritis of shoulder    • Pneumonia 1/1/2025   • RA (rheumatoid arthritis)     involving both hands   • Rotator cuff syndrome    • Sleep apnea    • SOB (shortness of breath)    • Stroke     left side weakness   • Urge incontinence of urine    • UTI (urinary tract infection) 05/2020   • Visit for screening mammogram 04/02/2018   • Vitamin D deficiency         Past Surgical History:   Procedure Laterality Date   • BREAST BIOPSY     • BREAST SURGERY      right side lumpectomy with biopsy   • CARDIAC CATHETERIZATION Left 10/20/2017    Procedure:  Cardiac Catheterization/Vascular Study;  Surgeon: Alphonso Olmedo MD;  Location: [facility] CATH INVASIVE LOCATION;  Service:    • CARDIAC CATHETERIZATION N/A 10/20/2017    +2 mitral regurgitation, left main 10% stenosis, mid to distal LAD 10% diffuse stenosis, circumflex 10% diffuse stenosis, RCA 10% proximal stenosis, and distal PDA consistent with coronary embolus with a lesion of 90% too small to consider coronary intervention; medical management recommended   • EYE SURGERY      laser surgery for glaucoma and left eye cataracts removed   • HYSTERECTOMY      10+ years ago   • JOINT REPLACEMENT  ;     bilateral knees and left rotater   • KNEE SURGERY     • MAMMO BILATERAL  2016    [facility]    • SHOULDER SURGERY          Social History     Occupational History   • 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     Employer: RETIRED   Tobacco Use   • Smoking status: Former     Current packs/day: 0.00     Average packs/day: 3.0 packs/day for 1.4 years (4.3 ttl pk-yrs)     Types: Cigarettes     Start date: 1967     Quit date: 10/19/1968     Years since quittin.4     Passive exposure: Never   • Smokeless tobacco: Never   • Tobacco comments:     caffeine use - decaf coffee   Vaping Use   • Vaping status: Never Used   Substance and Sexual Activity   • Alcohol use: No     Comment: No caffeine use   • Drug use: No   • Sexual activity: Defer      Social History     Social History Narrative   • Not on file        Family History   Problem Relation Age of Onset   • Colon cancer Mother    • Glaucoma Mother    • Stroke Mother    • Arthritis Mother    • Hypertension Mother    • Glaucoma Sister    • Diabetes Sister    • Heart disease Sister    • Arthritis Sister    • Asthma Sister    • Hypertension Sister    • Miscarriages / Stillbirths Sister    • Lung disease Sister    • Heart disease Brother    • Diabetes Brother    • Arthritis Brother    • Drug abuse Brother    • Hypertension Brother    • Arthritis Father     • COPD Father    • Lung disease Father    • Arthritis Daughter    • Depression Daughter    • Alcohol abuse Maternal Uncle    • Heart disease Sister    • Heart disease Sister    • Heart disease Brother    • Rheumatologic disease Sister        ROS: 14 point review of systems was performed and was negative except for documented findings in HPI and today's encounter.     Allergies:   Allergies   Allergen Reactions   • Metformin Diarrhea   • Contrast Dye (Echo Or Unknown Ct/Mr) Hives and Itching   • Dapagliflozin Headache and Swelling   • Macrobid [Nitrofurantoin] Hives   • Iodinated Contrast Media Hives     Constitutional:  Denies fever, shaking or chills   Eyes:  Denies change in visual acuity   HENT:  Denies nasal congestion or sore throat   Respiratory:  Denies cough or shortness of breath   Cardiovascular:  Denies chest pain or severe LE edema   GI:  Denies abdominal pain, nausea, vomiting, bloody stools or diarrhea   Musculoskeletal:  Numbness, tingling, or loss of motor function only as noted above in history of present illness.  : Denies painful urination or hematuria  Integument:  Denies rash, lesion or ulceration   Neurologic:  Denies headache or focal weakness  Endocrine:  Denies lymphadenopathy  Psych:  Denies confusion or change in mental status   Hem:  Denies active bleeding      Physical Exam: 79 y.o. female  Wt Readings from Last 3 Encounters:   01/30/25 100 kg (220 lb 12.8 oz)   01/13/25 105 kg (231 lb 7.7 oz)   10/23/24 113 kg (250 lb)       There is no height or weight on file to calculate BMI.    There were no vitals filed for this visit.  Vital signs reviewed.   General Appearance:    Alert, cooperative, in no acute distress                    Ortho exam    Exam is unchanged             Assessment: Left shoulder OA she really does want to talk about surgery but I told her with a recent heart attack and nobody would clear her for at least 6 months.  She sees her cardiologist in a few weeks I told  her to talk to her cardiologist and see if they would clear her in the near future if so I would have her see Dr. Diaz if it is just injections that she wants she understands she will see el    Plan: As above  Follow up as indicated.  Ice, elevate, and rest as needed.  Discussed conservative measures of pain control including ice, bracing.  Also talked about the importance of strengthening and maintaining ideal body weight    Large Joint Arthrocentesis: L glenohumeral  Date/Time: 4/22/2025 1:10 PM  Consent given by: patient  Site marked: site marked  Timeout: Immediately prior to procedure a time out was called to verify the correct patient, procedure, equipment, support staff and site/side marked as required   Supporting Documentation  Indications: pain   Procedure Details  Location: shoulder - L glenohumeral  Preparation: Patient was prepped and draped in the usual sterile fashion  Needle gauge: 21G.  Approach: posterior  Medications administered: 1 mL methylPREDNISolone acetate 80 MG/ML; 2 mL lidocaine PF 1% 1 %  Patient tolerance: patient tolerated the procedure well with no immediate complications      Asya Rodriges M.D.

## 2025-04-04 ENCOUNTER — TELEPHONE (OUTPATIENT)
Dept: CARDIOLOGY | Age: 80
End: 2025-04-04
Payer: MEDICARE

## 2025-04-04 NOTE — TELEPHONE ENCOUNTER
Patient is calling because she has been driving to the med management clinic to have her INR checked for the last 3 years, but she states she just can't drive out here anymore because it is too far away, she's 79, and her  has lung cancer. She is asking if there is some kind of home care she can do to have her INR checked.     Lillian Moise RN  Triage [facility]    Detail Level: Simple Detail Level: Zone Detail Level: Detailed Patient Specific Counseling (Will Not Stick From Patient To Patient): Scabies has completely resolved at this time, no need for further treatment

## 2025-04-11 ENCOUNTER — ANTICOAGULATION VISIT (OUTPATIENT)
Dept: PHARMACY | Facility: HOSPITAL | Age: 80
End: 2025-04-11
Payer: MEDICARE

## 2025-04-11 DIAGNOSIS — I48.11 LONGSTANDING PERSISTENT ATRIAL FIBRILLATION: Primary | Chronic | ICD-10-CM

## 2025-04-11 LAB
INR PPP: 2.2 (ref 0.91–1.09)
PROTHROMBIN TIME: 26.7 SECONDS (ref 10–13.8)

## 2025-04-11 PROCEDURE — G0463 HOSPITAL OUTPT CLINIC VISIT: HCPCS

## 2025-04-11 PROCEDURE — 85610 PROTHROMBIN TIME: CPT | Performed by: INTERNAL MEDICINE

## 2025-04-11 NOTE — PROGRESS NOTES
I have supervised and reviewed the notes, assessments, and/or procedures performed. I personally performed the assessment and implemented the plan. I concur with the documentation of this patient encounter.    Shaw Hand, PharmD

## 2025-04-11 NOTE — PROGRESS NOTES
Anticoagulation Clinic Progress Note    Anticoagulation Summary  As of 2025      INR goal:  2.0-3.0   TTR:  66.1% (6.3 y)   INR used for dosin.2 (2025)   Warfarin maintenance plan:  5 mg every day   Weekly warfarin total:  35 mg   No change documented:  Ash Espinosa, Pharmacy Technician   Plan last modified:  Love Allison RPH (3/27/2023)   Next INR check:  2025   Priority:  Maintenance   Target end date:  Indefinite    Indications    Atrial fibrillation [I48.91]                 Anticoagulation Episode Summary       INR check location:  --    Preferred lab:  --    Send INR reminders to:   [facility]    Comments:  --          Anticoagulation Care Providers       Provider Role Specialty Phone number    Pia Dueñas MD Referring Cardiology [phone]            Clinic Interview:  Patient Findings     Negatives:  Signs/symptoms of thrombosis, Signs/symptoms of bleeding,   Laboratory test error suspected, Change in health, Change in alcohol use,   Change in activity, Upcoming invasive procedure, Emergency department   visit, Upcoming dental procedure, Missed doses, Extra doses, Change in   medications, Change in diet/appetite, Hospital admission, Bruising, Other   complaints      Clinical Outcomes     Negatives:  Major bleeding event, Thromboembolic event,   Anticoagulation-related hospital admission, Anticoagulation-related ED   visit, Anticoagulation-related fatality        INR History:      Latest Ref Rng & Units 2025     5:01 AM 1/10/2025     6:39 AM 2025     3:37 AM 2025     4:59 AM 2025     4:19 AM 2025    11:15 AM 2025     1:30 PM   Anticoagulation Monitoring   INR       1.9 2.2   INR Date       2025   INR Goal       2.0-3.0 2.0-3.0   Trend       Same Same   Last Week Total       35 mg 35 mg   Next Week Total       37.5 mg 35 mg   Sun       5 mg 5 mg   Mon       5 mg 5 mg   Tue       5 mg 5 mg   Wed       5 mg 5 mg   Thu        7.5 mg (2/27); Otherwise 5 mg 5 mg   Fri       5 mg 5 mg   Sat       5 mg 5 mg   Historical INR 0.90 - 1.10 2.72  2.69  2.66  2.50  2.34          Plan:  1. INR is therapeutic today- see above in Anticoagulation Summary.   Will instruct Lana Nielsonb to continue their warfarin regimen- see above in Anticoagulation Summary.  2. Follow up in 4 weeks.  3. Patient desires warfarin refills.  4. Verbal and written information provided. Patient expresses understanding and has no further questions at this time.    Ash Espinosa, Pharmacy Technician

## 2025-04-15 ENCOUNTER — TRANSCRIBE ORDERS (OUTPATIENT)
Dept: ADMINISTRATIVE | Facility: HOSPITAL | Age: 80
End: 2025-04-15
Payer: MEDICARE

## 2025-04-15 ENCOUNTER — TELEPHONE (OUTPATIENT)
Dept: ORTHOPEDIC SURGERY | Facility: CLINIC | Age: 80
End: 2025-04-15

## 2025-04-15 DIAGNOSIS — Z12.31 VISIT FOR SCREENING MAMMOGRAM: Primary | ICD-10-CM

## 2025-04-22 ENCOUNTER — OFFICE VISIT (OUTPATIENT)
Dept: ORTHOPEDIC SURGERY | Facility: CLINIC | Age: 80
End: 2025-04-22
Payer: MEDICARE

## 2025-04-22 VITALS — HEIGHT: 66 IN | WEIGHT: 242 LBS | TEMPERATURE: 98 F | BODY MASS INDEX: 38.89 KG/M2

## 2025-04-22 DIAGNOSIS — M19.012 PRIMARY LOCALIZED OSTEOARTHROSIS OF LEFT SHOULDER REGION: Primary | ICD-10-CM

## 2025-04-22 PROCEDURE — 1159F MED LIST DOCD IN RCRD: CPT | Performed by: ORTHOPAEDIC SURGERY

## 2025-04-22 PROCEDURE — 20610 DRAIN/INJ JOINT/BURSA W/O US: CPT | Performed by: ORTHOPAEDIC SURGERY

## 2025-04-22 PROCEDURE — 1160F RVW MEDS BY RX/DR IN RCRD: CPT | Performed by: ORTHOPAEDIC SURGERY

## 2025-04-22 RX ORDER — LIDOCAINE HYDROCHLORIDE 10 MG/ML
2 INJECTION, SOLUTION EPIDURAL; INFILTRATION; INTRACAUDAL; PERINEURAL
Status: COMPLETED | OUTPATIENT
Start: 2025-04-22 | End: 2025-04-22

## 2025-04-22 RX ORDER — METHYLPREDNISOLONE ACETATE 80 MG/ML
1 INJECTION, SUSPENSION INTRA-ARTICULAR; INTRALESIONAL; INTRAMUSCULAR; SOFT TISSUE
Status: COMPLETED | OUTPATIENT
Start: 2025-04-22 | End: 2025-04-22

## 2025-04-22 RX ADMIN — LIDOCAINE HYDROCHLORIDE 2 ML: 10 INJECTION, SOLUTION EPIDURAL; INFILTRATION; INTRACAUDAL; PERINEURAL at 13:10

## 2025-04-22 RX ADMIN — METHYLPREDNISOLONE ACETATE 1 ML: 80 INJECTION, SUSPENSION INTRA-ARTICULAR; INTRALESIONAL; INTRAMUSCULAR; SOFT TISSUE at 13:10

## 2025-05-06 ENCOUNTER — TRANSCRIBE ORDERS (OUTPATIENT)
Dept: LAB | Facility: HOSPITAL | Age: 80
End: 2025-05-06
Payer: MEDICARE

## 2025-05-06 ENCOUNTER — OFFICE VISIT (OUTPATIENT)
Dept: CARDIOLOGY | Age: 80
End: 2025-05-06
Payer: MEDICARE

## 2025-05-06 ENCOUNTER — LAB (OUTPATIENT)
Dept: LAB | Facility: HOSPITAL | Age: 80
End: 2025-05-06
Payer: MEDICARE

## 2025-05-06 VITALS
SYSTOLIC BLOOD PRESSURE: 132 MMHG | HEART RATE: 86 BPM | BODY MASS INDEX: 39.05 KG/M2 | WEIGHT: 243 LBS | HEIGHT: 66 IN | DIASTOLIC BLOOD PRESSURE: 88 MMHG

## 2025-05-06 DIAGNOSIS — I50.32 CHRONIC DIASTOLIC HEART FAILURE: ICD-10-CM

## 2025-05-06 DIAGNOSIS — I27.20 PULMONARY HYPERTENSION: ICD-10-CM

## 2025-05-06 DIAGNOSIS — I48.11 LONGSTANDING PERSISTENT ATRIAL FIBRILLATION: Chronic | ICD-10-CM

## 2025-05-06 DIAGNOSIS — I10 ESSENTIAL HYPERTENSION, MALIGNANT: ICD-10-CM

## 2025-05-06 DIAGNOSIS — E78.2 MIXED HYPERLIPIDEMIA: ICD-10-CM

## 2025-05-06 DIAGNOSIS — I10 ESSENTIAL HYPERTENSION: ICD-10-CM

## 2025-05-06 DIAGNOSIS — I50.32 CHRONIC DIASTOLIC HEART FAILURE: Primary | ICD-10-CM

## 2025-05-06 DIAGNOSIS — I48.20 CHRONIC ATRIAL FIBRILLATION: ICD-10-CM

## 2025-05-06 DIAGNOSIS — E11.9 DIABETES MELLITUS WITHOUT COMPLICATION: ICD-10-CM

## 2025-05-06 DIAGNOSIS — I48.11 LONGSTANDING PERSISTENT ATRIAL FIBRILLATION: ICD-10-CM

## 2025-05-06 DIAGNOSIS — R06.09 DOE (DYSPNEA ON EXERTION): ICD-10-CM

## 2025-05-06 DIAGNOSIS — G47.33 OBSTRUCTIVE SLEEP APNEA SYNDROME: ICD-10-CM

## 2025-05-06 DIAGNOSIS — I48.20 CHRONIC ATRIAL FIBRILLATION: Primary | ICD-10-CM

## 2025-05-06 DIAGNOSIS — I25.10 CORONARY ARTERY DISEASE INVOLVING NATIVE CORONARY ARTERY OF NATIVE HEART WITHOUT ANGINA PECTORIS: Chronic | ICD-10-CM

## 2025-05-06 DIAGNOSIS — E11.9 DIABETES MELLITUS WITHOUT COMPLICATION: Primary | ICD-10-CM

## 2025-05-06 DIAGNOSIS — E78.2 MIXED HYPERLIPIDEMIA: Chronic | ICD-10-CM

## 2025-05-06 LAB
ALBUMIN SERPL-MCNC: 4.2 G/DL (ref 3.5–5.2)
ALBUMIN/GLOB SERPL: 1.2 G/DL
ALP SERPL-CCNC: 76 U/L (ref 39–117)
ALT SERPL W P-5'-P-CCNC: 10 U/L (ref 1–33)
ANION GAP SERPL CALCULATED.3IONS-SCNC: 9.8 MMOL/L (ref 5–15)
AST SERPL-CCNC: 19 U/L (ref 1–32)
BILIRUB SERPL-MCNC: 0.8 MG/DL (ref 0–1.2)
BUN SERPL-MCNC: 17 MG/DL (ref 8–23)
BUN/CREAT SERPL: 16.2 (ref 7–25)
CALCIUM SPEC-SCNC: 10 MG/DL (ref 8.6–10.5)
CHLORIDE SERPL-SCNC: 105 MMOL/L (ref 98–107)
CHOLEST SERPL-MCNC: 116 MG/DL (ref 0–200)
CO2 SERPL-SCNC: 25.2 MMOL/L (ref 22–29)
CREAT SERPL-MCNC: 1.05 MG/DL (ref 0.57–1)
DEPRECATED RDW RBC AUTO: 47.5 FL (ref 37–54)
EGFRCR SERPLBLD CKD-EPI 2021: 54.8 ML/MIN/1.73
ERYTHROCYTE [DISTWIDTH] IN BLOOD BY AUTOMATED COUNT: 14 % (ref 12.3–15.4)
GLOBULIN UR ELPH-MCNC: 3.5 GM/DL
GLUCOSE SERPL-MCNC: 136 MG/DL (ref 65–99)
HBA1C MFR BLD: 7 % (ref 4.8–5.6)
HCT VFR BLD AUTO: 41.7 % (ref 34–46.6)
HDLC SERPL-MCNC: 71 MG/DL (ref 40–60)
HGB BLD-MCNC: 13.5 G/DL (ref 12–15.9)
INR PPP: 2.13 (ref 0.9–1.1)
LDLC SERPL CALC-MCNC: 31 MG/DL (ref 0–100)
LDLC/HDLC SERPL: 0.45 {RATIO}
MCH RBC QN AUTO: 30.2 PG (ref 26.6–33)
MCHC RBC AUTO-ENTMCNC: 32.4 G/DL (ref 31.5–35.7)
MCV RBC AUTO: 93.3 FL (ref 79–97)
NT-PROBNP SERPL-MCNC: 929 PG/ML (ref 0–1800)
PLATELET # BLD AUTO: 151 10*3/MM3 (ref 140–450)
PMV BLD AUTO: 10.3 FL (ref 6–12)
POTASSIUM SERPL-SCNC: 4.5 MMOL/L (ref 3.5–5.2)
PROT SERPL-MCNC: 7.7 G/DL (ref 6–8.5)
PROTHROMBIN TIME: 23.9 SECONDS (ref 11.7–14.2)
RBC # BLD AUTO: 4.47 10*6/MM3 (ref 3.77–5.28)
SODIUM SERPL-SCNC: 140 MMOL/L (ref 136–145)
TRIGL SERPL-MCNC: 64 MG/DL (ref 0–150)
VLDLC SERPL-MCNC: 14 MG/DL (ref 5–40)
WBC NRBC COR # BLD AUTO: 6.97 10*3/MM3 (ref 3.4–10.8)

## 2025-05-06 PROCEDURE — 85027 COMPLETE CBC AUTOMATED: CPT

## 2025-05-06 PROCEDURE — 83880 ASSAY OF NATRIURETIC PEPTIDE: CPT

## 2025-05-06 PROCEDURE — 85610 PROTHROMBIN TIME: CPT

## 2025-05-06 PROCEDURE — 80053 COMPREHEN METABOLIC PANEL: CPT

## 2025-05-06 PROCEDURE — 80061 LIPID PANEL: CPT

## 2025-05-06 PROCEDURE — 83036 HEMOGLOBIN GLYCOSYLATED A1C: CPT

## 2025-05-06 PROCEDURE — 36415 COLL VENOUS BLD VENIPUNCTURE: CPT

## 2025-05-06 NOTE — PROGRESS NOTES
Date of Office Visit: 2025  Encounter Provider: Pia Dueñas MD  Place of Service: [facility] CARDIOLOGY  Patient Name: Lana Yañez  :1945    Chief complaint  Coronary artery disease, pulmonary hypertension, atrial fibrillation, mitral valve prolapse with moderate mitral regurgitation, dilated ascending aorta    History of Present Illness  The patient is a 80 yo female with with history of retention, hyperlipidemia, rheumatoid arthritis, obstructive sleep apnea who in early October was found to be in atrial fibrillation with rapid ventricular rates and mild diastolic heart failure.  She was placed on IV heparin and Pradaxa.  Echocardiogram revealed normal systolic function mild left ventricular hypertrophy, moderate atrial enlargement with aortic valve sclerosis and moderate pulmonary hypertension and moderate tricuspid regurgitation.  The RV systolic pressure is 54 mmHg.  She had a stress perfusion study that was negative for ischemia and a CT and her grandmother revealed a mildly dilated aorta without pulmonary emboli.  She then presented several days later with an acute stroke.  CLARIBEL was not pursued as it was felt to be embolic in nature.  However on  and she was diaphoretic and was found to have a non-ST elevation myocardial infarction.  This was on full dose Pradaxa which was not held.  CLARIBEL was pursued that revealed normal systolic function with moderate mitral valve prolapse without significant regurgitation.  There was right-sided spontaneous echo contrast without thrombus formation.  Cardiac catheterization revealed normal systolic function with 2+ mitral regurgitation, mild coronary disease except for a 90% stenosis of the distal PDA which was felt to be too small to be amenable PCI.  She was treated medically and switched to Coumadin. She has struggled with intermittent heart failure dietary indiscretions with salt since then.  She also had a  24-hour Holter that revealed persistent atrial fibrillation with reasonable rate control and frequent PVCs. No other arrhythmia was present.  Last echocardiogram in August 2021 showed an ejection fraction of 62% with moderate left ventricular hypertrophy with indeterminate diastolic function, mild aortic regurgitation, mitral valve thickening with moderate regurgitation without stenosis, severe tricuspid regurgitation with an RV systolic pressure 48 mmHg is present.  CT angiogram of the chest 9/2021 showed stable ascending aorta measuring 4.1 cm.  Lexiscan cardiac stress test 2/2022 was negative for ischemia.  On 12/2024 she was admitted with edema and upper respiratory symptoms.  It was found that she had not been taking torsemide due to polyuria.  She is also been eating a lot of restaurant food.  She was started back on torsemide and Aldactone but this was stopped prior to discharge.  Echo on 10/2024 showed ejection fraction 59%, left ventricular hypertrophy with indeterminate diastolic function severe biatrial enlargement, mild to moderate aortic valve regurgitation mild mitral regurgitation RVSP 45 mmHg in setting of mild tricuspid valve regurgitation.  She also developed acute renal insufficiency and has been followed by nephrology as an outpatient.    Patient states that she has palpitations that occur once a month last less than 5 minutes they are not too bothersome for her.  They are mild quite sporadic.  They are not exertional.  She does have dyspnea on exertion when she walks around the house and does vigorous this.  She has had slight worsening edema.  She has dizziness when.  She takes Lasix once every other week when there is an 8 pound weight gain.    Past Medical History:   Diagnosis Date   • Acute on chronic diastolic heart failure    • Acute UTI (urinary tract infection) 05/22/2022   • Allergic reaction 02/09/2018   • Arthritis    • Arthritis of back    • Arthritis of neck    • Asthma    • Atrial  fibrillation     Persistent; on warfarin   • Atypical chest pain    • Biliary acute pancreatitis without necrosis or infection 11/10/2021   • Bronchitis    • Cellulitis of right elbow     due to MRSA   • Cervical disc disorder    • CHF (congestive heart failure)    • COPD (chronic obstructive pulmonary disease)    • Coronary artery disease     Cardiac catheterization completed; 90% PDA stenosis with medical management recommended   • Coronary artery disease involving native coronary artery of native heart with angina pectoris with documented spasm    • CTS (carpal tunnel syndrome)    • Diabetes 1.5, managed as type 2    • Disease of thyroid gland    • Displacement of lumbar intervertebral disc without myelopathy    • Dizziness    • ANTOINE (dyspnea on exertion)    • Essential hypertension    • Fatigue    • Fracture, foot    • Generalized osteoarthritis of multiple sites    • History of rheumatic fever    • History of transfusion    • Hx of bone density study     10/23/2014   • Hyperglycemia    • Hyperlipidemia    • Hypovitaminosis D    • Knee swelling    • Left arm pain    • Left-sided weakness    • Leg swelling    • Low back pain    • Lower extremity edema    • Lumbosacral disc disease    • Malaise and fatigue    • Mitral valve disease     Moderate mitral valve prolapse and moderate mitral regurgitation   • Mitral valve insufficiency    • Morbid obesity with BMI of 40.0-44.9, adult    • MRSA infection    • NSTEMI (non-ST elevated myocardial infarction)    • PAF (paroxysmal atrial fibrillation)    • Periarthritis of shoulder    • Pneumonia 1/1/2025   • RA (rheumatoid arthritis)     involving both hands   • Rotator cuff syndrome    • Sleep apnea    • SOB (shortness of breath)    • Stroke     left side weakness   • Urge incontinence of urine    • UTI (urinary tract infection) 05/2020   • Visit for screening mammogram 04/02/2018   • Vitamin D deficiency      Past Surgical History:   Procedure Laterality Date   • BREAST  BIOPSY     • BREAST SURGERY      right side lumpectomy with biopsy   • CARDIAC CATHETERIZATION Left 10/20/2017    Procedure: Cardiac Catheterization/Vascular Study;  Surgeon: Alphonso Olmedo MD;  Location: [facility] CATH INVASIVE LOCATION;  Service:    • CARDIAC CATHETERIZATION N/A 10/20/2017    +2 mitral regurgitation, left main 10% stenosis, mid to distal LAD 10% diffuse stenosis, circumflex 10% diffuse stenosis, RCA 10% proximal stenosis, and distal PDA consistent with coronary embolus with a lesion of 90% too small to consider coronary intervention; medical management recommended   • EYE SURGERY      laser surgery for glaucoma and left eye cataracts removed   • HYSTERECTOMY      10+ years ago   • JOINT REPLACEMENT  2005; 2006    bilateral knees and left rotater   • KNEE SURGERY     • MAMMO BILATERAL  2016    [facility]    • SHOULDER SURGERY       Outpatient Medications Prior to Visit   Medication Sig Dispense Refill   • Accu-Chek FastClix Lancets misc Use to check glucose as directed 306 each 1   • acetaminophen (TYLENOL) 325 MG tablet Take 2 tablets by mouth Every 6 (Six) Hours As Needed for Moderate Pain.     • albuterol sulfate  (90 Base) MCG/ACT inhaler Inhale 2 puffs Every 4 (Four) Hours As Needed for Shortness of Air. PRN SOA/WHEEZING 18 g 11   • Alcohol Swabs (DropSafe Alcohol Prep) 70 % pads      • aspirin 81 MG EC tablet Take 1 tablet by mouth Daily.     • atorvastatin (LIPITOR) 10 MG tablet Take 1 tablet by mouth Daily. 90 tablet 1   • Blood Glucose Monitoring Suppl (ACCU-CHEK GUIDE) w/Device kit See Admin Instructions.     • Cholecalciferol 50 MCG (2000 UT) capsule Take 50 mcg by mouth Daily.     • digoxin (LANOXIN) 125 MCG tablet Take 1 tablet by mouth Every Other Day. 45 tablet 2   • furosemide (LASIX) 20 MG tablet Take 1 tablet by mouth Daily As Needed (for >8lb wt gain in 24 hours).     • glimepiride (Amaryl) 2 MG tablet By mouth take one tab twice daily with AM and PM meals. 180 tablet 2    • glucose blood test strip Test twice daily prior to breakfast and dinner as instructed (Patient taking differently: Test daily prior to breakfast) 200 each 12   • HYDROcodone-acetaminophen (NORCO) 7.5-325 MG per tablet Take 1 tablet by mouth Every 6 (Six) Hours As Needed for Moderate Pain. 12 tablet 0   • Januvia 50 MG tablet Take 2 tablets by mouth Daily.     • Lidocaine 4 % Place 1 patch on the skin as directed by provider Daily. Remove & Discard patch within 12 hours or as directed by MD     • Lumigan 0.01 % ophthalmic drops Administer 1 drop to both eyes At Night As Needed (pt states that she is supposed to use nightly, but only uses PRN).     • metoprolol tartrate (LOPRESSOR) 50 MG tablet Take 1 tablet by mouth 2 (Two) Times a Day.     • polyethylene glycol (MIRALAX) 17 g packet Take 17 g by mouth Daily As Needed (constipation).     • potassium chloride (MICRO-K) 10 MEQ CR capsule Take 1 capsule by mouth 2 (Two) Times a Day As Needed (only on days when taking PRN furosemide).     • vitamin B-12 (CYANOCOBALAMIN) 1000 MCG tablet Take 6 tablets by mouth Daily. Pt states that she has OTC 3000mcg tablets, and she takes 2 of these daily     • warfarin (COUMADIN) 5 MG tablet Take one-half tablet (2.5 mg) by mouth on Fridays, and take one tablet (5 mg) by mouth all other days or as directed. 85 tablet 1     No facility-administered medications prior to visit.       Allergies as of 05/06/2025 - Reviewed 05/06/2025   Allergen Reaction Noted   • Metformin Diarrhea 08/11/2024   • Contrast dye (echo or unknown ct/mr) Hives and Itching 01/04/2018   • Dapagliflozin Headache and Swelling 06/13/2024   • Macrobid [nitrofurantoin] Hives 03/21/2023   • Iodinated contrast media Hives 02/20/2018     Social History     Socioeconomic History   • Marital status:      Spouse name: Suresh   • Number of children: 5   • Years of education: 13   Tobacco Use   • Smoking status: Former     Current packs/day: 0.00     Average  packs/day: 3.0 packs/day for 1.4 years (4.3 ttl pk-yrs)     Types: Cigarettes     Start date: 1967     Quit date: 10/19/1968     Years since quittin.6     Passive exposure: Never   • Smokeless tobacco: Never   • Tobacco comments:     caffeine use - decaf coffee   Vaping Use   • Vaping status: Never Used   Substance and Sexual Activity   • Alcohol use: No     Comment: No caffeine use   • Drug use: No   • Sexual activity: Defer     Family History   Problem Relation Age of Onset   • Colon cancer Mother    • Glaucoma Mother    • Stroke Mother    • Arthritis Mother    • Hypertension Mother    • Glaucoma Sister    • Diabetes Sister    • Heart disease Sister    • Arthritis Sister    • Asthma Sister    • Hypertension Sister    • Miscarriages / Stillbirths Sister    • Lung disease Sister    • Heart disease Brother    • Diabetes Brother    • Arthritis Brother    • Drug abuse Brother    • Hypertension Brother    • Arthritis Father    • COPD Father    • Lung disease Father    • Arthritis Daughter    • Depression Daughter    • Alcohol abuse Maternal Uncle    • Heart disease Sister    • Heart disease Sister    • Heart disease Brother    • Rheumatologic disease Sister      Review of Systems   Constitutional: Positive for malaise/fatigue. Negative for chills, fever, weight gain and weight loss.   Cardiovascular:  Positive for dyspnea on exertion and palpitations. Negative for leg swelling.   Respiratory:  Negative for cough, snoring and wheezing.    Hematologic/Lymphatic: Negative for bleeding problem. Does not bruise/bleed easily.   Skin:  Negative for color change.   Musculoskeletal:  Positive for joint pain. Negative for falls.   Gastrointestinal:  Negative for melena.   Genitourinary:  Negative for hematuria.   Neurological:  Negative for excessive daytime sleepiness.   Psychiatric/Behavioral:  Negative for depression. The patient is not nervous/anxious.         Objective:     Vitals:    25 1250   BP: 132/88  "  Pulse: 86   Weight: 110 kg (243 lb)   Height: 167.6 cm (66\")     Body mass index is 39.22 kg/m².    Vitals reviewed.   Constitutional:       Appearance: Obese.   Eyes:      General: No scleral icterus.        Right eye: No discharge.      Conjunctiva/sclera: Conjunctivae normal.      Pupils: Pupils are equal, round, and reactive to light.   HENT:      Head: Normocephalic.      Nose: Nose normal.   Neck:      Thyroid: No thyromegaly.      Vascular: No JVD.   Pulmonary:      Effort: Pulmonary effort is normal. No respiratory distress.      Breath sounds: Normal breath sounds. Examination of the left-middle field reveals rales. Examination of the right-lower field reveals rales. Examination of the left-lower field reveals rales. No wheezing. No rales.   Cardiovascular:      Normal rate. Irregularly irregular rhythm. Normal S1. Normal S2.       Murmurs: There is no murmur.      No gallop.    Pulses:     Intact distal pulses.      Carotid: 2+ bilaterally.     Radial: 2+ bilaterally.     Femoral: 2+ bilaterally.     Popliteal: 2+ bilaterally.     Dorsalis pedis: 2+ bilaterally.     Posterior tibial: 2+ bilaterally.  Edema:     Peripheral edema absent.   Abdominal:      General: Bowel sounds are normal. There is no distension.      Palpations: Abdomen is soft.      Tenderness: There is no abdominal tenderness. There is no rebound.   Musculoskeletal: Normal range of motion.         General: No tenderness.      Cervical back: Normal range of motion and neck supple. Skin:     General: Skin is warm and dry.      Findings: No erythema or rash.   Neurological:      Mental Status: Alert and oriented to person, place, and time.   Psychiatric:         Behavior: Behavior normal.         Thought Content: Thought content normal.         Judgment: Judgment normal.     Lab Review:   Lab Results - Last 18 Months   Lab Units 05/06/25  1449 01/06/25  0735 01/05/25  0449   WBC 10*3/mm3 6.97 8.45 9.38   RBC 10*6/mm3 4.47 4.64 4.33 "   HEMOGLOBIN g/dL 13.5 13.7 13.4   HEMATOCRIT % 41.7 43.6 40.3   MCV fL 93.3 94.0 93.1   MCH pg 30.2 29.5 30.9   MCHC g/dL 32.4 31.4* 33.3   RDW % 14.0 13.1 13.0   PLATELETS 10*3/mm3 151 219 199   NEUTROPHIL % %  --  48.9 50.2   LYMPHOCYTE % %  --  32.2 32.0   MONOCYTES % %  --  15.5* 15.2*   EOSINOPHIL % %  --  2.5 1.9   BASOPHIL % %  --  0.5 0.3   NEUTROS ABS 10*3/mm3  --  4.14 4.70   LYMPHS ABS 10*3/mm3  --  2.72 3.00   MONOS ABS 10*3/mm3  --  1.31* 1.43*   EOS ABS 10*3/mm3  --  0.21 0.18   BASOS ABS 10*3/mm3  --  0.04 0.03   RDW-SD fl 47.5 44.8 44.0   MPV fL 10.3 10.8 10.6     Lab Results - Last 18 Months   Lab Units 05/06/25  1449 01/13/25  0419 01/06/25  0735 01/05/25  0449   GLUCOSE mg/dL 136* 139*   < > 125*   BUN mg/dL 17 25*   < > 26*   CREATININE mg/dL 1.05* 1.21*   < > 1.27*   SODIUM mmol/L 140 138   < > 139   POTASSIUM mmol/L 4.5 4.4   < > 3.8   CHLORIDE mmol/L 105 103   < > 96*   CO2 mmol/L 25.2 25.2   < > 31.0*   CALCIUM mg/dL 10.0 9.5   < > 9.5   TOTAL PROTEIN g/dL 7.7  --   --  7.7   ALBUMIN g/dL 4.2 3.5   < > 3.8   ALT (SGPT) U/L 10  --   --  12   AST (SGOT) U/L 19  --   --  20   ALK PHOS U/L 76  --   --  66   BILIRUBIN mg/dL 0.8  --   --  0.5   GLOBULIN gm/dL 3.5  --   --  3.9   A/G RATIO g/dL 1.2  --   --  1.0   BUN / CREAT RATIO  16.2 20.7   < > 20.5   ANION GAP mmol/L 9.8 9.8   < > 12.0   EGFR mL/min/1.73 54.8* 46.3*   < > 43.6*    < > = values in this interval not displayed.     Lab Results - Last 18 Months   Lab Units 05/06/25  1449 07/24/24  1301   CHOLESTEROL mg/dL 116 112   TRIGLYCERIDES mg/dL 64 63   HDL CHOL mg/dL 71* 71*   LDL CHOL mg/dL 31 27   VLDL CHOL mg/dL 14 14   LDL/HDL RATIO  0.45 0.40     Lab Results - Last 18 Months   Lab Units 05/06/25  1449 12/31/24  1419   PROBNP pg/mL 929.0 807.0     Lab Results - Last 18 Months   Lab Units 12/31/24  2324 12/31/24  1542   HSTROP T ng/L 16* 14*     Lab Results - Last 18 Months   Lab Units 10/04/24  1251   TSH uIU/mL 1.550     Lab Results -  Last 18 Months   Lab Units 05/06/25  1531 04/11/25  1340   PROTIME Seconds 23.9* 26.7*         ECG 12 Lead    Date/Time: 5/6/2025 1:04 PM  Performed by: Pia Dueñas MD    Authorized by: Pia Dueñas MD  Comparison: compared with previous ECG   Similar to previous ECG  Rhythm: atrial fibrillation  Other findings: non-specific ST-T wave changes  Other findings comments: Consider prior inferior and anterior wall infarction    Clinical impression: abnormal EKG          Diagnosis Plan   1. Chronic diastolic heart failure  ECG 12 Lead    Basic Metabolic Panel    proBNP      2. Pulmonary hypertension  ECG 12 Lead    Basic Metabolic Panel    proBNP      3. ANTOINE (dyspnea on exertion)  ECG 12 Lead    Basic Metabolic Panel    proBNP      4. Longstanding persistent atrial fibrillation        5. Coronary artery disease involving native coronary artery of native heart without angina pectoris        6. Mixed hyperlipidemia        7. Essential hypertension        8. Obstructive sleep apnea syndrome          Plan:       1.  Dyspnea on exertion.  Negative stress perfusion study 2/2022.  Echo 5/2022 with normal systolic function grade 2 diastolic dysfunction, no significant valve regurgitation with an RVSP 42 mmHg.  Dyspnea on exertion but has been more active.  It is not too bothersome for her.  Will check a proBNP and BMP as she does have evidence of few rales on exam and edema   2.  Chest pain.  Resolved  4.  Mild aortic valve stenosis with regurgitation.  No significant stenosis on echo 12/2024 but there was mild to moderate regurgitation continue medical therapy.   5.  Mitral valve prolapse with mild mitral regurgitation noted by CLARIBEL 2017.  Stable on e mild regurgitation on echo 12/2024.  Clinically stable  6.  Severe small vessel coronary artery disease.  Negative stress test 2/2022 and no residual anginal symptoms.  Some anginal symptoms is also associated with significant stresses at home.  Recommendations as above  7..   Persistent atrial fibrillation.  She has had recent exacerbation of palpitations in the setting of stress..  Overall stable  8.  Chronic anticoagulation.  INR has been controlled and within acceptable limits  9.  Hypertension, controlled  10.  History of embolic non ST-elevation myocardial infarction and stroke. Now on warfarin  11   FRANCY, untreated  12. Lung nodule followed by Dr. Garrido and felt to be benign per patient.  No further follow-up felt to be needed per patient  13.  Thoracic aortic aneurysm, stable by CT on 11/2024    Time Spent: I spent 35 minutes caring for Lana on this date of service. This time includes time spent by me in the following activities: preparing for the visit, reviewing tests, obtaining and/or reviewing a separately obtained history, performing a medically appropriate examination and/or evaluation, counseling and educating the patient/family/caregiver, ordering medications, tests, or procedures, documenting information in the medical record, and independently interpreting results and communicating that information with the patient/family/caregiver.   I spent 1 minutes on the separately reported service of ECG. This time is not included in the time used to support the E/M service also reported today.        Your medication list            Accurate as of May 6, 2025 11:59 PM. If you have any questions, ask your nurse or doctor.                CHANGE how you take these medications        Instructions Last Dose Given Next Dose Due   glucose blood test strip  What changed: additional instructions      Test twice daily prior to breakfast and dinner as instructed              CONTINUE taking these medications        Instructions Last Dose Given Next Dose Due   Accu-Chek FastClix Lancets misc      Use to check glucose as directed       Accu-Chek Guide w/Device kit      See Admin Instructions.       acetaminophen 325 MG tablet  Commonly known as: TYLENOL      Take 2 tablets by mouth Every 6  (Six) Hours As Needed for Moderate Pain.       albuterol sulfate  (90 Base) MCG/ACT inhaler  Commonly known as: PROVENTIL HFA;VENTOLIN HFA;PROAIR HFA      Inhale 2 puffs Every 4 (Four) Hours As Needed for Shortness of Air. PRN SOA/WHEEZING       aspirin 81 MG EC tablet      Take 1 tablet by mouth Daily.       atorvastatin 10 MG tablet  Commonly known as: LIPITOR      Take 1 tablet by mouth Daily.       Cholecalciferol 50 MCG (2000 UT) capsule      Take 50 mcg by mouth Daily.       digoxin 125 MCG tablet  Commonly known as: LANOXIN      Take 1 tablet by mouth Every Other Day.       DropSafe Alcohol Prep 70 % pads           furosemide 20 MG tablet  Commonly known as: LASIX      Take 1 tablet by mouth Daily As Needed (for >8lb wt gain in 24 hours).       glimepiride 2 MG tablet  Commonly known as: Amaryl      By mouth take one tab twice daily with AM and PM meals.       HYDROcodone-acetaminophen 7.5-325 MG per tablet  Commonly known as: NORCO      Take 1 tablet by mouth Every 6 (Six) Hours As Needed for Moderate Pain.       Januvia 50 MG tablet  Generic drug: SITagliptin      Take 2 tablets by mouth Daily.       Lidocaine 4 %      Place 1 patch on the skin as directed by provider Daily. Remove & Discard patch within 12 hours or as directed by MD Paredes 0.01 % ophthalmic drops  Generic drug: bimatoprost      Administer 1 drop to both eyes At Night As Needed (pt states that she is supposed to use nightly, but only uses PRN).       metoprolol tartrate 50 MG tablet  Commonly known as: LOPRESSOR      Take 1 tablet by mouth 2 (Two) Times a Day.       polyethylene glycol 17 g packet  Commonly known as: MIRALAX      Take 17 g by mouth Daily As Needed (constipation).       potassium chloride 10 MEQ CR capsule  Commonly known as: MICRO-K      Take 1 capsule by mouth 2 (Two) Times a Day As Needed (only on days when taking PRN furosemide).       vitamin B-12 1000 MCG tablet  Commonly known as: CYANOCOBALAMIN       Take 6 tablets by mouth Daily. Pt states that she has OTC 3000mcg tablets, and she takes 2 of these daily       warfarin 5 MG tablet  Commonly known as: COUMADIN      Take one-half tablet (2.5 mg) by mouth on Fridays, and take one tablet (5 mg) by mouth all other days or as directed.                Patient is no longer taking -.  I corrected the med list to reflect this.  I did not stop these medications.      Dictated utilizing Dragon dictation

## 2025-05-07 ENCOUNTER — ANTICOAGULATION VISIT (OUTPATIENT)
Dept: PHARMACY | Facility: HOSPITAL | Age: 80
End: 2025-05-07
Payer: MEDICARE

## 2025-05-07 ENCOUNTER — RESULTS FOLLOW-UP (OUTPATIENT)
Dept: CARDIOLOGY | Age: 80
End: 2025-05-07
Payer: MEDICARE

## 2025-05-07 NOTE — TELEPHONE ENCOUNTER
Please let her know that kidney function is improved from 3 months ago and electrolytes are normal.  Test for heart failure was also normal.  Hemoglobin A1c was 7.0 which is improved from prior check.  Lipids are at goal.  Hemoglobin and platelet count were normal.

## 2025-05-07 NOTE — PROGRESS NOTES
Anticoagulation Clinic Progress Note    Anticoagulation Summary  As of 2025      INR goal:  2.0-3.0   TTR:  66.4% (6.3 y)   INR used for dosin.13 (2025)   Warfarin maintenance plan:  5 mg every day   Weekly warfarin total:  35 mg   No change documented:  Love Allison RPH   Plan last modified:  Love Allison RPH (3/27/2023)   Next INR check:  6/3/2025   Priority:  Maintenance   Target end date:  Indefinite    Indications    Atrial fibrillation [I48.91]                 Anticoagulation Episode Summary       INR check location:  --    Preferred lab:  --    Send INR reminders to:  [facility]    Comments:  --          Anticoagulation Care Providers       Provider Role Specialty Phone number    Pia Dueñas MD Referring Cardiology [phone]            Clinic Interview:  Patient Findings     Negatives:  Signs/symptoms of thrombosis, Signs/symptoms of bleeding,   Laboratory test error suspected, Change in health, Change in alcohol use,   Change in activity, Upcoming invasive procedure, Emergency department   visit, Upcoming dental procedure, Missed doses, Extra doses, Change in   medications, Change in diet/appetite, Hospital admission, Bruising, Other   complaints      Clinical Outcomes     Negatives:  Major bleeding event, Thromboembolic event,   Anticoagulation-related hospital admission, Anticoagulation-related ED   visit, Anticoagulation-related fatality        INR History:      Latest Ref Rng & Units 2025     3:37 AM 2025     4:59 AM 2025     4:19 AM 2025    11:15 AM 2025     1:30 PM 2025     3:31 PM 2025     8:21 AM   Anticoagulation Monitoring   INR     1.9 2.2  2.13   INR Date     2025   INR Goal     2.0-3.0 2.0-3.0  2.0-3.0   Trend     Same Same  Same   Last Week Total     35 mg 35 mg  35 mg   Next Week Total     37.5 mg 35 mg  35 mg   Sun     5 mg 5 mg  5 mg   Mon     5 mg 5 mg  5 mg   Tue     5 mg 5 mg  5 mg   Wed      5 mg 5 mg  5 mg   Thu     7.5 mg (2/27); Otherwise 5 mg 5 mg  5 mg   Fri     5 mg 5 mg  5 mg   Sat     5 mg 5 mg  5 mg   Historical INR 0.90 - 1.10 2.66  2.50  2.34    2.13     Visit Report       Report        Plan:  1. INR is Therapeutic today- see above in Anticoagulation Summary.   Will instruct Lanaantonio Aguerocomb to Continue their warfarin regimen- see above in Anticoagulation Summary.  2. Follow up in 4 weeks.  3. They have been instructed to call if any changes in medications, doses, concerns, etc. Patient expresses understanding and has no further questions at this time.    Shaw Hand, PharmD

## 2025-06-12 ENCOUNTER — HOSPITAL ENCOUNTER (INPATIENT)
Facility: HOSPITAL | Age: 80
LOS: 4 days | Discharge: HOME | End: 2025-06-18
Attending: STUDENT IN AN ORGANIZED HEALTH CARE EDUCATION/TRAINING PROGRAM | Admitting: HOSPITALIST
Payer: MEDICARE

## 2025-06-12 ENCOUNTER — APPOINTMENT (OUTPATIENT)
Dept: GENERAL RADIOLOGY | Facility: HOSPITAL | Age: 80
End: 2025-06-12
Payer: MEDICARE

## 2025-06-12 DIAGNOSIS — J06.9 VIRAL URI: Primary | ICD-10-CM

## 2025-06-12 LAB
ALBUMIN SERPL-MCNC: 4 G/DL (ref 3.5–5.2)
ALBUMIN/GLOB SERPL: 1.1 G/DL
ALP SERPL-CCNC: 63 U/L (ref 39–117)
ALT SERPL W P-5'-P-CCNC: 10 U/L (ref 1–33)
ANION GAP SERPL CALCULATED.3IONS-SCNC: 14 MMOL/L (ref 5–15)
AST SERPL-CCNC: 24 U/L (ref 1–32)
B PARAPERT DNA SPEC QL NAA+PROBE: NOT DETECTED
B PERT DNA SPEC QL NAA+PROBE: NOT DETECTED
BASOPHILS # BLD AUTO: 0.03 10*3/MM3 (ref 0–0.2)
BASOPHILS NFR BLD AUTO: 0.6 % (ref 0–1.5)
BILIRUB SERPL-MCNC: 1.6 MG/DL (ref 0–1.2)
BUN SERPL-MCNC: 7 MG/DL (ref 8–23)
BUN/CREAT SERPL: 8 (ref 7–25)
C PNEUM DNA NPH QL NAA+NON-PROBE: NOT DETECTED
CALCIUM SPEC-SCNC: 9.2 MG/DL (ref 8.6–10.5)
CHLORIDE SERPL-SCNC: 102 MMOL/L (ref 98–107)
CO2 SERPL-SCNC: 24 MMOL/L (ref 22–29)
CREAT SERPL-MCNC: 0.87 MG/DL (ref 0.57–1)
DEPRECATED RDW RBC AUTO: 46.5 FL (ref 37–54)
DIGOXIN SERPL-MCNC: <0.3 NG/ML (ref 0.6–1.2)
EGFRCR SERPLBLD CKD-EPI 2021: 68.7 ML/MIN/1.73
EOSINOPHIL # BLD AUTO: 0.11 10*3/MM3 (ref 0–0.4)
EOSINOPHIL NFR BLD AUTO: 2.3 % (ref 0.3–6.2)
ERYTHROCYTE [DISTWIDTH] IN BLOOD BY AUTOMATED COUNT: 13.8 % (ref 12.3–15.4)
FLUAV SUBTYP SPEC NAA+PROBE: NOT DETECTED
FLUBV RNA ISLT QL NAA+PROBE: NOT DETECTED
GEN 5 1HR TROPONIN T REFLEX: 16 NG/L
GLOBULIN UR ELPH-MCNC: 3.6 GM/DL
GLUCOSE SERPL-MCNC: 124 MG/DL (ref 65–99)
HADV DNA SPEC NAA+PROBE: NOT DETECTED
HCOV 229E RNA SPEC QL NAA+PROBE: NOT DETECTED
HCOV HKU1 RNA SPEC QL NAA+PROBE: NOT DETECTED
HCOV NL63 RNA SPEC QL NAA+PROBE: NOT DETECTED
HCOV OC43 RNA SPEC QL NAA+PROBE: NOT DETECTED
HCT VFR BLD AUTO: 39.7 % (ref 34–46.6)
HGB BLD-MCNC: 13.2 G/DL (ref 12–15.9)
HMPV RNA NPH QL NAA+NON-PROBE: NOT DETECTED
HOLD SPECIMEN: NORMAL
HOLD SPECIMEN: NORMAL
HPIV1 RNA ISLT QL NAA+PROBE: NOT DETECTED
HPIV2 RNA SPEC QL NAA+PROBE: NOT DETECTED
HPIV3 RNA NPH QL NAA+PROBE: DETECTED
HPIV4 P GENE NPH QL NAA+PROBE: NOT DETECTED
IMM GRANULOCYTES # BLD AUTO: 0.03 10*3/MM3 (ref 0–0.05)
IMM GRANULOCYTES NFR BLD AUTO: 0.6 % (ref 0–0.5)
INR PPP: 2.31 (ref 0.9–1.1)
LYMPHOCYTES # BLD AUTO: 1.66 10*3/MM3 (ref 0.7–3.1)
LYMPHOCYTES NFR BLD AUTO: 35.1 % (ref 19.6–45.3)
M PNEUMO IGG SER IA-ACNC: NOT DETECTED
MCH RBC QN AUTO: 30.9 PG (ref 26.6–33)
MCHC RBC AUTO-ENTMCNC: 33.2 G/DL (ref 31.5–35.7)
MCV RBC AUTO: 93 FL (ref 79–97)
MONOCYTES # BLD AUTO: 0.77 10*3/MM3 (ref 0.1–0.9)
MONOCYTES NFR BLD AUTO: 16.3 % (ref 5–12)
NEUTROPHILS NFR BLD AUTO: 2.13 10*3/MM3 (ref 1.7–7)
NEUTROPHILS NFR BLD AUTO: 45.1 % (ref 42.7–76)
NRBC BLD AUTO-RTO: 0 /100 WBC (ref 0–0.2)
NT-PROBNP SERPL-MCNC: 841 PG/ML (ref 0–1800)
PLATELET # BLD AUTO: 149 10*3/MM3 (ref 140–450)
PMV BLD AUTO: 9.8 FL (ref 6–12)
POTASSIUM SERPL-SCNC: 3.2 MMOL/L (ref 3.5–5.2)
PROT SERPL-MCNC: 7.6 G/DL (ref 6–8.5)
PROTHROMBIN TIME: 25.6 SECONDS (ref 11.7–14.2)
RBC # BLD AUTO: 4.27 10*6/MM3 (ref 3.77–5.28)
RHINOVIRUS RNA SPEC NAA+PROBE: DETECTED
RSV RNA NPH QL NAA+NON-PROBE: NOT DETECTED
SARS-COV-2 RNA NPH QL NAA+NON-PROBE: NOT DETECTED
SODIUM SERPL-SCNC: 140 MMOL/L (ref 136–145)
TROPONIN T % DELTA: -6
TROPONIN T NUMERIC DELTA: -1 NG/L
TROPONIN T SERPL HS-MCNC: 17 NG/L
WBC NRBC COR # BLD AUTO: 4.73 10*3/MM3 (ref 3.4–10.8)
WHOLE BLOOD HOLD COAG: NORMAL
WHOLE BLOOD HOLD SPECIMEN: NORMAL

## 2025-06-12 PROCEDURE — 83880 ASSAY OF NATRIURETIC PEPTIDE: CPT

## 2025-06-12 PROCEDURE — 71045 X-RAY EXAM CHEST 1 VIEW: CPT

## 2025-06-12 PROCEDURE — 36415 COLL VENOUS BLD VENIPUNCTURE: CPT

## 2025-06-12 PROCEDURE — 84484 ASSAY OF TROPONIN QUANT: CPT | Performed by: STUDENT IN AN ORGANIZED HEALTH CARE EDUCATION/TRAINING PROGRAM

## 2025-06-12 PROCEDURE — 0202U NFCT DS 22 TRGT SARS-COV-2: CPT | Performed by: STUDENT IN AN ORGANIZED HEALTH CARE EDUCATION/TRAINING PROGRAM

## 2025-06-12 PROCEDURE — 94761 N-INVAS EAR/PLS OXIMETRY MLT: CPT

## 2025-06-12 PROCEDURE — 93010 ELECTROCARDIOGRAM REPORT: CPT | Performed by: INTERNAL MEDICINE

## 2025-06-12 PROCEDURE — 80053 COMPREHEN METABOLIC PANEL: CPT

## 2025-06-12 PROCEDURE — 85610 PROTHROMBIN TIME: CPT | Performed by: STUDENT IN AN ORGANIZED HEALTH CARE EDUCATION/TRAINING PROGRAM

## 2025-06-12 PROCEDURE — 94799 UNLISTED PULMONARY SVC/PX: CPT

## 2025-06-12 PROCEDURE — 85025 COMPLETE CBC W/AUTO DIFF WBC: CPT

## 2025-06-12 PROCEDURE — 93005 ELECTROCARDIOGRAM TRACING: CPT | Performed by: STUDENT IN AN ORGANIZED HEALTH CARE EDUCATION/TRAINING PROGRAM

## 2025-06-12 PROCEDURE — 84484 ASSAY OF TROPONIN QUANT: CPT

## 2025-06-12 PROCEDURE — G0378 HOSPITAL OBSERVATION PER HR: HCPCS

## 2025-06-12 PROCEDURE — 99285 EMERGENCY DEPT VISIT HI MDM: CPT

## 2025-06-12 PROCEDURE — 80162 ASSAY OF DIGOXIN TOTAL: CPT | Performed by: STUDENT IN AN ORGANIZED HEALTH CARE EDUCATION/TRAINING PROGRAM

## 2025-06-12 PROCEDURE — 93005 ELECTROCARDIOGRAM TRACING: CPT

## 2025-06-12 RX ORDER — SODIUM CHLORIDE 0.9 % (FLUSH) 0.9 %
10 SYRINGE (ML) INJECTION EVERY 12 HOURS SCHEDULED
Status: DISCONTINUED | OUTPATIENT
Start: 2025-06-12 | End: 2025-06-18 | Stop reason: HOSPADM

## 2025-06-12 RX ORDER — SODIUM CHLORIDE 0.9 % (FLUSH) 0.9 %
10 SYRINGE (ML) INJECTION AS NEEDED
Status: DISCONTINUED | OUTPATIENT
Start: 2025-06-12 | End: 2025-06-18 | Stop reason: HOSPADM

## 2025-06-12 RX ORDER — INSULIN LISPRO 100 [IU]/ML
2-7 INJECTION, SOLUTION INTRAVENOUS; SUBCUTANEOUS
Status: DISCONTINUED | OUTPATIENT
Start: 2025-06-12 | End: 2025-06-14

## 2025-06-12 RX ORDER — FUROSEMIDE 40 MG/1
20 TABLET ORAL DAILY
Status: DISCONTINUED | OUTPATIENT
Start: 2025-06-13 | End: 2025-06-15

## 2025-06-12 RX ORDER — DIGOXIN 125 MCG
125 TABLET ORAL EVERY OTHER DAY
Status: DISCONTINUED | OUTPATIENT
Start: 2025-06-13 | End: 2025-06-18 | Stop reason: HOSPADM

## 2025-06-12 RX ORDER — METOPROLOL TARTRATE 50 MG
50 TABLET ORAL 2 TIMES DAILY
Status: DISCONTINUED | OUTPATIENT
Start: 2025-06-12 | End: 2025-06-18 | Stop reason: HOSPADM

## 2025-06-12 RX ORDER — ONDANSETRON 4 MG/1
4 TABLET, ORALLY DISINTEGRATING ORAL EVERY 6 HOURS PRN
Status: DISCONTINUED | OUTPATIENT
Start: 2025-06-12 | End: 2025-06-18 | Stop reason: HOSPADM

## 2025-06-12 RX ORDER — BENZONATATE 100 MG/1
200 CAPSULE ORAL 3 TIMES DAILY PRN
Status: DISCONTINUED | OUTPATIENT
Start: 2025-06-12 | End: 2025-06-18 | Stop reason: HOSPADM

## 2025-06-12 RX ORDER — POLYETHYLENE GLYCOL 3350 17 G/17G
17 POWDER, FOR SOLUTION ORAL DAILY PRN
Status: DISCONTINUED | OUTPATIENT
Start: 2025-06-12 | End: 2025-06-12 | Stop reason: SDUPTHER

## 2025-06-12 RX ORDER — BISACODYL 10 MG
10 SUPPOSITORY, RECTAL RECTAL DAILY PRN
Status: DISCONTINUED | OUTPATIENT
Start: 2025-06-12 | End: 2025-06-18 | Stop reason: HOSPADM

## 2025-06-12 RX ORDER — DEXTROSE MONOHYDRATE 25 G/50ML
25 INJECTION, SOLUTION INTRAVENOUS
Status: DISCONTINUED | OUTPATIENT
Start: 2025-06-12 | End: 2025-06-18 | Stop reason: HOSPADM

## 2025-06-12 RX ORDER — ALBUTEROL SULFATE 0.83 MG/ML
2.5 SOLUTION RESPIRATORY (INHALATION) EVERY 6 HOURS PRN
Status: DISCONTINUED | OUTPATIENT
Start: 2025-06-12 | End: 2025-06-12 | Stop reason: SDUPTHER

## 2025-06-12 RX ORDER — POLYETHYLENE GLYCOL 3350 17 G/17G
17 POWDER, FOR SOLUTION ORAL DAILY PRN
Status: DISCONTINUED | OUTPATIENT
Start: 2025-06-12 | End: 2025-06-18 | Stop reason: HOSPADM

## 2025-06-12 RX ORDER — IPRATROPIUM BROMIDE AND ALBUTEROL SULFATE 2.5; .5 MG/3ML; MG/3ML
3 SOLUTION RESPIRATORY (INHALATION)
Status: DISCONTINUED | OUTPATIENT
Start: 2025-06-12 | End: 2025-06-14

## 2025-06-12 RX ORDER — WARFARIN SODIUM 5 MG/1
5 TABLET ORAL
Status: DISCONTINUED | OUTPATIENT
Start: 2025-06-12 | End: 2025-06-14

## 2025-06-12 RX ORDER — GUAIFENESIN 600 MG/1
1200 TABLET, EXTENDED RELEASE ORAL EVERY 12 HOURS SCHEDULED
Status: DISCONTINUED | OUTPATIENT
Start: 2025-06-12 | End: 2025-06-18 | Stop reason: HOSPADM

## 2025-06-12 RX ORDER — IPRATROPIUM BROMIDE AND ALBUTEROL SULFATE 2.5; .5 MG/3ML; MG/3ML
3 SOLUTION RESPIRATORY (INHALATION) ONCE
Status: COMPLETED | OUTPATIENT
Start: 2025-06-12 | End: 2025-06-12

## 2025-06-12 RX ORDER — NITROGLYCERIN 0.4 MG/1
0.4 TABLET SUBLINGUAL
Status: DISCONTINUED | OUTPATIENT
Start: 2025-06-12 | End: 2025-06-18 | Stop reason: HOSPADM

## 2025-06-12 RX ORDER — AMOXICILLIN 250 MG
2 CAPSULE ORAL 2 TIMES DAILY PRN
Status: DISCONTINUED | OUTPATIENT
Start: 2025-06-12 | End: 2025-06-18 | Stop reason: HOSPADM

## 2025-06-12 RX ORDER — BISACODYL 5 MG/1
5 TABLET, DELAYED RELEASE ORAL DAILY PRN
Status: DISCONTINUED | OUTPATIENT
Start: 2025-06-12 | End: 2025-06-18 | Stop reason: HOSPADM

## 2025-06-12 RX ORDER — ACETAMINOPHEN 325 MG/1
650 TABLET ORAL EVERY 6 HOURS PRN
Status: DISCONTINUED | OUTPATIENT
Start: 2025-06-12 | End: 2025-06-18 | Stop reason: HOSPADM

## 2025-06-12 RX ORDER — POTASSIUM CHLORIDE 750 MG/1
10 TABLET, FILM COATED, EXTENDED RELEASE ORAL DAILY
Status: COMPLETED | OUTPATIENT
Start: 2025-06-13 | End: 2025-06-15

## 2025-06-12 RX ORDER — IBUPROFEN 600 MG/1
1 TABLET ORAL
Status: DISCONTINUED | OUTPATIENT
Start: 2025-06-12 | End: 2025-06-18 | Stop reason: HOSPADM

## 2025-06-12 RX ORDER — ALBUTEROL SULFATE 0.83 MG/ML
2.5 SOLUTION RESPIRATORY (INHALATION) EVERY 6 HOURS PRN
Status: DISCONTINUED | OUTPATIENT
Start: 2025-06-12 | End: 2025-06-18 | Stop reason: HOSPADM

## 2025-06-12 RX ORDER — SODIUM CHLORIDE 9 MG/ML
40 INJECTION, SOLUTION INTRAVENOUS AS NEEDED
Status: DISCONTINUED | OUTPATIENT
Start: 2025-06-12 | End: 2025-06-18 | Stop reason: HOSPADM

## 2025-06-12 RX ORDER — LIDOCAINE 4 G/G
1 PATCH TOPICAL
Status: DISCONTINUED | OUTPATIENT
Start: 2025-06-13 | End: 2025-06-18 | Stop reason: HOSPADM

## 2025-06-12 RX ORDER — LATANOPROST 50 UG/ML
1 SOLUTION/ DROPS OPHTHALMIC NIGHTLY PRN
Status: DISCONTINUED | OUTPATIENT
Start: 2025-06-12 | End: 2025-06-18 | Stop reason: HOSPADM

## 2025-06-12 RX ORDER — HYDROCODONE BITARTRATE AND ACETAMINOPHEN 7.5; 325 MG/1; MG/1
1 TABLET ORAL EVERY 6 HOURS PRN
Refills: 0 | Status: DISCONTINUED | OUTPATIENT
Start: 2025-06-12 | End: 2025-06-18 | Stop reason: HOSPADM

## 2025-06-12 RX ORDER — POTASSIUM CHLORIDE 1500 MG/1
40 TABLET, EXTENDED RELEASE ORAL EVERY 4 HOURS
Status: COMPLETED | OUTPATIENT
Start: 2025-06-12 | End: 2025-06-13

## 2025-06-12 RX ORDER — ASPIRIN 81 MG/1
81 TABLET ORAL DAILY
Status: DISCONTINUED | OUTPATIENT
Start: 2025-06-13 | End: 2025-06-18 | Stop reason: HOSPADM

## 2025-06-12 RX ORDER — ONDANSETRON 2 MG/ML
4 INJECTION INTRAMUSCULAR; INTRAVENOUS EVERY 6 HOURS PRN
Status: DISCONTINUED | OUTPATIENT
Start: 2025-06-12 | End: 2025-06-18 | Stop reason: HOSPADM

## 2025-06-12 RX ORDER — ATORVASTATIN CALCIUM 20 MG/1
10 TABLET, FILM COATED ORAL DAILY
Status: DISCONTINUED | OUTPATIENT
Start: 2025-06-13 | End: 2025-06-18 | Stop reason: HOSPADM

## 2025-06-12 RX ORDER — NICOTINE POLACRILEX 4 MG
15 LOZENGE BUCCAL
Status: DISCONTINUED | OUTPATIENT
Start: 2025-06-12 | End: 2025-06-18 | Stop reason: HOSPADM

## 2025-06-12 RX ADMIN — GUAIFENESIN 1200 MG: 600 TABLET, EXTENDED RELEASE ORAL at 21:38

## 2025-06-12 RX ADMIN — IPRATROPIUM BROMIDE AND ALBUTEROL SULFATE 3 ML: .5; 3 SOLUTION RESPIRATORY (INHALATION) at 19:09

## 2025-06-12 RX ADMIN — POTASSIUM CHLORIDE 40 MEQ: 1500 TABLET, EXTENDED RELEASE ORAL at 21:50

## 2025-06-12 RX ADMIN — Medication 10 ML: at 21:43

## 2025-06-12 RX ADMIN — WARFARIN SODIUM 5 MG: 5 TABLET ORAL at 21:38

## 2025-06-12 RX ADMIN — METOPROLOL TARTRATE 50 MG: 25 TABLET, FILM COATED ORAL at 21:38

## 2025-06-12 NOTE — ED NOTES
Nursing report ED to floor  Lana Yañez  79 y.o.  female    HPI :  HPI  Stated Reason for Visit: cough, wheezing, shortness of breath x 4 days  History Obtained From: patient    Chief Complaint  Chief Complaint   Patient presents with    Shortness of Breath    Cough       Admitting doctor:   Maciej Dunn MD    Admitting diagnosis:   The encounter diagnosis was Viral URI.    Code status:   Current Code Status       Date Active Code Status Order ID Comments User Context       6/12/2025 1813 CPR (Attempt to Resuscitate) [id number]  Betty Lemos APRN ED        Question Answer    Code Status (Patient has no pulse and is not breathing) CPR (Attempt to Resuscitate)    Medical Interventions (Patient has pulse or is breathing) Full Support                    Allergies:   Metformin, Contrast dye (echo or unknown ct/mr), Dapagliflozin, Macrobid [nitrofurantoin], and Iodinated contrast media    Isolation:   Contact, Droplet    Intake and Output  No intake or output data in the 24 hours ending 06/12/25 1827    Weight:   There were no vitals filed for this visit.    Most recent vitals:   Vitals:    06/12/25 1555 06/12/25 1652 06/12/25 1700 06/12/25 1803   BP:   (!) 157/106    Pulse: 82 82 85    Resp:    18   Temp:       TempSrc:       SpO2: 97% 97% 97%        Active LDAs/IV Access:   Lines, Drains & Airways       Active LDAs       Name Placement date Placement time Site Days    Peripheral IV 06/12/25 1556 20 G Posterior;Right Forearm 06/12/25  1556  Forearm  less than 1                    Labs (abnormal labs have a star):   Labs Reviewed   RESPIRATORY PANEL PCR W/ COVID-19 (SARS-COV-2), NP SWAB IN UTM/VTP, 2 HR TAT - Abnormal; Notable for the following components:       Result Value    Human Rhinovirus/Enterovirus Detected (*)     Parainfluenza Virus 3 Detected (*)     All other components within normal limits    Narrative:     In the setting of a positive respiratory panel with a viral infection PLUS a negative  procalcitonin without other underlying concern for bacterial infection, consider observing off antibiotics or discontinuation of antibiotics and continue supportive care. If the respiratory panel is positive for atypical bacterial infection (Bordetella pertussis, Chlamydophila pneumoniae, or Mycoplasma pneumoniae), consider antibiotic de-escalation to target atypical bacterial infection.   COMPREHENSIVE METABOLIC PANEL - Abnormal; Notable for the following components:    Glucose 124 (*)     BUN 7.0 (*)     Potassium 3.2 (*)     Total Bilirubin 1.6 (*)     All other components within normal limits    Narrative:     GFR Categories in Chronic Kidney Disease (CKD)              GFR Category          GFR (mL/min/1.73)    Interpretation  G1                    90 or greater        Normal or high (1)  G2                    60-89                Mild decrease (1)  G3a                   45-59                Mild to moderate decrease  G3b                   30-44                Moderate to severe decrease  G4                    15-29                Severe decrease  G5                    14 or less           Kidney failure    (1)In the absence of evidence of kidney disease, neither GFR category G1 or G2 fulfill the criteria for CKD.    eGFR calculation 2021 CKD-EPI creatinine equation, which does not include race as a factor   TROPONIN - Abnormal; Notable for the following components:    HS Troponin T 17 (*)     All other components within normal limits    Narrative:     High Sensitive Troponin T Reference Range:  <14.0 ng/L- Negative Female for AMI  <22.0 ng/L- Negative Male for AMI  >=14 - Abnormal Female indicating possible myocardial injury.  >=22 - Abnormal Male indicating possible myocardial injury.   Clinicians would have to utilize clinical acumen, EKG, Troponin, and serial changes to determine if it is an Acute Myocardial Infarction or myocardial injury due to an underlying chronic condition.        CBC WITH AUTO  DIFFERENTIAL - Abnormal; Notable for the following components:    Monocyte % 16.3 (*)     Immature Grans % 0.6 (*)     All other components within normal limits   HIGH SENSITIVITIY TROPONIN T 1HR - Abnormal; Notable for the following components:    HS Troponin T 16 (*)     All other components within normal limits    Narrative:     High Sensitive Troponin T Reference Range:  <14.0 ng/L- Negative Female for AMI  <22.0 ng/L- Negative Male for AMI  >=14 - Abnormal Female indicating possible myocardial injury.  >=22 - Abnormal Male indicating possible myocardial injury.   Clinicians would have to utilize clinical acumen, EKG, Troponin, and serial changes to determine if it is an Acute Myocardial Infarction or myocardial injury due to an underlying chronic condition.        PROTIME-INR - Abnormal; Notable for the following components:    Protime 25.6 (*)     INR 2.31 (*)     All other components within normal limits   DIGOXIN LEVEL - Abnormal; Notable for the following components:    Digoxin <0.30 (*)     All other components within normal limits   BNP (IN-HOUSE) - Normal    Narrative:     This assay is used as an aid in the diagnosis of individuals suspected of having heart failure. It can be used as an aid in the diagnosis of acute decompensated heart failure (ADHF) in patients presenting with signs and symptoms of ADHF to the emergency department (ED). In addition, NT-proBNP of <300 pg/mL indicates ADHF is not likely.    Age Range Result Interpretation  NT-proBNP Concentration (pg/mL:      <50             Positive            >450                   Gray                 300-450                    Negative             <300    50-75           Positive            >900                  Gray                300-900                  Negative            <300      >75             Positive            >1800                  Gray                300-1800                  Negative            <300   RAINBOW DRAW    Narrative:     The  following orders were created for panel order [place] Draw.  Procedure                               Abnormality         Status                     ---------                               -----------         ------                     Green Top (Gel)[[id number]]                                  Final result               Lavender Top[[id number]]                                     Final result               Gold Top - SST[[id number]]                                   Final result               Light Blue Top[[id number]]                                   Final result                 Please view results for these tests on the individual orders.   CBC AND DIFFERENTIAL    Narrative:     The following orders were created for panel order CBC & Differential.  Procedure                               Abnormality         Status                     ---------                               -----------         ------                     CBC Auto Differential[[id number]]        Abnormal            Final result                 Please view results for these tests on the individual orders.   GREEN TOP   LAVENDER TOP   GOLD TOP - SST   LIGHT BLUE TOP       EKG:   ECG 12 Lead ED Triage Standing Order; SOA   Preliminary Result   HEART RATE=99  bpm   RR Smkxgpml=585  ms   WV Interval=  ms   P Horizontal Axis=  deg   P Front Axis=  deg   QRSD Interval=95  ms   QT Kjcrbqkh=457  ms   LKdF=785  ms   QRS Axis=-70  deg   T Wave Axis=51  deg   - ABNORMAL ECG -   Atrial fibrillation   Ventricular premature complex   Inferior infarct, old   Anterior infarct, old   When compared with ECG of 31-Dec-2024 15:18:17,   Significant repolarization change   Date and Time of Study:2025-06-12 14:13:49          Meds given in ED:   Medications   sodium chloride 0.9 % flush 10 mL (has no administration in time range)   ipratropium-albuterol (DUO-NEB) nebulizer solution 3 mL (has no administration in time range)   sodium chloride 0.9 % flush 10 mL (has no  administration in time range)   sodium chloride 0.9 % flush 10 mL (has no administration in time range)   sodium chloride 0.9 % infusion 40 mL (has no administration in time range)   ondansetron ODT (ZOFRAN-ODT) disintegrating tablet 4 mg (has no administration in time range)     Or   ondansetron (ZOFRAN) injection 4 mg (has no administration in time range)   nitroglycerin (NITROSTAT) SL tablet 0.4 mg (has no administration in time range)   Potassium Replacement - Follow Nurse / BPA Driven Protocol (has no administration in time range)   Magnesium Standard Dose Replacement - Follow Nurse / BPA Driven Protocol (has no administration in time range)   Phosphorus Replacement - Follow Nurse / BPA Driven Protocol (has no administration in time range)   Calcium Replacement - Follow Nurse / BPA Driven Protocol (has no administration in time range)   sennosides-docusate (PERICOLACE) 8.6-50 MG per tablet 2 tablet (has no administration in time range)     And   polyethylene glycol (MIRALAX) packet 17 g (has no administration in time range)     And   bisacodyl (DULCOLAX) EC tablet 5 mg (has no administration in time range)     And   bisacodyl (DULCOLAX) suppository 10 mg (has no administration in time range)   ipratropium-albuterol (DUO-NEB) nebulizer solution 3 mL (has no administration in time range)   albuterol (PROVENTIL) nebulizer solution 0.083% 2.5 mg/3mL (has no administration in time range)       Imaging results:  XR Chest 1 View  Result Date: 6/12/2025  No focal pulmonary consolidation. Cardiomegaly. Tortuous aorta. Follow-up as clinically indicated.  This report was finalized on 6/12/2025 2:41 PM by Dr. Antelmo Corea M.D on Workstation: [id number]        Ambulatory status:   - assist x2      Social issues:   Social History     Socioeconomic History    Marital status:      Spouse name: Suresh    Number of children: 5    Years of education: 13   Tobacco Use    Smoking status: Former     Current packs/day: 0.00      Average packs/day: 3.0 packs/day for 1.4 years (4.3 ttl pk-yrs)     Types: Cigarettes     Start date: 1967     Quit date: 10/19/1968     Years since quittin.6     Passive exposure: Never    Smokeless tobacco: Never    Tobacco comments:     caffeine use - decaf coffee   Vaping Use    Vaping status: Never Used   Substance and Sexual Activity    Alcohol use: No     Comment: No caffeine use    Drug use: No    Sexual activity: Defer       Peripheral Neurovascular  Peripheral Neurovascular (Adult)  Peripheral Neurovascular WDL: .WDL except  Additional Documentation: Edema (Group)  Edema  Edema: ankle, left, ankle, right, leg, left, leg, right, foot, left, foot, right  Leg, Left Edema: 2+ (Mild)  Leg, Right Edema: 2+ (Mild)  Ankle, Left Edema: 2+ (Mild)  Ankle, Right Edema: 2+ (Mild)  Foot, Left Edema: 2+ (Mild)  Foot, Right Edema: 2+ (Mild)    Neuro Cognitive  Neuro Cognitive (Adult)  Cognitive/Neuro/Behavioral WDL: WDL    Learning  Learning Assessment  Learning Readiness and Ability: no barriers identified  Education Provided  Person Taught: patient, spouse  Teaching Method: verbal instruction  Teaching Focus: symptom/problem overview, diagnostic test  Education Outcome Evaluation: verbalizes understanding    Respiratory  Respiratory WDL  Respiratory WDL: .WDL except, cough, rhythm/pattern  Rhythm/Pattern, Respiratory: shortness of breath (SOA w/ activity)  Cough Frequency: infrequent  Cough Type: productive    Abdominal Pain       Pain Assessments  Pain (Adult)  (0-10) Pain Rating: Rest: 0    NIH Stroke Scale       Shira Zurita RN  25 18:27 EDT

## 2025-06-12 NOTE — H&P
[facility]   HISTORY AND PHYSICAL    Patient Name: Lana Yañez  : 1945  MRN: [id number]  Primary Care Physician:  Toni Green MD  Date of admission: 2025    Subjective   Subjective     Chief Complaint:   Chief Complaint   Patient presents with    Shortness of Breath    Cough         HPI:    Lana Yañez is a 79 y.o. female comes in complaining productive cough since .  Patient states that she has had worsening issues of coughing throughout the week.  Patient does report some shortness of breath intermittently at times.  No reported chest pain.  Patient does report some chills but no fever.  Patient does report some diarrhea since Monday as well.  Patient reports nausea but no vomiting.  Patient states that she has had a poor appetite this week.  Patient does report some leg swelling bilaterally however patient states she takes a water pill as needed and since she has not been feeling well she has not been taking this.  Patient does have a history of CHF, COPD, persistent A-fib on warfarin, and CAD.    In the ED, initial troponin 17, repeat troponin of 16, proBNP normal.  Potassium of 3.2, total bilirubin of 1.6 however normal AST, ALT and alk phos.  CBC largely unremarkable for acute findings.  INR of 2.3.  Digoxin level less than 0.3.  RVP is positive for rhinovirus and parainfluenza virus 3.  Chest x-ray shows no acute findings.  EKG shows atrial fibrillation rate 99 bpm, PVC, no ST elevation apparent.  Patient is afebrile, pulse 90s, and room air oxygen 100% SpO2 and blood pressure 150s over 106.  Patient given DuoNeb in ED.    Review of Systems   All systems were reviewed and negative except for: as per HPI    Personal History     Past Medical History:   Diagnosis Date    Acute on chronic diastolic heart failure     Acute UTI (urinary tract infection) 2022    Allergic reaction 2018    Arthritis     Arthritis of back     Arthritis of neck     Asthma     Atrial  fibrillation     Persistent; on warfarin    Atypical chest pain     Biliary acute pancreatitis without necrosis or infection 11/10/2021    Bronchitis     Cellulitis of right elbow     due to MRSA    Cervical disc disorder     CHF (congestive heart failure)     COPD (chronic obstructive pulmonary disease)     Coronary artery disease     Cardiac catheterization completed; 90% PDA stenosis with medical management recommended    Coronary artery disease involving native coronary artery of native heart with angina pectoris with documented spasm     CTS (carpal tunnel syndrome)     Diabetes 1.5, managed as type 2     Disease of thyroid gland     Displacement of lumbar intervertebral disc without myelopathy     Dizziness     ANTOINE (dyspnea on exertion)     Essential hypertension     Fatigue     Fracture, foot     Generalized osteoarthritis of multiple sites     History of rheumatic fever     History of transfusion     Hx of bone density study     10/23/2014    Hyperglycemia     Hyperlipidemia     Hypovitaminosis D     Knee swelling     Left arm pain     Left-sided weakness     Leg swelling     Low back pain     Lower extremity edema     Lumbosacral disc disease     Malaise and fatigue     Mitral valve disease     Moderate mitral valve prolapse and moderate mitral regurgitation    Mitral valve insufficiency     Morbid obesity with BMI of 40.0-44.9, adult     MRSA infection     NSTEMI (non-ST elevated myocardial infarction)     PAF (paroxysmal atrial fibrillation)     Periarthritis of shoulder     Pneumonia 1/1/2025    RA (rheumatoid arthritis)     involving both hands    Rotator cuff syndrome     Sleep apnea     SOB (shortness of breath)     Stroke     left side weakness    Urge incontinence of urine     UTI (urinary tract infection) 05/2020    Visit for screening mammogram 04/02/2018    Vitamin D deficiency        Past Surgical History:   Procedure Laterality Date    BREAST BIOPSY      BREAST SURGERY      right side  lumpectomy with biopsy    CARDIAC CATHETERIZATION Left 10/20/2017    Procedure: Cardiac Catheterization/Vascular Study;  Surgeon: Alphonso Olmedo MD;  Location: [facility] CATH INVASIVE LOCATION;  Service:     CARDIAC CATHETERIZATION N/A 10/20/2017    +2 mitral regurgitation, left main 10% stenosis, mid to distal LAD 10% diffuse stenosis, circumflex 10% diffuse stenosis, RCA 10% proximal stenosis, and distal PDA consistent with coronary embolus with a lesion of 90% too small to consider coronary intervention; medical management recommended    EYE SURGERY      laser surgery for glaucoma and left eye cataracts removed    HYSTERECTOMY      10+ years ago    JOINT REPLACEMENT  2005; 2006    bilateral knees and left rotater    KNEE SURGERY      MAMMO BILATERAL  2016    [facility]     SHOULDER SURGERY         Family History: family history includes Alcohol abuse in her maternal uncle; Arthritis in her brother, daughter, father, mother, and sister; Asthma in her sister; COPD in her father; Colon cancer in her mother; Depression in her daughter; Diabetes in her brother and sister; Drug abuse in her brother; Glaucoma in her mother and sister; Heart disease in her brother, brother, sister, sister, and sister; Hypertension in her brother, mother, and sister; Lung disease in her father and sister; Miscarriages / Stillbirths in her sister; Rheumatologic disease in her sister; Stroke in her mother. Otherwise pertinent FHx was reviewed and not pertinent to current issue.    Social History:  reports that she quit smoking about 56 years ago. Her smoking use included cigarettes. She started smoking about 58 years ago. She has a 4.3 pack-year smoking history. She has never been exposed to tobacco smoke. She has never used smokeless tobacco. She reports that she does not drink alcohol and does not use drugs.    Home Medications:  Accu-Chek FastClix Lancets, Accu-Chek Guide, Cholecalciferol, DropSafe Alcohol Prep,  HYDROcodone-acetaminophen, Lidocaine, SITagliptin, acetaminophen, albuterol sulfate HFA, aspirin, atorvastatin, bimatoprost, digoxin, furosemide, glimepiride, glucose blood, metoprolol tartrate, polyethylene glycol, potassium chloride, vitamin B-12, and warfarin    Allergies:  Allergies   Allergen Reactions    Metformin Diarrhea    Contrast Dye (Echo Or Unknown Ct/Mr) Hives and Itching    Dapagliflozin Headache and Swelling    Macrobid [Nitrofurantoin] Hives    Iodinated Contrast Media Hives       Objective   Objective     Vitals:   Temp:  [98.1 °F (36.7 °C)-98.2 °F (36.8 °C)] 98.1 °F (36.7 °C)  Heart Rate:  [] 100  Resp:  [18-20] 20  BP: (139-157)/() 141/99  Physical Exam    Constitutional: Awake, alert   Eyes: PERRLA, sclerae anicteric, no conjunctival injection   HENT: NCAT, mucous membranes moist   Neck: Supple, no thyromegaly, no lymphadenopathy, trachea midline   Respiratory: Clear to auscultation bilaterally, nonlabored respirations    Cardiovascular: RRR, no murmurs, rubs, or gallops, palpable pedal pulses bilaterally   Gastrointestinal: Positive bowel sounds, soft, nontender, nondistended   Musculoskeletal: No bilateral ankle edema, no clubbing or cyanosis to extremities   Psychiatric: Appropriate affect, cooperative   Neurologic: Oriented x 3, strength symmetric in all extremities, Cranial Nerves grossly intact to confrontation, speech clear   Skin: No rashes     Result Review    Result Review:  I have personally reviewed the results from the time of this admission to 6/12/2025 20:23 EDT and agree with these findings:  [x]  Laboratory list / accordion  []  Microbiology  [x]  Radiology  [x]  EKG/Telemetry   []  Cardiology/Vascular   []  Pathology  []  Old records  []  Other:  Most notable findings include: see above      The ASCVD Risk score (Tutu GUTIÉRREZ, et al., 2019) failed to calculate for the following reasons:    Risk score cannot be calculated because patient has a medical history suggesting  prior/existing ASCVD      Assessment & Plan   Assessment / Plan     Brief Patient Summary:  Laan Yañez is a 79 y.o. female who comes in complaining of URI symptoms.    Active Hospital Problems:  Active Hospital Problems    Diagnosis     **Upper respiratory infection      Plan:     URI  Rhinovirus, parainfluenza virus positive  History of asthma  -initial troponin 17, repeat troponin of 16,   -proBNP normal.  WBC nml   -RVP is positive for rhinovirus and parainfluenza virus 3.    -Chest x-ray shows no acute findings.  Cardiomegaly.  -EKG shows atrial fibrillation rate 99 bpm, PVC, no ST elevation apparent.   - Patient is afebrile, pulse 90s, and room air oxygen 100% SpO2 and blood pressure 150s over 106.   - Patient given DuoNeb in ED.  - Continue DuoNebs  - Consider Solu-Medrol if breathing worsens currently on room air oxygen  - Continuous cardiac monitoring  - Diabetic diet    Hypokalemia  -Potassium of 3.2, replacement protocol ordered    Persistent atrial fibrillation  - Pharmacy to dose warfarin, continue  - INR of 2.3.    - Continue home digoxin,   -digoxin level low,  Digoxin level less than 0.3.      Diabetes mellitus type 2  - SSI, Accu-Cheks 3 times daily AC  - Check A1c  - Hold home Amaryl, Januvia,    CAD, continue home aspirin    Hyperlipidemia  - Check lipid panel, continue home statin    Essential hypertension, chronic, poorly controlled  - Continue home Lasix, Lopressor          VTE Prophylaxis: SCDs  Pharmacologic & mechanical VTE prophylaxis orders are present.        CODE STATUS:    Code Status (Patient has no pulse and is not breathing): CPR (Attempt to Resuscitate)  Medical Interventions (Patient has pulse or is breathing): Full Support    Admission Status:  I believe this patient meets observation status.    77 minutes have been spent by [facility] providers in the care of this patient while under observation status.      Appropriate PPE worn during  patient encounter.  Hand hygeine performed before and after seeing the patient.      Electronically signed by YEYO Kc, 06/12/25, 7:23 PM EDT.

## 2025-06-12 NOTE — ED PROVIDER NOTES
EMERGENCY DEPARTMENT ENCOUNTER  Room Number:  34/34  PCP: Toni Green MD  Independent Historians: Patient      HPI:  Chief Complaint: had concerns including Shortness of Breath and Cough.     Context: Lana Yañez is a 79 y.o. female with a medical history of CAD, diabetes, CVA, HTN, fatigue, who presents to the ED c/o acute shortness of breath and cough.  Symptoms been occurring for the last 4 days.  Patient has had productive cough.  Patient has noted wheezing.  Patient is also had extremity swelling.  Patient denies chest pain.      Review of prior external notes (non-ED) -and- Review of prior external test results outside of this encounter: Office visit with cardiology from 5/6/2025 reviewed and notable for history of pulmonary hypertension, A-fib, MVP, CAD.  Plan at that visit was to continue current treatment plan and reassess in several months.    Prescription drug monitoring program review:         PAST MEDICAL HISTORY  Active Ambulatory Problems     Diagnosis Date Noted   • Mixed hyperlipidemia 03/21/2016   • Vitamin deficiency 03/21/2016   • Essential hypertension 03/21/2016   • Rheumatoid arthritis involving both hands 03/21/2016   • Fatigue 04/06/2016   • ANTOINE (dyspnea on exertion) 04/06/2016   • Dysuria 08/02/2016   • Urge incontinence of urine 08/02/2016   • Hypovitaminosis D 08/02/2016   • Sleep apnea 08/02/2016   • Encounter for screening colonoscopy 08/02/2016   • Bronchitis 08/12/2016   • Cough 08/12/2016   • Generalized osteoarthritis of multiple sites 12/15/2016   • Cellulitis of right elbow due to MRSA 06/12/2017   • Medicare annual wellness visit, initial 06/27/2017   • Hospital discharge follow-up 06/27/2017   • Displacement of lumbar intervertebral disc without myelopathy 10/11/2017   • Lumbar disc herniation 10/11/2017   • Chronic atrial fibrillation 10/11/2017   • History of MRSA infection 10/11/2017   • Left-sided weakness 10/15/2017   • Atrial fibrillation 10/15/2017   • Left  shoulder pain 10/20/2017   • Cerebrovascular accident (CVA) due to occlusion of right middle cerebral artery 10/30/2017   • Relative lymphocytosis 11/13/2017   • Nausea 11/28/2017   • Weakness 11/28/2017   • Controlled substance agreement signed 12/05/2017   • Coronary artery disease involving native coronary artery of native heart without angina pectoris 12/14/2017   • SOB (shortness of breath) 12/14/2017   • Lower extremity edema 12/14/2017   • Adhesive capsulitis of left shoulder 12/28/2017   • CVA tenderness 12/28/2017   • Costochondritis, acute 01/04/2018   • Allergic reaction 02/09/2018   • 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   • Visit for screening mammogram 04/02/2018   • Low back pain 04/02/2018   • Urgency of urination 10/04/2018   • Hyperglycemia 10/04/2018   • Carpal tunnel syndrome of left wrist/ wakes her up 11/28/2018   • Localized edema 03/10/2019   • Acute cystitis without hematuria 04/05/2019   • Bruising 05/21/2019   • History of stroke 09/26/2019   • Diabetes 1.5, 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   • Other chronic pain 12/19/2019   • Vaginal candidiasis 12/17/2020   • Pulmonary hypertension 02/07/2021   • Acute respiratory failure with hypoxia 11/08/2021   • Hypokalemia 11/08/2021   • Hypomagnesemia 11/08/2021   • Type 2 diabetes mellitus with hyperglycemia, without long-term current use of insulin 11/08/2021   • Diarrhea 11/08/2021   • Sepsis without acute organ dysfunction 11/08/2021   • Morbid obesity with BMI of 40.0-44.9, adult 11/08/2021   • Biliary acute pancreatitis without necrosis or infection 11/10/2021   • Stage 3a chronic kidney disease 05/20/2022   • Acute UTI (urinary tract infection) 05/22/2022   • Hemiparesis of left nondominant side as late effect of cerebral infarction 05/22/2022     Resolved Ambulatory Problems     Diagnosis Date Noted   • Morbid obesity with BMI of 40.0-44.9, adult 12/15/2016   • Acute CVA (cerebrovascular accident) 10/15/2017   • NSTEMI (non-ST elevated  myocardial infarction) 10/19/2017   • Acute on chronic diastolic CHF (congestive heart failure) 12/14/2017   • Nitrofurantoin adverse reaction 04/05/2019   • Respiratory distress 05/22/2022   • Acute hypoxic respiratory failure 12/31/2024   • Pneumonia 01/01/2025   • Acute on chronic hypoxic respiratory failure 01/02/2025     Past Medical History:   Diagnosis Date   • Acute on chronic diastolic heart failure    • Arthritis    • Arthritis of back    • Arthritis of neck    • Asthma    • Atypical chest pain    • Cervical disc disorder    • CHF (congestive heart failure)    • COPD (chronic obstructive pulmonary disease)    • Coronary artery disease    • Coronary artery disease involving native coronary artery of native heart with angina pectoris with documented spasm    • CTS (carpal tunnel syndrome)    • Disease of thyroid gland    • Dizziness    • Fracture, foot    • History of rheumatic fever    • History of transfusion    • Hx of bone density study    • Hyperlipidemia    • Knee swelling    • Left arm pain    • Leg swelling    • Lumbosacral disc disease    • Malaise and fatigue    • Mitral valve disease    • Mitral valve insufficiency    • MRSA infection    • PAF (paroxysmal atrial fibrillation)    • Periarthritis of shoulder    • RA (rheumatoid arthritis)    • Rotator cuff syndrome    • Stroke    • UTI (urinary tract infection) 05/2020   • Vitamin D deficiency          PAST SURGICAL HISTORY  Past Surgical History:   Procedure Laterality Date   • BREAST BIOPSY     • BREAST SURGERY      right side lumpectomy with biopsy   • CARDIAC CATHETERIZATION Left 10/20/2017    Procedure: Cardiac Catheterization/Vascular Study;  Surgeon: Alphonso Olmedo MD;  Location: [facility] INVASIVE LOCATION;  Service:    • CARDIAC CATHETERIZATION N/A 10/20/2017    +2 mitral regurgitation, left main 10% stenosis, mid to distal LAD 10% diffuse stenosis, circumflex 10% diffuse stenosis, RCA 10% proximal stenosis, and distal PDA consistent with  coronary embolus with a lesion of 90% too small to consider coronary intervention; medical management recommended   • EYE SURGERY      laser surgery for glaucoma and left eye cataracts removed   • HYSTERECTOMY      10+ years ago   • JOINT REPLACEMENT  ;     bilateral knees and left rotater   • KNEE SURGERY     • MAMMO BILATERAL  2016    [facility]    • SHOULDER SURGERY           FAMILY HISTORY  Family History   Problem Relation Age of Onset   • Colon cancer Mother    • Glaucoma Mother    • Stroke Mother    • Arthritis Mother    • Hypertension Mother    • Glaucoma Sister    • Diabetes Sister    • Heart disease Sister    • Arthritis Sister    • Asthma Sister    • Hypertension Sister    • Miscarriages / Stillbirths Sister    • Lung disease Sister    • Heart disease Brother    • Diabetes Brother    • Arthritis Brother    • Drug abuse Brother    • Hypertension Brother    • Arthritis Father    • COPD Father    • Lung disease Father    • Arthritis Daughter    • Depression Daughter    • Alcohol abuse Maternal Uncle    • Heart disease Sister    • Heart disease Sister    • Heart disease Brother    • Rheumatologic disease Sister          SOCIAL HISTORY  Social History     Socioeconomic History   • Marital status:      Spouse name: Suresh   • Number of children: 5   • Years of education: 13   Tobacco Use   • Smoking status: Former     Current packs/day: 0.00     Average packs/day: 3.0 packs/day for 1.4 years (4.3 ttl pk-yrs)     Types: Cigarettes     Start date: 1967     Quit date: 10/19/1968     Years since quittin.6     Passive exposure: Never   • Smokeless tobacco: Never   • Tobacco comments:     caffeine use - decaf coffee   Vaping Use   • Vaping status: Never Used   Substance and Sexual Activity   • Alcohol use: No     Comment: No caffeine use   • Drug use: No   • Sexual activity: Defer         ALLERGIES  Metformin, Contrast dye (echo or unknown ct/mr), Dapagliflozin, Macrobid  [nitrofurantoin], and Iodinated contrast media      REVIEW OF SYSTEMS  Review of Systems  Included in HPI  All systems reviewed and negative except for those discussed in HPI.      PHYSICAL EXAM    I have reviewed the triage vital signs and nursing notes.    ED Triage Vitals   Temp Heart Rate Resp BP SpO2   06/12/25 1409 06/12/25 1409 06/12/25 1409 06/12/25 1415 06/12/25 1409   98.2 °F (36.8 °C) 104 20 (!) 147/125 97 %      Temp src Heart Rate Source Patient Position BP Location FiO2 (%)   06/12/25 1409 06/12/25 1409 -- -- --   Oral Monitor          Physical Exam  GENERAL: alert, no acute distress  SKIN: Warm, dry  HENT: Normocephalic, atraumatic  EYES: no scleral icterus  CV: regular rhythm, regular rate.  Moderate bilateral lower extremity edema  RESPIRATORY: normal effort, mild expiratory wheeze  ABDOMEN: soft, nontender, nondistended  MUSCULOSKELETAL: no deformity  NEURO: alert, moves all extremities, follows commands            LAB RESULTS  Recent Results (from the past 24 hours)   ECG 12 Lead ED Triage Standing Order; SOA    Collection Time: 06/12/25  2:13 PM   Result Value Ref Range    QT Interval 350 ms    QTC Interval 451 ms   Comprehensive Metabolic Panel    Collection Time: 06/12/25  2:19 PM    Specimen: Arm, Right; Blood   Result Value Ref Range    Glucose 124 (H) 65 - 99 mg/dL    BUN 7.0 (L) 8.0 - 23.0 mg/dL    Creatinine 0.87 0.57 - 1.00 mg/dL    Sodium 140 136 - 145 mmol/L    Potassium 3.2 (L) 3.5 - 5.2 mmol/L    Chloride 102 98 - 107 mmol/L    CO2 24.0 22.0 - 29.0 mmol/L    Calcium 9.2 8.6 - 10.5 mg/dL    Total Protein 7.6 6.0 - 8.5 g/dL    Albumin 4.0 3.5 - 5.2 g/dL    ALT (SGPT) 10 1 - 33 U/L    AST (SGOT) 24 1 - 32 U/L    Alkaline Phosphatase 63 39 - 117 U/L    Total Bilirubin 1.6 (H) 0.0 - 1.2 mg/dL    Globulin 3.6 gm/dL    A/G Ratio 1.1 g/dL    BUN/Creatinine Ratio 8.0 7.0 - 25.0    Anion Gap 14.0 5.0 - 15.0 mmol/L    eGFR 68.7 >60.0 mL/min/1.73   BNP    Collection Time: 06/12/25  2:19 PM     Specimen: Arm, Right; Blood   Result Value Ref Range    proBNP 841.0 0.0 - 1,800.0 pg/mL   High Sensitivity Troponin T    Collection Time: 06/12/25  2:19 PM    Specimen: Arm, Right; Blood   Result Value Ref Range    HS Troponin T 17 (H) <14 ng/L   Green Top (Gel)    Collection Time: 06/12/25  2:19 PM   Result Value Ref Range    Extra Tube Hold for add-ons.    Lavender Top    Collection Time: 06/12/25  2:19 PM   Result Value Ref Range    Extra Tube hold for add-on    Gold Top - SST    Collection Time: 06/12/25  2:19 PM   Result Value Ref Range    Extra Tube Hold for add-ons.    Light Blue Top    Collection Time: 06/12/25  2:19 PM   Result Value Ref Range    Extra Tube Hold for add-ons.    CBC Auto Differential    Collection Time: 06/12/25  2:19 PM    Specimen: Arm, Right; Blood   Result Value Ref Range    WBC 4.73 3.40 - 10.80 10*3/mm3    RBC 4.27 3.77 - 5.28 10*6/mm3    Hemoglobin 13.2 12.0 - 15.9 g/dL    Hematocrit 39.7 34.0 - 46.6 %    MCV 93.0 79.0 - 97.0 fL    MCH 30.9 26.6 - 33.0 pg    MCHC 33.2 31.5 - 35.7 g/dL    RDW 13.8 12.3 - 15.4 %    RDW-SD 46.5 37.0 - 54.0 fl    MPV 9.8 6.0 - 12.0 fL    Platelets 149 140 - 450 10*3/mm3    Neutrophil % 45.1 42.7 - 76.0 %    Lymphocyte % 35.1 19.6 - 45.3 %    Monocyte % 16.3 (H) 5.0 - 12.0 %    Eosinophil % 2.3 0.3 - 6.2 %    Basophil % 0.6 0.0 - 1.5 %    Immature Grans % 0.6 (H) 0.0 - 0.5 %    Neutrophils, Absolute 2.13 1.70 - 7.00 10*3/mm3    Lymphocytes, Absolute 1.66 0.70 - 3.10 10*3/mm3    Monocytes, Absolute 0.77 0.10 - 0.90 10*3/mm3    Eosinophils, Absolute 0.11 0.00 - 0.40 10*3/mm3    Basophils, Absolute 0.03 0.00 - 0.20 10*3/mm3    Immature Grans, Absolute 0.03 0.00 - 0.05 10*3/mm3    nRBC 0.0 0.0 - 0.2 /100 WBC   Protime-INR    Collection Time: 06/12/25  2:19 PM    Specimen: Arm, Right; Blood   Result Value Ref Range    Protime 25.6 (H) 11.7 - 14.2 Seconds    INR 2.31 (H) 0.90 - 1.10   Digoxin Level    Collection Time: 06/12/25  2:19 PM    Specimen: Arm, Right;  Blood   Result Value Ref Range    Digoxin <0.30 (L) 0.60 - 1.20 ng/mL   Respiratory Panel PCR w/COVID-19(SARS-CoV-2) MORGAN/EMERALD/EVELIO/PAD/COR/RAJEEV In-House, NP Swab in UTM/VTM, 2 HR TAT - Swab, Nasopharynx    Collection Time: 06/12/25  3:50 PM    Specimen: Nasopharynx; Swab   Result Value Ref Range    ADENOVIRUS, PCR Not Detected Not Detected    Coronavirus 229E Not Detected Not Detected    Coronavirus HKU1 Not Detected Not Detected    Coronavirus NL63 Not Detected Not Detected    Coronavirus OC43 Not Detected Not Detected    COVID19 Not Detected Not Detected - Ref. Range    Human Metapneumovirus Not Detected Not Detected    Human Rhinovirus/Enterovirus Detected (A) Not Detected    Influenza A PCR Not Detected Not Detected    Influenza B PCR Not Detected Not Detected    Parainfluenza Virus 1 Not Detected Not Detected    Parainfluenza Virus 2 Not Detected Not Detected    Parainfluenza Virus 3 Detected (A) Not Detected    Parainfluenza Virus 4 Not Detected Not Detected    RSV, PCR Not Detected Not Detected    Bordetella pertussis pcr Not Detected Not Detected    Bordetella parapertussis PCR Not Detected Not Detected    Chlamydophila pneumoniae PCR Not Detected Not Detected    Mycoplasma pneumo by PCR Not Detected Not Detected   High Sensitivity Troponin T 1Hr    Collection Time: 06/12/25  4:00 PM    Specimen: Hand, Left; Blood   Result Value Ref Range    HS Troponin T 16 (H) <14 ng/L    Troponin T Numeric Delta -1 ng/L    Troponin T % Delta -6 Abnormal if >/= 20%         RADIOLOGY  XR Chest 1 View  Result Date: 6/12/2025  XR CHEST 1 VW-  HISTORY: Female who is 79 years-old, short of breath  TECHNIQUE: Frontal views of the chest  COMPARISON: 1/2/2025  FINDINGS: The heart is enlarged. Aorta is tortuous, calcified. Pulmonary vasculature is unremarkable. No focal pulmonary consolidation, pleural effusion, or pneumothorax. Soft tissue calcifications in the region of the left humeral head, better characterized on the CT from  11/22/2024, also apparent on this exam. No acute osseous process.      No focal pulmonary consolidation. Cardiomegaly. Tortuous aorta. Follow-up as clinically indicated.  This report was finalized on 6/12/2025 2:41 PM by Dr. Antelmo Corea M.D on Workstation: [id number]          MEDICATIONS GIVEN IN ER  Medications   sodium chloride 0.9 % flush 10 mL (has no administration in time range)   ipratropium-albuterol (DUO-NEB) nebulizer solution 3 mL (has no administration in time range)   sodium chloride 0.9 % flush 10 mL (has no administration in time range)   sodium chloride 0.9 % flush 10 mL (has no administration in time range)   sodium chloride 0.9 % infusion 40 mL (has no administration in time range)   ondansetron ODT (ZOFRAN-ODT) disintegrating tablet 4 mg (has no administration in time range)     Or   ondansetron (ZOFRAN) injection 4 mg (has no administration in time range)   nitroglycerin (NITROSTAT) SL tablet 0.4 mg (has no administration in time range)   Potassium Replacement - Follow Nurse / BPA Driven Protocol (has no administration in time range)   Magnesium Standard Dose Replacement - Follow Nurse / BPA Driven Protocol (has no administration in time range)   Phosphorus Replacement - Follow Nurse / BPA Driven Protocol (has no administration in time range)   Calcium Replacement - Follow Nurse / BPA Driven Protocol (has no administration in time range)   sennosides-docusate (PERICOLACE) 8.6-50 MG per tablet 2 tablet (has no administration in time range)     And   polyethylene glycol (MIRALAX) packet 17 g (has no administration in time range)     And   bisacodyl (DULCOLAX) EC tablet 5 mg (has no administration in time range)     And   bisacodyl (DULCOLAX) suppository 10 mg (has no administration in time range)   ipratropium-albuterol (DUO-NEB) nebulizer solution 3 mL (has no administration in time range)   albuterol (PROVENTIL) nebulizer solution 0.083% 2.5 mg/3mL (has no administration in time range)          ORDERS PLACED DURING THIS VISIT:  Orders Placed This Encounter   Procedures   • Respiratory Panel PCR w/COVID-19(SARS-CoV-2) MORGAN/EMERALD/EVELIO/PAD/COR/RAJEEV In-House, NP Swab in UTM/VTM, 2 HR TAT - Swab, Nasopharynx   • XR Chest 1 View   • [place] Draw   • Comprehensive Metabolic Panel   • BNP   • High Sensitivity Troponin T   • CBC Auto Differential   • High Sensitivity Troponin T 1Hr   • Protime-INR   • Digoxin Level   • CBC (No Diff)   • Basic Metabolic Panel   • Diet: Cardiac, Diabetic; Healthy Heart (2-3 Na+); Consistent Carbohydrate; Fluid Consistency: Thin (IDDSI 0)   • Undress & Gown   • Vital Signs   • Intake & Output   • Weigh Patient   • Oral Care   • Maintain IV Access   • Telemetry - Place Orders & Notify Provider of Results When Patient Experiences Acute Chest Pain, Dysrhythmia or Respiratory Distress   • May Be Off Telemetry for Tests   • Vital Signs   • Continuous Pulse Oximetry   • Up With Assistance   • Place Sequential Compression Device   • Maintain Sequential Compression Device   • Code Status and Medical Interventions: CPR (Attempt to Resuscitate); Full Support   • Oxygen Therapy- Nasal Cannula; Titrate 1-6 LPM Per SpO2; 90 - 95%   • Incentive Spirometry   • ECG 12 Lead ED Triage Standing Order; SOA   • Insert Peripheral IV   • Insert Peripheral IV   • Initiate Emergency Department Observation Status   • CBC & Differential   • Green Top (Gel)   • Lavender Top   • Gold Top - SST   • Light Blue Top         OUTPATIENT MEDICATION MANAGEMENT:  Current Facility-Administered Medications Ordered in Epic   Medication Dose Route Frequency Provider Last Rate Last Admin   • albuterol (PROVENTIL) nebulizer solution 0.083% 2.5 mg/3mL  2.5 mg Nebulization Q6H PRN Betty Lemos APRN       • sennosides-docusate (PERICOLACE) 8.6-50 MG per tablet 2 tablet  2 tablet Oral BID PRN Betty Lemos APRN        And   • polyethylene glycol (MIRALAX) packet 17 g  17 g Oral Daily PRN Betty Lemos APRN        And   •  bisacodyl (DULCOLAX) EC tablet 5 mg  5 mg Oral Daily PRN Betty Lemos APRN        And   • bisacodyl (DULCOLAX) suppository 10 mg  10 mg Rectal Daily PRN Betty Lemos APRN       • Calcium Replacement - Follow Nurse / BPA Driven Protocol   Not Applicable PRN Betty Lemos APRN       • ipratropium-albuterol (DUO-NEB) nebulizer solution 3 mL  3 mL Nebulization Once Lester Kirk MD       • ipratropium-albuterol (DUO-NEB) nebulizer solution 3 mL  3 mL Nebulization 4x Daily - RT Betty Lemos APRN       • Magnesium Standard Dose Replacement - Follow Nurse / BPA Driven Protocol   Not Applicable PRN Betty Lemos APRN       • nitroglycerin (NITROSTAT) SL tablet 0.4 mg  0.4 mg Sublingual Q5 Min PRN Betty Lemos APRN       • ondansetron ODT (ZOFRAN-ODT) disintegrating tablet 4 mg  4 mg Oral Q6H PRN Betty Lemos APRN        Or   • ondansetron (ZOFRAN) injection 4 mg  4 mg Intravenous Q6H PRN Betty Lemos APRN       • Phosphorus Replacement - Follow Nurse / BPA Driven Protocol   Not Applicable PRN Betty Lemos APRN       • Potassium Replacement - Follow Nurse / BPA Driven Protocol   Not Applicable PRN Betty Lemos APRN       • sodium chloride 0.9 % flush 10 mL  10 mL Intravenous PRN Lester Kirk MD       • sodium chloride 0.9 % flush 10 mL  10 mL Intravenous Q12H Betty Lemos APRN       • sodium chloride 0.9 % flush 10 mL  10 mL Intravenous PRN Betty Lemos APRN       • sodium chloride 0.9 % infusion 40 mL  40 mL Intravenous PRN Betty Lemos APRN         Current Outpatient Medications Ordered in Epic   Medication Sig Dispense Refill   • Accu-Chek FastClix Lancets misc Use to check glucose as directed 306 each 1   • acetaminophen (TYLENOL) 325 MG tablet Take 2 tablets by mouth Every 6 (Six) Hours As Needed for Moderate Pain.     • albuterol sulfate  (90 Base) MCG/ACT inhaler Inhale 2 puffs Every 4 (Four) Hours As Needed for Shortness of Air. PRN SOA/WHEEZING 18 g 11   •  Alcohol Swabs (DropSafe Alcohol Prep) 70 % pads      • aspirin 81 MG EC tablet Take 1 tablet by mouth Daily.     • atorvastatin (LIPITOR) 10 MG tablet Take 1 tablet by mouth Daily. 90 tablet 1   • Blood Glucose Monitoring Suppl (ACCU-CHEK GUIDE) w/Device kit See Admin Instructions.     • Cholecalciferol 50 MCG (2000 UT) capsule Take 50 mcg by mouth Daily.     • digoxin (LANOXIN) 125 MCG tablet Take 1 tablet by mouth Every Other Day. 45 tablet 2   • furosemide (LASIX) 20 MG tablet Take 1 tablet by mouth Daily As Needed (for >8lb wt gain in 24 hours).     • glimepiride (Amaryl) 2 MG tablet By mouth take one tab twice daily with AM and PM meals. 180 tablet 2   • glucose blood test strip Test twice daily prior to breakfast and dinner as instructed (Patient taking differently: Test daily prior to breakfast) 200 each 12   • HYDROcodone-acetaminophen (NORCO) 7.5-325 MG per tablet Take 1 tablet by mouth Every 6 (Six) Hours As Needed for Moderate Pain. 12 tablet 0   • Januvia 50 MG tablet Take 2 tablets by mouth Daily.     • Lidocaine 4 % Place 1 patch on the skin as directed by provider Daily. Remove & Discard patch within 12 hours or as directed by MD     • Lumigan 0.01 % ophthalmic drops Administer 1 drop to both eyes At Night As Needed (pt states that she is supposed to use nightly, but only uses PRN).     • metoprolol tartrate (LOPRESSOR) 50 MG tablet Take 1 tablet by mouth 2 (Two) Times a Day.     • polyethylene glycol (MIRALAX) 17 g packet Take 17 g by mouth Daily As Needed (constipation).     • potassium chloride (MICRO-K) 10 MEQ CR capsule Take 1 capsule by mouth 2 (Two) Times a Day As Needed (only on days when taking PRN furosemide).     • vitamin B-12 (CYANOCOBALAMIN) 1000 MCG tablet Take 6 tablets by mouth Daily. Pt states that she has OTC 3000mcg tablets, and she takes 2 of these daily     • warfarin (COUMADIN) 5 MG tablet Take one-half tablet (2.5 mg) by mouth on Fridays, and take one tablet (5 mg) by mouth all  other days or as directed. 85 tablet 1         PROCEDURES  Procedures            PROGRESS, DATA ANALYSIS, CONSULTS, AND MEDICAL DECISION MAKING  All labs have been independently interpreted by me.  All radiology studies have been reviewed by me. All EKG's have been independently viewed and interpreted by me.  Discussion below represents my analysis of pertinent findings related to patient's condition, differential diagnosis, treatment plan and final disposition.    Differential diagnosis includes but is not limited to CHF exacerbation, COPD exacerbation, viral URI, pneumonia.    Clinical Scores:                                       ED Course as of 06/12/25 1832   Thu Jun 12, 2025   1800 EKG interpreted by me demonstrates A-fib, rate of 99, no QT prolongation, no ST elevation [MW]   1800 Chest x-ray interpreted by me demonstrates cardiomegaly, no evidence of consolidation [MW]   1800 Workup in the emergency department is notable for an RPP positive for rhinovirus and parainfluenza virus.  BNP noted to be normal.  Chest x-ray demonstrates cardiomegaly.  Patient does have mild expiratory wheeze.  Will go ahead and plan on admission for symptomatic management.  Patient agreeable. [MW]   1832 Discussed with Betty CROFT with [facility] who agrees to admit [MW]      ED Course User Index  [MW] Lester Kirk MD             AS OF 18:29 EDT VITALS:    BP - (!) 157/106  HR - 85  TEMP - 98.2 °F (36.8 °C) (Oral)  O2 SATS - 97%    COMPLEXITY OF CARE  The patient requires admission.      DIAGNOSIS  Final diagnoses:   Viral URI         DISPOSITION  ED Disposition       ED Disposition   Decision to Admit    Condition   --    Comment   --                Please note that portions of this document were completed with a voice recognition program.    Note Disclaimer: At [facility], we believe that sharing information builds trust and better relationships. You are receiving this note because you recently visited [facility]. It is  possible you will see health information before a provider has talked with you about it. This kind of information can be easy to misunderstand. To help you fully understand what it means for your health, we urge you to discuss this note with your provider.         Lester Kirk MD  06/12/25 1829       Lester Kirk MD  06/12/25 [id number]

## 2025-06-12 NOTE — PROGRESS NOTES
MD ATTESTATION NOTE    The LANG and I have discussed this patient's history, physical exam, and treatment plan.  I have reviewed the documentation and personally had a face to face interaction with the patient. I affirm the documentation and agree with the treatment and plan.  The attached note describes my personal findings.        SHARED APC FACE TO FACE: I performed a substantive part of the MDM during the patient's E/M visit. I personally evaluated and examined the patient. I personally made or approved the documented management plan and acknowledge its risk of complications.        Brief HPI: Patient with history of coronary disease diabetes CVA presents for evaluation of shortness of breath and cough.  Patient states symptoms have been going on for 4 days.  Patient has cough.  Patient has had headache.  States she is hard herself wheezing.  States she has had asthma in the past.  Patient was seen in the emergency department and had chest x-ray that was normal.  Respiratory viral panel positive for rhinovirus and parainfluenza virus.  Patient was admitted the observation for further evaluation management.    PHYSICAL EXAM  ED Triage Vitals   Temp Heart Rate Resp BP SpO2   06/12/25 1409 06/12/25 1409 06/12/25 1409 06/12/25 1415 06/12/25 1409   98.2 °F (36.8 °C) 104 20 (!) 147/125 97 %      Temp src Heart Rate Source Patient Position BP Location FiO2 (%)   06/12/25 1409 06/12/25 1409 -- -- --   Oral Monitor            GENERAL: no acute distress  HENT: nares patent  EYES: no scleral icterus  CV: regular rhythm, normal rate  RESPIRATORY: normal effort.  Mild wheezing bilaterally  ABDOMEN: soft  MUSCULOSKELETAL: no deformity.  No edema  NEURO: alert, moves all extremities, follows commands  PSYCH:  calm, cooperative  SKIN: warm, dry    Vital signs and nursing notes reviewed.        Plan: Admit observation unit.  Beta agonists.  Supportive care

## 2025-06-12 NOTE — H&P
[facility]   HISTORY AND PHYSICAL    Patient Name: Lana Yañez  : 1945  MRN: [id number]  Primary Care Physician:  Toni Green MD  Date of admission: 2025    Patient Care Team:  Toni Green MD as PCP - General (Internal Medicine)  Pia Dueñas MD as Consulting Physician (Cardiology)  Iain Hill MD as Consulting Physician (Pulmonary Disease)  Carmen Kirk MD as Consulting Physician (Pain Medicine)  Nano Cool RPH as Pharmacist  Shaw Hand PharmD as Pharmacist (Pharmacy)       Subjective   Subjective     Chief Complaint:   Chief Complaint   Patient presents with    Shortness of Breath    Cough         HPI:    Lana Yañez is a 79 y.o. female ***    Review of Systems   {Gen ROS:[id number]}    Personal History     Past Medical History:   Diagnosis Date    Acute on chronic diastolic heart failure     Acute UTI (urinary tract infection) 2022    Allergic reaction 2018    Arthritis     Arthritis of back     Arthritis of neck     Asthma     Atrial fibrillation     Persistent; on warfarin    Atypical chest pain     Biliary acute pancreatitis without necrosis or infection 11/10/2021    Bronchitis     Cellulitis of right elbow     due to MRSA    Cervical disc disorder     CHF (congestive heart failure)     COPD (chronic obstructive pulmonary disease)     Coronary artery disease     Cardiac catheterization completed; 90% PDA stenosis with medical management recommended    Coronary artery disease involving native coronary artery of native heart with angina pectoris with documented spasm     CTS (carpal tunnel syndrome)     Diabetes 1.5, managed as type 2     Disease of thyroid gland     Displacement of lumbar intervertebral disc without myelopathy     Dizziness     ANTOINE (dyspnea on exertion)     Essential hypertension     Fatigue     Fracture, foot     Generalized osteoarthritis of multiple sites     History of rheumatic fever     History of transfusion     Hx of bone  density study     10/23/2014    Hyperglycemia     Hyperlipidemia     Hypovitaminosis D     Knee swelling     Left arm pain     Left-sided weakness     Leg swelling     Low back pain     Lower extremity edema     Lumbosacral disc disease     Malaise and fatigue     Mitral valve disease     Moderate mitral valve prolapse and moderate mitral regurgitation    Mitral valve insufficiency     Morbid obesity with BMI of 40.0-44.9, adult     MRSA infection     NSTEMI (non-ST elevated myocardial infarction)     PAF (paroxysmal atrial fibrillation)     Periarthritis of shoulder     Pneumonia 1/1/2025    RA (rheumatoid arthritis)     involving both hands    Rotator cuff syndrome     Sleep apnea     SOB (shortness of breath)     Stroke     left side weakness    Urge incontinence of urine     UTI (urinary tract infection) 05/2020    Visit for screening mammogram 04/02/2018    Vitamin D deficiency        Past Surgical History:   Procedure Laterality Date    BREAST BIOPSY      BREAST SURGERY      right side lumpectomy with biopsy    CARDIAC CATHETERIZATION Left 10/20/2017    Procedure: Cardiac Catheterization/Vascular Study;  Surgeon: Alphonso Olmedo MD;  Location:  [facility];  Service:     CARDIAC CATHETERIZATION N/A 10/20/2017    +2 mitral regurgitation, left main 10% stenosis, mid to distal LAD 10% diffuse stenosis, circumflex 10% diffuse stenosis, RCA 10% proximal stenosis, and distal PDA consistent with coronary embolus with a lesion of 90% too small to consider coronary intervention; medical management recommended    EYE SURGERY      laser surgery for glaucoma and left eye cataracts removed    HYSTERECTOMY      10+ years ago    JOINT REPLACEMENT  2005; 2006    bilateral knees and left rotater    KNEE SURGERY      MAMMO BILATERAL  2016    [facility]     SHOULDER SURGERY         Family History: family history includes Alcohol abuse in her maternal uncle; Arthritis in her brother, daughter, father, mother,  and sister; Asthma in her sister; COPD in her father; Colon cancer in her mother; Depression in her daughter; Diabetes in her brother and sister; Drug abuse in her brother; Glaucoma in her mother and sister; Heart disease in her brother, brother, sister, sister, and sister; Hypertension in her brother, mother, and sister; Lung disease in her father and sister; Miscarriages / Stillbirths in her sister; Rheumatologic disease in her sister; Stroke in her mother. Otherwise pertinent FHx was reviewed and not pertinent to current issue.    Social History:  reports that she quit smoking about 56 years ago. Her smoking use included cigarettes. She started smoking about 58 years ago. She has a 4.3 pack-year smoking history. She has never been exposed to tobacco smoke. She has never used smokeless tobacco. She reports that she does not drink alcohol and does not use drugs.    Home Medications:  Accu-Chek FastClix Lancets, Accu-Chek Guide, Cholecalciferol, DropSafe Alcohol Prep, HYDROcodone-acetaminophen, Lidocaine, SITagliptin, acetaminophen, albuterol sulfate HFA, aspirin, atorvastatin, bimatoprost, digoxin, furosemide, glimepiride, glucose blood, metoprolol tartrate, polyethylene glycol, potassium chloride, vitamin B-12, and warfarin    Allergies:  Allergies   Allergen Reactions    Metformin Diarrhea    Contrast Dye (Echo Or Unknown Ct/Mr) Hives and Itching    Dapagliflozin Headache and Swelling    Macrobid [Nitrofurantoin] Hives    Iodinated Contrast Media Hives       Objective   Objective     Vitals:   Temp:  [98.2 °F (36.8 °C)] 98.2 °F (36.8 °C)  Heart Rate:  [] 85  Resp:  [18-20] 18  BP: (139-157)/() 157/106  Physical Exam    Constitutional: Awake, alert   Eyes: PERRLA, sclerae anicteric, no conjunctival injection   HENT: NCAT, mucous membranes moist   Neck: Supple, no thyromegaly, no lymphadenopathy, trachea midline   Respiratory: Clear to auscultation bilaterally, nonlabored respirations    Cardiovascular:  RRR, no murmurs, rubs, or gallops, palpable pedal pulses bilaterally   Gastrointestinal: Positive bowel sounds, soft, nontender, nondistended   Musculoskeletal: No bilateral ankle edema, no clubbing or cyanosis to extremities   Psychiatric: Appropriate affect, cooperative   Neurologic: Oriented x 3, strength symmetric in all extremities, Cranial Nerves grossly intact to confrontation, speech clear   Skin: No rashes     Result Review    Result Review:  I have personally reviewed the results from the time of this admission to 6/12/2025 18:29 EDT and agree with these findings:  [x]  Laboratory list / accordion  []  Microbiology  [x]  Radiology  []  EKG/Telemetry   []  Cardiology/Vascular   []  Pathology  [x]  Old records  []  Other:    Initial lab work in the emergency department shows high-sensitivity troponin of 17, 16, delta -1, potassium 3.2, glucose 124, all of the lab work is at baseline for the patient.  EKG shows atrial fibrillation, with a rate of 99.  Respiratory viral panel PCR is positive for human rhinovirus and parainfluenza virus.      The ASCVD Risk score (Tutu GUTIÉRREZ, et al., 2019) failed to calculate for the following reasons:    Risk score cannot be calculated because patient has a medical history suggesting prior/existing ASCVD     Assessment & Plan   Assessment / Plan     Brief Patient Summary:  Lana Yañez is a 79 y.o. female who was admitted to the observation unit for further evaluation and treatment of her upper respiratory infection.    Active Hospital Problems:  Active Hospital Problems    Diagnosis     **Upper respiratory infection      Plan:     Upper respiratory infection  Respiratory viral panel PCR positive for human rhinovirus, parainfluenza virus  -Cardiac monitoring  - Vital signs every 4 hours  - Continuous pulse ox  - Supportive care  - DuoNebs every 6 hours  - As needed albuterol mini nebs  - Mucinex 1200 mg p.o. twice daily    Atrial fibrillation-chronic  - Cardiac  monitoring  - Continue Coumadin, digoxin    Diabetes  -Accu-Cheks AC&HS  -Adult subcutaneous insulin management-low dose  -Hemoglobin A1c 7.0 on 5/6/2020    Chronic kidney disease  - Creatinine 0.87 today  - Avoid nephrotoxic agents if possible    VTE Prophylaxis:  Mechanical VTE prophylaxis orders are present.        CODE STATUS:    Code Status (Patient has no pulse and is not breathing): CPR (Attempt to Resuscitate)  Medical Interventions (Patient has pulse or is breathing): Full Support    Admission Status:  I believe this patient meets observation status.    *** minutes have been spent by [facility] providers in the care of this patient while under observation status on 06/12/25 .      Appropriate PPE worn during patient encounter.  Hand hygeine performed before and after seeing the patient.      Electronically signed by ANGELA Nicolas, 06/12/25, 6:29 PM EDT.

## 2025-06-12 NOTE — PROGRESS NOTES
[facility] Clinical Pharmacy Services: Warfarin Dosing/Monitoring Consult    Lana Yañez is a 79 y.o. female, CrCl cannot be calculated (Unknown ideal weight.). weighing  .    Results from last 7 days   Lab Units 06/12/25  1419   INR  2.31*   HEMOGLOBIN g/dL 13.2   HEMATOCRIT % 39.7   PLATELETS 10*3/mm3 149     Prior to admission anticoagulation: Warfarin managed by [facility] anticoagulation clinic; reviewed last encounter dated 5/6/25 and patient was taking warfarin 5 mg daily (35 mg weekly)    Hospital Anticoagulation:  Consulting provider: YEYO Kc with [facility]  Start date: 6/12/25 (home med)  Indication: A Fib - requiring full anticoagulation  Target INR: 2 - 3  Expected duration: indefinite     Potential food or drug interactions: No significant drug interactions identified. Respiratory panel positive for rhinovirus and parainfluenza virus 3; acute infections may increase the body's sensitivity to warfarin.     Education complete?/Date: N/A; home medication    Assessment/Plan:  Continue warfarin 5 mg daily  Monitor for any signs or symptoms of bleeding  Follow up daily INRs and dose adjustments    Pharmacy will continue to follow until discharge or discontinuation of warfarin.     Paola Tapia, PharmD, [facility]  Emergency Medicine Clinical Pharmacist   Phone: [phone]

## 2025-06-13 LAB
ALBUMIN SERPL-MCNC: 3.9 G/DL (ref 3.5–5.2)
ALBUMIN/GLOB SERPL: 1.1 G/DL
ALP SERPL-CCNC: 60 U/L (ref 39–117)
ALT SERPL W P-5'-P-CCNC: 8 U/L (ref 1–33)
ANION GAP SERPL CALCULATED.3IONS-SCNC: 13 MMOL/L (ref 5–15)
AST SERPL-CCNC: 22 U/L (ref 1–32)
BILIRUB SERPL-MCNC: 1.2 MG/DL (ref 0–1.2)
BUN SERPL-MCNC: 9 MG/DL (ref 8–23)
BUN/CREAT SERPL: 10.2 (ref 7–25)
CALCIUM SPEC-SCNC: 9.1 MG/DL (ref 8.6–10.5)
CHLORIDE SERPL-SCNC: 102 MMOL/L (ref 98–107)
CO2 SERPL-SCNC: 25 MMOL/L (ref 22–29)
CREAT SERPL-MCNC: 0.88 MG/DL (ref 0.57–1)
DEPRECATED RDW RBC AUTO: 47.6 FL (ref 37–54)
EGFRCR SERPLBLD CKD-EPI 2021: 67.8 ML/MIN/1.73
ERYTHROCYTE [DISTWIDTH] IN BLOOD BY AUTOMATED COUNT: 13.9 % (ref 12.3–15.4)
GLOBULIN UR ELPH-MCNC: 3.5 GM/DL
GLUCOSE BLDC GLUCOMTR-MCNC: 155 MG/DL (ref 70–130)
GLUCOSE BLDC GLUCOMTR-MCNC: 193 MG/DL (ref 70–130)
GLUCOSE BLDC GLUCOMTR-MCNC: 252 MG/DL (ref 70–130)
GLUCOSE SERPL-MCNC: 147 MG/DL (ref 65–99)
HCT VFR BLD AUTO: 39.9 % (ref 34–46.6)
HGB BLD-MCNC: 13 G/DL (ref 12–15.9)
INR PPP: 2.54 (ref 0.9–1.1)
MCH RBC QN AUTO: 30.8 PG (ref 26.6–33)
MCHC RBC AUTO-ENTMCNC: 32.6 G/DL (ref 31.5–35.7)
MCV RBC AUTO: 94.5 FL (ref 79–97)
PLATELET # BLD AUTO: 145 10*3/MM3 (ref 140–450)
PMV BLD AUTO: 10.2 FL (ref 6–12)
POTASSIUM SERPL-SCNC: 4.1 MMOL/L (ref 3.5–5.2)
PROT SERPL-MCNC: 7.4 G/DL (ref 6–8.5)
PROTHROMBIN TIME: 27.6 SECONDS (ref 11.7–14.2)
QT INTERVAL: 350 MS
QTC INTERVAL: 451 MS
RBC # BLD AUTO: 4.22 10*6/MM3 (ref 3.77–5.28)
SODIUM SERPL-SCNC: 140 MMOL/L (ref 136–145)
WBC NRBC COR # BLD AUTO: 6.34 10*3/MM3 (ref 3.4–10.8)

## 2025-06-13 PROCEDURE — 85610 PROTHROMBIN TIME: CPT | Performed by: PHYSICIAN ASSISTANT

## 2025-06-13 PROCEDURE — 63710000001 DIPHENHYDRAMINE PER 50 MG: Performed by: PHYSICIAN ASSISTANT

## 2025-06-13 PROCEDURE — 63710000001 PREDNISONE PER 1 MG: Performed by: STUDENT IN AN ORGANIZED HEALTH CARE EDUCATION/TRAINING PROGRAM

## 2025-06-13 PROCEDURE — 94799 UNLISTED PULMONARY SVC/PX: CPT

## 2025-06-13 PROCEDURE — 80053 COMPREHEN METABOLIC PANEL: CPT | Performed by: PHYSICIAN ASSISTANT

## 2025-06-13 PROCEDURE — 94664 DEMO&/EVAL PT USE INHALER: CPT

## 2025-06-13 PROCEDURE — G0378 HOSPITAL OBSERVATION PER HR: HCPCS

## 2025-06-13 PROCEDURE — 85027 COMPLETE CBC AUTOMATED: CPT | Performed by: PHYSICIAN ASSISTANT

## 2025-06-13 PROCEDURE — 25010000002 ONDANSETRON PER 1 MG

## 2025-06-13 PROCEDURE — 94761 N-INVAS EAR/PLS OXIMETRY MLT: CPT

## 2025-06-13 PROCEDURE — 82948 REAGENT STRIP/BLOOD GLUCOSE: CPT

## 2025-06-13 PROCEDURE — 87481 CANDIDA DNA AMP PROBE: CPT | Performed by: PHYSICIAN ASSISTANT

## 2025-06-13 PROCEDURE — 63710000001 INSULIN LISPRO (HUMAN) PER 5 UNITS

## 2025-06-13 RX ORDER — DIPHENHYDRAMINE HCL 25 MG
25 CAPSULE ORAL NIGHTLY PRN
Status: DISCONTINUED | OUTPATIENT
Start: 2025-06-13 | End: 2025-06-18 | Stop reason: HOSPADM

## 2025-06-13 RX ORDER — PREDNISONE 20 MG/1
40 TABLET ORAL
Status: COMPLETED | OUTPATIENT
Start: 2025-06-13 | End: 2025-06-17

## 2025-06-13 RX ADMIN — Medication 10 MG: at 20:42

## 2025-06-13 RX ADMIN — INSULIN LISPRO 4 UNITS: 100 INJECTION, SOLUTION INTRAVENOUS; SUBCUTANEOUS at 20:52

## 2025-06-13 RX ADMIN — IPRATROPIUM BROMIDE AND ALBUTEROL SULFATE 3 ML: .5; 3 SOLUTION RESPIRATORY (INHALATION) at 06:35

## 2025-06-13 RX ADMIN — IPRATROPIUM BROMIDE AND ALBUTEROL SULFATE 3 ML: .5; 3 SOLUTION RESPIRATORY (INHALATION) at 19:21

## 2025-06-13 RX ADMIN — ASPIRIN 81 MG: 81 TABLET, COATED ORAL at 15:18

## 2025-06-13 RX ADMIN — METOPROLOL TARTRATE 50 MG: 25 TABLET, FILM COATED ORAL at 20:37

## 2025-06-13 RX ADMIN — DIPHENHYDRAMINE HYDROCHLORIDE 25 MG: 25 CAPSULE ORAL at 02:17

## 2025-06-13 RX ADMIN — Medication 10 ML: at 15:20

## 2025-06-13 RX ADMIN — IPRATROPIUM BROMIDE AND ALBUTEROL SULFATE 3 ML: .5; 3 SOLUTION RESPIRATORY (INHALATION) at 10:46

## 2025-06-13 RX ADMIN — INSULIN LISPRO 2 UNITS: 100 INJECTION, SOLUTION INTRAVENOUS; SUBCUTANEOUS at 12:45

## 2025-06-13 RX ADMIN — Medication 10 MG: at 02:17

## 2025-06-13 RX ADMIN — GUAIFENESIN 1200 MG: 600 TABLET, EXTENDED RELEASE ORAL at 15:18

## 2025-06-13 RX ADMIN — IPRATROPIUM BROMIDE AND ALBUTEROL SULFATE 3 ML: .5; 3 SOLUTION RESPIRATORY (INHALATION) at 14:49

## 2025-06-13 RX ADMIN — POTASSIUM CHLORIDE 40 MEQ: 1500 TABLET, EXTENDED RELEASE ORAL at 02:17

## 2025-06-13 RX ADMIN — ALBUTEROL SULFATE 2.5 MG: 2.5 SOLUTION RESPIRATORY (INHALATION) at 01:26

## 2025-06-13 RX ADMIN — DIGOXIN 125 MCG: 0.12 TABLET ORAL at 15:18

## 2025-06-13 RX ADMIN — WARFARIN SODIUM 5 MG: 5 TABLET ORAL at 17:29

## 2025-06-13 RX ADMIN — GUAIFENESIN 1200 MG: 600 TABLET, EXTENDED RELEASE ORAL at 20:37

## 2025-06-13 RX ADMIN — INSULIN LISPRO 2 UNITS: 100 INJECTION, SOLUTION INTRAVENOUS; SUBCUTANEOUS at 17:29

## 2025-06-13 RX ADMIN — BENZONATATE 200 MG: 100 CAPSULE ORAL at 20:42

## 2025-06-13 RX ADMIN — METOPROLOL TARTRATE 50 MG: 25 TABLET, FILM COATED ORAL at 15:18

## 2025-06-13 RX ADMIN — ATORVASTATIN CALCIUM 10 MG: 20 TABLET, FILM COATED ORAL at 15:18

## 2025-06-13 RX ADMIN — PREDNISONE 40 MG: 20 TABLET ORAL at 15:18

## 2025-06-13 RX ADMIN — POTASSIUM CHLORIDE 10 MEQ: 750 TABLET, EXTENDED RELEASE ORAL at 15:18

## 2025-06-13 RX ADMIN — ONDANSETRON 4 MG: 2 INJECTION, SOLUTION INTRAMUSCULAR; INTRAVENOUS at 02:28

## 2025-06-13 RX ADMIN — BENZONATATE 200 MG: 100 CAPSULE ORAL at 01:50

## 2025-06-13 RX ADMIN — Medication 10 ML: at 20:47

## 2025-06-13 NOTE — PLAN OF CARE
Goal Outcome Evaluation:              Outcome Evaluation: pt is aox4, vss, pt still complains of intermittent SOB and nausea, pt to stay overnight for further observation, pt has no further questions at this time

## 2025-06-13 NOTE — PROGRESS NOTES
[facility] Clinical Pharmacy Services: Warfarin Dosing/Monitoring Consult    Lana Yañez is a 79 y.o. female, estimated creatinine clearance is 67.3 mL/min (by C-G formula based on SCr of 0.88 mg/dL). weighing 110 kg (243 lb).    Results from last 7 days   Lab Units 06/13/25  0705 06/12/25  1419   INR  2.54* 2.31*   HEMOGLOBIN g/dL 13.0 13.2   HEMATOCRIT % 39.9 39.7   PLATELETS 10*3/mm3 145 149     Prior to admission anticoagulation: Warfarin managed by [facility] anticoagulation clinic; reviewed last encounter dated 5/6/25 and patient was taking warfarin 5 mg daily (35 mg weekly)     Hospital Anticoagulation:  Consulting provider: YEYO Kc with [facility]  Start date: 6/12/25 (home med)  Indication: A Fib - requiring full anticoagulation  Target INR: 2 - 3  Expected duration: indefinite     Potential food or drug interactions:  No significant drug interactions identified. Respiratory panel positive for rhinovirus and parainfluenza virus 3; acute infections may increase the body's sensitivity to warfarin.        Education complete?/Date: N/A; home medication    Assessment/Plan:  INR within therapeutic range. Continue home warfarin dosing of 5mg daily.  Monitor for any signs or symptoms of bleeding  Follow up daily INRs and dose adjustments    Pharmacy will continue to follow until discharge or discontinuation of warfarin.     Bernice Adair, PharmD, BCPS, [facility]  Clinical Pharmacy Specialist, Emergency Medicine   Phone: [phone]

## 2025-06-13 NOTE — PLAN OF CARE
Goal Outcome Evaluation:    Patient admitted for upper respiratory infection.  Droplet Isolation/ Rhinovirus and para influenza 3.  Patient sleeping between care.

## 2025-06-13 NOTE — PROGRESS NOTES
ED OBSERVATION PROGRESS/DISCHARGE SUMMARY    Date of Admission: 6/12/2025   LOS: 0 days   PCP: Toni Green MD    Final Diagnosis COPD exacerbation with human rhinovirus and parainfluenza      Subjective     Hospital Outcome: Lana Yañez is a 79 y.o. female comes in complaining productive cough since Sunday.  Patient states that she has had worsening issues of coughing throughout the week.  Patient does report some shortness of breath intermittently at times.  No reported chest pain.  Patient does report some chills but no fever.  Patient does report some diarrhea since Monday as well.  Patient reports nausea but no vomiting.  Patient states that she has had a poor appetite this week.  Patient does report some leg swelling bilaterally however patient states she takes a water pill as needed and since she has not been feeling well she has not been taking this.  Patient does have a history of CHF, COPD, persistent A-fib on warfarin, and CAD.     6/13/2025: Patient reports that she does feel a little bit better than on admission but overall still feeling very short of breath.  Started p.o. steroids this morning and will reevaluate in the morning.    ROS:  General: no fevers, chills  Respiratory: no cough, dyspnea  Cardiovascular: no chest pain, palpitations  Abdomen: No abdominal pain, nausea, vomiting, or diarrhea  Neurologic: No focal weakness    Objective   Physical Exam:  I have reviewed the vital signs.  Temp:  [97.3 °F (36.3 °C)-98.2 °F (36.8 °C)] 97.3 °F (36.3 °C)  Heart Rate:  [] 90  Resp:  [18-20] 18  BP: (139-157)/() 144/99  General Appearance:    Alert, cooperative, no distress  Head:    Normocephalic, atraumatic, normal hearing  Eyes:    Sclerae anicteric, EOMI  Neck:   Supple, nontender  Lungs: Diminished breath sounds bilaterally with faint wheezing on expiration, on room air  Heart: Regular rate and rhythm, S1 and S2 normal, no murmur, rub or gallop  Abdomen:  Soft, nontender, bowel  sounds active, nondistended  Extremities: No clubbing, cyanosis, or edema to lower extremities  Pulses:  2+ and symmetric in distal lower extremities  Skin: No rashes   Neurologic: Oriented x3, Normal strength to extremities    Results Review:    I have reviewed the labs, radiology results and diagnostic studies.    Results from last 7 days   Lab Units 06/13/25  0705   WBC 10*3/mm3 6.34   HEMOGLOBIN g/dL 13.0   HEMATOCRIT % 39.9   PLATELETS 10*3/mm3 145     Results from last 7 days   Lab Units 06/12/25  1419   SODIUM mmol/L 140   POTASSIUM mmol/L 3.2*   CHLORIDE mmol/L 102   CO2 mmol/L 24.0   BUN mg/dL 7.0*   CREATININE mg/dL 0.87   CALCIUM mg/dL 9.2   BILIRUBIN mg/dL 1.6*   ALK PHOS U/L 63   ALT (SGPT) U/L 10   AST (SGOT) U/L 24   GLUCOSE mg/dL 124*     Imaging Results (Last 24 Hours)       Procedure Component Value Units Date/Time    XR Chest 1 View [[id number]] Collected: 06/12/25 1438     Updated: 06/12/25 1444    Narrative:      XR CHEST 1 VW-     HISTORY: Female who is 79 years-old, short of breath     TECHNIQUE: Frontal views of the chest     COMPARISON: 1/2/2025     FINDINGS: The heart is enlarged. Aorta is tortuous, calcified. Pulmonary  vasculature is unremarkable. No focal pulmonary consolidation, pleural  effusion, or pneumothorax. Soft tissue calcifications in the region of  the left humeral head, better characterized on the CT from 11/22/2024,  also apparent on this exam. No acute osseous process.       Impression:      No focal pulmonary consolidation. Cardiomegaly. Tortuous  aorta. Follow-up as clinically indicated.     This report was finalized on 6/12/2025 2:41 PM by Dr. Antelmo Corea M.D on Workstation: [id number]               I have reviewed the medications.     Discharge Medications        Continue These Medications        Instructions Start Date   Accu-Chek FastClix Lancets misc   Use to check glucose as directed      Accu-Chek Guide w/Device kit   See Admin Instructions      DropSafe  Alcohol Prep 70 % pads              ASK your doctor about these medications        Instructions Start Date   acetaminophen 325 MG tablet  Commonly known as: TYLENOL   650 mg, Every 6 Hours PRN      albuterol sulfate  (90 Base) MCG/ACT inhaler  Commonly known as: PROVENTIL HFA;VENTOLIN HFA;PROAIR HFA   2 puffs, Inhalation, Every 4 Hours PRN, PRN SOA/WHEEZING      aspirin 81 MG EC tablet   81 mg, Oral, Daily      atorvastatin 10 MG tablet  Commonly known as: LIPITOR   10 mg, Oral, Daily      Cholecalciferol 50 MCG (2000 UT) capsule   50 mcg, Weekly      digoxin 125 MCG tablet  Commonly known as: LANOXIN   125 mcg, Oral, Every Other Day      furosemide 20 MG tablet  Commonly known as: LASIX   20 mg, Daily PRN      glimepiride 2 MG tablet  Commonly known as: Amaryl   By mouth take one tab twice daily with AM and PM meals.      glucose blood test strip   Test twice daily prior to breakfast and dinner as instructed      HYDROcodone-acetaminophen 7.5-325 MG per tablet  Commonly known as: NORCO   1 tablet, Oral, Every 6 Hours PRN      Januvia 50 MG tablet  Generic drug: SITagliptin   Take 2 tablets by mouth Daily.      Lidocaine 4 %   1 patch, Transdermal, Every 24 Hours Scheduled, Remove & Discard patch within 12 hours or as directed by MD Paredes 0.01 % ophthalmic drops  Generic drug: bimatoprost   1 drop, Nightly PRN      metoprolol tartrate 50 MG tablet  Commonly known as: LOPRESSOR   50 mg, 2 Times Daily      polyethylene glycol 17 g packet  Commonly known as: MIRALAX   17 g, Daily PRN      potassium chloride 10 MEQ CR capsule  Commonly known as: MICRO-K   10 mEq, 2 Times Daily PRN      vitamin B-12 1000 MCG tablet  Commonly known as: CYANOCOBALAMIN   6,000 mcg, Daily      warfarin 5 MG tablet  Commonly known as: COUMADIN   Take one-half tablet (2.5 mg) by mouth on Fridays, and take one tablet (5 mg) by mouth all other days or as directed.               ---------------------------------------------------------------------------------------------  Assessment & Plan   Assessment/Problem List    Upper respiratory infection      Plan:    URI  Rhinovirus, parainfluenza virus positive  History of asthma  -initial troponin 17, repeat troponin of 16,   -proBNP normal.  WBC nml   -RVP is positive for rhinovirus and parainfluenza virus 3.    -Chest x-ray shows no acute findings.  Cardiomegaly.  -EKG shows atrial fibrillation rate 99 bpm, PVC, no ST elevation apparent.   - Patient is afebrile, pulse 90s, and room air oxygen 100% SpO2 and blood pressure 150s over 106.   - Patient given DuoNeb in ED.  - Continue DuoNebs  - Starting p.o. steroids on 6/13/2025 and will reevaluate in the morning     Hypokalemia  -Potassium of 3.2, replacement protocol ordered     Persistent atrial fibrillation  - Pharmacy to dose warfarin, continue  - INR of 2.3.    - Continue home digoxin,   -digoxin level low,  Digoxin level less than 0.3.       Diabetes mellitus type 2  - SSI, Accu-Cheks 3 times daily AC  - Check A1c  - Hold home Amaryl, Januvia,     CAD, continue home aspirin     Hyperlipidemia  - Check lipid panel, continue home statin     Essential hypertension, chronic, poorly controlled  - Continue home Lasix, Lopressor    Disposition: Pending on hospital course    Follow-up after Discharge: Dependent on hospital course    This note will serve as a progress note    Lauryn Marie PA-C 06/13/25 07:26 EDT    I have worn appropriate PPE during this patient encounter, sanitized my hands both with entering and exiting patient's room.      56 minutes has been spent by [facility] providers in the care of this patient while under observation status on this date 06/13/25

## 2025-06-14 PROBLEM — B34.8 PARAINFLUENZA: Status: ACTIVE | Noted: 2025-06-14

## 2025-06-14 PROBLEM — B34.8 RHINOVIRUS INFECTION: Status: ACTIVE | Noted: 2025-06-14

## 2025-06-14 PROBLEM — J12.9 VIRAL PNEUMONIA: Status: ACTIVE | Noted: 2025-06-14

## 2025-06-14 PROBLEM — E11.65 TYPE 2 DIABETES MELLITUS WITH HYPERGLYCEMIA: Status: ACTIVE | Noted: 2025-06-14

## 2025-06-14 LAB
ANION GAP SERPL CALCULATED.3IONS-SCNC: 10.4 MMOL/L (ref 5–15)
BASOPHILS # BLD AUTO: 0.01 10*3/MM3 (ref 0–0.2)
BASOPHILS NFR BLD AUTO: 0.2 % (ref 0–1.5)
BUN SERPL-MCNC: 11 MG/DL (ref 8–23)
BUN/CREAT SERPL: 15.5 (ref 7–25)
C AURIS DNA SPEC QL NAA+NON-PROBE: DETECTED
CALCIUM SPEC-SCNC: 9.3 MG/DL (ref 8.6–10.5)
CHLORIDE SERPL-SCNC: 104 MMOL/L (ref 98–107)
CO2 SERPL-SCNC: 23.6 MMOL/L (ref 22–29)
CREAT SERPL-MCNC: 0.71 MG/DL (ref 0.57–1)
DEPRECATED RDW RBC AUTO: 49.1 FL (ref 37–54)
EGFRCR SERPLBLD CKD-EPI 2021: 87.7 ML/MIN/1.73
EOSINOPHIL # BLD AUTO: 0 10*3/MM3 (ref 0–0.4)
EOSINOPHIL NFR BLD AUTO: 0 % (ref 0.3–6.2)
ERYTHROCYTE [DISTWIDTH] IN BLOOD BY AUTOMATED COUNT: 14 % (ref 12.3–15.4)
GLUCOSE BLDC GLUCOMTR-MCNC: 197 MG/DL (ref 70–130)
GLUCOSE BLDC GLUCOMTR-MCNC: 204 MG/DL (ref 70–130)
GLUCOSE BLDC GLUCOMTR-MCNC: 247 MG/DL (ref 70–130)
GLUCOSE BLDC GLUCOMTR-MCNC: 252 MG/DL (ref 70–130)
GLUCOSE SERPL-MCNC: 238 MG/DL (ref 65–99)
HCT VFR BLD AUTO: 38.7 % (ref 34–46.6)
HGB BLD-MCNC: 12.5 G/DL (ref 12–15.9)
IMM GRANULOCYTES # BLD AUTO: 0.05 10*3/MM3 (ref 0–0.05)
IMM GRANULOCYTES NFR BLD AUTO: 0.9 % (ref 0–0.5)
INR PPP: 3.22 (ref 0.9–1.1)
LYMPHOCYTES # BLD AUTO: 1.05 10*3/MM3 (ref 0.7–3.1)
LYMPHOCYTES NFR BLD AUTO: 19 % (ref 19.6–45.3)
MCH RBC QN AUTO: 30.6 PG (ref 26.6–33)
MCHC RBC AUTO-ENTMCNC: 32.3 G/DL (ref 31.5–35.7)
MCV RBC AUTO: 94.9 FL (ref 79–97)
MONOCYTES # BLD AUTO: 0.44 10*3/MM3 (ref 0.1–0.9)
MONOCYTES NFR BLD AUTO: 8 % (ref 5–12)
NEUTROPHILS NFR BLD AUTO: 3.97 10*3/MM3 (ref 1.7–7)
NEUTROPHILS NFR BLD AUTO: 71.9 % (ref 42.7–76)
NRBC BLD AUTO-RTO: 0 /100 WBC (ref 0–0.2)
PLATELET # BLD AUTO: 154 10*3/MM3 (ref 140–450)
PMV BLD AUTO: 10.9 FL (ref 6–12)
POTASSIUM SERPL-SCNC: 4.3 MMOL/L (ref 3.5–5.2)
PROTHROMBIN TIME: 33.4 SECONDS (ref 11.7–14.2)
RBC # BLD AUTO: 4.08 10*6/MM3 (ref 3.77–5.28)
SODIUM SERPL-SCNC: 138 MMOL/L (ref 136–145)
WBC NRBC COR # BLD AUTO: 5.52 10*3/MM3 (ref 3.4–10.8)

## 2025-06-14 PROCEDURE — 63710000001 INSULIN GLARGINE PER 5 UNITS: Performed by: HOSPITALIST

## 2025-06-14 PROCEDURE — 82948 REAGENT STRIP/BLOOD GLUCOSE: CPT

## 2025-06-14 PROCEDURE — 94664 DEMO&/EVAL PT USE INHALER: CPT

## 2025-06-14 PROCEDURE — 80048 BASIC METABOLIC PNL TOTAL CA: CPT

## 2025-06-14 PROCEDURE — 63710000001 PREDNISONE PER 1 MG: Performed by: STUDENT IN AN ORGANIZED HEALTH CARE EDUCATION/TRAINING PROGRAM

## 2025-06-14 PROCEDURE — 94799 UNLISTED PULMONARY SVC/PX: CPT

## 2025-06-14 PROCEDURE — 94761 N-INVAS EAR/PLS OXIMETRY MLT: CPT

## 2025-06-14 PROCEDURE — 85610 PROTHROMBIN TIME: CPT | Performed by: PHYSICIAN ASSISTANT

## 2025-06-14 PROCEDURE — 85025 COMPLETE CBC W/AUTO DIFF WBC: CPT

## 2025-06-14 PROCEDURE — 63710000001 INSULIN LISPRO (HUMAN) PER 5 UNITS

## 2025-06-14 RX ORDER — GLIPIZIDE 5 MG/1
5 TABLET ORAL
Status: DISCONTINUED | OUTPATIENT
Start: 2025-06-14 | End: 2025-06-18 | Stop reason: HOSPADM

## 2025-06-14 RX ORDER — IPRATROPIUM BROMIDE AND ALBUTEROL SULFATE 2.5; .5 MG/3ML; MG/3ML
3 SOLUTION RESPIRATORY (INHALATION) EVERY 4 HOURS PRN
Status: DISCONTINUED | OUTPATIENT
Start: 2025-06-14 | End: 2025-06-14

## 2025-06-14 RX ORDER — GLIPIZIDE 5 MG/1
5 TABLET ORAL
Status: DISCONTINUED | OUTPATIENT
Start: 2025-06-14 | End: 2025-06-14

## 2025-06-14 RX ORDER — WARFARIN SODIUM 2 MG/1
2 TABLET ORAL
Status: COMPLETED | OUTPATIENT
Start: 2025-06-14 | End: 2025-06-14

## 2025-06-14 RX ORDER — INSULIN LISPRO 100 [IU]/ML
2-9 INJECTION, SOLUTION INTRAVENOUS; SUBCUTANEOUS
Status: DISCONTINUED | OUTPATIENT
Start: 2025-06-14 | End: 2025-06-18 | Stop reason: HOSPADM

## 2025-06-14 RX ADMIN — METOPROLOL TARTRATE 50 MG: 25 TABLET, FILM COATED ORAL at 08:58

## 2025-06-14 RX ADMIN — INSULIN LISPRO 4 UNITS: 100 INJECTION, SOLUTION INTRAVENOUS; SUBCUTANEOUS at 07:57

## 2025-06-14 RX ADMIN — WARFARIN 2 MG: 2 TABLET ORAL at 21:55

## 2025-06-14 RX ADMIN — IPRATROPIUM BROMIDE AND ALBUTEROL SULFATE 3 ML: .5; 3 SOLUTION RESPIRATORY (INHALATION) at 12:12

## 2025-06-14 RX ADMIN — INSULIN LISPRO 4 UNITS: 100 INJECTION, SOLUTION INTRAVENOUS; SUBCUTANEOUS at 20:44

## 2025-06-14 RX ADMIN — GLIPIZIDE 5 MG: 5 TABLET ORAL at 13:41

## 2025-06-14 RX ADMIN — ATORVASTATIN CALCIUM 10 MG: 20 TABLET, FILM COATED ORAL at 08:58

## 2025-06-14 RX ADMIN — INSULIN LISPRO 2 UNITS: 100 INJECTION, SOLUTION INTRAVENOUS; SUBCUTANEOUS at 13:03

## 2025-06-14 RX ADMIN — GUAIFENESIN 1200 MG: 600 TABLET, EXTENDED RELEASE ORAL at 20:44

## 2025-06-14 RX ADMIN — GLIPIZIDE 5 MG: 5 TABLET ORAL at 17:20

## 2025-06-14 RX ADMIN — BISACODYL 5 MG: 5 TABLET, COATED ORAL at 20:56

## 2025-06-14 RX ADMIN — INSULIN GLARGINE 10 UNITS: 100 INJECTION, SOLUTION SUBCUTANEOUS at 13:02

## 2025-06-14 RX ADMIN — INSULIN LISPRO 6 UNITS: 100 INJECTION, SOLUTION INTRAVENOUS; SUBCUTANEOUS at 17:20

## 2025-06-14 RX ADMIN — GUAIFENESIN 1200 MG: 600 TABLET, EXTENDED RELEASE ORAL at 08:58

## 2025-06-14 RX ADMIN — FUROSEMIDE 20 MG: 20 TABLET ORAL at 08:58

## 2025-06-14 RX ADMIN — Medication 10 ML: at 09:02

## 2025-06-14 RX ADMIN — PREDNISONE 40 MG: 20 TABLET ORAL at 08:58

## 2025-06-14 RX ADMIN — ASPIRIN 81 MG: 81 TABLET, COATED ORAL at 08:58

## 2025-06-14 RX ADMIN — METOPROLOL TARTRATE 50 MG: 25 TABLET, FILM COATED ORAL at 20:44

## 2025-06-14 RX ADMIN — POTASSIUM CHLORIDE 10 MEQ: 750 TABLET, EXTENDED RELEASE ORAL at 08:58

## 2025-06-14 RX ADMIN — IPRATROPIUM BROMIDE AND ALBUTEROL SULFATE 3 ML: .5; 3 SOLUTION RESPIRATORY (INHALATION) at 06:47

## 2025-06-14 RX ADMIN — Medication 10 ML: at 20:44

## 2025-06-14 NOTE — PLAN OF CARE
Problem: Adult Inpatient Plan of Care  Goal: Plan of Care Review  Outcome: Progressing  Flowsheets (Taken 6/14/2025 1456)  Progress: improving  Plan of Care Reviewed With: patient  Goal: Patient-Specific Goal (Individualized)  Outcome: Progressing  Goal: Absence of Hospital-Acquired Illness or Injury  Outcome: Progressing  Intervention: Identify and Manage Fall Risk  Recent Flowsheet Documentation  Taken 6/14/2025 1200 by Ave Mathews RN  Safety Promotion/Fall Prevention:   safety round/check completed   room organization consistent   nonskid shoes/slippers when out of bed   mobility aid in reach   lighting adjusted   fall prevention program maintained  Taken 6/14/2025 0735 by Ave Mathews RN  Safety Promotion/Fall Prevention:   safety round/check completed   room organization consistent   lighting adjusted   fall prevention program maintained  Intervention: Prevent Skin Injury  Recent Flowsheet Documentation  Taken 6/14/2025 1200 by Ave Mathews RN  Body Position: position changed independently  Intervention: Prevent and Manage VTE (Venous Thromboembolism) Risk  Recent Flowsheet Documentation  Taken 6/14/2025 0735 by Ave Mathews RN  VTE Prevention/Management:   bilateral   compression stockings on  Goal: Optimal Comfort and Wellbeing  Outcome: Progressing  Intervention: Provide Person-Centered Care  Recent Flowsheet Documentation  Taken 6/14/2025 0900 by Ave Mathews RN  Trust Relationship/Rapport:   care explained   choices provided   emotional support provided   empathic listening provided   questions answered   questions encouraged   reassurance provided   thoughts/feelings acknowledged  Goal: Readiness for Transition of Care  Outcome: Progressing     Problem: Comorbidity Management  Goal: Maintenance of Asthma Control  Outcome: Progressing  Intervention: Maintain Asthma Symptom Control  Recent Flowsheet Documentation  Taken 6/14/2025 0735 by Ave Mathews RN  Medication  Review/Management: medications reviewed  Goal: Maintenance of COPD Symptom Control  Outcome: Progressing  Intervention: Maintain COPD (Chronic Obstructive Pulmonary Disease) Symptom Control  Recent Flowsheet Documentation  Taken 6/14/2025 0735 by Ave Mathews RN  Medication Review/Management: medications reviewed  Goal: Blood Glucose Level Within Target Range  Outcome: Progressing  Intervention: Monitor and Manage Glycemia  Recent Flowsheet Documentation  Taken 6/14/2025 0735 by Ave Mathews RN  Medication Review/Management: medications reviewed  Goal: Blood Pressure in Desired Range  Outcome: Progressing  Intervention: Maintain Blood Pressure Management  Recent Flowsheet Documentation  Taken 6/14/2025 0735 by Ave Mathews RN  Medication Review/Management: medications reviewed     Problem: Asthma Exacerbation  Goal: Asthma Symptom Relief  Outcome: Progressing  Intervention: Support Asthma Symptom Control  Recent Flowsheet Documentation  Taken 6/14/2025 0735 by Ave Mathews RN  Medication Review/Management: medications reviewed     Problem: Fall Injury Risk  Goal: Absence of Fall and Fall-Related Injury  Outcome: Progressing  Intervention: Identify and Manage Contributors  Recent Flowsheet Documentation  Taken 6/14/2025 0735 by Ave Mathews RN  Medication Review/Management: medications reviewed  Intervention: Promote Injury-Free Environment  Recent Flowsheet Documentation  Taken 6/14/2025 1200 by Ave Mathews RN  Safety Promotion/Fall Prevention:   safety round/check completed   room organization consistent   nonskid shoes/slippers when out of bed   mobility aid in reach   lighting adjusted   fall prevention program maintained  Taken 6/14/2025 0735 by Ave Mathews RN  Safety Promotion/Fall Prevention:   safety round/check completed   room organization consistent   lighting adjusted   fall prevention program maintained     Problem: Skin Injury Risk Increased  Goal: Skin Health  and Integrity  Outcome: Progressing  Intervention: Optimize Skin Protection  Recent Flowsheet Documentation  Taken 6/14/2025 1200 by Ave Mathews, RN  Activity Management: ambulated in room  Taken 6/14/2025 0900 by Ave Mathews, RN  Activity Management: up in chair  Taken 6/14/2025 0735 by Ave Mathews, RN  Activity Management: activity encouraged  Head of Bed (HOB) Positioning: HOB at 30 degrees   Goal Outcome Evaluation:  Plan of Care Reviewed With: patient        Progress: improving

## 2025-06-14 NOTE — PLAN OF CARE
Goal Outcome Evaluation:    Patient remains SOB.  Placed patient on 2L O2 for comfort (Not de-sating)  No consults at this time.

## 2025-06-14 NOTE — PROGRESS NOTES
ED OBSERVATION PROGRESS/DISCHARGE SUMMARY    Date of Admission: 6/12/2025   LOS: 0 days   PCP: Toni Green MD    Final Diagnosis rhinovirus,       Subjective     Hospital Outcome:     Lana Yañez is a 79 y.o. female comes in complaining productive cough since Sunday.  Patient states that she has had worsening issues of coughing throughout the week.  Patient does report some shortness of breath intermittently at times.  No reported chest pain.  Patient does report some chills but no fever.  Patient does report some diarrhea since Monday as well.  Patient reports nausea but no vomiting.  Patient states that she has had a poor appetite this week.  Patient does report some leg swelling bilaterally however patient states she takes a water pill as needed and since she has not been feeling well she has not been taking this.  Patient does have a history of CHF, COPD, persistent A-fib on warfarin, and CAD.      6/13/2025: Patient reports that she does feel a little bit better than on admission but overall still feeling very short of breath.  Started p.o. steroids this morning and will reevaluate in the morning.    06/14/25:  Maintaining SpO2 on room air but breathing is labored.  She felt a lot better with 2 L of supplemental O2 overnight. This morning her ambulatory oxygen sat dropped to 90% and her heart rate shot up to 133, she reported fatigue and was unable to complete testing walking 100 feet.     Case discussed with CCP RN who advises pt meets criteria for inpatient. I have discussed case with Dr. Garcia on call for [facility] who has graciously accepted to admit patient to a tele inpt bed.     Review of Systems:   Constitutional:  No weight changes, fever, or chills. No night sweats, no fatigue, no malaise.    Cardiovascular:  No chest pain, no palpitations, no edema.      Respiratory:  + SOA, productive cough.   Gastrointestinal:  No nausea, vomiting, or diarrhea. No constipation, or GI discomfort. No reflux  pain, no anorexia, no dysphagia. No hematochezia or melena.    Neuro:  + generalized weakness, no numbness, no paresthesias, no loss of consciousness, no syncope, no dizziness, no headache.     Objective   Physical Exam:   Constitutional: Awake, alert. Well developed for age. Nontoxic appearing.   Eyes: PERRL x2, sclerae anicteric, no conjunctival injection. No EOM abnormalities noted.   HENT: NCAT, mucous membranes moist,   Neck: Supple, no thyromegaly, no lymphadenopathy, trachea midline  Respiratory: Fine wheezes bilateral anterior upper lung fields, mild tachypnea, able to speak in some sentences and short phrases with mild fatigue/dyspnea  Cardiovascular: Irregular, no murmurs, rubs, or gallops, palpable pedal pulses bilaterally. No appreciable edema.   Gastrointestinal: Positive bowel sounds, soft, nontender, obese  Musculoskeletal: No bilateral ankle edema, no clubbing or cyanosis to extremities. No obvious deformities.   Psychiatric: Appropriate affect, cooperative. Converses appropriately for age.   Neurologic: Oriented x 3, strength symmetric in all extremities. Cranial nerves grossly intact to confrontation, speech clear  Skin: No rashes, skin intact.     Results Review:    I have reviewed the labs, radiology results and diagnostic studies.    Results from last 7 days   Lab Units 06/13/25  0705   WBC 10*3/mm3 6.34   HEMOGLOBIN g/dL 13.0   HEMATOCRIT % 39.9   PLATELETS 10*3/mm3 145     Results from last 7 days   Lab Units 06/13/25  0705 06/12/25  1419   SODIUM mmol/L 140 140   POTASSIUM mmol/L 4.1 3.2*   CHLORIDE mmol/L 102 102   CO2 mmol/L 25.0 24.0   BUN mg/dL 9.0 7.0*   CREATININE mg/dL 0.88 0.87   CALCIUM mg/dL 9.1 9.2   BILIRUBIN mg/dL 1.2 1.6*   ALK PHOS U/L 60 63   ALT (SGPT) U/L 8 10   AST (SGOT) U/L 22 24   GLUCOSE mg/dL 147* 124*     Imaging Results (Last 24 Hours)       ** No results found for the last 24 hours. **            I have reviewed the medications.     Discharge Medications         Continue These Medications        Instructions Start Date   Accu-Chek FastClix Lancets misc   Use to check glucose as directed      Accu-Chek Guide w/Device kit   See Admin Instructions      DropSafe Alcohol Prep 70 % pads              ASK your doctor about these medications        Instructions Start Date   acetaminophen 325 MG tablet  Commonly known as: TYLENOL   650 mg, Every 6 Hours PRN      albuterol sulfate  (90 Base) MCG/ACT inhaler  Commonly known as: PROVENTIL HFA;VENTOLIN HFA;PROAIR HFA   2 puffs, Inhalation, Every 4 Hours PRN, PRN SOA/WHEEZING      aspirin 81 MG EC tablet   81 mg, Oral, Daily      atorvastatin 10 MG tablet  Commonly known as: LIPITOR   10 mg, Oral, Daily      Cholecalciferol 50 MCG (2000 UT) capsule   50 mcg, Weekly      digoxin 125 MCG tablet  Commonly known as: LANOXIN   125 mcg, Oral, Every Other Day      furosemide 20 MG tablet  Commonly known as: LASIX   20 mg, Daily PRN      glimepiride 2 MG tablet  Commonly known as: Amaryl   By mouth take one tab twice daily with AM and PM meals.      glucose blood test strip   Test twice daily prior to breakfast and dinner as instructed      HYDROcodone-acetaminophen 7.5-325 MG per tablet  Commonly known as: NORCO   1 tablet, Oral, Every 6 Hours PRN      Januvia 50 MG tablet  Generic drug: SITagliptin   Take 2 tablets by mouth Daily.      Lidocaine 4 %   1 patch, Transdermal, Every 24 Hours Scheduled, Remove & Discard patch within 12 hours or as directed by MD Paredes 0.01 % ophthalmic drops  Generic drug: bimatoprost   1 drop, Nightly PRN      metoprolol tartrate 50 MG tablet  Commonly known as: LOPRESSOR   50 mg, 2 Times Daily      polyethylene glycol 17 g packet  Commonly known as: MIRALAX   17 g, Daily PRN      potassium chloride 10 MEQ CR capsule  Commonly known as: MICRO-K   10 mEq, 2 Times Daily PRN      vitamin B-12 1000 MCG tablet  Commonly known as: CYANOCOBALAMIN   6,000 mcg, Daily      warfarin 5 MG tablet  Commonly  known as: COUMADIN   Take one-half tablet (2.5 mg) by mouth on Fridays, and take one tablet (5 mg) by mouth all other days or as directed.              ---------------------------------------------------------------------------------------------  Assessment & Plan   Assessment/Problem List    Upper respiratory infection      Plan:    URI  Rhinovirus, parainfluenza virus positive  History of asthma  -initial troponin 17, repeat troponin of 16,   -proBNP normal.  WBC nml   -RVP is positive for rhinovirus and parainfluenza virus 3.    -Chest x-ray shows no acute findings.  Cardiomegaly.  -EKG shows atrial fibrillation rate 99 bpm, PVC, no ST elevation apparent.   - Patient is afebrile, pulse 90s, and room air oxygen 100% SpO2 and blood pressure 150s over 106.   - Patient given DuoNeb in ED.  - Continue DuoNebs  - Starting p.o. steroids on 6/13/2025     Hypokalemia  -Resolved following electrolyte replacement protocol     Persistent atrial fibrillation  - Pharmacy to dose warfarin, continue  - INR of 2.3.    - Continue home digoxin,      Diabetes mellitus type 2  - SSI, Accu-Cheks 3 times daily AC  - Hold home Amaryl, Januvia,     CAD  -Continue home aspirin     Hyperlipidemia  - Check lipid panel, continue home statin     Essential hypertension, chronic, poorly controlled  - Continue home Lasix, Lopressor    Disposition: admitted to [facility], Dr. Garcia, tele inpt    This note will serve as a transfer and progress note    Juju Lowe, ANGELA 06/14/25 04:49 EDT    I have worn appropriate PPE during this patient encounter, sanitized my hands both with entering and exiting patient's room.      54 minutes has been spent by [facility] providers in the care of this patient while under observation status

## 2025-06-14 NOTE — PROGRESS NOTES
"    DAILY PROGRESS NOTE  [facility]    Patient Identification:  Name: Lana Yañez  Age: 79 y.o.  Sex: female  :  1945  MRN: [id number]         Primary Care Physician: Toni Green MD    Subjective:  Interval History: Patient still with shortness of breath.  She states her swelling she noted the other day has improved with resumption of Lasix that she is really only had 1 dose with it scheduled for the next 3 days.  She denies any chest pain.  Still with cough when they got her up to walk her O2 saturations dropped to 90% and since she was still symptomatic [facility] was asked to admit.  Patient has received steroids and breathing treatments for viral bronchitis.  She does feel better overall but she still feels weak overall.    Objective: Talking on cell phone upon entering room.  Patient casual and conversational.  She does not appear toxic but does look a little sickly from viral illness.    Scheduled Meds:aspirin, 81 mg, Oral, Daily  atorvastatin, 10 mg, Oral, Daily  digoxin, 125 mcg, Oral, Every Other Day  furosemide, 20 mg, Oral, Daily  guaiFENesin, 1,200 mg, Oral, Q12H  insulin lispro, 2-9 Units, Subcutaneous, 4x Daily AC & at Bedtime  ipratropium-albuterol, 3 mL, Nebulization, 4x Daily - RT  Lidocaine, 1 patch, Transdermal, Q24H  metoprolol tartrate, 50 mg, Oral, BID  potassium chloride, 10 mEq, Oral, Daily  predniSONE, 40 mg, Oral, Daily With Breakfast  sodium chloride, 10 mL, Intravenous, Q12H  warfarin, 5 mg, Oral, Daily      Continuous Infusions:Pharmacy to dose warfarin,         Vital signs in last 24 hours:  Temp:  [97.7 °F (36.5 °C)-98.1 °F (36.7 °C)] 97.8 °F (36.6 °C)  Heart Rate:  [] 85  Resp:  [16-20] 20  BP: (132-151)/() 151/97    Intake/Output:  No intake or output data in the 24 hours ending 25 1216    Exam:  /97 (BP Location: Right arm, Patient Position: Lying)   Pulse 85   Temp 97.8 °F (36.6 °C) (Oral)   Resp 20   Ht 167.6 cm (66\")   Wt " 110 kg (243 lb)   LMP  (LMP Unknown)   SpO2 100%   BMI 39.22 kg/m²     General Appearance:    Alert, cooperative, nontoxic, AAOx3                          Head:    Normocephalic, without obvious abnormality, atraumatic                         Throat:   Oral mucosa pink and moist                           Neck:   No JVD                         Lungs:    Rhonchi versus rales throughout all lung fields bilaterally to auscultation bilaterally, respirations unlabored with conversation -stable on room air                  Chest Wall:    No tenderness or deformity                          Heart:  Irregularly irregular rate and rhythm, S1 and S2 normal                  Abdomen:     Soft, nontender, bowel sounds active                 Extremities: Moving all, generalized weakness but nothing focal, trace/resolving edema                        Pulses:   Pulses palpable in all extremities                            Skin:   Skin is warm and dry                  Neurologic:   CNII-XII intact, no focal deficits noted     Data Review:  Labs in chart were reviewed.    Assessment:  Active Hospital Problems    Diagnosis  POA    **Upper respiratory infection [J06.9]  Yes    Parainfluenza [B34.8]  Yes    Rhinovirus infection [B34.8]  Unknown    Viral pneumonia [J12.9]  Unknown    Type 2 diabetes mellitus with hyperglycemia [E11.65]  Unknown    Atrial fibrillation [I48.91]  Yes    Chronic atrial fibrillation [I48.20]  Yes      Resolved Hospital Problems   No resolved problems to display.       Plan:    Anticipate this patient is most likely dealing with viral bronchitis versus developing viral pneumonia as her illness is encroaching on a week's time   - Chest x-ray negative and will further evaluate with CT as I would not doubt if there is more subtle changes for viral/atypical pneumonia versus interstitial fluid retention given past history of CHF   - Patient utilizes Lasix on an endings to the basis and she got 20 mg today with  plans for 20 mg for the next couple days and I agree with that.  Probable aspects of mild acute on chronic diastolic CHF secondary to CAF but volume status otherwise reassuring on exam and rate controlled with digoxin/metoprolol with anticoagulation on Coumadin and supratherapeutic INR of 3.22 today with pharmacy dosing.  [facility] to dose Coumadin and I will manage.  INR escalating to 3.22 and will give 2 mg today and recheck tomorrow.  Has received 5 mg 2 days here with INR trending 2.31-2.54-3.22   - Continue supportive care -Mucinex/make scheduled DuoNebs as needed is likely also driving patient's heart rate up   - Pending CT consider broad-spectrum antibiotic   - Monitor BMP and check uric with a.m. labs   - CBC normal        DM2 with hyperglycemia secondary to steroid   - A1c otherwise controlled at 7.0   - Off Januvia/glimepiride currently on sliding scale and will resume sulfonylurea with glipizide due to formulary issues with dinner.  Januvia not available   - Lantus 10 units x 1 (lunch)   - Monitor BP trends      HLD-statin        No additional DVT prophylaxis warranted given INR    [facility] asked to admit from observation unit.  Anticipate patient likely ready for discharge in the next day or 2    Georgi Garcia MD  6/14/2025  12:16 EDT

## 2025-06-14 NOTE — PROGRESS NOTES
[facility] ATTESTATION NOTE    SHARED VISIT: This visit was performed by BOTH a physician and an APC. The substantive portion of the medical decision making was performed by this attesting physician who made or approved the management plan and takes responsibility for patient management. All studies in the APC note (if performed) were independently interpreted by me.     The LANG and I have discussed this patient's history, physical exam, and treatment plan.  I have reviewed the documentation and personally had a face to face interaction with the patient. I provided a substantive portion of the care of the patient.  I affirm the documentation and agree with the treatment and plan.  The note below describes my personal findings:         S: doing better than yesterday, waiting for breakfast, expresses concern about elevated BS and BP in setting of steroid given for resp reasons     O:  Exam  General : cooperative and conversant f  HEENT: NCAT, eomi, no scleral icterus  Resp: relaxed breathing at rest  Exts: no obvious deformities  Neuro: alert and appropriate, face symmetric, nl speech     Assessment and Plan: 80 yo F with h/o CHF, afib, COPD, CAD admitted to obs unit with shortness of breath in setting of viral URI.  Plan walking oxygen saturation and will re-evaluate

## 2025-06-14 NOTE — PROGRESS NOTES
[facility] Clinical Pharmacy Services: Warfarin Dosing/Monitoring Consult    Lana Yañez is a 79 y.o. female, estimated creatinine clearance is 83.4 mL/min (by C-G formula based on SCr of 0.71 mg/dL). weighing 110 kg (243 lb).    Results from last 7 days   Lab Units 06/14/25  0443 06/13/25  0705 06/12/25  1419   INR  3.22* 2.54* 2.31*   HEMOGLOBIN g/dL 12.5 13.0 13.2   HEMATOCRIT % 38.7 39.9 39.7   PLATELETS 10*3/mm3 154 145 149     Prior to admission anticoagulation: Warfarin managed by [facility] anticoagulation clinic; reviewed last encounter dated 5/6/25 and patient was taking warfarin 5 mg daily (35 mg weekly)     Hospital Anticoagulation:  Consulting provider: YEYO K[facility] with [facility]  Start date: 6/12/25 (home med)  Indication: A Fib - requiring full anticoagulation  Target INR: 2 - 3  Expected duration: indefinite     Potential food or drug interactions:   Prednisone (5-day course started on 6/13): may increase the anticoagulant effects of warfarin  Respiratory panel positive for rhinovirus and parainfluenza virus 3; acute infections may increase the body's sensitivity to warfarin.      Education complete?/Date: N/A; home medication    Assessment/Plan:  INR increased significantly to >3 so will hold warfarin today.  Monitor for any signs or symptoms of bleeding  Follow up daily INRs and dose adjustments    Pharmacy will continue to follow until discharge or discontinuation of warfarin.     Zander Estrada III, PharmD  Clinical Pharmacist

## 2025-06-15 LAB
ANION GAP SERPL CALCULATED.3IONS-SCNC: 10 MMOL/L (ref 5–15)
BUN SERPL-MCNC: 17 MG/DL (ref 8–23)
BUN/CREAT SERPL: 18.5 (ref 7–25)
CALCIUM SPEC-SCNC: 9.4 MG/DL (ref 8.6–10.5)
CHLORIDE SERPL-SCNC: 102 MMOL/L (ref 98–107)
CO2 SERPL-SCNC: 24 MMOL/L (ref 22–29)
CREAT SERPL-MCNC: 0.92 MG/DL (ref 0.57–1)
EGFRCR SERPLBLD CKD-EPI 2021: 64.3 ML/MIN/1.73
GLUCOSE BLDC GLUCOMTR-MCNC: 179 MG/DL (ref 70–130)
GLUCOSE BLDC GLUCOMTR-MCNC: 217 MG/DL (ref 70–130)
GLUCOSE BLDC GLUCOMTR-MCNC: 262 MG/DL (ref 70–130)
GLUCOSE SERPL-MCNC: 226 MG/DL (ref 65–99)
INR PPP: 3.63 (ref 0.9–1.1)
POTASSIUM SERPL-SCNC: 3.9 MMOL/L (ref 3.5–5.2)
PROTHROMBIN TIME: 36.7 SECONDS (ref 11.7–14.2)
SODIUM SERPL-SCNC: 136 MMOL/L (ref 136–145)
URATE SERPL-MCNC: 5.2 MG/DL (ref 2.4–5.7)

## 2025-06-15 PROCEDURE — 82948 REAGENT STRIP/BLOOD GLUCOSE: CPT

## 2025-06-15 PROCEDURE — 63710000001 PREDNISONE PER 1 MG: Performed by: STUDENT IN AN ORGANIZED HEALTH CARE EDUCATION/TRAINING PROGRAM

## 2025-06-15 PROCEDURE — 63710000001 INSULIN LISPRO (HUMAN) PER 5 UNITS

## 2025-06-15 PROCEDURE — 25010000002 FUROSEMIDE PER 20 MG: Performed by: HOSPITALIST

## 2025-06-15 PROCEDURE — 63710000001 INSULIN GLARGINE PER 5 UNITS: Performed by: HOSPITALIST

## 2025-06-15 PROCEDURE — 85610 PROTHROMBIN TIME: CPT | Performed by: HOSPITALIST

## 2025-06-15 PROCEDURE — 80048 BASIC METABOLIC PNL TOTAL CA: CPT | Performed by: HOSPITALIST

## 2025-06-15 PROCEDURE — 84550 ASSAY OF BLOOD/URIC ACID: CPT | Performed by: HOSPITALIST

## 2025-06-15 RX ORDER — POTASSIUM CHLORIDE 1500 MG/1
40 TABLET, EXTENDED RELEASE ORAL ONCE
Status: COMPLETED | OUTPATIENT
Start: 2025-06-15 | End: 2025-06-15

## 2025-06-15 RX ORDER — LOSARTAN POTASSIUM 25 MG/1
25 TABLET ORAL
Status: DISCONTINUED | OUTPATIENT
Start: 2025-06-15 | End: 2025-06-18 | Stop reason: HOSPADM

## 2025-06-15 RX ORDER — FUROSEMIDE 10 MG/ML
40 INJECTION INTRAMUSCULAR; INTRAVENOUS ONCE
Status: COMPLETED | OUTPATIENT
Start: 2025-06-15 | End: 2025-06-15

## 2025-06-15 RX ADMIN — INSULIN GLARGINE 12 UNITS: 100 INJECTION, SOLUTION SUBCUTANEOUS at 10:51

## 2025-06-15 RX ADMIN — POTASSIUM CHLORIDE 40 MEQ: 1500 TABLET, EXTENDED RELEASE ORAL at 11:02

## 2025-06-15 RX ADMIN — GLIPIZIDE 5 MG: 5 TABLET ORAL at 17:30

## 2025-06-15 RX ADMIN — INSULIN LISPRO 2 UNITS: 100 INJECTION, SOLUTION INTRAVENOUS; SUBCUTANEOUS at 13:02

## 2025-06-15 RX ADMIN — INSULIN LISPRO 4 UNITS: 100 INJECTION, SOLUTION INTRAVENOUS; SUBCUTANEOUS at 17:30

## 2025-06-15 RX ADMIN — METOPROLOL TARTRATE 50 MG: 25 TABLET, FILM COATED ORAL at 08:21

## 2025-06-15 RX ADMIN — Medication 10 ML: at 22:31

## 2025-06-15 RX ADMIN — INSULIN LISPRO 6 UNITS: 100 INJECTION, SOLUTION INTRAVENOUS; SUBCUTANEOUS at 22:31

## 2025-06-15 RX ADMIN — GUAIFENESIN 1200 MG: 600 TABLET, EXTENDED RELEASE ORAL at 22:30

## 2025-06-15 RX ADMIN — FUROSEMIDE 40 MG: 10 INJECTION, SOLUTION INTRAMUSCULAR; INTRAVENOUS at 11:02

## 2025-06-15 RX ADMIN — ASPIRIN 81 MG: 81 TABLET, COATED ORAL at 08:20

## 2025-06-15 RX ADMIN — PREDNISONE 40 MG: 20 TABLET ORAL at 08:21

## 2025-06-15 RX ADMIN — POTASSIUM CHLORIDE 10 MEQ: 750 TABLET, EXTENDED RELEASE ORAL at 08:21

## 2025-06-15 RX ADMIN — DIGOXIN 125 MCG: 0.12 TABLET ORAL at 08:21

## 2025-06-15 RX ADMIN — GUAIFENESIN 1200 MG: 600 TABLET, EXTENDED RELEASE ORAL at 08:21

## 2025-06-15 RX ADMIN — METOPROLOL TARTRATE 50 MG: 25 TABLET, FILM COATED ORAL at 22:30

## 2025-06-15 RX ADMIN — LOSARTAN POTASSIUM 25 MG: 25 TABLET, FILM COATED ORAL at 08:21

## 2025-06-15 RX ADMIN — ATORVASTATIN CALCIUM 10 MG: 20 TABLET, FILM COATED ORAL at 08:21

## 2025-06-15 RX ADMIN — GLIPIZIDE 5 MG: 5 TABLET ORAL at 11:03

## 2025-06-15 NOTE — PLAN OF CARE
Goal Outcome Evaluation:   Patient is alert and oriented x4, up ad sekou with standby assistance only and is voiding without difficulty. Lungs are clear to diminished bilaterally with an intermittent dry cough noted. She is able to take her medicines whole with water. She remains in droplet isolation for respiratory infections, and states she is feeling better than she had previously.

## 2025-06-15 NOTE — PROGRESS NOTES
"    DAILY PROGRESS NOTE  [facility]    Patient Identification:  Name: Lana Yañez  Age: 79 y.o.  Sex: female  :  1945  MRN: [id number]         Primary Care Physician: Toni Green MD    Subjective:  Interval History: Feeling better in some ways but also still bit short of breath and labored when laying flat.  She denies any bloody cough.  No fevers overnight and oxygen has been weaned to room air.    Objective: Conversational and pleasant.  No family present    Scheduled Meds:aspirin, 81 mg, Oral, Daily  atorvastatin, 10 mg, Oral, Daily  digoxin, 125 mcg, Oral, Every Other Day  furosemide, 20 mg, Oral, Daily  glipizide, 5 mg, Oral, BID AC  guaiFENesin, 1,200 mg, Oral, Q12H  insulin glargine, 12 Units, Subcutaneous, Once  insulin lispro, 2-9 Units, Subcutaneous, 4x Daily AC & at Bedtime  Lidocaine, 1 patch, Transdermal, Q24H  losartan, 25 mg, Oral, Q24H  metoprolol tartrate, 50 mg, Oral, BID  predniSONE, 40 mg, Oral, Daily With Breakfast  sodium chloride, 10 mL, Intravenous, Q12H      Continuous Infusions:     Vital signs in last 24 hours:  Temp:  [97.3 °F (36.3 °C)-97.5 °F (36.4 °C)] 97.3 °F (36.3 °C)  Heart Rate:  [] 92  Resp:  [18] 18  BP: (141-154)/() 141/95    Intake/Output:  No intake or output data in the 24 hours ending 06/15/25 09    Exam:  /95 (BP Location: Right arm, Patient Position: Sitting)   Pulse 92   Temp 97.3 °F (36.3 °C) (Oral)   Resp 18   Ht 167.6 cm (66\")   Wt 110 kg (243 lb)   LMP  (LMP Unknown)   SpO2 97%   BMI 39.22 kg/m²     General Appearance:    Alert, cooperative, sickly, AAOx3                          Head:    Normocephalic, without obvious abnormality, atraumatic                           Eyes:    PERRLA/EOM's intact, both eyes                         Throat:   Oral mucosa pink and moist                           Neck:   Supple, symmetrical, trachea midline, no JVD                         Lungs:    Diminished bases with subtle " rales and diffuse rhonchi to auscultation bilaterally, respirations unlabored on room air laying with head of bed at about 30 degrees                 Chest Wall:    No tenderness or deformity                          Heart:  Irregular rate and rhythm, S1 and S2 normal                  Abdomen:     Soft, nontender, bowel sounds active                 Extremities: Moving all, trace edema                        Pulses:   Pulses palpable in all extremities                            Skin:   Skin is warm and dry                  Neurologic:   CNII-XII intact, no focal deficits noted     Data Review:  Labs in chart were reviewed.    Assessment:  Active Hospital Problems    Diagnosis  POA    **Upper respiratory infection [J06.9]  Yes    Parainfluenza [B34.8]  Yes    Rhinovirus infection [B34.8]  Unknown    Viral pneumonia [J12.9]  Unknown    Type 2 diabetes mellitus with hyperglycemia [E11.65]  Unknown    Atrial fibrillation [I48.91]  Yes    Chronic atrial fibrillation [I48.20]  Yes      Resolved Hospital Problems   No resolved problems to display.       Plan:    CT chest pending to rule out secondary bacterial pneumonia but otherwise anticipating an interstitial fluid retention and possible atypical pneumonic/viral changes    Patient was started on Lasix at only 20 mg yesterday she did have some output.  She is symptomatic when laying supine though she has been weaned to room air.  I held the a.m. 20 mg dose this a.m. and I am going to give her 40 mg of IV x 1   - Uric/creatinine and electrolytes normal     Continue supportive care-Mucinex/DuoNebs as needed    Pending CT consider initiation of antibiotics.  CBC otherwise normal    Monitor BMP with uric    HTN uncontrolled -she should respond to IV Lasix but I am also can initiate low-dose losartan 25 mg and monitor response   - I also feel a certain amount of this is labile secondary to mood/symptoms    [facility]HA is managing INR.  She was set to receive 5 mg with pharmacy  dosing yesterday and I canceled that and wrote for 2 mg and her INR continues to increase to 3.63 so I will hold all AC today and recheck in a.m. tomorrow    DM2 with hyperglycemia secondary to steroid and an A1c of 7   - Off Januvia and glimepiride and currently using sliding scale while pulsing this patient with Lantus over the last couple days.  She received 10 units 2 days ago and I will give 12 units today and will pulse and adjust accordingly to BS trends    HLD on statin        Disposition: Hopefully home in the next day or 2 pending remaining workup and further lab surveillance    Georgi Garcia MD  6/15/2025  09:27 EDT

## 2025-06-15 NOTE — SIGNIFICANT NOTE
06/15/25 1245   OTHER   Discipline physical therapist   Rehab Time/Intention   Session Not Performed patient/family declined evaluation;patient/family declined, not feeling well   Recommendation   PT - Next Appointment 06/16/25

## 2025-06-16 ENCOUNTER — APPOINTMENT (OUTPATIENT)
Dept: CT IMAGING | Facility: HOSPITAL | Age: 80
End: 2025-06-16
Payer: MEDICARE

## 2025-06-16 ENCOUNTER — APPOINTMENT (OUTPATIENT)
Dept: CARDIOLOGY | Facility: HOSPITAL | Age: 80
End: 2025-06-16
Payer: MEDICARE

## 2025-06-16 LAB
ANION GAP SERPL CALCULATED.3IONS-SCNC: 8.3 MMOL/L (ref 5–15)
BH CV LOWER VASCULAR LEFT COMMON FEMORAL AUGMENT: NORMAL
BH CV LOWER VASCULAR LEFT COMMON FEMORAL COMPETENT: NORMAL
BH CV LOWER VASCULAR LEFT COMMON FEMORAL COMPRESS: NORMAL
BH CV LOWER VASCULAR LEFT COMMON FEMORAL PHASIC: NORMAL
BH CV LOWER VASCULAR LEFT COMMON FEMORAL SPONT: NORMAL
BH CV LOWER VASCULAR LEFT DISTAL FEMORAL COMPRESS: NORMAL
BH CV LOWER VASCULAR LEFT GASTRONEMIUS COMPRESS: NORMAL
BH CV LOWER VASCULAR LEFT GREATER SAPH AK COMPRESS: NORMAL
BH CV LOWER VASCULAR LEFT GREATER SAPH BK COMPRESS: NORMAL
BH CV LOWER VASCULAR LEFT LESSER SAPH COMPRESS: NORMAL
BH CV LOWER VASCULAR LEFT MID FEMORAL AUGMENT: NORMAL
BH CV LOWER VASCULAR LEFT MID FEMORAL COMPETENT: NORMAL
BH CV LOWER VASCULAR LEFT MID FEMORAL COMPRESS: NORMAL
BH CV LOWER VASCULAR LEFT MID FEMORAL PHASIC: NORMAL
BH CV LOWER VASCULAR LEFT MID FEMORAL SPONT: NORMAL
BH CV LOWER VASCULAR LEFT PERONEAL COMPRESS: NORMAL
BH CV LOWER VASCULAR LEFT POPLITEAL AUGMENT: NORMAL
BH CV LOWER VASCULAR LEFT POPLITEAL COMPETENT: NORMAL
BH CV LOWER VASCULAR LEFT POPLITEAL COMPRESS: NORMAL
BH CV LOWER VASCULAR LEFT POPLITEAL PHASIC: NORMAL
BH CV LOWER VASCULAR LEFT POPLITEAL SPONT: NORMAL
BH CV LOWER VASCULAR LEFT POSTERIOR TIBIAL COMPRESS: NORMAL
BH CV LOWER VASCULAR LEFT PROFUNDA FEMORAL COMPRESS: NORMAL
BH CV LOWER VASCULAR LEFT PROXIMAL FEMORAL COMPRESS: NORMAL
BH CV LOWER VASCULAR LEFT SAPHENOFEMORAL JUNCTION COMPRESS: NORMAL
BH CV LOWER VASCULAR RIGHT COMMON FEMORAL AUGMENT: NORMAL
BH CV LOWER VASCULAR RIGHT COMMON FEMORAL COMPETENT: NORMAL
BH CV LOWER VASCULAR RIGHT COMMON FEMORAL COMPRESS: NORMAL
BH CV LOWER VASCULAR RIGHT COMMON FEMORAL PHASIC: NORMAL
BH CV LOWER VASCULAR RIGHT COMMON FEMORAL SPONT: NORMAL
BUN SERPL-MCNC: 20 MG/DL (ref 8–23)
BUN/CREAT SERPL: 19.2 (ref 7–25)
CALCIUM SPEC-SCNC: 9.6 MG/DL (ref 8.6–10.5)
CHLORIDE SERPL-SCNC: 101 MMOL/L (ref 98–107)
CO2 SERPL-SCNC: 28.7 MMOL/L (ref 22–29)
CREAT SERPL-MCNC: 1.04 MG/DL (ref 0.57–1)
EGFRCR SERPLBLD CKD-EPI 2021: 55.5 ML/MIN/1.73
GLUCOSE BLDC GLUCOMTR-MCNC: 110 MG/DL (ref 70–130)
GLUCOSE BLDC GLUCOMTR-MCNC: 125 MG/DL (ref 70–130)
GLUCOSE BLDC GLUCOMTR-MCNC: 216 MG/DL (ref 70–130)
GLUCOSE BLDC GLUCOMTR-MCNC: 362 MG/DL (ref 70–130)
GLUCOSE SERPL-MCNC: 139 MG/DL (ref 65–99)
INR PPP: 2.81 (ref 0.9–1.1)
POTASSIUM SERPL-SCNC: 4.1 MMOL/L (ref 3.5–5.2)
PROTHROMBIN TIME: 29.9 SECONDS (ref 11.7–14.2)
SODIUM SERPL-SCNC: 138 MMOL/L (ref 136–145)

## 2025-06-16 PROCEDURE — 94760 N-INVAS EAR/PLS OXIMETRY 1: CPT

## 2025-06-16 PROCEDURE — 94640 AIRWAY INHALATION TREATMENT: CPT

## 2025-06-16 PROCEDURE — 82948 REAGENT STRIP/BLOOD GLUCOSE: CPT

## 2025-06-16 PROCEDURE — 93971 EXTREMITY STUDY: CPT | Performed by: SURGERY

## 2025-06-16 PROCEDURE — 94799 UNLISTED PULMONARY SVC/PX: CPT

## 2025-06-16 PROCEDURE — 25010000002 CEFTRIAXONE PER 250 MG: Performed by: INTERNAL MEDICINE

## 2025-06-16 PROCEDURE — 93971 EXTREMITY STUDY: CPT

## 2025-06-16 PROCEDURE — 63710000001 PREDNISONE PER 1 MG: Performed by: STUDENT IN AN ORGANIZED HEALTH CARE EDUCATION/TRAINING PROGRAM

## 2025-06-16 PROCEDURE — 71250 CT THORAX DX C-: CPT

## 2025-06-16 PROCEDURE — 97530 THERAPEUTIC ACTIVITIES: CPT

## 2025-06-16 PROCEDURE — 85610 PROTHROMBIN TIME: CPT | Performed by: HOSPITALIST

## 2025-06-16 PROCEDURE — 94761 N-INVAS EAR/PLS OXIMETRY MLT: CPT

## 2025-06-16 PROCEDURE — 63710000001 INSULIN LISPRO (HUMAN) PER 5 UNITS

## 2025-06-16 PROCEDURE — 80048 BASIC METABOLIC PNL TOTAL CA: CPT | Performed by: HOSPITALIST

## 2025-06-16 PROCEDURE — 97162 PT EVAL MOD COMPLEX 30 MIN: CPT

## 2025-06-16 RX ORDER — IPRATROPIUM BROMIDE AND ALBUTEROL SULFATE 2.5; .5 MG/3ML; MG/3ML
3 SOLUTION RESPIRATORY (INHALATION)
Status: DISCONTINUED | OUTPATIENT
Start: 2025-06-16 | End: 2025-06-18 | Stop reason: HOSPADM

## 2025-06-16 RX ORDER — HYDRALAZINE HYDROCHLORIDE 20 MG/ML
10 INJECTION INTRAMUSCULAR; INTRAVENOUS EVERY 6 HOURS PRN
Status: DISCONTINUED | OUTPATIENT
Start: 2025-06-16 | End: 2025-06-18 | Stop reason: HOSPADM

## 2025-06-16 RX ORDER — WARFARIN SODIUM 5 MG/1
2.5 TABLET ORAL
Status: DISCONTINUED | OUTPATIENT
Start: 2025-06-16 | End: 2025-06-18 | Stop reason: HOSPADM

## 2025-06-16 RX ORDER — FUROSEMIDE 10 MG/ML
20 INJECTION INTRAMUSCULAR; INTRAVENOUS ONCE
Status: DISCONTINUED | OUTPATIENT
Start: 2025-06-16 | End: 2025-06-16

## 2025-06-16 RX ADMIN — PREDNISONE 40 MG: 20 TABLET ORAL at 10:34

## 2025-06-16 RX ADMIN — IPRATROPIUM BROMIDE AND ALBUTEROL SULFATE 3 ML: .5; 3 SOLUTION RESPIRATORY (INHALATION) at 23:41

## 2025-06-16 RX ADMIN — INSULIN LISPRO 4 UNITS: 100 INJECTION, SOLUTION INTRAVENOUS; SUBCUTANEOUS at 18:33

## 2025-06-16 RX ADMIN — GUAIFENESIN 1200 MG: 600 TABLET, EXTENDED RELEASE ORAL at 22:28

## 2025-06-16 RX ADMIN — IPRATROPIUM BROMIDE AND ALBUTEROL SULFATE 3 ML: .5; 3 SOLUTION RESPIRATORY (INHALATION) at 11:14

## 2025-06-16 RX ADMIN — GLIPIZIDE 5 MG: 5 TABLET ORAL at 18:32

## 2025-06-16 RX ADMIN — INSULIN LISPRO 8 UNITS: 100 INJECTION, SOLUTION INTRAVENOUS; SUBCUTANEOUS at 22:29

## 2025-06-16 RX ADMIN — GUAIFENESIN 1200 MG: 600 TABLET, EXTENDED RELEASE ORAL at 09:35

## 2025-06-16 RX ADMIN — ASPIRIN 81 MG: 81 TABLET, COATED ORAL at 09:35

## 2025-06-16 RX ADMIN — Medication 10 ML: at 22:29

## 2025-06-16 RX ADMIN — ATORVASTATIN CALCIUM 10 MG: 20 TABLET, FILM COATED ORAL at 09:35

## 2025-06-16 RX ADMIN — LINAGLIPTIN 5 MG: 5 TABLET, FILM COATED ORAL at 10:34

## 2025-06-16 RX ADMIN — CEFTRIAXONE SODIUM 1000 MG: 1 INJECTION, POWDER, FOR SOLUTION INTRAMUSCULAR; INTRAVENOUS at 13:14

## 2025-06-16 RX ADMIN — METOPROLOL TARTRATE 50 MG: 25 TABLET, FILM COATED ORAL at 09:35

## 2025-06-16 RX ADMIN — LOSARTAN POTASSIUM 25 MG: 25 TABLET, FILM COATED ORAL at 09:35

## 2025-06-16 RX ADMIN — METOPROLOL TARTRATE 50 MG: 25 TABLET, FILM COATED ORAL at 22:28

## 2025-06-16 RX ADMIN — WARFARIN SODIUM 2.5 MG: 5 TABLET ORAL at 18:32

## 2025-06-16 RX ADMIN — ACETAMINOPHEN 650 MG: 325 TABLET, FILM COATED ORAL at 10:34

## 2025-06-16 RX ADMIN — IPRATROPIUM BROMIDE AND ALBUTEROL SULFATE 3 ML: .5; 3 SOLUTION RESPIRATORY (INHALATION) at 19:12

## 2025-06-16 RX ADMIN — GLIPIZIDE 5 MG: 5 TABLET ORAL at 09:35

## 2025-06-16 NOTE — THERAPY EVALUATION
Patient Name: Lana Yañez  : 1945    MRN: [id number]                              Today's Date: 2025       Admit Date: 2025    Visit Dx:     ICD-10-CM ICD-9-CM   1. Viral URI  J06.9 465.9     Patient Active Problem List   Diagnosis    Mixed hyperlipidemia    Vitamin deficiency    Essential hypertension    Rheumatoid arthritis involving both hands    Fatigue    ANTOINE (dyspnea on exertion)    Dysuria    Urge incontinence of urine    Hypovitaminosis D    Sleep apnea    Encounter for screening colonoscopy    Bronchitis    Cough    Generalized osteoarthritis of multiple sites    Cellulitis of right elbow due to MRSA    Medicare annual wellness visit, initial    Hospital discharge follow-up    Displacement of lumbar intervertebral disc without myelopathy    Lumbar disc herniation    Chronic atrial fibrillation    History of MRSA infection    Left-sided weakness    Atrial fibrillation    Left shoulder pain    Cerebrovascular accident (CVA) due to occlusion of right middle cerebral artery    Relative lymphocytosis    Nausea    Weakness    Controlled substance agreement signed    Coronary artery disease involving native coronary artery of native heart without angina pectoris    SOB (shortness of breath)    Lower extremity edema    Adhesive capsulitis of left shoulder    CVA tenderness    Costochondritis, acute    Allergic reaction    Coronary artery embolism    Visit for screening mammogram    Low back pain    Urgency of urination    Hyperglycemia    Carpal tunnel syndrome of left wrist/ wakes her up    Localized edema    Acute cystitis without hematuria    Bruising    History of stroke    Diabetes 1.5, managed as type 2    Other chronic pain    Vaginal candidiasis    Pulmonary hypertension    Acute respiratory failure with hypoxia    Hypokalemia    Hypomagnesemia    Type 2 diabetes mellitus with hyperglycemia, without long-term current use of insulin    Diarrhea    Sepsis without acute organ  dysfunction    Morbid obesity with BMI of 40.0-44.9, adult    Biliary acute pancreatitis without necrosis or infection    Stage 3a chronic kidney disease    Acute UTI (urinary tract infection)    Hemiparesis of left nondominant side as late effect of cerebral infarction    Upper respiratory infection    Parainfluenza    Rhinovirus infection    Viral pneumonia    Type 2 diabetes mellitus with hyperglycemia     Past Medical History:   Diagnosis Date    Acute on chronic diastolic heart failure     Acute UTI (urinary tract infection) 05/22/2022    Allergic reaction 02/09/2018    Arthritis     Arthritis of back     Arthritis of neck     Asthma     Atrial fibrillation     Persistent; on warfarin    Atypical chest pain     Biliary acute pancreatitis without necrosis or infection 11/10/2021    Bronchitis     Cellulitis of right elbow     due to MRSA    Cervical disc disorder     CHF (congestive heart failure)     COPD (chronic obstructive pulmonary disease)     Coronary artery disease     Cardiac catheterization completed; 90% PDA stenosis with medical management recommended    Coronary artery disease involving native coronary artery of native heart with angina pectoris with documented spasm     CTS (carpal tunnel syndrome)     Diabetes 1.5, managed as type 2     Disease of thyroid gland     Displacement of lumbar intervertebral disc without myelopathy     Dizziness     ANTOINE (dyspnea on exertion)     Essential hypertension     Fatigue     Fracture, foot     Generalized osteoarthritis of multiple sites     History of rheumatic fever     History of transfusion     Hx of bone density study     10/23/2014    Hyperglycemia     Hyperlipidemia     Hypovitaminosis D     Knee swelling     Left arm pain     Left-sided weakness     Leg swelling     Low back pain     Lower extremity edema     Lumbosacral disc disease     Malaise and fatigue     Mitral valve disease     Moderate mitral valve prolapse and moderate mitral regurgitation     Mitral valve insufficiency     Morbid obesity with BMI of 40.0-44.9, adult     MRSA infection     NSTEMI (non-ST elevated myocardial infarction)     PAF (paroxysmal atrial fibrillation)     Periarthritis of shoulder     Pneumonia 1/1/2025    RA (rheumatoid arthritis)     involving both hands    Rotator cuff syndrome     Sleep apnea     SOB (shortness of breath)     Stroke     left side weakness    Urge incontinence of urine     UTI (urinary tract infection) 05/2020    Visit for screening mammogram 04/02/2018    Vitamin D deficiency      Past Surgical History:   Procedure Laterality Date    BREAST BIOPSY      BREAST SURGERY      right side lumpectomy with biopsy    CARDIAC CATHETERIZATION Left 10/20/2017    Procedure: Cardiac Catheterization/Vascular Study;  Surgeon: Alphonso Olmedo MD;  Location:  [facility];  Service:     CARDIAC CATHETERIZATION N/A 10/20/2017    +2 mitral regurgitation, left main 10% stenosis, mid to distal LAD 10% diffuse stenosis, circumflex 10% diffuse stenosis, RCA 10% proximal stenosis, and distal PDA consistent with coronary embolus with a lesion of 90% too small to consider coronary intervention; medical management recommended    EYE SURGERY      laser surgery for glaucoma and left eye cataracts removed    HYSTERECTOMY      10+ years ago    JOINT REPLACEMENT  2005; 2006    bilateral knees and left rotater    KNEE SURGERY      MAMMO BILATERAL  2016    [facility]     SHOULDER SURGERY        General Information       Row Name 06/16/25 1202          Physical Therapy Time and Intention    Document Type evaluation  -MG (r) SB (t) MG (c)     Mode of Treatment physical therapy  -MG (r) SB (t) MG (c)       Row Name 06/16/25 1208          General Information    Patient Profile Reviewed yes  -MG (r) SB (t) MG (c)     Prior Level of Function independent:  Pt is (I) at baseline w/ use of straight, tri tip cane, or rollator. Pt lives w/ spouse who is in remission and would not be  able to help pt w/ ADLs.  -MG (r) SB (t) MG (c)     Existing Precautions/Restrictions fall  -MG (r) SB (t) MG (c)     Barriers to Rehab none identified  -MG (r) SB (t) MG (c)       Row Name 06/16/25 1202          Living Environment    Current Living Arrangements home  -MG (r) SB (t) MG (c)     People in Home spouse  -MG (r) SB (t) MG (c)       Row Name 06/16/25 1202          Home Main Entrance    Number of Stairs, Main Entrance six  -MG (r) SB (t) MG (c)       Row Name 06/16/25 1202          Stairs Within Home, Primary    Stairs, Within Home, Primary 10 stairs up or down. Pt lives in a bi-level home.  -MG (r) SB (t) MG (c)     Number of Stairs, Within Home, Primary ten  -MG (r) SB (t) MG (c)       Row Name 06/16/25 1202          Cognition    Orientation Status (Cognition) oriented x 4  -MG (r) SB (t) MG (c)       Row Name 06/16/25 1202          Safety Issues/Impairments Affecting Functional Mobility    Impairments Affecting Function (Mobility) endurance/activity tolerance;pain;strength;balance  -MG (r) SB (t) MG (c)     Comment, Safety Issues/Impairments (Mobility) Gait belt and non-skid socks donned.  -MG (r) SB (t) MG (c)               User Key  (r) = Recorded By, (t) = Taken By, (c) = Cosigned By      Initials Name Provider Type    MG Bernice Lowe, PT Physical Therapist    Mark Brown, PT Student PT Student                   Mobility       Row Name 06/16/25 1210          Bed Mobility    Bed Mobility scooting/bridging;supine-sit;sit-supine  -MG (r) SB (t) MG (c)     Scooting/Bridging [place] (Bed Mobility) standby assist  -MG (r) SB (t) MG (c)     Supine-Sit [place] (Bed Mobility) standby assist;verbal cues  -MG (r) SB (t) MG (c)     Sit-Supine [place] (Bed Mobility) minimum assist (75% patient effort);1 person assist  -MG (r) SB (t) MG (c)     Assistive Device (Bed Mobility) bed rails;head of bed elevated  -MG (r) SB (t) MG (c)     Comment, (Bed Mobility) Pt performed 2x bridge/scoots in bed  to re-center pelvis. Assisted w/ LE from sit to supine  -MG (r) SB (t) MG (c)       Row Name 06/16/25 1210          Sit-Stand Transfer    Sit-Stand [place] (Transfers) standby assist  -MG (r) SB (t) MG (c)     Assistive Device (Sit-Stand Transfers) other (see comments)  Tri tip cane.  -MG (r) SB (t) MG (c)       Row Name 06/16/25 1210          Gait/Stairs (Locomotion)    [place] Level (Gait) contact guard;1 person assist  -MG (r) SB (t) MG (c)     Assistive Device (Gait) other (see comments)  Tri-tip cane.  -MG (r) SB (t) MG (c)     Patient was able to Ambulate yes  -MG (r) SB (t) MG (c)     Distance in Feet (Gait) 20  + 3' L side steps after seated rest break  -MG (r) SB (t) MG (c)     Deviations/Abnormal Patterns (Gait) gait speed decreased;stride length decreased;héctor decreased  -MG (r) SB (t) MG (c)     Bilateral Gait Deviations forward flexed posture;heel strike decreased  -MG (r) SB (t) MG (c)     Comment, (Gait/Stairs) No overt LOB or buckling, but pt did report dizziness during.  -MG (r) SB (t) MG (c)               User Key  (r) = Recorded By, (t) = Taken By, (c) = Cosigned By      Initials Name Provider Type    MG Bernice Lowe, PT Physical Therapist    Mark Brown, PT Student PT Student                   Obj/Interventions       Row Name 06/16/25 1253          Range of Motion Comprehensive    General Range of Motion upper extremity range of motion deficits identified;bilateral lower extremity ROM WFL  -MG (r) SB (t) MG (c)     Comment, General Range of Motion LUE limited, pt states that she has frozen shoulder.  -MG (r) SB (t) MG (c)       Row Name 06/16/25 1253          Strength Comprehensive (MMT)    General Manual Muscle Testing (MMT) Assessment lower extremity strength deficits identified  -MG (r) SB (t) MG (c)     Comment, General Manual Muscle Testing (MMT) Assessment Generalized weakness, 4/5  -MG (r) SB (t) MG (c)       Row Name 06/16/25 1253          Motor Skills    Motor Skills  functional endurance  -MG (r) SB (t) MG (c)     Functional Endurance poor  -MG (r) SB (t) MG (c)       Row Name 06/16/25 1253          Balance    Balance Assessment sitting static balance;sitting dynamic balance;standing static balance;standing dynamic balance  -MG (r) SB (t) MG (c)     Static Sitting Balance standby assist  -MG (r) SB (t) MG (c)     Dynamic Sitting Balance standby assist  -MG (r) SB (t) MG (c)     Position, Sitting Balance unsupported;sitting edge of bed  -MG (r) SB (t) MG (c)     Static Standing Balance contact guard  -MG (r) SB (t) MG (c)     Dynamic Standing Balance contact guard  -MG (r) SB (t) MG (c)     Position/Device Used, Standing Balance supported;other (see comments)  Tri-tip cane.  -MG (r) SB (t) MG (c)     Comment, Balance No overt LOB.  -MG (r) SB (t) MG (c)       Row Name 06/16/25 1253          Sensory Assessment (Somatosensory)    Sensory Assessment (Somatosensory) sensation intact  -MG (r) SB (t) MG (c)               User Key  (r) = Recorded By, (t) = Taken By, (c) = Cosigned By      Initials Name Provider Type    Bernice Alicea, PT Physical Therapist    Mark Brown, PT Student PT Student                   Goals/Plan       Row Name 06/16/25 1315          Bed Mobility Goal 1 (PT)    Activity/Assistive Device (Bed Mobility Goal 1, PT) bed mobility activities, all  -MG (r) SB (t) MG (c)     [place] Level/Cues Needed (Bed Mobility Goal 1, PT) modified independence  -MG (r) SB (t) MG (c)     Time Frame (Bed Mobility Goal 1, PT) 1 week  -MG (r) SB (t) MG (c)       Row Name 06/16/25 1315          Transfer Goal 1 (PT)    Activity/Assistive Device (Transfer Goal 1, PT) transfers, all  -MG (r) SB (t) MG (c)     [place] Level/Cues Needed (Transfer Goal 1, PT) modified independence  -MG (r) SB (t) MG (c)     Time Frame (Transfer Goal 1, PT) 1 week  -MG (r) SB (t) MG (c)       Row Name 06/16/25 1719          Gait Training Goal 1 (PT)    Activity/Assistive Device (Gait Training  Goal 1, PT) gait (walking locomotion)  -MG (r) SB (t) MG (c)     [place] Level (Gait Training Goal 1, PT) modified independence  -MG (r) SB (t) MG (c)     Distance (Gait Training Goal 1, PT) 40'  -MG (r) SB (t) MG (c)     Time Frame (Gait Training Goal 1, PT) 1 week  -MG (r) SB (t) MG (c)       Row Name 06/16/25 1315          Therapy Assessment/Plan (PT)    Planned Therapy Interventions (PT) balance training;bed mobility training;gait training;home exercise program;patient/family education;postural re-education;ROM (range of motion);stair training;strengthening;stretching;transfer training  -MG (r) SB (t) MG (c)               User Key  (r) = Recorded By, (t) = Taken By, (c) = Cosigned By      Initials Name Provider Type    MG Bernice Lowe, PT Physical Therapist    SB Mark Finley, PT Student PT Student                   Clinical Impression       Row Name 06/16/25 1257          Pain    Pretreatment Pain Rating 6/10  -MG (r) SB (t) MG (c)     Posttreatment Pain Rating 6/10  -MG (r) SB (t) MG (c)     Pain Location calf;ankle;foot  -MG (r) SB (t) MG (c)     Pain Side/Orientation left  -MG (r) SB (t) MG (c)     Pain Management Interventions exercise or physical activity utilized  -MG (r) SB (t) MG (c)     Response to Pain Interventions activity participation with tolerable pain  -MG (r) SB (t) MG (c)     Pre/Posttreatment Pain Comment Painful to touch, no swelling, redness, or heat to touch.  -MG (r) SB (t) MG (c)       Row Name 06/16/25 1257          Plan of Care Review    Plan of Care Reviewed With patient  -MG (r) SB (t) MG (c)     Progress no change  -MG (r) SB (t) MG (c)     Outcome Evaluation Pt is a 80 y/o F who has h/o CAD, diabetes, CVA, HTN, who presented to [facility] w/ c/o shortness of breath, cough, wheezing, and extremity swelling. Pt was A&O x4 and was pleasant to work with. Pt is (I) at baseline w/ use of a straight, tri- tip cane, or rollator and lives in a home w/ 6 MARGARETTE and w/ 10 to go up or down in her  bi-level home. Pt lives w/ her  who is in remission and is not able to help the pt w/ ADLs or assist as needed. Today, pt was SBA for sit to supine, Domenic x1 for supine to sit, SBA for 2x STS/tsfs, and ambulated ~20' and L side steps w/ CG & w/ her tri-tip cane. Pt reported dizziness during gait which dissipated w/ seated rest. O2 after her gait bout was at 96% while on RA, and her HR was 95 bpm. Pt reported pain in LLE that was painful to touch, but w/o swelling, redness, or hot to touch; per chart LE dopplar ordered. No overt LOB, SOB, or buckling.  Pt will benefit from skilled PT services to address their impaired strength, mobility, and endurance. Discussed w/ RN. Rec home w/ HH at [place].  -MG (r) SB (t) MG (c)       Row Name 06/16/25 1257          Therapy Assessment/Plan (PT)    Rehab Potential (PT) good  -MG (r) SB (t) MG (c)     Criteria for Skilled Interventions Met (PT) yes;meets criteria;skilled treatment is necessary  -MG (r) SB (t) MG (c)     Therapy Frequency (PT) 5 times/wk  -MG (r) SB (t) MG (c)       Row Name 06/16/25 1257          Vital Signs    Intratreatment Heart Rate (beats/min) 95  -MG (r) SB (t) MG (c)     O2 Delivery Pre Treatment room air  -MG (r) SB (t) MG (c)     Intra SpO2 (%) 96  -MG (r) SB (t) MG (c)     O2 Delivery Intra Treatment room air  -MG (r) SB (t) MG (c)     O2 Delivery Post Treatment room air  -MG (r) SB (t) MG (c)     Pre Patient Position Supine  -MG (r) SB (t) MG (c)     Intra Patient Position Standing  -MG (r) SB (t) MG (c)     Post Patient Position Supine  -MG (r) SB (t) MG (c)       Row Name 06/16/25 1257          Positioning and Restraints    Pre-Treatment Position in bed  -MG (r) SB (t) MG (c)     Post Treatment Position bed  -MG (r) SB (t) MG (c)     In Bed supine;notified nsg;fowlers;call light within reach;encouraged to call for assist;exit alarm on;side rails up x3  -MG (r) SB (t) MG (c)               User Key  (r) = Recorded By, (t) = Taken By, (c) = Cosigned By       Initials Name Provider Type    Bernice Alicea, PT Physical Therapist    Mark Brown, PT Student PT Student                   Outcome Measures       Row Name 06/16/25 1317 06/16/25 0805       How much help from another person do you currently need...    Turning from your back to your side while in flat bed without using bedrails? 3  -MG (r) SB (t) MG (c) 3  -BR    Moving from lying on back to sitting on the side of a flat bed without bedrails? 3  -MG (r) SB (t) MG (c) 3  -BR    Moving to and from a bed to a chair (including a wheelchair)? 3  -MG (r) SB (t) MG (c) 3  -BR    Standing up from a chair using your arms (e.g., wheelchair, bedside chair)? 3  -MG (r) SB (t) MG (c) 3  -BR    Climbing 3-5 steps with a railing? 2  -MG (r) SB (t) MG (c) 3  -BR    To walk in hospital room? 3  -MG (r) SB (t) MG (c) 3  -BR    AM-PAC 6 Clicks Score (PT) 17  -MG (r) SB (t) 18  -BR              User Key  (r) = Recorded By, (t) = Taken By, (c) = Cosigned By      Initials Name Provider Type    Ramirez Curiel, RN Registered Nurse    Bernice Alicea, PT Physical Therapist    Mark Brown, PT Student PT Student                                 Physical Therapy Education       Title: PT OT SLP Therapies (In Progress)       Topic: Physical Therapy (In Progress)       Point: Mobility training (Done)       Learning Progress Summary            Patient Acceptance, E, VU by SB at 6/16/2025 1317                      Point: Home exercise program (Not Started)       Learner Progress:  Not documented in this visit.              Point: Body mechanics (Done)       Learning Progress Summary            Patient Acceptance, E, VU by SB at 6/16/2025 1317                      Point: Precautions (Done)       Learning Progress Summary            Patient Acceptance, E, VU by SB at 6/16/2025 1317                                      User Key       Initials Effective Dates Name Provider Type Discipline     05/19/25 -  Mark Finley, PT Student PT  Student PT                  PT Recommendation and Plan  Planned Therapy Interventions (PT): balance training, bed mobility training, gait training, home exercise program, patient/family education, postural re-education, ROM (range of motion), stair training, strengthening, stretching, transfer training  Progress: no change  Outcome Evaluation: Pt is a 78 y/o F who has h/o CAD, diabetes, CVA, HTN, who presented to [facility] w/ c/o shortness of breath, cough, wheezing, and extremity swelling. Pt was A&O x4 and was pleasant to work with. Pt is (I) at baseline w/ use of a straight, tri- tip cane, or rollator and lives in a home w/ 6 MARGARETTE and w/ 10 to go up or down in her bi-level home. Pt lives w/ her  who is in remission and is not able to help the pt w/ ADLs or assist as needed. Today, pt was SBA for sit to supine, Domenic x1 for supine to sit, SBA for 2x STS/tsfs, and ambulated ~20' and L side steps w/ CG & w/ her tri-tip cane. Pt reported dizziness during gait which dissipated w/ seated rest. O2 after her gait bout was at 96% while on RA, and her HR was 95 bpm. Pt reported pain in LLE that was painful to touch, but w/o swelling, redness, or hot to touch; per chart LE dopplar ordered. No overt LOB, SOB, or buckling.  Pt will benefit from skilled PT services to address their impaired strength, mobility, and endurance. Discussed w/ RN. Rec home w/ HH at [place].     Time Calculation:         PT Charges       Row Name 06/16/25 4042             Time Calculation    Start Time 1002  -MG (r) SB (t) MG (c)      Stop Time 1036  -MG (r) SB (t) MG (c)      Time Calculation (min) 34 min  -MG (r) SB (t)      PT Received On 06/16/25  -MG (r) SB (t) MG (c)      PT - Next Appointment 06/17/25  -MG (r) SB (t) MG (c)      PT Goal Re-Cert Due Date 06/30/25  -MG (r) SB (t) MG (c)         Time Calculation- PT    Total Timed Code Minutes- PT 24 minute(s)  -MG (r) SB (t) MG (c)                User Key  (r) = Recorded By, (t) = Taken By, (c) =  Cosigned By      Initials Name Provider Type    MG Bernice Lowe, PT Physical Therapist    Mark Brown, PT Student PT Student                      PT G-Codes  AM-PAC 6 Clicks Score (PT): 17  PT Discharge Summary  Anticipated Discharge Disposition (PT): home, home with home health    Mark Finley, PT Student  6/16/2025

## 2025-06-16 NOTE — PLAN OF CARE
Goal Outcome Evaluation:  Plan of Care Reviewed With: patient        Progress: no change  Outcome Evaluation: Pt is a 78 y/o F who has h/o CAD, diabetes, CVA, HTN, who presented to [facility] w/ c/o shortness of breath, cough, wheezing, and extremity swelling. Pt was A&O x4 and was pleasant to work with. Pt is (I) at baseline w/ use of a straight, tri- tip cane, or rollator and lives in a home w/ 6 [address]ARGAR[address]TE and w/ 10 to go up or down in her bi-level home. Pt lives w/ her  who is in remission and is not able to help the pt w/ ADLs or assist as needed. Today, pt was SBA for sit to supine, Domenic x1 for supine to sit, SBA for 2x STS/tsfs, and ambulated ~20' and L side steps w/ CG & w/ her tri-tip cane. Pt reported dizziness during gait which dissipated w/ seated rest. O2 after her gait bout was at 96% while on RA, and her HR was 95 bpm. Pt reported pain in LLE that was painful to touch, but w/o swelling, redness, or hot to touch; per chart LE dopplar ordered. No overt LOB, SOB, or buckling.  Pt will benefit from skilled PT services to address their impaired strength, mobility, and endurance. Discussed w/ RN. [facility] home [facility].    Anticipated Discharge Disposition (PT): home, home with home health

## 2025-06-16 NOTE — CASE MANAGEMENT/SOCIAL WORK
Discharge Planning Assessment  [facility]     Patient Name: Lana Yañez  MRN: [id number]  Today's Date: 6/16/2025    Admit Date: 6/12/2025    Plan: Plan home with spouse.  CHRIS Luis RN   Discharge Needs Assessment       Row Name 06/16/25 0809       Living Environment    People in Home spouse    Name(s) of People in Home Spouse  ( Suresh Yañez [phone])    Current Living Arrangements home    Potentially Unsafe Housing Conditions none    In the past 12 months has the electric, gas, oil, or water company threatened to shut off services in your home? No    Primary Care Provided by self    Provides Primary Care For no one    Family Caregiver if Needed spouse    Family Caregiver Names Spouse ( Suresh Yañez [phone])    Quality of Family Relationships supportive    Able to Return to Prior Arrangements yes    Living Arrangement Comments Pt lives with her spouse  ( Suresh Yañez [phone]) in a bi level house.       Resource/Environmental Concerns    Resource/Environmental Concerns none    Transportation Concerns none       Transportation Needs    In the past 12 months, has lack of transportation kept you from medical appointments or from getting medications? no    In the past 12 months, has lack of transportation kept you from meetings, work, or from getting things needed for daily living? No       Transition Planning    Patient/Family Anticipates Transition to home with family    Patient/Family Anticipated Services at Transition none    Transportation Anticipated family or friend will provide       Discharge Needs Assessment    Readmission Within the Last 30 Days no previous admission in last 30 days    Equipment Currently Used at Home cane, straight;glucometer;grab bar;rollator;shower chair;walker, rolling    Concerns to be Addressed financial/insurance    Anticipated Changes Related to Illness none    Equipment Needed After Discharge cane, straight;grab bar,  tub/shower;glucometer;rollator;shower chair;walker, rolling                   Discharge Plan       Row Name 06/16/25 0811       Plan    Plan Plan home with spouse.  CHRIS Luis RN    Patient/Family in Agreement with Plan yes    Plan Comments FACE SHEET VERIFIED/ IM LETTER SIGNED.  Spoke with pt at bedside.  Pt's PCP is Dr. Toni Green.  Pt lives with her spouse ( Suresh Yañez [phone]) in a bi level house.   Pt is independent with ADLs.  Pt has a cane, glucometer, grab bar, rolator, shower chair and rolling walker for home use if needed.  Pt gets her prescriptions at [facility] [address].  Pt can have issues affording medications. Pt is not current with .  Pt has been in [facility] for skilled care.  Pt states she needs assistance with preparing meals.  Pt states her plan is to return home.  Plan home with spouse.  CHRIS Luis RN                    Expected Discharge Date and Time       Expected Discharge Date Expected Discharge Time    Jun 18, 2025            Demographic Summary       Row Name 06/16/25 0808       General Information    Admission Type inpatient    Arrived From emergency department    Required Notices Provided Important Message from Medicare    Referral Source admission list    Reason for Consult discharge planning    Preferred Language English                   Functional Status       Row Name 06/16/25 0809       Functional Status    Usual Activity Tolerance moderate    Current Activity Tolerance moderate       Functional Status, IADL    Medications independent    Meal Preparation assistive person    Housekeeping assistive person    Laundry assistive person    Shopping assistive person       Mental Status    General Appearance WDL WDL                   Psychosocial    No documentation.                  Abuse/Neglect    No documentation.                  Legal    No documentation.                  Substance Abuse    No documentation.                  Patient Forms    No  documentation.                     Cheyenne Luis, RN

## 2025-06-16 NOTE — PROGRESS NOTES
[facility] Clinical Pharmacy Services: Warfarin Dosing/Monitoring Consult    Lana Yañez is a 79 y.o. female, estimated creatinine clearance is 56.9 mL/min (A) (by C-G formula based on SCr of 1.04 mg/dL (H)). weighing 110 kg (243 lb).    Results from last 7 days   Lab Units 06/16/25  0533 06/15/25  0714 06/14/25  0443 06/13/25  0705 06/12/25  1419   INR  2.81* 3.63* 3.22* 2.54* 2.31*   HEMOGLOBIN g/dL  --   --  12.5 13.0 13.2   HEMATOCRIT %  --   --  38.7 39.9 39.7   PLATELETS 10*3/mm3  --   --  154 145 149     Prior to admission anticoagulation: 5mg daily. Monitored by [facility] outpatient, last appointment 5/7 therapeutic with INR at 2.13 using 5mg daily - next INR check was 6/3 (cancelled by pt) and then again 6/10 (cancelled by pt again - she was sick, thought she had covid would reach out when feels better).      Hospital Anticoagulation:  Consulting provider: [place]  Start date: 6/16  Indication: A Fib - requiring full anticoagulation  Target INR: 2 - 3  Expected duration: indefinitely   Bridge Therapy: No     Potential food or drug interactions: prednisone and rocephin may increase anticoagulant effects but these are temporary and patient is being monitored.     Education complete?/Date: N/A; home medication    Assessment/Plan:  Dose: 2.5mg daily per MD  Monitor for any signs or symptoms of bleeding  Follow up daily INRs and dose adjustments    Pharmacy will continue to follow until discharge or discontinuation of warfarin.     Bethany Babinski, PharmD  Clinical Pharmacist

## 2025-06-16 NOTE — PLAN OF CARE
Goal Outcome Evaluation:   Pt resting on the bed, alert and oriented, afib on monitor, RA, meds given per mar, no c/o pain, infrequent cough, vss, will continue to monitor.        Problem: Adult Inpatient Plan of Care  Goal: Absence of Hospital-Acquired Illness or Injury  Intervention: Identify and Manage Fall Risk  Recent Flowsheet Documentation  Taken 6/16/2025 0010 by Cristiane Lopez RN  Safety Promotion/Fall Prevention:   activity supervised   room organization consistent   safety round/check completed  Taken 6/15/2025 2012 by Cristiane Lopez RN  Safety Promotion/Fall Prevention:   activity supervised   room organization consistent   safety round/check completed  Intervention: Prevent Skin Injury  Recent Flowsheet Documentation  Taken 6/16/2025 0010 by Cristiane Lopez RN  Body Position: position changed independently  Skin Protection: incontinence pads utilized  Taken 6/15/2025 2012 by Cristiane Lopez RN  Body Position: position changed independently  Skin Protection: incontinence pads utilized  Intervention: Prevent and Manage VTE (Venous Thromboembolism) Risk  Recent Flowsheet Documentation  Taken 6/16/2025 0010 by Cristiane Lopez RN  VTE Prevention/Management: SCDs (sequential compression devices) off  Taken 6/15/2025 2012 by Cristiane Lopez RN  VTE Prevention/Management: SCDs (sequential compression devices) off  Intervention: Prevent Infection  Recent Flowsheet Documentation  Taken 6/16/2025 0010 by Cristiane Lopez RN  Infection Prevention:   hand hygiene promoted   rest/sleep promoted  Taken 6/15/2025 2012 by Cristiane Lopez RN  Infection Prevention:   hand hygiene promoted   rest/sleep promoted  Goal: Optimal Comfort and Wellbeing  Intervention: Provide Person-Centered Care  Recent Flowsheet Documentation  Taken 6/16/2025 0010 by Cristiane Lopez RN  Trust Relationship/Rapport:   care explained   choices provided  Taken 6/15/2025 2012 by Cristiane Lopez RN Trust  Relationship/Rapport:   care explained   choices provided     Problem: Comorbidity Management  Goal: Maintenance of Asthma Control  Intervention: Maintain Asthma Symptom Control  Recent Flowsheet Documentation  Taken 6/16/2025 0010 by Cristiane Lopez RN  Medication Review/Management: medications reviewed  Taken 6/15/2025 2012 by Cristiane Lopez RN  Medication Review/Management: medications reviewed  Goal: Maintenance of COPD Symptom Control  Intervention: Maintain COPD (Chronic Obstructive Pulmonary Disease) Symptom Control  Recent Flowsheet Documentation  Taken 6/16/2025 0010 by Cristiane Lopez RN  Medication Review/Management: medications reviewed  Taken 6/15/2025 2012 by Cristiane Lopez RN  Medication Review/Management: medications reviewed  Goal: Blood Glucose Level Within Target Range  Intervention: Monitor and Manage Glycemia  Recent Flowsheet Documentation  Taken 6/16/2025 0010 by Cristiane Lopez RN  Medication Review/Management: medications reviewed  Taken 6/15/2025 2012 by Cristiane Lopez RN  Medication Review/Management: medications reviewed  Goal: Blood Pressure in Desired Range  Intervention: Maintain Blood Pressure Management  Recent Flowsheet Documentation  Taken 6/16/2025 0010 by Cristiane Lopez RN  Medication Review/Management: medications reviewed  Taken 6/15/2025 2012 by Cristiane Lopez RN  Medication Review/Management: medications reviewed     Problem: Asthma Exacerbation  Goal: Asthma Symptom Relief  Intervention: Support Asthma Symptom Control  Recent Flowsheet Documentation  Taken 6/16/2025 0010 by Cristiane Lopez RN  Medication Review/Management: medications reviewed  Taken 6/15/2025 2012 by Cristiane Lopez RN  Medication Review/Management: medications reviewed     Problem: Fall Injury Risk  Goal: Absence of Fall and Fall-Related Injury  Intervention: Identify and Manage Contributors  Recent Flowsheet Documentation  Taken 6/16/2025 0010 by Cristiane Lopez  RN  Medication Review/Management: medications reviewed  Taken 6/15/2025 2012 by Cristiane Lopez RN  Medication Review/Management: medications reviewed  Self-Care Promotion: independence encouraged  Intervention: Promote Injury-Free Environment  Recent Flowsheet Documentation  Taken 6/16/2025 0010 by Cristiane Lopez RN  Safety Promotion/Fall Prevention:   activity supervised   room organization consistent   safety round/check completed  Taken 6/15/2025 2012 by Cristinae Lopez RN  Safety Promotion/Fall Prevention:   activity supervised   room organization consistent   safety round/check completed     Problem: Skin Injury Risk Increased  Goal: Skin Health and Integrity  Intervention: Optimize Skin Protection  Recent Flowsheet Documentation  Taken 6/16/2025 0010 by Cristiane Lopez RN  Activity Management: bedrest  Pressure Reduction Techniques:   frequent weight shift encouraged   weight shift assistance provided  Head of Bed (HOB) Positioning: HOB elevated  Pressure Reduction Devices:   alternating pressure pump (LANG)   pressure-redistributing mattress utilized  Skin Protection: incontinence pads utilized  Taken 6/15/2025 2012 by Cristiane Lopez RN  Activity Management: bedrest  Pressure Reduction Techniques:   frequent weight shift encouraged   weight shift assistance provided  Head of Bed (HOB) Positioning: HOB elevated  Pressure Reduction Devices:   alternating pressure pump (LANG)   pressure-redistributing mattress utilized  Skin Protection: incontinence pads utilized  Intervention: Promote and Optimize Oral Intake  Recent Flowsheet Documentation  Taken 6/16/2025 0010 by Cristiane Lopez RN  Oral Nutrition Promotion: rest periods promoted  Taken 6/15/2025 2012 by Cristiane Lopez RN  Oral Nutrition Promotion: rest periods promoted

## 2025-06-16 NOTE — CASE MANAGEMENT/SOCIAL WORK
Continued Stay Note  [facility]     Patient Name: Lana Yañez  MRN: [id number]  Today's Date: 6/16/2025    Admit Date: 6/12/2025    Plan: Plan home with spouse.  CHRIS Luis RN   Discharge Plan       Row Name 06/16/25 0811       Plan    Plan Plan home with spouse.  CHRIS Luis RN    Patient/Family in Agreement with Plan yes    Plan Comments FACE SHEET VERIFIED/ IM LETTER SIGNED.  Spoke with pt at bedside.  Pt's PCP is Dr. Toni Green.  Pt lives with her spouse ( Suresh Yañez [phone]) in a bi level house.   Pt is independent with ADLs.  Pt has a cane, glucometer, grab bar, rolator, shower chair and rolling walker for home use if needed.  Pt gets her prescriptions at [facility] [address].  Pt can have issues affording medications. Pt is not current with .  Pt has been in [facility] for skilled care.  Pt states she needs assistance with preparing meals.  Pt states her plan is to return home.  Plan home with spouse.  CHRIS Luis RN                   Discharge Codes    No documentation.                 Expected Discharge Date and Time       Expected Discharge Date Expected Discharge Time    Jun 18, 2025               Cheyenne Luis RN

## 2025-06-16 NOTE — PROGRESS NOTES
" LOS: 2 days     Name: Lana Yañez  Age: 79 y.o.  Sex: female  :  1945  MRN: [id number]         Primary Care Physician: Toni Green MD    Subjective   Subjective  Ports feeling a little bit dizzy and unsteady when she has been up this morning and has a little bit of blurry vision.  Feels as if her blood pressure is high.  Blood pressure is 172/106 this morning.    Objective   Vital Signs  Temp:  [97.3 °F (36.3 °C)-98.2 °F (36.8 °C)] 97.9 °F (36.6 °C)  Heart Rate:  [70-91] 70  Resp:  [18] 18  BP: (145-172)/() 172/106  Body mass index is 39.22 kg/m².    Objective:  General Appearance:  Comfortable and in no acute distress.    Vital signs: (most recent): Blood pressure (!) 172/106, pulse 70, temperature 97.9 °F (36.6 °C), temperature source Oral, resp. rate 18, height 167.6 cm (66\"), weight 110 kg (243 lb), SpO2 96%, not currently breastfeeding.    Lungs:  Normal effort and normal respiratory rate.  She is not in respiratory distress.  There are decreased breath sounds.    Heart: Normal rate.  Regular rhythm.    Abdomen: Abdomen is soft.  Bowel sounds are normal.   There is no abdominal tenderness.     Extremities: There is no dependent edema or local swelling.    Neurological: Patient is alert and oriented to person, place and time.    Skin:  Warm and dry.                Results Review:       I reviewed the patient's new clinical results.    Results from last 7 days   Lab Units 25  0443 25  0705 25  1419   WBC 10*3/mm3 5.52 6.34 4.73   HEMOGLOBIN g/dL 12.5 13.0 13.2   PLATELETS 10*3/mm3 154 145 149     Results from last 7 days   Lab Units 25  0533 06/15/25  0714 25  0443 25  0705 25  1419   SODIUM mmol/L 138 136 138 140 140   POTASSIUM mmol/L 4.1 3.9 4.3 4.1 3.2*   CHLORIDE mmol/L 101 102 104 102 102   CO2 mmol/L 28.7 24.0 23.6 25.0 24.0   BUN mg/dL 20.0 17.0 11.0 9.0 7.0*   CREATININE mg/dL 1.04* 0.92 0.71 0.88 0.87   CALCIUM mg/dL 9.6 9.4 9.3 9.1 " 9.2   GLUCOSE mg/dL 139* 226* 238* 147* 124*     Results from last 7 days   Lab Units 06/16/25  0533 06/15/25  0714 06/14/25  0443 06/13/25  0705 06/12/25  1419   INR  2.81* 3.63* 3.22* 2.54* 2.31*             Scheduled Meds:   aspirin, 81 mg, Oral, Daily  atorvastatin, 10 mg, Oral, Daily  digoxin, 125 mcg, Oral, Every Other Day  glipizide, 5 mg, Oral, BID AC  guaiFENesin, 1,200 mg, Oral, Q12H  insulin lispro, 2-9 Units, Subcutaneous, 4x Daily AC & at Bedtime  Lidocaine, 1 patch, Transdermal, Q24H  linagliptin, 5 mg, Oral, Daily  losartan, 25 mg, Oral, Q24H  metoprolol tartrate, 50 mg, Oral, BID  predniSONE, 40 mg, Oral, Daily With Breakfast  sodium chloride, 10 mL, Intravenous, Q12H  warfarin, 2.5 mg, Oral, Daily      PRN Meds:     acetaminophen    albuterol    benzonatate    senna-docusate sodium **AND** polyethylene glycol **AND** bisacodyl **AND** bisacodyl    dextrose    dextrose    diphenhydrAMINE    glucagon (human recombinant)    HYDROcodone-acetaminophen    latanoprost    melatonin    nitroglycerin    ondansetron ODT **OR** ondansetron    sodium chloride    sodium chloride    sodium chloride  Continuous Infusions:       Assessment & Plan   Active Hospital Problems    Diagnosis  POA    **Upper respiratory infection [J06.9]  Yes    Parainfluenza [B34.8]  Yes    Rhinovirus infection [B34.8]  Unknown    Viral pneumonia [J12.9]  Unknown    Type 2 diabetes mellitus with hyperglycemia [E11.65]  Unknown    Atrial fibrillation [I48.91]  Yes    Chronic atrial fibrillation [I48.20]  Yes      Resolved Hospital Problems   No resolved problems to display.       Assessment & Plan    CT chest does reveal changes consistent with bronchopneumonia and subsegmental airway occlusion.  Will initiate Rocephin and intensify her pulmonary hygiene measures with addition of scheduled nebulizers and flutter valve.  Continue I-S and.  Also completing a 5-day course of prednisone.     She does have some leg swelling, left greater than  right.  I will check a Doppler.  Patient has declined any further use of Lasix which I think is okay for now.     HTN uncontrolled this morning prior to morning medications.  Initiated on losartan yesterday.  Will check BP again around noon.  I have added as needed IV hydralazine to help temporize     After couple days of being supratherapeutic INR is down to 2.8.  Will restart Coumadin at 2.5 mg dose (previously on 5 mg at home and during initial part of her hospital stay).     DM2 with hyperglycemia secondary to steroid and an A1c of 7              - Blood sugars well-controlled this morning.  Currently on glipizide.  Will restart her Januvia and continue sliding scale.  Will not give any additional Lantus today.     HLD on statin     Disposition: Hopefully home in the next day or 2       Expected Discharge Date: 6/18/2025; Expected Discharge Time:      Toni Lazo MD  [facility]  06/16/25  10:39 EDT

## 2025-06-17 PROBLEM — J15.9 BACTERIAL PNEUMONIA: Status: ACTIVE | Noted: 2025-01-01

## 2025-06-17 LAB
ANION GAP SERPL CALCULATED.3IONS-SCNC: 9.9 MMOL/L (ref 5–15)
BUN SERPL-MCNC: 16 MG/DL (ref 8–23)
BUN/CREAT SERPL: 17.2 (ref 7–25)
CALCIUM SPEC-SCNC: 9.4 MG/DL (ref 8.6–10.5)
CHLORIDE SERPL-SCNC: 101 MMOL/L (ref 98–107)
CO2 SERPL-SCNC: 27.1 MMOL/L (ref 22–29)
CREAT SERPL-MCNC: 0.93 MG/DL (ref 0.57–1)
DEPRECATED RDW RBC AUTO: 48.2 FL (ref 37–54)
EGFRCR SERPLBLD CKD-EPI 2021: 63.4 ML/MIN/1.73
ERYTHROCYTE [DISTWIDTH] IN BLOOD BY AUTOMATED COUNT: 13.9 % (ref 12.3–15.4)
GLUCOSE BLDC GLUCOMTR-MCNC: 141 MG/DL (ref 70–130)
GLUCOSE BLDC GLUCOMTR-MCNC: 177 MG/DL (ref 70–130)
GLUCOSE BLDC GLUCOMTR-MCNC: 250 MG/DL (ref 70–130)
GLUCOSE BLDC GLUCOMTR-MCNC: 369 MG/DL (ref 70–130)
GLUCOSE SERPL-MCNC: 164 MG/DL (ref 65–99)
HCT VFR BLD AUTO: 41.1 % (ref 34–46.6)
HGB BLD-MCNC: 13.4 G/DL (ref 12–15.9)
INR PPP: 2.65 (ref 0.9–1.1)
MCH RBC QN AUTO: 30.7 PG (ref 26.6–33)
MCHC RBC AUTO-ENTMCNC: 32.6 G/DL (ref 31.5–35.7)
MCV RBC AUTO: 94.1 FL (ref 79–97)
PLATELET # BLD AUTO: 194 10*3/MM3 (ref 140–450)
PMV BLD AUTO: 10.4 FL (ref 6–12)
POTASSIUM SERPL-SCNC: 3.6 MMOL/L (ref 3.5–5.2)
PROTHROMBIN TIME: 28.6 SECONDS (ref 11.7–14.2)
RBC # BLD AUTO: 4.37 10*6/MM3 (ref 3.77–5.28)
SODIUM SERPL-SCNC: 138 MMOL/L (ref 136–145)
WBC NRBC COR # BLD AUTO: 14.21 10*3/MM3 (ref 3.4–10.8)

## 2025-06-17 PROCEDURE — 63710000001 PREDNISONE PER 1 MG: Performed by: STUDENT IN AN ORGANIZED HEALTH CARE EDUCATION/TRAINING PROGRAM

## 2025-06-17 PROCEDURE — 94799 UNLISTED PULMONARY SVC/PX: CPT

## 2025-06-17 PROCEDURE — 80048 BASIC METABOLIC PNL TOTAL CA: CPT | Performed by: INTERNAL MEDICINE

## 2025-06-17 PROCEDURE — 94664 DEMO&/EVAL PT USE INHALER: CPT

## 2025-06-17 PROCEDURE — 85027 COMPLETE CBC AUTOMATED: CPT | Performed by: INTERNAL MEDICINE

## 2025-06-17 PROCEDURE — 85610 PROTHROMBIN TIME: CPT | Performed by: INTERNAL MEDICINE

## 2025-06-17 PROCEDURE — 25010000002 CEFTRIAXONE PER 250 MG: Performed by: INTERNAL MEDICINE

## 2025-06-17 PROCEDURE — 63710000001 INSULIN LISPRO (HUMAN) PER 5 UNITS

## 2025-06-17 PROCEDURE — 82948 REAGENT STRIP/BLOOD GLUCOSE: CPT

## 2025-06-17 RX ADMIN — CEFTRIAXONE SODIUM 1000 MG: 1 INJECTION, POWDER, FOR SOLUTION INTRAMUSCULAR; INTRAVENOUS at 13:14

## 2025-06-17 RX ADMIN — PREDNISONE 40 MG: 20 TABLET ORAL at 09:15

## 2025-06-17 RX ADMIN — METOPROLOL TARTRATE 50 MG: 25 TABLET, FILM COATED ORAL at 09:15

## 2025-06-17 RX ADMIN — DIGOXIN 125 MCG: 0.12 TABLET ORAL at 09:15

## 2025-06-17 RX ADMIN — ASPIRIN 81 MG: 81 TABLET, COATED ORAL at 09:15

## 2025-06-17 RX ADMIN — METOPROLOL TARTRATE 50 MG: 25 TABLET, FILM COATED ORAL at 22:25

## 2025-06-17 RX ADMIN — ATORVASTATIN CALCIUM 10 MG: 20 TABLET, FILM COATED ORAL at 09:15

## 2025-06-17 RX ADMIN — WARFARIN SODIUM 2.5 MG: 5 TABLET ORAL at 18:19

## 2025-06-17 RX ADMIN — Medication 10 ML: at 09:16

## 2025-06-17 RX ADMIN — GUAIFENESIN 1200 MG: 600 TABLET, EXTENDED RELEASE ORAL at 22:25

## 2025-06-17 RX ADMIN — IPRATROPIUM BROMIDE AND ALBUTEROL SULFATE 3 ML: .5; 3 SOLUTION RESPIRATORY (INHALATION) at 19:15

## 2025-06-17 RX ADMIN — LOSARTAN POTASSIUM 25 MG: 25 TABLET, FILM COATED ORAL at 09:15

## 2025-06-17 RX ADMIN — INSULIN LISPRO 2 UNITS: 100 INJECTION, SOLUTION INTRAVENOUS; SUBCUTANEOUS at 13:14

## 2025-06-17 RX ADMIN — GUAIFENESIN 1200 MG: 600 TABLET, EXTENDED RELEASE ORAL at 09:15

## 2025-06-17 RX ADMIN — Medication 10 ML: at 22:27

## 2025-06-17 RX ADMIN — GLIPIZIDE 5 MG: 5 TABLET ORAL at 09:15

## 2025-06-17 RX ADMIN — INSULIN LISPRO 8 UNITS: 100 INJECTION, SOLUTION INTRAVENOUS; SUBCUTANEOUS at 22:25

## 2025-06-17 RX ADMIN — IPRATROPIUM BROMIDE AND ALBUTEROL SULFATE 3 ML: .5; 3 SOLUTION RESPIRATORY (INHALATION) at 07:05

## 2025-06-17 RX ADMIN — LINAGLIPTIN 5 MG: 5 TABLET, FILM COATED ORAL at 09:15

## 2025-06-17 NOTE — PROGRESS NOTES
" LOS: 3 days     Name: Lana Yañez  Age: 79 y.o.  Sex: female  :  1945  MRN: [id number]         Primary Care Physician: Toni Green MD    Subjective   Subjective  On room air with nonlabored breathing.  Has a mild cough.  Reports some mild dyspnea particularly with exertion.  He is getting up with physical therapy recommend home with home health    Objective   Vital Signs  Temp:  [97.3 °F (36.3 °C)-98.9 °F (37.2 °C)] 98.9 °F (37.2 °C)  Heart Rate:  [] 77  Resp:  [16-20] 18  BP: (142-157)/(84-97) 151/91  Body mass index is 39.22 kg/m².    Objective:  General Appearance:  Comfortable and in no acute distress (Obese).    Vital signs: (most recent): Blood pressure 151/91, pulse 77, temperature 98.9 °F (37.2 °C), temperature source Oral, resp. rate 18, height 167.6 cm (66\"), weight 110 kg (243 lb), SpO2 99%, not currently breastfeeding.    Lungs:  Normal effort and normal respiratory rate.  There are decreased breath sounds.    Heart: Normal rate.  Regular rhythm.    Abdomen: Abdomen is soft.  Bowel sounds are normal.   There is no abdominal tenderness.     Extremities: There is no dependent edema or local swelling.    Neurological: Patient is alert and oriented to person, place and time.    Skin:  Warm and dry.                Results Review:       I reviewed the patient's new clinical results.    Results from last 7 days   Lab Units 25  0443 25  0705 25  1419   WBC 10*3/mm3 5.52 6.34 4.73   HEMOGLOBIN g/dL 12.5 13.0 13.2   PLATELETS 10*3/mm3 154 145 149     Results from last 7 days   Lab Units 25  0533 06/15/25  0714 25  0443 25  0705 25  1419   SODIUM mmol/L 138 136 138 140 140   POTASSIUM mmol/L 4.1 3.9 4.3 4.1 3.2*   CHLORIDE mmol/L 101 102 104 102 102   CO2 mmol/L 28.7 24.0 23.6 25.0 24.0   BUN mg/dL 20.0 17.0 11.0 9.0 7.0*   CREATININE mg/dL 1.04* 0.92 0.71 0.88 0.87   CALCIUM mg/dL 9.6 9.4 9.3 9.1 9.2   GLUCOSE mg/dL 139* 226* 238* 147* 124* "     Results from last 7 days   Lab Units 06/16/25  0533 06/15/25  0714 06/14/25  0443 06/13/25  0705 06/12/25  1419   INR  2.81* 3.63* 3.22* 2.54* 2.31*             Scheduled Meds:   aspirin, 81 mg, Oral, Daily  atorvastatin, 10 mg, Oral, Daily  cefTRIAXone, 1,000 mg, Intravenous, Q24H  digoxin, 125 mcg, Oral, Every Other Day  glipizide, 5 mg, Oral, BID AC  guaiFENesin, 1,200 mg, Oral, Q12H  insulin lispro, 2-9 Units, Subcutaneous, 4x Daily AC & at Bedtime  ipratropium-albuterol, 3 mL, Nebulization, Q6H - RT  Lidocaine, 1 patch, Transdermal, Q24H  linagliptin, 5 mg, Oral, Daily  losartan, 25 mg, Oral, Q24H  metoprolol tartrate, 50 mg, Oral, BID  sodium chloride, 10 mL, Intravenous, Q12H  warfarin, 2.5 mg, Oral, Daily      PRN Meds:     acetaminophen    albuterol    benzonatate    senna-docusate sodium **AND** polyethylene glycol **AND** bisacodyl **AND** bisacodyl    dextrose    dextrose    diphenhydrAMINE    glucagon (human recombinant)    hydrALAZINE    HYDROcodone-acetaminophen    latanoprost    melatonin    nitroglycerin    ondansetron ODT **OR** ondansetron    Pharmacy to dose warfarin    sodium chloride    sodium chloride    sodium chloride  Continuous Infusions:  Pharmacy to dose warfarin,         Assessment & Plan   Active Hospital Problems    Diagnosis  POA    **Upper respiratory infection [J06.9]  Yes    Parainfluenza [B34.8]  Yes    Rhinovirus infection [B34.8]  Unknown    Viral pneumonia [J12.9]  Unknown    Type 2 diabetes mellitus with hyperglycemia [E11.65]  Unknown    Atrial fibrillation [I48.91]  Yes    Chronic atrial fibrillation [I48.20]  Yes      Resolved Hospital Problems   No resolved problems to display.       Assessment & Plan    CT chest does reveal changes consistent with bronchopneumonia and subsegmental airway occlusion.  Continue Rocephin for treatment of secondary bacterial pneumonia.  Changed to Ceftin upon discharge to complete a 7-day course.  Continue supportive measures and  aggressive pulmonary hygiene for viral pneumonia.     Lower extremity Doppler negative for DVT.  Patient declines any further Lasix.     HTN under better control this morning.  I think the prednisone is contributing.  This will be completed today.  Continue losartan.     INR pending this morning.  Dosing as per pharmacy.     DM2 with hyperglycemia secondary to steroid and an A1c of 7              - Blood sugars well-controlled this morning.  Continue present insulin regimen     HLD on statin     Disposition: Anticipate discharge home tomorrow with home health.      Expected Discharge Date: 6/18/2025; Expected Discharge Time:      Toni Lazo MD  [facility]  06/17/25  09:48 EDT

## 2025-06-17 NOTE — PROGRESS NOTES
Enter Query Response Below      Query Response: Hyperglycemia expected              If applicable, please update the problem list.

## 2025-06-17 NOTE — PLAN OF CARE
Goal Outcome Evaluation:  Plan of Care Reviewed With: patient           Outcome Evaluation: Plan is to DC home tomorrow with families help.  VSS, RA, NSR. Up in the chair most of shift and walking in room with cane.

## 2025-06-17 NOTE — PROGRESS NOTES
[facility] Clinical Pharmacy Services: Clinical Pharmacy Consult - Warfarin Dosing/Monitoring    Results from last 7 days   Lab Units 06/17/25  0959 06/16/25  0533 06/15/25  0714 06/14/25  0443 06/13/25  0705   INR  2.65* 2.81* 3.63* 3.22* 2.54*     Dose Ordered: 2.5mg daily  Comments: No changes today, if we drop by another 0.2 or more tomorrow would increase to 3mg daily. She's already off the prednisone and once the rocephin is complete she may need to be returned to her previous dose of 5mg.    Pharmacy will continue to follow until discharge or discontinuation of warfarin.    Bethany Babinski, PharmD  Clinical Pharmacist

## 2025-06-17 NOTE — PLAN OF CARE
Goal Outcome Evaluation:  Plan of Care Reviewed With: patient        Progress: no change  Outcome Evaluation: Patient  resting  at this  time.   No  complaints  of  pain  voiced.  Alert  and  oriented.  Edema  decreasing  BNil  lower  extremity.  Room  air.  Afib  on  the  monitor.  Possible  discharge  this  am.  Nursing  will  continuie  to monitor.

## 2025-06-18 ENCOUNTER — READMISSION MANAGEMENT (OUTPATIENT)
Dept: CALL CENTER | Facility: HOSPITAL | Age: 80
End: 2025-06-18
Payer: MEDICARE

## 2025-06-18 VITALS
BODY MASS INDEX: 39.05 KG/M2 | WEIGHT: 243 LBS | RESPIRATION RATE: 14 BRPM | HEIGHT: 66 IN | HEART RATE: 82 BPM | DIASTOLIC BLOOD PRESSURE: 73 MMHG | TEMPERATURE: 98.8 F | OXYGEN SATURATION: 96 % | SYSTOLIC BLOOD PRESSURE: 149 MMHG

## 2025-06-18 LAB
GLUCOSE BLDC GLUCOMTR-MCNC: 157 MG/DL (ref 70–130)
GLUCOSE BLDC GLUCOMTR-MCNC: 158 MG/DL (ref 70–130)
INR PPP: 2.6 (ref 0.9–1.1)
PROTHROMBIN TIME: 28.2 SECONDS (ref 11.7–14.2)

## 2025-06-18 PROCEDURE — 63710000001 INSULIN LISPRO (HUMAN) PER 5 UNITS

## 2025-06-18 PROCEDURE — 94799 UNLISTED PULMONARY SVC/PX: CPT

## 2025-06-18 PROCEDURE — 82948 REAGENT STRIP/BLOOD GLUCOSE: CPT

## 2025-06-18 PROCEDURE — 85610 PROTHROMBIN TIME: CPT | Performed by: INTERNAL MEDICINE

## 2025-06-18 PROCEDURE — 94761 N-INVAS EAR/PLS OXIMETRY MLT: CPT

## 2025-06-18 RX ORDER — CEFUROXIME AXETIL 500 MG/1
500 TABLET ORAL 2 TIMES DAILY
Qty: 10 TABLET | Refills: 0 | Status: SHIPPED | OUTPATIENT
Start: 2025-06-18 | End: 2025-06-23

## 2025-06-18 RX ORDER — BENZONATATE 200 MG/1
200 CAPSULE ORAL 3 TIMES DAILY PRN
Qty: 15 CAPSULE | Refills: 0 | Status: SHIPPED | OUTPATIENT
Start: 2025-06-18

## 2025-06-18 RX ORDER — GUAIFENESIN 600 MG/1
1200 TABLET, EXTENDED RELEASE ORAL EVERY 12 HOURS SCHEDULED
Qty: 28 TABLET | Refills: 0 | Status: SHIPPED | OUTPATIENT
Start: 2025-06-18 | End: 2025-06-25

## 2025-06-18 RX ORDER — GUAIFENESIN 600 MG/1
1200 TABLET, EXTENDED RELEASE ORAL EVERY 12 HOURS SCHEDULED
Qty: 28 TABLET | Refills: 0 | Status: SHIPPED | OUTPATIENT
Start: 2025-06-18 | End: 2025-06-18

## 2025-06-18 RX ORDER — CEFUROXIME AXETIL 500 MG/1
500 TABLET ORAL 2 TIMES DAILY
Qty: 10 TABLET | Refills: 0 | Status: SHIPPED | OUTPATIENT
Start: 2025-06-18 | End: 2025-06-18

## 2025-06-18 RX ORDER — LOSARTAN POTASSIUM 25 MG/1
25 TABLET ORAL
Qty: 30 TABLET | Refills: 0 | Status: SHIPPED | OUTPATIENT
Start: 2025-06-18 | End: 2025-07-18

## 2025-06-18 RX ORDER — BENZONATATE 200 MG/1
200 CAPSULE ORAL 3 TIMES DAILY PRN
Qty: 15 CAPSULE | Refills: 0 | Status: SHIPPED | OUTPATIENT
Start: 2025-06-18 | End: 2025-06-18

## 2025-06-18 RX ORDER — LOSARTAN POTASSIUM 25 MG/1
25 TABLET ORAL
Qty: 30 TABLET | Refills: 0 | Status: SHIPPED | OUTPATIENT
Start: 2025-06-18 | End: 2025-06-18

## 2025-06-18 RX ADMIN — GUAIFENESIN 1200 MG: 600 TABLET, EXTENDED RELEASE ORAL at 08:44

## 2025-06-18 RX ADMIN — Medication 10 ML: at 08:46

## 2025-06-18 RX ADMIN — IPRATROPIUM BROMIDE AND ALBUTEROL SULFATE 3 ML: .5; 3 SOLUTION RESPIRATORY (INHALATION) at 00:10

## 2025-06-18 RX ADMIN — INSULIN LISPRO 2 UNITS: 100 INJECTION, SOLUTION INTRAVENOUS; SUBCUTANEOUS at 08:45

## 2025-06-18 RX ADMIN — ASPIRIN 81 MG: 81 TABLET, COATED ORAL at 08:44

## 2025-06-18 RX ADMIN — IPRATROPIUM BROMIDE AND ALBUTEROL SULFATE 3 ML: .5; 3 SOLUTION RESPIRATORY (INHALATION) at 07:52

## 2025-06-18 RX ADMIN — LOSARTAN POTASSIUM 25 MG: 25 TABLET, FILM COATED ORAL at 08:44

## 2025-06-18 RX ADMIN — LINAGLIPTIN 5 MG: 5 TABLET, FILM COATED ORAL at 08:46

## 2025-06-18 RX ADMIN — ATORVASTATIN CALCIUM 10 MG: 20 TABLET, FILM COATED ORAL at 08:44

## 2025-06-18 RX ADMIN — GLIPIZIDE 5 MG: 5 TABLET ORAL at 08:44

## 2025-06-18 RX ADMIN — METOPROLOL TARTRATE 50 MG: 25 TABLET, FILM COATED ORAL at 08:44

## 2025-06-18 RX ADMIN — INSULIN LISPRO 2 UNITS: 100 INJECTION, SOLUTION INTRAVENOUS; SUBCUTANEOUS at 12:21

## 2025-06-18 NOTE — PROGRESS NOTES
[facility] Clinical Pharmacy Services: Clinical Pharmacy Consult - Warfarin Dosing/Monitoring    Results from last 7 days   Lab Units 06/18/25  0617 06/17/25  0959 06/16/25  0533 06/15/25  0714 06/14/25  0443   INR  2.60* 2.65* 2.81* 3.63* 3.22*     Dose Ordered: 2.5mg  Comments: No changes for her right now.    Pharmacy will continue to follow until discharge or discontinuation of warfarin.    Bethany Babinski, PharmD  Clinical Pharmacist

## 2025-06-18 NOTE — CASE MANAGEMENT/SOCIAL WORK
Case Management Discharge Note      Final Note: Pt discharged home with her spouse.  CHRIS Luis RN         Selected Continued Care - Admitted Since 6/12/2025       Destination    No services have been selected for the patient.                Durable Medical Equipment    No services have been selected for the patient.                Dialysis/Infusion    No services have been selected for the patient.                Home Medical Care    No services have been selected for the patient.                Therapy    No services have been selected for the patient.                Community Resources    No services have been selected for the patient.                Community & DME    No services have been selected for the patient.                    Transportation Services  Private: Car    Final Discharge Disposition Code: 01 - home or self-care

## 2025-06-18 NOTE — PLAN OF CARE
Goal Outcome Evaluation:  Plan of Care Reviewed With: patient        Progress: improving  Outcome Evaluation: Patient  resting  at vthis  time.  No complaints  of  pain  voiced.  Still  has productive  cough.    Doing  much  better.  Room  air.  Afib  on  the  monirtor.  Possible  discharge  today.  [employer]  will continue to  monitor

## 2025-06-18 NOTE — DISCHARGE SUMMARY
Date of Admission: 6/12/2025  Date of Discharge:  6/18/2025  Primary Care Physician: Toni Green MD     Discharge Diagnosis:  Active Hospital Problems    Diagnosis  POA    **Viral pneumonia [J12.9]  Unknown    Parainfluenza [B34.8]  Yes    Rhinovirus infection [B34.8]  Unknown    Type 2 diabetes mellitus with hyperglycemia [E11.65]  Unknown    Upper respiratory infection [J06.9]  Yes    Bacterial pneumonia [J15.9]  Yes    Atrial fibrillation [I48.91]  Yes    Chronic atrial fibrillation [I48.20]  Yes      Resolved Hospital Problems   No resolved problems to display.       DETAILS OF HOSPITAL STAY     Pertinent Test Results and Procedures Performed    Chest CT:  1. Ill-defined bronchovascular opacities in the posterior left upper   lobe and in the superior and posterior segment of the left lower lobe   with surrounding centrilobular and tree-in-bud nodules. Suspect   bronchopneumonia. Recommend follow-up to resolution.   2. Subsegmental airway occlusions in the lingula and central and   peripheral airway occlusions seen in the left lower lobe. Recommend   follow-up to clearing.   3. Mild cardiomegaly with coronary artery atherosclerotic calcifications   and aortic valve calcification.   4. Mildly prominent mediastinal lymph nodes, suspect reactive.     Left lower extremity Doppler negative for DVT    Hospital Course  This is a 79-year-old female who presented to the emergency room and was admitted to the observation unit for shortness of breath and viral pneumonia.  Please see H&P for full details.  Her hospitalization became prolonged and she was formally admitted to the hospitalist service for further care.  She had a CT scan of the chest which is described above.  She was placed on Rocephin for suspected secondary bacterial pneumonia.  She is now overall significantly improved.  She will complete a 7-day course of antibiotics and continue supportive measures for the viral respiratory infection.  She was  given a 5-day course of prednisone that exacerbated her hypertension and diabetes.  Steroids have now been discontinued and these are coming under better control.  She was started on losartan during this admission.  She will resume her outpatient oral diabetic regimen at home.  Her INR did become supratherapeutic for a few days but is now back within goal range of 2-3 and she will continue her outpatient regimen of Coumadin and I have asked her to have a repeat INR checked on Friday of this week.  At this point she is medically stable and will be discharged home.    Physical Exam at Discharge:  General: No acute distress, AAOx3  HEENT: EOMI, PERRL  Cardiovascular: +s1 and s2, RRR  Lungs: No rhonchi or wheezing  Abdomen: soft, nontender    Consults:   Consults       Date and Time Order Name Status Description    6/14/2025 10:49 AM Inpatient Hospitalist Consult                Condition on Discharge: Stable, improved    Discharge Disposition  Home or Self Care    Discharge Medications     Discharge Medications        New Medications        Instructions Start Date   benzonatate 200 MG capsule  Commonly known as: TESSALON   200 mg, Oral, 3 Times Daily PRN      cefuroxime 500 MG tablet  Commonly known as: CEFTIN   500 mg, Oral, 2 Times Daily      guaiFENesin 600 MG 12 hr tablet  Commonly known as: MUCINEX   1,200 mg, Oral, Every 12 Hours Scheduled      losartan 25 MG tablet  Commonly known as: COZAAR   25 mg, Oral, Every 24 Hours Scheduled             Changes to Medications        Instructions Start Date   glucose blood test strip  What changed: additional instructions   Test twice daily prior to breakfast and dinner as instructed      warfarin 5 MG tablet  Commonly known as: COUMADIN  What changed:   how much to take  how to take this  when to take this  additional instructions   Take one-half tablet (2.5 mg) by mouth on Fridays, and take one tablet (5 mg) by mouth all other days or as directed.             Continue  These Medications        Instructions Start Date   Accu-Chek FastClix Lancets misc   Use to check glucose as directed      Accu-Chek Guide w/Device kit   See Admin Instructions      acetaminophen 325 MG tablet  Commonly known as: TYLENOL   650 mg, Every 6 Hours PRN      albuterol sulfate  (90 Base) MCG/ACT inhaler  Commonly known as: PROVENTIL HFA;VENTOLIN HFA;PROAIR HFA   2 puffs, Inhalation, Every 4 Hours PRN, PRN SOA/WHEEZING      aspirin 81 MG EC tablet   81 mg, Oral, Daily      atorvastatin 10 MG tablet  Commonly known as: LIPITOR   10 mg, Oral, Daily      Cholecalciferol 50 MCG (2000 UT) capsule   50 mcg, Weekly      digoxin 125 MCG tablet  Commonly known as: LANOXIN   125 mcg, Oral, Every Other Day      DropSafe Alcohol Prep 70 % pads       furosemide 20 MG tablet  Commonly known as: LASIX   20 mg, Daily PRN      glimepiride 2 MG tablet  Commonly known as: Amaryl   By mouth take one tab twice daily with AM and PM meals.      HYDROcodone-acetaminophen 7.5-325 MG per tablet  Commonly known as: NORCO   1 tablet, Oral, Every 6 Hours PRN      Januvia 50 MG tablet  Generic drug: SITagliptin   Take 2 tablets by mouth Daily.      Lidocaine 4 %   1 patch, Transdermal, Every 24 Hours Scheduled, Remove & Discard patch within 12 hours or as directed by MD Paredes 0.01 % ophthalmic drops  Generic drug: bimatoprost   1 drop, Nightly PRN      metoprolol tartrate 50 MG tablet  Commonly known as: LOPRESSOR   50 mg, 2 Times Daily      polyethylene glycol 17 g packet  Commonly known as: MIRALAX   17 g, Daily PRN      potassium chloride 10 MEQ CR capsule  Commonly known as: MICRO-K   10 mEq, 2 Times Daily PRN      vitamin B-12 1000 MCG tablet  Commonly known as: CYANOCOBALAMIN   6,000 mcg, Daily               Discharge Diet:   Diet Instructions       Diet: Regular/House Diet, Cardiac Diets, Diabetic Diets; Healthy Heart (2-3 Na+); Regular (IDDSI 7); Thin (IDDSI 0); Consistent Carbohydrate      Discharge Diet:   Regular/House Diet  Cardiac Diets  Diabetic Diets       Cardiac Diet: Healthy Heart (2-3 Na+)    Texture: Regular (IDDSI 7)    Fluid Consistency: Thin (IDDSI 0)    Diabetic Diet: Consistent Carbohydrate            Activity at Discharge:   Activity Instructions       Activity as Tolerated              Follow-up Appointments  Future Appointments   Date Time Provider Department Center   8/8/2025 11:40 AM Pia Dueñas MD [id number]     Additional Instructions for the Follow-ups that You Need to Schedule       Discharge Follow-up with PCP   As directed       Currently Documented PCP:    Toni Green MD    PCP Phone Number:    [phone]     Follow Up Details: 1 week                I have examined and discussed discharge planning with the patient today.    Toni Lazo MD  06/18/25  09:47 EDT    Time: Discharge greater than 30 min

## 2025-06-18 NOTE — CASE MANAGEMENT/SOCIAL WORK
Discharge Planning Assessment  [facility]     Patient Name: Lana Yañez  MRN: [id number]  Today's Date: 6/18/2025    Admit Date: 6/12/2025    Plan: Plan home with spouse.  CHRIS Luis RN   Discharge Needs Assessment    No documentation.                  Discharge Plan       Row Name 06/18/25 1626       Plan    Plan Comments Call  received from Sarah requesting assistance placing referral to [facility]; she noted that patient is agreeable to referral- sent referral for caregiving asistance in the home      Row Name 06/18/25 1336       Plan    Final Discharge Disposition Code 01 - home or self-care    Final Note Pt discharged home with her spouse.  CHRIS Luis RN                    Expected Discharge Date and Time       Expected Discharge Date Expected Discharge Time    Jun 18, 2025            Demographic Summary    No documentation.                  Functional Status    No documentation.                  Psychosocial    No documentation.                  Abuse/Neglect    No documentation.                  Legal    No documentation.                  Substance Abuse    No documentation.                  Patient Forms    No documentation.                     Kassi Duarte RN

## 2025-06-18 NOTE — NURSING NOTE
Meds to Bed changed to [facility] per pt's request.  IV removed.  VSS, RA. NSR.  Family to  this afternoon as transport to home.

## 2025-06-18 NOTE — CASE MANAGEMENT/SOCIAL WORK
Continued Stay Note  [facility]     Patient Name: Lana Yañez  MRN: [id number]  Today's Date: 6/18/2025    Admit Date: 6/12/2025    Plan: Plan home with spouse.  CHRIS Luis RN   Discharge Plan       Row Name 06/18/25 1306       Plan    Plan Plan home with spouse.  CHRIS Luis RN    Patient/Family in Agreement with Plan yes    Plan Comments Referral placed to [facility] to evaluate pt for additional services.  Pt instructed to call [facility] regarding meals post hospitalization.  Pt provided information on Food Insecurity Options.   Plan home with spouse.   CHRIS Luis RN                   Discharge Codes    No documentation.                 Expected Discharge Date and Time       Expected Discharge Date Expected Discharge Time    Jun 18, 2025               Cheyenne Luis RN

## 2025-06-19 NOTE — OUTREACH NOTE
Prep Survey      Flowsheet Row Responses   [facility] patient discharged from? [place]   Is LACE score < 7 ? No   Eligibility Readm Mgmt   Discharge diagnosis Viral pneumonia   Does the patient have one of the following disease processes/diagnoses(primary or secondary)? Other   Does the patient have Home health ordered? No   Is there a DME ordered? No   Prep survey completed? Yes            Evangelina BOWERS - Registered Nurse

## 2025-06-24 ENCOUNTER — READMISSION MANAGEMENT (OUTPATIENT)
Dept: CALL CENTER | Facility: HOSPITAL | Age: 80
End: 2025-06-24
Payer: MEDICARE

## 2025-06-24 NOTE — OUTREACH NOTE
Medical Week 1 Survey      Flowsheet Row Responses   [facility] patient discharged from? [place]   Does the patient have one of the following disease processes/diagnoses(primary or secondary)? Other   Week 1 attempt successful? No   Unsuccessful attempts Attempt 1  [pt declines call at present time as she is on another call]   Week 1 call completed? Yes            MELISA SHAFFER - Registered Nurse

## 2025-06-30 ENCOUNTER — READMISSION MANAGEMENT (OUTPATIENT)
Dept: CALL CENTER | Facility: HOSPITAL | Age: 80
End: 2025-06-30
Payer: MEDICARE

## 2025-06-30 NOTE — OUTREACH NOTE
Medical Week 2 Survey      Flowsheet Row Responses   [facility] patient discharged from? [place]   Does the patient have one of the following disease processes/diagnoses(primary or secondary)? Other   Week 2 attempt successful? No   Unsuccessful attempts Attempt 1            AMANDEEP WILLIAM - Registered Nurse

## 2025-07-16 ENCOUNTER — READMISSION MANAGEMENT (OUTPATIENT)
Dept: CALL CENTER | Facility: HOSPITAL | Age: 80
End: 2025-07-16
Payer: MEDICARE

## 2025-07-16 NOTE — OUTREACH NOTE
Medical Week 3 Survey      Flowsheet Row Responses   [facility] patient discharged from? [place]   Does the patient have one of the following disease processes/diagnoses(primary or secondary)? Other   Week 3 attempt successful? Yes   Call start time 1325   Call end time 1334   Discharge diagnosis Viral pneumonia   Meds reviewed with patient/caregiver? Yes   Is the patient having any side effects they believe may be caused by any medication additions or changes? Yes   Side effects comments  Losartan made her dizzy, PCP stopped that medication, pt reports.   Does the patient have all medications ordered at discharge? Yes   Is the patient taking all medications as directed (includes completed medication regime)? Yes   Has the patient kept scheduled appointments due by today? Yes   Psychosocial comments Pt's sister passed away yesterday unexpectidly   Comments Lower back pain continues, denies cough but pt c/o fluttering in chest since stress of sister's passing yesterday. Glucose levels WNL, pt reports.Pt c/o LE edema despite taking Lasix, pt educated on importance of morning wts, when to contact MD. Pt denies SOA, fever/chills or cough   What is the patient's perception of their health status since discharge? Improving   Is the patient/caregiver able to teach back signs and symptoms related to disease process for when to call PCP? Yes   Is the patient/caregiver able to teach back signs and symptoms related to disease process for when to call 911? Yes   Week 3 Call Completed? Yes   Graduated Yes   Call end time 1334            Carmela QUIROGA - Registered Nurse

## 2025-07-28 ENCOUNTER — TELEPHONE (OUTPATIENT)
Dept: PHARMACY | Facility: HOSPITAL | Age: 80
End: 2025-07-28
Payer: MEDICARE

## 2025-07-28 NOTE — TELEPHONE ENCOUNTER
Overdue for INR. S/w patient. She is agreeable to INR check on 8/5/25 to coordinate with another appointment nearby.

## 2025-08-05 ENCOUNTER — OFFICE VISIT (OUTPATIENT)
Dept: ORTHOPEDIC SURGERY | Facility: CLINIC | Age: 80
End: 2025-08-05
Payer: MEDICARE

## 2025-08-05 ENCOUNTER — ANTICOAGULATION VISIT (OUTPATIENT)
Dept: PHARMACY | Facility: HOSPITAL | Age: 80
End: 2025-08-05
Payer: MEDICARE

## 2025-08-05 VITALS — HEIGHT: 66 IN | TEMPERATURE: 97.8 F | WEIGHT: 251.1 LBS | BODY MASS INDEX: 40.36 KG/M2

## 2025-08-05 DIAGNOSIS — M25.512 CHRONIC LEFT SHOULDER PAIN: ICD-10-CM

## 2025-08-05 DIAGNOSIS — G89.29 CHRONIC LEFT SHOULDER PAIN: ICD-10-CM

## 2025-08-05 DIAGNOSIS — M19.019 GLENOHUMERAL ARTHRITIS: Primary | ICD-10-CM

## 2025-08-05 LAB
INR PPP: 1.2 (ref 0.91–1.09)
PROTHROMBIN TIME: 14.9 SECONDS (ref 10–13.8)

## 2025-08-05 PROCEDURE — 85610 PROTHROMBIN TIME: CPT

## 2025-08-05 PROCEDURE — G0463 HOSPITAL OUTPT CLINIC VISIT: HCPCS

## 2025-08-05 RX ORDER — LIDOCAINE HYDROCHLORIDE 10 MG/ML
2 INJECTION, SOLUTION EPIDURAL; INFILTRATION; INTRACAUDAL; PERINEURAL
Status: COMPLETED | OUTPATIENT
Start: 2025-08-05 | End: 2025-08-05

## 2025-08-05 RX ORDER — METHYLPREDNISOLONE ACETATE 40 MG/ML
80 INJECTION, SUSPENSION INTRA-ARTICULAR; INTRALESIONAL; INTRAMUSCULAR; SOFT TISSUE
Status: COMPLETED | OUTPATIENT
Start: 2025-08-05 | End: 2025-08-05

## 2025-08-05 RX ADMIN — METHYLPREDNISOLONE ACETATE 80 MG: 40 INJECTION, SUSPENSION INTRA-ARTICULAR; INTRALESIONAL; INTRAMUSCULAR; SOFT TISSUE at 13:30

## 2025-08-05 RX ADMIN — LIDOCAINE HYDROCHLORIDE 2 ML: 10 INJECTION, SOLUTION EPIDURAL; INFILTRATION; INTRACAUDAL; PERINEURAL at 13:30

## 2025-08-11 ENCOUNTER — OFFICE VISIT (OUTPATIENT)
Age: 80
End: 2025-08-11
Payer: MEDICARE

## 2025-08-11 VITALS — BODY MASS INDEX: 40.34 KG/M2 | WEIGHT: 251 LBS | RESPIRATION RATE: 20 BRPM | HEIGHT: 66 IN

## 2025-08-11 DIAGNOSIS — I83.029 VENOUS ULCER OF LEFT LEG: ICD-10-CM

## 2025-08-11 DIAGNOSIS — G63 POLYNEUROPATHY ASSOCIATED WITH UNDERLYING DISEASE: ICD-10-CM

## 2025-08-11 DIAGNOSIS — E11.65 TYPE 2 DIABETES MELLITUS WITH HYPERGLYCEMIA, WITHOUT LONG-TERM CURRENT USE OF INSULIN: Primary | Chronic | ICD-10-CM

## 2025-08-11 DIAGNOSIS — I89.0 LYMPHEDEMA: ICD-10-CM

## 2025-08-11 DIAGNOSIS — L97.929 VENOUS ULCER OF LEFT LEG: ICD-10-CM

## 2025-08-11 PROCEDURE — 1160F RVW MEDS BY RX/DR IN RCRD: CPT | Performed by: PODIATRIST

## 2025-08-11 PROCEDURE — 29581 APPL MULTLAYER CMPRN SYS LEG: CPT | Performed by: PODIATRIST

## 2025-08-11 PROCEDURE — 99203 OFFICE O/P NEW LOW 30 MIN: CPT | Performed by: PODIATRIST

## 2025-08-11 PROCEDURE — 1159F MED LIST DOCD IN RCRD: CPT | Performed by: PODIATRIST

## 2025-08-18 ENCOUNTER — OFFICE VISIT (OUTPATIENT)
Age: 80
End: 2025-08-18
Payer: MEDICARE

## 2025-08-18 VITALS — WEIGHT: 251 LBS | BODY MASS INDEX: 40.34 KG/M2 | RESPIRATION RATE: 20 BRPM | HEIGHT: 66 IN

## 2025-08-18 DIAGNOSIS — I83.029 VENOUS ULCER OF LEFT LEG: ICD-10-CM

## 2025-08-18 DIAGNOSIS — E11.65 TYPE 2 DIABETES MELLITUS WITH HYPERGLYCEMIA, WITHOUT LONG-TERM CURRENT USE OF INSULIN: Primary | ICD-10-CM

## 2025-08-18 DIAGNOSIS — I89.0 LYMPHEDEMA: ICD-10-CM

## 2025-08-18 DIAGNOSIS — G63 POLYNEUROPATHY ASSOCIATED WITH UNDERLYING DISEASE: ICD-10-CM

## 2025-08-18 DIAGNOSIS — L97.929 VENOUS ULCER OF LEFT LEG: ICD-10-CM

## 2025-08-18 PROCEDURE — 1160F RVW MEDS BY RX/DR IN RCRD: CPT | Performed by: PODIATRIST

## 2025-08-18 PROCEDURE — 1159F MED LIST DOCD IN RCRD: CPT | Performed by: PODIATRIST

## 2025-08-18 PROCEDURE — 99213 OFFICE O/P EST LOW 20 MIN: CPT | Performed by: PODIATRIST

## (undated) DEVICE — GW EMR FIX EXCHG J STD .035 3MM 260CM

## (undated) DEVICE — CATH VENT MIV RADL PIG ST TIP 5F 110CM

## (undated) DEVICE — GLIDESHEATH BASIC HYDROPHILIC COATED INTRODUCER SHEATH: Brand: GLIDESHEATH

## (undated) DEVICE — CATH DIAG IMPULSE FL3.5 5F 100CM

## (undated) DEVICE — Device

## (undated) DEVICE — KT MANIFLD CARDIAC

## (undated) DEVICE — CATH DIAG IMPULSE FR4 5F 100CM

## (undated) DEVICE — PK CATH CARD 40